# Patient Record
Sex: MALE | Race: WHITE | Employment: UNEMPLOYED | URBAN - METROPOLITAN AREA
[De-identification: names, ages, dates, MRNs, and addresses within clinical notes are randomized per-mention and may not be internally consistent; named-entity substitution may affect disease eponyms.]

---

## 2017-03-11 ENCOUNTER — HOSPITAL ENCOUNTER (EMERGENCY)
Age: 52
Discharge: HOME | End: 2017-03-11
Attending: EMERGENCY MEDICINE
Payer: SELF-PAY

## 2017-03-11 ENCOUNTER — APPOINTMENT (OUTPATIENT)
Dept: GENERAL RADIOLOGY | Age: 52
End: 2017-03-11
Attending: EMERGENCY MEDICINE
Payer: SELF-PAY

## 2017-03-11 VITALS
SYSTOLIC BLOOD PRESSURE: 180 MMHG | WEIGHT: 250 LBS | BODY MASS INDEX: 33.86 KG/M2 | HEIGHT: 72 IN | OXYGEN SATURATION: 94 % | TEMPERATURE: 98 F | DIASTOLIC BLOOD PRESSURE: 96 MMHG | HEART RATE: 105 BPM | RESPIRATION RATE: 18 BRPM

## 2017-03-11 DIAGNOSIS — M16.10 ARTHRITIS PAIN OF HIP: Primary | ICD-10-CM

## 2017-03-11 PROCEDURE — 74011250637 HC RX REV CODE- 250/637: Performed by: EMERGENCY MEDICINE

## 2017-03-11 PROCEDURE — 73502 X-RAY EXAM HIP UNI 2-3 VIEWS: CPT

## 2017-03-11 PROCEDURE — 96372 THER/PROPH/DIAG INJ SC/IM: CPT | Performed by: EMERGENCY MEDICINE

## 2017-03-11 PROCEDURE — 74011250636 HC RX REV CODE- 250/636: Performed by: EMERGENCY MEDICINE

## 2017-03-11 PROCEDURE — 99284 EMERGENCY DEPT VISIT MOD MDM: CPT | Performed by: EMERGENCY MEDICINE

## 2017-03-11 RX ORDER — HYDROMORPHONE HYDROCHLORIDE 1 MG/ML
2 INJECTION, SOLUTION INTRAMUSCULAR; INTRAVENOUS; SUBCUTANEOUS
Status: COMPLETED | OUTPATIENT
Start: 2017-03-11 | End: 2017-03-11

## 2017-03-11 RX ORDER — ONDANSETRON 4 MG/1
4 TABLET, ORALLY DISINTEGRATING ORAL
Status: COMPLETED | OUTPATIENT
Start: 2017-03-11 | End: 2017-03-11

## 2017-03-11 RX ORDER — CELECOXIB 200 MG/1
200 CAPSULE ORAL DAILY
Qty: 30 CAP | Refills: 0 | Status: SHIPPED | OUTPATIENT
Start: 2017-03-11 | End: 2018-01-24

## 2017-03-11 RX ADMIN — HYDROMORPHONE HYDROCHLORIDE 2 MG: 1 INJECTION, SOLUTION INTRAMUSCULAR; INTRAVENOUS; SUBCUTANEOUS at 20:20

## 2017-03-11 RX ADMIN — ONDANSETRON 4 MG: 4 TABLET, ORALLY DISINTEGRATING ORAL at 20:19

## 2017-03-11 NOTE — ED TRIAGE NOTES
Patient arrives via EMS from home. States broke right hip 3-4 years ago and did not have money to have surgery. States he \"over did\" it today with ambulating. VSS for ems, but hypertensive.

## 2017-03-12 NOTE — ED NOTES
Patient awake, A&O x3. Patient states he broke his hip 3-4 years ago and never had it fixed. Patient states that today he \"overdid it\" and is now having 10/10, sharp stabbing right hip pain. Patient denies numbness/tingling. Patient is concerned it may be re-broken.

## 2017-03-12 NOTE — DISCHARGE INSTRUCTIONS
Hip Pain: Care Instructions  Your Care Instructions  Hip pain may be caused by many things, including overuse, a fall, or a twisting movement. Another cause of hip pain is arthritis. Your pain may increase when you stand up, walk, or squat. The pain may come and go or may be constant. Home treatment can help relieve hip pain, swelling, and stiffness. If your pain is ongoing, you may need more tests and treatment. Follow-up care is a key part of your treatment and safety. Be sure to make and go to all appointments, and call your doctor if you are having problems. Its also a good idea to know your test results and keep a list of the medicines you take. How can you care for yourself at home? · Take pain medicines exactly as directed. ¨ If the doctor gave you a prescription medicine for pain, take it as prescribed. ¨ If you are not taking a prescription pain medicine, ask your doctor if you can take an over-the-counter medicine. · Rest and protect your hip. Take a break from any activity, including standing or walking, that may cause pain. · Put ice or a cold pack against your hip for 10 to 20 minutes at a time. Try to do this every 1 to 2 hours for the next 3 days (when you are awake) or until the swelling goes down. Put a thin cloth between the ice and your skin. · Sleep on your healthy side with a pillow between your knees, or sleep on your back with pillows under your knees. · If there is no swelling, you can put moist heat, a heating pad, or a warm cloth on your hip. Do gentle stretching exercises to help keep your hip flexible. · Learn how to prevent falls. Have your vision and hearing checked regularly. Wear slippers or shoes with a nonskid sole. · Stay at a healthy weight. · Wear comfortable shoes. When should you call for help? Call 911 anytime you think you may need emergency care. For example, call if:  · You have sudden chest pain and shortness of breath, or you cough up blood.   · You are not able to stand or walk or bear weight. · Your buttocks, legs, or feet feel numb or tingly. · Your leg or foot is cool or pale or changes color. · You have severe pain. Call your doctor now or seek immediate medical care if:  · You have signs of infection, such as:  ¨ Increased pain, swelling, warmth, or redness in the hip area. ¨ Red streaks leading from the hip area. ¨ Pus draining from the hip area. ¨ A fever. · You have signs of a blood clot, such as:  ¨ Pain in your calf, back of the knee, thigh, or groin. ¨ Redness and swelling in your leg or groin. · You are not able to bend, straighten, or move your leg normally. · You have trouble urinating or having bowel movements. Watch closely for changes in your health, and be sure to contact your doctor if:  · You do not get better as expected. Where can you learn more? Go to http://regina-jacy.info/. Enter O038 in the search box to learn more about \"Hip Pain: Care Instructions. \"  Current as of: May 27, 2016  Content Version: 11.1  © 2799-1098 [employer]. Care instructions adapted under license by [employer] (which disclaims liability or warranty for this information). If you have questions about a medical condition or this instruction, always ask your healthcare professional. [place] [address] any warranty or liability for your use of this information.

## 2017-03-12 NOTE — ED NOTES
I have reviewed medications, follow up provider options, and discharge instructions with the patient and spouse. The patient and spouse verbalized understanding. Copy of discharge information given to spouse upon discharge. Prescription(s) given to spouse. Patient discharged in no distress. Patient taken via wheelchair to waiting area.  No questions at this time

## 2017-03-12 NOTE — ED PROVIDER NOTES
HPI Comments: 69-year-old male reports chronic pain in his right hip for the past 3 years since falling out of a hammock. He's had several emergency department visits to [facility], [address] [place] [address], and [facility], according to the patient over the years. He states he has been told that his hip is \"so broken that it is amazing that he is walking. \"  He states he has never seen a orthopedist because he did not have insurance. He takes BC powder for pain. Pain worsened today. No new injury, fever, redness, rash. No numbness or weakness. Patient is a 46 y.o. male presenting with hip pain. The history is provided by the patient. Hip Injury    Pertinent negatives include no numbness and no back pain. Past Medical History:   Diagnosis Date    Sleep disorder        No past surgical history on file. No family history on file. Social History     Social History    Marital status:      Spouse name: N/A    Number of children: N/A    Years of education: N/A     Occupational History    Not on file. Social History Main Topics    Smoking status: Current Every Day Smoker    Smokeless tobacco: Not on file    Alcohol use No    Drug use: No    Sexual activity: Not on file     Other Topics Concern    Not on file     Social History Narrative    No narrative on file         ALLERGIES: Review of patient's allergies indicates no known allergies. Review of Systems   Constitutional: Negative for fever. Genitourinary: Negative for difficulty urinating and dysuria. Musculoskeletal: Positive for arthralgias. Negative for back pain and joint swelling. Skin: Negative for wound. Neurological: Negative for weakness and numbness. Vitals:    03/11/17 1827   BP: (!) 186/98   Pulse: 94   Resp: 18   Temp: 97.8 °F (36.6 °C)   SpO2: 95%   Weight: 113.4 kg (250 lb)   Height: 6' (1.829 m)            Physical Exam   Constitutional: No distress.    morbid obesity   HENT:   Head: Normocephalic and atraumatic. Eyes: Conjunctivae and EOM are normal. Pupils are equal, round, and reactive to light. Neck: Neck supple. Musculoskeletal:        Right hip: He exhibits decreased range of motion and tenderness. He exhibits no swelling, no crepitus and no deformity. Legs:  Neurological: He is alert. He has normal strength. No sensory deficit. He exhibits normal muscle tone. Skin: Skin is warm and dry. No rash noted. No erythema. Psychiatric: He has a normal mood and affect. Nursing note and vitals reviewed. MDM  Number of Diagnoses or Management Options  Diagnosis management comments: Parts of this document were created using dragon voice recognition software. The chart has been reviewed but errors may still be present. MRI in 2015 showed advanced arthritis. Will treat pain and check xray. Again expressed importance of ortho fu for definitive treatment. 9:12 PM  Left information with  for assistance. Pain improved. Amount and/or Complexity of Data Reviewed  Tests in the radiology section of CPT®: ordered and reviewed (Xr Hip Rt W Or Wo Pelv 2-3 Vws    Result Date: 3/11/2017  Exam: AP view of the pelvis, and AP and lateral views of the right hip Indication:  Right hip pain Comparison: Right hip radiograph from May 11, 2015 Findings: No definite evidence of acute fracture or dislocation. There is severe degenerative change in the right hip with joint space narrowing, osteophyte formation, and subchondral cystic change. Mild degenerative change in the lower lumbar spine. SI joints and symphysis pubis are within normal limits. No radiopaque foreign body or significant soft tissue abnormality is identified. Impression: Severe degenerative change in the right hip without evidence of acute fracture.  If patient is nonweightbearing or there remains clinical concern for fracture an MRI would be more sensitive.    )  Tests in the medicine section of CPT®: ordered and reviewed      ED Course       Procedures

## 2018-01-06 ENCOUNTER — HOSPITAL ENCOUNTER (INPATIENT)
Age: 53
LOS: 18 days | Discharge: HOME | DRG: 871 | End: 2018-01-24
Attending: EMERGENCY MEDICINE | Admitting: FAMILY MEDICINE
Payer: SELF-PAY

## 2018-01-06 ENCOUNTER — APPOINTMENT (OUTPATIENT)
Dept: CT IMAGING | Age: 53
DRG: 871 | End: 2018-01-06
Attending: EMERGENCY MEDICINE
Payer: SELF-PAY

## 2018-01-06 ENCOUNTER — APPOINTMENT (OUTPATIENT)
Dept: GENERAL RADIOLOGY | Age: 53
DRG: 871 | End: 2018-01-06
Attending: EMERGENCY MEDICINE
Payer: SELF-PAY

## 2018-01-06 DIAGNOSIS — J96.21 ACUTE ON CHRONIC RESPIRATORY FAILURE WITH HYPOXIA AND HYPERCAPNIA (HCC): ICD-10-CM

## 2018-01-06 DIAGNOSIS — I50.9 ACUTE CONGESTIVE HEART FAILURE, UNSPECIFIED CONGESTIVE HEART FAILURE TYPE: Primary | ICD-10-CM

## 2018-01-06 DIAGNOSIS — Z59.00 HOMELESS: ICD-10-CM

## 2018-01-06 DIAGNOSIS — R60.1 ANASARCA: ICD-10-CM

## 2018-01-06 DIAGNOSIS — G47.33 OSA (OBSTRUCTIVE SLEEP APNEA): Chronic | ICD-10-CM

## 2018-01-06 DIAGNOSIS — F17.200 NICOTINE DEPENDENCE, UNCOMPLICATED, UNSPECIFIED NICOTINE PRODUCT TYPE: Chronic | ICD-10-CM

## 2018-01-06 DIAGNOSIS — A41.9 SEVERE SEPSIS (HCC): ICD-10-CM

## 2018-01-06 DIAGNOSIS — J96.22 ACUTE ON CHRONIC RESPIRATORY FAILURE WITH HYPOXIA AND HYPERCAPNIA (HCC): ICD-10-CM

## 2018-01-06 DIAGNOSIS — N35.9 URETHRAL STRICTURE, UNSPECIFIED STRICTURE TYPE: ICD-10-CM

## 2018-01-06 DIAGNOSIS — G93.41 ACUTE METABOLIC ENCEPHALOPATHY: ICD-10-CM

## 2018-01-06 DIAGNOSIS — R65.20 SEVERE SEPSIS (HCC): ICD-10-CM

## 2018-01-06 DIAGNOSIS — J44.9 CHRONIC OBSTRUCTIVE PULMONARY DISEASE, UNSPECIFIED COPD TYPE (HCC): Chronic | ICD-10-CM

## 2018-01-06 DIAGNOSIS — F15.10 METHAMPHETAMINE ABUSE (HCC): ICD-10-CM

## 2018-01-06 DIAGNOSIS — F10.10 ALCOHOL ABUSE: Chronic | ICD-10-CM

## 2018-01-06 DIAGNOSIS — N17.9 ACUTE KIDNEY INJURY (HCC): ICD-10-CM

## 2018-01-06 DIAGNOSIS — J18.9 COMMUNITY ACQUIRED PNEUMONIA OF RIGHT LOWER LOBE OF LUNG: ICD-10-CM

## 2018-01-06 DIAGNOSIS — I95.9 HYPOTENSION, UNSPECIFIED HYPOTENSION TYPE: ICD-10-CM

## 2018-01-06 DIAGNOSIS — J96.21 ACUTE ON CHRONIC RESPIRATORY FAILURE WITH HYPOXIA (HCC): ICD-10-CM

## 2018-01-06 PROBLEM — J96.01 ACUTE RESPIRATORY FAILURE WITH HYPOXIA (HCC): Status: ACTIVE | Noted: 2018-01-06

## 2018-01-06 PROBLEM — R09.02 HYPOXIA: Status: ACTIVE | Noted: 2018-01-06

## 2018-01-06 LAB
ALBUMIN SERPL-MCNC: 3.5 G/DL (ref 3.5–5)
ALBUMIN/GLOB SERPL: 0.8 {RATIO} (ref 1.2–3.5)
ALP SERPL-CCNC: 98 U/L (ref 50–136)
ALT SERPL-CCNC: 32 U/L (ref 12–65)
ANION GAP SERPL CALC-SCNC: 5 MMOL/L (ref 7–16)
AST SERPL-CCNC: 29 U/L (ref 15–37)
BASOPHILS # BLD: 0 K/UL (ref 0–0.2)
BASOPHILS NFR BLD: 0 % (ref 0–2)
BILIRUB SERPL-MCNC: 0.4 MG/DL (ref 0.2–1.1)
BNP SERPL-MCNC: 533 PG/ML
BUN SERPL-MCNC: 24 MG/DL (ref 6–23)
CALCIUM SERPL-MCNC: 8.8 MG/DL (ref 8.3–10.4)
CHLORIDE SERPL-SCNC: 101 MMOL/L (ref 98–107)
CO2 SERPL-SCNC: 35 MMOL/L (ref 21–32)
CREAT SERPL-MCNC: 1.54 MG/DL (ref 0.8–1.5)
D DIMER PPP FEU-MCNC: 1.26 UG/ML(FEU)
DIFFERENTIAL METHOD BLD: ABNORMAL
EOSINOPHIL # BLD: 0.1 K/UL (ref 0–0.8)
EOSINOPHIL NFR BLD: 1 % (ref 0.5–7.8)
ERYTHROCYTE [DISTWIDTH] IN BLOOD BY AUTOMATED COUNT: 13.8 % (ref 11.9–14.6)
ETHANOL SERPL-MCNC: <3 MG/DL
GLOBULIN SER CALC-MCNC: 4.3 G/DL (ref 2.3–3.5)
GLUCOSE SERPL-MCNC: 101 MG/DL (ref 65–100)
HCT VFR BLD AUTO: 48.2 % (ref 41.1–50.3)
HGB BLD-MCNC: 14.6 G/DL (ref 13.6–17.2)
IMM GRANULOCYTES # BLD: 0 K/UL (ref 0–0.5)
IMM GRANULOCYTES NFR BLD AUTO: 0 % (ref 0–5)
LACTATE BLD-SCNC: 1.3 MMOL/L (ref 0.5–1.9)
LYMPHOCYTES # BLD: 1.7 K/UL (ref 0.5–4.6)
LYMPHOCYTES NFR BLD: 21 % (ref 13–44)
MCH RBC QN AUTO: 33.2 PG (ref 26.1–32.9)
MCHC RBC AUTO-ENTMCNC: 30.3 G/DL (ref 31.4–35)
MCV RBC AUTO: 109.5 FL (ref 79.6–97.8)
MONOCYTES # BLD: 0.8 K/UL (ref 0.1–1.3)
MONOCYTES NFR BLD: 10 % (ref 4–12)
NEUTS SEG # BLD: 5.4 K/UL (ref 1.7–8.2)
NEUTS SEG NFR BLD: 68 % (ref 43–78)
PLATELET # BLD AUTO: 216 K/UL (ref 150–450)
PMV BLD AUTO: 10.3 FL (ref 10.8–14.1)
POTASSIUM SERPL-SCNC: 5 MMOL/L (ref 3.5–5.1)
PROT SERPL-MCNC: 7.8 G/DL (ref 6.3–8.2)
RBC # BLD AUTO: 4.4 M/UL (ref 4.23–5.67)
SODIUM SERPL-SCNC: 141 MMOL/L (ref 136–145)
TROPONIN I BLD-MCNC: 0.02 NG/ML (ref 0.02–0.05)
WBC # BLD AUTO: 8 K/UL (ref 4.3–11.1)

## 2018-01-06 PROCEDURE — 94640 AIRWAY INHALATION TREATMENT: CPT

## 2018-01-06 PROCEDURE — 80053 COMPREHEN METABOLIC PANEL: CPT | Performed by: EMERGENCY MEDICINE

## 2018-01-06 PROCEDURE — 83605 ASSAY OF LACTIC ACID: CPT

## 2018-01-06 PROCEDURE — 74011000258 HC RX REV CODE- 258: Performed by: EMERGENCY MEDICINE

## 2018-01-06 PROCEDURE — 74011250636 HC RX REV CODE- 250/636: Performed by: FAMILY MEDICINE

## 2018-01-06 PROCEDURE — 65660000000 HC RM CCU STEPDOWN

## 2018-01-06 PROCEDURE — 96374 THER/PROPH/DIAG INJ IV PUSH: CPT | Performed by: EMERGENCY MEDICINE

## 2018-01-06 PROCEDURE — 74011250637 HC RX REV CODE- 250/637: Performed by: EMERGENCY MEDICINE

## 2018-01-06 PROCEDURE — 80307 DRUG TEST PRSMV CHEM ANLYZR: CPT | Performed by: FAMILY MEDICINE

## 2018-01-06 PROCEDURE — 87040 BLOOD CULTURE FOR BACTERIA: CPT | Performed by: EMERGENCY MEDICINE

## 2018-01-06 PROCEDURE — 99285 EMERGENCY DEPT VISIT HI MDM: CPT | Performed by: EMERGENCY MEDICINE

## 2018-01-06 PROCEDURE — 93005 ELECTROCARDIOGRAM TRACING: CPT | Performed by: EMERGENCY MEDICINE

## 2018-01-06 PROCEDURE — 94762 N-INVAS EAR/PLS OXIMTRY CONT: CPT | Performed by: EMERGENCY MEDICINE

## 2018-01-06 PROCEDURE — 74011636320 HC RX REV CODE- 636/320: Performed by: EMERGENCY MEDICINE

## 2018-01-06 PROCEDURE — 74011000250 HC RX REV CODE- 250: Performed by: FAMILY MEDICINE

## 2018-01-06 PROCEDURE — 84484 ASSAY OF TROPONIN QUANT: CPT

## 2018-01-06 PROCEDURE — 83880 ASSAY OF NATRIURETIC PEPTIDE: CPT | Performed by: EMERGENCY MEDICINE

## 2018-01-06 PROCEDURE — 74011250636 HC RX REV CODE- 250/636: Performed by: EMERGENCY MEDICINE

## 2018-01-06 PROCEDURE — 85379 FIBRIN DEGRADATION QUANT: CPT | Performed by: EMERGENCY MEDICINE

## 2018-01-06 PROCEDURE — 94760 N-INVAS EAR/PLS OXIMETRY 1: CPT

## 2018-01-06 PROCEDURE — 85025 COMPLETE CBC W/AUTO DIFF WBC: CPT | Performed by: EMERGENCY MEDICINE

## 2018-01-06 PROCEDURE — 71045 X-RAY EXAM CHEST 1 VIEW: CPT

## 2018-01-06 PROCEDURE — 71260 CT THORAX DX C+: CPT

## 2018-01-06 RX ORDER — FUROSEMIDE 10 MG/ML
40 INJECTION INTRAMUSCULAR; INTRAVENOUS EVERY 12 HOURS
Status: DISCONTINUED | OUTPATIENT
Start: 2018-01-06 | End: 2018-01-12

## 2018-01-06 RX ORDER — POLYETHYLENE GLYCOL 3350 17 G/17G
17 POWDER, FOR SOLUTION ORAL DAILY PRN
Status: DISCONTINUED | OUTPATIENT
Start: 2018-01-06 | End: 2018-01-24 | Stop reason: HOSPADM

## 2018-01-06 RX ORDER — IPRATROPIUM BROMIDE AND ALBUTEROL SULFATE 2.5; .5 MG/3ML; MG/3ML
3 SOLUTION RESPIRATORY (INHALATION)
Status: DISCONTINUED | OUTPATIENT
Start: 2018-01-07 | End: 2018-01-10

## 2018-01-06 RX ORDER — ACETAMINOPHEN 325 MG/1
650 TABLET ORAL
Status: DISCONTINUED | OUTPATIENT
Start: 2018-01-06 | End: 2018-01-24 | Stop reason: HOSPADM

## 2018-01-06 RX ORDER — HEPARIN SODIUM 5000 [USP'U]/ML
5000 INJECTION, SOLUTION INTRAVENOUS; SUBCUTANEOUS EVERY 8 HOURS
Status: DISCONTINUED | OUTPATIENT
Start: 2018-01-06 | End: 2018-01-21

## 2018-01-06 RX ORDER — LISINOPRIL 20 MG/1
20 TABLET ORAL DAILY
Status: DISCONTINUED | OUTPATIENT
Start: 2018-01-07 | End: 2018-01-21

## 2018-01-06 RX ORDER — CALCIUM CARBONATE 200(500)MG
400 TABLET,CHEWABLE ORAL
Status: DISCONTINUED | OUTPATIENT
Start: 2018-01-06 | End: 2018-01-24 | Stop reason: HOSPADM

## 2018-01-06 RX ORDER — ONDANSETRON 2 MG/ML
4 INJECTION INTRAMUSCULAR; INTRAVENOUS
Status: DISCONTINUED | OUTPATIENT
Start: 2018-01-06 | End: 2018-01-24 | Stop reason: HOSPADM

## 2018-01-06 RX ORDER — ENOXAPARIN SODIUM 100 MG/ML
40 INJECTION SUBCUTANEOUS EVERY 24 HOURS
Status: DISCONTINUED | OUTPATIENT
Start: 2018-01-06 | End: 2018-01-06

## 2018-01-06 RX ORDER — SODIUM CHLORIDE 0.9 % (FLUSH) 0.9 %
10 SYRINGE (ML) INJECTION
Status: COMPLETED | OUTPATIENT
Start: 2018-01-06 | End: 2018-01-06

## 2018-01-06 RX ORDER — DIPHENHYDRAMINE HCL 25 MG
25 CAPSULE ORAL
Status: DISCONTINUED | OUTPATIENT
Start: 2018-01-06 | End: 2018-01-24 | Stop reason: HOSPADM

## 2018-01-06 RX ORDER — SODIUM CHLORIDE 0.9 % (FLUSH) 0.9 %
5-10 SYRINGE (ML) INJECTION EVERY 8 HOURS
Status: DISCONTINUED | OUTPATIENT
Start: 2018-01-06 | End: 2018-01-24 | Stop reason: HOSPADM

## 2018-01-06 RX ORDER — GUAIFENESIN 600 MG/1
600 TABLET, EXTENDED RELEASE ORAL
Status: DISCONTINUED | OUTPATIENT
Start: 2018-01-06 | End: 2018-01-24 | Stop reason: HOSPADM

## 2018-01-06 RX ORDER — SODIUM CHLORIDE 0.9 % (FLUSH) 0.9 %
5-10 SYRINGE (ML) INJECTION AS NEEDED
Status: DISCONTINUED | OUTPATIENT
Start: 2018-01-06 | End: 2018-01-24 | Stop reason: HOSPADM

## 2018-01-06 RX ORDER — ZOLPIDEM TARTRATE 5 MG/1
5 TABLET ORAL
Status: DISCONTINUED | OUTPATIENT
Start: 2018-01-06 | End: 2018-01-07

## 2018-01-06 RX ORDER — CARVEDILOL 3.12 MG/1
3.12 TABLET ORAL 2 TIMES DAILY WITH MEALS
Status: DISCONTINUED | OUTPATIENT
Start: 2018-01-07 | End: 2018-01-24 | Stop reason: HOSPADM

## 2018-01-06 RX ORDER — FUROSEMIDE 10 MG/ML
60 INJECTION INTRAMUSCULAR; INTRAVENOUS
Status: COMPLETED | OUTPATIENT
Start: 2018-01-06 | End: 2018-01-06

## 2018-01-06 RX ADMIN — IOPAMIDOL 100 ML: 755 INJECTION, SOLUTION INTRAVENOUS at 19:46

## 2018-01-06 RX ADMIN — Medication 10 ML: at 19:46

## 2018-01-06 RX ADMIN — Medication 5 ML: at 22:44

## 2018-01-06 RX ADMIN — FUROSEMIDE 60 MG: 10 INJECTION, SOLUTION INTRAMUSCULAR; INTRAVENOUS at 20:48

## 2018-01-06 RX ADMIN — HEPARIN SODIUM 5000 UNITS: 5000 INJECTION, SOLUTION INTRAVENOUS; SUBCUTANEOUS at 22:56

## 2018-01-06 RX ADMIN — SODIUM CHLORIDE 100 ML: 900 INJECTION, SOLUTION INTRAVENOUS at 19:46

## 2018-01-06 RX ADMIN — IPRATROPIUM BROMIDE AND ALBUTEROL SULFATE 3 ML: .5; 3 SOLUTION RESPIRATORY (INHALATION) at 23:20

## 2018-01-06 RX ADMIN — NITROGLYCERIN 1 INCH: 20 OINTMENT TOPICAL at 18:51

## 2018-01-06 SDOH — ECONOMIC STABILITY - HOUSING INSECURITY: HOMELESSNESS UNSPECIFIED: Z59.00

## 2018-01-06 NOTE — IP AVS SNAPSHOT
Floyd Simons 
 
 
 [facility] 
[phone] Patient: Trina Hancock MRN: [id number] :1965 A check mag indicates which time of day the medication should be taken. My Medications START taking these medications Instructions Each Dose to Equal  
 Morning Noon Evening Bedtime  
 albuterol-ipratropium 2.5 mg-0.5 mg/3 ml Nebu Commonly known as:  DUO-NEB  
   
 3 mL by Nebulization route every four (4) hours as needed. 3 mL  
    
   
   
   
  
 amiodarone 400 mg tablet Commonly known as:  Sari Peterson Take 1 Tab by mouth two (2) times a day. 400 mg  
    
  
   
   
  
   
  
 carvedilol 3.125 mg tablet Commonly known as:  Marisel Alvarez Take 1 Tab by mouth two (2) times daily (with meals). 3.125 mg  
    
  
   
   
  
   
  
 rivaroxaban 20 mg Tab tablet Commonly known as:  Wayna Deer Start taking on:  2018 Take 1 Tab by mouth daily (with breakfast). 20 mg  
    
  
   
   
   
  
 sertraline 50 mg tablet Commonly known as:  ZOLOFT Start taking on:  2018 Take 1 Tab by mouth daily. 50 mg  
    
  
   
   
   
  
 tiotropium 18 mcg inhalation capsule Commonly known as:  Dorian Bibber Take 1 Cap by inhalation daily. 1 Cap CHANGE how you take these medications Instructions Each Dose to Equal  
 Morning Noon Evening Bedtime  
 albuterol 90 mcg/actuation inhaler Commonly known as:  PROVENTIL HFA, VENTOLIN HFA, PROAIR HFA What changed:   
- when to take this 
- reasons to take this Take 2 Puffs by inhalation every four (4) hours as needed for Wheezing. 2 Puff STOP taking these medications   
 celecoxib 200 mg capsule Commonly known as:  CELEBREX HYDROcodone-acetaminophen 5-325 mg per tablet Commonly known as:  NORCO  
   
  
 levoFLOXacin 750 mg tablet Commonly known as:  Bhavna Haider  
   
  
 Where to Get Your Medications Information on where to get these meds will be given to you by the nurse or doctor. ! Ask your nurse or doctor about these medications  
  albuterol 90 mcg/actuation inhaler  
 albuterol-ipratropium 2.5 mg-0.5 mg/3 ml Nebu  
 amiodarone 400 mg tablet  
 carvedilol 3.125 mg tablet  
 rivaroxaban 20 mg Tab tablet  
 sertraline 50 mg tablet  
 tiotropium 18 mcg inhalation capsule

## 2018-01-06 NOTE — IP AVS SNAPSHOT
[address] 
[phone] Patient: Nereyda Coyne MRN: [id number] :1965 About your hospitalization You were admitted on:  2018 You last received care in the:  [facility] 3 TELEMETRY You were discharged on:  2018 Why you were hospitalized Your primary diagnosis was:  Acute On Chronic Respiratory Failure With Hypoxia And Hypercapnia (Hcc) Your diagnoses also included:  Copd (Chronic Obstructive Pulmonary Disease) (Hcc), Nicotine Dependence, Methamphetamine Abuse, Anasarca, Sebastián (Obstructive Sleep Apnea), Acute Kidney Injury (Hcc), Acute Metabolic Encephalopathy, Hypotension, Urethral Stricture, Severe Sepsis (Hcc), Community Acquired Pneumonia Of Right Lower Lobe Of Lung (Hcc), Alcohol Abuse, Homeless, Diarrhea, Atrial Flutter (Hcc) Follow-up Information Follow up With Details Comments Contact Info None   None ([phone]) Patient stated that they have no PCP 
  
   10:30 am appointment on  with [facility] Discharge Orders None A check mag indicates which time of day the medication should be taken. My Medications START taking these medications Instructions Each Dose to Equal  
 Morning Noon Evening Bedtime  
 albuterol-ipratropium 2.5 mg-0.5 mg/3 ml Nebu Commonly known as:  DUO-NEB  
   
 3 mL by Nebulization route every four (4) hours as needed. 3 mL  
    
   
   
   
  
 amiodarone 400 mg tablet Commonly known as:  Abdullahi Altamirano Take 1 Tab by mouth two (2) times a day. 400 mg  
    
  
   
   
  
   
  
 carvedilol 3.125 mg tablet Commonly known as:  Lang Horne Take 1 Tab by mouth two (2) times daily (with meals). 3.125 mg  
    
  
   
   
  
   
  
 rivaroxaban 20 mg Tab tablet Commonly known as:  Staci Schwab Start taking on:  2018 Take 1 Tab by mouth daily (with breakfast).   
 20 mg  
    
  
   
   
   
  
 sertraline 50 mg tablet Commonly known as:  ZOLOFT Start taking on:  1/25/2018 Take 1 Tab by mouth daily. 50 mg  
    
  
   
   
   
  
 tiotropium 18 mcg inhalation capsule Commonly known as:  Dorian Adamchary Take 1 Cap by inhalation daily. 1 Cap CHANGE how you take these medications Instructions Each Dose to Equal  
 Morning Noon Evening Bedtime  
 albuterol 90 mcg/actuation inhaler Commonly known as:  PROVENTIL HFA, VENTOLIN HFA, PROAIR HFA What changed:   
- when to take this 
- reasons to take this Take 2 Puffs by inhalation every four (4) hours as needed for Wheezing. 2 Puff STOP taking these medications   
 celecoxib 200 mg capsule Commonly known as:  CELEBREX HYDROcodone-acetaminophen 5-325 mg per tablet Commonly known as:  NORCO  
   
  
 levoFLOXacin 750 mg tablet Commonly known as:  Bhavna Salines Where to Get Your Medications Information on where to get these meds will be given to you by the nurse or doctor. ! Ask your nurse or doctor about these medications  
  albuterol 90 mcg/actuation inhaler  
 albuterol-ipratropium 2.5 mg-0.5 mg/3 ml Nebu  
 amiodarone 400 mg tablet  
 carvedilol 3.125 mg tablet  
 rivaroxaban 20 mg Tab tablet  
 sertraline 50 mg tablet  
 tiotropium 18 mcg inhalation capsule Discharge Instructions Acute Alcohol Intoxication: Care Instructions Your Care Instructions You have had treatment to help your body rid itself of alcohol. Too much alcohol upsets the body's fluid balance. Your doctor may have given you fluids and vitamins. For some people, drinking too much alcohol is a one-time event. For others, it is an ongoing problem. In either case, it is serious. It can be life-threatening. Follow-up care is a key part of your treatment and safety.  Be sure to make and go to all appointments, and call your doctor if you are having problems. It's also a good idea to know your test results and keep a list of the medicines you take. How can you care for yourself at home? · Do not drink and drive. · Be safe with medicines. Take your medicines exactly as prescribed. Call your doctor if you think you are having a problem with your medicine. · Your doctor may have prescribed disulfiram (Antabuse). Do not drink any alcohol while you are taking this medicine. You may have severe or even life-threatening side effects from even small amounts of alcohol. · If you were given medicine to prevent nausea, be sure to take it exactly as prescribed. · Before you take any medicine, tell your doctor if: 
¨ You have had a bad reaction to any medicines in the past. 
¨ You are taking other medicines, including over-the-counter ones, or have other health problems. ¨ You are or could be pregnant. · Be prepared to have some symptoms of withdrawal in the next few days. · Drink plenty of liquids in the next few days. · Seek help if you need it to stop drinking. Getting counseling and joining a support group can help you stay sober. Try a support group such as Alcoholics Anonymous. · Avoid alcohol when you take medicines. It can react with many medicines and cause serious problems. When should you call for help? Call 911 anytime you think you may need emergency care. For example, call if: 
? · You feel confused and are seeing things that are not there. ? · You are thinking about killing yourself or hurting others. ? · You have a seizure. ? · You vomit blood or what looks like coffee grounds. ?Call your doctor now or seek immediate medical care if: 
? · You have trembling, restlessness, sweating, and other withdrawal symptoms that are new or that get worse. ? · Your withdrawal symptoms come back after not bothering you for days or weeks. ? · You can't stop vomiting. ? Watch closely for changes in your health, and be sure to contact your doctor if: 
? · You need help to stop drinking. Where can you learn more? Go to http://regina-jacy.info/. Enter T102 in the search box to learn more about \"Acute Alcohol Intoxication: Care Instructions. \" Current as of: November 3, 2016 Content Version: 11.4 © 8755-1560 [employer]. Care instructions adapted under license by [employer] (which disclaims liability or warranty for this information). If you have questions about a medical condition or this instruction, always ask your healthcare professional. [address] any warranty or liability for your use of this information. Chronic Obstructive Pulmonary Disease (COPD): Care Instructions Your Care Instructions Chronic obstructive pulmonary disease (COPD) is a general term for a group of lung diseases, including emphysema and chronic bronchitis. People with COPD have decreased airflow in and out of the lungs, which makes it hard to breathe. The airways also can get clogged with thick mucus. Cigarette smoking is a major cause of COPD. Although there is no cure for COPD, you can slow its progress. Following your treatment plan and taking care of yourself can help you feel better and live longer. Follow-up care is a key part of your treatment and safety. Be sure to make and go to all appointments, and call your doctor if you are having problems. It's also a good idea to know your test results and keep a list of the medicines you take. How can you care for yourself at home? ?Staying healthy ? · Do not smoke. This is the most important step you can take to prevent more damage to your lungs. If you need help quitting, talk to your doctor about stop-smoking programs and medicines. These can increase your chances of quitting for good. ? · Avoid colds and flu. Get a pneumococcal vaccine shot. If you have had one before, ask your doctor whether you need a second dose.  Get the flu vaccine every fall. If you must be around people with colds or the flu, wash your hands often. ? · Avoid secondhand smoke, air pollution, and high altitudes. Also avoid cold, dry air and hot, humid air. Stay at home with your windows closed when air pollution is bad. ?Medicines and oxygen therapy ? · Take your medicines exactly as prescribed. Call your doctor if you think you are having a problem with your medicine. ? · You may be taking medicines such as: ¨ Bronchodilators. These help open your airways and make breathing easier. Bronchodilators are either short-acting (work for 6 to 9 hours) or long-acting (work for 24 hours). You inhale most bronchodilators, so they start to act quickly. Always carry your quick-relief inhaler with you in case you need it while you are away from home. ¨ Corticosteroids (prednisone, budesonide). These reduce airway inflammation. They come in pill or inhaled form. You must take these medicines every day for them to work well. ? · A spacer may help you get more inhaled medicine to your lungs. Ask your doctor or pharmacist if a spacer is right for you. If it is, ask how to use it properly. ? · Do not take any vitamins, over-the-counter medicine, or herbal products without talking to your doctor first.  
? · If your doctor prescribed antibiotics, take them as directed. Do not stop taking them just because you feel better. You need to take the full course of antibiotics. ? · Oxygen therapy boosts the amount of oxygen in your blood and helps you breathe easier. Use the flow rate your doctor has recommended, and do not change it without talking to your doctor first.  
Activity ? · Get regular exercise. Walking is an easy way to get exercise. Start out slowly, and walk a little more each day. ? · Pay attention to your breathing. You are exercising too hard if you cannot talk while you are exercising.   
? · Take short rest breaks when doing household chores and other activities. ? · Learn breathing methods-such as breathing through pursed lips-to help you become less short of breath. ? · If your doctor has not set you up with a pulmonary rehabilitation program, talk to him or her about whether rehab is right for you. Rehab includes exercise programs, education about your disease and how to manage it, help with diet and other changes, and emotional support. Diet ? · Eat regular, healthy meals. Use bronchodilators about 1 hour before you eat to make it easier to eat. Eat several small meals instead of three large ones. Drink beverages at the end of the meal. Avoid foods that are hard to chew. ? · Eat foods that contain protein so that you do not lose muscle mass. ? · Talk with your doctor if you gain too much weight or if you lose weight without trying. ?Mental health ? · Talk to your family, friends, or a therapist about your feelings. It is normal to feel frightened, angry, hopeless, helpless, and even guilty. Talking openly about bad feelings can help you cope. If these feelings last, talk to your doctor. When should you call for help? Call 911 anytime you think you may need emergency care. For example, call if: 
? · You have severe trouble breathing. ?Call your doctor now or seek immediate medical care if: 
? · You have new or worse trouble breathing. ? · You cough up blood. ? · You have a fever. ? Watch closely for changes in your health, and be sure to contact your doctor if: 
? · You cough more deeply or more often, especially if you notice more mucus or a change in the color of your mucus. ? · You have new or worse swelling in your legs or belly. ? · You are not getting better as expected. Where can you learn more? Go to http://regina-jacy.info/. Arash English in the search box to learn more about \"Chronic Obstructive Pulmonary Disease (COPD): Care Instructions. \" Current as of: May 12, 2017 Content Version: 11.4 © 6505-7395 [employer]. Care instructions adapted under license by [employer] (which disclaims liability or warranty for this information). If you have questions about a medical condition or this instruction, always ask your healthcare professional. [address] any warranty or liability for your use of this information. Learning About Cardioversion What is cardioversion? Cardioversion helps your heart return to a normal rhythm. It treats problems like atrial fibrillation. It is also sometimes used in emergencies. It can correct a fast heartbeat that causes low blood pressure, chest pain, or heart failure. There are two types: · The electrical type uses an electric current. The current enters your body through patches on your chest or back. · The chemical type uses medicines. The medicine is usually put into your arm through a tube called an IV. How is cardioversion done? Your doctor may ask you to take medicines before the treatment. These help prevent blood clots. Your doctor will watch you closely to make sure that there are no problems. Electrical cardioversion The electrical procedure is done in a hospital. You will get medicine to help you relax and control the pain. Your doctor will put patches on your chest or back. The patches send an electric current to your heart. This resets your heart rhythm. The electrical part takes about 5 minutes. But you will probably be in the hospital for 1 to 2 hours. You will need to recover from the effects of the sedative medicine. Chemical cardioversion The chemical procedure is most often done in a hospital. In most cases, the medicine is put into your arm through a tube called an IV. But you may get medicines to take by mouth. You may feel a quick sting or pinch when the IV starts. The procedure usually takes about 4 to 8 hours. What can you expect after cardioversion? · You can usually go home the same day. You will need someone to drive you home. · Your doctor may have you take medicines daily. These help your heart beat normally and prevent blood clots. · After electrical cardioversion, you may have redness where the patches were. This looks and feels like a sunburn. · Abnormal heart rhythms sometimes come back after cardioversion. Follow-up care is a key part of your treatment and safety. Be sure to make and go to all appointments, and call your doctor if you are having problems. It's also a good idea to know your test results and keep a list of the medicines you take. Where can you learn more? Go to http://regina-jacy.info/. Enter Z279 in the search box to learn more about \"Learning About Cardioversion. \" Current as of: September 21, 2016 Content Version: 11.4 © 0914-9572 [employer]. Care instructions adapted under license by [employer] (which disclaims liability or warranty for this information). If you have questions about a medical condition or this instruction, always ask your healthcare professional. [place] [address] any warranty or liability for your use of this information. DISCHARGE SUMMARY from Nurse PATIENT INSTRUCTIONS: 
 
 
F-face looks uneven A-arms unable to move or move unevenly S-speech slurred or non-existent T-time-call 911 as soon as signs and symptoms begin-DO NOT go Back to bed or wait to see if you get better-TIME IS BRAIN. Warning Signs of HEART ATTACK Call 911 if you have these symptoms: 
? Chest discomfort. Most heart attacks involve discomfort in the center of the chest that lasts more than a few minutes, or that goes away and comes back. It can feel like uncomfortable pressure, squeezing, fullness, or pain. ? Discomfort in other areas of the upper body. Symptoms can include pain or discomfort in one or both arms, the back, neck, jaw, or stomach. ? Shortness of breath with or without chest discomfort. ? Other signs may include breaking out in a cold sweat, nausea, or lightheadedness. Don't wait more than five minutes to call [address]! Fast action can save your life. Calling 911 is almost always the fastest way to get lifesaving treatment. Emergency Medical Services staff can begin treatment when they arrive  up to an hour sooner than if someone gets to the hospital by car. The discharge information has been reviewed with the patient. The patient verbalized understanding. Discharge medications reviewed with the patient and appropriate educational materials and side effects teaching were provided. [employer] Announcement We are excited to announce that we are making your provider's discharge notes available to you in [employer]. You will see these notes when they are completed and signed by the physician that discharged you from your recent hospital stay. If you have any questions or concerns about any information you see in [employer], please call the Health Information Department where you were seen or reach out to your Primary Care Provider for more information about your plan of care. Introducing hospitals & HEALTH SERVICES! [employer] introduces [employer] patient portal. Now you can access parts of your medical record, email your doctor's office, and request medication refills online. 1. In your internet browser, go to [email or web address] 2. Click on the First Time User? Click Here link in the Sign In box. You will see the New Member Sign Up page. 3. Enter your [employer] Access Code exactly as it appears below. You will not need to use this code after youve completed the sign-up process. If you do not sign up before the expiration date, you must request a new code. · [employer] Access Code: [email or web address] Expires: 4/6/2018  6:31 PM 
 
4. Enter the last four digits of your Social Security Number (xxxx) and Date of Birth (mm/dd/yyyy) as indicated and click Submit. You will be taken to the next sign-up page. 5. Create a [employer] ID. This will be your [employer] login ID and cannot be changed, so think of one that is secure and easy to remember. 6. Create a [employer] password. You can change your password at any time. 7. Enter your Password Reset Question and Answer. This can be used at a later time if you forget your password. 8. Enter your e-mail address. You will receive e-mail notification when new information is available in [address] 9. Click Sign Up. You can now view and download portions of your medical record. 10. Click the Download Summary menu link to download a portable copy of your medical information. If you have questions, please visit the Frequently Asked Questions section of the [employer] website. Remember, [employer] is NOT to be used for urgent needs. For medical emergencies, dial 911. Now available from your iPhone and Android! Unresulted Labs-Please follow up with your PCP about these lab tests Order Current Status CULTURE, BLOOD Preliminary result CULTURE, BLOOD Preliminary result Providers Seen During Your Hospitalization Provider Specialty Primary office phone Anup Dean MD Emergency Medicine [phone] Nahomy Acosta MD [facility] [phone] Immunizations Administered for This Admission Name Date Influenza Vaccine (Quad) PF 1/24/2018 TB Skin Test (PPD) Intradermal 1/7/2018 Your Primary Care Physician (PCP) Primary Care Physician Office Phone Office Fax NONE ** None ** ** None ** You are allergic to the following No active allergies Recent Documentation Height Weight BMI Smoking Status 1.727 m 131.3 kg 44.02 kg/m2 Current Every Day Smoker Emergency Contacts Name Discharge Info Relation Home Work Mobile Shyann Schaffer  Spouse [3] [phone] Patient Belongings The following personal items are in your possession at time of discharge: 
  Dental Appliances: None  Visual Aid: None      Home Medications: None   Jewelry: None  Clothing: Pants, Shirt, Sent home    Other Valuables: None Please provide this summary of care documentation to your next provider. Signatures-by signing, you are acknowledging that this After Visit Summary has been reviewed with you and you have received a copy. Patient Signature:  ____________________________________________________________ Date:  ____________________________________________________________  
  
[facility] Provider Signature:  ____________________________________________________________ Date:  ____________________________________________________________

## 2018-01-06 NOTE — IP AVS SNAPSHOT
Summary of Care Report The Summary of Care report has been created to help improve care coordination. Users with access to [facility] or [address] (Web-based application) may access additional patient information including the Discharge Summary. If you are not currently a [employer] [facility] user and need more information, please call the number listed below in the [place] [address] section and ask to be connected with Medical Records. Facility Information Name Address Phone [address] [address] [address] [phone] Patient Information Patient Name Sex  Mil Winters ([id number]) Male 1965 Discharge Information Admitting Provider Service Area Unit Erlinda Sims MD / [id number] Telemetry / [phone] Discharge Provider Discharge Date/Time Discharge Disposition Destination (none) 2018 (Pending) AHR (none) Patient Language Language ENGLISH [13] Hospital Problems as of 2018  Reviewed: 2018 11:46 AM by Marlen Matos NP Class Noted - Resolved Last Modified POA Active Problems COPD (chronic obstructive pulmonary disease) ([facility] [address] (Chronic)  2018 - Present 2018 by Stacie Gutierrez MD Yes Entered by Erlinda Sims MD  
  Nicotine dependence (Chronic)  2018 - Present 2018 by Stacie Gutierrez MD Yes Entered by Erlinda Sims MD  
  Methamphetamine abuse (Chronic)  2018 - Present 2018 by Yadiel Castro NP Yes Entered by Erlinda Sims MD  
  Anasarca  2018 - Present 2018 by Erlinda Sims MD Yes Entered by Erlinda Sims MD  
  NATALIIA (obstructive sleep apnea) (Chronic)  2018 - Present 2018 by Stacie Gutierrez MD Yes   Entered by Erlinda Sims MD  
 * (Principal)Acute on chronic respiratory failure with hypoxia and hypercapnia ([address]  1/6/2018 - Present 1/11/2018 by Rodney Manuel MD Yes Entered by Tiera Hernández MD  
  Urethral stricture  1/7/2018 - Present 1/7/2018 by Rodney Manuel MD Yes Entered by Tiera Hernández MD  
  Alcohol abuse (Chronic)  1/15/2018 - Present 1/15/2018 by Swathi August MD Yes Entered by Swathi August MD  
  Homeless (Chronic)  1/15/2018 - Present 1/23/2018 by Eather Cogan, NP No  
  Entered by Swathi August MD  
  
Non-Hospital Problems as of 1/24/2018  Reviewed: 1/19/2018 11:46 AM by Opal Chavez NP None You are allergic to the following No active allergies Current Discharge Medication List  
  
START taking these medications Dose & Instructions Dispensing Information Comments  
 albuterol-ipratropium 2.5 mg-0.5 mg/3 ml Nebu Commonly known as:  Charlette Bustle Dose:  3 mL  
3 mL by Nebulization route every four (4) hours as needed. Quantity:  30 Nebule Refills:  0  
   
 amiodarone 400 mg tablet Commonly known as:  Rochelle Andes Dose:  400 mg Take 1 Tab by mouth two (2) times a day. Quantity:  60 Tab Refills:  0  
   
 carvedilol 3.125 mg tablet Commonly known as:  Kirsten Carreon Dose:  3.125 mg Take 1 Tab by mouth two (2) times daily (with meals). Quantity:  60 Tab Refills:  0  
   
 rivaroxaban 20 mg Tab tablet Commonly known as:  Amira Colwich Start taking on:  1/25/2018 Dose:  20 mg Take 1 Tab by mouth daily (with breakfast). Quantity:  30 Tab Refills:  0  
   
 sertraline 50 mg tablet Commonly known as:  ZOLOFT Start taking on:  1/25/2018 Dose:  50 mg Take 1 Tab by mouth daily. Quantity:  15 Tab Refills:  0  
   
 tiotropium 18 mcg inhalation capsule Commonly known as:  Devota Marlene Dose:  1 Cap Take 1 Cap by inhalation daily. Quantity:  30 Cap Refills:  0 CONTINUE these medications which have CHANGED Dose & Instructions Dispensing Information Comments  
 albuterol 90 mcg/actuation inhaler Commonly known as:  PROVENTIL HFA, VENTOLIN HFA, PROAIR HFA What changed:   
- when to take this 
- reasons to take this Dose:  2 Puff Take 2 Puffs by inhalation every four (4) hours as needed for Wheezing. Quantity:  1 Inhaler Refills:  0 STOP taking these medications Comments  
 celecoxib 200 mg capsule Commonly known as:  CELEBREX HYDROcodone-acetaminophen 5-325 mg per tablet Commonly known as:  NORCO  
   
   
 levoFLOXacin 750 mg tablet Commonly known as:  Jimbo Lopez Current Immunizations Name Date Influenza Vaccine (Quad) PF 1/24/2018 TB Skin Test (PPD) Intradermal 1/7/2018 Follow-up Information Follow up With Details Comments Contact Info None   None ([phone]) Patient stated that they have no PCP 
  
   10:30 am [facility] Discharge Instructions Acute Alcohol Intoxication: Care Instructions Your Care Instructions You have had treatment to help your body rid itself of alcohol. Too much alcohol upsets the body's fluid balance. Your doctor may have given you fluids and vitamins. For some people, drinking too much alcohol is a one-time event. For others, it is an ongoing problem. In either case, it is serious. It can be life-threatening. Follow-up care is a key part of your treatment and safety. Be sure to make and go to all appointments, and call your doctor if you are having problems. It's also a good idea to know your test results and keep a list of the medicines you take. How can you care for yourself at home? · Do not drink and drive. · Be safe with medicines. Take your medicines exactly as prescribed. Call your doctor if you think you are having a problem with your medicine. · Your doctor may have prescribed disulfiram (Antabuse).  Do not drink any alcohol while you are taking this medicine. You may have severe or even life-threatening side effects from even small amounts of alcohol. · If you were given medicine to prevent nausea, be sure to take it exactly as prescribed. · Before you take any medicine, tell your doctor if: 
¨ You have had a bad reaction to any medicines in the past. 
¨ You are taking other medicines, including over-the-counter ones, or have other health problems. ¨ You are or could be pregnant. · Be prepared to have some symptoms of withdrawal in the next few days. · Drink plenty of liquids in the next few days. · Seek help if you need it to stop drinking. Getting counseling and joining a support group can help you stay sober. Try a support group such as Alcoholics Anonymous. · Avoid alcohol when you take medicines. It can react with many medicines and cause serious problems. When should you call for help? Call 911 anytime you think you may need emergency care. For example, call if: 
? · You feel confused and are seeing things that are not there. ? · You are thinking about killing yourself or hurting others. ? · You have a seizure. ? · You vomit blood or what looks like coffee grounds. ?Call your doctor now or seek immediate medical care if: 
? · You have trembling, restlessness, sweating, and other withdrawal symptoms that are new or that get worse. ? · Your withdrawal symptoms come back after not bothering you for days or weeks. ? · You can't stop vomiting. ? Watch closely for changes in your health, and be sure to contact your doctor if: 
? · You need help to stop drinking. Where can you learn more? Go to http://regina-jacy.info/. Enter T102 in the search box to learn more about \"Acute Alcohol Intoxication: Care Instructions. \" Current as of: November 3, 2016 Content Version: 11.4 © 7425-7637 Healthwise, Incorporated.  Care instructions adapted under license by [address] (which disclaims liability or warranty for this information). If you have questions about a medical condition or this instruction, always ask your healthcare professional. [address] any warranty or liability for your use of this information. Chronic Obstructive Pulmonary Disease (COPD): Care Instructions Your Care Instructions Chronic obstructive pulmonary disease (COPD) is a general term for a group of lung diseases, including emphysema and chronic bronchitis. People with COPD have decreased airflow in and out of the lungs, which makes it hard to breathe. The airways also can get clogged with thick mucus. Cigarette smoking is a major cause of COPD. Although there is no cure for COPD, you can slow its progress. Following your treatment plan and taking care of yourself can help you feel better and live longer. Follow-up care is a key part of your treatment and safety. Be sure to make and go to all appointments, and call your doctor if you are having problems. It's also a good idea to know your test results and keep a list of the medicines you take. How can you care for yourself at home? ?Staying healthy ? · Do not smoke. This is the most important step you can take to prevent more damage to your lungs. If you need help quitting, talk to your doctor about stop-smoking programs and medicines. These can increase your chances of quitting for good. ? · Avoid colds and flu. Get a pneumococcal vaccine shot. If you have had one before, ask your doctor whether you need a second dose. Get the flu vaccine every fall. If you must be around people with colds or the flu, wash your hands often. ? · Avoid secondhand smoke, air pollution, and high altitudes. Also avoid cold, dry air and hot, humid air. Stay at home with your windows closed when air pollution is bad. ?Medicines and oxygen therapy ? · Take your medicines exactly as prescribed. Call your doctor if you think you are having a problem with your medicine. ? · You may be taking medicines such as: ¨ Bronchodilators. These help open your airways and make breathing easier. Bronchodilators are either short-acting (work for 6 to 9 hours) or long-acting (work for 24 hours). You inhale most bronchodilators, so they start to act quickly. Always carry your quick-relief inhaler with you in case you need it while you are away from home. ¨ Corticosteroids (prednisone, budesonide). These reduce airway inflammation. They come in pill or inhaled form. You must take these medicines every day for them to work well. ? · A spacer may help you get more inhaled medicine to your lungs. Ask your doctor or pharmacist if a spacer is right for you. If it is, ask how to use it properly. ? · Do not take any vitamins, over-the-counter medicine, or herbal products without talking to your doctor first.  
? · If your doctor prescribed antibiotics, take them as directed. Do not stop taking them just because you feel better. You need to take the full course of antibiotics. ? · Oxygen therapy boosts the amount of oxygen in your blood and helps you breathe easier. Use the flow rate your doctor has recommended, and do not change it without talking to your doctor first.  
Activity ? · Get regular exercise. Walking is an easy way to get exercise. Start out slowly, and walk a little more each day. ? · Pay attention to your breathing. You are exercising too hard if you cannot talk while you are exercising. ? · Take short rest breaks when doing household chores and other activities. ? · Learn breathing methods-such as breathing through pursed lips-to help you become less short of breath. ? · If your doctor has not set you up with a pulmonary rehabilitation program, talk to him or her about whether rehab is right for you.  Rehab includes exercise programs, education about your disease and how to manage it, help with diet and other changes, and emotional support. Diet ? · Eat regular, healthy meals. Use bronchodilators about 1 hour before you eat to make it easier to eat. Eat several small meals instead of three large ones. Drink beverages at the end of the meal. Avoid foods that are hard to chew. ? · Eat foods that contain protein so that you do not lose muscle mass. ? · Talk with your doctor if you gain too much weight or if you lose weight without trying. ?Mental health ? · Talk to your family, friends, or a therapist about your feelings. It is normal to feel frightened, angry, hopeless, helpless, and even guilty. Talking openly about bad feelings can help you cope. If these feelings last, talk to your doctor. When should you call for help? Call 911 anytime you think you may need emergency care. For example, call if: 
? · You have severe trouble breathing. ?Call your doctor now or seek immediate medical care if: 
? · You have new or worse trouble breathing. ? · You cough up blood. ? · You have a fever. ? Watch closely for changes in your health, and be sure to contact your doctor if: 
? · You cough more deeply or more often, especially if you notice more mucus or a change in the color of your mucus. ? · You have new or worse swelling in your legs or belly. ? · You are not getting better as expected. Where can you learn more? Go to http://regina-jacy.info/. Nettie Andersen in the search box to learn more about \"Chronic Obstructive Pulmonary Disease (COPD): Care Instructions. \" Current as of: May 12, 2017 Content Version: 11.4 © 1066-1289 [employer]. Care instructions adapted under license by [employer] (which disclaims liability or warranty for this information).  If you have questions about a medical condition or this instruction, always ask your healthcare professional. [address] any warranty or liability for your use of this information. Learning About Cardioversion What is cardioversion? Cardioversion helps your heart return to a normal rhythm. It treats problems like atrial fibrillation. It is also sometimes used in emergencies. It can correct a fast heartbeat that causes low blood pressure, chest pain, or heart failure. There are two types: · The electrical type uses an electric current. The current enters your body through patches on your chest or back. · The chemical type uses medicines. The medicine is usually put into your arm through a tube called an IV. How is cardioversion done? Your doctor may ask you to take medicines before the treatment. These help prevent blood clots. Your doctor will watch you closely to make sure that there are no problems. Electrical cardioversion The electrical procedure is done in a hospital. You will get medicine to help you relax and control the pain. Your doctor will put patches on your chest or back. The patches send an electric current to your heart. This resets your heart rhythm. The electrical part takes about 5 minutes. But you will probably be in the hospital for 1 to 2 hours. You will need to recover from the effects of the sedative medicine. Chemical cardioversion The chemical procedure is most often done in a hospital. In most cases, the medicine is put into your arm through a tube called an IV. But you may get medicines to take by mouth. You may feel a quick sting or pinch when the IV starts. The procedure usually takes about 4 to 8 hours. What can you expect after cardioversion? · You can usually go home the same day. You will need someone to drive you home. · Your doctor may have you take medicines daily. These help your heart beat normally and prevent blood clots. · After electrical cardioversion, you may have redness where the patches were. This looks and feels like a sunburn. · Abnormal heart rhythms sometimes come back after cardioversion. Follow-up care is a key part of your treatment and safety. Be sure to make and go to all appointments, and call your doctor if you are having problems. It's also a good idea to know your test results and keep a list of the medicines you take. Where can you learn more? Go to http://regina-jacy.info/. Enter W628 in the search box to learn more about \"Learning About Cardioversion. \" Current as of: September 21, 2016 Content Version: 11.4 © 1387-0187 [employer]. Care instructions adapted under license by [employer] (which disclaims liability or warranty for this information). If you have questions about a medical condition or this instruction, always ask your healthcare professional. [place] [address] any warranty or liability for your use of this information. DISCHARGE SUMMARY from Nurse PATIENT INSTRUCTIONS: 
 
After general anesthesia or intravenous sedation, for 24 hours or while taking prescription Narcotics: · Limit your activities · Do not drive and operate hazardous machinery · Do not make important personal or business decisions · Do  not drink alcoholic beverages · If you have not urinated within 8 hours after discharge, please contact your surgeon on call. Report the following to your surgeon: 
· Excessive pain, swelling, redness or odor of or around the surgical area · Temperature over 100.5 · Nausea and vomiting lasting longer than 4 hours or if unable to take medications · Any signs of decreased circulation or nerve impairment to extremity: change in color, persistent  numbness, tingling, coldness or increase pain · Any questions What to do at Home: 
Recommended activity: Activity as tolerated, 
 
 *  Please give a list of your current medications to your Primary Care Provider. *  Please update this list whenever your medications are discontinued, doses are 
    changed, or new medications (including over-the-counter products) are added. *  Please carry medication information at all times in case of emergency situations. These are general instructions for a healthy lifestyle: No smoking/ No tobacco products/ Avoid exposure to second hand smoke Surgeon General's Warning:  Quitting smoking now greatly reduces serious risk to your health. Obesity, smoking, and sedentary lifestyle greatly increases your risk for illness A healthy diet, regular physical exercise & weight monitoring are important for maintaining a healthy lifestyle You may be retaining fluid if you have a history of heart failure or if you experience any of the following symptoms:  Weight gain of 3 pounds or more overnight or 5 pounds in a week, increased swelling in our hands or feet or shortness of breath while lying flat in bed. Please call your doctor as soon as you notice any of these symptoms; do not wait until your next office visit. Recognize signs and symptoms of STROKE: 
 
F-face looks uneven A-arms unable to move or move unevenly S-speech slurred or non-existent T-time-call 911 as soon as signs and symptoms begin-DO NOT go Back to bed or wait to see if you get better-TIME IS BRAIN. Warning Signs of HEART ATTACK Call 911 if you have these symptoms: 
? Chest discomfort. Most heart attacks involve discomfort in the center of the chest that lasts more than a few minutes, or that goes away and comes back. It can feel like uncomfortable pressure, squeezing, fullness, or pain. ? Discomfort in other areas of the upper body. Symptoms can include pain or discomfort in one or both arms, the back, neck, jaw, or stomach. ? Shortness of breath with or without chest discomfort. ? Other signs may include breaking out in a cold sweat, nausea, or lightheadedness. Don't wait more than five minutes to call [address]! Fast action can save your life. Calling 911 is almost always the fastest way to get lifesaving treatment. Emergency Medical Services staff can begin treatment when they arrive  up to an hour sooner than if someone gets to the hospital by car. The discharge information has been reviewed with the patient. The patient verbalized understanding. Discharge medications reviewed with the patient and appropriate educational materials and side effects teaching were provided. Chart Review Routing History Recipient Method Report Sent By Pablo Boyd MD  
Fax: [phone] Phone: [phone] Fax Notes Report Brea Pain [[id number]] 3/21/2017 12:05 PM 3/11/2017

## 2018-01-06 NOTE — ED TRIAGE NOTES
EMS called by family for low abd pain and increased SOB. Pt has hx of \"lung problems\" but has refused to see a PCP for several years. Reports SOB for several months. Per EMS initial O2 sat 58% RA and 88% on 6L, given 125mg SoluMedrol and 10mg albuterol neb en route with some improvement.

## 2018-01-07 ENCOUNTER — APPOINTMENT (OUTPATIENT)
Dept: GENERAL RADIOLOGY | Age: 53
DRG: 871 | End: 2018-01-07
Attending: INTERNAL MEDICINE
Payer: SELF-PAY

## 2018-01-07 PROBLEM — J96.21 ACUTE ON CHRONIC RESPIRATORY FAILURE WITH HYPOXIA AND HYPERCAPNIA (HCC): Status: ACTIVE | Noted: 2018-01-06

## 2018-01-07 PROBLEM — I95.9 HYPOTENSION: Status: ACTIVE | Noted: 2018-01-07

## 2018-01-07 PROBLEM — G93.41 ACUTE METABOLIC ENCEPHALOPATHY: Status: ACTIVE | Noted: 2018-01-07

## 2018-01-07 PROBLEM — F17.200 NICOTINE DEPENDENCE: Chronic | Status: ACTIVE | Noted: 2018-01-06

## 2018-01-07 PROBLEM — G47.33 OSA (OBSTRUCTIVE SLEEP APNEA): Chronic | Status: ACTIVE | Noted: 2018-01-06

## 2018-01-07 PROBLEM — J96.22 ACUTE ON CHRONIC RESPIRATORY FAILURE WITH HYPOXIA AND HYPERCAPNIA (HCC): Status: ACTIVE | Noted: 2018-01-07

## 2018-01-07 PROBLEM — N35.919 URETHRAL STRICTURE: Status: ACTIVE | Noted: 2018-01-07

## 2018-01-07 PROBLEM — F15.10 METHAMPHETAMINE ABUSE (HCC): Chronic | Status: ACTIVE | Noted: 2018-01-06

## 2018-01-07 PROBLEM — J44.9 COPD (CHRONIC OBSTRUCTIVE PULMONARY DISEASE) (HCC): Chronic | Status: ACTIVE | Noted: 2018-01-06

## 2018-01-07 PROBLEM — J96.21 ACUTE ON CHRONIC RESPIRATORY FAILURE WITH HYPOXIA (HCC): Status: ACTIVE | Noted: 2018-01-06

## 2018-01-07 PROBLEM — J96.22 ACUTE ON CHRONIC RESPIRATORY FAILURE WITH HYPOXIA AND HYPERCAPNIA (HCC): Status: ACTIVE | Noted: 2018-01-06

## 2018-01-07 PROBLEM — J96.21 ACUTE ON CHRONIC RESPIRATORY FAILURE WITH HYPOXIA AND HYPERCAPNIA (HCC): Status: ACTIVE | Noted: 2018-01-07

## 2018-01-07 LAB
AMPHET UR QL SCN: POSITIVE
ANION GAP SERPL CALC-SCNC: 7 MMOL/L (ref 7–16)
ANION GAP SERPL CALC-SCNC: 8 MMOL/L (ref 7–16)
ARTERIAL PATENCY WRIST A: POSITIVE
ARTERIAL PATENCY WRIST A: POSITIVE
ATRIAL RATE: 107 BPM
BARBITURATES UR QL SCN: NEGATIVE
BASE EXCESS BLDA CALC-SCNC: 4.2 MMOL/L (ref 0–3)
BASE EXCESS BLDA CALC-SCNC: 4.3 MMOL/L (ref 0–3)
BDY SITE: ABNORMAL
BDY SITE: ABNORMAL
BENZODIAZ UR QL: NEGATIVE
BNP SERPL-MCNC: 697 PG/ML
BUN SERPL-MCNC: 26 MG/DL (ref 6–23)
BUN SERPL-MCNC: 29 MG/DL (ref 6–23)
CA-I BLD-SCNC: 1.2 MMOL/L (ref 1–1.3)
CALCIUM SERPL-MCNC: 8.2 MG/DL (ref 8.3–10.4)
CALCIUM SERPL-MCNC: 8.9 MG/DL (ref 8.3–10.4)
CALCULATED P AXIS, ECG09: 54 DEGREES
CALCULATED R AXIS, ECG10: 99 DEGREES
CALCULATED T AXIS, ECG11: 55 DEGREES
CANNABINOIDS UR QL SCN: NEGATIVE
CHLORIDE BLDA-SCNC: 97 MMOL/L (ref 98–106)
CHLORIDE SERPL-SCNC: 101 MMOL/L (ref 98–107)
CHLORIDE SERPL-SCNC: 98 MMOL/L (ref 98–107)
CO2 SERPL-SCNC: 35 MMOL/L (ref 21–32)
CO2 SERPL-SCNC: 36 MMOL/L (ref 21–32)
COCAINE UR QL SCN: NEGATIVE
COHGB MFR BLD: 1.5 % (ref 0.5–1.5)
COHGB MFR BLD: 2.5 % (ref 0.5–1.5)
CREAT SERPL-MCNC: 1.81 MG/DL (ref 0.8–1.5)
CREAT SERPL-MCNC: 2.11 MG/DL (ref 0.8–1.5)
DIAGNOSIS, 93000: NORMAL
DO-HGB BLD-MCNC: 1 % (ref 0–5)
DO-HGB BLD-MCNC: 2 % (ref 0–5)
FIO2 ON VENT: 70 %
GLUCOSE SERPL-MCNC: 183 MG/DL (ref 65–100)
GLUCOSE SERPL-MCNC: 93 MG/DL (ref 65–100)
HCO3 BLDA-SCNC: 35 MMOL/L (ref 22–26)
HCO3 BLDA-SCNC: 39 MMOL/L (ref 22–26)
HGB BLDMV-MCNC: 14.1 GM/DL (ref 11.7–15)
HGB BLDMV-MCNC: 15.2 GM/DL (ref 11.7–15)
METHADONE UR QL: NEGATIVE
METHGB MFR BLD: 0.4 % (ref 0–1.5)
METHGB MFR BLD: 0.4 % (ref 0–1.5)
OPIATES UR QL: NEGATIVE
OXYHGB MFR BLDA: 95.8 % (ref 94–97)
OXYHGB MFR BLDA: 96.5 % (ref 94–97)
P-R INTERVAL, ECG05: 170 MS
PCO2 BLDA: 125 MMHG (ref 35–45)
PCO2 BLDA: 85 MMHG (ref 35–45)
PCP UR QL: NEGATIVE
PEEP RESPIRATORY: 10 CM[H2O]
PH BLDA: 7.11 [PH] (ref 7.35–7.45)
PH BLDA: 7.23 [PH] (ref 7.35–7.45)
PO2 BLDA: 135 MMHG (ref 80–105)
PO2 BLDA: 152 MMHG (ref 80–105)
POTASSIUM BLDA-SCNC: 5.54 MMOL/L (ref 3.5–5.3)
POTASSIUM SERPL-SCNC: 4.7 MMOL/L (ref 3.5–5.1)
POTASSIUM SERPL-SCNC: 5.1 MMOL/L (ref 3.5–5.1)
Q-T INTERVAL, ECG07: 304 MS
QRS DURATION, ECG06: 78 MS
QTC CALCULATION (BEZET), ECG08: 405 MS
RESP RATE: 16
SAO2 % BLD: 98 % (ref 92–98.5)
SAO2 % BLD: 99 % (ref 92–98.5)
SERVICE CMNT-IMP: ABNORMAL
SERVICE CMNT-IMP: ABNORMAL
SODIUM BLDA-SCNC: 139.7 MMOL/L (ref 135–148)
SODIUM SERPL-SCNC: 141 MMOL/L (ref 136–145)
SODIUM SERPL-SCNC: 144 MMOL/L (ref 136–145)
TROPONIN I SERPL-MCNC: 0.04 NG/ML (ref 0.02–0.05)
VENTILATION MODE VENT: ABNORMAL
VENTILATION MODE VENT: ABNORMAL
VENTRICULAR RATE, ECG03: 107 BPM
VT SETTING VENT: 500 ML

## 2018-01-07 PROCEDURE — 80051 ELECTROLYTE PANEL: CPT

## 2018-01-07 PROCEDURE — 80048 BASIC METABOLIC PNL TOTAL CA: CPT | Performed by: FAMILY MEDICINE

## 2018-01-07 PROCEDURE — 87070 CULTURE OTHR SPECIMN AEROBIC: CPT | Performed by: INTERNAL MEDICINE

## 2018-01-07 PROCEDURE — 86580 TB INTRADERMAL TEST: CPT | Performed by: INTERNAL MEDICINE

## 2018-01-07 PROCEDURE — 65620000000 HC RM CCU GENERAL

## 2018-01-07 PROCEDURE — 74011000250 HC RX REV CODE- 250

## 2018-01-07 PROCEDURE — 94760 N-INVAS EAR/PLS OXIMETRY 1: CPT

## 2018-01-07 PROCEDURE — 94660 CPAP INITIATION&MGMT: CPT

## 2018-01-07 PROCEDURE — 80048 BASIC METABOLIC PNL TOTAL CA: CPT | Performed by: INTERNAL MEDICINE

## 2018-01-07 PROCEDURE — 94640 AIRWAY INHALATION TREATMENT: CPT

## 2018-01-07 PROCEDURE — C1751 CATH, INF, PER/CENT/MIDLINE: HCPCS

## 2018-01-07 PROCEDURE — 5A1945Z RESPIRATORY VENTILATION, 24-96 CONSECUTIVE HOURS: ICD-10-PCS | Performed by: INTERNAL MEDICINE

## 2018-01-07 PROCEDURE — 99254 IP/OBS CNSLTJ NEW/EST MOD 60: CPT | Performed by: INTERNAL MEDICINE

## 2018-01-07 PROCEDURE — 74011000250 HC RX REV CODE- 250: Performed by: FAMILY MEDICINE

## 2018-01-07 PROCEDURE — 87185 SC STD ENZYME DETCJ PER NZM: CPT | Performed by: INTERNAL MEDICINE

## 2018-01-07 PROCEDURE — 94002 VENT MGMT INPAT INIT DAY: CPT

## 2018-01-07 PROCEDURE — 87186 SC STD MICRODIL/AGAR DIL: CPT | Performed by: INTERNAL MEDICINE

## 2018-01-07 PROCEDURE — 36556 INSERT NON-TUNNEL CV CATH: CPT | Performed by: INTERNAL MEDICINE

## 2018-01-07 PROCEDURE — 89220 SPUTUM SPECIMEN COLLECTION: CPT

## 2018-01-07 PROCEDURE — 36556 INSERT NON-TUNNEL CV CATH: CPT

## 2018-01-07 PROCEDURE — 74011250636 HC RX REV CODE- 250/636: Performed by: INTERNAL MEDICINE

## 2018-01-07 PROCEDURE — 74011000250 HC RX REV CODE- 250: Performed by: INTERNAL MEDICINE

## 2018-01-07 PROCEDURE — 77030034849

## 2018-01-07 PROCEDURE — 0T7D7ZZ DILATION OF URETHRA, VIA NATURAL OR ARTIFICIAL OPENING: ICD-10-PCS | Performed by: UROLOGY

## 2018-01-07 PROCEDURE — 36592 COLLECT BLOOD FROM PICC: CPT

## 2018-01-07 PROCEDURE — 71045 X-RAY EXAM CHEST 1 VIEW: CPT

## 2018-01-07 PROCEDURE — 36600 WITHDRAWAL OF ARTERIAL BLOOD: CPT

## 2018-01-07 PROCEDURE — 31500 INSERT EMERGENCY AIRWAY: CPT | Performed by: INTERNAL MEDICINE

## 2018-01-07 PROCEDURE — 74011250636 HC RX REV CODE- 250/636

## 2018-01-07 PROCEDURE — 74011250636 HC RX REV CODE- 250/636: Performed by: FAMILY MEDICINE

## 2018-01-07 PROCEDURE — 83880 ASSAY OF NATRIURETIC PEPTIDE: CPT | Performed by: FAMILY MEDICINE

## 2018-01-07 PROCEDURE — 77030034850

## 2018-01-07 PROCEDURE — 74011250637 HC RX REV CODE- 250/637: Performed by: INTERNAL MEDICINE

## 2018-01-07 PROCEDURE — 05H433Z INSERTION OF INFUSION DEVICE INTO LEFT INNOMINATE VEIN, PERCUTANEOUS APPROACH: ICD-10-PCS | Performed by: INTERNAL MEDICINE

## 2018-01-07 PROCEDURE — 77030008771 HC TU NG SALEM SUMP -A

## 2018-01-07 PROCEDURE — 87077 CULTURE AEROBIC IDENTIFY: CPT | Performed by: INTERNAL MEDICINE

## 2018-01-07 PROCEDURE — 0BH18EZ INSERTION OF ENDOTRACHEAL AIRWAY INTO TRACHEA, VIA NATURAL OR ARTIFICIAL OPENING ENDOSCOPIC: ICD-10-PCS | Performed by: INTERNAL MEDICINE

## 2018-01-07 PROCEDURE — 77030020179

## 2018-01-07 PROCEDURE — 84484 ASSAY OF TROPONIN QUANT: CPT | Performed by: INTERNAL MEDICINE

## 2018-01-07 PROCEDURE — 77030015718 HC BLD LRYGSC MAC SIMS -A

## 2018-01-07 PROCEDURE — 74011000302 HC RX REV CODE- 302: Performed by: INTERNAL MEDICINE

## 2018-01-07 PROCEDURE — 36415 COLL VENOUS BLD VENIPUNCTURE: CPT | Performed by: FAMILY MEDICINE

## 2018-01-07 PROCEDURE — 74011000258 HC RX REV CODE- 258: Performed by: INTERNAL MEDICINE

## 2018-01-07 PROCEDURE — 74018 RADEX ABDOMEN 1 VIEW: CPT

## 2018-01-07 PROCEDURE — 77010033711 HC HIGH FLOW OXYGEN

## 2018-01-07 PROCEDURE — 0T9B70Z DRAINAGE OF BLADDER WITH DRAINAGE DEVICE, VIA NATURAL OR ARTIFICIAL OPENING: ICD-10-PCS | Performed by: UROLOGY

## 2018-01-07 RX ORDER — FENTANYL CITRATE 50 UG/ML
100 INJECTION, SOLUTION INTRAMUSCULAR; INTRAVENOUS ONCE
Status: COMPLETED | OUTPATIENT
Start: 2018-01-07 | End: 2018-01-07

## 2018-01-07 RX ORDER — METOPROLOL TARTRATE 5 MG/5ML
INJECTION INTRAVENOUS
Status: COMPLETED
Start: 2018-01-07 | End: 2018-01-07

## 2018-01-07 RX ORDER — FENTANYL CITRATE 50 UG/ML
INJECTION, SOLUTION INTRAMUSCULAR; INTRAVENOUS
Status: DISCONTINUED
Start: 2018-01-07 | End: 2018-01-07

## 2018-01-07 RX ORDER — PROPOFOL 10 MG/ML
INJECTION, EMULSION INTRAVENOUS
Status: DISCONTINUED
Start: 2018-01-07 | End: 2018-01-07

## 2018-01-07 RX ORDER — MIDAZOLAM HYDROCHLORIDE 1 MG/ML
INJECTION, SOLUTION INTRAMUSCULAR; INTRAVENOUS
Status: DISCONTINUED
Start: 2018-01-07 | End: 2018-01-07

## 2018-01-07 RX ORDER — METOPROLOL TARTRATE 5 MG/5ML
5 INJECTION INTRAVENOUS
Status: DISCONTINUED | OUTPATIENT
Start: 2018-01-07 | End: 2018-01-24 | Stop reason: HOSPADM

## 2018-01-07 RX ORDER — PROPOFOL 10 MG/ML
INJECTION, EMULSION INTRAVENOUS
Status: COMPLETED
Start: 2018-01-07 | End: 2018-01-07

## 2018-01-07 RX ORDER — ETOMIDATE 2 MG/ML
INJECTION INTRAVENOUS
Status: DISCONTINUED
Start: 2018-01-07 | End: 2018-01-07

## 2018-01-07 RX ORDER — CHLORHEXIDINE GLUCONATE 1.2 MG/ML
15 RINSE ORAL EVERY 12 HOURS
Status: DISCONTINUED | OUTPATIENT
Start: 2018-01-07 | End: 2018-01-15 | Stop reason: ALTCHOICE

## 2018-01-07 RX ORDER — PROPOFOL 10 MG/ML
INJECTION, EMULSION INTRAVENOUS
Status: ACTIVE
Start: 2018-01-07 | End: 2018-01-07

## 2018-01-07 RX ORDER — IBUPROFEN 200 MG
1 TABLET ORAL DAILY
Status: DISCONTINUED | OUTPATIENT
Start: 2018-01-07 | End: 2018-01-21

## 2018-01-07 RX ORDER — FENTANYL CITRATE 50 UG/ML
INJECTION, SOLUTION INTRAMUSCULAR; INTRAVENOUS
Status: COMPLETED
Start: 2018-01-07 | End: 2018-01-07

## 2018-01-07 RX ORDER — ETOMIDATE 2 MG/ML
INJECTION INTRAVENOUS
Status: COMPLETED
Start: 2018-01-07 | End: 2018-01-07

## 2018-01-07 RX ORDER — ETOMIDATE 2 MG/ML
20 INJECTION INTRAVENOUS ONCE
Status: COMPLETED | OUTPATIENT
Start: 2018-01-07 | End: 2018-01-07

## 2018-01-07 RX ORDER — PROPOFOL 10 MG/ML
130 INJECTION, EMULSION INTRAVENOUS
Status: COMPLETED | OUTPATIENT
Start: 2018-01-07 | End: 2018-01-07

## 2018-01-07 RX ORDER — FENTANYL CITRATE-0.9 % NACL/PF 25 MCG/ML
0-200 PLASTIC BAG, INJECTION (ML) INJECTION
Status: DISCONTINUED | OUTPATIENT
Start: 2018-01-07 | End: 2018-01-10

## 2018-01-07 RX ORDER — HYDRALAZINE HYDROCHLORIDE 20 MG/ML
20 INJECTION INTRAMUSCULAR; INTRAVENOUS
Status: DISCONTINUED | OUTPATIENT
Start: 2018-01-07 | End: 2018-01-24 | Stop reason: HOSPADM

## 2018-01-07 RX ADMIN — FENTANYL CITRATE 100 MCG: 50 INJECTION, SOLUTION INTRAMUSCULAR; INTRAVENOUS at 11:02

## 2018-01-07 RX ADMIN — HEPARIN SODIUM 5000 UNITS: 5000 INJECTION, SOLUTION INTRAVENOUS; SUBCUTANEOUS at 13:39

## 2018-01-07 RX ADMIN — ETOMIDATE 20 MG: 2 INJECTION INTRAVENOUS at 11:02

## 2018-01-07 RX ADMIN — Medication 5 ML: at 05:15

## 2018-01-07 RX ADMIN — NOREPINEPHRINE BITARTRATE 10 MCG/MIN: 1 INJECTION INTRAVENOUS at 11:43

## 2018-01-07 RX ADMIN — FUROSEMIDE 40 MG: 10 INJECTION, SOLUTION INTRAMUSCULAR; INTRAVENOUS at 21:08

## 2018-01-07 RX ADMIN — IPRATROPIUM BROMIDE AND ALBUTEROL SULFATE 3 ML: .5; 3 SOLUTION RESPIRATORY (INHALATION) at 12:35

## 2018-01-07 RX ADMIN — Medication 10 ML: at 13:33

## 2018-01-07 RX ADMIN — TUBERCULIN PURIFIED PROTEIN DERIVATIVE 5 UNITS: 5 INJECTION, SOLUTION INTRADERMAL at 21:08

## 2018-01-07 RX ADMIN — FENTANYL CITRATE 100 MCG: 50 INJECTION INTRAMUSCULAR; INTRAVENOUS at 11:02

## 2018-01-07 RX ADMIN — HEPARIN SODIUM 5000 UNITS: 5000 INJECTION, SOLUTION INTRAVENOUS; SUBCUTANEOUS at 05:13

## 2018-01-07 RX ADMIN — MIDAZOLAM HYDROCHLORIDE 2 MG/HR: 5 INJECTION, SOLUTION INTRAMUSCULAR; INTRAVENOUS at 12:06

## 2018-01-07 RX ADMIN — HEPARIN SODIUM 5000 UNITS: 5000 INJECTION, SOLUTION INTRAVENOUS; SUBCUTANEOUS at 21:24

## 2018-01-07 RX ADMIN — Medication 50 MCG/HR: at 12:05

## 2018-01-07 RX ADMIN — PROPOFOL 130 MG: 10 INJECTION, EMULSION INTRAVENOUS at 11:02

## 2018-01-07 RX ADMIN — Medication 10 ML: at 21:09

## 2018-01-07 RX ADMIN — ETOMIDATE 20 MG: 2 INJECTION, SOLUTION INTRAVENOUS at 11:02

## 2018-01-07 RX ADMIN — IPRATROPIUM BROMIDE AND ALBUTEROL SULFATE 3 ML: .5; 3 SOLUTION RESPIRATORY (INHALATION) at 03:56

## 2018-01-07 RX ADMIN — IPRATROPIUM BROMIDE AND ALBUTEROL SULFATE 3 ML: .5; 3 SOLUTION RESPIRATORY (INHALATION) at 08:02

## 2018-01-07 RX ADMIN — FUROSEMIDE 40 MG: 10 INJECTION, SOLUTION INTRAMUSCULAR; INTRAVENOUS at 09:03

## 2018-01-07 RX ADMIN — METOPROLOL TARTRATE 5 MG: 5 INJECTION, SOLUTION INTRAVENOUS at 09:55

## 2018-01-07 RX ADMIN — IPRATROPIUM BROMIDE AND ALBUTEROL SULFATE 3 ML: .5; 3 SOLUTION RESPIRATORY (INHALATION) at 15:49

## 2018-01-07 RX ADMIN — METOROPROLOL TARTRATE 5 MG: 5 INJECTION, SOLUTION INTRAVENOUS at 09:55

## 2018-01-07 RX ADMIN — IPRATROPIUM BROMIDE AND ALBUTEROL SULFATE 3 ML: .5; 3 SOLUTION RESPIRATORY (INHALATION) at 20:40

## 2018-01-07 RX ADMIN — CHLORHEXIDINE GLUCONATE 15 ML: 1.2 RINSE ORAL at 21:12

## 2018-01-07 NOTE — PROGRESS NOTES
Reviewed notes for spiritual concerns. Spoke with doctor about prognosis.     Tesha Doshi, staff [address], [address] [facility]  /   [email or web address]

## 2018-01-07 NOTE — PROGRESS NOTES
Patient was intubated with a number 9.0 ET Tube. Tube placement verified by auscultation. ET Tube is secured at the 24 cm mag at the lip and on the right side. Patient was intubated by Dr. Pastor Pittman on the 1 attempt. Breath sounds are coarse and diminished. Patient is Negative for subcutaneous air and chest excursion is symmetric. Trachea is midline. Patient is also Negative for cyanosis and is Negative for pitting edema. Patient placed on ventilator on documented settings. All alarms are set and audible. Resuscitation bag is at the head of the bed.       Ventilator Settings  Mode FIO2 Rate Tidal Volume Pressure PEEP I:E Ratio   PRVC  60 %  16  500 ml     8 cm H20  1:2.8      Peak airway pressure: 24 cm H2O   Minute ventilation: 8.4 l/min     ABG:   Recent Labs      01/07/18   0920   PH  7.11*   PCO2  125*   PO2  152*   HCO3  39*

## 2018-01-07 NOTE — PROGRESS NOTES
Rapid response called. Kt Wilder, Dr. Angie Shankar, RT, ICU charge, and Dr. Bienvenido Shirley to bedside. Patient placed on non rebreather. Orders received for bipap per Dr. Bienvenido Shirley. Patient to be transferred to icu. See flowsheets for vitals.

## 2018-01-07 NOTE — PROGRESS NOTES
Patient difficult to arouse. Once awoken patient confused, unable to answer questions and swinging arms about. Patient on 8 L NC and O2 sat 70%. Rapid response called and patient put on BIPAP. Patient to transferred to the unit.

## 2018-01-07 NOTE — PROGRESS NOTES
TRANSFER - IN REPORT:    Verbal report received from [facility], RN(name) on Felipe Arroyo  being received from ED (unit) for routine progression of care      Report consisted of patients Situation, Background, Assessment and   Recommendations(SBAR). Information from the following report(s) SBAR, ED Summary and Cardiac Rhythm NSR to Sinus Tachycardia was reviewed with the receiving nurse. Opportunity for questions and clarification was provided. Assessment completed upon patients arrival to unit and care assumed. Received to CV-Telemetry Unit, via stretcher from Emergency Department. Telemetry Roni applied. Admitted to room 319, oriented to room, surroundings and staff, voiced understanding. Instructed and demonstrated use of hand held call-light and Bed rail controls. Assisted out of street clothing and dressed in [address], applied nonskid socks to feet. Head to toe skin assessment completed. Skin warm, dry and intact. No skin abrasions, no skin tears or break-down noted. Cracked skin noted on soles and heels of feet. Old scars noted.

## 2018-01-07 NOTE — CONSULTS
CONSULT NOTE    Shraddha Kaur    1/7/2018    Date of Admission:  1/6/2018    The patient's chart is reviewed and the patient is discussed with the staff. Subjective:     Patient is a 45 y.o.  male seen and evaluated at the request of Rapid Response/Dr. Dre Wyman. Patient just admitted today with acute hypoxemic respiratory failure, with methamphetamine positive, smoker, morbid obesity, suspected NATALIIA. Was on Telemetry and being diuresed. Patient in room foaming in mouth, combative and disoriented. He was being aggressively bagged in Room and when I came noted to be moving around and eyes open. I took off Ambu bag and reported names Jorge Blend but disoriented. Review of Systems  Not alert and not able to answer much. Patient Active Problem List   Diagnosis Code    COPD (chronic obstructive pulmonary disease) ([facility] J44.9    Nicotine dependence F17.200    Methamphetamine abuse F15.10    Anasarca R60.1    NATALIIA (obstructive sleep apnea) G47.33    Acute on chronic respiratory failure with hypoxia (HCC) J96.21    Acute kidney injury ([facility] N17.9            Prior to Admission Medications   Prescriptions Last Dose Informant Patient Reported? Taking? HYDROcodone-acetaminophen (NORCO) 5-325 mg per tablet Not Taking at Unknown time  No No   Sig: Take 1-2 Tabs by mouth every six (6) hours as needed for Pain. Max Daily Amount: 8 Tabs. albuterol (PROVENTIL HFA, VENTOLIN HFA, PROAIR HFA) 90 mcg/actuation inhaler Not Taking at Unknown time  No No   Sig: Take 2 Puffs by inhalation every six (6) hours as needed for Wheezing or Shortness of Breath. celecoxib (CELEBREX) 200 mg capsule Not Taking at Unknown time  No No   Sig: Take 1 Cap by mouth daily. With food   levofloxacin (LEVAQUIN) 750 mg tablet Not Taking at Unknown time  No No   Sig: Take 1 Tab by mouth daily.       Facility-Administered Medications: None       Past Medical History:   Diagnosis Date    Sleep disorder      No past surgical history on file. Social History     Social History    Marital status:      Spouse name: N/A    Number of children: N/A    Years of education: N/A     Occupational History    Not on file. Social History Main Topics    Smoking status: Current Every Day Smoker     Packs/day: 2.00    Smokeless tobacco: Not on file    Alcohol use No      Comment: once a month beer    Drug use: Yes     Special: Marijuana, Methamphetamines    Sexual activity: Not on file     Other Topics Concern    Not on file     Social History Narrative     No family history on file.   No Known Allergies    Current Facility-Administered Medications   Medication Dose Route Frequency    etomidate (AMIDATE) 2 mg/mL injection        fentaNYL citrate (PF) 50 mcg/mL injection        propofol (DIPRIVAN) 10 mg/mL injection        midazolam (VERSED) 1 mg/mL injection        sodium chloride (NS) flush 5-10 mL  5-10 mL IntraVENous Q8H    sodium chloride (NS) flush 5-10 mL  5-10 mL IntraVENous PRN    lisinopril (PRINIVIL, ZESTRIL) tablet 20 mg  20 mg Oral DAILY    carvedilol (COREG) tablet 3.125 mg  3.125 mg Oral BID WITH MEALS    ondansetron (ZOFRAN) injection 4 mg  4 mg IntraVENous Q6H PRN    acetaminophen (TYLENOL) tablet 650 mg  650 mg Oral Q6H PRN    albuterol-ipratropium (DUO-NEB) 2.5 MG-0.5 MG/3 ML  3 mL Nebulization Q4H RT    furosemide (LASIX) injection 40 mg  40 mg IntraVENous Q12H    zolpidem (AMBIEN) tablet 5 mg  5 mg Oral QHS PRN    diphenhydrAMINE (BENADRYL) capsule 25 mg  25 mg Oral Q4H PRN    polyethylene glycol (MIRALAX) packet 17 g  17 g Oral DAILY PRN    guaiFENesin ER (MUCINEX) tablet 600 mg  600 mg Oral Q12H PRN    calcium carbonate (TUMS) chewable tablet 400 mg [elemental]  400 mg Oral TID PRN    heparin (porcine) injection 5,000 Units  5,000 Units SubCUTAneous Q8H         Objective:     Vitals:    01/07/18 0356 01/07/18 0427 01/07/18 0903 01/07/18 0915   BP:  (!) 144/96 162/84 162/84   Pulse: (!) 112 (!) 113    Resp:  22     Temp:  96.3 °F (35.7 °C)  98 °F (36.7 °C)   SpO2: 92% (!) 85%  (!) 70%   Weight:       Height:           PHYSICAL EXAM     Constitutional:  the patient is obese WM in bed on NRB  EENMT:  Sclera clear, pupils equal but small, oral mucosa moist  Respiratory: coarse b/l  Cardiovascular:  RRR without M,G,R  Gastrointestinal: soft and non-tender; with positive bowel sounds. Musculoskeletal: warm without cyanosis. There is no lower leg edema. Skin:  no jaundice or rashes, no visible wounds, slightly mottled  Neurologic: no gross neuro deficits     Psychiatric:  Did say his name and follow commands    CT: IMPRESSIONS:  1. No evidence of pulmonary embolus. 2. Trace right pleural effusion and pericardial effusion. 3. Bibasilar atelectasis.         Recent Labs      01/06/18   1838   WBC  8.0   HGB  14.6   HCT  48.2   PLT  216     Recent Labs      01/07/18   0353  01/06/18   1838   NA  141  141   K  5.1  5.0   CL  98  101   GLU  183*  101*   CO2  36*  35*   BUN  26*  24*   CREA  1.81*  1.54*   CA  8.9  8.8   ALB   --   3.5   TBILI   --   0.4   ALT   --   32   SGOT   --   29     No results for input(s): PH, PCO2, PO2, HCO3 in the last 72 hours. No results for input(s): LCAD, LAC in the last 72 hours.     Assessment:  (Medical Decision Making)     Hospital Problems  Never Reviewed          Codes Class Noted POA    COPD (chronic obstructive pulmonary disease) (HCC) (Chronic) ICD-10-CM: J44.9  ICD-9-CM: 369  1/6/2018 Yes        Nicotine dependence (Chronic) ICD-10-CM: F17.200  ICD-9-CM: 305.1  1/6/2018 Yes        Methamphetamine abuse (Chronic) ICD-10-CM: F15.10  ICD-9-CM: 305.70  1/6/2018 Yes        Anasarca ICD-10-CM: R60.1  ICD-9-CM: 782.3  1/6/2018 Yes        NATALIIA (obstructive sleep apnea) (Chronic) ICD-10-CM: G47.33  ICD-9-CM: 327.23  1/6/2018 Yes        * (Principal)Acute on chronic respiratory failure with hypoxia (HCC) ICD-10-CM: J96.21  ICD-9-CM: 518.84, 799.02  1/6/2018 Yes Acute kidney injury [facility]) ICD-10-CM: N17.9  ICD-9-CM: 584.9  1/6/2018 Yes              Plan:  (Medical Decision Making)     --transfer to the ICU  -- BIPAP for now and will f/u ABG -- currently PCO2 is 125. Will have to f/u. IF not will need to be on the vent  -- continue diureses and f/u urine output. Galindo needs to be placed  -- will have to see when stable if qualifies for a triology machine and is agreeable to wear it. If not may end up needing a trach. Per staff prior history reported may need trach  -- needs to stop using drugs  -- nebs  -- nicotine patch  -- continue remaining treatment    Condition is critical.   Time spent -- 33 minutes      More than 50% of the time documented was spent in face-to-face contact with the patient and in the care of the patient on the floor/unit where the patient is located. Thank you very much for this referral.  We appreciate the opportunity to participate in this patient's care. Will follow along with above stated plan.     Anni Arellano MD

## 2018-01-07 NOTE — PROCEDURES
Indication:    Time Out done and Correct patient for procedure. Using Ultrasound Large left Internal Jugular Vein Located. Area prepped and sterilized with chlorhexedine. Sterile drapes applied. Sterile sheath place on ultrasound probe. Patient given local lidocane for anesthesia. Using Seldinger Technique with realtime ultrasound guidance   QUAD Lumen Catheter inserted. Guidewire removed. All ports with blood return and lines flushed. Line Sutured in Lisa Chevy Pham. Patient tolerated procedure well, no complications. CXR ordered. Estimated Blood loss: 2 ml      Rasheeda Jay MD    Electronically signed.

## 2018-01-07 NOTE — PROGRESS NOTES
Hospitalist Progress Note     Admit Date:  2018  6:27 PM   Name:  Nelida Cr   Age:  45 y.o.  :  1965   MRN:  [id number]   PCP:  None  Treatment Team: Attending Provider: Janey Cordero MD    Subjective:   Patient is a 44 y/o M with history of morbid obesity, unconfirmed NATALIIA, cigarette smoking, methamphetamine abuse, who p/w worsening SOB. History of poor compliance and follow up with medical care. Apparently had been scheduled for trach in past but ended up cancelling last minute per wife. Had been recommended for CPAP at home in past but refused. Early morning after admission rapid response called for delirium, combativeness after being awoken from sleep; nurse had checked his pulse ox and was in 60s. He was bagged and almost intubated but improved slightly and was placed on bipap instead and transferred to ICU.     - cannot obtain history from pt due to AMS. On bipap. Discussed with wife.       Objective:     Patient Vitals for the past 24 hrs:   Temp Pulse Resp BP SpO2   18 1004 - 85 - (!) 172/114 -   18 0955 - (!) 109 - (!) 172/105 -   18 0930 - - - - 98 %   18 0915 98 °F (36.7 °C) - - 162/84 (!) 70 %   18 0903 - (!) 113 - 162/84 -   18 0802 - - - - 90 %   18 0427 96.3 °F (35.7 °C) (!) 112 22 (!) 144/96 (!) 85 %   18 0356 - - - - 92 %   18 0257 98.6 °F (37 °C) (!) 122 20 140/70 92 %   18 0129 97.8 °F (36.6 °C) (!) 130 24 136/76 93 %   18 2358 98.6 °F (37 °C) (!) 102 18 140/74 98 %   18 2320 - - - - 98 %   18 2226 98.8 °F (37.1 °C) (!) 113 20 (!) 184/113 100 %   18 - (!) 108 8 - 98 %   18 - (!) 109 - (!) 190/116 -   18 - (!) 107 22 (!) 190/116 -   18 - - - (!) 153/100 -   18 - (!) 109 13 (!) 205/107 100 %   18 1823 - (!) 108 28 (!) 195/115 98 %     Oxygen Therapy  O2 Sat (%): 98 % (18 0930)  Pulse via Oximetry: 100 beats per minute (01/07/18 0930)  O2 Device: BIPAP (01/07/18 0930)  O2 Flow Rate (L/min): 8 l/min (01/07/18 0802)  FIO2 (%): 100 % (01/07/18 0930)    Intake/Output Summary (Last 24 hours) at 01/07/18 1020  Last data filed at 01/07/18 0200   Gross per 24 hour   Intake                0 ml   Output             1600 ml   Net            -1600 ml         General:    Severely obese. On bipap.  disoriented  CV:   Tachy, reg. No murmur, rub, or gallop. Lungs:   Severely diminished, even on bipap  Abdomen:   Soft, nontender, nondistended. Extremities: Warm and dry. No cyanosis. Stasis edema 2+ with derm changes  Skin:     No rashes or jaundice. Data Review:  I have reviewed all labs, meds, telemetry events, and studies from the last 24 hours. Recent Results (from the past 24 hour(s))   EKG, 12 LEAD, INITIAL    Collection Time: 01/06/18  6:28 PM   Result Value Ref Range    Ventricular Rate 107 BPM    Atrial Rate 107 BPM    P-R Interval 170 ms    QRS Duration 78 ms    Q-T Interval 304 ms    QTC Calculation (Bezet) 405 ms    Calculated P Axis 54 degrees    Calculated R Axis 99 degrees    Calculated T Axis 55 degrees    Diagnosis       !! AGE AND GENDER SPECIFIC ECG ANALYSIS !!   Sinus tachycardia  Possible Left atrial enlargement  Rightward axis  Pulmonary disease pattern  Abnormal ECG  No previous ECGs available     POC TROPONIN-I    Collection Time: 01/06/18  6:35 PM   Result Value Ref Range    Troponin-I (POC) 0.02 0.02 - 0.05 ng/ml   CBC WITH AUTOMATED DIFF    Collection Time: 01/06/18  6:38 PM   Result Value Ref Range    WBC 8.0 4.3 - 11.1 K/uL    RBC 4.40 4.23 - 5.67 M/uL    HGB 14.6 13.6 - 17.2 g/dL    HCT 48.2 41.1 - 50.3 %    .5 (H) 79.6 - 97.8 FL    MCH 33.2 (H) 26.1 - 32.9 PG    MCHC 30.3 (L) 31.4 - 35.0 g/dL    RDW 13.8 11.9 - 14.6 %    PLATELET 252 963 - 916 K/uL    MPV 10.3 (L) 10.8 - 14.1 FL    DF AUTOMATED      NEUTROPHILS 68 43 - 78 %    LYMPHOCYTES 21 13 - 44 %    MONOCYTES 10 4.0 - 12.0 %    EOSINOPHILS 1 0.5 - 7.8 %    BASOPHILS 0 0.0 - 2.0 %    IMMATURE GRANULOCYTES 0 0.0 - 5.0 %    ABS. NEUTROPHILS 5.4 1.7 - 8.2 K/UL    ABS. LYMPHOCYTES 1.7 0.5 - 4.6 K/UL    ABS. MONOCYTES 0.8 0.1 - 1.3 K/UL    ABS. EOSINOPHILS 0.1 0.0 - 0.8 K/UL    ABS. BASOPHILS 0.0 0.0 - 0.2 K/UL    ABS. IMM. GRANS. 0.0 0.0 - 0.5 K/UL   D DIMER    Collection Time: 01/06/18  6:38 PM   Result Value Ref Range    D DIMER 1.26 (HH) <0.56 ug/ml(FEU)   METABOLIC PANEL, COMPREHENSIVE    Collection Time: 01/06/18  6:38 PM   Result Value Ref Range    Sodium 141 136 - 145 mmol/L    Potassium 5.0 3.5 - 5.1 mmol/L    Chloride 101 98 - 107 mmol/L    CO2 35 (H) 21 - 32 mmol/L    Anion gap 5 (L) 7 - 16 mmol/L    Glucose 101 (H) 65 - 100 mg/dL    BUN 24 (H) 6 - 23 MG/DL    Creatinine 1.54 (H) 0.8 - 1.5 MG/DL    GFR est AA >60 >60 ml/min/1.73m2    GFR est non-AA 50 (L) >60 ml/min/1.73m2    Calcium 8.8 8.3 - 10.4 MG/DL    Bilirubin, total 0.4 0.2 - 1.1 MG/DL    ALT (SGPT) 32 12 - 65 U/L    AST (SGOT) 29 15 - 37 U/L    Alk.  phosphatase 98 50 - 136 U/L    Protein, total 7.8 6.3 - 8.2 g/dL    Albumin 3.5 3.5 - 5.0 g/dL    Globulin 4.3 (H) 2.3 - 3.5 g/dL    A-G Ratio 0.8 (L) 1.2 - 3.5     BNP    Collection Time: 01/06/18  6:38 PM   Result Value Ref Range     pg/mL   POC LACTIC ACID    Collection Time: 01/06/18  6:38 PM   Result Value Ref Range    Lactic Acid (POC) 1.3 0.5 - 1.9 mmol/L   ETHYL ALCOHOL    Collection Time: 01/06/18  6:38 PM   Result Value Ref Range    ALCOHOL(ETHYL),SERUM <3 MG/DL   DRUG SCREEN, URINE    Collection Time: 01/06/18 10:48 PM   Result Value Ref Range    PCP(PHENCYCLIDINE) NEGATIVE       BENZODIAZEPINES NEGATIVE       COCAINE NEGATIVE       AMPHETAMINES POSITIVE      METHADONE NEGATIVE       THC (TH-CANNABINOL) NEGATIVE       OPIATES NEGATIVE       BARBITURATES NEGATIVE      BNP    Collection Time: 01/07/18  3:53 AM   Result Value Ref Range     pg/mL   METABOLIC PANEL, BASIC    Collection Time: 01/07/18  3:53 AM   Result Value Ref Range    Sodium 141 136 - 145 mmol/L    Potassium 5.1 3.5 - 5.1 mmol/L    Chloride 98 98 - 107 mmol/L    CO2 36 (H) 21 - 32 mmol/L    Anion gap 7 7 - 16 mmol/L    Glucose 183 (H) 65 - 100 mg/dL    BUN 26 (H) 6 - 23 MG/DL    Creatinine 1.81 (H) 0.8 - 1.5 MG/DL    GFR est AA 50 (L) >60 ml/min/1.73m2    GFR est non-AA 42 (L) >60 ml/min/1.73m2    Calcium 8.9 8.3 - 10.4 MG/DL   BLOOD GAS & LYTES, ARTERIAL    Collection Time: 01/07/18  9:20 AM   Result Value Ref Range    pH 7.11 (L) 7.35 - 7.45      PCO2 125 (H) 35 - 45 mmHg    PO2 152 (H) 80 - 105 mmHg    BICARBONATE 39 (H) 22 - 26 mmol/L    BASE EXCESS 4.2 (H) 0 - 3 mmol/L    TOTAL HEMOGLOBIN 15.2 (H) 11.7 - 15.0 GM/DL    O2 SAT 99 (H) 92 - 98.5 %    Arterial O2 Hgb 95.8 94 - 97 %    CARBOXYHEMOGLOBIN 2.5 (H) 0.5 - 1.5 %    METHEMOGLOBIN 0.4 0.0 - 1.5 %    DEOXYHEMOGLOBIN 1 0.0 - 5.0 %    Sodium 139.7 135 - 148 MMOL/L    POTASSIUM 5.54 (H) 3.5 - 5.3 MMOL/L    CHLORIDE 97 (L) 98 - 106      Calcium, ionized 1.20 1.0 - 1.3 mmol/L    SITE RR     ALLENS TEST POSITIVE      MODE NRB     Respiratory comment: Jason at 1 7 2018 9 23 42 AM. Read back.          All Micro Results     Procedure Component Value Units Date/Time    CULTURE, BLOOD [[id number]] Collected:  01/06/18 1854    Order Status:  Completed Specimen:  Whole Blood from Blood Updated:  01/06/18 1922    CULTURE, BLOOD [[id number]] Collected:  01/06/18 1854    Order Status:  Completed Specimen:  Whole Blood from Blood Updated:  01/06/18 1922          Current Meds:  Current Facility-Administered Medications   Medication Dose Route Frequency    nicotine (NICODERM CQ) 21 mg/24 hr patch 1 Patch  1 Patch TransDERmal DAILY    metoprolol (LOPRESSOR) injection 5 mg  5 mg IntraVENous Q4H PRN    hydrALAZINE (APRESOLINE) 20 mg/mL injection 20 mg  20 mg IntraVENous Q6H PRN    tuberculin injection 5 Units  5 Units IntraDERMal ONCE    sodium chloride (NS) flush 5-10 mL  5-10 mL IntraVENous Q8H    sodium chloride (NS) flush 5-10 mL  5-10 mL IntraVENous PRN    lisinopril (PRINIVIL, ZESTRIL) tablet 20 mg  20 mg Oral DAILY    carvedilol (COREG) tablet 3.125 mg  3.125 mg Oral BID WITH MEALS    ondansetron (ZOFRAN) injection 4 mg  4 mg IntraVENous Q6H PRN    acetaminophen (TYLENOL) tablet 650 mg  650 mg Oral Q6H PRN    albuterol-ipratropium (DUO-NEB) 2.5 MG-0.5 MG/3 ML  3 mL Nebulization Q4H RT    furosemide (LASIX) injection 40 mg  40 mg IntraVENous Q12H    diphenhydrAMINE (BENADRYL) capsule 25 mg  25 mg Oral Q4H PRN    polyethylene glycol (MIRALAX) packet 17 g  17 g Oral DAILY PRN    guaiFENesin ER (MUCINEX) tablet 600 mg  600 mg Oral Q12H PRN    calcium carbonate (TUMS) chewable tablet 400 mg [elemental]  400 mg Oral TID PRN    heparin (porcine) injection 5,000 Units  5,000 Units SubCUTAneous Q8H       Other Studies (last 24 hours):  Ct Chest W Cont    Result Date: 1/6/2018  HISTORY: Shortness of breath Exam: CT chest, PE protocol Technique: Thin section axial CT images are obtained from the thoracic inlet through the upper abdomen. Coronal reformatted images are obtained based on the axial data. 100 cc Isovue 370 is administered intraveneously without incident. Radiation dose reduction techniques were used for this study. Our CT scanners use one or all of the following: Automated exposure control, adjustment of the mA and/or kV according to patient size, use of iterative reconstruction. Comparison: None Findings: There are no filling defects seen within the pulmonary vasculature to suggest pulmonary embolus. There is no mediastinal, hilar, or axillary lymphadenopathy seen. No suspicious pulmonary nodules. There is a trace right pleural effusion present. There is bibasilar atelectasis. There is also a trace pericardial effusion. The central airways are patent. Evaluation of the upper abdomen demonstrates a small gallstone and perinephric fat stranding. Bone window evaluation demonstrates no aggressive osseous lesions. IMPRESSIONS: 1. No evidence of pulmonary embolus. 2. Trace right pleural effusion and pericardial effusion. 3. Bibasilar atelectasis. Xr Chest Port    Result Date: 1/6/2018  Portable chest x-ray 1/6/2018 INDICATION: Abdominal pain COMPARISON: 5/11/2015 Heart is enlarged. Lung fields are clear. Soft tissues and bony structures are unremarkable. IMPRESSION: Cardiomegaly, clear lung fields      Assessment and Plan:     Hospital Problems as of 1/7/2018  Never Reviewed          Codes Class Noted - Resolved POA    Acute metabolic encephalopathy [id number]: G93.41  ICD-9-CM: 348.31  1/7/2018 - Present Yes        COPD (chronic obstructive pulmonary disease) ([facility] (Chronic) ICD-10-CM: J44.9  ICD-9-CM: 102  1/6/2018 - Present Yes        Nicotine dependence (Chronic) ICD-10-CM: F17.200  ICD-9-CM: 305.1  1/6/2018 - Present Yes        Methamphetamine abuse ICD-10-CM: F15.10  ICD-9-CM: 305.70  1/6/2018 - Present Yes        Anasarca ICD-10-CM: R60.1  ICD-9-CM: 782.3  1/6/2018 - Present Yes        NATALIIA (obstructive sleep apnea) (Chronic) ICD-10-CM: G47.33  ICD-9-CM: 327.23  1/6/2018 - Present Yes        * (Principal)Acute on chronic respiratory failure with hypoxia (HCC) ICD-10-CM: J96.21  ICD-9-CM: 518.84, 799.02  1/6/2018 - Present Yes        Acute kidney injury ([facility] ICD-10-CM: N17.9  ICD-9-CM: 584.9  1/6/2018 - Present Yes              PLAN:    · Acute on chronic resp failure - placed on bipap. Narrowly avoided intubation, for the moment at least.  Will move to ICU. Recheck ABG in a couple hours. Nebs. Check 2d echo for CHF. prob has pulm HTN also  · ARF - cardiorenal +/- ischemic +/- uncontrolled HTN. Continue IV lasix. Is urinating but place alegre for monitoring. · HTN - uncontrolled. Not able to give PO meds currently due to AMS. PRN hydralazine and metoprolol. May also get better with bipap. If this does not work would start cardene  · Amphetamine abuse - definitely not helping matters.   Needs counseling on cessation when more oriented. · misc- ordered alegre placed but nursing unable. Consulting Urology    DC planning/Dispo:  Ppd ordered. SW consulted.   DVT ppx:  Heparin  Critical care time spent 40 minutes    Signed:  Jenniffer Velazquez MD

## 2018-01-07 NOTE — PROGRESS NOTES
RAPID RESPONSE CRITICAL CARE OUTREACH NURSE NOTE      Pt was seen and examined by this Outreach RN following Rapid Response. Pt status/focused assessment upon arrival to Rapid Response as follows:      SITUATION: Arrived to room with staff at bedside, use of ambubag with low sats, lethargy; primary RN at bedside states pt current user of amphetamines and prior refusal for bipap and trach. Female at bedside. NEURO: lethargic     RESP: shallow, tachypnic     CARDIAC: hypertensive, tachycardic. GI:n/a    : urine staining on the bed    LATEST VITAL SIGNS AND LAB VALUES RECORDED:  MEWS Score: 2 (01/06/18 2358)  Vitals:    01/07/18 0356 01/07/18 0427 01/07/18 0903 01/07/18 0915   BP:  (!) 144/96 162/84 162/84   Pulse:  (!) 112 (!) 113    Resp:  22     Temp:  96.3 °F (35.7 °C)  98 °F (36.7 °C)   SpO2: 92% (!) 85%  (!) 70%   Weight:       Height:         Recent Labs      01/07/18   0353  01/06/18   1838   NA  141  141   K  5.1  5.0   CL  98  101   CO2  36*  35*   AGAP  7  5*   GLU  183*  101*   BUN  26*  24*   CREA  1.81*  1.54*   GFRAA  50*  >60   GFRNA  42*  50*   CA  8.9  8.8   ALB   --   3.5   TP   --   7.8   GLOB   --   4.3*   AGRAT   --   0.8*   ALT   --   32        Recent Labs      01/06/18   1838   WBC  8.0   HGB  14.6   HCT  48.2   PLT  216          LAB WORK PENDING/SENT DURING RAPID RESPONSE:    Primary RN at bedside: Chapincito Gay  RT at bedside: Arielle Benitez MD at bedside: Dr Saelna Guzman, Dr Jose Luis Jara, Dr Phillip Connor      Interventions completed during Rapid Response:Blood gas, initiation of bipap    Is pt transferring to Critical Care bed? yes  Room #: 4008, once current pt transfers out and room cleaned  Did Outreach RN assist with transfer? yes      Pt disposition IF not transferring to Critical Care:      Post Rapid Response plan of care: Continue bipap.   Monitor CO2, mentation, and Withdrawal symtpoms

## 2018-01-07 NOTE — PROGRESS NOTES
Dr. Sara Newberry, Hospitalist notified of patient status, combative with staff and wife. Patient refused CPAP, BiPAP on admission. Will continue to monitor. Wife reported  (patient) does not want Cpap, Bipap, intubation or trach.

## 2018-01-07 NOTE — PROGRESS NOTES
Multiple attempts to place Galindo catheter for strict I&O's. Unable to place, Dr. Darin Rogers MD notified and consult for urology placed.

## 2018-01-07 NOTE — PROGRESS NOTES
Patient was sleeping, difficult to arouse, did awakened with sternal rub. Patient became combative with RN and Respiratory Therapist during UA treatment. Wife got up from [facility] 22 and attempted to assist with holding UA treatment, combative and pulling on her hair and swatting at her.

## 2018-01-07 NOTE — ED NOTES
TRANSFER - OUT REPORT:    Verbal report given to Debbi Banks RN(name) on [employer]  being transferred to [facility](unit) for routine progression of care       Report consisted of patients Situation, Background, Assessment and   Recommendations(SBAR). Information from the following report(s) SBAR was reviewed with the receiving nurse. Lines:   Peripheral IV 01/06/18 Left Antecubital (Active)       Peripheral IV 01/06/18 Right Antecubital (Active)        Opportunity for questions and clarification was provided.       Patient transported with:   O2 @ 4 liters, monitor

## 2018-01-07 NOTE — PROGRESS NOTES
Patient in ICU now on 100% BIPAP and was doing ok then saturation in 30's. Just had to emergently intubate patient and also hypotensive and had to place in emergent central line. No family here. Dr. Mika Ellis had to dilate the penis opening and place in catheter. He reports DO  [address]. They will need to f/u the patient. Hd to change from diprivan to fentanyl/versed and starting pressors with levaphed. Time spent additional, not counting procedures if 25 more minutes for about 60 minutes without procedures.     Song Davidson MD

## 2018-01-07 NOTE — PROCEDURES
Procedure:   emergent intubation    Indication: acute respiratory failure    Anesthesia- Rapid sequence:  Fentanyl 100 mcg(1.5mcg/kg), Etomidate 20 mg(0.5mg'kg) , Propofol 100 mg(1mg/kg),        After assessing the airway, the patient underwent preoxygenation with 100% FiO2 for 5 min. Fentanyl was then given and after 3 min, etomidate and succnylcholine was given sequentially in rapid IV push. The Sellick maneuver was performed throughout the entire sequence. After adequate sedation and paralisys emergent intubation was performed. A  4. 0MAC  blade  laryngoscope was used to visualize the epiglottis and vocal chords. After positive identification of the epiglottis an 9.0 ET tube was placed into the trachea with direct visualization. The tube was seen passing through the vocal chords  Without some difficulty. CO2 colorimetry was employed immediately to verify tube in airway  With  appropriate color change indicating detection of CO2. Water vapor was seen within the ET tube, and auscultation of the abdomen revealed no bubbling souds. Auscultation  and inspection of the chest after intubation showed symmetric chest excursion and symmetric air entry bilaterally. Chest X-ray has been ordered and is pending. The patient has been placed on a mechanical ventilator. There were no complications. India Melton MD    This note was signed electronically.

## 2018-01-07 NOTE — H&P
HOSPITALIST INITIAL HISTORY AND PHYSICAL    NAME:  Joe Osborne   Age:  45 y.o.  :   1965   MRN:   [id number]  PCP: None  Consulting MD:  Treatment Team: Attending Provider: Robert Mcgowan MD; Primary Nurse: Ramírez Chavez    CHIEF COMPLAINT: shortness of breath and swelling    HISTORY OF PRESENT ILLNESS:   Joe Osborne is a 45y.o. year-old male with a past medical history of polysubstance abuse and NATALIIA with poor follow up who presents to the ER with a complaint of worsening shortness of breath and lower extremity/abdominal swelling over the past month. He admits to respiratory problems in the past and apparently was supposed to get a trach placed at some point but didn't. He has also had CPAP recommended but cannot wear it because it is too loud for him. He admits to occasional cough productive of green sputum. Denies fevers, chills, nausea, vomiting, constipation, diarrhea, chest pain. REVIEW OF SYSTEMS: Comprehensive ROS performed and negative except as stated in HPI. Past Medical History:   Diagnosis Date    Sleep disorder         No past surgical history on file. Prior to Admission Medications   Prescriptions Last Dose Informant Patient Reported? Taking? HYDROcodone-acetaminophen (NORCO) 5-325 mg per tablet Not Taking at Unknown time  No No   Sig: Take 1-2 Tabs by mouth every six (6) hours as needed for Pain. Max Daily Amount: 8 Tabs. albuterol (PROVENTIL HFA, VENTOLIN HFA, PROAIR HFA) 90 mcg/actuation inhaler Not Taking at Unknown time  No No   Sig: Take 2 Puffs by inhalation every six (6) hours as needed for Wheezing or Shortness of Breath. celecoxib (CELEBREX) 200 mg capsule Not Taking at Unknown time  No No   Sig: Take 1 Cap by mouth daily. With food   levofloxacin (LEVAQUIN) 750 mg tablet Not Taking at Unknown time  No No   Sig: Take 1 Tab by mouth daily. Facility-Administered Medications: None       No Known Allergies    FAMILY HISTORY: Reviewed.  Negative except No family history on file. Social History   Substance Use Topics    Smoking status: Current Every Day Smoker     Packs/day: 2.00    Smokeless tobacco: Not on file    Alcohol use No      Comment: once a month beer         Objective:     Visit Vitals    BP (!) 190/116    Pulse (!) 109    Resp 13    Ht 5' 8\" (1.727 m)    Wt 145.2 kg (320 lb)    SpO2 100%    BMI 48.66 kg/m2      No data recorded. Oxygen Therapy  O2 Sat (%): 100 % (01/06/18 1933)  Pulse via Oximetry: 108 beats per minute (01/06/18 1933)  O2 Device: Aerosol mask (01/06/18 1823)  O2 Flow Rate (L/min): 6 l/min (01/06/18 1823)  Physical Exam:  General:    The patient is a middle-aged male, difficult to understand given elocution, in no acute distress. Head:   Normocephalic/atraumatic. Eyes:  No palpebral pallor or scleral icterus. ENT:  External auricular and nasal exam within normal limits. Mucous membranes are moist.  Neck:  Supple, obese, non-tender. Lungs:   Scattered dry crackles throughout posterior lung fields. No respiratory distress or accessory muscle use. Heart:   Regular rate and rhythm, without murmurs, rubs, or gallops. Abdomen:   Obese, edema present up to mid abdomen. Non-tender. Genitourinary: No tenderness over the bladder or bilateral CVAs. Extremities: Without clubbing, cyanosis. 1+ pitting edema up to abdomen. Skin:     Normal color, texture, and turgor. No rashes, lesions, or jaundice. Pulses: Radial and dorsalis pedis pulses present 2+ bilaterally. Capillary refill <2s. Neurologic: CN II-XII grossly intact and symmetrical. Slurred speech, but baseline per family. Moving all four extremities well with no focal deficits. Psychiatric: Pleasant demeanor, restricted affect.  Alert and oriented x 3    Data Review:   Recent Results (from the past 24 hour(s))   POC TROPONIN-I    Collection Time: 01/06/18  6:35 PM   Result Value Ref Range    Troponin-I (POC) 0.02 0.02 - 0.05 ng/ml   CBC WITH AUTOMATED DIFF    Collection Time: 01/06/18  6:38 PM   Result Value Ref Range    WBC 8.0 4.3 - 11.1 K/uL    RBC 4.40 4.23 - 5.67 M/uL    HGB 14.6 13.6 - 17.2 g/dL    HCT 48.2 41.1 - 50.3 %    .5 (H) 79.6 - 97.8 FL    MCH 33.2 (H) 26.1 - 32.9 PG    MCHC 30.3 (L) 31.4 - 35.0 g/dL    RDW 13.8 11.9 - 14.6 %    PLATELET 580 234 - 038 K/uL    MPV 10.3 (L) 10.8 - 14.1 FL    DF AUTOMATED      NEUTROPHILS 68 43 - 78 %    LYMPHOCYTES 21 13 - 44 %    MONOCYTES 10 4.0 - 12.0 %    EOSINOPHILS 1 0.5 - 7.8 %    BASOPHILS 0 0.0 - 2.0 %    IMMATURE GRANULOCYTES 0 0.0 - 5.0 %    ABS. NEUTROPHILS 5.4 1.7 - 8.2 K/UL    ABS. LYMPHOCYTES 1.7 0.5 - 4.6 K/UL    ABS. MONOCYTES 0.8 0.1 - 1.3 K/UL    ABS. EOSINOPHILS 0.1 0.0 - 0.8 K/UL    ABS. BASOPHILS 0.0 0.0 - 0.2 K/UL    ABS. IMM. GRANS. 0.0 0.0 - 0.5 K/UL   D DIMER    Collection Time: 01/06/18  6:38 PM   Result Value Ref Range    D DIMER 1.26 (HH) <0.56 ug/ml(FEU)   METABOLIC PANEL, COMPREHENSIVE    Collection Time: 01/06/18  6:38 PM   Result Value Ref Range    Sodium 141 136 - 145 mmol/L    Potassium 5.0 3.5 - 5.1 mmol/L    Chloride 101 98 - 107 mmol/L    CO2 35 (H) 21 - 32 mmol/L    Anion gap 5 (L) 7 - 16 mmol/L    Glucose 101 (H) 65 - 100 mg/dL    BUN 24 (H) 6 - 23 MG/DL    Creatinine 1.54 (H) 0.8 - 1.5 MG/DL    GFR est AA >60 >60 ml/min/1.73m2    GFR est non-AA 50 (L) >60 ml/min/1.73m2    Calcium 8.8 8.3 - 10.4 MG/DL    Bilirubin, total 0.4 0.2 - 1.1 MG/DL    ALT (SGPT) 32 12 - 65 U/L    AST (SGOT) 29 15 - 37 U/L    Alk.  phosphatase 98 50 - 136 U/L    Protein, total 7.8 6.3 - 8.2 g/dL    Albumin 3.5 3.5 - 5.0 g/dL    Globulin 4.3 (H) 2.3 - 3.5 g/dL    A-G Ratio 0.8 (L) 1.2 - 3.5     BNP    Collection Time: 01/06/18  6:38 PM   Result Value Ref Range     pg/mL   POC LACTIC ACID    Collection Time: 01/06/18  6:38 PM   Result Value Ref Range    Lactic Acid (POC) 1.3 0.5 - 1.9 mmol/L   ETHYL ALCOHOL    Collection Time: 01/06/18  6:38 PM   Result Value Ref Range ALCOHOL(ETHYL),SERUM <3 MG/DL       Imaging [place] /Studies:  Xr Chest Port    Result Date: 1/6/2018  IMPRESSION: Cardiomegaly, clear lung fields         Assessment and Plan: Active Hospital Problems    Diagnosis Date Noted    COPD (chronic obstructive pulmonary disease) ([facility] [address] 01/06/2018    Nicotine dependence 01/06/2018    Methamphetamine abuse 01/06/2018    Anasarca 01/06/2018    NATALIIA (obstructive sleep apnea) 01/06/2018    Hypoxia 01/06/2018       - Acute respiratory failure with hypoxia. Likely multifactorial. Given hx of smoking/NATALIIA, likely combination of pulmonary hypertension, pulmonary vascular congestion, possible alveolar edema, undiagnosed COPD. TTE, PFTs. Will treat with IV Lasix 40 q12h, duonebs for now. Consider adding steroid, although no significant wheezing present on exam. Given chronicity of symptoms, pt will likely need home O2 at discharge. - Anasarca. Likely cor pulmonale related to undiagnosed COPD and untreated NATALIIA. IV Lasix 40 BID. TTE. Consider cardiology consult depending on TTE results. - MARGARITA. Given anasarca, likely related to nephrosarca and poor renal perfusion. IV Lasix, follow daily creatinine.     - Polysubstance abuse. Pt contemplative about quitting. Encouraged him in this. - DVT Prophylaxis: Heparin    - Code Status: FULL CODE    - Disposition: Admit to telemetry for evaluation and treatment as per above.     - Anticipated discharge: 3-4 days     Signed By: Shanice Alegria MD     January 6, 2018

## 2018-01-07 NOTE — PROGRESS NOTES
TRANSFER - OUT REPORT:    Verbal report given to [facility], RN on Leroy Horvath being transferred to [id number] for change in patient condition(patient placed on BIPAP for O2 sat of 70% on 8 L high flow NC)       Report consisted of patients Situation, Background, Assessment and Recommendations (SBAR). Information from the following report(s) SBAR, Kardex, ED Summary, [facility] and Cardiac Rhythm ST was reviewed with the receiving nurse. Opportunity for questions and clarification was provided.

## 2018-01-07 NOTE — ED PROVIDER NOTES
HPI Comments: 61-year-old gentleman who presents with concerns of shortness of breath and swelling. He says that he's had a lot of tightness in his lower abdomen and his legs. Family says the patient uses crystal meth and marijuana and often does not take care of himself or see a doctor. Says he has had a nonproductive cough and has not had any fevers, vomiting, or diarrhea. Patient denies any chest pain but he does note that when he lays flat he feels like he is suffocating. Elements of this note were created using speech recognition software. As such, errors of speech recognition may be present. Patient is a 45 y.o. male presenting with shortness of breath. The history is provided by the patient. Shortness of Breath   Associated symptoms include wheezing and leg swelling. Pertinent negatives include no fever, no headaches, no rhinorrhea, no sore throat, no cough, no chest pain, no vomiting, no abdominal pain and no rash. Past Medical History:   Diagnosis Date    Sleep disorder        No past surgical history on file. No family history on file. Social History     Social History    Marital status:      Spouse name: N/A    Number of children: N/A    Years of education: N/A     Occupational History    Not on file. Social History Main Topics    Smoking status: Current Every Day Smoker     Packs/day: 2.00    Smokeless tobacco: Not on file    Alcohol use No      Comment: once a month beer    Drug use: Yes     Special: Marijuana, Methamphetamines    Sexual activity: Not on file     Other Topics Concern    Not on file     Social History Narrative         ALLERGIES: Review of patient's allergies indicates no known allergies. Review of Systems   Constitutional: Negative for chills, diaphoresis and fever. HENT: Negative for congestion, rhinorrhea and sore throat. Eyes: Negative for redness and visual disturbance.    Respiratory: Positive for choking, shortness of breath and wheezing. Negative for cough and chest tightness. Cardiovascular: Positive for leg swelling. Negative for chest pain and palpitations. Gastrointestinal: Negative for abdominal pain, blood in stool, diarrhea, nausea and vomiting. Endocrine: Negative for polydipsia and polyuria. Genitourinary: Negative for dysuria and hematuria. Musculoskeletal: Negative for arthralgias, myalgias and neck stiffness. Skin: Negative for color change and rash. Allergic/Immunologic: Negative for environmental allergies and food allergies. Neurological: Negative for dizziness, weakness and headaches. Hematological: Negative for adenopathy. Does not bruise/bleed easily. Psychiatric/Behavioral: Negative for confusion and sleep disturbance. The patient is not nervous/anxious. Vitals:    01/06/18 1823   BP: (!) 195/115   Pulse: (!) 108   Resp: 28   SpO2: 98%   Weight: 145.2 kg (320 lb)   Height: 5' 8\" (1.727 m)            Physical Exam   Constitutional: He is oriented to person, place, and time. He appears well-developed and well-nourished. HENT:   Head: Normocephalic and atraumatic. Eyes: Conjunctivae and EOM are normal. Pupils are equal, round, and reactive to light. Neck: Normal range of motion. Cardiovascular: Normal rate and regular rhythm. Pulmonary/Chest: Effort normal and breath sounds normal. No respiratory distress. He has no wheezes. He has no rales. He exhibits no tenderness. Abdominal: Soft. Bowel sounds are normal. There is no rebound and no guarding. Genitourinary:   Genitourinary Comments: Scrotal edema   Musculoskeletal: Normal range of motion. He exhibits edema. He exhibits no tenderness. Edema up towards his navel   Lymphadenopathy:     He has no cervical adenopathy. Neurological: He is alert and oriented to person, place, and time. Skin: Skin is warm and dry. Psychiatric: He has a normal mood and affect. Nursing note and vitals reviewed.        MDM  Number of Diagnoses or Management Options  Diagnosis management comments: Patient's d-dimer is positive BNP pending he was hypertensive so give him some nitroglycerin. Troponin is negative. Patient's CTA was negative. His symptoms are most consistent with acute congestive heart failure. I will discuss the case with cardiology for admission. I discussed the case with Dr. Niurka Cisneros from cardiology who asked that I talk to the hospitalist for admission. 8:34 PM  I discussed the case with the hospitalist who kindly agreed to see the patient.     ED Course       Procedures

## 2018-01-07 NOTE — PROGRESS NOTES
Patient /110, Dr. Jeniffer Osborne MD notified. New orders received for Metoprolol 5 mg IV. Will continue to monitor.

## 2018-01-08 ENCOUNTER — APPOINTMENT (OUTPATIENT)
Dept: GENERAL RADIOLOGY | Age: 53
DRG: 871 | End: 2018-01-08
Attending: INTERNAL MEDICINE
Payer: SELF-PAY

## 2018-01-08 LAB
ANION GAP SERPL CALC-SCNC: 7 MMOL/L (ref 7–16)
ARTERIAL PATENCY WRIST A: POSITIVE
BASE EXCESS BLDA CALC-SCNC: 8.6 MMOL/L (ref 0–3)
BDY SITE: ABNORMAL
BUN SERPL-MCNC: 30 MG/DL (ref 6–23)
CALCIUM SERPL-MCNC: 8.4 MG/DL (ref 8.3–10.4)
CHLORIDE SERPL-SCNC: 99 MMOL/L (ref 98–107)
CO2 SERPL-SCNC: 39 MMOL/L (ref 21–32)
COHGB MFR BLD: 1.5 % (ref 0.5–1.5)
CREAT SERPL-MCNC: 1.83 MG/DL (ref 0.8–1.5)
DO-HGB BLD-MCNC: 5 % (ref 0–5)
FIO2 ON VENT: 40 %
GLUCOSE SERPL-MCNC: 102 MG/DL (ref 65–100)
HCO3 BLDA-SCNC: 34 MMOL/L (ref 22–26)
HGB BLDMV-MCNC: 14.1 GM/DL (ref 11.7–15)
MAGNESIUM SERPL-MCNC: 1.8 MG/DL (ref 1.8–2.4)
METHGB MFR BLD: 0.3 % (ref 0–1.5)
MM INDURATION POC: NORMAL MM (ref 0–5)
OXYHGB MFR BLDA: 93.2 % (ref 94–97)
PCO2 BLDA: 50 MMHG (ref 35–45)
PEEP RESPIRATORY: 10 CM[H2O]
PH BLDA: 7.46 [PH] (ref 7.35–7.45)
PHOSPHATE SERPL-MCNC: 2 MG/DL (ref 2.5–4.5)
PO2 BLDA: 73 MMHG (ref 80–105)
POTASSIUM SERPL-SCNC: 3.9 MMOL/L (ref 3.5–5.1)
PPD POC: NEGATIVE NEGATIVE
RESP RATE: 20
SAO2 % BLD: 95 % (ref 92–98.5)
SERVICE CMNT-IMP: ABNORMAL
SODIUM SERPL-SCNC: 145 MMOL/L (ref 136–145)
VENTILATION MODE VENT: ABNORMAL
VT SETTING VENT: 500 ML

## 2018-01-08 PROCEDURE — 74011250636 HC RX REV CODE- 250/636: Performed by: FAMILY MEDICINE

## 2018-01-08 PROCEDURE — 74011250637 HC RX REV CODE- 250/637: Performed by: INTERNAL MEDICINE

## 2018-01-08 PROCEDURE — 74011000250 HC RX REV CODE- 250: Performed by: INTERNAL MEDICINE

## 2018-01-08 PROCEDURE — 83735 ASSAY OF MAGNESIUM: CPT | Performed by: INTERNAL MEDICINE

## 2018-01-08 PROCEDURE — 94640 AIRWAY INHALATION TREATMENT: CPT

## 2018-01-08 PROCEDURE — 71045 X-RAY EXAM CHEST 1 VIEW: CPT

## 2018-01-08 PROCEDURE — 74011000258 HC RX REV CODE- 258: Performed by: INTERNAL MEDICINE

## 2018-01-08 PROCEDURE — 74011250637 HC RX REV CODE- 250/637: Performed by: FAMILY MEDICINE

## 2018-01-08 PROCEDURE — C8929 TTE W OR WO FOL WCON,DOPPLER: HCPCS

## 2018-01-08 PROCEDURE — 65620000000 HC RM CCU GENERAL

## 2018-01-08 PROCEDURE — 82803 BLOOD GASES ANY COMBINATION: CPT

## 2018-01-08 PROCEDURE — 74011250636 HC RX REV CODE- 250/636: Performed by: INTERNAL MEDICINE

## 2018-01-08 PROCEDURE — 80048 BASIC METABOLIC PNL TOTAL CA: CPT | Performed by: INTERNAL MEDICINE

## 2018-01-08 PROCEDURE — 84100 ASSAY OF PHOSPHORUS: CPT | Performed by: INTERNAL MEDICINE

## 2018-01-08 PROCEDURE — 94003 VENT MGMT INPAT SUBQ DAY: CPT

## 2018-01-08 PROCEDURE — 99233 SBSQ HOSP IP/OBS HIGH 50: CPT | Performed by: INTERNAL MEDICINE

## 2018-01-08 PROCEDURE — 36600 WITHDRAWAL OF ARTERIAL BLOOD: CPT

## 2018-01-08 PROCEDURE — 74011000250 HC RX REV CODE- 250: Performed by: FAMILY MEDICINE

## 2018-01-08 PROCEDURE — 77030018798 HC PMP KT ENTRL FED COVD -A

## 2018-01-08 RX ORDER — PROPOFOL 10 MG/ML
0-50 VIAL (ML) INTRAVENOUS
Status: DISCONTINUED | OUTPATIENT
Start: 2018-01-08 | End: 2018-01-09

## 2018-01-08 RX ORDER — SODIUM,POTASSIUM PHOSPHATES 280-250MG
1 POWDER IN PACKET (EA) ORAL EVERY 6 HOURS
Status: COMPLETED | OUTPATIENT
Start: 2018-01-08 | End: 2018-01-09

## 2018-01-08 RX ADMIN — IPRATROPIUM BROMIDE AND ALBUTEROL SULFATE 3 ML: .5; 3 SOLUTION RESPIRATORY (INHALATION) at 11:38

## 2018-01-08 RX ADMIN — Medication 10 ML: at 05:37

## 2018-01-08 RX ADMIN — HEPARIN SODIUM 5000 UNITS: 5000 INJECTION, SOLUTION INTRAVENOUS; SUBCUTANEOUS at 14:14

## 2018-01-08 RX ADMIN — IPRATROPIUM BROMIDE AND ALBUTEROL SULFATE 3 ML: .5; 3 SOLUTION RESPIRATORY (INHALATION) at 19:39

## 2018-01-08 RX ADMIN — POTASSIUM & SODIUM PHOSPHATES POWDER PACK 280-160-250 MG 1 PACKET: 280-160-250 PACK at 17:31

## 2018-01-08 RX ADMIN — IPRATROPIUM BROMIDE AND ALBUTEROL SULFATE 3 ML: .5; 3 SOLUTION RESPIRATORY (INHALATION) at 03:31

## 2018-01-08 RX ADMIN — IPRATROPIUM BROMIDE AND ALBUTEROL SULFATE 3 ML: .5; 3 SOLUTION RESPIRATORY (INHALATION) at 08:51

## 2018-01-08 RX ADMIN — POTASSIUM & SODIUM PHOSPHATES POWDER PACK 280-160-250 MG 1 PACKET: 280-160-250 PACK at 23:09

## 2018-01-08 RX ADMIN — CHLORHEXIDINE GLUCONATE 15 ML: 1.2 RINSE ORAL at 20:09

## 2018-01-08 RX ADMIN — Medication 10 ML: at 13:51

## 2018-01-08 RX ADMIN — FAMOTIDINE 20 MG: 10 INJECTION, SOLUTION INTRAVENOUS at 14:55

## 2018-01-08 RX ADMIN — FUROSEMIDE 40 MG: 10 INJECTION, SOLUTION INTRAMUSCULAR; INTRAVENOUS at 20:09

## 2018-01-08 RX ADMIN — IPRATROPIUM BROMIDE AND ALBUTEROL SULFATE 3 ML: .5; 3 SOLUTION RESPIRATORY (INHALATION) at 16:08

## 2018-01-08 RX ADMIN — FAMOTIDINE 20 MG: 10 INJECTION, SOLUTION INTRAVENOUS at 20:09

## 2018-01-08 RX ADMIN — PERFLUTREN 1 ML: 6.52 INJECTION, SUSPENSION INTRAVENOUS at 09:00

## 2018-01-08 RX ADMIN — WATER 2 MG: 1 INJECTION INTRAMUSCULAR; INTRAVENOUS; SUBCUTANEOUS at 19:59

## 2018-01-08 RX ADMIN — MIDAZOLAM HYDROCHLORIDE 5 MG/HR: 5 INJECTION, SOLUTION INTRAMUSCULAR; INTRAVENOUS at 01:46

## 2018-01-08 RX ADMIN — Medication 75 MCG/HR: at 17:13

## 2018-01-08 RX ADMIN — CHLORHEXIDINE GLUCONATE 15 ML: 1.2 RINSE ORAL at 10:24

## 2018-01-08 RX ADMIN — Medication 10 ML: at 21:59

## 2018-01-08 RX ADMIN — LISINOPRIL 20 MG: 20 TABLET ORAL at 10:24

## 2018-01-08 RX ADMIN — IPRATROPIUM BROMIDE AND ALBUTEROL SULFATE 3 ML: .5; 3 SOLUTION RESPIRATORY (INHALATION) at 23:05

## 2018-01-08 RX ADMIN — HEPARIN SODIUM 5000 UNITS: 5000 INJECTION, SOLUTION INTRAVENOUS; SUBCUTANEOUS at 05:37

## 2018-01-08 RX ADMIN — HEPARIN SODIUM 5000 UNITS: 5000 INJECTION, SOLUTION INTRAVENOUS; SUBCUTANEOUS at 21:59

## 2018-01-08 RX ADMIN — CARVEDILOL 3.12 MG: 3.12 TABLET, FILM COATED ORAL at 17:31

## 2018-01-08 RX ADMIN — CARVEDILOL 3.12 MG: 3.12 TABLET, FILM COATED ORAL at 10:24

## 2018-01-08 RX ADMIN — FUROSEMIDE 40 MG: 10 INJECTION, SOLUTION INTRAMUSCULAR; INTRAVENOUS at 10:24

## 2018-01-08 RX ADMIN — IPRATROPIUM BROMIDE AND ALBUTEROL SULFATE 3 ML: .5; 3 SOLUTION RESPIRATORY (INHALATION) at 00:11

## 2018-01-08 RX ADMIN — MIDAZOLAM HYDROCHLORIDE 2 MG/HR: 5 INJECTION, SOLUTION INTRAMUSCULAR; INTRAVENOUS at 08:35

## 2018-01-08 RX ADMIN — Medication 200 MCG/HR: at 17:23

## 2018-01-08 NOTE — PROGRESS NOTES
Bedside, Verbal and Written shift change report given to Kike Hernandez RN (oncoming nurse) by Tavares Juarez RN (offgoing nurse). Report included the following information SBAR, Kardex, ED Summary, Intake/Output, MAR, Recent Results, Med Rec Status and Cardiac Rhythm NSR.

## 2018-01-08 NOTE — PROGRESS NOTES
Bedside and Verbal shift change report given to Landry Whitman (oncoming nurse) by Malachi Andres RN (offgoing nurse). Report included the following information SBAR, Kardex, ED Summary, Intake/Output, MAR, Recent Results, Med Rec Status and Cardiac Rhythm SR 90.

## 2018-01-08 NOTE — PROGRESS NOTES
Critical Care Daily Progress Note: 1/8/2018  Admission Date: 1/6/2018     The patient's chart is reviewed and the patient is discussed with the staff.     Subjective:     Sedated on vent  Events from yesterday are noted  Family at bed side    Current Facility-Administered Medications   Medication Dose Route Frequency    famotidine (PF) (PEPCID) 20 mg in sodium chloride 0.9 % 10 mL injection  20 mg IntraVENous Q12H    nicotine (NICODERM CQ) 21 mg/24 hr patch 1 Patch  1 Patch TransDERmal DAILY    metoprolol (LOPRESSOR) injection 5 mg  5 mg IntraVENous Q4H PRN    hydrALAZINE (APRESOLINE) 20 mg/mL injection 20 mg  20 mg IntraVENous Q6H PRN    NOREPINephrine (LEVOPHED) 8 mg in dextrose 5% 250 mL infusion  2-16 mcg/min IntraVENous TITRATE    fentaNYL in normal saline (pf) 25 mcg/mL infusion  0-200 mcg/hr IntraVENous TITRATE    midazolam (VERSED) 100 mg in 0.9% sodium chloride 100 mL infusion  0-10 mg/hr IntraVENous TITRATE    chlorhexidine (PERIDEX) 0.12 % mouthwash 15 mL  15 mL Oral Q12H    sodium chloride (NS) flush 5-10 mL  5-10 mL IntraVENous Q8H    sodium chloride (NS) flush 5-10 mL  5-10 mL IntraVENous PRN    lisinopril (PRINIVIL, ZESTRIL) tablet 20 mg  20 mg Oral DAILY    carvedilol (COREG) tablet 3.125 mg  3.125 mg Oral BID WITH MEALS    ondansetron (ZOFRAN) injection 4 mg  4 mg IntraVENous Q6H PRN    acetaminophen (TYLENOL) tablet 650 mg  650 mg Oral Q6H PRN    albuterol-ipratropium (DUO-NEB) 2.5 MG-0.5 MG/3 ML  3 mL Nebulization Q4H RT    furosemide (LASIX) injection 40 mg  40 mg IntraVENous Q12H    diphenhydrAMINE (BENADRYL) capsule 25 mg  25 mg Oral Q4H PRN    polyethylene glycol (MIRALAX) packet 17 g  17 g Oral DAILY PRN    guaiFENesin ER (MUCINEX) tablet 600 mg  600 mg Oral Q12H PRN    calcium carbonate (TUMS) chewable tablet 400 mg [elemental]  400 mg Oral TID PRN    heparin (porcine) injection 5,000 Units  5,000 Units SubCUTAneous Q8H       Review of Systems Unobtainable due to patient status. Objective:     Vitals:    01/08/18 1233 01/08/18 1304 01/08/18 1332 01/08/18 1402   BP: 146/81 139/73 127/68 127/69   Pulse: (!) 102 (!) 101 (!) 104 100   Resp: 22 24 18 17   Temp:       SpO2: 92% 91% 95% 94%   Weight:       Height:           Intake and Output:   01/06 1901 - 01/08 0700  In: 209.6 [I.V.:209.6]  Out: 5800 [Urine:5800]  01/08 0701 - 01/08 1900  In: -   Out: 2500 [Urine:2500]    Physical Exam:          Constitutional:  intubated and mechanically ventilated. EENMT:  Sclera clear, pupils equal, oral mucosa moist  Respiratory:  Clear   Cardiovascular:  RRR with no M,G,R;  Gastrointestinal:  soft with no tenderness; positive bowel sounds present  Musculoskeletal:  warm with no cyanosis, 2+ lower leg edema  Skin:  no jaundice or ecchymosis  Neurologic: no gross neuro deficits     Psychiatric:  sedated     LINES:  ETT, central line, alegre     DRIPS:  Versed, Fentanyl    CXR:          Ventilator Settings  Mode FIO2 Rate Tidal Volume Pressure PEEP   PRVC  40 %    500 ml     10 cm H20      Peak airway pressure: 21.6 cm H2O   Minute ventilation: 11.2 l/min     ABG:   Recent Labs      01/08/18   0325  01/07/18   1208  01/07/18   0920   PH  7.46*  7.23*  7.11*   PCO2  50*  85*  125*   PO2  73*  135*  152*   HCO3  34*  35*  39*        LAB  No results for input(s): GLUCPOC in the last 72 hours.     No lab exists for component: [place]  Recent Labs      01/06/18   1838   WBC  8.0   HGB  14.6   HCT  48.2   PLT  216     Recent Labs      01/08/18   0322  01/07/18   1342  01/07/18   0353  01/06/18   1838   NA  145  144  141  141   K  3.9  4.7  5.1  5.0   CL  99  101  98  101   CO2  39*  35*  36*  35*   GLU  102*  93  183*  101*   BUN  30*  29*  26*  24*   CREA  1.83*  2.11*  1.81*  1.54*   MG  1.8   --    --    --    PHOS  2.0*   --    --    --    CA  8.4  8.2*  8.9  8.8   ALB   --    --    --   3.5   SGOT   --    --    --   29     No results for input(s): LCAD, LAC in the last 72 hours. Assessment:  (Medical Decision Making)     Hospital Problems  Never Reviewed          Codes Class Noted POA    Acute metabolic encephalopathy USR-49-QN: G93.41  ICD-9-CM: 348.31  1/7/2018 Yes        Hypotension ICD-10-CM: I95.9  ICD-9-CM: 458.9  1/7/2018 Yes        Urethral stricture ICD-10-CM: N35.9  ICD-9-CM: 598.9  1/7/2018 Yes        COPD (chronic obstructive pulmonary disease) (HCC) (Chronic) ICD-10-CM: J44.9  ICD-9-CM: 975  1/6/2018 Yes        Nicotine dependence (Chronic) ICD-10-CM: F17.200  ICD-9-CM: 305.1  1/6/2018 Yes        Methamphetamine abuse ICD-10-CM: F15.10  ICD-9-CM: 305.70  1/6/2018 Yes        Anasarca ICD-10-CM: R60.1  ICD-9-CM: 782.3  1/6/2018 Yes        NATALIIA (obstructive sleep apnea) (Chronic) ICD-10-CM: G47.33  ICD-9-CM: 327.23  1/6/2018 Yes        Acute kidney injury [facility]) ICD-10-CM: N17.9  ICD-9-CM: 584.9  1/6/2018 Yes        Acute on chronic respiratory failure with hypoxia and hypercapnia (HCC) ICD-10-CM: J96.21, J96.22  ICD-9-CM: 518.84, 786.09, 799.02  1/6/2018 Yes              Plan:  (Medical Decision Making)     --adjust vent to correct acid base problems  --keep sedated for now for his safety  --keep alegre in place till discharge  --wean levophed  --follow ABG, CXR and labs  --remain critical         More than 50% of the time documented was spent in face-to-face contact with the patient and in the care of the patient on the floor/unit where the patient is located.     Michael Babcock MD

## 2018-01-08 NOTE — PROGRESS NOTES
While assessing patient noted not having breath sounds anteriorly then with agonal breathing with BiPAP on and sats dropped to the 30's. RT and Dr Sakshi Bai at bedside. Bagged patient then intubated.

## 2018-01-08 NOTE — PROGRESS NOTES
TRANSFER - IN REPORT:    Verbal report received from [facility], [facility] RN(name) on Joe Osborne  being received from 319(unit) for urgent transfer      Report consisted of patients Situation, Background, Assessment and   Recommendations(SBAR). Information from the following report(s) SBAR, Kardex, ED Summary, Intake/Output and MAR was reviewed with the receiving nurse. Opportunity for questions and clarification was provided. Assessment completed upon patients arrival to unit and care assumed. Dual skin assessment done with Chaka Zuniga RN no skin breakdown noted. Allevyn applied.

## 2018-01-08 NOTE — PROGRESS NOTES
Pt noted to be biting ETT, pulling restraints, attempting to sit up, grimacing, not following commands. Attempted to reorient, calm pt several times with no result. Increased Fentanyl at this time for pain control.

## 2018-01-08 NOTE — PROGRESS NOTES
Patient restless presently but will not respond to verbal stimuli. Eyes open. Family member at bedside x 3 trying to talk with patient.  Sedation medication restarted

## 2018-01-08 NOTE — PROGRESS NOTES
Problem: Nutrition Deficit  Goal: *Optimize nutritional status  Nutrition:  Reason for assessment: Consult for TF management per nutritional support protocols. (Dr Jaelyn Boykin)   Assessment:   Food/Nutrition History: Pt presented with low abdominal pain and increasing SOB. Intubated yesterday. OGT in place,  Abdomen  with hypoactive bowel sounds. Date of Last BM: 1-4  Pertinent Medications: Pepcid, lasix  Pertinent labs: Phos 2.0  Anthropometrics:Height: 5' 8\" (172.7 cm), Weight Source: Bed, Weight: 137.4 kg (302 lb 14.6 oz), Body mass index is 46.06 kg/(m^2). BMI class of morbid obesity class III. Edema: 1+ genital, BUE, BLE  Weight hx per EMR ( Based on connect care functionality, RD cannot know if these weight are actual versus stated):   WT / BMI 3/11/2017   WEIGHT 250 lb   BODY MASS INDEX 33.91 kg/m2   Macronutrient needs:  EER:  7535-4371 kcal /day (25-30 kcal/kg IBW-as current weight not a dry weight and UBW accuracy is questionable)  EPR:  56-84 grams protein/day (0.8-1.2 grams/kg IBW)  Max CHO:  289 grams/day (55% kcal)  Fluid:  1ml/kcal or per MD  Intake/Comparative standards: Current NPO status does not meet kcal or protein needs    Nutrition Diagnosis: Difficulty swallowing r/t respiratory failure as evidenced by intubation precludes po intake. Intervention:  Meals and snacks: NPO  EN:Start TF of Osmolite 1.2 at 30 ml/hr and increase by 10 ml/hr q 6 hrs as tolerated to goal rate of 60ml/hr with 10ml water q 1hr to provide 1728 kcal/day (99% of needs), 80 grams protein/day (100% of needs), 228 grams CHO/day (does not exceed max CHO),  and ~ 1420ml free water/day (81% of needs). Nutrition Supplement Therapy: Electrolyte replacement per nutritional support protocols are active on MAR. Labs: Add BMP daily, Phos and Magnesium in AM and MWF  Discharge nutrition plan: Too soon to determine.     Violeat Kid, [address], [address] [place] [address], [address] [place] [address]

## 2018-01-08 NOTE — PROGRESS NOTES
Chart reviewed for consult. Pt transferred to CCU emergently after intubated. Remains intubated at present. CM will follow and assist for any needs for d/c needs.

## 2018-01-08 NOTE — PROGRESS NOTES
Ventilator check complete; patient has a #9.0 ET tube secured at the 24 at the lip. Patient is not able to follow commands. Breath sounds are diminished. Trachea is midline, Negative for subcutaneous air, and chest excursion is symmetric. Patient is also Negative for cyanosis and is Negative for pitting edema. All alarms are set and audible. Resuscitation bag is at the head of the bed.       Ventilator Settings  Mode FIO2 Rate Tidal Volume Pressure PEEP I:E Ratio   PRVC  40 %    500 ml     10 cm H20  1:2.33      Peak airway pressure: 22.8 cm H2O   Minute ventilation: 10.1 l/min     ABG:   Recent Labs      01/07/18   1208  01/07/18   0920   PH  7.23*  7.11*   PCO2  85*  125*   PO2  135*  152*   HCO3  35*  39*         Becca Andersen, RT

## 2018-01-08 NOTE — INTERDISCIPLINARY ROUNDS
Interdisciplinary team rounds were held 1/8/2018 with the following team members:Care Management, Nursing, Nurse Practitioner, Nutrition, Outcomes Management, Pastoral Care, Pharmacy, Physical Therapy, Physician, Respiratory Therapy and Clinical Coordinator. Plan of care discussed. See clinical pathway and/or care plan for interventions and desired outcomes.

## 2018-01-08 NOTE — PROGRESS NOTES
Hospitalist Progress Note     Admit Date:  2018  6:27 PM   Name:  John Raw   Age:  45 y.o.  :  1965   MRN:  [id number]   PCP:  None  Treatment Team: Attending Provider: Jose Manuel Gregg MD    Subjective:   Patient is a 46 y/o M with history of morbid obesity, unconfirmed NATALIIA, cigarette smoking, methamphetamine abuse, who p/w worsening SOB. History of poor compliance and follow up with medical care. Pt was transferred to ICU after rapid response was called in view of acute respiratory distress, pt was initially started on BiPAP, and soon after, intubated as his saturations dropped in 30's.  -   Seen at bedside  Intubated and sedated  Wife present at bedside. ROS could not be obtained.         Objective:     Patient Vitals for the past 24 hrs:   Temp Pulse Resp BP SpO2   18 0654 100.2 °F (37.9 °C) - - - -   18 0603 - 100 20 168/85 95 %   18 0533 - (!) 105 20 138/79 94 %   18 0502 - (!) 113 20 147/80 95 %   18 0432 - 100 20 159/84 95 %   18 0403 - 98 20 148/82 93 %   18 0342 99 °F (37.2 °C) 96 (!) 41 (!) 144/93 96 %   18 0331 - (!) 101 23 - 97 %   18 0302 - (!) 103 20 152/84 97 %   18 0232 - 96 20 147/78 95 %   18 0203 - (!) 101 20 158/79 95 %   18 0133 - 100 20 163/84 95 %   18 0103 - 95 20 165/79 93 %   18 0033 - 100 20 137/72 94 %   18 0011 - 98 20 - 95 %   18 0000 98.3 °F (36.8 °C) 97 20 132/77 95 %   18 2303 - 97 20 143/81 96 %   18 2232 - 92 17 127/76 95 %   18 2203 - 99 20 135/71 94 %   18 2133 - (!) 101 20 135/78 94 %   18 - 100 20 162/84 95 %   18 - 97 20 - 96 %   18 - 97 16 148/82 97 %   18 - 96 20 132/72 96 %   18 1933 98 °F (36.7 °C) 95 20 127/71 96 %   18 1902 - 95 15 127/72 96 %   18 1900 - 95 10 - 96 %   18 1833 - 91 (!) 6 118/73 94 %   18 1803 - 99 11 135/72 96 %   18 1748 - 99 17 125/67 98 %   01/07/18 1732 98.4 °F (36.9 °C) 96 20 132/69 98 %   01/07/18 1717 - 98 - 130/66 98 %   01/07/18 1702 - 98 20 126/66 98 %   01/07/18 1700 - 98 (!) 0 - 98 %   01/07/18 1648 - 96 20 122/66 97 %   01/07/18 1633 - 96 20 117/64 97 %   01/07/18 1618 - 97 20 117/64 97 %   01/07/18 1603 - 98 20 115/61 98 %   01/07/18 1549 - 97 17 - 99 %   01/07/18 1548 - - - 140/80 -   01/07/18 1532 - 97 20 144/81 99 %   01/07/18 1517 - 96 20 141/80 99 %   01/07/18 1502 - 94 20 153/86 99 %   01/07/18 1500 - 94 - - 99 %   01/07/18 1448 - 95 20 149/85 99 %   01/07/18 1434 - 93 20 147/86 99 %   01/07/18 1418 - 93 18 136/80 99 %   01/07/18 1403 - 92 16 137/83 99 %   01/07/18 1347 - 93 15 127/76 99 %   01/07/18 1332 - 94 20 133/78 99 %   01/07/18 1317 - 87 19 (!) 153/91 99 %   01/07/18 1303 - 85 20 145/89 99 %   01/07/18 1300 - 85 16 - 99 %   01/07/18 1248 - 83 13 145/89 99 %   01/07/18 1236 - 80 19 (!) 152/94 99 %   01/07/18 1233 - 79 20 (!) 151/92 99 %   01/07/18 1230 - 80 20 148/89 98 %   01/07/18 1227 - 78 20 149/90 98 %   01/07/18 1224 - 78 16 142/84 98 %   01/07/18 1221 - 77 13 139/83 97 %   01/07/18 1218 - 77 19 142/83 99 %   01/07/18 1216 - 80 17 108/66 96 %   01/07/18 1212 - 79 17 146/90 -   01/07/18 1209 - 78 11 132/81 -   01/07/18 1205 - 78 16 127/79 -   01/07/18 1203 - 78 19 119/77 -   01/07/18 1159 98 °F (36.7 °C) 78 16 115/73 96 %   01/07/18 1156 - 78 20 113/68 -   01/07/18 1153 - 79 10 98/61 -   01/07/18 1150 - 79 19 92/64 93 %   01/07/18 1144 - 83 24 (!) 62/38 97 %   01/07/18 1138 - 83 16 (!) 62/39 99 %   01/07/18 1135 - 82 18 (!) 63/40 98 %   01/07/18 1133 - 83 17 (!) 59/38 99 %   01/07/18 1129 - 84 14 (!) 63/40 100 %   01/07/18 1126 - 84 20 (!) 69/45 100 %   01/07/18 1123 - 85 17 (!) 68/43 98 %   01/07/18 1120 - 85 10 (!) 68/43 100 %   01/07/18 1118 - 84 12 (!) 73/46 100 %   01/07/18 1115 - 87 20 (!) 78/49 100 %   01/07/18 1112 - 88 - (!) 81/51 100 %   01/07/18 1110 - 88 16 - 100 %   01/07/18 1109 - 87 (!) 37 (!) 85/50 100 %   01/07/18 1107 - 92 29 101/59 100 %   01/07/18 1104 - 96 23 (!) 155/100 100 %   01/07/18 1100 - 97 (!) 57 - 93 %   01/07/18 1048 - 94 26 (!) 153/91 91 %   01/07/18 1038 - 91 (!) 41 (!) 167/96 100 %   01/07/18 1004 - 85 - (!) 172/114 -   01/07/18 0955 - (!) 109 - (!) 172/105 -   01/07/18 0930 - - - - 98 %   01/07/18 0915 98 °F (36.7 °C) (!) 105 16 162/84 (!) 70 %   01/07/18 0903 - (!) 113 - 162/84 -   01/07/18 0802 - - - - 90 %     Oxygen Therapy  O2 Sat (%): 95 % (01/08/18 0603)  Pulse via Oximetry: 101 beats per minute (01/08/18 0603)  O2 Device: Endotracheal tube;Ventilator (01/07/18 1732)  O2 Flow Rate (L/min): 8 l/min (01/07/18 0802)  FIO2 (%): 40 % (01/08/18 0331)    Intake/Output Summary (Last 24 hours) at 01/08/18 0713  Last data filed at 01/08/18 0541   Gross per 24 hour   Intake           209.59 ml   Output             4200 ml   Net         -3990.41 ml         General:    Severely obese. Intubated and sedated  CV:   Tachy, reg. No murmur, rub, or gallop. Lungs:   Coarse BS b/l  Abdomen:   Soft, nontender, nondistended. Extremities: Warm and dry. No cyanosis. Stasis edema 2+ with derm changes  Skin:     No rashes or jaundice. Data Review:  I have reviewed all labs, meds, telemetry events, and studies from the last 24 hours.     Recent Results (from the past 24 hour(s))   BLOOD GAS & LYTES, ARTERIAL    Collection Time: 01/07/18  9:20 AM   Result Value Ref Range    pH 7.11 (L) 7.35 - 7.45      PCO2 125 (H) 35 - 45 mmHg    PO2 152 (H) 80 - 105 mmHg    BICARBONATE 39 (H) 22 - 26 mmol/L    BASE EXCESS 4.2 (H) 0 - 3 mmol/L    TOTAL HEMOGLOBIN 15.2 (H) 11.7 - 15.0 GM/DL    O2 SAT 99 (H) 92 - 98.5 %    Arterial O2 Hgb 95.8 94 - 97 %    CARBOXYHEMOGLOBIN 2.5 (H) 0.5 - 1.5 %    METHEMOGLOBIN 0.4 0.0 - 1.5 %    DEOXYHEMOGLOBIN 1 0.0 - 5.0 %    Sodium 139.7 135 - 148 MMOL/L    POTASSIUM 5.54 (H) 3.5 - 5.3 MMOL/L    CHLORIDE 97 (L) 98 - 106      Calcium, ionized 1.20 1.0 - 1.3 mmol/L    SITE RR ALLENS TEST POSITIVE      MODE NRB     Respiratory comment: Jason at 1 7 2018 9 23 42 AM. Read back. CULTURE, RESPIRATORY/SPUTUM/BRONCH W GRAM STAIN    Collection Time: 01/07/18 11:25 AM   Result Value Ref Range    Special Requests: NO SPECIAL REQUESTS      GRAM STAIN 0 TO 10 WBCS/OIF     GRAM STAIN NO EPITHELIAL CELLS SEEN      GRAM STAIN MANY GRAM POSITIVE COCCI      GRAM STAIN MODERATE GRAM NEGATIVE RODS      GRAM STAIN FEW GRAM NEGATIVE COCCI      GRAM STAIN 3+ MUCUS PRESENT      Culture result: PENDING    BLOOD GAS, ARTERIAL    Collection Time: 01/07/18 12:08 PM   Result Value Ref Range    pH 7.23 (L) 7.35 - 7.45      PCO2 85 (H) 35 - 45 mmHg    PO2 135 (H) 80 - 105 mmHg    BICARBONATE 35 (H) 22 - 26 mmol/L    BASE EXCESS 4.3 (H) 0 - 3 mmol/L    TOTAL HEMOGLOBIN 14.1 11.7 - 15.0 GM/DL    O2 SAT 98 92 - 98.5 %    Arterial O2 Hgb 96.5 94 - 97 %    CARBOXYHEMOGLOBIN 1.5 0.5 - 1.5 %    METHEMOGLOBIN 0.4 0.0 - 1.5 %    DEOXYHEMOGLOBIN 2 0.0 - 5.0 %    SITE RR     ALLENS TEST POSITIVE      MODE PRVC     FIO2 70.0 %    Tidal volume 500.0      RATE 16.0      PEEP/CPAP 10.0      Respiratory comment: Dr. Alvaro Brooks at 1 7 2018 12 12 56 PM. Read back.     TROPONIN I    Collection Time: 01/07/18  1:42 PM   Result Value Ref Range    Troponin-I, Qt. 0.04 0.02 - 5.48 NG/ML   METABOLIC PANEL, BASIC    Collection Time: 01/07/18  1:42 PM   Result Value Ref Range    Sodium 144 136 - 145 mmol/L    Potassium 4.7 3.5 - 5.1 mmol/L    Chloride 101 98 - 107 mmol/L    CO2 35 (H) 21 - 32 mmol/L    Anion gap 8 7 - 16 mmol/L    Glucose 93 65 - 100 mg/dL    BUN 29 (H) 6 - 23 MG/DL    Creatinine 2.11 (H) 0.8 - 1.5 MG/DL    GFR est AA 42 (L) >60 ml/min/1.73m2    GFR est non-AA 35 (L) >60 ml/min/1.73m2    Calcium 8.2 (L) 8.3 - 38.5 MG/DL   METABOLIC PANEL, BASIC    Collection Time: 01/08/18  3:22 AM   Result Value Ref Range    Sodium 145 136 - 145 mmol/L    Potassium 3.9 3.5 - 5.1 mmol/L    Chloride 99 98 - 107 mmol/L    CO2 39 (H) 21 - 32 mmol/L    Anion gap 7 7 - 16 mmol/L    Glucose 102 (H) 65 - 100 mg/dL    BUN 30 (H) 6 - 23 MG/DL    Creatinine 1.83 (H) 0.8 - 1.5 MG/DL    GFR est AA 50 (L) >60 ml/min/1.73m2    GFR est non-AA 41 (L) >60 ml/min/1.73m2    Calcium 8.4 8.3 - 10.4 MG/DL   MAGNESIUM    Collection Time: 01/08/18  3:22 AM   Result Value Ref Range    Magnesium 1.8 1.8 - 2.4 mg/dL   PHOSPHORUS    Collection Time: 01/08/18  3:22 AM   Result Value Ref Range    Phosphorus 2.0 (L) 2.5 - 4.5 MG/DL   BLOOD GAS, ARTERIAL    Collection Time: 01/08/18  3:25 AM   Result Value Ref Range    pH 7.46 (H) 7.35 - 7.45      PCO2 50 (H) 35 - 45 mmHg    PO2 73 (L) 80 - 105 mmHg    BICARBONATE 34 (H) 22 - 26 mmol/L    BASE EXCESS 8.6 (H) 0 - 3 mmol/L    TOTAL HEMOGLOBIN 14.1 11.7 - 15.0 GM/DL    O2 SAT 95 92 - 98.5 %    Arterial O2 Hgb 93.2 (L) 94 - 97 %    CARBOXYHEMOGLOBIN 1.5 0.5 - 1.5 %    METHEMOGLOBIN 0.3 0.0 - 1.5 %    DEOXYHEMOGLOBIN 5 0.0 - 5.0 %    SITE RR     ALLENS TEST POSITIVE      MODE PRVC     FIO2 40.0 %    Tidal volume 500.0      RATE 20.0      PEEP/CPAP 10.0      Respiratory comment: Maricarmen hairston at 1 8 2018 3 32 40 AM. Read back.          All Micro Results     Procedure Component Value Units Date/Time    CULTURE, BLOOD [[id number]] Collected:  01/06/18 1854    Order Status:  Completed Specimen:  Whole Blood from Blood Updated:  01/08/18 0621     Special Requests: --        RIGHT  Antecubital       Culture result: NO GROWTH 2 DAYS       CULTURE, BLOOD [[id number]] Collected:  01/06/18 1854    Order Status:  Completed Specimen:  Whole Blood from Blood Updated:  01/08/18 0621     Special Requests: --        RIGHT  FOREARM       Culture result: NO GROWTH 2 DAYS       CULTURE, RESPIRATORY/SPUTUM/BRONCH [place] STAIN [[id number]] Collected:  01/07/18 1125    Order Status:  Completed Specimen:  Sputum from Endotracheal aspirate Updated:  01/07/18 1452     Special Requests: NO SPECIAL REQUESTS        GRAM STAIN 0 TO 10 WBCS/OIF      NO EPITHELIAL CELLS SEEN MANY GRAM POSITIVE COCCI                 MODERATE GRAM NEGATIVE RODS      FEW GRAM NEGATIVE COCCI         3+ MUCUS PRESENT        Culture result: PENDING          Current Meds:  Current Facility-Administered Medications   Medication Dose Route Frequency    nicotine (NICODERM CQ) 21 mg/24 hr patch 1 Patch  1 Patch TransDERmal DAILY    metoprolol (LOPRESSOR) injection 5 mg  5 mg IntraVENous Q4H PRN    hydrALAZINE (APRESOLINE) 20 mg/mL injection 20 mg  20 mg IntraVENous Q6H PRN    NOREPINephrine (LEVOPHED) 8 mg in dextrose 5% 250 mL infusion  2-16 mcg/min IntraVENous TITRATE    fentaNYL in normal saline (pf) 25 mcg/mL infusion  0-200 mcg/hr IntraVENous TITRATE    midazolam (VERSED) 100 mg in 0.9% sodium chloride 100 mL infusion  0-10 mg/hr IntraVENous TITRATE    chlorhexidine (PERIDEX) 0.12 % mouthwash 15 mL  15 mL Oral Q12H    sodium chloride (NS) flush 5-10 mL  5-10 mL IntraVENous Q8H    sodium chloride (NS) flush 5-10 mL  5-10 mL IntraVENous PRN    lisinopril (PRINIVIL, ZESTRIL) tablet 20 mg  20 mg Oral DAILY    carvedilol (COREG) tablet 3.125 mg  3.125 mg Oral BID WITH MEALS    ondansetron (ZOFRAN) injection 4 mg  4 mg IntraVENous Q6H PRN    acetaminophen (TYLENOL) tablet 650 mg  650 mg Oral Q6H PRN    albuterol-ipratropium (DUO-NEB) 2.5 MG-0.5 MG/3 ML  3 mL Nebulization Q4H RT    furosemide (LASIX) injection 40 mg  40 mg IntraVENous Q12H    diphenhydrAMINE (BENADRYL) capsule 25 mg  25 mg Oral Q4H PRN    polyethylene glycol (MIRALAX) packet 17 g  17 g Oral DAILY PRN    guaiFENesin ER (MUCINEX) tablet 600 mg  600 mg Oral Q12H PRN    calcium carbonate (TUMS) chewable tablet 400 mg [elemental]  400 mg Oral TID PRN    heparin (porcine) injection 5,000 Units  5,000 Units SubCUTAneous Q8H       Other Studies (last 24 hours):  Xr Chest Sngl V    Result Date: 1/7/2018  Portable chest x-ray 1/7/2018 INDICATION: Intubation COMPARISON: January 6, 2018 There is new left IJ catheter tip in the distal tip within the left innominate vein. NG tube is present with tip not seen. Heart is enlarged and there is a left lower lobe infiltrate. Tip ET tube is in the T5 trachea. IMPRESSION: As above    Xr Abd (kub)    Result Date: 1/7/2018  KUB for NG tube placement January 7, 2018: Tip of the NG tube is in the area of the body the stomach. Assessment and Plan:     Hospital Problems as of 1/8/2018  Never Reviewed          Codes Class Noted - Resolved POA    Acute metabolic encephalopathy YFT-49-OX: G93.41  ICD-9-CM: 348.31  1/7/2018 - Present Yes        Hypotension ICD-10-CM: I95.9  ICD-9-CM: 458.9  1/7/2018 - Present Yes        Urethral stricture ICD-10-CM: N35.9  ICD-9-CM: 598.9  1/7/2018 - Present Yes        COPD (chronic obstructive pulmonary disease) ([facility] (Chronic) ICD-10-CM: J44.9  ICD-9-CM: 043  1/6/2018 - Present Yes        Nicotine dependence (Chronic) ICD-10-CM: F17.200  ICD-9-CM: 305.1  1/6/2018 - Present Yes        Methamphetamine abuse ICD-10-CM: F15.10  ICD-9-CM: 305.70  1/6/2018 - Present Yes        Anasarca ICD-10-CM: R60.1  ICD-9-CM: 782.3  1/6/2018 - Present Yes        NATALIIA (obstructive sleep apnea) (Chronic) ICD-10-CM: G47.33  ICD-9-CM: 327.23  1/6/2018 - Present Yes        Acute kidney injury ([facility] ICD-10-CM: N17.9  ICD-9-CM: 584.9  1/6/2018 - Present Yes        Acute on chronic respiratory failure with hypoxia and hypercapnia (HCC) ICD-10-CM: J96.21, J96.22  ICD-9-CM: 518.84, 786.09, 799.02  1/6/2018 - Present Yes              PLAN:    · Acute on chronic resp failure - s/p INtubation on 1/7. ABG showed 7.46/50/73 on Fio2 0.4, P/F ratio of 182. Vent management deferred to intensivist, will appreciate their recommendations. · ARF - cardiorenal +/- ischemic +/- uncontrolled HTN. Continue IV lasix. Creatinine is improving, baseline unknown. · HTN - suboptimally controlled. PRN hydralazine and metoprolol. Monitor and titrate the doses.   · Amphetamine abuse - discussed with pt's wife and counseled. · Urology has placed a alegre, recommended to keep in place until after discharge. · Non compliance: discussed with pt's wife and counseled. DC planning/Dispo:  Ppd ordered. SW consulted.   DVT ppx:  Heparin  Critical care time spent 40 minutes    Signed:  Arthea Gaucher, MD

## 2018-01-08 NOTE — PROGRESS NOTES
IP consult to cardiac rehab for heart failure. Patient is currently in intensive care and is on the ventilator. Will attempt to discuss referral with patient when stable and transferred out of the unit.

## 2018-01-08 NOTE — PROGRESS NOTES
Physical Therapy Note:    Participated in interdisciplinary rounds including Adriel, Wound Care, Palliative Care NP, RN staff, MD, Respiratory therapy, and Nutrition. Patient at this time is not following commands, therefore with decreased ability to participate in therapy safely. Will continue to participate in rounds to determine appropriateness of PT/OT.     Thank you,  PURA BowenT

## 2018-01-09 ENCOUNTER — APPOINTMENT (OUTPATIENT)
Dept: GENERAL RADIOLOGY | Age: 53
DRG: 871 | End: 2018-01-09
Attending: INTERNAL MEDICINE
Payer: SELF-PAY

## 2018-01-09 ENCOUNTER — APPOINTMENT (OUTPATIENT)
Dept: CT IMAGING | Age: 53
DRG: 871 | End: 2018-01-09
Attending: INTERNAL MEDICINE
Payer: SELF-PAY

## 2018-01-09 PROBLEM — R65.20 SEVERE SEPSIS (HCC): Status: ACTIVE | Noted: 2018-01-09

## 2018-01-09 PROBLEM — J18.9 COMMUNITY ACQUIRED PNEUMONIA OF RIGHT LOWER LOBE OF LUNG: Status: ACTIVE | Noted: 2018-01-09

## 2018-01-09 PROBLEM — A41.9 SEVERE SEPSIS (HCC): Status: ACTIVE | Noted: 2018-01-09

## 2018-01-09 LAB
ANION GAP SERPL CALC-SCNC: 5 MMOL/L (ref 7–16)
ARTERIAL PATENCY WRIST A: POSITIVE
BASE EXCESS BLDA CALC-SCNC: 12.3 MMOL/L (ref 0–3)
BASOPHILS # BLD: 0 K/UL (ref 0–0.2)
BASOPHILS NFR BLD: 0 % (ref 0–2)
BDY SITE: ABNORMAL
BUN SERPL-MCNC: 28 MG/DL (ref 6–23)
CALCIUM SERPL-MCNC: 8.5 MG/DL (ref 8.3–10.4)
CHLORIDE SERPL-SCNC: 98 MMOL/L (ref 98–107)
CO2 SERPL-SCNC: 40 MMOL/L (ref 21–32)
COHGB MFR BLD: 2 % (ref 0.5–1.5)
CREAT SERPL-MCNC: 1.52 MG/DL (ref 0.8–1.5)
DIFFERENTIAL METHOD BLD: ABNORMAL
DO-HGB BLD-MCNC: 4 % (ref 0–5)
EOSINOPHIL # BLD: 0.1 K/UL (ref 0–0.8)
EOSINOPHIL NFR BLD: 1 % (ref 0.5–7.8)
ERYTHROCYTE [DISTWIDTH] IN BLOOD BY AUTOMATED COUNT: 14 % (ref 11.9–14.6)
GLUCOSE SERPL-MCNC: 108 MG/DL (ref 65–100)
HCO3 BLDA-SCNC: 39 MMOL/L (ref 22–26)
HCT VFR BLD AUTO: 43.5 % (ref 41.1–50.3)
HGB BLD-MCNC: 13.5 G/DL (ref 13.6–17.2)
HGB BLDMV-MCNC: 13.9 GM/DL (ref 11.7–15)
IMM GRANULOCYTES # BLD: 0 K/UL (ref 0–0.5)
IMM GRANULOCYTES NFR BLD AUTO: 0 % (ref 0–5)
LYMPHOCYTES # BLD: 0.6 K/UL (ref 0.5–4.6)
LYMPHOCYTES NFR BLD: 7 % (ref 13–44)
MAGNESIUM SERPL-MCNC: 1.6 MG/DL (ref 1.8–2.4)
MCH RBC QN AUTO: 32.8 PG (ref 26.1–32.9)
MCHC RBC AUTO-ENTMCNC: 31 G/DL (ref 31.4–35)
MCV RBC AUTO: 105.6 FL (ref 79.6–97.8)
METHGB MFR BLD: 0.3 % (ref 0–1.5)
MM INDURATION POC: 0 MM (ref 0–5)
MONOCYTES # BLD: 0.8 K/UL (ref 0.1–1.3)
MONOCYTES NFR BLD: 9 % (ref 4–12)
NEUTS SEG # BLD: 7.3 K/UL (ref 1.7–8.2)
NEUTS SEG NFR BLD: 83 % (ref 43–78)
OXYHGB MFR BLDA: 93.7 % (ref 94–97)
PCO2 BLDA: 57 MMHG (ref 35–45)
PEEP RESPIRATORY: 10 CM[H2O]
PH BLDA: 7.45 [PH] (ref 7.35–7.45)
PHOSPHATE SERPL-MCNC: 3.2 MG/DL (ref 2.5–4.5)
PLATELET # BLD AUTO: 181 K/UL (ref 150–450)
PMV BLD AUTO: 9.8 FL (ref 10.8–14.1)
PO2 BLDA: 77 MMHG (ref 80–105)
POTASSIUM SERPL-SCNC: 3.6 MMOL/L (ref 3.5–5.1)
PPD POC: NEGATIVE NEGATIVE
RBC # BLD AUTO: 4.12 M/UL (ref 4.23–5.67)
RESP RATE: 18
SAO2 % BLD: 96 % (ref 92–98.5)
SERVICE CMNT-IMP: ABNORMAL
SODIUM SERPL-SCNC: 143 MMOL/L (ref 136–145)
VENTILATION MODE VENT: ABNORMAL
VT SETTING VENT: 500 ML
WBC # BLD AUTO: 8.8 K/UL (ref 4.3–11.1)

## 2018-01-09 PROCEDURE — 74011000250 HC RX REV CODE- 250: Performed by: INTERNAL MEDICINE

## 2018-01-09 PROCEDURE — 84100 ASSAY OF PHOSPHORUS: CPT | Performed by: INTERNAL MEDICINE

## 2018-01-09 PROCEDURE — 65620000000 HC RM CCU GENERAL

## 2018-01-09 PROCEDURE — 70486 CT MAXILLOFACIAL W/O DYE: CPT

## 2018-01-09 PROCEDURE — 74011250636 HC RX REV CODE- 250/636: Performed by: INTERNAL MEDICINE

## 2018-01-09 PROCEDURE — 74011250637 HC RX REV CODE- 250/637: Performed by: INTERNAL MEDICINE

## 2018-01-09 PROCEDURE — 74011000258 HC RX REV CODE- 258: Performed by: INTERNAL MEDICINE

## 2018-01-09 PROCEDURE — 71045 X-RAY EXAM CHEST 1 VIEW: CPT

## 2018-01-09 PROCEDURE — 99291 CRITICAL CARE FIRST HOUR: CPT | Performed by: INTERNAL MEDICINE

## 2018-01-09 PROCEDURE — 36592 COLLECT BLOOD FROM PICC: CPT

## 2018-01-09 PROCEDURE — 36600 WITHDRAWAL OF ARTERIAL BLOOD: CPT

## 2018-01-09 PROCEDURE — 85025 COMPLETE CBC W/AUTO DIFF WBC: CPT | Performed by: INTERNAL MEDICINE

## 2018-01-09 PROCEDURE — 94003 VENT MGMT INPAT SUBQ DAY: CPT

## 2018-01-09 PROCEDURE — 70450 CT HEAD/BRAIN W/O DYE: CPT

## 2018-01-09 PROCEDURE — 82803 BLOOD GASES ANY COMBINATION: CPT

## 2018-01-09 PROCEDURE — 74011250637 HC RX REV CODE- 250/637: Performed by: FAMILY MEDICINE

## 2018-01-09 PROCEDURE — 77030018798 HC PMP KT ENTRL FED COVD -A

## 2018-01-09 PROCEDURE — 74011250636 HC RX REV CODE- 250/636: Performed by: FAMILY MEDICINE

## 2018-01-09 PROCEDURE — 74011000250 HC RX REV CODE- 250: Performed by: FAMILY MEDICINE

## 2018-01-09 PROCEDURE — 77030018719 HC DRSG PTCH ANTIMIC J&J -A

## 2018-01-09 PROCEDURE — 80048 BASIC METABOLIC PNL TOTAL CA: CPT | Performed by: INTERNAL MEDICINE

## 2018-01-09 PROCEDURE — 83735 ASSAY OF MAGNESIUM: CPT | Performed by: INTERNAL MEDICINE

## 2018-01-09 PROCEDURE — 94640 AIRWAY INHALATION TREATMENT: CPT

## 2018-01-09 RX ORDER — VANCOMYCIN/0.9 % SOD CHLORIDE 1.5G/250ML
1500 PLASTIC BAG, INJECTION (ML) INTRAVENOUS EVERY 12 HOURS
Status: DISCONTINUED | OUTPATIENT
Start: 2018-01-09 | End: 2018-01-11

## 2018-01-09 RX ORDER — VANCOMYCIN 2 GRAM/500 ML IN 0.9 % SODIUM CHLORIDE INTRAVENOUS
2000 ONCE
Status: COMPLETED | OUTPATIENT
Start: 2018-01-09 | End: 2018-01-09

## 2018-01-09 RX ORDER — MAGNESIUM SULFATE HEPTAHYDRATE 40 MG/ML
2 INJECTION, SOLUTION INTRAVENOUS ONCE
Status: COMPLETED | OUTPATIENT
Start: 2018-01-09 | End: 2018-01-09

## 2018-01-09 RX ADMIN — FUROSEMIDE 40 MG: 10 INJECTION, SOLUTION INTRAMUSCULAR; INTRAVENOUS at 09:14

## 2018-01-09 RX ADMIN — IPRATROPIUM BROMIDE AND ALBUTEROL SULFATE 3 ML: .5; 3 SOLUTION RESPIRATORY (INHALATION) at 07:38

## 2018-01-09 RX ADMIN — DEXMEDETOMIDINE HYDROCHLORIDE 0.5 MCG/KG/HR: 100 INJECTION, SOLUTION INTRAVENOUS at 10:27

## 2018-01-09 RX ADMIN — CHLORHEXIDINE GLUCONATE 15 ML: 1.2 RINSE ORAL at 10:20

## 2018-01-09 RX ADMIN — HEPARIN SODIUM 5000 UNITS: 5000 INJECTION, SOLUTION INTRAVENOUS; SUBCUTANEOUS at 21:36

## 2018-01-09 RX ADMIN — MAGNESIUM SULFATE HEPTAHYDRATE 2 G: 40 INJECTION, SOLUTION INTRAVENOUS at 09:14

## 2018-01-09 RX ADMIN — HEPARIN SODIUM 5000 UNITS: 5000 INJECTION, SOLUTION INTRAVENOUS; SUBCUTANEOUS at 05:31

## 2018-01-09 RX ADMIN — Medication 10 ML: at 05:31

## 2018-01-09 RX ADMIN — PIPERACILLIN SODIUM,TAZOBACTAM SODIUM 4.5 G: 4; .5 INJECTION, POWDER, FOR SOLUTION INTRAVENOUS at 10:44

## 2018-01-09 RX ADMIN — POTASSIUM & SODIUM PHOSPHATES POWDER PACK 280-160-250 MG 1 PACKET: 280-160-250 PACK at 05:31

## 2018-01-09 RX ADMIN — IPRATROPIUM BROMIDE AND ALBUTEROL SULFATE 3 ML: .5; 3 SOLUTION RESPIRATORY (INHALATION) at 11:33

## 2018-01-09 RX ADMIN — POTASSIUM & SODIUM PHOSPHATES POWDER PACK 280-160-250 MG 1 PACKET: 280-160-250 PACK at 12:51

## 2018-01-09 RX ADMIN — IPRATROPIUM BROMIDE AND ALBUTEROL SULFATE 3 ML: .5; 3 SOLUTION RESPIRATORY (INHALATION) at 03:50

## 2018-01-09 RX ADMIN — DEXMEDETOMIDINE HYDROCHLORIDE 0.6 MCG/KG/HR: 100 INJECTION, SOLUTION INTRAVENOUS at 14:44

## 2018-01-09 RX ADMIN — FAMOTIDINE 20 MG: 10 INJECTION, SOLUTION INTRAVENOUS at 21:35

## 2018-01-09 RX ADMIN — IPRATROPIUM BROMIDE AND ALBUTEROL SULFATE 3 ML: .5; 3 SOLUTION RESPIRATORY (INHALATION) at 20:04

## 2018-01-09 RX ADMIN — VANCOMYCIN HYDROCHLORIDE 2000 MG: 10 INJECTION, POWDER, LYOPHILIZED, FOR SOLUTION INTRAVENOUS at 14:24

## 2018-01-09 RX ADMIN — Medication 10 ML: at 21:37

## 2018-01-09 RX ADMIN — WATER 2 MG: 1 INJECTION INTRAMUSCULAR; INTRAVENOUS; SUBCUTANEOUS at 21:36

## 2018-01-09 RX ADMIN — CARVEDILOL 3.12 MG: 3.12 TABLET, FILM COATED ORAL at 09:16

## 2018-01-09 RX ADMIN — Medication 200 MCG/HR: at 07:17

## 2018-01-09 RX ADMIN — DEXMEDETOMIDINE HYDROCHLORIDE 0.6 MCG/KG/HR: 100 INJECTION, SOLUTION INTRAVENOUS at 10:40

## 2018-01-09 RX ADMIN — IPRATROPIUM BROMIDE AND ALBUTEROL SULFATE 3 ML: .5; 3 SOLUTION RESPIRATORY (INHALATION) at 15:48

## 2018-01-09 RX ADMIN — IPRATROPIUM BROMIDE AND ALBUTEROL SULFATE 3 ML: .5; 3 SOLUTION RESPIRATORY (INHALATION) at 23:45

## 2018-01-09 RX ADMIN — Medication 50 MCG/HR: at 19:12

## 2018-01-09 RX ADMIN — FUROSEMIDE 40 MG: 10 INJECTION, SOLUTION INTRAMUSCULAR; INTRAVENOUS at 21:35

## 2018-01-09 RX ADMIN — HEPARIN SODIUM 5000 UNITS: 5000 INJECTION, SOLUTION INTRAVENOUS; SUBCUTANEOUS at 14:46

## 2018-01-09 RX ADMIN — DEXMEDETOMIDINE HYDROCHLORIDE 0.4 MCG/KG/HR: 100 INJECTION, SOLUTION INTRAVENOUS at 09:41

## 2018-01-09 RX ADMIN — Medication 100 MCG/HR: at 10:51

## 2018-01-09 RX ADMIN — FAMOTIDINE 20 MG: 10 INJECTION, SOLUTION INTRAVENOUS at 09:14

## 2018-01-09 RX ADMIN — CHLORHEXIDINE GLUCONATE 15 ML: 1.2 RINSE ORAL at 21:00

## 2018-01-09 RX ADMIN — LISINOPRIL 20 MG: 20 TABLET ORAL at 09:15

## 2018-01-09 RX ADMIN — PIPERACILLIN SODIUM,TAZOBACTAM SODIUM 4.5 G: 4; .5 INJECTION, POWDER, FOR SOLUTION INTRAVENOUS at 19:10

## 2018-01-09 RX ADMIN — Medication 10 ML: at 14:24

## 2018-01-09 NOTE — PROGRESS NOTES
Dr Wagner Po at bedside with this RN, pt noted to have copious amounts of yellow/green thick secretions from rt nare, mouth. Pt rouses to pain, pt being tapered off fentanyl while adding precedex. Pt vitals stable.

## 2018-01-09 NOTE — PROGRESS NOTES
Critical Care Daily Progress Note: 1/9/2018  Admission Date: 1/6/2018     Patient is a 45 y.o.  male seen and evaluated at the request of Rapid Response/Dr. Lakshmi Lambert.     Patient just admitted today with acute hypoxemic respiratory failure, with methamphetamine positive, smoker, morbid obesity, suspected NATALIIA. Was on Telemetry and being diuresed. Patient in room foaming in mouth, combative and disoriented. He was being aggressively bagged in Room and when I came noted to be moving around and eyes open. I took off Ambu bag and reported names Farhana Ocampo but disoriented. Shortly after admission to ICU on BIPAP had to be intubated on 1/7/18    The patient's chart is reviewed and the patient is discussed with the staff. Subjective:     Patient in bed on Vent, sedated and in restraints. Only on Fentanyl now. Having yellow drainage from Right nostril all over face. Wife reported to nurse today that had been coughing and congested with discolored sputum before he came in. Did wake up intermittently today with nursing.  Off pressors today    Current Facility-Administered Medications   Medication Dose Route Frequency    dexmedeTOMidine (PRECEDEX) 400 mcg in 0.9% sodium chloride 100 mL infusion  0.2-0.7 mcg/kg/hr IntraVENous TITRATE    magnesium sulfate 2 g/50 ml IVPB (premix or compounded)  2 g IntraVENous ONCE    famotidine (PF) (PEPCID) 20 mg in sodium chloride 0.9 % 10 mL injection  20 mg IntraVENous Q12H    NUTRITIONAL SUPPORT ELECTROLYTE PRN ORDERS   Does Not Apply PRN    potassium, sodium phosphates (NEUTRA-PHOS) packet 1 Packet  1 Packet Per G Tube Q6H    propofol (DIPRIVAN) infusion  0-50 mcg/kg/min IntraVENous TITRATE    nicotine (NICODERM CQ) 21 mg/24 hr patch 1 Patch  1 Patch TransDERmal DAILY    metoprolol (LOPRESSOR) injection 5 mg  5 mg IntraVENous Q4H PRN    hydrALAZINE (APRESOLINE) 20 mg/mL injection 20 mg  20 mg IntraVENous Q6H PRN    NOREPINephrine (LEVOPHED) 8 mg in dextrose 5% 250 mL infusion  2-16 mcg/min IntraVENous TITRATE    fentaNYL in normal saline (pf) 25 mcg/mL infusion  0-200 mcg/hr IntraVENous TITRATE    chlorhexidine (PERIDEX) 0.12 % mouthwash 15 mL  15 mL Oral Q12H    sodium chloride (NS) flush 5-10 mL  5-10 mL IntraVENous Q8H    sodium chloride (NS) flush 5-10 mL  5-10 mL IntraVENous PRN    lisinopril (PRINIVIL, ZESTRIL) tablet 20 mg  20 mg Oral DAILY    carvedilol (COREG) tablet 3.125 mg  3.125 mg Oral BID WITH MEALS    ondansetron (ZOFRAN) injection 4 mg  4 mg IntraVENous Q6H PRN    acetaminophen (TYLENOL) tablet 650 mg  650 mg Oral Q6H PRN    albuterol-ipratropium (DUO-NEB) 2.5 MG-0.5 MG/3 ML  3 mL Nebulization Q4H RT    furosemide (LASIX) injection 40 mg  40 mg IntraVENous Q12H    diphenhydrAMINE (BENADRYL) capsule 25 mg  25 mg Oral Q4H PRN    polyethylene glycol (MIRALAX) packet 17 g  17 g Oral DAILY PRN    guaiFENesin ER (MUCINEX) tablet 600 mg  600 mg Oral Q12H PRN    calcium carbonate (TUMS) chewable tablet 400 mg [elemental]  400 mg Oral TID PRN    heparin (porcine) injection 5,000 Units  5,000 Units SubCUTAneous Q8H       Review of Systems    Unobtainable due to patient status -- sedated and not alert. Objective:     Vitals:    01/09/18 0533 01/09/18 0602 01/09/18 0739 01/09/18 0914   BP: 126/74 129/71  121/66   Pulse: 90 95 93 93   Resp: 19 17 20    Temp:       SpO2: 94% 94% 93%    Weight:       Height:           Intake and Output:   01/07 1901 - 01/09 0700  In: 957.4 [I.V.:243.4]  Out: 8400 [Urine:8400]       Physical Exam:          Constitutional:  intubated and mechanically ventilated. EENMT:  Sclera clear, pupils equal, oral mucosa moist,noted thick yellow drainage from right nostril  Respiratory:  Clear but distant sounds b/l.  NO wheezing  Cardiovascular:  RRR with no M,G,R;  Gastrointestinal:  soft with no tenderness; positive bowel sounds present  Musculoskeletal:  warm with no cyanosis, 1+ lower leg edema  Skin:  no jaundice or ecchymosis  Neurologic: pupils pinpoint and noted going in opposite directions, does have pupil response. Then woke up and looked at me clearly and nodded head to his name then went back out. Breathing above vent. Psychiatric:  sedated     LINES:  ETT, central line, alegre     DRIPS:   Fentanyl    CXR:  Infiltrates in the lower lobes Right more visible then left            Ventilator Settings  Mode FIO2 Rate Tidal Volume Pressure PEEP   PRVC  40 %    500 ml     10 cm H20      Peak airway pressure: 21.1 cm H2O   Minute ventilation: 9.2 l/min     ABG:   Recent Labs      01/09/18   0228  01/08/18   0325  01/07/18   1208   PH  7.45  7.46*  7.23*   PCO2  57*  50*  85*   PO2  77*  73*  135*   HCO3  39*  34*  35*        LAB  No results for input(s): GLUCPOC in the last 72 hours. No lab exists for component: [place]  Recent Labs      01/06/18   1838   WBC  8.0   HGB  14.6   HCT  48.2   PLT  216     Recent Labs      01/09/18   0319  01/08/18   0322  01/07/18   1342   01/06/18   1838   NA  143  145  144   < >  141   K  3.6  3.9  4.7   < >  5.0   CL  98  99  101   < >  101   CO2  40*  39*  35*   < >  35*   GLU  108*  102*  93   < >  101*   BUN  28*  30*  29*   < >  24*   CREA  1.52*  1.83*  2.11*   < >  1.54*   MG  1.6*  1.8   --    --    --    PHOS  3.2  2.0*   --    --    --    CA  8.5  8.4  8.2*   < >  8.8   ALB   --    --    --    --   3.5   SGOT   --    --    --    --   29    < > = values in this interval not displayed. No results for input(s): LCAD, LAC in the last 72 hours.       Assessment:  (Medical Decision Making)     Hospital Problems  Never Reviewed          Codes Class Noted POA    Severe sepsis [facility]) ICD-10-CM: A41.9, R65.20  ICD-9-CM: 038.9, 995.92  1/9/2018 Unknown        Community acquired pneumonia of right lower lobe of lung ([address] ICD-10-CM: J18.1  ICD-9-CM: 360  1/9/2018 Unknown        Acute metabolic encephalopathy DRF-20-FF: G93.41  ICD-9-CM: 348.31  1/7/2018 Yes        Hypotension ICD-10-CM: I95.9  ICD-9-CM: 458.9  1/7/2018 Yes        Urethral stricture ICD-10-CM: N35.9  ICD-9-CM: 598.9  1/7/2018 Yes        COPD (chronic obstructive pulmonary disease) (HCC) (Chronic) ICD-10-CM: J44.9  ICD-9-CM: 593  1/6/2018 Yes        Nicotine dependence (Chronic) ICD-10-CM: F17.200  ICD-9-CM: 305.1  1/6/2018 Yes        Methamphetamine abuse ICD-10-CM: F15.10  ICD-9-CM: 305.70  1/6/2018 Yes        Anasarca ICD-10-CM: R60.1  ICD-9-CM: 782.3  1/6/2018 Yes        NATALIIA (obstructive sleep apnea) (Chronic) ICD-10-CM: G47.33  ICD-9-CM: 327.23  1/6/2018 Yes        Acute kidney injury [facility]) ICD-10-CM: N17.9  ICD-9-CM: 584.9  1/6/2018 Yes        Acute on chronic respiratory failure with hypoxia and hypercapnia (HCC) ICD-10-CM: J96.21, J96.22  ICD-9-CM: 518.84, 786.09, 799.02  1/6/2018 Yes              Plan:  (Medical Decision Making)     --continue vent support, did decrease his rate from 18 to 16 now and abg noted. --given copious yellow secretions from nostril and reported history prior to admission with now more visibile infiltrates in lower lobes, likley with sinusitis and CAP. Will start abx. cultures from 1/7 negative and will repeat today. per notes from 1/7 after intubation with copious secretions. Will start zosyn/vanco  --get CT head and sinus to r/o Intracranial pathology and also if marked sinus collection that needs drainage. --keep sedated for now for his safety, only on Fentanyl and will see if can add precedex and taper fentanyl.   --keep alegre in place till discharge  --continue tube feedings  --continue remaining treatment. Condition -- critical  Time spent with care today is 31 minutes. More than 50% of the time documented was spent in face-to-face contact with the patient and in the care of the patient on the floor/unit where the patient is located.     Morgan Casey MD

## 2018-01-09 NOTE — PROGRESS NOTES
CT sinus and CT head noted. NO CVA. pansinusitis and ?CSF leak.  Will engage ENT today  Prabha Luz MD

## 2018-01-09 NOTE — CONSULTS
Brief ENT Note    Consultation requested regarding patient intubated, with increased nasal and oral airway secretions. Earlier today a head CT scan was performed that suggested there was a possible fracture of the right nasal bone and that a CSF leak could not be ruled out. There is no history of head, midface or nasal trauma prior to being admitted. It would be highly unlikely that the amount of secretions would be secondary to a CSF leak. Appropriately so, a follow-up maxillofacial CT scan was obtained. This is reviewed at length. The most salient finding is that of a chronic pansinusitis. One of the indications that chronic changes are present include bilateral middle turbinate delano bullosa which are almost completely opacified. There is mild mucosal thickening inferior aspect bilateral frontal sinuses with severe mucosal thickening and opacification bilateral anterior posterior ethmoids. Similar mucosal thickening and chronic sinus changes in maxillary and sphenoid sinuses noted. Careful attention was then paid to the anterior posterior tables of the frontal sinus followed by a review of the fovea ethmoidalis and cribriform plates which are all intact. Medial and inferior orbital rims intact without dehiscence. There may possibly be an old fracture of the right lateral nasal bone but this does not extend into the ethmoids with no evidence of an old nasal ethmoid complex fracture. The written report on the CT scan confirms that there is no evidence of bony defects to suggest fracture or CSF leak. At this point in time treating the chronic sinusitis is handicapped by the patient being intubated in the supine position. He is appropriately covered with antibiotics. Steroids would be helpful but are contraindicated. Conservative measures would include saline nasal rinses with irrigation several times daily.   When the patient is extubated he will obviously be able to handle secretions better and participate in aggressive nasal saline rinses with the addition of intranasal steroids such as Rhinocort twice daily or even utilizing a nasal nebulizer with budesonide. These findings and suggestions discussed at length with Dr. Maya Marin.

## 2018-01-09 NOTE — PROGRESS NOTES
Bedside, Verbal and Written shift change report given to Mendy Butterfield RN (oncoming nurse) by Brittney Dalton RN (offgoing nurse). Report included the following information SBAR, Kardex, Intake/Output, MAR, Recent Results, Med Rec Status, Cardiac Rhythm NSR and Alarm Parameters .

## 2018-01-09 NOTE — PROGRESS NOTES
Pharmacokinetic Consult to Pharmacist    Opal Beau is a 45 y.o. male being treated for suspected pneumonia with vancomycin and pip/tazobactam.    Height: 5' 8\" (172.7 cm)  Weight: 135.9 kg (299 lb 9.7 oz)  Lab Results   Component Value Date/Time    BUN 28 01/09/2018 03:19 AM    Creatinine 1.52 01/09/2018 03:19 AM    WBC 8.0 01/06/2018 06:38 PM    Lactic Acid (POC) 1.3 01/06/2018 06:38 PM      Estimated Creatinine Clearance: 75 mL/min (based on Cr of 1.52). CULTURES:  All Micro Results     Procedure Component Value Units Date/Time    CULTURE, RESPIRATORY/SPUTUM/BRONCH Ina Cure [[id number]]     Order Status:  Sent Specimen:  Sputum from Sputum     CULTURE, BODY FLUID Iraida Hilts STAIN [[id number]]     Order Status:  Sent Specimen:  Miscellaneous Fluid     CULTURE, BLOOD [[id number]] Collected:  01/06/18 1854    Order Status:  Completed Specimen:  Whole Blood from Blood Updated:  01/09/18 0643     Special Requests: --        RIGHT  Antecubital       Culture result: NO GROWTH 3 DAYS       CULTURE, BLOOD [[id number]] Collected:  01/06/18 1854    Order Status:  Completed Specimen:  Whole Blood from Blood Updated:  01/09/18 0643     Special Requests: --        RIGHT  FOREARM       Culture result: NO GROWTH 3 DAYS       CULTURE, RESPIRATORY/SPUTUM/BRONCH Iraida Hilts STAIN [[id number]] Collected:  01/07/18 1125    Order Status:  Completed Specimen:  Sputum from Endotracheal aspirate Updated:  01/08/18 1009     Special Requests: NO SPECIAL REQUESTS        GRAM STAIN 0 TO 10 WBCS/OIF      NO EPITHELIAL CELLS SEEN         MANY GRAM POSITIVE COCCI                 MODERATE GRAM NEGATIVE RODS      FEW GRAM NEGATIVE COCCI         3+ MUCUS PRESENT        Culture result:         MODERATE NORMAL RESPIRATORY EDUAR          Day 1 of vancomycin. Goal trough is 15-20. I will dose 2000mg X1 loading dose and schedule 1500mg q12h to begin this evening. Plan for a trough level before the 4th dose of the dosing regimen.  Continue to trend SrCr for empiric dose adjustments if indicated. Kadie London Pharmacist will continue to follow patient. Please call with any questions. Thank you,  Vanessa Fan.  Bela Head, PharmD  Clinical Pharmacist  [phone]

## 2018-01-09 NOTE — PROGRESS NOTES
Problem: Nutrition Deficit  Goal: *Optimize nutritional status  Nutrition F/U: TF management per nutritional support protocols. (Dr Torin Gaviria)   Assessment:   · Remains on vent and TF depdendent. Has tolerated progression of TF to 50 ml/hr as of 0538 this AM with residuals <100ml, abdomen with active bowel sounds. Date of Last BM: 1-4   · Phos 3. 2-WNL after supplementation yesterday and that continues today  Last 3 Recorded Weights in this Encounter    01/07/18 1732 01/08/18 0345 01/09/18 0530   Weight: 138.7 kg (305 lb 12.5 oz) 137.4 kg (302 lb 14.6 oz) 135.9 kg (299 lb 9.7 oz)    Edema: 1+ genital, BUE, BLE   Weight hx per EMR ( Based on connect care functionality, RD cannot know if these weight are actual versus stated):   WT / BMI 3/11/2017   WEIGHT 250 lb   BODY MASS INDEX 33.91 kg/m2   Macronutrient needs:   EER: 7808-7468 kcal /day (25-30 kcal/kg IBW-as current weight not a dry weight and UBW accuracy is questionable)   EPR: 56-84 grams protein/day (0.8-1.2 grams/kg IBW)   Max CHO: 289 grams/day (55% kcal)   Fluid: 1ml/kcal or per MD   Intake/Comparative standards: Current TF: Osmolite 1.2 at 50 ml/hr with 10ml water q 1hr provides 1440 kcal/day (82% of needs), 67 grams protein/day (100% of needs), 190 grams CHO/day (does not exceed max CHO), and ~ 1224ml free water/day (70% of needs)  Intervention:   Meals and snacks: NPO   EN: Continue TF of Osmolite 1.2 at 50 ml/hr and progress  by 10 ml/hr q 6 hrs as tolerated to goal rate of 60ml/hr with 10ml water q 1hr to provide 1728 kcal/day (99% of needs), 80 grams protein/day (100% of needs), 228 grams CHO/day (does not exceed max CHO), and ~ 1420ml free water/day (81% of needs). Nutrition Supplement Therapy: Electrolyte replacement per nutritional support protocols are active on MAR.      [place], 64 [address], [address] [place] [address]

## 2018-01-09 NOTE — PROGRESS NOTES
I discussed the case with Dr. Pastor Pittman. Pulmonary will take over the care as pt is currently intubated. Hospitalist service will resume care once pt is stable from pulmonary stand point and extubated.

## 2018-01-09 NOTE — PROGRESS NOTES
Ventilator check complete; patient has a #9.0 ET tube secured at the 24 at the lip. Patient is sedated. Patient is not able to follow commands. Breath sounds are coarse. Trachea is midline, Negative for subcutaneous air, and chest excursion is symmetric. Patient is also Negative for cyanosis and is Negative for pitting edema. All alarms are set and audible. Resuscitation bag is at the head of the bed.       Ventilator Settings  Mode FIO2 Rate Tidal Volume Pressure PEEP I:E Ratio   PRVC  40 %   18 500 ml     10 cm H20  1:2.70      Peak airway pressure: 22.8 cm H2O   Minute ventilation: 9.2 l/min     ABG:   Recent Labs      01/08/18   0325  01/07/18   1208  01/07/18   0920   PH  7.46*  7.23*  7.11*   PCO2  50*  85*  125*   PO2  73*  135*  152*   HCO3  34*  35*  39*         Brooklyn Morse, RT

## 2018-01-09 NOTE — PROGRESS NOTES
Bedside shift change report given to SALVATORE Bar RN (oncoming nurse) by Paty Chavez RN (offgoing nurse). Report included the following information SBAR, Kardex, Intake/Output and Recent Results. Dual skin assessment performed by this RN and Paty Chavez RN. Pt noted to have fentanyl infusing @200. Pt lethargic, responds to pain only. Pt vitals stable at this time.

## 2018-01-09 NOTE — INTERDISCIPLINARY ROUNDS
Interdisciplinary team rounds were held 1/9/2018 with the following team members:Care Management, Nursing, Nutrition, Occupational Therapy, Patient Relations, Physical Therapy, Physician, Respiratory Therapy and Clinical Coordinator. Plan of care discussed. See clinical pathway and/or care plan for interventions and desired outcomes.

## 2018-01-09 NOTE — PROGRESS NOTES
Ventilator check complete; patient has a #9.0 ET tube secured at the 24 at the lip. Patient is sedated. Patient is not able to follow commands. Breath sounds are coarse. Trachea is midline, Negative for subcutaneous air, and chest excursion is symmetric. Patient is also Negative for cyanosis and is Negative for pitting edema. All alarms are set and audible. Resuscitation bag is at the head of the bed.       Ventilator Settings  Mode FIO2 Rate Tidal Volume Pressure PEEP I:E Ratio   PRVC  40 %   18 500 ml     10 cm H20  1:2.70      Peak airway pressure: 21.1 cm H2O   Minute ventilation: 9.2 l/min     ABG:   Recent Labs      01/09/18   0228  01/08/18   0325  01/07/18   1208   PH  7.45  7.46*  7.23*   PCO2  57*  50*  85*   PO2  77*  73*  135*   HCO3  39*  34*  35*         Ismael Mullen, RT

## 2018-01-10 ENCOUNTER — APPOINTMENT (OUTPATIENT)
Dept: GENERAL RADIOLOGY | Age: 53
DRG: 871 | End: 2018-01-10
Attending: INTERNAL MEDICINE
Payer: SELF-PAY

## 2018-01-10 LAB
ANION GAP SERPL CALC-SCNC: 4 MMOL/L (ref 7–16)
ARTERIAL PATENCY WRIST A: POSITIVE
BASE EXCESS BLDA CALC-SCNC: 8.6 MMOL/L (ref 0–3)
BASOPHILS # BLD: 0 K/UL (ref 0–0.2)
BASOPHILS NFR BLD: 0 % (ref 0–2)
BDY SITE: ABNORMAL
BUN SERPL-MCNC: 36 MG/DL (ref 6–23)
CALCIUM SERPL-MCNC: 8.3 MG/DL (ref 8.3–10.4)
CHLORIDE SERPL-SCNC: 100 MMOL/L (ref 98–107)
CO2 SERPL-SCNC: 39 MMOL/L (ref 21–32)
COHGB MFR BLD: 2 % (ref 0.5–1.5)
CREAT SERPL-MCNC: 1.71 MG/DL (ref 0.8–1.5)
DIFFERENTIAL METHOD BLD: ABNORMAL
DO-HGB BLD-MCNC: 6 % (ref 0–5)
EOSINOPHIL # BLD: 0.2 K/UL (ref 0–0.8)
EOSINOPHIL NFR BLD: 3 % (ref 0.5–7.8)
ERYTHROCYTE [DISTWIDTH] IN BLOOD BY AUTOMATED COUNT: 13.8 % (ref 11.9–14.6)
GLUCOSE SERPL-MCNC: 128 MG/DL (ref 65–100)
HCO3 BLDA-SCNC: 36 MMOL/L (ref 22–26)
HCT VFR BLD AUTO: 42.1 % (ref 41.1–50.3)
HGB BLD-MCNC: 13 G/DL (ref 13.6–17.2)
HGB BLDMV-MCNC: 13.7 GM/DL (ref 11.7–15)
IMM GRANULOCYTES # BLD: 0 K/UL (ref 0–0.5)
IMM GRANULOCYTES NFR BLD AUTO: 0 % (ref 0–5)
LYMPHOCYTES # BLD: 0.9 K/UL (ref 0.5–4.6)
LYMPHOCYTES NFR BLD: 12 % (ref 13–44)
MAGNESIUM SERPL-MCNC: 2.3 MG/DL (ref 1.8–2.4)
MCH RBC QN AUTO: 32.8 PG (ref 26.1–32.9)
MCHC RBC AUTO-ENTMCNC: 30.9 G/DL (ref 31.4–35)
MCV RBC AUTO: 106.3 FL (ref 79.6–97.8)
METHGB MFR BLD: 0.3 % (ref 0–1.5)
MM INDURATION POC: 0 MM (ref 0–5)
MONOCYTES # BLD: 0.7 K/UL (ref 0.1–1.3)
MONOCYTES NFR BLD: 10 % (ref 4–12)
NEUTS SEG # BLD: 5.4 K/UL (ref 1.7–8.2)
NEUTS SEG NFR BLD: 75 % (ref 43–78)
OXYHGB MFR BLDA: 92 % (ref 94–97)
PCO2 BLDA: 60 MMHG (ref 35–45)
PEEP RESPIRATORY: 10 CM[H2O]
PH BLDA: 7.39 [PH] (ref 7.35–7.45)
PHOSPHATE SERPL-MCNC: 4 MG/DL (ref 2.5–4.5)
PLATELET # BLD AUTO: 157 K/UL (ref 150–450)
PMV BLD AUTO: 10.2 FL (ref 10.8–14.1)
PO2 BLDA: 74 MMHG (ref 80–105)
POTASSIUM SERPL-SCNC: 3.6 MMOL/L (ref 3.5–5.1)
PPD POC: NEGATIVE NEGATIVE
RBC # BLD AUTO: 3.96 M/UL (ref 4.23–5.67)
SAO2 % BLD: 94 % (ref 92–98.5)
SERVICE CMNT-IMP: ABNORMAL
SODIUM SERPL-SCNC: 143 MMOL/L (ref 136–145)
VENTILATION MODE VENT: ABNORMAL
WBC # BLD AUTO: 7.2 K/UL (ref 4.3–11.1)

## 2018-01-10 PROCEDURE — 74011250636 HC RX REV CODE- 250/636: Performed by: FAMILY MEDICINE

## 2018-01-10 PROCEDURE — 74011000250 HC RX REV CODE- 250: Performed by: FAMILY MEDICINE

## 2018-01-10 PROCEDURE — 74011000250 HC RX REV CODE- 250: Performed by: INTERNAL MEDICINE

## 2018-01-10 PROCEDURE — 94660 CPAP INITIATION&MGMT: CPT

## 2018-01-10 PROCEDURE — 65620000000 HC RM CCU GENERAL

## 2018-01-10 PROCEDURE — 80048 BASIC METABOLIC PNL TOTAL CA: CPT | Performed by: INTERNAL MEDICINE

## 2018-01-10 PROCEDURE — 74011250636 HC RX REV CODE- 250/636: Performed by: INTERNAL MEDICINE

## 2018-01-10 PROCEDURE — 87070 CULTURE OTHR SPECIMN AEROBIC: CPT | Performed by: INTERNAL MEDICINE

## 2018-01-10 PROCEDURE — 71045 X-RAY EXAM CHEST 1 VIEW: CPT

## 2018-01-10 PROCEDURE — 94003 VENT MGMT INPAT SUBQ DAY: CPT

## 2018-01-10 PROCEDURE — 74011250637 HC RX REV CODE- 250/637: Performed by: FAMILY MEDICINE

## 2018-01-10 PROCEDURE — 77010033678 HC OXYGEN DAILY

## 2018-01-10 PROCEDURE — 85025 COMPLETE CBC W/AUTO DIFF WBC: CPT | Performed by: INTERNAL MEDICINE

## 2018-01-10 PROCEDURE — 36600 WITHDRAWAL OF ARTERIAL BLOOD: CPT

## 2018-01-10 PROCEDURE — 77030027138 HC INCENT SPIROMETER -A

## 2018-01-10 PROCEDURE — 36415 COLL VENOUS BLD VENIPUNCTURE: CPT | Performed by: INTERNAL MEDICINE

## 2018-01-10 PROCEDURE — 74011000258 HC RX REV CODE- 258: Performed by: INTERNAL MEDICINE

## 2018-01-10 PROCEDURE — 94640 AIRWAY INHALATION TREATMENT: CPT

## 2018-01-10 PROCEDURE — 97161 PT EVAL LOW COMPLEX 20 MIN: CPT

## 2018-01-10 PROCEDURE — 87040 BLOOD CULTURE FOR BACTERIA: CPT | Performed by: INTERNAL MEDICINE

## 2018-01-10 PROCEDURE — 99233 SBSQ HOSP IP/OBS HIGH 50: CPT | Performed by: INTERNAL MEDICINE

## 2018-01-10 PROCEDURE — 82803 BLOOD GASES ANY COMBINATION: CPT

## 2018-01-10 PROCEDURE — 94760 N-INVAS EAR/PLS OXIMETRY 1: CPT

## 2018-01-10 PROCEDURE — 84100 ASSAY OF PHOSPHORUS: CPT | Performed by: INTERNAL MEDICINE

## 2018-01-10 PROCEDURE — 74011250637 HC RX REV CODE- 250/637: Performed by: INTERNAL MEDICINE

## 2018-01-10 PROCEDURE — 83735 ASSAY OF MAGNESIUM: CPT | Performed by: INTERNAL MEDICINE

## 2018-01-10 RX ORDER — ALBUTEROL SULFATE 0.83 MG/ML
2.5 SOLUTION RESPIRATORY (INHALATION)
Status: DISCONTINUED | OUTPATIENT
Start: 2018-01-10 | End: 2018-01-13 | Stop reason: SDUPTHER

## 2018-01-10 RX ORDER — FLUTICASONE PROPIONATE 50 MCG
2 SPRAY, SUSPENSION (ML) NASAL DAILY
Status: DISCONTINUED | OUTPATIENT
Start: 2018-01-10 | End: 2018-01-24 | Stop reason: HOSPADM

## 2018-01-10 RX ADMIN — CARVEDILOL 3.12 MG: 3.12 TABLET, FILM COATED ORAL at 09:51

## 2018-01-10 RX ADMIN — PIPERACILLIN SODIUM,TAZOBACTAM SODIUM 4.5 G: 4; .5 INJECTION, POWDER, FOR SOLUTION INTRAVENOUS at 18:30

## 2018-01-10 RX ADMIN — HEPARIN SODIUM 5000 UNITS: 5000 INJECTION, SOLUTION INTRAVENOUS; SUBCUTANEOUS at 15:51

## 2018-01-10 RX ADMIN — PIPERACILLIN SODIUM,TAZOBACTAM SODIUM 4.5 G: 4; .5 INJECTION, POWDER, FOR SOLUTION INTRAVENOUS at 09:51

## 2018-01-10 RX ADMIN — FUROSEMIDE 40 MG: 10 INJECTION, SOLUTION INTRAMUSCULAR; INTRAVENOUS at 19:58

## 2018-01-10 RX ADMIN — CARVEDILOL 3.12 MG: 3.12 TABLET, FILM COATED ORAL at 18:12

## 2018-01-10 RX ADMIN — VANCOMYCIN HYDROCHLORIDE 1500 MG: 10 INJECTION, POWDER, LYOPHILIZED, FOR SOLUTION INTRAVENOUS at 13:33

## 2018-01-10 RX ADMIN — IPRATROPIUM BROMIDE AND ALBUTEROL SULFATE 3 ML: .5; 3 SOLUTION RESPIRATORY (INHALATION) at 03:30

## 2018-01-10 RX ADMIN — Medication 10 ML: at 14:00

## 2018-01-10 RX ADMIN — FAMOTIDINE 20 MG: 10 INJECTION, SOLUTION INTRAVENOUS at 09:51

## 2018-01-10 RX ADMIN — CHLORHEXIDINE GLUCONATE 15 ML: 1.2 RINSE ORAL at 19:59

## 2018-01-10 RX ADMIN — Medication 10 ML: at 20:00

## 2018-01-10 RX ADMIN — GUAIFENESIN 600 MG: 600 TABLET, EXTENDED RELEASE ORAL at 19:58

## 2018-01-10 RX ADMIN — CHLORHEXIDINE GLUCONATE 15 ML: 1.2 RINSE ORAL at 09:51

## 2018-01-10 RX ADMIN — FLUTICASONE PROPIONATE 2 SPRAY: 50 SPRAY, METERED NASAL at 18:12

## 2018-01-10 RX ADMIN — LISINOPRIL 20 MG: 20 TABLET ORAL at 09:51

## 2018-01-10 RX ADMIN — ACETAMINOPHEN 650 MG: 325 TABLET ORAL at 19:58

## 2018-01-10 RX ADMIN — IPRATROPIUM BROMIDE AND ALBUTEROL SULFATE 3 ML: .5; 3 SOLUTION RESPIRATORY (INHALATION) at 07:58

## 2018-01-10 RX ADMIN — ALBUTEROL SULFATE 2.5 MG: 2.5 SOLUTION RESPIRATORY (INHALATION) at 20:30

## 2018-01-10 RX ADMIN — HEPARIN SODIUM 5000 UNITS: 5000 INJECTION, SOLUTION INTRAVENOUS; SUBCUTANEOUS at 06:38

## 2018-01-10 RX ADMIN — HEPARIN SODIUM 5000 UNITS: 5000 INJECTION, SOLUTION INTRAVENOUS; SUBCUTANEOUS at 19:58

## 2018-01-10 RX ADMIN — Medication 10 ML: at 03:55

## 2018-01-10 RX ADMIN — IPRATROPIUM BROMIDE AND ALBUTEROL SULFATE 3 ML: .5; 3 SOLUTION RESPIRATORY (INHALATION) at 11:45

## 2018-01-10 RX ADMIN — FUROSEMIDE 40 MG: 10 INJECTION, SOLUTION INTRAMUSCULAR; INTRAVENOUS at 09:51

## 2018-01-10 RX ADMIN — VANCOMYCIN HYDROCHLORIDE 1500 MG: 10 INJECTION, POWDER, LYOPHILIZED, FOR SOLUTION INTRAVENOUS at 01:01

## 2018-01-10 RX ADMIN — FAMOTIDINE 20 MG: 10 INJECTION, SOLUTION INTRAVENOUS at 19:58

## 2018-01-10 RX ADMIN — PIPERACILLIN SODIUM,TAZOBACTAM SODIUM 4.5 G: 4; .5 INJECTION, POWDER, FOR SOLUTION INTRAVENOUS at 01:04

## 2018-01-10 NOTE — PROGRESS NOTES
Fentanyl IV titrated down to 25 mcg/hour IV.   notified of his decrease in sedation and agrees with this. Spoke with respiratory therapist regarding 's improved progress and plan to continue weaning ventilator settings.

## 2018-01-10 NOTE — PROGRESS NOTES
Bedside shift change report given to Reina RN (oncoming nurse) by Alpha Gaucher RN (offgoing nurse). Report included the following information Kardex, Intake/Output, MAR, Recent Results, Med Rec Status and Cardiac Rhythm NSR. Alert and oriented. Tolerating pressure support since 4 am. O2 saturation 94%, maintained and orally suctions self with yaunker and performs own mouth care. Moderate yellow/white secretions. Weaned OFF Precedex and Fentanyl IV. Discontinued soft wrist restraints. Calm and cooperative. Galindo, patent. Dobhoff tolerating TF. Mouths, Gestures and writes on paper with pen appropriately. No DTIs/No pressure ulcers observed. Plan of care discussed with  including him to perform bath with assistance if needed this am, he agrees and is appreciative.

## 2018-01-10 NOTE — PROGRESS NOTES
Mr. Keven Garcia has been compliant with a trial release of his bilateral soft wrist restraints and has not demonstrated any pulling on medical equipment and has written on paper that he understands and agrees to not pull on his medical equipment because it could cause harm to him. Soft wrist restraints discontinued at this time. Mr. Keven Garcia is using his remote control to turn channels and adjust the volume to his TV. He also is performing his own mouth care and suctioning his mouth out with his yaunker.

## 2018-01-10 NOTE — PROGRESS NOTES
Mr. Hua Andrade pointing to his ETT and writes,\"suction\". Moderate amount of yellow thick secretions from in-line suction catheter. Peridex mouth care performed and oral suction with yaunker. Face cleansed with soapy wash cloth. Precedex OFF since 8 pm.  Wife, son and daughter in law at bedside.  writing on paper with pen \"1 person at a time\" and then points to the door. Son and daughter in law step to waiting area per his request.  Pleasant and cooperative visit with family. Wife states,\"I am going to go home tonight and get some rest. He knows that's what I am going to do and I told him I will be back in the morning. \"

## 2018-01-10 NOTE — PROGRESS NOTES
Problem: Falls - Risk of  Goal: *Absence of Falls  Document Mega Fall Risk and appropriate interventions in the flowsheet.    Outcome: Progressing Towards Goal  Fall Risk Interventions:  Mobility Interventions: Bed/chair exit alarm, Communicate number of staff needed for ambulation/transfer, OT consult for ADLs, Patient to call before getting OOB, PT Consult for mobility concerns, PT Consult for assist device competence, Strengthening exercises (ROM-active/passive), Utilize walker, cane, or other assitive device, Utilize gait belt for transfers/ambulation    Mentation Interventions: Adequate sleep, hydration, pain control, Bed/chair exit alarm, Door open when patient unattended, Evaluate medications/consider consulting pharmacy, Familiar objects from home, Increase mobility, More frequent rounding, Reorient patient, Room close to nurse's station    Medication Interventions: Assess postural VS orthostatic hypotension, Bed/chair exit alarm, Evaluate medications/consider consulting pharmacy, Patient to call before getting OOB, Teach patient to arise slowly, Utilize gait belt for transfers/ambulation    Elimination Interventions: Bed/chair exit alarm, Call light in reach, Patient to call for help with toileting needs, Toilet paper/wipes in reach, Toileting schedule/hourly rounds

## 2018-01-10 NOTE — PROGRESS NOTES
Ventilator check complete; patient has a #9.0 ET tube secured at the 24 at the lip. Patient is not sedated. Patient is able to follow commands. Breath sounds are coarse and diminished. Trachea is midline, Negative for subcutaneous air, and chest excursion is symmetric. Patient is also Negative for cyanosis and is Negative for pitting edema. All alarms are set and audible. Resuscitation bag is at the head of the bed.       Ventilator Settings  Mode FIO2 Rate Tidal Volume Pressure PEEP I:E Ratio   PRVC  50 %   16 500 ml     10 cm H20  1:3.13      Peak airway pressure: 23.6 cm H2O   Minute ventilation: 9.3 l/min     ABG:   Recent Labs      01/09/18   0228  01/08/18   0325  01/07/18   1208   PH  7.45  7.46*  7.23*   PCO2  57*  50*  85*   PO2  77*  73*  135*   HCO3  39*  34*  35*         Brooklyn Morse, RT

## 2018-01-10 NOTE — PROGRESS NOTES
Critical Care Daily Progress Note: 1/10/2018  Admission Date: 1/6/2018     Patient is a 45 y.o.  male seen and evaluated at the request of Rapid Response/Dr. Karen Das.     Patient just admitted today with acute hypoxemic respiratory failure, with methamphetamine positive, smoker, morbid obesity, suspected NATALIIA. Was on Telemetry and being diuresed. Patient in room foaming in mouth, combative and disoriented. He was being aggressively bagged in Room and when I came noted to be moving around and eyes open. I took off Ambu bag and reported names Tavares Cueto but disoriented. Shortly after admission to ICU on BIPAP had to be intubated on 1/7/18    Patient had CT sinus and CT head done and marked pansinusitis noted. Discussed with ENT since ? CSF leak and he disagrees based on CT sinus. Off vent on 1/10    The patient's chart is reviewed and the patient is discussed with the staff. Subjective:     Patient in bed now off vent since few hours ago. Feels like he can breath on 6 LPM oxygen. No pain. Admits smokes 1.5 PPD and does frequent meth. Aware needs to quit. His wife also reports smoke and did meth till 2 month ago. Sinuses also have been an issue with marked draininge and also with some discomfort.      Review of Systems:  -Fever  -Headaches +sinus drainage  -Chest pain  -Dyspnea, -wheezing,- cough  -Abdominal pain-, constipation  -Leg swelling  All other organ systems grossly normal.      Current Facility-Administered Medications   Medication Dose Route Frequency    [START ON 1/11/2018] vanc trough reminder    Other ONCE    piperacillin-tazobactam (ZOSYN) 4.5 g in 0.9% sodium chloride (MBP/ADV) 100 mL  4.5 g IntraVENous Q8H    vancomycin (VANCOCIN) 1500 mg in  ml infusion  1,500 mg IntraVENous Q12H    influenza vaccine 2017-18 (3 yrs+)(PF) (FLUZONE QUAD/FLUARIX QUAD) injection 0.5 mL  0.5 mL IntraMUSCular PRIOR TO DISCHARGE    famotidine (PF) (PEPCID) 20 mg in sodium chloride 0.9 % 10 mL injection  20 mg IntraVENous Q12H    NUTRITIONAL SUPPORT ELECTROLYTE PRN ORDERS   Does Not Apply PRN    nicotine (NICODERM CQ) 21 mg/24 hr patch 1 Patch  1 Patch TransDERmal DAILY    metoprolol (LOPRESSOR) injection 5 mg  5 mg IntraVENous Q4H PRN    hydrALAZINE (APRESOLINE) 20 mg/mL injection 20 mg  20 mg IntraVENous Q6H PRN    chlorhexidine (PERIDEX) 0.12 % mouthwash 15 mL  15 mL Oral Q12H    sodium chloride (NS) flush 5-10 mL  5-10 mL IntraVENous Q8H    sodium chloride (NS) flush 5-10 mL  5-10 mL IntraVENous PRN    lisinopril (PRINIVIL, ZESTRIL) tablet 20 mg  20 mg Oral DAILY    carvedilol (COREG) tablet 3.125 mg  3.125 mg Oral BID WITH MEALS    ondansetron (ZOFRAN) injection 4 mg  4 mg IntraVENous Q6H PRN    acetaminophen (TYLENOL) tablet 650 mg  650 mg Oral Q6H PRN    albuterol-ipratropium (DUO-NEB) 2.5 MG-0.5 MG/3 ML  3 mL Nebulization Q4H RT    furosemide (LASIX) injection 40 mg  40 mg IntraVENous Q12H    diphenhydrAMINE (BENADRYL) capsule 25 mg  25 mg Oral Q4H PRN    polyethylene glycol (MIRALAX) packet 17 g  17 g Oral DAILY PRN    guaiFENesin ER (MUCINEX) tablet 600 mg  600 mg Oral Q12H PRN    calcium carbonate (TUMS) chewable tablet 400 mg [elemental]  400 mg Oral TID PRN    heparin (porcine) injection 5,000 Units  5,000 Units SubCUTAneous Q8H                  Objective:     Vitals:    01/10/18 0800 01/10/18 0802 01/10/18 0830 01/10/18 1146   BP:       Pulse: 76      Resp: 25      Temp:  98.7 °F (37.1 °C)     SpO2: 93%  91% 93%   Weight:       Height:           Intake and Output:   01/08 1901 - 01/10 0700  In: 3241.5 [I.V.:1742.5]  Out: 9493 [Urine:4195]       Physical Exam:          Constitutional:  Obese WM in bed and in no distress on 5 LPM  EENMT:  Sclera clear, pupils equal, oral mucosa moist, no nasal drainage today. Respiratory:  Clear but distant sounds b/l.  NO wheezing  Cardiovascular:  RRR with no M,G,R;  Gastrointestinal:  soft with no tenderness; positive bowel sounds present  Musculoskeletal:  warm with no cyanosis, 1+ lower leg edema  Skin:  no jaundice or ecchymosis  Neurologic: no gross focal deficits  Psychiatric:  Calm and answering questions    LINES:    central line, alegre     DRIPS:   none    CXR:  Infiltrates in the lower lobes Right              Ventilator Settings  Mode FIO2 Rate Tidal Volume Pressure PEEP   Pressure support  40 %    500 ml  10 cm H2O  10 cm H20      Peak airway pressure: 20.1 cm H2O   Minute ventilation: 8.1 l/min     ABG:   Recent Labs      01/10/18   0325  01/09/18   0228  01/08/18   0325   PH  7.39  7.45  7.46*   PCO2  60*  57*  50*   PO2  74*  77*  73*   HCO3  36*  39*  34*        LAB  No results for input(s): GLUCPOC in the last 72 hours. No lab exists for component: [place]  Recent Labs      01/10/18   0345  01/09/18   1128   WBC  7.2  8.8   HGB  13.0*  13.5*   HCT  42.1  43.5   PLT  157  181     Recent Labs      01/10/18   0345  01/09/18   0319  01/08/18   0322   NA  143  143  145   K  3.6  3.6  3.9   CL  100  98  99   CO2  39*  40*  39*   GLU  128*  108*  102*   BUN  36*  28*  30*   CREA  1.71*  1.52*  1.83*   MG  2.3  1.6*  1.8   PHOS  4.0  3.2  2.0*   CA  8.3  8.5  8.4     No results for input(s): LCAD, LAC in the last 72 hours. Assessment:  (Medical Decision Making)     Hospital Problems  Never Reviewed          Codes Class Noted POA    Severe sepsis ([facility] [address] ICD-10-CM: A41.9, R65.20  ICD-9-CM: 038.9, 995.92  1/9/2018 Unknown    Likely from acute on chronic sinusitis and pneumonia.     Community acquired pneumonia of right lower lobe of lung ([facility] [address] ICD-10-CM: J18.1  ICD-9-CM: 736  1/9/2018 Unknown    --likely from sinus drainage    Acute metabolic encephalopathy LCI-19-MI: G93.41  ICD-9-CM: 348.31  1/7/2018 Yes    resolved    Hypotension ICD-10-CM: I95.9  ICD-9-CM: 458.9  1/7/2018 Yes    Off pressors    Urethral stricture ICD-10-CM: N35.9  ICD-9-CM: 598.9  1/7/2018 Yes    Has alegre placed by urology    COPD (chronic obstructive pulmonary disease) ([facility] [address] (Chronic) ICD-10-CM: J44.9  ICD-9-CM: 497  1/6/2018 Yes    Likely and smokes about 1.5 PPD and uses drugs    Nicotine dependence (Chronic) ICD-10-CM: F17.200  ICD-9-CM: 305.1  1/6/2018 Yes        Methamphetamine abuse ICD-10-CM: F15.10  ICD-9-CM: 305.70  1/6/2018 Yes    See below    Anasarca ICD-10-CM: R60.1  ICD-9-CM: 782.3  1/6/2018 Yes        NATALIIA (obstructive sleep apnea) (Chronic) ICD-10-CM: Z08.83  ICD-9-CM: 327.23  1/6/2018 Yes    Does not have a CPAP at home but clearly with likely OHS vs NATALIIA, see below    Acute kidney injury [facility]) ICD-10-CM: N17.9  ICD-9-CM: 584.9  1/6/2018 Yes    Cr in 1.7 range    Acute on chronic respiratory failure with hypoxia and hypercapnia (HCC) ICD-10-CM: J96.21, J96.22  ICD-9-CM: 518.84, 786.09, 799.02  1/6/2018 Yes    Still on 5 LPM from vent today. Plan:  (Medical Decision Making)     --taper oxygen since does not use at home. Keep SAT > 90%  --spoke to ENT yesterday and now off vent will try nasal saline and nasal steroids for now. Since off vent they can see patient and determine changes if needed  --continue abx and will need at lease 14 days of treatment for marked sinus infection (if stays negative tomorrow, can de-esclate abx)  --nicotine patch  --needs to quit smoking and using drugs -- we had a long discussion about this today for over 15 minutes. --keep alegre in place till discharge  --speech and start PO intake  --will place on BIPAP on AVAPS mode QHS to see if able to tolerate. If so may need triology machine  --continue remaining treatment. Condition -- guarded    Spent 28 minutes with care today. More than 50% of the time documented was spent in face-to-face contact with the patient and in the care of the patient on the floor/unit where the patient is located.     Sophia Miranda 32, MD

## 2018-01-10 NOTE — PROGRESS NOTES
Guideline     Guideline Number: -[id number]     Title: Management of the Patient with Mechanical Ventilation (including weaning) and ABCDE Bundle    Effective Date:  03/00    Revised Date: 02/09, 03/10, 7/12, 5/13,                                  10/13, 8/14  Reviewed Date: 07/2015, 04/2016, 06/2017       I. Policy:  Management of the patient requiring mechanical ventilation, including readiness to wean and weaning protocol. The information provided serves as a guideline for patient management. Included in this guidelines is the ABCDE Bundle to provide guidance to staff for evidence based management of pain, agitation/anxiety and delirium in the intensive care unit. The goals of critical care analgesia and sedation are to facilitate mechanical ventilation, to prevent patient and caregiver injury, and to avoid the psychological and physiologic consequences of inadequate treatment of pain, anxiety, agitation, and delirium by maintaining a light level of sedation. Pain occurs commonly in adult ICU patients, regardless of admitting diagnosis. Therefore, pain should be frequently assessed and analgesic medications titrated to prevent adverse effects associated with either inadequate or excessive analgesia. Once pain has been addressed, anxiolytic and/or antipsychotic medications can be utilized to treat unresolved agitation/anxiety and delirium, with the goal to prevent over- or under sedation by using the Richards Agitation Sedation Scale (RASS). Assertive management of these issues has been shown to reduce costs, improve ICU outcomes such as successful extubation and ICU length of stay, and allow for patients to participate in their own care. II. Purpose: The respiratory care practitioner and the critical care RN will utilize the following guideline to provide the most efficient and effective management of mechanical ventilators and weaning and extubation processes.     Goals of Treatment:  1. Adequate management of patients pain and discomfort while maintaining a light level of                   sedation (RASS score of 0 to -1)  2. Both chronic and acute sources of pain should be identified and treated. 3. Sedative agents should be considered if patient still expresses discomfort and/or is not at RASS         goal of 0 to -1 despite adequate management of pain. 4. Patients requiring neuromuscular blockage must have continuous infusions of analgesic and              sedative agents. III. Responsibility: Director Respiratory Care Services and all Respiratory Care Practitioners  with documented competency as well as Critical Care RN staff. General Guidelines    1. Introduction to Ventilator Plan Phase  a. Ventilator Management Phase, General Statement  -  The plan should be initiated in patients who have a secure airway/require invasive mechanical ventilation (endotracheal or tracheostomy) only  -   The provider determines the appropriate medications used for analgesia and agitation/anxiety along with the clinical pharmacist    2. Monitoring Levels of Comfort  a. Pain Assessment  - Pain is monitored using the numerical scales  - A pain assessment should be conducted, at a minimum, every 4 hours and as needed and per guidelines. - The level of pain should be determined as satisfactory by the patient based on patients baseline level of pain , considering any chronic pain that the patient may have. - If the patient is unable to communicate pain level, the nurse can assess for nonverbal indicators including facial grimacing, moaning, tachypnea, tachycardia, hypertension, diaphoresis, etc as a cue to begin further pain assessment.             b.  Sedation Assessment  - Sedation is monitored using the Richards Agitation Sedation Scale (RASS)  Target RASS RASS Description   +4 Combative, violent, danger to staff     +3 Pulls or removes tubes(s) or catheters; aggressive   +2 Frequent nonpurposeful movement, fights ventilator   +1 Anxious, apprehensive, but not aggressive   0 Alert and calm   -1 Awakens to voice (eye opening/contact) > 10 sec   -2 Light sedation, briefly awakens to voice (eye opening/contact) < 10 sec   -3 Moderate sedation, movement or eye opening. No eye contact   -4 Deep sedation, no response to voice, but movement or eye opening to physical stimulation   -5 Unarousable, no response to voice or physical stimulation     -  Goal RASS is 0 to -1, unless otherwise specified by providers order.  - Nursing staff should conduct the RASS every 4 hours and as needed to maintain goal RASS of 0 to -1.  - If RASS is outside of goal range, discuss treatment options with provider.  - A RASS score of +2 to +4 requires further assessment by the nurse. Causes of agitation/anxiety that should be considered include:  a. Pulmonary -   endotracheal tube malposition or patency, mode of ventilation, pneumothorax, hypoxemia, hypercarbia  b. Metabolic  hypoglycemia, hyponatremia, acute renal or hepatic failure  c. Emotional upset  with information and awareness of critical condition, prognosis, need for surgical or invasive procedures, other interventions or complications, family or personal stressors  - C. Sedation Assessment while using Neuromusclar Blocking Agents  - D. Delirium Assessment  a. the ICU ([facility]  ICU)   3. Analgesia  The incidence of significant pain has been reported to be 50% or higher in both medical and surgical ICU patients. These patients also experience discomfort during routine/procedural ICU care and at rest.  However, patients may be unable to self-report their pain (either verbally or with other signs) because of an altered level of consciousness, the use of mechanical ventilation, or high doses of sedative agents or neuromuscular blocking agents.   The short and long term consequences of unrelieved or inadequately treated pain are significant and include patient discomfort, decreased satisfaction with care by family and patient, delirium, agitation/anxiety, post traumatic stress disorder and depression. Therefore, routine assessment and treatment of pain should occur in all ICU patients. Causes and Treatment of Pain in the ICU  a. Acute pain (post-operative, procedural pain, discomfort with usual ICU care or other acute episodes of pain-related to underlying disease)  1. Consider use of PCA for alert and oriented patients with pain needs not met by PRN dosing or opoids. 2. Preemptive analgesia should occur prior to chest tube removal, and should be considered for other procedural pain such as turning and repositioning, would drain removal, wound dressing change, tracheal suctioning, femoral catheter removal or place of central venous catheter. 3. Appropriate analgesic medications for preemptive analgesia are short acting intravenous (IV) agents (i.e. fentanyl, morphine, hydromorphone)  a. Administration of analgesia before patient experiences noxious stimuli prevents amplification and hyperexcitability of the central nervous system. b. Analgesia for Mechanically Ventilated Patients:  1. The approach to sedation and analgesia management for mechanically ventilated patients favors use of analgesia first sedation. The primary goal of this strategy is to address pain and discomfort first, and then if necessary, add anxiolytic agent. 2. Analgesia first sedation reduces dose escalation of medications, decreases the duration of mechanical ventilation and the incidence of VAP, improves the probability of successful extubation, and ultimately shortens ICU length of stay. 3. For pain management, analgesic medications are determined by the provider. Intermittent dosing of the analgesic should be attempted first.    If the patient requires more than 3 doses within 1 hour then provider should be contacted to consider continuous infusion.   4.  Analgesic options for mechanically ventilated patients include:  a. Fentanyl which is considered the drug of choice for patients requiring continuous infusion. b.Morphine may be considered for those patients without renal dysfunction who are hemodynamically stable and require intermittent pain medication. Continuous infusions of morphine may be used for patientl who are receiving comfort care as part of end of life care. c.Hydromorphone is reserved for patients who are refractory to fentanyl or morphine and is typically admininstered by intermittent dosing. 4.  Agitation/Anxiety    Background  Agitation and anxiety frequently occur in ICU patients. Anxiolytic/sedation agents may be indicated to help relieve discomfort, improve synchrony with mechanical ventilation, and decrease the overall work of breathing. Pain control alone may be sufficient to make patients comfortable enough to require no anxiolytic/sedative agent. In addition, non-pharmacologic interventions such as repositioning or verbal assurance may be helpful to comfort or redirect an agitated patient. If these methods are unsuccessful, then anxiolytic/sedative medications such as propofol, dexmedetomidine, or benzodiazepines can be used. Selection of an anxiolytic should be based on the pharmacokinetic properties of the medication, patient specific characteristics, and sedation goal.   However, nonbenzodiazepine sedatives (ie propofol or dexmedetomidine) may be preferable over benzodiazepines (ie midazolam or larazepam) due to more favorable outcomes such as delirum. Causes and Treatment of Agitation/Anxiety  a. Possible underlying causes of agitation and anxiety include pain, delirium, hypoxemia, hypoglycemia, hypotension, or withdrawal from alcohol  and other drugs. b. Analgesia first sedation should be attempted initially to manage pain and provide sedation in appropriate patients. Analgesia alone may be adequate to reach RASS goal of 0 to -1.   If patient remains agitated or anxious despite adequate analgesia (ie RASS +2 to +4) then anxiolytic/sedative should be considered. c. The choice of anxiolytic should be based on desired levels of sedation (ie light sedation or deep sedation) with preference for the use of nonbenzodiazepines such as propofol or dexmedetomidine if appropriate. While light sedation (ie RASS 0 to -1) is preferred for most patients, there are instances when deep sedation (ie RASS -4 to -5) is desired. For example, in the setting of ventilator dysynchrony due to ARDS or for patients receiving NMB agents. d. Medications to maintain light sedation (ie RASS 0 to -1) include  1. Propofol continuous infusion can be considered for hemodynamically stable (ie SBP = 100, MAP = 65 and/or not requiring vasopressor support) patients requiring light sedation. Propofol has a quick onset (1-2 minutes) and offset of action, making it a good agent to assess neurological status and facilitate liberation from the mechanical ventilator. 2.Dexmedetomidine continuous infusion is a good option for hemodynamically stable patients requiring light sedation as it allows for a more awake, interactive patient is associated with less delirium. It has an intermediate onset of action (5-10 min). Therefore, abrupt titrations should be avoided, but use of prn haloperidol or benzodiazepine may be useful to manage agitation until the medication takes effect. 3. Antipsychotics are another option. In particular, haloperidol intermittently dosed may be useful for patients with symptoms of agitation/anxiety and delirium. 4.Benzodiazapines can also be considered for light sedation, but should be intermittently doses. Midazolam is an option for patients without renal dysfunction. It has a short onset of action (2-5 minutes) making it a good agent for acute agitation/anxiety, but short duration of action resulting in frequent dosing. Lorazepam is another option.   It has a longer onset of action (15-20 minutes) in comparison to midazolam, but longer duration of action. e. Medications to maintain deep sedation (RASS -4 to -5) include:  1) Propofol continuous infusion should be considered as a first line option for hemodynamically stable patients. 2)Benzodiazepines can be considered as second line options for deep sedation. Studies comparing these agents to other sedatives have shown that they lead to worse outcomes including delirium, oversedation, delayed extubation, and longer time to discharge. Midazolam is one option for patients without renal dysfunction and lorazepam is another options. If patient requires more than 3 doses within 1 hour then contact provider  to consider initiation of continuous infusion. 5.  Daily Sedation Awakening Trial (SAT) from IV Continuous Analgesia/Sedation  a. Patients are to have daily awakening from sedation while on continuous IV analgesia and/or sedation in the ICU. Continuous analgesia infusions may be maintained only if needed for active pain and RASS is at goal 0 - -1. Unit guideline is for the SAT to occur following ICP rounds each morning.    b. The sedation awakening trial (SAT) is done regardless if the patient meets criteria for spontaneous breathing trial (SBT). c. SAT safety screen is assessed and SAT should not be performed if sedation is being used for active seizures, alcohol withdrawal, hemodynamically unstable or requiring support of vasoactive medications , in conjunction with NMB agents, if ICP is greater than  20mmHg or if sedation is being used to control ICP, patients RASS is +3 or +4 (very agitated or combative). Other exclusion criteria are:  if there is documentation of myocardial ischemia in the past 24 hours; or patient is receiving high frequency oscillator ventilation (HFOV) , if the patient has an open chest /abdomen or is receiving comfort care.   d. Criteria for passing the SAT are the patient opened their eyes to verbal stimuli or tolerated sedative interruption without exhibiting failure criteria.  e. Patients fail the SAT if the develop sustained anxiety, agitation, or pain; a respiratory rate of 35 per minutes for 5 minutes or longer, an SpO2 less than 88% for 5 minutes or longer; an acute cardiac dysrhythmia; two or more signs of respiratory distress including tachycardia, bradycardia; use of accessory muscles; diaphoresis; marked dyspnea; or myocardial ischemia. f. Respiratory therapy staff must verify with the nurse that continuous IV analgesia (unless being use  for active pain) and sedation (unless patient is receiving dexmedetomidine) is off prior to placing patient on SBT. g. DO NOT interrupt infusion of analgesia and sedation medications if patient is receiving neuromuscular blockade.  h. Monitor level of wakefulness unless patient is awake and follows commands (RASS 0 to -1) or patient becomes uncomfortable or agitated (RASS +3 to +4)  i. If agitation prevents successful awakening , administer bolus of analgesia and/or sedation then resume infusion of the medication at ½ previous dose and titrate as needed.  j. If oversedation prevents successful awakening, hole infusion until at goal and resume ½ of prior infusion rate/dose if clinically indicated. 6. Delirium  Background  Delirium is characterized by the acute onset of cerebral dysfunction with a change or fluctuation baseline  mental status, inattention, and either disorganized thinking or an altered level of consciousness. It affects up to 80% of mechanically ventilated adult ICU patients, and is associated with increased mortality,   and treatment is important and may in turn allow for a patient to be conscious yet cooperative enough to participate   in ventilator weaning trials and early mobilization efforts.     Delirium can only be assessed in patients who are able to sufficiently interact and communicate with bedside  clinicians (ie RASS -3 to +4). IV. Procedure:  A. Assessment: The following criteria must be assessed prior to the initiation of weaning from mechanical ventilation. Note: The criteria are general guidelines and must be individualized for each patient. The patients primary nurse will be responsible, in coordination with the RT, the Spontaneous Awakening Trial). The RT will perform the SBT. B. Spontaneous Awakening Trials (SATs  also referred to as Sedation Vacation) and Spontaneous Breathing Trials (SBTs) performed to determine readiness to wean. 1. For patients who meet established criteria, such as those without active seizures, alcohol withdrawal and agitation, myocardial ischemia or those requiring cardiac support devices, without increased intracranial pressure and those not receiving neuromuscular blockade, the nurse will reduce the infusions of sedative by 50% of current used for sedation that was used to achieve a level of light sedation (Rocha Score 2 or RASS score of 0 to -1) and evaluate patient response to reduction of sedation. Analgesics required for pain control are continued during the test.  Obtain MD order to cover no SAT for that time period if patient has any exclusion criteria as described above. 2. Failure of the spontaneous awakening trial occurs when the patient shows symptoms such as increased agitation, anxiety, pain or signs of respiratory distress including respiratory rate >35/min or oxygen saturation <88% as well as development of acute cardiac arrhythmias. If these symptoms develop during the SAT, the nurse then restarts sedation at 75% of the previous dose and titrates the medications until the patient is comfortable and/or symptoms have abated. 3.  If the patient passes the SAT then the patient moves on to the Spontaneous Breathing Trials as performed by the RT.    The SBT Safety Screen included the following:  No agitation, oxygen saturation > 88%, FIO2 < 50%, PEEP < 7.5 cm H20, no myocardial ischemia, no vasopressor use, and with inspiratory efforts. 4. Patients who pass the spontaneous awakening trial but fail the spontaneous breathing trial are placed back on full ventilator support and reassessed the next day. 5. Failure of the SBT (spontaneous breathing trail) includes any of the following:  Respiratory rate > 35/min, respiratory rate < 8/min, oxygen saturation < 88%, respiratory distress, mental status change, acute cardiac arrhythmia. 6. Extubation is considered for patients who tolerate the spontaneous awakening trial and pass the spontaneous breathing trial.    C. Can the cause of respiratory failure be reversed (i.e. absence of high spinal cord injury or advanced ALS)? D. Is gas exchange adequate? 1. PaO2/FIO2 ratio > 150  200,  2. PEEP < 8 cm H20  3. FIO2 < 50  4. pH > 7.30  5. Rapid shallow breathing index (f/VT) < 105  E. Is patient hemodynamically stable? 1. Absence of clinically significant hypotension (minimal vasopressors such as Dopamine < 5mcg/kg/minute)? F. Is there evidence of intact respiratory drive (NIP/NIF >-04 [id number], stable VC02)? G. Does patient have an adequate cough, airway clearance ability? H. Is there absence of excessive secretions? V. Initiation:     A. The therapist shall consult with RN to determine if sedation can be discontinued or significantly decreased. If this can be achieved, the therapist shall implement the                     followin. Identify patient and verify name and account number via ID [email or web address]. 2. Perform hand hygiene per hospital policy utilizing Standard Precautions for all patients and following transmission-based isolation as indicated per hospital policy. 3. Perform a ONE-MINUTE SPONTANEOUS TRIAL AND ASSESSMENT. 4. Measure Rapid Shallow Breathing Index (RSBI) and monitor SpO2 and cardiovascular parameters during the spontaneous breathing assessment.   5. If SpO2 and cardiovascular parameters are stable, continue spontaneous breathing trial for at least 30 minutes and up to 120 minutes, as patient tolerates. 6. Monitor ventilatory status, SpO2, and cardiovascular status during spontaneous breathing trial.  7. If patient has a successful trial, consider patient as a candidate for extubation and obtain order. 8. If patient fails the weaning trial, place back on ventilator and adjust settings to provide a non-fatiguing form of ventilatory support for the remainder of the day and night. 9. One attempt at weaning shall be performed each day until successful weaning occurs. The RCP will make every attempt to begin the spontaneous breathing trials between 0500 and 0600 to provide documentation of the trial when the pulmonologist makes rounds. B.  Assessment of SBT or PST:  1. Is gas exchange acceptable? 2. PaO2 > 60 mm Hg. 3. PH > 7.30  4. Increase in PaCO2  < 10 mm Hg  C. Is patient hemodynamically stable? 1. HR < 120 beats/minute  2. HR  < 20%   3. Systolic BP < 825 and > 90 mmHg  4. BP  < 20%, no vasopressors required  D. Does patient have stable ventilatory pattern? 1. Sustained RR < 30 breaths per minute  2. Normal and stable VCO2  3. Patient is not demonstrating any signs of increased work of breathing, such as increased use of accessory muscles. E. Mental status stable throughout trial?  1. Absence of changes such as somnolence, excessive agitation or anxiety  2. Absence of diaphoresis during trial?  IV. Safety:    A. The RCP shall monitor patient according to the above guidelines. If at any time during the weaning process, the respiratory therapist or nurse feels that the patient is not tolerating weaning, the therapist shall place patient back on previous ventilator settings. B. The patient shall be reassessed and the weaning process should be continued the following day. V. Reportable Conditions:    A.  The therapist shall notify the physician, as appropriate, for any of the following conditions:  1. FIO2 increase (sustained) at 10% or greater  2. Poor patient/ventilator interface in spite of adjustments  3. Need for increased sedation for respiratory distress  4. Need for increasing ventilating pressures (i.e. PEEP, PIP, MAP)  5. ABG results meeting panic value criteria or other clinical signs indicating deterioration of patients condition. 6. Unplanned extubation. 7. Unexplained sustained increase in PIP greater than 10 cm H2O.  8. Assessment results regarding ventilator discontinuance process. VI. Ventilator protocol management   A. The following items should be maintained for patients who are being mechanically           ventilated. 1. Obtain STAT Chest X-Ray for ET tube placement after insertion. 2. Chest X-Ray q a.m. while on ventilator. 3. ABGs 30 - 60 minutes after being stable on the ventilator. 4. ABG's q a.m. while on ventilator and prn.  5. Do spontaneous breathing trials when patient is hemodynamically stable, responsive, and without fever. 6. Terminate trials if patient exhibits signs of respiratory distress. 7. Therapists should maintain ABG s as follows:      a. pH -  7.30 - 7.50                   b. PaO2 -   60  100        8. Racemic Epinephrine (0.5cc) for post extubation stridor (2 UA treatments max.)    VII.   Early Mobilization    Mobility Level Criteria Start at Level 1 if:   PaO2/FIO2 <250   Positive end-expiratory Pressure (PEEP) >=10 cm H2O   O2 saturation <90%   Respiratory Rate (RR) Not within 10-30 per min   Cardiac arrhythmias or ischemia New onset   Heart Rate  (HR) <60 or >120 beats per min   Mean arterial pressure (MAP) <55 or >644 mmHg   Systolic blood pressure (SBP) <90 or > 180 mmHg   Vasopressor infusion New or increasing   Richards Agitation Scale (RASS) < - 3       Level I:   Breathe  (Rass -5 to -3)  HOB Angle  improve VAP protocol compliance   Visually confirm the [facility] is elevated >= 30 degrees to comply with VAP prevention protocols   The Centers for Disease Control and Prevention recommends an HOB angle of 30-45 degrees , unless contraindicated  Additional activities to be implemented   Every 2 hour turning   Passive range of motion   Up to 20 degrees reverse trendelenburg with lower extremity exercise/retracting footboard   Continuous lateral rotation therapy can be considered part of early mobility therapy in patients who are at high risk for pulmonary complications  Move to Level 2 when the patient  - Has acceptable oxygenation/hemodynamics  - Tolerates q 2 turning  - Tolerates HOB > 30 degrees or up to 20 degrees reverse trendelenburg    Level 2 :Tilt  Patient Assessment Rass > -3  (eg, opens eyes, may have profound weakness)  Up to 20 degrees Reverse Trendelenburg position and 10 degrees minimum HOB  - Reverse Trendelenburg positioning allows for orthostatic position in fragile patients  - If available , use in conjunction with retracting foot section to allow for partial weight bearing prior to sitting up in the bed or getting out of bed  Additional activities to be implemented  -  Maintain HOB >/= 30 degrees  - Q 2 hour turning  - Passive/active range of motion  - Legs dependent  - PT consultation       Move to Level 3 when the patient . .    -Tolerates active- assistance exercises 2 times per day    -Tolerates lower extremity exercises against footboard/Up to 20 degrees Reverse Trendelenburg    -Tolerates legs dependent /HOB 45 degrees  Level 3 :  SIT  (Rass >- 1 (eg , weak but may move arms/legs independently)   Full chair position (footboard on)   Full upright positioning allows for diaphragmatic excursion and lung expansion   Sitting with legs in a dependent position facilitates gas exchange  Additional activities to be implemented  - Maintain HOB >= 30 degrees  - Q 2 hour turning (assisted)  - Active range of motion  PT/OT actively involved  - Encourage activities of daily living  - Dangling, if patient can move arm against gravity  Move to Level 4 when the patient . .  - Tolerates increasing active exercise in bed  - Actively assists with every- 2- hour turning or turns independently  - Tolerates full chair position 3 times/day  Level 4:  Stand ( RASS >0 (eg, weak but may tolerate increased activity)      Stand Attempts   Full chair position (footboard off/feet on the floor)   Consider using a sit-to-stand lift   Pivot to chair, it tolerates partial weight bearing  Additional activities to be implemented  - Maintain head of bed >= 30 degrees  - Q 2 hr turning (self/assisted)  - Active range of motion  - Encourage activities of daily living  - PT/OT actively involved      Move to Level 5 when the patient .  - Can successfully comply with all activities  - Tolerates trial periods of full chair position (footboard off/feet on floor) 3 times per day  - Tolerates partial weight-bearing stand and pivots to chair    Level 5 :  Move  (RASS > 0    (eg, weak but may tolerate increased activity)   Achieve out of bed   Utilize mobile floor life to ambulate to bedside chair  Additional activities to be implemented  - Maintain HOB > = 30 degrees  - Q 2 hour turning (self/assisted)  - Active range of motion  - Patient stands/bears weight > 1 minute  - Patient marches in place  - PT/OT actively involved    Patient continues to ambulate progressively longer distances as tolerated until they consistently participate and move independently.         E Approved by [address] 2-19-09   [facility] Clinical Guidelines             GAY FERNANDO [facility] Flowsheet Content Variables to select when addressing section Comments   GAY Initiated  Yes/No  RN to address minimum q 24 hours (day shift)   Target RASS  0 = alert and oriented   -1 = drowsy   -2 = light sedation   -3= moderate sedation   -4= deep sedation Target on standard ventilator setting should be -2; -4 with oscillator   CAM -ICU  Positive   Negative   Unable to assess Delirium assessment   SAT Safety Screen Passed  Yes   No Select yes if proceeding on to the sedation vacation (reduction of continuous sedative drip by directed by MD)  Select no if your patient has any of the below reasons for not proceeding on to the daily awakening sedation vacation trial   SAT Screen for Failure  Active seizures   Acute delirium tremors   Agitation that threatens accidental line/tube removal   On paralytics   MI (24-48hr)   Abnormal ICP   Open abdomen Select one of the options when the patient will not undergo the sedation vacation  ALSO MUST OBTAIN AN ORDER FOR no [address] written under nursing miscellaneous for now by either the NP or Intensivist   Daily sedation Vacation/assessment of   Yes   No   Not applicable If yes, MUST see the reduction in sedation on the [facility] and please place in the comment section of the sedative sedation vacation started   SBT Safety Screen Passed  Yes   No Select Yes if the patient has none of the below listed reasons for not proceeding on to the SBT following reduction of sedation   SBT Screen Reason for Failure  Agitation   O2 Sat < or = 88%   FIO2 > 50%   PEEP >7   MI   Vasopressor Use   Bilevel setting on Vent   Oscillator in use   Increased resp effort Select reason as appropriate for NOT proceeding on to the SBT                                               Wake Up and Breathe Protocol

## 2018-01-10 NOTE — PROGRESS NOTES
Tolerating Pressure support ventilator settings. Suctions his mouth with yaunker and performs his own mouth care. Fentanyl discontinued.

## 2018-01-10 NOTE — PROGRESS NOTES
Ventilator check complete; patient has a #9.0 ET tube secured at the 26 at the lip. Patient is not sedated. Patient is able to follow commands. Breath sounds are clear. Trachea is midline, Negative for subcutaneous air, and chest excursion is symmetric. Patient is also Negative for cyanosis and is Negative for pitting edema. All alarms are set and audible. Resuscitation bag is at the head of the bed.       Ventilator Settings  Mode FIO2 Rate Tidal Volume Pressure PEEP I:E Ratio   Pressure support  40 %    500 ml  10 cm H2O  10 cm H20  1:3.13      Peak airway pressure: 20.1 cm H2O   Minute ventilation: 8.1 l/min     ABG:   Recent Labs      01/10/18   0325  01/09/18   0228  01/08/18   0325   PH  7.39  7.45  7.46*   PCO2  60*  57*  50*   PO2  74*  77*  73*   HCO3  36*  39*  34*         Vicente Rodriguez, RT

## 2018-01-10 NOTE — PROGRESS NOTES
Respiratory Mechanics completed and are as follows:  Weaning Parameters  Spontaneous Breathing Trial Complete: Yes  Resp Rate Observed: 18  Ve: 9.1  VT: 568  RSBI: 32  [place] Agitation Sedation Scale (RASS): Alert and calm  Patient extubated to a 10L NC. Patient is able to communicate and is negative for stridor. Breath sounds are clear. No complications with extubation.      Olaf Ca, RT

## 2018-01-10 NOTE — PROGRESS NOTES
Bedside shift change report given to Nallely Wyatt RN (oncoming nurse) by Aparna Gutierrez RN (offgoing nurse). Report included the following information Kardex, Intake/Output, MAR, Recent Results, Med Rec Status and Cardiac Rhythm NSR. Drowsy and confused. Reoriented. Mouths and writes appropriate words. Asking \"Are they gonna pull the plug. Am I OK? Where am I? \" Reoriented. Left neck central line:Precedex IV, Fentanyl IV, verified. ETT on ventilator. Dobhoff tolerating TF. Galindo, patent. Bilateral soft wrist restraints. No DTIs/No pressure ulcers observed.

## 2018-01-10 NOTE — PROGRESS NOTES
Spoke with nurse for ENT per Dr. Sue Smith request to notify ENT physician that pt is off ventilator. Nurse will notify physician.

## 2018-01-10 NOTE — PROGRESS NOTES
Pt Tommie Zambrano dsg loose.   Pt states Primary RN stated she would be back to bathe him and change his dsg at that time, Deferring to primary for now

## 2018-01-10 NOTE — PROGRESS NOTES
Problem: Mobility Impaired (Adult and Pediatric)  Goal: *Acute Goals and Plan of Care (Insert Text)  STG:  (1.)Mr. Schaffer will move from supine to sit and sit to supine , scoot up and down and roll side to side with STAND BY ASSIST within 3 day(s). (2.)Mr. Schaffer will transfer from bed to chair and chair to bed with CONTACT GUARD ASSIST using the least restrictive device within 3 day(s). (3.)Mr. Schaffer will ambulate with CONTACT GUARD ASSIST for 100 feet with the least restrictive device within 3 day(s). (4.)Mr. Schaffer will perform standing static and dynamic balance activities x 15 minutes with CONTACT GUARD ASSIST to improve safety within 3 day(s). (5.)Mr. Schaffer will maintain stable vital signs throughout all functional mobility within 3 days. LTG:  (1.)Mr. Schaffer will move from supine to sit and sit to supine , scoot up and down and roll side to side in bed with MODIFIED INDEPENDENCE within 7 day(s). (2.)Mr. Schaffer will transfer from bed to chair and chair to bed with MODIFIED INDEPENDENCE using the least restrictive device within 7 day(s). (3.)Mr. Schaffer will ambulate with MODIFIED INDEPENDENCE for 250+ feet with the least restrictive device within 7 day(s). ________________________________________________________________________________________________      PHYSICAL THERAPY: Initial Assessment, PM 1/10/2018  INPATIENT: Hospital Day: 5  Payor: SELF PAY / Plan: [facility] SELF PAY / Product Type: Self Pay /      NAME/AGE/GENDER: Joe Osborne is a 45 y.o. male   PRIMARY DIAGNOSIS: COPD (chronic obstructive pulmonary disease) (HCC) <principal problem not specified> <principal problem not specified>        ICD-10: Treatment Diagnosis:   · Difficulty in walking, Not elsewhere classified (R26.2)   Precaution/Allergies:  Review of patient's allergies indicates no known allergies. ASSESSMENT:     Mr. Servando Stuart is a 45 y.o. male with COPD.  He lives with wife in single story home and typically ambulates with a straight cane. He was extubated today and is in CCU, supine and agreeable to therapy. He transferred to sitting with CGA, stood with minimal assistance and able to ambulate within room with minimal assist and rolling walker. Patient required some verbal cues for safety and walker negotiation within room, however followed commands well. Short, shuffled steps appreciated, however appears to have functional strength and intact sensation BLE to light touch. He remained seated in recliner at the end of the session with all needs in reach and chair alarm activated. Nelida Cr is currently functioning below his baseline and would benefit from skilled PT during acute care stay to maximize safety and independence with functional mobility. This section established at most recent assessment   PROBLEM LIST (Impairments causing functional limitations):  1. Decreased Strength  2. Decreased ADL/Functional Activities  3. Decreased Transfer Abilities  4. Decreased Ambulation Ability/Technique  5. Decreased Balance  6. Increased Pain  7. Decreased Activity Tolerance  8. Decreased Knowledge of Precautions  9. Decreased [place] with Home Exercise Program   INTERVENTIONS PLANNED: (Benefits and precautions of physical therapy have been discussed with the patient.)  1. Balance Exercise  2. Bed Mobility  3. Family Education  4. Gait Training  5. Home Exercise Program (HEP)  6. Therapeutic Activites  7. Therapeutic Exercise/Strengthening  8. Transfer Training  9. Patient Education  10. Group Therapy     TREATMENT PLAN: Frequency/Duration: 3 times a week for duration of hospital stay  Rehabilitation Potential For Stated Goals: Good     RECOMMENDED REHABILITATION/EQUIPMENT: (at time of discharge pending progress): Due to the probability of continued deficits (see above) this patient will likely need continued skilled physical therapy after discharge.   Equipment:    None at this time HISTORY:   History of Present Injury/Illness (Reason for Referral):  Per MD: Darrius Carroll is a 45y.o. year-old male with a past medical history of polysubstance abuse and NATALIIA with poor follow up who presents to the ER with a complaint of worsening shortness of breath and lower extremity/abdominal swelling over the past month. He admits to respiratory problems in the past and apparently was supposed to get a trach placed at some point but didn't. He has also had CPAP recommended but cannot wear it because it is too loud for him. He admits to occasional cough productive of green sputum. Denies fevers, chills, nausea, vomiting, constipation, diarrhea, chest pain. Past Medical History/Comorbidities:   Mr. Meliton Phoenix  has a past medical history of Sleep disorder. Mr. Meliton Phoenix  has no past surgical history on file. Social History/Living Environment:   Home Environment: Private residence  # Steps to Enter: 0  One/Two Story Residence: One story  Living Alone: No  Support Systems: Spouse/Significant Other/Partner  Patient Expects to be Discharged to[de-identified] Private residence  Current DME Used/Available at Home: Cane, straight  Prior Level of Function/Work/Activity:  Patient ambulated with modified independence prior to admission, required some assist from wife for donning socks. Number of Personal Factors/Comorbidities that affect the Plan of Care: 1-2: MODERATE COMPLEXITY   EXAMINATION:   Most Recent Physical Functioning:   Gross Assessment:  AROM: Generally decreased, functional  PROM: Generally decreased, functional  Strength: Generally decreased, functional  Coordination: Generally decreased, functional  Sensation: Intact               Posture:  Posture (WDL): Exceptions to WDL  Posture Assessment:  Forward head, Rounded shoulders  Balance:  Sitting: Impaired  Sitting - Static: Good (unsupported)  Sitting - Dynamic: Fair (occasional)  Standing: Impaired  Standing - Static: Fair  Standing - Dynamic : Fair Bed Mobility:  Supine to Sit: Minimum assistance  Scooting: Contact guard assistance  Wheelchair Mobility:     Transfers:  Sit to Stand: Minimum assistance  Stand to Sit: Minimum assistance  Bed to Chair: Minimum assistance  Gait:     Base of Support: Narrowed; Center of gravity altered  Speed/Fawn: Slow;Pace decreased (<100 feet/min); Shuffled  Step Length: Right shortened;Left shortened  Swing Pattern: Right symmetrical;Left symmetrical  Stance: Right increased; Left increased  Gait Abnormalities: Decreased step clearance;Trunk sway increased; Path deviations  Distance (ft): 10 Feet (ft)  Assistive Device: Walker, rolling  Ambulation - Level of Assistance: Minimal assistance  Interventions: Manual cues; Safety awareness training;Verbal cues      Body Structures Involved:  1. Heart  2. Lungs  3. Muscles Body Functions Affected:  1. Sensory/Pain  2. Respiratory  3. Neuromusculoskeletal  4. Movement Related Activities and Participation Affected:  1. Mobility  2. Self Care  3. Domestic Life  4. Interpersonal Interactions and Relationships  5. Community, Social and [place]   Number of elements that affect the Plan of Care: 4+: HIGH COMPLEXITY   CLINICAL PRESENTATION:   Presentation: Stable and uncomplicated: LOW COMPLEXITY   CLINICAL DECISION MAKIN [facility] Inpatient Short Form  How much difficulty does the patient currently have. .. Unable A Lot A Little None   1. Turning over in bed (including adjusting bedclothes, sheets and blankets)? [] 1   [] 2   [x] 3   [] 4   2. Sitting down on and standing up from a chair with arms ( e.g., wheelchair, bedside commode, etc.)   [] 1   [] 2   [x] 3   [] 4   3. Moving from lying on back to sitting on the side of the bed? [] 1   [] 2   [] 3   [] 4   How much help from another person does the patient currently need. .. Total A Lot A Little None   4. Moving to and from a bed to a chair (including a wheelchair)?    [] 1   [] 2   [x] 3 [] 4   5. Need to walk in hospital room? [] 1   [x] 2   [] 3   [] 4   6. Climbing 3-5 steps with a railing? [] 1   [x] 2   [] 3   [] 4   © 2007, Trustees of [address] [place] [address] [address] [address], under license to [employer]. All rights reserved      Score:  Initial: 16 Most Recent: X (Date: -- )    Interpretation of Tool:  Represents activities that are increasingly more difficult (i.e. Bed mobility, Transfers, Gait). Score 24 23 22-20 19-15 14-10 9-7 6     Modifier CH CI CJ CK CL CM CN      ? Mobility - Walking and Moving Around:     - CURRENT STATUS: CK - 40%-59% impaired, limited or restricted    - GOAL STATUS: CJ - 20%-39% impaired, limited or restricted    - D/C STATUS:  ---------------To be determined---------------  Payor: SELF PAY / Plan: [facility] SELF PAY / Product Type: Self Pay /      Medical Necessity:     · Patient demonstrates good rehab potential due to higher previous functional level. Reason for Services/Other Comments:  · Patient continues to require modification of therapeutic interventions to increase complexity of exercises. Use of outcome tool(s) and clinical judgement create a POC that gives a: Clear prediction of patient's progress: LOW COMPLEXITY            TREATMENT:   (In addition to Assessment/Re-Assessment sessions the following treatments were rendered)   Pre-treatment Symptoms/Complaints:  none  Pain: Initial:   Pain Intensity 1: 0  Post Session:  0/10     Assessment/Reassessment only, no treatment provided today    Braces/Orthotics/Lines/Etc:   · IV  · alegre catheter  · O2 Device: Hi flow nasal cannula  Treatment/Session Assessment:    · Response to Treatment:  Patient tolerated treatment well.   · Interdisciplinary Collaboration:   o Physical Therapist  o Registered Nurse  · After treatment position/precautions:   o Up in chair  o Bed alarm/tab alert on  o Bed/Chair-wheels locked  o Bed in low position  o Call light within reach  o RN notified  o Family at bedside · Compliance with Program/Exercises: Will assess as treatment progresses. · Recommendations/Intent for next treatment session: \"Next visit will focus on advancements to more challenging activities and reduction in assistance provided\".   Total Treatment Duration:  PT Patient Time In/Time Out  Time In: 1431  Time Out: [address], DPT

## 2018-01-11 LAB
ANION GAP SERPL CALC-SCNC: 3 MMOL/L (ref 7–16)
BACTERIA SPEC CULT: NORMAL
BACTERIA SPEC CULT: NORMAL
BASOPHILS # BLD: 0 K/UL (ref 0–0.2)
BASOPHILS NFR BLD: 0 % (ref 0–2)
BUN SERPL-MCNC: 29 MG/DL (ref 6–23)
CALCIUM SERPL-MCNC: 8.6 MG/DL (ref 8.3–10.4)
CHLORIDE SERPL-SCNC: 102 MMOL/L (ref 98–107)
CO2 SERPL-SCNC: 39 MMOL/L (ref 21–32)
CREAT SERPL-MCNC: 1.41 MG/DL (ref 0.8–1.5)
DIFFERENTIAL METHOD BLD: ABNORMAL
EOSINOPHIL # BLD: 0.4 K/UL (ref 0–0.8)
EOSINOPHIL NFR BLD: 5 % (ref 0.5–7.8)
ERYTHROCYTE [DISTWIDTH] IN BLOOD BY AUTOMATED COUNT: 14 % (ref 11.9–14.6)
GLUCOSE SERPL-MCNC: 108 MG/DL (ref 65–100)
HCT VFR BLD AUTO: 45.3 % (ref 41.1–50.3)
HGB BLD-MCNC: 13.7 G/DL (ref 13.6–17.2)
IMM GRANULOCYTES # BLD: 0 K/UL (ref 0–0.5)
IMM GRANULOCYTES NFR BLD AUTO: 0 % (ref 0–5)
LYMPHOCYTES # BLD: 0.9 K/UL (ref 0.5–4.6)
LYMPHOCYTES NFR BLD: 13 % (ref 13–44)
MCH RBC QN AUTO: 33.1 PG (ref 26.1–32.9)
MCHC RBC AUTO-ENTMCNC: 30.2 G/DL (ref 31.4–35)
MCV RBC AUTO: 109.4 FL (ref 79.6–97.8)
MONOCYTES # BLD: 0.9 K/UL (ref 0.1–1.3)
MONOCYTES NFR BLD: 12 % (ref 4–12)
NEUTS SEG # BLD: 4.9 K/UL (ref 1.7–8.2)
NEUTS SEG NFR BLD: 70 % (ref 43–78)
PLATELET # BLD AUTO: 162 K/UL (ref 150–450)
PMV BLD AUTO: 10.4 FL (ref 10.8–14.1)
POTASSIUM SERPL-SCNC: 4 MMOL/L (ref 3.5–5.1)
RBC # BLD AUTO: 4.14 M/UL (ref 4.23–5.67)
SERVICE CMNT-IMP: NORMAL
SERVICE CMNT-IMP: NORMAL
SODIUM SERPL-SCNC: 144 MMOL/L (ref 136–145)
VANCOMYCIN TROUGH SERPL-MCNC: 21.8 UG/ML (ref 5–20)
WBC # BLD AUTO: 7.1 K/UL (ref 4.3–11.1)

## 2018-01-11 PROCEDURE — 94640 AIRWAY INHALATION TREATMENT: CPT

## 2018-01-11 PROCEDURE — 74011250637 HC RX REV CODE- 250/637: Performed by: INTERNAL MEDICINE

## 2018-01-11 PROCEDURE — 97165 OT EVAL LOW COMPLEX 30 MIN: CPT

## 2018-01-11 PROCEDURE — 77010033678 HC OXYGEN DAILY

## 2018-01-11 PROCEDURE — 74011000258 HC RX REV CODE- 258: Performed by: INTERNAL MEDICINE

## 2018-01-11 PROCEDURE — 74011000250 HC RX REV CODE- 250: Performed by: INTERNAL MEDICINE

## 2018-01-11 PROCEDURE — 74011250636 HC RX REV CODE- 250/636: Performed by: FAMILY MEDICINE

## 2018-01-11 PROCEDURE — 74011250636 HC RX REV CODE- 250/636: Performed by: INTERNAL MEDICINE

## 2018-01-11 PROCEDURE — 99233 SBSQ HOSP IP/OBS HIGH 50: CPT | Performed by: INTERNAL MEDICINE

## 2018-01-11 PROCEDURE — 80048 BASIC METABOLIC PNL TOTAL CA: CPT | Performed by: INTERNAL MEDICINE

## 2018-01-11 PROCEDURE — 74011250637 HC RX REV CODE- 250/637: Performed by: FAMILY MEDICINE

## 2018-01-11 PROCEDURE — 77030021668 HC NEB PREFIL KT VYRM -A

## 2018-01-11 PROCEDURE — 85025 COMPLETE CBC W/AUTO DIFF WBC: CPT | Performed by: INTERNAL MEDICINE

## 2018-01-11 PROCEDURE — 65620000000 HC RM CCU GENERAL

## 2018-01-11 PROCEDURE — 94660 CPAP INITIATION&MGMT: CPT

## 2018-01-11 PROCEDURE — 77030020120 HC VLV RESP PEP HI -B

## 2018-01-11 PROCEDURE — 80202 ASSAY OF VANCOMYCIN: CPT | Performed by: INTERNAL MEDICINE

## 2018-01-11 RX ORDER — LORAZEPAM 2 MG/ML
1 INJECTION INTRAMUSCULAR
Status: DISCONTINUED | OUTPATIENT
Start: 2018-01-11 | End: 2018-01-15

## 2018-01-11 RX ORDER — SERTRALINE HYDROCHLORIDE 50 MG/1
50 TABLET, FILM COATED ORAL DAILY
Status: DISCONTINUED | OUTPATIENT
Start: 2018-01-12 | End: 2018-01-24 | Stop reason: HOSPADM

## 2018-01-11 RX ADMIN — FUROSEMIDE 40 MG: 10 INJECTION, SOLUTION INTRAMUSCULAR; INTRAVENOUS at 09:45

## 2018-01-11 RX ADMIN — WATER 2 G: 1 INJECTION INTRAMUSCULAR; INTRAVENOUS; SUBCUTANEOUS at 11:16

## 2018-01-11 RX ADMIN — SALINE NASAL SPRAY 2 SPRAY: 1.5 SOLUTION NASAL at 18:06

## 2018-01-11 RX ADMIN — SALINE NASAL SPRAY 2 SPRAY: 1.5 SOLUTION NASAL at 22:50

## 2018-01-11 RX ADMIN — HEPARIN SODIUM 5000 UNITS: 5000 INJECTION, SOLUTION INTRAVENOUS; SUBCUTANEOUS at 14:25

## 2018-01-11 RX ADMIN — HEPARIN SODIUM 5000 UNITS: 5000 INJECTION, SOLUTION INTRAVENOUS; SUBCUTANEOUS at 05:15

## 2018-01-11 RX ADMIN — GUAIFENESIN 600 MG: 600 TABLET, EXTENDED RELEASE ORAL at 23:33

## 2018-01-11 RX ADMIN — ALBUTEROL SULFATE 2.5 MG: 2.5 SOLUTION RESPIRATORY (INHALATION) at 08:04

## 2018-01-11 RX ADMIN — CARVEDILOL 3.12 MG: 3.12 TABLET, FILM COATED ORAL at 08:29

## 2018-01-11 RX ADMIN — SALINE NASAL SPRAY 2 SPRAY: 1.5 SOLUTION NASAL at 13:28

## 2018-01-11 RX ADMIN — LISINOPRIL 20 MG: 20 TABLET ORAL at 10:00

## 2018-01-11 RX ADMIN — FLUTICASONE PROPIONATE 2 SPRAY: 50 SPRAY, METERED NASAL at 09:43

## 2018-01-11 RX ADMIN — PIPERACILLIN SODIUM,TAZOBACTAM SODIUM 4.5 G: 4; .5 INJECTION, POWDER, FOR SOLUTION INTRAVENOUS at 09:45

## 2018-01-11 RX ADMIN — CHLORHEXIDINE GLUCONATE 15 ML: 1.2 RINSE ORAL at 22:15

## 2018-01-11 RX ADMIN — ALBUTEROL SULFATE 2.5 MG: 2.5 SOLUTION RESPIRATORY (INHALATION) at 15:21

## 2018-01-11 RX ADMIN — CARVEDILOL 3.12 MG: 3.12 TABLET, FILM COATED ORAL at 16:58

## 2018-01-11 RX ADMIN — Medication 10 ML: at 13:30

## 2018-01-11 RX ADMIN — FAMOTIDINE 20 MG: 10 INJECTION, SOLUTION INTRAVENOUS at 22:50

## 2018-01-11 RX ADMIN — Medication 10 ML: at 05:15

## 2018-01-11 RX ADMIN — ALBUTEROL SULFATE 2.5 MG: 2.5 SOLUTION RESPIRATORY (INHALATION) at 12:09

## 2018-01-11 RX ADMIN — Medication 10 ML: at 22:50

## 2018-01-11 RX ADMIN — HEPARIN SODIUM 5000 UNITS: 5000 INJECTION, SOLUTION INTRAVENOUS; SUBCUTANEOUS at 22:50

## 2018-01-11 RX ADMIN — FUROSEMIDE 40 MG: 10 INJECTION, SOLUTION INTRAMUSCULAR; INTRAVENOUS at 22:50

## 2018-01-11 RX ADMIN — GUAIFENESIN 600 MG: 600 TABLET, EXTENDED RELEASE ORAL at 10:00

## 2018-01-11 RX ADMIN — ALBUTEROL SULFATE 2.5 MG: 2.5 SOLUTION RESPIRATORY (INHALATION) at 19:10

## 2018-01-11 RX ADMIN — CHLORHEXIDINE GLUCONATE 15 ML: 1.2 RINSE ORAL at 09:45

## 2018-01-11 RX ADMIN — ACETAMINOPHEN 650 MG: 325 TABLET ORAL at 23:32

## 2018-01-11 RX ADMIN — FAMOTIDINE 20 MG: 10 INJECTION, SOLUTION INTRAVENOUS at 09:45

## 2018-01-11 RX ADMIN — PIPERACILLIN SODIUM,TAZOBACTAM SODIUM 4.5 G: 4; .5 INJECTION, POWDER, FOR SOLUTION INTRAVENOUS at 01:49

## 2018-01-11 RX ADMIN — VANCOMYCIN HYDROCHLORIDE 1500 MG: 10 INJECTION, POWDER, LYOPHILIZED, FOR SOLUTION INTRAVENOUS at 01:49

## 2018-01-11 NOTE — INTERDISCIPLINARY ROUNDS
Interdisciplinary team rounds were held 1/11/2018 with the following team members:Nursing, Nurse Practitioner, Nutrition, Pharmacy, Physician, Respiratory Therapy and Clinical Coordinator. Plan of care discussed. See clinical pathway and/or care plan for interventions and desired outcomes.

## 2018-01-11 NOTE — PROGRESS NOTES
Critical Care Daily Progress Note: 1/11/2018  Admission Date: 1/6/2018     Patient is a 45 y.o.  male seen and evaluated at the request of Rapid Response/Dr. Germaine Villaseñor.     Patient just admitted today with acute hypoxemic respiratory failure, with methamphetamine positive, smoker, morbid obesity, suspected NATALIIA. Was on Telemetry and being diuresed. Patient in room foaming in mouth, combative and disoriented. He was being aggressively bagged in Room and when I came noted to be moving around and eyes open. I took off Ambu bag and reported names [employer] but disoriented. Shortly after admission to ICU on BIPAP had to be intubated on 1/7/18    Patient had CT sinus and CT head done and marked pansinusitis noted. Discussed with ENT since ? CSF leak and he disagrees based on CT sinus. Off vent on 1/10    The patient's chart is reviewed and the patient is discussed with the staff. Subjective:     Now on 8lpm with O2 sat of 94%. Coughing up purulent secretions  No leukocytosis.   Hemodynamically stable    Review of Systems:  -Fever  -Headaches +sinus drainage  -Chest pain  -Dyspnea, -wheezing,- cough  -Abdominal pain-, constipation  +slight Leg swelling  +sputum production      Current Facility-Administered Medications   Medication Dose Route Frequency    albuterol (PROVENTIL VENTOLIN) nebulizer solution 2.5 mg  2.5 mg Nebulization QID RT    sodium chloride (OCEAN) 0.65 % nasal spray 2 Spray  2 Spray Both Nostrils QID    sodium chloride (OCEAN) 0.65 % nasal spray 2 Spray  2 Spray Both Nostrils Q2H PRN    fluticasone (FLONASE) 50 mcg/actuation nasal spray 2 Spray  2 Spray Both Nostrils DAILY    piperacillin-tazobactam (ZOSYN) 4.5 g in 0.9% sodium chloride (MBP/ADV) 100 mL  4.5 g IntraVENous Q8H    vancomycin (VANCOCIN) 1500 mg in  ml infusion  1,500 mg IntraVENous Q12H    influenza vaccine 2017-18 (3 yrs+)(PF) (FLUZONE QUAD/FLUARIX QUAD) injection 0.5 mL  0.5 mL IntraMUSCular PRIOR TO DISCHARGE    famotidine (PF) (PEPCID) 20 mg in sodium chloride 0.9 % 10 mL injection  20 mg IntraVENous Q12H    NUTRITIONAL SUPPORT ELECTROLYTE PRN ORDERS   Does Not Apply PRN    nicotine (NICODERM CQ) 21 mg/24 hr patch 1 Patch  1 Patch TransDERmal DAILY    metoprolol (LOPRESSOR) injection 5 mg  5 mg IntraVENous Q4H PRN    hydrALAZINE (APRESOLINE) 20 mg/mL injection 20 mg  20 mg IntraVENous Q6H PRN    chlorhexidine (PERIDEX) 0.12 % mouthwash 15 mL  15 mL Oral Q12H    sodium chloride (NS) flush 5-10 mL  5-10 mL IntraVENous Q8H    sodium chloride (NS) flush 5-10 mL  5-10 mL IntraVENous PRN    lisinopril (PRINIVIL, ZESTRIL) tablet 20 mg  20 mg Oral DAILY    carvedilol (COREG) tablet 3.125 mg  3.125 mg Oral BID WITH MEALS    ondansetron (ZOFRAN) injection 4 mg  4 mg IntraVENous Q6H PRN    acetaminophen (TYLENOL) tablet 650 mg  650 mg Oral Q6H PRN    furosemide (LASIX) injection 40 mg  40 mg IntraVENous Q12H    diphenhydrAMINE (BENADRYL) capsule 25 mg  25 mg Oral Q4H PRN    polyethylene glycol (MIRALAX) packet 17 g  17 g Oral DAILY PRN    guaiFENesin ER (MUCINEX) tablet 600 mg  600 mg Oral Q12H PRN    calcium carbonate (TUMS) chewable tablet 400 mg [elemental]  400 mg Oral TID PRN    heparin (porcine) injection 5,000 Units  5,000 Units SubCUTAneous Q8H                  Objective:     Vitals:    01/11/18 0733 01/11/18 0803 01/11/18 0804 01/11/18 0833   BP: 130/75 100/63  130/78   Pulse: 84 79  84   Resp: 19 20  20   Temp:       SpO2: 92% 93% 95% 96%   Weight:       Height:           Intake and Output:   01/09 1901 - 01/11 0700  In: 3293.6 [P.O.:480; I.V.:2038.6]  Out: 4370 [Urine:4370]       Physical Exam:          Constitutional:  Obese WM in bed and in no distress on 5 LPM  EENMT:  Sclera clear, pupils equal, oral mucosa moist, no nasal drainage today. Respiratory:  Clear but distant sounds b/l.  NO wheezing  Cardiovascular:  RRR with no M,G,R;  Gastrointestinal:  soft with no tenderness; positive bowel sounds present  Musculoskeletal:  warm with no cyanosis, 1+ lower leg edema  Skin:  no jaundice or ecchymosis  Neurologic: no gross focal deficits  Psychiatric:  Calm and answering questions    LINES:    central line, alegre     DRIPS:   none    CXR:  Infiltrates in the lower lobes Right              Ventilator Settings  Mode FIO2 Rate Tidal Volume Pressure PEEP   Pressure support  40 %    500 ml  10 cm H2O  10 cm H20      Peak airway pressure: 20.1 cm H2O   Minute ventilation: 9 l/min     ABG:   Recent Labs      01/10/18   0325  01/09/18   0228   PH  7.39  7.45   PCO2  60*  57*   PO2  74*  77*   HCO3  36*  39*        LAB  No results for input(s): GLUCPOC in the last 72 hours. No lab exists for component: [place]  Recent Labs      01/11/18   0508  01/10/18   0345  01/09/18   1128   WBC  7.1  7.2  8.8   HGB  13.7  13.0*  13.5*   HCT  45.3  42.1  43.5   PLT  162  157  181     Recent Labs      01/11/18   0508  01/10/18   0345  01/09/18   0319   NA  144  143  143   K  4.0  3.6  3.6   CL  102  100  98   CO2  39*  39*  40*   GLU  108*  128*  108*   BUN  29*  36*  28*   CREA  1.41  1.71*  1.52*   MG   --   2.3  1.6*   PHOS   --   4.0  3.2   CA  8.6  8.3  8.5     No results for input(s): LCAD, LAC in the last 72 hours. Assessment:  (Medical Decision Making)     Hospital Problems  Never Reviewed          Codes Class Noted POA    Severe sepsis ([facility] [address] ICD-10-CM: A41.9, R65.20  ICD-9-CM: 038.9, 995.92  1/9/2018 Unknown    Likely from acute on chronic sinusitis and pneumonia. Community acquired pneumonia of right lower lobe of lung ([facility] [address] ICD-10-CM: J18.1  ICD-9-CM: 782  1/9/2018 Unknown    --likely from sinus drainage  --has h.  Flu and strep pneumo on culture  --taper to ceftriaxone from zosyn started 1/9 (day 3)    Acute metabolic encephalopathy IJW-12-TW: G93.41  ICD-9-CM: 348.31  1/7/2018 Yes    resolved    Hypotension ICD-10-CM: I95.9  ICD-9-CM: 458.9  1/7/2018 Yes    Off pressors    Urethral stricture ICD-10-CM: N35.9  ICD-9-CM: 598.9  1/7/2018 Yes    Has alegre placed by urology    COPD (chronic obstructive pulmonary disease) ([facility] [address] (Chronic) ICD-10-CM: J44.9  ICD-9-CM: 970  1/6/2018 Yes    Likely and smokes about 1.5 PPD and uses drugs    Nicotine dependence (Chronic) ICD-10-CM: F17.200  ICD-9-CM: 305.1  1/6/2018 Yes    Nicotine patch    Methamphetamine abuse ICD-10-CM: F15.10  ICD-9-CM: 305.70  1/6/2018 Yes    See below    Anasarca ICD-10-CM: R60.1  ICD-9-CM: 782.3  1/6/2018 Yes    On lasix. Continue and check bnp    NATALIIA (obstructive sleep apnea) (Chronic) ICD-10-CM: G47.33  ICD-9-CM: 327.23  1/6/2018 Yes    Does not have a CPAP at home but clearly with likely OHS vs NATALIIA, see below    Acute kidney injury [facility]) ICD-10-CM: N17.9  ICD-9-CM: 584.9  1/6/2018 Yes    Cr in 1.7 range    Acute on chronic respiratory failure with hypoxia and hypercapnia (HCC) ICD-10-CM: J96.21, J96.22  ICD-9-CM: 518.84, 786.09, 799.02  1/6/2018 Yes    Still on  8LPM today          Plan:  (Medical Decision Making)     --taper oxygen since does not use at home. Keep SAT > 90%  --continue day 2/14 of antibiotics. Narrowed to ceftriaxone today  --nicotine patch  --needs to quit smoking and using drugs -- we had a long discussion about this today for over 15 minutes. --continue scheduled bronchodilators, flutter valve  --continue lasix and check BNP  --keep alegre in place till discharge  --speech and start PO intake  --will place on BIPAP on AVAPS mode QHS to see if able to tolerate. If so may need triology machine  --hospitalist to resume care tomorrow if O2 weaning and stable for transfer to floor. More than 50% of the time documented was spent in face-to-face contact with the patient and in the care of the patient on the floor/unit where the patient is located.     Rhett Hodge MD

## 2018-01-11 NOTE — PROGRESS NOTES
BIPAP with air leak. Respiratory therapist notified. No s/sx of distress at this time. Denies needs.

## 2018-01-11 NOTE — PROGRESS NOTES
Problem: Self Care Deficits Care Plan (Adult)  Goal: *Acute Goals and Plan of Care (Insert Text)  1. Patient will complete lower body bathing and dressing with supervision and adaptive equipment as needed. 2. Patient will complete toileting with supervision. 3. Patient will tolerate 30 minutes of OT treatment with minimal rest breaks to increase activity tolerance for ADLs. 4. Patient will complete functional transfers with supervision and adaptive equipment as needed. Timeframe: 7 visits       OCCUPATIONAL THERAPY: Initial Assessment and AM 1/11/2018  INPATIENT: Hospital Day: 6  Payor: SELF PAY / Plan: [facility] SELF PAY / Product Type: Self Pay /      NAME/AGE/GENDER: Michelle Barnes is a 45 y.o. male   PRIMARY DIAGNOSIS:  COPD (chronic obstructive pulmonary disease) (HCC) Acute on chronic respiratory failure with hypoxia and hypercapnia (HCC) Acute on chronic respiratory failure with hypoxia and hypercapnia (HCC)        ICD-10: Treatment Diagnosis:    · Generalized Muscle Weakness (M62.81)  · Other lack of cordination (R27.8)  · History of falling (Z91.81)   Precautions/Allergies:    Review of patient's allergies indicates no known allergies. ASSESSMENT:     Mr. Marisa Tran presents to the hospital with acute respiratory failure and COPD. Patient was alert and pleasant upon arrival. Pt is agreeable to activity out of bed. Pt is currently on optiflow. Pt reports that he and his wife live in mobile home. Pt admits he and his wife are not in the best health (she is disabled as well). Pt tearful when reporting about his social situation stating \"its embarrassing\" and \"we are just barely making it! \" Pt has a cane that he uses for functional mobility and needs assistance some with lower body dressing due to hx or R hip injury. Pt perseverating on old hip injury and reports he feels like it needs to be x-rayed again. Pt sat to the edge of bed with minimal assistance.  Pt stood at the walker with SBA and needed CGA and assistance with transfer to chair and management of lines. Pt's vitals remained stable throughout. Pt appears comfortable up in the chair. Pt cooperative throughout and appears motivated to work with therapy. Pt is currently functioning below baseline for ADL/functional transfers and will benefit from OT services to address stated goals and plan of care. This section established at most recent assessment   PROBLEM LIST (Impairments causing functional limitations):  1. Decreased Strength  2. Decreased ADL/Functional Activities  3. Decreased Transfer Abilities  4. Decreased Ambulation Ability/Technique  5. Decreased Balance  6. Decreased Activity Tolerance  7. Decreased Work Simplification/Energy Conservation Techniques  8. Increased Shortness of Breath  9. Decreased Flexibility/Joint Mobility  10. Decreased [place] with Home Exercise Program   INTERVENTIONS PLANNED: (Benefits and precautions of occupational therapy have been discussed with the patient.)  1. Activities of daily living training  2. Adaptive equipment training  3. Balance training  4. Clothing management  5. Donning&doffing training  6. Group therapy  7. Neuromuscular re-eduation  8. Therapeutic activity  9. Therapeutic exercise     TREATMENT PLAN: Frequency/Duration: Follow patient 3 times per week to address above goals. Rehabilitation Potential For Stated Goals: Excellent     RECOMMENDED REHABILITATION/EQUIPMENT: (at time of discharge pending progress): Due to the probability of continued deficits (see above) this patient will likely need continued skilled occupational therapy after discharge. Equipment:    TBD              OCCUPATIONAL PROFILE AND HISTORY:   History of Present Injury/Illness (Reason for Referral):  See H&P  Past Medical History/Comorbidities:   Mr. Jenny Solis  has a past medical history of Sleep disorder. Mr. Jenny Solis  has no past surgical history on file.   Social History/Living Environment:   Home Environment: Trailer/mobile home  # Steps to Enter: 1  One/Two Story Residence: One story  Living Alone: No  Support Systems: Child(suzette), Family member(s), Spouse/Significant Other/Partner  Patient Expects to be Discharged to[de-identified] Private residence  Current DME Used/Available at Home: Cane, straight  Tub or Shower Type: Tub/Shower combination  Prior Level of Function/Work/Activity:  Pt lives at home with his wife. Pt reports he is not able to work due to pain in his R hip. Pt completes functional mobility with cane and gets occasional assistance for lower body dressing (assistance to don socks)  Personal Factors:          Social Background:  Lives with wife who is also disabled        Past/Current Experience:  Hx of falls; hx of drug abuse         Overall Behavior:  Tearful         Other factors that influence how disability is experienced by the patient:  Multiple co-morbidities    Number of Personal Factors/Comorbidities that affect the Plan of Care: Extensive review of physical, cognitive, and psychosocial performance (3+):  HIGH COMPLEXITY   ASSESSMENT OF OCCUPATIONAL PERFORMANCE[de-identified]   Activities of Daily Living:           Basic ADLs (From Assessment) Complex ADLs (From Assessment)   Basic ADL  Feeding: Setup  Oral Facial Hygiene/Grooming: Minimum assistance  Bathing: Moderate assistance  Upper Body Dressing: Minimum assistance  Lower Body Dressing: Moderate assistance  Toileting: Total assistance (alegre) Instrumental ADL  Meal Preparation: Total assistance  Homemaking: Total assistance   Grooming/Bathing/Dressing Activities of Daily Living     Cognitive Retraining  Safety/Judgement: Fall prevention;Home safety                 Functional Transfers  Toilet Transfer : Minimum assistance  Tub Transfer:  Moderate assistance  Shower Transfer: Minimum assistance     Bed/Mat Mobility  Supine to Sit: Minimum assistance  Sit to Stand: Stand-by asssistance  Bed to Chair: Contact guard assistance  Scooting: Contact guard assistance Most Recent Physical Functioning:   Gross Assessment:  AROM: Generally decreased, functional (B UE)  Strength: Generally decreased, functional (B UE)  Coordination: Generally decreased, functional (B UE)               Posture:  Posture (WDL): Exceptions to WDL  Posture Assessment: Forward head, Rounded shoulders  Balance:  Sitting: Impaired  Sitting - Static: Good (unsupported)  Sitting - Dynamic: Fair (occasional)  Standing: Impaired  Standing - Static: Fair  Standing - Dynamic : Fair Bed Mobility:  Supine to Sit: Minimum assistance  Scooting: Contact guard assistance  Wheelchair Mobility:     Transfers:  Sit to Stand: Stand-by asssistance  Stand to Sit: Stand-by asssistance  Bed to Chair: Contact guard assistance              Patient Vitals for the past 6 hrs:   BP SpO2 O2 Flow Rate (L/min) Pulse   18 0803 100/63 93 % 8 l/min 79   18 0804 - 95 % 8 l/min -   18 0833 130/78 96 % - 84   18 0902 128/76 91 % - 85   18 0933 116/72 90 % - 84   18 1003 107/72 93 % - 81   18 1032 105/60 96 % - 83   18 1102 126/71 93 % - 83   18 1119 - 95 % 50 l/min -   18 1133 123/80 94 % - 84   18 1202 161/90 100 % - 85   18 1209 - 98 % 50 l/min -   18 1233 125/61 97 % - 86   18 1333 114/62 98 % - 89       Mental Status  Neurologic State: Alert  Orientation Level: Oriented X4  Cognition: Appropriate for age attention/concentration, Follows commands  Perception: Appears intact  Perseveration: No perseveration noted  Safety/Judgement: Fall prevention, Home safety                          Physical Skills Involved:  1. Balance  2. Strength  3. Activity Tolerance  4. Pain (Chronic) Cognitive Skills Affected (resulting in the inability to perform in a timely and safe manner):  1. [facility] Psychosocial Skills Affected:  1.  Habits/Routines   Number of elements that affect the Plan of Care: 3-5:  MODERATE COMPLEXITY   CLINICAL DECISION MAKIN[address] [place] [id number] AM-PAC 6 Clicks   Daily Activity Inpatient Short Form  How much help from another person does the patient currently need. .. Total A Lot A Little None   1. Putting on and taking off regular lower body clothing? [] 1   [x] 2   [] 3   [] 4   2. Bathing (including washing, rinsing, drying)? [] 1   [x] 2   [] 3   [] 4   3. Toileting, which includes using toilet, bedpan or urinal?   [x] 1   [] 2   [] 3   [] 4   4. Putting on and taking off regular upper body clothing? [] 1   [] 2   [x] 3   [] 4   5. Taking care of personal grooming such as brushing teeth? [] 1   [] 2   [x] 3   [] 4   6. Eating meals? [] 1   [] 2   [x] 3   [] 4   © 2007, Trustees of [address] [place] [address] [address] [address], under license to [employer]. All rights reserved      Score:  Initial: 14 Most Recent: X (Date: -- )    Interpretation of Tool:  Represents activities that are increasingly more difficult (i.e. Bed mobility, Transfers, Gait). Score 24 23 22-20 19-15 14-10 9-7 6     Modifier CH CI CJ CK CL CM CN      ? Self Care:     - CURRENT STATUS: CL - 60%-79% impaired, limited or restricted    - GOAL STATUS: CJ - 20%-39% impaired, limited or restricted    - D/C STATUS:  ---------------To be determined---------------  Payor: SELF PAY / Plan: [facility] SELF PAY / Product Type: Self Pay /      Medical Necessity:     · Patient demonstrates good rehab potential due to higher previous functional level. Reason for Services/Other Comments:  · Patient continues to require skilled intervention due to decreased independence with ADL/functional transfers.    Use of outcome tool(s) and clinical judgement create a POC that gives a: LOW COMPLEXITY         TREATMENT:   (In addition to Assessment/Re-Assessment sessions the following treatments were rendered)     Pre-treatment Symptoms/Complaints:    Pain: Initial:   Pain Intensity 1: 0  Post Session:  0/10     Assessment/Reassessment only, no treatment provided today    Braces/Orthotics/Lines/Etc: · IV  · alegre catheter  · O2 Device: Heated, Hi flow nasal cannula  Treatment/Session Assessment:    · Response to Treatment:  Evaluation only. · Interdisciplinary Collaboration:   o Occupational Therapist  o Registered Nurse  · After treatment position/precautions:   o Up in chair  o Bed/Chair-wheels locked  o Call light within reach  o RN notified (states no alarm needed because pt calls for assistance)  · Compliance with Program/Exercises: Will assess as treatment progresses. · Recommendations/Intent for next treatment session: \"Next visit will focus on advancements to more challenging activities and reduction in assistance provided\".   Total Treatment Duration:  OT Patient Time In/Time Out  Time In: 1125  Time Out: [facility], OT

## 2018-01-11 NOTE — PROGRESS NOTES
Bedside shift change report given to Bard Ba RN (oncoming nurse) by Cora Kumari RN (offgoing nurse). Report included the following information Kardex, Intake/Output, MAR, Recent Results, Med Rec Status and Cardiac Rhythm sr. Alert and oriented x4 on 10 L high flow humidified NC. Coughing up large amounts of yellow secretions. Incentive spirometer given to patient and taught, returned correct demonstration. Galindo, patent. Tolerating po intake. Left neck central line, no s/sx of infection observed.

## 2018-01-11 NOTE — PROGRESS NOTES
Sputum culture pending sent to lab. Blood cultures drawn by phlebotomist.   placed on BIPAP by respiratory therapist. Discussed purpose and expected outcome of BIPAP use. Questions answered. Cooperative with BIPAP placement.

## 2018-01-11 NOTE — PROGRESS NOTES
Problem: Nutrition Deficit  Goal: *Optimize nutritional status  Nutrition F/U:  Assessment:  Weight 130.5 kg (CCU bed 1/11/18), edema - trace BLEs. The patient was extubated 1/10/18 and the FT was removed at the same time. His oral intake has resumed and he consumes everything on his tray. He reports that he has no food allergies and eats all foods. No difficulty chewing or swallowing reported. Labs are unremarkable. Macronutrient Needs:  EER: 7934-5736 kcal /day (25-30 kcal/kg IBW-as current weight not a dry weight and UBW accuracy is questionable)   EPR: 56-84 grams protein/day (0.8-1.2 grams/kg IBW)   Intake/Comparative Standards: This patient's average meal intake of cardiac diet for the past 1 recorded days/2 meals: 100%. This potentially meets 100% of calorie and 100% of protein needs. Intervention:   Meals and Snacks: Cardiac as tolerated. Mineral Supplement Therapy: Nutrition Support Orders/Electrolyte Management Replacement Protocols are active on the MAR. Coordination of Nutrition Care by a Nutrition Professional: AM CCU rounds, collaboration with Carito Goodwin RN. Nutrition Discharge Plan: Too soon to determine. Vandana Peña.  [facility]  [phone]

## 2018-01-11 NOTE — PROGRESS NOTES
Bedside shift change report given to Jaclyn WOLF (oncoming nurse) by Lorraine Solis RN (offgoing nurse). Report included the following information Kardex, Intake/Output, MAR, Recent Results, Med Rec Status and Cardiac Rhythm SR. Alert and oriented x4 on BIPAP. Self care requiring one person assist up to chair during the day. Using incentive spirometer correctly while awake. Left neck central line dressing loose around edges appears to be due to beard growth. Primary RN to change after BIPAP removed this am.  Galindo patent. Skin intact. No DTIs/No pressure ulcers observed.

## 2018-01-11 NOTE — PROGRESS NOTES
Patient crying and in emotional distress. Patient expressed significant worry concerning ability to address health concerns post discharge due to lack of resources. Patient expressed suicidal thoughts. Emotional support provided by primary RN. Orders received for tele psych consult.

## 2018-01-11 NOTE — PROGRESS NOTES
Dr. Joel Manuel notified temperature 101.1 orally s/p extubation today. Order obtained for blood cultures x2.

## 2018-01-11 NOTE — PROGRESS NOTES
Removed BIPAP mask stating,\"It got to be a little too much for me. I just need a minute off of it. \"  Face wiped and sips of water given. Mr. Evin Grider blew his nose and suctioned his mouth and asked for BIPAP to be placed back on his face. Tolerating and cooperative.

## 2018-01-12 ENCOUNTER — APPOINTMENT (OUTPATIENT)
Dept: GENERAL RADIOLOGY | Age: 53
DRG: 871 | End: 2018-01-12
Attending: INTERNAL MEDICINE
Payer: SELF-PAY

## 2018-01-12 LAB
AMPHET UR QL SCN: NEGATIVE
ANION GAP SERPL CALC-SCNC: 6 MMOL/L (ref 7–16)
ARTERIAL PATENCY WRIST A: POSITIVE
BACTERIA SPEC CULT: ABNORMAL
BARBITURATES UR QL SCN: NEGATIVE
BASE EXCESS BLDA CALC-SCNC: 11.6 MMOL/L (ref 0–3)
BASE EXCESS BLDA CALC-SCNC: 5.4 MMOL/L (ref 0–3)
BASE EXCESS BLDA CALC-SCNC: 8.6 MMOL/L (ref 0–3)
BASOPHILS # BLD: 0 K/UL (ref 0–0.2)
BASOPHILS NFR BLD: 0 % (ref 0–2)
BDY SITE: ABNORMAL
BENZODIAZ UR QL: POSITIVE
BNP SERPL-MCNC: 168 PG/ML
BUN SERPL-MCNC: 26 MG/DL (ref 6–23)
CALCIUM SERPL-MCNC: 8.7 MG/DL (ref 8.3–10.4)
CANNABINOIDS UR QL SCN: NEGATIVE
CHLORIDE SERPL-SCNC: 99 MMOL/L (ref 98–107)
CO2 SERPL-SCNC: 38 MMOL/L (ref 21–32)
COCAINE UR QL SCN: NEGATIVE
COHGB MFR BLD: 0.9 % (ref 0.5–1.5)
COHGB MFR BLD: 1.7 % (ref 0.5–1.5)
COHGB MFR BLD: 1.9 % (ref 0.5–1.5)
CREAT SERPL-MCNC: 1.41 MG/DL (ref 0.8–1.5)
DIFFERENTIAL METHOD BLD: ABNORMAL
DO-HGB BLD-MCNC: 10 % (ref 0–5)
DO-HGB BLD-MCNC: 4 % (ref 0–5)
DO-HGB BLD-MCNC: 5 % (ref 0–5)
EOSINOPHIL # BLD: 0.3 K/UL (ref 0–0.8)
EOSINOPHIL NFR BLD: 4 % (ref 0.5–7.8)
ERYTHROCYTE [DISTWIDTH] IN BLOOD BY AUTOMATED COUNT: 13.6 % (ref 11.9–14.6)
FIO2 ON VENT: 45 %
FIO2 ON VENT: 45 %
GLUCOSE SERPL-MCNC: 115 MG/DL (ref 65–100)
GRAM STN SPEC: ABNORMAL
HCO3 BLDA-SCNC: 37 MMOL/L (ref 22–26)
HCO3 BLDA-SCNC: 38 MMOL/L (ref 22–26)
HCO3 BLDA-SCNC: 46 MMOL/L (ref 22–26)
HCT VFR BLD AUTO: 48.3 % (ref 41.1–50.3)
HGB BLD-MCNC: 14.8 G/DL (ref 13.6–17.2)
HGB BLDMV-MCNC: 15.2 GM/DL (ref 11.7–15)
HGB BLDMV-MCNC: 15.8 GM/DL (ref 11.7–15)
HGB BLDMV-MCNC: 16 GM/DL (ref 11.7–15)
IMM GRANULOCYTES # BLD: 0 K/UL (ref 0–0.5)
IMM GRANULOCYTES NFR BLD AUTO: 0 % (ref 0–5)
LYMPHOCYTES # BLD: 1 K/UL (ref 0.5–4.6)
LYMPHOCYTES NFR BLD: 12 % (ref 13–44)
MAGNESIUM SERPL-MCNC: 2.2 MG/DL (ref 1.8–2.4)
MCH RBC QN AUTO: 33.3 PG (ref 26.1–32.9)
MCHC RBC AUTO-ENTMCNC: 30.6 G/DL (ref 31.4–35)
MCV RBC AUTO: 108.5 FL (ref 79.6–97.8)
METHADONE UR QL: NEGATIVE
METHGB MFR BLD: 0.3 % (ref 0–1.5)
METHGB MFR BLD: 0.4 % (ref 0–1.5)
METHGB MFR BLD: 0.4 % (ref 0–1.5)
MONOCYTES # BLD: 0.8 K/UL (ref 0.1–1.3)
MONOCYTES NFR BLD: 10 % (ref 4–12)
NEUTS SEG # BLD: 5.9 K/UL (ref 1.7–8.2)
NEUTS SEG NFR BLD: 74 % (ref 43–78)
OPIATES UR QL: NEGATIVE
OXYHGB MFR BLDA: 87.4 % (ref 94–97)
OXYHGB MFR BLDA: 93.3 % (ref 94–97)
OXYHGB MFR BLDA: 93.7 % (ref 94–97)
PCO2 BLDA: 126 MMHG (ref 35–45)
PCO2 BLDA: 66 MMHG (ref 35–45)
PCO2 BLDA: 95 MMHG (ref 35–45)
PCP UR QL: NEGATIVE
PEEP RESPIRATORY: 8 CM[H2O]
PH BLDA: 7.18 [PH] (ref 7.35–7.45)
PH BLDA: 7.22 [PH] (ref 7.35–7.45)
PH BLDA: 7.36 [PH] (ref 7.35–7.45)
PHOSPHATE SERPL-MCNC: 3.3 MG/DL (ref 2.5–4.5)
PLATELET # BLD AUTO: 163 K/UL (ref 150–450)
PMV BLD AUTO: 10.3 FL (ref 10.8–14.1)
PO2 BLDA: 54 MMHG (ref 80–105)
PO2 BLDA: 85 MMHG (ref 80–105)
PO2 BLDA: 86 MMHG (ref 80–105)
POTASSIUM SERPL-SCNC: 3.9 MMOL/L (ref 3.5–5.1)
RBC # BLD AUTO: 4.45 M/UL (ref 4.23–5.67)
RESP RATE: 20
RESP RATE: 22
SAO2 % BLD: 89 % (ref 92–98.5)
SAO2 % BLD: 95 % (ref 92–98.5)
SAO2 % BLD: 96 % (ref 92–98.5)
SERVICE CMNT-IMP: ABNORMAL
SODIUM SERPL-SCNC: 143 MMOL/L (ref 136–145)
VENTILATION MODE VENT: ABNORMAL
VT SETTING VENT: 500 ML
WBC # BLD AUTO: 8.1 K/UL (ref 4.3–11.1)

## 2018-01-12 PROCEDURE — 31500 INSERT EMERGENCY AIRWAY: CPT | Performed by: INTERNAL MEDICINE

## 2018-01-12 PROCEDURE — 74011250636 HC RX REV CODE- 250/636: Performed by: INTERNAL MEDICINE

## 2018-01-12 PROCEDURE — 77030034848

## 2018-01-12 PROCEDURE — 74011000250 HC RX REV CODE- 250

## 2018-01-12 PROCEDURE — 82803 BLOOD GASES ANY COMBINATION: CPT

## 2018-01-12 PROCEDURE — 74011250636 HC RX REV CODE- 250/636: Performed by: FAMILY MEDICINE

## 2018-01-12 PROCEDURE — 36592 COLLECT BLOOD FROM PICC: CPT

## 2018-01-12 PROCEDURE — 71045 X-RAY EXAM CHEST 1 VIEW: CPT

## 2018-01-12 PROCEDURE — 99233 SBSQ HOSP IP/OBS HIGH 50: CPT | Performed by: INTERNAL MEDICINE

## 2018-01-12 PROCEDURE — 83735 ASSAY OF MAGNESIUM: CPT | Performed by: INTERNAL MEDICINE

## 2018-01-12 PROCEDURE — 74011000250 HC RX REV CODE- 250: Performed by: INTERNAL MEDICINE

## 2018-01-12 PROCEDURE — 74011000258 HC RX REV CODE- 258: Performed by: INTERNAL MEDICINE

## 2018-01-12 PROCEDURE — 74011250636 HC RX REV CODE- 250/636: Performed by: NURSE PRACTITIONER

## 2018-01-12 PROCEDURE — 94660 CPAP INITIATION&MGMT: CPT

## 2018-01-12 PROCEDURE — 0BH18EZ INSERTION OF ENDOTRACHEAL AIRWAY INTO TRACHEA, VIA NATURAL OR ARTIFICIAL OPENING ENDOSCOPIC: ICD-10-PCS | Performed by: INTERNAL MEDICINE

## 2018-01-12 PROCEDURE — 65620000000 HC RM CCU GENERAL

## 2018-01-12 PROCEDURE — 5A1945Z RESPIRATORY VENTILATION, 24-96 CONSECUTIVE HOURS: ICD-10-PCS | Performed by: INTERNAL MEDICINE

## 2018-01-12 PROCEDURE — 85025 COMPLETE CBC W/AUTO DIFF WBC: CPT | Performed by: INTERNAL MEDICINE

## 2018-01-12 PROCEDURE — 80307 DRUG TEST PRSMV CHEM ANLYZR: CPT | Performed by: INTERNAL MEDICINE

## 2018-01-12 PROCEDURE — 36600 WITHDRAWAL OF ARTERIAL BLOOD: CPT

## 2018-01-12 PROCEDURE — 94640 AIRWAY INHALATION TREATMENT: CPT

## 2018-01-12 PROCEDURE — 74011250637 HC RX REV CODE- 250/637: Performed by: INTERNAL MEDICINE

## 2018-01-12 PROCEDURE — 74011250636 HC RX REV CODE- 250/636

## 2018-01-12 PROCEDURE — 83880 ASSAY OF NATRIURETIC PEPTIDE: CPT | Performed by: INTERNAL MEDICINE

## 2018-01-12 PROCEDURE — 94002 VENT MGMT INPAT INIT DAY: CPT

## 2018-01-12 PROCEDURE — 84100 ASSAY OF PHOSPHORUS: CPT | Performed by: INTERNAL MEDICINE

## 2018-01-12 PROCEDURE — 74011250637 HC RX REV CODE- 250/637: Performed by: FAMILY MEDICINE

## 2018-01-12 PROCEDURE — 80048 BASIC METABOLIC PNL TOTAL CA: CPT | Performed by: INTERNAL MEDICINE

## 2018-01-12 RX ORDER — FLUMAZENIL 0.1 MG/ML
0.2 INJECTION INTRAVENOUS ONCE
Status: COMPLETED | OUTPATIENT
Start: 2018-01-12 | End: 2018-01-12

## 2018-01-12 RX ORDER — ETOMIDATE 2 MG/ML
INJECTION INTRAVENOUS
Status: COMPLETED
Start: 2018-01-12 | End: 2018-01-12

## 2018-01-12 RX ORDER — DEXMEDETOMIDINE HYDROCHLORIDE 4 UG/ML
.2-.7 INJECTION, SOLUTION INTRAVENOUS
Status: DISCONTINUED | OUTPATIENT
Start: 2018-01-12 | End: 2018-01-12 | Stop reason: SDUPTHER

## 2018-01-12 RX ORDER — FENTANYL CITRATE 50 UG/ML
50 INJECTION, SOLUTION INTRAMUSCULAR; INTRAVENOUS ONCE
Status: COMPLETED | OUTPATIENT
Start: 2018-01-12 | End: 2018-01-12

## 2018-01-12 RX ORDER — LORAZEPAM 0.5 MG/1
0.5 TABLET ORAL
Status: DISCONTINUED | OUTPATIENT
Start: 2018-01-12 | End: 2018-01-24 | Stop reason: HOSPADM

## 2018-01-12 RX ORDER — ETOMIDATE 2 MG/ML
20 INJECTION INTRAVENOUS ONCE
Status: COMPLETED | OUTPATIENT
Start: 2018-01-12 | End: 2018-01-12

## 2018-01-12 RX ORDER — FENTANYL CITRATE 50 UG/ML
INJECTION, SOLUTION INTRAMUSCULAR; INTRAVENOUS
Status: COMPLETED
Start: 2018-01-12 | End: 2018-01-12

## 2018-01-12 RX ORDER — NOREPINEPHRINE BITARTRATE/D5W 4MG/250ML
PLASTIC BAG, INJECTION (ML) INTRAVENOUS
Status: COMPLETED
Start: 2018-01-12 | End: 2018-01-12

## 2018-01-12 RX ORDER — NOREPINEPHRINE BITARTRATE/D5W 4MG/250ML
2-16 PLASTIC BAG, INJECTION (ML) INTRAVENOUS
Status: DISCONTINUED | OUTPATIENT
Start: 2018-01-12 | End: 2018-01-15

## 2018-01-12 RX ORDER — HALOPERIDOL 5 MG/ML
5 INJECTION INTRAMUSCULAR
Status: DISCONTINUED | OUTPATIENT
Start: 2018-01-12 | End: 2018-01-24 | Stop reason: HOSPADM

## 2018-01-12 RX ORDER — NALOXONE HYDROCHLORIDE 0.4 MG/ML
INJECTION, SOLUTION INTRAMUSCULAR; INTRAVENOUS; SUBCUTANEOUS
Status: COMPLETED
Start: 2018-01-12 | End: 2018-01-12

## 2018-01-12 RX ORDER — HALOPERIDOL 5 MG/ML
5 INJECTION INTRAMUSCULAR ONCE
Status: COMPLETED | OUTPATIENT
Start: 2018-01-12 | End: 2018-01-12

## 2018-01-12 RX ORDER — NALOXONE HYDROCHLORIDE 0.4 MG/ML
0.4 INJECTION, SOLUTION INTRAMUSCULAR; INTRAVENOUS; SUBCUTANEOUS ONCE
Status: COMPLETED | OUTPATIENT
Start: 2018-01-12 | End: 2018-01-12

## 2018-01-12 RX ADMIN — CHLORHEXIDINE GLUCONATE 15 ML: 1.2 RINSE ORAL at 21:28

## 2018-01-12 RX ADMIN — ALBUTEROL SULFATE 2.5 MG: 2.5 SOLUTION RESPIRATORY (INHALATION) at 08:47

## 2018-01-12 RX ADMIN — ALBUTEROL SULFATE 2.5 MG: 2.5 SOLUTION RESPIRATORY (INHALATION) at 11:40

## 2018-01-12 RX ADMIN — ONDANSETRON 4 MG: 2 INJECTION INTRAMUSCULAR; INTRAVENOUS at 01:31

## 2018-01-12 RX ADMIN — CHLORHEXIDINE GLUCONATE 15 ML: 1.2 RINSE ORAL at 14:05

## 2018-01-12 RX ADMIN — FAMOTIDINE 20 MG: 10 INJECTION, SOLUTION INTRAVENOUS at 09:23

## 2018-01-12 RX ADMIN — ETOMIDATE 20 MG: 2 INJECTION INTRAVENOUS at 13:20

## 2018-01-12 RX ADMIN — SODIUM CHLORIDE 12.5 MG: 9 INJECTION INTRAMUSCULAR; INTRAVENOUS; SUBCUTANEOUS at 04:10

## 2018-01-12 RX ADMIN — FAMOTIDINE 20 MG: 10 INJECTION, SOLUTION INTRAVENOUS at 21:28

## 2018-01-12 RX ADMIN — WATER 2 G: 1 INJECTION INTRAMUSCULAR; INTRAVENOUS; SUBCUTANEOUS at 12:05

## 2018-01-12 RX ADMIN — NALOXONE HYDROCHLORIDE 0.4 MG: 0.4 INJECTION, SOLUTION INTRAMUSCULAR; INTRAVENOUS; SUBCUTANEOUS at 12:06

## 2018-01-12 RX ADMIN — ALBUTEROL SULFATE 2.5 MG: 2.5 SOLUTION RESPIRATORY (INHALATION) at 15:34

## 2018-01-12 RX ADMIN — Medication 10 ML: at 06:04

## 2018-01-12 RX ADMIN — FUROSEMIDE 40 MG: 10 INJECTION, SOLUTION INTRAMUSCULAR; INTRAVENOUS at 09:24

## 2018-01-12 RX ADMIN — HEPARIN SODIUM 5000 UNITS: 5000 INJECTION, SOLUTION INTRAVENOUS; SUBCUTANEOUS at 21:34

## 2018-01-12 RX ADMIN — Medication 10 MCG/MIN: at 14:37

## 2018-01-12 RX ADMIN — ETOMIDATE 20 MG: 2 INJECTION, SOLUTION INTRAVENOUS at 13:20

## 2018-01-12 RX ADMIN — FENTANYL CITRATE 50 MCG: 50 INJECTION INTRAMUSCULAR; INTRAVENOUS at 13:20

## 2018-01-12 RX ADMIN — LORAZEPAM 1 MG: 2 INJECTION INTRAMUSCULAR; INTRAVENOUS at 13:35

## 2018-01-12 RX ADMIN — WATER 500 MG: 1 INJECTION INTRAMUSCULAR; INTRAVENOUS; SUBCUTANEOUS at 16:50

## 2018-01-12 RX ADMIN — DEXMEDETOMIDINE HYDROCHLORIDE 0.7 MCG/KG/HR: 100 INJECTION, SOLUTION INTRAVENOUS at 13:48

## 2018-01-12 RX ADMIN — ALBUTEROL SULFATE 2.5 MG: 2.5 SOLUTION RESPIRATORY (INHALATION) at 20:22

## 2018-01-12 RX ADMIN — HEPARIN SODIUM 5000 UNITS: 5000 INJECTION, SOLUTION INTRAVENOUS; SUBCUTANEOUS at 16:51

## 2018-01-12 RX ADMIN — SODIUM CHLORIDE 500 ML: 900 INJECTION, SOLUTION INTRAVENOUS at 14:36

## 2018-01-12 RX ADMIN — WATER 500 MG: 1 INJECTION INTRAMUSCULAR; INTRAVENOUS; SUBCUTANEOUS at 21:29

## 2018-01-12 RX ADMIN — DEXMEDETOMIDINE HYDROCHLORIDE 0.3 MCG/KG/HR: 100 INJECTION, SOLUTION INTRAVENOUS at 23:15

## 2018-01-12 RX ADMIN — FENTANYL CITRATE 50 MCG: 50 INJECTION, SOLUTION INTRAMUSCULAR; INTRAVENOUS at 13:20

## 2018-01-12 RX ADMIN — LORAZEPAM 1 MG: 2 INJECTION INTRAMUSCULAR; INTRAVENOUS at 01:52

## 2018-01-12 RX ADMIN — Medication 10 ML: at 21:29

## 2018-01-12 RX ADMIN — FLUMAZENIL 0.2 MG: 0.1 INJECTION, SOLUTION INTRAVENOUS at 11:33

## 2018-01-12 RX ADMIN — POLYETHYLENE GLYCOL 3350 17 G: 17 POWDER, FOR SOLUTION ORAL at 04:36

## 2018-01-12 RX ADMIN — Medication 10 ML: at 14:37

## 2018-01-12 RX ADMIN — HALOPERIDOL LACTATE 5 MG: 5 INJECTION, SOLUTION INTRAMUSCULAR at 14:16

## 2018-01-12 RX ADMIN — HEPARIN SODIUM 5000 UNITS: 5000 INJECTION, SOLUTION INTRAVENOUS; SUBCUTANEOUS at 06:04

## 2018-01-12 NOTE — H&P (VIEW-ONLY)
CONSULT NOTE    Pastor Cedillo    1/7/2018    Date of Admission:  1/6/2018    The patient's chart is reviewed and the patient is discussed with the staff. Subjective:     Patient is a 45 y.o.  male seen and evaluated at the request of Rapid Response/Dr. Governor Valera. Patient just admitted today with acute hypoxemic respiratory failure, with methamphetamine positive, smoker, morbid obesity, suspected NATALIIA. Was on Telemetry and being diuresed. Patient in room foaming in mouth, combative and disoriented. He was being aggressively bagged in Room and when I came noted to be moving around and eyes open. I took off Ambu bag and reported names Benny Orosco but disoriented. Review of Systems  Not alert and not able to answer much. Patient Active Problem List   Diagnosis Code    COPD (chronic obstructive pulmonary disease) ([facility] [address] [facility] J44.9    Nicotine dependence F17.200    Methamphetamine abuse F15.10    Anasarca R60.1    NATALIIA (obstructive sleep apnea) G47.33    Acute on chronic respiratory failure with hypoxia (HCC) J96.21    Acute kidney injury ([facility] [address] N17.9            Prior to Admission Medications   Prescriptions Last Dose Informant Patient Reported? Taking? HYDROcodone-acetaminophen (NORCO) 5-325 mg per tablet Not Taking at Unknown time  No No   Sig: Take 1-2 Tabs by mouth every six (6) hours as needed for Pain. Max Daily Amount: 8 Tabs. albuterol (PROVENTIL HFA, VENTOLIN HFA, PROAIR HFA) 90 mcg/actuation inhaler Not Taking at Unknown time  No No   Sig: Take 2 Puffs by inhalation every six (6) hours as needed for Wheezing or Shortness of Breath. celecoxib (CELEBREX) 200 mg capsule Not Taking at Unknown time  No No   Sig: Take 1 Cap by mouth daily. With food   levofloxacin (LEVAQUIN) 750 mg tablet Not Taking at Unknown time  No No   Sig: Take 1 Tab by mouth daily.       Facility-Administered Medications: None       Past Medical History:   Diagnosis Date    Sleep disorder      No past surgical history on file. Social History     Social History    Marital status:      Spouse name: N/A    Number of children: N/A    Years of education: N/A     Occupational History    Not on file. Social History Main Topics    Smoking status: Current Every Day Smoker     Packs/day: 2.00    Smokeless tobacco: Not on file    Alcohol use No      Comment: once a month beer    Drug use: Yes     Special: Marijuana, Methamphetamines    Sexual activity: Not on file     Other Topics Concern    Not on file     Social History Narrative     No family history on file.   No Known Allergies    Current Facility-Administered Medications   Medication Dose Route Frequency    etomidate (AMIDATE) 2 mg/mL injection        fentaNYL citrate (PF) 50 mcg/mL injection        propofol (DIPRIVAN) 10 mg/mL injection        midazolam (VERSED) 1 mg/mL injection        sodium chloride (NS) flush 5-10 mL  5-10 mL IntraVENous Q8H    sodium chloride (NS) flush 5-10 mL  5-10 mL IntraVENous PRN    lisinopril (PRINIVIL, ZESTRIL) tablet 20 mg  20 mg Oral DAILY    carvedilol (COREG) tablet 3.125 mg  3.125 mg Oral BID WITH MEALS    ondansetron (ZOFRAN) injection 4 mg  4 mg IntraVENous Q6H PRN    acetaminophen (TYLENOL) tablet 650 mg  650 mg Oral Q6H PRN    albuterol-ipratropium (DUO-NEB) 2.5 MG-0.5 MG/3 ML  3 mL Nebulization Q4H RT    furosemide (LASIX) injection 40 mg  40 mg IntraVENous Q12H    zolpidem (AMBIEN) tablet 5 mg  5 mg Oral QHS PRN    diphenhydrAMINE (BENADRYL) capsule 25 mg  25 mg Oral Q4H PRN    polyethylene glycol (MIRALAX) packet 17 g  17 g Oral DAILY PRN    guaiFENesin ER (MUCINEX) tablet 600 mg  600 mg Oral Q12H PRN    calcium carbonate (TUMS) chewable tablet 400 mg [elemental]  400 mg Oral TID PRN    heparin (porcine) injection 5,000 Units  5,000 Units SubCUTAneous Q8H         Objective:     Vitals:    01/07/18 0356 01/07/18 0427 01/07/18 0903 01/07/18 0915   BP:  (!) 144/96 162/84 162/84   Pulse: (!) 112 (!) 113    Resp:  22     Temp:  96.3 °F (35.7 °C)  98 °F (36.7 °C)   SpO2: 92% (!) 85%  (!) 70%   Weight:       Height:           PHYSICAL EXAM     Constitutional:  the patient is obese WM in bed on NRB  EENMT:  Sclera clear, pupils equal but small, oral mucosa moist  Respiratory: coarse b/l  Cardiovascular:  RRR without M,G,R  Gastrointestinal: soft and non-tender; with positive bowel sounds. Musculoskeletal: warm without cyanosis. There is no lower leg edema. Skin:  no jaundice or rashes, no visible wounds, slightly mottled  Neurologic: no gross neuro deficits     Psychiatric:  Did say his name and follow commands    CT: IMPRESSIONS:  1. No evidence of pulmonary embolus. 2. Trace right pleural effusion and pericardial effusion. 3. Bibasilar atelectasis.         Recent Labs      01/06/18   1838   WBC  8.0   HGB  14.6   HCT  48.2   PLT  216     Recent Labs      01/07/18   0353  01/06/18   1838   NA  141  141   K  5.1  5.0   CL  98  101   GLU  183*  101*   CO2  36*  35*   BUN  26*  24*   CREA  1.81*  1.54*   CA  8.9  8.8   ALB   --   3.5   TBILI   --   0.4   ALT   --   32   SGOT   --   29     No results for input(s): PH, PCO2, PO2, HCO3 in the last 72 hours. No results for input(s): LCAD, LAC in the last 72 hours.     Assessment:  (Medical Decision Making)     Hospital Problems  Never Reviewed          Codes Class Noted POA    COPD (chronic obstructive pulmonary disease) (HCC) (Chronic) ICD-10-CM: J44.9  ICD-9-CM: 384  1/6/2018 Yes        Nicotine dependence (Chronic) ICD-10-CM: F17.200  ICD-9-CM: 305.1  1/6/2018 Yes        Methamphetamine abuse (Chronic) ICD-10-CM: F15.10  ICD-9-CM: 305.70  1/6/2018 Yes        Anasarca ICD-10-CM: R60.1  ICD-9-CM: 782.3  1/6/2018 Yes        NATALIIA (obstructive sleep apnea) (Chronic) ICD-10-CM: G47.33  ICD-9-CM: 327.23  1/6/2018 Yes        * (Principal)Acute on chronic respiratory failure with hypoxia (HCC) ICD-10-CM: J96.21  ICD-9-CM: 518.84, 799.02  1/6/2018 Yes Acute kidney injury [facility]) ICD-10-CM: N17.9  ICD-9-CM: 584.9  1/6/2018 Yes              Plan:  (Medical Decision Making)     --transfer to the ICU  -- BIPAP for now and will f/u ABG -- currently PCO2 is 125. Will have to f/u. IF not will need to be on the vent  -- continue diureses and f/u urine output. Galindo needs to be placed  -- will have to see when stable if qualifies for a triology machine and is agreeable to wear it. If not may end up needing a trach. Per staff prior history reported may need trach  -- needs to stop using drugs  -- nebs  -- nicotine patch  -- continue remaining treatment    Condition is critical.   Time spent -- 33 minutes      More than 50% of the time documented was spent in face-to-face contact with the patient and in the care of the patient on the floor/unit where the patient is located. Thank you very much for this referral.  We appreciate the opportunity to participate in this patient's care. Will follow along with above stated plan.     Anni Arellano MD

## 2018-01-12 NOTE — PROGRESS NOTES
Bedside and Verbal shift change report given to Juanjo Palomares RN (oncoming nurse) by Brenda Gary RN (offgoing nurse). Report included the following information SBAR, Kardex, Intake/Output, MAR, Recent Results and Cardiac Rhythm NSR. Patient remains very lethargic after phenergan administrations, VSS on BiPAP.

## 2018-01-12 NOTE — PROGRESS NOTES
Pt remains obtunded. Brief eye opening to verbal stimuli, but poor command following. PERRL @ 2 mm, sluggish. RT at bedside. Attempts to switch pt to CPAP, but periods of apnea noted. BiPAP changed from 16/8 to 20/12 by RT. Continuing to monitor.

## 2018-01-12 NOTE — PROGRESS NOTES
Problem: Mobility Impaired (Adult and Pediatric)  Goal: *Acute Goals and Plan of Care (Insert Text)  STG:  (1.)Mr. Schaffer will move from supine to sit and sit to supine , scoot up and down and roll side to side with STAND BY ASSIST within 3 day(s). (2.)Mr. Schaffer will transfer from bed to chair and chair to bed with CONTACT GUARD ASSIST using the least restrictive device within 3 day(s). (3.)Mr. Schaffer will ambulate with CONTACT GUARD ASSIST for 100 feet with the least restrictive device within 3 day(s). (4.)Mr. Schaffer will perform standing static and dynamic balance activities x 15 minutes with CONTACT GUARD ASSIST to improve safety within 3 day(s). (5.)Mr. Schaffer will maintain stable vital signs throughout all functional mobility within 3 days. LTG:  (1.)Mr. Schaffer will move from supine to sit and sit to supine , scoot up and down and roll side to side in bed with MODIFIED INDEPENDENCE within 7 day(s). (2.)Mr. Schaffer will transfer from bed to chair and chair to bed with MODIFIED INDEPENDENCE using the least restrictive device within 7 day(s). (3.)Mr. Schaffer will ambulate with MODIFIED INDEPENDENCE for 250+ feet with the least restrictive device within 7 day(s). ________________________________________________________________________________________________     Patient has just been re intubated this afternoon.   Will hold PT for today and will follow    Sherrell Freitas PTA

## 2018-01-12 NOTE — PROGRESS NOTES
Critical Care Daily Progress Note: 1/12/2018  Admission Date: 1/6/2018     Patient is a 45 y.o.  male admitted on 1/6 with acute hypoxemic respiratory failure, with methamphetamine positive, smoker, morbid obesity, suspected NATALIIA/OHS. Was on Telemetry and being diuresed. Patient in room foaming in mouth, combative and disoriented. He was being aggressively bagged in Room and when I came noted to be moving around and eyes open. I took off Ambu bag and reported names Benny Orosco but disoriented. Shortly after admission to ICU on BIPAP had to be intubated on 1/7/18/1/10/18    Patient had CT sinus and CT head done and marked pansinusitis noted. Discussed with ENT since ? CSF leak and he disagrees based on CT sinus. The patient's chart is reviewed and the patient is discussed with the staff. Subjective:     Currently on BiPAP for recurrent hypercarbia after using HFNC yesterday and BiPAP overnight    Received ativan overnight after telepsych was consulted because patient reported suicideality yesterday afternoon. Continues on lasix 40mg IV bid.   Narrowed to ceftriaxone yesterday    Review of Systems:  -Fever  -Headaches +sinus drainage  -Chest pain  -Dyspnea, -wheezing,- cough  -Abdominal pain-, constipation  +slight Leg swelling  +sputum production      Current Facility-Administered Medications   Medication Dose Route Frequency    promethazine (PHENERGAN) with saline injection 12.5 mg  12.5 mg IntraVENous Q6H PRN    cefTRIAXone (ROCEPHIN) 2 g in sterile water (preservative free) 20 mL IV syringe  2 g IntraVENous Q24H    sertraline (ZOLOFT) tablet 50 mg  50 mg Oral DAILY    LORazepam (ATIVAN) injection 1 mg  1 mg IntraVENous Q8H PRN    albuterol (PROVENTIL VENTOLIN) nebulizer solution 2.5 mg  2.5 mg Nebulization QID RT    sodium chloride (OCEAN) 0.65 % nasal spray 2 Spray  2 Spray Both Nostrils QID    sodium chloride (OCEAN) 0.65 % nasal spray 2 Spray  2 Spray Both Nostrils Q2H PRN    fluticasone (FLONASE) 50 mcg/actuation nasal spray 2 Spray  2 Spray Both Nostrils DAILY    influenza vaccine 2017-18 (3 yrs+)(PF) (FLUZONE QUAD/FLUARIX QUAD) injection 0.5 mL  0.5 mL IntraMUSCular PRIOR TO DISCHARGE    famotidine (PF) (PEPCID) 20 mg in sodium chloride 0.9 % 10 mL injection  20 mg IntraVENous Q12H    NUTRITIONAL SUPPORT ELECTROLYTE PRN ORDERS   Does Not Apply PRN    nicotine (NICODERM CQ) 21 mg/24 hr patch 1 Patch  1 Patch TransDERmal DAILY    metoprolol (LOPRESSOR) injection 5 mg  5 mg IntraVENous Q4H PRN    hydrALAZINE (APRESOLINE) 20 mg/mL injection 20 mg  20 mg IntraVENous Q6H PRN    chlorhexidine (PERIDEX) 0.12 % mouthwash 15 mL  15 mL Oral Q12H    sodium chloride (NS) flush 5-10 mL  5-10 mL IntraVENous Q8H    sodium chloride (NS) flush 5-10 mL  5-10 mL IntraVENous PRN    lisinopril (PRINIVIL, ZESTRIL) tablet 20 mg  20 mg Oral DAILY    carvedilol (COREG) tablet 3.125 mg  3.125 mg Oral BID WITH MEALS    ondansetron (ZOFRAN) injection 4 mg  4 mg IntraVENous Q6H PRN    acetaminophen (TYLENOL) tablet 650 mg  650 mg Oral Q6H PRN    furosemide (LASIX) injection 40 mg  40 mg IntraVENous Q12H    diphenhydrAMINE (BENADRYL) capsule 25 mg  25 mg Oral Q4H PRN    polyethylene glycol (MIRALAX) packet 17 g  17 g Oral DAILY PRN    guaiFENesin ER (MUCINEX) tablet 600 mg  600 mg Oral Q12H PRN    calcium carbonate (TUMS) chewable tablet 400 mg [elemental]  400 mg Oral TID PRN    heparin (porcine) injection 5,000 Units  5,000 Units SubCUTAneous Q8H                  Objective:     Vitals:    01/12/18 0845 01/12/18 0847 01/12/18 0900 01/12/18 0902   BP:    139/88   Pulse: 85  84    Resp: 29  19    Temp:   97.8 °F (36.6 °C)    SpO2: 97% 95% 94%    Weight:       Height:           Intake and Output:   01/10 1901 - 01/12 0700  In: 1080 [P.O.:480; I.V.:600]  Out: 4725 [Urine:4725]       Physical Exam:          Constitutional:  Obese WM in bed and in no distress on 5 LPM  EENMT:  Sclera clear, pupils equal, oral mucosa moist, no nasal drainage today. Respiratory:  Clear but distant sounds b/l. NO wheezing  Cardiovascular:  RRR with no M,G,R;  Gastrointestinal:  soft with no tenderness; positive bowel sounds present  Musculoskeletal:  warm with no cyanosis, 1+ lower leg edema  Skin:  no jaundice or ecchymosis  Neurologic: no gross focal deficits  Psychiatric:  Calm and answering questions    LINES:    central line, alegre     DRIPS:   none      Ventilator Settings  Mode FIO2 Rate Tidal Volume Pressure PEEP   Pressure support  40 %    500 ml  10 cm H2O  10 cm H20      Peak airway pressure: 20.1 cm H2O   Minute ventilation: 11.1 l/min     ABG:   Recent Labs      01/12/18   0830  01/10/18   0325   PH  7.22*  7.39   PCO2  95*  60*   PO2  86  74*   HCO3  38*  36*        LAB  No results for input(s): GLUCPOC in the last 72 hours. No lab exists for component: [place]  Recent Labs      01/12/18   0416  01/11/18   0508  01/10/18   0345   WBC  8.1  7.1  7.2   HGB  14.8  13.7  13.0*   HCT  48.3  45.3  42.1   PLT  163  162  157     Recent Labs      01/12/18   0416  01/11/18   0508  01/10/18   0345   NA  143  144  143   K  3.9  4.0  3.6   CL  99  102  100   CO2  38*  39*  39*   GLU  115*  108*  128*   BUN  26*  29*  36*   CREA  1.41  1.41  1.71*   MG  2.2   --   2.3   PHOS  3.3   --   4.0   CA  8.7  8.6  8.3     No results for input(s): LCAD, LAC in the last 72 hours. Assessment:  (Medical Decision Making)     Hospital Problems  Never Reviewed          Codes Class Noted POA    Severe sepsis ([facility] [address] ICD-10-CM: A41.9, R65.20  ICD-9-CM: 038.9, 995.92  1/9/2018 Unknown    Likely from acute on chronic sinusitis and pneumonia. Community acquired pneumonia of right lower lobe of lung ([facility] [address] ICD-10-CM: J18.1  ICD-9-CM: 383  1/9/2018 Unknown    --likely from sinus drainage  --has h.  Flu and strep pneumo on culture  --taper to ceftriaxone from zosyn started 1/9 (day 4)    Acute metabolic encephalopathy IIJ-73-OE: G93.41  ICD-9-CM: 348.31  1/7/2018 Yes    Resolved yesterday but worse today with hypercarbia    Hypotension ICD-10-CM: I95.9  ICD-9-CM: 458.9  1/7/2018 Yes    Off pressors    Urethral stricture ICD-10-CM: N35.9  ICD-9-CM: 598.9  1/7/2018 Yes    Has alegre placed by urology    COPD (chronic obstructive pulmonary disease) ([facility] [address] (Chronic) ICD-10-CM: J44.9  ICD-9-CM: 520  1/6/2018 Yes    Likely and smokes about 1.5 PPD and uses drugs    Nicotine dependence (Chronic) ICD-10-CM: F17.200  ICD-9-CM: 305.1  1/6/2018 Yes    Nicotine patch    Methamphetamine abuse ICD-10-CM: F15.10  ICD-9-CM: 305.70  1/6/2018 Yes    See below    Anasarca ICD-10-CM: R60.1  ICD-9-CM: 782.3  1/6/2018 Yes    On lasix. Continue and check bnp    NATALIIA (obstructive sleep apnea) (Chronic) ICD-10-CM: G47.33  ICD-9-CM: 327.23  1/6/2018 Yes    Does not have a CPAP at home but clearly with likely OHS vs NATALIIA, see below    Acute kidney injury [facility]) ICD-10-CM: N17.9  ICD-9-CM: 584.9  1/6/2018 Yes    Cr in 1.4 now    Acute on chronic respiratory failure with hypoxia and hypercapnia [facility]) ICD-10-CM: J96.21, J96.22  ICD-9-CM: 518.84, 786.09, 799.02  1/6/2018 Yes    Still requiring BiPAP          Plan:  (Medical Decision Making)     --BiPAP with recheck of ABG at noon  --CXR now  --continue day 4/14 of ceftriaxone  --nicotine patch  --urine drug screen today: ? Drug use in hospital  --continue scheduled bronchodilators, flutter valve  --continue lasix and ADD ON BNP today  --keep alegre in place till discharge  --speech and start PO intake  --Needs Trilogy at night but has no insurance- hopefully can get BiPAP at least  --hospitalist to resume care when off BiPAP during day. More than 50% of the time documented was spent in face-to-face contact with the patient and in the care of the patient on the floor/unit where the patient is located.     Milad Reese MD

## 2018-01-12 NOTE — PROGRESS NOTES
Patient again complaining of nausea, at present is sitting on bedside commode attempting to have BM, not able to given zofran again at this time; Dr. Denny Victor notified, new orders for 12.5mg phenergan q6hrs PRN.

## 2018-01-12 NOTE — INTERVAL H&P NOTE
H&P Update:  Julia Caballero was seen and examined. History and physical has been reviewed. The patient has been examined.  There have been no significant clinical changes since the completion of the originally dated History and Physical.    Signed By: Heidi Hutton MD     January 12, 2018 1:30 PM

## 2018-01-12 NOTE — PROGRESS NOTES
Precedex gtt initiated per orders. Pt remains agitated, attempting to pull at ETT and monitoring lines. Lorazepam administered per orders. Subsequent drop in NIBP, MAP 45-50 with multiple checks. Orders received for 500 mL 0.9%NS bolus, levophed gtt, and haloperidol, see MAR.

## 2018-01-12 NOTE — PROGRESS NOTES
Dr. Gallardo Rang at bedside with RT to intubate patient. Fentanyl 50 mcg and Etomidate 20 mg given IVP. No complications with intubation. CXR ordered. Will continue to monitor.

## 2018-01-12 NOTE — PROGRESS NOTES
Patient given his evening medications, saline nasal rinse, and PRN mucinex and PRN tylenol for mild lower abd pain; mouth care given, continues to orally suction self with thick secretions. Afebrile. Expresses that he realizes he came very close to death, and wants to get himself and  his family members off of drugs, given emotional support. RT to bedside to place on BiPAP overnight. VSS. Will continue to monitor.

## 2018-01-12 NOTE — PROGRESS NOTES
Bedside and Verbal shift change report given to [address] (oncoming nurse) by Renate Reddy RN (offgoing nurse). Report included the following information SBAR, Kardex, Intake/Output, MAR, Recent Results and Cardiac Rhythm NSR. Patient is resting in bed watching tv, oriented x4, follows commands. NSR 80s on monitor, /71, O2 sat 94% on Optiflow 50%. Lung sounds coarse upper, diminished lower lobes, continues with thick secretions, orally suctions self, yankaur at bedside. Bowel sounds active, abdomen obese, possible distension, semi-soft. Denies pain. Galindo draining kierra urine. NAD noted. Will continue to monitor.

## 2018-01-12 NOTE — PROGRESS NOTES
Patient unable to have BM, no BM in several days, given miralax in juice per orders. VSS. Slightly drowsy after admin of phenergan for nausea. Will continue to monitor.

## 2018-01-12 NOTE — PROGRESS NOTES
ABG obtained per orders, Dr. Kristy Castorena notified. RT at bedside, BiPAP rate and O2 settings changed by RT. Orders for repeat ABG in 2 hr. Pt continues to open eyes to verbal stimuli and is now following simple commands. Continuing to monitor.

## 2018-01-12 NOTE — PROGRESS NOTES
Chart review for NIV- patient listed as self pay, so this will not be an option.    Can purse BiPAP, and see if DME company willing to provide financial assist.

## 2018-01-12 NOTE — PROGRESS NOTES
Therapist is discontinuing occupational therapy at this time per departmental protocol due to patient intubated and change in medical status. Mr. Surekha Laboy occupational therapy goals were not met. Please reorder OT when medically indicated. Thank you.   CHERYL Jain, OTR/L  1/12/2018

## 2018-01-12 NOTE — PROGRESS NOTES
Patient taken off of BiPAP due to nausea, given zofran per orders. Placed on HFNC while awaiting RT to place back on OptiFlow. Patient shortly thereafter vomited approx 300ml brown liquid. Now stating he is scared and wants to call his wife. Reassured patient that nausea and vomiting will pass, given ativan 1mg per orders for anxiety. NSR 90s on monitor, /76, O2 sat 96% on Optiflow. Patient now resting quietly with eyes closed. Will continue to monitor.

## 2018-01-12 NOTE — PROCEDURES
Procedure: Endotracheal intubation    Indication: Acute respiratory failure 518.81    Anesthesia:  Fentanyl 50mcg (1.5mcg/kg),  Etomidate 20mg (0.5mg'kg)     After assessing the airway, the patient underwent preoxygenation with 100% FiO2 for 5 min. Fentanyl was then given and after 3 min, etomidate were administered sequentially in rapid IV push. The Sellick maneuver was performed throughout the entire sequence. After adequate sedation and paralysis intubation was performed. A Ari 4.0 laryngoscope was used to visualize the epiglottis and vocal cords. After positive identification of epiglottis and the vocal cords, a size 8.0 ET tube was placed into the trachea with direct visualization. Confirmation of endotracheal positionin. The ET tube was directly visualized passing through the vocal cords. 2.  CO2 colorimetry was employed immediately to verify tube in airway with appropriate color change indicating detection/lack of CO2.   3.  Water vapor was seen within the ET tube, and auscultation of the abdomen revealed no bubbling sounds. 4.  Auscultation  And inspection of the chest after intubation showed symmetric chest excursion and symmetric air entry bilaterally. 5.  Chest X-ray has been ordered and is pending. The tube was secured at 24cm at the teeth with a tube amanda. The patient has been placed on a mechanical ventilator. There were no complications.     Sarah Martinez MD

## 2018-01-12 NOTE — PROGRESS NOTES
Patient was intubated with a number 8.0 ET Tube. Tube placement verified by auscultation and ETCO2 monitor. ET Tube is secured at the 24 cm mag at the teeth and on the left side. Patient was intubated by Dr Jola Spatz on the 1 attempt. Breath sounds are diminished. Patient is Negative for subcutaneous air and chest excursion is symmetric. Trachea is midline. Patient is also Negative for cyanosis and is Negative for pitting edema. Patient placed on ventilator on documented settings. All alarms are set and audible. Resuscitation bag is at the head of the bed.       Ventilator Settings  Mode FIO2 Rate Tidal Volume Pressure PEEP I:E Ratio   PRVC  45 %    500 ml  0 cm H2O  8 cm H20  1:2.3      Peak airway pressure: 39 cm H2O   Minute ventilation: 11.8 l/min     ABG:   Recent Labs      01/12/18   1105  01/12/18   0830  01/10/18   0325   PH  7.18*  7.22*  7.39   PCO2  126*  95*  60*   PO2  85  86  74*   HCO3  46*  38*  36*

## 2018-01-12 NOTE — PROGRESS NOTES
Patient snoring on Optiflow, very drowsy after phenergan 12.5mg for nausea, O2 sats dropped 75-88% when snoring, placed back on BiPAP, O2 sat 94%. Will continue to monitor.

## 2018-01-12 NOTE — PROGRESS NOTES
Romazicon and narcan administered per orders, see MAR. Transient improvement in alertness noted with verbal/tactile stimuli, but pt unable to maintain adequate respirations--tidal volumes drop to 100-200s with consequent decrease in SpO2 to 60s-80s. Dr. Cherri Alicia notified, plan to intubate pt, RT notified.

## 2018-01-13 ENCOUNTER — APPOINTMENT (OUTPATIENT)
Dept: GENERAL RADIOLOGY | Age: 53
DRG: 871 | End: 2018-01-13
Attending: INTERNAL MEDICINE
Payer: SELF-PAY

## 2018-01-13 LAB
ANION GAP SERPL CALC-SCNC: 8 MMOL/L (ref 7–16)
ARTERIAL PATENCY WRIST A: POSITIVE
BACTERIA SPEC CULT: NORMAL
BASE EXCESS BLDA CALC-SCNC: 7.4 MMOL/L (ref 0–3)
BASOPHILS # BLD: 0 K/UL (ref 0–0.2)
BASOPHILS NFR BLD: 0 % (ref 0–2)
BDY SITE: ABNORMAL
BUN SERPL-MCNC: 27 MG/DL (ref 6–23)
CALCIUM SERPL-MCNC: 9.2 MG/DL (ref 8.3–10.4)
CHLORIDE SERPL-SCNC: 102 MMOL/L (ref 98–107)
CO2 SERPL-SCNC: 34 MMOL/L (ref 21–32)
COHGB MFR BLD: 1.4 % (ref 0.5–1.5)
CREAT SERPL-MCNC: 1.44 MG/DL (ref 0.8–1.5)
DIFFERENTIAL METHOD BLD: ABNORMAL
DO-HGB BLD-MCNC: 3 % (ref 0–5)
EOSINOPHIL # BLD: 0.2 K/UL (ref 0–0.8)
EOSINOPHIL NFR BLD: 3 % (ref 0.5–7.8)
ERYTHROCYTE [DISTWIDTH] IN BLOOD BY AUTOMATED COUNT: 13.3 % (ref 11.9–14.6)
FIO2 ON VENT: 55 %
GLUCOSE SERPL-MCNC: 96 MG/DL (ref 65–100)
GRAM STN SPEC: NORMAL
HCO3 BLDA-SCNC: 33 MMOL/L (ref 22–26)
HCT VFR BLD AUTO: 46.2 % (ref 41.1–50.3)
HGB BLD-MCNC: 13.9 G/DL (ref 13.6–17.2)
HGB BLDMV-MCNC: 14.7 GM/DL (ref 11.7–15)
IMM GRANULOCYTES # BLD: 0 K/UL (ref 0–0.5)
IMM GRANULOCYTES NFR BLD AUTO: 1 % (ref 0–5)
LYMPHOCYTES # BLD: 1.3 K/UL (ref 0.5–4.6)
LYMPHOCYTES NFR BLD: 19 % (ref 13–44)
MCH RBC QN AUTO: 32.1 PG (ref 26.1–32.9)
MCHC RBC AUTO-ENTMCNC: 30.1 G/DL (ref 31.4–35)
MCV RBC AUTO: 106.7 FL (ref 79.6–97.8)
METHGB MFR BLD: 0.3 % (ref 0–1.5)
MONOCYTES # BLD: 0.8 K/UL (ref 0.1–1.3)
MONOCYTES NFR BLD: 12 % (ref 4–12)
NEUTS SEG # BLD: 4.4 K/UL (ref 1.7–8.2)
NEUTS SEG NFR BLD: 65 % (ref 43–78)
OXYHGB MFR BLDA: 95.6 % (ref 94–97)
PCO2 BLDA: 49 MMHG (ref 35–45)
PEEP RESPIRATORY: 8 CM[H2O]
PH BLDA: 7.45 [PH] (ref 7.35–7.45)
PLATELET # BLD AUTO: 160 K/UL (ref 150–450)
PMV BLD AUTO: 10.8 FL (ref 10.8–14.1)
PO2 BLDA: 89 MMHG (ref 80–105)
POTASSIUM SERPL-SCNC: 3.5 MMOL/L (ref 3.5–5.1)
RBC # BLD AUTO: 4.33 M/UL (ref 4.23–5.67)
RESP RATE: 20
SAO2 % BLD: 97 % (ref 92–98.5)
SERVICE CMNT-IMP: NORMAL
SODIUM SERPL-SCNC: 144 MMOL/L (ref 136–145)
VENTILATION MODE VENT: ABNORMAL
VT SETTING VENT: 500 ML
WBC # BLD AUTO: 6.9 K/UL (ref 4.3–11.1)

## 2018-01-13 PROCEDURE — 74011250636 HC RX REV CODE- 250/636: Performed by: INTERNAL MEDICINE

## 2018-01-13 PROCEDURE — 82803 BLOOD GASES ANY COMBINATION: CPT

## 2018-01-13 PROCEDURE — 74011250637 HC RX REV CODE- 250/637: Performed by: INTERNAL MEDICINE

## 2018-01-13 PROCEDURE — 74011000250 HC RX REV CODE- 250: Performed by: INTERNAL MEDICINE

## 2018-01-13 PROCEDURE — 36592 COLLECT BLOOD FROM PICC: CPT

## 2018-01-13 PROCEDURE — 36600 WITHDRAWAL OF ARTERIAL BLOOD: CPT

## 2018-01-13 PROCEDURE — 94003 VENT MGMT INPAT SUBQ DAY: CPT

## 2018-01-13 PROCEDURE — 99291 CRITICAL CARE FIRST HOUR: CPT | Performed by: INTERNAL MEDICINE

## 2018-01-13 PROCEDURE — 85025 COMPLETE CBC W/AUTO DIFF WBC: CPT | Performed by: INTERNAL MEDICINE

## 2018-01-13 PROCEDURE — 94660 CPAP INITIATION&MGMT: CPT

## 2018-01-13 PROCEDURE — 94640 AIRWAY INHALATION TREATMENT: CPT

## 2018-01-13 PROCEDURE — 71045 X-RAY EXAM CHEST 1 VIEW: CPT

## 2018-01-13 PROCEDURE — 74011250637 HC RX REV CODE- 250/637: Performed by: FAMILY MEDICINE

## 2018-01-13 PROCEDURE — 65620000000 HC RM CCU GENERAL

## 2018-01-13 PROCEDURE — 80048 BASIC METABOLIC PNL TOTAL CA: CPT | Performed by: INTERNAL MEDICINE

## 2018-01-13 PROCEDURE — 74011250636 HC RX REV CODE- 250/636: Performed by: FAMILY MEDICINE

## 2018-01-13 RX ORDER — ALBUTEROL SULFATE 0.83 MG/ML
2.5 SOLUTION RESPIRATORY (INHALATION)
Status: DISCONTINUED | OUTPATIENT
Start: 2018-01-13 | End: 2018-01-21

## 2018-01-13 RX ADMIN — WATER 2 G: 1 INJECTION INTRAMUSCULAR; INTRAVENOUS; SUBCUTANEOUS at 12:12

## 2018-01-13 RX ADMIN — ALBUTEROL SULFATE 2.5 MG: 2.5 SOLUTION RESPIRATORY (INHALATION) at 07:28

## 2018-01-13 RX ADMIN — METHYLPREDNISOLONE SODIUM SUCCINATE 40 MG: 40 INJECTION, POWDER, FOR SOLUTION INTRAMUSCULAR; INTRAVENOUS at 18:00

## 2018-01-13 RX ADMIN — Medication 10 ML: at 05:23

## 2018-01-13 RX ADMIN — CHLORHEXIDINE GLUCONATE 15 ML: 1.2 RINSE ORAL at 08:32

## 2018-01-13 RX ADMIN — ALBUTEROL SULFATE 2.5 MG: 2.5 SOLUTION RESPIRATORY (INHALATION) at 21:10

## 2018-01-13 RX ADMIN — ALBUTEROL SULFATE 2.5 MG: 2.5 SOLUTION RESPIRATORY (INHALATION) at 14:42

## 2018-01-13 RX ADMIN — METOPROLOL TARTRATE 5 MG: 5 INJECTION, SOLUTION INTRAVENOUS at 22:50

## 2018-01-13 RX ADMIN — HEPARIN SODIUM 5000 UNITS: 5000 INJECTION, SOLUTION INTRAVENOUS; SUBCUTANEOUS at 05:23

## 2018-01-13 RX ADMIN — FAMOTIDINE 20 MG: 10 INJECTION, SOLUTION INTRAVENOUS at 21:33

## 2018-01-13 RX ADMIN — FLUTICASONE PROPIONATE 2 SPRAY: 50 SPRAY, METERED NASAL at 08:34

## 2018-01-13 RX ADMIN — HEPARIN SODIUM 5000 UNITS: 5000 INJECTION, SOLUTION INTRAVENOUS; SUBCUTANEOUS at 21:33

## 2018-01-13 RX ADMIN — FAMOTIDINE 20 MG: 10 INJECTION, SOLUTION INTRAVENOUS at 08:32

## 2018-01-13 RX ADMIN — METHYLPREDNISOLONE SODIUM SUCCINATE 40 MG: 40 INJECTION, POWDER, FOR SOLUTION INTRAMUSCULAR; INTRAVENOUS at 12:13

## 2018-01-13 RX ADMIN — WATER 500 MG: 1 INJECTION INTRAMUSCULAR; INTRAVENOUS; SUBCUTANEOUS at 21:33

## 2018-01-13 RX ADMIN — CARVEDILOL 3.12 MG: 3.12 TABLET, FILM COATED ORAL at 18:40

## 2018-01-13 RX ADMIN — WATER 500 MG: 1 INJECTION INTRAMUSCULAR; INTRAVENOUS; SUBCUTANEOUS at 08:32

## 2018-01-13 RX ADMIN — ALBUTEROL SULFATE 2.5 MG: 2.5 SOLUTION RESPIRATORY (INHALATION) at 11:13

## 2018-01-13 RX ADMIN — HEPARIN SODIUM 5000 UNITS: 5000 INJECTION, SOLUTION INTRAVENOUS; SUBCUTANEOUS at 14:12

## 2018-01-13 RX ADMIN — METHYLPREDNISOLONE SODIUM SUCCINATE 40 MG: 40 INJECTION, POWDER, FOR SOLUTION INTRAMUSCULAR; INTRAVENOUS at 23:20

## 2018-01-13 RX ADMIN — Medication 10 ML: at 14:13

## 2018-01-13 RX ADMIN — Medication 10 ML: at 21:33

## 2018-01-13 NOTE — PROGRESS NOTES
Ventilator check complete; patient has a #8. 0 ET tube secured at the 24 at the teeth. Breath sounds are coarse. Trachea is midline, Negative for subcutaneous air, and chest excursion is symmetric. Patient is also Negative for cyanosis. All alarms are set and audible. Resuscitation bag is at the head of the bed.       Ventilator Settings  Mode FIO2 Rate Tidal Volume Pressure PEEP I:E Ratio   PRVC  55 %    500 ml  0 cm H2O  8 cm H20  1:2.3      Peak airway pressure: 21 cm H2O   Minute ventilation: 10.4 l/min     ABG:   Recent Labs      01/12/18   1500  01/12/18   1105  01/12/18   0830   PH  7.36  7.18*  7.22*   PCO2  66*  126*  95*   PO2  54*  85  86   HCO3  37*  46*  38*         Dianne Díaz

## 2018-01-13 NOTE — PROGRESS NOTES
Critical Care Daily Progress Note: 1/13/2018  Admission Date: 1/6/2018     Patient is a 45 y.o.  male admitted on 1/6 with acute hypoxemic respiratory failure, with methamphetamine positive, smoker, morbid obesity, suspected NATALIIA/OHS. Was on Telemetry and being diuresed. Patient in room foaming in mouth, combative and disoriented. He was being aggressively bagged in Room and when I came noted to be moving around and eyes open. I took off Ambu bag and reported names Velia Carrion but disoriented. Shortly after admission to ICU on BIPAP had to be intubated on 1/7/18/1/10/18    Patient had CT sinus and CT head done and marked pansinusitis noted. Discussed with ENT since ? CSF leak and he disagrees based on CT sinus. The patient's chart is reviewed and the patient is discussed with the staff.     Subjective:   Reintubated yesterday for recurrent hypercarbia  Still has L basilar infiltrate  Negative 1.9L with diamox  BP was down requiring levophed briefly yesterday  No sedation    Review of Systems:  -Fever  -Headaches +sinus drainage  -Chest pain  -Dyspnea, -wheezing,- cough  -Abdominal pain-, constipation  +slight Leg swelling  +sputum production      Current Facility-Administered Medications   Medication Dose Route Frequency    promethazine (PHENERGAN) with saline injection 12.5 mg  12.5 mg IntraVENous Q6H PRN    LORazepam (ATIVAN) tablet 0.5 mg  0.5 mg Oral Q8H PRN    dexmedeTOMidine (PRECEDEX) 400 mcg in 0.9% sodium chloride 100 mL infusion  0.2-0.7 mcg/kg/hr IntraVENous TITRATE    acetaZOLAMIDE (DIAMOX) 500 mg in sterile water (preservative free) 5 mL injection  500 mg IntraVENous Q12H    NOREPINephrine (LEVOPHED) 4 mg in 5% dextrose 250 mL infusion  2-16 mcg/min IntraVENous TITRATE    haloperidol lactate (HALDOL) injection 5 mg  5 mg IntraVENous Q6H PRN    cefTRIAXone (ROCEPHIN) 2 g in sterile water (preservative free) 20 mL IV syringe  2 g IntraVENous Q24H    sertraline (ZOLOFT) tablet 50 mg  50 mg Oral DAILY    LORazepam (ATIVAN) injection 1 mg  1 mg IntraVENous Q8H PRN    albuterol (PROVENTIL VENTOLIN) nebulizer solution 2.5 mg  2.5 mg Nebulization QID RT    sodium chloride (OCEAN) 0.65 % nasal spray 2 Spray  2 Spray Both Nostrils QID    sodium chloride (OCEAN) 0.65 % nasal spray 2 Spray  2 Spray Both Nostrils Q2H PRN    fluticasone (FLONASE) 50 mcg/actuation nasal spray 2 Spray  2 Spray Both Nostrils DAILY    influenza vaccine 2017-18 (3 yrs+)(PF) (FLUZONE QUAD/FLUARIX QUAD) injection 0.5 mL  0.5 mL IntraMUSCular PRIOR TO DISCHARGE    famotidine (PF) (PEPCID) 20 mg in sodium chloride 0.9 % 10 mL injection  20 mg IntraVENous Q12H    NUTRITIONAL SUPPORT ELECTROLYTE PRN ORDERS   Does Not Apply PRN    nicotine (NICODERM CQ) 21 mg/24 hr patch 1 Patch  1 Patch TransDERmal DAILY    metoprolol (LOPRESSOR) injection 5 mg  5 mg IntraVENous Q4H PRN    hydrALAZINE (APRESOLINE) 20 mg/mL injection 20 mg  20 mg IntraVENous Q6H PRN    chlorhexidine (PERIDEX) 0.12 % mouthwash 15 mL  15 mL Oral Q12H    sodium chloride (NS) flush 5-10 mL  5-10 mL IntraVENous Q8H    sodium chloride (NS) flush 5-10 mL  5-10 mL IntraVENous PRN    lisinopril (PRINIVIL, ZESTRIL) tablet 20 mg  20 mg Oral DAILY    carvedilol (COREG) tablet 3.125 mg  3.125 mg Oral BID WITH MEALS    ondansetron (ZOFRAN) injection 4 mg  4 mg IntraVENous Q6H PRN    acetaminophen (TYLENOL) tablet 650 mg  650 mg Oral Q6H PRN    diphenhydrAMINE (BENADRYL) capsule 25 mg  25 mg Oral Q4H PRN    polyethylene glycol (MIRALAX) packet 17 g  17 g Oral DAILY PRN    guaiFENesin ER (MUCINEX) tablet 600 mg  600 mg Oral Q12H PRN    calcium carbonate (TUMS) chewable tablet 400 mg [elemental]  400 mg Oral TID PRN    heparin (porcine) injection 5,000 Units  5,000 Units SubCUTAneous Q8H         Objective:     Vitals:    01/13/18 1032 01/13/18 1103 01/13/18 1111 01/13/18 1113   BP: 142/80 140/81 140/81    Pulse: 75 82 83 84   Resp: 12 22 18 22   Temp: 98.7 °F (37.1 °C)    SpO2: 93% 94% 95% 95%   Weight:       Height:           Intake and Output:   01/11 1901 - 01/13 0700  In: 780.4 [I.V.:780.4]  Out: 4020 [Urine:4020]  01/13 0701 - 01/13 1900  In: -   Out: 380 [Urine:380]    Physical Exam:          Constitutional:  Obese WM in bed and in no distress on 5 LPM  EENMT:  Sclera clear, pupils equal, oral mucosa moist, no nasal drainage today. Respiratory:  Clear but distant sounds b/l. NO wheezing  Cardiovascular:  RRR with no M,G,R;  Gastrointestinal:  soft with no tenderness; positive bowel sounds present  Musculoskeletal:  warm with no cyanosis,  lower leg edema  Skin:  no jaundice or ecchymosis  Neurologic: no gross focal deficits  Psychiatric:  Calm and answering questions    LINES:    central line, alegre     DRIPS:   none        Ventilator Settings  Mode FIO2 Rate Tidal Volume Pressure PEEP   Pressure support (changed from simv)  40 %    500 ml  12 cm H2O  8 cm H20      Peak airway pressure: 20 cm H2O   Minute ventilation: 8.4 l/min     ABG:   Recent Labs      01/13/18   0332  01/12/18   1500  01/12/18   1105   PH  7.45  7.36  7.18*   PCO2  49*  66*  126*   PO2  89  54*  85   HCO3  33*  37*  46*        LAB  No results for input(s): GLUCPOC in the last 72 hours. No lab exists for component: [place]  Recent Labs      01/13/18   0324  01/12/18   0416  01/11/18   0508   WBC  6.9  8.1  7.1   HGB  13.9  14.8  13.7   HCT  46.2  48.3  45.3   PLT  160  163  162     Recent Labs      01/13/18   0324  01/12/18   0416  01/11/18   0508   NA  144  143  144   K  3.5  3.9  4.0   CL  102  99  102   CO2  34*  38*  39*   GLU  96  115*  108*   BUN  27*  26*  29*   CREA  1.44  1.41  1.41   MG   --   2.2   --    PHOS   --   3.3   --    CA  9.2  8.7  8.6     No results for input(s): LCAD, LAC in the last 72 hours.       Assessment:  (Medical Decision Making)     Hospital Problems  Never Reviewed          Codes Class Noted POA    Severe sepsis ([facility] [address] ICD-10-CM: A41.9, R65.20  ICD-9-CM: 038.9, 995.92  1/9/2018 Unknown    Likely from acute on chronic sinusitis and pneumonia. Community acquired pneumonia of right lower lobe of lung ([facility] ICD-10-CM: J18.1  ICD-9-CM: 666  1/9/2018 Unknown    --likely from sinus drainage  --has h. Flu and strep pneumo on culture  --taper to ceftriaxone from zosyn started 1/9 (day 4)    Acute metabolic encephalopathy ISD-99-QX: G93.41  ICD-9-CM: 348.31  1/7/2018 Yes    Resolved yesterday but worse today with hypercarbia    Hypotension ICD-10-CM: I95.9  ICD-9-CM: 458.9  1/7/2018 Yes    Off pressors    Urethral stricture ICD-10-CM: N35.9  ICD-9-CM: 598.9  1/7/2018 Yes    Has alegre placed by urology    COPD (chronic obstructive pulmonary disease) ([facility] (Chronic) ICD-10-CM: J44.9  ICD-9-CM: 410  1/6/2018 Yes    Likely and smokes about 1.5 PPD and uses drugs    Nicotine dependence (Chronic) ICD-10-CM: F17.200  ICD-9-CM: 305.1  1/6/2018 Yes    Nicotine patch    Methamphetamine abuse ICD-10-CM: F15.10  ICD-9-CM: 305.70  1/6/2018 Yes    See below    Anasarca ICD-10-CM: R60.1  ICD-9-CM: 782.3  1/6/2018 Yes    Continued diamox today     NATALIIA (obstructive sleep apnea) (Chronic) ICD-10-CM: G47.33  ICD-9-CM: 327.23  1/6/2018 Yes    Does not have a CPAP at home but clearly with likely OHS vs NATALIIA, see below    Acute kidney injury [facility]) ICD-10-CM: N17.9  ICD-9-CM: 584.9  1/6/2018 Yes    Cr is 1.4 now    Acute on chronic respiratory failure with hypoxia and hypercapnia ([facility] [address] ICD-10-CM: J96.21, J96.22  ICD-9-CM: 518.84, 786.09, 799.02  1/6/2018 Yes    Extubate this afternoon. Will need BiPAP at night      ? COPD:  Strong family history. Steroids added. +smoking history.     Plan:  (Medical Decision Making)     --extubate this afternoon  --continue diamox today and then reassess tomorrow  --BiPAP nightly after extubated  --continue day 5/14 of ceftriaxone  --nicotine patch  --continue scheduled bronchodilators, flutter valve  --keep alegre in place till discharge    More than 50% of the time documented was spent in face-to-face contact with the patient and in the care of the patient on the floor/unit where the patient is located.     Matt Caballero MD

## 2018-01-13 NOTE — PROGRESS NOTES
[facility] Shift assessment completed. Patient moving around in bed, but not following commands or opening eyes to voice. VSS on monitor, NSR. Regular, unlabored respirations on vent 55% Fi02. Strong, productive cough. Hypoactive bowel sounds.     Lines:  L IJ Quad CVC    Drips:  Precedex @0.2 mcg/kg/hr  Levophed @ 6 mcg/min    Drains:  [place]     Airway:  ETT

## 2018-01-13 NOTE — PROGRESS NOTES
Ventilator check complete; patient has a #8. 0 ET tube secured at the 24 at the teeth. Patient is not sedated. Patient is able to follow commands. Breath sounds are coarse. Trachea is midline, Negative for subcutaneous air, and chest excursion is symmetric. Patient is also Negative for cyanosis and is Negative for pitting edema. All alarms are set and audible. Resuscitation bag is at the head of the bed.       Ventilator Settings  Mode FIO2 Rate Tidal Volume Pressure PEEP I:E Ratio   SIMV, PRVC  45 %  12  500 ml  10 cm H2O  8 cm H20  1:2.3      Peak airway pressure: 20 cm H2O   Minute ventilation: 6.4 l/min     ABG:   Recent Labs      01/13/18   0332  01/12/18   1500  01/12/18   1105   PH  7.45  7.36  7.18*   PCO2  49*  66*  126*   PO2  89  54*  85   HCO3  33*  37*  46*         Rosy Siegel, RT

## 2018-01-13 NOTE — PROGRESS NOTES
Respiratory Mechanics completed and are as follows:  Weaning Parameters  Spontaneous Breathing Trial Complete: Yes  Resp Rate Observed: 23  Ve: 9.8  VT: 426  RSBI: 43  NIF: -24  Richards Agitation Sedation Scale (RASS): Drowsy  Patient extubated to a 6L NC. Patient is able to communicate and is negative for stridor. Breath sounds are diminished. No complications with extubation.      Boris Munoz, RT

## 2018-01-14 ENCOUNTER — APPOINTMENT (OUTPATIENT)
Dept: GENERAL RADIOLOGY | Age: 53
DRG: 871 | End: 2018-01-14
Attending: INTERNAL MEDICINE
Payer: SELF-PAY

## 2018-01-14 LAB
ANION GAP SERPL CALC-SCNC: 6 MMOL/L (ref 7–16)
BUN SERPL-MCNC: 32 MG/DL (ref 6–23)
CALCIUM SERPL-MCNC: 9.8 MG/DL (ref 8.3–10.4)
CHLORIDE SERPL-SCNC: 104 MMOL/L (ref 98–107)
CO2 SERPL-SCNC: 32 MMOL/L (ref 21–32)
CREAT SERPL-MCNC: 1.39 MG/DL (ref 0.8–1.5)
GLUCOSE SERPL-MCNC: 150 MG/DL (ref 65–100)
POTASSIUM SERPL-SCNC: 4.2 MMOL/L (ref 3.5–5.1)
SODIUM SERPL-SCNC: 142 MMOL/L (ref 136–145)

## 2018-01-14 PROCEDURE — 74011250637 HC RX REV CODE- 250/637: Performed by: INTERNAL MEDICINE

## 2018-01-14 PROCEDURE — 94760 N-INVAS EAR/PLS OXIMETRY 1: CPT

## 2018-01-14 PROCEDURE — 74011000250 HC RX REV CODE- 250: Performed by: INTERNAL MEDICINE

## 2018-01-14 PROCEDURE — 74011250636 HC RX REV CODE- 250/636: Performed by: INTERNAL MEDICINE

## 2018-01-14 PROCEDURE — 74011250637 HC RX REV CODE- 250/637: Performed by: NURSE PRACTITIONER

## 2018-01-14 PROCEDURE — 80048 BASIC METABOLIC PNL TOTAL CA: CPT | Performed by: INTERNAL MEDICINE

## 2018-01-14 PROCEDURE — 71045 X-RAY EXAM CHEST 1 VIEW: CPT

## 2018-01-14 PROCEDURE — 99233 SBSQ HOSP IP/OBS HIGH 50: CPT | Performed by: INTERNAL MEDICINE

## 2018-01-14 PROCEDURE — 77010033678 HC OXYGEN DAILY

## 2018-01-14 PROCEDURE — 36592 COLLECT BLOOD FROM PICC: CPT

## 2018-01-14 PROCEDURE — 65270000029 HC RM PRIVATE

## 2018-01-14 PROCEDURE — 74011250637 HC RX REV CODE- 250/637: Performed by: FAMILY MEDICINE

## 2018-01-14 PROCEDURE — 74011250636 HC RX REV CODE- 250/636: Performed by: FAMILY MEDICINE

## 2018-01-14 PROCEDURE — 94640 AIRWAY INHALATION TREATMENT: CPT

## 2018-01-14 RX ORDER — ACETAZOLAMIDE 250 MG/1
250 TABLET ORAL ONCE
Status: COMPLETED | OUTPATIENT
Start: 2018-01-14 | End: 2018-01-14

## 2018-01-14 RX ADMIN — Medication 10 ML: at 05:32

## 2018-01-14 RX ADMIN — ALBUTEROL SULFATE 2.5 MG: 2.5 SOLUTION RESPIRATORY (INHALATION) at 07:48

## 2018-01-14 RX ADMIN — Medication 10 ML: at 14:36

## 2018-01-14 RX ADMIN — CARVEDILOL 3.12 MG: 3.12 TABLET, FILM COATED ORAL at 09:12

## 2018-01-14 RX ADMIN — ONDANSETRON 4 MG: 2 INJECTION INTRAMUSCULAR; INTRAVENOUS at 05:31

## 2018-01-14 RX ADMIN — CARVEDILOL 3.12 MG: 3.12 TABLET, FILM COATED ORAL at 17:26

## 2018-01-14 RX ADMIN — FAMOTIDINE 20 MG: 10 INJECTION, SOLUTION INTRAVENOUS at 22:41

## 2018-01-14 RX ADMIN — ALBUTEROL SULFATE 2.5 MG: 2.5 SOLUTION RESPIRATORY (INHALATION) at 11:07

## 2018-01-14 RX ADMIN — METHYLPREDNISOLONE SODIUM SUCCINATE 40 MG: 40 INJECTION, POWDER, FOR SOLUTION INTRAMUSCULAR; INTRAVENOUS at 17:26

## 2018-01-14 RX ADMIN — METHYLPREDNISOLONE SODIUM SUCCINATE 40 MG: 40 INJECTION, POWDER, FOR SOLUTION INTRAMUSCULAR; INTRAVENOUS at 05:43

## 2018-01-14 RX ADMIN — Medication 5 ML: at 22:42

## 2018-01-14 RX ADMIN — HYDRALAZINE HYDROCHLORIDE 20 MG: 20 INJECTION INTRAMUSCULAR; INTRAVENOUS at 02:56

## 2018-01-14 RX ADMIN — ALBUTEROL SULFATE 2.5 MG: 2.5 SOLUTION RESPIRATORY (INHALATION) at 15:06

## 2018-01-14 RX ADMIN — HEPARIN SODIUM 5000 UNITS: 5000 INJECTION, SOLUTION INTRAVENOUS; SUBCUTANEOUS at 22:41

## 2018-01-14 RX ADMIN — SERTRALINE HYDROCHLORIDE 50 MG: 50 TABLET ORAL at 09:12

## 2018-01-14 RX ADMIN — CHLORHEXIDINE GLUCONATE 15 ML: 1.2 RINSE ORAL at 09:15

## 2018-01-14 RX ADMIN — ALBUTEROL SULFATE 2.5 MG: 2.5 SOLUTION RESPIRATORY (INHALATION) at 20:04

## 2018-01-14 RX ADMIN — LISINOPRIL 20 MG: 20 TABLET ORAL at 09:12

## 2018-01-14 RX ADMIN — FLUTICASONE PROPIONATE 2 SPRAY: 50 SPRAY, METERED NASAL at 09:15

## 2018-01-14 RX ADMIN — HEPARIN SODIUM 5000 UNITS: 5000 INJECTION, SOLUTION INTRAVENOUS; SUBCUTANEOUS at 05:43

## 2018-01-14 RX ADMIN — CHLORHEXIDINE GLUCONATE 15 ML: 1.2 RINSE ORAL at 22:41

## 2018-01-14 RX ADMIN — FAMOTIDINE 20 MG: 10 INJECTION, SOLUTION INTRAVENOUS at 09:12

## 2018-01-14 RX ADMIN — HEPARIN SODIUM 5000 UNITS: 5000 INJECTION, SOLUTION INTRAVENOUS; SUBCUTANEOUS at 14:36

## 2018-01-14 RX ADMIN — ACETAZOLAMIDE 250 MG: 250 TABLET ORAL at 12:34

## 2018-01-14 RX ADMIN — METHYLPREDNISOLONE SODIUM SUCCINATE 40 MG: 40 INJECTION, POWDER, FOR SOLUTION INTRAMUSCULAR; INTRAVENOUS at 13:14

## 2018-01-14 RX ADMIN — WATER 2 G: 1 INJECTION INTRAMUSCULAR; INTRAVENOUS; SUBCUTANEOUS at 11:01

## 2018-01-14 NOTE — PROGRESS NOTES
Verbal bedside report received from [facility]. Shift assessment completed. Patient is alert and oriented x 4, no complaints of pain. NSR on monitor. 02 sat in low to mid 90's on 6 L NC. Lung sounds course, diminished in bases. Frequent, productive coughing. Discussed plans with patient to wear BIPAP while sleeping and importance of compliance.     Lines:  R IJ Quad CVC    Drips:  none    Drains:  [place]

## 2018-01-14 NOTE — PROGRESS NOTES
Problem: Falls - Risk of  Goal: *Absence of Falls  Document Mega Fall Risk and appropriate interventions in the flowsheet.    Outcome: Progressing Towards Goal  Fall Risk Interventions:  Mobility Interventions: Bed/chair exit alarm, Communicate number of staff needed for ambulation/transfer, Patient to call before getting OOB, Strengthening exercises (ROM-active/passive), Utilize walker, cane, or other assitive device    Mentation Interventions: Bed/chair exit alarm, Door open when patient unattended, Increase mobility, More frequent rounding, Reorient patient, Toileting rounds    Medication Interventions: Bed/chair exit alarm, Evaluate medications/consider consulting pharmacy    Elimination Interventions: Bed/chair exit alarm, Call light in reach, Patient to call for help with toileting needs, Toileting schedule/hourly rounds

## 2018-01-14 NOTE — PROGRESS NOTES
Patient was compliant with BIPAP overnight, did request two short breaks. He is very scared and anxious about having to be intubated again. Educated patient on the importance of compliance with CPAP/BIPAP. Patient states he did not ever have a CPAP at home?  He could also benefit from more education on his condition; he was concerned if he would be able to ride in a car again because he knew someone who \" of carbon poisoning in his garage\"

## 2018-01-14 NOTE — PROGRESS NOTES
Dual skin assessment completed with Cuca Vasquez RN. Patient has scars to right knee, left forearm, left elbow, left upper arm, and left shoulder. Patient's toenails are yellow. Patient has a wart located on the fourth finger of the right hand. Sacral area is intact and blanchable without redness. Excoriation under abdominal fold. No other abnormalities noted.

## 2018-01-14 NOTE — PROGRESS NOTES
TRANSFER - IN REPORT:    [facility] Shraddha Ion  being received from CCU for routine progression of care      Report consisted of patients Situation, Background, Assessment and   Recommendations(SBAR). Information from the following report(s) SBAR was reviewed with the receiving nurse. Opportunity for questions and clarification was provided. Assessment completed upon patients arrival to unit and care assumed.

## 2018-01-14 NOTE — PROGRESS NOTES
Critical Care Daily Progress Note: 1/14/2018  Admission Date: 1/6/2018     Patient is a 45 y.o.  male admitted on 1/6 with acute hypoxemic respiratory failure, with methamphetamine positive, smoker, morbid obesity, suspected NATALIIA/OHS. Also has sinusitis on CT and possible pneumonia. Was on Telemetry and being diuresed. Patient in room foaming in mouth, combative and disoriented. He was being aggressively bagged in Room and when I came noted to be moving around and eyes open. I took off Ambu bag and reported names Sujatha Gonzales but disoriented. Shortly after admission to ICU on BIPAP had to be intubated on 1/7/18. He has been treated for pneumonia, sinusitis, COPD exacerbation and volume overload. Subjective:   Bicarb down to 32 on labs today and renal function improved with creatinine of 1.39. Diuresed with 1.2L negative yesterday.     No fevers or leukocytosis    Wore BiPAP overnight and is now on 6lpm.    Review of Systems:  -Fever  -Headaches +sinus drainage  -Chest pain  -Dyspnea, -wheezing,- cough  -Abdominal pain-, constipation  +slight Leg swelling  +sputum production      Current Facility-Administered Medications   Medication Dose Route Frequency    albuterol (PROVENTIL VENTOLIN) nebulizer solution 2.5 mg  2.5 mg Nebulization QID RT    methylPREDNISolone (PF) (SOLU-MEDROL) injection 40 mg  40 mg IntraVENous Q6H    promethazine (PHENERGAN) with saline injection 12.5 mg  12.5 mg IntraVENous Q6H PRN    LORazepam (ATIVAN) tablet 0.5 mg  0.5 mg Oral Q8H PRN    dexmedeTOMidine (PRECEDEX) 400 mcg in 0.9% sodium chloride 100 mL infusion  0.2-0.7 mcg/kg/hr IntraVENous TITRATE    NOREPINephrine (LEVOPHED) 4 mg in 5% dextrose 250 mL infusion  2-16 mcg/min IntraVENous TITRATE    haloperidol lactate (HALDOL) injection 5 mg  5 mg IntraVENous Q6H PRN    cefTRIAXone (ROCEPHIN) 2 g in sterile water (preservative free) 20 mL IV syringe  2 g IntraVENous Q24H    sertraline (ZOLOFT) tablet 50 mg  50 mg Oral DAILY    LORazepam (ATIVAN) injection 1 mg  1 mg IntraVENous Q8H PRN    sodium chloride (OCEAN) 0.65 % nasal spray 2 Spray  2 Spray Both Nostrils QID    sodium chloride (OCEAN) 0.65 % nasal spray 2 Spray  2 Spray Both Nostrils Q2H PRN    fluticasone (FLONASE) 50 mcg/actuation nasal spray 2 Spray  2 Spray Both Nostrils DAILY    influenza vaccine 2017-18 (3 yrs+)(PF) (FLUZONE QUAD/FLUARIX QUAD) injection 0.5 mL  0.5 mL IntraMUSCular PRIOR TO DISCHARGE    famotidine (PF) (PEPCID) 20 mg in sodium chloride 0.9 % 10 mL injection  20 mg IntraVENous Q12H    NUTRITIONAL SUPPORT ELECTROLYTE PRN ORDERS   Does Not Apply PRN    nicotine (NICODERM CQ) 21 mg/24 hr patch 1 Patch  1 Patch TransDERmal DAILY    metoprolol (LOPRESSOR) injection 5 mg  5 mg IntraVENous Q4H PRN    hydrALAZINE (APRESOLINE) 20 mg/mL injection 20 mg  20 mg IntraVENous Q6H PRN    chlorhexidine (PERIDEX) 0.12 % mouthwash 15 mL  15 mL Oral Q12H    sodium chloride (NS) flush 5-10 mL  5-10 mL IntraVENous Q8H    sodium chloride (NS) flush 5-10 mL  5-10 mL IntraVENous PRN    lisinopril (PRINIVIL, ZESTRIL) tablet 20 mg  20 mg Oral DAILY    carvedilol (COREG) tablet 3.125 mg  3.125 mg Oral BID WITH MEALS    ondansetron (ZOFRAN) injection 4 mg  4 mg IntraVENous Q6H PRN    acetaminophen (TYLENOL) tablet 650 mg  650 mg Oral Q6H PRN    diphenhydrAMINE (BENADRYL) capsule 25 mg  25 mg Oral Q4H PRN    polyethylene glycol (MIRALAX) packet 17 g  17 g Oral DAILY PRN    guaiFENesin ER (MUCINEX) tablet 600 mg  600 mg Oral Q12H PRN    calcium carbonate (TUMS) chewable tablet 400 mg [elemental]  400 mg Oral TID PRN    heparin (porcine) injection 5,000 Units  5,000 Units SubCUTAneous Q8H         Objective:     Vitals:    01/14/18 0633 01/14/18 0634 01/14/18 0731 01/14/18 0749   BP: 139/77  128/70    Pulse: (!) 102  (!) 104    Resp: 27  25    Temp:   99 °F (37.2 °C)    SpO2:  92% 93% 93%   Weight:       Height:           Intake and Output: 01/12 1901 - 01/14 0700  In: 646.7 [P.O.:500; I.V.:146.7]  Out: 2665 [Urine:2665]  01/14 0701 - 01/14 1900  In: -   Out: 140 [Urine:140]    Physical Exam:          Constitutional:  Obese WM in bed and in no distress on 5 LPM  EENMT:  Sclera clear, pupils equal, oral mucosa moist, no nasal drainage today. Respiratory:  Clear but distant sounds   Cardiovascular:  RRR with no M,G,R;  Gastrointestinal:  soft with no tenderness; positive bowel sounds present  Musculoskeletal:  warm with no cyanosis, trace lower leg edema  Skin:  no jaundice or ecchymosis  Neurologic: no gross focal deficits  Psychiatric:  Calm and answering questions    LINES:    central line, alegre     DRIPS:   none            ABG:   Recent Labs      01/13/18   0332  01/12/18   1500  01/12/18   1105   PH  7.45  7.36  7.18*   PCO2  49*  66*  126*   PO2  89  54*  85   HCO3  33*  37*  46*        LAB  No results for input(s): GLUCPOC in the last 72 hours. No lab exists for component: [place]  Recent Labs      01/13/18   0324  01/12/18   0416   WBC  6.9  8.1   HGB  13.9  14.8   HCT  46.2  48.3   PLT  160  163     Recent Labs      01/14/18   0350  01/13/18   0324  01/12/18   0416   NA  142  144  143   K  4.2  3.5  3.9   CL  104  102  99   CO2  32  34*  38*   GLU  150*  96  115*   BUN  32*  27*  26*   CREA  1.39  1.44  1.41   MG   --    --   2.2   PHOS   --    --   3.3   CA  9.8  9.2  8.7     No results for input(s): LCAD, LAC in the last 72 hours. Assessment:  (Medical Decision Making)     Hospital Problems  Never Reviewed          Codes Class Noted POA    Severe sepsis ([facility] [address] ICD-10-CM: A41.9, R65.20  ICD-9-CM: 038.9, 995.92  1/9/2018 Unknown    Likely from acute on chronic sinusitis and pneumonia. Community acquired pneumonia of right lower lobe of lung [facility]) ICD-10-CM: J18.1  ICD-9-CM: 340  1/9/2018 Unknown      --has h.  Flu and strep pneumo on culture  --taper to ceftriaxone from zosyn started 1/9 (day 5)    Acute metabolic encephalopathy ICD-10-CM: G93.41  ICD-9-CM: 348.31  1/7/2018 Yes    Resolved yesterday but worse today with hypercarbia    Hypotension ICD-10-CM: I95.9  ICD-9-CM: 458.9  1/7/2018 Yes    Off pressors    Urethral stricture ICD-10-CM: N35.9  ICD-9-CM: 598.9  1/7/2018 Yes    Has alegre placed by urology    COPD (chronic obstructive pulmonary disease) ([facility] (Chronic) ICD-10-CM: J44.9  ICD-9-CM: 574  1/6/2018 Yes    Likely and smokes about 1.5 PPD and uses drugs    Nicotine dependence (Chronic) ICD-10-CM: F17.200  ICD-9-CM: 305.1  1/6/2018 Yes    Nicotine patch    Methamphetamine abuse ICD-10-CM: F15.10  ICD-9-CM: 305.70  1/6/2018 Yes    See below    Anasarca ICD-10-CM: R60.1  ICD-9-CM: 782.3  1/6/2018 Yes    Continued diamox today     NATALIIA (obstructive sleep apnea) (Chronic) ICD-10-CM: G47.33  ICD-9-CM: 327.23  1/6/2018 Yes    Does not have a CPAP at home but clearly with likely OHS vs NATALIIA, see below    Acute kidney injury [facility]) ICD-10-CM: N17.9  ICD-9-CM: 584.9  1/6/2018 Yes    Cr is 1.4 now    Acute on chronic respiratory failure with hypoxia and hypercapnia ([facility] ICD-10-CM: J96.21, J96.22  ICD-9-CM: 518.84, 786.09, 799.02  1/6/2018 Yes    Extubate this afternoon. Will need BiPAP at night      ? COPD:  Strong family history. Steroids added. +smoking history. Plan:  (Medical Decision Making)     --Wean O2 as tolerated  --continue day 5/14 of abx for pneumonia/sinusitis (ceftriaxone ongoing)  --nicotine patch  --continue scheduled bronchodilators, flutter valve  --bronchodilators scheduled  --keep alegre in place til discharge  --hospitalist to resume primary care tomorrow if transfers to floor. More than 50% of the time documented was spent in face-to-face contact with the patient and in the care of the patient on the floor/unit where the patient is located.     Merlin Ball, MD

## 2018-01-15 ENCOUNTER — APPOINTMENT (OUTPATIENT)
Dept: GENERAL RADIOLOGY | Age: 53
DRG: 871 | End: 2018-01-15
Attending: INTERNAL MEDICINE
Payer: SELF-PAY

## 2018-01-15 PROBLEM — G93.41 ACUTE METABOLIC ENCEPHALOPATHY: Status: RESOLVED | Noted: 2018-01-07 | Resolved: 2018-01-15

## 2018-01-15 PROBLEM — Z59.00 HOMELESS: Status: ACTIVE | Noted: 2018-01-15

## 2018-01-15 PROBLEM — F10.10 ALCOHOL ABUSE: Chronic | Status: ACTIVE | Noted: 2018-01-15

## 2018-01-15 PROBLEM — R65.20 SEVERE SEPSIS (HCC): Status: RESOLVED | Noted: 2018-01-09 | Resolved: 2018-01-15

## 2018-01-15 PROBLEM — A41.9 SEVERE SEPSIS (HCC): Status: RESOLVED | Noted: 2018-01-09 | Resolved: 2018-01-15

## 2018-01-15 PROBLEM — I95.9 HYPOTENSION: Status: RESOLVED | Noted: 2018-01-07 | Resolved: 2018-01-15

## 2018-01-15 LAB
ANION GAP SERPL CALC-SCNC: 9 MMOL/L (ref 7–16)
BACTERIA SPEC CULT: NORMAL
BACTERIA SPEC CULT: NORMAL
BUN SERPL-MCNC: 41 MG/DL (ref 6–23)
CALCIUM SERPL-MCNC: 9.1 MG/DL (ref 8.3–10.4)
CHLORIDE SERPL-SCNC: 106 MMOL/L (ref 98–107)
CO2 SERPL-SCNC: 27 MMOL/L (ref 21–32)
CREAT SERPL-MCNC: 1.43 MG/DL (ref 0.8–1.5)
GLUCOSE SERPL-MCNC: 116 MG/DL (ref 65–100)
MAGNESIUM SERPL-MCNC: 2.5 MG/DL (ref 1.8–2.4)
PHOSPHATE SERPL-MCNC: 5.4 MG/DL (ref 2.5–4.5)
POTASSIUM SERPL-SCNC: 4.5 MMOL/L (ref 3.5–5.1)
SERVICE CMNT-IMP: NORMAL
SERVICE CMNT-IMP: NORMAL
SODIUM SERPL-SCNC: 142 MMOL/L (ref 136–145)

## 2018-01-15 PROCEDURE — 94640 AIRWAY INHALATION TREATMENT: CPT

## 2018-01-15 PROCEDURE — 74011250637 HC RX REV CODE- 250/637: Performed by: NURSE PRACTITIONER

## 2018-01-15 PROCEDURE — 74011250636 HC RX REV CODE- 250/636: Performed by: INTERNAL MEDICINE

## 2018-01-15 PROCEDURE — 74011250637 HC RX REV CODE- 250/637: Performed by: INTERNAL MEDICINE

## 2018-01-15 PROCEDURE — 97164 PT RE-EVAL EST PLAN CARE: CPT

## 2018-01-15 PROCEDURE — 74011250637 HC RX REV CODE- 250/637: Performed by: FAMILY MEDICINE

## 2018-01-15 PROCEDURE — 83735 ASSAY OF MAGNESIUM: CPT | Performed by: INTERNAL MEDICINE

## 2018-01-15 PROCEDURE — 71045 X-RAY EXAM CHEST 1 VIEW: CPT

## 2018-01-15 PROCEDURE — 74011250637 HC RX REV CODE- 250/637: Performed by: HOSPITALIST

## 2018-01-15 PROCEDURE — 74011000250 HC RX REV CODE- 250: Performed by: INTERNAL MEDICINE

## 2018-01-15 PROCEDURE — 74011250636 HC RX REV CODE- 250/636: Performed by: FAMILY MEDICINE

## 2018-01-15 PROCEDURE — 99232 SBSQ HOSP IP/OBS MODERATE 35: CPT | Performed by: INTERNAL MEDICINE

## 2018-01-15 PROCEDURE — 77010033678 HC OXYGEN DAILY

## 2018-01-15 PROCEDURE — 97116 GAIT TRAINING THERAPY: CPT

## 2018-01-15 PROCEDURE — 84100 ASSAY OF PHOSPHORUS: CPT | Performed by: INTERNAL MEDICINE

## 2018-01-15 PROCEDURE — 94760 N-INVAS EAR/PLS OXIMETRY 1: CPT

## 2018-01-15 PROCEDURE — 73502 X-RAY EXAM HIP UNI 2-3 VIEWS: CPT

## 2018-01-15 PROCEDURE — 65270000029 HC RM PRIVATE

## 2018-01-15 PROCEDURE — 36591 DRAW BLOOD OFF VENOUS DEVICE: CPT

## 2018-01-15 PROCEDURE — 80048 BASIC METABOLIC PNL TOTAL CA: CPT | Performed by: INTERNAL MEDICINE

## 2018-01-15 RX ORDER — LANOLIN ALCOHOL/MO/W.PET/CERES
100 CREAM (GRAM) TOPICAL DAILY
Status: DISCONTINUED | OUTPATIENT
Start: 2018-01-15 | End: 2018-01-24 | Stop reason: HOSPADM

## 2018-01-15 RX ORDER — FAMOTIDINE 20 MG/1
20 TABLET, FILM COATED ORAL 2 TIMES DAILY
Status: DISCONTINUED | OUTPATIENT
Start: 2018-01-16 | End: 2018-01-24 | Stop reason: HOSPADM

## 2018-01-15 RX ORDER — FOLIC ACID 1 MG/1
1 TABLET ORAL DAILY
Status: DISCONTINUED | OUTPATIENT
Start: 2018-01-15 | End: 2018-01-24 | Stop reason: HOSPADM

## 2018-01-15 RX ADMIN — METHYLPREDNISOLONE SODIUM SUCCINATE 40 MG: 40 INJECTION, POWDER, FOR SOLUTION INTRAMUSCULAR; INTRAVENOUS at 01:19

## 2018-01-15 RX ADMIN — FOLIC ACID 1 MG: 1 TABLET ORAL at 12:05

## 2018-01-15 RX ADMIN — FAMOTIDINE 20 MG: 10 INJECTION, SOLUTION INTRAVENOUS at 08:56

## 2018-01-15 RX ADMIN — Medication 10 ML: at 22:58

## 2018-01-15 RX ADMIN — METHYLPREDNISOLONE SODIUM SUCCINATE 40 MG: 40 INJECTION, POWDER, FOR SOLUTION INTRAMUSCULAR; INTRAVENOUS at 06:29

## 2018-01-15 RX ADMIN — HEPARIN SODIUM 5000 UNITS: 5000 INJECTION, SOLUTION INTRAVENOUS; SUBCUTANEOUS at 22:57

## 2018-01-15 RX ADMIN — ALBUTEROL SULFATE 2.5 MG: 2.5 SOLUTION RESPIRATORY (INHALATION) at 12:54

## 2018-01-15 RX ADMIN — CARVEDILOL 3.12 MG: 3.12 TABLET, FILM COATED ORAL at 08:56

## 2018-01-15 RX ADMIN — CHLORHEXIDINE GLUCONATE 15 ML: 1.2 RINSE ORAL at 08:56

## 2018-01-15 RX ADMIN — Medication 5 ML: at 06:29

## 2018-01-15 RX ADMIN — CARVEDILOL 3.12 MG: 3.12 TABLET, FILM COATED ORAL at 17:19

## 2018-01-15 RX ADMIN — HYDRALAZINE HYDROCHLORIDE 20 MG: 20 INJECTION INTRAMUSCULAR; INTRAVENOUS at 06:29

## 2018-01-15 RX ADMIN — LISINOPRIL 20 MG: 20 TABLET ORAL at 08:56

## 2018-01-15 RX ADMIN — SALINE NASAL SPRAY 2 SPRAY: 1.5 SOLUTION NASAL at 18:00

## 2018-01-15 RX ADMIN — ALBUTEROL SULFATE 2.5 MG: 2.5 SOLUTION RESPIRATORY (INHALATION) at 21:23

## 2018-01-15 RX ADMIN — METHYLPREDNISOLONE SODIUM SUCCINATE 40 MG: 40 INJECTION, POWDER, FOR SOLUTION INTRAMUSCULAR; INTRAVENOUS at 22:58

## 2018-01-15 RX ADMIN — SERTRALINE HYDROCHLORIDE 50 MG: 50 TABLET ORAL at 08:56

## 2018-01-15 RX ADMIN — WATER 2 G: 1 INJECTION INTRAMUSCULAR; INTRAVENOUS; SUBCUTANEOUS at 11:56

## 2018-01-15 RX ADMIN — HEPARIN SODIUM 5000 UNITS: 5000 INJECTION, SOLUTION INTRAVENOUS; SUBCUTANEOUS at 15:04

## 2018-01-15 RX ADMIN — SALINE NASAL SPRAY 2 SPRAY: 1.5 SOLUTION NASAL at 09:00

## 2018-01-15 RX ADMIN — SALINE NASAL SPRAY 2 SPRAY: 1.5 SOLUTION NASAL at 13:00

## 2018-01-15 RX ADMIN — ALBUTEROL SULFATE 2.5 MG: 2.5 SOLUTION RESPIRATORY (INHALATION) at 09:28

## 2018-01-15 RX ADMIN — Medication 5 ML: at 15:04

## 2018-01-15 RX ADMIN — ALBUTEROL SULFATE 2.5 MG: 2.5 SOLUTION RESPIRATORY (INHALATION) at 16:29

## 2018-01-15 RX ADMIN — METHYLPREDNISOLONE SODIUM SUCCINATE 40 MG: 40 INJECTION, POWDER, FOR SOLUTION INTRAMUSCULAR; INTRAVENOUS at 11:56

## 2018-01-15 RX ADMIN — Medication 100 MG: at 12:05

## 2018-01-15 RX ADMIN — HEPARIN SODIUM 5000 UNITS: 5000 INJECTION, SOLUTION INTRAVENOUS; SUBCUTANEOUS at 06:29

## 2018-01-15 RX ADMIN — FLUTICASONE PROPIONATE 2 SPRAY: 50 SPRAY, METERED NASAL at 08:56

## 2018-01-15 NOTE — PROGRESS NOTES
Patient had oxygen out of nose, saturation in the mid 70's, placed on BIPAP for inline nebulizer, unable to understand much of what he is saying, mumbles a lot.

## 2018-01-15 NOTE — PROGRESS NOTES
Problem: Mobility Impaired (Adult and Pediatric)  Goal: *Acute Goals and Plan of Care (Insert Text)  STG:  (1.)Mr. Schaffer will move from supine to sit and sit to supine , scoot up and down and roll side to side with STAND BY ASSIST within 3 day(s). (2.)Mr. Schaffer will transfer from bed to chair and chair to bed with CONTACT GUARD ASSIST using the least restrictive device within 3 day(s). (3.)Mr. Schaffer will ambulate with CONTACT GUARD ASSIST for 100 feet with the least restrictive device within 3 day(s). (4.)Mr. Schaffer will perform standing static and dynamic balance activities x 15 minutes with CONTACT GUARD ASSIST to improve safety within 3 day(s). (5.)Mr. Schaffer will maintain stable vital signs throughout all functional mobility within 3 days. LTG:  (1.)Mr. Schaffer will move from supine to sit and sit to supine , scoot up and down and roll side to side in bed with MODIFIED INDEPENDENCE within 7 day(s). (2.)Mr. Schaffer will transfer from bed to chair and chair to bed with MODIFIED INDEPENDENCE using the least restrictive device within 7 day(s). (3.)Mr. Schaffer will ambulate with MODIFIED INDEPENDENCE for 250+ feet with the least restrictive device within 7 day(s).   ________________________________________________________________________________________________      PHYSICAL THERAPY: Re-evaluation, Treatment Day: Day of Assessment, AM 1/15/2018  INPATIENT: Hospital Day: 10  Payor: SELF PAY / Plan: [facility] SELF PAY / Product Type: Self Pay /      NAME/AGE/GENDER: Radha Barajas is a 45 y.o. male   PRIMARY DIAGNOSIS: COPD (chronic obstructive pulmonary disease) (HCC) Acute on chronic respiratory failure with hypoxia and hypercapnia (HCC) Acute on chronic respiratory failure with hypoxia and hypercapnia (HCC)        ICD-10: Treatment Diagnosis:   · Difficulty in walking, Not elsewhere classified (R26.2)   Precaution/Allergies:  Review of patient's allergies indicates no known allergies. ASSESSMENT:     Mr. Jacinda Mcqueen is a 45 y.o. male with COPD. He reports he is homeless presently and typically ambulates with a straight cane. Pt was intubated and now extubated again for this reeval. When entering room pt was supine with BIPAP lying next to head, alarm on machine going off and pt groggy and difficult to understand with slurred speech. He was willing to participate. Replaced with 4L O2 and sats increased to 92%. He was able to get to EOB with SBA. Sitting at EOB required constant support probably due to lethargy. SBA to stand but needed CGA due to pt leaning to far back. He states he uses SPC in R hand with R hip hurting more. When ambulating he trialed using SPC in L which he coordinated about 66% of time. Returned BTB after session on 4L O2, 92% with alarm on. Goals were adjusted and updated. Will cont with PT to address impairments. Radha Barajas is currently functioning below his baseline and would benefit from skilled PT during acute care stay to maximize safety and independence with functional mobility decreasing risk for falls. This section established at most recent assessment   PROBLEM LIST (Impairments causing functional limitations):  1. Decreased Strength  2. Decreased ADL/Functional Activities  3. Decreased Transfer Abilities  4. Decreased Ambulation Ability/Technique  5. Decreased Balance  6. Increased Pain  7. Decreased Activity Tolerance  8. Decreased Knowledge of Precautions  9. Decreased [place] with Home Exercise Program   INTERVENTIONS PLANNED: (Benefits and precautions of physical therapy have been discussed with the patient.)  1. Balance Exercise  2. Bed Mobility  3. Family Education  4. Gait Training  5. Home Exercise Program (HEP)  6. Therapeutic Activites  7. Therapeutic Exercise/Strengthening  8. Transfer Training  9. Patient Education  10.  Group Therapy     TREATMENT PLAN: Frequency/Duration: 3 times a week for duration of hospital stay  Rehabilitation Potential For Stated Goals: Good     RECOMMENDED REHABILITATION/EQUIPMENT: (at time of discharge pending progress): Due to the probability of continued deficits (see above) this patient will likely need continued skilled physical therapy after discharge. Equipment:    None at this time              HISTORY:   History of Present Injury/Illness (Reason for Referral):  Per MD: Miguel Bibiana is a 45y.o. year-old male with a past medical history of polysubstance abuse and NATALIIA with poor follow up who presents to the ER with a complaint of worsening shortness of breath and lower extremity/abdominal swelling over the past month. He admits to respiratory problems in the past and apparently was supposed to get a trach placed at some point but didn't. He has also had CPAP recommended but cannot wear it because it is too loud for him. He admits to occasional cough productive of green sputum. Denies fevers, chills, nausea, vomiting, constipation, diarrhea, chest pain. Past Medical History/Comorbidities:   Mr. Karuna Ko  has a past medical history of Sleep disorder. Mr. Karnua Ko  has no past surgical history on file. Social History/Living Environment:   Home Environment: Trailer/mobile home  # Steps to Enter: 1  One/Two Story Residence: One story  Living Alone: No  Support Systems: Child(suzette), Family member(s), Spouse/Significant Other/Partner  Patient Expects to be Discharged to[de-identified] Private residence  Current DME Used/Available at Home: Cane, straight  Tub or Shower Type: Tub/Shower combination  Prior Level of Function/Work/Activity:  Patient ambulated with modified independence prior to admission, required some assist from wife for donning socks.      Number of Personal Factors/Comorbidities that affect the Plan of Care: 1-2: MODERATE COMPLEXITY   EXAMINATION:   Most Recent Physical Functioning:   Gross Assessment:                  Posture:     Balance:  Sitting: Impaired  Sitting - Static: Good (unsupported)  Sitting - Dynamic: Fair (occasional)  Standing: Impaired  Standing - Static: Fair  Standing - Dynamic : Fair Bed Mobility:  Supine to Sit: Contact guard assistance  Scooting: Contact guard assistance  Wheelchair Mobility:     Transfers:  Sit to Stand: Stand-by asssistance  Stand to Sit: Stand-by asssistance  Gait:     Base of Support: Widened  Speed/Fawn: Shuffled; Slow  Stance: Weight shift  Gait Abnormalities: Decreased step clearance;Shuffling gait; Step to gait  Distance (ft): 250 Feet (ft)  Assistive Device: Cane, straight  Ambulation - Level of Assistance: Contact guard assistance      Body Structures Involved:  1. Heart  2. Lungs  3. Muscles Body Functions Affected:  1. Sensory/Pain  2. Respiratory  3. Neuromusculoskeletal  4. Movement Related Activities and Participation Affected:  1. Mobility  2. Self Care  3. Domestic Life  4. Interpersonal Interactions and Relationships  5. Community, Social and [place]   Number of elements that affect the Plan of Care: 4+: HIGH COMPLEXITY   CLINICAL PRESENTATION:   Presentation: Stable and uncomplicated: LOW COMPLEXITY   CLINICAL DECISION MAKIN [facility] Short Form  How much difficulty does the patient currently have. .. Unable A Lot A Little None   1. Turning over in bed (including adjusting bedclothes, sheets and blankets)? [] 1   [] 2   [x] 3   [] 4   2. Sitting down on and standing up from a chair with arms ( e.g., wheelchair, bedside commode, etc.)   [] 1   [] 2   [x] 3   [] 4   3. Moving from lying on back to sitting on the side of the bed? [] 1   [] 2   [] 3   [] 4   How much help from another person does the patient currently need. .. Total A Lot A Little None   4. Moving to and from a bed to a chair (including a wheelchair)? [] 1   [] 2   [x] 3   [] 4   5. Need to walk in hospital room? [] 1   [] 2   [x] 3   [] 4   6. Climbing 3-5 steps with a railing?    [] 1   [x] 2   [] 3 [] 4   © 2007, Trustees of [address] [place] [address] [address] [id number], under license to [employer]. All rights reserved      Score:  Initial: 17 Most Recent: X (Date: -- )    Interpretation of Tool:  Represents activities that are increasingly more difficult (i.e. Bed mobility, Transfers, Gait). Score 24 23 22-20 19-15 14-10 9-7 6     Modifier CH CI CJ CK CL CM CN      ? Mobility - Walking and Moving Around:     - CURRENT STATUS: CK - 40%-59% impaired, limited or restricted    - GOAL STATUS: CJ - 20%-39% impaired, limited or restricted    - D/C STATUS:  ---------------To be determined---------------  Payor: SELF PAY / Plan: [facility] SELF PAY / Product Type: Self Pay /      Medical Necessity:     · Patient demonstrates good rehab potential due to higher previous functional level. Reason for Services/Other Comments:  · Patient continues to require modification of therapeutic interventions to increase complexity of exercises. Use of outcome tool(s) and clinical judgement create a POC that gives a: Clear prediction of patient's progress: LOW COMPLEXITY            TREATMENT:   (In addition to Assessment/Re-Assessment sessions the following treatments were rendered)   Pre-treatment Symptoms/Complaints:  none  Pain: Initial:   Pain Intensity 1: 0  Post Session:  0/10     PT Reassessment Table:   Initial Physical Functioning: Most Recent Physical Functioning:   Gross Assessment:  AROM: Generally decreased, functional  PROM: Generally decreased, functional  Strength: Generally decreased, functional  Coordination: Generally decreased, functional  Sensation: Intact Gross Assessment:       Posture:   Posture (WDL): Exceptions to WDL  Posture Assessment:  Forward head, Rounded shoulders Posture:       Balance:   Sitting: Impaired  Sitting - Static: Good (unsupported)  Sitting - Dynamic: Fair (occasional)  Standing: Impaired  Standing - Static: Fair  Standing - Dynamic : Fair Balance:   Sitting: Impaired  Sitting - Static: Good (unsupported)  Sitting - Dynamic: Fair (occasional)  Standing: Impaired  Standing - Static: Fair  Standing - Dynamic : Fair   Bed Mobility:   Supine to Sit: Minimum assistance  Scooting: Contact guard assistance Bed Mobility:   Supine to Sit: Contact guard assistance  Scooting: Contact guard assistance   Wheelchair Mobility:    Wheelchair Mobility:      Transfers:   Sit to Stand: Minimum assistance  Stand to Sit: Minimum assistance  Bed to Chair: Minimum assistance Transfers:   Sit to Stand: Stand-by asssistance  Stand to Sit: Stand-by asssistance   Gait:   Base of Support: Narrowed, Center of gravity altered  Speed/Fawn: Slow, Pace decreased (<100 feet/min), Shuffled  Step Length: Right shortened, Left shortened  Swing Pattern: Right symmetrical, Left symmetrical  Stance: Right increased, Left increased  Gait Abnormalities: Decreased step clearance, Trunk sway increased, Path deviations  Ambulation - Level of Assistance: Minimal assistance  Distance (ft): 10 Feet (ft)  Assistive Device: Walker, rolling  Interventions: Manual cues, Safety awareness training, Verbal cues Gait:   Base of Support: Widened  Speed/Fawn: Shuffled; Slow  Stance: Weight shift  Gait Abnormalities: Decreased step clearance;Shuffling gait; Step to gait  Ambulation - Level of Assistance: Contact guard assistance  Distance (ft): 250 Feet (ft)  Assistive Device: Cane, straight   ROM:                         ROM:                           Strength:              Strength:                Spine:           Spine:               Gait Training ( 10min):  Gait training to improve and/or restore physical functioning as related to mobility, strength, balance and coordination.   Ambulated 250 Feet (ft) with Contact guard assistance using a Cane, straight and maximal   related to their stance phase, stride length, push off and heel strike to promote proper body alignment, promote proper body posture, promote proper body mechanics and promote proper body breathing techniques. Braces/Orthotics/Lines/Etc:   · alegre catheter  · O2 Device: Hi flow nasal cannula  Treatment/Session Assessment:    · Response to Treatment:  Patient tolerated treatment well. · Interdisciplinary Collaboration:   o Physical Therapist  o Registered Nurse  · After treatment position/precautions:   o Supine in bed  o Bed alarm/tab alert on  o Bed/Chair-wheels locked  o Bed in low position  o Call light within reach  o RN notified  o Family at bedside   · Compliance with Program/Exercises: Will assess as treatment progresses. · Recommendations/Intent for next treatment session: \"Next visit will focus on advancements to more challenging activities and reduction in assistance provided\".   Total Treatment Duration:  PT Patient Time In/Time Out  Time In: 1020  Time Out: 103 Prema Noe DPT

## 2018-01-15 NOTE — PROGRESS NOTES
CM spoke with patient regarding source of income. Patient is in need of oxygen and a C-Pac machine. CM asked patient will he be able to provide some funding towards DME's. Patient stated that he has no income at all and will not be able to pay a large amount. Patient states that he works sometimes with [employer] and as soon as he gets better he can get work some hours. CM asked RN Case Manager Leyda Jurado regarding any available [employer] organizations that assist with these items. CM was informed that nothing is available. CM will follow up with JANNETTE to inquire about patient receiving Health Insurance and [address] [place] [address].

## 2018-01-15 NOTE — PROGRESS NOTES
Felipe Arroyo  Admission Date: 1/6/2018             Daily Progress Note: 1/15/2018    The patient's chart is reviewed and the patient is discussed with the staff. 45 y. o. CM admitted on 1/6 with acute hypoxemic respiratory failure, with methamphetamine positive, smoker, morbid obesity, suspected NATALIIA/OHS. Also has sinusitis on CT and possible pneumonia. Was on Telemetry and being diuresed. Patient in room foaming in mouth, combative and disoriented. He was being aggressively bagged in Room and when I came noted to be moving around and eyes open. I took off Ambu bag and but disoriented. Shortly after admission to ICU on BIPAP had to be intubated on 1/7/18. He has been treated for pneumonia, sinusitis, COPD exacerbation and volume overload. Moved for the ICU to the floor and the hospitalist service assumed care. Needs sleep study but has no pay source. Subjective:     Patient is awake and feels tired. States breathing is still not that good. On 3 LPM currrently.  reports does not have insurance, no income and no place of residence. Now reports has a hip fracture as well and did not tell anyone.      Current Facility-Administered Medications   Medication Dose Route Frequency    thiamine (B-1) tablet 100 mg  100 mg Oral DAILY    folic acid (FOLVITE) tablet 1 mg  1 mg Oral DAILY    methylPREDNISolone (PF) (SOLU-MEDROL) injection 40 mg  40 mg IntraVENous Q12H    albuterol (PROVENTIL VENTOLIN) nebulizer solution 2.5 mg  2.5 mg Nebulization QID RT    promethazine (PHENERGAN) with saline injection 12.5 mg  12.5 mg IntraVENous Q6H PRN    LORazepam (ATIVAN) tablet 0.5 mg  0.5 mg Oral Q8H PRN    haloperidol lactate (HALDOL) injection 5 mg  5 mg IntraVENous Q6H PRN    cefTRIAXone (ROCEPHIN) 2 g in sterile water (preservative free) 20 mL IV syringe  2 g IntraVENous Q24H    sertraline (ZOLOFT) tablet 50 mg  50 mg Oral DAILY    LORazepam (ATIVAN) injection 1 mg  1 mg IntraVENous Q8H PRN    sodium chloride (OCEAN) 0.65 % nasal spray 2 Spray  2 Spray Both Nostrils QID    sodium chloride (OCEAN) 0.65 % nasal spray 2 Spray  2 Spray Both Nostrils Q2H PRN    fluticasone (FLONASE) 50 mcg/actuation nasal spray 2 Spray  2 Spray Both Nostrils DAILY    influenza vaccine 2017-18 (3 yrs+)(PF) (FLUZONE QUAD/FLUARIX QUAD) injection 0.5 mL  0.5 mL IntraMUSCular PRIOR TO DISCHARGE    famotidine (PF) (PEPCID) 20 mg in sodium chloride 0.9 % 10 mL injection  20 mg IntraVENous Q12H    nicotine (NICODERM CQ) 21 mg/24 hr patch 1 Patch  1 Patch TransDERmal DAILY    metoprolol (LOPRESSOR) injection 5 mg  5 mg IntraVENous Q4H PRN    hydrALAZINE (APRESOLINE) 20 mg/mL injection 20 mg  20 mg IntraVENous Q6H PRN    chlorhexidine (PERIDEX) 0.12 % mouthwash 15 mL  15 mL Oral Q12H    sodium chloride (NS) flush 5-10 mL  5-10 mL IntraVENous Q8H    sodium chloride (NS) flush 5-10 mL  5-10 mL IntraVENous PRN    lisinopril (PRINIVIL, ZESTRIL) tablet 20 mg  20 mg Oral DAILY    carvedilol (COREG) tablet 3.125 mg  3.125 mg Oral BID WITH MEALS    ondansetron (ZOFRAN) injection 4 mg  4 mg IntraVENous Q6H PRN    acetaminophen (TYLENOL) tablet 650 mg  650 mg Oral Q6H PRN    diphenhydrAMINE (BENADRYL) capsule 25 mg  25 mg Oral Q4H PRN    polyethylene glycol (MIRALAX) packet 17 g  17 g Oral DAILY PRN    guaiFENesin ER (MUCINEX) tablet 600 mg  600 mg Oral Q12H PRN    calcium carbonate (TUMS) chewable tablet 400 mg [elemental]  400 mg Oral TID PRN    heparin (porcine) injection 5,000 Units  5,000 Units SubCUTAneous Q8H       Review of Systems  Constitutional: negative for fever, chills, sweats  Cardiovascular: negative for chest pain, palpitations, syncope, edema  Gastrointestinal:  negative for dysphagia, reflux, vomiting, diarrhea, abdominal pain, or melena  Neurologic:  negative for focal weakness, numbness, headache  MS  - does have hip pain.      Objective:     Vitals:    01/15/18 0928 01/15/18 1105 01/15/18 1254 01/15/18 1513   BP:  119/70  131/70   Pulse:  95  91   Resp:  18  18   Temp:  98.4 °F (36.9 °C)  98.1 °F (36.7 °C)   SpO2: 96% 92% 95% 92%   Weight:       Height:         Intake and Output:   01/13 1901 - 01/15 0700  In: 520 [P.O.:500; I.V.:20]  Out: 2090 [Urine:2090]  01/15 0701 - 01/15 1900  In: -   Out: 295 [Urine:295]    Physical Exam:   Constitution:  the patient is well developed and in no acute distress on 3 LPM  EENMT:  Sclera clear, pupils equal, oral mucosa moist, narrow airway. Respiratory: coarse b/l. Cardiovascular:  RRR without M,G,R  Gastrointestinal: soft and non-tender; with positive bowel sounds. Musculoskeletal: warm without cyanosis. There is + lower leg edema. Skin:  no jaundice or rashes, no wounds   Neurologic: no gross neuro deficits     Psychiatric:  alert and oriented x 3    CHEST XRAY 1/15/18:       LAB  No results for input(s): GLUCPOC in the last 72 hours. No lab exists for component: [place]   Recent Labs      01/13/18   0324   WBC  6.9   HGB  13.9   HCT  46.2   PLT  160     Recent Labs      01/15/18   0446  01/14/18   0350  01/13/18   0324   NA  142  142  144   K  4.5  4.2  3.5   CL  106  104  102   CO2  27  32  34*   GLU  116*  150*  96   BUN  41*  32*  27*   CREA  1.43  1.39  1.44   MG  2.5*   --    --    CA  9.1  9.8  9.2   PHOS  5.4*   --    --      Recent Labs      01/13/18   0332   PH  7.45   PCO2  49*   PO2  89   HCO3  33*     No results for input(s): LCAD, LAC in the last 72 hours.       Assessment:  (Medical Decision Making)         Hospital Problems  Date Reviewed: 1/15/2018          Codes Class Noted POA    Severe sepsis [facility]) ICD-10-CM: A41.9, R65.20  ICD-9-CM: 038.9, 995.92  1/9/2018 Unknown        Community acquired pneumonia of right lower lobe of lung ([facility] [address] ICD-10-CM: J18.1  ICD-9-CM: 629  1/9/2018 Yes        Acute metabolic encephalopathy CUS-46-IY: G93.41  ICD-9-CM: 348.31  1/7/2018 Yes        Hypotension ICD-10-CM: I95.9  ICD-9-CM: 458.9  1/7/2018 Yes        Urethral stricture ICD-10-CM: N35.9  ICD-9-CM: 598.9  1/7/2018 Yes        COPD (chronic obstructive pulmonary disease) (HCC) (Chronic) ICD-10-CM: J44.9  ICD-9-CM: 887  1/6/2018 Yes        Nicotine dependence (Chronic) ICD-10-CM: G64.607  ICD-9-CM: 305.1  1/6/2018 Yes        Methamphetamine abuse ICD-10-CM: F15.10  ICD-9-CM: 305.70  1/6/2018 Yes        Anasarca ICD-10-CM: R60.1  ICD-9-CM: 782.3  1/6/2018 Yes        NATALIIA (obstructive sleep apnea) (Chronic) ICD-10-CM: G47.33  ICD-9-CM: 327.23  1/6/2018 Yes        Acute kidney injury [facility]) ICD-10-CM: N17.9  ICD-9-CM: 584.9  1/6/2018 Yes        * (Principal)Acute on chronic respiratory failure with hypoxia and hypercapnia (HCC) ICD-10-CM: J96.21, J96.22  ICD-9-CM: 518.84, 786.09, 799.02  1/6/2018 Yes              Plan:  (Medical Decision Making)     --continue oxygen and taper as tolerated  --now given ? left hip fracture will get x-ray to check. If true will need ortho and this may help patient get emergency medicaid. Spoke with  and they are aware. --Albuterol  --Rocephin day 5  --Solu Medrol 40mg q6h, will taper to q12  --Case management to determine if any [employer] equipment and for possible sleep study. He is self pay source -- and this is the biggest issues. Also with him trying to get oxygen as well. --needs to stop doing drugs -- aware got a second chance in life and reports will not use. unfortunately his wife and reportedly by  his son are using drugs. --continue remaining treatment. More than 50% of the time documented was spent in face-to-face contact with the patient and in the care of the patient on the floor/unit where the patient is located.     Umesh George MD

## 2018-01-15 NOTE — PROGRESS NOTES
Problem: Interdisciplinary Rounds  Goal: Interdisciplinary Rounds  Outcome: Progressing Towards Goal  Interdisciplinary team rounds were held 1/15/2018 with the following team members:Care Management, Physician and . Plan of care discussed. See clinical pathway and/or care plan for interventions and desired outcomes.

## 2018-01-15 NOTE — PROGRESS NOTES
Pt has removed BiPAP several times during night . Have educated pt on importance and showed him the drop in O2 when removed but he continues to remove.

## 2018-01-15 NOTE — PROGRESS NOTES
CM Specialist Ana Delgado met with patient and he was alert. Patient is currently homeless but previously lived with wife and son. Patient states the home is a trailer with a few steps. Prior to admission patient was independent with adls. Patient does not drives because he doesnt has a s license. Patient states that he knows how to drive if need be. Patient does not have any breathing assistive equipment but needs to be using a C-Pac machine. Patient does has a cane that he uses. Patient does has issues affording medications because he doesnt have health insurance. Patient does not have a PCP. Patient needs to be seen by [facility].  Patient states that the entire family to include him has issues with Substance abuse and Alcoholism. Patient wants help with substance abuse and alcoholism. Patient states that he is tired of living his life this way. Patient voice concerns of needing drug/alcohol counseling. CM will provide the patient with substance/alcohol local programs for patient to follow up with once discharge. CM will relate information to RN Case Manager Amy Barrios. CM will continue to follow for discharge needs. .Care Management Interventions  PCP Verified by CM: No  Mode of Transport at Discharge: BLS  Transition of Care Consult (CM Consult): Discharge Planning  MyChart Signup: No  Discharge Durable Medical Equipment: No  Physical Therapy Consult: Yes  Occupational Therapy Consult: Yes  Speech Therapy Consult: No  Current Support Network:  Other (Patient is currently homeless)  Confirm Follow Up Transport: Other (see comment) (Unknown at this time.)  Plan discussed with Pt/Family/Caregiver: Yes  [employer] Information Provided?: No

## 2018-01-15 NOTE — PROGRESS NOTES
Hospitalist Progress Note    1/15/2018  Admit Date: 2018  6:27 PM   NAME: Pastor Cedillo   :  1965   DOS:              01/15/18  MRN:  [id number]   Attending: Jaylan Mariano MD  PCP:  None  Treatment Team: Attending Provider: Concetta Mcburney, MD; Care Manager: Zachary Bonner, RN; Utilization Review: Lito Cintron RN; Consulting Provider: Tye Kussmaul, MD; Consulting Provider: Mary Phillips MD; Consulting Provider: Ren Clark MD; Charge Nurse: Devonte Carr RN    Full Code     SUBJECTIVE:   As previously documented: \" Mr. Radhames Conti is a 46 yo M who was admitted on 18 by hospitalist team with acute hypoxemic respiratory failure, methamphetamine abuse, smoker, morbid obesity, suspected NATALIIA/OHS. Noted to have sinusitis on CT and possible pneumonia. Initially admitted to telemetry ge and  was diuresed. MR. Schaffer  had some foaming in mouth, combative and disoriented. Was being aggressively bagged in Room. Transferred to ICU on BIPAP and had to be intubated on 18. He has been treated for pneumonia, sinusitis, COPD exacerbation and volume overload. Respiratory culture on  with heavy strep pneumoniae and H. Influenzae. BC / negative. Initially extubated, although had to be reintubated for recurrent hypercarbia. Extubated on  and on  was transferred to medical ge. Received 4 days of IV Zosyn that was tapered to Ceftriaxone for full 14 days of treatment. Galindo catheter placed by urology due to urethral stricture - advised to keep Galindo catheter in until discharge. Placed on Diamox for anasarca. Hospitalst team asked to resume care on 1/15. \"     01/15/18   Mr. Pastor Cedillo denies any SOB, CP or abdominal pain. Is feeling tired. States that he broke up with his wife yesterday and now he is homeless. Hx of alcohol abuse. 10+ ROS reviewed and negative except for positive in HPI.    No Known Allergies  Current Facility-Administered Medications   Medication Dose Route Frequency    albuterol (PROVENTIL VENTOLIN) nebulizer solution 2.5 mg  2.5 mg Nebulization QID RT    methylPREDNISolone (PF) (SOLU-MEDROL) injection 40 mg  40 mg IntraVENous Q6H    promethazine (PHENERGAN) with saline injection 12.5 mg  12.5 mg IntraVENous Q6H PRN    LORazepam (ATIVAN) tablet 0.5 mg  0.5 mg Oral Q8H PRN    dexmedeTOMidine (PRECEDEX) 400 mcg in 0.9% sodium chloride 100 mL infusion  0.2-0.7 mcg/kg/hr IntraVENous TITRATE    NOREPINephrine (LEVOPHED) 4 mg in 5% dextrose 250 mL infusion  2-16 mcg/min IntraVENous TITRATE    haloperidol lactate (HALDOL) injection 5 mg  5 mg IntraVENous Q6H PRN    cefTRIAXone (ROCEPHIN) 2 g in sterile water (preservative free) 20 mL IV syringe  2 g IntraVENous Q24H    sertraline (ZOLOFT) tablet 50 mg  50 mg Oral DAILY    LORazepam (ATIVAN) injection 1 mg  1 mg IntraVENous Q8H PRN    sodium chloride (OCEAN) 0.65 % nasal spray 2 Spray  2 Spray Both Nostrils QID    sodium chloride (OCEAN) 0.65 % nasal spray 2 Spray  2 Spray Both Nostrils Q2H PRN    fluticasone (FLONASE) 50 mcg/actuation nasal spray 2 Spray  2 Spray Both Nostrils DAILY    influenza vaccine 2017-18 (3 yrs+)(PF) (FLUZONE QUAD/FLUARIX QUAD) injection 0.5 mL  0.5 mL IntraMUSCular PRIOR TO DISCHARGE    famotidine (PF) (PEPCID) 20 mg in sodium chloride 0.9 % 10 mL injection  20 mg IntraVENous Q12H    NUTRITIONAL SUPPORT ELECTROLYTE PRN ORDERS   Does Not Apply PRN    nicotine (NICODERM CQ) 21 mg/24 hr patch 1 Patch  1 Patch TransDERmal DAILY    metoprolol (LOPRESSOR) injection 5 mg  5 mg IntraVENous Q4H PRN    hydrALAZINE (APRESOLINE) 20 mg/mL injection 20 mg  20 mg IntraVENous Q6H PRN    chlorhexidine (PERIDEX) 0.12 % mouthwash 15 mL  15 mL Oral Q12H    sodium chloride (NS) flush 5-10 mL  5-10 mL IntraVENous Q8H    sodium chloride (NS) flush 5-10 mL  5-10 mL IntraVENous PRN    lisinopril (PRINIVIL, ZESTRIL) tablet 20 mg  20 mg Oral DAILY    carvedilol (COREG) tablet 3.125 mg  3.125 mg Oral BID WITH MEALS    ondansetron (ZOFRAN) injection 4 mg  4 mg IntraVENous Q6H PRN    acetaminophen (TYLENOL) tablet 650 mg  650 mg Oral Q6H PRN    diphenhydrAMINE (BENADRYL) capsule 25 mg  25 mg Oral Q4H PRN    polyethylene glycol (MIRALAX) packet 17 g  17 g Oral DAILY PRN    guaiFENesin ER (MUCINEX) tablet 600 mg  600 mg Oral Q12H PRN    calcium carbonate (TUMS) chewable tablet 400 mg [elemental]  400 mg Oral TID PRN    heparin (porcine) injection 5,000 Units  5,000 Units SubCUTAneous Q8H         Immunization History   Administered Date(s) Administered    TB Skin Test (PPD) Intradermal 2018     Objective:     Patient Vitals for the past 24 hrs:   Temp Pulse Resp BP SpO2   01/15/18 0928 - - - - 96 %   01/15/18 0748 97.3 °F (36.3 °C) 96 20 98/69 96 %   01/15/18 0446 98 °F (36.7 °C) (!) 102 20 (!) 171/93 98 %   01/15/18 0303 - - - - 97 %   01/15/18 0047 98.1 °F (36.7 °C) (!) 57 20 139/76 97 %   18 2317 - - - - 95 %   18 2004 - - - - 92 %   18 1930 99.2 °F (37.3 °C) 92 22 156/70 90 %   18 1749 97.9 °F (36.6 °C) 92 18 135/74 92 %   18 1702 - 91 19 117/66 93 %   18 1632 - 92 26 108/57 91 %   18 1603 - 93 27 104/59 (!) 89 %   18 1548 98.2 °F (36.8 °C) 96 20 126/62 91 %   18 1533 - 96 22 126/62 (!) 89 %   18 1506 - - - - 95 %   18 1502 - 97 25 103/63 (!) 89 %   18 1432 - 95 26 99/63 (!) 89 %   18 1404 - 97 21 97/55 91 %   18 1332 - (!) 101 26 116/67 95 %   18 1302 - 93 24 117/61 95 %   18 1232 - 99 30 117/60 92 %   18 1207 99.1 °F (37.3 °C) (!) 101 22 105/59 93 %   18 1203 - (!) 102 (!) 34 105/59 94 %   18 1132 - (!) 101 13 105/55 91 %     Temp (24hrs), Av.3 °F (36.8 °C), Min:97.3 °F (36.3 °C), Max:99.2 °F (37.3 °C)    Oxygen Therapy  O2 Sat (%): 96 % (01/15/18 0928)  Pulse via Oximetry: 96 beats per minute (01/15/18 0928)  O2 Device: BIPAP (01/15/18 0928)  O2 Flow Rate (L/min): 4 l/min (01/14/18 2004)  O2 Temperature: 86.9 °F (30.5 °C) (01/11/18 1521)  FIO2 (%): 45 % (01/15/18 0928)  Oxygen Therapy  O2 Sat (%): 96 % (01/15/18 0928)  Pulse via Oximetry: 96 beats per minute (01/15/18 0928)  O2 Device: BIPAP (01/15/18 0928)  O2 Flow Rate (L/min): 4 l/min (01/14/18 2004)  O2 Temperature: 86.9 °F (30.5 °C) (01/11/18 1521)  FIO2 (%): 45 % (01/15/18 0928)    Physical Exam:  General:         Alert, cooperative, no acute  Distress. Afebrile. HEENT:               NCAT. No obvious deformity. Nares normal. No drainage  Lungs:                  Decreased air entry b/l. Scattered rhonchi. Cardiovascular:   RRR. No m/r/g. 2+ pitting pedal edema b/l. +2 PT/DT pulses b/l. Abdomen:       S/nt/nd. Bowel sounds normal. .   Skin:         No rashes or lesions. Not Jaundiced  Neurologic:    AAOx3. No gross focal deficit. Moves all extremities. Normal sensory. Psychiatric:         Good mood. Normal affect. Denies any SI/HI.                DIAGNOSTIC STUDIES      Data Review:   Recent Results (from the past 24 hour(s))   METABOLIC PANEL, BASIC    Collection Time: 01/15/18  4:46 AM   Result Value Ref Range    Sodium 142 136 - 145 mmol/L    Potassium 4.5 3.5 - 5.1 mmol/L    Chloride 106 98 - 107 mmol/L    CO2 27 21 - 32 mmol/L    Anion gap 9 7 - 16 mmol/L    Glucose 116 (H) 65 - 100 mg/dL    BUN 41 (H) 6 - 23 MG/DL    Creatinine 1.43 0.8 - 1.5 MG/DL    GFR est AA >60 >60 ml/min/1.73m2    GFR est non-AA 55 (L) >60 ml/min/1.73m2    Calcium 9.1 8.3 - 10.4 MG/DL   PHOSPHORUS    Collection Time: 01/15/18  4:46 AM   Result Value Ref Range    Phosphorus 5.4 (H) 2.5 - 4.5 MG/DL   MAGNESIUM    Collection Time: 01/15/18  4:46 AM   Result Value Ref Range    Magnesium 2.5 (H) 1.8 - 2.4 mg/dL       All Micro Results     Procedure Component Value Units Date/Time    CULTURE, BLOOD [[id number]] Collected:  01/10/18 6110    Order Status:  Completed Specimen:  Blood from Blood Updated:  01/15/18 5902     Special Requests: -- RIGHT  ARM       Culture result: NO GROWTH 5 DAYS       CULTURE, BLOOD [[id number]] Collected:  01/10/18 2150    Order Status:  Completed Specimen:  Blood from Blood Updated:  01/15/18 0623     Special Requests: --        RIGHT  ARM       Culture result: NO GROWTH 5 DAYS       CULTURE, RESPIRATORY/SPUTUM/BRONCH W Rah Watt [[id number]] Collected:  01/10/18 2115    Order Status:  Completed Specimen:  Sputum from Sputum Updated:  01/13/18 0730     Special Requests: NO SPECIAL REQUESTS        GRAM STAIN FEW GRAM POSITIVE COCCI         WBC'S TOO NUMEROUS TO COUNT      0 TO 1 EPITHELIAL CELLS PER OIF      4+ MUCUS     Culture result:         SCANT NORMAL RESPIRATORY EUDAR    CULTURE, RESPIRATORY/SPUTUM/BRONCH Zuleima Kalin STAIN [[id number]]  (Abnormal)  (Susceptibility) Collected:  01/07/18 1125    Order Status:  Completed Specimen:  Sputum from Endotracheal aspirate Updated:  01/12/18 0644     Special Requests: NO SPECIAL REQUESTS        GRAM STAIN 0 TO 10 WBCS/OIF      NO EPITHELIAL CELLS SEEN         MANY GRAM POSITIVE COCCI                 MODERATE GRAM NEGATIVE RODS      FEW GRAM NEGATIVE COCCI         3+ MUCUS PRESENT        Culture result:         HEAVY STREPTOCOCCUS PNEUMONIAE (A)              HEAVY HAEMOPHILUS INFLUENZAE BETA LACTAMASE NEGATIVE (A)              MODERATE NORMAL RESPIRATORY EDUAR    CULTURE, BLOOD [[id number]] Collected:  01/06/18 1854    Order Status:  Completed Specimen:  Whole Blood from Blood Updated:  01/11/18 0616     Special Requests: --        RIGHT  FOREARM       Culture result: NO GROWTH 5 DAYS       CULTURE, BLOOD [[id number]] Collected:  01/06/18 1854    Order Status:  Completed Specimen:  Whole Blood from Blood Updated:  01/11/18 0616     Special Requests: --        RIGHT  Antecubital       Culture result: NO GROWTH 5 DAYS       CULTURE, BODY FLUID Zuleima Kalin STAIN [[id number]] Collected:  01/09/18 0945    Order Status:  Canceled Specimen:  Miscellaneous Fluid           Imaging Emigdio Roman /Studies:    CXR Results  (Last 48 hours)               01/15/18 0557  XR CHEST SNGL V Final result    Impression:  IMPRESSION: Left lower lobe atelectasis or pneumonia unchanged. Narrative:  EXAM:  XR CHEST SNGL V       INDICATION:  Respiratory failure       COMPARISON:  2018       FINDINGS: A portable AP radiograph of the chest was obtained at 0610 hours. The   patient is on a cardiac monitor. Left lower lobe airspace disease unchanged. Central venous catheter remains with its tip in the left internal jugular vein. .    The cardiac and mediastinal contours and pulmonary vascularity are normal.  The   bones and soft tissues are grossly within normal limits. 18 0515  XR CHEST SNGL V Final result    Impression:  IMPRESSION: Left lower lobe atelectasis or pneumonia improved. Narrative:  EXAM:  TEMPORARY       INDICATION:  Respiratory failure       COMPARISON:  2018       FINDINGS: A portable AP radiograph of the chest was obtained at 0527 hours. The   patient is on a cardiac monitor. Left lower lobe atelectasis or pneumonia   improved. .  The cardiac and mediastinal contours and pulmonary vascularity are   normal.  The bones and soft tissues are grossly within normal limits. Central   venous catheter remains in place within its left internal jugular vein.                    Results for orders placed or performed during the hospital encounter of 18   2D ECHO COMPLETE ADULT (TTE) W OR WO CONTR    Narrative    [address]  [phone]    Transthoracic Echocardiogram  2D, M-mode, Doppler, and Color Doppler    Patient: Rah Hennessy  MR #: [id number]  : 1965  Age: 45 years  Gender: Male  Study date: 2018  Account #: [de-identified]  Height: 67.7 in  Weight: 315.3 lb  BSA: 2.47 mï¾²  Status:Routine  Location: [facility]  BP: 144/ 96    Allergies: NO KNOWN ALLERGIES    Sonographer: Paloma Delacruz [facility]  Group:  [facility]  Referring Physician:  aJm Hunt MD  Reading Physician:  Arnel Prieto. MD Naga [facility]    INDICATIONS: Edema. *Patient on vent. Technically difficult due to patient body habitus   (316lbs). *    PROCEDURE: This was a routine study. A transthoracic echocardiogram was  performed. The study included complete 2D imaging, M-mode, complete spectral  Doppler, and color Doppler. Intravenous contrast (Definity) was administered. Echocardiographic views were limited by restricted patient mobility and poor  acoustic window availability. This was a technically difficult study. LEFT VENTRICLE: Size was normal. Systolic function was normal. Ejection  fraction was estimated in the range of 55 % to 60 %. There were no regional  wall motion abnormalities. There was moderate concentric hypertrophy. The   E/e'  ratio was 21.1. Diastolic Function was indeterminate. RIGHT VENTRICLE: Not well visualized. LEFT ATRIUM: The atrium was mildly dilated. RIGHT ATRIUM: Not well visualized. SYSTEMIC VEINS: IVC: The inferior vena cava was dilated. AORTIC VALVE: The valve was not well visualized. The peak velocity was 2.23  m/s. The mean pressure gradient was 11 mmHg. The peak pressure gradient was   20  mmHg. There was no insufficiency. MITRAL VALVE: Not well visualized. There was no evidence for stenosis. There  was no regurgitation. TRICUSPID VALVE: Not well visualized. There was no evidence for stenosis. There  was trivial regurgitation. PULMONIC VALVE: Not well visualized. PERICARDIUM: A trivial pericardial effusion was identified. There was no  evidence of hemodynamic compromise. AORTA: The root exhibited normal size. SUMMARY:    -  Procedure information: This was a technically difficult study. -  Left ventricle: Systolic function was normal. Ejection fraction was  estimated in the range of 55 % to 60 %.  There were no regional wall motion  abnormalities. There was moderate concentric hypertrophy. The E/e' ratio was  21.1. Diastolic Function was indeterminate. -  Left atrium: The atrium was mildly dilated. -  Inferior vena cava, hepatic veins: The inferior vena cava was dilated. -  Pericardium: A trivial pericardial effusion was identified. There was no  evidence of hemodynamic compromise. SYSTEM MEASUREMENT TABLES    2D mode  AoR Diam (2D): 3.9 cm  LA Dimension (2D): 3.2 cm  Left Atrium Systolic Volume Index; Method of Disks, Biplane; 2D mode;: 31   ml/m2  IVS/LVPW (2D): 1  IVSd (2D): 1.8 cm  LVIDd (2D): 4.2 cm  LVIDs (2D): 2.9 cm  LVOT Area (2D): 4.2 cm2  LVPWd (2D): 1.9 cm  RVIDd (2D): 3.8 cm    Unspecified Scan Mode  Peak Grad; Mean; Antegrade Flow: 19 mm[Hg]  Vmax; Antegrade Flow: 219 cm/s  LVOT Diam: 2.3 cm  Peak Grad; Mean; Antegrade Flow: 8 mm[Hg]  Vmax; Antegrade Flow: 141 cm/s    Prepared and signed by    Dionicio Reynolds.  MD [facility]  Signed 08-Jan-2018 14:17:50         Labs and Studies from previous 24 hours have been personally reviewed by myself    ASSESSMENT      Active Hospital Problems    Diagnosis Date Noted    Severe sepsis ([facility] 01/09/2018    Community acquired pneumonia of right lower lobe of lung ([facility] 01/09/2018    Acute metabolic encephalopathy 37/54/5136    Hypotension 01/07/2018    Urethral stricture 01/07/2018    COPD (chronic obstructive pulmonary disease) ([facility] 01/06/2018    Nicotine dependence 01/06/2018    Methamphetamine abuse 01/06/2018    Anasarca 01/06/2018    NATALIIA (obstructive sleep apnea) 01/06/2018    Acute kidney injury ([facility] 01/06/2018    [facility] on [facility] and [facility] 01/06/2018     Hospital Problems as of 1/15/2018  Date Reviewed: 1/15/2018          Codes Class Noted - Resolved POA    Alcohol abuse (Chronic) ICD-10-CM: F10.10  ICD-9-CM: 305.00  1/15/2018 - Present Yes        Homeless ICD-10-CM: Z59.0  ICD-9-CM: V60.0  1/15/2018 - Present No Community acquired pneumonia of right lower lobe of lung ([place] [address] ICD-10-CM: J18.1  ICD-9-CM: 823  1/9/2018 - Present Yes        Urethral stricture ICD-10-CM: N35.9  ICD-9-CM: 598.9  1/7/2018 - Present Yes        COPD (chronic obstructive pulmonary disease) ([place] [address] (Chronic) ICD-10-CM: J44.9  ICD-9-CM: 621  1/6/2018 - Present Yes        Nicotine dependence (Chronic) ICD-10-CM: F17.200  ICD-9-CM: 305.1  1/6/2018 - Present Yes        Methamphetamine abuse ICD-10-CM: F15.10  ICD-9-CM: 305.70  1/6/2018 - Present Yes        Anasarca ICD-10-CM: R60.1  ICD-9-CM: 782.3  1/6/2018 - Present Yes        NATALIIA (obstructive sleep apnea) (Chronic) ICD-10-CM: G47.33  ICD-9-CM: 327.23  1/6/2018 - Present Yes        Acute kidney injury ([place] [address] ICD-10-CM: N17.9  ICD-9-CM: 584.9  1/6/2018 - Present Yes        * (Principal)Acute on chronic respiratory failure with hypoxia and hypercapnia (HCC) ICD-10-CM: J96.21, J96.22  ICD-9-CM: 518.84, 786.09, 799.02  1/6/2018 - Present Yes        RESOLVED: Severe sepsis ([place] [address] ICD-10-CM: A41.9, R65.20  ICD-9-CM: 038.9, 995.92  1/9/2018 - 1/15/2018 Yes        RESOLVED: Acute metabolic encephalopathy IEM-38-MY: G93.41  ICD-9-CM: 348.31  1/7/2018 - 1/15/2018 Yes        RESOLVED: Hypotension ICD-10-CM: I95.9  ICD-9-CM: 458.9  1/7/2018 - 1/15/2018 Yes              Plan:  - Continue IV Rocephin 5/20  - on IV Solumedrol, Albuterol - tapering per pulmonary management. - wean off O2 supplementation   - add thiamine/Folic acid  - on BIPAP when sleeping/ napping - will need sleep study, although w/o insurance  - educated regarding drug abuse  - on nicotine patch.  - SW consulted  -PPD/PT/OT      DVT Prophylaxis: Heparin SQ  CODE Status: Full CODE  Plan of Care Discussed with: patient.  Care team.  Medical Risk: high  Doc Beata Murray MD  01/15/18

## 2018-01-15 NOTE — PROGRESS NOTES
Problem: Falls - Risk of  Goal: *Absence of Falls  Document Mega Fall Risk and appropriate interventions in the flowsheet.    Fall Risk Interventions:  Mobility Interventions: Bed/chair exit alarm, Communicate number of staff needed for ambulation/transfer, OT consult for ADLs, Patient to call before getting OOB, PT Consult for mobility concerns, PT Consult for assist device competence, Strengthening exercises (ROM-active/passive), Utilize walker, cane, or other assitive device    Mentation Interventions: Bed/chair exit alarm, Door open when patient unattended, Increase mobility, More frequent rounding, Reorient patient, Toileting rounds    Medication Interventions: Bed/chair exit alarm, Evaluate medications/consider consulting pharmacy, Patient to call before getting OOB, Teach patient to arise slowly    Elimination Interventions: Bed/chair exit alarm, Call light in reach, Patient to call for help with toileting needs, Toileting schedule/hourly rounds

## 2018-01-15 NOTE — PROGRESS NOTES
Patient has been approved through [facility] for Medicaid. Patient wife is inquiring on Social Security and Disability benefits for patient.

## 2018-01-16 ENCOUNTER — APPOINTMENT (OUTPATIENT)
Dept: GENERAL RADIOLOGY | Age: 53
DRG: 871 | End: 2018-01-16
Attending: INTERNAL MEDICINE
Payer: SELF-PAY

## 2018-01-16 LAB
ANION GAP SERPL CALC-SCNC: 8 MMOL/L (ref 7–16)
BUN SERPL-MCNC: 43 MG/DL (ref 6–23)
CALCIUM SERPL-MCNC: 8.9 MG/DL (ref 8.3–10.4)
CHLORIDE SERPL-SCNC: 105 MMOL/L (ref 98–107)
CO2 SERPL-SCNC: 31 MMOL/L (ref 21–32)
CREAT SERPL-MCNC: 1.36 MG/DL (ref 0.8–1.5)
ERYTHROCYTE [DISTWIDTH] IN BLOOD BY AUTOMATED COUNT: 13.3 % (ref 11.9–14.6)
FOLATE SERPL-MCNC: 7.6 NG/ML (ref 3.1–17.5)
GLUCOSE SERPL-MCNC: 111 MG/DL (ref 65–100)
HCT VFR BLD AUTO: 49.6 % (ref 41.1–50.3)
HGB BLD-MCNC: 15.4 G/DL (ref 13.6–17.2)
MCH RBC QN AUTO: 33 PG (ref 26.1–32.9)
MCHC RBC AUTO-ENTMCNC: 31 G/DL (ref 31.4–35)
MCV RBC AUTO: 106.2 FL (ref 79.6–97.8)
PLATELET # BLD AUTO: 209 K/UL (ref 150–450)
PMV BLD AUTO: 10.6 FL (ref 10.8–14.1)
POTASSIUM SERPL-SCNC: 4 MMOL/L (ref 3.5–5.1)
RBC # BLD AUTO: 4.67 M/UL (ref 4.23–5.67)
SODIUM SERPL-SCNC: 144 MMOL/L (ref 136–145)
VIT B12 SERPL-MCNC: 573 PG/ML (ref 193–986)
WBC # BLD AUTO: 9.6 K/UL (ref 4.3–11.1)

## 2018-01-16 PROCEDURE — 77010033678 HC OXYGEN DAILY

## 2018-01-16 PROCEDURE — 74011000250 HC RX REV CODE- 250: Performed by: INTERNAL MEDICINE

## 2018-01-16 PROCEDURE — 74011250637 HC RX REV CODE- 250/637: Performed by: HOSPITALIST

## 2018-01-16 PROCEDURE — 74011250636 HC RX REV CODE- 250/636: Performed by: INTERNAL MEDICINE

## 2018-01-16 PROCEDURE — 94760 N-INVAS EAR/PLS OXIMETRY 1: CPT

## 2018-01-16 PROCEDURE — 80048 BASIC METABOLIC PNL TOTAL CA: CPT | Performed by: INTERNAL MEDICINE

## 2018-01-16 PROCEDURE — 74011250637 HC RX REV CODE- 250/637: Performed by: FAMILY MEDICINE

## 2018-01-16 PROCEDURE — 85027 COMPLETE CBC AUTOMATED: CPT | Performed by: INTERNAL MEDICINE

## 2018-01-16 PROCEDURE — 82607 VITAMIN B-12: CPT | Performed by: INTERNAL MEDICINE

## 2018-01-16 PROCEDURE — 94660 CPAP INITIATION&MGMT: CPT

## 2018-01-16 PROCEDURE — 94640 AIRWAY INHALATION TREATMENT: CPT

## 2018-01-16 PROCEDURE — 97168 OT RE-EVAL EST PLAN CARE: CPT

## 2018-01-16 PROCEDURE — 36415 COLL VENOUS BLD VENIPUNCTURE: CPT | Performed by: INTERNAL MEDICINE

## 2018-01-16 PROCEDURE — 99232 SBSQ HOSP IP/OBS MODERATE 35: CPT | Performed by: INTERNAL MEDICINE

## 2018-01-16 PROCEDURE — 65270000029 HC RM PRIVATE

## 2018-01-16 PROCEDURE — 71045 X-RAY EXAM CHEST 1 VIEW: CPT

## 2018-01-16 PROCEDURE — 82746 ASSAY OF FOLIC ACID SERUM: CPT | Performed by: INTERNAL MEDICINE

## 2018-01-16 PROCEDURE — 74011250637 HC RX REV CODE- 250/637: Performed by: INTERNAL MEDICINE

## 2018-01-16 PROCEDURE — 74011250637 HC RX REV CODE- 250/637: Performed by: NURSE PRACTITIONER

## 2018-01-16 PROCEDURE — 36591 DRAW BLOOD OFF VENOUS DEVICE: CPT

## 2018-01-16 PROCEDURE — 97530 THERAPEUTIC ACTIVITIES: CPT

## 2018-01-16 PROCEDURE — 74011250636 HC RX REV CODE- 250/636: Performed by: FAMILY MEDICINE

## 2018-01-16 RX ADMIN — Medication 10 ML: at 11:40

## 2018-01-16 RX ADMIN — Medication 10 ML: at 14:36

## 2018-01-16 RX ADMIN — SALINE NASAL SPRAY 2 SPRAY: 1.5 SOLUTION NASAL at 11:39

## 2018-01-16 RX ADMIN — ALBUTEROL SULFATE 2.5 MG: 2.5 SOLUTION RESPIRATORY (INHALATION) at 11:31

## 2018-01-16 RX ADMIN — Medication 5 ML: at 05:44

## 2018-01-16 RX ADMIN — ALBUTEROL SULFATE 2.5 MG: 2.5 SOLUTION RESPIRATORY (INHALATION) at 07:53

## 2018-01-16 RX ADMIN — ALBUTEROL SULFATE 2.5 MG: 2.5 SOLUTION RESPIRATORY (INHALATION) at 21:17

## 2018-01-16 RX ADMIN — LISINOPRIL 20 MG: 20 TABLET ORAL at 08:40

## 2018-01-16 RX ADMIN — FAMOTIDINE 20 MG: 20 TABLET, FILM COATED ORAL at 08:41

## 2018-01-16 RX ADMIN — Medication 100 MG: at 08:41

## 2018-01-16 RX ADMIN — WATER 2 G: 1 INJECTION INTRAMUSCULAR; INTRAVENOUS; SUBCUTANEOUS at 11:40

## 2018-01-16 RX ADMIN — HEPARIN SODIUM 5000 UNITS: 5000 INJECTION, SOLUTION INTRAVENOUS; SUBCUTANEOUS at 21:38

## 2018-01-16 RX ADMIN — METHYLPREDNISOLONE SODIUM SUCCINATE 40 MG: 40 INJECTION, POWDER, FOR SOLUTION INTRAMUSCULAR; INTRAVENOUS at 08:41

## 2018-01-16 RX ADMIN — CARVEDILOL 3.12 MG: 3.12 TABLET, FILM COATED ORAL at 17:12

## 2018-01-16 RX ADMIN — ACETAMINOPHEN 650 MG: 325 TABLET ORAL at 21:37

## 2018-01-16 RX ADMIN — HEPARIN SODIUM 5000 UNITS: 5000 INJECTION, SOLUTION INTRAVENOUS; SUBCUTANEOUS at 05:44

## 2018-01-16 RX ADMIN — ALBUTEROL SULFATE 2.5 MG: 2.5 SOLUTION RESPIRATORY (INHALATION) at 15:21

## 2018-01-16 RX ADMIN — Medication 10 ML: at 20:37

## 2018-01-16 RX ADMIN — FLUTICASONE PROPIONATE 2 SPRAY: 50 SPRAY, METERED NASAL at 08:41

## 2018-01-16 RX ADMIN — SALINE NASAL SPRAY 2 SPRAY: 1.5 SOLUTION NASAL at 20:36

## 2018-01-16 RX ADMIN — SALINE NASAL SPRAY 2 SPRAY: 1.5 SOLUTION NASAL at 12:00

## 2018-01-16 RX ADMIN — CARVEDILOL 3.12 MG: 3.12 TABLET, FILM COATED ORAL at 08:40

## 2018-01-16 RX ADMIN — METHYLPREDNISOLONE SODIUM SUCCINATE 40 MG: 40 INJECTION, POWDER, FOR SOLUTION INTRAMUSCULAR; INTRAVENOUS at 20:33

## 2018-01-16 RX ADMIN — FAMOTIDINE 20 MG: 20 TABLET, FILM COATED ORAL at 17:12

## 2018-01-16 RX ADMIN — SERTRALINE HYDROCHLORIDE 50 MG: 50 TABLET ORAL at 08:41

## 2018-01-16 RX ADMIN — HEPARIN SODIUM 5000 UNITS: 5000 INJECTION, SOLUTION INTRAVENOUS; SUBCUTANEOUS at 14:35

## 2018-01-16 RX ADMIN — FOLIC ACID 1 MG: 1 TABLET ORAL at 08:40

## 2018-01-16 RX ADMIN — Medication 10 ML: at 21:38

## 2018-01-16 RX ADMIN — SALINE NASAL SPRAY 2 SPRAY: 1.5 SOLUTION NASAL at 17:12

## 2018-01-16 RX ADMIN — SALINE NASAL SPRAY 2 SPRAY: 1.5 SOLUTION NASAL at 08:41

## 2018-01-16 NOTE — PROGRESS NOTES
Shraddha Ion  Admission Date: 1/6/2018             Daily Progress Note: 1/16/2018    The patient's chart is reviewed and the patient is discussed with the staff. 45 y. o. CM admitted on 1/6 with acute hypoxemic respiratory failure, with methamphetamine positive, smoker, morbid obesity, suspected NATALIIA/OHS. Ravindra Coleman has sinusitis on CT and possible pneumonia.   Was on Telemetry and being diuresed. Patient in room foaming in mouth, combative and disoriented. He was being aggressively bagged in Room and when I came noted to be moving around and eyes open. I took off Ambu bag and but disoriented. Shortly after admission to ICU on BIPAP had to be intubated on 1/7/18. Rod Das has been treated for pneumonia (sputum with heavy strep pneumo and H flu), sinusitis, COPD exacerbation and volume overload. Moved for the ICU to the floor and the hospitalist service assumed care. Needs sleep study but has no pay source. Subjective:     Patient states his breathing is better. However he remains on 4L O2. Sleeping on my arrival with audible upper airway obstruction. Reports ongoing hip pain, but the hip fracture he reported to Dr Thaddeus Ng yesterday was found a year ago. Surgery was suggested but he did not have it due to financial constraints. Hip xray done yesterday with osteoarthritis. No new issues today.      Current Facility-Administered Medications   Medication Dose Route Frequency    thiamine (B-1) tablet 100 mg  100 mg Oral DAILY    folic acid (FOLVITE) tablet 1 mg  1 mg Oral DAILY    methylPREDNISolone (PF) (SOLU-MEDROL) injection 40 mg  40 mg IntraVENous Q12H    famotidine (PEPCID) tablet 20 mg  20 mg Oral BID    albuterol (PROVENTIL VENTOLIN) nebulizer solution 2.5 mg  2.5 mg Nebulization QID RT    promethazine (PHENERGAN) with saline injection 12.5 mg  12.5 mg IntraVENous Q6H PRN    LORazepam (ATIVAN) tablet 0.5 mg  0.5 mg Oral Q8H PRN    haloperidol lactate (HALDOL) injection 5 mg  5 mg IntraVENous Q6H PRN    cefTRIAXone (ROCEPHIN) 2 g in sterile water (preservative free) 20 mL IV syringe  2 g IntraVENous Q24H    sertraline (ZOLOFT) tablet 50 mg  50 mg Oral DAILY    sodium chloride (OCEAN) 0.65 % nasal spray 2 Spray  2 Spray Both Nostrils QID    sodium chloride (OCEAN) 0.65 % nasal spray 2 Spray  2 Spray Both Nostrils Q2H PRN    fluticasone (FLONASE) 50 mcg/actuation nasal spray 2 Spray  2 Spray Both Nostrils DAILY    influenza vaccine 2017-18 (3 yrs+)(PF) (FLUZONE QUAD/FLUARIX QUAD) injection 0.5 mL  0.5 mL IntraMUSCular PRIOR TO DISCHARGE    nicotine (NICODERM CQ) 21 mg/24 hr patch 1 Patch  1 Patch TransDERmal DAILY    metoprolol (LOPRESSOR) injection 5 mg  5 mg IntraVENous Q4H PRN    hydrALAZINE (APRESOLINE) 20 mg/mL injection 20 mg  20 mg IntraVENous Q6H PRN    sodium chloride (NS) flush 5-10 mL  5-10 mL IntraVENous Q8H    sodium chloride (NS) flush 5-10 mL  5-10 mL IntraVENous PRN    lisinopril (PRINIVIL, ZESTRIL) tablet 20 mg  20 mg Oral DAILY    carvedilol (COREG) tablet 3.125 mg  3.125 mg Oral BID WITH MEALS    ondansetron (ZOFRAN) injection 4 mg  4 mg IntraVENous Q6H PRN    acetaminophen (TYLENOL) tablet 650 mg  650 mg Oral Q6H PRN    diphenhydrAMINE (BENADRYL) capsule 25 mg  25 mg Oral Q4H PRN    polyethylene glycol (MIRALAX) packet 17 g  17 g Oral DAILY PRN    guaiFENesin ER (MUCINEX) tablet 600 mg  600 mg Oral Q12H PRN    calcium carbonate (TUMS) chewable tablet 400 mg [elemental]  400 mg Oral TID PRN    heparin (porcine) injection 5,000 Units  5,000 Units SubCUTAneous Q8H       Review of Systems  Constitutional: negative for fever, chills, sweats  Cardiovascular: negative for chest pain, palpitations, syncope, edema  Gastrointestinal: negative for dysphagia, reflux, vomiting, diarrhea, abdominal pain, or melena  Neurologic: negative for focal weakness, numbness, headache    Objective:     Vitals:    01/16/18 0710 01/16/18 0753 01/16/18 1131 01/16/18 1134   BP: 164/75   106/68   Pulse: 100   91   Resp: 18   17   Temp: 98.3 °F (36.8 °C)   98.3 °F (36.8 °C)   SpO2: 91% 91% 94% 99%   Weight:       Height:         Intake and Output:   01/14 1901 - 01/16 0700  In: -   Out: 2338 [Urine:2345]       Physical Exam:   Constitution:  the patient is well developed and in no acute distress  EENMT:  Sclera clear, pupils equal, oral mucosa moist  Respiratory: Snoring. CTA B, no w/r/r. Cardiovascular:  RRR without M,G,R  Gastrointestinal: soft and non-tender; with positive bowel sounds. Musculoskeletal: warm without cyanosis. There is no lower leg edema. Skin:  no jaundice or rashes, no wounds   Neurologic: no gross neuro deficits     Psychiatric:  alert and oriented x ppt    CHEST XRAY:   1/16/18  IMPRESSION: Left lower lobe atelectasis or pneumonia improved. LAB  No lab exists for component: [place]   Recent Labs      01/16/18   0642   WBC  9.6   HGB  15.4   HCT  49.6   PLT  209     Recent Labs      01/16/18   0642  01/15/18   0446  01/14/18   0350   NA  144  142  142   K  4.0  4.5  4.2   CL  105  106  104   CO2  31  27  32   GLU  111*  116*  150*   BUN  43*  41*  32*   CREA  1.36  1.43  1.39   MG   --   2.5*   --    CA  8.9  9.1  9.8   PHOS   --   5.4*   --      No results for input(s): PH, PCO2, PO2, HCO3 in the last 72 hours.     Assessment:  (Medical Decision Making)     Hospital Problems  Date Reviewed: 1/15/2018          Codes Class Noted POA    Alcohol abuse (Chronic) ICD-10-CM: F10.10  ICD-9-CM: 305.00  1/15/2018 Yes        Homeless ICD-10-CM: Z59.0  ICD-9-CM: V60.0  1/15/2018 No        Community acquired pneumonia of right lower lobe of lung ([facility] [address] ICD-10-CM: J18.1  ICD-9-CM: 568  1/9/2018 Yes        Urethral stricture ICD-10-CM: N35.9  ICD-9-CM: 598.9  1/7/2018 Yes        COPD (chronic obstructive pulmonary disease) ([email or web address] (Chronic) ICD-10-CM: J44.9  ICD-9-CM: 454  1/6/2018 Yes        Nicotine dependence (Chronic) ICD-10-CM: D98.895  ICD-9-CM: 305.1  1/6/2018 Yes Methamphetamine abuse ICD-10-CM: F15.10  ICD-9-CM: 305.70  1/6/2018 Yes        Anasarca ICD-10-CM: R60.1  ICD-9-CM: 782.3  1/6/2018 Yes        NATALIIA (obstructive sleep apnea) (Chronic) ICD-10-CM: C58.04  ICD-9-CM: 327.23  1/6/2018 Yes        Acute kidney injury [facility]) ICD-10-CM: N17.9  ICD-9-CM: 584.9  1/6/2018 Yes        * (Principal)Acute on chronic respiratory failure with hypoxia and hypercapnia (HCC) ICD-10-CM: J96.21, J96.22  ICD-9-CM: 518.84, 786.09, 799.02  1/6/2018 Yes            Patient with ongoing hypoxia likely related to recent pna with strep and h flu in sputum culture. Plan:  (Medical Decision Making)   -ABX: day 6 ceftriaxone, was on zosyn and vanc x 2 days prior to that. Will d/c tomorrow, completing 8 day course.   -on methylpred. Just tapered to bid today. Cont to taper in next 1-2 days. -wean o2 as tolerated  -appears euvolemic, no additional diuresis at present.   -albuterol  -likely needs outpatient f/u including sleep eval but unable to avoid. Case management involved.       Juan Canales MD

## 2018-01-16 NOTE — PROGRESS NOTES
Hospitalist Progress Note    2018  Admit Date: 2018  6:27 PM   NAME: Charan Martines   :  1965   DOS:              18  MRN:  [id number]   Attending: Umang Carty MD  PCP:  None  Treatment Team: Attending Provider: Rayshawn Jensen MD; Care Manager: Tan Edwards, RN; Utilization Review: Pascual Ocasio RN; Consulting Provider: Rory Thorne MD; Consulting Provider: Jocelyn Walker MD; Consulting Provider: Sukhdev Pride MD; Care Manager: Anna Lindsay, RN; Charge Nurse: Bi Prather RN    Full Code     SUBJECTIVE:   Patient examined at bedside. Wife present in the room, all questions answered. He was lying in his bed, HFNC saturating 96%. He stated feeling better. Has tolerated food, was able to participate with PT/OT. Pulmonary on board. He needs to have Galindo cath in until he is ready for discharge. Denies fever, sob, chest pain, diarrhea. 10+ ROS reviewed and negative except for positive in HPI.    No Known Allergies  Current Facility-Administered Medications   Medication Dose Route Frequency    thiamine (B-1) tablet 100 mg  100 mg Oral DAILY    folic acid (FOLVITE) tablet 1 mg  1 mg Oral DAILY    methylPREDNISolone (PF) (SOLU-MEDROL) injection 40 mg  40 mg IntraVENous Q12H    famotidine (PEPCID) tablet 20 mg  20 mg Oral BID    albuterol (PROVENTIL VENTOLIN) nebulizer solution 2.5 mg  2.5 mg Nebulization QID RT    promethazine (PHENERGAN) with saline injection 12.5 mg  12.5 mg IntraVENous Q6H PRN    LORazepam (ATIVAN) tablet 0.5 mg  0.5 mg Oral Q8H PRN    haloperidol lactate (HALDOL) injection 5 mg  5 mg IntraVENous Q6H PRN    cefTRIAXone (ROCEPHIN) 2 g in sterile water (preservative free) 20 mL IV syringe  2 g IntraVENous Q24H    sertraline (ZOLOFT) tablet 50 mg  50 mg Oral DAILY    sodium chloride (OCEAN) 0.65 % nasal spray 2 Spray  2 Spray Both Nostrils QID    sodium chloride (OCEAN) 0.65 % nasal spray 2 Spray  2 Spray Both Nostrils Q2H PRN    fluticasone (FLONASE) 50 mcg/actuation nasal spray 2 Spray  2 Spray Both Nostrils DAILY    influenza vaccine - (3 yrs+)(PF) (FLUZONE QUAD/FLUARIX QUAD) injection 0.5 mL  0.5 mL IntraMUSCular PRIOR TO DISCHARGE    nicotine (NICODERM CQ) 21 mg/24 hr patch 1 Patch  1 Patch TransDERmal DAILY    metoprolol (LOPRESSOR) injection 5 mg  5 mg IntraVENous Q4H PRN    hydrALAZINE (APRESOLINE) 20 mg/mL injection 20 mg  20 mg IntraVENous Q6H PRN    sodium chloride (NS) flush 5-10 mL  5-10 mL IntraVENous Q8H    sodium chloride (NS) flush 5-10 mL  5-10 mL IntraVENous PRN    lisinopril (PRINIVIL, ZESTRIL) tablet 20 mg  20 mg Oral DAILY    carvedilol (COREG) tablet 3.125 mg  3.125 mg Oral BID WITH MEALS    ondansetron (ZOFRAN) injection 4 mg  4 mg IntraVENous Q6H PRN    acetaminophen (TYLENOL) tablet 650 mg  650 mg Oral Q6H PRN    diphenhydrAMINE (BENADRYL) capsule 25 mg  25 mg Oral Q4H PRN    polyethylene glycol (MIRALAX) packet 17 g  17 g Oral DAILY PRN    guaiFENesin ER (MUCINEX) tablet 600 mg  600 mg Oral Q12H PRN    calcium carbonate (TUMS) chewable tablet 400 mg [elemental]  400 mg Oral TID PRN    heparin (porcine) injection 5,000 Units  5,000 Units SubCUTAneous Q8H         Immunization History   Administered Date(s) Administered    TB Skin Test (PPD) Intradermal 2018     Objective:     Patient Vitals for the past 24 hrs:   Temp Pulse Resp BP SpO2   18 0753 - - - - 91 %   18 0710 98.3 °F (36.8 °C) 100 18 164/75 91 %   18 0414 97.8 °F (36.6 °C) (!) 101 19 152/76 92 %   18 0348 - - - - 95 %   18 0016 98.5 °F (36.9 °C) 91 19 147/73 97 %   01/15/18 2127 - - - - 94 %   01/15/18 2126 - - - - 94 %   01/15/18 1946 98.2 °F (36.8 °C) 93 18 109/66 94 %   01/15/18 1630 - - - - 91 %   01/15/18 1513 98.1 °F (36.7 °C) 91 18 131/70 92 %   01/15/18 1254 - - - - 95 %   01/15/18 1105 98.4 °F (36.9 °C) 95 18 119/70 92 %   01/15/18 0928 - - - - 96 %     Temp (24hrs), Av.2 °F (36.8 °C), Min:97.8 °F (36.6 °C), Max:98.5 °F (36.9 °C)    Oxygen Therapy  O2 Sat (%): 91 % (01/16/18 0753)  Pulse via Oximetry: 100 beats per minute (01/16/18 0753)  O2 Device: Hi flow nasal cannula (01/16/18 0753)  O2 Flow Rate (L/min): 4 l/min (01/16/18 0753)  O2 Temperature: 86.9 °F (30.5 °C) (01/11/18 1521)  FIO2 (%): 36 % (01/16/18 0753)  Oxygen Therapy  O2 Sat (%): 91 % (01/16/18 0753)  Pulse via Oximetry: 100 beats per minute (01/16/18 0753)  O2 Device: Hi flow nasal cannula (01/16/18 0753)  O2 Flow Rate (L/min): 4 l/min (01/16/18 0753)  O2 Temperature: 86.9 °F (30.5 °C) (01/11/18 1521)  FIO2 (%): 36 % (01/16/18 0753)    Physical Exam:  General:         Alert, cooperative, no acute distress, Afebrile. HEENT:               NCAT. No obvious deformity. Nares normal. No drainage  Lungs:                  Decreased air entry b/l. Scattered rhonchi, no rales, no wheezing   Cardiovascular:   RRR. No m/r/g. Abdomen:       S/nt/nd. Bowel sounds normal.   Extremities:         No pedal edema   Skin:         No rashes or lesions. Not Jaundiced  Neurologic:    AAOx3. No gross focal deficit. Moves all extremities. Normal sensory. Psychiatric:         Good mood. Normal affect. Denies any SI/HI.    Genital:    Galindo cath in place, draining clear urine     DIAGNOSTIC STUDIES      Data Review:   Recent Results (from the past 24 hour(s))   CBC W/O DIFF    Collection Time: 01/16/18  6:42 AM   Result Value Ref Range    WBC 9.6 4.3 - 11.1 K/uL    RBC 4.67 4.23 - 5.67 M/uL    HGB 15.4 13.6 - 17.2 g/dL    HCT 49.6 41.1 - 50.3 %    .2 (H) 79.6 - 97.8 FL    MCH 33.0 (H) 26.1 - 32.9 PG    MCHC 31.0 (L) 31.4 - 35.0 g/dL    RDW 13.3 11.9 - 14.6 %    PLATELET 861 321 - 180 K/uL    MPV 10.6 (L) 10.8 - 14.1 FL       All Micro Results     Procedure Component Value Units Date/Time    CULTURE, BLOOD [[id number]] Collected:  01/10/18 3932    Order Status:  Completed Specimen:  Blood from Blood Updated:  01/15/18 8427     Special Requests: -- RIGHT  ARM       Culture result: NO GROWTH 5 DAYS       CULTURE, BLOOD [[id number]] Collected:  01/10/18 2150    Order Status:  Completed Specimen:  Blood from Blood Updated:  01/15/18 0623     Special Requests: --        RIGHT  ARM       Culture result: NO GROWTH 5 DAYS       CULTURE, RESPIRATORY/SPUTUM/BRONCH W Skip Alva [[id number]] Collected:  01/10/18 2115    Order Status:  Completed Specimen:  Sputum from Sputum Updated:  01/13/18 0730     Special Requests: NO SPECIAL REQUESTS        GRAM STAIN FEW GRAM POSITIVE COCCI         WBC'S TOO NUMEROUS TO COUNT      0 TO 1 EPITHELIAL CELLS PER OIF      4+ MUCUS     Culture result:         SCANT NORMAL RESPIRATORY EDUAR    CULTURE, RESPIRATORY/SPUTUM/BRONCH Lisa Clause STAIN [[id number]]  (Abnormal)  (Susceptibility) Collected:  01/07/18 1125    Order Status:  Completed Specimen:  Sputum from Endotracheal aspirate Updated:  01/12/18 0644     Special Requests: NO SPECIAL REQUESTS        GRAM STAIN 0 TO 10 WBCS/OIF      NO EPITHELIAL CELLS SEEN         MANY GRAM POSITIVE COCCI                 MODERATE GRAM NEGATIVE RODS      FEW GRAM NEGATIVE COCCI         3+ MUCUS PRESENT        Culture result:         HEAVY STREPTOCOCCUS PNEUMONIAE (A)              HEAVY HAEMOPHILUS INFLUENZAE BETA LACTAMASE NEGATIVE (A)              MODERATE NORMAL RESPIRATORY EDUAR    CULTURE, BLOOD [[id number]] Collected:  01/06/18 1854    Order Status:  Completed Specimen:  Whole Blood from Blood Updated:  01/11/18 0616     Special Requests: --        RIGHT  FOREARM       Culture result: NO GROWTH 5 DAYS       CULTURE, BLOOD [[id number]] Collected:  01/06/18 1854    Order Status:  Completed Specimen:  Whole Blood from Blood Updated:  01/11/18 0616     Special Requests: --        RIGHT  Antecubital       Culture result: NO GROWTH 5 DAYS       CULTURE, BODY FLUID Lisa Clause STAIN [[id number]] Collected:  01/09/18 0945    Order Status:  Canceled Specimen:  Miscellaneous Fluid           Imaging Maria Guadalupe Baltazar /Studies:    CXR Results  (Last 48 hours)               01/15/18 0557  XR CHEST SNGL V Final result    Impression:  IMPRESSION: Left lower lobe atelectasis or pneumonia unchanged. Narrative:  EXAM:  XR CHEST SNGL V       INDICATION:  Respiratory failure       COMPARISON:  2018       FINDINGS: A portable AP radiograph of the chest was obtained at 0610 hours. The   patient is on a cardiac monitor. Left lower lobe airspace disease unchanged. Central venous catheter remains with its tip in the left internal jugular vein. .    The cardiac and mediastinal contours and pulmonary vascularity are normal.  The   bones and soft tissues are grossly within normal limits. 18 0515  XR CHEST SNGL V Final result    Impression:  IMPRESSION: Left lower lobe atelectasis or pneumonia improved. Narrative:  EXAM:  TEMPORARY       INDICATION:  Respiratory failure       COMPARISON:  2018       FINDINGS: A portable AP radiograph of the chest was obtained at 0527 hours. The   patient is on a cardiac monitor. Left lower lobe atelectasis or pneumonia   improved. .  The cardiac and mediastinal contours and pulmonary vascularity are   normal.  The bones and soft tissues are grossly within normal limits. Central   venous catheter remains in place within its left internal jugular vein.                    Results for orders placed or performed during the hospital encounter of 18   2D ECHO COMPLETE ADULT (TTE) W OR WO CONTR    Narrative    [facility] [address], [facility] [address] [facility]  [phone]    Transthoracic Echocardiogram  2D, M-mode, Doppler, and Color Doppler    Patient: Naman Ralph  MR #: [id number]  : 1965  Age: 45 years  Gender: Male  Study date: 2018  Account #: [de-identified]  Height: 67.7 in  Weight: 315.3 lb  BSA: 2.47 mï¾²  Status:Routine  Location: [facility]  BP: 144/ 96    Allergies: NO KNOWN ALLERGIES    Sonographer: Sri Clement [facility]  Group:  [facility]  Referring Physician:  Jacqueline Burris MD  Reading Physician:  Lukas Fairbanks. MD [facility]    INDICATIONS: Edema. *Patient on vent. Technically difficult due to patient body habitus   (316lbs). *    PROCEDURE: This was a routine study. A transthoracic echocardiogram was  performed. The study included complete 2D imaging, M-mode, complete spectral  Doppler, and color Doppler. Intravenous contrast (Definity) was administered. Echocardiographic views were limited by restricted patient mobility and poor  acoustic window availability. This was a technically difficult study. LEFT VENTRICLE: Size was normal. Systolic function was normal. Ejection  fraction was estimated in the range of 55 % to 60 %. There were no regional  wall motion abnormalities. There was moderate concentric hypertrophy. The   E/e'  ratio was 21.1. Diastolic Function was indeterminate. RIGHT VENTRICLE: Not well visualized. LEFT ATRIUM: The atrium was mildly dilated. RIGHT ATRIUM: Not well visualized. SYSTEMIC VEINS: IVC: The inferior vena cava was dilated. AORTIC VALVE: The valve was not well visualized. The peak velocity was 2.23  m/s. The mean pressure gradient was 11 mmHg. The peak pressure gradient was   20  mmHg. There was no insufficiency. MITRAL VALVE: Not well visualized. There was no evidence for stenosis. There  was no regurgitation. TRICUSPID VALVE: Not well visualized. There was no evidence for stenosis. There  was trivial regurgitation. PULMONIC VALVE: Not well visualized. PERICARDIUM: A trivial pericardial effusion was identified. There was no  evidence of hemodynamic compromise. AORTA: The root exhibited normal size. SUMMARY:    -  Procedure information: This was a technically difficult study. -  Left ventricle: Systolic function was normal. Ejection fraction was  estimated in the range of 55 % to 60 %.  There were no regional wall motion  abnormalities. There was moderate concentric hypertrophy. The E/e' ratio was  21.1. Diastolic Function was indeterminate. -  Left atrium: The atrium was mildly dilated. -  Inferior vena cava, hepatic veins: The inferior vena cava was dilated. -  Pericardium: A trivial pericardial effusion was identified. There was no  evidence of hemodynamic compromise. SYSTEM MEASUREMENT TABLES    2D mode  AoR Diam (2D): 3.9 cm  LA Dimension (2D): 3.2 cm  Left Atrium Systolic Volume Index; Method of Disks, Biplane; 2D mode;: 31   ml/m2  IVS/LVPW (2D): 1  IVSd (2D): 1.8 cm  LVIDd (2D): 4.2 cm  LVIDs (2D): 2.9 cm  LVOT Area (2D): 4.2 cm2  LVPWd (2D): 1.9 cm  RVIDd (2D): 3.8 cm    Unspecified Scan Mode  Peak Grad; Mean; Antegrade Flow: 19 mm[Hg]  Vmax; Antegrade Flow: 219 cm/s  LVOT Diam: 2.3 cm  Peak Grad; Mean; Antegrade Flow: 8 mm[Hg]  Vmax; Antegrade Flow: 141 cm/s    Prepared and signed by    Kevin Jacques.  MD [facility]  Signed 08-Jan-2018 14:17:50         Labs and Studies from previous 24 hours have been personally reviewed by myself [place] Problems    Diagnosis Date Noted    Alcohol abuse 01/15/2018    Homeless 01/15/2018    Community acquired pneumonia of right lower lobe of lung ([facility] [address] 01/09/2018    Urethral stricture 01/07/2018    COPD (chronic obstructive pulmonary disease) ([facility] [address] 01/06/2018    Nicotine dependence 01/06/2018    Methamphetamine abuse 01/06/2018    Anasarca 01/06/2018    NATALIIA (obstructive sleep apnea) 01/06/2018    Acute kidney injury ([address] 01/06/2018    Acute on chronic respiratory failure with hypoxia and hypercapnia ([facility] [address] 01/06/2018     Hospital Problems as of 1/16/2018  Date Reviewed: 1/15/2018          Codes Class Noted - Resolved POA    Alcohol abuse (Chronic) ICD-10-CM: F10.10  ICD-9-CM: 305.00  1/15/2018 - Present Yes        Homeless ICD-10-CM: Z59.0  ICD-9-CM: V60.0  1/15/2018 - Present No        Community acquired pneumonia of right lower lobe of lung [facility]) ICD-10-CM: J18.1  ICD-9-CM: 501  1/9/2018 - Present Yes        Urethral stricture ICD-10-CM: N35.9  ICD-9-CM: 598.9  1/7/2018 - Present Yes        COPD (chronic obstructive pulmonary disease) ([facility] (Chronic) ICD-10-CM: J44.9  ICD-9-CM: 981  1/6/2018 - Present Yes        Nicotine dependence (Chronic) ICD-10-CM: F17.200  ICD-9-CM: 305.1  1/6/2018 - Present Yes        Methamphetamine abuse ICD-10-CM: F15.10  ICD-9-CM: 305.70  1/6/2018 - Present Yes        Anasarca ICD-10-CM: R60.1  ICD-9-CM: 782.3  1/6/2018 - Present Yes        NATALIIA (obstructive sleep apnea) (Chronic) ICD-10-CM: G47.33  ICD-9-CM: 327.23  1/6/2018 - Present Yes        Acute kidney injury ([facility] ICD-10-CM: N17.9  ICD-9-CM: 584.9  1/6/2018 - Present Yes        * (Principal)Acute on chronic respiratory failure with hypoxia and hypercapnia (HCC) ICD-10-CM: J96.21, J96.22  ICD-9-CM: 518.84, 786.09, 799.02  1/6/2018 - Present Yes        RESOLVED: Severe sepsis ([facility] ICD-10-CM: A41.9, R65.20  ICD-9-CM: 038.9, 995.92  1/9/2018 - 1/15/2018 Yes        RESOLVED: Acute metabolic encephalopathy RRQ-59-JN: G93.41  ICD-9-CM: 348.31  1/7/2018 - 1/15/2018 Yes        RESOLVED: Hypotension ICD-10-CM: I95.9  ICD-9-CM: 458.9  1/7/2018 - 1/15/2018 Yes              Plan:    - Continue IV Rocephin, pulmonary plans to discontinue tomorrow for a total of 8 days   - on IV Solumedrol, Albuterol - tapering   - wean off O2 supplementation   - add thiamine/Folic acid  - on BIPAP when sleeping/ napping - will need sleep study, although w/o insurance  - educated regarding drug abuse- care manager involved for referral as outpatient   - on nicotine patch.   -Keep Galindo cath due to urethral stricture- may remove once he is ready to be discharge and ambulating   - SW consulted  -PPD/PT/OT    DVT Prophylaxis: Heparin SQ  CODE Status: Full CODE    Chula Kingsley MD  01/16/18

## 2018-01-16 NOTE — PROGRESS NOTES
Quad lumen is not giving a blood return.   IV team to see if obtaining peripheral IV is possible if so line will be removed

## 2018-01-16 NOTE — PROGRESS NOTES
Problem: Breathing Pattern - Ineffective  Goal: *Absence of hypoxia  Outcome: Progressing Towards Goal  Patient on 4 L NC with 91% oxygen saturation.

## 2018-01-16 NOTE — PROGRESS NOTES
Problem: Falls - Risk of  Goal: *Absence of Falls  Document Mega Fall Risk and appropriate interventions in the flowsheet.    Outcome: Progressing Towards Goal  Fall Risk Interventions:  Mobility Interventions: Bed/chair exit alarm, Patient to call before getting OOB    Mentation Interventions: Bed/chair exit alarm, Door open when patient unattended, Increase mobility, More frequent rounding, Reorient patient, Toileting rounds    Medication Interventions: Patient to call before getting OOB, Teach patient to arise slowly, Bed/chair exit alarm    Elimination Interventions: Bed/chair exit alarm, Call light in reach

## 2018-01-16 NOTE — PROGRESS NOTES
Problem: Mobility Impaired (Adult and Pediatric)  Goal: *Acute Goals and Plan of Care (Insert Text)  STG:  (1.)Mr. Schaffer will move from supine to sit and sit to supine , scoot up and down and roll side to side with STAND BY ASSIST within 3 day(s). (2.)Mr. Schaffer will transfer from bed to chair and chair to bed with CONTACT GUARD ASSIST using the least restrictive device within 3 day(s). (3.)Mr. Schaffer will ambulate with CONTACT GUARD ASSIST for 100 feet with the least restrictive device within 3 day(s). (4.)Mr. Schaffer will perform standing static and dynamic balance activities x 15 minutes with CONTACT GUARD ASSIST to improve safety within 3 day(s). (5.)Mr. Schfafer will maintain stable vital signs throughout all functional mobility within 3 days. LTG:  (1.)Mr. Schaffer will move from supine to sit and sit to supine , scoot up and down and roll side to side in bed with MODIFIED INDEPENDENCE within 7 day(s). (2.)Mr. Schaffer will transfer from bed to chair and chair to bed with MODIFIED INDEPENDENCE using the least restrictive device within 7 day(s). (3.)Mr. Schaffer will ambulate with MODIFIED INDEPENDENCE for 250+ feet with the least restrictive device within 7 day(s). ________________________________________________________________________________________________      PHYSICAL THERAPY: Treatment Day: 1st, AM 1/16/2018  INPATIENT: Hospital Day: 11  Payor: SELF PAY / Plan: [facility] / Product Type: Self Pay /      NAME/AGE/GENDER: Alejandra Sanchez is a 45 y.o. male   PRIMARY DIAGNOSIS: COPD (chronic obstructive pulmonary disease) (HCC) Acute on chronic respiratory failure with hypoxia and hypercapnia (HCC) Acute on chronic respiratory failure with hypoxia and hypercapnia (HCC)        ICD-10: Treatment Diagnosis:   · Difficulty in walking, Not elsewhere classified (R26.2)   Precaution/Allergies:  Review of patient's allergies indicates no known allergies.       ASSESSMENT: Mr. Shree Osman is a 45 y.o. male with COPD. Pt was supine in bed with 4L O2 via nasal cannula in place with family member present upon PT entrance. Pt agreeable to participate in PT. He was able to get to EOB independently and perform sit-to-stand transfer with SBA. He states he uses SPC in his R hand for ambulation, secondary to R hip pain that he has had for years. Patient remained between 90-91% spO2 while ambulating with 4L O2. Pt ambulated 500 ft with SPC, CGA, and verbal cues to increase step length and demonstrate bilateral heel strike throughout. Returned bedside chair after session on 4L O2, 92%, with alarm on, respiratory therapist present, and RN notified. Will continue to follow patient throughout inpatient stay. This section established at most recent assessment   PROBLEM LIST (Impairments causing functional limitations):  1. Decreased Strength  2. Decreased ADL/Functional Activities  3. Decreased Transfer Abilities  4. Decreased Ambulation Ability/Technique  5. Decreased Balance  6. Increased Pain  7. Decreased Activity Tolerance  8. Decreased Knowledge of Precautions  9. Decreased [place] with Home Exercise Program   INTERVENTIONS PLANNED: (Benefits and precautions of physical therapy have been discussed with the patient.)  1. Balance Exercise  2. Bed Mobility  3. Family Education  4. Gait Training  5. Home Exercise Program (HEP)  6. Therapeutic Activites  7. Therapeutic Exercise/Strengthening  8. Transfer Training  9. Patient Education  10. Group Therapy     TREATMENT PLAN: Frequency/Duration: 3 times a week for duration of hospital stay  Rehabilitation Potential For Stated Goals: Good     RECOMMENDED REHABILITATION/EQUIPMENT: (at time of discharge pending progress): Due to the probability of continued deficits (see above) this patient will likely need continued skilled physical therapy after discharge.   Equipment:    None at this time              HISTORY:   History of Present Injury/Illness (Reason for Referral):  Per MD: Darrius Carroll is a 45y.o. year-old male with a past medical history of polysubstance abuse and NATALIIA with poor follow up who presents to the ER with a complaint of worsening shortness of breath and lower extremity/abdominal swelling over the past month. He admits to respiratory problems in the past and apparently was supposed to get a trach placed at some point but didn't. He has also had CPAP recommended but cannot wear it because it is too loud for him. He admits to occasional cough productive of green sputum. Denies fevers, chills, nausea, vomiting, constipation, diarrhea, chest pain. Past Medical History/Comorbidities:   Mr. Meliton [place]  has a past medical history of Sleep disorder. Mr. Meliton [place]  has no past surgical history on file. Social History/Living Environment:   Home Environment: Trailer/mobile home  # Steps to Enter: 1  One/Two Story Residence: One story  Living Alone: No  Support Systems: Child(suzette), Family member(s), Spouse/Significant Other/Partner  Patient Expects to be Discharged to[de-identified] Private residence  Current DME Used/Available at Home: Cane, straight  Tub or Shower Type: Tub/Shower combination  Prior Level of Function/Work/Activity:  Patient ambulated with modified independence prior to admission, required some assist from wife for donning socks.      Number of Personal Factors/Comorbidities that affect the Plan of Care: 1-2: MODERATE COMPLEXITY   EXAMINATION:   Most Recent Physical Functioning:   Gross Assessment:                  Posture:     Balance:  Sitting: Impaired  Sitting - Static: Good (unsupported)  Sitting - Dynamic: Fair (occasional)  Standing: Impaired  Standing - Static: Fair  Standing - Dynamic : Fair Bed Mobility:  Supine to Sit: Stand-by asssistance  Scooting: Stand-by asssistance  Wheelchair Mobility:     Transfers:  Sit to Stand: Stand-by asssistance  Stand to Sit: Stand-by asssistance  Gait:     Base of Support: Widened  Speed/Fawn: Shuffled; Slow  Step Length: Left shortened;Right shortened  Stance: Weight shift  Gait Abnormalities: Decreased step clearance;Shuffling gait; Step to gait  Distance (ft): 500 Feet (ft)  Assistive Device: Cane, straight  Ambulation - Level of Assistance: Contact guard assistance      Body Structures Involved:  1. Heart  2. Lungs  3. Muscles Body Functions Affected:  1. Sensory/Pain  2. Respiratory  3. Neuromusculoskeletal  4. Movement Related Activities and Participation Affected:  1. Mobility  2. Self Care  3. Domestic Life  4. Interpersonal Interactions and Relationships  5. Community, Social and [place]   Number of elements that affect the Plan of Care: 4+: HIGH COMPLEXITY   CLINICAL PRESENTATION:   Presentation: Stable and uncomplicated: LOW COMPLEXITY   CLINICAL DECISION MAKIN [facility] Inpatient Short Form  How much difficulty does the patient currently have. .. Unable A Lot A Little None   1. Turning over in bed (including adjusting bedclothes, sheets and blankets)? [] 1   [] 2   [x] 3   [] 4   2. Sitting down on and standing up from a chair with arms ( e.g., wheelchair, bedside commode, etc.)   [] 1   [] 2   [x] 3   [] 4   3. Moving from lying on back to sitting on the side of the bed? [] 1   [] 2   [] 3   [] 4   How much help from another person does the patient currently need. .. Total A Lot A Little None   4. Moving to and from a bed to a chair (including a wheelchair)? [] 1   [] 2   [x] 3   [] 4   5. Need to walk in hospital room? [] 1   [] 2   [x] 3   [] 4   6. Climbing 3-5 steps with a railing? [] 1   [x] 2   [] 3   [] 4   © , Trustees of [address] [place] [address] [address] [address], under license to [employer] All rights reserved      Score:  Initial: 17 Most Recent: X (Date: -- )    Interpretation of Tool:  Represents activities that are increasingly more difficult (i.e. Bed mobility, Transfers, Gait).    Score 24 23 22-20 19-15 14-10 9-7 6     Modifier CH CI CJ CK CL CM CN      ? Mobility - Walking and Moving Around:     - CURRENT STATUS: CK - 40%-59% impaired, limited or restricted    - GOAL STATUS: CJ - 20%-39% impaired, limited or restricted    - D/C STATUS:  ---------------To be determined---------------  Payor: SELF PAY / Plan: [facility] SELF PAY / Product Type: Self Pay /      Medical Necessity:     · Patient demonstrates good rehab potential due to higher previous functional level. Reason for Services/Other Comments:  · Patient continues to require modification of therapeutic interventions to increase complexity of exercises. Use of outcome tool(s) and clinical judgement create a POC that gives a: Clear prediction of patient's progress: LOW COMPLEXITY            TREATMENT:   (In addition to Assessment/Re-Assessment sessions the following treatments were rendered)   Pre-treatment Symptoms/Complaints:  none  Pain: Initial:      Post Session:  0/10     PT Reassessment Table:   Initial Physical Functioning: Most Recent Physical Functioning:   Gross Assessment:  AROM: Generally decreased, functional  PROM: Generally decreased, functional  Strength: Generally decreased, functional  Coordination: Generally decreased, functional  Sensation: Intact Gross Assessment:       Posture:   Posture (WDL): Exceptions to WDL  Posture Assessment:  Forward head, Rounded shoulders Posture:       Balance:   Sitting: Impaired  Sitting - Static: Good (unsupported)  Sitting - Dynamic: Fair (occasional)  Standing: Impaired  Standing - Static: Fair  Standing - Dynamic : Fair Balance:   Sitting: Impaired  Sitting - Static: Good (unsupported)  Sitting - Dynamic: Fair (occasional)  Standing: Impaired  Standing - Static: Fair  Standing - Dynamic : Fair   Bed Mobility:   Supine to Sit: Minimum assistance  Scooting: Contact guard assistance Bed Mobility:   Supine to Sit: Stand-by asssistance  Scooting: Stand-by asssistance   Wheelchair Mobility:    Wheelchair Mobility:      Transfers:   Sit to Stand: Minimum assistance  Stand to Sit: Minimum assistance  Bed to Chair: Minimum assistance Transfers:   Sit to Stand: Stand-by asssistance  Stand to Sit: Stand-by asssistance   Gait:   Base of Support: Narrowed, Center of gravity altered  Speed/Fawn: Slow, Pace decreased (<100 feet/min), Shuffled  Step Length: Right shortened, Left shortened  Swing Pattern: Right symmetrical, Left symmetrical  Stance: Right increased, Left increased  Gait Abnormalities: Decreased step clearance, Trunk sway increased, Path deviations  Ambulation - Level of Assistance: Minimal assistance  Distance (ft): 10 Feet (ft)  Assistive Device: Walker, rolling  Interventions: Manual cues, Safety awareness training, Verbal cues Gait:   Base of Support: Widened  Speed/Fawn: Shuffled; Slow  Step Length: Left shortened;Right shortened  Stance: Weight shift  Gait Abnormalities: Decreased step clearance;Shuffling gait; Step to gait  Ambulation - Level of Assistance: Contact guard assistance  Distance (ft): 500 Feet (ft)  Assistive Device: Cane, straight   ROM:                         ROM:                           Strength:              Strength:                Spine:           Spine:               Therapeutic Activity ( 25 min): Bed mobility and transfer training with SBA. Ambulation with straight cane to improve and/or restore physical functioning as related to mobility, strength, balance and coordination. Ambulated 500 Feet (ft) with Contact guard assistance using a Cane, straight and maximal   related to their stance phase, stride length, push off and heel strike to promote proper body alignment, promote proper body posture, promote proper body mechanics and promote proper body breathing techniques. Braces/Orthotics/Lines/Etc:   · alegre catheter  · O2 Device: Hi flow nasal cannula  Treatment/Session Assessment:    · Response to Treatment:  Patient tolerated treatment well.   · Interdisciplinary Collaboration:   o Physical Therapist  o Occupational Therapist  o Registered Nurse  o Respiratory Therapist  · After treatment position/precautions:   o Up in chair  o Bed alarm/tab alert on  o Bed/Chair-wheels locked  o Bed in low position  o Call light within reach  o RN notified  o Respiratory Therapist at bedside   · Compliance with Program/Exercises: Will assess as treatment progresses. · Recommendations/Intent for next treatment session: \"Next visit will focus on advancements to more challenging activities and reduction in assistance provided\".   Total Treatment Duration:  PT Patient Time In/Time Out  Time In: 1104  Time Out: [address], DPT

## 2018-01-16 NOTE — PROGRESS NOTES
Problem: Self Care Deficits Care Plan (Adult)  Goal: *Acute Goals and Plan of Care (Insert Text)    1. Patient will complete lower body bathing and dressing with minimal assistance and adaptive equipment as needed. MODIFIED   2. Patient will complete toileting with supervision. DISCONTINUE  3. Patient will tolerate 30 minutes of OT treatment with less than 3 rest breaks to increase activity tolerance for ADLs. MODIFIED  4. Patient will complete functional transfers with supervision and adaptive equipment as needed. CONTINUE     New goals added 1/16/2018:   5. Patient will participate in BUE therapeutic exercises to increase strength in BUE to at least 4+/5 for participation in ADLs. 6. Patient will complete upper body bathing and dressing with setup only. Timeframe: 7 visits          OCCUPATIONAL THERAPY: Re-evaluation 1/16/2018  INPATIENT: Hospital Day: 11  Payor: SELF PAY / Plan: [facility] SELF PAY / Product Type: Self Pay /      NAME/AGE/GENDER: Yumiko Taylor is a 45 y.o. male   PRIMARY DIAGNOSIS:  COPD (chronic obstructive pulmonary disease) (HCC) Acute on chronic respiratory failure with hypoxia and hypercapnia (HCC) Acute on chronic respiratory failure with hypoxia and hypercapnia (HCC)        ICD-10: Treatment Diagnosis:    · Generalized Muscle Weakness (M62.81)  · Other lack of cordination (R27.8)  · History of falling (Z91.81)   Precautions/Allergies:    Review of patient's allergies indicates no known allergies. ASSESSMENT:     Mr. Agnieszka Pierce presents to the hospital with acute respiratory failure and COPD. Patient seen for OT reassessment today after being extubated and transferred to the 7th floor. History from initial evaluation:  Pt reports that he and his wife live in mobile home. Pt admits he and his wife are not in the best health (she is disabled as well). Pt tearful when reporting about his social situation stating \"its embarrassing\" and \"we are just barely making it! \" Pt has a cane that he uses for functional mobility and needs assistance some with lower body dressing due to hx or R hip injury. Patient agreeable to OT re-evaluation. Reports no pain just chronic discomfort in his back and R hip. O2 sats 94% on 4L O2 during session. BUE assessment reveals ROM WFL, strength 3+/5 to 4/5 bilaterally. Balance intact in sitting, impaired in standing (good/ fair). Patient continues to function below his baseline for ADLs. Will plan to follow with updated goals and plan of care. This section established at most recent assessment   PROBLEM LIST (Impairments causing functional limitations):  1. Decreased Strength  2. Decreased ADL/Functional Activities  3. Decreased Transfer Abilities  4. Decreased Ambulation Ability/Technique  5. Decreased Balance  6. Decreased Activity Tolerance  7. Decreased Work Simplification/Energy Conservation Techniques  8. Increased Shortness of Breath  9. Decreased Flexibility/Joint Mobility  10. Decreased [place] with Home Exercise Program   INTERVENTIONS PLANNED: (Benefits and precautions of occupational therapy have been discussed with the patient.)  1. Activities of daily living training  2. Adaptive equipment training  3. Balance training  4. Clothing management  5. Donning&doffing training  6. Group therapy  7. Neuromuscular re-eduation  8. Therapeutic activity  9. Therapeutic exercise     TREATMENT PLAN: Frequency/Duration: Follow patient 3 times per week to address above goals. Rehabilitation Potential For Stated Goals: GOOD     RECOMMENDED REHABILITATION/EQUIPMENT: (at time of discharge pending progress): Due to the probability of continued deficits (see above) this patient will likely need continued skilled occupational therapy after discharge.   Equipment:    TBD              OCCUPATIONAL PROFILE AND HISTORY:   History of Present Injury/Illness (Reason for Referral):  See H&P  Past Medical History/Comorbidities:   Mr. Harsh Blancas  has a past medical history of Sleep disorder. Mr. Anais Keen  has no past surgical history on file. Social History/Living Environment:   Home Environment: Trailer/mobile home  # Steps to Enter: 1  One/Two Story Residence: One story  Living Alone: No  Support Systems: Child(suzette), Family member(s), Spouse/Significant Other/Partner  Patient Expects to be Discharged to[de-identified] Private residence  Current DME Used/Available at Home: Cane, straight  Tub or Shower Type: Tub/Shower combination  Prior Level of Function/Work/Activity:  Pt lives at home with his wife. Pt reports he is not able to work due to pain in his R hip. Pt completes functional mobility with cane and gets occasional assistance for lower body dressing (assistance to don socks)  Personal Factors:          Social Background:  Lives with wife who is also disabled        Past/Current Experience:  Hx of falls; hx of drug abuse         Overall Behavior:  Tearful         Other factors that influence how disability is experienced by the patient:  Multiple co-morbidities    Number of Personal Factors/Comorbidities that affect the Plan of Care: Extensive review of physical, cognitive, and psychosocial performance (3+):  HIGH COMPLEXITY   ASSESSMENT OF OCCUPATIONAL PERFORMANCE[de-identified]   Activities of Daily Living:           Basic ADLs (From Assessment) Complex ADLs (From Assessment)   Basic ADL  Feeding: Setup  Oral Facial Hygiene/Grooming: Setup  Bathing: Moderate assistance  Upper Body Dressing: Minimum assistance  Lower Body Dressing: Maximum assistance  Toileting: Moderate assistance Instrumental ADL  Meal Preparation: Maximum assistance  Homemaking: Maximum assistance  Medication Management: Maximum assistance  Financial Management: Maximum assistance   Grooming/Bathing/Dressing Activities of Daily Living     Cognitive Retraining  Safety/Judgement: Awareness of environment; Fall prevention           Toileting  Toileting Assistance:  Total assistance(dependent) (catheter)           Bed/Mat Mobility  Supine to Sit: Stand-by asssistance  Sit to Stand: Contact guard assistance  Scooting: Stand-by asssistance       Most Recent Physical Functioning:   Gross Assessment:  AROM: Within functional limits  Strength: Generally decreased, functional (3+/5 to 4/5)  Coordination: Generally decreased, functional               Posture:  Posture (WDL): Exceptions to WDL  Posture Assessment: Forward head, Rounded shoulders  Balance:  Sitting: Intact; With support  Sitting - Static: Good (unsupported)  Sitting - Dynamic: Fair (occasional)  Standing: Impaired  Standing - Static: Good  Standing - Dynamic : Fair Bed Mobility:  Supine to Sit: Stand-by asssistance  Scooting: Stand-by asssistance  Wheelchair Mobility:     Transfers:  Sit to Stand: Contact guard assistance  Stand to Sit: Contact guard assistance              Patient Vitals for the past 6 hrs:   BP BP Patient Position SpO2 O2 Flow Rate (L/min) Pulse   18 0710 164/75 At rest 91 % - 100   18 0753 - - 91 % 4 l/min -   18 1131 - - 94 % 4 l/min -   18 1134 106/68 At rest 99 % - 91       Mental Status  Neurologic State: Alert  Orientation Level: Oriented to person, Oriented to situation, Oriented to place  Cognition: Follows commands  Perception: Appears intact  Perseveration: No perseveration noted  Safety/Judgement: Awareness of environment, Fall prevention                          Physical Skills Involved:  1. Balance  2. Strength  3. Activity Tolerance  4. Pain (Chronic) Cognitive Skills Affected (resulting in the inability to perform in a timely and safe manner):  1. [facility] Psychosocial Skills Affected:  1. Habits/Routines  2. Environmental Adaptation   Number of elements that affect the Plan of Care: 3-5:  MODERATE COMPLEXITY   CLINICAL DECISION MAKIN [facility] [address] [id number] AM-PAC 6 Clicks   Daily Activity Inpatient Short Form  How much help from another person does the patient currently need. .. Total A Lot A Little None   1.   Putting on and taking off regular lower body clothing? [] 1   [x] 2   [] 3   [] 4   2. Bathing (including washing, rinsing, drying)? [] 1   [x] 2   [] 3   [] 4   3. Toileting, which includes using toilet, bedpan or urinal?   [x] 1   [] 2   [] 3   [] 4   4. Putting on and taking off regular upper body clothing? [] 1   [] 2   [x] 3   [] 4   5. Taking care of personal grooming such as brushing teeth? [] 1   [] 2   [] 3   [x] 4   6. Eating meals? [] 1   [] 2   [] 3   [x] 4   © 2007, Trustees of [address] [place] [address] [address] [address], under license to [employer]. All rights reserved      Score:  Initial: 14 Most Recent: 16 (Date: -- )    Interpretation of Tool:  Represents activities that are increasingly more difficult (i.e. Bed mobility, Transfers, Gait). Score 24 23 22-20 19-15 14-10 9-7 6     Modifier CH CI CJ CK CL CM CN      ? Self Care:     - CURRENT STATUS: CK - 40%-59% impaired, limited or restricted    - GOAL STATUS: CJ - 20%-39% impaired, limited or restricted    - D/C STATUS:  ---------------To be determined---------------  Payor: SELF PAY / Plan: [facility] SELF PAY / Product Type: Self Pay /      Medical Necessity:     · Patient demonstrates good rehab potential due to higher previous functional level. Reason for Services/Other Comments:  · Patient continues to require skilled intervention due to decreased independence with ADL/functional transfers. Use of outcome tool(s) and clinical judgement create a POC that gives a: LOW COMPLEXITY         TREATMENT:   (In addition to Assessment/Re-Assessment sessions the following treatments were rendered)     Pre-treatment Symptoms/Complaints:  None   Pain: Initial:   Pain Intensity 1: 0  Post Session:  0/10     Assessment/Reassessment only, no treatment provided today    Braces/Orthotics/Lines/Etc:   · IV  · alegre catheter  · O2 Device: Heated, Hi flow nasal cannula  Treatment/Session Assessment:    · Response to Treatment:  Re- Evaluation only.    · Interdisciplinary Collaboration:   o Occupational Therapist  o Registered Nurse  o Certified Nursing Assistant/Patient Care Technician  o Respiratory Therapist  · After treatment position/precautions:   o Up in chair  o Bed/Chair-wheels locked  o Call light within reach  o RN notified  o Family at bedside  o Nurse at bedside   · Compliance with Program/Exercises: Will assess as treatment progresses. · Recommendations/Intent for next treatment session: \"Next visit will focus on advancements to more challenging activities and reduction in assistance provided\".   Total Treatment Duration:  OT Patient Time In/Time Out  Time In: 1130  Time Out: Shivani Zabala, OTR/L

## 2018-01-17 ENCOUNTER — APPOINTMENT (OUTPATIENT)
Dept: GENERAL RADIOLOGY | Age: 53
DRG: 871 | End: 2018-01-17
Attending: INTERNAL MEDICINE
Payer: SELF-PAY

## 2018-01-17 LAB
ANION GAP SERPL CALC-SCNC: 6 MMOL/L (ref 7–16)
BUN SERPL-MCNC: 38 MG/DL (ref 6–23)
CALCIUM SERPL-MCNC: 8.8 MG/DL (ref 8.3–10.4)
CHLORIDE SERPL-SCNC: 106 MMOL/L (ref 98–107)
CO2 SERPL-SCNC: 32 MMOL/L (ref 21–32)
CREAT SERPL-MCNC: 1.08 MG/DL (ref 0.8–1.5)
ERYTHROCYTE [DISTWIDTH] IN BLOOD BY AUTOMATED COUNT: 13.2 % (ref 11.9–14.6)
GLUCOSE SERPL-MCNC: 147 MG/DL (ref 65–100)
HCT VFR BLD AUTO: 48.7 % (ref 41.1–50.3)
HGB BLD-MCNC: 15.1 G/DL (ref 13.6–17.2)
MAGNESIUM SERPL-MCNC: 2.4 MG/DL (ref 1.8–2.4)
MCH RBC QN AUTO: 32.3 PG (ref 26.1–32.9)
MCHC RBC AUTO-ENTMCNC: 31 G/DL (ref 31.4–35)
MCV RBC AUTO: 104.3 FL (ref 79.6–97.8)
PHOSPHATE SERPL-MCNC: 2.7 MG/DL (ref 2.5–4.5)
PLATELET # BLD AUTO: 215 K/UL (ref 150–450)
PMV BLD AUTO: 10.4 FL (ref 10.8–14.1)
POTASSIUM SERPL-SCNC: 4 MMOL/L (ref 3.5–5.1)
RBC # BLD AUTO: 4.67 M/UL (ref 4.23–5.67)
SODIUM SERPL-SCNC: 144 MMOL/L (ref 136–145)
WBC # BLD AUTO: 7.3 K/UL (ref 4.3–11.1)

## 2018-01-17 PROCEDURE — 74011250636 HC RX REV CODE- 250/636: Performed by: INTERNAL MEDICINE

## 2018-01-17 PROCEDURE — 84100 ASSAY OF PHOSPHORUS: CPT | Performed by: INTERNAL MEDICINE

## 2018-01-17 PROCEDURE — 74011250636 HC RX REV CODE- 250/636: Performed by: FAMILY MEDICINE

## 2018-01-17 PROCEDURE — 74011250637 HC RX REV CODE- 250/637: Performed by: INTERNAL MEDICINE

## 2018-01-17 PROCEDURE — 83735 ASSAY OF MAGNESIUM: CPT | Performed by: INTERNAL MEDICINE

## 2018-01-17 PROCEDURE — 74011250637 HC RX REV CODE- 250/637: Performed by: NURSE PRACTITIONER

## 2018-01-17 PROCEDURE — 94660 CPAP INITIATION&MGMT: CPT

## 2018-01-17 PROCEDURE — 36415 COLL VENOUS BLD VENIPUNCTURE: CPT | Performed by: INTERNAL MEDICINE

## 2018-01-17 PROCEDURE — 65270000029 HC RM PRIVATE

## 2018-01-17 PROCEDURE — 94640 AIRWAY INHALATION TREATMENT: CPT

## 2018-01-17 PROCEDURE — 74011250637 HC RX REV CODE- 250/637: Performed by: HOSPITALIST

## 2018-01-17 PROCEDURE — 99232 SBSQ HOSP IP/OBS MODERATE 35: CPT | Performed by: INTERNAL MEDICINE

## 2018-01-17 PROCEDURE — 77010033678 HC OXYGEN DAILY

## 2018-01-17 PROCEDURE — 74011000250 HC RX REV CODE- 250: Performed by: INTERNAL MEDICINE

## 2018-01-17 PROCEDURE — 74011250637 HC RX REV CODE- 250/637: Performed by: FAMILY MEDICINE

## 2018-01-17 PROCEDURE — 71045 X-RAY EXAM CHEST 1 VIEW: CPT

## 2018-01-17 PROCEDURE — 97530 THERAPEUTIC ACTIVITIES: CPT

## 2018-01-17 PROCEDURE — 85027 COMPLETE CBC AUTOMATED: CPT | Performed by: INTERNAL MEDICINE

## 2018-01-17 PROCEDURE — 94760 N-INVAS EAR/PLS OXIMETRY 1: CPT

## 2018-01-17 PROCEDURE — 80048 BASIC METABOLIC PNL TOTAL CA: CPT | Performed by: INTERNAL MEDICINE

## 2018-01-17 RX ADMIN — SALINE NASAL SPRAY 2 SPRAY: 1.5 SOLUTION NASAL at 14:06

## 2018-01-17 RX ADMIN — CARVEDILOL 3.12 MG: 3.12 TABLET, FILM COATED ORAL at 17:17

## 2018-01-17 RX ADMIN — SALINE NASAL SPRAY 2 SPRAY: 1.5 SOLUTION NASAL at 17:17

## 2018-01-17 RX ADMIN — METHYLPREDNISOLONE SODIUM SUCCINATE 40 MG: 40 INJECTION, POWDER, FOR SOLUTION INTRAMUSCULAR; INTRAVENOUS at 08:25

## 2018-01-17 RX ADMIN — Medication 100 MG: at 08:26

## 2018-01-17 RX ADMIN — HEPARIN SODIUM 5000 UNITS: 5000 INJECTION, SOLUTION INTRAVENOUS; SUBCUTANEOUS at 05:45

## 2018-01-17 RX ADMIN — HEPARIN SODIUM 5000 UNITS: 5000 INJECTION, SOLUTION INTRAVENOUS; SUBCUTANEOUS at 21:21

## 2018-01-17 RX ADMIN — LISINOPRIL 20 MG: 20 TABLET ORAL at 08:26

## 2018-01-17 RX ADMIN — WATER 2 G: 1 INJECTION INTRAMUSCULAR; INTRAVENOUS; SUBCUTANEOUS at 10:41

## 2018-01-17 RX ADMIN — Medication 5 ML: at 14:05

## 2018-01-17 RX ADMIN — ALBUTEROL SULFATE 2.5 MG: 2.5 SOLUTION RESPIRATORY (INHALATION) at 15:18

## 2018-01-17 RX ADMIN — SERTRALINE HYDROCHLORIDE 50 MG: 50 TABLET ORAL at 08:26

## 2018-01-17 RX ADMIN — ALBUTEROL SULFATE 2.5 MG: 2.5 SOLUTION RESPIRATORY (INHALATION) at 19:32

## 2018-01-17 RX ADMIN — FOLIC ACID 1 MG: 1 TABLET ORAL at 08:26

## 2018-01-17 RX ADMIN — Medication 5 ML: at 21:23

## 2018-01-17 RX ADMIN — CARVEDILOL 3.12 MG: 3.12 TABLET, FILM COATED ORAL at 08:26

## 2018-01-17 RX ADMIN — ACETAMINOPHEN 650 MG: 325 TABLET ORAL at 21:22

## 2018-01-17 RX ADMIN — SALINE NASAL SPRAY 2 SPRAY: 1.5 SOLUTION NASAL at 21:27

## 2018-01-17 RX ADMIN — ALBUTEROL SULFATE 2.5 MG: 2.5 SOLUTION RESPIRATORY (INHALATION) at 08:20

## 2018-01-17 RX ADMIN — Medication 10 ML: at 05:46

## 2018-01-17 RX ADMIN — ALBUTEROL SULFATE 2.5 MG: 2.5 SOLUTION RESPIRATORY (INHALATION) at 11:44

## 2018-01-17 RX ADMIN — FAMOTIDINE 20 MG: 20 TABLET, FILM COATED ORAL at 17:17

## 2018-01-17 RX ADMIN — HEPARIN SODIUM 5000 UNITS: 5000 INJECTION, SOLUTION INTRAVENOUS; SUBCUTANEOUS at 14:05

## 2018-01-17 RX ADMIN — SALINE NASAL SPRAY 2 SPRAY: 1.5 SOLUTION NASAL at 08:30

## 2018-01-17 RX ADMIN — FLUTICASONE PROPIONATE 2 SPRAY: 50 SPRAY, METERED NASAL at 08:31

## 2018-01-17 RX ADMIN — FAMOTIDINE 20 MG: 20 TABLET, FILM COATED ORAL at 08:26

## 2018-01-17 NOTE — PROGRESS NOTES
Problem: Mobility Impaired (Adult and Pediatric)  Goal: *Acute Goals and Plan of Care (Insert Text)  STG:  (1.)Mr. Schaffer will move from supine to sit and sit to supine , scoot up and down and roll side to side with STAND BY ASSIST within 3 day(s). (2.)Mr. Schaffer will transfer from bed to chair and chair to bed with CONTACT GUARD ASSIST using the least restrictive device within 3 day(s). (3.)Mr. Schaffer will ambulate with CONTACT GUARD ASSIST for 100 feet with the least restrictive device within 3 day(s). (4.)Mr. Schaffer will perform standing static and dynamic balance activities x 15 minutes with CONTACT GUARD ASSIST to improve safety within 3 day(s). (5.)Mr. Schaffer will maintain stable vital signs throughout all functional mobility within 3 days. LTG:  (1.)Mr. Schaffer will move from supine to sit and sit to supine , scoot up and down and roll side to side in bed with MODIFIED INDEPENDENCE within 7 day(s). (2.)Mr. Schaffer will transfer from bed to chair and chair to bed with MODIFIED INDEPENDENCE using the least restrictive device within 7 day(s). (3.)Mr. Schaffer will ambulate with MODIFIED INDEPENDENCE for 250+ feet with the least restrictive device within 7 day(s). ________________________________________________________________________________________________      PHYSICAL THERAPY: Daily Note, Treatment Day: 1st, PM 1/17/2018  INPATIENT: Hospital Day: 12  Payor: SELF PAY / Plan: [facility] SELF PAY / Product Type: Self Pay /      NAME/AGE/GENDER: Arnulfo Bobo is a 45 y.o. male   PRIMARY DIAGNOSIS: COPD (chronic obstructive pulmonary disease) (HCC) Acute on chronic respiratory failure with hypoxia and hypercapnia (HCC) Acute on chronic respiratory failure with hypoxia and hypercapnia (HCC)        ICD-10: Treatment Diagnosis:   · Difficulty in walking, Not elsewhere classified (R26.2)   Precaution/Allergies:  Review of patient's allergies indicates no known allergies. ASSESSMENT:     Mr. Refugio Brown is a 45 y.o. male with COPD. Patient was supine upon contact and agreeable to PT. Patient appears to be more groggy and drowsy today. He is difficult to understand due to mumbling. Patient able to perform supine to sit with mod assist ,additional time, and cues for improved technique. Patient attempted several times to perform supine to sit on his own power but was having difficulty. Patient then transfers to standing with min assist and cues for hand placement. Again, he attempted several times to perform on his own power but unable needing assistance from therapist. Once standing patient ambulates 250' with SPC and increased trunk sway/path deviations but no true LOB noted. Patient needs several short standing rest breaks due to fatigue. Patient returns to EOB and to supine with SBA. Overall patient did not do as well today as yesterday due to stated above reasons (drowsy, fatigued) but was motivated to particiapte. This section established at most recent assessment   PROBLEM LIST (Impairments causing functional limitations):  1. Decreased Strength  2. Decreased ADL/Functional Activities  3. Decreased Transfer Abilities  4. Decreased Ambulation Ability/Technique  5. Decreased Balance  6. Increased Pain  7. Decreased Activity Tolerance  8. Decreased Knowledge of Precautions  9. Decreased [place] with Home Exercise Program   INTERVENTIONS PLANNED: (Benefits and precautions of physical therapy have been discussed with the patient.)  1. Balance Exercise  2. Bed Mobility  3. Family Education  4. Gait Training  5. Home Exercise Program (HEP)  6. Therapeutic Activites  7. Therapeutic Exercise/Strengthening  8. Transfer Training  9. Patient Education  10.  Group Therapy     TREATMENT PLAN: Frequency/Duration: 3 times a week for duration of hospital stay  Rehabilitation Potential For Stated Goals: Good     RECOMMENDED REHABILITATION/EQUIPMENT: (at time of discharge pending progress): Due to the probability of continued deficits (see above) this patient will likely need continued skilled physical therapy after discharge. Equipment:    None at this time              HISTORY:   History of Present Injury/Illness (Reason for Referral):  Per MD: Darrius Carroll is a 45y.o. year-old male with a past medical history of polysubstance abuse and NATALIIA with poor follow up who presents to the ER with a complaint of worsening shortness of breath and lower extremity/abdominal swelling over the past month. He admits to respiratory problems in the past and apparently was supposed to get a trach placed at some point but didn't. He has also had CPAP recommended but cannot wear it because it is too loud for him. He admits to occasional cough productive of green sputum. Denies fevers, chills, nausea, vomiting, constipation, diarrhea, chest pain. Past Medical History/Comorbidities:   Mr. Meliton Phoenix  has a past medical history of Sleep disorder. Mr. Meliton Phoenix  has no past surgical history on file. Social History/Living Environment:   Home Environment: Trailer/mobile home  # Steps to Enter: 1  One/Two Story Residence: One story  Living Alone: No  Support Systems: Child(suzette), Family member(s), Spouse/Significant Other/Partner  Patient Expects to be Discharged to[de-identified] Private residence  Current DME Used/Available at Home: Cane, straight  Tub or Shower Type: Tub/Shower combination  Prior Level of Function/Work/Activity:  Patient ambulated with modified independence prior to admission, required some assist from wife for donning socks.      Number of Personal Factors/Comorbidities that affect the Plan of Care: 1-2: MODERATE COMPLEXITY   EXAMINATION:   Most Recent Physical Functioning:   Gross Assessment:                  Posture:     Balance:  Sitting: Intact  Standing: Impaired  Standing - Static: Fair  Standing - Dynamic : Fair Bed Mobility:  Supine to Sit: Moderate assistance  Sit to Supine: Stand-by asssistance  Wheelchair Mobility:     Transfers:  Sit to Stand: Minimum assistance  Stand to Sit: Minimum assistance  Gait:     Base of Support: Widened  Speed/Fawn: Shuffled; Slow  Step Length: Right shortened;Left shortened  Gait Abnormalities: Decreased step clearance;Shuffling gait; Path deviations;Trunk sway increased  Distance (ft): 250 Feet (ft)  Assistive Device: Cane, straight  Ambulation - Level of Assistance: Contact guard assistance  Interventions: Safety awareness training;Verbal cues      Body Structures Involved:  1. Heart  2. Lungs  3. Muscles Body Functions Affected:  1. Sensory/Pain  2. Respiratory  3. Neuromusculoskeletal  4. Movement Related Activities and Participation Affected:  1. Mobility  2. Self Care  3. Domestic Life  4. Interpersonal Interactions and Relationships  5. Community, Social and [place]   Number of elements that affect the Plan of Care: 4+: HIGH COMPLEXITY   CLINICAL PRESENTATION:   Presentation: Stable and uncomplicated: LOW COMPLEXITY   CLINICAL DECISION MAKIN [facility] Mobility Inpatient Short Form  How much difficulty does the patient currently have. .. Unable A Lot A Little None   1. Turning over in bed (including adjusting bedclothes, sheets and blankets)? [] 1   [] 2   [x] 3   [] 4   2. Sitting down on and standing up from a chair with arms ( e.g., wheelchair, bedside commode, etc.)   [] 1   [] 2   [x] 3   [] 4   3. Moving from lying on back to sitting on the side of the bed? [] 1   [] 2   [] 3   [] 4   How much help from another person does the patient currently need. .. Total A Lot A Little None   4. Moving to and from a bed to a chair (including a wheelchair)? [] 1   [] 2   [x] 3   [] 4   5. Need to walk in hospital room? [] 1   [] 2   [x] 3   [] 4   6. Climbing 3-5 steps with a railing? [] 1   [x] 2   [] 3   [] 4   © , Trustees of [address] [place] [address] [address] [address], under license to [employer].  All rights reserved Score:  Initial: 17 Most Recent: X (Date: -- )    Interpretation of Tool:  Represents activities that are increasingly more difficult (i.e. Bed mobility, Transfers, Gait). Score 24 23 22-20 19-15 14-10 9-7 6     Modifier CH CI CJ CK CL CM CN      ? Mobility - Walking and Moving Around:     - CURRENT STATUS: CK - 40%-59% impaired, limited or restricted    - GOAL STATUS: CJ - 20%-39% impaired, limited or restricted    - D/C STATUS:  ---------------To be determined---------------  Payor: SELF PAY / Plan: [facility] SELF PAY / Product Type: Self Pay /      Medical Necessity:     · Patient demonstrates good rehab potential due to higher previous functional level. Reason for Services/Other Comments:  · Patient continues to require modification of therapeutic interventions to increase complexity of exercises. Use of outcome tool(s) and clinical judgement create a POC that gives a: Clear prediction of patient's progress: LOW COMPLEXITY            TREATMENT:   (In addition to Assessment/Re-Assessment sessions the following treatments were rendered)   Pre-treatment Symptoms/Complaints:  none  Pain: Initial:   Pain Intensity 1: 0  Post Session:  0/10     PT Reassessment Table:   Most Recent Physical Functioning:   Gross Assessment:       Posture:       Balance:   Sitting: Intact  Standing: Impaired  Standing - Static: Fair  Standing - Dynamic : Fair   Bed Mobility:   Supine to Sit: Moderate assistance  Sit to Supine: Stand-by asssistance   Wheelchair Mobility:      Transfers:   Sit to Stand: Minimum assistance  Stand to Sit: Minimum assistance   Gait:   Base of Support: Widened  Speed/Fawn: Shuffled; Slow  Step Length: Right shortened;Left shortened  Gait Abnormalities: Decreased step clearance;Shuffling gait; Path deviations;Trunk sway increased  Ambulation - Level of Assistance: Contact guard assistance  Distance (ft): 250 Feet (ft)  Assistive Device: Cane, straight  Interventions: Safety awareness training;Verbal cues   ROM:                           Strength:                Spine:               Therapeutic Activity: (    24 Minutes): Therapeutic activities including bed mobility training, transfer training, ambulation on level ground, static/dynamic standing balance activities, scooting, and patient education to improve mobility, strength, balance and activity tolerance. Required moderate Safety awareness training;Verbal cues to promote static and dynamic balance in standing, promote coordination of bilateral, lower extremity(s) and to promote improved technique with bed mobility, transfers, scooting, and sequencing during ambulation. Braces/Orthotics/Lines/Etc:   · alegre catheter  · O2 Device: Hi flow nasal cannula  Treatment/Session Assessment:    · Response to Treatment:  See above  · Interdisciplinary Collaboration:   o Physical Therapy Assistant  o Registered Nurse  · After treatment position/precautions:   o Supine in bed  o Bed alarm/tab alert on  o Bed/Chair-wheels locked  o Bed in low position  o Call light within reach  o RN notified  o Family at bedside   · Compliance with Program/Exercises: Will assess as treatment progresses. · Recommendations/Intent for next treatment session: \"Next visit will focus on advancements to more challenging activities and reduction in assistance provided\".   Total Treatment Duration:  PT Patient Time In/Time Out  Time In: 1246  Time Out: 304 [facility]

## 2018-01-17 NOTE — PROGRESS NOTES
Joe Osborne  Admission Date: 1/6/2018             Daily Progress Note: 1/17/2018    The patient's chart is reviewed and the patient is discussed with the staff. 45 y. o. CM admitted on 1/6 with acute hypoxemic respiratory failure, with methamphetamine positive, smoker, morbid obesity, suspected NATALIIA/OHS. Rosangela Coats has sinusitis on CT and possible pneumonia.   Was on Telemetry and being diuresed. Patient in room foaming in mouth, combative and disoriented. He was being aggressively bagged in Room and when I came noted to be moving around and eyes open. I took off Ambu bag and but disoriented. Shortly after admission to ICU on BIPAP had to be intubated on 1/7/18. [facility] has been treated for pneumonia (sputum with heavy strep pneumo and H flu), sinusitis, COPD exacerbation and volume overload. Moved for the ICU to the floor and the hospitalist service assumed care. Needs sleep study but has no pay source. Reports ongoing hip pain, but the hip fracture he reported to Dr Tavares Huerta yesterday was found a year ago. Surgery was suggested but he did not have it due to financial constraints. Hip xray done yesterday with osteoarthritis. Subjective:     Patient in bed and states almost ready to go home. Aware that currently on oxygen and did uses BIPAP/on off and does not have it. Reports will not use Meth any more. Smoking may still be an issue.        Current Facility-Administered Medications   Medication Dose Route Frequency    thiamine (B-1) tablet 100 mg  100 mg Oral DAILY    folic acid (FOLVITE) tablet 1 mg  1 mg Oral DAILY    methylPREDNISolone (PF) (SOLU-MEDROL) injection 40 mg  40 mg IntraVENous Q12H    famotidine (PEPCID) tablet 20 mg  20 mg Oral BID    albuterol (PROVENTIL VENTOLIN) nebulizer solution 2.5 mg  2.5 mg Nebulization QID RT    promethazine (PHENERGAN) with saline injection 12.5 mg  12.5 mg IntraVENous Q6H PRN    LORazepam (ATIVAN) tablet 0.5 mg  0.5 mg Oral Q8H PRN    haloperidol lactate (HALDOL) injection 5 mg  5 mg IntraVENous Q6H PRN    cefTRIAXone (ROCEPHIN) 2 g in sterile water (preservative free) 20 mL IV syringe  2 g IntraVENous Q24H    sertraline (ZOLOFT) tablet 50 mg  50 mg Oral DAILY    sodium chloride (OCEAN) 0.65 % nasal spray 2 Spray  2 Spray Both Nostrils QID    sodium chloride (OCEAN) 0.65 % nasal spray 2 Spray  2 Spray Both Nostrils Q2H PRN    fluticasone (FLONASE) 50 mcg/actuation nasal spray 2 Spray  2 Spray Both Nostrils DAILY    influenza vaccine 2017-18 (3 yrs+)(PF) (FLUZONE QUAD/FLUARIX QUAD) injection 0.5 mL  0.5 mL IntraMUSCular PRIOR TO DISCHARGE    nicotine (NICODERM CQ) 21 mg/24 hr patch 1 Patch  1 Patch TransDERmal DAILY    metoprolol (LOPRESSOR) injection 5 mg  5 mg IntraVENous Q4H PRN    hydrALAZINE (APRESOLINE) 20 mg/mL injection 20 mg  20 mg IntraVENous Q6H PRN    sodium chloride (NS) flush 5-10 mL  5-10 mL IntraVENous Q8H    sodium chloride (NS) flush 5-10 mL  5-10 mL IntraVENous PRN    lisinopril (PRINIVIL, ZESTRIL) tablet 20 mg  20 mg Oral DAILY    carvedilol (COREG) tablet 3.125 mg  3.125 mg Oral BID WITH MEALS    ondansetron (ZOFRAN) injection 4 mg  4 mg IntraVENous Q6H PRN    acetaminophen (TYLENOL) tablet 650 mg  650 mg Oral Q6H PRN    diphenhydrAMINE (BENADRYL) capsule 25 mg  25 mg Oral Q4H PRN    polyethylene glycol (MIRALAX) packet 17 g  17 g Oral DAILY PRN    guaiFENesin ER (MUCINEX) tablet 600 mg  600 mg Oral Q12H PRN    calcium carbonate (TUMS) chewable tablet 400 mg [elemental]  400 mg Oral TID PRN    heparin (porcine) injection 5,000 Units  5,000 Units SubCUTAneous Q8H       Review of Systems  Constitutional: negative for fever, chills, sweats  Cardiovascular: negative for chest pain, palpitations, syncope, edema  Gastrointestinal: negative for dysphagia, reflux, vomiting, diarrhea, abdominal pain, or melena  Neurologic: negative for focal weakness, numbness, headache    Objective:     Vitals:    01/16/18 2353 01/17/18 0355 01/17/18 0740 01/17/18 0820   BP: 144/84 107/70 153/81    Pulse: 84 64 94    Resp: 18 18 19    Temp: 98.2 °F (36.8 °C) 98.3 °F (36.8 °C) 98.7 °F (37.1 °C)    SpO2: 97% 96% 90% 92%   Weight:       Height:         Intake and Output:   01/15 1901 - 01/17 0700  In: -   Out: 2450 [Urine:2450]       Physical Exam:   Constitution:  the patient is well developed and in no acute distress  EENMT:  Sclera clear, pupils equal, oral mucosa moist on 2 LPM  Respiratory: CTA b/l no w/r/r. Cardiovascular:  RRR without M,G,R  Gastrointestinal: soft and non-tender; with positive bowel sounds. Musculoskeletal: warm without cyanosis. There is no lower leg edema. Skin:  no jaundice or rashes, no wounds   Neurologic: no gross neuro deficits     Psychiatric:  alert and oriented x ppt    CHEST XRAY:   Slight infiltrates in RLL. Overall better aeration today. LAB  No lab exists for component: [place]   Recent Labs      01/17/18   0614  01/16/18   0642   WBC  7.3  9.6   HGB  15.1  15.4   HCT  48.7  49.6   PLT  215  209     Recent Labs      01/17/18   0614  01/16/18   0642  01/15/18   0446   NA  144  144  142   K  4.0  4.0  4.5   CL  106  105  106   CO2  32  31  27   GLU  147*  111*  116*   BUN  38*  43*  41*   CREA  1.08  1.36  1.43   MG  2.4   --   2.5*   CA  8.8  8.9  9.1   PHOS  2.7   --   5.4*     No results for input(s): PH, PCO2, PO2, HCO3 in the last 72 hours.     Assessment:  (Medical Decision Making)     Hospital Problems  Date Reviewed: 1/15/2018          Codes Class Noted POA    Alcohol abuse (Chronic) ICD-10-CM: F10.10  ICD-9-CM: 305.00  1/15/2018 Yes        Homeless ICD-10-CM: Z59.0  ICD-9-CM: V60.0  1/15/2018 No        Community acquired pneumonia of right lower lobe of lung ([facility] ICD-10-CM: J18.1  ICD-9-CM: 313  1/9/2018 Yes        Urethral stricture ICD-10-CM: N35.9  ICD-9-CM: 598.9  1/7/2018 Yes        COPD (chronic obstructive pulmonary disease) ([facility] (Chronic) ICD-10-CM: J44.9  ICD-9-CM: 594  1/6/2018 Yes        Nicotine dependence (Chronic) ICD-10-CM: F17.200  ICD-9-CM: 305.1  1/6/2018 Yes        Methamphetamine abuse ICD-10-CM: F15.10  ICD-9-CM: 305.70  1/6/2018 Yes        Anasarca ICD-10-CM: R60.1  ICD-9-CM: 782.3  1/6/2018 Yes        NATALIIA (obstructive sleep apnea) (Chronic) ICD-10-CM: I95.61  ICD-9-CM: 327.23  1/6/2018 Yes        Acute kidney injury [facility]) ICD-10-CM: N17.9  ICD-9-CM: 584.9  1/6/2018 Yes        * (Principal)Acute on chronic respiratory failure with hypoxia and hypercapnia (HCC) ICD-10-CM: J96.21, J96.22  ICD-9-CM: 518.84, 786.09, 799.02  1/6/2018 Yes            Patient with ongoing hypoxia likely related to recent pna with strep and h flu in sputum culture. Plan:  (Medical Decision Making)   -stop abx today, will have competed 8 days. CXR noted and improved Right base  -did check RA saturation myself and 88% and with purse lip breathing at 90%. Will need oxygen for home not sure who will pay for tanks. He reports does have a concentrator from a relative  --x-ray of hip with osteoarthritis and no surgery -- since in the past with ?fracture did not have funds for it  -albuterol  -likely needs outpatient f/u including sleep eval but unable to avoid. Case management involved, but not sure he will be able to afford it.       Starla Azevedo MD

## 2018-01-17 NOTE — PROGRESS NOTES
Hospitalist Progress Note    2018  Admit Date: 2018  6:27 PM   NAME: Rasta Wyatt   :  1965   DOS:              18  MRN:  [id number]   Attending: Susanna Lang MD  PCP:  None  Treatment Team: Attending Provider: Cabrera Coley MD; Care Manager: Josephine Montenegro RN; Utilization Review: Kamini Bryan RN; Consulting Provider: Verónica Flor MD; Consulting Provider: Caro Chou MD; Consulting Provider: George Hernandez MD; Care Manager: Emy Richter RN; Tech: Heike Luo RN    Full Code     SUBJECTIVE:   Patient examined at bedside. Wife present in the room, all questions answered. He had no complains. Had resting and walking oxygen test by pulmonary team. He will require home oxygen. His clinical status is much improved. Care manager involved for arrangements of oxygen tank and appointments for sleep studies. 10+ ROS reviewed and negative except for positive in HPI.    No Known Allergies  Current Facility-Administered Medications   Medication Dose Route Frequency    thiamine (B-1) tablet 100 mg  100 mg Oral DAILY    folic acid (FOLVITE) tablet 1 mg  1 mg Oral DAILY    methylPREDNISolone (PF) (SOLU-MEDROL) injection 40 mg  40 mg IntraVENous Q12H    famotidine (PEPCID) tablet 20 mg  20 mg Oral BID    albuterol (PROVENTIL VENTOLIN) nebulizer solution 2.5 mg  2.5 mg Nebulization QID RT    promethazine (PHENERGAN) with saline injection 12.5 mg  12.5 mg IntraVENous Q6H PRN    LORazepam (ATIVAN) tablet 0.5 mg  0.5 mg Oral Q8H PRN    haloperidol lactate (HALDOL) injection 5 mg  5 mg IntraVENous Q6H PRN    cefTRIAXone (ROCEPHIN) 2 g in sterile water (preservative free) 20 mL IV syringe  2 g IntraVENous Q24H    sertraline (ZOLOFT) tablet 50 mg  50 mg Oral DAILY    sodium chloride (OCEAN) 0.65 % nasal spray 2 Spray  2 Spray Both Nostrils QID    sodium chloride (OCEAN) 0.65 % nasal spray 2 Spray  2 Spray Both Nostrils Q2H PRN    fluticasone (FLONASE) 50 mcg/actuation nasal spray 2 Spray  2 Spray Both Nostrils DAILY    influenza vaccine  (3 yrs+)(PF) (FLUZONE QUAD/FLUARIX QUAD) injection 0.5 mL  0.5 mL IntraMUSCular PRIOR TO DISCHARGE    nicotine (NICODERM CQ) 21 mg/24 hr patch 1 Patch  1 Patch TransDERmal DAILY    metoprolol (LOPRESSOR) injection 5 mg  5 mg IntraVENous Q4H PRN    hydrALAZINE (APRESOLINE) 20 mg/mL injection 20 mg  20 mg IntraVENous Q6H PRN    sodium chloride (NS) flush 5-10 mL  5-10 mL IntraVENous Q8H    sodium chloride (NS) flush 5-10 mL  5-10 mL IntraVENous PRN    lisinopril (PRINIVIL, ZESTRIL) tablet 20 mg  20 mg Oral DAILY    carvedilol (COREG) tablet 3.125 mg  3.125 mg Oral BID WITH MEALS    ondansetron (ZOFRAN) injection 4 mg  4 mg IntraVENous Q6H PRN    acetaminophen (TYLENOL) tablet 650 mg  650 mg Oral Q6H PRN    diphenhydrAMINE (BENADRYL) capsule 25 mg  25 mg Oral Q4H PRN    polyethylene glycol (MIRALAX) packet 17 g  17 g Oral DAILY PRN    guaiFENesin ER (MUCINEX) tablet 600 mg  600 mg Oral Q12H PRN    calcium carbonate (TUMS) chewable tablet 400 mg [elemental]  400 mg Oral TID PRN    heparin (porcine) injection 5,000 Units  5,000 Units SubCUTAneous Q8H         Immunization History   Administered Date(s) Administered    TB Skin Test (PPD) Intradermal 2018     Objective:     Patient Vitals for the past 24 hrs:   Temp Pulse Resp BP SpO2   18 0820 - - - - 92 %   18 0740 98.7 °F (37.1 °C) 94 19 153/81 90 %   18 0355 98.3 °F (36.8 °C) 64 18 107/70 96 %   18 2353 98.2 °F (36.8 °C) 84 18 144/84 97 %   18 2119 - - - - 92 %   18 2055 97.9 °F (36.6 °C) 92 18 114/74 92 %   18 1523 - - - - 93 %   18 1501 97.9 °F (36.6 °C) 86 18 111/66 93 %   18 1134 98.3 °F (36.8 °C) 91 17 106/68 99 %   18 1131 - - - - 94 %     Temp (24hrs), Av.2 °F (36.8 °C), Min:97.9 °F (36.6 °C), Max:98.7 °F (37.1 °C)    Oxygen Therapy  O2 Sat (%): 92 % (18 0820)  Pulse via Oximetry: 98 beats per minute (01/17/18 0820)  O2 Device: Hi flow nasal cannula (01/17/18 0820)  O2 Flow Rate (L/min): 4 l/min (01/17/18 0820)  O2 Temperature: 86.9 °F (30.5 °C) (01/11/18 1521)  FIO2 (%): 36 % (01/16/18 1523)  Oxygen Therapy  O2 Sat (%): 92 % (01/17/18 0820)  Pulse via Oximetry: 98 beats per minute (01/17/18 0820)  O2 Device: Hi flow nasal cannula (01/17/18 0820)  O2 Flow Rate (L/min): 4 l/min (01/17/18 0820)  O2 Temperature: 86.9 °F (30.5 °C) (01/11/18 1521)  FIO2 (%): 36 % (01/16/18 1523)    Physical Exam:  General:         Alert, cooperative, no acute distress, Afebrile. HEENT:               NCAT. No obvious deformity. Nares normal. No drainage  Lungs:                  Decreased air entry b/l. Scattered rhonchi, no rales, no wheezing   Cardiovascular:   RRR. No m/r/g. Abdomen:       S/nt/nd. Bowel sounds normal.   Extremities:         No pedal edema   Skin:         No rashes or lesions. Not Jaundiced  Neurologic:    AAOx3. No gross focal deficit. Moves all extremities. Normal sensory. Psychiatric:         Good mood. Normal affect. Denies any SI/HI.    Genital:    Galindo cath in place, draining clear urine     DIAGNOSTIC STUDIES      Data Review:   Recent Results (from the past 24 hour(s))   METABOLIC PANEL, BASIC    Collection Time: 01/17/18  6:14 AM   Result Value Ref Range    Sodium 144 136 - 145 mmol/L    Potassium 4.0 3.5 - 5.1 mmol/L    Chloride 106 98 - 107 mmol/L    CO2 32 21 - 32 mmol/L    Anion gap 6 (L) 7 - 16 mmol/L    Glucose 147 (H) 65 - 100 mg/dL    BUN 38 (H) 6 - 23 MG/DL    Creatinine 1.08 0.8 - 1.5 MG/DL    GFR est AA >60 >60 ml/min/1.73m2    GFR est non-AA >60 >60 ml/min/1.73m2    Calcium 8.8 8.3 - 10.4 MG/DL   PHOSPHORUS    Collection Time: 01/17/18  6:14 AM   Result Value Ref Range    Phosphorus 2.7 2.5 - 4.5 MG/DL   MAGNESIUM    Collection Time: 01/17/18  6:14 AM   Result Value Ref Range    Magnesium 2.4 1.8 - 2.4 mg/dL   CBC W/O DIFF    Collection Time: 01/17/18  6:14 AM   Result Value Ref Range WBC 7.3 4.3 - 11.1 K/uL    RBC 4.67 4.23 - 5.67 M/uL    HGB 15.1 13.6 - 17.2 g/dL    HCT 48.7 41.1 - 50.3 %    .3 (H) 79.6 - 97.8 FL    MCH 32.3 26.1 - 32.9 PG    MCHC 31.0 (L) 31.4 - 35.0 g/dL    RDW 13.2 11.9 - 14.6 %    PLATELET 753 302 - 823 K/uL    MPV 10.4 (L) 10.8 - 14.1 FL       All Micro Results     Procedure Component Value Units Date/Time    CULTURE, BLOOD [[id number]] Collected:  01/10/18 2150    Order Status:  Completed Specimen:  Blood from Blood Updated:  01/15/18 0623     Special Requests: --        RIGHT  ARM       Culture result: NO GROWTH 5 DAYS       CULTURE, BLOOD [[id number]] Collected:  01/10/18 2150    Order Status:  Completed Specimen:  Blood from Blood Updated:  01/15/18 0623     Special Requests: --        RIGHT  ARM       Culture result: NO GROWTH 5 DAYS       CULTURE, RESPIRATORY/SPUTUM/BRONCH Sury Box STAIN [[id number]] Collected:  01/10/18 2115    Order Status:  Completed Specimen:  Sputum from Sputum Updated:  01/13/18 0730     Special Requests: NO SPECIAL REQUESTS        GRAM STAIN FEW GRAM POSITIVE COCCI         WBC'S TOO NUMEROUS TO COUNT      0 TO 1 EPITHELIAL CELLS PER OIF      4+ MUCUS     Culture result:         SCANT NORMAL RESPIRATORY EDUAR    CULTURE, RESPIRATORY/SPUTUM/BRONCH Sury Box STAIN [[id number]]  (Abnormal)  (Susceptibility) Collected:  01/07/18 1125    Order Status:  Completed Specimen:  Sputum from Endotracheal aspirate Updated:  01/12/18 0644     Special Requests: NO SPECIAL REQUESTS        GRAM STAIN 0 TO 10 WBCS/OIF      NO EPITHELIAL CELLS SEEN         MANY GRAM POSITIVE COCCI                 MODERATE GRAM NEGATIVE RODS      FEW GRAM NEGATIVE COCCI         3+ MUCUS PRESENT        Culture result:         HEAVY STREPTOCOCCUS PNEUMONIAE (A)              HEAVY HAEMOPHILUS INFLUENZAE BETA LACTAMASE NEGATIVE (A)              MODERATE NORMAL RESPIRATORY EDUAR    CULTURE, BLOOD [[id number]] Collected:  01/06/18 1854    Order Status:  Completed Specimen:  Whole Blood from Blood Updated:  01/11/18 0616     Special Requests: --        RIGHT  FOREARM       Culture result: NO GROWTH 5 DAYS       CULTURE, BLOOD [[id number]] Collected:  01/06/18 1854    Order Status:  Completed Specimen:  Whole Blood from Blood Updated:  01/11/18 0616     Special Requests: --        RIGHT  Antecubital       Culture result: NO GROWTH 5 DAYS       CULTURE, BODY FLUID Roula Chowdhury STAIN [[id number]] Collected:  01/09/18 0945    Order Status:  Canceled Specimen:  Miscellaneous Fluid           [facility]Studies:    CXR Results  (Last 48 hours)               01/15/18 0557  XR CHEST SNGL V Final result    Impression:  IMPRESSION: Left lower lobe atelectasis or pneumonia unchanged. Narrative:  EXAM:  XR CHEST SNGL V       INDICATION:  Respiratory failure       COMPARISON:  1/14/2018       FINDINGS: A portable AP radiograph of the chest was obtained at 0610 hours. The   patient is on a cardiac monitor. Left lower lobe airspace disease unchanged. Central venous catheter remains with its tip in the left internal jugular vein. .    The cardiac and mediastinal contours and pulmonary vascularity are normal.  The   bones and soft tissues are grossly within normal limits. 01/14/18 0515  XR CHEST SNGL V Final result    Impression:  IMPRESSION: Left lower lobe atelectasis or pneumonia improved. Narrative:  EXAM:  TEMPORARY       INDICATION:  Respiratory failure       COMPARISON:  1/13/2018       FINDINGS: A portable AP radiograph of the chest was obtained at 0527 hours. The   patient is on a cardiac monitor. Left lower lobe atelectasis or pneumonia   improved. .  The cardiac and mediastinal contours and pulmonary vascularity are   normal.  The bones and soft tissues are grossly within normal limits. Central   venous catheter remains in place within its left internal jugular vein.                    Results for orders placed or performed during the hospital encounter of 18   2D ECHO COMPLETE ADULT (TTE) W OR WO CONTR    Narrative    Sushil Gutierrez [address] [facility], [address] [facility]  [phone]    Transthoracic Echocardiogram  2D, M-mode, Doppler, and Color Doppler    Patient: Niall Zuniga  MR #: [id number]  : 1965  Age: 45 years  Gender: Male  Study date: 2018  Account #: [de-identified]  Height: 67.7 in  Weight: 315.3 lb  BSA: 2.47 mï¾²  Status:Routine  Location: [facility]  BP: 144/ 96    Allergies: NO KNOWN ALLERGIES    Sonographer:  Tianna Lomas RDCS  Group:  [facility]  Referring Physician:  Ashlie Burrows MD  Reading Physician:  Latonia Cano. MD [facility]    INDICATIONS: Edema. *Patient on vent. Technically difficult due to patient body habitus   (316lbs). *    PROCEDURE: This was a routine study. A transthoracic echocardiogram was  performed. The study included complete 2D imaging, M-mode, complete spectral  Doppler, and color Doppler. Intravenous contrast (Definity) was administered. Echocardiographic views were limited by restricted patient mobility and poor  acoustic window availability. This was a technically difficult study. LEFT VENTRICLE: Size was normal. Systolic function was normal. Ejection  fraction was estimated in the range of 55 % to 60 %. There were no regional  wall motion abnormalities. There was moderate concentric hypertrophy. The   E/e'  ratio was 21.1. Diastolic Function was indeterminate. RIGHT VENTRICLE: Not well visualized. LEFT ATRIUM: The atrium was mildly dilated. RIGHT ATRIUM: Not well visualized. SYSTEMIC VEINS: IVC: The inferior vena cava was dilated. AORTIC VALVE: The valve was not well visualized. The peak velocity was 2.23  m/s. The mean pressure gradient was 11 mmHg. The peak pressure gradient was   20  mmHg. There was no insufficiency. MITRAL VALVE: Not well visualized. There was no evidence for stenosis. There  was no regurgitation.     TRICUSPID VALVE: Not well visualized. There was no evidence for stenosis. There  was trivial regurgitation. PULMONIC VALVE: Not well visualized. PERICARDIUM: A trivial pericardial effusion was identified. There was no  evidence of hemodynamic compromise. AORTA: The root exhibited normal size. SUMMARY:    -  Procedure information: This was a technically difficult study. -  Left ventricle: Systolic function was normal. Ejection fraction was  estimated in the range of 55 % to 60 %. There were no regional wall motion  abnormalities. There was moderate concentric hypertrophy. The E/e' ratio was  21.1. Diastolic Function was indeterminate. -  Left atrium: The atrium was mildly dilated. -  Inferior vena cava, hepatic veins: The inferior vena cava was dilated. -  Pericardium: A trivial pericardial effusion was identified. There was no  evidence of hemodynamic compromise. SYSTEM MEASUREMENT TABLES    2D mode  AoR Diam (2D): 3.9 cm  LA Dimension (2D): 3.2 cm  Left Atrium Systolic Volume Index; Method of Disks, Biplane; 2D mode;: 31   ml/m2  IVS/LVPW (2D): 1  IVSd (2D): 1.8 cm  LVIDd (2D): 4.2 cm  LVIDs (2D): 2.9 cm  LVOT Area (2D): 4.2 cm2  LVPWd (2D): 1.9 cm  RVIDd (2D): 3.8 cm    Unspecified Scan Mode  Peak Grad; Mean; Antegrade Flow: 19 mm[Hg]  Vmax; Antegrade Flow: 219 cm/s  LVOT Diam: 2.3 cm  Peak Grad; Mean; Antegrade Flow: 8 mm[Hg]  Vmax; Antegrade Flow: 141 cm/s    Prepared and signed by    Kevin Jacques.  MD [facility]  Signed 08-Jan-2018 14:17:50         Labs and Studies from previous 24 hours have been personally reviewed by myself [place] Problems    Diagnosis Date Noted    Alcohol abuse 01/15/2018    Homeless 01/15/2018    Community acquired pneumonia of right lower lobe of lung ([facility] 01/09/2018    Urethral stricture 01/07/2018    COPD (chronic obstructive pulmonary disease) ([facility] [address] 01/06/2018    Nicotine dependence 01/06/2018    Methamphetamine abuse 01/06/2018  Anasarca 01/06/2018    NATALIIA (obstructive sleep apnea) 01/06/2018    Acute kidney injury ([facility] 01/06/2018    Acute on chronic respiratory failure with hypoxia and hypercapnia ([facility] 01/06/2018     Hospital Problems as of 1/17/2018  Date Reviewed: 1/15/2018          Codes Class Noted - Resolved POA    Alcohol abuse (Chronic) ICD-10-CM: F10.10  ICD-9-CM: 305.00  1/15/2018 - Present Yes        Homeless ICD-10-CM: Z59.0  ICD-9-CM: V60.0  1/15/2018 - Present No        Community acquired pneumonia of right lower lobe of lung ([facility] ICD-10-CM: J18.1  ICD-9-CM: 959  1/9/2018 - Present Yes        Urethral stricture ICD-10-CM: N35.9  ICD-9-CM: 598.9  1/7/2018 - Present Yes        COPD (chronic obstructive pulmonary disease) ([facility] (Chronic) ICD-10-CM: J44.9  ICD-9-CM: 926  1/6/2018 - Present Yes        Nicotine dependence (Chronic) ICD-10-CM: F17.200  ICD-9-CM: 305.1  1/6/2018 - Present Yes        Methamphetamine abuse ICD-10-CM: F15.10  ICD-9-CM: 305.70  1/6/2018 - Present Yes        Anasarca ICD-10-CM: R60.1  ICD-9-CM: 782.3  1/6/2018 - Present Yes        NATALIIA (obstructive sleep apnea) (Chronic) ICD-10-CM: G47.33  ICD-9-CM: 327.23  1/6/2018 - Present Yes        Acute kidney injury ([facility] ICD-10-CM: N17.9  ICD-9-CM: 584.9  1/6/2018 - Present Yes        * (Principal)Acute on chronic respiratory failure with hypoxia and hypercapnia (HCC) ICD-10-CM: J96.21, J96.22  ICD-9-CM: 518.84, 786.09, 799.02  1/6/2018 - Present Yes        RESOLVED: Severe sepsis ([facility] ICD-10-CM: A41.9, R65.20  ICD-9-CM: 038.9, 995.92  1/9/2018 - 1/15/2018 Yes        RESOLVED: Acute metabolic encephalopathy JIM-20-FU: G93.41  ICD-9-CM: 348.31  1/7/2018 - 1/15/2018 Yes        RESOLVED: Hypotension ICD-10-CM: I95.9  ICD-9-CM: 458.9  1/7/2018 - 1/15/2018 Yes              Plan:    - Stop Rocephin, he completed a total of 8 days  - DC solumedrol, continue Albuterol -   - Keep supplemental oxygen- he will need home oxygen   - thiamine/Folic acid  - on BIPAP when sleeping/ napping - will need sleep study, although w/o insurance  - educated regarding drug abuse- care manager involved for referral as outpatient   - on nicotine patch.   -Keep Galindo cath due to urethral stricture- may remove once he is ready to be discharge and ambulating   -PPD/PT/OT    DVT Prophylaxis: Heparin SQ  CODE Status: Full CODE    DC planning: possible DC once he has home oxygen, appointments arrangements- Thursday or Friday     Savannah Manuel MD  01/17/18

## 2018-01-17 NOTE — PROGRESS NOTES
SW following pt's chart due to previous consult on 1/7/18. SW contacted Jarvis Li with Pulmonary to see if there are any resources for this pt for a NATALIIA and CPAP per Dr. Dee Dixon note. Per Jarvis Li can go to [facility] to get set up for the sleep study. Pt will also qualify for HOP program.  SW will relay this information to pt to see if he is agreeable.

## 2018-01-17 NOTE — PROGRESS NOTES
Problem: Falls - Risk of  Goal: *Absence of Falls  Document Mega Fall Risk and appropriate interventions in the flowsheet.    Outcome: Progressing Towards Goal  Fall Risk Interventions:  Mobility Interventions: Bed/chair exit alarm, Communicate number of staff needed for ambulation/transfer, OT consult for ADLs, Patient to call before getting OOB, PT Consult for mobility concerns, PT Consult for assist device competence    Mentation Interventions: Adequate sleep, hydration, pain control, Bed/chair exit alarm, Evaluate medications/consider consulting pharmacy, Family/sitter at bedside, Increase mobility, More frequent rounding, Room close to nurse's station    Medication Interventions: Patient to call before getting OOB, Teach patient to arise slowly, Evaluate medications/consider consulting pharmacy    Elimination Interventions: Bed/chair exit alarm, Call light in reach, Patient to call for help with toileting needs, Toilet paper/wipes in reach, Toileting schedule/hourly rounds

## 2018-01-18 ENCOUNTER — APPOINTMENT (OUTPATIENT)
Dept: GENERAL RADIOLOGY | Age: 53
DRG: 871 | End: 2018-01-18
Attending: INTERNAL MEDICINE
Payer: SELF-PAY

## 2018-01-18 PROBLEM — R19.7 DIARRHEA: Status: ACTIVE | Noted: 2018-01-18

## 2018-01-18 LAB
AMPHET UR QL SCN: NEGATIVE
ANION GAP SERPL CALC-SCNC: 6 MMOL/L (ref 7–16)
ARTERIAL PATENCY WRIST A: POSITIVE
BARBITURATES UR QL SCN: NEGATIVE
BASE DEFICIT BLDA-SCNC: 0.5 MMOL/L (ref 0–2)
BASE EXCESS BLDA CALC-SCNC: 1 MMOL/L (ref 0–3)
BASE EXCESS BLDA CALC-SCNC: 2 MMOL/L (ref 0–3)
BDY SITE: ABNORMAL
BENZODIAZ UR QL: NEGATIVE
BNP SERPL-MCNC: 119 PG/ML
BUN SERPL-MCNC: 32 MG/DL (ref 6–23)
CALCIUM SERPL-MCNC: 8.4 MG/DL (ref 8.3–10.4)
CANNABINOIDS UR QL SCN: POSITIVE
CHLORIDE SERPL-SCNC: 106 MMOL/L (ref 98–107)
CO2 SERPL-SCNC: 34 MMOL/L (ref 21–32)
COCAINE UR QL SCN: NEGATIVE
COHGB MFR BLD: 1.6 % (ref 0.5–1.5)
COHGB MFR BLD: 1.7 % (ref 0.5–1.5)
COHGB MFR BLD: 1.8 % (ref 0.5–1.5)
CREAT SERPL-MCNC: 1.09 MG/DL (ref 0.8–1.5)
DO-HGB BLD-MCNC: 4 % (ref 0–5)
DO-HGB BLD-MCNC: 5 % (ref 0–5)
DO-HGB BLD-MCNC: 9 % (ref 0–5)
ERYTHROCYTE [DISTWIDTH] IN BLOOD BY AUTOMATED COUNT: 13 % (ref 11.9–14.6)
FIO2 ON VENT: 35 %
GLUCOSE SERPL-MCNC: 101 MG/DL (ref 65–100)
HCO3 BLDA-SCNC: 29 MMOL/L (ref 22–26)
HCO3 BLDA-SCNC: 31 MMOL/L (ref 22–26)
HCO3 BLDA-SCNC: 33 MMOL/L (ref 22–26)
HCT VFR BLD AUTO: 50.1 % (ref 41.1–50.3)
HGB BLD-MCNC: 15.8 G/DL (ref 13.6–17.2)
HGB BLDMV-MCNC: 16.6 GM/DL (ref 11.7–15)
HGB BLDMV-MCNC: 16.7 GM/DL (ref 11.7–15)
HGB BLDMV-MCNC: 17 GM/DL (ref 11.7–15)
MCH RBC QN AUTO: 32.7 PG (ref 26.1–32.9)
MCHC RBC AUTO-ENTMCNC: 31.5 G/DL (ref 31.4–35)
MCV RBC AUTO: 103.7 FL (ref 79.6–97.8)
METHADONE UR QL: NEGATIVE
METHGB MFR BLD: 0.1 % (ref 0–1.5)
METHGB MFR BLD: 0.4 % (ref 0–1.5)
METHGB MFR BLD: 0.5 % (ref 0–1.5)
MM INDURATION POC: 0 MM (ref 0–5)
OPIATES UR QL: NEGATIVE
OXYHGB MFR BLDA: 88.7 % (ref 94–97)
OXYHGB MFR BLDA: 93.1 % (ref 94–97)
OXYHGB MFR BLDA: 94.3 % (ref 94–97)
PCO2 BLDA: 69 MMHG (ref 35–45)
PCO2 BLDA: 70 MMHG (ref 35–45)
PCO2 BLDA: 83 MMHG (ref 35–45)
PCP UR QL: NEGATIVE
PH BLDA: 7.22 [PH] (ref 7.35–7.45)
PH BLDA: 7.24 [PH] (ref 7.35–7.45)
PH BLDA: 7.26 [PH] (ref 7.35–7.45)
PLATELET # BLD AUTO: 225 K/UL (ref 150–450)
PMV BLD AUTO: 10.4 FL (ref 10.8–14.1)
PO2 BLDA: 63 MMHG (ref 80–105)
PO2 BLDA: 78 MMHG (ref 80–105)
PO2 BLDA: 91 MMHG (ref 80–105)
POTASSIUM SERPL-SCNC: 3.7 MMOL/L (ref 3.5–5.1)
PPD POC: NORMAL NEGATIVE
RBC # BLD AUTO: 4.83 M/UL (ref 4.23–5.67)
RESP RATE: 14
RESP RATE: 22
SAO2 % BLD: 91 % (ref 92–98.5)
SAO2 % BLD: 95 % (ref 92–98.5)
SAO2 % BLD: 96 % (ref 92–98.5)
SERVICE CMNT-IMP: ABNORMAL
SODIUM SERPL-SCNC: 146 MMOL/L (ref 136–145)
VENTILATION MODE VENT: ABNORMAL
WBC # BLD AUTO: 15.1 K/UL (ref 4.3–11.1)

## 2018-01-18 PROCEDURE — 94640 AIRWAY INHALATION TREATMENT: CPT

## 2018-01-18 PROCEDURE — 74011250637 HC RX REV CODE- 250/637: Performed by: HOSPITALIST

## 2018-01-18 PROCEDURE — 36415 COLL VENOUS BLD VENIPUNCTURE: CPT | Performed by: INTERNAL MEDICINE

## 2018-01-18 PROCEDURE — 80307 DRUG TEST PRSMV CHEM ANLYZR: CPT | Performed by: INTERNAL MEDICINE

## 2018-01-18 PROCEDURE — 74011250636 HC RX REV CODE- 250/636: Performed by: INTERNAL MEDICINE

## 2018-01-18 PROCEDURE — 85027 COMPLETE CBC AUTOMATED: CPT | Performed by: INTERNAL MEDICINE

## 2018-01-18 PROCEDURE — 36600 WITHDRAWAL OF ARTERIAL BLOOD: CPT

## 2018-01-18 PROCEDURE — 65620000000 HC RM CCU GENERAL

## 2018-01-18 PROCEDURE — 77030019605

## 2018-01-18 PROCEDURE — 71045 X-RAY EXAM CHEST 1 VIEW: CPT

## 2018-01-18 PROCEDURE — 74011250637 HC RX REV CODE- 250/637: Performed by: NURSE PRACTITIONER

## 2018-01-18 PROCEDURE — 94660 CPAP INITIATION&MGMT: CPT

## 2018-01-18 PROCEDURE — 74011250637 HC RX REV CODE- 250/637: Performed by: INTERNAL MEDICINE

## 2018-01-18 PROCEDURE — 74011250636 HC RX REV CODE- 250/636: Performed by: FAMILY MEDICINE

## 2018-01-18 PROCEDURE — 74011250636 HC RX REV CODE- 250/636

## 2018-01-18 PROCEDURE — 74011000250 HC RX REV CODE- 250: Performed by: INTERNAL MEDICINE

## 2018-01-18 PROCEDURE — 77030021907 HC KT URIN FOL O&M -A

## 2018-01-18 PROCEDURE — 82803 BLOOD GASES ANY COMBINATION: CPT

## 2018-01-18 PROCEDURE — 74011250637 HC RX REV CODE- 250/637: Performed by: FAMILY MEDICINE

## 2018-01-18 PROCEDURE — 80048 BASIC METABOLIC PNL TOTAL CA: CPT | Performed by: INTERNAL MEDICINE

## 2018-01-18 PROCEDURE — 99232 SBSQ HOSP IP/OBS MODERATE 35: CPT | Performed by: INTERNAL MEDICINE

## 2018-01-18 PROCEDURE — 83880 ASSAY OF NATRIURETIC PEPTIDE: CPT | Performed by: INTERNAL MEDICINE

## 2018-01-18 RX ORDER — NALOXONE HYDROCHLORIDE 0.4 MG/ML
INJECTION, SOLUTION INTRAMUSCULAR; INTRAVENOUS; SUBCUTANEOUS
Status: COMPLETED
Start: 2018-01-18 | End: 2018-01-18

## 2018-01-18 RX ORDER — NALOXONE HYDROCHLORIDE 0.4 MG/ML
0.4 INJECTION, SOLUTION INTRAMUSCULAR; INTRAVENOUS; SUBCUTANEOUS ONCE
Status: COMPLETED | OUTPATIENT
Start: 2018-01-18 | End: 2018-01-18

## 2018-01-18 RX ORDER — NALOXONE HYDROCHLORIDE 0.4 MG/ML
0.4 INJECTION, SOLUTION INTRAMUSCULAR; INTRAVENOUS; SUBCUTANEOUS
Status: DISCONTINUED | OUTPATIENT
Start: 2018-01-18 | End: 2018-01-24 | Stop reason: HOSPADM

## 2018-01-18 RX ADMIN — WATER 250 MG: 1 INJECTION INTRAMUSCULAR; INTRAVENOUS; SUBCUTANEOUS at 09:17

## 2018-01-18 RX ADMIN — ALBUTEROL SULFATE 2.5 MG: 2.5 SOLUTION RESPIRATORY (INHALATION) at 07:28

## 2018-01-18 RX ADMIN — HEPARIN SODIUM 5000 UNITS: 5000 INJECTION, SOLUTION INTRAVENOUS; SUBCUTANEOUS at 05:14

## 2018-01-18 RX ADMIN — HEPARIN SODIUM 5000 UNITS: 5000 INJECTION, SOLUTION INTRAVENOUS; SUBCUTANEOUS at 21:49

## 2018-01-18 RX ADMIN — NALOXONE HYDROCHLORIDE 0.4 MG: 0.4 INJECTION, SOLUTION INTRAMUSCULAR; INTRAVENOUS; SUBCUTANEOUS at 14:48

## 2018-01-18 RX ADMIN — ALBUTEROL SULFATE 2.5 MG: 2.5 SOLUTION RESPIRATORY (INHALATION) at 11:09

## 2018-01-18 RX ADMIN — WATER 2 G: 1 INJECTION INTRAMUSCULAR; INTRAVENOUS; SUBCUTANEOUS at 09:12

## 2018-01-18 RX ADMIN — ONDANSETRON 4 MG: 2 INJECTION INTRAMUSCULAR; INTRAVENOUS at 06:32

## 2018-01-18 RX ADMIN — Medication 10 ML: at 05:14

## 2018-01-18 RX ADMIN — ALBUTEROL SULFATE 2.5 MG: 2.5 SOLUTION RESPIRATORY (INHALATION) at 16:49

## 2018-01-18 RX ADMIN — Medication 10 ML: at 21:50

## 2018-01-18 RX ADMIN — HYDRALAZINE HYDROCHLORIDE 20 MG: 20 INJECTION INTRAMUSCULAR; INTRAVENOUS at 05:14

## 2018-01-18 RX ADMIN — ALBUTEROL SULFATE 2.5 MG: 2.5 SOLUTION RESPIRATORY (INHALATION) at 19:35

## 2018-01-18 RX ADMIN — HEPARIN SODIUM 5000 UNITS: 5000 INJECTION, SOLUTION INTRAVENOUS; SUBCUTANEOUS at 16:21

## 2018-01-18 RX ADMIN — Medication 10 ML: at 14:00

## 2018-01-18 NOTE — PROGRESS NOTES
Problem: Falls - Risk of  Goal: *Absence of Falls  Document Mega Fall Risk and appropriate interventions in the flowsheet.    Outcome: Progressing Towards Goal  Fall Risk Interventions:  Mobility Interventions: Bed/chair exit alarm, Communicate number of staff needed for ambulation/transfer, Patient to call before getting OOB    Mentation Interventions: Adequate sleep, hydration, pain control, Bed/chair exit alarm, Evaluate medications/consider consulting pharmacy, Family/sitter at bedside, Increase mobility, More frequent rounding, Room close to nurse's station    Medication Interventions: Assess postural VS orthostatic hypotension, Bed/chair exit alarm, Patient to call before getting OOB    Elimination Interventions: Bed/chair exit alarm, Call light in reach, Patient to call for help with toileting needs

## 2018-01-18 NOTE — PROGRESS NOTES
Problem: Falls - Risk of  Goal: *Absence of Falls  Document Mega Fall Risk and appropriate interventions in the flowsheet.    Outcome: Progressing Towards Goal  Fall Risk Interventions:  Mobility Interventions: Bed/chair exit alarm, Communicate number of staff needed for ambulation/transfer, OT consult for ADLs, Patient to call before getting OOB, PT Consult for mobility concerns, PT Consult for assist device competence, Utilize walker, cane, or other assitive device    Mentation Interventions: Adequate sleep, hydration, pain control, Bed/chair exit alarm, Evaluate medications/consider consulting pharmacy, Family/sitter at bedside, Increase mobility, More frequent rounding, Room close to nurse's station    Medication Interventions: Bed/chair exit alarm, Evaluate medications/consider consulting pharmacy, Patient to call before getting OOB, Teach patient to arise slowly    Elimination Interventions: Bed/chair exit alarm, Call light in reach, Patient to call for help with toileting needs, Toileting schedule/hourly rounds

## 2018-01-18 NOTE — PROGRESS NOTES
Dr. Damion Cr and Dr. Tioc Phoenix notified of patient's respiratory status this AM and his nausea, high WBC, and frequent loose stools. Both MDs on floor to see patient.

## 2018-01-18 NOTE — PROGRESS NOTES
Leroy Horvath  Admission Date: 1/6/2018             Daily Progress Note: 1/18/2018    The patient's chart is reviewed and the patient is discussed with the staff. 45 y. o. CM admitted on 1/6 with acute hypoxemic respiratory failure, with methamphetamine positive, smoker with strong FH of severe COPD, morbid obesity, suspected OHS.  Also has sinusitis on CT and possible pneumonia.   Has had waxing/waning hypercarbia through his hospitalization- some days alert and others somnolent. Has required intubation twice thus far. Subjective:   Difficulty tolerating BiPAP last night. Lethargic again this morning. NT suctioned. Overnight his O2 requirement went up from 2L to 5L. Wife insists the diarrhea made it hard for him to wear BiPAP because had to keep getting up and that he is just really sleepy because he was awake all night.     Leukocytosis is up to 15.1  Multiple loose stools  Bicarb rising daily to 34 today      Current Facility-Administered Medications   Medication Dose Route Frequency    thiamine (B-1) tablet 100 mg  100 mg Oral DAILY    folic acid (FOLVITE) tablet 1 mg  1 mg Oral DAILY    famotidine (PEPCID) tablet 20 mg  20 mg Oral BID    albuterol (PROVENTIL VENTOLIN) nebulizer solution 2.5 mg  2.5 mg Nebulization QID RT    promethazine (PHENERGAN) with saline injection 12.5 mg  12.5 mg IntraVENous Q6H PRN    LORazepam (ATIVAN) tablet 0.5 mg  0.5 mg Oral Q8H PRN    haloperidol lactate (HALDOL) injection 5 mg  5 mg IntraVENous Q6H PRN    sertraline (ZOLOFT) tablet 50 mg  50 mg Oral DAILY    sodium chloride (OCEAN) 0.65 % nasal spray 2 Spray  2 Spray Both Nostrils QID    sodium chloride (OCEAN) 0.65 % nasal spray 2 Spray  2 Spray Both Nostrils Q2H PRN    fluticasone (FLONASE) 50 mcg/actuation nasal spray 2 Spray  2 Spray Both Nostrils DAILY    influenza vaccine 2017-18 (3 yrs+)(PF) (FLUZONE QUAD/FLUARIX QUAD) injection 0.5 mL  0.5 mL IntraMUSCular PRIOR TO DISCHARGE    nicotine (NICODERM CQ) 21 mg/24 hr patch 1 Patch  1 Patch TransDERmal DAILY    metoprolol (LOPRESSOR) injection 5 mg  5 mg IntraVENous Q4H PRN    hydrALAZINE (APRESOLINE) 20 mg/mL injection 20 mg  20 mg IntraVENous Q6H PRN    sodium chloride (NS) flush 5-10 mL  5-10 mL IntraVENous Q8H    sodium chloride (NS) flush 5-10 mL  5-10 mL IntraVENous PRN    lisinopril (PRINIVIL, ZESTRIL) tablet 20 mg  20 mg Oral DAILY    carvedilol (COREG) tablet 3.125 mg  3.125 mg Oral BID WITH MEALS    ondansetron (ZOFRAN) injection 4 mg  4 mg IntraVENous Q6H PRN    acetaminophen (TYLENOL) tablet 650 mg  650 mg Oral Q6H PRN    diphenhydrAMINE (BENADRYL) capsule 25 mg  25 mg Oral Q4H PRN    polyethylene glycol (MIRALAX) packet 17 g  17 g Oral DAILY PRN    guaiFENesin ER (MUCINEX) tablet 600 mg  600 mg Oral Q12H PRN    calcium carbonate (TUMS) chewable tablet 400 mg [elemental]  400 mg Oral TID PRN    heparin (porcine) injection 5,000 Units  5,000 Units SubCUTAneous Q8H       Review of Systems  Constitutional: negative for fever, chills, sweats  Cardiovascular: negative for chest pain, palpitations, syncope, edema  Gastrointestinal: negative for dysphagia, reflux, vomiting, diarrhea, abdominal pain, or melena  Neurologic: negative for focal weakness, numbness, headache    Objective:     Vitals:    01/17/18 2349 01/17/18 2356 01/18/18 0447 01/18/18 0735   BP:  165/89 167/90 139/78   Pulse:  99 97 (!) 106   Resp:  18 20 22   Temp:  99 °F (37.2 °C) 98 °F (36.7 °C) 97.7 °F (36.5 °C)   SpO2: 97% 94% 91% 92%   Weight:       Height:         Intake and Output:   01/16 1901 - 01/18 0700  In: -   Out: 2975 [Urine:2975]       Physical Exam:   Constitution:  the patient is well developed and in no acute distress  EENMT:  Sclera clear, pupils equal, oral mucosa moist on 2 LPM  Respiratory: CTA b/l no w/r/r. Cardiovascular:  RRR without M,G,R  Gastrointestinal: soft and non-tender; with positive bowel sounds.   Musculoskeletal: warm without cyanosis. There is no lower leg edema. Skin:  no jaundice or rashes, no wounds   Neurologic: no gross neuro deficits     Psychiatric:  alert and oriented x ppt    CHEST XRAY:         LAB  No lab exists for component: [place]   Recent Labs      01/18/18 0614  01/17/18 0614  01/16/18   0642   WBC  15.1*  7.3  9.6   HGB  15.8  15.1  15.4   HCT  50.1  48.7  49.6   PLT  225  215  209     Recent Labs      01/18/18 0614  01/17/18 0614  01/16/18   0642   NA  146*  144  144   K  3.7  4.0  4.0   CL  106  106  105   CO2  34*  32  31   GLU  101*  147*  111*   BUN  32*  38*  43*   CREA  1.09  1.08  1.36   MG   --   2.4   --    CA  8.4  8.8  8.9   PHOS   --   2.7   --      No results for input(s): PH, PCO2, PO2, HCO3 in the last 72 hours. Assessment:  (Medical Decision Making)     Hospital Problems  Date Reviewed: 1/15/2018          Codes Class Noted POA    Alcohol abuse (Chronic) ICD-10-CM: F10.10  ICD-9-CM: 305.00  1/15/2018 Yes        Homeless ICD-10-CM: Z59.0  ICD-9-CM: V60.0  1/15/2018 No        [facility] right lower [facility] ICD-10-CM: J18.1  ICD-9-CM: 160  1/9/2018 Yes    On ceftriaxone day 9/14, also covering sinusitis    Urethral stricture ICD-10-CM: N35.9  ICD-9-CM: 598.9  1/7/2018 Yes        COPD (chronic obstructive pulmonary disease) ([facility] (Chronic) ICD-10-CM: J44.9  ICD-9-CM: 756  1/6/2018 Yes    Suspected with strong FH. On albuterol nebs and spiriva started today.   Steroids off as no wheezing    Nicotine dependence (Chronic) ICD-10-CM: F17.200  ICD-9-CM: 305.1  1/6/2018 Yes        Methamphetamine abuse ICD-10-CM: F15.10  ICD-9-CM: 305.70  1/6/2018 Yes        Anasarca ICD-10-CM: R60.1  ICD-9-CM: 782.3  1/6/2018 Yes        NATALIIA (obstructive sleep apnea) (Chronic) ICD-10-CM: R11.59  ICD-9-CM: 327.23  1/6/2018 Yes    Strongly suspected with likely OHS    Acute kidney injury [facility]) ICD-10-CM: N17.9  ICD-9-CM: 584.9  1/6/2018 Yes    Resolved with creatinine normal    * (Principal)Acute on chronic respiratory failure with hypoxia and hypercapnia (HCC) ICD-10-CM: J96.21, J96.22  ICD-9-CM: 518.84, 786.09, 799.02  1/6/2018 Yes    Both O2 sat and encephalopathy worsened today. Recheck ABG          Plan:  (Medical Decision Making)   --Antibiotics stopped yesterday at day 8 but had been planned for 14 days for severe sinusitis/pneumonia. Will resume. --repeat ABG, BNP  --Monitor diarrhea closely. Consider c. Diff testing if appropriate. --continue BiPAP  --d/w wife concerns that without BiPAP at home he may have recurrences of hypercarbia (this is 3rd time in the hospital if he is again hypercarbic) which may be dangerous. Will continue working with case management to see what cost would be.     Lynnette Chirinos MD

## 2018-01-18 NOTE — PROGRESS NOTES
Physical Therapy Note:    Therapist is discontinuing physical therapy at this time due to decline in medical status. Mr. Delano Duran physical therapy goals were not met. Please reorder PT when our services are again appropriate. Thank you.     Tiburcio Henry DPT  1/18/2018

## 2018-01-18 NOTE — PROGRESS NOTES
Patient appeared to have difficulty breathing at shift change report. Patient awake and alert but sounds like he is snoring. Patient's O2 sats in the high 80s and O2 turned up to 5 L. RT called to bedside. Patient deep suctioned and breathing tx given. Patient breathing better. Patient and family states patient did not sleep at all last night. Patient continually falling asleep with RT at bedside. RT placed patient back on bipap machine.  Will monitor closely and notify MD.

## 2018-01-18 NOTE — PROGRESS NOTES
OT Note:    OT will discontinue services at this time due to patient being transferred to CCU. Please re-order when appropriate.     Thanks,  Evan Shelton

## 2018-01-18 NOTE — PROGRESS NOTES
I came to assess patient. He was found drowsy, minimally responding to verbal or painful stimuli. Saturation 91%. ABG showed ph 7.26 pco2 70 po2 91 on Bpap. Received 1 dose of narcan 0.4 mgiv. He was noted alert, able to speak, but slurred speech, was able to move all 4 limbs. He was explained he is at high risk for re-intubation. Will continue Bpap, continuous pulse oxymeter, cardiac monitor, rocephin daily. Serial abgs. ICU MD on board.

## 2018-01-18 NOTE — PROGRESS NOTES
TRANSFER - OUT REPORT:    Verbal report given to Nelia Haines RN(name) on Isis Tiwari  being transferred to ICU 3308(unit) for routine progression of care       Report consisted of patients Situation, Background, Assessment and   Recommendations(SBAR). Information from the following report(s) SBAR, Kardex, Intake/Output, MAR and Recent Results was reviewed with the receiving nurse. Lines:   Quad Lumen 01/07/18 Left Internal jugular (Active)   Central Line Being Utilized Yes 1/18/2018  7:35 AM   Criteria for Appropriate Use Irritant/vesicant medication 1/18/2018  7:35 AM   Site Assessment Clean, dry, & intact 1/18/2018  7:35 AM   Infiltration Assessment 0 1/18/2018  7:35 AM   Affected Extremity/Extremities Color distal to insertion site pink (or appropriate for race); Pulses palpable;Range of motion performed 1/18/2018  7:35 AM   Date of Last Dressing Change 01/18/18 1/18/2018 10:45 AM   Dressing Status Clean, dry, & intact 1/18/2018  7:35 AM   Dressing Type Disk with Chlorhexadine gluconate (CHG); Transparent 1/18/2018  7:35 AM   Action Taken Dressing changed 1/15/2018  3:09 PM   Proximal Hub Color/Line Status Red;Cap end changed; Capped;Flushed 1/18/2018 10:45 AM   Positive Blood Return (Medial Site) No 1/18/2018 10:45 AM   Medial 1 Hub Color/Line Status Gray;Cap end changed; Capped;Flushed 1/18/2018 10:45 AM   Positive Blood Return (Lateral Site) No 1/18/2018 10:45 AM   Medial 2 Hub Color/Line Status White;Capped;Cap end changed; Flushed 1/18/2018 10:45 AM   Positive Blood Return (Site #3) No 1/18/2018 10:45 AM   Distal Hub Color/Line Status Blue;Cap end changed; Capped;Flushed 1/18/2018 10:45 AM   Positive Blood Return (Site #4) No 1/18/2018 10:45 AM   Alcohol Cap Used No 1/15/2018  3:09 PM        Opportunity for questions and clarification was provided.       Patient transported with:   Registered Nurse   RT with BIPAP

## 2018-01-18 NOTE — PROGRESS NOTES
MEW score of 4. Patient awaiting ICU bed. ICU coordinator and RT have been assessing patient regularly. Patient on bipap. Continue to monitor closely.

## 2018-01-18 NOTE — PROGRESS NOTES
Hospitalist Progress Note    2018  Admit Date: 2018  6:27 PM   NAME: Joe Osborne   :  1965   DOS:              18  MRN:  [id number]   Attending: Kassandra Gomez MD  PCP:  None  Treatment Team: Attending Provider: Parmjit Lim MD; Care Manager: Brent Verde RN; Utilization Review: Marti Morales RN; Consulting Provider: Miles Castillo MD; Consulting Provider: Rachel Huber MD; Consulting Provider: Wendy Asher MD; Care Manager: Tyra Hendricks RN    Full Code     SUBJECTIVE:   Patient examined at bedside. Wife present in the room, all questions answered. This morning he was noted drowsy, saturating 92% on HFNC. ABG showed respiratory acidosis with hypercarbia ( ph 7.2, co2 83, po2 83 ). Wbc 15k and episodes of loose stools. Pulmonary on board, plan to resume antibiotics and transfer patient to ICU for monitoring. He is at high risk for re-intubation. Has underlying NATALIIA-hypoventilating syndrome. Drug screening this morning showed positive for THC. ROS: denied chest pain, vomiting. 10+ ROS reviewed and negative except for positive in HPI.    No Known Allergies  Current Facility-Administered Medications   Medication Dose Route Frequency    acetaZOLAMIDE (DIAMOX) 250 mg in sterile water (preservative free) 2.5 mL injection  250 mg IntraVENous ONCE    cefTRIAXone (ROCEPHIN) 2 g in sterile water (preservative free) 20 mL IV syringe  2 g IntraVENous Q24H    tiotropium (SPIRIVA) inhalation capsule 18 mcg  1 Cap Inhalation DAILY    thiamine (B-1) tablet 100 mg  100 mg Oral DAILY    folic acid (FOLVITE) tablet 1 mg  1 mg Oral DAILY    famotidine (PEPCID) tablet 20 mg  20 mg Oral BID    albuterol (PROVENTIL VENTOLIN) nebulizer solution 2.5 mg  2.5 mg Nebulization QID RT    promethazine (PHENERGAN) with saline injection 12.5 mg  12.5 mg IntraVENous Q6H PRN    LORazepam (ATIVAN) tablet 0.5 mg  0.5 mg Oral Q8H PRN    haloperidol lactate (HALDOL) injection 5 mg  5 mg IntraVENous Q6H PRN    sertraline (ZOLOFT) tablet 50 mg  50 mg Oral DAILY    sodium chloride (OCEAN) 0.65 % nasal spray 2 Spray  2 Spray Both Nostrils QID    sodium chloride (OCEAN) 0.65 % nasal spray 2 Spray  2 Spray Both Nostrils Q2H PRN    fluticasone (FLONASE) 50 mcg/actuation nasal spray 2 Spray  2 Spray Both Nostrils DAILY    influenza vaccine 2017-18 (3 yrs+)(PF) (FLUZONE QUAD/FLUARIX QUAD) injection 0.5 mL  0.5 mL IntraMUSCular PRIOR TO DISCHARGE    nicotine (NICODERM CQ) 21 mg/24 hr patch 1 Patch  1 Patch TransDERmal DAILY    metoprolol (LOPRESSOR) injection 5 mg  5 mg IntraVENous Q4H PRN    hydrALAZINE (APRESOLINE) 20 mg/mL injection 20 mg  20 mg IntraVENous Q6H PRN    sodium chloride (NS) flush 5-10 mL  5-10 mL IntraVENous Q8H    sodium chloride (NS) flush 5-10 mL  5-10 mL IntraVENous PRN    lisinopril (PRINIVIL, ZESTRIL) tablet 20 mg  20 mg Oral DAILY    carvedilol (COREG) tablet 3.125 mg  3.125 mg Oral BID WITH MEALS    ondansetron (ZOFRAN) injection 4 mg  4 mg IntraVENous Q6H PRN    acetaminophen (TYLENOL) tablet 650 mg  650 mg Oral Q6H PRN    diphenhydrAMINE (BENADRYL) capsule 25 mg  25 mg Oral Q4H PRN    polyethylene glycol (MIRALAX) packet 17 g  17 g Oral DAILY PRN    guaiFENesin ER (MUCINEX) tablet 600 mg  600 mg Oral Q12H PRN    calcium carbonate (TUMS) chewable tablet 400 mg [elemental]  400 mg Oral TID PRN    heparin (porcine) injection 5,000 Units  5,000 Units SubCUTAneous Q8H         Immunization History   Administered Date(s) Administered    TB Skin Test (PPD) Intradermal 01/07/2018     Objective:     Patient Vitals for the past 24 hrs:   Temp Pulse Resp BP SpO2   01/18/18 0735 97.7 °F (36.5 °C) (!) 106 22 139/78 92 %   01/18/18 0447 98 °F (36.7 °C) 97 20 167/90 91 %   01/17/18 2356 99 °F (37.2 °C) 99 18 165/89 94 %   01/17/18 2349 - - - - 97 %   01/17/18 2047 98 °F (36.7 °C) 95 18 159/77 92 %   01/17/18 1932 - - - - 90 %   01/17/18 1605 98 °F (36.7 °C) 88 18 150/85 94 % 18 1519 - - - - 92 %   18 1144 - - - - 92 %   18 1139 98.3 °F (36.8 °C) 82 18 149/82 97 %   18 0820 - - - - 92 %     Temp (24hrs), Av.2 °F (36.8 °C), Min:97.7 °F (36.5 °C), Max:99 °F (37.2 °C)    Oxygen Therapy  O2 Sat (%): 92 % (18 0735)  Pulse via Oximetry: 90 beats per minute (18)  O2 Device: Hi flow nasal cannula;Humidifier (18)  O2 Flow Rate (L/min): 3 l/min (18)  O2 Temperature: 86.9 °F (30.5 °C) (18 152)  FIO2 (%): 36 % (18 1523)  Oxygen Therapy  O2 Sat (%): 92 % (18 0735)  Pulse via Oximetry: 90 beats per minute (18)  O2 Device: Hi flow nasal cannula;Humidifier (18)  O2 Flow Rate (L/min): 3 l/min (18)  O2 Temperature: 86.9 °F (30.5 °C) (18 152)  FIO2 (%): 36 % (18 1523)    Physical Exam:  General:         Drowsy, but easily arousable   HEENT:               Small reactive pupils. No obvious deformity. Nares normal. No drainage  Lungs:                  Decreased air entry b/l. Scattered rhonchi, no rales, no wheezing   Cardiovascular:   RRR. No m/r/g. Abdomen:       S/nt/nd. Bowel sounds normal.   Extremities:         No pedal edema   Skin:         No rashes or lesions. Not Jaundiced  Neurologic:    AAOx3. No gross focal deficit. Moves all extremities. Normal sensory. Psychiatric:         Good mood. Normal affect. Denies any SI/HI.    Genital:    Galindo cath in place, draining clear urine     DIAGNOSTIC STUDIES      Data Review:   Recent Results (from the past 24 hour(s))   METABOLIC PANEL, BASIC    Collection Time: 18  6:14 AM   Result Value Ref Range    Sodium 146 (H) 136 - 145 mmol/L    Potassium 3.7 3.5 - 5.1 mmol/L    Chloride 106 98 - 107 mmol/L    CO2 34 (H) 21 - 32 mmol/L    Anion gap 6 (L) 7 - 16 mmol/L    Glucose 101 (H) 65 - 100 mg/dL    BUN 32 (H) 6 - 23 MG/DL    Creatinine 1.09 0.8 - 1.5 MG/DL    GFR est AA >60 >60 ml/min/1.73m2    GFR est non-AA >60 >60 ml/min/1.73m2    Calcium 8.4 8.3 - 10.4 MG/DL   CBC W/O DIFF    Collection Time: 01/18/18  6:14 AM   Result Value Ref Range    WBC 15.1 (H) 4.3 - 11.1 K/uL    RBC 4.83 4.23 - 5.67 M/uL    HGB 15.8 13.6 - 17.2 g/dL    HCT 50.1 41.1 - 50.3 %    .7 (H) 79.6 - 97.8 FL    MCH 32.7 26.1 - 32.9 PG    MCHC 31.5 31.4 - 35.0 g/dL    RDW 13.0 11.9 - 14.6 %    PLATELET 095 982 - 883 K/uL    MPV 10.4 (L) 10.8 - 14.1 FL       All Micro Results     Procedure Component Value Units Date/Time    CULTURE, BLOOD [[id number]] Collected:  01/10/18 2150    Order Status:  Completed Specimen:  Blood from Blood Updated:  01/15/18 0623     Special Requests: --        RIGHT  ARM       Culture result: NO GROWTH 5 DAYS       CULTURE, BLOOD [[id number]] Collected:  01/10/18 2150    Order Status:  Completed Specimen:  Blood from Blood Updated:  01/15/18 0623     Special Requests: --        RIGHT  ARM       Culture result: NO GROWTH 5 DAYS       CULTURE, RESPIRATORY/SPUTUM/BRONCH Delong Eagles STAIN [[id number]] Collected:  01/10/18 2115    Order Status:  Completed Specimen:  Sputum from Sputum Updated:  01/13/18 0730     Special Requests: NO SPECIAL REQUESTS        GRAM STAIN FEW GRAM POSITIVE COCCI         WBC'S TOO NUMEROUS TO COUNT      0 TO 1 EPITHELIAL CELLS PER OIF      4+ MUCUS     Culture result:         SCANT NORMAL RESPIRATORY EDUAR    CULTURE, RESPIRATORY/SPUTUM/BRONCH Delong Eagles STAIN [[id number]]  (Abnormal)  (Susceptibility) Collected:  01/07/18 1125    Order Status:  Completed Specimen:  Sputum from Endotracheal aspirate Updated:  01/12/18 0644     Special Requests: NO SPECIAL REQUESTS        GRAM STAIN 0 TO 10 WBCS/OIF      NO EPITHELIAL CELLS SEEN         MANY GRAM POSITIVE COCCI                 MODERATE GRAM NEGATIVE RODS      FEW GRAM NEGATIVE COCCI         3+ MUCUS PRESENT        Culture result:         HEAVY STREPTOCOCCUS PNEUMONIAE (A)              HEAVY HAEMOPHILUS INFLUENZAE BETA LACTAMASE NEGATIVE (A)              MODERATE NORMAL RESPIRATORY EDUAR    CULTURE, BLOOD [[id number]] Collected:  01/06/18 1854    Order Status:  Completed Specimen:  Whole Blood from Blood Updated:  01/11/18 0616     Special Requests: --        RIGHT  FOREARM       Culture result: NO GROWTH 5 DAYS       CULTURE, BLOOD [[id number]] Collected:  01/06/18 1854    Order Status:  Completed Specimen:  Whole Blood from Blood Updated:  01/11/18 0616     Special Requests: --        RIGHT  Antecubital       Culture result: NO GROWTH 5 DAYS       CULTURE, BODY FLUID Clare Heft STAIN [[id number]] Collected:  01/09/18 0945    Order Status:  Canceled Specimen:  Miscellaneous Fluid           Imaging Ledy Kennedy /Studies:    CXR Results  (Last 48 hours)               01/15/18 0557  XR CHEST SNGL V Final result    Impression:  IMPRESSION: Left lower lobe atelectasis or pneumonia unchanged. Narrative:  EXAM:  XR CHEST SNGL V       INDICATION:  Respiratory failure       COMPARISON:  1/14/2018       FINDINGS: A portable AP radiograph of the chest was obtained at 0610 hours. The   patient is on a cardiac monitor. Left lower lobe airspace disease unchanged. Central venous catheter remains with its tip in the left internal jugular vein. .    The cardiac and mediastinal contours and pulmonary vascularity are normal.  The   bones and soft tissues are grossly within normal limits. 01/14/18 0515  XR CHEST SNGL V Final result    Impression:  IMPRESSION: Left lower lobe atelectasis or pneumonia improved. Narrative:  EXAM:  TEMPORARY       INDICATION:  Respiratory failure       COMPARISON:  1/13/2018       FINDINGS: A portable AP radiograph of the chest was obtained at 0527 hours. The   patient is on a cardiac monitor. Left lower lobe atelectasis or pneumonia   improved. .  The cardiac and mediastinal contours and pulmonary vascularity are   normal.  The bones and soft tissues are grossly within normal limits.  Central   venous catheter remains in place within its left internal jugular vein. Results for orders placed or performed during the hospital encounter of 18   2D ECHO COMPLETE ADULT (TTE) W OR WO CONTR    Narrative    Sushil Gutierrez [address], [address] [facility]  [phone]    Transthoracic Echocardiogram  2D, M-mode, Doppler, and Color Doppler    Patient: Jesus Wu  MR #: [id number]  : 1965  Age: 45 years  Gender: Male  Study date: 2018  Account #: [de-identified]  Height: 67.7 in  Weight: 315.3 lb  BSA: 2.47 mï¾²  Status:Routine  Location: [facility]  BP: 144/ 96    Allergies: NO KNOWN ALLERGIES    Sonographer:  Sarah Dc RDCS  Group:  [facility]  Referring Physician:  Uzair Owen MD  Reading Physician:  Susana Nielson MD [facility]    INDICATIONS: Edema. *Patient on vent. Technically difficult due to patient body habitus   (316lbs). *    PROCEDURE: This was a routine study. A transthoracic echocardiogram was  performed. The study included complete 2D imaging, M-mode, complete spectral  Doppler, and color Doppler. Intravenous contrast (Definity) was administered. Echocardiographic views were limited by restricted patient mobility and poor  acoustic window availability. This was a technically difficult study. LEFT VENTRICLE: Size was normal. Systolic function was normal. Ejection  fraction was estimated in the range of 55 % to 60 %. There were no regional  wall motion abnormalities. There was moderate concentric hypertrophy. The   E/e'  ratio was 21.1. Diastolic Function was indeterminate. RIGHT VENTRICLE: Not well visualized. LEFT ATRIUM: The atrium was mildly dilated. RIGHT ATRIUM: Not well visualized. SYSTEMIC VEINS: IVC: The inferior vena cava was dilated. AORTIC VALVE: The valve was not well visualized. The peak velocity was 2.23  m/s. The mean pressure gradient was 11 mmHg. The peak pressure gradient was   20  mmHg.  There was no insufficiency. MITRAL VALVE: Not well visualized. There was no evidence for stenosis. There  was no regurgitation. TRICUSPID VALVE: Not well visualized. There was no evidence for stenosis. There  was trivial regurgitation. PULMONIC VALVE: Not well visualized. PERICARDIUM: A trivial pericardial effusion was identified. There was no  evidence of hemodynamic compromise. AORTA: The root exhibited normal size. SUMMARY:    -  Procedure information: This was a technically difficult study. -  Left ventricle: Systolic function was normal. Ejection fraction was  estimated in the range of 55 % to 60 %. There were no regional wall motion  abnormalities. There was moderate concentric hypertrophy. The E/e' ratio was  21.1. Diastolic Function was indeterminate. -  Left atrium: The atrium was mildly dilated. -  Inferior vena cava, hepatic veins: The inferior vena cava was dilated. -  Pericardium: A trivial pericardial effusion was identified. There was no  evidence of hemodynamic compromise. SYSTEM MEASUREMENT TABLES    2D mode  AoR Diam (2D): 3.9 cm  LA Dimension (2D): 3.2 cm  Left Atrium Systolic Volume Index; Method of Disks, Biplane; 2D mode;: 31   ml/m2  IVS/LVPW (2D): 1  IVSd (2D): 1.8 cm  LVIDd (2D): 4.2 cm  LVIDs (2D): 2.9 cm  LVOT Area (2D): 4.2 cm2  LVPWd (2D): 1.9 cm  RVIDd (2D): 3.8 cm    Unspecified Scan Mode  Peak Grad; Mean; Antegrade Flow: 19 mm[Hg]  Vmax; Antegrade Flow: 219 cm/s  LVOT Diam: 2.3 cm  Peak Grad; Mean; Antegrade Flow: 8 mm[Hg]  Vmax; Antegrade Flow: 141 cm/s    Prepared and signed by    Flavio Colon.  MD [facility]  Signed 08-Jan-2018 14:17:50         Labs and Studies from previous 24 hours have been personally reviewed by myself [place] [address] Problems    Diagnosis Date Noted    Alcohol abuse 01/15/2018    Homeless 01/15/2018    Community acquired pneumonia of right lower lobe of lung ([address] 01/09/2018    Urethral stricture 01/07/2018    COPD (chronic obstructive pulmonary disease) ([facility] 01/06/2018    Nicotine dependence 01/06/2018    Methamphetamine abuse 01/06/2018    Anasarca 01/06/2018    NATALIIA (obstructive sleep apnea) 01/06/2018    Acute kidney injury ([facility] 01/06/2018    Acute on chronic respiratory failure with hypoxia and hypercapnia ([facility] 01/06/2018     Hospital Problems as of 1/18/2018  Date Reviewed: 1/15/2018          Codes Class Noted - Resolved POA    Alcohol abuse (Chronic) ICD-10-CM: F10.10  ICD-9-CM: 305.00  1/15/2018 - Present Yes        Homeless ICD-10-CM: Z59.0  ICD-9-CM: V60.0  1/15/2018 - Present No        Community acquired pneumonia of right lower lobe of lung ([facility] ICD-10-CM: J18.1  ICD-9-CM: 240  1/9/2018 - Present Yes        Urethral stricture ICD-10-CM: N35.9  ICD-9-CM: 598.9  1/7/2018 - Present Yes        COPD (chronic obstructive pulmonary disease) ([facility] (Chronic) ICD-10-CM: J44.9  ICD-9-CM: 816  1/6/2018 - Present Yes        Nicotine dependence (Chronic) ICD-10-CM: F17.200  ICD-9-CM: 305.1  1/6/2018 - Present Yes        Methamphetamine abuse ICD-10-CM: F15.10  ICD-9-CM: 305.70  1/6/2018 - Present Yes        Anasarca ICD-10-CM: R60.1  ICD-9-CM: 782.3  1/6/2018 - Present Yes        NATALIIA (obstructive sleep apnea) (Chronic) ICD-10-CM: G47.33  ICD-9-CM: 327.23  1/6/2018 - Present Yes        Acute kidney injury ([facility] ICD-10-CM: N17.9  ICD-9-CM: 584.9  1/6/2018 - Present Yes        * (Principal)Acute on chronic respiratory failure with hypoxia and hypercapnia (HCC) ICD-10-CM: J96.21, J96.22  ICD-9-CM: 518.84, 786.09, 799.02  1/6/2018 - Present Yes        RESOLVED: Severe sepsis ([facility] ICD-10-CM: A41.9, R65.20  ICD-9-CM: 038.9, 995.92  1/9/2018 - 1/15/2018 Yes        RESOLVED: Acute metabolic encephalopathy YMI-33-SP: G93.41  ICD-9-CM: 348.31  1/7/2018 - 1/15/2018 Yes        RESOLVED: Hypotension ICD-10-CM: I95.9  ICD-9-CM: 458.9  1/7/2018 - 1/15/2018 Yes              Plan:    -Transfer to ICU for acute hypoercapnic respiratory failure  -resume Rocephin- follow pulmonary recommendations   -Duo-nebs  -Keep BPAP  -Monitor respiratory status  -Serial ABGs  - educated regarding drug abuse- he tested positive for THC in urine this am  - on nicotine patch.  -Has loose stools with leukocytosis, check for c dif   -Keep Galindo cath due to urethral stricture- may remove once he is ready to be discharge and ambulating   -PPD/PT/OT  -CXR in am     DVT Prophylaxis: Heparin SQ  CODE Status: Full CODE    Cyndi Bah MD  01/18/18

## 2018-01-19 ENCOUNTER — APPOINTMENT (OUTPATIENT)
Dept: GENERAL RADIOLOGY | Age: 53
DRG: 871 | End: 2018-01-19
Attending: INTERNAL MEDICINE
Payer: SELF-PAY

## 2018-01-19 LAB
ANION GAP SERPL CALC-SCNC: 6 MMOL/L (ref 7–16)
ARTERIAL PATENCY WRIST A: POSITIVE
BASE EXCESS BLDA CALC-SCNC: 3.3 MMOL/L (ref 0–3)
BASOPHILS # BLD: 0 K/UL (ref 0–0.2)
BASOPHILS NFR BLD: 0 % (ref 0–2)
BDY SITE: ABNORMAL
BUN SERPL-MCNC: 32 MG/DL (ref 6–23)
CALCIUM SERPL-MCNC: 9.1 MG/DL (ref 8.3–10.4)
CHLORIDE SERPL-SCNC: 107 MMOL/L (ref 98–107)
CO2 SERPL-SCNC: 33 MMOL/L (ref 21–32)
COHGB MFR BLD: 1.8 % (ref 0.5–1.5)
CREAT SERPL-MCNC: 0.96 MG/DL (ref 0.8–1.5)
DIFFERENTIAL METHOD BLD: ABNORMAL
DO-HGB BLD-MCNC: 3 % (ref 0–5)
EOSINOPHIL # BLD: 0.1 K/UL (ref 0–0.8)
EOSINOPHIL NFR BLD: 1 % (ref 0.5–7.8)
ERYTHROCYTE [DISTWIDTH] IN BLOOD BY AUTOMATED COUNT: 13.1 % (ref 11.9–14.6)
GLUCOSE SERPL-MCNC: 105 MG/DL (ref 65–100)
HCO3 BLDA-SCNC: 32 MMOL/L (ref 22–26)
HCT VFR BLD AUTO: 50.4 % (ref 41.1–50.3)
HGB BLD-MCNC: 15.7 G/DL (ref 13.6–17.2)
HGB BLDMV-MCNC: 15.8 GM/DL (ref 11.7–15)
IMM GRANULOCYTES # BLD: 0 K/UL (ref 0–0.5)
IMM GRANULOCYTES NFR BLD AUTO: 0 % (ref 0–5)
LYMPHOCYTES # BLD: 1.3 K/UL (ref 0.5–4.6)
LYMPHOCYTES NFR BLD: 12 % (ref 13–44)
MAGNESIUM SERPL-MCNC: 2.4 MG/DL (ref 1.8–2.4)
MCH RBC QN AUTO: 32.8 PG (ref 26.1–32.9)
MCHC RBC AUTO-ENTMCNC: 31.2 G/DL (ref 31.4–35)
MCV RBC AUTO: 105.2 FL (ref 79.6–97.8)
METHGB MFR BLD: 0.1 % (ref 0–1.5)
MONOCYTES # BLD: 0.7 K/UL (ref 0.1–1.3)
MONOCYTES NFR BLD: 7 % (ref 4–12)
NEUTS SEG # BLD: 8.3 K/UL (ref 1.7–8.2)
NEUTS SEG NFR BLD: 80 % (ref 43–78)
OXYHGB MFR BLDA: 95.4 % (ref 94–97)
PCO2 BLDA: 64 MMHG (ref 35–45)
PH BLDA: 7.31 [PH] (ref 7.35–7.45)
PHOSPHATE SERPL-MCNC: 2.8 MG/DL (ref 2.5–4.5)
PLATELET # BLD AUTO: 216 K/UL (ref 150–450)
PMV BLD AUTO: 10.4 FL (ref 10.8–14.1)
PO2 BLDA: 89 MMHG (ref 80–105)
POTASSIUM SERPL-SCNC: 3.9 MMOL/L (ref 3.5–5.1)
RBC # BLD AUTO: 4.79 M/UL (ref 4.23–5.67)
RESP RATE: 18
SAO2 % BLD: 97 % (ref 92–98.5)
SERVICE CMNT-IMP: ABNORMAL
SODIUM SERPL-SCNC: 146 MMOL/L (ref 136–145)
VENTILATION MODE VENT: ABNORMAL
WBC # BLD AUTO: 10.4 K/UL (ref 4.3–11.1)

## 2018-01-19 PROCEDURE — 71045 X-RAY EXAM CHEST 1 VIEW: CPT

## 2018-01-19 PROCEDURE — 94640 AIRWAY INHALATION TREATMENT: CPT

## 2018-01-19 PROCEDURE — 74011000250 HC RX REV CODE- 250: Performed by: INTERNAL MEDICINE

## 2018-01-19 PROCEDURE — 74011250637 HC RX REV CODE- 250/637: Performed by: INTERNAL MEDICINE

## 2018-01-19 PROCEDURE — 65270000029 HC RM PRIVATE

## 2018-01-19 PROCEDURE — 74011250637 HC RX REV CODE- 250/637: Performed by: HOSPITALIST

## 2018-01-19 PROCEDURE — 74011250636 HC RX REV CODE- 250/636: Performed by: INTERNAL MEDICINE

## 2018-01-19 PROCEDURE — 77010033678 HC OXYGEN DAILY

## 2018-01-19 PROCEDURE — 82803 BLOOD GASES ANY COMBINATION: CPT

## 2018-01-19 PROCEDURE — 94660 CPAP INITIATION&MGMT: CPT

## 2018-01-19 PROCEDURE — 85025 COMPLETE CBC W/AUTO DIFF WBC: CPT | Performed by: INTERNAL MEDICINE

## 2018-01-19 PROCEDURE — 74011250636 HC RX REV CODE- 250/636: Performed by: FAMILY MEDICINE

## 2018-01-19 PROCEDURE — 74011250637 HC RX REV CODE- 250/637: Performed by: FAMILY MEDICINE

## 2018-01-19 PROCEDURE — 83735 ASSAY OF MAGNESIUM: CPT | Performed by: INTERNAL MEDICINE

## 2018-01-19 PROCEDURE — 99232 SBSQ HOSP IP/OBS MODERATE 35: CPT | Performed by: INTERNAL MEDICINE

## 2018-01-19 PROCEDURE — 97168 OT RE-EVAL EST PLAN CARE: CPT

## 2018-01-19 PROCEDURE — 97164 PT RE-EVAL EST PLAN CARE: CPT

## 2018-01-19 PROCEDURE — 80048 BASIC METABOLIC PNL TOTAL CA: CPT | Performed by: INTERNAL MEDICINE

## 2018-01-19 PROCEDURE — 36600 WITHDRAWAL OF ARTERIAL BLOOD: CPT

## 2018-01-19 PROCEDURE — 74011250637 HC RX REV CODE- 250/637: Performed by: NURSE PRACTITIONER

## 2018-01-19 PROCEDURE — 84100 ASSAY OF PHOSPHORUS: CPT | Performed by: INTERNAL MEDICINE

## 2018-01-19 RX ORDER — DEXTROSE MONOHYDRATE 50 MG/ML
75 INJECTION, SOLUTION INTRAVENOUS CONTINUOUS
Status: DISCONTINUED | OUTPATIENT
Start: 2018-01-19 | End: 2018-01-21

## 2018-01-19 RX ADMIN — METHYLPREDNISOLONE SODIUM SUCCINATE 40 MG: 125 INJECTION, POWDER, FOR SOLUTION INTRAMUSCULAR; INTRAVENOUS at 14:20

## 2018-01-19 RX ADMIN — Medication 10 ML: at 05:07

## 2018-01-19 RX ADMIN — AZITHROMYCIN MONOHYDRATE 500 MG: 500 INJECTION, POWDER, LYOPHILIZED, FOR SOLUTION INTRAVENOUS at 18:17

## 2018-01-19 RX ADMIN — SALINE NASAL SPRAY 2 SPRAY: 1.5 SOLUTION NASAL at 17:29

## 2018-01-19 RX ADMIN — TIOTROPIUM BROMIDE 18 MCG: 18 CAPSULE ORAL; RESPIRATORY (INHALATION) at 09:52

## 2018-01-19 RX ADMIN — ALBUTEROL SULFATE 2.5 MG: 2.5 SOLUTION RESPIRATORY (INHALATION) at 09:52

## 2018-01-19 RX ADMIN — Medication 10 ML: at 14:20

## 2018-01-19 RX ADMIN — ALBUTEROL SULFATE 2.5 MG: 2.5 SOLUTION RESPIRATORY (INHALATION) at 16:44

## 2018-01-19 RX ADMIN — METHYLPREDNISOLONE SODIUM SUCCINATE 40 MG: 125 INJECTION, POWDER, FOR SOLUTION INTRAMUSCULAR; INTRAVENOUS at 21:36

## 2018-01-19 RX ADMIN — CARVEDILOL 3.12 MG: 3.12 TABLET, FILM COATED ORAL at 17:29

## 2018-01-19 RX ADMIN — WATER 2 MG: 1 INJECTION INTRAMUSCULAR; INTRAVENOUS; SUBCUTANEOUS at 00:10

## 2018-01-19 RX ADMIN — FAMOTIDINE 20 MG: 20 TABLET, FILM COATED ORAL at 08:38

## 2018-01-19 RX ADMIN — ALBUTEROL SULFATE 2.5 MG: 2.5 SOLUTION RESPIRATORY (INHALATION) at 12:42

## 2018-01-19 RX ADMIN — SALINE NASAL SPRAY 2 SPRAY: 1.5 SOLUTION NASAL at 14:20

## 2018-01-19 RX ADMIN — Medication 10 ML: at 21:37

## 2018-01-19 RX ADMIN — HEPARIN SODIUM 5000 UNITS: 5000 INJECTION, SOLUTION INTRAVENOUS; SUBCUTANEOUS at 06:01

## 2018-01-19 RX ADMIN — ALBUTEROL SULFATE 2.5 MG: 2.5 SOLUTION RESPIRATORY (INHALATION) at 19:24

## 2018-01-19 RX ADMIN — SALINE NASAL SPRAY 2 SPRAY: 1.5 SOLUTION NASAL at 22:00

## 2018-01-19 RX ADMIN — CARVEDILOL 3.12 MG: 3.12 TABLET, FILM COATED ORAL at 08:38

## 2018-01-19 RX ADMIN — WATER 2 MG: 1 INJECTION INTRAMUSCULAR; INTRAVENOUS; SUBCUTANEOUS at 00:00

## 2018-01-19 RX ADMIN — WATER 2 G: 1 INJECTION INTRAMUSCULAR; INTRAVENOUS; SUBCUTANEOUS at 08:37

## 2018-01-19 RX ADMIN — WATER 2 MG: 1 INJECTION INTRAMUSCULAR; INTRAVENOUS; SUBCUTANEOUS at 00:15

## 2018-01-19 RX ADMIN — Medication 1 SPRAY: at 05:07

## 2018-01-19 RX ADMIN — HEPARIN SODIUM 5000 UNITS: 5000 INJECTION, SOLUTION INTRAVENOUS; SUBCUTANEOUS at 14:19

## 2018-01-19 RX ADMIN — HEPARIN SODIUM 5000 UNITS: 5000 INJECTION, SOLUTION INTRAVENOUS; SUBCUTANEOUS at 21:35

## 2018-01-19 RX ADMIN — LISINOPRIL 20 MG: 20 TABLET ORAL at 08:38

## 2018-01-19 RX ADMIN — SALINE NASAL SPRAY 2 SPRAY: 1.5 SOLUTION NASAL at 08:39

## 2018-01-19 RX ADMIN — DEXTROSE MONOHYDRATE 75 ML/HR: 5 INJECTION, SOLUTION INTRAVENOUS at 18:18

## 2018-01-19 RX ADMIN — FOLIC ACID 1 MG: 1 TABLET ORAL at 08:38

## 2018-01-19 RX ADMIN — WATER 2 MG: 1 INJECTION INTRAMUSCULAR; INTRAVENOUS; SUBCUTANEOUS at 00:05

## 2018-01-19 RX ADMIN — FLUTICASONE PROPIONATE 2 SPRAY: 50 SPRAY, METERED NASAL at 09:05

## 2018-01-19 RX ADMIN — FAMOTIDINE 20 MG: 20 TABLET, FILM COATED ORAL at 17:29

## 2018-01-19 RX ADMIN — Medication 100 MG: at 08:38

## 2018-01-19 RX ADMIN — SERTRALINE HYDROCHLORIDE 50 MG: 50 TABLET ORAL at 08:38

## 2018-01-19 NOTE — PROGRESS NOTES
C-Diff testing has been ordered but the test has not been completed. It did not meet testing criteria because:  · The patient has not had 3 or more liquid bowel movements in the last 24 hours. Patient with no bowel movements in over 24 hours. C.diff test and enteric precautions discontinued.

## 2018-01-19 NOTE — PROGRESS NOTES
Critical Care Daily Progress Note: 1/19/2018    Leroy Horvath   Admission Date: 1/6/2018         The patient's chart is reviewed and the patient is discussed with the staff. Subjective:     45 y. o. CM admitted on 1/6 with acute hypoxemic respiratory failure, with methamphetamine positive, smoker with strong FH of severe COPD, morbid obesity, suspected OHS.  Also has sinusitis on CT and possible pneumonia.   Has had waxing/waning hypercarbia through his hospitalization- some days alert and others somnolent. Has required intubation twice thus far  On and off bipap since yesterday.   Doing better today-concerned about where he is going to live when he is discharged    Current Facility-Administered Medications   Medication Dose Route Frequency    artificial saliva (MOUTH KOTE) 1 Spray  1 Spray Oral PRN    cefTRIAXone (ROCEPHIN) 2 g in sterile water (preservative free) 20 mL IV syringe  2 g IntraVENous Q24H    tiotropium (SPIRIVA) inhalation capsule 18 mcg  1 Cap Inhalation DAILY    naloxone (NARCAN) injection 0.4 mg  0.4 mg IntraVENous EVERY 2 MINUTES AS NEEDED    thiamine (B-1) tablet 100 mg  100 mg Oral DAILY    folic acid (FOLVITE) tablet 1 mg  1 mg Oral DAILY    famotidine (PEPCID) tablet 20 mg  20 mg Oral BID    albuterol (PROVENTIL VENTOLIN) nebulizer solution 2.5 mg  2.5 mg Nebulization QID RT    promethazine (PHENERGAN) with saline injection 12.5 mg  12.5 mg IntraVENous Q6H PRN    LORazepam (ATIVAN) tablet 0.5 mg  0.5 mg Oral Q8H PRN    haloperidol lactate (HALDOL) injection 5 mg  5 mg IntraVENous Q6H PRN    sertraline (ZOLOFT) tablet 50 mg  50 mg Oral DAILY    sodium chloride (OCEAN) 0.65 % nasal spray 2 Spray  2 Spray Both Nostrils QID    sodium chloride (OCEAN) 0.65 % nasal spray 2 Spray  2 Spray Both Nostrils Q2H PRN    fluticasone (FLONASE) 50 mcg/actuation nasal spray 2 Spray  2 Spray Both Nostrils DAILY    influenza vaccine 2017-18 (3 yrs+)(PF) (FLUZONE QUAD/FLUARIX QUAD) injection 0.5 mL  0.5 mL IntraMUSCular PRIOR TO DISCHARGE    nicotine (NICODERM CQ) 21 mg/24 hr patch 1 Patch  1 Patch TransDERmal DAILY    metoprolol (LOPRESSOR) injection 5 mg  5 mg IntraVENous Q4H PRN    hydrALAZINE (APRESOLINE) 20 mg/mL injection 20 mg  20 mg IntraVENous Q6H PRN    sodium chloride (NS) flush 5-10 mL  5-10 mL IntraVENous Q8H    sodium chloride (NS) flush 5-10 mL  5-10 mL IntraVENous PRN    lisinopril (PRINIVIL, ZESTRIL) tablet 20 mg  20 mg Oral DAILY    carvedilol (COREG) tablet 3.125 mg  3.125 mg Oral BID WITH MEALS    ondansetron (ZOFRAN) injection 4 mg  4 mg IntraVENous Q6H PRN    acetaminophen (TYLENOL) tablet 650 mg  650 mg Oral Q6H PRN    diphenhydrAMINE (BENADRYL) capsule 25 mg  25 mg Oral Q4H PRN    polyethylene glycol (MIRALAX) packet 17 g  17 g Oral DAILY PRN    guaiFENesin ER (MUCINEX) tablet 600 mg  600 mg Oral Q12H PRN    calcium carbonate (TUMS) chewable tablet 400 mg [elemental]  400 mg Oral TID PRN    heparin (porcine) injection 5,000 Units  5,000 Units SubCUTAneous Q8H       Review of Systems    Constitutional:  negative for fever, chills, sweats  Cardiovascular:  negative for chest pain, palpitations, syncope, edema  Gastrointestinal:  negative for dysphagia, reflux, vomiting, diarrhea, abdominal pain, or melena  Neurologic:  negative for focal weakness, numbness, headache      Objective:     Vitals:    01/19/18 0948 01/19/18 0957 01/19/18 1002 01/19/18 1032   BP: 128/80  145/76 138/87   Pulse: 96  87 88   Resp: 28  19 29   Temp:       SpO2: 93% 94% 94% 94%   Weight:       Height:           Intake and Output:   01/17 1901 - 01/19 0700  In: -   Out: 1800 [Urine:1800]       Physical Exam:          Constitutional:  the patient is well developed and in no acute distress  EENMT:  Sclera clear, pupils equal, oral mucosa moist  Respiratory:   Decreased breath sounds  Cardiovascular:  RRR without M,G,R  Gastrointestinal: soft and non-tender; with positive bowel sounds. Musculoskeletal: warm without cyanosis. There is no lower leg edema. Skin:  no jaundice or rashes, no wounds   Neurologic: no gross neuro deficits     Psychiatric:  alert and oriented x 3    LINES:  peripheral    DRIPS:   none    CXR:       LAB  No results for input(s): GLUCPOC in the last 72 hours. No lab exists for component: [place]   Recent Labs      01/19/18   0521  01/18/18   0614  01/17/18   0614   WBC  10.4  15.1*  7.3   HGB  15.7  15.8  15.1   HCT  50.4*  50.1  48.7   PLT  216  225  215     Recent Labs      01/19/18   0521  01/18/18   0614  01/17/18   0614   NA  146*  146*  144   K  3.9  3.7  4.0   CL  107  106  106   CO2  33*  34*  32   GLU  105*  101*  147*   BUN  32*  32*  38*   CREA  0.96  1.09  1.08   MG  2.4   --   2.4   PHOS  2.8   --   2.7   CA  9.1  8.4  8.8     Recent Labs      01/19/18   0313  01/18/18   1425  01/18/18   1017   PH  7.31*  7.26*  7.24*   PCO2  64*  70*  69*   PO2  89  91  63*   HCO3  32*  31*  29*     No results for input(s): LCAD, LAC in the last 72 hours.     Assessment:  (Medical Decision Making)     Hospital Problems  Date Reviewed: 1/19/2018          Codes Class Noted POA    Diarrhea ICD-10-CM: R19.7  ICD-9-CM: 787.91  1/18/2018 No        Alcohol abuse (Chronic) ICD-10-CM: F10.10  ICD-9-CM: 305.00  1/15/2018 Yes        Homeless ICD-10-CM: Z59.0  ICD-9-CM: V60.0  1/15/2018 No        Community acquired pneumonia of right lower lobe of lung ([facility] ICD-10-CM: J18.1  ICD-9-CM: 974  1/9/2018 Yes    LLL    Urethral stricture ICD-10-CM: N35.9  ICD-9-CM: 598.9  1/7/2018 Yes        COPD (chronic obstructive pulmonary disease) (HCC) (Chronic) ICD-10-CM: J44.9  ICD-9-CM: 136  1/6/2018 Yes        Nicotine dependence (Chronic) ICD-10-CM: F17.200  ICD-9-CM: 305.1  1/6/2018 Yes        Methamphetamine abuse ICD-10-CM: F15.10  ICD-9-CM: 305.70  1/6/2018 Yes        Anasarca ICD-10-CM: R60.1  ICD-9-CM: 782.3  1/6/2018 Yes        NATALIIA (obstructive sleep apnea) (Chronic) ICD-10-CM: G47.33  ICD-9-CM: 327.23  1/6/2018 Yes        Acute kidney injury [facility]) ICD-10-CM: N17.9  ICD-9-CM: 584.9  1/6/2018 Yes        * (Principal)Acute on chronic respiratory failure with hypoxia and hypercapnia (HCC) ICD-10-CM: J96.21, J96.22  ICD-9-CM: 518.84, 786.09, 799.02  1/6/2018 Yes    Off bipap for now          Plan:  (Medical Decision Making)   1    Try to leave off bipap  2     Bronchodilators  3     Iv steroids  Will add  4     Possibly move to floor later today  5    Iv antibx  Since 1/18  --    More than 50% of the time documented was spent in face-to-face contact with the patient and in the care of the patient on the floor/unit where the patient is located.     Savi Pearl MD

## 2018-01-19 NOTE — PROGRESS NOTES
Problem: Mobility Impaired (Adult and Pediatric)  Goal: *Acute Goals and Plan of Care (Insert Text)  UPDATED: 1/19/18  STG:  (1.)Mr. Schaffer will move from supine to sit and sit to supine , scoot up and down and roll side to side with STAND BY ASSIST within 3 day(s). (2.)Mr. Schaffer will transfer from bed to chair and chair to bed with CONTACT GUARD ASSIST using the least restrictive device within 3 day(s). (3.)Mr. Schaffer will ambulate with CONTACT GUARD ASSIST for 100 feet with the least restrictive device within 3 day(s). (4.)Mr. Schaffer will perform standing static and dynamic balance activities x 15 minutes with CONTACT GUARD ASSIST to improve safety within 3 day(s). (5.)Mr. Schaffer will maintain stable vital signs throughout all functional mobility within 3 days. LTG:  (1.)Mr. Schaffer will move from supine to sit and sit to supine , scoot up and down and roll side to side in bed with MODIFIED INDEPENDENCE within 7 day(s). (2.)Mr. Schaffer will transfer from bed to chair and chair to bed with MODIFIED INDEPENDENCE using the least restrictive device within 7 day(s). (3.)Mr. Schaffer will ambulate with MODIFIED INDEPENDENCE for 250+ feet with the least restrictive device within 7 day(s). (4.)Mr. Schaffer will participate in therapeutic activity/exerices x 23 minutes for increased activity tolerance within 7 days.     ________________________________________________________________________________________________        PHYSICAL THERAPY: Re-evaluation, PM 1/19/2018  INPATIENT: Hospital Day: 14  Payor: SELF PAY / Plan: [facility] SELF PAY / Product Type: Self Pay /      NAME/AGE/GENDER: Kurt Isbell is a 45 y.o. male   PRIMARY DIAGNOSIS: COPD (chronic obstructive pulmonary disease) (HCC) Acute on chronic respiratory failure with hypoxia and hypercapnia (HCC) Acute on chronic respiratory failure with hypoxia and hypercapnia (HCC)        ICD-10: Treatment Diagnosis:   · Difficulty in walking, Not elsewhere classified (R26.2)   Precaution/Allergies:  Review of patient's allergies indicates no known allergies. ASSESSMENT:     Mr. Janet Cui was re-evaluated today in CCU as he was recently discharged due to transfer to unit. Pt reports a history of drug abuse which he has decided to give up. He later stated that his wife also has drug abuse problem. Pt presents to PT with slight weakness in B LEs but WFL AROM. He required CGA x 2 for STS and transferred to chair with CGA-Jose x 2. Pt ambulated short distance with increased trunk sway and decreased step clearance. Pt's SpO2 observed to remain in upper 80s-low 90s throughout session. Pt appears to be functioning close to his prior level of function but goals have been updated slightly. He could continue to benefit from PT. This section established at most recent assessment   PROBLEM LIST (Impairments causing functional limitations):  1. Decreased Strength  2. Decreased ADL/Functional Activities  3. Decreased Transfer Abilities  4. Decreased Ambulation Ability/Technique  5. Decreased Balance  6. Increased Pain  7. Decreased Activity Tolerance  8. Decreased Knowledge of Precautions  9. Decreased Morris with Home Exercise Program   INTERVENTIONS PLANNED: (Benefits and precautions of physical therapy have been discussed with the patient.)  1. Balance Exercise  2. Bed Mobility  3. Family Education  4. Gait Training  5. Home Exercise Program (HEP)  6. Therapeutic Activites  7. Therapeutic Exercise/Strengthening  8. Transfer Training  9. Patient Education  10. Group Therapy     TREATMENT PLAN: Frequency/Duration: 3 times a week for duration of hospital stay  Rehabilitation Potential For Stated Goals: Good     RECOMMENDED REHABILITATION/EQUIPMENT: (at time of discharge pending progress): Due to the probability of continued deficits (see above) this patient will likely need continued skilled physical therapy after discharge.   Equipment:  None at this time              HISTORY:   History of Present Injury/Illness (Reason for Referral):  Per MD: Ashu Schumacher is a 45y.o. year-old male with a past medical history of polysubstance abuse and NATALIIA with poor follow up who presents to the ER with a complaint of worsening shortness of breath and lower extremity/abdominal swelling over the past month. He admits to respiratory problems in the past and apparently was supposed to get a trach placed at some point but didn't. He has also had CPAP recommended but cannot wear it because it is too loud for him. He admits to occasional cough productive of green sputum. Denies fevers, chills, nausea, vomiting, constipation, diarrhea, chest pain. Past Medical History/Comorbidities:   Mr. Jose Ferguson  has a past medical history of Sleep disorder. Mr. Jose Ferguson  has no past surgical history on file. Social History/Living Environment:   Home Environment: Trailer/mobile home  # Steps to Enter: 1  One/Two Story Residence: One story  Living Alone: No  Support Systems: Child(suzette), Family member(s), Spouse/Significant Other/Partner  Patient Expects to be Discharged to[de-identified] Private residence  Current DME Used/Available at Home: Cane, straight  Tub or Shower Type: Tub/Shower combination  Prior Level of Function/Work/Activity:  Patient ambulated with modified independence prior to admission, required some assist from wife for donning socks.      Number of Personal Factors/Comorbidities that affect the Plan of Care: 1-2: MODERATE COMPLEXITY   EXAMINATION:   Most Recent Physical Functioning:   Gross Assessment:  AROM: Within functional limits  Strength: Generally decreased, functional (B hip flexion 4-/5)               Posture:     Balance:  Sitting: Impaired  Sitting - Static: Prop sitting  Sitting - Dynamic: Prop sitting  Standing: Impaired  Standing - Static: Fair  Standing - Dynamic : Fair Bed Mobility:     Wheelchair Mobility:     Transfers:  Sit to Stand: Contact guard assistance;Assist x2  Stand to Sit: Contact guard assistance;Assist x2  Bed to Chair: Contact guard assistance;Minimum assistance;Assist x2  Gait:     Base of Support: Center of gravity altered  Speed/Fawn: Slow  Step Length: Right shortened;Left shortened  Gait Abnormalities: Trunk sway increased;Decreased step clearance  Distance (ft): 2 Feet (ft)  Assistive Device:  (HHA)  Ambulation - Level of Assistance: Minimal assistance;Assist x2      Body Structures Involved:  1. Heart  2. Lungs  3. Muscles Body Functions Affected:  1. Sensory/Pain  2. Respiratory  3. Neuromusculoskeletal  4. Movement Related Activities and Participation Affected:  1. Mobility  2. Self Care  3. Domestic Life  4. Interpersonal Interactions and Relationships  5. Community, Social and [place]   Number of elements that affect the Plan of Care: 4+: HIGH COMPLEXITY   CLINICAL PRESENTATION:   Presentation: Stable and uncomplicated: LOW COMPLEXITY   CLINICAL DECISION MAKIN [facility] Mobility Inpatient Short Form  How much difficulty does the patient currently have. .. Unable A Lot A Little None   1. Turning over in bed (including adjusting bedclothes, sheets and blankets)? [] 1   [] 2   [x] 3   [] 4   2. Sitting down on and standing up from a chair with arms ( e.g., wheelchair, bedside commode, etc.)   [] 1   [] 2   [x] 3   [] 4   3. Moving from lying on back to sitting on the side of the bed? [] 1   [] 2   [x] 3   [] 4   How much help from another person does the patient currently need. .. Total A Lot A Little None   4. Moving to and from a bed to a chair (including a wheelchair)? [] 1   [] 2   [x] 3   [] 4   5. Need to walk in hospital room? [] 1   [] 2   [x] 3   [] 4   6. Climbing 3-5 steps with a railing? [] 1   [x] 2   [] 3   [] 4   © 2007, Trustees of [address] [place] [address] [address] [id number], under license to [employer].  All rights reserved      Score:  Initial: 17 Most Recent:17 (Date: 18 ) Interpretation of Tool:  Represents activities that are increasingly more difficult (i.e. Bed mobility, Transfers, Gait). Score 24 23 22-20 19-15 14-10 9-7 6     Modifier CH CI CJ CK CL CM CN      ? Mobility - Walking and Moving Around:     - CURRENT STATUS: CK - 40%-59% impaired, limited or restricted    - GOAL STATUS: CJ - 20%-39% impaired, limited or restricted    - D/C STATUS:  ---------------To be determined---------------  Payor: SELF PAY / Plan: [facility] SELF PAY / Product Type: Self Pay /      Medical Necessity:     · Patient demonstrates good rehab potential due to higher previous functional level. Reason for Services/Other Comments:  · Patient continues to require modification of therapeutic interventions to increase complexity of exercises. Use of outcome tool(s) and clinical judgement create a POC that gives a: Clear prediction of patient's progress: LOW COMPLEXITY            TREATMENT:   (In addition to Assessment/Re-Assessment sessions the following treatments were rendered)   Pre-treatment Symptoms/Complaints:  None  Pain: Initial:   Pain Intensity 1: 0  Post Session:  0/10    Assessment/Reassessment only, no treatment provided today          Braces/Orthotics/Lines/Etc:   · alegre catheter  · O2 Device: Hi flow nasal cannula  Treatment/Session Assessment:    · Response to Treatment:  Pt tolerated fairly given SpO2 in upper 80s-low 90s throughout. · Interdisciplinary Collaboration:   o Physical Therapist  o Registered Nurse  · After treatment position/precautions:   o Up in chair  o Bed alarm/tab alert on  o Bed/Chair-wheels locked  o Call light within reach  o RN notified  o Family at bedside   · Compliance with Program/Exercises: Will assess as treatment progresses. · Recommendations/Intent for next treatment session: \"Next visit will focus on advancements to more challenging activities and reduction in assistance provided\".   Total Treatment Duration:  PT Patient Time In/Time Out  Time In: 4304  Time Out: 201 [address] [place] [address] Dipak Hui, PT, DPT

## 2018-01-19 NOTE — PROGRESS NOTES
Hospitalist Progress Note    2018  Admit Date: 2018  6:27 PM   NAME: Coy Kenyon   :  1965   DOS:              18  MRN:  [id number]   Attending: Ana M Vanegas MD  PCP:  None  Treatment Team: Attending Provider: Venus Gutiérrez MD; Care Manager: Yasmeen Hooks, RN; Utilization Review: Zakia Dunn RN; Consulting Provider: Zane Yun MD; Consulting Provider: Nathan Neil MD; Consulting Provider: Nida Jiménez MD; Care Manager: Myriam Perez RN    Full Code     SUBJECTIVE:   Patient examined at bedside. Wife present in the room, all questions answered. This morning he was noted drowsy, saturating 92% on HFNC. ABG showed respiratory acidosis with hypercarbia ( ph 7.2, co2 83, po2 83 ). Wbc 15k and episodes of loose stools. Pulmonary on board, plan to resume antibiotics and transfer patient to ICU for monitoring. He is at high risk for re-intubation. Has underlying NATALIIA-hypoventilating syndrome. Drug screening this morning showed positive for THC. ROS: denied chest pain, vomiting.     2017- seen - no new complaints- off bipap- states that he will now be compliant- transferred emergently for bipap yesterday secondary to non-compliance    10+ ROS reviewed and negative except for positive in HPI.    No Known Allergies  Current Facility-Administered Medications   Medication Dose Route Frequency    artificial saliva (MOUTH KOTE) 1 Spray  1 Spray Oral PRN    methylPREDNISolone (PF) (SOLU-MEDROL) injection 40 mg  40 mg IntraVENous Q8H    cefTRIAXone (ROCEPHIN) 2 g in sterile water (preservative free) 20 mL IV syringe  2 g IntraVENous Q24H    tiotropium (SPIRIVA) inhalation capsule 18 mcg  1 Cap Inhalation DAILY    naloxone (NARCAN) injection 0.4 mg  0.4 mg IntraVENous EVERY 2 MINUTES AS NEEDED    thiamine (B-1) tablet 100 mg  100 mg Oral DAILY    folic acid (FOLVITE) tablet 1 mg  1 mg Oral DAILY    famotidine (PEPCID) tablet 20 mg  20 mg Oral BID    albuterol (PROVENTIL VENTOLIN) nebulizer solution 2.5 mg  2.5 mg Nebulization QID RT    promethazine (PHENERGAN) with saline injection 12.5 mg  12.5 mg IntraVENous Q6H PRN    LORazepam (ATIVAN) tablet 0.5 mg  0.5 mg Oral Q8H PRN    haloperidol lactate (HALDOL) injection 5 mg  5 mg IntraVENous Q6H PRN    sertraline (ZOLOFT) tablet 50 mg  50 mg Oral DAILY    sodium chloride (OCEAN) 0.65 % nasal spray 2 Spray  2 Spray Both Nostrils QID    sodium chloride (OCEAN) 0.65 % nasal spray 2 Spray  2 Spray Both Nostrils Q2H PRN    fluticasone (FLONASE) 50 mcg/actuation nasal spray 2 Spray  2 Spray Both Nostrils DAILY    influenza vaccine 2017-18 (3 yrs+)(PF) (FLUZONE QUAD/FLUARIX QUAD) injection 0.5 mL  0.5 mL IntraMUSCular PRIOR TO DISCHARGE    nicotine (NICODERM CQ) 21 mg/24 hr patch 1 Patch  1 Patch TransDERmal DAILY    metoprolol (LOPRESSOR) injection 5 mg  5 mg IntraVENous Q4H PRN    hydrALAZINE (APRESOLINE) 20 mg/mL injection 20 mg  20 mg IntraVENous Q6H PRN    sodium chloride (NS) flush 5-10 mL  5-10 mL IntraVENous Q8H    sodium chloride (NS) flush 5-10 mL  5-10 mL IntraVENous PRN    lisinopril (PRINIVIL, ZESTRIL) tablet 20 mg  20 mg Oral DAILY    carvedilol (COREG) tablet 3.125 mg  3.125 mg Oral BID WITH MEALS    ondansetron (ZOFRAN) injection 4 mg  4 mg IntraVENous Q6H PRN    acetaminophen (TYLENOL) tablet 650 mg  650 mg Oral Q6H PRN    diphenhydrAMINE (BENADRYL) capsule 25 mg  25 mg Oral Q4H PRN    polyethylene glycol (MIRALAX) packet 17 g  17 g Oral DAILY PRN    guaiFENesin ER (MUCINEX) tablet 600 mg  600 mg Oral Q12H PRN    calcium carbonate (TUMS) chewable tablet 400 mg [elemental]  400 mg Oral TID PRN    heparin (porcine) injection 5,000 Units  5,000 Units SubCUTAneous Q8H         Immunization History   Administered Date(s) Administered    TB Skin Test (PPD) Intradermal 01/07/2018     Objective:     Patient Vitals for the past 24 hrs:   Temp Pulse Resp BP SpO2   01/19/18 1644 - - - - 95 %   01/19/18 1505 - - - - 90 %   01/19/18 1432 - (!) 108 22 115/60 90 %   01/19/18 1417 - (!) 110 22 118/64 90 %   01/19/18 1402 - (!) 109 24 120/67 91 %   01/19/18 1348 - (!) 107 - 115/54 90 %   01/19/18 1333 - (!) 103 22 130/79 95 %   01/19/18 1318 - 99 18 133/73 93 %   01/19/18 1303 - 98 (!) 36 132/86 91 %   01/19/18 1248 - 89 21 130/86 100 %   01/19/18 1242 - - - - 94 %   01/19/18 1232 - 91 24 (!) 141/91 93 %   01/19/18 1217 - 90 - 137/89 94 %   01/19/18 1203 - 96 20 137/86 94 %   01/19/18 1200 98.5 °F (36.9 °C) - - - -   01/19/18 1148 - 97 19 142/82 97 %   01/19/18 1133 - 87 21 143/89 100 %   01/19/18 1118 - 89 (!) 38 144/88 96 %   01/19/18 1102 - 89 16 147/87 96 %   01/19/18 1047 - 91 (!) 31 145/88 96 %   01/19/18 1032 - 88 29 138/87 94 %   01/19/18 1002 - 87 19 145/76 94 %   01/19/18 0957 - - - - 94 %   01/19/18 0948 - 96 28 128/80 93 %   01/19/18 0933 - 96 (!) 32 132/79 99 %   01/19/18 0902 - 91 19 125/82 97 %   01/19/18 0848 - 99 20 112/80 94 %   01/19/18 0832 - (!) 101 17 (!) 155/92 91 %   01/19/18 0817 - (!) 101 16 (!) 151/93 92 %   01/19/18 0802 98.1 °F (36.7 °C) (!) 103 18 138/87 93 %   01/19/18 0748 - (!) 104 17 (!) 156/95 95 %   01/19/18 0733 - (!) 103 17 (!) 159/99 96 %   01/19/18 0717 - 97 15 (!) 167/102 94 %   01/19/18 0703 - 97 17 (!) 169/103 95 %   01/19/18 0617 - 96 18 (!) 140/91 94 %   01/19/18 0603 - 97 20 141/90 94 %   01/19/18 0548 - 97 21 (!) 142/91 95 %   01/19/18 0532 - 97 21 (!) 149/92 95 %   01/19/18 0517 - 98 (!) 32 133/85 97 %   01/19/18 0508 97.8 °F (36.6 °C) 98 18 137/87 95 %   01/19/18 0502 - 99 18 151/87 95 %   01/19/18 0447 - (!) 101 18 139/87 95 %   01/19/18 0432 - (!) 102 17 140/85 95 %   01/19/18 0418 - (!) 101 20 157/90 96 %   01/19/18 0403 - 97 22 (!) 162/96 96 %   01/19/18 0348 - 99 12 (!) 150/93 97 %   01/19/18 0332 - 97 15 (!) 148/92 95 %   01/19/18 0315 - - - - 95 %   01/19/18 0302 - (!) 102 14 151/86 96 %   01/19/18 0247 - 97 16 153/87 97 %   01/19/18 0233 - 93 19 142/83 95 % 18 0219 - 97 17 146/89 95 %   18 0203 - 96 17 145/87 95 %   18 0148 - 97 26 (!) 138/95 95 %   18 0132 98 °F (36.7 °C) 100 18 133/83 96 %   18 0117 - (!) 101 18 160/82 95 %   18 0047 - 96 16 131/80 96 %   18 0033 - 99 18 141/83 94 %   18 0018 - 99 17 143/83 94 %   18 2347 - (!) 101 18 155/81 95 %   18 2345 - - - - 94 %   18 2303 - 100 14 145/84 98 %   18 2248 - 96 16 157/88 96 %   18 2233 - 98 20 127/79 96 %   18 2217 - 96 18 145/83 95 %   18 2202 - 96 17 129/77 96 %   18 2147 - 99 17 127/75 96 %   18 213 - 99 13 134/79 97 %   188 - 99 18 132/76 95 %   188 - 98 (!) 32 (!) 154/95 96 %   18 - 96 16 136/81 95 %   18 - 100 (!) 52 138/81 96 %   18 97.8 °F (36.6 °C) 99 11 132/79 96 %   18 -  12 (!) 148/92 95 %   18 1947 - 97 9 131/78 95 %   18 1935 - - - - 96 %   18 193 - 96 14 133/77 96 %   18 1918 - 96 10 148/82 96 %   18 1903 - 94 12 130/81 95 %   18 1848 - 96 24 128/81 96 %   18 1832 - 99 17 130/81 95 %   18 1817 - 98 9 142/87 97 %   18 1803 - (!) 101 9 146/82 96 %   18 1747 - 100 13 (!) 151/91 97 %     Temp (24hrs), Av °F (36.7 °C), Min:97.8 °F (36.6 °C), Max:98.5 °F (36.9 °C)    Oxygen Therapy  O2 Sat (%): 95 % (18)  Pulse via Oximetry: 96 beats per minute (18)  O2 Device: BIPAP (neb given inline) (18)  O2 Flow Rate (L/min): 3 l/min (18 1242)  O2 Temperature: 86.9 °F (30.5 °C) (18 152)  FIO2 (%): 35 % (18)  Oxygen Therapy  O2 Sat (%): 95 % (18)  Pulse via Oximetry: 96 beats per minute (18)  O2 Device: BIPAP (neb given inline) (18)  O2 Flow Rate (L/min): 3 l/min (18 1242)  O2 Temperature: 86.9 °F (30.5 °C) (18 152)  FIO2 (%): 35 % (18)    Physical Exam:  General: Drowsy, but easily arousable   HEENT:               Small reactive pupils. No obvious deformity. Nares normal. No drainage  Lungs:                  Decreased air entry b/l. Scattered rhonchi, no rales, no wheezing   Cardiovascular:   RRR. No m/r/g. Abdomen:       S/nt/nd. Bowel sounds normal.   Extremities:         No pedal edema   Skin:         No rashes or lesions. Not Jaundiced  Neurologic:    AAOx3. No gross focal deficit. Moves all extremities. Normal sensory. Psychiatric:         Good mood. Normal affect. Denies any SI/HI. Genital:    Galindo cath in place, draining clear urine     DIAGNOSTIC STUDIES      Data Review:   Recent Results (from the past 24 hour(s))   BLOOD GAS, ARTERIAL    Collection Time: 01/19/18  3:13 AM   Result Value Ref Range    pH 7.31 (L) 7.35 - 7.45      PCO2 64 (H) 35 - 45 mmHg    PO2 89 80 - 105 mmHg    BICARBONATE 32 (H) 22 - 26 mmol/L    BASE EXCESS 3.3 (H) 0 - 3 mmol/L    TOTAL HEMOGLOBIN 15.8 (H) 11.7 - 15.0 GM/DL    O2 SAT 97 92 - 98.5 %    Arterial O2 Hgb 95.4 94 - 97 %    CARBOXYHEMOGLOBIN 1.8 (H) 0.5 - 1.5 %    METHEMOGLOBIN 0.1 0.0 - 1.5 %    DEOXYHEMOGLOBIN 3 0.0 - 5.0 %    SITE RR     ALLENS TEST POSITIVE      MODE BIPAP 18 8     RATE 18.0      Respiratory comment: Gina WOLF at 1 19 2018 3 20 27 AM. Read back.     METABOLIC PANEL, BASIC    Collection Time: 01/19/18  5:21 AM   Result Value Ref Range    Sodium 146 (H) 136 - 145 mmol/L    Potassium 3.9 3.5 - 5.1 mmol/L    Chloride 107 98 - 107 mmol/L    CO2 33 (H) 21 - 32 mmol/L    Anion gap 6 (L) 7 - 16 mmol/L    Glucose 105 (H) 65 - 100 mg/dL    BUN 32 (H) 6 - 23 MG/DL    Creatinine 0.96 0.8 - 1.5 MG/DL    GFR est AA >60 >60 ml/min/1.73m2    GFR est non-AA >60 >60 ml/min/1.73m2    Calcium 9.1 8.3 - 10.4 MG/DL   PHOSPHORUS    Collection Time: 01/19/18  5:21 AM   Result Value Ref Range    Phosphorus 2.8 2.5 - 4.5 MG/DL   MAGNESIUM    Collection Time: 01/19/18  5:21 AM   Result Value Ref Range    Magnesium 2.4 1.8 - 2.4 mg/dL   CBC WITH AUTOMATED DIFF    Collection Time: 01/19/18  5:21 AM   Result Value Ref Range    WBC 10.4 4.3 - 11.1 K/uL    RBC 4.79 4.23 - 5.67 M/uL    HGB 15.7 13.6 - 17.2 g/dL    HCT 50.4 (H) 41.1 - 50.3 %    .2 (H) 79.6 - 97.8 FL    MCH 32.8 26.1 - 32.9 PG    MCHC 31.2 (L) 31.4 - 35.0 g/dL    RDW 13.1 11.9 - 14.6 %    PLATELET 539 358 - 471 K/uL    MPV 10.4 (L) 10.8 - 14.1 FL    DF AUTOMATED      NEUTROPHILS 80 (H) 43 - 78 %    LYMPHOCYTES 12 (L) 13 - 44 %    MONOCYTES 7 4.0 - 12.0 %    EOSINOPHILS 1 0.5 - 7.8 %    BASOPHILS 0 0.0 - 2.0 %    IMMATURE GRANULOCYTES 0 0.0 - 5.0 %    ABS. NEUTROPHILS 8.3 (H) 1.7 - 8.2 K/UL    ABS. LYMPHOCYTES 1.3 0.5 - 4.6 K/UL    ABS. MONOCYTES 0.7 0.1 - 1.3 K/UL    ABS. EOSINOPHILS 0.1 0.0 - 0.8 K/UL    ABS. BASOPHILS 0.0 0.0 - 0.2 K/UL    ABS. IMM.  GRANS. 0.0 0.0 - 0.5 K/UL       All Micro Results     Procedure Component Value Units Date/Time    C. DIFFICILE/EPI PCR [[id number]]     Order Status:  Canceled Specimen:  Stool     CULTURE, BLOOD [[id number]] Collected:  01/10/18 2150    Order Status:  Completed Specimen:  Blood from Blood Updated:  01/15/18 0623     Special Requests: --        RIGHT  ARM       Culture result: NO GROWTH 5 DAYS       CULTURE, BLOOD [[id number]] Collected:  01/10/18 2150    Order Status:  Completed Specimen:  Blood from Blood Updated:  01/15/18 0623     Special Requests: --        RIGHT  ARM       Culture result: NO GROWTH 5 DAYS       CULTURE, RESPIRATORY/SPUTUM/BRONCH Haley Sicilian STAIN [[id number]] Collected:  01/10/18 2115    Order Status:  Completed Specimen:  Sputum from Sputum Updated:  01/13/18 0730     Special Requests: NO SPECIAL REQUESTS        GRAM STAIN FEW GRAM POSITIVE COCCI         WBC'S TOO NUMEROUS TO COUNT      0 TO 1 EPITHELIAL CELLS PER OIF      4+ MUCUS     Culture result:         SCANT NORMAL RESPIRATORY EDUAR    CULTURE, RESPIRATORY/SPUTUM/BRONCH W GRAM STAIN [[id number]]  (Abnormal)  (Susceptibility) Collected:  01/07/18 1129 Order Status:  Completed Specimen:  Sputum from Endotracheal aspirate Updated:  01/12/18 0644     Special Requests: NO SPECIAL REQUESTS        GRAM STAIN 0 TO 10 WBCS/OIF      NO EPITHELIAL CELLS SEEN         MANY GRAM POSITIVE COCCI                 MODERATE GRAM NEGATIVE RODS      FEW GRAM NEGATIVE COCCI         3+ MUCUS PRESENT        Culture result:         HEAVY STREPTOCOCCUS PNEUMONIAE (A)              HEAVY HAEMOPHILUS INFLUENZAE BETA LACTAMASE NEGATIVE (A)              MODERATE NORMAL RESPIRATORY EDUAR    CULTURE, BLOOD [[id number]] Collected:  01/06/18 1854    Order Status:  Completed Specimen:  Whole Blood from Blood Updated:  01/11/18 0616     Special Requests: --        RIGHT  FOREARM       Culture result: NO GROWTH 5 DAYS       CULTURE, BLOOD [[id number]] Collected:  01/06/18 1854    Order Status:  Completed Specimen:  Whole Blood from Blood Updated:  01/11/18 0616     Special Requests: --        RIGHT  Antecubital       Culture result: NO GROWTH 5 DAYS       CULTURE, BODY FLUID Ming Show STAIN [[id number]] Collected:  01/09/18 0945    Order Status:  Canceled Specimen:  Miscellaneous Fluid           Imaging Christopher Bowman /Studies:    CXR Results  (Last 48 hours)               01/15/18 0557  XR CHEST SNGL V Final result    Impression:  IMPRESSION: Left lower lobe atelectasis or pneumonia unchanged. Narrative:  EXAM:  XR CHEST SNGL V       INDICATION:  Respiratory failure       COMPARISON:  1/14/2018       FINDINGS: A portable AP radiograph of the chest was obtained at 0610 hours. The   patient is on a cardiac monitor. Left lower lobe airspace disease unchanged. Central venous catheter remains with its tip in the left internal jugular vein. .    The cardiac and mediastinal contours and pulmonary vascularity are normal.  The   bones and soft tissues are grossly within normal limits.             01/14/18 0515  XR CHEST SNGL V Final result    Impression:  IMPRESSION: Left lower lobe atelectasis or pneumonia improved. Narrative:  EXAM:  TEMPORARY       INDICATION:  Respiratory failure       COMPARISON:  2018       FINDINGS: A portable AP radiograph of the chest was obtained at 0527 hours. The   patient is on a cardiac monitor. Left lower lobe atelectasis or pneumonia   improved. .  The cardiac and mediastinal contours and pulmonary vascularity are   normal.  The bones and soft tissues are grossly within normal limits. Central   venous catheter remains in place within its left internal jugular vein. Results for orders placed or performed during the hospital encounter of 18   2D ECHO COMPLETE ADULT (TTE) W OR WO CONTR    Narrative    Sushil Gutierrez [address] [facility], [address] [facility]  [phone]    Transthoracic Echocardiogram  2D, M-mode, Doppler, and Color Doppler    Patient: Dorys Pearl  MR #: [id number]  : 1965  Age: 45 years  Gender: Male  Study date: 2018  Account #: [de-identified]  Height: 67.7 in  Weight: 315.3 lb  BSA: 2.47 mï¾²  Status:Routine  Location: 3305  BP: 144/ 96    Allergies: NO KNOWN ALLERGIES    Sonographer:  Adolph Roque [facility]  Group:  [facility]  Referring Physician:  Leoncio Sellers MD  Reading Physician:  Sandra Waters. MD [facility]    INDICATIONS: Edema. *Patient on vent. Technically difficult due to patient body habitus   (316lbs). *    PROCEDURE: This was a routine study. A transthoracic echocardiogram was  performed. The study included complete 2D imaging, M-mode, complete spectral  Doppler, and color Doppler. Intravenous contrast (Definity) was administered. Echocardiographic views were limited by restricted patient mobility and poor  acoustic window availability. This was a technically difficult study. LEFT VENTRICLE: Size was normal. Systolic function was normal. Ejection  fraction was estimated in the range of 55 % to 60 %.  There were no regional  wall motion abnormalities. There was moderate concentric hypertrophy. The   E/e'  ratio was 21.1. Diastolic Function was indeterminate. RIGHT VENTRICLE: Not well visualized. LEFT ATRIUM: The atrium was mildly dilated. RIGHT ATRIUM: Not well visualized. SYSTEMIC VEINS: IVC: The inferior vena cava was dilated. AORTIC VALVE: The valve was not well visualized. The peak velocity was 2.23  m/s. The mean pressure gradient was 11 mmHg. The peak pressure gradient was   20  mmHg. There was no insufficiency. MITRAL VALVE: Not well visualized. There was no evidence for stenosis. There  was no regurgitation. TRICUSPID VALVE: Not well visualized. There was no evidence for stenosis. There  was trivial regurgitation. PULMONIC VALVE: Not well visualized. PERICARDIUM: A trivial pericardial effusion was identified. There was no  evidence of hemodynamic compromise. AORTA: The root exhibited normal size. SUMMARY:    -  Procedure information: This was a technically difficult study. -  Left ventricle: Systolic function was normal. Ejection fraction was  estimated in the range of 55 % to 60 %. There were no regional wall motion  abnormalities. There was moderate concentric hypertrophy. The E/e' ratio was  21.1. Diastolic Function was indeterminate. -  Left atrium: The atrium was mildly dilated. -  Inferior vena cava, hepatic veins: The inferior vena cava was dilated. -  Pericardium: A trivial pericardial effusion was identified. There was no  evidence of hemodynamic compromise. SYSTEM MEASUREMENT TABLES    2D mode  AoR Diam (2D): 3.9 cm  LA Dimension (2D): 3.2 cm  Left Atrium Systolic Volume Index; Method of Disks, Biplane; 2D mode;: 31   ml/m2  IVS/LVPW (2D): 1  IVSd (2D): 1.8 cm  LVIDd (2D): 4.2 cm  LVIDs (2D): 2.9 cm  LVOT Area (2D): 4.2 cm2  LVPWd (2D): 1.9 cm  RVIDd (2D): 3.8 cm    Unspecified Scan Mode  Peak Grad; Mean; Antegrade Flow: 19 mm[Hg]  Vmax;  Antegrade Flow: 219 cm/s  LVOT Diam: 2.3 cm  Peak Grad; Mean; Antegrade Flow: 8 mm[Hg]  Vmax; Antegrade Flow: 141 cm/s    Prepared and signed by    Shanita Harrington.  MD [facility]  Signed 08-Jan-2018 14:17:50         Labs and Studies from previous 24 hours have been personally reviewed by myself    ASSESSMENT      Active Hospital Problems    Diagnosis Date Noted    Diarrhea 01/18/2018    Alcohol abuse 01/15/2018    Homeless 01/15/2018    Community acquired pneumonia of right lower lobe of lung ([facility] 01/09/2018    Urethral stricture 01/07/2018    COPD (chronic obstructive pulmonary disease) [facility] 01/06/2018    Nicotine dependence 01/06/2018    Methamphetamine abuse 01/06/2018    Anasarca 01/06/2018    NATALIIA (obstructive sleep apnea) 01/06/2018    Acute kidney injury ([facility] 01/06/2018    Acute on chronic respiratory failure with hypoxia and hypercapnia ([facility] 01/06/2018     Hospital Problems as of 1/19/2018  Date Reviewed: 1/19/2018          Codes Class Noted - Resolved POA    Diarrhea ICD-10-CM: R19.7  ICD-9-CM: 787.91  1/18/2018 - Present No        Alcohol abuse (Chronic) ICD-10-CM: F10.10  ICD-9-CM: 305.00  1/15/2018 - Present Yes        Homeless ICD-10-CM: Z59.0  ICD-9-CM: V60.0  1/15/2018 - Present No        Community acquired pneumonia of right lower lobe of lung ([facility] ICD-10-CM: J18.1  ICD-9-CM: 278  1/9/2018 - Present Yes        Urethral stricture ICD-10-CM: N35.9  ICD-9-CM: 598.9  1/7/2018 - Present Yes        COPD (chronic obstructive pulmonary disease) ([facility] (Chronic) ICD-10-CM: J44.9  ICD-9-CM: 496  1/6/2018 - Present Yes        Nicotine dependence (Chronic) ICD-10-CM: F17.200  ICD-9-CM: 305.1  1/6/2018 - Present Yes        Methamphetamine abuse ICD-10-CM: F15.10  ICD-9-CM: 305.70  1/6/2018 - Present Yes        Anasarca ICD-10-CM: R60.1  ICD-9-CM: 782.3  1/6/2018 - Present Yes        NATALIIA (obstructive sleep apnea) (Chronic) ICD-10-CM: G47.33  ICD-9-CM: 327.23  1/6/2018 - Present Yes        Acute kidney injury ([facility] ICD-10-CM: N17.9  ICD-9-CM: 584.9  1/6/2018 - Present Yes        * (Principal)Acute on chronic respiratory failure with hypoxia and hypercapnia (HCC) ICD-10-CM: J96.21, J96.22  ICD-9-CM: 518.84, 786.09, 799.02  1/6/2018 - Present Yes        RESOLVED: Severe sepsis ([facility] ICD-10-CM: A41.9, R65.20  ICD-9-CM: 038.9, 995.92  1/9/2018 - 1/15/2018 Yes        RESOLVED: Acute metabolic encephalopathy [facility]-XK: G93.41  ICD-9-CM: 348.31  1/7/2018 - 1/15/2018 Yes        RESOLVED: Hypotension ICD-10-CM: I95.9  ICD-9-CM: 458.9  1/7/2018 - 1/15/2018 Yes              Plan:    -acute hypoercapnic respiratory failure secondary to no-compliance- resolved-  - ?  Rt lower lobe pna- continue Rocephin add azithromycin  - Pulm input appreciated   -Duo-nebs  -Bipap at night  - on nicotine patch.  -no diarrhea for cdiff testing   -Keep Galindo cath due to urethral stricture- may remove once he is ready to be discharge and ambulating   -PPD/PT/OT  -transfer to the medical floor    DVT Prophylaxis: Heparin SQ  CODE Status: Full CODE    Nilay Nelson MD  01/19/18

## 2018-01-19 NOTE — PROGRESS NOTES
Pt arrived to unit on NRB and in NAD. Pt oriented x4 and following commands although drowsy. Skin is intact with no noticeable redness. Soles of feet cracked and dry. Will continue to monitor pt closely.

## 2018-01-19 NOTE — PROGRESS NOTES
Pt is lethargic, oriented x4, eyes open to voice and sternal rub, decreased command following, eyes conjugate. NSR/Sinus tach on monitor, S1/S2 auscultated. Breath sounds diminished, symmetrical chest expansion on Bipap @ 35%. Bowel sounds hypoactive, abdomen intact. Skin with trace edema and soles of feet with cracked skin. Lines: L IJ  Drains: alegre  Airway: Bipap mask    Will continue to monitor pt.

## 2018-01-19 NOTE — PROGRESS NOTES
Chart reviewed after transfer to CCU. Pt being followed by CM already. See previous notes. [facility] has already screened and started applications for pt. Did verify if approved for TASHA, not approved, just application started per Abebe with [facility].  Pt does have sepsis dx and will need to be followed after d/c. CM following for d/c needs.

## 2018-01-19 NOTE — PROGRESS NOTES
Follow up visit with patient and his wife. Talked about his living situation and two offers he has for a place to live in. Talked about limiting his stress and being thankful for caring people stepping up. Prayer. Will continue to discuss with patient during his admission.     Raman Saha, staff Arnoldo anna [address], [address] [facility]  /   [email or web address]

## 2018-01-19 NOTE — PROGRESS NOTES
Problem: Self Care Deficits Care Plan (Adult)  Goal: *Acute Goals and Plan of Care (Insert Text)  NEW GOALS 1/19/18:  1. Patient will complete full body bathing and dressing with modified independence and adaptive equipment as needed. MODIFIED   2. Patient will complete toileting with modified independence. MODIFIED  3. Patient will tolerate 30 minutes of OT treatment with less than 3 rest breaks to increase activity tolerance for ADLs. CONTINUE  4. Patient will complete functional transfers with modified independence and adaptive equipment as needed. MODIFIED  5. Patient will participate in BUE therapeutic exercises to increase strength in BUE to at least 4+/5 for participation in ADLs. CONTINUE  6. Patient will complete upper body bathing and dressing with setup only. DISCONTINUE    Timeframe: 7 visits          OCCUPATIONAL THERAPY: Re-evaluation and PM 1/19/2018  INPATIENT: Hospital Day: 14  Payor: SELF PAY / Plan: [facility] SELF PAY / Product Type: Self Pay /      NAME/AGE/GENDER: Felipe Arroyo is a 45 y.o. male   PRIMARY DIAGNOSIS:  COPD (chronic obstructive pulmonary disease) (HCC) Acute on chronic respiratory failure with hypoxia and hypercapnia (HCC) Acute on chronic respiratory failure with hypoxia and hypercapnia (HCC)        ICD-10: Treatment Diagnosis:    · Generalized Muscle Weakness (M62.81)  · Other lack of cordination (R27.8)  · History of falling (Z91.81)   Precautions/Allergies:    Review of patient's allergies indicates no known allergies. ASSESSMENT:     Mr. Fern Mortimer presents to the hospital with acute respiratory failure and COPD. Patient seen for OT reassessment today in CCU. Pt is supine in bed upon arrival, AOX4, and pleasantly agreeable to OT evaluation. Pt completes supine to sit with minimal assistance for boost up at trunk, demonstrating intact sitting balance at EOB. Per BUE screen, biceps/triceps and  strength are 4/5 and shoulder strength is  3+/5.  Pt completes STS with CGA/HHA and fair balance in standing. No AD. Pt left with PT for re-evaluation completion. Pt has progressed in terms of functional status, thus goals were updated accordingly. Will continue with updated goals and POC. This section established at most recent assessment   PROBLEM LIST (Impairments causing functional limitations):  1. Decreased Strength  2. Decreased ADL/Functional Activities  3. Decreased Transfer Abilities  4. Decreased Ambulation Ability/Technique  5. Decreased Balance  6. Decreased Activity Tolerance  7. Decreased Work Simplification/Energy Conservation Techniques  8. Increased Shortness of Breath  9. Decreased Flexibility/Joint Mobility  10. Decreased [place] with Home Exercise Program   INTERVENTIONS PLANNED: (Benefits and precautions of occupational therapy have been discussed with the patient.)  1. Activities of daily living training  2. Adaptive equipment training  3. Balance training  4. Clothing management  5. Donning&doffing training  6. Group therapy  7. Neuromuscular re-eduation  8. Therapeutic activity  9. Therapeutic exercise     TREATMENT PLAN: Frequency/Duration: Follow patient 3 times per week to address above goals. Rehabilitation Potential For Stated Goals: GOOD     RECOMMENDED REHABILITATION/EQUIPMENT: (at time of discharge pending progress): Due to the probability of continued deficits (see above) this patient will likely need continued skilled occupational therapy after discharge. Equipment:    TBD              OCCUPATIONAL PROFILE AND HISTORY:   History of Present Injury/Illness (Reason for Referral):  See H&P  Past Medical History/Comorbidities:   Mr. Brett Berman  has a past medical history of Sleep disorder. Mr. Brett Berman  has no past surgical history on file.   Social History/Living Environment:   Home Environment: Trailer/mobile home  # Steps to Enter: 1  One/Two Story Residence: One story  Living Alone: No  Support Systems: Child(suzette), Family member(s), Spouse/Significant Other/Partner  Patient Expects to be Discharged to[de-identified] Private residence  Current DME Used/Available at Home: Cane, straight  Tub or Shower Type: Tub/Shower combination  Prior Level of Function/Work/Activity:  Pt lives at home with his wife. Pt reports he is not able to work due to pain in his R hip. Pt completes functional mobility with cane and gets occasional assistance for lower body dressing (assistance to don socks)  Personal Factors:          Social Background:  Lives with wife who is also disabled        Past/Current Experience:  Hx of falls; hx of drug abuse         Overall Behavior:  Tearful         Other factors that influence how disability is experienced by the patient:  Multiple co-morbidities    Number of Personal Factors/Comorbidities that affect the Plan of Care: Extensive review of physical, cognitive, and psychosocial performance (3+):  HIGH COMPLEXITY   ASSESSMENT OF OCCUPATIONAL PERFORMANCE[de-identified]   Activities of Daily Living:           Basic ADLs (From Assessment) Complex ADLs (From Assessment)   Basic ADL  Feeding: Setup  Oral Facial Hygiene/Grooming: Setup  Bathing: Moderate assistance  Upper Body Dressing: Minimum assistance  Lower Body Dressing: Maximum assistance  Toileting: Moderate assistance Instrumental ADL  Meal Preparation: Maximum assistance  Homemaking: Maximum assistance  Medication Management: Maximum assistance  Financial Management: Maximum assistance   Grooming/Bathing/Dressing Activities of Daily Living     Cognitive Retraining  Safety/Judgement: Awareness of environment                       Bed/Mat Mobility  Supine to Sit: Minimum assistance  Sit to Stand: Contact guard assistance  Bed to Chair: Contact guard assistance;Minimum assistance  Scooting: Stand-by asssistance       Most Recent Physical Functioning:   Gross Assessment:                  Posture:  Posture (WDL): Exceptions to WDL  Posture Assessment:  Forward head, Rounded shoulders  Balance:  Sitting: Intact  Sitting - Static: Prop sitting  Sitting - Dynamic: Prop sitting  Standing: Impaired; Without support  Standing - Static: Fair  Standing - Dynamic : Fair Bed Mobility:  Supine to Sit: Minimum assistance  Scooting: Stand-by asssistance  Wheelchair Mobility:     Transfers:  Sit to Stand: Contact guard assistance  Stand to Sit: Contact guard assistance  Bed to Chair: Contact guard assistance;Minimum assistance              Patient Vitals for the past 6 hrs:   BP SpO2 O2 Flow Rate (L/min) Pulse   01/19/18 0933 132/79 99 % - 96   01/19/18 0948 128/80 93 % - 96   01/19/18 0957 - 94 % 4 l/min -   01/19/18 1002 145/76 94 % - 87   01/19/18 1032 138/87 94 % - 88   01/19/18 1047 145/88 96 % - 91   01/19/18 1102 147/87 96 % - 89   01/19/18 1118 144/88 96 % - 89   01/19/18 1133 143/89 100 % - 87   01/19/18 1148 142/82 97 % - 97   01/19/18 1203 137/86 94 % - 96   01/19/18 1217 137/89 94 % - 90   01/19/18 1232 (!) 141/91 93 % - 91   01/19/18 1242 - 94 % 3 l/min -   01/19/18 1248 130/86 100 % - 89   01/19/18 1303 132/86 91 % - 98   01/19/18 1318 133/73 93 % - 99   01/19/18 1333 130/79 95 % - (!) 103   01/19/18 1348 115/54 90 % - (!) 107   01/19/18 1402 120/67 91 % - (!) 109   01/19/18 1417 118/64 90 % - (!) 110   01/19/18 1432 115/60 90 % - (!) 108   01/19/18 1505 - 90 % - -       Mental Status  Neurologic State: Alert  Orientation Level: Oriented X4  Cognition: Follows commands, Decreased attention/concentration  Perception: Appears intact  Perseveration: No perseveration noted  Safety/Judgement: Awareness of environment                          Physical Skills Involved:  1. Balance  2. Strength  3. Activity Tolerance  4. Pain (Chronic) Cognitive Skills Affected (resulting in the inability to perform in a timely and safe manner):  1. [facility] Psychosocial Skills Affected:  1. Habits/Routines  2.  Environmental Adaptation   Number of elements that affect the Plan of Care: 3-5:  MODERATE COMPLEXITY   CLINICAL DECISION MAKING: [address] [place] [id number] AM-PAC 6 Clicks   Daily Activity Inpatient Short Form  How much help from another person does the patient currently need. .. Total A Lot A Little None   1. Putting on and taking off regular lower body clothing? [] 1   [x] 2   [] 3   [] 4   2. Bathing (including washing, rinsing, drying)? [] 1   [x] 2   [] 3   [] 4   3. Toileting, which includes using toilet, bedpan or urinal?   [x] 1   [] 2   [] 3   [] 4   4. Putting on and taking off regular upper body clothing? [] 1   [] 2   [x] 3   [] 4   5. Taking care of personal grooming such as brushing teeth? [] 1   [] 2   [] 3   [x] 4   6. Eating meals? [] 1   [] 2   [] 3   [x] 4   © 2007, Trustees of [address] [place] [id number], under license to [employer]. All rights reserved      Score:  Initial: 14 Most Recent: 16 (Date: -- )    Interpretation of Tool:  Represents activities that are increasingly more difficult (i.e. Bed mobility, Transfers, Gait). Score 24 23 22-20 19-15 14-10 9-7 6     Modifier CH CI CJ CK CL CM CN      ? Self Care:     - CURRENT STATUS: CK - 40%-59% impaired, limited or restricted    - GOAL STATUS: CJ - 20%-39% impaired, limited or restricted    - D/C STATUS:  ---------------To be determined---------------  Payor: SELF PAY / Plan: [facility] SELF PAY / Product Type: Self Pay /      Medical Necessity:     · Patient demonstrates good rehab potential due to higher previous functional level. Reason for Services/Other Comments:  · Patient continues to require skilled intervention due to decreased independence with ADL/functional transfers.    Use of outcome tool(s) and clinical judgement create a POC that gives a: LOW COMPLEXITY         TREATMENT:   (In addition to Assessment/Re-Assessment sessions the following treatments were rendered)     Pre-treatment Symptoms/Complaints:    Pain: Initial:   Pain Intensity 1: 0  Post Session:  0/10     Assessment/Reassessment only, no treatment provided today    Braces/Orthotics/Lines/Etc:   · alegre catheter  · O2 Device: Heated, Hi flow nasal cannula  Treatment/Session Assessment:    · Response to Treatment:  Tolerated well w/o complications or complaints   · Interdisciplinary Collaboration:   o Physical Therapist  o Occupational Therapist  o Registered Nurse  · After treatment position/precautions:   o Up in chair  o with PT   · Compliance with Program/Exercises: Will assess as treatment progresses. · Recommendations/Intent for next treatment session: \"Next visit will focus on advancements to more challenging activities and reduction in assistance provided\".   Total Treatment Duration:  OT Patient Time In/Time Out  Time In: 1454  Time Out: [address]

## 2018-01-19 NOTE — PROGRESS NOTES
Problem: Nutrition Deficit  Goal: *Optimize nutritional status  Nutrition F/U:  Assessment:  Weight 126.9 kg (CCU bed 1/19/18), edema - trace. The patient was transferred back to CCU for BIPAP due to AMS. He was able to come off BIPAP and eat his lunch with excellent intake reported. Macronutrient Needs:  EER: 2794-5767 kcal /day (25-30 kcal/kg IBW-as current weight not a dry weight and UBW accuracy is questionable)   EPR: 56-84 grams protein/day (0.8-1.2 grams/kg IBW)   Intake/Comparative Standards:  Minimal oral intake recorded. Intervention:   Meals and Snacks: Cardiac. Mineral Supplement Therapy: Nutrition Support Orders/Electrolyte Management Replacement Protocols are active on the MAR. Coordination of Nutrition Care by a Nutrition Professional: AM CCU rounds, collaboration with Lauren Painter RN. Nutrition Discharge Plan: Too soon to determine. Iram Wang.  Oliver Saber  [phone]

## 2018-01-19 NOTE — INTERDISCIPLINARY ROUNDS
Interdisciplinary team rounds were held 1/19/2018 with the following team members:Care Management, Nursing, Nurse Practitioner, Nutrition, Pharmacy, Physical Therapy, Physician, Respiratory Therapy and Clinical Coordinator and the patient and spouse. Plan of care discussed. See clinical pathway and/or care plan for interventions and desired outcomes.

## 2018-01-19 NOTE — PROGRESS NOTES
TRANSFER - IN REPORT:    Verbal report received from [facility] on Charan Martines  being received from floor for routine progression of care      Report consisted of patients Situation, Background, Assessment and   Recommendations(SBAR). Information from the following report(s) SBAR, Intake/Output, MAR, Recent Results and Med Rec Status was reviewed with the receiving nurse. Opportunity for questions and clarification was provided. Assessment completed upon patients arrival to unit and care assumed.

## 2018-01-20 ENCOUNTER — APPOINTMENT (OUTPATIENT)
Dept: GENERAL RADIOLOGY | Age: 53
DRG: 871 | End: 2018-01-20
Attending: INTERNAL MEDICINE
Payer: SELF-PAY

## 2018-01-20 LAB
ANION GAP SERPL CALC-SCNC: 4 MMOL/L (ref 7–16)
BASOPHILS # BLD: 0 K/UL (ref 0–0.2)
BASOPHILS NFR BLD: 0 % (ref 0–2)
BUN SERPL-MCNC: 30 MG/DL (ref 6–23)
CALCIUM SERPL-MCNC: 8.7 MG/DL (ref 8.3–10.4)
CHLORIDE SERPL-SCNC: 104 MMOL/L (ref 98–107)
CO2 SERPL-SCNC: 35 MMOL/L (ref 21–32)
CREAT SERPL-MCNC: 1.07 MG/DL (ref 0.8–1.5)
DIFFERENTIAL METHOD BLD: ABNORMAL
EOSINOPHIL # BLD: 0 K/UL (ref 0–0.8)
EOSINOPHIL NFR BLD: 0 % (ref 0.5–7.8)
ERYTHROCYTE [DISTWIDTH] IN BLOOD BY AUTOMATED COUNT: 12.9 % (ref 11.9–14.6)
GLUCOSE SERPL-MCNC: 174 MG/DL (ref 65–100)
HCT VFR BLD AUTO: 46.4 % (ref 41.1–50.3)
HGB BLD-MCNC: 14.8 G/DL (ref 13.6–17.2)
IMM GRANULOCYTES # BLD: 0.1 K/UL (ref 0–0.5)
IMM GRANULOCYTES NFR BLD AUTO: 0 % (ref 0–5)
LYMPHOCYTES # BLD: 0.7 K/UL (ref 0.5–4.6)
LYMPHOCYTES NFR BLD: 4 % (ref 13–44)
MCH RBC QN AUTO: 32.7 PG (ref 26.1–32.9)
MCHC RBC AUTO-ENTMCNC: 31.9 G/DL (ref 31.4–35)
MCV RBC AUTO: 102.4 FL (ref 79.6–97.8)
MONOCYTES # BLD: 0.2 K/UL (ref 0.1–1.3)
MONOCYTES NFR BLD: 1 % (ref 4–12)
NEUTS SEG # BLD: 15 K/UL (ref 1.7–8.2)
NEUTS SEG NFR BLD: 95 % (ref 43–78)
PLATELET # BLD AUTO: 216 K/UL (ref 150–450)
PMV BLD AUTO: 10.5 FL (ref 10.8–14.1)
POTASSIUM SERPL-SCNC: 4.2 MMOL/L (ref 3.5–5.1)
RBC # BLD AUTO: 4.53 M/UL (ref 4.23–5.67)
SODIUM SERPL-SCNC: 143 MMOL/L (ref 136–145)
WBC # BLD AUTO: 15.9 K/UL (ref 4.3–11.1)

## 2018-01-20 PROCEDURE — 74011250637 HC RX REV CODE- 250/637: Performed by: NURSE PRACTITIONER

## 2018-01-20 PROCEDURE — 71045 X-RAY EXAM CHEST 1 VIEW: CPT

## 2018-01-20 PROCEDURE — 85025 COMPLETE CBC W/AUTO DIFF WBC: CPT | Performed by: INTERNAL MEDICINE

## 2018-01-20 PROCEDURE — 94760 N-INVAS EAR/PLS OXIMETRY 1: CPT

## 2018-01-20 PROCEDURE — C1751 CATH, INF, PER/CENT/MIDLINE: HCPCS

## 2018-01-20 PROCEDURE — 74011250637 HC RX REV CODE- 250/637: Performed by: FAMILY MEDICINE

## 2018-01-20 PROCEDURE — 74011250636 HC RX REV CODE- 250/636: Performed by: INTERNAL MEDICINE

## 2018-01-20 PROCEDURE — 76937 US GUIDE VASCULAR ACCESS: CPT

## 2018-01-20 PROCEDURE — 80048 BASIC METABOLIC PNL TOTAL CA: CPT | Performed by: INTERNAL MEDICINE

## 2018-01-20 PROCEDURE — 94640 AIRWAY INHALATION TREATMENT: CPT

## 2018-01-20 PROCEDURE — 74011250637 HC RX REV CODE- 250/637: Performed by: INTERNAL MEDICINE

## 2018-01-20 PROCEDURE — 74011250636 HC RX REV CODE- 250/636: Performed by: FAMILY MEDICINE

## 2018-01-20 PROCEDURE — 74011250637 HC RX REV CODE- 250/637: Performed by: HOSPITALIST

## 2018-01-20 PROCEDURE — 99232 SBSQ HOSP IP/OBS MODERATE 35: CPT | Performed by: INTERNAL MEDICINE

## 2018-01-20 PROCEDURE — 65270000029 HC RM PRIVATE

## 2018-01-20 PROCEDURE — 74011000250 HC RX REV CODE- 250: Performed by: INTERNAL MEDICINE

## 2018-01-20 RX ORDER — SODIUM CHLORIDE 0.9 % (FLUSH) 0.9 %
10 SYRINGE (ML) INJECTION EVERY 8 HOURS
Status: DISCONTINUED | OUTPATIENT
Start: 2018-01-20 | End: 2018-01-23

## 2018-01-20 RX ORDER — SODIUM CHLORIDE 0.9 % (FLUSH) 0.9 %
10 SYRINGE (ML) INJECTION AS NEEDED
Status: DISCONTINUED | OUTPATIENT
Start: 2018-01-20 | End: 2018-01-24 | Stop reason: HOSPADM

## 2018-01-20 RX ADMIN — ALBUTEROL SULFATE 2.5 MG: 2.5 SOLUTION RESPIRATORY (INHALATION) at 11:30

## 2018-01-20 RX ADMIN — METHYLPREDNISOLONE SODIUM SUCCINATE 40 MG: 125 INJECTION, POWDER, FOR SOLUTION INTRAMUSCULAR; INTRAVENOUS at 05:51

## 2018-01-20 RX ADMIN — Medication 10 ML: at 13:25

## 2018-01-20 RX ADMIN — HEPARIN SODIUM 5000 UNITS: 5000 INJECTION, SOLUTION INTRAVENOUS; SUBCUTANEOUS at 05:52

## 2018-01-20 RX ADMIN — SALINE NASAL SPRAY 2 SPRAY: 1.5 SOLUTION NASAL at 08:51

## 2018-01-20 RX ADMIN — SALINE NASAL SPRAY 2 SPRAY: 1.5 SOLUTION NASAL at 23:32

## 2018-01-20 RX ADMIN — Medication 10 ML: at 18:40

## 2018-01-20 RX ADMIN — DEXTROSE MONOHYDRATE 75 ML/HR: 5 INJECTION, SOLUTION INTRAVENOUS at 20:48

## 2018-01-20 RX ADMIN — SERTRALINE HYDROCHLORIDE 50 MG: 50 TABLET ORAL at 08:44

## 2018-01-20 RX ADMIN — Medication 100 MG: at 08:44

## 2018-01-20 RX ADMIN — CARVEDILOL 3.12 MG: 3.12 TABLET, FILM COATED ORAL at 08:45

## 2018-01-20 RX ADMIN — FAMOTIDINE 20 MG: 20 TABLET, FILM COATED ORAL at 18:36

## 2018-01-20 RX ADMIN — METHYLPREDNISOLONE SODIUM SUCCINATE 40 MG: 125 INJECTION, POWDER, FOR SOLUTION INTRAMUSCULAR; INTRAVENOUS at 13:23

## 2018-01-20 RX ADMIN — ALBUTEROL SULFATE 2.5 MG: 2.5 SOLUTION RESPIRATORY (INHALATION) at 20:43

## 2018-01-20 RX ADMIN — FLUTICASONE PROPIONATE 2 SPRAY: 50 SPRAY, METERED NASAL at 08:52

## 2018-01-20 RX ADMIN — SALINE NASAL SPRAY 2 SPRAY: 1.5 SOLUTION NASAL at 18:00

## 2018-01-20 RX ADMIN — LISINOPRIL 20 MG: 20 TABLET ORAL at 08:45

## 2018-01-20 RX ADMIN — Medication 10 ML: at 05:52

## 2018-01-20 RX ADMIN — FAMOTIDINE 20 MG: 20 TABLET, FILM COATED ORAL at 08:45

## 2018-01-20 RX ADMIN — SALINE NASAL SPRAY 2 SPRAY: 1.5 SOLUTION NASAL at 20:56

## 2018-01-20 RX ADMIN — TIOTROPIUM BROMIDE 18 MCG: 18 CAPSULE ORAL; RESPIRATORY (INHALATION) at 08:11

## 2018-01-20 RX ADMIN — AZITHROMYCIN MONOHYDRATE 500 MG: 500 INJECTION, POWDER, LYOPHILIZED, FOR SOLUTION INTRAVENOUS at 18:39

## 2018-01-20 RX ADMIN — HEPARIN SODIUM 5000 UNITS: 5000 INJECTION, SOLUTION INTRAVENOUS; SUBCUTANEOUS at 18:36

## 2018-01-20 RX ADMIN — Medication 10 ML: at 23:30

## 2018-01-20 RX ADMIN — SALINE NASAL SPRAY 2 SPRAY: 1.5 SOLUTION NASAL at 13:24

## 2018-01-20 RX ADMIN — ALBUTEROL SULFATE 2.5 MG: 2.5 SOLUTION RESPIRATORY (INHALATION) at 15:25

## 2018-01-20 RX ADMIN — FOLIC ACID 1 MG: 1 TABLET ORAL at 08:44

## 2018-01-20 RX ADMIN — SALINE NASAL SPRAY 2 SPRAY: 1.5 SOLUTION NASAL at 18:37

## 2018-01-20 RX ADMIN — CARVEDILOL 3.12 MG: 3.12 TABLET, FILM COATED ORAL at 18:36

## 2018-01-20 RX ADMIN — ALBUTEROL SULFATE 2.5 MG: 2.5 SOLUTION RESPIRATORY (INHALATION) at 08:09

## 2018-01-20 RX ADMIN — HEPARIN SODIUM 5000 UNITS: 5000 INJECTION, SOLUTION INTRAVENOUS; SUBCUTANEOUS at 23:30

## 2018-01-20 RX ADMIN — WATER 2 G: 1 INJECTION INTRAMUSCULAR; INTRAVENOUS; SUBCUTANEOUS at 08:51

## 2018-01-20 NOTE — PROGRESS NOTES
MIDLINE Placement Note    PRE-PROCEDURE VERIFICATION  PROCEDURE DETAIL  Time out completed all person present in agreement with time out. Anna Correia rn vat in agreement with time out. A single lumen Midline was started for vascular access. The following documentation is in addition to the Midline properties in the lines/airways flowsheet :  Lot #: [id number]  Xylocaine used: yes  Mid-Arm Circumference: 37 (cm)  Internal Catheter Length: 8 (cm)  Internal Catheter Total Length: 8 (cm)  Vein Selection for Midline:right brachial    Line is okay to use: yes.

## 2018-01-20 NOTE — PROGRESS NOTES
TRANSFER - OUT REPORT:    [facility]  being transferred to room 817  for ordered transfer. Report consisted of patients Situation, Background, Assessment and   Recommendations(SBAR). Information from the following report(s) SBAR was reviewed with the receiving nurse. Lines:   Quad Lumen 01/07/18 Left Internal jugular (Active)   Central Line Being Utilized Yes 1/20/2018  8:02 AM   Criteria for Appropriate Use Limited/no vessel suitable for conventional peripheral access 1/20/2018  8:02 AM   Site Assessment Clean, dry, & intact 1/20/2018  8:02 AM   Infiltration Assessment 0 1/20/2018  8:02 AM   Affected Extremity/Extremities Color distal to insertion site pink (or appropriate for race); Pulses palpable;Range of motion performed 1/20/2018  8:02 AM   Date of Last Dressing Change 01/19/18 1/20/2018  8:02 AM   Dressing Status Clean, dry, & intact 1/20/2018  8:02 AM   Dressing Type Disk with Chlorhexadine gluconate (CHG); Transparent;Tape 1/20/2018  8:02 AM   Action Taken Dressing changed 1/19/2018 10:30 AM   Proximal Hub Color/Line Status Red;Patent; Flushed 1/20/2018  8:02 AM   Positive Blood Return (Medial Site) No 1/20/2018  8:02 AM   Medial 1 Hub Color/Line Status Gray;Patent; Flushed 1/20/2018  8:02 AM   Positive Blood Return (Lateral Site) Yes 1/20/2018  8:02 AM   Medial 2 Hub Color/Line Status Blue;Patent; Flushed 1/20/2018  8:02 AM   Positive Blood Return (Site #3) No 1/20/2018  8:02 AM   Distal Hub Color/Line Status Brown;Patent; Flushed 1/20/2018  8:02 AM   Positive Blood Return (Site #4) No 1/20/2018  8:02 AM   Alcohol Cap Used No 1/19/2018  7:29 PM        Opportunity for questions and clarification was provided.       Patient transported with:   O2 @ 5 liters

## 2018-01-20 NOTE — PROGRESS NOTES
Received patient to room 817 from ICU. Oriented patient to room and floor. No distress at present.  Patient instructed to call if needs

## 2018-01-20 NOTE — PROGRESS NOTES
Hospitalist Progress Note    2018  Admit Date: 2018  6:27 PM   NAME: Coy Kenyon   :  1965   DOS:              18  MRN:  [id number]   Attending: Ana M Vanegas MD  PCP:  None  Treatment Team: Attending Provider: Venus Gutiérrez MD; Care Manager: Yasmeen Hooks, RN; Utilization Review: Zakia Dunn RN; Consulting Provider: Zane Yun MD; Consulting Provider: Nathan Neil MD; Consulting Provider: Nida Jiménez MD; Care Manager: Myriam Perez RN    Full Code     SUBJECTIVE:   Patient examined at bedside. Wife present in the room, all questions answered. This morning he was noted drowsy, saturating 92% on HFNC. ABG showed respiratory acidosis with hypercarbia ( ph 7.2, co2 83, po2 83 ). Wbc 15k and episodes of loose stools. Pulmonary on board, plan to resume antibiotics and transfer patient to ICU for monitoring. He is at high risk for re-intubation. Has underlying NATALIIA-hypoventilating syndrome. Drug screening this morning showed positive for THC. ROS: denied chest pain, vomiting.     2017- seen - no new complaints- off bipap- states that he will now be compliant- transferred emergently for bipap 2018 secondary to non-compliance    2018- seen - did well overnight on bipap. Wants to know when he will be dc. States brother has several cpap machines that he may be able to use. 10+ ROS reviewed and negative except for positive in HPI.    No Known Allergies  Current Facility-Administered Medications   Medication Dose Route Frequency    artificial saliva (MOUTH KOTE) 1 Spray  1 Spray Oral PRN    methylPREDNISolone (PF) (SOLU-MEDROL) injection 40 mg  40 mg IntraVENous Q8H    dextrose 5% infusion  75 mL/hr IntraVENous CONTINUOUS    azithromycin (ZITHROMAX) 500 mg in 0.9% sodium chloride 250 mL IVPB  500 mg IntraVENous Q24H    cefTRIAXone (ROCEPHIN) 2 g in sterile water (preservative free) 20 mL IV syringe  2 g IntraVENous Q24H    tiotropium (SPIRIVA) inhalation capsule 18 mcg  1 Cap Inhalation DAILY    naloxone (NARCAN) injection 0.4 mg  0.4 mg IntraVENous EVERY 2 MINUTES AS NEEDED    thiamine (B-1) tablet 100 mg  100 mg Oral DAILY    folic acid (FOLVITE) tablet 1 mg  1 mg Oral DAILY    famotidine (PEPCID) tablet 20 mg  20 mg Oral BID    albuterol (PROVENTIL VENTOLIN) nebulizer solution 2.5 mg  2.5 mg Nebulization QID RT    promethazine (PHENERGAN) with saline injection 12.5 mg  12.5 mg IntraVENous Q6H PRN    LORazepam (ATIVAN) tablet 0.5 mg  0.5 mg Oral Q8H PRN    haloperidol lactate (HALDOL) injection 5 mg  5 mg IntraVENous Q6H PRN    sertraline (ZOLOFT) tablet 50 mg  50 mg Oral DAILY    sodium chloride (OCEAN) 0.65 % nasal spray 2 Spray  2 Spray Both Nostrils QID    sodium chloride (OCEAN) 0.65 % nasal spray 2 Spray  2 Spray Both Nostrils Q2H PRN    fluticasone (FLONASE) 50 mcg/actuation nasal spray 2 Spray  2 Spray Both Nostrils DAILY    influenza vaccine 2017-18 (3 yrs+)(PF) (FLUZONE QUAD/FLUARIX QUAD) injection 0.5 mL  0.5 mL IntraMUSCular PRIOR TO DISCHARGE    nicotine (NICODERM CQ) 21 mg/24 hr patch 1 Patch  1 Patch TransDERmal DAILY    metoprolol (LOPRESSOR) injection 5 mg  5 mg IntraVENous Q4H PRN    hydrALAZINE (APRESOLINE) 20 mg/mL injection 20 mg  20 mg IntraVENous Q6H PRN    sodium chloride (NS) flush 5-10 mL  5-10 mL IntraVENous Q8H    sodium chloride (NS) flush 5-10 mL  5-10 mL IntraVENous PRN    lisinopril (PRINIVIL, ZESTRIL) tablet 20 mg  20 mg Oral DAILY    carvedilol (COREG) tablet 3.125 mg  3.125 mg Oral BID WITH MEALS    ondansetron (ZOFRAN) injection 4 mg  4 mg IntraVENous Q6H PRN    acetaminophen (TYLENOL) tablet 650 mg  650 mg Oral Q6H PRN    diphenhydrAMINE (BENADRYL) capsule 25 mg  25 mg Oral Q4H PRN    polyethylene glycol (MIRALAX) packet 17 g  17 g Oral DAILY PRN    guaiFENesin ER (MUCINEX) tablet 600 mg  600 mg Oral Q12H PRN    calcium carbonate (TUMS) chewable tablet 400 mg [elemental]  400 mg Oral TID PRN  heparin (porcine) injection 5,000 Units  5,000 Units SubCUTAneous Q8H         Immunization History   Administered Date(s) Administered    TB Skin Test (PPD) Intradermal 2018     Objective:     Patient Vitals for the past 24 hrs:   Temp Pulse Resp BP SpO2   18 1003 - 98 (!) 37 119/78 94 %   18 0811 - - - - 91 %   18 0802 97.8 °F (36.6 °C) 96 22 127/74 92 %   18 0739 97.8 °F (36.6 °C) - - - -   18 0602 - (!) 105 15 118/70 98 %   18 0402 - 98 27 124/85 95 %   18 0339 97.4 °F (36.3 °C) (!) 102 (!) 33 (!) 138/92 95 %   18 0203 - (!) 102 (!) 38 141/81 96 %   18 0027 97.9 °F (36.6 °C) 95 29 129/75 98 %   18 2202 - 97 22 105/72 92 %   18 - (!) 101 (!) 31 100/55 92 %   18 1929 99 °F (37.2 °C) 97 8 95/61 97 %   18 1924 - - - - 93 %   18 1644 - - - - 95 %   18 1508 - (!) 109 20 116/70 90 %   18 1505 - - - - 90 %   18 1447 - (!) 109 18 121/68 93 %   18 1432 - (!) 108 22 115/60 90 %   18 1417 - (!) 110 22 118/64 90 %   18 1402 - (!) 109 24 120/67 91 %   18 1348 - (!) 107 - 115/54 90 %   18 1333 - (!) 103 22 130/79 95 %   18 1318 - 99 18 133/73 93 %   18 1303 - 98 (!) 36 132/86 91 %   18 1248 - 89 21 130/86 100 %   18 1242 - - - - 94 %   18 1232 - 91 24 (!) 141/91 93 %   18 1217 - 90 - 137/89 94 %   18 1203 - 96 20 137/86 94 %   18 1200 98.5 °F (36.9 °C) - - - -   18 1148 - 97 19 142/82 97 %     Temp (24hrs), Av.1 °F (36.7 °C), Min:97.4 °F (36.3 °C), Max:99 °F (37.2 °C)    Oxygen Therapy  O2 Sat (%): 94 % (18 1003)  Pulse via Oximetry: 101 beats per minute (18 1003)  O2 Device: Nasal cannula (18 0811)  O2 Flow Rate (L/min): 4 l/min (18 0811)  O2 Temperature: 86.9 °F (30.5 °C) (18 1521)  FIO2 (%): 35 % (18 1644)  Oxygen Therapy  O2 Sat (%): 94 % (18 1003)  Pulse via Oximetry: 101 beats per minute (01/20/18 1003)  O2 Device: Nasal cannula (01/20/18 0811)  O2 Flow Rate (L/min): 4 l/min (01/20/18 0811)  O2 Temperature: 86.9 °F (30.5 °C) (01/11/18 1521)  FIO2 (%): 35 % (01/19/18 1644)    Physical Exam:  General:         Drowsy, but easily arousable   HEENT:               Small reactive pupils. No obvious deformity. Nares normal. No drainage  Lungs:                  Decreased air entry b/l. Scattered rhonchi, no rales, no wheezing   Cardiovascular:   RRR. No m/r/g. Abdomen:       S/nt/nd. Bowel sounds normal.   Extremities:         No pedal edema   Skin:         No rashes or lesions. Not Jaundiced  Neurologic:    AAOx3. No gross focal deficit. Moves all extremities. Normal sensory. Psychiatric:         Good mood. Normal affect. Denies any SI/HI.    Genital:    Galindo cath in place, draining clear urine     DIAGNOSTIC STUDIES      Data Review:   Recent Results (from the past 24 hour(s))   METABOLIC PANEL, BASIC    Collection Time: 01/20/18  3:43 AM   Result Value Ref Range    Sodium 143 136 - 145 mmol/L    Potassium 4.2 3.5 - 5.1 mmol/L    Chloride 104 98 - 107 mmol/L    CO2 35 (H) 21 - 32 mmol/L    Anion gap 4 (L) 7 - 16 mmol/L    Glucose 174 (H) 65 - 100 mg/dL    BUN 30 (H) 6 - 23 MG/DL    Creatinine 1.07 0.8 - 1.5 MG/DL    GFR est AA >60 >60 ml/min/1.73m2    GFR est non-AA >60 >60 ml/min/1.73m2    Calcium 8.7 8.3 - 10.4 MG/DL   CBC WITH AUTOMATED DIFF    Collection Time: 01/20/18  3:43 AM   Result Value Ref Range    WBC 15.9 (H) 4.3 - 11.1 K/uL    RBC 4.53 4.23 - 5.67 M/uL    HGB 14.8 13.6 - 17.2 g/dL    HCT 46.4 41.1 - 50.3 %    .4 (H) 79.6 - 97.8 FL    MCH 32.7 26.1 - 32.9 PG    MCHC 31.9 31.4 - 35.0 g/dL    RDW 12.9 11.9 - 14.6 %    PLATELET 413 631 - 687 K/uL    MPV 10.5 (L) 10.8 - 14.1 FL    DF AUTOMATED      NEUTROPHILS 95 (H) 43 - 78 %    LYMPHOCYTES 4 (L) 13 - 44 %    MONOCYTES 1 (L) 4.0 - 12.0 %    EOSINOPHILS 0 (L) 0.5 - 7.8 %    BASOPHILS 0 0.0 - 2.0 %    IMMATURE GRANULOCYTES 0 0.0 - 5.0 %    ABS. NEUTROPHILS 15.0 (H) 1.7 - 8.2 K/UL    ABS. LYMPHOCYTES 0.7 0.5 - 4.6 K/UL    ABS. MONOCYTES 0.2 0.1 - 1.3 K/UL    ABS. EOSINOPHILS 0.0 0.0 - 0.8 K/UL    ABS. BASOPHILS 0.0 0.0 - 0.2 K/UL    ABS. IMM.  GRANS. 0.1 0.0 - 0.5 K/UL       All Micro Results     Procedure Component Value Units Date/Time    C. DIFFICILE/EPI PCR [[id number]]     Order Status:  Canceled Specimen:  Stool     CULTURE, BLOOD [[id number]] Collected:  01/10/18 2150    Order Status:  Completed Specimen:  Blood from Blood Updated:  01/15/18 0623     Special Requests: --        RIGHT  ARM       Culture result: NO GROWTH 5 DAYS       CULTURE, BLOOD [[id number]] Collected:  01/10/18 2150    Order Status:  Completed Specimen:  Blood from Blood Updated:  01/15/18 0623     Special Requests: --        RIGHT  ARM       Culture result: NO GROWTH 5 DAYS       CULTURE, RESPIRATORY/SPUTUM/BRONCH Gwendalyn Ivorian STAIN [[id number]] Collected:  01/10/18 2115    Order Status:  Completed Specimen:  Sputum from Sputum Updated:  01/13/18 0730     Special Requests: NO SPECIAL REQUESTS        GRAM STAIN FEW GRAM POSITIVE COCCI         WBC'S TOO NUMEROUS TO COUNT      0 TO 1 EPITHELIAL CELLS PER OIF      4+ MUCUS     Culture result:         SCANT NORMAL RESPIRATORY EDUAR    CULTURE, RESPIRATORY/SPUTUM/BRONCH Gwendalyn Ivorian STAIN [[id number]]  (Abnormal)  (Susceptibility) Collected:  01/07/18 1125    Order Status:  Completed Specimen:  Sputum from Endotracheal aspirate Updated:  01/12/18 0644     Special Requests: NO SPECIAL REQUESTS        GRAM STAIN 0 TO 10 WBCS/OIF      NO EPITHELIAL CELLS SEEN         MANY GRAM POSITIVE COCCI                 MODERATE GRAM NEGATIVE RODS      FEW GRAM NEGATIVE COCCI         3+ MUCUS PRESENT        Culture result:         HEAVY STREPTOCOCCUS PNEUMONIAE (A)              HEAVY HAEMOPHILUS INFLUENZAE BETA LACTAMASE NEGATIVE (A)              MODERATE NORMAL RESPIRATORY EDUAR    CULTURE, BLOOD [[id number]] Collected:  01/06/18 1854    Order Status:  Completed Specimen:  Whole Blood from Blood Updated:  01/11/18 0616     Special Requests: --        RIGHT  FOREARM       Culture result: NO GROWTH 5 DAYS       CULTURE, BLOOD [[id number]] Collected:  01/06/18 1854    Order Status:  Completed Specimen:  Whole Blood from Blood Updated:  01/11/18 0616     Special Requests: --        RIGHT  Antecubital       Culture result: NO GROWTH 5 DAYS       CULTURE, BODY FLUID Hue Large STAIN [[id number]] Collected:  01/09/18 0945    Order Status:  Canceled Specimen:  Miscellaneous Fluid           Imaging Lupe Basque /Studies:    CXR Results  (Last 48 hours)               01/15/18 0557  XR CHEST SNGL V Final result    Impression:  IMPRESSION: Left lower lobe atelectasis or pneumonia unchanged. Narrative:  EXAM:  XR CHEST SNGL V       INDICATION:  Respiratory failure       COMPARISON:  1/14/2018       FINDINGS: A portable AP radiograph of the chest was obtained at 0610 hours. The   patient is on a cardiac monitor. Left lower lobe airspace disease unchanged. Central venous catheter remains with its tip in the left internal jugular vein. .    The cardiac and mediastinal contours and pulmonary vascularity are normal.  The   bones and soft tissues are grossly within normal limits. 01/14/18 0515  XR CHEST SNGL V Final result    Impression:  IMPRESSION: Left lower lobe atelectasis or pneumonia improved. Narrative:  EXAM:  TEMPORARY       INDICATION:  Respiratory failure       COMPARISON:  1/13/2018       FINDINGS: A portable AP radiograph of the chest was obtained at 0527 hours. The   patient is on a cardiac monitor. Left lower lobe atelectasis or pneumonia   improved. .  The cardiac and mediastinal contours and pulmonary vascularity are   normal.  The bones and soft tissues are grossly within normal limits. Central   venous catheter remains in place within its left internal jugular vein.                    Results for orders placed or performed during the hospital encounter of 18   2D ECHO COMPLETE ADULT (TTE) W OR WO CONTR    Narrative    Sushil Gutierrez [address], [address] [facility]  [phone]    Transthoracic Echocardiogram  2D, M-mode, Doppler, and Color Doppler    Patient: Sandra Zaman  MR #: [id number]  : 1965  Age: 45 years  Gender: Male  Study date: 2018  Account #: [de-identified]  Height: 67.7 in  Weight: 315.3 lb  BSA: 2.47 mï¾²  Status:Routine  Location: 3305  BP: 144/ 96    Allergies: NO KNOWN ALLERGIES    Sonographer:  Yana Chatterjee [facility]  Group:  [facility]  Referring Physician:  Iveth Benton MD  Reading Physician:  Larry Wu. MD [facility] [place], [place]    INDICATIONS: Edema. *Patient on vent. Technically difficult due to patient body habitus   (316lbs). *    PROCEDURE: This was a routine study. A transthoracic echocardiogram was  performed. The study included complete 2D imaging, M-mode, complete spectral  Doppler, and color Doppler. Intravenous contrast (Definity) was administered. Echocardiographic views were limited by restricted patient mobility and poor  acoustic window availability. This was a technically difficult study. LEFT VENTRICLE: Size was normal. Systolic function was normal. Ejection  fraction was estimated in the range of 55 % to 60 %. There were no regional  wall motion abnormalities. There was moderate concentric hypertrophy. The   E/e'  ratio was 21.1. Diastolic Function was indeterminate. RIGHT VENTRICLE: Not well visualized. LEFT ATRIUM: The atrium was mildly dilated. RIGHT ATRIUM: Not well visualized. SYSTEMIC VEINS: IVC: The inferior vena cava was dilated. AORTIC VALVE: The valve was not well visualized. The peak velocity was 2.23  m/s. The mean pressure gradient was 11 mmHg. The peak pressure gradient was   20  mmHg. There was no insufficiency. MITRAL VALVE: Not well visualized. There was no evidence for stenosis. There  was no regurgitation. TRICUSPID VALVE: Not well visualized. There was no evidence for stenosis. There  was trivial regurgitation. PULMONIC VALVE: Not well visualized. PERICARDIUM: A trivial pericardial effusion was identified. There was no  evidence of hemodynamic compromise. AORTA: The root exhibited normal size. SUMMARY:    -  Procedure information: This was a technically difficult study. -  Left ventricle: Systolic function was normal. Ejection fraction was  estimated in the range of 55 % to 60 %. There were no regional wall motion  abnormalities. There was moderate concentric hypertrophy. The E/e' ratio was  21.1. Diastolic Function was indeterminate. -  Left atrium: The atrium was mildly dilated. -  Inferior vena cava, hepatic veins: The inferior vena cava was dilated. -  Pericardium: A trivial pericardial effusion was identified. There was no  evidence of hemodynamic compromise. SYSTEM MEASUREMENT TABLES    2D mode  AoR Diam (2D): 3.9 cm  LA Dimension (2D): 3.2 cm  Left Atrium Systolic Volume Index; Method of Disks, Biplane; 2D mode;: 31   ml/m2  IVS/LVPW (2D): 1  IVSd (2D): 1.8 cm  LVIDd (2D): 4.2 cm  LVIDs (2D): 2.9 cm  LVOT Area (2D): 4.2 cm2  LVPWd (2D): 1.9 cm  RVIDd (2D): 3.8 cm    Unspecified Scan Mode  Peak Grad; Mean; Antegrade Flow: 19 mm[Hg]  Vmax; Antegrade Flow: 219 cm/s  LVOT Diam: 2.3 cm  Peak Grad; Mean; Antegrade Flow: 8 mm[Hg]  Vmax; Antegrade Flow: 141 cm/s    Prepared and signed by    Lilly Ordonez.  MD [facility] [place]  Signed 08-Jan-2018 14:17:50         Labs and Studies from previous 24 hours have been personally reviewed by myself    ASSESSMENT      Active Hospital Problems    Diagnosis Date Noted    Diarrhea 01/18/2018    Alcohol abuse 01/15/2018    Homeless 01/15/2018    Community acquired pneumonia of right lower lobe of lung ([address] 01/09/2018    Urethral stricture 01/07/2018    COPD (chronic obstructive pulmonary disease) ([address] 01/06/2018    Nicotine dependence 01/06/2018    Methamphetamine abuse 01/06/2018    Anasarca 01/06/2018    NATALIIA (obstructive sleep apnea) 01/06/2018    Acute kidney injury ([facility] 01/06/2018    Acute on chronic respiratory failure with hypoxia and hypercapnia ([facility] 01/06/2018     Hospital Problems as of 1/20/2018  Date Reviewed: 1/19/2018          Codes Class Noted - Resolved POA    Diarrhea ICD-10-CM: R19.7  ICD-9-CM: 787.91  1/18/2018 - Present No        Alcohol abuse (Chronic) ICD-10-CM: F10.10  ICD-9-CM: 305.00  1/15/2018 - Present Yes        Homeless ICD-10-CM: Z59.0  ICD-9-CM: V60.0  1/15/2018 - Present No        Community acquired pneumonia of right lower lobe of lung ([facility] ICD-10-CM: J18.1  ICD-9-CM: 484  1/9/2018 - Present Yes        Urethral stricture ICD-10-CM: N35.9  ICD-9-CM: 598.9  1/7/2018 - Present Yes        COPD (chronic obstructive pulmonary disease) ([facility] (Chronic) ICD-10-CM: J44.9  ICD-9-CM: 403  1/6/2018 - Present Yes        Nicotine dependence (Chronic) ICD-10-CM: F17.200  ICD-9-CM: 305.1  1/6/2018 - Present Yes        Methamphetamine abuse ICD-10-CM: F15.10  ICD-9-CM: 305.70  1/6/2018 - Present Yes        Anasarca ICD-10-CM: R60.1  ICD-9-CM: 782.3  1/6/2018 - Present Yes        NATALIIA (obstructive sleep apnea) (Chronic) ICD-10-CM: G47.33  ICD-9-CM: 327.23  1/6/2018 - Present Yes        Acute kidney injury ([facility] ICD-10-CM: N17.9  ICD-9-CM: 584.9  1/6/2018 - Present Yes        * (Principal)Acute on chronic respiratory failure with hypoxia and hypercapnia (HCC) ICD-10-CM: J96.21, J96.22  ICD-9-CM: 518.84, 786.09, 799.02  1/6/2018 - Present Yes        RESOLVED: Severe sepsis ([facility] ICD-10-CM: A41.9, R65.20  ICD-9-CM: 038.9, 995.92  1/9/2018 - 1/15/2018 Yes        RESOLVED: Acute metabolic encephalopathy CFX-24-FI: G93.41  ICD-9-CM: 348.31  1/7/2018 - 1/15/2018 Yes        RESOLVED: Hypotension ICD-10-CM: I95.9  ICD-9-CM: 458.9  1/7/2018 - 1/15/2018 Yes              Plan:    - acute hypercapnic hypoxic respiratory failure secondary to no-compliance- resolved-  - ?  Rt lower lobe pna- continue Rocephin add azithromycin  - Pulm input appreciated   - continue Duo-nebs  - continue steroids- last dose scheduled for today at 2pm   - monitor bg on steroids and cover with insulin as needed  - continue Bipap at night  - on nicotine patch.  -no diarrhea for cdiff testing   -Keep Galindo cath due to urethral stricture- may remove once he is ready to be discharge and ambulating   -PPD/PT/OT  - awaiting transfer to the medical floor  Can likely be discharged once we organize home O2 and bipap needs    DVT Prophylaxis: Heparin SQ  CODE Status: Full Chaitanya Verdugo MD  01/20/18

## 2018-01-20 NOTE — PROGRESS NOTES
Critical Care Daily Progress Note: 1/20/2018    Isis Tiwari   Admission Date: 1/6/2018         The patient's chart is reviewed and the patient is discussed with the staff. Subjective:     45 y. o. CM admitted on 1/6 with acute hypoxemic respiratory failure, with methamphetamine positive, smoker with strong FH of severe COPD, morbid obesity, suspected OHS.  Also has sinusitis on CT and possible pneumonia.   Has had waxing/waning hypercarbia through his hospitalization- some days alert and others somnolent.  Has required intubation twice thus far  Better today- going to floor      Current Facility-Administered Medications   Medication Dose Route Frequency    artificial saliva (MOUTH KOTE) 1 Spray  1 Spray Oral PRN    methylPREDNISolone (PF) (SOLU-MEDROL) injection 40 mg  40 mg IntraVENous Q8H    dextrose 5% infusion  75 mL/hr IntraVENous CONTINUOUS    azithromycin (ZITHROMAX) 500 mg in 0.9% sodium chloride 250 mL IVPB  500 mg IntraVENous Q24H    cefTRIAXone (ROCEPHIN) 2 g in sterile water (preservative free) 20 mL IV syringe  2 g IntraVENous Q24H    tiotropium (SPIRIVA) inhalation capsule 18 mcg  1 Cap Inhalation DAILY    naloxone (NARCAN) injection 0.4 mg  0.4 mg IntraVENous EVERY 2 MINUTES AS NEEDED    thiamine (B-1) tablet 100 mg  100 mg Oral DAILY    folic acid (FOLVITE) tablet 1 mg  1 mg Oral DAILY    famotidine (PEPCID) tablet 20 mg  20 mg Oral BID    albuterol (PROVENTIL VENTOLIN) nebulizer solution 2.5 mg  2.5 mg Nebulization QID RT    promethazine (PHENERGAN) with saline injection 12.5 mg  12.5 mg IntraVENous Q6H PRN    LORazepam (ATIVAN) tablet 0.5 mg  0.5 mg Oral Q8H PRN    haloperidol lactate (HALDOL) injection 5 mg  5 mg IntraVENous Q6H PRN    sertraline (ZOLOFT) tablet 50 mg  50 mg Oral DAILY    sodium chloride (OCEAN) 0.65 % nasal spray 2 Spray  2 Spray Both Nostrils QID    sodium chloride (OCEAN) 0.65 % nasal spray 2 Spray  2 Spray Both Nostrils Q2H PRN    fluticasone (FLONASE) 50 mcg/actuation nasal spray 2 Spray  2 Spray Both Nostrils DAILY    influenza vaccine 2017-18 (3 yrs+)(PF) (FLUZONE QUAD/FLUARIX QUAD) injection 0.5 mL  0.5 mL IntraMUSCular PRIOR TO DISCHARGE    nicotine (NICODERM CQ) 21 mg/24 hr patch 1 Patch  1 Patch TransDERmal DAILY    metoprolol (LOPRESSOR) injection 5 mg  5 mg IntraVENous Q4H PRN    hydrALAZINE (APRESOLINE) 20 mg/mL injection 20 mg  20 mg IntraVENous Q6H PRN    sodium chloride (NS) flush 5-10 mL  5-10 mL IntraVENous Q8H    sodium chloride (NS) flush 5-10 mL  5-10 mL IntraVENous PRN    lisinopril (PRINIVIL, ZESTRIL) tablet 20 mg  20 mg Oral DAILY    carvedilol (COREG) tablet 3.125 mg  3.125 mg Oral BID WITH MEALS    ondansetron (ZOFRAN) injection 4 mg  4 mg IntraVENous Q6H PRN    acetaminophen (TYLENOL) tablet 650 mg  650 mg Oral Q6H PRN    diphenhydrAMINE (BENADRYL) capsule 25 mg  25 mg Oral Q4H PRN    polyethylene glycol (MIRALAX) packet 17 g  17 g Oral DAILY PRN    guaiFENesin ER (MUCINEX) tablet 600 mg  600 mg Oral Q12H PRN    calcium carbonate (TUMS) chewable tablet 400 mg [elemental]  400 mg Oral TID PRN    heparin (porcine) injection 5,000 Units  5,000 Units SubCUTAneous Q8H       Review of Systems    Constitutional:  negative for fever, chills, sweats  Cardiovascular:  negative for chest pain, palpitations, syncope, edema  Gastrointestinal:  negative for dysphagia, reflux, vomiting, diarrhea, abdominal pain, or melena  Neurologic:  negative for focal weakness, numbness, headache      Objective:     Vitals:    01/20/18 0739 01/20/18 0802 01/20/18 0811 01/20/18 1003   BP:  127/74  119/78   Pulse:  96  98   Resp:  22  (!) 37   Temp: 97.8 °F (36.6 °C) 97.8 °F (36.6 °C)     SpO2:  92% 91% 94%   Weight:       Height:           Intake and Output:   01/18 1901 - 01/20 0700  In: 2154 [P.O.:1080; I.V.:1074]  Out: 2525 [Urine:2525]       Physical Exam:          Constitutional:  the patient is well developed and in no acute distress  EENMT:  Sclera clear, pupils equal, oral mucosa moist  Respiratory: clear -no wheezing  Cardiovascular:  RRR without M,G,R  Gastrointestinal: soft and non-tender; with positive bowel sounds. Musculoskeletal: warm without cyanosis. There is no lower leg edema. Skin:  no jaundice or rashes, no wounds   Neurologic: no gross neuro deficits     Psychiatric:  alert and oriented x 3        CXR:       LAB  No results for input(s): GLUCPOC in the last 72 hours. No lab exists for component: [place]   Recent Labs      01/20/18   0343  01/19/18   0521  01/18/18   0614   WBC  15.9*  10.4  15.1*   HGB  14.8  15.7  15.8   HCT  46.4  50.4*  50.1   PLT  216  216  225     Recent Labs      01/20/18   0343  01/19/18   0521  01/18/18   0614   NA  143  146*  146*   K  4.2  3.9  3.7   CL  104  107  106   CO2  35*  33*  34*   GLU  174*  105*  101*   BUN  30*  32*  32*   CREA  1.07  0.96  1.09   MG   --   2.4   --    PHOS   --   2.8   --    CA  8.7  9.1  8.4     Recent Labs      01/19/18   0313  01/18/18   1425  01/18/18   1017   PH  7.31*  7.26*  7.24*   PCO2  64*  70*  69*   PO2  89  91  63*   HCO3  32*  31*  29*     No results for input(s): LCAD, LAC in the last 72 hours.     Assessment:  (Medical Decision Making)     Hospital Problems  Date Reviewed: 1/19/2018          Codes Class Noted POA    Diarrhea ICD-10-CM: R19.7  ICD-9-CM: 787.91  1/18/2018 No        Alcohol abuse (Chronic) ICD-10-CM: F10.10  ICD-9-CM: 305.00  1/15/2018 Yes        Homeless ICD-10-CM: Z59.0  ICD-9-CM: V60.0  1/15/2018 No        Community acquired pneumonia of right lower lobe of lung ([address] ICD-10-CM: J18.1  ICD-9-CM: 407  1/9/2018 Yes        Urethral stricture ICD-10-CM: N35.9  ICD-9-CM: 598.9  1/7/2018 Yes        COPD (chronic obstructive pulmonary disease) (HCC) (Chronic) ICD-10-CM: J44.9  ICD-9-CM: 313  1/6/2018 Yes        Nicotine dependence (Chronic) ICD-10-CM: Z59.915  ICD-9-CM: 305.1  1/6/2018 Yes        Methamphetamine abuse ICD-10-CM: F15.10  ICD-9-CM: 305.70  1/6/2018 Yes        Anasarca ICD-10-CM: R60.1  ICD-9-CM: 782.3  1/6/2018 Yes        NATALIIA (obstructive sleep apnea) (Chronic) ICD-10-CM: Z83.62  ICD-9-CM: 327.23  1/6/2018 Yes        Acute kidney injury [facility]) ICD-10-CM: N17.9  ICD-9-CM: 584.9  1/6/2018 Yes        * (Principal)Acute on chronic respiratory failure with hypoxia and hypercapnia (HCC) ICD-10-CM: J96.21, J96.22  ICD-9-CM: 518.84, 786.09, 799.02  1/6/2018 Yes              Plan:  (Medical Decision Making)   11    Try to leave off bipap during the day, bipap hs  2     Bronchodilators  3     Iv steroids  Will add  4      move to floor later today  5    Iv antibx  Since 1/18  --    More than 50% of the time documented was spent in face-to-face contact with the patient and in the care of the patient on the floor/unit where the patient is located.     Patrick Calhoun MD

## 2018-01-20 NOTE — PROGRESS NOTES
Pt is up in chair, A&Ox4, follows commands, eyes focus and track. Breath sounds diminished in the bases, symmetrical chest expansion on 4L NC. NSR/ST on monitor, S1/S2 auscultated. Bowel sounds hypoactive, abdomen obese and semi-soft. Skin intact. Lines :L IJ  Drips: D5 @ 75 mL/hr  Drains: alegre    Will continue to monitor pt.

## 2018-01-20 NOTE — PROGRESS NOTES
TRANSFER - OUT REPORT:    Verbal report given to Simon Mims RN(name) on Yumiko Taylor  being transferred to 8th floor(unit) for routine progression of care       Report consisted of patients Situation, Background, Assessment and   Recommendations(SBAR). Information from the following report(s) SBAR, Intake/Output, MAR, Recent Results, Med Rec Status and Cardiac Rhythm NSR was reviewed with the receiving nurse. Lines:   Quad Lumen 01/07/18 Left Internal jugular (Active)   Central Line Being Utilized Yes 1/20/2018  8:02 AM   Criteria for Appropriate Use Limited/no vessel suitable for conventional peripheral access 1/20/2018  8:02 AM   Site Assessment Clean, dry, & intact 1/20/2018  8:02 AM   Infiltration Assessment 0 1/20/2018  8:02 AM   Affected Extremity/Extremities Color distal to insertion site pink (or appropriate for race); Pulses palpable;Range of motion performed 1/20/2018  8:02 AM   Date of Last Dressing Change 01/19/18 1/20/2018  8:02 AM   Dressing Status Clean, dry, & intact 1/20/2018  8:02 AM   Dressing Type Disk with Chlorhexadine gluconate (CHG); Transparent;Tape 1/20/2018  8:02 AM   Action Taken Dressing changed 1/19/2018 10:30 AM   Proximal Hub Color/Line Status Red;Patent; Flushed 1/20/2018  8:02 AM   Positive Blood Return (Medial Site) No 1/20/2018  8:02 AM   Medial 1 Hub Color/Line Status Gray;Patent; Flushed 1/20/2018  8:02 AM   Positive Blood Return (Lateral Site) Yes 1/20/2018  8:02 AM   Medial 2 Hub Color/Line Status Blue;Patent; Flushed 1/20/2018  8:02 AM   Positive Blood Return (Site #3) No 1/20/2018  8:02 AM   Distal Hub Color/Line Status Brown;Patent; Flushed 1/20/2018  8:02 AM   Positive Blood Return (Site #4) No 1/20/2018  8:02 AM   Alcohol Cap Used No 1/19/2018  7:29 PM        Opportunity for questions and clarification was provided.       Patient transported with:   O2 @ 4 liters

## 2018-01-21 ENCOUNTER — APPOINTMENT (OUTPATIENT)
Dept: GENERAL RADIOLOGY | Age: 53
DRG: 871 | End: 2018-01-21
Attending: INTERNAL MEDICINE
Payer: SELF-PAY

## 2018-01-21 PROBLEM — I48.92 ATRIAL FLUTTER (HCC): Status: ACTIVE | Noted: 2018-01-21

## 2018-01-21 LAB
AMPHET UR QL SCN: NEGATIVE
ANION GAP SERPL CALC-SCNC: 5 MMOL/L (ref 7–16)
APTT PPP: 42.9 SEC (ref 23.2–35.3)
ATRIAL RATE: 153 BPM
ATRIAL RATE: 155 BPM
BARBITURATES UR QL SCN: NEGATIVE
BASOPHILS # BLD: 0 K/UL (ref 0–0.2)
BASOPHILS NFR BLD: 0 % (ref 0–2)
BENZODIAZ UR QL: NEGATIVE
BUN SERPL-MCNC: 24 MG/DL (ref 6–23)
CALCIUM SERPL-MCNC: 8.9 MG/DL (ref 8.3–10.4)
CALCULATED R AXIS, ECG10: -74 DEGREES
CALCULATED R AXIS, ECG10: 38 DEGREES
CALCULATED T AXIS, ECG11: 114 DEGREES
CALCULATED T AXIS, ECG11: 116 DEGREES
CANNABINOIDS UR QL SCN: NEGATIVE
CHLORIDE SERPL-SCNC: 104 MMOL/L (ref 98–107)
CK MB CFR SERPL CALC: 5.7 %
CK MB CFR SERPL CALC: 5.7 %
CK MB SERPL-MCNC: 1.6 NG/ML (ref 0.5–3.6)
CK MB SERPL-MCNC: 1.6 NG/ML (ref 0.5–3.6)
CK SERPL-CCNC: 28 U/L (ref 21–215)
CK SERPL-CCNC: 28 U/L (ref 21–215)
CO2 SERPL-SCNC: 35 MMOL/L (ref 21–32)
COCAINE UR QL SCN: NEGATIVE
CREAT SERPL-MCNC: 0.99 MG/DL (ref 0.8–1.5)
DIAGNOSIS, 93000: NORMAL
DIAGNOSIS, 93000: NORMAL
DIFFERENTIAL METHOD BLD: ABNORMAL
EOSINOPHIL # BLD: 0.1 K/UL (ref 0–0.8)
EOSINOPHIL NFR BLD: 1 % (ref 0.5–7.8)
ERYTHROCYTE [DISTWIDTH] IN BLOOD BY AUTOMATED COUNT: 13.1 % (ref 11.9–14.6)
GLUCOSE SERPL-MCNC: 108 MG/DL (ref 65–100)
HCT VFR BLD AUTO: 43.9 % (ref 41.1–50.3)
HGB BLD-MCNC: 13.8 G/DL (ref 13.6–17.2)
IMM GRANULOCYTES # BLD: 0.1 K/UL (ref 0–0.5)
IMM GRANULOCYTES NFR BLD AUTO: 1 % (ref 0–5)
LYMPHOCYTES # BLD: 1.8 K/UL (ref 0.5–4.6)
LYMPHOCYTES NFR BLD: 10 % (ref 13–44)
MAGNESIUM SERPL-MCNC: 2.1 MG/DL (ref 1.8–2.4)
MCH RBC QN AUTO: 32.2 PG (ref 26.1–32.9)
MCHC RBC AUTO-ENTMCNC: 31.4 G/DL (ref 31.4–35)
MCV RBC AUTO: 102.6 FL (ref 79.6–97.8)
METHADONE UR QL: NEGATIVE
MONOCYTES # BLD: 1.1 K/UL (ref 0.1–1.3)
MONOCYTES NFR BLD: 7 % (ref 4–12)
NEUTS SEG # BLD: 14.3 K/UL (ref 1.7–8.2)
NEUTS SEG NFR BLD: 81 % (ref 43–78)
OPIATES UR QL: NEGATIVE
P-R INTERVAL, ECG05: 132 MS
P-R INTERVAL, ECG05: 144 MS
PCP UR QL: NEGATIVE
PLATELET # BLD AUTO: 210 K/UL (ref 150–450)
PMV BLD AUTO: 10.6 FL (ref 10.8–14.1)
POTASSIUM SERPL-SCNC: 4 MMOL/L (ref 3.5–5.1)
PROCALCITONIN SERPL-MCNC: <0.1 NG/ML
Q-T INTERVAL, ECG07: 236 MS
Q-T INTERVAL, ECG07: 328 MS
QRS DURATION, ECG06: 100 MS
QRS DURATION, ECG06: 80 MS
QTC CALCULATION (BEZET), ECG08: 376 MS
QTC CALCULATION (BEZET), ECG08: 526 MS
RBC # BLD AUTO: 4.28 M/UL (ref 4.23–5.67)
SODIUM SERPL-SCNC: 144 MMOL/L (ref 136–145)
TROPONIN I SERPL-MCNC: 0.03 NG/ML (ref 0.02–0.05)
TROPONIN I SERPL-MCNC: 0.04 NG/ML (ref 0.02–0.05)
TSH SERPL DL<=0.005 MIU/L-ACNC: 1.55 UIU/ML (ref 0.36–3.74)
VENTRICULAR RATE, ECG03: 153 BPM
VENTRICULAR RATE, ECG03: 155 BPM
WBC # BLD AUTO: 17.3 K/UL (ref 4.3–11.1)

## 2018-01-21 PROCEDURE — 80307 DRUG TEST PRSMV CHEM ANLYZR: CPT | Performed by: INTERNAL MEDICINE

## 2018-01-21 PROCEDURE — 84484 ASSAY OF TROPONIN QUANT: CPT | Performed by: INTERNAL MEDICINE

## 2018-01-21 PROCEDURE — 74011250637 HC RX REV CODE- 250/637: Performed by: FAMILY MEDICINE

## 2018-01-21 PROCEDURE — 94660 CPAP INITIATION&MGMT: CPT

## 2018-01-21 PROCEDURE — 65660000000 HC RM CCU STEPDOWN

## 2018-01-21 PROCEDURE — 74011000250 HC RX REV CODE- 250: Performed by: NURSE PRACTITIONER

## 2018-01-21 PROCEDURE — 94640 AIRWAY INHALATION TREATMENT: CPT

## 2018-01-21 PROCEDURE — 74011250636 HC RX REV CODE- 250/636: Performed by: INTERNAL MEDICINE

## 2018-01-21 PROCEDURE — 84145 PROCALCITONIN (PCT): CPT | Performed by: INTERNAL MEDICINE

## 2018-01-21 PROCEDURE — 74011000258 HC RX REV CODE- 258: Performed by: INTERNAL MEDICINE

## 2018-01-21 PROCEDURE — 71045 X-RAY EXAM CHEST 1 VIEW: CPT

## 2018-01-21 PROCEDURE — 77010033678 HC OXYGEN DAILY

## 2018-01-21 PROCEDURE — 94760 N-INVAS EAR/PLS OXIMETRY 1: CPT

## 2018-01-21 PROCEDURE — 99232 SBSQ HOSP IP/OBS MODERATE 35: CPT | Performed by: INTERNAL MEDICINE

## 2018-01-21 PROCEDURE — 74011250636 HC RX REV CODE- 250/636: Performed by: FAMILY MEDICINE

## 2018-01-21 PROCEDURE — 74011250637 HC RX REV CODE- 250/637: Performed by: INTERNAL MEDICINE

## 2018-01-21 PROCEDURE — 93005 ELECTROCARDIOGRAM TRACING: CPT | Performed by: INTERNAL MEDICINE

## 2018-01-21 PROCEDURE — 74011250637 HC RX REV CODE- 250/637: Performed by: NURSE PRACTITIONER

## 2018-01-21 PROCEDURE — 83735 ASSAY OF MAGNESIUM: CPT | Performed by: INTERNAL MEDICINE

## 2018-01-21 PROCEDURE — 74011000250 HC RX REV CODE- 250: Performed by: INTERNAL MEDICINE

## 2018-01-21 PROCEDURE — 85730 THROMBOPLASTIN TIME PARTIAL: CPT | Performed by: INTERNAL MEDICINE

## 2018-01-21 PROCEDURE — 80048 BASIC METABOLIC PNL TOTAL CA: CPT | Performed by: INTERNAL MEDICINE

## 2018-01-21 PROCEDURE — 82550 ASSAY OF CK (CPK): CPT | Performed by: INTERNAL MEDICINE

## 2018-01-21 PROCEDURE — 85025 COMPLETE CBC W/AUTO DIFF WBC: CPT | Performed by: INTERNAL MEDICINE

## 2018-01-21 PROCEDURE — 93005 ELECTROCARDIOGRAM TRACING: CPT | Performed by: FAMILY MEDICINE

## 2018-01-21 PROCEDURE — 87040 BLOOD CULTURE FOR BACTERIA: CPT | Performed by: INTERNAL MEDICINE

## 2018-01-21 PROCEDURE — 36415 COLL VENOUS BLD VENIPUNCTURE: CPT | Performed by: INTERNAL MEDICINE

## 2018-01-21 PROCEDURE — 84443 ASSAY THYROID STIM HORMONE: CPT | Performed by: INTERNAL MEDICINE

## 2018-01-21 PROCEDURE — 74011250637 HC RX REV CODE- 250/637: Performed by: HOSPITALIST

## 2018-01-21 RX ORDER — LEVALBUTEROL INHALATION SOLUTION 0.63 MG/3ML
0.63 SOLUTION RESPIRATORY (INHALATION) EVERY 8 HOURS
Status: DISCONTINUED | OUTPATIENT
Start: 2018-01-21 | End: 2018-01-24 | Stop reason: HOSPADM

## 2018-01-21 RX ORDER — LISINOPRIL 5 MG/1
5 TABLET ORAL DAILY
Status: DISCONTINUED | OUTPATIENT
Start: 2018-01-22 | End: 2018-01-21

## 2018-01-21 RX ORDER — DILTIAZEM HYDROCHLORIDE 5 MG/ML
10 INJECTION INTRAVENOUS ONCE
Status: COMPLETED | OUTPATIENT
Start: 2018-01-21 | End: 2018-01-21

## 2018-01-21 RX ORDER — METOPROLOL TARTRATE 5 MG/5ML
5 INJECTION INTRAVENOUS
Status: COMPLETED | OUTPATIENT
Start: 2018-01-21 | End: 2018-01-21

## 2018-01-21 RX ORDER — HEPARIN SODIUM 5000 [USP'U]/100ML
12-25 INJECTION, SOLUTION INTRAVENOUS
Status: DISCONTINUED | OUTPATIENT
Start: 2018-01-21 | End: 2018-01-22

## 2018-01-21 RX ORDER — HEPARIN SODIUM 5000 [USP'U]/ML
5000 INJECTION, SOLUTION INTRAVENOUS; SUBCUTANEOUS ONCE
Status: COMPLETED | OUTPATIENT
Start: 2018-01-21 | End: 2018-01-21

## 2018-01-21 RX ADMIN — Medication 10 ML: at 06:38

## 2018-01-21 RX ADMIN — FAMOTIDINE 20 MG: 20 TABLET, FILM COATED ORAL at 08:48

## 2018-01-21 RX ADMIN — HEPARIN SODIUM AND DEXTROSE 12 UNITS/KG/HR: 5000; 5 INJECTION INTRAVENOUS at 14:55

## 2018-01-21 RX ADMIN — CARVEDILOL 3.12 MG: 3.12 TABLET, FILM COATED ORAL at 08:48

## 2018-01-21 RX ADMIN — ALBUTEROL SULFATE 2.5 MG: 2.5 SOLUTION RESPIRATORY (INHALATION) at 07:36

## 2018-01-21 RX ADMIN — FLUTICASONE PROPIONATE 2 SPRAY: 50 SPRAY, METERED NASAL at 08:49

## 2018-01-21 RX ADMIN — LORAZEPAM 0.5 MG: 0.5 TABLET ORAL at 12:05

## 2018-01-21 RX ADMIN — METOROPROLOL TARTRATE 5 MG: 5 INJECTION, SOLUTION INTRAVENOUS at 11:51

## 2018-01-21 RX ADMIN — HEPARIN SODIUM 5000 UNITS: 5000 INJECTION, SOLUTION INTRAVENOUS; SUBCUTANEOUS at 14:26

## 2018-01-21 RX ADMIN — WATER 2 G: 1 INJECTION INTRAMUSCULAR; INTRAVENOUS; SUBCUTANEOUS at 08:49

## 2018-01-21 RX ADMIN — FOLIC ACID 1 MG: 1 TABLET ORAL at 08:48

## 2018-01-21 RX ADMIN — DILTIAZEM HYDROCHLORIDE 10 MG: 5 INJECTION INTRAVENOUS at 17:30

## 2018-01-21 RX ADMIN — SALINE NASAL SPRAY 2 SPRAY: 1.5 SOLUTION NASAL at 14:25

## 2018-01-21 RX ADMIN — SALINE NASAL SPRAY 2 SPRAY: 1.5 SOLUTION NASAL at 18:30

## 2018-01-21 RX ADMIN — SODIUM CHLORIDE 250 ML: 900 INJECTION, SOLUTION INTRAVENOUS at 16:10

## 2018-01-21 RX ADMIN — ALBUTEROL SULFATE 2.5 MG: 2.5 SOLUTION RESPIRATORY (INHALATION) at 11:40

## 2018-01-21 RX ADMIN — FAMOTIDINE 20 MG: 20 TABLET, FILM COATED ORAL at 17:28

## 2018-01-21 RX ADMIN — Medication 100 MG: at 08:48

## 2018-01-21 RX ADMIN — HEPARIN SODIUM 5000 UNITS: 5000 INJECTION, SOLUTION INTRAVENOUS; SUBCUTANEOUS at 06:38

## 2018-01-21 RX ADMIN — METOPROLOL TARTRATE 5 MG: 5 INJECTION, SOLUTION INTRAVENOUS at 12:04

## 2018-01-21 RX ADMIN — SALINE NASAL SPRAY 2 SPRAY: 1.5 SOLUTION NASAL at 08:47

## 2018-01-21 RX ADMIN — LEVALBUTEROL HYDROCHLORIDE 0.63 MG: 0.63 SOLUTION RESPIRATORY (INHALATION) at 20:49

## 2018-01-21 RX ADMIN — AZITHROMYCIN MONOHYDRATE 500 MG: 500 INJECTION, POWDER, LYOPHILIZED, FOR SOLUTION INTRAVENOUS at 18:25

## 2018-01-21 RX ADMIN — DILTIAZEM HYDROCHLORIDE 2.5 MG/HR: 5 INJECTION INTRAVENOUS at 14:28

## 2018-01-21 RX ADMIN — SERTRALINE HYDROCHLORIDE 50 MG: 50 TABLET ORAL at 08:48

## 2018-01-21 RX ADMIN — DILTIAZEM HYDROCHLORIDE 10 MG: 5 INJECTION INTRAVENOUS at 14:21

## 2018-01-21 RX ADMIN — LISINOPRIL 20 MG: 20 TABLET ORAL at 08:48

## 2018-01-21 RX ADMIN — TIOTROPIUM BROMIDE 18 MCG: 18 CAPSULE ORAL; RESPIRATORY (INHALATION) at 09:00

## 2018-01-21 RX ADMIN — SALINE NASAL SPRAY 2 SPRAY: 1.5 SOLUTION NASAL at 23:59

## 2018-01-21 RX ADMIN — Medication 10 ML: at 14:24

## 2018-01-21 RX ADMIN — Medication 10 ML: at 14:25

## 2018-01-21 NOTE — PROGRESS NOTES
Patient arrived from ICU via stretcher, family members present at this time. Patient is alert and oriented x 4 at this time. No distress at this time. Skin assessment revealed 1+ pitting edema in all four extremities. Lung assessment revealed inspiratory breathing patient states, \" the BIPAP helps me a lot when I am having difficulty breathing. \" Angel cath in place and currently draining yellow colored urine. D5W going through Quad lumen at 75 ml/hr. Mid cath is difficult to flush at this time. Safety measures in place. Will continue to monitor.

## 2018-01-21 NOTE — PROGRESS NOTES
MD paged for patient c/o chest pain and sob. HR in 150's. MD at bedside with new orders for 12 Lead EKG, remote tele, lopressor and lab work. Lopressor given. Will continue to monitor.

## 2018-01-21 NOTE — PROGRESS NOTES
TRANSFER - OUT REPORT:    Verbal report given to Angel Phoenix on [employer]  being transferred to [facility] for change in patient condition(heart rate 150-160's)       Report consisted of patients Situation, Background, Assessment and   Recommendations(SBAR). Information from the following report(s) SBAR was reviewed with the receiving nurse. Lines:   Peripheral IV 01/21/18 Right Forearm (Active)        Opportunity for questions and clarification was provided.       Patient transported with:   Registered Nurse

## 2018-01-21 NOTE — PROGRESS NOTES
Pulmonary Daily Progress Note: 1/21/2018    Anamaria Pagan   Admission Date: 1/6/2018     45 y. o. CM admitted on 1/6 with acute hypoxemic respiratory failure, with methamphetamine positive, smoker with strong FH of severe COPD, morbid obesity, suspected OHS.  Also has sinusitis on CT and possible pneumonia.   Has had waxing/waning hypercarbia through his hospitalization- some days alert and others somnolent.  Has required intubation twice thus far. Transferred to floor on 1/20    The patient's chart is reviewed and the patient is discussed with the staff. Subjective:     Patient sleeping when I came to the room and not on oxygen and noted with snoring/chocking. Woke him up. Did use the BIPAP last night but the pressure was too low for him, would like it higher. Not coughing up much. On 5 LPM oxygen. Reports does now have  Place to stay, with a friend. Is not homeless now.        Current Facility-Administered Medications   Medication Dose Route Frequency    sodium chloride (NS) flush 10 mL  10 mL InterCATHeter Q8H    sodium chloride (NS) flush 10 mL  10 mL InterCATHeter PRN    artificial saliva (MOUTH KOTE) 1 Spray  1 Spray Oral PRN    dextrose 5% infusion  75 mL/hr IntraVENous CONTINUOUS    azithromycin (ZITHROMAX) 500 mg in 0.9% sodium chloride 250 mL IVPB  500 mg IntraVENous Q24H    cefTRIAXone (ROCEPHIN) 2 g in sterile water (preservative free) 20 mL IV syringe  2 g IntraVENous Q24H    tiotropium (SPIRIVA) inhalation capsule 18 mcg  1 Cap Inhalation DAILY    naloxone (NARCAN) injection 0.4 mg  0.4 mg IntraVENous EVERY 2 MINUTES AS NEEDED    thiamine (B-1) tablet 100 mg  100 mg Oral DAILY    folic acid (FOLVITE) tablet 1 mg  1 mg Oral DAILY    famotidine (PEPCID) tablet 20 mg  20 mg Oral BID    albuterol (PROVENTIL VENTOLIN) nebulizer solution 2.5 mg  2.5 mg Nebulization QID RT    promethazine (PHENERGAN) with saline injection 12.5 mg  12.5 mg IntraVENous Q6H PRN    LORazepam (ATIVAN) tablet 0.5 mg  0.5 mg Oral Q8H PRN    haloperidol lactate (HALDOL) injection 5 mg  5 mg IntraVENous Q6H PRN    sertraline (ZOLOFT) tablet 50 mg  50 mg Oral DAILY    sodium chloride (OCEAN) 0.65 % nasal spray 2 Spray  2 Spray Both Nostrils QID    sodium chloride (OCEAN) 0.65 % nasal spray 2 Spray  2 Spray Both Nostrils Q2H PRN    fluticasone (FLONASE) 50 mcg/actuation nasal spray 2 Spray  2 Spray Both Nostrils DAILY    influenza vaccine 2017-18 (3 yrs+)(PF) (FLUZONE QUAD/FLUARIX QUAD) injection 0.5 mL  0.5 mL IntraMUSCular PRIOR TO DISCHARGE    nicotine (NICODERM CQ) 21 mg/24 hr patch 1 Patch  1 Patch TransDERmal DAILY    metoprolol (LOPRESSOR) injection 5 mg  5 mg IntraVENous Q4H PRN    hydrALAZINE (APRESOLINE) 20 mg/mL injection 20 mg  20 mg IntraVENous Q6H PRN    sodium chloride (NS) flush 5-10 mL  5-10 mL IntraVENous Q8H    sodium chloride (NS) flush 5-10 mL  5-10 mL IntraVENous PRN    lisinopril (PRINIVIL, ZESTRIL) tablet 20 mg  20 mg Oral DAILY    carvedilol (COREG) tablet 3.125 mg  3.125 mg Oral BID WITH MEALS    ondansetron (ZOFRAN) injection 4 mg  4 mg IntraVENous Q6H PRN    acetaminophen (TYLENOL) tablet 650 mg  650 mg Oral Q6H PRN    diphenhydrAMINE (BENADRYL) capsule 25 mg  25 mg Oral Q4H PRN    polyethylene glycol (MIRALAX) packet 17 g  17 g Oral DAILY PRN    guaiFENesin ER (MUCINEX) tablet 600 mg  600 mg Oral Q12H PRN    calcium carbonate (TUMS) chewable tablet 400 mg [elemental]  400 mg Oral TID PRN    heparin (porcine) injection 5,000 Units  5,000 Units SubCUTAneous Q8H       Review of Systems    Constitutional:  negative for fever, chills, sweats  Cardiovascular:  negative for chest pain, palpitations, syncope, edema  Gastrointestinal:  negative for dysphagia, reflux, vomiting, diarrhea, abdominal pain, or melena  Neurologic:  negative for focal weakness, numbness, headache      Objective:     Vitals:    01/21/18 0050 01/21/18 0315 01/21/18 0637 01/21/18 0716   BP: 132/85  142/80   Pulse:  91  92   Resp:  16  17   Temp:  97.8 °F (36.6 °C)  97.4 °F (36.3 °C)   SpO2: 93% 97%  93%   Weight:   279 lb 3.2 oz (126.6 kg)    Height:           Intake and Output:   01/19 1901 - 01/21 0700  In: Antwon Perkimmy [P.O.:795; I.V.:1034]  Out: 3650 [Urine:3650]       Physical Exam:          Constitutional:  the patient is well developed and in no acute distress on 5 PM  EENMT:  Sclera clear, pupils equal, oral mucosa moist  Respiratory: clear -no wheezing  Cardiovascular:  RRR without M,G,R  Gastrointestinal: soft and non-tender; with positive bowel sounds. Musculoskeletal: warm without cyanosis. There is no lower leg edema. Skin:  no jaundice or rashes, no wounds   Neurologic: no gross neuro deficits     Psychiatric:  alert and oriented x 3        CXR: minmimal haziness in left base ?atelecatsis vs infiltrates          LAB  No results for input(s): GLUCPOC in the last 72 hours. No lab exists for component: [place]   Recent Labs      01/21/18   0653  01/20/18   0343  01/19/18   0521   WBC  17.3*  15.9*  10.4   HGB  13.8  14.8  15.7   HCT  43.9  46.4  50.4*   PLT  210  216  216     Recent Labs      01/21/18   0653  01/20/18   0343  01/19/18   0521   NA  144  143  146*   K  4.0  4.2  3.9   CL  104  104  107   CO2  35*  35*  33*   GLU  108*  174*  105*   BUN  24*  30*  32*   CREA  0.99  1.07  0.96   MG   --    --   2.4   PHOS   --    --   2.8   CA  8.9  8.7  9.1     Recent Labs      01/19/18   0313  01/18/18   1425  01/18/18   1017   PH  7.31*  7.26*  7.24*   PCO2  64*  70*  69*   PO2  89  91  63*   HCO3  32*  31*  29*     No results for input(s): LCAD, LAC in the last 72 hours.     Assessment:  (Medical Decision Making)     Hospital Problems  Date Reviewed: 1/19/2018          Codes Class Noted POA    Diarrhea ICD-10-CM: R19.7  ICD-9-CM: 787.91  1/18/2018 No        Alcohol abuse (Chronic) ICD-10-CM: F10.10  ICD-9-CM: 305.00  1/15/2018 Yes        Homeless ICD-10-CM: Z59.0  ICD-9-CM: V60.0  1/15/2018 No Community acquired pneumonia of right lower lobe of lung ([facility] [address] ICD-10-CM: J18.1  ICD-9-CM: 087  1/9/2018 Yes        Urethral stricture ICD-10-CM: N35.9  ICD-9-CM: 598.9  1/7/2018 Yes        COPD (chronic obstructive pulmonary disease) (HCC) (Chronic) ICD-10-CM: J44.9  ICD-9-CM: 249  1/6/2018 Yes        Nicotine dependence (Chronic) ICD-10-CM: F17.200  ICD-9-CM: 305.1  1/6/2018 Yes        Methamphetamine abuse ICD-10-CM: F15.10  ICD-9-CM: 305.70  1/6/2018 Yes        Anasarca ICD-10-CM: R60.1  ICD-9-CM: 782.3  1/6/2018 Yes        NATALIIA (obstructive sleep apnea) (Chronic) ICD-10-CM: W72.12  ICD-9-CM: 327.23  1/6/2018 Yes        Acute kidney injury [facility]) ICD-10-CM: N17.9  ICD-9-CM: 584.9  1/6/2018 Yes        * (Principal)Acute on chronic respiratory failure with hypoxia and hypercapnia (HCC) ICD-10-CM: J96.21, J96.22  ICD-9-CM: 518.84, 786.09, 799.02  1/6/2018 Yes              Plan:  (Medical Decision Making)     --will place back on BIPAP when sleeping but go up on the pressure to 18/12 or higher, just talked to respiratory  --taper oxygen as tolerated, not sure he will be able to get this or BIPAP at home  --nebs  --continue steroids   --on abx since 1/8 again -- does not have any new cultures. CXR noted with ?left sided infiltrates. --will get procal, cxr AP And LAT and f/u WBC to see if really need to continue abx or not  --stop IVF  --picc ?not working will see if they can call team. Then will need to keep central line  --social issues will be major issues regarding placement -- need for oxygen, BIPAP, etc  --KEEP DEXTER IN-- per UROLOGY --  was very difficult to place -- [address].  --continue remaining treatment. More than 50% of the time documented was spent in face-to-face contact with the patient and in the care of the patient on the floor/unit where the patient is located.     Ovidio Waite MD

## 2018-01-21 NOTE — PROGRESS NOTES
At this time patient has his cardizem running at 5 mg/Hr and his SBP is around 100 mmHg. HR is still around 150. His initial set of troponin was WNL. Will order a bolus of NS. May need amio drip. Will ask cardiology to see him.

## 2018-01-21 NOTE — CONSULTS
[facility] Cardiology Consult     Attending Cardiologist:Dr. Hunter Slater    Primary Cardiologist:none    Primary Care Physician:none    Subjective:     Alejandra Sanchez is a 45 y.o. male with hx of polysubstance abuse with interesting toxicology reports--unsure if he has been using substances even in hospital. We are asked to see pt for his tachycardia. Pt was admitted on 1/6, notes reviewed. He developed sudden onset of acute respiratory compromise and tachycardia earlier today. ECG now appears to be atrial flutter with RVR. He is now on IV cardizem and IV heparin. No prior hx of tachycardia, mild chest discomfort earlier today with onset of CP. Troponin level negative. He states he last did drugs approximately 3 days prior to admit but toxicology would beg to differ. He denies CP at present.     Past Medical History:   Diagnosis Date    Sleep disorder       No past surgical history on file.    Current Facility-Administered Medications   Medication Dose Route Frequency    heparin 25,000 units in dextrose 500 mL infusion  12-25 Units/kg/hr (Adjusted) IntraVENous TITRATE    levalbuterol (XOPENEX) nebulizer soln 0.63 mg/3 mL  0.63 mg Nebulization Q8H    dilTIAZem (CARDIZEM) 125 mg in dextrose 5% 125 mL infusion  0-15 mg/hr IntraVENous TITRATE    sodium chloride 0.9 % bolus infusion 250 mL  250 mL IntraVENous ONCE    sodium chloride (NS) flush 10 mL  10 mL InterCATHeter Q8H    sodium chloride (NS) flush 10 mL  10 mL InterCATHeter PRN    artificial saliva (MOUTH KOTE) 1 Spray  1 Spray Oral PRN    azithromycin (ZITHROMAX) 500 mg in 0.9% sodium chloride 250 mL IVPB  500 mg IntraVENous Q24H    cefTRIAXone (ROCEPHIN) 2 g in sterile water (preservative free) 20 mL IV syringe  2 g IntraVENous Q24H    tiotropium (SPIRIVA) inhalation capsule 18 mcg  1 Cap Inhalation DAILY    naloxone (NARCAN) injection 0.4 mg  0.4 mg IntraVENous EVERY 2 MINUTES AS NEEDED    thiamine (B-1) tablet 100 mg  100 mg Oral DAILY    folic acid (FOLVITE) tablet 1 mg  1 mg Oral DAILY    famotidine (PEPCID) tablet 20 mg  20 mg Oral BID    promethazine (PHENERGAN) with saline injection 12.5 mg  12.5 mg IntraVENous Q6H PRN    LORazepam (ATIVAN) tablet 0.5 mg  0.5 mg Oral Q8H PRN    haloperidol lactate (HALDOL) injection 5 mg  5 mg IntraVENous Q6H PRN    sertraline (ZOLOFT) tablet 50 mg  50 mg Oral DAILY    sodium chloride (OCEAN) 0.65 % nasal spray 2 Spray  2 Spray Both Nostrils QID    sodium chloride (OCEAN) 0.65 % nasal spray 2 Spray  2 Spray Both Nostrils Q2H PRN    fluticasone (FLONASE) 50 mcg/actuation nasal spray 2 Spray  2 Spray Both Nostrils DAILY    influenza vaccine 2017-18 (3 yrs+)(PF) (FLUZONE QUAD/FLUARIX QUAD) injection 0.5 mL  0.5 mL IntraMUSCular PRIOR TO DISCHARGE    metoprolol (LOPRESSOR) injection 5 mg  5 mg IntraVENous Q4H PRN    hydrALAZINE (APRESOLINE) 20 mg/mL injection 20 mg  20 mg IntraVENous Q6H PRN    sodium chloride (NS) flush 5-10 mL  5-10 mL IntraVENous Q8H    sodium chloride (NS) flush 5-10 mL  5-10 mL IntraVENous PRN    carvedilol (COREG) tablet 3.125 mg  3.125 mg Oral BID WITH MEALS    ondansetron (ZOFRAN) injection 4 mg  4 mg IntraVENous Q6H PRN    acetaminophen (TYLENOL) tablet 650 mg  650 mg Oral Q6H PRN    diphenhydrAMINE (BENADRYL) capsule 25 mg  25 mg Oral Q4H PRN    polyethylene glycol (MIRALAX) packet 17 g  17 g Oral DAILY PRN    guaiFENesin ER (MUCINEX) tablet 600 mg  600 mg Oral Q12H PRN    calcium carbonate (TUMS) chewable tablet 400 mg [elemental]  400 mg Oral TID PRN     No Known Allergies   Social History   Substance Use Topics    Smoking status: Current Every Day Smoker     Packs/day: 2.00    Smokeless tobacco: Not on file    Alcohol use No      Comment: once a month beer          Review of Systems  Gen: Denies fever, chills, malaise or fatigue. Appetite good.    HEENT: Denies frequent headaches, dizzyness, visual disturbances, Neck pain or swallowing difficulty  Lungs: SOB improved since admit   Cardiovascular: as above, tachycardic   GI: Denies hememesis, dark tarry stools, No prior Hx of GI bleed, Denies constipation  : Denies dysuria, no complaints of frequency, nocturia  Heme: No prior bleeding disorders, no prior Cancer  Neuro: Denies prior CVA, TIA. Endocrine: no diabetes, thyroid disorders  Psychiatric: Denies anxiety, or other psychiatric illnesses. Objective:     Visit Vitals    /61 (BP 1 Location: Right arm, BP Patient Position: At rest)  Comment: Simultaneous filing. User may not have seen previous data.  Pulse (!) 152  Comment: Simultaneous filing. User may not have seen previous data.  Temp 96.6 °F (35.9 °C)    Resp 18    Ht 5' 8\" (1.727 m)    Wt 126.6 kg (279 lb 3.2 oz)    SpO2 98%    BMI 42.45 kg/m2     General:Alert, cooperative, no distress, appears stated age  Head: Normocephalic, without obvious abnormality, atraumatic. Eyes: Conjunctivae/corneas clear. PERRL, EOMs intact  Nose:Nares normal. Septum midline. Mucosa normal. No drainage or sinus tenderness. Throat: Lips, mucosa, and tongue normal. Very poor dentition. Neck: Supple, symmetrical, trachea midline,  no carotid bruit and no JVD. Lungs:Clear to auscultation bilaterally. Chest wall: No tenderness or deformity. Heart: tachycardic, irregular   Abdomen:Soft, non-tender. Bowel sounds normal. No masses, No organomegaly. Extremities: Extremities normal, atraumatic, no cyanosis or edema. Pulses: 2+ and symmetric all extremities. Skin: Skin color, texture, turgor normal. No rashes or lesions  Lymph nodes: Cervical, supraclavicular, and axillary nodes normal  Neurologic:No focal deficits identified                 ECG:atrial tachycardia RVR.      Data Review:     Recent Results (from the past 24 hour(s))   METABOLIC PANEL, BASIC    Collection Time: 01/21/18  6:53 AM   Result Value Ref Range    Sodium 144 136 - 145 mmol/L    Potassium 4.0 3.5 - 5.1 mmol/L    Chloride 104 98 - 107 mmol/L    CO2 35 (H) 21 - 32 mmol/L    Anion gap 5 (L) 7 - 16 mmol/L    Glucose 108 (H) 65 - 100 mg/dL    BUN 24 (H) 6 - 23 MG/DL    Creatinine 0.99 0.8 - 1.5 MG/DL    GFR est AA >60 >60 ml/min/1.73m2    GFR est non-AA >60 >60 ml/min/1.73m2    Calcium 8.9 8.3 - 10.4 MG/DL   CBC WITH AUTOMATED DIFF    Collection Time: 01/21/18  6:53 AM   Result Value Ref Range    WBC 17.3 (H) 4.3 - 11.1 K/uL    RBC 4.28 4.23 - 5.67 M/uL    HGB 13.8 13.6 - 17.2 g/dL    HCT 43.9 41.1 - 50.3 %    .6 (H) 79.6 - 97.8 FL    MCH 32.2 26.1 - 32.9 PG    MCHC 31.4 31.4 - 35.0 g/dL    RDW 13.1 11.9 - 14.6 %    PLATELET 821 377 - 886 K/uL    MPV 10.6 (L) 10.8 - 14.1 FL    DF AUTOMATED      NEUTROPHILS 81 (H) 43 - 78 %    LYMPHOCYTES 10 (L) 13 - 44 %    MONOCYTES 7 4.0 - 12.0 %    EOSINOPHILS 1 0.5 - 7.8 %    BASOPHILS 0 0.0 - 2.0 %    IMMATURE GRANULOCYTES 1 0.0 - 5.0 %    ABS. NEUTROPHILS 14.3 (H) 1.7 - 8.2 K/UL    ABS. LYMPHOCYTES 1.8 0.5 - 4.6 K/UL    ABS. MONOCYTES 1.1 0.1 - 1.3 K/UL    ABS. EOSINOPHILS 0.1 0.0 - 0.8 K/UL    ABS. BASOPHILS 0.0 0.0 - 0.2 K/UL    ABS. IMM.  GRANS. 0.1 0.0 - 0.5 K/UL   PROCALCITONIN    Collection Time: 01/21/18  6:53 AM   Result Value Ref Range    Procalcitonin <0.1 ng/mL   EKG, 12 LEAD, INITIAL    Collection Time: 01/21/18 11:46 AM   Result Value Ref Range    Ventricular Rate 155 BPM    Atrial Rate 155 BPM    P-R Interval 132 ms    QRS Duration 80 ms    Q-T Interval 328 ms    QTC Calculation (Bezet) 526 ms    Calculated R Axis -74 degrees    Calculated T Axis 114 degrees    Diagnosis       Sinus tachycardia  Left axis deviation  ST & T wave abnormality, consider inferior ischemia  Abnormal ECG  When compared with ECG of 06-JAN-2018 18:28,  QRS axis Shifted left  ST now depressed in Inferior leads  ST now depressed in Anterolateral leads  T wave inversion now evident in Inferior leads  T wave inversion now evident in Lateral leads  Confirmed by Drea Lane ([id number]) on 1/21/2018 1:06:52 PM     EKG, 12 LEAD, SUBSEQUENT    Collection Time: 01/21/18 12:35 PM   Result Value Ref Range    Ventricular Rate 153 BPM    Atrial Rate 153 BPM    P-R Interval 144 ms    QRS Duration 100 ms    Q-T Interval 236 ms    QTC Calculation (Bezet) 376 ms    Calculated R Axis 38 degrees    Calculated T Axis 116 degrees    Diagnosis       Atrial flutter   Nonspecific T wave abnormality  Abnormal ECG  When compared with ECG of 21-JAN-2018 11:46,  QRS axis Shifted right  ST no longer depressed in Inferior leads  ST elevation has replaced ST depression in Anterior leads  Nonspecific T wave abnormality no longer evident in Anterior leads  Confirmed by Marcus Beckham ([id number]) on 1/21/2018 1:07:31 PM     TROPONIN I    Collection Time: 01/21/18  1:24 PM   Result Value Ref Range    Troponin-I, Qt. 0.03 0.02 - 0.05 NG/ML   CK WITH MB    Collection Time: 01/21/18  1:24 PM   Result Value Ref Range    CK 28 21 - 215 U/L    CK - MB 1.6 0.5 - 3.6 ng/ml    CK-MB Index 5.7 (H) <2.5 %   MAGNESIUM    Collection Time: 01/21/18  1:24 PM   Result Value Ref Range    Magnesium 2.1 1.8 - 2.4 mg/dL         Assessment / Plan     Principal Problem:    Acute on chronic respiratory failure with hypoxia and hypercapnia (HCC) (1/6/2018)--appears to be stable on NC oxygen. Active Problems:  Atrial flutter with RVR--will try to rate control with IV cardizem for now. May need IV amiodarone but will try with cardizem first. Check TSH. Continue IV heparin. Not sure if he is good long term anticoagulation candidate. COPD (chronic obstructive pulmonary disease) (HCC) (1/6/2018)--exacerbating rhythm       Nicotine dependence (1/6/2018)      Methamphetamine abuse (1/6/2018)--as above, will check additional UDS. Encouraged cessation.        Anasarca (1/6/2018)      NATALIIA (obstructive sleep apnea) (1/6/2018)      Acute kidney injury ([facility] [address] (1/6/2018)      Urethral stricture (1/7/2018)      Community acquired pneumonia of right lower lobe of lung ([facility] [address] (1/9/2018) Alcohol abuse (1/15/2018)      Homeless (1/15/2018)      Diarrhea (1/18/2018)              Griffin Tobar NP

## 2018-01-21 NOTE — PROGRESS NOTES
Report received from [facility]. Patient sitting up in chair with family at bedside. Respirations even and unlabored at 18. Will continue to monitor.

## 2018-01-21 NOTE — PROGRESS NOTES
Patient resting in bed currently. BIPAP currently on patient. Respirations even and unlabored at 16. Safety measures in place. Will continue to monitor.

## 2018-01-21 NOTE — PROGRESS NOTES
Patient placed back on BIPAP 20/10 @ 40% patient complains of shortness of breath and chest pain. Stat EKG and chest xray ordered.

## 2018-01-21 NOTE — PROGRESS NOTES
Visit with patient to build rapport with . Patient is calm. Receptive to  presence. Encouraged and assured patient of our continued care.     Perez Beasley,  Staff   C: [phone]  /  [email or web address]

## 2018-01-21 NOTE — PROGRESS NOTES
Patient resting in bed with no complaints at this time. Patient is alert and orientated with no distress noted. IV intact and patent with no s/s of infection noted. Respirations even and unlabored with heart rate regular. Patient unable to ambulate independently without assistance; needs x1 r/t weakness and dyspnea. Bed in low locked position with call light within reach. Patient instructed to call if assistance is needed. Will continue to monitor.

## 2018-01-22 ENCOUNTER — APPOINTMENT (OUTPATIENT)
Dept: GENERAL RADIOLOGY | Age: 53
DRG: 871 | End: 2018-01-22
Attending: INTERNAL MEDICINE
Payer: SELF-PAY

## 2018-01-22 LAB
ANION GAP SERPL CALC-SCNC: 7 MMOL/L (ref 7–16)
APTT PPP: 57.3 SEC (ref 23.2–35.3)
BASOPHILS # BLD: 0 K/UL (ref 0–0.2)
BASOPHILS NFR BLD: 0 % (ref 0–2)
BUN SERPL-MCNC: 24 MG/DL (ref 6–23)
CALCIUM SERPL-MCNC: 8.4 MG/DL (ref 8.3–10.4)
CHLORIDE SERPL-SCNC: 106 MMOL/L (ref 98–107)
CK MB CFR SERPL CALC: 7.3 %
CK MB SERPL-MCNC: 2.2 NG/ML (ref 0.5–3.6)
CK SERPL-CCNC: 30 U/L (ref 21–215)
CO2 SERPL-SCNC: 32 MMOL/L (ref 21–32)
CREAT SERPL-MCNC: 0.98 MG/DL (ref 0.8–1.5)
DIFFERENTIAL METHOD BLD: ABNORMAL
EOSINOPHIL # BLD: 0.2 K/UL (ref 0–0.8)
EOSINOPHIL NFR BLD: 2 % (ref 0.5–7.8)
ERYTHROCYTE [DISTWIDTH] IN BLOOD BY AUTOMATED COUNT: 13.2 % (ref 11.9–14.6)
ERYTHROCYTE [DISTWIDTH] IN BLOOD BY AUTOMATED COUNT: 13.2 % (ref 11.9–14.6)
GLUCOSE BLD STRIP.AUTO-MCNC: 110 MG/DL (ref 65–100)
GLUCOSE BLD STRIP.AUTO-MCNC: 118 MG/DL (ref 65–100)
GLUCOSE BLD STRIP.AUTO-MCNC: 135 MG/DL (ref 65–100)
GLUCOSE SERPL-MCNC: 98 MG/DL (ref 65–100)
HCT VFR BLD AUTO: 40.8 % (ref 41.1–50.3)
HCT VFR BLD AUTO: 43.3 % (ref 41.1–50.3)
HGB BLD-MCNC: 12.7 G/DL (ref 13.6–17.2)
HGB BLD-MCNC: 13.4 G/DL (ref 13.6–17.2)
IMM GRANULOCYTES # BLD: 0 K/UL (ref 0–0.5)
IMM GRANULOCYTES NFR BLD AUTO: 0 % (ref 0–5)
LYMPHOCYTES # BLD: 1.9 K/UL (ref 0.5–4.6)
LYMPHOCYTES NFR BLD: 15 % (ref 13–44)
MAGNESIUM SERPL-MCNC: 2.2 MG/DL (ref 1.8–2.4)
MCH RBC QN AUTO: 32.3 PG (ref 26.1–32.9)
MCH RBC QN AUTO: 32.6 PG (ref 26.1–32.9)
MCHC RBC AUTO-ENTMCNC: 30.9 G/DL (ref 31.4–35)
MCHC RBC AUTO-ENTMCNC: 31.1 G/DL (ref 31.4–35)
MCV RBC AUTO: 104.3 FL (ref 79.6–97.8)
MCV RBC AUTO: 104.6 FL (ref 79.6–97.8)
MONOCYTES # BLD: 1 K/UL (ref 0.1–1.3)
MONOCYTES NFR BLD: 8 % (ref 4–12)
NEUTS SEG # BLD: 9 K/UL (ref 1.7–8.2)
NEUTS SEG NFR BLD: 75 % (ref 43–78)
PHOSPHATE SERPL-MCNC: 3.9 MG/DL (ref 2.5–4.5)
PLATELET # BLD AUTO: 201 K/UL (ref 150–450)
PLATELET # BLD AUTO: 209 K/UL (ref 150–450)
PMV BLD AUTO: 10.7 FL (ref 10.8–14.1)
PMV BLD AUTO: 11 FL (ref 10.8–14.1)
POTASSIUM SERPL-SCNC: 3.6 MMOL/L (ref 3.5–5.1)
RBC # BLD AUTO: 3.9 M/UL (ref 4.23–5.67)
RBC # BLD AUTO: 4.15 M/UL (ref 4.23–5.67)
SODIUM SERPL-SCNC: 145 MMOL/L (ref 136–145)
TROPONIN I SERPL-MCNC: 0.1 NG/ML (ref 0.02–0.05)
WBC # BLD AUTO: 12.1 K/UL (ref 4.3–11.1)
WBC # BLD AUTO: 9.3 K/UL (ref 4.3–11.1)

## 2018-01-22 PROCEDURE — 74011250637 HC RX REV CODE- 250/637: Performed by: NURSE PRACTITIONER

## 2018-01-22 PROCEDURE — 77010033678 HC OXYGEN DAILY

## 2018-01-22 PROCEDURE — 82550 ASSAY OF CK (CPK): CPT | Performed by: INTERNAL MEDICINE

## 2018-01-22 PROCEDURE — 74011250636 HC RX REV CODE- 250/636

## 2018-01-22 PROCEDURE — 36415 COLL VENOUS BLD VENIPUNCTURE: CPT | Performed by: INTERNAL MEDICINE

## 2018-01-22 PROCEDURE — 74011250637 HC RX REV CODE- 250/637: Performed by: FAMILY MEDICINE

## 2018-01-22 PROCEDURE — 80048 BASIC METABOLIC PNL TOTAL CA: CPT | Performed by: INTERNAL MEDICINE

## 2018-01-22 PROCEDURE — 77030027138 HC INCENT SPIROMETER -A

## 2018-01-22 PROCEDURE — 94760 N-INVAS EAR/PLS OXIMETRY 1: CPT

## 2018-01-22 PROCEDURE — 74011250636 HC RX REV CODE- 250/636: Performed by: FAMILY MEDICINE

## 2018-01-22 PROCEDURE — 74011000250 HC RX REV CODE- 250: Performed by: INTERNAL MEDICINE

## 2018-01-22 PROCEDURE — 85730 THROMBOPLASTIN TIME PARTIAL: CPT | Performed by: FAMILY MEDICINE

## 2018-01-22 PROCEDURE — 83735 ASSAY OF MAGNESIUM: CPT | Performed by: INTERNAL MEDICINE

## 2018-01-22 PROCEDURE — 5A2204Z RESTORATION OF CARDIAC RHYTHM, SINGLE: ICD-10-PCS | Performed by: INTERNAL MEDICINE

## 2018-01-22 PROCEDURE — 85027 COMPLETE CBC AUTOMATED: CPT | Performed by: INTERNAL MEDICINE

## 2018-01-22 PROCEDURE — 74011250636 HC RX REV CODE- 250/636: Performed by: INTERNAL MEDICINE

## 2018-01-22 PROCEDURE — 99152 MOD SED SAME PHYS/QHP 5/>YRS: CPT

## 2018-01-22 PROCEDURE — 82962 GLUCOSE BLOOD TEST: CPT

## 2018-01-22 PROCEDURE — 82553 CREATINE MB FRACTION: CPT | Performed by: INTERNAL MEDICINE

## 2018-01-22 PROCEDURE — 74011000258 HC RX REV CODE- 258: Performed by: INTERNAL MEDICINE

## 2018-01-22 PROCEDURE — 99232 SBSQ HOSP IP/OBS MODERATE 35: CPT | Performed by: INTERNAL MEDICINE

## 2018-01-22 PROCEDURE — 74011250637 HC RX REV CODE- 250/637: Performed by: INTERNAL MEDICINE

## 2018-01-22 PROCEDURE — 74011000250 HC RX REV CODE- 250: Performed by: FAMILY MEDICINE

## 2018-01-22 PROCEDURE — 85025 COMPLETE CBC W/AUTO DIFF WBC: CPT | Performed by: INTERNAL MEDICINE

## 2018-01-22 PROCEDURE — 84100 ASSAY OF PHOSPHORUS: CPT | Performed by: INTERNAL MEDICINE

## 2018-01-22 PROCEDURE — 65660000000 HC RM CCU STEPDOWN

## 2018-01-22 PROCEDURE — 92960 CARDIOVERSION ELECTRIC EXT: CPT

## 2018-01-22 PROCEDURE — 84484 ASSAY OF TROPONIN QUANT: CPT | Performed by: INTERNAL MEDICINE

## 2018-01-22 PROCEDURE — 93312 ECHO TRANSESOPHAGEAL: CPT

## 2018-01-22 PROCEDURE — 94640 AIRWAY INHALATION TREATMENT: CPT

## 2018-01-22 PROCEDURE — 71046 X-RAY EXAM CHEST 2 VIEWS: CPT

## 2018-01-22 PROCEDURE — 74011250637 HC RX REV CODE- 250/637: Performed by: HOSPITALIST

## 2018-01-22 RX ORDER — LIDOCAINE HYDROCHLORIDE 20 MG/ML
15 SOLUTION OROPHARYNGEAL AS NEEDED
Status: DISCONTINUED | OUTPATIENT
Start: 2018-01-22 | End: 2018-01-24 | Stop reason: HOSPADM

## 2018-01-22 RX ORDER — FENTANYL CITRATE 50 UG/ML
25-50 INJECTION, SOLUTION INTRAMUSCULAR; INTRAVENOUS
Status: DISCONTINUED | OUTPATIENT
Start: 2018-01-22 | End: 2018-01-23

## 2018-01-22 RX ORDER — AMIODARONE HYDROCHLORIDE 200 MG/1
400 TABLET ORAL 2 TIMES DAILY
Status: DISCONTINUED | OUTPATIENT
Start: 2018-01-22 | End: 2018-01-24 | Stop reason: HOSPADM

## 2018-01-22 RX ORDER — HEPARIN SODIUM 5000 [USP'U]/ML
35 INJECTION, SOLUTION INTRAVENOUS; SUBCUTANEOUS ONCE
Status: COMPLETED | OUTPATIENT
Start: 2018-01-22 | End: 2018-01-22

## 2018-01-22 RX ORDER — MIDAZOLAM HYDROCHLORIDE 1 MG/ML
.5-2 INJECTION, SOLUTION INTRAMUSCULAR; INTRAVENOUS
Status: DISCONTINUED | OUTPATIENT
Start: 2018-01-22 | End: 2018-01-23

## 2018-01-22 RX ADMIN — Medication 10 ML: at 14:00

## 2018-01-22 RX ADMIN — Medication 10 ML: at 09:18

## 2018-01-22 RX ADMIN — CARVEDILOL 3.12 MG: 3.12 TABLET, FILM COATED ORAL at 17:40

## 2018-01-22 RX ADMIN — Medication 10 ML: at 02:32

## 2018-01-22 RX ADMIN — Medication 100 MG: at 09:06

## 2018-01-22 RX ADMIN — FLUTICASONE PROPIONATE 2 SPRAY: 50 SPRAY, METERED NASAL at 09:21

## 2018-01-22 RX ADMIN — HEPARIN SODIUM 4450 UNITS: 5000 INJECTION, SOLUTION INTRAVENOUS; SUBCUTANEOUS at 02:30

## 2018-01-22 RX ADMIN — FOLIC ACID 1 MG: 1 TABLET ORAL at 09:06

## 2018-01-22 RX ADMIN — WATER 2 G: 1 INJECTION INTRAMUSCULAR; INTRAVENOUS; SUBCUTANEOUS at 09:12

## 2018-01-22 RX ADMIN — LEVALBUTEROL HYDROCHLORIDE 0.63 MG: 0.63 SOLUTION RESPIRATORY (INHALATION) at 14:40

## 2018-01-22 RX ADMIN — FAMOTIDINE 20 MG: 20 TABLET, FILM COATED ORAL at 09:06

## 2018-01-22 RX ADMIN — SERTRALINE HYDROCHLORIDE 50 MG: 50 TABLET ORAL at 09:06

## 2018-01-22 RX ADMIN — SALINE NASAL SPRAY 2 SPRAY: 1.5 SOLUTION NASAL at 09:21

## 2018-01-22 RX ADMIN — Medication 5 ML: at 02:32

## 2018-01-22 RX ADMIN — AMIODARONE HYDROCHLORIDE 400 MG: 200 TABLET ORAL at 09:06

## 2018-01-22 RX ADMIN — LIDOCAINE HYDROCHLORIDE 15 ML: 20 SOLUTION ORAL; TOPICAL at 12:00

## 2018-01-22 RX ADMIN — TIOTROPIUM BROMIDE 18 MCG: 18 CAPSULE ORAL; RESPIRATORY (INHALATION) at 08:34

## 2018-01-22 RX ADMIN — RIVAROXABAN 20 MG: 20 TABLET, FILM COATED ORAL at 12:39

## 2018-01-22 RX ADMIN — DILTIAZEM HYDROCHLORIDE 15 MG/HR: 5 INJECTION INTRAVENOUS at 02:30

## 2018-01-22 RX ADMIN — MIDAZOLAM HYDROCHLORIDE 2 MG: 1 INJECTION, SOLUTION INTRAMUSCULAR; INTRAVENOUS at 11:15

## 2018-01-22 RX ADMIN — Medication 10 ML: at 22:00

## 2018-01-22 RX ADMIN — AMIODARONE HYDROCHLORIDE 400 MG: 200 TABLET ORAL at 17:40

## 2018-01-22 RX ADMIN — LEVALBUTEROL HYDROCHLORIDE 0.63 MG: 0.63 SOLUTION RESPIRATORY (INHALATION) at 20:14

## 2018-01-22 RX ADMIN — LEVALBUTEROL HYDROCHLORIDE 0.63 MG: 0.63 SOLUTION RESPIRATORY (INHALATION) at 05:37

## 2018-01-22 RX ADMIN — FENTANYL CITRATE 50 MCG: 50 INJECTION, SOLUTION INTRAMUSCULAR; INTRAVENOUS at 11:10

## 2018-01-22 RX ADMIN — MIDAZOLAM HYDROCHLORIDE 2 MG: 1 INJECTION, SOLUTION INTRAMUSCULAR; INTRAVENOUS at 11:10

## 2018-01-22 RX ADMIN — CARVEDILOL 3.12 MG: 3.12 TABLET, FILM COATED ORAL at 09:06

## 2018-01-22 RX ADMIN — FAMOTIDINE 20 MG: 20 TABLET, FILM COATED ORAL at 17:39

## 2018-01-22 NOTE — PROGRESS NOTES
Pulmonary Daily Progress Note: 1/22/2018    Concetta Oviedo   Admission Date: 1/6/2018     45 y. o. CM admitted on 1/6 with acute hypoxemic respiratory failure, with methamphetamine positive, smoker with strong FH of severe COPD, morbid obesity, suspected OHS.  Also has sinusitis on CT and possible pneumonia.   Has had waxing/waning hypercarbia through his hospitalization- some days alert and others somnolent.  Has required intubation twice thus far. Transferred to floor on 1/20    The patient's chart is reviewed and the patient is discussed with the staff. Subjective:     Taking a nap lying flat in bed with oxygen on.   Leukocytosis improved and no infiltrate on CXR    Current Facility-Administered Medications   Medication Dose Route Frequency    rivaroxaban (XARELTO) tablet 20 mg  20 mg Oral DAILY WITH BREAKFAST    amiodarone (CORDARONE) tablet 400 mg  400 mg Oral BID    fentaNYL citrate (PF) injection 25-50 mcg  25-50 mcg IntraVENous Multiple    midazolam (VERSED) injection 0.5-2 mg  0.5-2 mg IntraVENous Multiple    lidocaine (XYLOCAINE) 2 % viscous solution 15 mL  15 mL Mouth/Throat PRN    levalbuterol (XOPENEX) nebulizer soln 0.63 mg/3 mL  0.63 mg Nebulization Q8H    dilTIAZem (CARDIZEM) 125 mg in dextrose 5% 125 mL infusion  0-15 mg/hr IntraVENous TITRATE    sodium chloride (NS) flush 10 mL  10 mL InterCATHeter Q8H    sodium chloride (NS) flush 10 mL  10 mL InterCATHeter PRN    artificial saliva (MOUTH KOTE) 1 Spray  1 Spray Oral PRN    azithromycin (ZITHROMAX) 500 mg in 0.9% sodium chloride 250 mL IVPB  500 mg IntraVENous Q24H    cefTRIAXone (ROCEPHIN) 2 g in sterile water (preservative free) 20 mL IV syringe  2 g IntraVENous Q24H    tiotropium (SPIRIVA) inhalation capsule 18 mcg  1 Cap Inhalation DAILY    naloxone (NARCAN) injection 0.4 mg  0.4 mg IntraVENous EVERY 2 MINUTES AS NEEDED    thiamine (B-1) tablet 100 mg  100 mg Oral DAILY    folic acid (FOLVITE) tablet 1 mg  1 mg Oral DAILY    famotidine (PEPCID) tablet 20 mg  20 mg Oral BID    promethazine (PHENERGAN) with saline injection 12.5 mg  12.5 mg IntraVENous Q6H PRN    LORazepam (ATIVAN) tablet 0.5 mg  0.5 mg Oral Q8H PRN    haloperidol lactate (HALDOL) injection 5 mg  5 mg IntraVENous Q6H PRN    sertraline (ZOLOFT) tablet 50 mg  50 mg Oral DAILY    sodium chloride (OCEAN) 0.65 % nasal spray 2 Spray  2 Spray Both Nostrils QID    sodium chloride (OCEAN) 0.65 % nasal spray 2 Spray  2 Spray Both Nostrils Q2H PRN    fluticasone (FLONASE) 50 mcg/actuation nasal spray 2 Spray  2 Spray Both Nostrils DAILY    influenza vaccine 2017-18 (3 yrs+)(PF) (FLUZONE QUAD/FLUARIX QUAD) injection 0.5 mL  0.5 mL IntraMUSCular PRIOR TO DISCHARGE    metoprolol (LOPRESSOR) injection 5 mg  5 mg IntraVENous Q4H PRN    hydrALAZINE (APRESOLINE) 20 mg/mL injection 20 mg  20 mg IntraVENous Q6H PRN    sodium chloride (NS) flush 5-10 mL  5-10 mL IntraVENous Q8H    sodium chloride (NS) flush 5-10 mL  5-10 mL IntraVENous PRN    carvedilol (COREG) tablet 3.125 mg  3.125 mg Oral BID WITH MEALS    ondansetron (ZOFRAN) injection 4 mg  4 mg IntraVENous Q6H PRN    acetaminophen (TYLENOL) tablet 650 mg  650 mg Oral Q6H PRN    diphenhydrAMINE (BENADRYL) capsule 25 mg  25 mg Oral Q4H PRN    polyethylene glycol (MIRALAX) packet 17 g  17 g Oral DAILY PRN    guaiFENesin ER (MUCINEX) tablet 600 mg  600 mg Oral Q12H PRN    calcium carbonate (TUMS) chewable tablet 400 mg [elemental]  400 mg Oral TID PRN       Review of Systems    Constitutional:  negative for fever, chills, sweats  Cardiovascular:  negative for chest pain, palpitations, syncope, edema  Gastrointestinal:  negative for dysphagia, reflux, vomiting, diarrhea, abdominal pain, or melena  Neurologic:  negative for focal weakness, numbness, headache      Objective:     Vitals:    01/22/18 0834 01/22/18 0902 01/22/18 1128 01/22/18 1311   BP:  151/69 122/73 103/63   Pulse:  (!) 110 78 74   Resp:  20 18 20   Temp:  96.4 °F (35.8 °C)  96.8 °F (36 °C)   SpO2: 93% 96% 99% 98%   Weight:       Height:           Intake and Output:   01/20 1901 - 01/22 0700  In: 400 [P.O.:400]  Out: 3825 [Urine:3825]  01/22 0701 - 01/22 1900  In: 240 [P.O.:240]  Out: -     Physical Exam:          Constitutional:  the patient is well developed and in no acute distress on 5 PM  EENMT:  Sclera clear, pupils equal, oral mucosa moist  Respiratory: clear -no wheezing  Cardiovascular:  RRR without M,G,R  Gastrointestinal: soft and non-tender; with positive bowel sounds. Musculoskeletal: warm without cyanosis. There is no lower leg edema. Skin:  no jaundice or rashes, no wounds   Neurologic: no gross neuro deficits     Psychiatric:  alert and oriented x 3        CXR: minmimal haziness in left base ?atelecatsis vs infiltrates          LAB  Recent Labs      01/22/18   0633  01/21/18   2357   GLUCPOC  110*  118*      Recent Labs      01/22/18   0440  01/21/18   0653  01/20/18   0343   WBC  12.1*  17.3*  15.9*   HGB  13.4*  13.8  14.8   HCT  43.3  43.9  46.4   PLT  209  210  216     Recent Labs      01/22/18   0440  01/21/18   1820  01/21/18   1324  01/21/18   0653  01/20/18   0343   NA  145   --    --   144  143   K  3.6   --    --   4.0  4.2   CL  106   --    --   104  104   CO2  32   --    --   35*  35*   GLU  98   --    --   108*  174*   BUN  24*   --    --   24*  30*   CREA  0.98   --    --   0.99  1.07   MG  2.2   --   2.1   --    --    PHOS  3.9   --    --    --    --    CA  8.4   --    --   8.9  8.7   TROIQ  0.10*  0.04  0.03   --    --      No results for input(s): PH, PCO2, PO2, HCO3 in the last 72 hours. No results for input(s): LCAD, LAC in the last 72 hours.     Assessment:  (Medical Decision Making)     Hospital Problems  Date Reviewed: 1/19/2018          Codes Class Noted POA    Atrial flutter ([facility] [address] ICD-10-CM: U61.87  ICD-9-CM: 427.32  1/21/2018 Unknown        Diarrhea ICD-10-CM: R19.7  ICD-9-CM: 787.91  1/18/2018 No        Alcohol abuse (Chronic) ICD-10-CM: F10.10  ICD-9-CM: 305.00  1/15/2018 Yes        Homeless ICD-10-CM: Z59.0  ICD-9-CM: V60.0  1/15/2018 No        Community acquired pneumonia of right lower lobe of lung ([facility] [address] ICD-10-CM: J18.1  ICD-9-CM: 892  1/9/2018 Yes        Urethral stricture ICD-10-CM: N35.9  ICD-9-CM: 598.9  1/7/2018 Yes        COPD (chronic obstructive pulmonary disease) (HCC) (Chronic) ICD-10-CM: J44.9  ICD-9-CM: 183  1/6/2018 Yes        Nicotine dependence (Chronic) ICD-10-CM: F17.200  ICD-9-CM: 305.1  1/6/2018 Yes        Methamphetamine abuse ICD-10-CM: F15.10  ICD-9-CM: 305.70  1/6/2018 Yes        Anasarca ICD-10-CM: R60.1  ICD-9-CM: 782.3  1/6/2018 Yes        NATALIIA (obstructive sleep apnea) (Chronic) ICD-10-CM: G47.33  ICD-9-CM: 327.23  1/6/2018 Yes        Acute kidney injury [facility]) ICD-10-CM: N17.9  ICD-9-CM: 584.9  1/6/2018 Yes        * (Principal)Acute on chronic respiratory failure with hypoxia and hypercapnia (HCC) ICD-10-CM: J96.21, J96.22  ICD-9-CM: 518.84, 786.09, 799.02  1/6/2018 Yes              Plan:  (Medical Decision Making)     --continue BIPAP when sleeping but go up on the pressure to 18/12 or higher, just talked to respiratory  --taper oxygen as tolerated, not sure he will be able to get this or BIPAP at home  --nebs  --completes ceftriaxone tomorrow on 1/23 for sinusitis. --Stop azithromycin with interacts with xarelto, raising the levels of xarelto to the point where bleeding risks can occur.  --social issues will be major issues regarding placement -- need for oxygen, BIPAP, etc  --KEEP DEXTER IN-- per UROLOGY --  was very difficult to place -- [place] [id number]. More than 50% of the time documented was spent in face-to-face contact with the patient and in the care of the patient on the floor/unit where the patient is located.     Song Patel MD

## 2018-01-22 NOTE — PROGRESS NOTES
Physical Therapy Note:    Attempted to see patient today for physical therapy treatment session. Per chart, patient off of the floor for cardioversion. Will follow and re-attempt as schedule permits/patient available.  Thank you,    Cordelia Gilbert, PT, DPT  1/22/18

## 2018-01-22 NOTE — PROCEDURES
[address]    Acosta Macias  MR#: [id number]  : 1965  ACCOUNT #: [de-identified]   DATE OF SERVICE: 2018    PROCEDURE:  This is a MIRANDA cardioversion for recent onset atrial flutter. DESCRIPTION OF PROCEDURE:  After obtaining informed consent, the patient was brought in the lab. He underwent sedation with Versed and fentanyl after which his esophagus was intubated without difficulty. MIRANDA was obtained showing normal valvular function, normal LV function and no evidence of clot, thrombus or vegetation. The patient was initially attempted 50-joule direct-current synchronized cardioversion for atrial flutter. This was unsuccessful. A repeat 150-joule direct-current synchronized cardioversion was successful with restitution of normal sinus rhythm. The patient will be maintained on heparin until therapeutic oral anticoagulation is achieved.       MD LASHAE Shelton / SERENITY  D: 2018 11:24     T: 2018 13:35  JOB #: [id number]

## 2018-01-22 NOTE — PROGRESS NOTES
Progress Note    Patient: Coy Kenyon MRN: [id number]  SSN: [id number]    YOB: 1965  Age: 45 y.o. Sex: male      Admit Date: 1/6/2018    LOS: 16 days     Subjective: This is a 44 YO male patient with a PMH of HTN, obesity, met-amphetamine use and tobacco use who was admitted on 1/16 with acute hypoxemic respiratory failure. Sinusitis was reported on CT scan and also possible pneumonia was reported. He was intubated and admitted to the ICU. He was started on IV ceftriaxone. He was extubated and unfortunately he developed respiratory failure had had to be re-intubated. There has been some suspicion for inpatient illicit drug use although it has not been proved. He was extubated for the second time and was transferred to the medical floor. On 1/21 he developed atrial flutter and had to be transferred to the tely floor. He was started on a cardizem drip + heparin  with partial response. Today he had a MIRANDA done with Electric cardioversion. He cardioverted to NSR and was switched to oral xarelto and amiodarone. Is using BIPAP at night. Has no resources and placement might be a problem. Objective:     Vitals:    01/22/18 0902 01/22/18 1128 01/22/18 1311 01/22/18 1440   BP: 151/69 122/73 103/63    Pulse: (!) 110 78 74    Resp: 20 18 20    Temp: 96.4 °F (35.8 °C)  96.8 °F (36 °C)    SpO2: 96% 99% 98% 97%   Weight:       Height:            Intake and Output:  Current Shift: 01/22 0701 - 01/22 1900  In: 240 [P.O.:240]  Out: -   Last three shifts: 01/20 1901 - 01/22 0700  In: 400 [P.O.:400]  Out: 3825 [Urine:3825]    Physical Exam:   GENERAL: alert  EYE: conjunctivae/corneas clear. PERRL, EOM's intact. Fundi benign  LYMPHATIC: Cervical, supraclavicular, and axillary nodes normal.   THROAT & NECK: normal and no erythema or exudates noted. LUNG: decreased breath sounds in both lung bases   HEART:tachycardic. regular  ABDOMEN: soft, non-tender.  Bowel sounds normal. No masses,  no organomegaly  EXTREMITIES:  extremities normal, atraumatic, no cyanosis or edema  SKIN: Normal.  NEUROLOGIC: No focal deficits. PSYCHIATRIC: non focal    Lab/Data Review:  Lab results reviewed. For significant abnormal values and values requiring intervention, see assessment and plan. Assessment:     Principal Problem:    Acute on chronic respiratory failure with hypoxia and hypercapnia (HCC) (1/6/2018)    Active Problems:    COPD (chronic obstructive pulmonary disease) ([address] (1/6/2018)      Nicotine dependence (1/6/2018)      Methamphetamine abuse (1/6/2018)      Anasarca (1/6/2018)      NATALIIA (obstructive sleep apnea) (1/6/2018)      Acute kidney injury ([address] (1/6/2018)      Urethral stricture (1/7/2018)      Community acquired pneumonia of right lower lobe of lung ([address] (1/9/2018)      Alcohol abuse (1/15/2018)      Homeless (1/15/2018)      Diarrhea (1/18/2018)      Atrial flutter ([address] (1/21/2018)        Plan:     -had MIRANDA and cardioversion today. Started on amio. IV heparin stopped,started on xarelto   -continue coreg   -removed nicotine patch due to arrhythmia   -stopped albuterol. Ordered xopenex. - Will complete Ceftriaxone tomorrow  -continue BIPAP at night   -keep alegre in place ( placed by urology).  Consider removal a few days before his discharge date has been confirmed     Signed By: Katarzyna Barroso MD     January 22, 2018

## 2018-01-22 NOTE — PROGRESS NOTES
:\"Bedside\"} shift change report given to Skyler Curry (oncoming nurse) by self  (offgoing nurse). Report included the following information SBAR.

## 2018-01-22 NOTE — PROGRESS NOTES
TRANSFER - OUT REPORT:    Verbal report given to chata and telemetry rn(name) on Dorota Overall  being transferred to [place] [id number](unit) for routine progression of care       Report consisted of patients Situation, Background, Assessment and   Recommendations(SBAR). Information from the following report(s) SBAR was reviewed with the receiving nurse. Opportunity for questions and clarification was provided. Procedure: pancho/cvn   Finding Summary: cvn x 2, converted to NSR(cath/pci/pacer settings)            Intra Procedure Meds:    Versed: 4mg  Fentanyl: 50mcg               Peripheral IV 01/21/18 Right Forearm (Active)                                has No Known Allergies.     Past Medical History:   Diagnosis Date    Sleep disorder      Visit Vitals    /73    Pulse 78    Temp 96.4 °F (35.8 °C)    Resp 18    Ht 5' 8\" (1.727 m)    Wt 129.4 kg (285 lb 4.8 oz)    SpO2 99%    BMI 43.38 kg/m2

## 2018-01-22 NOTE — PROGRESS NOTES
\"Bedside shift change report given to self  (oncoming nurse) by Cynthia Melissa RN (offgoing nurse). Report included the following information SBAR.

## 2018-01-22 NOTE — PROGRESS NOTES
Bedside and Verbal shift change report given to self (oncoming nurse) by Taiwo Altamirano RN (offgoing nurse). Report included the following information Kardex.

## 2018-01-22 NOTE — PROGRESS NOTES
1/22/2018 6:50 AM    Admit Date: 1/6/2018    Admit Diagnosis: COPD (chronic obstructive pulmonary disease) (HCC)      Subjective:    Patient remains in atrail flutter with rvr. Needs pancho cardioversion.     Objective:      Visit Vitals    BP 94/57 (BP 1 Location: Left arm, BP Patient Position: At rest)    Pulse (!) 116    Temp 98.3 °F (36.8 °C)    Resp 20    Ht 5' 8\" (1.727 m)    Wt 129.4 kg (285 lb 4.8 oz)    SpO2 97%    BMI 43.38 kg/m2       ROS:  General ROS: negative for - chills  Hematological and Lymphatic ROS: negative for - blood clots or jaundice  Respiratory ROS: no cough, shortness of breath, or wheezing  Cardiovascular ROS: no chest pain or dyspnea on exertion  Gastrointestinal ROS: no abdominal pain, change in bowel habits, or black or bloody stools  Neurological ROS: no TIA or stroke symptoms    Physical Exam:    Physical Examination: General appearance - alert, well appearing, and in no distress  Mental status - alert, oriented to person, place, and time  Eyes - pupils equal and reactive, extraocular eye movements intact  Neck/lymph - supple, no significant adenopathy  Chest/CV - clear to auscultation, no wheezes, rales or rhonchi, symmetric air entry  Heart - irregularly irregular rhythm with rate 130  Abdomen/GI - soft, nontender, nondistended, no masses or organomegaly  Musculoskeletal - no joint tenderness, deformity or swelling  Extremities - peripheral pulses normal, no pedal edema, no clubbing or cyanosis  Skin - normal coloration and turgor, no rashes, no suspicious skin lesions noted    Current Facility-Administered Medications   Medication Dose Route Frequency    heparin 25,000 units in dextrose 500 mL infusion  12-25 Units/kg/hr (Adjusted) IntraVENous TITRATE    levalbuterol (XOPENEX) nebulizer soln 0.63 mg/3 mL  0.63 mg Nebulization Q8H    dilTIAZem (CARDIZEM) 125 mg in dextrose 5% 125 mL infusion  0-15 mg/hr IntraVENous TITRATE    sodium chloride (NS) flush 10 mL  10 mL InterCATHeter Q8H    sodium chloride (NS) flush 10 mL  10 mL InterCATHeter PRN    artificial saliva (MOUTH KOTE) 1 Spray  1 Spray Oral PRN    azithromycin (ZITHROMAX) 500 mg in 0.9% sodium chloride 250 mL IVPB  500 mg IntraVENous Q24H    cefTRIAXone (ROCEPHIN) 2 g in sterile water (preservative free) 20 mL IV syringe  2 g IntraVENous Q24H    tiotropium (SPIRIVA) inhalation capsule 18 mcg  1 Cap Inhalation DAILY    naloxone (NARCAN) injection 0.4 mg  0.4 mg IntraVENous EVERY 2 MINUTES AS NEEDED    thiamine (B-1) tablet 100 mg  100 mg Oral DAILY    folic acid (FOLVITE) tablet 1 mg  1 mg Oral DAILY    famotidine (PEPCID) tablet 20 mg  20 mg Oral BID    promethazine (PHENERGAN) with saline injection 12.5 mg  12.5 mg IntraVENous Q6H PRN    LORazepam (ATIVAN) tablet 0.5 mg  0.5 mg Oral Q8H PRN    haloperidol lactate (HALDOL) injection 5 mg  5 mg IntraVENous Q6H PRN    sertraline (ZOLOFT) tablet 50 mg  50 mg Oral DAILY    sodium chloride (OCEAN) 0.65 % nasal spray 2 Spray  2 Spray Both Nostrils QID    sodium chloride (OCEAN) 0.65 % nasal spray 2 Spray  2 Spray Both Nostrils Q2H PRN    fluticasone (FLONASE) 50 mcg/actuation nasal spray 2 Spray  2 Spray Both Nostrils DAILY    influenza vaccine 2017-18 (3 yrs+)(PF) (FLUZONE QUAD/FLUARIX QUAD) injection 0.5 mL  0.5 mL IntraMUSCular PRIOR TO DISCHARGE    metoprolol (LOPRESSOR) injection 5 mg  5 mg IntraVENous Q4H PRN    hydrALAZINE (APRESOLINE) 20 mg/mL injection 20 mg  20 mg IntraVENous Q6H PRN    sodium chloride (NS) flush 5-10 mL  5-10 mL IntraVENous Q8H    sodium chloride (NS) flush 5-10 mL  5-10 mL IntraVENous PRN    carvedilol (COREG) tablet 3.125 mg  3.125 mg Oral BID WITH MEALS    ondansetron (ZOFRAN) injection 4 mg  4 mg IntraVENous Q6H PRN    acetaminophen (TYLENOL) tablet 650 mg  650 mg Oral Q6H PRN    diphenhydrAMINE (BENADRYL) capsule 25 mg  25 mg Oral Q4H PRN    polyethylene glycol (MIRALAX) packet 17 g  17 g Oral DAILY PRN    guaiFENesin ER (MUCINEX) tablet 600 mg  600 mg Oral Q12H PRN    calcium carbonate (TUMS) chewable tablet 400 mg [elemental]  400 mg Oral TID PRN       Data Review:   @LABRCNT(Na,K,BUN,CREA,WBC,HGB,HCT,PLT,INR,TRP,TCHOL*,Triglyceride*,LDL*,LDLCPOC HDL*,HDL])@    TELEMETRY: aflutter    Assessment/Plan:     Principal Problem:    Acute on chronic respiratory failure with hypoxia and hypercapnia (HCC) (1/6/2018)    Active Problems:    COPD (chronic obstructive pulmonary disease) ([address] (1/6/2018)The current medical regimen is effective;  continue present plan and medications.         Nicotine dependence (1/6/2018)      Methamphetamine abuse (1/6/2018)      Anasarca (1/6/2018)      NATALIIA (obstructive sleep apnea) (1/6/2018)      Acute kidney injury ([address] (1/6/2018)      Urethral stricture (1/7/2018)      Community acquired pneumonia of right lower lobe of lung ([address] (1/9/2018)      Alcohol abuse (1/15/2018)      Homeless (1/15/2018)      Diarrhea (1/18/2018)      Atrial flutter ([address] (1/21/2018) consider pancho cardioversion today          Rasheeda Mccarthy MD

## 2018-01-22 NOTE — PROGRESS NOTES
OT note: Attempted to see patient this am for OT, however patient is off the floor for pancho cardioversion. . Will re-attempt as time permits.   Thank you,   Phil Kay, OT

## 2018-01-23 PROBLEM — I48.92 ATRIAL FLUTTER (HCC): Status: RESOLVED | Noted: 2018-01-21 | Resolved: 2018-01-23

## 2018-01-23 PROBLEM — J18.9 COMMUNITY ACQUIRED PNEUMONIA OF RIGHT LOWER LOBE OF LUNG: Status: RESOLVED | Noted: 2018-01-09 | Resolved: 2018-01-23

## 2018-01-23 PROBLEM — N17.9 ACUTE KIDNEY INJURY (HCC): Status: RESOLVED | Noted: 2018-01-06 | Resolved: 2018-01-23

## 2018-01-23 PROBLEM — R19.7 DIARRHEA: Status: RESOLVED | Noted: 2018-01-18 | Resolved: 2018-01-23

## 2018-01-23 PROBLEM — Z59.00 HOMELESS: Chronic | Status: ACTIVE | Noted: 2018-01-15

## 2018-01-23 LAB
ANION GAP SERPL CALC-SCNC: 4 MMOL/L (ref 7–16)
BUN SERPL-MCNC: 17 MG/DL (ref 6–23)
CALCIUM SERPL-MCNC: 8.6 MG/DL (ref 8.3–10.4)
CHLORIDE SERPL-SCNC: 101 MMOL/L (ref 98–107)
CO2 SERPL-SCNC: 38 MMOL/L (ref 21–32)
CREAT SERPL-MCNC: 1.08 MG/DL (ref 0.8–1.5)
ERYTHROCYTE [DISTWIDTH] IN BLOOD BY AUTOMATED COUNT: 13 % (ref 11.9–14.6)
GLUCOSE BLD STRIP.AUTO-MCNC: 108 MG/DL (ref 65–100)
GLUCOSE SERPL-MCNC: 156 MG/DL (ref 65–100)
HCT VFR BLD AUTO: 44.8 % (ref 41.1–50.3)
HGB BLD-MCNC: 13.8 G/DL (ref 13.6–17.2)
MAGNESIUM SERPL-MCNC: 2.1 MG/DL (ref 1.8–2.4)
MCH RBC QN AUTO: 32.4 PG (ref 26.1–32.9)
MCHC RBC AUTO-ENTMCNC: 30.8 G/DL (ref 31.4–35)
MCV RBC AUTO: 105.2 FL (ref 79.6–97.8)
PLATELET # BLD AUTO: 190 K/UL (ref 150–450)
PMV BLD AUTO: 10.5 FL (ref 10.8–14.1)
POTASSIUM SERPL-SCNC: 4.1 MMOL/L (ref 3.5–5.1)
RBC # BLD AUTO: 4.26 M/UL (ref 4.23–5.67)
SODIUM SERPL-SCNC: 143 MMOL/L (ref 136–145)
WBC # BLD AUTO: 9 K/UL (ref 4.3–11.1)

## 2018-01-23 PROCEDURE — 94640 AIRWAY INHALATION TREATMENT: CPT

## 2018-01-23 PROCEDURE — 97535 SELF CARE MNGMENT TRAINING: CPT

## 2018-01-23 PROCEDURE — 74011250637 HC RX REV CODE- 250/637: Performed by: HOSPITALIST

## 2018-01-23 PROCEDURE — 74011250636 HC RX REV CODE- 250/636: Performed by: INTERNAL MEDICINE

## 2018-01-23 PROCEDURE — 99232 SBSQ HOSP IP/OBS MODERATE 35: CPT | Performed by: INTERNAL MEDICINE

## 2018-01-23 PROCEDURE — 97110 THERAPEUTIC EXERCISES: CPT

## 2018-01-23 PROCEDURE — 85027 COMPLETE CBC AUTOMATED: CPT | Performed by: INTERNAL MEDICINE

## 2018-01-23 PROCEDURE — 74011000250 HC RX REV CODE- 250: Performed by: INTERNAL MEDICINE

## 2018-01-23 PROCEDURE — 80048 BASIC METABOLIC PNL TOTAL CA: CPT | Performed by: INTERNAL MEDICINE

## 2018-01-23 PROCEDURE — 83735 ASSAY OF MAGNESIUM: CPT | Performed by: INTERNAL MEDICINE

## 2018-01-23 PROCEDURE — 77030019605

## 2018-01-23 PROCEDURE — 74011250637 HC RX REV CODE- 250/637: Performed by: FAMILY MEDICINE

## 2018-01-23 PROCEDURE — 94660 CPAP INITIATION&MGMT: CPT

## 2018-01-23 PROCEDURE — 97530 THERAPEUTIC ACTIVITIES: CPT

## 2018-01-23 PROCEDURE — 74011250637 HC RX REV CODE- 250/637: Performed by: NURSE PRACTITIONER

## 2018-01-23 PROCEDURE — 82962 GLUCOSE BLOOD TEST: CPT

## 2018-01-23 PROCEDURE — 65660000000 HC RM CCU STEPDOWN

## 2018-01-23 PROCEDURE — 74011250637 HC RX REV CODE- 250/637: Performed by: INTERNAL MEDICINE

## 2018-01-23 PROCEDURE — 77010033678 HC OXYGEN DAILY

## 2018-01-23 PROCEDURE — 94761 N-INVAS EAR/PLS OXIMETRY MLT: CPT

## 2018-01-23 PROCEDURE — 94760 N-INVAS EAR/PLS OXIMETRY 1: CPT

## 2018-01-23 PROCEDURE — 36415 COLL VENOUS BLD VENIPUNCTURE: CPT | Performed by: INTERNAL MEDICINE

## 2018-01-23 RX ORDER — NYSTATIN 100000 [USP'U]/G
POWDER TOPICAL 2 TIMES DAILY
Status: DISCONTINUED | OUTPATIENT
Start: 2018-01-23 | End: 2018-01-24 | Stop reason: HOSPADM

## 2018-01-23 RX ADMIN — LEVALBUTEROL HYDROCHLORIDE 0.63 MG: 0.63 SOLUTION RESPIRATORY (INHALATION) at 14:53

## 2018-01-23 RX ADMIN — LEVALBUTEROL HYDROCHLORIDE 0.63 MG: 0.63 SOLUTION RESPIRATORY (INHALATION) at 19:52

## 2018-01-23 RX ADMIN — FAMOTIDINE 20 MG: 20 TABLET, FILM COATED ORAL at 18:10

## 2018-01-23 RX ADMIN — AMIODARONE HYDROCHLORIDE 400 MG: 200 TABLET ORAL at 18:10

## 2018-01-23 RX ADMIN — SALINE NASAL SPRAY 2 SPRAY: 1.5 SOLUTION NASAL at 09:51

## 2018-01-23 RX ADMIN — Medication 10 ML: at 14:00

## 2018-01-23 RX ADMIN — FLUTICASONE PROPIONATE 2 SPRAY: 50 SPRAY, METERED NASAL at 09:51

## 2018-01-23 RX ADMIN — Medication 10 ML: at 20:40

## 2018-01-23 RX ADMIN — WATER 2 G: 1 INJECTION INTRAMUSCULAR; INTRAVENOUS; SUBCUTANEOUS at 09:43

## 2018-01-23 RX ADMIN — CARVEDILOL 3.12 MG: 3.12 TABLET, FILM COATED ORAL at 09:41

## 2018-01-23 RX ADMIN — CARVEDILOL 3.12 MG: 3.12 TABLET, FILM COATED ORAL at 18:10

## 2018-01-23 RX ADMIN — RIVAROXABAN 20 MG: 20 TABLET, FILM COATED ORAL at 09:41

## 2018-01-23 RX ADMIN — LEVALBUTEROL HYDROCHLORIDE 0.63 MG: 0.63 SOLUTION RESPIRATORY (INHALATION) at 08:16

## 2018-01-23 RX ADMIN — Medication 10 ML: at 05:14

## 2018-01-23 RX ADMIN — AMIODARONE HYDROCHLORIDE 400 MG: 200 TABLET ORAL at 09:41

## 2018-01-23 RX ADMIN — TIOTROPIUM BROMIDE 18 MCG: 18 CAPSULE ORAL; RESPIRATORY (INHALATION) at 08:16

## 2018-01-23 RX ADMIN — SALINE NASAL SPRAY 2 SPRAY: 1.5 SOLUTION NASAL at 20:40

## 2018-01-23 RX ADMIN — NYSTATIN: 100000 POWDER TOPICAL at 21:59

## 2018-01-23 RX ADMIN — SERTRALINE HYDROCHLORIDE 50 MG: 50 TABLET ORAL at 09:41

## 2018-01-23 RX ADMIN — FOLIC ACID 1 MG: 1 TABLET ORAL at 09:41

## 2018-01-23 RX ADMIN — FAMOTIDINE 20 MG: 20 TABLET, FILM COATED ORAL at 09:41

## 2018-01-23 RX ADMIN — Medication 100 MG: at 09:41

## 2018-01-23 NOTE — PROGRESS NOTES
Bedside and Verbal shift change report given to self (oncoming nurse) by Sky Cannon RN (offgoing nurse). Report included the following information SBAR, Kardex, MAR and Recent Results.

## 2018-01-23 NOTE — PROGRESS NOTES
Problem: Mobility Impaired (Adult and Pediatric)  Goal: *Acute Goals and Plan of Care (Insert Text)  UPDATED: 1/19/18  STG:  (1.)Mr. Schaffer will move from supine to sit and sit to supine , scoot up and down and roll side to side with STAND BY ASSIST within 3 day(s). (2.)Mr. Schaffer will transfer from bed to chair and chair to bed with CONTACT GUARD ASSIST using the least restrictive device within 3 day(s). Met 1/23/2018   (3.)Mr. Schaffer will ambulate with CONTACT GUARD ASSIST for 100 feet with the least restrictive device within 3 day(s). Met 1/23/2018   (4.)Mr. Schaffer will perform standing static and dynamic balance activities x 15 minutes with CONTACT GUARD ASSIST to improve safety within 3 day(s). (5.)Mr. Schaffer will maintain stable vital signs throughout all functional mobility within 3 days. Met 1/23/2018     LTG:  (1.)Mr. Schaffer will move from supine to sit and sit to supine , scoot up and down and roll side to side in bed with MODIFIED INDEPENDENCE within 7 day(s). (2.)Mr. Schaffer will transfer from bed to chair and chair to bed with MODIFIED INDEPENDENCE using the least restrictive device within 7 day(s). (3.)Mr. Schaffer will ambulate with MODIFIED INDEPENDENCE for 250+ feet with the least restrictive device within 7 day(s). (4.)Mr. Schaffer will participate in therapeutic activity/exerices x 23 minutes for increased activity tolerance within 7 days.     ________________________________________________________________________________________________        PHYSICAL THERAPY: Daily Note, Treatment Day: 1st, AM 1/23/2018  INPATIENT: Hospital Day: 18  Payor: SELF PAY / Plan: [facility] SELF PAY / Product Type: Self Pay /      NAME/AGE/GENDER: Sakshi Arteaga is a 45 y.o. male   PRIMARY DIAGNOSIS: COPD (chronic obstructive pulmonary disease) (HCC) Acute on chronic respiratory failure with hypoxia and hypercapnia (HCC) Acute on chronic respiratory failure with hypoxia and hypercapnia [facility])        ICD-10: Treatment Diagnosis:   · Difficulty in walking, Not elsewhere classified (R26.2)   Precaution/Allergies:  Review of patient's allergies indicates no known allergies. ASSESSMENT:     Mr. Meliton Phoenix was sitting in recliner on 4L high flow nasal cannula on presentation. Patient stood with modified independence. He ambulated 300' with RW and SBA. Then performed standing exercises with hands on bedside table for balance. Pt's SpO2 observed to remain >90% throughout session. Pt demonstrated progress with transfers and gait today. He could continue to benefit from PT. This section established at most recent assessment   PROBLEM LIST (Impairments causing functional limitations):  1. Decreased Strength  2. Decreased ADL/Functional Activities  3. Decreased Transfer Abilities  4. Decreased Ambulation Ability/Technique  5. Decreased Balance  6. Increased Pain  7. Decreased Activity Tolerance  8. Decreased Knowledge of Precautions  9. Decreased [place] with Home Exercise Program   INTERVENTIONS PLANNED: (Benefits and precautions of physical therapy have been discussed with the patient.)  1. Balance Exercise  2. Bed Mobility  3. Family Education  4. Gait Training  5. Home Exercise Program (HEP)  6. Therapeutic Activites  7. Therapeutic Exercise/Strengthening  8. Transfer Training  9. Patient Education  10. Group Therapy     TREATMENT PLAN: Frequency/Duration: 3 times a week for duration of hospital stay  Rehabilitation Potential For Stated Goals: Good     RECOMMENDED REHABILITATION/EQUIPMENT: (at time of discharge pending progress): Due to the probability of continued deficits (see above) this patient will likely need continued skilled physical therapy after discharge. Equipment:    None at this time  [address] [facility], [place]? ? To be determined.               HISTORY:   History of Present Injury/Illness (Reason for Referral):  Per MD: Darrius Carroll is a 45y.o. year-old male with a past medical history of polysubstance abuse and NATALIIA with poor follow up who presents to the ER with a complaint of worsening shortness of breath and lower extremity/abdominal swelling over the past month. He admits to respiratory problems in the past and apparently was supposed to get a trach placed at some point but didn't. He has also had CPAP recommended but cannot wear it because it is too loud for him. He admits to occasional cough productive of green sputum. Denies fevers, chills, nausea, vomiting, constipation, diarrhea, chest pain. Past Medical History/Comorbidities:   Mr. Jose Ferguson  has a past medical history of Sleep disorder. Mr. Jose Ferguson  has no past surgical history on file. Social History/Living Environment:   Home Environment: Trailer/mobile home  # Steps to Enter: 1  One/Two Story Residence: One story  Living Alone: No  Support Systems: Child(suzette), Family member(s), Spouse/Significant Other/Partner  Patient Expects to be Discharged to[de-identified] Private residence  Current DME Used/Available at Home: Cane, straight  Tub or Shower Type: Tub/Shower combination  Prior Level of Function/Work/Activity:  Patient ambulated with modified independence prior to admission, required some assist from wife for donning socks.      Number of Personal Factors/Comorbidities that affect the Plan of Care: 1-2: MODERATE COMPLEXITY   EXAMINATION:   Most Recent Physical Functioning:   Gross Assessment:                  Posture:     Balance:  Sitting: Intact  Standing: Impaired  Standing - Static: Good  Standing - Dynamic : Fair Bed Mobility:     Wheelchair Mobility:     Transfers:  Sit to Stand: Modified independent  Stand to Sit: Modified independent  Gait:     Base of Support: Center of gravity altered  Speed/Fawn: Slow  Step Length: Right shortened;Left shortened  Gait Abnormalities: Decreased step clearance  Distance (ft): 300 Feet (ft)  Assistive Device: Walker, rolling  Ambulation - Level of Assistance: Stand-by asssistance  Interventions: Verbal cues      Body Structures Involved:  1. Heart  2. Lungs  3. Muscles Body Functions Affected:  1. Sensory/Pain  2. Respiratory  3. Neuromusculoskeletal  4. Movement Related Activities and Participation Affected:  1. Mobility  2. Self Care  3. Domestic Life  4. Interpersonal Interactions and Relationships  5. Community, Social and [place]   Number of elements that affect the Plan of Care: 4+: HIGH COMPLEXITY   CLINICAL PRESENTATION:   Presentation: Stable and uncomplicated: LOW COMPLEXITY   CLINICAL DECISION MAKIN [facility] Inpatient Short Form  How much difficulty does the patient currently have. .. Unable A Lot A Little None   1. Turning over in bed (including adjusting bedclothes, sheets and blankets)? [] 1   [] 2   [x] 3   [] 4   2. Sitting down on and standing up from a chair with arms ( e.g., wheelchair, bedside commode, etc.)   [] 1   [] 2   [x] 3   [] 4   3. Moving from lying on back to sitting on the side of the bed? [] 1   [] 2   [x] 3   [] 4   How much help from another person does the patient currently need. .. Total A Lot A Little None   4. Moving to and from a bed to a chair (including a wheelchair)? [] 1   [] 2   [x] 3   [] 4   5. Need to walk in hospital room? [] 1   [] 2   [x] 3   [] 4   6. Climbing 3-5 steps with a railing? [] 1   [x] 2   [] 3   [] 4   © , Trustees of [address] [place] [address], under license to [employer]. All rights reserved      Score:  Initial: 17 Most Recent:17 (Date: 18 )    Interpretation of Tool:  Represents activities that are increasingly more difficult (i.e. Bed mobility, Transfers, Gait). Score 24 23 22-20 19-15 14-10 9-7 6     Modifier CH CI CJ CK CL CM CN      ?  Mobility - Walking and Moving Around:     - CURRENT STATUS: CK - 40%-59% impaired, limited or restricted    - GOAL STATUS: CJ - 20%-39% impaired, limited or restricted    - D/C STATUS:  ---------------To be determined---------------  Payor: SELF PAY / Plan: [facility] SELF PAY / Product Type: Self Pay /      Medical Necessity:     · Patient demonstrates good rehab potential due to higher previous functional level. Reason for Services/Other Comments:  · Patient continues to require modification of therapeutic interventions to increase complexity of exercises. Use of outcome tool(s) and clinical judgement create a POC that gives a: Clear prediction of patient's progress: LOW COMPLEXITY            TREATMENT:   (In addition to Assessment/Re-Assessment sessions the following treatments were rendered)   Pre-treatment Symptoms/Complaints:  None  Pain: Initial:   Pain Intensity 1: 0  Post Session:  0/10    Therapeutic Exercise: (25 Minutes):  Exercises per grid below to improve mobility, strength and balance. Required minimal visual and verbal cues to promote proper body alignment and promote proper body breathing techniques. Progressed complexity of movement as indicated. Date: 1/23/19        Ambulation  Device  assistance 300'  RW  SBA        Partial Squats         Hip Abduction/ Adduction Standing 2x10 AB        Heel Raises  Standing         Toe Raises Standing 2x10 AB        Hip Flexion Standing 2x10 AB        Sit to Stand         Key:  R=right, L=left, B=bilaterally, A=active, AA=active assisted, P=passive            Braces/Orthotics/Lines/Etc:   · alegre catheter  · O2 Device: Hi flow nasal cannula  Treatment/Session Assessment:    · Response to Treatment:  Pt tolerated fairly given SpO2 in upper 80s-low 90s throughout. · Interdisciplinary Collaboration:   o Physical Therapist  o Occupational Therapist  o Registered Nurse  · After treatment position/precautions:   o Up in chair  o Bed/Chair-wheels locked  o Call light within reach  o RN notified  o Family at bedside   · Compliance with Program/Exercises: Will assess as treatment progresses. · Recommendations/Intent for next treatment session:   \"Next visit will focus on advancements to more challenging activities and reduction in assistance provided\".   Total Treatment Duration:  PT Patient Time In/Time Out  Time In: 1055  Time Out: 98 Prema Ontiveros, PT, DPT

## 2018-01-23 NOTE — PROGRESS NOTES
\"Bedside shift change report given to Chris Mary T.RN (oncoming nurse) by self (offgoing nurse). Report included the following information SBAR.

## 2018-01-23 NOTE — PROGRESS NOTES
Problem: Self Care Deficits Care Plan (Adult)  Goal: *Acute Goals and Plan of Care (Insert Text)  NEW GOALS 1/19/18:  1. Patient will complete full body bathing and dressing with modified independence and adaptive equipment as needed. MODIFIED   2. Patient will complete toileting with modified independence. MODIFIED  3. Patient will tolerate 30 minutes of OT treatment with less than 3 rest breaks to increase activity tolerance for ADLs. CONTINUE  4. Patient will complete functional transfers with modified independence and adaptive equipment as needed. MODIFIED  5. Patient will participate in BUE therapeutic exercises to increase strength in BUE to at least 4+/5 for participation in ADLs. CONTINUE  6. Patient will complete upper body bathing and dressing with setup only. DISCONTINUE    Timeframe: 7 visits          OCCUPATIONAL THERAPY: Treatment Day: 1st and PM 1/23/2018  INPATIENT: Hospital Day: 18  Payor: SELF PAY / Plan: [facility] SELF PAY / Product Type: Self Pay /      NAME/AGE/GENDER: John Forman is a 45 y.o. male   PRIMARY DIAGNOSIS:  COPD (chronic obstructive pulmonary disease) (HCC) Acute on chronic respiratory failure with hypoxia and hypercapnia (HCC) Acute on chronic respiratory failure with hypoxia and hypercapnia (HCC)        ICD-10: Treatment Diagnosis:    · Generalized Muscle Weakness (M62.81)  · Other lack of cordination (R27.8)  · History of falling (Z91.81)   Precautions/Allergies:    Review of patient's allergies indicates no known allergies. ASSESSMENT:     Mr. Najera Husbands was sitting up in chair upon arrival with wife at bedside and pt had O2 out of his nose. Educated pt to place O2 back in his nose with pt agreeable. Pt agreeable to OT treatment today and was quite receptive to recommendations throughout session. Pt completed functional mobility with CGA to supervision in the room and no loss of balance. Pt stood at sink and brushed teeth and combed hair without difficulty.  Pt made several laps around bed in room and then sat and rested in chair. Pt educated on LE dressing with use of sock-aid with pt demonstrating good use to help with energy conservation and overall independence with ADL. Pt completed functional mobility in the hallway, but did use walker out of room because LE pain. Pt tolerated with supervision with walker. Pt reports pins/needles sensation in the feet after functional mobility. Pt demonstrated no shortness of breath with good O2 sats at end of session. Pt making progress towards goals and will benefit from continued OT services. This section established at most recent assessment   PROBLEM LIST (Impairments causing functional limitations):  1. Decreased Strength  2. Decreased ADL/Functional Activities  3. Decreased Transfer Abilities  4. Decreased Ambulation Ability/Technique  5. Decreased Balance  6. Decreased Activity Tolerance  7. Decreased Work Simplification/Energy Conservation Techniques  8. Increased Shortness of Breath  9. Decreased Flexibility/Joint Mobility  10. Decreased [place] with Home Exercise Program   INTERVENTIONS PLANNED: (Benefits and precautions of occupational therapy have been discussed with the patient.)  1. Activities of daily living training  2. Adaptive equipment training  3. Balance training  4. Clothing management  5. Donning&doffing training  6. Group therapy  7. Neuromuscular re-eduation  8. Therapeutic activity  9. Therapeutic exercise     TREATMENT PLAN: Frequency/Duration: Follow patient 3 times per week to address above goals. Rehabilitation Potential For Stated Goals: GOOD     RECOMMENDED REHABILITATION/EQUIPMENT: (at time of discharge pending progress): Due to the probability of continued deficits (see above) this patient will likely need continued skilled occupational therapy after discharge.   Equipment:    TBD              OCCUPATIONAL PROFILE AND HISTORY:   History of Present Injury/Illness (Reason for Referral):  See H&P  Past Medical History/Comorbidities:   Mr. Karuna Ko  has a past medical history of Sleep disorder. Mr. Karuna Ko  has no past surgical history on file. Social History/Living Environment:   Home Environment: Trailer/mobile home  # Steps to Enter: 1  One/Two Story Residence: One story  Living Alone: No  Support Systems: Child(suzette), Family member(s), Spouse/Significant Other/Partner  Patient Expects to be Discharged to[de-identified] Private residence  Current DME Used/Available at Home: Cane, straight  Tub or Shower Type: Tub/Shower combination  Prior Level of Function/Work/Activity:  Pt lives at home with his wife. Pt reports he is not able to work due to pain in his R hip. Pt completes functional mobility with cane and gets occasional assistance for lower body dressing (assistance to don socks)  Personal Factors:          Social Background:  Lives with wife who is also disabled        Past/Current Experience:  Hx of falls; hx of drug abuse         Overall Behavior:  Tearful         Other factors that influence how disability is experienced by the patient:  Multiple co-morbidities    Number of Personal Factors/Comorbidities that affect the Plan of Care: Extensive review of physical, cognitive, and psychosocial performance (3+):  HIGH COMPLEXITY   ASSESSMENT OF OCCUPATIONAL PERFORMANCE[de-identified]   Activities of Daily Living:           Basic ADLs (From Assessment) Complex ADLs (From Assessment)   Basic ADL  Feeding: Setup  Oral Facial Hygiene/Grooming: Setup  Bathing: Moderate assistance  Upper Body Dressing: Minimum assistance  Lower Body Dressing: Maximum assistance  Toileting:  Moderate assistance Instrumental ADL  Meal Preparation: Maximum assistance  Homemaking: Maximum assistance  Medication Management: Maximum assistance  Financial Management: Maximum assistance   Grooming/Bathing/Dressing Activities of Daily Living   Grooming  Grooming Assistance: Supervision/set up  Brushing Teeth: Supervision/set-up  Brushing/Combing Hair: Supervision/set-up Cognitive Retraining  Safety/Judgement: Awareness of environment;Home safety                 Functional Transfers  Bathroom Mobility: Supervision/set up  Cues: Verbal cues provided;Visual cues provided   Lower Body Dressing Assistance  Socks: Minimum assistance  Position Performed: Seated in chair  Cues: Don;Verbal cues provided;Visual cues provided  Adaptive Equipment Used: Sock aid Bed/Mat Mobility  Sit to Stand: Modified independent       Most Recent Physical Functioning:   Gross Assessment:  AROM: Generally decreased, functional (B UE)  Strength: Generally decreased, functional (B UE)  Coordination: Generally decreased, functional (B UE)               Posture:  Posture (WDL): Exceptions to WDL  Posture Assessment: Forward head, Rounded shoulders  Balance:  Sitting: Intact  Standing: Impaired; Intact; With support  Standing - Static: Good  Standing - Dynamic : Fair Bed Mobility:     Wheelchair Mobility:     Transfers:  Sit to Stand: Modified independent  Stand to Sit: Modified independent              Patient Vitals for the past 6 hrs:   BP BP Patient Position SpO2 O2 Flow Rate (L/min) Pulse   01/23/18 0816 - - 90 % 4 l/min -   01/23/18 0923 (!) 158/95 At rest 98 % - 88       Mental Status  Neurologic State: Alert  Orientation Level: Appropriate for age  Cognition: Appropriate for age attention/concentration, Follows commands  Perception: Appears intact  Perseveration: No perseveration noted  Safety/Judgement: Awareness of environment, Home safety                          Physical Skills Involved:  1. Balance  2. Strength  3. Activity Tolerance  4. Pain (Chronic) Cognitive Skills Affected (resulting in the inability to perform in a timely and safe manner):  1. [facility] Psychosocial Skills Affected:  1. Habits/Routines  2.  Environmental Adaptation   Number of elements that affect the Plan of Care: 3-5:  MODERATE COMPLEXITY   CLINICAL DECISION MAKING:   Nickey Klinefelter AM-PAC 6 Clicks   Daily Activity Inpatient Short Form  How much help from another person does the patient currently need. .. Total A Lot A Little None   1. Putting on and taking off regular lower body clothing? [] 1   [x] 2   [] 3   [] 4   2. Bathing (including washing, rinsing, drying)? [] 1   [x] 2   [] 3   [] 4   3. Toileting, which includes using toilet, bedpan or urinal?   [x] 1   [] 2   [] 3   [] 4   4. Putting on and taking off regular upper body clothing? [] 1   [] 2   [x] 3   [] 4   5. Taking care of personal grooming such as brushing teeth? [] 1   [] 2   [] 3   [x] 4   6. Eating meals? [] 1   [] 2   [] 3   [x] 4   © 2007, Trustees of [address] [place] [address] [address] [address], under license to [employer]. All rights reserved      Score:  Initial: 14 Most Recent: 16 (Date: -- )    Interpretation of Tool:  Represents activities that are increasingly more difficult (i.e. Bed mobility, Transfers, Gait). Score 24 23 22-20 19-15 14-10 9-7 6     Modifier CH CI CJ CK CL CM CN      ? Self Care:     - CURRENT STATUS: CK - 40%-59% impaired, limited or restricted    - GOAL STATUS: CJ - 20%-39% impaired, limited or restricted    - D/C STATUS:  ---------------To be determined---------------  Payor: SELF PAY / Plan: [facility] SELF PAY / Product Type: Self Pay /      Medical Necessity:     · Patient demonstrates good rehab potential due to higher previous functional level. Reason for Services/Other Comments:  · Patient continues to require skilled intervention due to decreased independence with ADL/functional transfers.    Use of outcome tool(s) and clinical judgement create a POC that gives a: LOW COMPLEXITY         TREATMENT:   (In addition to Assessment/Re-Assessment sessions the following treatments were rendered)     Pre-treatment Symptoms/Complaints:    Pain: Initial:   Pain Intensity 1: 0  Post Session:  0/10     Self Care: (15): Procedure(s) (per grid) utilized to improve and/or restore self-care/home management as related to dressing and grooming. Required minimal visual and verbal cueing to facilitate activities of daily living skills and compensatory activities. Therapeutic Activity: (    23): Therapeutic activities including Chair transfers and Ambulation on level ground to improve mobility, strength, balance and coordination. Required minimal Verbal cues to promote dynamic balance in standing.     n/a    Braces/Orthotics/Lines/Etc:   · alegre catheter  · O2 Device: Heated, Hi flow nasal cannula  Treatment/Session Assessment:    · Response to Treatment:  Tolerated well w/o complications or complaints   · Interdisciplinary Collaboration:   o Occupational Therapist  o Registered Nurse  · After treatment position/precautions:   o Up in chair  o Bed/Chair-wheels locked  o Call light within reach   · Compliance with Program/Exercises: Will assess as treatment progresses. · Recommendations/Intent for next treatment session: \"Next visit will focus on advancements to more challenging activities and reduction in assistance provided\".   Total Treatment Duration:  OT Patient Time In/Time Out  Time In: 1257  Time Out: Alessandra Wall 78, OT

## 2018-01-23 NOTE — PROGRESS NOTES
Oxygen Qualifier       Room air: SpO2 with O2 and liter flow   Resting SpO2  82%  90% on 4L   Ambulating SpO2   95% on 4L       Completed by:    Elliott Denise, RT

## 2018-01-23 NOTE — PROGRESS NOTES
Marli Call  Admission Date: 1/6/2018             Daily Progress Note: 1/23/2018   52 y. o. CM admitted on 1/6 with acute hypoxemic respiratory failure, with methamphetamine positive, smoker with strong FH of severe COPD, morbid obesity, suspected OHS.  Also has sinusitis on CT and possible pneumonia.   Has had waxing/waning hypercarbia through his hospitalization- some days alert and others somnolent.  Has required intubation twice thus far. Transferred to floor on 1/20    Subjective:     Sats 98% on 4 lpm, assess O2 needs. Watching TV and asking when he can go home.    Wearing BiPAP QHS here    Review of Systems  Constitutional: negative for fevers, chills and sweats  Respiratory: negative for cough, sputum, wheezing or dyspnea on exertion    Current Facility-Administered Medications   Medication Dose Route Frequency    rivaroxaban (XARELTO) tablet 20 mg  20 mg Oral DAILY WITH BREAKFAST    amiodarone (CORDARONE) tablet 400 mg  400 mg Oral BID    fentaNYL citrate (PF) injection 25-50 mcg  25-50 mcg IntraVENous Multiple    midazolam (VERSED) injection 0.5-2 mg  0.5-2 mg IntraVENous Multiple    lidocaine (XYLOCAINE) 2 % viscous solution 15 mL  15 mL Mouth/Throat PRN    levalbuterol (XOPENEX) nebulizer soln 0.63 mg/3 mL  0.63 mg Nebulization Q8H    sodium chloride (NS) flush 10 mL  10 mL InterCATHeter Q8H    sodium chloride (NS) flush 10 mL  10 mL InterCATHeter PRN    artificial saliva (MOUTH KOTE) 1 Spray  1 Spray Oral PRN    cefTRIAXone (ROCEPHIN) 2 g in sterile water (preservative free) 20 mL IV syringe  2 g IntraVENous Q24H    tiotropium (SPIRIVA) inhalation capsule 18 mcg  1 Cap Inhalation DAILY    naloxone (NARCAN) injection 0.4 mg  0.4 mg IntraVENous EVERY 2 MINUTES AS NEEDED    thiamine (B-1) tablet 100 mg  100 mg Oral DAILY    folic acid (FOLVITE) tablet 1 mg  1 mg Oral DAILY    famotidine (PEPCID) tablet 20 mg  20 mg Oral BID    promethazine (PHENERGAN) with saline injection 12.5 mg 12.5 mg IntraVENous Q6H PRN    LORazepam (ATIVAN) tablet 0.5 mg  0.5 mg Oral Q8H PRN    haloperidol lactate (HALDOL) injection 5 mg  5 mg IntraVENous Q6H PRN    sertraline (ZOLOFT) tablet 50 mg  50 mg Oral DAILY    sodium chloride (OCEAN) 0.65 % nasal spray 2 Spray  2 Spray Both Nostrils QID    sodium chloride (OCEAN) 0.65 % nasal spray 2 Spray  2 Spray Both Nostrils Q2H PRN    fluticasone (FLONASE) 50 mcg/actuation nasal spray 2 Spray  2 Spray Both Nostrils DAILY    influenza vaccine 2017-18 (3 yrs+)(PF) (FLUZONE QUAD/FLUARIX QUAD) injection 0.5 mL  0.5 mL IntraMUSCular PRIOR TO DISCHARGE    metoprolol (LOPRESSOR) injection 5 mg  5 mg IntraVENous Q4H PRN    hydrALAZINE (APRESOLINE) 20 mg/mL injection 20 mg  20 mg IntraVENous Q6H PRN    sodium chloride (NS) flush 5-10 mL  5-10 mL IntraVENous Q8H    sodium chloride (NS) flush 5-10 mL  5-10 mL IntraVENous PRN    carvedilol (COREG) tablet 3.125 mg  3.125 mg Oral BID WITH MEALS    ondansetron (ZOFRAN) injection 4 mg  4 mg IntraVENous Q6H PRN    acetaminophen (TYLENOL) tablet 650 mg  650 mg Oral Q6H PRN    diphenhydrAMINE (BENADRYL) capsule 25 mg  25 mg Oral Q4H PRN    polyethylene glycol (MIRALAX) packet 17 g  17 g Oral DAILY PRN    guaiFENesin ER (MUCINEX) tablet 600 mg  600 mg Oral Q12H PRN    calcium carbonate (TUMS) chewable tablet 400 mg [elemental]  400 mg Oral TID PRN         Objective:     Vitals:    01/23/18 0540 01/23/18 0816 01/23/18 0923 01/23/18 1325   BP:   (!) 158/95 134/79   Pulse:   88 90   Resp:   18 16   Temp:   98.1 °F (36.7 °C) 98.5 °F (36.9 °C)   SpO2: 95% 90% 98% 93%   Weight:       Height:         Intake and Output:   01/21 1901 - 01/23 0700  In: 880 [P.O.:880]  Out: 4125 [Urine:4125]  01/23 0701 - 01/23 1900  In: 480 [P.O.:480]  Out: 325 [Urine:325]    Physical Exam:          Constitutional: the patient is overweight  HEENT: Sclera clear, pupils equal, oral mucosa moist  Lungs: decreased bases on NC  Cardiovascular: RRR without M,G,R  Abd/GI: soft and non-tender; with positive bowel sounds. Ext: warm without cyanosis. There is no lower leg edema.   Musculoskeletal: moves all four extremities with equal strength  Skin: no jaundice or rashes, no wounds   Neuro: no gross neuro deficits       Lines/Drains: IV  Nutrition: cardiac    CHEST XRAY:       LAB  Recent Labs      01/23/18   0924  01/22/18   1954  01/22/18   0440   WBC  9.0  9.3  12.1*   HGB  13.8  12.7*  13.4*   HCT  44.8  40.8*  43.3   PLT  190  201  209     Recent Labs      01/23/18   0924  01/22/18   0440  01/21/18   1820  01/21/18   1324  01/21/18   0653   NA  143  145   --    --   144   K  4.1  3.6   --    --   4.0   CL  101  106   --    --   104   CO2  38*  32   --    --   35*   GLU  156*  98   --    --   108*   BUN  17  24*   --    --   24*   CREA  1.08  0.98   --    --   0.99   MG  2.1  2.2   --   2.1   --    PHOS   --   3.9   --    --    --    TROIQ   --   0.10*  0.04  0.03   --          Assessment:     Patient Active Problem List   Diagnosis Code    COPD (chronic obstructive pulmonary disease) ([facility]) J44.9    Nicotine dependence F17.200    Methamphetamine abuse F15.10    Anasarca R60.1    NATALIIA (obstructive sleep apnea) G47.33    Acute on chronic respiratory failure with hypoxia and hypercapnia ([facility]) J96.21, J96.22    Urethral stricture N35.9    Alcohol abuse F10.10    Homeless Z59.0       [facility] Problems  Date Reviewed: 1/19/2018          Codes Class Noted POA    Alcohol abuse (Chronic) ICD-10-CM: F10.10  ICD-9-CM: 305.00  1/15/2018 Yes        Homeless (Chronic) ICD-10-CM: Z59.0  ICD-9-CM: V60.0  1/15/2018 No        Urethral stricture ICD-10-CM: N35.9  ICD-9-CM: 598.9  1/7/2018 Yes        COPD (chronic obstructive pulmonary disease) ([facility]) (Chronic) ICD-10-CM: J44.9  ICD-9-CM: 496  1/6/2018 Yes        Nicotine dependence (Chronic) ICD-10-CM: E35.104  ICD-9-CM: 305.1  1/6/2018 Yes        Methamphetamine abuse (Chronic) ICD-10-CM: F15.10  ICD-9-CM: 305.70 1/6/2018 Yes        Anasarca ICD-10-CM: R60.1  ICD-9-CM: 782.3  1/6/2018 Yes        NATALIIA (obstructive sleep apnea) (Chronic) ICD-10-CM: S87.44  ICD-9-CM: 327.23  1/6/2018 Yes    BiPAP here, O/P f/u at the [facility]    * (Principal)Acute on chronic respiratory failure with hypoxia and hypercapnia (HCC) ICD-10-CM: J96.21, J96.22  ICD-9-CM: 518.84, 786.09, 799.02  1/6/2018 Yes                -- assess O2 needs  -- he is a self pay and will need BIPAP. CM assisting and he will plan to be seen at the [facility] clinic for sleep eval after discharge for BiPAP. -- keep alegre until discharge  --smoking cessation and lifestyle changes needed  -- discussed with CM home O2 and BiPAP. BiPAP with be through [facility] after discharge and RT to assess O2 needs    -- okay to go home from our standpoint. Pranay Hennessy, NP   I have spoken with and examined the patient. I agree with the above assessment and plan as documented. Gen: pleasant on nasal cannula O2  Lungs:  CTA with few scattered posterior crackles  Heart:  RRR with no Murmur/Rubs/Gallops  Abd: NABS  Ext: no edema    --Recommend outpatient follow up be arranged for COPD. He can be arranged to see me at the [facility] if appropriate or otherwise can continue duonebs with nebulizer until he can be seen at [address] in future. --Needs BiPAP nightly- settings IPAP20/EPAP10 with FiO2 35% and minimum respiratory rate of 18  --Needs PFT while inpatient tomorrow prior to discharge. Will f/u result at [facility] and set up chronic inhaler therapy. --Discussed importance of abstinence from smoking and meth with patient  --[facility] will sign off. Please call us with any questions. Tamiko Duval MD      More than 50% of time documented was spent in face-to-face contact with the patient and in the care of the patient on the floor/unit where the patient is located.

## 2018-01-23 NOTE — MED STUDENT NOTES
Internal Medicine Progress Note  Admission: 1/6/2018  Encounter Date: 01/23/18      Subjective:  Isis Tiwari is a 45 y.o. male with PMHx of HTN, tobacco and methamphetamine abuse who presented on 1/6/18 with acute respiratory failure. CXR and CT showed possible RLL PNA  And sinusitis. Zosyn was started. He required intubation on 1/7 and admission to the ICU. Cardiology and pulmonology were consulted. Subsequent extubation failed requiring prolonged intubation until 1/14 when he was successfully extubated. Serial blood cultures have been negative. Sputum cultures were positive for Strep. Pneumoniae and zosyn was transitioned to ceftiraxone. The patient went into atrial flutter on 1/21, was started on diltiazem and heparin and cardiology was consulted. He underwent MIRANDA and electric cardioversion on 1/22 and was switched to amiodarone and xarelto. He is on 4l O2 NC continuously and BiPAP at night. Today he reports doing much better and is not short of breath. He denies any chest pain, palpitations, swelling or drug use. He states he has a tingling pain in his feet when he walks and reports a similar pain in his fingertips. He is having normal bowel movements. Otherwise he has no complaints.     Objective:  Visit Vitals    BP (!) 158/95 (BP 1 Location: Left arm, BP Patient Position: At rest)    Pulse 88    Temp 98.1 °F (36.7 °C)    Resp 18    Ht 5' 8\" (1.727 m)    Wt 130.5 kg (287 lb 9.6 oz)    SpO2 98%    BMI 43.73 kg/m2       Intake/Output Summary (Last 24 hours) at 01/23/18 1054  Last data filed at 01/23/18 0924   Gross per 24 hour   Intake              480 ml   Output             2000 ml   Net            -1520 ml       Physical Exam  --General: Well appearing, seated in chair in no acute distress   --HENT: NCAT, nares clear without discharge, O2 NC in place, oropharynx clear and moist, no JVD --Eyes: PERRL, EOMI, no scleral icterus  --Heart: Normal S1, S2, RRR, no murmurs rubs or gallops  --Lungs: Normal work of breathing, distant breath sounds CTAB  --Abdomen: Normal BS, non-tender, non-distended  --Extremities: No cyanosis or edema, moves all 4 spontaneously  --Skin: Warm, dry.  No rashes or trauma  --Neuro: CN II-XII grossly intact B/L, no focal deficits      Current Facility-Administered Medications:     rivaroxaban (XARELTO) tablet 20 mg, 20 mg, Oral, DAILY WITH BREAKFAST, Vilma Lopez MD, 20 mg at 01/23/18 0941    amiodarone (CORDARONE) tablet 400 mg, 400 mg, Oral, BID, Vilma Lopez MD, 400 mg at 01/23/18 0941    fentaNYL citrate (PF) injection 25-50 mcg, 25-50 mcg, IntraVENous, Multiple, Vilma Lopez MD, 50 mcg at 01/22/18 1110    midazolam (VERSED) injection 0.5-2 mg, 0.5-2 mg, IntraVENous, Multiple, Vilma Lopez MD, 2 mg at 01/22/18 1115    lidocaine (XYLOCAINE) 2 % viscous solution 15 mL, 15 mL, Mouth/Throat, PRN, Concetta Mcburney, MD, 15 mL at 01/22/18 1200    levalbuterol (XOPENEX) nebulizer soln 0.63 mg/3 mL, 0.63 mg, Nebulization, Q8H, Ramesh Lo MD, 0.63 mg at 01/23/18 0816    sodium chloride (NS) flush 10 mL, 10 mL, InterCATHeter, Q8H, Concetta Mcburney, MD, 10 mL at 01/23/18 0514    sodium chloride (NS) flush 10 mL, 10 mL, InterCATHeter, PRN, Concetta Mcburney, MD    artificial saliva (MOUTH KOTE) 1 Spray, 1 Spray, Oral, PRN, Jessika Santos MD, 1 [place] at 01/19/18 0507    cefTRIAXone (ROCEPHIN) 2 g in sterile water (preservative free) 20 mL IV syringe, 2 g, IntraVENous, Q24H, Justino Preston MD, 2 g at 01/23/18 0943    tiotropium (SPIRIVA) inhalation capsule 18 mcg, 1 Cap, Inhalation, DAILY, Justino Preston MD, 18 mcg at 01/23/18 0816    naloxone (NARCAN) injection 0.4 mg, 0.4 mg, IntraVENous, EVERY 2 MINUTES AS NEEDED, Tye Kussmaul, MD    thiamine (B-1) tablet 100 mg, 100 mg, Oral, DAILY, Jaylan Mariano MD, 100 mg at 17/42/04 5659    folic acid (FOLVITE) tablet 1 mg, 1 mg, Oral, DAILY, Jaylan Mariano MD, 1 mg at 01/23/18 0941    famotidine (PEPCID) tablet 20 mg, 20 mg, Oral, BID, Greyson Chan MD, 20 mg at 01/23/18 0941    promethazine (PHENERGAN) with saline injection 12.5 mg, 12.5 mg, IntraVENous, Q6H PRN, Ovidio Waite MD, 12.5 mg at 01/12/18 0410    LORazepam (ATIVAN) tablet 0.5 mg, 0.5 mg, Oral, Q8H PRN, Rhett Hodge MD, 0.5 mg at 01/21/18 1205    haloperidol lactate (HALDOL) injection 5 mg, 5 mg, IntraVENous, Q6H PRN, Rhett Hodge MD    sertraline (ZOLOFT) tablet 50 mg, 50 mg, Oral, DAILY, Julio Vera NP, 50 mg at 01/23/18 0941    sodium chloride (OCEAN) 0.65 % nasal spray 2 Spray, 2 Spray, Both Nostrils, QID, Vitaly Orozco MD, 2 Spray at 01/21/18 1425    sodium chloride (OCEAN) 0.65 % nasal spray 2 Spray, 2 Spray, Both Nostrils, Q2H PRN, Ovidio Waite MD, 2 [place] at 01/23/18 0951    fluticasone (FLONASE) 50 mcg/actuation nasal spray 2 Spray, 2 Spray, Both Nostrils, DAILY, Vitaly Orozco MD, 2 Spray at 01/23/18 0951    influenza vaccine 2017-18 (3 yrs+)(PF) (FLUZONE QUAD/FLUARIX QUAD) injection 0.5 mL, 0.5 mL, IntraMUSCular, PRIOR TO DISCHARGE, Eulalia Garcia MD    metoprolol (LOPRESSOR) injection 5 mg, 5 mg, IntraVENous, Q4H PRN, Yumiko Tamez MD, 5 mg at 01/21/18 1204    hydrALAZINE (APRESOLINE) 20 mg/mL injection 20 mg, 20 mg, IntraVENous, Q6H PRN, Yumiko Tamez MD, 20 mg at 01/18/18 0514    sodium chloride (NS) flush 5-10 mL, 5-10 mL, IntraVENous, Q8H, Shelbie Rosales MD, 10 mL at 01/22/18 1400    sodium chloride (NS) flush 5-10 mL, 5-10 mL, IntraVENous, PRN, Shelbie Rosales MD, 10 mL at 01/16/18 2138    carvedilol (COREG) tablet 3.125 mg, 3.125 mg, Oral, BID WITH MEALS, Iliana Gallo MD, 3.125 mg at 01/23/18 0941    ondansetron (ZOFRAN) injection 4 mg, 4 mg, IntraVENous, Q6H PRN, Iliana Gallo MD, 4 mg at 01/18/18 8320    acetaminophen (TYLENOL) tablet 650 mg, 650 mg, Oral, Q6H PRN, Iliana Gallo MD, 650 mg at 01/17/18 2122    diphenhydrAMINE (BENADRYL) capsule 25 mg, 25 mg, Oral, Q4H PRN, Lloyd Soda Avelino Maxwell MD    polyethylene glycol [facility]) packet 17 g, 17 g, Oral, DAILY PRN, Katlyn Cobb MD, 17 g at 01/12/18 0436    guaiFENesin ER (MUCINEX) tablet 600 mg, 600 mg, Oral, Q12H PRN, Katlyn Cobb MD, 600 mg at 01/11/18 2333    calcium carbonate (TUMS) chewable tablet 400 mg [elemental], 400 mg, Oral, TID PRN, Katlyn Cobb MD    Imaging:     --CXR 1/22/18  IMPRESSION:   1. No infiltrate seen. Only stable chronic appearing interstitial prominence is  seen at the left lung base.       Labs:  MCV: 105.2  K+: 4.1  Ca2+: 8.6  Mg2+: 2.1  BUN: 17  Cr: 1.08    Assessment:  Patient Active Problem List   Diagnosis Code    COPD (chronic obstructive pulmonary disease) ([facility]) J44.9    Nicotine dependence F17.200    Methamphetamine abuse F15.10    Anasarca R60.1    NATALIIA (obstructive sleep apnea) G47.33    Acute kidney injury ([facility] [address] N17.9    Acute on chronic respiratory failure with hypoxia and hypercapnia ([facility]) J96.21, J96.22    Urethral stricture N35.9    Community acquired pneumonia of right lower lobe of lung ([facility]) J18.1    Alcohol abuse F10.10    Homeless Z59.0    Diarrhea R19.7    Atrial flutter ([facility]) I48.92         Plan:   Acute on Chronic Respiratory Failure/COPD/ CAP  --Levalbuterol and tiotropium neb  --Ween off O2 and BiPAP before D/C  --Finish ceftriaxone today    Atrial Flutter  -- Successful MIRANDA cardioversion 1/22   -- Continue Carvedilol, amiodarone, and xarelto  -- Monitor electrolytes and replete PRN    MARGARITA  -- Resolved  -- Galindo to be removed at discharge  -- Follow up with urology OP    Substance Abuse  -- /encourage abstinence and rehab    Homeless  -- Social work referral for discharge resources and counseling    DVT prophylaxis: xarelto  Regular diet       *ATTENTION:  This note has been created by a medical student for educational purposes only. Please do not refer to the content of this note for clinical decision-making, billing, or other purposes.   Please see attending physicians note to obtain clinical information on this patient. *

## 2018-01-23 NOTE — PROGRESS NOTES
1/23/2018 7:06 AM    Admit Date: 1/6/2018    Admit Diagnosis: COPD (chronic obstructive pulmonary disease) (HCC)      Subjective:    Patient sp cardioversion for a flutter. Maintain amiodarone and xarelto.  Call if needed    Objective:      Visit Vitals    /78 (BP 1 Location: Left arm, BP Patient Position: At rest)    Pulse 93    Temp 97.3 °F (36.3 °C)    Resp 18    Ht 5' 8\" (1.727 m)    Wt 130.5 kg (287 lb 9.6 oz)    SpO2 95%    BMI 43.73 kg/m2       ROS:  General ROS: negative for - chills  Hematological and Lymphatic ROS: negative for - blood clots or jaundice  Respiratory ROS: no cough, shortness of breath, or wheezing  Cardiovascular ROS: no chest pain or dyspnea on exertion  Gastrointestinal ROS: no abdominal pain, change in bowel habits, or black or bloody stools  Neurological ROS: no TIA or stroke symptoms    Physical Exam:    Physical Examination: General appearance - alert, well appearing, and in no distress  Mental status - alert, oriented to person, place, and time  Eyes - pupils equal and reactive, extraocular eye movements intact  Neck/lymph - supple, no significant adenopathy  Chest/CV - clear to auscultation, no wheezes, rales or rhonchi, symmetric air entry  Heart - normal rate, regular rhythm, normal S1, S2, no murmurs, rubs, clicks or gallops  Abdomen/GI - soft, nontender, nondistended, no masses or organomegaly  Musculoskeletal - no joint tenderness, deformity or swelling  Extremities - peripheral pulses normal, no pedal edema, no clubbing or cyanosis  Skin - normal coloration and turgor, no rashes, no suspicious skin lesions noted    Current Facility-Administered Medications   Medication Dose Route Frequency    rivaroxaban (XARELTO) tablet 20 mg  20 mg Oral DAILY WITH BREAKFAST    amiodarone (CORDARONE) tablet 400 mg  400 mg Oral BID    fentaNYL citrate (PF) injection 25-50 mcg  25-50 mcg IntraVENous Multiple    midazolam (VERSED) injection 0.5-2 mg  0.5-2 mg IntraVENous Multiple    lidocaine (XYLOCAINE) 2 % viscous solution 15 mL  15 mL Mouth/Throat PRN    levalbuterol (XOPENEX) nebulizer soln 0.63 mg/3 mL  0.63 mg Nebulization Q8H    sodium chloride (NS) flush 10 mL  10 mL InterCATHeter Q8H    sodium chloride (NS) flush 10 mL  10 mL InterCATHeter PRN    artificial saliva (MOUTH KOTE) 1 Spray  1 Spray Oral PRN    cefTRIAXone (ROCEPHIN) 2 g in sterile water (preservative free) 20 mL IV syringe  2 g IntraVENous Q24H    tiotropium (SPIRIVA) inhalation capsule 18 mcg  1 Cap Inhalation DAILY    naloxone (NARCAN) injection 0.4 mg  0.4 mg IntraVENous EVERY 2 MINUTES AS NEEDED    thiamine (B-1) tablet 100 mg  100 mg Oral DAILY    folic acid (FOLVITE) tablet 1 mg  1 mg Oral DAILY    famotidine (PEPCID) tablet 20 mg  20 mg Oral BID    promethazine (PHENERGAN) with saline injection 12.5 mg  12.5 mg IntraVENous Q6H PRN    LORazepam (ATIVAN) tablet 0.5 mg  0.5 mg Oral Q8H PRN    haloperidol lactate (HALDOL) injection 5 mg  5 mg IntraVENous Q6H PRN    sertraline (ZOLOFT) tablet 50 mg  50 mg Oral DAILY    sodium chloride (OCEAN) 0.65 % nasal spray 2 Spray  2 Spray Both Nostrils QID    sodium chloride (OCEAN) 0.65 % nasal spray 2 Spray  2 Spray Both Nostrils Q2H PRN    fluticasone (FLONASE) 50 mcg/actuation nasal spray 2 Spray  2 Spray Both Nostrils DAILY    influenza vaccine 2017-18 (3 yrs+)(PF) (FLUZONE QUAD/FLUARIX QUAD) injection 0.5 mL  0.5 mL IntraMUSCular PRIOR TO DISCHARGE    metoprolol (LOPRESSOR) injection 5 mg  5 mg IntraVENous Q4H PRN    hydrALAZINE (APRESOLINE) 20 mg/mL injection 20 mg  20 mg IntraVENous Q6H PRN    sodium chloride (NS) flush 5-10 mL  5-10 mL IntraVENous Q8H    sodium chloride (NS) flush 5-10 mL  5-10 mL IntraVENous PRN    carvedilol (COREG) tablet 3.125 mg  3.125 mg Oral BID WITH MEALS    ondansetron (ZOFRAN) injection 4 mg  4 mg IntraVENous Q6H PRN    acetaminophen (TYLENOL) tablet 650 mg  650 mg Oral Q6H PRN    diphenhydrAMINE (BENADRYL) capsule 25 mg  25 mg Oral Q4H PRN    polyethylene glycol (MIRALAX) packet 17 g  17 g Oral DAILY PRN    guaiFENesin ER (MUCINEX) tablet 600 mg  600 mg Oral Q12H PRN    calcium carbonate (TUMS) chewable tablet 400 mg [elemental]  400 mg Oral TID PRN       Data Review:   @LABRCNT(Na,K,BUN,CREA,WBC,HGB,HCT,PLT,INR,TRP,TCHOL*,Triglyceride*,LDL*,LDLCPOC HDL*,HDL])@    TELEMETRY: nsr    Assessment/Plan:     Principal Problem:    Acute on chronic respiratory failure with hypoxia and hypercapnia (HCC) (1/6/2018)The current medical regimen is effective;  continue present plan and medications. Active Problems:    COPD (chronic obstructive pulmonary disease) ([address] (1/6/2018)The current medical regimen is effective;  continue present plan and medications. Nicotine dependence (1/6/2018)      Methamphetamine abuse (1/6/2018)      Anasarca (1/6/2018)      NATALIIA (obstructive sleep apnea) (1/6/2018)      Acute kidney injury ([address] (1/6/2018)      Urethral stricture (1/7/2018)      Community acquired pneumonia of right lower lobe of lung ([address] (1/9/2018)      Alcohol abuse (1/15/2018)      Homeless (1/15/2018)      Diarrhea (1/18/2018)      Atrial flutter ([address] (1/21/2018)The current medical regimen is effective;  continue present plan and medications.             Julianne Hutchins MD

## 2018-01-23 NOTE — PROGRESS NOTES
Patient will need BIPAP and O2 prior to d/c possibly tomorrow. Spoke with Dr. Fredi Morel and she is willing to see patient at the [facility] at D/C. Dr. Fredi Morel is going to write note and order for BIPAP setting. [address] and they are willing to work with patient if they can afford $50.00. CM following for DME and waiting for PFT per Dr. Fredi Morel.

## 2018-01-23 NOTE — PROGRESS NOTES
Hospitalist Progress Note     Admit Date:  2018  6:27 PM   Name:  Geraldyne Peabody   Age:  45 y.o.  :  1965   MRN:  [id number]   PCP:  None  Treatment Team: Attending Provider: Erlinda Sims MD; Utilization Review: Efrain Sam RN; Consulting Provider: Romana Macario MD; Consulting Provider: Deepak Solomon MD; Consulting Provider: Dax Verdin MD; Staff Nurse: Shani Dejesus    Subjective: This is a 44 YO male patient with a PMH of HTN, obesity, met-amphetamine use and tobacco use who was admitted on  with acute hypoxemic respiratory failure. Sinusitis was reported on CT scan and also possible pneumonia was reported. He was intubated and admitted to the ICU. He was started on IV ceftriaxone. He was extubated and unfortunately he developed respiratory failure had had to be re-intubated. There has been some suspicion for inpatient illicit drug use although it has not been proved. He was extubated for the second time and was transferred to the medical floor. On  he developed atrial flutter and had to be transferred to the tely floor. He was started on a cardizem drip + heparin  with partial response. Today he had a MIRANDA done with Electric cardioversion. He cardioverted to NSR and was switched to oral xarelto and amiodarone. Is using BIPAP at night.  Has no resources and placement might be a problem.        18  Says sob is better- on nasal cannula oxygen and bipap at night      Objective:   Patient Vitals for the past 24 hrs:   Temp Pulse Resp BP SpO2   18 1325 98.5 °F (36.9 °C) 90 16 134/79 93 %   18 0923 98.1 °F (36.7 °C) 88 18 (!) 158/95 98 %   18 0816 - - - - 90 %   18 0540 - - - - 95 %   18 0450 97.3 °F (36.3 °C) 93 18 130/78 95 %   18 0058 98.7 °F (37.1 °C) 91 18 148/88 96 %   18 2340 - - - - 94 %   18 97 °F (36.1 °C) 91 19 154/75 96 %   18 - - - - 91 %   18 - - - - 91 %   18 173 97.8 °F (36.6 °C) 87 20 126/71 92 %     Oxygen Therapy  O2 Sat (%): 93 % (01/23/18 1325)  Pulse via Oximetry: 84 beats per minute (01/23/18 0816)  O2 Device: Nasal cannula (01/23/18 0923)  O2 Flow Rate (L/min): 4 l/min (01/23/18 0816)  O2 Temperature: 86.9 °F (30.5 °C) (01/11/18 1521)  FIO2 (%): 40 % (01/23/18 0540)    Intake/Output Summary (Last 24 hours) at 01/23/18 1449  Last data filed at 01/23/18 1234   Gross per 24 hour   Intake              720 ml   Output             2325 ml   Net            -1605 ml         General:    Well nourished. Alert. On nasal cannula oxygen-no resp distress at rest  HEENT- normal  CV:   RRR. No murmur, rub, or gallop. Lungs:   Mild decreased lung bases. No wheezing, rhonchi, or rales. Abdomen:   Soft, nontender, nondistended. Obese  CNS- no focal neurological deficit  Extremities: Warm and dry. No cyanosis or edema. Skin:     No rashes or jaundice. Data Review:  I have reviewed all labs, meds, telemetry events, and studies from the last 24 hours.     Recent Results (from the past 24 hour(s))   GLUCOSE, POC    Collection Time: 01/22/18  6:06 PM   Result Value Ref Range    Glucose (POC) 135 (H) 65 - 100 mg/dL   CBC W/O DIFF    Collection Time: 01/22/18  7:54 PM   Result Value Ref Range    WBC 9.3 4.3 - 11.1 K/uL    RBC 3.90 (L) 4.23 - 5.67 M/uL    HGB 12.7 (L) 13.6 - 17.2 g/dL    HCT 40.8 (L) 41.1 - 50.3 %    .6 (H) 79.6 - 97.8 FL    MCH 32.6 26.1 - 32.9 PG    MCHC 31.1 (L) 31.4 - 35.0 g/dL    RDW 13.2 11.9 - 14.6 %    PLATELET 684 158 - 597 K/uL    MPV 10.7 (L) 10.8 - 14.1 FL   GLUCOSE, POC    Collection Time: 01/23/18  6:02 AM   Result Value Ref Range    Glucose (POC) 108 (H) 65 - 100 mg/dL   CBC W/O DIFF    Collection Time: 01/23/18  9:24 AM   Result Value Ref Range    WBC 9.0 4.3 - 11.1 K/uL    RBC 4.26 4.23 - 5.67 M/uL    HGB 13.8 13.6 - 17.2 g/dL    HCT 44.8 41.1 - 50.3 %    .2 (H) 79.6 - 97.8 FL    MCH 32.4 26.1 - 32.9 PG    MCHC 30.8 (L) 31.4 - 35.0 g/dL RDW 13.0 11.9 - 14.6 %    PLATELET 263 854 - 311 K/uL    MPV 10.5 (L) 10.8 - 41.0 FL   METABOLIC PANEL, BASIC    Collection Time: 01/23/18  9:24 AM   Result Value Ref Range    Sodium 143 136 - 145 mmol/L    Potassium 4.1 3.5 - 5.1 mmol/L    Chloride 101 98 - 107 mmol/L    CO2 38 (H) 21 - 32 mmol/L    Anion gap 4 (L) 7 - 16 mmol/L    Glucose 156 (H) 65 - 100 mg/dL    BUN 17 6 - 23 MG/DL    Creatinine 1.08 0.8 - 1.5 MG/DL    GFR est AA >60 >60 ml/min/1.73m2    GFR est non-AA >60 >60 ml/min/1.73m2    Calcium 8.6 8.3 - 10.4 MG/DL   MAGNESIUM    Collection Time: 01/23/18  9:24 AM   Result Value Ref Range    Magnesium 2.1 1.8 - 2.4 mg/dL        All Micro Results     Procedure Component Value Units Date/Time    CULTURE, BLOOD [[id number]] Collected:  01/21/18 1324    Order Status:  Completed Specimen:  Blood from Blood Updated:  01/23/18 0618     Special Requests: --        LEFT  ARM       Culture result: NO GROWTH 2 DAYS       CULTURE, BLOOD [[id number]] Collected:  01/21/18 1351    Order Status:  Completed Specimen:  Blood from Blood Updated:  01/23/18 0618     Special Requests: --        LEFT  Antecubital       Culture result: NO GROWTH 2 DAYS       C. DIFFICILE/EPI PCR [[id number]]     Order Status:  Canceled Specimen:  Stool     CULTURE, BLOOD [[id number]] Collected:  01/10/18 2150    Order Status:  Completed Specimen:  Blood from Blood Updated:  01/15/18 0623     Special Requests: --        RIGHT  ARM       Culture result: NO GROWTH 5 DAYS       CULTURE, BLOOD [[id number]] Collected:  01/10/18 2150    Order Status:  Completed Specimen:  Blood from Blood Updated:  01/15/18 0623     Special Requests: --        RIGHT  ARM       Culture result: NO GROWTH 5 DAYS       CULTURE, RESPIRATORY/SPUTUM/BRONCH Benetta Ling STAIN [[id number]] Collected:  01/10/18 2115    Order Status:  Completed Specimen:  Sputum from Sputum Updated:  01/13/18 0730     Special Requests: NO SPECIAL REQUESTS        GRAM STAIN FEW GRAM POSITIVE COCCI WBC'S TOO NUMEROUS TO COUNT      0 TO 1 EPITHELIAL CELLS PER OIF      4+ MUCUS     Culture result:         SCANT NORMAL RESPIRATORY EDUAR    CULTURE, RESPIRATORY/SPUTUM/BRONCH Kirsten Query STAIN [[id number]]  (Abnormal)  (Susceptibility) Collected:  01/07/18 1125    Order Status:  Completed Specimen:  Sputum from Endotracheal aspirate Updated:  01/12/18 0644     Special Requests: NO SPECIAL REQUESTS        GRAM STAIN 0 TO 10 WBCS/OIF      NO EPITHELIAL CELLS SEEN         MANY GRAM POSITIVE COCCI                 MODERATE GRAM NEGATIVE RODS      FEW GRAM NEGATIVE COCCI         3+ MUCUS PRESENT        Culture result:         HEAVY STREPTOCOCCUS PNEUMONIAE (A)              HEAVY HAEMOPHILUS INFLUENZAE BETA LACTAMASE NEGATIVE (A)              MODERATE NORMAL RESPIRATORY EDUAR    CULTURE, BLOOD [[id number]] Collected:  01/06/18 1854    Order Status:  Completed Specimen:  Whole Blood from Blood Updated:  01/11/18 0616     Special Requests: --        RIGHT  FOREARM       Culture result: NO GROWTH 5 DAYS       CULTURE, BLOOD [[id number]] Collected:  01/06/18 1854    Order Status:  Completed Specimen:  Whole Blood from Blood Updated:  01/11/18 0616     Special Requests: --        RIGHT  Antecubital       Culture result: NO GROWTH 5 DAYS       CULTURE, BODY FLUID Kirsten Query STAIN [[id number]] Collected:  01/09/18 0945    Order Status:  Canceled Specimen:  Miscellaneous Fluid           Current Meds:  Current Facility-Administered Medications   Medication Dose Route Frequency    rivaroxaban (XARELTO) tablet 20 mg  20 mg Oral DAILY WITH BREAKFAST    amiodarone (CORDARONE) tablet 400 mg  400 mg Oral BID    fentaNYL citrate (PF) injection 25-50 mcg  25-50 mcg IntraVENous Multiple    midazolam (VERSED) injection 0.5-2 mg  0.5-2 mg IntraVENous Multiple    lidocaine (XYLOCAINE) 2 % viscous solution 15 mL  15 mL Mouth/Throat PRN    levalbuterol (XOPENEX) nebulizer soln 0.63 mg/3 mL  0.63 mg Nebulization Q8H    sodium chloride (NS) flush 10 mL 10 mL InterCATHeter Q8H    sodium chloride (NS) flush 10 mL  10 mL InterCATHeter PRN    artificial saliva (MOUTH KOTE) 1 Spray  1 Spray Oral PRN    cefTRIAXone (ROCEPHIN) 2 g in sterile water (preservative free) 20 mL IV syringe  2 g IntraVENous Q24H    tiotropium (SPIRIVA) inhalation capsule 18 mcg  1 Cap Inhalation DAILY    naloxone (NARCAN) injection 0.4 mg  0.4 mg IntraVENous EVERY 2 MINUTES AS NEEDED    thiamine (B-1) tablet 100 mg  100 mg Oral DAILY    folic acid (FOLVITE) tablet 1 mg  1 mg Oral DAILY    famotidine (PEPCID) tablet 20 mg  20 mg Oral BID    promethazine (PHENERGAN) with saline injection 12.5 mg  12.5 mg IntraVENous Q6H PRN    LORazepam (ATIVAN) tablet 0.5 mg  0.5 mg Oral Q8H PRN    haloperidol lactate (HALDOL) injection 5 mg  5 mg IntraVENous Q6H PRN    sertraline (ZOLOFT) tablet 50 mg  50 mg Oral DAILY    sodium chloride (OCEAN) 0.65 % nasal spray 2 Spray  2 Spray Both Nostrils QID    sodium chloride (OCEAN) 0.65 % nasal spray 2 Spray  2 Spray Both Nostrils Q2H PRN    fluticasone (FLONASE) 50 mcg/actuation nasal spray 2 Spray  2 Spray Both Nostrils DAILY    influenza vaccine 2017-18 (3 yrs+)(PF) (FLUZONE QUAD/FLUARIX QUAD) injection 0.5 mL  0.5 mL IntraMUSCular PRIOR TO DISCHARGE    metoprolol (LOPRESSOR) injection 5 mg  5 mg IntraVENous Q4H PRN    hydrALAZINE (APRESOLINE) 20 mg/mL injection 20 mg  20 mg IntraVENous Q6H PRN    sodium chloride (NS) flush 5-10 mL  5-10 mL IntraVENous Q8H    sodium chloride (NS) flush 5-10 mL  5-10 mL IntraVENous PRN    carvedilol (COREG) tablet 3.125 mg  3.125 mg Oral BID WITH MEALS    ondansetron (ZOFRAN) injection 4 mg  4 mg IntraVENous Q6H PRN    acetaminophen (TYLENOL) tablet 650 mg  650 mg Oral Q6H PRN    diphenhydrAMINE (BENADRYL) capsule 25 mg  25 mg Oral Q4H PRN    polyethylene glycol (MIRALAX) packet 17 g  17 g Oral DAILY PRN    guaiFENesin ER (MUCINEX) tablet 600 mg  600 mg Oral Q12H PRN    calcium carbonate (TUMS) chewable tablet 400 mg [elemental]  400 mg Oral TID PRN       Other Studies (last 24 hours):  Xr Chest Pa Lat    Result Date: 1/22/2018  CHEST X-RAY, 2 views 1/22/2018 History: Left-sided infiltrate. Technique: PA and lateral views of the chest. Comparison: Chest x-ray 1/21/2018 Findings: Stable borderline cardiomegaly is seen. Lungs are expanded without pneumothorax. Stable asymmetric interstitial prominence is seen at the left lung base a similar appearance has been seen on multiple prior studies dating back to the earliest chest x-ray at this institution dated 5/11/2015 and therefore this is felt to represent chronic change. IMPRESSION: 1. No infiltrate seen. Only stable chronic appearing interstitial prominence is seen at the left lung base.        Assessment and Plan:     Hospital Problems as of 1/23/2018  Date Reviewed: 1/19/2018          Codes Class Noted - Resolved POA    Alcohol abuse (Chronic) ICD-10-CM: F10.10  ICD-9-CM: 305.00  1/15/2018 - Present Yes        Homeless (Chronic) ICD-10-CM: Z59.0  ICD-9-CM: V60.0  1/15/2018 - Present No        Urethral stricture ICD-10-CM: N35.9  ICD-9-CM: 598.9  1/7/2018 - Present Yes        COPD (chronic obstructive pulmonary disease) ([facility] [address] (Chronic) ICD-10-CM: J44.9  ICD-9-CM: 423  1/6/2018 - Present Yes        Nicotine dependence (Chronic) ICD-10-CM: F17.200  ICD-9-CM: 305.1  1/6/2018 - Present Yes        Methamphetamine abuse (Chronic) ICD-10-CM: F15.10  ICD-9-CM: 305.70  1/6/2018 - Present Yes        Anasarca ICD-10-CM: R60.1  ICD-9-CM: 782.3  1/6/2018 - Present Yes        NATALIIA (obstructive sleep apnea) (Chronic) ICD-10-CM: G47.33  ICD-9-CM: 327.23  1/6/2018 - Present Yes        * (Principal)Acute on chronic respiratory failure with hypoxia and hypercapnia (HCC) ICD-10-CM: J96.21, J96.22  ICD-9-CM: 518.84, 786.09, 799.02  1/6/2018 - Present Yes        RESOLVED: [facility] ([facility] ICD-10-CM: I48.92  ICD-9-CM: 427.32  1/21/2018 - 1/23/2018 Unknown        RESOLVED: Diarrhea ICD-10-CM: R19.7  ICD-9-CM: 787.91  1/18/2018 - 1/23/2018 No        RESOLVED: Severe sepsis ([facility] ICD-10-CM: A41.9, R65.20  ICD-9-CM: 038.9, 995.92  1/9/2018 - 1/15/2018 Yes        RESOLVED: Community acquired pneumonia of right lower lobe of lung ([facility] ICD-10-CM: J18.1  ICD-9-CM: 641  1/9/2018 - 1/23/2018 Yes        RESOLVED: Acute metabolic encephalopathy DSC-79-MK: G93.41  ICD-9-CM: 348.31  1/7/2018 - 1/15/2018 Yes        RESOLVED: Hypotension ICD-10-CM: I95.9  ICD-9-CM: 458.9  1/7/2018 - 1/15/2018 Yes        RESOLVED: Acute kidney injury ([facility] ICD-10-CM: N17.9  ICD-9-CM: 584.9  1/6/2018 - 1/23/2018 Yes              PLAN:    Principal Problem:    Acute on chronic respiratory failure with hypoxia and hypercapnia (HCC) (1/6/2018)     Active Problems:    COPD (chronic obstructive pulmonary disease) ([facility] [address] (1/6/2018)       Nicotine dependence (1/6/2018)       Methamphetamine abuse (1/6/2018)       Anasarca (1/6/2018)       NATALIIA (obstructive sleep apnea) (1/6/2018)       Acute kidney injury ([facility] (1/6/2018)       Urethral stricture (1/7/2018)       Community acquired pneumonia of right lower lobe of lung ([facility] (1/9/2018)       Alcohol abuse (1/15/2018)       Homeless (1/15/2018)       Diarrhea (1/18/2018)       Atrial flutter ([facility] [address] (1/21/2018)           Plan:      -s/p cardioversion- presently on xarelto  -continue coreg   -h/o nicotine patch removed due to arrhythmia   -copd- xopenex- o2 dependent- requiring bipap at night. - completed the course of anitbiotic  -keep alegre in place ( placed by urology).  Consider removal a few days before his discharge date has been confirmed        Signed:  Austin Gary MD

## 2018-01-24 VITALS
HEIGHT: 68 IN | HEART RATE: 88 BPM | TEMPERATURE: 98.2 F | WEIGHT: 289.5 LBS | SYSTOLIC BLOOD PRESSURE: 126 MMHG | DIASTOLIC BLOOD PRESSURE: 74 MMHG | RESPIRATION RATE: 19 BRPM | BODY MASS INDEX: 43.87 KG/M2 | OXYGEN SATURATION: 94 %

## 2018-01-24 PROCEDURE — 74011250637 HC RX REV CODE- 250/637: Performed by: NURSE PRACTITIONER

## 2018-01-24 PROCEDURE — 90686 IIV4 VACC NO PRSV 0.5 ML IM: CPT | Performed by: INTERNAL MEDICINE

## 2018-01-24 PROCEDURE — 77010033678 HC OXYGEN DAILY

## 2018-01-24 PROCEDURE — 74011250637 HC RX REV CODE- 250/637: Performed by: FAMILY MEDICINE

## 2018-01-24 PROCEDURE — 94010 BREATHING CAPACITY TEST: CPT

## 2018-01-24 PROCEDURE — 74011250637 HC RX REV CODE- 250/637: Performed by: HOSPITALIST

## 2018-01-24 PROCEDURE — 74011250636 HC RX REV CODE- 250/636: Performed by: INTERNAL MEDICINE

## 2018-01-24 PROCEDURE — 97530 THERAPEUTIC ACTIVITIES: CPT

## 2018-01-24 PROCEDURE — 90471 IMMUNIZATION ADMIN: CPT

## 2018-01-24 PROCEDURE — 74011000250 HC RX REV CODE- 250: Performed by: INTERNAL MEDICINE

## 2018-01-24 PROCEDURE — 74011250637 HC RX REV CODE- 250/637: Performed by: INTERNAL MEDICINE

## 2018-01-24 PROCEDURE — 94760 N-INVAS EAR/PLS OXIMETRY 1: CPT

## 2018-01-24 PROCEDURE — 94640 AIRWAY INHALATION TREATMENT: CPT

## 2018-01-24 RX ORDER — AMIODARONE HYDROCHLORIDE 400 MG/1
400 TABLET ORAL 2 TIMES DAILY
Qty: 60 TAB | Refills: 0 | Status: ON HOLD | OUTPATIENT
Start: 2018-01-24 | End: 2018-11-16

## 2018-01-24 RX ORDER — IPRATROPIUM BROMIDE AND ALBUTEROL SULFATE 2.5; .5 MG/3ML; MG/3ML
3 SOLUTION RESPIRATORY (INHALATION)
Qty: 30 NEBULE | Refills: 0 | Status: SHIPPED | OUTPATIENT
Start: 2018-01-24 | End: 2021-01-01 | Stop reason: HOSPADM

## 2018-01-24 RX ORDER — ALBUTEROL SULFATE 90 UG/1
2 AEROSOL, METERED RESPIRATORY (INHALATION)
Qty: 1 INHALER | Refills: 0 | Status: SHIPPED | OUTPATIENT
Start: 2018-01-24 | End: 2021-01-01 | Stop reason: HOSPADM

## 2018-01-24 RX ORDER — CARVEDILOL 3.12 MG/1
3.12 TABLET ORAL 2 TIMES DAILY WITH MEALS
Qty: 60 TAB | Refills: 0 | Status: SHIPPED | OUTPATIENT
Start: 2018-01-24 | End: 2018-11-30

## 2018-01-24 RX ORDER — SERTRALINE HYDROCHLORIDE 50 MG/1
50 TABLET, FILM COATED ORAL DAILY
Qty: 15 TAB | Refills: 0 | Status: ON HOLD | OUTPATIENT
Start: 2018-01-25 | End: 2018-11-30 | Stop reason: SDUPTHER

## 2018-01-24 RX ADMIN — NYSTATIN: 100000 POWDER TOPICAL at 09:00

## 2018-01-24 RX ADMIN — SALINE NASAL SPRAY 2 SPRAY: 1.5 SOLUTION NASAL at 18:00

## 2018-01-24 RX ADMIN — INFLUENZA VIRUS VACCINE 0.5 ML: 15; 15; 15; 15 SUSPENSION INTRAMUSCULAR at 16:55

## 2018-01-24 RX ADMIN — AMIODARONE HYDROCHLORIDE 400 MG: 200 TABLET ORAL at 08:40

## 2018-01-24 RX ADMIN — AMIODARONE HYDROCHLORIDE 400 MG: 200 TABLET ORAL at 17:08

## 2018-01-24 RX ADMIN — Medication 100 MG: at 08:40

## 2018-01-24 RX ADMIN — LEVALBUTEROL HYDROCHLORIDE 0.63 MG: 0.63 SOLUTION RESPIRATORY (INHALATION) at 14:18

## 2018-01-24 RX ADMIN — FAMOTIDINE 20 MG: 20 TABLET, FILM COATED ORAL at 08:40

## 2018-01-24 RX ADMIN — FOLIC ACID 1 MG: 1 TABLET ORAL at 08:40

## 2018-01-24 RX ADMIN — WATER 2 G: 1 INJECTION INTRAMUSCULAR; INTRAVENOUS; SUBCUTANEOUS at 08:41

## 2018-01-24 RX ADMIN — Medication 10 ML: at 05:24

## 2018-01-24 RX ADMIN — SERTRALINE HYDROCHLORIDE 50 MG: 50 TABLET ORAL at 08:40

## 2018-01-24 RX ADMIN — Medication 5 ML: at 14:00

## 2018-01-24 RX ADMIN — RIVAROXABAN 20 MG: 20 TABLET, FILM COATED ORAL at 08:40

## 2018-01-24 RX ADMIN — FAMOTIDINE 20 MG: 20 TABLET, FILM COATED ORAL at 17:08

## 2018-01-24 RX ADMIN — NYSTATIN: 100000 POWDER TOPICAL at 18:00

## 2018-01-24 RX ADMIN — SALINE NASAL SPRAY 2 SPRAY: 1.5 SOLUTION NASAL at 13:00

## 2018-01-24 RX ADMIN — LEVALBUTEROL HYDROCHLORIDE 0.63 MG: 0.63 SOLUTION RESPIRATORY (INHALATION) at 07:40

## 2018-01-24 RX ADMIN — CARVEDILOL 3.12 MG: 3.12 TABLET, FILM COATED ORAL at 17:08

## 2018-01-24 RX ADMIN — CARVEDILOL 3.12 MG: 3.12 TABLET, FILM COATED ORAL at 08:40

## 2018-01-24 RX ADMIN — TIOTROPIUM BROMIDE 18 MCG: 18 CAPSULE ORAL; RESPIRATORY (INHALATION) at 07:41

## 2018-01-24 NOTE — PROGRESS NOTES
Bedside shift change report given to Graciela Regalado. RN (oncoming nurse) by self Sasha julio). Report included the following information SBAR.

## 2018-01-24 NOTE — DISCHARGE INSTRUCTIONS
Acute Alcohol Intoxication: Care Instructions  Your Care Instructions    You have had treatment to help your body rid itself of alcohol. Too much alcohol upsets the body's fluid balance. Your doctor may have given you fluids and vitamins. For some people, drinking too much alcohol is a one-time event. For others, it is an ongoing problem. In either case, it is serious. It can be life-threatening. Follow-up care is a key part of your treatment and safety. Be sure to make and go to all appointments, and call your doctor if you are having problems. It's also a good idea to know your test results and keep a list of the medicines you take. How can you care for yourself at home? · Do not drink and drive. · Be safe with medicines. Take your medicines exactly as prescribed. Call your doctor if you think you are having a problem with your medicine. · Your doctor may have prescribed disulfiram (Antabuse). Do not drink any alcohol while you are taking this medicine. You may have severe or even life-threatening side effects from even small amounts of alcohol. · If you were given medicine to prevent nausea, be sure to take it exactly as prescribed. · Before you take any medicine, tell your doctor if:  ¨ You have had a bad reaction to any medicines in the past.  ¨ You are taking other medicines, including over-the-counter ones, or have other health problems. ¨ You are or could be pregnant. · Be prepared to have some symptoms of withdrawal in the next few days. · Drink plenty of liquids in the next few days. · Seek help if you need it to stop drinking. Getting counseling and joining a support group can help you stay sober. Try a support group such as Alcoholics Anonymous. · Avoid alcohol when you take medicines. It can react with many medicines and cause serious problems. When should you call for help? Call 911 anytime you think you may need emergency care.  For example, call if:  ? · You feel confused and are seeing things that are not there. ? · You are thinking about killing yourself or hurting others. ? · You have a seizure. ? · You vomit blood or what looks like coffee grounds. ?Call your doctor now or seek immediate medical care if:  ? · You have trembling, restlessness, sweating, and other withdrawal symptoms that are new or that get worse. ? · Your withdrawal symptoms come back after not bothering you for days or weeks. ? · You can't stop vomiting. ? Watch closely for changes in your health, and be sure to contact your doctor if:  ? · You need help to stop drinking. Where can you learn more? Go to http://regina-jacy.info/. Enter T102 in the search box to learn more about \"Acute Alcohol Intoxication: Care Instructions. \"  Current as of: November 3, 2016  Content Version: 11.4  © 2980-2579 [employer]. Care instructions adapted under license by [employer] (which disclaims liability or warranty for this information). If you have questions about a medical condition or this instruction, always ask your healthcare professional. [address] any warranty or liability for your use of this information. Chronic Obstructive Pulmonary Disease (COPD): Care Instructions  Your Care Instructions    Chronic obstructive pulmonary disease (COPD) is a general term for a group of lung diseases, including emphysema and chronic bronchitis. People with COPD have decreased airflow in and out of the lungs, which makes it hard to breathe. The airways also can get clogged with thick mucus. Cigarette smoking is a major cause of COPD. Although there is no cure for COPD, you can slow its progress. Following your treatment plan and taking care of yourself can help you feel better and live longer. Follow-up care is a key part of your treatment and safety. Be sure to make and go to all appointments, and call your doctor if you are having problems.  It's also a good idea to know your test results and keep a list of the medicines you take. How can you care for yourself at home? ?Staying healthy  ? · Do not smoke. This is the most important step you can take to prevent more damage to your lungs. If you need help quitting, talk to your doctor about stop-smoking programs and medicines. These can increase your chances of quitting for good. ? · Avoid colds and flu. Get a pneumococcal vaccine shot. If you have had one before, ask your doctor whether you need a second dose. Get the flu vaccine every fall. If you must be around people with colds or the flu, wash your hands often. ? · Avoid secondhand smoke, air pollution, and high altitudes. Also avoid cold, dry air and hot, humid air. Stay at home with your windows closed when air pollution is bad. ?Medicines and oxygen therapy  ? · Take your medicines exactly as prescribed. Call your doctor if you think you are having a problem with your medicine. ? · You may be taking medicines such as:  ¨ Bronchodilators. These help open your airways and make breathing easier. Bronchodilators are either short-acting (work for 6 to 9 hours) or long-acting (work for 24 hours). You inhale most bronchodilators, so they start to act quickly. Always carry your quick-relief inhaler with you in case you need it while you are away from home. ¨ Corticosteroids (prednisone, budesonide). These reduce airway inflammation. They come in pill or inhaled form. You must take these medicines every day for them to work well. ? · A spacer may help you get more inhaled medicine to your lungs. Ask your doctor or pharmacist if a spacer is right for you. If it is, ask how to use it properly. ? · Do not take any vitamins, over-the-counter medicine, or herbal products without talking to your doctor first.   ? · If your doctor prescribed antibiotics, take them as directed. Do not stop taking them just because you feel better.  You need to take the full course of antibiotics. ? · Oxygen therapy boosts the amount of oxygen in your blood and helps you breathe easier. Use the flow rate your doctor has recommended, and do not change it without talking to your doctor first.   Activity  ? · Get regular exercise. Walking is an easy way to get exercise. Start out slowly, and walk a little more each day. ? · Pay attention to your breathing. You are exercising too hard if you cannot talk while you are exercising. ? · Take short rest breaks when doing household chores and other activities. ? · Learn breathing methods-such as breathing through pursed lips-to help you become less short of breath. ? · If your doctor has not set you up with a pulmonary rehabilitation program, talk to him or her about whether rehab is right for you. Rehab includes exercise programs, education about your disease and how to manage it, help with diet and other changes, and emotional support. Diet  ? · Eat regular, healthy meals. Use bronchodilators about 1 hour before you eat to make it easier to eat. Eat several small meals instead of three large ones. Drink beverages at the end of the meal. Avoid foods that are hard to chew. ? · Eat foods that contain protein so that you do not lose muscle mass. ? · Talk with your doctor if you gain too much weight or if you lose weight without trying. ?Mental health  ? · Talk to your family, friends, or a therapist about your feelings. It is normal to feel frightened, angry, hopeless, helpless, and even guilty. Talking openly about bad feelings can help you cope. If these feelings last, talk to your doctor. When should you call for help? Call 911 anytime you think you may need emergency care. For example, call if:  ? · You have severe trouble breathing. ?Call your doctor now or seek immediate medical care if:  ? · You have new or worse trouble breathing. ? · You cough up blood. ? · You have a fever. ? Watch closely for changes in your health, and be sure to contact your doctor if:  ? · You cough more deeply or more often, especially if you notice more mucus or a change in the color of your mucus. ? · You have new or worse swelling in your legs or belly. ? · You are not getting better as expected. Where can you learn more? Go to http://regina-jacy.info/. Glenda Maynard in the search box to learn more about \"Chronic Obstructive Pulmonary Disease (COPD): Care Instructions. \"  Current as of: May 12, 2017  Content Version: 11.4  © 9163-9751 [employer]. Care instructions adapted under license by [employer] (which disclaims liability or warranty for this information). If you have questions about a medical condition or this instruction, always ask your healthcare professional. [address] any warranty or liability for your use of this information. Learning About Cardioversion  What is cardioversion? Cardioversion helps your heart return to a normal rhythm. It treats problems like atrial fibrillation. It is also sometimes used in emergencies. It can correct a fast heartbeat that causes low blood pressure, chest pain, or heart failure. There are two types:  · The electrical type uses an electric current. The current enters your body through patches on your chest or back. · The chemical type uses medicines. The medicine is usually put into your arm through a tube called an IV. How is cardioversion done? Your doctor may ask you to take medicines before the treatment. These help prevent blood clots. Your doctor will watch you closely to make sure that there are no problems. Electrical cardioversion  The electrical procedure is done in a hospital. You will get medicine to help you relax and control the pain. Your doctor will put patches on your chest or back. The patches send an electric current to your heart. This resets your heart rhythm.   The electrical part takes about 5 minutes. But you will probably be in the hospital for 1 to 2 hours. You will need to recover from the effects of the sedative medicine. Chemical cardioversion  The chemical procedure is most often done in a hospital. In most cases, the medicine is put into your arm through a tube called an IV. But you may get medicines to take by mouth. You may feel a quick sting or pinch when the IV starts. The procedure usually takes about 4 to 8 hours. What can you expect after cardioversion? · You can usually go home the same day. You will need someone to drive you home. · Your doctor may have you take medicines daily. These help your heart beat normally and prevent blood clots. · After electrical cardioversion, you may have redness where the patches were. This looks and feels like a sunburn. · Abnormal heart rhythms sometimes come back after cardioversion. Follow-up care is a key part of your treatment and safety. Be sure to make and go to all appointments, and call your doctor if you are having problems. It's also a good idea to know your test results and keep a list of the medicines you take. Where can you learn more? Go to http://reginaInvolution Studiosjacy.info/. Enter U308 in the search box to learn more about \"Learning About Cardioversion. \"  Current as of: September 21, 2016  Content Version: 11.4  © 9225-8544 [employer]. Care instructions adapted under license by [employer] (which disclaims liability or warranty for this information). If you have questions about a medical condition or this instruction, always ask your healthcare professional. [place] [address] any warranty or liability for your use of this information.       DISCHARGE SUMMARY from Nurse    PATIENT INSTRUCTIONS:    After general anesthesia or intravenous sedation, for 24 hours or while taking prescription Narcotics:  · Limit your activities  · Do not drive and operate hazardous machinery  · Do not make important personal or business decisions  · Do  not drink alcoholic beverages  · If you have not urinated within 8 hours after discharge, please contact your surgeon on call. Report the following to your surgeon:  · Excessive pain, swelling, redness or odor of or around the surgical area  · Temperature over 100.5  · Nausea and vomiting lasting longer than 4 hours or if unable to take medications  · Any signs of decreased circulation or nerve impairment to extremity: change in color, persistent  numbness, tingling, coldness or increase pain  · Any questions    What to do at Home:  Recommended activity: Activity as tolerated,    *  Please give a list of your current medications to your Primary Care Provider. *  Please update this list whenever your medications are discontinued, doses are      changed, or new medications (including over-the-counter products) are added. *  Please carry medication information at all times in case of emergency situations. These are general instructions for a healthy lifestyle:    No smoking/ No tobacco products/ Avoid exposure to second hand smoke  Surgeon General's Warning:  Quitting smoking now greatly reduces serious risk to your health. Obesity, smoking, and sedentary lifestyle greatly increases your risk for illness    A healthy diet, regular physical exercise & weight monitoring are important for maintaining a healthy lifestyle    You may be retaining fluid if you have a history of heart failure or if you experience any of the following symptoms:  Weight gain of 3 pounds or more overnight or 5 pounds in a week, increased swelling in our hands or feet or shortness of breath while lying flat in bed. Please call your doctor as soon as you notice any of these symptoms; do not wait until your next office visit.     Recognize signs and symptoms of STROKE:    F-face looks uneven    A-arms unable to move or move unevenly    S-speech slurred or non-existent    T-time-call 911 as soon as signs and symptoms begin-DO NOT go       Back to bed or wait to see if you get better-TIME IS BRAIN. Warning Signs of HEART ATTACK     Call 911 if you have these symptoms:   Chest discomfort. Most heart attacks involve discomfort in the center of the chest that lasts more than a few minutes, or that goes away and comes back. It can feel like uncomfortable pressure, squeezing, fullness, or pain.  Discomfort in other areas of the upper body. Symptoms can include pain or discomfort in one or both arms, the back, neck, jaw, or stomach.  Shortness of breath with or without chest discomfort.  Other signs may include breaking out in a cold sweat, nausea, or lightheadedness. Don't wait more than five minutes to call 911 - MINUTES MATTER! Fast action can save your life. Calling 911 is almost always the fastest way to get lifesaving treatment. Emergency Medical Services staff can begin treatment when they arrive -- up to an hour sooner than if someone gets to the hospital by car. The discharge information has been reviewed with the patient. The patient verbalized understanding. Discharge medications reviewed with the patient and appropriate educational materials and side effects teaching were provided.

## 2018-01-24 NOTE — PROGRESS NOTES
Care Management Interventions  PCP Verified by CM:  (referred to [facility] [phone])  Mode of Transport at Discharge: Other (see comment)  Transition of Care Consult (CM Consult): Discharge Planning  MyChart Signup: No  Discharge Durable Medical Equipment: No  Physical Therapy Consult: Yes  Occupational Therapy Consult: Yes  Speech Therapy Consult: No  Current Support Network: Lives with Spouse  Confirm Follow Up Transport: Family  Plan discussed with Pt/Family/Caregiver: Yes  Freedom of Choice Offered: Yes   Resource Information Provided?: No  Discharge Location  Discharge Placement: Home  Patient to be d/c home today. Spoke with [facility] regarding BIPAP and O2. They charge $10/portable tank, $100/O2 concentrator and they have no used BIPAP so cost to patient is $1000 minimal depending on type and equipment needed. Asked them about working with [facility] and they stated was best they could do. [facility] and they will do O2 at $50/month. Unable to supply BIPAP. Discussed all this with patient they can afford the O2 with [facility] so this was set up with them. Patient has brother that has CPAP spoke with Dr. Dallin Lombardo asking if CPAP would be okay after explaining about unable to obtain BIPAP. Per Dr. Dallin Lombardo BIPAP is the best option for patient but if unable to locate BIPAP she would consider CPAP and provide settings. I spoke with Rehana Morales of the [facility] program and they will see what they can find out. Patient will need voucher for medication and nebulizer. Dr. Gilles Sacks stated that patient can wait until tomorrow to receive the nebulizer from the [facility]. Voucher total for medication $594.76, also given 30 day free card for Xarelto and approved by [place]. Patient will need to go to [facility] due to [address] [facility] [address] is closed at this time.  Received call from Arti Ji and they have found a used BIPAP machine for the patient and obtained permission from patient to send clinical information to [facility] and [facility]. Patient signed authorization form for release of medical information and this is placed in the chart. Carrol Smyth and Farideh Sanchez delivered BIPAP to patient's room and also has mask for him. He is to follow up at the [facility] tomorrow 1/25/18 at 10 am with Severo Catalan to set up for nebulizer and their [address] They are to set patient up for medication assistance. Patient to be d/c home with wife and he declined home health follow up stated will go to the clinic tomorrow and get everything arranged.

## 2018-01-24 NOTE — PROGRESS NOTES
Problem: Mobility Impaired (Adult and Pediatric)  Goal: *Acute Goals and Plan of Care (Insert Text)  UPDATED: 1/19/18  STG:  (1.)Mr. Schaffer will move from supine to sit and sit to supine , scoot up and down and roll side to side with STAND BY ASSIST within 3 day(s). (2.)Mr. Schaffer will transfer from bed to chair and chair to bed with CONTACT GUARD ASSIST using the least restrictive device within 3 day(s). Met 1/23/2018   (3.)Mr. Schaffer will ambulate with CONTACT GUARD ASSIST for 100 feet with the least restrictive device within 3 day(s). Met 1/23/2018   (4.)Mr. Schaffer will perform standing static and dynamic balance activities x 15 minutes with CONTACT GUARD ASSIST to improve safety within 3 day(s). (5.)Mr. Schaffer will maintain stable vital signs throughout all functional mobility within 3 days. Met 1/23/2018     LTG:  (1.)Mr. Schaffer will move from supine to sit and sit to supine , scoot up and down and roll side to side in bed with MODIFIED INDEPENDENCE within 7 day(s). (2.)Mr. Schaffer will transfer from bed to chair and chair to bed with MODIFIED INDEPENDENCE using the least restrictive device within 7 day(s). (3.)Mr. Schaffer will ambulate with MODIFIED INDEPENDENCE for 250+ feet with the least restrictive device within 7 day(s). (4.)Mr. Schaffer will participate in therapeutic activity/exerices x 23 minutes for increased activity tolerance within 7 days.     ________________________________________________________________________________________________        PHYSICAL THERAPY: Daily Note, Treatment Day: 2nd, AM 1/24/2018  INPATIENT: Hospital Day: 23  Payor: SELF PAY / Plan: [facility] SELF PAY / Product Type: Self Pay /      NAME/AGE/GENDER: Rasta Wyatt is a 45 y.o. male   PRIMARY DIAGNOSIS: COPD (chronic obstructive pulmonary disease) (HCC) Acute on chronic respiratory failure with hypoxia and hypercapnia (HCC) Acute on chronic respiratory failure with hypoxia and hypercapnia [facility])        ICD-10: Treatment Diagnosis:   · Difficulty in walking, Not elsewhere classified (R26.2)   Precaution/Allergies:  Review of patient's allergies indicates no known allergies. ASSESSMENT:     Mr. Agnieszka Pierce was sitting in recliner on 4L high flow nasal cannula on presentation. Patient stood with modified independence. He ambulated 300' with no AD, but pushing portable oxygen tank. Pt's SpO2 observed to remain >90% throughout session. Pt demonstrated progress with transfers and gait today. He was tired upon return to room. He could continue to benefit from PT. This section established at most recent assessment   PROBLEM LIST (Impairments causing functional limitations):  1. Decreased Strength  2. Decreased ADL/Functional Activities  3. Decreased Transfer Abilities  4. Decreased Ambulation Ability/Technique  5. Decreased Balance  6. Increased Pain  7. Decreased Activity Tolerance  8. Decreased Knowledge of Precautions  9. Decreased [place] with Home Exercise Program   INTERVENTIONS PLANNED: (Benefits and precautions of physical therapy have been discussed with the patient.)  1. Balance Exercise  2. Bed Mobility  3. Family Education  4. Gait Training  5. Home Exercise Program (HEP)  6. Therapeutic Activites  7. Therapeutic Exercise/Strengthening  8. Transfer Training  9. Patient Education  10. Group Therapy     TREATMENT PLAN: Frequency/Duration: 3 times a week for duration of hospital stay  Rehabilitation Potential For Stated Goals: Good     RECOMMENDED REHABILITATION/EQUIPMENT: (at time of discharge pending progress): Due to the probability of continued deficits (see above) this patient will likely need continued skilled physical therapy after discharge. Equipment:    None at this time  [place]? ? To be determined.               HISTORY:   History of Present Injury/Illness (Reason for Referral):  Per MD: Yumiko Taylor is a 45y.o. year-old male with a past medical history of polysubstance abuse and NATALIIA with poor follow up who presents to the ER with a complaint of worsening shortness of breath and lower extremity/abdominal swelling over the past month. He admits to respiratory problems in the past and apparently was supposed to get a trach placed at some point but didn't. He has also had CPAP recommended but cannot wear it because it is too loud for him. He admits to occasional cough productive of green sputum. Denies fevers, chills, nausea, vomiting, constipation, diarrhea, chest pain. Past Medical History/Comorbidities:   Mr. Chelsey Forde  has a past medical history of Sleep disorder. Mr. Chelsey Forde  has no past surgical history on file. Social History/Living Environment:   Home Environment: Trailer/mobile home  # Steps to Enter: 1  One/Two Story Residence: One story  Living Alone: No  Support Systems: Child(suzette), Family member(s), Spouse/Significant Other/Partner  Patient Expects to be Discharged to[de-identified] Private residence  Current DME Used/Available at Home: Cane, straight  Tub or Shower Type: Tub/Shower combination  Prior Level of Function/Work/Activity:  Patient ambulated with modified independence prior to admission, required some assist from wife for donning socks. Number of Personal Factors/Comorbidities that affect the Plan of Care: 1-2: MODERATE COMPLEXITY   EXAMINATION:   Most Recent Physical Functioning:   Gross Assessment:                  Posture:  Posture Assessment: Forward head, Rounded shoulders  Balance:    Bed Mobility:     Wheelchair Mobility:     Transfers:     Gait:     Base of Support: Center of gravity altered  Speed/Fawn: Slow  Step Length: Left shortened;Right shortened  Gait Abnormalities: Decreased step clearance  Distance (ft): 300 Feet (ft)  Assistive Device: Other (comment) (no AD, but pushing portable oxygen tank)  Ambulation - Level of Assistance: Stand-by asssistance      Body Structures Involved:  1. Heart  2. Lungs  3.  Muscles Body Functions Affected:  1. Sensory/Pain  2. Respiratory  3. Neuromusculoskeletal  4. Movement Related Activities and Participation Affected:  1. Mobility  2. Self Care  3. Domestic Life  4. Interpersonal Interactions and Relationships  5. Community, Social and [place]   Number of elements that affect the Plan of Care: 4+: HIGH COMPLEXITY   CLINICAL PRESENTATION:   Presentation: Stable and uncomplicated: LOW COMPLEXITY   CLINICAL DECISION MAKIN [facility] Inpatient Short Form  How much difficulty does the patient currently have. .. Unable A Lot A Little None   1. Turning over in bed (including adjusting bedclothes, sheets and blankets)? [] 1   [] 2   [x] 3   [] 4   2. Sitting down on and standing up from a chair with arms ( e.g., wheelchair, bedside commode, etc.)   [] 1   [] 2   [x] 3   [] 4   3. Moving from lying on back to sitting on the side of the bed? [] 1   [] 2   [x] 3   [] 4   How much help from another person does the patient currently need. .. Total A Lot A Little None   4. Moving to and from a bed to a chair (including a wheelchair)? [] 1   [] 2   [x] 3   [] 4   5. Need to walk in hospital room? [] 1   [] 2   [x] 3   [] 4   6. Climbing 3-5 steps with a railing? [] 1   [x] 2   [] 3   [] 4   © , Trustees of [address] [place] [address], under license to [employer]. All rights reserved      Score:  Initial: 17 Most Recent:17 (Date: 18 )    Interpretation of Tool:  Represents activities that are increasingly more difficult (i.e. Bed mobility, Transfers, Gait). Score 24 23 22-20 19-15 14-10 9-7 6     Modifier CH CI CJ CK CL CM CN      ?  Mobility - Walking and Moving Around:     - CURRENT STATUS: CK - 40%-59% impaired, limited or restricted    - GOAL STATUS: CJ - 20%-39% impaired, limited or restricted    - D/C STATUS:  ---------------To be determined---------------  Payor: SELF PAY / Plan: [facility] SELF PAY / Product Type: Self Pay /      Medical Necessity:     · Patient demonstrates good rehab potential due to higher previous functional level. Reason for Services/Other Comments:  · Patient continues to require modification of therapeutic interventions to increase complexity of exercises. Use of outcome tool(s) and clinical judgement create a POC that gives a: Clear prediction of patient's progress: LOW COMPLEXITY            TREATMENT:   (In addition to Assessment/Re-Assessment sessions the following treatments were rendered)   Pre-treatment Symptoms/Complaints:  None  Pain: Initial:      Post Session:  0/10    Therapeutic Exercise: ( ):  Exercises per grid below to improve mobility, strength and balance. Required minimal visual and verbal cues to promote proper body alignment and promote proper body breathing techniques. Progressed complexity of movement as indicated. Date: 1/23/19        Ambulation  Device  assistance 300'  RW  SBA        Partial Squats         Hip Abduction/ Adduction Standing 2x10 AB        Heel Raises  Standing         Toe Raises Standing 2x10 AB        Hip Flexion Standing 2x10 AB        Sit to Stand         Key:  R=right, L=left, B=bilaterally, A=active, AA=active assisted, P=passive    Therapeutic Activity: (    25 minutes ):  Therapeutic activities including Bed transfers, Chair transfers, Toilet transfers and Ambulation on level ground to improve mobility, strength, balance and endurance. Braces/Orthotics/Lines/Etc:   · alegre catheter  · O2 Device: Hi flow nasal cannula  Treatment/Session Assessment:    · Response to Treatment:  Pt tolerated fairly given SpO2 in upper 80s-low 90s throughout. · Interdisciplinary Collaboration:   o Physical Therapist  o Occupational Therapist  o Registered Nurse  · After treatment position/precautions:   o Up in chair  o Bed/Chair-wheels locked  o Call light within reach  o RN notified  o Family at bedside   · Compliance with Program/Exercises:  Will assess as treatment progresses. · Recommendations/Intent for next treatment session: \"Next visit will focus on advancements to more challenging activities and reduction in assistance provided\".   Total Treatment Duration:  PT Patient Time In/Time Out  Time In: 1040  Time Out: [place], [place]

## 2018-01-24 NOTE — PROGRESS NOTES
Problem: Falls - Risk of  Goal: *Absence of Falls  Document Mega Fall Risk and appropriate interventions in the flowsheet.    Outcome: Progressing Towards Goal  Fall Risk Interventions:  Mobility Interventions: Bed/chair exit alarm, Patient to call before getting OOB    Mentation Interventions: Bed/chair exit alarm, More frequent rounding, Room close to nurse's station, Update white board    Medication Interventions: Bed/chair exit alarm, Evaluate medications/consider consulting pharmacy, Patient to call before getting OOB, Teach patient to arise slowly    Elimination Interventions: Bed/chair exit alarm, Call light in reach, Urinal in reach, Toileting schedule/hourly rounds

## 2018-01-24 NOTE — PROGRESS NOTES
Bedside and Verbal shift change report given to Raul Harper RN (oncoming nurse) by self Romero julio). Report included the following information SBAR, Kardex, MAR and Recent Results. Bed alarm on and functioning.

## 2018-01-24 NOTE — DISCHARGE SUMMARY
Hospitalist Discharge Summary     Admit Date:  2018  6:27 PM   Name:  Miguel Mccarty   Age:  45 y.o.  :  1965   MRN:  [id number]   PCP:  None  Treatment Team: Attending Provider: Gopal Morales MD; Utilization Review: Carri Galdamez RN; Consulting Provider: Charmayne Huge, MD; Consulting Provider: Semaj Leonard MD; Staff Nurse: Elliot Womack; Care Manager: Zachary Wilson RN    Problem List for this Hospitalization:  Hospital Problems as of 2018  Date Reviewed: 2018          Codes Class Noted - Resolved POA    Alcohol abuse (Chronic) ICD-10-CM: F10.10  ICD-9-CM: 305.00  1/15/2018 - Present Yes        Homeless (Chronic) ICD-10-CM: Z59.0  ICD-9-CM: V60.0  1/15/2018 - Present No        Urethral stricture ICD-10-CM: N35.9  ICD-9-CM: 598.9  2018 - Present Yes        COPD (chronic obstructive pulmonary disease) ([facility] (Chronic) ICD-10-CM: J44.9  ICD-9-CM: 084  2018 - Present Yes        Nicotine dependence (Chronic) ICD-10-CM: F17.200  ICD-9-CM: 305.1  2018 - Present Yes        Methamphetamine abuse (Chronic) ICD-10-CM: F15.10  ICD-9-CM: 305.70  2018 - Present Yes        Anasarca ICD-10-CM: R60.1  ICD-9-CM: 782.3  2018 - Present Yes        NATALIIA (obstructive sleep apnea) (Chronic) ICD-10-CM: T96.16  ICD-9-CM: 327.23  2018 - Present Yes        * (Principal)Acute on chronic respiratory failure with hypoxia and hypercapnia ([facility] ICD-10-CM: J96.21, J96.22  ICD-9-CM: 518.84, 786.09, 799.02  2018 - Present Yes        RESOLVED: Atrial flutter ([facility] ICD-10-CM: I48.92  ICD-9-CM: 427.32  2018 - 2018 Unknown        RESOLVED: Diarrhea ICD-10-CM: R19.7  ICD-9-CM: 787.91  2018 - 2018 No        RESOLVED: Severe sepsis ([facility] ICD-10-CM: A41.9, R65.20  ICD-9-CM: 038.9, 995.92  2018 - 1/15/2018 Yes        RESOLVED: Community acquired pneumonia of right lower lobe of lung ([facility] ICD-10-CM: J18.1  ICD-9-CM: 487  2018 - 2018 Yes        RESOLVED: Acute metabolic encephalopathy AKH-63-VO: G93.41  ICD-9-CM: 348.31  1/7/2018 - 1/15/2018 Yes        RESOLVED: Hypotension ICD-10-CM: I95.9  ICD-9-CM: 458.9  1/7/2018 - 1/15/2018 Yes        RESOLVED: Acute kidney injury ([facility] ICD-10-CM: N17.9  ICD-9-CM: 584.9  1/6/2018 - 1/23/2018 Yes                Admission HPI from 1/6/2018:    Coy Kenyon is a 45y.o. year-old male with a past medical history of polysubstance abuse and NATALIIA with poor follow up who presents to the ER with a complaint of worsening shortness of breath and lower extremity/abdominal swelling over the past month. He admits to respiratory problems in the past and apparently was supposed to get a trach placed at some point but didn't. He has also had CPAP recommended but cannot wear it because it is too loud for him. He admits to occasional cough productive of green sputum. Denies fevers, chills, nausea, vomiting, constipation, diarrhea, chest pain. Pt was intialy admitted for resp failure,copd exa,buster,anasarca. Hospital Course: This is a 44 YO male patient with a PMH of HTN, obesity, met-amphetamine use and tobacco use who was admitted on 1/16 with acute hypoxemic respiratory failure. Sinusitis was reported on CT scan and also possible pneumonia was reported. He was intubated and admitted to the ICU. Sputum cultures were positive for Strep. Pneumoniae and zosyn was transitioned to ceftiraxone. He was extubated and unfortunately he developed respiratory failure had had to be re-intubated. There has been some suspicion for inpatient illicit drug use although it has not been proved. He was extubated for the second time and was transferred to the medical floor. On 1/21 he developed atrial flutter and had to be transferred to the tely floor. He was started on a cardizem drip + heparin  with partial response. Today he had a MIRANDA done with Electric cardioversion. He cardioverted to NSR and was switched to oral xarelto and amiodarone.  Is using BIPAP at night.pt had PFTs prior to discharge. Pt respiratory status is stable. Ok to discharge per pulmonology. Pt is medically stable to discharge.  did get an appointment with the [facility] clinic  And pts oxygen,bipap and nebulization machine are arranged. Follow up instructions below. Plan was discussed with patient. .  All questions answered. Patient was stable at time of discharge and was instructed to call or return if there are any concerns or recurrence of symptoms. Diagnostic Imaging/Tests:   Xr Chest Sngl V    Result Date: 1/7/2018  Portable chest x-ray 1/7/2018 INDICATION: Intubation COMPARISON: January 6, 2018 There is new left IJ catheter tip in the distal tip within the left innominate vein. NG tube is present with tip not seen. Heart is enlarged and there is a left lower lobe infiltrate. Tip ET tube is in the T5 trachea. IMPRESSION: As above    Xr Abd (kub)    Result Date: 1/7/2018  KUB for NG tube placement January 7, 2018: Tip of the NG tube is in the area of the body the stomach. Ct Chest W Cont    Result Date: 1/6/2018  HISTORY: Shortness of breath Exam: CT chest, PE protocol Technique: Thin section axial CT images are obtained from the thoracic inlet through the upper abdomen. Coronal reformatted images are obtained based on the axial data. 100 cc Isovue 370 is administered intraveneously without incident. Radiation dose reduction techniques were used for this study. Our CT scanners use one or all of the following: Automated exposure control, adjustment of the mA and/or kV according to patient size, use of iterative reconstruction. Comparison: None Findings: There are no filling defects seen within the pulmonary vasculature to suggest pulmonary embolus. There is no mediastinal, hilar, or axillary lymphadenopathy seen. No suspicious pulmonary nodules. There is a trace right pleural effusion present. There is bibasilar atelectasis. There is also a trace pericardial effusion.  The central airways are patent. Evaluation of the upper abdomen demonstrates a small gallstone and perinephric fat stranding. Bone window evaluation demonstrates no aggressive osseous lesions. IMPRESSIONS: 1. No evidence of pulmonary embolus. 2. Trace right pleural effusion and pericardial effusion. 3. Bibasilar atelectasis. Xr Chest Port    Result Date: 2018  Portable chest x-ray 2018 INDICATION: Abdominal pain COMPARISON: 2015 Heart is enlarged. Lung fields are clear. Soft tissues and bony structures are unremarkable. IMPRESSION: Cardiomegaly, clear lung fields      Echocardiogram results:  Results for orders placed or performed during the hospital encounter of 18   2D ECHO COMPLETE ADULT (TTE) W OR WO CONTR    Narrative    [facility] [address] [facility], [address]  [phone]    Transthoracic Echocardiogram  2D, M-mode, Doppler, and Color Doppler    Patient: Olga Saeed  MR #: [id number]  : 1965  Age: 45 years  Gender: Male  Study date: 2018  Account #: [de-identified]  Height: 67.7 in  Weight: 315.3 lb  BSA: 2.47 mï¾²  Status:Routine  Location: 3305  BP: 144/ 96    Allergies: NO KNOWN ALLERGIES    Sonographer:  Neel Sylvester RDCS  Group:  [facility]  Referring Physician:  Irlanda Carrillo MD  Reading Physician:  Flavio Colon. MD Naga [facility]    INDICATIONS: Edema. *Patient on vent. Technically difficult due to patient body habitus   (316lbs). *    PROCEDURE: This was a routine study. A transthoracic echocardiogram was  performed. The study included complete 2D imaging, M-mode, complete spectral  Doppler, and color Doppler. Intravenous contrast (Definity) was administered. Echocardiographic views were limited by restricted patient mobility and poor  acoustic window availability. This was a technically difficult study.     LEFT VENTRICLE: Size was normal. Systolic function was normal. Ejection  fraction was estimated in the range of 55 % to 60 %. There were no regional  wall motion abnormalities. There was moderate concentric hypertrophy. The   E/e'  ratio was 21.1. Diastolic Function was indeterminate. RIGHT VENTRICLE: Not well visualized. LEFT ATRIUM: The atrium was mildly dilated. RIGHT ATRIUM: Not well visualized. SYSTEMIC VEINS: IVC: The inferior vena cava was dilated. AORTIC VALVE: The valve was not well visualized. The peak velocity was 2.23  m/s. The mean pressure gradient was 11 mmHg. The peak pressure gradient was   20  mmHg. There was no insufficiency. MITRAL VALVE: Not well visualized. There was no evidence for stenosis. There  was no regurgitation. TRICUSPID VALVE: Not well visualized. There was no evidence for stenosis. There  was trivial regurgitation. PULMONIC VALVE: Not well visualized. PERICARDIUM: A trivial pericardial effusion was identified. There was no  evidence of hemodynamic compromise. AORTA: The root exhibited normal size. SUMMARY:    -  Procedure information: This was a technically difficult study. -  Left ventricle: Systolic function was normal. Ejection fraction was  estimated in the range of 55 % to 60 %. There were no regional wall motion  abnormalities. There was moderate concentric hypertrophy. The E/e' ratio was  21.1. Diastolic Function was indeterminate. -  Left atrium: The atrium was mildly dilated. -  Inferior vena cava, hepatic veins: The inferior vena cava was dilated. -  Pericardium: A trivial pericardial effusion was identified. There was no  evidence of hemodynamic compromise. SYSTEM MEASUREMENT TABLES    2D mode  AoR Diam (2D): 3.9 cm  LA Dimension (2D): 3.2 cm  Left Atrium Systolic Volume Index; Method of Disks, Biplane; 2D mode;: 31   ml/m2  IVS/LVPW (2D): 1  IVSd (2D): 1.8 cm  LVIDd (2D): 4.2 cm  LVIDs (2D): 2.9 cm  LVOT Area (2D): 4.2 cm2  LVPWd (2D): 1.9 cm  RVIDd (2D): 3.8 cm    Unspecified Scan Mode  Peak Grad; Mean;  Antegrade Flow: 19 mm[Hg]  Vmax; Antegrade Flow: 219 cm/s  LVOT Diam: 2.3 cm  Peak Grad; Mean; Antegrade Flow: 8 mm[Hg]  Vmax; Antegrade Flow: 141 cm/s    Prepared and signed by    Hal Nielson MD [facility]  Signed 08-Jan-2018 14:17:50           All Micro Results     Procedure Component Value Units Date/Time    CULTURE, BLOOD [[id number]] Collected:  01/21/18 1324    Order Status:  Completed Specimen:  Blood from Blood Updated:  01/24/18 0651     Special Requests: --        LEFT  ARM       Culture result: NO GROWTH 3 DAYS       CULTURE, BLOOD [[id number]] Collected:  01/21/18 1351    Order Status:  Completed Specimen:  Blood from Blood Updated:  01/24/18 0651     Special Requests: --        LEFT  Antecubital       Culture result: NO GROWTH 3 DAYS       C. DIFFICILE/EPI PCR [[id number]]     Order Status:  Canceled Specimen:  Stool     CULTURE, BLOOD [[id number]] Collected:  01/10/18 2150    Order Status:  Completed Specimen:  Blood from Blood Updated:  01/15/18 0623     Special Requests: --        RIGHT  ARM       Culture result: NO GROWTH 5 DAYS       CULTURE, BLOOD [[id number]] Collected:  01/10/18 2150    Order Status:  Completed Specimen:  Blood from Blood Updated:  01/15/18 0623     Special Requests: --        RIGHT  ARM       Culture result: NO GROWTH 5 DAYS       CULTURE, RESPIRATORY/SPUTUM/BRONCH Delong Eagles STAIN [[id number]] Collected:  01/10/18 2115    Order Status:  Completed Specimen:  Sputum from Sputum Updated:  01/13/18 0730     Special Requests: NO SPECIAL REQUESTS        GRAM STAIN FEW GRAM POSITIVE COCCI         WBC'S TOO NUMEROUS TO COUNT      0 TO 1 EPITHELIAL CELLS PER OIF      4+ MUCUS     Culture result:         SCANT NORMAL RESPIRATORY EDUAR    CULTURE, RESPIRATORY/SPUTUM/BRONCH Delong Eagles STAIN [[id number]]  (Abnormal)  (Susceptibility) Collected:  01/07/18 1125    Order Status:  Completed Specimen:  Sputum from Endotracheal aspirate Updated:  01/12/18 0644     Special Requests: NO SPECIAL REQUESTS        Christina Mays STAIN 0 TO 10 WBCS/OIF      NO EPITHELIAL CELLS SEEN         MANY GRAM POSITIVE COCCI                 MODERATE GRAM NEGATIVE RODS      FEW GRAM NEGATIVE COCCI         3+ MUCUS PRESENT        Culture result:         HEAVY STREPTOCOCCUS PNEUMONIAE (A)              HEAVY HAEMOPHILUS INFLUENZAE BETA LACTAMASE NEGATIVE (A)              MODERATE NORMAL RESPIRATORY EDUAR    CULTURE, BLOOD [[id number]] Collected:  01/06/18 1854    Order Status:  Completed Specimen:  Whole Blood from Blood Updated:  01/11/18 0616     Special Requests: --        RIGHT  FOREARM       Culture result: NO GROWTH 5 DAYS       CULTURE, BLOOD [[id number]] Collected:  01/06/18 1854    Order Status:  Completed Specimen:  Whole Blood from Blood Updated:  01/11/18 0616     Special Requests: --        RIGHT  Antecubital       Culture result: NO GROWTH 5 DAYS       CULTURE, BODY FLUID Zuleima Kalin STAIN [[id number]] Collected:  01/09/18 0945    Order Status:  Canceled Specimen:  Miscellaneous Fluid           Labs: Results:       BMP, Mg, Phos Recent Labs      01/23/18 0924 01/22/18   0440   NA  143  145   K  4.1  3.6   CL  101  106   CO2  38*  32   AGAP  4*  7   BUN  17  24*   CREA  1.08  0.98   CA  8.6  8.4   GLU  156*  98   MG  2.1  2.2   PHOS   --   3.9      CBC Recent Labs      01/23/18 0924 01/22/18 1954 01/22/18   0440   WBC  9.0  9.3  12.1*   RBC  4.26  3.90*  4.15*   HGB  13.8  12.7*  13.4*   HCT  44.8  40.8*  43.3   PLT  190  201  209   GRANS   --    --   75   LYMPH   --    --   15   EOS   --    --   2   MONOS   --    --   8   BASOS   --    --   0   IG   --    --   0   ANEU   --    --   9.0*   ABL   --    --   1.9   JAY   --    --   0.2   ABM   --    --   1.0   ABB   --    --   0.0   AIG   --    --   0.0      LFT No results for input(s): SGOT, ALT, TBIL, AP, TP, ALB, GLOB, AGRAT, GPT in the last 72 hours.    Cardiac Testing Lab Results   Component Value Date/Time     01/18/2018 06:14 AM     01/12/2018 04:16 AM     01/07/2018 03:53 AM    CK 30 01/22/2018 04:40 AM    CK 28 01/21/2018 06:20 PM    CK 28 01/21/2018 01:24 PM    CK - MB 2.2 01/22/2018 04:40 AM    CK - MB 1.6 01/21/2018 06:20 PM    CK - MB 1.6 01/21/2018 01:24 PM    CK-MB Index 7.3 01/22/2018 04:40 AM    CK-MB Index 5.7 01/21/2018 06:20 PM    CK-MB Index 5.7 01/21/2018 01:24 PM    Troponin-I, Qt. 0.10 01/22/2018 04:40 AM    Troponin-I, Qt. 0.04 01/21/2018 06:20 PM    Troponin-I, Qt. 0.03 01/21/2018 01:24 PM      Coagulation Tests Lab Results   Component Value Date/Time    Prothrombin time 9.8 05/11/2015 03:30 PM    INR 0.9 05/11/2015 03:30 PM    aPTT 57.3 01/22/2018 08:26 AM    aPTT 42.9 01/21/2018 08:44 PM      A1c No results found for: HBA1C, HGBE8, BHM8QDEW   Lipid Panel No results found for: CHOL, CHOLPOCT, CHOLX, CHLST, CHOLV, 332348, HDL, LDL, LDLC, DLDLP, 186372, VLDLC, VLDL, TGLX, TRIGL, TRIGP, TGLPOCT, CHHD, CHHDX   Thyroid Panel Lab Results   Component Value Date/Time    TSH 1.550 01/21/2018 06:20 PM        Most Recent UA No results found for: COLOR, APPRN, REFSG, SACHA, PROTU, GLUCU, KETU, BILU, BLDU, UROU, CARLITOS, LEUKU     No Known Allergies  Immunization History   Administered Date(s) Administered    TB Skin Test (PPD) Intradermal 01/07/2018       All Labs from Last 24 Hrs:  No results found for this or any previous visit (from the past 24 hour(s)).     Discharge Exam:  Patient Vitals for the past 24 hrs:   Temp Pulse Resp BP SpO2   01/24/18 1420 - - - - 98 %   01/24/18 1326 98.2 °F (36.8 °C) 88 22 125/63 91 %   01/24/18 1223 (!) 53.6 °F (12 °C) - - - -   01/24/18 0932 98.3 °F (36.8 °C) 90 20 160/83 97 %   01/24/18 0740 - - - - 92 %   01/24/18 0531 97.5 °F (36.4 °C) 91 18 143/86 93 %   01/24/18 0039 97.8 °F (36.6 °C) 87 18 (!) 151/91 98 %   01/23/18 2335 - - - - 92 %   01/23/18 2044 97.7 °F (36.5 °C) 88 18 132/85 92 %   01/23/18 1953 - - - - 91 %   01/23/18 1615 97.9 °F (36.6 °C) 89 16 144/81 93 %     Oxygen Therapy  O2 Sat (%): 98 % (weaned to 3l) (01/24/18 1420)  Pulse via Oximetry: 89 beats per minute (01/24/18 1420)  O2 Device: Nasal cannula (01/24/18 1420)  O2 Flow Rate (L/min): 3 l/min (01/24/18 1420)  O2 Temperature: 86.9 °F (30.5 °C) (01/11/18 1521)  FIO2 (%): 40 % (01/23/18 0540)    Intake/Output Summary (Last 24 hours) at 01/24/18 1552  Last data filed at 01/24/18 0556   Gross per 24 hour   Intake              915 ml   Output             1700 ml   Net             -785 ml       General:    Well nourished. Alert. No distress. On 4lit of oxygen nasal cannula  Eyes:   Normal sclera. Extraocular movements intact. ENT:  Normocephalic, atraumatic. Moist mucous membranes  CV:   Regular rate and rhythm. No murmur, rub, or gallop. Lungs:  Clear to auscultation bilaterally. No wheezing, rhonchi, or rales. Abdomen: Soft, nontender, nondistended. Bowel sounds normal. obese  Extremities: Warm and dry. No cyanosis or edema. Neurologic: CN II-XII grossly intact. Sensation intact. Skin:     No rashes or jaundice. Psych:  Normal mood and affect. Discharge Info:   Current Discharge Medication List      START taking these medications    Details   amiodarone (PACERONE) 400 mg tablet Take 1 Tab by mouth two (2) times a day. Qty: 60 Tab, Refills: 0      carvedilol (COREG) 3.125 mg tablet Take 1 Tab by mouth two (2) times daily (with meals). Qty: 60 Tab, Refills: 0      rivaroxaban (XARELTO) 20 mg tab tablet Take 1 Tab by mouth daily (with breakfast). Qty: 30 Tab, Refills: 0      sertraline (ZOLOFT) 50 mg tablet Take 1 Tab by mouth daily. Qty: 15 Tab, Refills: 0      tiotropium (SPIRIVA) 18 mcg inhalation capsule Take 1 Cap by inhalation daily. Qty: 30 Cap, Refills: 0      albuterol-ipratropium (DUO-NEB) 2.5 mg-0.5 mg/3 ml nebu 3 mL by Nebulization route every four (4) hours as needed.   Qty: 30 Nebule, Refills: 0         CONTINUE these medications which have CHANGED    Details   albuterol (PROVENTIL HFA, VENTOLIN HFA, PROAIR HFA) 90 mcg/actuation inhaler Take 2 Puffs by inhalation every four (4) hours as needed for Wheezing. Qty: 1 Inhaler, Refills: 0         STOP taking these medications       celecoxib (CELEBREX) 200 mg capsule Comments:   Reason for Stopping:         levofloxacin (LEVAQUIN) 750 mg tablet Comments:   Reason for Stopping:         HYDROcodone-acetaminophen (NORCO) 5-325 mg per tablet Comments:   Reason for Stopping:                 Disposition: home . Activity: no strenuous work. Diet: DIET CARDIAC Regular    Follow-up Appointments   Procedures    FOLLOW UP VISIT Appointment in: 3 - 5 Days Cardiac diet. Advised against smoking and polysubstance abuse. Advised to keep appointment with free clinic. Advised to be complaint with medication and f/u. tsh in 4 weeks at the free clinic. Cardiac diet. Advised against smoking and polysubstance abuse. Advised to keep appointment with free clinic. Advised to be complaint with medication and f/u.  tsh in 4 weeks at the free clinic. Standing Status:   Standing     Number of Occurrences:   1     Order Specific Question:   Appointment in     Answer:   3 - 5 Days     If any bleeding stop xarelto and call pcp or come back to er. Come back to er if any shortness of breath even on bipap/oxygen/nebs. Advised on alcohol cessation. Follow-up Information     Follow up With Details Comments Contact Info    None   None ([phone]) Patient stated that they have no PCP            Time spent in patient discharge planning and coordination 38 minutes.     Signed:  Shaheen Rosales MD

## 2018-01-24 NOTE — PROGRESS NOTES
Discharge instructions were reviewed with patient. An opportunity was given for questions. All medications were reviewed, and information was given on the new medications. Patient verbalized understanding, and has no questions at this time.      Prescriptions and Vouchers provided to patient by   IVs and heart monitor removed by primary RN

## 2018-01-25 ENCOUNTER — NURSE NAVIGATOR (OUTPATIENT)
Dept: CASE MANAGEMENT | Age: 53
End: 2018-01-25

## 2018-01-26 LAB
BACTERIA SPEC CULT: NORMAL
BACTERIA SPEC CULT: NORMAL
SERVICE CMNT-IMP: NORMAL
SERVICE CMNT-IMP: NORMAL

## 2018-02-01 ENCOUNTER — APPOINTMENT (OUTPATIENT)
Dept: CT IMAGING | Age: 53
DRG: 208 | End: 2018-02-01
Attending: EMERGENCY MEDICINE
Payer: SELF-PAY

## 2018-02-01 ENCOUNTER — HOSPITAL ENCOUNTER (INPATIENT)
Age: 53
LOS: 7 days | Discharge: HOME | DRG: 208 | End: 2018-02-08
Attending: EMERGENCY MEDICINE | Admitting: INTERNAL MEDICINE
Payer: SELF-PAY

## 2018-02-01 ENCOUNTER — APPOINTMENT (OUTPATIENT)
Dept: GENERAL RADIOLOGY | Age: 53
DRG: 208 | End: 2018-02-01
Attending: STUDENT IN AN ORGANIZED HEALTH CARE EDUCATION/TRAINING PROGRAM
Payer: SELF-PAY

## 2018-02-01 DIAGNOSIS — G47.33 OSA (OBSTRUCTIVE SLEEP APNEA): Chronic | ICD-10-CM

## 2018-02-01 DIAGNOSIS — J44.9 CHRONIC OBSTRUCTIVE PULMONARY DISEASE, UNSPECIFIED COPD TYPE (HCC): Chronic | ICD-10-CM

## 2018-02-01 DIAGNOSIS — J96.22 ACUTE ON CHRONIC RESPIRATORY FAILURE WITH HYPOXIA AND HYPERCAPNIA (HCC): Primary | ICD-10-CM

## 2018-02-01 DIAGNOSIS — E66.01 MORBID OBESITY (HCC): ICD-10-CM

## 2018-02-01 DIAGNOSIS — F10.10 ALCOHOL ABUSE: Chronic | ICD-10-CM

## 2018-02-01 DIAGNOSIS — J96.21 ACUTE ON CHRONIC RESPIRATORY FAILURE WITH HYPOXIA AND HYPERCAPNIA (HCC): Primary | ICD-10-CM

## 2018-02-01 DIAGNOSIS — R40.1 STUPOR: ICD-10-CM

## 2018-02-01 DIAGNOSIS — F17.200 NICOTINE DEPENDENCE, UNCOMPLICATED, UNSPECIFIED NICOTINE PRODUCT TYPE: Chronic | ICD-10-CM

## 2018-02-01 DIAGNOSIS — F15.10 METHAMPHETAMINE ABUSE (HCC): Chronic | ICD-10-CM

## 2018-02-01 DIAGNOSIS — R60.1 ANASARCA: ICD-10-CM

## 2018-02-01 DIAGNOSIS — J96.02 ACUTE RESPIRATORY FAILURE WITH HYPERCAPNIA (HCC): ICD-10-CM

## 2018-02-01 PROBLEM — R41.82 ALTERED MENTAL STATUS: Status: ACTIVE | Noted: 2018-02-01

## 2018-02-01 LAB
ALBUMIN SERPL-MCNC: 2.5 G/DL (ref 3.5–5)
ALBUMIN/GLOB SERPL: 0.6 {RATIO} (ref 1.2–3.5)
ALP SERPL-CCNC: 67 U/L (ref 50–136)
ALT SERPL-CCNC: 41 U/L (ref 12–65)
AMORPH CRY URNS QL MICRO: ABNORMAL
AMPHET UR QL SCN: POSITIVE
ANION GAP SERPL CALC-SCNC: 12 MMOL/L (ref 7–16)
APPEARANCE UR: ABNORMAL
ARTERIAL PATENCY WRIST A: POSITIVE
AST SERPL-CCNC: 50 U/L (ref 15–37)
ATRIAL RATE: 88 BPM
BACTERIA URNS QL MICRO: ABNORMAL /HPF
BARBITURATES UR QL SCN: NEGATIVE
BASE DEFICIT BLDA-SCNC: 2.9 MMOL/L (ref 0–2)
BASE DEFICIT BLDA-SCNC: 2.9 MMOL/L (ref 0–2)
BASE EXCESS BLDA CALC-SCNC: 3.9 MMOL/L (ref 0–3)
BASOPHILS # BLD: 0 K/UL (ref 0–0.2)
BASOPHILS NFR BLD: 0 % (ref 0–2)
BDY SITE: ABNORMAL
BENZODIAZ UR QL: POSITIVE
BILIRUB SERPL-MCNC: 0.3 MG/DL (ref 0.2–1.1)
BILIRUB UR QL: NEGATIVE
BNP SERPL-MCNC: 487 PG/ML
BUN SERPL-MCNC: 15 MG/DL (ref 6–23)
CALCIUM SERPL-MCNC: 7.6 MG/DL (ref 8.3–10.4)
CALCULATED P AXIS, ECG09: 126 DEGREES
CALCULATED R AXIS, ECG10: 112 DEGREES
CALCULATED T AXIS, ECG11: 92 DEGREES
CANNABINOIDS UR QL SCN: NEGATIVE
CASTS URNS QL MICRO: ABNORMAL /LPF
CHLORIDE SERPL-SCNC: 107 MMOL/L (ref 98–107)
CO2 SERPL-SCNC: 25 MMOL/L (ref 21–32)
COCAINE UR QL SCN: NEGATIVE
COHGB MFR BLD: 1.2 % (ref 0.5–1.5)
COHGB MFR BLD: 2 % (ref 0.5–1.5)
COHGB MFR BLD: 2 % (ref 0.5–1.5)
COLOR UR: YELLOW
CREAT SERPL-MCNC: 1.58 MG/DL (ref 0.8–1.5)
DIAGNOSIS, 93000: NORMAL
DIFFERENTIAL METHOD BLD: ABNORMAL
DO-HGB BLD-MCNC: 10 % (ref 0–5)
DO-HGB BLD-MCNC: 10 % (ref 0–5)
DO-HGB BLD-MCNC: 2 % (ref 0–5)
EOSINOPHIL # BLD: 0.1 K/UL (ref 0–0.8)
EOSINOPHIL NFR BLD: 1 % (ref 0.5–7.8)
EPI CELLS #/AREA URNS HPF: ABNORMAL /HPF
ERYTHROCYTE [DISTWIDTH] IN BLOOD BY AUTOMATED COUNT: 14.1 % (ref 11.9–14.6)
FIO2 ON VENT: 40 %
FIO2 ON VENT: 40 %
FIO2 ON VENT: 65 %
GLOBULIN SER CALC-MCNC: 4.1 G/DL (ref 2.3–3.5)
GLUCOSE SERPL-MCNC: 212 MG/DL (ref 65–100)
GLUCOSE UR STRIP.AUTO-MCNC: NEGATIVE MG/DL
HCO3 BLDA-SCNC: 28 MMOL/L (ref 22–26)
HCO3 BLDA-SCNC: 28 MMOL/L (ref 22–26)
HCO3 BLDA-SCNC: 31 MMOL/L (ref 22–26)
HCT VFR BLD AUTO: 45.1 % (ref 41.1–50.3)
HGB BLD-MCNC: 13.8 G/DL (ref 13.6–17.2)
HGB BLDMV-MCNC: 12.3 GM/DL (ref 11.7–15)
HGB BLDMV-MCNC: 13.2 GM/DL (ref 11.7–15)
HGB BLDMV-MCNC: 13.2 GM/DL (ref 11.7–15)
HGB UR QL STRIP: NEGATIVE
IMM GRANULOCYTES # BLD: 0.1 K/UL (ref 0–0.5)
IMM GRANULOCYTES NFR BLD AUTO: 1 % (ref 0–5)
KETONES UR QL STRIP.AUTO: NEGATIVE MG/DL
LACTATE BLD-SCNC: 5.6 MMOL/L (ref 0.5–1.9)
LEUKOCYTE ESTERASE UR QL STRIP.AUTO: NEGATIVE
LYMPHOCYTES # BLD: 1.4 K/UL (ref 0.5–4.6)
LYMPHOCYTES NFR BLD: 13 % (ref 13–44)
MCH RBC QN AUTO: 32.9 PG (ref 26.1–32.9)
MCHC RBC AUTO-ENTMCNC: 30.6 G/DL (ref 31.4–35)
MCV RBC AUTO: 107.4 FL (ref 79.6–97.8)
METHADONE UR QL: NEGATIVE
METHGB MFR BLD: 0.1 % (ref 0–1.5)
METHGB MFR BLD: 0.2 % (ref 0–1.5)
METHGB MFR BLD: 0.2 % (ref 0–1.5)
MONOCYTES # BLD: 0.5 K/UL (ref 0.1–1.3)
MONOCYTES NFR BLD: 4 % (ref 4–12)
MUCOUS THREADS URNS QL MICRO: ABNORMAL /LPF
NEUTS SEG # BLD: 8.8 K/UL (ref 1.7–8.2)
NEUTS SEG NFR BLD: 81 % (ref 43–78)
NITRITE UR QL STRIP.AUTO: NEGATIVE
OPIATES UR QL: POSITIVE
OXYHGB MFR BLDA: 87.4 % (ref 94–97)
OXYHGB MFR BLDA: 87.4 % (ref 94–97)
OXYHGB MFR BLDA: 96.9 % (ref 94–97)
P-R INTERVAL, ECG05: 196 MS
PCO2 BLDA: 56 MMHG (ref 35–45)
PCO2 BLDA: 80 MMHG (ref 35–45)
PCO2 BLDA: 80 MMHG (ref 35–45)
PCP UR QL: NEGATIVE
PEEP RESPIRATORY: 18 CM[H2O]
PEEP RESPIRATORY: 8 CM[H2O]
PEEP RESPIRATORY: 8 CM[H2O]
PH BLDA: 7.16 [PH] (ref 7.35–7.45)
PH BLDA: 7.16 [PH] (ref 7.35–7.45)
PH BLDA: 7.35 [PH] (ref 7.35–7.45)
PH UR STRIP: 5.5 [PH] (ref 5–9)
PLATELET # BLD AUTO: 178 K/UL (ref 150–450)
PMV BLD AUTO: 9.9 FL (ref 10.8–14.1)
PO2 BLDA: 137 MMHG (ref 80–105)
PO2 BLDA: 69 MMHG (ref 80–105)
PO2 BLDA: 69 MMHG (ref 80–105)
POTASSIUM SERPL-SCNC: 4.7 MMOL/L (ref 3.5–5.1)
PROT SERPL-MCNC: 6.6 G/DL (ref 6.3–8.2)
PROT UR STRIP-MCNC: NEGATIVE MG/DL
Q-T INTERVAL, ECG07: 380 MS
QRS DURATION, ECG06: 92 MS
QTC CALCULATION (BEZET), ECG08: 459 MS
RBC # BLD AUTO: 4.2 M/UL (ref 4.23–5.67)
RBC #/AREA URNS HPF: ABNORMAL /HPF
RESP RATE: 15
RESP RATE: 15
RESP RATE: 18
SAO2 % BLD: 89 % (ref 92–98.5)
SAO2 % BLD: 89 % (ref 92–98.5)
SAO2 % BLD: 98 % (ref 92–98.5)
SERVICE CMNT-IMP: ABNORMAL
SODIUM SERPL-SCNC: 144 MMOL/L (ref 136–145)
SP GR UR REFRACTOMETRY: 1.02 (ref 1–1.02)
TROPONIN I BLD-MCNC: 0.02 NG/ML (ref 0.02–0.05)
UROBILINOGEN UR QL STRIP.AUTO: 0.2 EU/DL (ref 0.2–1)
VENTILATION MODE VENT: ABNORMAL
VENTRICULAR RATE, ECG03: 88 BPM
VT SETTING VENT: 450 ML
VT SETTING VENT: 500 ML
WBC # BLD AUTO: 10.7 K/UL (ref 4.3–11.1)
WBC URNS QL MICRO: >100 /HPF

## 2018-02-01 PROCEDURE — 83880 ASSAY OF NATRIURETIC PEPTIDE: CPT | Performed by: STUDENT IN AN ORGANIZED HEALTH CARE EDUCATION/TRAINING PROGRAM

## 2018-02-01 PROCEDURE — 94002 VENT MGMT INPAT INIT DAY: CPT

## 2018-02-01 PROCEDURE — 85025 COMPLETE CBC W/AUTO DIFF WBC: CPT | Performed by: STUDENT IN AN ORGANIZED HEALTH CARE EDUCATION/TRAINING PROGRAM

## 2018-02-01 PROCEDURE — 84484 ASSAY OF TROPONIN QUANT: CPT

## 2018-02-01 PROCEDURE — 5A1945Z RESPIRATORY VENTILATION, 24-96 CONSECUTIVE HOURS: ICD-10-PCS | Performed by: INTERNAL MEDICINE

## 2018-02-01 PROCEDURE — 74011250636 HC RX REV CODE- 250/636: Performed by: EMERGENCY MEDICINE

## 2018-02-01 PROCEDURE — 96375 TX/PRO/DX INJ NEW DRUG ADDON: CPT | Performed by: EMERGENCY MEDICINE

## 2018-02-01 PROCEDURE — 87186 SC STD MICRODIL/AGAR DIL: CPT | Performed by: INTERNAL MEDICINE

## 2018-02-01 PROCEDURE — 70450 CT HEAD/BRAIN W/O DYE: CPT

## 2018-02-01 PROCEDURE — 82803 BLOOD GASES ANY COMBINATION: CPT

## 2018-02-01 PROCEDURE — 81003 URINALYSIS AUTO W/O SCOPE: CPT | Performed by: EMERGENCY MEDICINE

## 2018-02-01 PROCEDURE — 93005 ELECTROCARDIOGRAM TRACING: CPT | Performed by: STUDENT IN AN ORGANIZED HEALTH CARE EDUCATION/TRAINING PROGRAM

## 2018-02-01 PROCEDURE — 74011250636 HC RX REV CODE- 250/636

## 2018-02-01 PROCEDURE — 80307 DRUG TEST PRSMV CHEM ANLYZR: CPT | Performed by: EMERGENCY MEDICINE

## 2018-02-01 PROCEDURE — 99285 EMERGENCY DEPT VISIT HI MDM: CPT | Performed by: EMERGENCY MEDICINE

## 2018-02-01 PROCEDURE — 87086 URINE CULTURE/COLONY COUNT: CPT | Performed by: INTERNAL MEDICINE

## 2018-02-01 PROCEDURE — 87088 URINE BACTERIA CULTURE: CPT | Performed by: INTERNAL MEDICINE

## 2018-02-01 PROCEDURE — 65610000006 HC RM INTENSIVE CARE

## 2018-02-01 PROCEDURE — 83605 ASSAY OF LACTIC ACID: CPT

## 2018-02-01 PROCEDURE — 80053 COMPREHEN METABOLIC PANEL: CPT | Performed by: STUDENT IN AN ORGANIZED HEALTH CARE EDUCATION/TRAINING PROGRAM

## 2018-02-01 PROCEDURE — 71045 X-RAY EXAM CHEST 1 VIEW: CPT

## 2018-02-01 PROCEDURE — 96366 THER/PROPH/DIAG IV INF ADDON: CPT | Performed by: EMERGENCY MEDICINE

## 2018-02-01 PROCEDURE — 99223 1ST HOSP IP/OBS HIGH 75: CPT | Performed by: INTERNAL MEDICINE

## 2018-02-01 PROCEDURE — 36600 WITHDRAWAL OF ARTERIAL BLOOD: CPT

## 2018-02-01 PROCEDURE — 96365 THER/PROPH/DIAG IV INF INIT: CPT | Performed by: EMERGENCY MEDICINE

## 2018-02-01 RX ORDER — MIDAZOLAM HYDROCHLORIDE 1 MG/ML
INJECTION, SOLUTION INTRAMUSCULAR; INTRAVENOUS
Status: COMPLETED
Start: 2018-02-01 | End: 2018-02-01

## 2018-02-01 RX ORDER — PROPOFOL 10 MG/ML
INJECTION, EMULSION INTRAVENOUS
Status: COMPLETED
Start: 2018-02-01 | End: 2018-02-01

## 2018-02-01 RX ORDER — MIDAZOLAM HYDROCHLORIDE 1 MG/ML
5 INJECTION, SOLUTION INTRAMUSCULAR; INTRAVENOUS ONCE
Status: COMPLETED | OUTPATIENT
Start: 2018-02-01 | End: 2018-02-01

## 2018-02-01 RX ORDER — PROPOFOL 10 MG/ML
0-50 INJECTION, EMULSION INTRAVENOUS
Status: COMPLETED | OUTPATIENT
Start: 2018-02-01 | End: 2018-02-01

## 2018-02-01 RX ORDER — FENTANYL CITRATE 50 UG/ML
100 INJECTION, SOLUTION INTRAMUSCULAR; INTRAVENOUS
Status: COMPLETED | OUTPATIENT
Start: 2018-02-01 | End: 2018-02-01

## 2018-02-01 RX ADMIN — FENTANYL CITRATE 100 MCG: 50 INJECTION INTRAMUSCULAR; INTRAVENOUS at 19:29

## 2018-02-01 RX ADMIN — MIDAZOLAM 5 MG: 1 INJECTION INTRAMUSCULAR; INTRAVENOUS at 19:15

## 2018-02-01 RX ADMIN — MIDAZOLAM HYDROCHLORIDE 5 MG: 1 INJECTION, SOLUTION INTRAMUSCULAR; INTRAVENOUS at 19:15

## 2018-02-01 RX ADMIN — SODIUM CHLORIDE 1000 ML: 900 INJECTION, SOLUTION INTRAVENOUS at 22:26

## 2018-02-01 RX ADMIN — PROPOFOL 15 MCG/KG/MIN: 10 INJECTION, EMULSION INTRAVENOUS at 19:26

## 2018-02-01 NOTE — IP AVS SNAPSHOT
Summary of Care Report The Summary of Care report has been created to help improve care coordination. Users with access to [employer] or [address] (Web-based application) may access additional patient information including the Discharge Summary. If you are not currently a [address] user and need more information, please call the number listed below in the [place] [address] section and ask to be connected with Medical Records. Facility Information Name Address Phone [phone] [address] [address] [phone] Patient Information Patient Name Sex  Yuly Barahona ([id number]) Male 1965 Discharge Information Admitting Provider Service Area Unit Dez Lind MD / [address] [facility] / [phone] Discharge Provider Discharge Date/Time Discharge Disposition Destination (none) 2018 Midday (Pending) AHR (none) Patient Language Language ENGLISH [13] Hospital Problems as of 2018  Reviewed: 2018  7:50 AM by Bossman Kidd NP Class Noted - Resolved Last Modified POA Active Problems COPD (chronic obstructive pulmonary disease) ([facility] (Chronic)  2018 - Present 2018 by Bossman Kidd NP Yes Entered by Rudy Snyder MD  
  Nicotine dependence (Chronic)  2018 - Present 2018 by Bossman Kidd NP Yes Entered by Rudy Snyder MD  
  Methamphetamine abuse (Chronic)  2018 - Present 2018 by Bossman Kidd NP Yes Entered by Rudy Snyder MD  
  NATALIIA (obstructive sleep apnea) (Chronic)  2018 - Present 2018 by Bossman Kidd NP Yes Entered by Rudy Snyder MD  
  Acute on chronic respiratory failure with hypoxia and hypercapnia ([facility] [address]  2018 - Present 2018 by Bossman Kidd NP Yes   Entered by Mahesh Pulliam MD  
 Alcohol abuse (Chronic)  2/1/2018 - Present 2/6/2018 by Ashanti Alarcon, NP Yes Entered by Micah Gamble MD  
  Altered mental status  2/1/2018 - Present 2/1/2018 by Elie Essex, MD Yes Entered by Elie Essex, MD  
  * (Principal)Acute respiratory failure with hypercapnia ([address]  2/1/2018 - Present 2/2/2018 by Michael Garg, NP Yes Entered by Elie Essex, MD  
  Morbid obesity [facility]) (Chronic)  2/2/2018 - Present 2/6/2018 by Ashanti Alarcon, NP Yes Entered by Patrick Calhoun MD  
  H/O atrial flutter (Chronic)  2/6/2018 - Present 2/6/2018 by Ashanti Alarcon, NP Yes Entered by Ashanti Alarcon NP Overview Signed 2/6/2018  7:51 AM by Ashanti Alarcon NP  
   1/2018 Atrial flutter, s/p cardioversion. Non-Hospital Problems as of 2/8/2018  Reviewed: 2/8/2018  7:50 AM by Ashanti Alarcon NP Class Noted - Resolved Last Modified Active Problems Anasarca  1/6/2018 - Present 1/6/2018 by Bella Richard MD  
  Entered by Bella Richard MD  
  Urethral stricture  1/7/2018 - Present 1/7/2018 by Popeye Ellington MD  
  Entered by Starla Azevedo MD  
  Homeless (Chronic)  1/15/2018 - Present 1/23/2018 by Michael Garg NP Entered by Micah Gamble MD  
  
You are allergic to the following No active allergies Current Discharge Medication List  
  
CONTINUE these medications which have NOT CHANGED Dose & Instructions Dispensing Information Comments  
 albuterol 90 mcg/actuation inhaler Commonly known as:  PROVENTIL HFA, VENTOLIN HFA, PROAIR HFA Dose:  2 Puff Take 2 Puffs by inhalation every four (4) hours as needed for Wheezing. Quantity:  1 Inhaler Refills:  0  
   
 albuterol-ipratropium 2.5 mg-0.5 mg/3 ml Nebu Commonly known as:  Sonido Lobstein Dose:  3 mL  
3 mL by Nebulization route every four (4) hours as needed. Quantity:  30 Nebule Refills:  0  
   
 amiodarone 400 mg tablet Commonly known as:  Jc Gonzalez  
 Dose:  400 mg Take 1 Tab by mouth two (2) times a day. Quantity:  60 Tab Refills:  0  
   
 carvedilol 3.125 mg tablet Commonly known as:  Daryle Rubins Dose:  3.125 mg Take 1 Tab by mouth two (2) times daily (with meals). Quantity:  60 Tab Refills:  0  
   
 rivaroxaban 20 mg Tab tablet Commonly known as:  Juaquin Bessie Dose:  20 mg Take 1 Tab by mouth daily (with breakfast). Quantity:  30 Tab Refills:  0  
   
 sertraline 50 mg tablet Commonly known as:  ZOLOFT Dose:  50 mg Take 1 Tab by mouth daily. Quantity:  15 Tab Refills:  0  
   
 tiotropium 18 mcg inhalation capsule Commonly known as:  Darliss Cassis Dose:  1 Cap Take 1 Cap by inhalation daily. Quantity:  30 Cap Refills:  0 Current Immunizations Name Date Influenza Vaccine (Quad) PF 1/24/2018 TB Skin Test (PPD) Intradermal 1/7/2018 Follow-up Information Follow up With Details Comments Contact Info [address] [place] [address] On 2/27/2018 1:30 PM Jacques Gonzales [phone] 
[phone] Discharge Instructions Chronic Obstructive Pulmonary Disease (COPD): Care Instructions Your Care Instructions Chronic obstructive pulmonary disease (COPD) is a general term for a group of lung diseases, including emphysema and chronic bronchitis. People with COPD have decreased airflow in and out of the lungs, which makes it hard to breathe. The airways also can get clogged with thick mucus. Cigarette smoking is a major cause of COPD. Although there is no cure for COPD, you can slow its progress. Following your treatment plan and taking care of yourself can help you feel better and live longer. Follow-up care is a key part of your treatment and safety. Be sure to make and go to all appointments, and call your doctor if you are having problems. It's also a good idea to know your test results and keep a list of the medicines you take. How can you care for yourself at home? ?Staying healthy ? · Do not smoke. This is the most important step you can take to prevent more damage to your lungs. If you need help quitting, talk to your doctor about stop-smoking programs and medicines. These can increase your chances of quitting for good. ? · Avoid colds and flu. Get a pneumococcal vaccine shot. If you have had one before, ask your doctor whether you need a second dose. Get the flu vaccine every fall. If you must be around people with colds or the flu, wash your hands often. ? · Avoid secondhand smoke, air pollution, and high altitudes. Also avoid cold, dry air and hot, humid air. Stay at home with your windows closed when air pollution is bad. ?Medicines and oxygen therapy ? · Take your medicines exactly as prescribed. Call your doctor if you think you are having a problem with your medicine. ? · You may be taking medicines such as: ¨ Bronchodilators. These help open your airways and make breathing easier. Bronchodilators are either short-acting (work for 6 to 9 hours) or long-acting (work for 24 hours). You inhale most bronchodilators, so they start to act quickly. Always carry your quick-relief inhaler with you in case you need it while you are away from home. ¨ Corticosteroids (prednisone, budesonide). These reduce airway inflammation. They come in pill or inhaled form. You must take these medicines every day for them to work well. ? · A spacer may help you get more inhaled medicine to your lungs. Ask your doctor or pharmacist if a spacer is right for you. If it is, ask how to use it properly. ? · Do not take any vitamins, over-the-counter medicine, or herbal products without talking to your doctor first.  
? · If your doctor prescribed antibiotics, take them as directed. Do not stop taking them just because you feel better. You need to take the full course of antibiotics. ? · Oxygen therapy boosts the amount of oxygen in your blood and helps you breathe easier. Use the flow rate your doctor has recommended, and do not change it without talking to your doctor first.  
Activity ? · Get regular exercise. Walking is an easy way to get exercise. Start out slowly, and walk a little more each day. ? · Pay attention to your breathing. You are exercising too hard if you cannot talk while you are exercising. ? · Take short rest breaks when doing household chores and other activities. ? · Learn breathing methods-such as breathing through pursed lips-to help you become less short of breath. ? · If your doctor has not set you up with a pulmonary rehabilitation program, talk to him or her about whether rehab is right for you. Rehab includes exercise programs, education about your disease and how to manage it, help with diet and other changes, and emotional support. Diet ? · Eat regular, healthy meals. Use bronchodilators about 1 hour before you eat to make it easier to eat. Eat several small meals instead of three large ones. Drink beverages at the end of the meal. Avoid foods that are hard to chew. ? · Eat foods that contain protein so that you do not lose muscle mass. ? · Talk with your doctor if you gain too much weight or if you lose weight without trying. ?Mental health ? · Talk to your family, friends, or a therapist about your feelings. It is normal to feel frightened, angry, hopeless, helpless, and even guilty. Talking openly about bad feelings can help you cope. If these feelings last, talk to your doctor. When should you call for help? Call 911 anytime you think you may need emergency care. For example, call if: 
? · You have severe trouble breathing. ?Call your doctor now or seek immediate medical care if: 
? · You have new or worse trouble breathing. ? · You cough up blood. ? · You have a fever. ?Watch closely for changes in your health, and be sure to contact your doctor if: 
? · You cough more deeply or more often, especially if you notice more mucus or a change in the color of your mucus. ? · You have new or worse swelling in your legs or belly. ? · You are not getting better as expected. Where can you learn more? Go to http://regina-jacy.info/. Marinell Habermann in the search box to learn more about \"Chronic Obstructive Pulmonary Disease (COPD): Care Instructions. \" Current as of: May 12, 2017 Content Version: 11.4 © 8769-3995 [employer]. Care instructions adapted under license by [employer] (which disclaims liability or warranty for this information). If you have questions about a medical condition or this instruction, always ask your healthcare professional. [address] any warranty or liability for your use of this information. DISCHARGE SUMMARY from Nurse PATIENT INSTRUCTIONS: 
 
After general anesthesia or intravenous sedation, for 24 hours or while taking prescription Narcotics: · Limit your activities · Do not drive and operate hazardous machinery · Do not make important personal or business decisions · Do  not drink alcoholic beverages · If you have not urinated within 8 hours after discharge, please contact your surgeon on call. Report the following to your surgeon: 
· Excessive pain, swelling, redness or odor of or around the surgical area · Temperature over 100.5 · Nausea and vomiting lasting longer than 4 hours or if unable to take medications · Any signs of decreased circulation or nerve impairment to extremity: change in color, persistent  numbness, tingling, coldness or increase pain · Any questions What to do at Home: *  Please give a list of your current medications to your Primary Care Provider.  
 
*  Please update this list whenever your medications are discontinued, doses are 
 changed, or new medications (including over-the-counter products) are added. *  Please carry medication information at all times in case of emergency situations. These are general instructions for a healthy lifestyle: No smoking/ No tobacco products/ Avoid exposure to second hand smoke Surgeon General's Warning:  Quitting smoking now greatly reduces serious risk to your health. Obesity, smoking, and sedentary lifestyle greatly increases your risk for illness A healthy diet, regular physical exercise & weight monitoring are important for maintaining a healthy lifestyle You may be retaining fluid if you have a history of heart failure or if you experience any of the following symptoms:  Weight gain of 3 pounds or more overnight or 5 pounds in a week, increased swelling in our hands or feet or shortness of breath while lying flat in bed. Please call your doctor as soon as you notice any of these symptoms; do not wait until your next office visit. Recognize signs and symptoms of STROKE: 
 
F-face looks uneven A-arms unable to move or move unevenly S-speech slurred or non-existent T-time-call 911 as soon as signs and symptoms begin-DO NOT go Back to bed or wait to see if you get better-TIME IS BRAIN. Warning Signs of HEART ATTACK Call 911 if you have these symptoms: 
? Chest discomfort. Most heart attacks involve discomfort in the center of the chest that lasts more than a few minutes, or that goes away and comes back. It can feel like uncomfortable pressure, squeezing, fullness, or pain. ? Discomfort in other areas of the upper body. Symptoms can include pain or discomfort in one or both arms, the back, neck, jaw, or stomach. ? Shortness of breath with or without chest discomfort. ? Other signs may include breaking out in a cold sweat, nausea, or lightheadedness. Don't wait more than five minutes to call [address]!  Fast action can save your life. Calling 911 is almost always the fastest way to get lifesaving treatment. Emergency Medical Services staff can begin treatment when they arrive  up to an hour sooner than if someone gets to the hospital by car. The discharge information has been reviewed with the patient. The patient verbalized understanding. Discharge medications reviewed with the patient and appropriate educational materials and side effects teaching were provided. ___________________________________________________________________________________________________________________________________ Chart Review Routing History Recipient Method Report Sent By Talha Morales MD  
Fax: [phone] Phone: [phone] Fax Notes Report Augusto Tee [[id number]] 3/21/2017 12:05 PM 3/11/2017

## 2018-02-01 NOTE — IP AVS SNAPSHOT
[address] 992 322 [facility] 
[phone] Patient: Freya High MRN: [id number] :1965 About your hospitalization You were admitted on:  2018 You last received care in the:  [facility] You were discharged on:  2018 Why you were hospitalized Your primary diagnosis was:  Acute Respiratory Failure With Hypercapnia (Hcc) Your diagnoses also included:  Copd (Chronic Obstructive Pulmonary Disease) (Hcc), Methamphetamine Abuse, Nicotine Dependence, Alcohol Abuse, Sebastián (Obstructive Sleep Apnea), Acute On Chronic Respiratory Failure With Hypoxia And Hypercapnia (Hcc), Altered Mental Status, Morbid Obesity (Hcc), H/O Atrial Flutter Follow-up Information Follow up With Details Comments Contact Info [address] [place] [address] On 2018 1:30 PM Jacques Gonzales [phone] 
[phone] Discharge Orders None A check mag indicates which time of day the medication should be taken. My Medications CONTINUE taking these medications Instructions Each Dose to Equal  
 Morning Noon Evening Bedtime  
 albuterol 90 mcg/actuation inhaler Commonly known as:  PROVENTIL HFA, VENTOLIN HFA, PROAIR HFA Take 2 Puffs by inhalation every four (4) hours as needed for Wheezing. 2 Puff  
    
   
   
   
  
 albuterol-ipratropium 2.5 mg-0.5 mg/3 ml Nebu Commonly known as:  DUO-NEB  
   
 3 mL by Nebulization route every four (4) hours as needed. 3 mL  
    
   
   
   
  
 amiodarone 400 mg tablet Commonly known as:  Eustaquio Goltz Your next dose is: This evening Take 1 Tab by mouth two (2) times a day. 400 mg  
    
  
   
   
  
   
  
 carvedilol 3.125 mg tablet Commonly known as:  Brenda Solano Your next dose is: This evening Take 1 Tab by mouth two (2) times daily (with meals).   
 3.125 mg  
    
  
   
   
 rivaroxaban 20 mg Tab tablet Commonly known as:  Harriet Partha Your next dose is:  Tomorrow Morning Take 1 Tab by mouth daily (with breakfast). 20 mg  
    
  
   
   
   
  
 sertraline 50 mg tablet Commonly known as:  ZOLOFT Your next dose is:  Tomorrow Morning Take 1 Tab by mouth daily. 50 mg  
    
  
   
   
   
  
 tiotropium 18 mcg inhalation capsule Commonly known as:  Tedkatja Cipriano Your next dose is:  Tomorrow Morning Take 1 Cap by inhalation daily. 1 Cap Discharge Instructions Chronic Obstructive Pulmonary Disease (COPD): Care Instructions Your Care Instructions Chronic obstructive pulmonary disease (COPD) is a general term for a group of lung diseases, including emphysema and chronic bronchitis. People with COPD have decreased airflow in and out of the lungs, which makes it hard to breathe. The airways also can get clogged with thick mucus. Cigarette smoking is a major cause of COPD. Although there is no cure for COPD, you can slow its progress. Following your treatment plan and taking care of yourself can help you feel better and live longer. Follow-up care is a key part of your treatment and safety. Be sure to make and go to all appointments, and call your doctor if you are having problems. It's also a good idea to know your test results and keep a list of the medicines you take. How can you care for yourself at home? ?Staying healthy ? · Do not smoke. This is the most important step you can take to prevent more damage to your lungs. If you need help quitting, talk to your doctor about stop-smoking programs and medicines. These can increase your chances of quitting for good. ? · Avoid colds and flu. Get a pneumococcal vaccine shot. If you have had one before, ask your doctor whether you need a second dose. Get the flu vaccine every fall.  If you must be around people with colds or the flu, wash your hands often. ? · Avoid secondhand smoke, air pollution, and high altitudes. Also avoid cold, dry air and hot, humid air. Stay at home with your windows closed when air pollution is bad. ?Medicines and oxygen therapy ? · Take your medicines exactly as prescribed. Call your doctor if you think you are having a problem with your medicine. ? · You may be taking medicines such as: ¨ Bronchodilators. These help open your airways and make breathing easier. Bronchodilators are either short-acting (work for 6 to 9 hours) or long-acting (work for 24 hours). You inhale most bronchodilators, so they start to act quickly. Always carry your quick-relief inhaler with you in case you need it while you are away from home. ¨ Corticosteroids (prednisone, budesonide). These reduce airway inflammation. They come in pill or inhaled form. You must take these medicines every day for them to work well. ? · A spacer may help you get more inhaled medicine to your lungs. Ask your doctor or pharmacist if a spacer is right for you. If it is, ask how to use it properly. ? · Do not take any vitamins, over-the-counter medicine, or herbal products without talking to your doctor first.  
? · If your doctor prescribed antibiotics, take them as directed. Do not stop taking them just because you feel better. You need to take the full course of antibiotics. ? · Oxygen therapy boosts the amount of oxygen in your blood and helps you breathe easier. Use the flow rate your doctor has recommended, and do not change it without talking to your doctor first.  
Activity ? · Get regular exercise. Walking is an easy way to get exercise. Start out slowly, and walk a little more each day. ? · Pay attention to your breathing. You are exercising too hard if you cannot talk while you are exercising. ? · Take short rest breaks when doing household chores and other activities. ? · Learn breathing methods-such as breathing through pursed lips-to help you become less short of breath. ? · If your doctor has not set you up with a pulmonary rehabilitation program, talk to him or her about whether rehab is right for you. Rehab includes exercise programs, education about your disease and how to manage it, help with diet and other changes, and emotional support. Diet ? · Eat regular, healthy meals. Use bronchodilators about 1 hour before you eat to make it easier to eat. Eat several small meals instead of three large ones. Drink beverages at the end of the meal. Avoid foods that are hard to chew. ? · Eat foods that contain protein so that you do not lose muscle mass. ? · Talk with your doctor if you gain too much weight or if you lose weight without trying. ?Mental health ? · Talk to your family, friends, or a therapist about your feelings. It is normal to feel frightened, angry, hopeless, helpless, and even guilty. Talking openly about bad feelings can help you cope. If these feelings last, talk to your doctor. When should you call for help? Call 911 anytime you think you may need emergency care. For example, call if: 
? · You have severe trouble breathing. ?Call your doctor now or seek immediate medical care if: 
? · You have new or worse trouble breathing. ? · You cough up blood. ? · You have a fever. ? Watch closely for changes in your health, and be sure to contact your doctor if: 
? · You cough more deeply or more often, especially if you notice more mucus or a change in the color of your mucus. ? · You have new or worse swelling in your legs or belly. ? · You are not getting better as expected. Where can you learn more? Go to http://regina-jacy.info/. Evette Head in the search box to learn more about \"Chronic Obstructive Pulmonary Disease (COPD): Care Instructions. \" Current as of: May 12, 2017 Content Version: 11.4 © 4978-7744 Healthwise, [employer]. Care instructions adapted under license by [employer] (which disclaims liability or warranty for this information). If you have questions about a medical condition or this instruction, always ask your healthcare professional. [address] any warranty or liability for your use of this information. DISCHARGE SUMMARY from Nurse PATIENT INSTRUCTIONS: 
 
After general anesthesia or intravenous sedation, for 24 hours or while taking prescription Narcotics: · Limit your activities · Do not drive and operate hazardous machinery · Do not make important personal or business decisions · Do  not drink alcoholic beverages · If you have not urinated within 8 hours after discharge, please contact your surgeon on call. Report the following to your surgeon: 
· Excessive pain, swelling, redness or odor of or around the surgical area · Temperature over 100.5 · Nausea and vomiting lasting longer than 4 hours or if unable to take medications · Any signs of decreased circulation or nerve impairment to extremity: change in color, persistent  numbness, tingling, coldness or increase pain · Any questions What to do at Home: *  Please give a list of your current medications to your Primary Care Provider. *  Please update this list whenever your medications are discontinued, doses are 
    changed, or new medications (including over-the-counter products) are added. *  Please carry medication information at all times in case of emergency situations. These are general instructions for a healthy lifestyle: No smoking/ No tobacco products/ Avoid exposure to second hand smoke Surgeon General's Warning:  Quitting smoking now greatly reduces serious risk to your health. Obesity, smoking, and sedentary lifestyle greatly increases your risk for illness A healthy diet, regular physical exercise & weight monitoring are important for maintaining a healthy lifestyle You may be retaining fluid if you have a history of heart failure or if you experience any of the following symptoms:  Weight gain of 3 pounds or more overnight or 5 pounds in a week, increased swelling in our hands or feet or shortness of breath while lying flat in bed. Please call your doctor as soon as you notice any of these symptoms; do not wait until your next office visit. Recognize signs and symptoms of STROKE: 
 
F-face looks uneven A-arms unable to move or move unevenly S-speech slurred or non-existent T-time-call 911 as soon as signs and symptoms begin-DO NOT go Back to bed or wait to see if you get better-TIME IS BRAIN. Warning Signs of HEART ATTACK Call 911 if you have these symptoms: 
? Chest discomfort. Most heart attacks involve discomfort in the center of the chest that lasts more than a few minutes, or that goes away and comes back. It can feel like uncomfortable pressure, squeezing, fullness, or pain. ? Discomfort in other areas of the upper body. Symptoms can include pain or discomfort in one or both arms, the back, neck, jaw, or stomach. ? Shortness of breath with or without chest discomfort. ? Other signs may include breaking out in a cold sweat, nausea, or lightheadedness. Don't wait more than five minutes to call [address]! Fast action can save your life. Calling 911 is almost always the fastest way to get lifesaving treatment. Emergency Medical Services staff can begin treatment when they arrive  up to an hour sooner than if someone gets to the hospital by car. The discharge information has been reviewed with the patient. The patient verbalized understanding. Discharge medications reviewed with the patient and appropriate educational materials and side effects teaching were provided.  
___________________________________________________________________________ ________________________________________________________ [employer] Announcement We are excited to announce that we are making your provider's discharge notes available to you in [employer]. You will see these notes when they are completed and signed by the physician that discharged you from your recent hospital stay. If you have any questions or concerns about any information you see in [employer], please call the Health Information Department where you were seen or reach out to your Primary Care Provider for more information about your plan of care. Introducing [facility]! George Jung introduces [employer] patient portal. Now you can access parts of your medical record, email your doctor's office, and request medication refills online. 1. In your internet browser, go to [email or web address] 2. Click on the First Time User? Click Here link in the Sign In box. You will see the New Member Sign Up page. 3. Enter your [employer] Access Code exactly as it appears below. You will not need to use this code after youve completed the sign-up process. If you do not sign up before the expiration date, you must request a new code. · [employer] Access Code: [email or web address] Expires: 4/6/2018  6:31 PM 
 
4. Enter the last four digits of your Social Security Number (xxxx) and Date of Birth (mm/dd/yyyy) as indicated and click Submit. You will be taken to the next sign-up page. 5. Create a [employer] ID. This will be your [employer] login ID and cannot be changed, so think of one that is secure and easy to remember. 6. Create a [employer] password. You can change your password at any time. 7. Enter your Password Reset Question and Answer. This can be used at a later time if you forget your password. 8. Enter your e-mail address. You will receive e-mail notification when new information is available in [address] 9. Click Sign Up. You can now view and download portions of your medical record. 10. Click the Download Summary menu link to download a portable copy of your medical information. If you have questions, please visit the Frequently Asked Questions section of the [employer] website. Remember, [employer] is NOT to be used for urgent needs. For medical emergencies, dial 911. Now available from your iPhone and Android! Providers Seen During Your Hospitalization Provider Specialty Primary office phone Kristina Cohn MD Emergency Medicine [phone] Viktor Morton MD Pulmonary Disease [phone] Your Primary Care Physician (PCP) Primary Care Physician Office Phone Office Fax NONE ** None ** ** None ** You are allergic to the following No active allergies Recent Documentation Height Weight BMI Smoking Status 1.727 m 129.3 kg 43.34 kg/m2 Current Every Day Smoker Emergency Contacts Name Discharge Info Relation Home Work Mobile Ailyn Schaffer  Spouse [3] [phone] Patient Belongings The following personal items are in your possession at time of discharge: 
  Dental Appliances: None         Home Medications: None   Jewelry: None  Clothing: None    Other Valuables: None Please provide this summary of care documentation to your next provider. Signatures-by signing, you are acknowledging that this After Visit Summary has been reviewed with you and you have received a copy. Patient Signature:  ____________________________________________________________ Date:  ____________________________________________________________  
  
Hai Banks Provider Signature:  ____________________________________________________________ Date:  ____________________________________________________________

## 2018-02-02 ENCOUNTER — APPOINTMENT (OUTPATIENT)
Dept: GENERAL RADIOLOGY | Age: 53
DRG: 208 | End: 2018-02-02
Attending: INTERNAL MEDICINE
Payer: SELF-PAY

## 2018-02-02 PROBLEM — E66.01 MORBID OBESITY (HCC): Status: ACTIVE | Noted: 2018-02-02

## 2018-02-02 LAB
ALBUMIN SERPL-MCNC: 2.5 G/DL (ref 3.5–5)
ALBUMIN/GLOB SERPL: 0.7 {RATIO} (ref 1.2–3.5)
ALP SERPL-CCNC: 69 U/L (ref 50–136)
ALT SERPL-CCNC: 51 U/L (ref 12–65)
ANION GAP SERPL CALC-SCNC: 16 MMOL/L (ref 7–16)
ARTERIAL PATENCY WRIST A: POSITIVE
AST SERPL-CCNC: 65 U/L (ref 15–37)
BASE EXCESS BLDA CALC-SCNC: 4.4 MMOL/L (ref 0–3)
BDY SITE: ABNORMAL
BILIRUB SERPL-MCNC: 0.4 MG/DL (ref 0.2–1.1)
BUN SERPL-MCNC: 18 MG/DL (ref 6–23)
CALCIUM SERPL-MCNC: 8.5 MG/DL (ref 8.3–10.4)
CHLORIDE SERPL-SCNC: 105 MMOL/L (ref 98–107)
CO2 SERPL-SCNC: 23 MMOL/L (ref 21–32)
COHGB MFR BLD: 1.9 % (ref 0.5–1.5)
CREAT SERPL-MCNC: 1.48 MG/DL (ref 0.8–1.5)
DIFFERENTIAL METHOD BLD: ABNORMAL
DO-HGB BLD-MCNC: 2 % (ref 0–5)
ERYTHROCYTE [DISTWIDTH] IN BLOOD BY AUTOMATED COUNT: 13.9 % (ref 11.9–14.6)
FIO2 ON VENT: 40 %
GLOBULIN SER CALC-MCNC: 3.8 G/DL (ref 2.3–3.5)
GLUCOSE BLD STRIP.AUTO-MCNC: 72 MG/DL (ref 65–100)
GLUCOSE BLD STRIP.AUTO-MCNC: 74 MG/DL (ref 65–100)
GLUCOSE SERPL-MCNC: 59 MG/DL (ref 65–100)
HCO3 BLDA-SCNC: 30 MMOL/L (ref 22–26)
HCT VFR BLD AUTO: 38.2 % (ref 41.1–50.3)
HGB BLD-MCNC: 12.1 G/DL (ref 13.6–17.2)
HGB BLDMV-MCNC: 11.8 GM/DL (ref 11.7–15)
LACTATE SERPL-SCNC: 1.6 MMOL/L (ref 0.4–2)
LYMPHOCYTES # BLD: 1.3 K/UL (ref 0.5–4.6)
LYMPHOCYTES NFR BLD MANUAL: 13 % (ref 16–44)
MCH RBC QN AUTO: 33.2 PG (ref 26.1–32.9)
MCHC RBC AUTO-ENTMCNC: 31.7 G/DL (ref 31.4–35)
MCV RBC AUTO: 104.7 FL (ref 79.6–97.8)
METHGB MFR BLD: 0.3 % (ref 0–1.5)
MONOCYTES # BLD: 0.9 K/UL (ref 0.1–1.3)
MONOCYTES NFR BLD MANUAL: 9 % (ref 3–9)
NEUTS SEG # BLD: 7.5 K/UL (ref 1.7–8.2)
NEUTS SEG NFR BLD MANUAL: 78 % (ref 47–75)
OXYHGB MFR BLDA: 95.5 % (ref 94–97)
PCO2 BLDA: 47 MMHG (ref 35–45)
PEEP RESPIRATORY: 8 CM[H2O]
PH BLDA: 7.42 [PH] (ref 7.35–7.45)
PLATELET # BLD AUTO: 148 K/UL (ref 150–450)
PLATELET COMMENTS,PCOM: ADEQUATE
PMV BLD AUTO: 10.7 FL (ref 10.8–14.1)
PO2 BLDA: 92 MMHG (ref 80–105)
POTASSIUM SERPL-SCNC: 5 MMOL/L (ref 3.5–5.1)
PROCALCITONIN SERPL-MCNC: 0.4 NG/ML
PROT SERPL-MCNC: 6.3 G/DL (ref 6.3–8.2)
RBC # BLD AUTO: 3.65 M/UL (ref 4.23–5.67)
RBC MORPH BLD: ABNORMAL
RESP RATE: 18
SAO2 % BLD: 98 % (ref 92–98.5)
SODIUM SERPL-SCNC: 144 MMOL/L (ref 136–145)
VENTILATION MODE VENT: ABNORMAL
VT SETTING VENT: 500 ML
WBC # BLD AUTO: 9.7 K/UL (ref 4.3–11.1)
WBC MORPH BLD: ABNORMAL

## 2018-02-02 PROCEDURE — 74011250637 HC RX REV CODE- 250/637: Performed by: INTERNAL MEDICINE

## 2018-02-02 PROCEDURE — 77030004950 HC CATH ENTRL NG COVD -A

## 2018-02-02 PROCEDURE — 80053 COMPREHEN METABOLIC PANEL: CPT | Performed by: INTERNAL MEDICINE

## 2018-02-02 PROCEDURE — 77030032490 HC SLV COMPR SCD KNE COVD -B

## 2018-02-02 PROCEDURE — 87040 BLOOD CULTURE FOR BACTERIA: CPT | Performed by: INTERNAL MEDICINE

## 2018-02-02 PROCEDURE — 82962 GLUCOSE BLOOD TEST: CPT

## 2018-02-02 PROCEDURE — 94760 N-INVAS EAR/PLS OXIMETRY 1: CPT

## 2018-02-02 PROCEDURE — 87070 CULTURE OTHR SPECIMN AEROBIC: CPT | Performed by: INTERNAL MEDICINE

## 2018-02-02 PROCEDURE — 36415 COLL VENOUS BLD VENIPUNCTURE: CPT | Performed by: INTERNAL MEDICINE

## 2018-02-02 PROCEDURE — 77030019605

## 2018-02-02 PROCEDURE — 83605 ASSAY OF LACTIC ACID: CPT | Performed by: INTERNAL MEDICINE

## 2018-02-02 PROCEDURE — 94003 VENT MGMT INPAT SUBQ DAY: CPT

## 2018-02-02 PROCEDURE — 74018 RADEX ABDOMEN 1 VIEW: CPT

## 2018-02-02 PROCEDURE — 94640 AIRWAY INHALATION TREATMENT: CPT

## 2018-02-02 PROCEDURE — 74011250636 HC RX REV CODE- 250/636: Performed by: INTERNAL MEDICINE

## 2018-02-02 PROCEDURE — 85025 COMPLETE CBC W/AUTO DIFF WBC: CPT | Performed by: INTERNAL MEDICINE

## 2018-02-02 PROCEDURE — 84145 PROCALCITONIN (PCT): CPT | Performed by: INTERNAL MEDICINE

## 2018-02-02 PROCEDURE — 65610000001 HC ROOM ICU GENERAL

## 2018-02-02 PROCEDURE — 74011000250 HC RX REV CODE- 250: Performed by: INTERNAL MEDICINE

## 2018-02-02 PROCEDURE — 99233 SBSQ HOSP IP/OBS HIGH 50: CPT | Performed by: INTERNAL MEDICINE

## 2018-02-02 PROCEDURE — 82803 BLOOD GASES ANY COMBINATION: CPT

## 2018-02-02 PROCEDURE — 36600 WITHDRAWAL OF ARTERIAL BLOOD: CPT

## 2018-02-02 RX ORDER — IPRATROPIUM BROMIDE AND ALBUTEROL SULFATE 2.5; .5 MG/3ML; MG/3ML
3 SOLUTION RESPIRATORY (INHALATION)
Status: DISCONTINUED | OUTPATIENT
Start: 2018-02-02 | End: 2018-02-08 | Stop reason: HOSPADM

## 2018-02-02 RX ORDER — DEXTROSE 50 % IN WATER (D50W) INTRAVENOUS SYRINGE
Status: DISCONTINUED
Start: 2018-02-02 | End: 2018-02-02

## 2018-02-02 RX ORDER — OXYCODONE HYDROCHLORIDE 5 MG/1
5 TABLET ORAL
Status: DISCONTINUED | OUTPATIENT
Start: 2018-02-02 | End: 2018-02-07

## 2018-02-02 RX ORDER — CARVEDILOL 3.12 MG/1
3.12 TABLET ORAL 2 TIMES DAILY WITH MEALS
Status: DISCONTINUED | OUTPATIENT
Start: 2018-02-02 | End: 2018-02-08 | Stop reason: HOSPADM

## 2018-02-02 RX ORDER — PROPOFOL 10 MG/ML
0-50 VIAL (ML) INTRAVENOUS
Status: DISCONTINUED | OUTPATIENT
Start: 2018-02-02 | End: 2018-02-05

## 2018-02-02 RX ORDER — SODIUM CHLORIDE 0.9 % (FLUSH) 0.9 %
5-10 SYRINGE (ML) INJECTION AS NEEDED
Status: DISCONTINUED | OUTPATIENT
Start: 2018-02-02 | End: 2018-02-08 | Stop reason: HOSPADM

## 2018-02-02 RX ORDER — SODIUM CHLORIDE 0.9 % (FLUSH) 0.9 %
5-10 SYRINGE (ML) INJECTION EVERY 8 HOURS
Status: DISCONTINUED | OUTPATIENT
Start: 2018-02-02 | End: 2018-02-08 | Stop reason: HOSPADM

## 2018-02-02 RX ORDER — CHLORHEXIDINE GLUCONATE 1.2 MG/ML
15 RINSE ORAL EVERY 12 HOURS
Status: DISCONTINUED | OUTPATIENT
Start: 2018-02-02 | End: 2018-02-08 | Stop reason: HOSPADM

## 2018-02-02 RX ORDER — AMIODARONE HYDROCHLORIDE 200 MG/1
400 TABLET ORAL 2 TIMES DAILY
Status: DISCONTINUED | OUTPATIENT
Start: 2018-02-02 | End: 2018-02-08 | Stop reason: HOSPADM

## 2018-02-02 RX ADMIN — PROPOFOL 20 MCG/KG/MIN: 10 INJECTION, EMULSION INTRAVENOUS at 00:25

## 2018-02-02 RX ADMIN — IPRATROPIUM BROMIDE AND ALBUTEROL SULFATE 3 ML: .5; 3 SOLUTION RESPIRATORY (INHALATION) at 23:15

## 2018-02-02 RX ADMIN — IPRATROPIUM BROMIDE AND ALBUTEROL SULFATE 3 ML: .5; 3 SOLUTION RESPIRATORY (INHALATION) at 12:08

## 2018-02-02 RX ADMIN — IPRATROPIUM BROMIDE AND ALBUTEROL SULFATE 3 ML: .5; 3 SOLUTION RESPIRATORY (INHALATION) at 03:36

## 2018-02-02 RX ADMIN — CHLORHEXIDINE GLUCONATE 15 ML: 1.2 RINSE ORAL at 21:00

## 2018-02-02 RX ADMIN — CARVEDILOL 3.12 MG: 3.12 TABLET, FILM COATED ORAL at 17:03

## 2018-02-02 RX ADMIN — PROPOFOL 10 MCG/KG/MIN: 10 INJECTION, EMULSION INTRAVENOUS at 07:17

## 2018-02-02 RX ADMIN — OXYCODONE HYDROCHLORIDE 5 MG: 5 TABLET ORAL at 23:08

## 2018-02-02 RX ADMIN — Medication 10 ML: at 06:00

## 2018-02-02 RX ADMIN — IPRATROPIUM BROMIDE AND ALBUTEROL SULFATE 3 ML: .5; 3 SOLUTION RESPIRATORY (INHALATION) at 00:53

## 2018-02-02 RX ADMIN — IPRATROPIUM BROMIDE AND ALBUTEROL SULFATE 3 ML: .5; 3 SOLUTION RESPIRATORY (INHALATION) at 15:11

## 2018-02-02 RX ADMIN — AMIODARONE HYDROCHLORIDE 400 MG: 200 TABLET ORAL at 17:03

## 2018-02-02 RX ADMIN — Medication 10 ML: at 14:24

## 2018-02-02 RX ADMIN — PROPOFOL 10 MCG/KG/MIN: 10 INJECTION, EMULSION INTRAVENOUS at 19:35

## 2018-02-02 RX ADMIN — Medication 10 ML: at 00:26

## 2018-02-02 RX ADMIN — IPRATROPIUM BROMIDE AND ALBUTEROL SULFATE 3 ML: .5; 3 SOLUTION RESPIRATORY (INHALATION) at 19:12

## 2018-02-02 RX ADMIN — Medication 10 ML: at 23:08

## 2018-02-02 RX ADMIN — IPRATROPIUM BROMIDE AND ALBUTEROL SULFATE 3 ML: .5; 3 SOLUTION RESPIRATORY (INHALATION) at 07:32

## 2018-02-02 NOTE — PROGRESS NOTES
Shift assessment complete. Chart reviewed. Patient is alert. Oriented to self and place. Intubated (R nare) follows commands, writes words. PERRLA 2mm. NSR on monitor. Chest expansion symmetrical. Lung sounds clear but diminshed on auscultation. Vent settings per RT. Pt on propofol at 15 mcg. Bowel sounds hypoactive. Galindo Catheter in place, draining. VSS, NAD.

## 2018-02-02 NOTE — PROGRESS NOTES
Patient was intubated with a number 7.0 ET Tube. Tube placement verified by auscultation. ET Tube is secured at the hub of nostril at the nose and on the right side. Patient was intubated by EMS. Breath sounds are coarse. Patient is Negative for subcutaneous air and chest excursion is symmetric. Trachea is midline. Patient is also Negative for cyanosis. Patient placed on ventilator on documented settings. All alarms are set and audible. Resuscitation bag is at the head of the bed.       Ventilator Settings  Mode FIO2 Rate Tidal Volume Pressure PEEP I:E Ratio     PRVC  40   15   450      8        Peak airway pressure:     Minute ventilation:       ABG:   Recent Labs      02/01/18   2145  02/01/18   1930  02/01/18   1330   PH  7.35  7.16*  7.16*   PCO2  56*  80*  80*   PO2  137*  69*  69*   HCO3  31*  28*  28*

## 2018-02-02 NOTE — PROGRESS NOTES
TRANSFER - IN REPORT:    Verbal report received from [address] on Radha Barajas  being received from ED(unit) for routine progression of care      Report consisted of patients Situation, Background, Assessment and   Recommendations(SBAR). Information from the following report(s) SBAR, Kardex, ED Summary, Procedure Summary, Intake/Output, MAR, Accordion, Recent Results, Med Rec Status, Cardiac Rhythm NSR and Alarm Parameters  was reviewed with the receiving nurse. Opportunity for questions and clarification was provided. Assessment completed upon patients arrival to unit and care assumed.

## 2018-02-02 NOTE — PROGRESS NOTES
Care Daily Progress Note: 2/2/2018  Admission Date: 2/1/2018     The patient's chart is reviewed and the patient is discussed with the staff. Subjective:    45 y.o.  male presents with altered mental status.     Patient is known with polysubstance abuse, NATALIIA and COPD who was recently in the hospital requiring reintubation coming in with altered mental status. There are no family to give nay history but as per staff patient was doing well half an hour before his family came to reassess him and was found on the floor unresponsive. EMS came and had to nasally intubate him. There is no clear reason why he was intubated nasally. Patient was recommended CPAP but he is non compliant and refused to use it due to the noise. Also a trach was recommended and refused  He has been on vent over night and is more responsive       Current Facility-Administered Medications   Medication Dose Route Frequency    amiodarone (CORDARONE) tablet 400 mg  400 mg Oral BID    carvedilol (COREG) tablet 3.125 mg  3.125 mg Oral BID WITH MEALS    rivaroxaban (XARELTO) tablet 20 mg  20 mg Oral DAILY WITH BREAKFAST    sodium chloride (NS) flush 5-10 mL  5-10 mL IntraVENous Q8H    sodium chloride (NS) flush 5-10 mL  5-10 mL IntraVENous PRN    albuterol-ipratropium (DUO-NEB) 2.5 MG-0.5 MG/3 ML  3 mL Nebulization Q4H RT    propofol (DIPRIVAN) infusion  0-50 mcg/kg/min IntraVENous TITRATE    dextrose (D50W) 50% injection syrg           Review of Systems   Unobtainable due to patient status. Objective:     Vitals:    02/02/18 0702 02/02/18 0725 02/02/18 0732 02/02/18 0802   BP: 146/90  126/75 140/81   Pulse: 80 79 80 80   Resp: 16 19 21 16   Temp:       SpO2: 97% 94% 94% 96%   Weight:       Height:           Intake and Output:   01/31 1901 - 02/02 0700  In: -   Out: 225 [Urine:225]       Physical Exam:          Constitutional:  intubated and mechanically ventilated.   EENMT:  Sclera clear, pupils equal, oral mucosa moist  Respiratory: clear  Cardiovascular:  RRR with no M,G,R;  Gastrointestinal:  soft with no tenderness; positive bowel sounds present  Musculoskeletal:  warm with no cyanosis, no lower leg edema  Skin:  no jaundice or ecchymosis  Neurologic: no gross neuro deficits     Psychiatric:  Lethargic but does open eyes     LINES:  ETT, peripheral    DRIPS:  none    CXR:  None this am    Ventilator Settings  Mode FIO2 Rate Tidal Volume Pressure PEEP   PRVC  40 %    500 ml     8 cm H20      Peak airway pressure: 23 cm H2O   Minute ventilation: 9.4 l/min     ABG:   Recent Labs      02/02/18   0310  02/01/18   2145  02/01/18   1930   PH  7.42  7.35  7.16*   PCO2  47*  56*  80*   PO2  92  137*  69*   HCO3  30*  31*  28*        LAB  Recent Labs      02/02/18   0739  02/02/18   0620   GLUCPOC  74  72     Recent Labs      02/02/18   0323  02/01/18   1919   WBC  9.7  10.7   HGB  12.1*  13.8   HCT  38.2*  45.1   PLT  148*  178     Recent Labs      02/02/18   0323  02/01/18 1919   NA  144  144   K  5.0  4.7   CL  105  107   CO2  23  25   GLU  59*  212*   BUN  18  15   CREA  1.48  1.58*   CA  8.5  7.6*   ALB  2.5*  2.5*   SGOT  65*  50*     No results for input(s): LCAD, LAC in the last 72 hours.       Assessment:  (Medical Decision Making)     Hospital Problems  Date Reviewed: 1/19/2018          Codes Class Noted POA    Altered mental status ICD-10-CM: R41.82  ICD-9-CM: 780.97  2/1/2018 Yes        Acute respiratory failure with hypercapnia (HCC) ICD-10-CM: J96.02  ICD-9-CM: 518.81  2/1/2018 Unknown    Vent support    Alcohol abuse (Chronic) ICD-10-CM: F10.10  ICD-9-CM: 305.00  1/15/2018 Yes        COPD (chronic obstructive pulmonary disease) (HCC) (Chronic) ICD-10-CM: J44.9  ICD-9-CM: 211  1/6/2018 Yes    No wheezing    Nicotine dependence (Chronic) ICD-10-CM: F17.200  ICD-9-CM: 305.1  1/6/2018 Yes        Methamphetamine abuse (Chronic) ICD-10-CM: F15.10  ICD-9-CM: 305.70  1/6/2018 Yes        NATALIIA (obstructive sleep apnea) (Chronic) ICD-10-CM: R30.80  ICD-9-CM: 327.23  1/6/2018 Yes        Acute on chronic respiratory failure with hypoxia and hypercapnia (HCC) ICD-10-CM: J96.21, J96.22  ICD-9-CM: 518.84, 786.09, 799.02  1/6/2018 Yes    Probably due to narcotic meds          Plan:  (Medical Decision Making)   1    Wean vent  2    Extubate later today  --    More than 50% of the time documented was spent in face-to-face contact with the patient and in the care of the patient on the floor/unit where the patient is located.     Hollie Gonzalez MD

## 2018-02-02 NOTE — ED NOTES
Patient to CT in stretcher with respiratory and [address], RN at this time. Cardiac monitoring and cycling vitals in place.

## 2018-02-02 NOTE — ED NOTES
Propofol rate change to 20mcg/kg at this time. Unable to chart in [facility] due to lack of \"rate change\". Cardiac monitoring and cycling vitals in place. Will continue to monitor.

## 2018-02-02 NOTE — PROGRESS NOTES
Pt arrived from ED via bed. Pt placed on monitor,  NSR on monitor, /94 with HR 72, oral temp 97.4. Pt not interactive, no command following but able to withdraw from pain. Pt nasally intubated on right nare, O2 saturation 98% on PRVC at 40% FiO2, chest expansion symmetrical, BBS coarse, able to suction thin clear secretions via inline tube. Abdomen is intact and obese, bowel sounds active. Galindo catheter in place, urine is yellow, cloudy and hazy. All extremities are weak and cool to touch, pulses present. Skin assessment complete- no breakdown noted, sacrum is intact with no redness present. Feet and hands are covered in dark grease, attempted to remove. No family present with patient, will continue to monitor closely.     Lines:   #18 Right AC  #20 Right upper arm  #18 left AC    Drains:  Galindo    Drips:  Propofol @ 20 mcg/kg/min

## 2018-02-02 NOTE — ED NOTES
Propofol rate change to 30mcg/kg at this time. Unable to chart in [facility] due to lack of \"rate change\". Cardiac monitoring and cycling vitals in place. Will continue to monitor.

## 2018-02-02 NOTE — ED PROVIDER NOTES
HPI Comments: 45year old gentleman who presents after being found down. Family says they last saw him approximately 30 minutes earlier. When he came back he was on the floor not really responsive and not breathing well. EMS was called and on their arrival they found a pulse and a pressure with the patient's respiratory effort was very poor. They gave him some IM Narcan and established an IV and gave him some IV Narcan failed to improve his respiratory or mental status. They've been nasally intubated him and transported him to us. Review of his old records reveals that he was discharged from our facility within the past couple of weeks after an admission for respiratory failure and being intubated. CPAP was recommended for him but he refused. Placing a trach was also discussed with him but he refused that. The chart also reveals a history of polysubstance abuse including methamphetamines and alcohol. Patient is a 45 y.o. male presenting with respiratory distress syndrome. The history is provided by the patient. Respiratory Distress          Past Medical History:   Diagnosis Date    Sleep disorder        No past surgical history on file. No family history on file. Social History     Social History    Marital status:      Spouse name: N/A    Number of children: N/A    Years of education: N/A     Occupational History    Not on file. Social History Main Topics    Smoking status: Current Every Day Smoker     Packs/day: 2.00    Smokeless tobacco: Not on file    Alcohol use No      Comment: once a month beer    Drug use: Yes     Special: Marijuana, Methamphetamines    Sexual activity: Not on file     Other Topics Concern    Not on file     Social History Narrative         ALLERGIES: Review of patient's allergies indicates no known allergies.     Review of Systems   Unable to perform ROS: Acuity of condition       Vitals:    02/01/18 1912 02/01/18 1917 02/01/18 1922 02/01/18 1923   BP: 151/73 149/66 151/76    Pulse: 72 88 88    Resp:  13 15    SpO2: (!) 68% 94% 92%    Weight:    131.1 kg (289 lb)   Height:    5' 8\" (1.727 m)            Physical Exam   Constitutional: He appears well-developed and well-nourished. HENT:   Head: Normocephalic and atraumatic. Eyes: Right eye exhibits no discharge. Left eye exhibits no discharge. Neck: No tracheal deviation present. No thyromegaly present. Cardiovascular: Normal rate, regular rhythm and normal heart sounds. Pulmonary/Chest:   mminimal spontaneous respiratory effort   Abdominal:   Large abdomen   Musculoskeletal: He exhibits edema. He exhibits no deformity. 2+ edema in both legs   Skin:   Ecchymoses on his abdomen from previous Lovenox injections   Nursing note and vitals reviewed. MDM  Number of Diagnoses or Management Options  Diagnosis management comments: Patient had minimal spontaneous respiratory effort. Therefore we placed him on a ventilator and I did give him sedation with Versed, fentanyl, and propofol. We will get a head CT scan to look for a head bleed  Since he is on xarelto. This may be another episode of hypoxic or hypercapnic respiratory failure, previous stroke, could be a head bleed. His EKG did not show evidence of an MI. Patient's lactic acid is elevated at 5.6. However, I do not think this represents severe sepsis. I think it is from prolonged hypoxia.    ===================================================================  This patient is critically ill and there is a high probability of of imminent or life threatening deterioration in the patient's condition without immediate management.     The nature of the patient's clinical problem is: respiratory failure    I have spent 45 minutes in direct patient care, documentation, review of labs/xrays/old records, discussion with intensivist .     The time involved in the performance of separately reportable procedures was not counted toward critical care time.      Justin Parham MD; 2/1/2018 @8:11 PM  ===================================================================            ED Course       Procedures

## 2018-02-02 NOTE — CDMP QUERY
Please clarify if this patient has MORBID OBESITY with Height 5' 8\", Weight 296 lbs, and BMI 45.     Thank you,  Darrick Smith RN, [address] [place] [address] Documentation Management Program  [phone]

## 2018-02-02 NOTE — ED NOTES
Propofol has been titrated down to 20mcg/kg at this time. Unable to chart due to unavailability of \"rate change\" option in [facility].

## 2018-02-02 NOTE — H&P
HISTORY AND PHYSICAL      Arnulfo Bobo    2/1/2018    Date of Admission:  2/1/2018    The patient's chart is reviewed and the patient is discussed with the staff. Subjective:     Patient is a 45 y.o.  male presents with altered mental status. Patient is known with polysubstance abuse, NATALIIA and COPD who was recently in the hospital requiring reintubation coming in with altered mental status. There are no family to give nay history but as per staff patient was doing well half an hour before his family came to reassess him and was found on the floor unresponsive. EMS came and had to nasally intubate him. There is no clear reason why he was intubated nasally. Patient was recommended CPAP but he is non compliant and refused to use it due to the noise. Also a trach was recommended and refused. Patient received narcan twice on the field with no response. Review of Systems  Review of systems not obtained due to patient factors. Patient Active Problem List   Diagnosis Code    COPD (chronic obstructive pulmonary disease) ([facility]) J44.9    Nicotine dependence F17.200    Methamphetamine abuse F15.10    Anasarca R60.1    NATALIIA (obstructive sleep apnea) G47.33    Acute on chronic respiratory failure with hypoxia and hypercapnia ([facility]) J96.21, J96.22    Urethral stricture N35.9    Alcohol abuse F10.10    Homeless Z59.0    Altered mental status R41.82    Acute respiratory failure with hypercapnia ([facility]) J96.02       Prior to Admission Medications   Prescriptions Last Dose Informant Patient Reported? Taking? albuterol (PROVENTIL HFA, VENTOLIN HFA, PROAIR HFA) 90 mcg/actuation inhaler   No No   Sig: Take 2 Puffs by inhalation every four (4) hours as needed for Wheezing. albuterol-ipratropium (DUO-NEB) 2.5 mg-0.5 mg/3 ml nebu   No No   Sig: 3 mL by Nebulization route every four (4) hours as needed.    amiodarone (PACERONE) 400 mg tablet   No No   Sig: Take 1 Tab by mouth two (2) times a day. carvedilol (COREG) 3.125 mg tablet   No No   Sig: Take 1 Tab by mouth two (2) times daily (with meals). rivaroxaban (XARELTO) 20 mg tab tablet   No No   Sig: Take 1 Tab by mouth daily (with breakfast). sertraline (ZOLOFT) 50 mg tablet   No No   Sig: Take 1 Tab by mouth daily. tiotropium (SPIRIVA) 18 mcg inhalation capsule   No No   Sig: Take 1 Cap by inhalation daily. Facility-Administered Medications: None       Past Medical History:   Diagnosis Date    Sleep disorder      No past surgical history on file. Social History     Social History    Marital status:      Spouse name: N/A    Number of children: N/A    Years of education: N/A     Occupational History    Not on file. Social History Main Topics    Smoking status: Current Every Day Smoker     Packs/day: 2.00    Smokeless tobacco: Not on file    Alcohol use No      Comment: once a month beer    Drug use: Yes     Special: Marijuana, Methamphetamines    Sexual activity: Not on file     Other Topics Concern    Not on file     Social History Narrative     No family history on file. No Known Allergies    No current facility-administered medications for this encounter. Current Outpatient Prescriptions   Medication Sig    amiodarone (PACERONE) 400 mg tablet Take 1 Tab by mouth two (2) times a day.  carvedilol (COREG) 3.125 mg tablet Take 1 Tab by mouth two (2) times daily (with meals).  rivaroxaban (XARELTO) 20 mg tab tablet Take 1 Tab by mouth daily (with breakfast).  sertraline (ZOLOFT) 50 mg tablet Take 1 Tab by mouth daily.  tiotropium (SPIRIVA) 18 mcg inhalation capsule Take 1 Cap by inhalation daily.  albuterol (PROVENTIL HFA, VENTOLIN HFA, PROAIR HFA) 90 mcg/actuation inhaler Take 2 Puffs by inhalation every four (4) hours as needed for Wheezing.  albuterol-ipratropium (DUO-NEB) 2.5 mg-0.5 mg/3 ml nebu 3 mL by Nebulization route every four (4) hours as needed.            Objective: Vitals:    02/01/18 2057 02/01/18 2102 02/01/18 2107 02/01/18 2112   BP: 128/76 128/78 130/79 132/78   Pulse: 69 68 69 68   Resp: 16 23 17 18   SpO2: 100% 100% 100% 97%   Weight:       Height:         Nasally intubated on mechanical ventilation     PHYSICAL EXAM     Constitutional:  the patient is well developed and in no acute distress, nasally intubated   EENMT:  Sclera clear, pupils equal, oral mucosa moist  Respiratory: clear equal air sounds bilaterally   Cardiovascular:  RRR without M,G,R  Gastrointestinal: soft and non-tender; with positive bowel sounds. Musculoskeletal: warm without cyanosis. There is mild lower leg edema. Skin:  no jaundice or rashes, no wounds   Neurologic: patient is sedated not responsive  Psychiatric:  Sedated not responsive     CXR:1.  ENDOTRACHEAL TUBE TIP AT THE MID TRACHEA WITHOUT DEFINITE PNEUMOTHORAX.     2. STABLE BORDERLINE CARDIOMEGALY WITH NO ACUTE CARDIOPULMONARY DISEASE  IDENTIFIED. Recent Labs      02/01/18 1919   WBC  10.7   HGB  13.8   HCT  45.1   PLT  178     Recent Labs      02/01/18 1919   NA  144   K  4.7   CL  107   GLU  212*   CO2  25   BUN  15   CREA  1.58*   CA  7.6*   ALB  2.5*   TBILI  0.3   ALT  41   SGOT  50*     Recent Labs      02/01/18   1930  02/01/18   1330   PH  7.16*  7.16*   PCO2  80*  80*   PO2  69*  69*   HCO3  28*  28*     No results for input(s): LCAD, LAC in the last 72 hours. Assessment:  (Medical Decision Making)     Patient is coming with altered mental status secondary to hypercapnic respiratory failure requiring mechanical ventilation and suspicion for substance abuse. Patient is known with COPD and NATALIIA.      Hospital Problems  Date Reviewed: 1/19/2018          Codes Class Noted POA    Altered mental status ICD-10-CM: R41.82  ICD-9-CM: 780.97  2/1/2018 Yes        Acute respiratory failure with hypercapnia ([facility] ICD-10-CM: J96.02  ICD-9-CM: 518.81  2/1/2018 Unknown        Alcohol abuse (Chronic) ICD-10-CM: F10.10  ICD-9-CM: 305.00  1/15/2018 Yes        COPD (chronic obstructive pulmonary disease) ([facility]) (Chronic) ICD-10-CM: J44.9  ICD-9-CM: 496  1/6/2018 Yes        Nicotine dependence (Chronic) ICD-10-CM: E55.987  ICD-9-CM: 305.1  1/6/2018 Yes        Methamphetamine abuse (Chronic) ICD-10-CM: F15.10  ICD-9-CM: 305.70  1/6/2018 Yes        NATALIIA (obstructive sleep apnea) (Chronic) ICD-10-CM: S27.05  ICD-9-CM: 327.23  1/6/2018 Yes        Acute on chronic respiratory failure with hypoxia and hypercapnia ([facility]) ICD-10-CM: J96.21, J96.22  ICD-9-CM: 518.84, 786.09, 799.02  1/6/2018 Yes              Plan:  (Medical Decision Making)     --Will admit to ICU  for further medical management  -- mechanical ventilation  -- sedate with propofol   --Respiratory nebulizer treatments  --sputum culture  --IV steroid therapy  --no indication for antibiotic therapy  --reassess mental status  -- follow drug screen \  -- repeat ABg     More than 50% of the time documented was spent in face-to-face contact with the patient and in the care of the patient on the floor/unit where the patient is located.     Hermelindo Valenzuela MD

## 2018-02-02 NOTE — ED TRIAGE NOTES
Pt arrived to ED via [facility] nasally intubated. EMS states PT was fine then 30 mins later was unable to breath.

## 2018-02-02 NOTE — ED NOTES
Propofol rate change to 25mcg/kg at this time. Unable to chart in [facility] due to lack of \"rate change\". Cardiac monitoring and cycling vitals in place. Will continue to monitor.

## 2018-02-02 NOTE — PROGRESS NOTES
Bedside shift change report given to Yocasta Bello (oncoming nurse) by Domenic Fan (offgoing nurse). Report included the following information SBAR, Kardex, ED Summary, Procedure Summary, Intake/Output, MAR, Recent Results, Med Rec Status and Cardiac Rhythm NSR. Skin assessed, patient turned.

## 2018-02-02 NOTE — INTERDISCIPLINARY ROUNDS
Interdisciplinary team rounds were held 2/2/2018 with the following team members:Care Management, Nursing, Nurse Practitioner, Nutrition, Outcomes Management, Pharmacy, Physical Therapy, Physician, Respiratory Therapy, Speech Therapy, Clinical Coordinator. Plan of care discussed. See clinical pathway and/or care plan for interventions and desired outcomes.

## 2018-02-03 LAB
ANION GAP SERPL CALC-SCNC: 10 MMOL/L (ref 7–16)
ARTERIAL PATENCY WRIST A: POSITIVE
ARTERIAL PATENCY WRIST A: POSITIVE
BASE EXCESS BLDA CALC-SCNC: 0.5 MMOL/L (ref 0–3)
BASE EXCESS BLDA CALC-SCNC: 5 MMOL/L (ref 0–3)
BDY SITE: ABNORMAL
BDY SITE: ABNORMAL
BUN SERPL-MCNC: 16 MG/DL (ref 6–23)
CALCIUM SERPL-MCNC: 8.5 MG/DL (ref 8.3–10.4)
CHLORIDE SERPL-SCNC: 105 MMOL/L (ref 98–107)
CO2 SERPL-SCNC: 30 MMOL/L (ref 21–32)
COHGB MFR BLD: 0.9 % (ref 0.5–1.5)
COHGB MFR BLD: 1.1 % (ref 0.5–1.5)
CREAT SERPL-MCNC: 1.46 MG/DL (ref 0.8–1.5)
DO-HGB BLD-MCNC: 3 % (ref 0–5)
DO-HGB BLD-MCNC: 6 % (ref 0–5)
ERYTHROCYTE [DISTWIDTH] IN BLOOD BY AUTOMATED COUNT: 14.1 % (ref 11.9–14.6)
FIO2 ON VENT: 40 %
GLUCOSE SERPL-MCNC: 74 MG/DL (ref 65–100)
HCO3 BLDA-SCNC: 27 MMOL/L (ref 22–26)
HCO3 BLDA-SCNC: 34 MMOL/L (ref 22–26)
HCT VFR BLD AUTO: 36.8 % (ref 41.1–50.3)
HGB BLD-MCNC: 11.3 G/DL (ref 13.6–17.2)
HGB BLDMV-MCNC: 11.8 GM/DL (ref 11.7–15)
HGB BLDMV-MCNC: 12.5 GM/DL (ref 11.7–15)
MCH RBC QN AUTO: 32.1 PG (ref 26.1–32.9)
MCHC RBC AUTO-ENTMCNC: 30.7 G/DL (ref 31.4–35)
MCV RBC AUTO: 104.5 FL (ref 79.6–97.8)
METHGB MFR BLD: 0.3 % (ref 0–1.5)
METHGB MFR BLD: 0.4 % (ref 0–1.5)
OXYHGB MFR BLDA: 93.1 % (ref 94–97)
OXYHGB MFR BLDA: 96 % (ref 94–97)
PCO2 BLDA: 48 MMHG (ref 35–45)
PCO2 BLDA: 73 MMHG (ref 35–45)
PEEP RESPIRATORY: 8 CM[H2O]
PEEP RESPIRATORY: 8 CM[H2O]
PH BLDA: 7.28 [PH] (ref 7.35–7.45)
PH BLDA: 7.36 [PH] (ref 7.35–7.45)
PLATELET # BLD AUTO: 152 K/UL (ref 150–450)
PMV BLD AUTO: 9.9 FL (ref 10.8–14.1)
PO2 BLDA: 100 MMHG (ref 80–105)
PO2 BLDA: 73 MMHG (ref 80–105)
POTASSIUM SERPL-SCNC: 4.1 MMOL/L (ref 3.5–5.1)
RBC # BLD AUTO: 3.52 M/UL (ref 4.23–5.67)
SAO2 % BLD: 94 % (ref 92–98.5)
SAO2 % BLD: 97 % (ref 92–98.5)
SERVICE CMNT-IMP: ABNORMAL
SODIUM SERPL-SCNC: 145 MMOL/L (ref 136–145)
VENTILATION MODE VENT: ABNORMAL
VENTILATION MODE VENT: ABNORMAL
WBC # BLD AUTO: 8.4 K/UL (ref 4.3–11.1)

## 2018-02-03 PROCEDURE — 74011000250 HC RX REV CODE- 250: Performed by: INTERNAL MEDICINE

## 2018-02-03 PROCEDURE — 36415 COLL VENOUS BLD VENIPUNCTURE: CPT | Performed by: INTERNAL MEDICINE

## 2018-02-03 PROCEDURE — 94003 VENT MGMT INPAT SUBQ DAY: CPT

## 2018-02-03 PROCEDURE — 74011250637 HC RX REV CODE- 250/637: Performed by: INTERNAL MEDICINE

## 2018-02-03 PROCEDURE — 65610000001 HC ROOM ICU GENERAL

## 2018-02-03 PROCEDURE — 74011250636 HC RX REV CODE- 250/636: Performed by: INTERNAL MEDICINE

## 2018-02-03 PROCEDURE — 94640 AIRWAY INHALATION TREATMENT: CPT

## 2018-02-03 PROCEDURE — 82803 BLOOD GASES ANY COMBINATION: CPT

## 2018-02-03 PROCEDURE — 80048 BASIC METABOLIC PNL TOTAL CA: CPT | Performed by: INTERNAL MEDICINE

## 2018-02-03 PROCEDURE — 94660 CPAP INITIATION&MGMT: CPT

## 2018-02-03 PROCEDURE — 85027 COMPLETE CBC AUTOMATED: CPT | Performed by: INTERNAL MEDICINE

## 2018-02-03 PROCEDURE — 99233 SBSQ HOSP IP/OBS HIGH 50: CPT | Performed by: INTERNAL MEDICINE

## 2018-02-03 PROCEDURE — 36600 WITHDRAWAL OF ARTERIAL BLOOD: CPT

## 2018-02-03 RX ORDER — SODIUM CHLORIDE, SODIUM LACTATE, POTASSIUM CHLORIDE, CALCIUM CHLORIDE 600; 310; 30; 20 MG/100ML; MG/100ML; MG/100ML; MG/100ML
75 INJECTION, SOLUTION INTRAVENOUS CONTINUOUS
Status: DISCONTINUED | OUTPATIENT
Start: 2018-02-03 | End: 2018-02-05

## 2018-02-03 RX ORDER — ACETAMINOPHEN 325 MG/1
650 TABLET ORAL
Status: DISCONTINUED | OUTPATIENT
Start: 2018-02-03 | End: 2018-02-08 | Stop reason: HOSPADM

## 2018-02-03 RX ADMIN — Medication 10 ML: at 06:14

## 2018-02-03 RX ADMIN — CHLORHEXIDINE GLUCONATE 15 ML: 1.2 RINSE ORAL at 08:33

## 2018-02-03 RX ADMIN — SODIUM CHLORIDE, SODIUM LACTATE, POTASSIUM CHLORIDE, AND CALCIUM CHLORIDE 75 ML/HR: 600; 310; 30; 20 INJECTION, SOLUTION INTRAVENOUS at 19:46

## 2018-02-03 RX ADMIN — ACETAMINOPHEN 650 MG: 325 TABLET, FILM COATED ORAL at 21:06

## 2018-02-03 RX ADMIN — IPRATROPIUM BROMIDE AND ALBUTEROL SULFATE 3 ML: .5; 3 SOLUTION RESPIRATORY (INHALATION) at 11:33

## 2018-02-03 RX ADMIN — IPRATROPIUM BROMIDE AND ALBUTEROL SULFATE 3 ML: .5; 3 SOLUTION RESPIRATORY (INHALATION) at 03:06

## 2018-02-03 RX ADMIN — CARVEDILOL 3.12 MG: 3.12 TABLET, FILM COATED ORAL at 08:34

## 2018-02-03 RX ADMIN — PROPOFOL 10 MCG/KG/MIN: 10 INJECTION, EMULSION INTRAVENOUS at 06:12

## 2018-02-03 RX ADMIN — IPRATROPIUM BROMIDE AND ALBUTEROL SULFATE 3 ML: .5; 3 SOLUTION RESPIRATORY (INHALATION) at 23:22

## 2018-02-03 RX ADMIN — Medication 10 ML: at 22:00

## 2018-02-03 RX ADMIN — AMIODARONE HYDROCHLORIDE 400 MG: 200 TABLET ORAL at 08:33

## 2018-02-03 RX ADMIN — CARVEDILOL 3.12 MG: 3.12 TABLET, FILM COATED ORAL at 17:15

## 2018-02-03 RX ADMIN — IPRATROPIUM BROMIDE AND ALBUTEROL SULFATE 3 ML: .5; 3 SOLUTION RESPIRATORY (INHALATION) at 07:10

## 2018-02-03 RX ADMIN — SODIUM CHLORIDE, SODIUM LACTATE, POTASSIUM CHLORIDE, AND CALCIUM CHLORIDE 75 ML/HR: 600; 310; 30; 20 INJECTION, SOLUTION INTRAVENOUS at 08:40

## 2018-02-03 RX ADMIN — Medication 10 ML: at 15:19

## 2018-02-03 RX ADMIN — AMIODARONE HYDROCHLORIDE 400 MG: 200 TABLET ORAL at 17:15

## 2018-02-03 RX ADMIN — IPRATROPIUM BROMIDE AND ALBUTEROL SULFATE 3 ML: .5; 3 SOLUTION RESPIRATORY (INHALATION) at 16:13

## 2018-02-03 RX ADMIN — CHLORHEXIDINE GLUCONATE 15 ML: 1.2 RINSE ORAL at 21:00

## 2018-02-03 RX ADMIN — IPRATROPIUM BROMIDE AND ALBUTEROL SULFATE 3 ML: .5; 3 SOLUTION RESPIRATORY (INHALATION) at 19:30

## 2018-02-03 RX ADMIN — RIVAROXABAN 20 MG: 20 TABLET, FILM COATED ORAL at 08:34

## 2018-02-03 NOTE — PROGRESS NOTES
Ventilator check complete; patient has a #7. 0 ET tube secured at the Nare. .  Breath sounds are coarse. Trachea is midline, Negative for subcutaneous air, and chest excursion is symmetric. Patient is also Negative for cyanosis and is Negative for pitting edema. All alarms are set and audible. Resuscitation bag is at the head of the bed.       Ventilator Settings  Mode FIO2 Rate Tidal Volume Pressure PEEP I:E Ratio   Pressure support  30 %    500 ml  10 cm H2O  8 cm H20  1:2.2      Peak airway pressure: 19 cm H2O   Minute ventilation: 5.6 l/min     ABG:   Recent Labs      02/03/18   0310  02/02/18   0310  02/01/18   2145   PH  7.28*  7.42  7.35   PCO2  73*  47*  56*   PO2  100  92  137*   HCO3  34*  30*  31*         Mendez Mg, RT

## 2018-02-03 NOTE — PROGRESS NOTES
Respiratory Mechanics completed and are as follows:  Weaning Parameters  Spontaneous Breathing Trial Complete: Yes  Resp Rate Observed: 10  Ve: 5.6  VT: 546 (PS-10cm)  RSBI: 18.3  Richards Agitation Sedation Scale (RASS): Alert and calm  Patient extubated to a 2L NC. Patient is able to communicate and is negative for stridor. Breath sounds are coarse. No complications with extubation.      Jeevan Abdi, RT

## 2018-02-03 NOTE — PROGRESS NOTES
Increased pressure support from 5 to 10 and decreased FIO2 from 40% to 30% post ABG. No complications noted at this time.

## 2018-02-03 NOTE — PROGRESS NOTES
Bedside report received from [facility]. Pt resting quietly in bed. Alert and follows commands. Pt on ventilator with PS and Fi02 40%. Lung sounds clear and diminished. Abdomen obese and semi-soft with active bowel sounds. NGT in L nare clamped. Galindo in place draining kierra cloudy urine. Restraints removed- pt is calm and cooperative. Bed in chair position and tv on for stimulation. Pt denies pain at this time.

## 2018-02-03 NOTE — PROGRESS NOTES
Bedside shift change report given to [facility] (oncoming nurse) by Hill Franklin (offgoing nurse). Report included the following information SBAR, Kardex, Procedure Summary, Intake/Output, MAR, Recent Results, Med Rec Status and Cardiac Rhythm NSR. Skin assessed.  Pt turned

## 2018-02-03 NOTE — PROGRESS NOTES
Nutrition:  Acknowledge Nutrition Consult for General Nutrition Management & Supplements and TF Management. The patient was discharged 1/24/18 after an admission for respiratory failure. During his previous admission he had excellent oral intake when he was alert and not requiring ventilator support. The patient was admitted intubated and ventilated. He was extubated earlier today with a regular diet now ordered. MST remains incomplete at this time. Do not anticipate any barrier that would impede continued excellent oral intake. Patient will be seen based on standards of care or if an acute need evolves. [facility].  Leonard Pean  [phone]

## 2018-02-03 NOTE — PROGRESS NOTES
Care Daily Progress Note: 2/3/2018  Admission Date: 2/1/2018     The patient's chart is reviewed and the patient is discussed with the staff. Subjective:   45 y. o.  male presents with altered mental status on 2/1      Patient is known with polysubstance abuse, NATALIIA and COPD who was recently in the hospital requiring reintubation coming in with altered mental status. There are no family to give nay history but as per staff patient was doing well half an hour before his family came to reassess him and was found on the floor unresponsive. EMS came and had to nasally intubate him. There is no clear reason why he was intubated nasally. Patient was recommended CPAP but he is non compliant and refused to use it due to the noise. Also a trach was recommended and refused  More awake today but abg earlier this am revealed paco2 of 73 on ps of 5 so ps increased    Current Facility-Administered Medications   Medication Dose Route Frequency    amiodarone (CORDARONE) tablet 400 mg  400 mg Oral BID    carvedilol (COREG) tablet 3.125 mg  3.125 mg Oral BID WITH MEALS    rivaroxaban (XARELTO) tablet 20 mg  20 mg Oral DAILY WITH BREAKFAST    sodium chloride (NS) flush 5-10 mL  5-10 mL IntraVENous Q8H    sodium chloride (NS) flush 5-10 mL  5-10 mL IntraVENous PRN    albuterol-ipratropium (DUO-NEB) 2.5 MG-0.5 MG/3 ML  3 mL Nebulization Q4H RT    propofol (DIPRIVAN) infusion  0-50 mcg/kg/min IntraVENous TITRATE    chlorhexidine (PERIDEX) 0.12 % mouthwash 15 mL  15 mL Oral Q12H    oxyCODONE IR (ROXICODONE) tablet 5 mg  5 mg Oral Q6H PRN       Review of Systems   Unobtainable due to patient status.           Objective:     Vitals:    02/03/18 0445 02/03/18 0503 02/03/18 0602 02/03/18 0715   BP:  172/72 145/86    Pulse:  86 80 81   Resp:  17 19 (!) 52   Temp: 98.1 °F (36.7 °C)      SpO2:  94% 97% 95%   Weight: 285 lb 0.9 oz (129.3 kg)      Height:           Intake and Output:   02/01 1901 - 02/03 0700  In: 1398.9 [I.V.:1398.9]  Out: 1425 [Urine:1425]       Physical Exam:          Constitutional:  intubated and mechanically ventilated. EENMT:  Sclera clear, pupils equal, oral mucosa moist  Respiratory: clear  Cardiovascular:  RRR with no M,G,R;  Gastrointestinal:  soft with no tenderness; positive bowel sounds present  Musculoskeletal:  warm with no cyanosis, no lower leg edema  Skin:  no jaundice or ecchymosis  Neurologic: no gross neuro deficits     Psychiatric:  alert and  responsive    LINES:  ETT, peripheral    DRIPS:  none    CXR:    None today    Ventilator Settings  Mode FIO2 Rate Tidal Volume Pressure PEEP   Pressure support  30 %    500 ml  10 cm H2O  8 cm H20      Peak airway pressure: 19 cm H2O   Minute ventilation: 5.6 l/min     ABG:   Recent Labs      02/03/18   0310  02/02/18   0310  02/01/18   2145   PH  7.28*  7.42  7.35   PCO2  73*  47*  56*   PO2  100  92  137*   HCO3  34*  30*  31*        LAB  Recent Labs      02/02/18   0739  02/02/18   0620   GLUCPOC  74  72     Recent Labs      02/03/18   0340  02/02/18   0323  02/01/18   1919   WBC  8.4  9.7  10.7   HGB  11.3*  12.1*  13.8   HCT  36.8*  38.2*  45.1   PLT  152  148*  178     Recent Labs      02/03/18   0340  02/02/18   0323  02/01/18   1919   NA  145  144  144   K  4.1  5.0  4.7   CL  105  105  107   CO2  30  23  25   GLU  74  59*  212*   BUN  16  18  15   CREA  1.46  1.48  1.58*   CA  8.5  8.5  7.6*   ALB   --   2.5*  2.5*   SGOT   --   65*  50*     Recent Labs      02/02/18   0852   LAC  1.6         Assessment:  (Medical Decision Making)     Hospital Problems  Date Reviewed: 1/19/2018          Codes Class Noted POA    Morbid obesity ([facility] ICD-10-CM: E66.01  ICD-9-CM: 278.01  2/2/2018 Unknown        Altered mental status ICD-10-CM: R41.82  ICD-9-CM: 780.97  2/1/2018 Yes    improved    * (Principal)Acute respiratory failure with hypercapnia ([facility] ICD-10-CM: J96.02  ICD-9-CM: 518.81  2/1/2018 Yes    abg this am looked much worse but the pt.  Looks good    Alcohol abuse (Chronic) ICD-10-CM: F10.10  ICD-9-CM: 305.00  1/15/2018 Yes        COPD (chronic obstructive pulmonary disease) ([facility]) (Chronic) ICD-10-CM: J44.9  ICD-9-CM: 496  1/6/2018 Yes        Nicotine dependence (Chronic) ICD-10-CM: F85.152  ICD-9-CM: 305.1  1/6/2018 Yes        Methamphetamine abuse (Chronic) ICD-10-CM: F15.10  ICD-9-CM: 305.70  1/6/2018 Yes        NATALIIA (obstructive sleep apnea) (Chronic) ICD-10-CM: T55.34  ICD-9-CM: 327.23  1/6/2018 Yes        Acute on chronic respiratory failure with hypoxia and hypercapnia (HCC) ICD-10-CM: J96.21, J96.22  ICD-9-CM: 518.84, 786.09, 799.02  1/6/2018 Yes              Plan:  (Medical Decision Making)   1    Repeat abg on ps of 5 - if ok extubate  --    More than 50% of the time documented was spent in face-to-face contact with the patient and in the care of the patient on the floor/unit where the patient is located.     Dez Andersen MD

## 2018-02-03 NOTE — PROGRESS NOTES
Ventilator check complete; patient has a #7. 0 Nasal ET tube secured at the hub at the right nare Patient is not sedated. Patient is able to follow commands. Breath sounds are coarse. Trachea is midline, Negative for subcutaneous air, and chest excursion is symmetric. Patient is also Negative for cyanosis and is Negative for pitting edema. All alarms are set and audible. Resuscitation bag is at the head of the bed.       Ventilator Settings  Mode FIO2 Rate Tidal Volume Pressure PEEP I:E Ratio   Pressure support  40 %    500 ml  8 cm H2O (weaned)  8 cm H20  1:2.2      Peak airway pressure: 17 cm H2O   Minute ventilation: 5 l/min     ABG:   Recent Labs      02/02/18   0310  02/01/18   2145  02/01/18   1930   PH  7.42  7.35  7.16*   PCO2  47*  56*  80*   PO2  92  137*  69*   HCO3  30*  31*  28*         [address], RT

## 2018-02-04 LAB
ARTERIAL PATENCY WRIST A: POSITIVE
ARTERIAL PATENCY WRIST A: POSITIVE
BASE EXCESS BLDA CALC-SCNC: 2.9 MMOL/L (ref 0–3)
BASE EXCESS BLDA CALC-SCNC: 3.8 MMOL/L (ref 0–3)
BDY SITE: ABNORMAL
BDY SITE: ABNORMAL
COHGB MFR BLD: 1.3 % (ref 0.5–1.5)
COHGB MFR BLD: 1.7 % (ref 0.5–1.5)
DO-HGB BLD-MCNC: 4 % (ref 0–5)
DO-HGB BLD-MCNC: 7 % (ref 0–5)
FIO2 ON VENT: 40 %
FIO2 ON VENT: 45 %
HCO3 BLDA-SCNC: 32 MMOL/L (ref 22–26)
HCO3 BLDA-SCNC: 36 MMOL/L (ref 22–26)
HGB BLDMV-MCNC: 12.8 GM/DL (ref 11.7–15)
HGB BLDMV-MCNC: 13.2 GM/DL (ref 11.7–15)
METHGB MFR BLD: 0.2 % (ref 0–1.5)
METHGB MFR BLD: 0.5 % (ref 0–1.5)
OXYHGB MFR BLDA: 90.9 % (ref 94–97)
OXYHGB MFR BLDA: 93.9 % (ref 94–97)
PCO2 BLDA: 100 MMHG (ref 35–45)
PCO2 BLDA: 69 MMHG (ref 35–45)
PH BLDA: 7.17 [PH] (ref 7.35–7.45)
PH BLDA: 7.28 [PH] (ref 7.35–7.45)
PO2 BLDA: 67 MMHG (ref 80–105)
PO2 BLDA: 91 MMHG (ref 80–105)
RESP RATE: 22
SAO2 % BLD: 93 % (ref 92–98.5)
SAO2 % BLD: 96 % (ref 92–98.5)
SERVICE CMNT-IMP: ABNORMAL
SERVICE CMNT-IMP: ABNORMAL
VENTILATION MODE VENT: ABNORMAL
VENTILATION MODE VENT: ABNORMAL

## 2018-02-04 PROCEDURE — 94640 AIRWAY INHALATION TREATMENT: CPT

## 2018-02-04 PROCEDURE — 74011250637 HC RX REV CODE- 250/637: Performed by: INTERNAL MEDICINE

## 2018-02-04 PROCEDURE — 74011250636 HC RX REV CODE- 250/636: Performed by: INTERNAL MEDICINE

## 2018-02-04 PROCEDURE — 36600 WITHDRAWAL OF ARTERIAL BLOOD: CPT

## 2018-02-04 PROCEDURE — 94660 CPAP INITIATION&MGMT: CPT

## 2018-02-04 PROCEDURE — 65610000001 HC ROOM ICU GENERAL

## 2018-02-04 PROCEDURE — 82803 BLOOD GASES ANY COMBINATION: CPT

## 2018-02-04 PROCEDURE — 77010033678 HC OXYGEN DAILY

## 2018-02-04 PROCEDURE — 99232 SBSQ HOSP IP/OBS MODERATE 35: CPT | Performed by: INTERNAL MEDICINE

## 2018-02-04 PROCEDURE — 74011000250 HC RX REV CODE- 250: Performed by: INTERNAL MEDICINE

## 2018-02-04 RX ADMIN — CARVEDILOL 3.12 MG: 3.12 TABLET, FILM COATED ORAL at 08:52

## 2018-02-04 RX ADMIN — IPRATROPIUM BROMIDE AND ALBUTEROL SULFATE 3 ML: .5; 3 SOLUTION RESPIRATORY (INHALATION) at 07:03

## 2018-02-04 RX ADMIN — Medication 10 ML: at 05:24

## 2018-02-04 RX ADMIN — CHLORHEXIDINE GLUCONATE 15 ML: 1.2 RINSE ORAL at 21:45

## 2018-02-04 RX ADMIN — RIVAROXABAN 20 MG: 20 TABLET, FILM COATED ORAL at 08:52

## 2018-02-04 RX ADMIN — CHLORHEXIDINE GLUCONATE 15 ML: 1.2 RINSE ORAL at 08:54

## 2018-02-04 RX ADMIN — AMIODARONE HYDROCHLORIDE 400 MG: 200 TABLET ORAL at 17:03

## 2018-02-04 RX ADMIN — CARVEDILOL 3.12 MG: 3.12 TABLET, FILM COATED ORAL at 17:03

## 2018-02-04 RX ADMIN — IPRATROPIUM BROMIDE AND ALBUTEROL SULFATE 3 ML: .5; 3 SOLUTION RESPIRATORY (INHALATION) at 15:08

## 2018-02-04 RX ADMIN — Medication 10 ML: at 14:23

## 2018-02-04 RX ADMIN — IPRATROPIUM BROMIDE AND ALBUTEROL SULFATE 3 ML: .5; 3 SOLUTION RESPIRATORY (INHALATION) at 04:49

## 2018-02-04 RX ADMIN — SODIUM CHLORIDE, SODIUM LACTATE, POTASSIUM CHLORIDE, AND CALCIUM CHLORIDE 75 ML/HR: 600; 310; 30; 20 INJECTION, SOLUTION INTRAVENOUS at 14:23

## 2018-02-04 RX ADMIN — IPRATROPIUM BROMIDE AND ALBUTEROL SULFATE 3 ML: .5; 3 SOLUTION RESPIRATORY (INHALATION) at 10:53

## 2018-02-04 RX ADMIN — AMIODARONE HYDROCHLORIDE 400 MG: 200 TABLET ORAL at 08:52

## 2018-02-04 RX ADMIN — IPRATROPIUM BROMIDE AND ALBUTEROL SULFATE 3 ML: .5; 3 SOLUTION RESPIRATORY (INHALATION) at 19:15

## 2018-02-04 RX ADMIN — Medication 10 ML: at 21:45

## 2018-02-04 NOTE — PROGRESS NOTES
ABG results given to DR Jluis Cortez. Orders to increase BIPAP to 18/8, RR 22.  Repeat ABG in one hour

## 2018-02-04 NOTE — PROGRESS NOTES
knows patient from previous admissions. Will continue to follow.     Chilo Mena, staff [address], [address]  /   [email or web address]

## 2018-02-04 NOTE — PROGRESS NOTES
Patient sleeping with BiPAP in place most of the day. Patient responds immediately to voice and awakens. NC in place on 5L during meals, O2 sats stable. Patient awake and alert during meals and eats without assistance. No complaints of pain. VSS, NAD.

## 2018-02-04 NOTE — PROGRESS NOTES
Bedside shift change report given to Shelly Varela RN (oncoming nurse) by Madelyn Kat RN (offgoing nurse). Report included the following information SBAR, Kardex, ED Summary, Intake/Output, MAR, Recent Results and Cardiac Rhythm NSR. Patient is drowsy but opens eyes to voice and follows all commands. BiPAP 45%, O2 sats 98%. NSR, BP stable. Bilateral breath sounds are coarse and diminished. Abdomen is obese and intact with active bowel sounds x4. Patient voids without difficulty in urinal. Brisk cap refill and pulses palpable to all extremities. No edema noted. Skin is intact with no issues noted, Allevyn in place. VSS, NAD.

## 2018-02-04 NOTE — PROGRESS NOTES
Critical Care Daily Progress Note: 2/4/2018    [place] List   Admission Date: 2/1/2018         The patient's chart is reviewed and the patient is discussed with the staff. Subjective:     45 y. o.  male presents with altered mental status on 2/1      Patient is known with polysubstance abuse, NATALIIA and COPD who was recently in the hospital requiring reintubation coming in with altered mental status. There are no family to give nay history but as per staff patient was doing well half an hour before his family came to reassess him and was found on the floor unresponsive. EMS came and had to nasally intubate him. There is no clear reason why he was intubated nasally. Patient was recommended CPAP but he is non compliant and refused to use it due to the noise.  Also a trach was recommended and refused  More awake today but abg earlier this am revealed paco2 of 73 on ps of 5 so ps increased    Current Facility-Administered Medications   Medication Dose Route Frequency    lactated Ringers infusion  75 mL/hr IntraVENous CONTINUOUS    acetaminophen (TYLENOL) tablet 650 mg  650 mg Oral Q6H PRN    amiodarone (CORDARONE) tablet 400 mg  400 mg Oral BID    carvedilol (COREG) tablet 3.125 mg  3.125 mg Oral BID WITH MEALS    rivaroxaban (XARELTO) tablet 20 mg  20 mg Oral DAILY WITH BREAKFAST    sodium chloride (NS) flush 5-10 mL  5-10 mL IntraVENous Q8H    sodium chloride (NS) flush 5-10 mL  5-10 mL IntraVENous PRN    albuterol-ipratropium (DUO-NEB) 2.5 MG-0.5 MG/3 ML  3 mL Nebulization Q4H RT    propofol (DIPRIVAN) infusion  0-50 mcg/kg/min IntraVENous TITRATE    chlorhexidine (PERIDEX) 0.12 % mouthwash 15 mL  15 mL Oral Q12H    oxyCODONE IR (ROXICODONE) tablet 5 mg  5 mg Oral Q6H PRN       Review of Systems    Constitutional:  negative for fever, chills, sweats  Cardiovascular:  negative for chest pain, palpitations, syncope, edema  Gastrointestinal:  negative for dysphagia, reflux, vomiting, diarrhea, abdominal pain, or melena  Neurologic:  negative for focal weakness, numbness, headache      Objective:     Vitals:    02/04/18 0502 02/04/18 0602 02/04/18 0707 02/04/18 0710   BP: 116/59 112/65     Pulse: 91 81     Resp: 24 22     Temp: 100.1 °F (37.8 °C)      SpO2: 91% 90% 93% 93%   Weight:       Height:           Intake and Output:   02/02 1901 - 02/04 0700  In: 1333.3 [P.O.:480; I.V.:743.3]  Out: 1950 [Urine:1950]       Physical Exam:          Constitutional:  the patient is well developed and in no acute distress  EENMT:  Sclera clear, pupils equal, oral mucosa moist  Respiratory:   rhonchi  Cardiovascular:  RRR without M,G,R  Gastrointestinal: soft and non-tender; with positive bowel sounds. Musculoskeletal: warm without cyanosis. There is no lower leg edema.   Skin:  no jaundice or rashes, no wounds   Neurologic: no gross neuro deficits     Psychiatric: lethargic but does wake up    LINES:  peripheral    DRIPS:   none    CXR:       LAB  Recent Labs      02/02/18   0739  02/02/18   0620   GLUCPOC  74  72      Recent Labs      02/03/18   0340  02/02/18   0323  02/01/18 1919   WBC  8.4  9.7  10.7   HGB  11.3*  12.1*  13.8   HCT  36.8*  38.2*  45.1   PLT  152  148*  178     Recent Labs      02/03/18   0340  02/02/18   0323  02/01/18 1919   NA  145  144  144   K  4.1  5.0  4.7   CL  105  105  107   CO2  30  23  25   GLU  74  59*  212*   BUN  16  18  15   CREA  1.46  1.48  1.58*   CA  8.5  8.5  7.6*   ALB   --   2.5*  2.5*   TBILI   --   0.4  0.3   ALT   --   51  41   SGOT   --   65*  50*     Recent Labs      02/04/18   0624  02/04/18   0502  02/03/18   0925   PH  7.28*  7.17*  7.36   PCO2  69*  100*  48*   PO2  67*  91  73*   HCO3  32*  36*  27*     Recent Labs      02/02/18   0852   LAC  1.6       Assessment:  (Medical Decision Making)     Hospital Problems  Date Reviewed: 1/19/2018          Codes Class Noted POA    [facility] ICD-10-CM: E66.01  ICD-9-CM: 278.01  2/2/2018 Unknown Altered mental status ICD-10-CM: R41.82  ICD-9-CM: 780.97  2/1/2018 Yes        * (Principal)Acute respiratory failure with hypercapnia (HCC) ICD-10-CM: J96.02  ICD-9-CM: 518.81  2/1/2018 Yes        Alcohol abuse (Chronic) ICD-10-CM: F10.10  ICD-9-CM: 305.00  1/15/2018 Yes        COPD (chronic obstructive pulmonary disease) (HCC) (Chronic) ICD-10-CM: J44.9  ICD-9-CM: 496  1/6/2018 Yes    No wheezing    Nicotine dependence (Chronic) ICD-10-CM: F17.200  ICD-9-CM: 305.1  1/6/2018 Yes        Methamphetamine abuse (Chronic) ICD-10-CM: F15.10  ICD-9-CM: 305.70  1/6/2018 Yes        NATALIIA (obstructive sleep apnea) (Chronic) ICD-10-CM: C72.28  ICD-9-CM: 327.23  1/6/2018 Yes        Acute on chronic respiratory failure with hypoxia and hypercapnia (HCC) ICD-10-CM: J96.21, J96.22  ICD-9-CM: 518.84, 786.09, 799.02  1/6/2018 Yes    ongoing          Plan:  (Medical Decision Making)    1    bipap for now -will try to wean off  2     Will repeat cxr  --    More than 50% of the time documented was spent in face-to-face contact with the patient and in the care of the patient on the floor/unit where the patient is located.     Miky Bautista MD

## 2018-02-05 ENCOUNTER — APPOINTMENT (OUTPATIENT)
Dept: GENERAL RADIOLOGY | Age: 53
DRG: 208 | End: 2018-02-05
Attending: INTERNAL MEDICINE
Payer: SELF-PAY

## 2018-02-05 LAB
BACTERIA SPEC CULT: ABNORMAL
BACTERIA SPEC CULT: NORMAL
GRAM STN SPEC: NORMAL
SERVICE CMNT-IMP: ABNORMAL
SERVICE CMNT-IMP: NORMAL

## 2018-02-05 PROCEDURE — 65270000029 HC RM PRIVATE

## 2018-02-05 PROCEDURE — 74011250637 HC RX REV CODE- 250/637: Performed by: INTERNAL MEDICINE

## 2018-02-05 PROCEDURE — 94640 AIRWAY INHALATION TREATMENT: CPT

## 2018-02-05 PROCEDURE — 99232 SBSQ HOSP IP/OBS MODERATE 35: CPT | Performed by: INTERNAL MEDICINE

## 2018-02-05 PROCEDURE — 74011000250 HC RX REV CODE- 250: Performed by: INTERNAL MEDICINE

## 2018-02-05 PROCEDURE — 77010033678 HC OXYGEN DAILY

## 2018-02-05 PROCEDURE — 71045 X-RAY EXAM CHEST 1 VIEW: CPT

## 2018-02-05 PROCEDURE — 94660 CPAP INITIATION&MGMT: CPT

## 2018-02-05 PROCEDURE — 94760 N-INVAS EAR/PLS OXIMETRY 1: CPT

## 2018-02-05 RX ADMIN — CHLORHEXIDINE GLUCONATE 15 ML: 1.2 RINSE ORAL at 22:36

## 2018-02-05 RX ADMIN — IPRATROPIUM BROMIDE AND ALBUTEROL SULFATE 3 ML: .5; 3 SOLUTION RESPIRATORY (INHALATION) at 23:53

## 2018-02-05 RX ADMIN — AMIODARONE HYDROCHLORIDE 400 MG: 200 TABLET ORAL at 17:07

## 2018-02-05 RX ADMIN — Medication 10 ML: at 06:01

## 2018-02-05 RX ADMIN — IPRATROPIUM BROMIDE AND ALBUTEROL SULFATE 3 ML: .5; 3 SOLUTION RESPIRATORY (INHALATION) at 00:20

## 2018-02-05 RX ADMIN — AMIODARONE HYDROCHLORIDE 400 MG: 200 TABLET ORAL at 08:49

## 2018-02-05 RX ADMIN — IPRATROPIUM BROMIDE AND ALBUTEROL SULFATE 3 ML: .5; 3 SOLUTION RESPIRATORY (INHALATION) at 19:37

## 2018-02-05 RX ADMIN — CARVEDILOL 3.12 MG: 3.12 TABLET, FILM COATED ORAL at 17:07

## 2018-02-05 RX ADMIN — CARVEDILOL 3.12 MG: 3.12 TABLET, FILM COATED ORAL at 08:49

## 2018-02-05 RX ADMIN — RIVAROXABAN 20 MG: 20 TABLET, FILM COATED ORAL at 08:49

## 2018-02-05 RX ADMIN — IPRATROPIUM BROMIDE AND ALBUTEROL SULFATE 3 ML: .5; 3 SOLUTION RESPIRATORY (INHALATION) at 11:15

## 2018-02-05 RX ADMIN — Medication 10 ML: at 13:25

## 2018-02-05 RX ADMIN — IPRATROPIUM BROMIDE AND ALBUTEROL SULFATE 3 ML: .5; 3 SOLUTION RESPIRATORY (INHALATION) at 15:54

## 2018-02-05 RX ADMIN — Medication 10 ML: at 22:37

## 2018-02-05 RX ADMIN — IPRATROPIUM BROMIDE AND ALBUTEROL SULFATE 3 ML: .5; 3 SOLUTION RESPIRATORY (INHALATION) at 07:08

## 2018-02-05 RX ADMIN — IPRATROPIUM BROMIDE AND ALBUTEROL SULFATE 3 ML: .5; 3 SOLUTION RESPIRATORY (INHALATION) at 04:45

## 2018-02-05 NOTE — PROGRESS NOTES
Problem: Non-Violent Restraints  Goal: *Removal from restraints as soon as assessed to be safe  Outcome: Resolved/Met Date Met: 02/05/18       Problem: Falls - Risk of  Goal: *Absence of Falls  Document Mega Fall Risk and appropriate interventions in the flowsheet.    Outcome: Progressing Towards Goal  Fall Risk Interventions:  Mobility Interventions: Bed/chair exit alarm, Communicate number of staff needed for ambulation/transfer, OT consult for ADLs, Patient to call before getting OOB, PT Consult for mobility concerns         Medication Interventions: Bed/chair exit alarm, Evaluate medications/consider consulting pharmacy, Patient to call before getting OOB, Teach patient to arise slowly    Elimination Interventions: Bed/chair exit alarm, Call light in reach, Patient to call for help with toileting needs, Toilet paper/wipes in reach, Toileting schedule/hourly rounds, Urinal in reach    History of Falls Interventions: Bed/chair exit alarm, Consult care management for discharge planning, Door open when patient unattended, Evaluate medications/consider consulting pharmacy

## 2018-02-05 NOTE — PROGRESS NOTES
Bedside and Verbal shift change report given to Helder Berry RN (oncoming nurse) by Wilman Ernandez RN (offgoing nurse).  Report included the following information SBAR, Kardex, ED Summary and Cardiac Rhythm SR.

## 2018-02-05 NOTE — PROGRESS NOTES
[facility] brochure given to pt by primary RN, Slick Rodriguez. Message to Hermelindo Strange, [facility], regarding pt's appointment tomorrow. Still currently in ICU.

## 2018-02-05 NOTE — PROGRESS NOTES
Pt is A&Ox4, follows commands, eyes focus and track. Breath sounds diminished with symmetrical chest expansion, on BiPap @ 40%. NSR on monitor, S1/S2 auscultated. Bowel sounds active, abdomen obese and semi-soft. Skin intact. Lines:18G R AC, 20G R UA, 18G L AC  Drips: LR @ 75      Pt requesting to have Bipap off, will inform RT. Will continue to monitor pt.

## 2018-02-05 NOTE — PROGRESS NOTES
Bedside and Verbal shift change report given to Emilie Ponce, [facility] (oncoming nurse) by Nell Thacker RN (offgoing nurse). Report included the following information SBAR, Kardex, Intake/Output, MAR, Recent Results and Cardiac Rhythm NSR. Patient remains drowsy, awakens spontaneously and to voice. Mumbles words, but oriented x3. NSR 70s on monitor, /76, O2 sat 97% on BiPAP, while sleeping. NAD noted. Will continue to monitor.

## 2018-02-05 NOTE — PROGRESS NOTES
Critical Care Daily Progress Note: 2/5/2018    Joe Osborne   Admission Date: 2/1/2018      45 y. o.  male presents with altered mental status on 2/1      Patient is known with polysubstance abuse, NATALIIA and COPD who was recently in the hospital requiring reintubation coming in with altered mental status. There are no family to give nay history but as per staff patient was doing well half an hour before his family came to reassess him and was found on the floor unresponsive. EMS came and had to nasally intubate him. There is no clear reason why he was intubated nasally. Patient was recommended CPAP but he is non compliant and refused to use it due to the noise. Also a trach was recommended and refused       The patient's chart is reviewed and the patient is discussed with the staff. Subjective:     Patient in bed off BIPAP now. On NC, but coming out since needs nasal trumpet to clear posterior pharynx. Awake and aware that he is unfortunately getting sick from using drugs. Aware needs to quit. No pain.  Does not like nasal trumpet    Current Facility-Administered Medications   Medication Dose Route Frequency    acetaminophen (TYLENOL) tablet 650 mg  650 mg Oral Q6H PRN    amiodarone (CORDARONE) tablet 400 mg  400 mg Oral BID    carvedilol (COREG) tablet 3.125 mg  3.125 mg Oral BID WITH MEALS    rivaroxaban (XARELTO) tablet 20 mg  20 mg Oral DAILY WITH BREAKFAST    sodium chloride (NS) flush 5-10 mL  5-10 mL IntraVENous Q8H    sodium chloride (NS) flush 5-10 mL  5-10 mL IntraVENous PRN    albuterol-ipratropium (DUO-NEB) 2.5 MG-0.5 MG/3 ML  3 mL Nebulization Q4H RT    chlorhexidine (PERIDEX) 0.12 % mouthwash 15 mL  15 mL Oral Q12H    oxyCODONE IR (ROXICODONE) tablet 5 mg  5 mg Oral Q6H PRN       Review of Systems    Constitutional:  negative for fever, chills, sweats  Cardiovascular:  negative for chest pain, palpitations, syncope, edema  Gastrointestinal:  negative for dysphagia, reflux, vomiting, diarrhea, abdominal pain, or melena  Neurologic:  negative for focal weakness, numbness, headache      Objective:     Vitals:    02/05/18 0822 02/05/18 0832 02/05/18 0902 02/05/18 0931   BP: 139/79 133/77 123/65 112/61   Pulse: 75 74 76 74   Resp: 18 20 24 12   Temp: 97.7 °F (36.5 °C)      SpO2: 98% 94% 90% 97%   Weight:       Height:           Intake and Output:   02/03 1901 - 02/05 0700  In: 2726 [I.V.:2726]  Out: 1325 [Urine:1325]  02/05 0701 - 02/05 1900  In: -   Out: 300 [Urine:300]    Physical Exam:          Constitutional:  the patient is well developed and in no acute distress on 5 LPM  EENMT:  Sclera clear, pupils equal, oral mucosa moist  Respiratory:   Poor effort but no rhonchi today. Cardiovascular:  RRR without M,G,R  Gastrointestinal: soft and non-tender; with positive bowel sounds. Musculoskeletal: warm without cyanosis. There is no lower leg edema. Skin:  no jaundice or rashes, no wounds   Neurologic: no gross neuro deficits     Psychiatric: awake but slow in answering questions. LINES:  peripheral    DRIPS:   none    CXR:       LAB  No results for input(s): GLUCPOC in the last 72 hours. No lab exists for component: [place]   Recent Labs      02/03/18   0340   WBC  8.4   HGB  11.3*   HCT  36.8*   PLT  152     Recent Labs      02/03/18   0340   NA  145   K  4.1   CL  105   CO2  30   GLU  74   BUN  16   CREA  1.46   CA  8.5     Recent Labs      02/04/18   0624  02/04/18   0502  02/03/18   0925   PH  7.28*  7.17*  7.36   PCO2  69*  100*  48*   PO2  67*  91  73*   HCO3  32*  36*  27*     No results for input(s): LCAD, LAC in the last 72 hours.     Assessment:  (Medical Decision Making)     Hospital Problems  Date Reviewed: 1/19/2018          Codes Class Noted POA    Morbid obesity ([facility] ICD-10-CM: E66.01  ICD-9-CM: 278.01  2/2/2018 Unknown        Altered mental status ICD-10-CM: R41.82  ICD-9-CM: 780.97  2/1/2018 Yes        * (Principal)Acute respiratory failure with hypercapnia [facility]) ICD-10-CM: J96.02  ICD-9-CM: 518.81  2/1/2018 Yes        Alcohol abuse (Chronic) ICD-10-CM: F10.10  ICD-9-CM: 305.00  1/15/2018 Yes        COPD (chronic obstructive pulmonary disease) (HCC) (Chronic) ICD-10-CM: J44.9  ICD-9-CM: 496  1/6/2018 Yes    No wheezing    Nicotine dependence (Chronic) ICD-10-CM: F17.200  ICD-9-CM: 305.1  1/6/2018 Yes        Methamphetamine abuse (Chronic) ICD-10-CM: F15.10  ICD-9-CM: 305.70  1/6/2018 Yes        NATALIIA (obstructive sleep apnea) (Chronic) ICD-10-CM: Q40.31  ICD-9-CM: 327.23  1/6/2018 Yes        Acute on chronic respiratory failure with hypoxia and hypercapnia (HCC) ICD-10-CM: J96.21, J96.22  ICD-9-CM: 518.84, 786.09, 799.02  1/6/2018 Yes    ongoing          Plan:  (Medical Decision Making)     --continue BIPAP and QHs  --taper oxygen to 3 LPM for now.   --remove nasal trumpet  --check labs and cxr today  --may be able to goto floor in transition care unit later today since will need to continue BIPAP with sleep and QHS  --needs to stop using drugs, aware it is killing him. --continue remaining treatment. More than 50% of the time documented was spent in face-to-face contact with the patient and in the care of the patient on the floor/unit where the patient is located.     Cierra Denton MD

## 2018-02-05 NOTE — PROGRESS NOTES
Interdisciplinary team rounds were held 2/5/2018 with the following team members:Nursing, Nutrition, Outcomes Management, Palliative Care, Pharmacy, Physician, Respiratory Therapy and Clinical Coordinator and the patient. Plan of care discussed. See clinical pathway and/or care plan for interventions and desired outcomes.

## 2018-02-05 NOTE — PROGRESS NOTES
Pt requesting to be back on Bipap because he would like to go to sleep. RT notified and patient is now on BiPap.

## 2018-02-06 PROBLEM — J44.9 COPD (CHRONIC OBSTRUCTIVE PULMONARY DISEASE) (HCC): Chronic | Status: ACTIVE | Noted: 2018-02-01

## 2018-02-06 PROBLEM — F15.10 METHAMPHETAMINE ABUSE (HCC): Chronic | Status: ACTIVE | Noted: 2018-02-01

## 2018-02-06 PROBLEM — F17.200 NICOTINE DEPENDENCE: Chronic | Status: ACTIVE | Noted: 2018-02-01

## 2018-02-06 PROBLEM — G47.33 OSA (OBSTRUCTIVE SLEEP APNEA): Chronic | Status: ACTIVE | Noted: 2018-02-01

## 2018-02-06 PROBLEM — F10.10 ALCOHOL ABUSE: Chronic | Status: ACTIVE | Noted: 2018-02-01

## 2018-02-06 PROBLEM — E66.01 MORBID OBESITY (HCC): Chronic | Status: ACTIVE | Noted: 2018-02-02

## 2018-02-06 PROBLEM — J96.21 ACUTE ON CHRONIC RESPIRATORY FAILURE WITH HYPOXIA AND HYPERCAPNIA (HCC): Status: ACTIVE | Noted: 2018-02-01

## 2018-02-06 PROBLEM — Z86.79 H/O ATRIAL FLUTTER: Chronic | Status: ACTIVE | Noted: 2018-02-06

## 2018-02-06 PROBLEM — J96.22 ACUTE ON CHRONIC RESPIRATORY FAILURE WITH HYPOXIA AND HYPERCAPNIA (HCC): Status: ACTIVE | Noted: 2018-02-01

## 2018-02-06 LAB
ANION GAP SERPL CALC-SCNC: 6 MMOL/L (ref 7–16)
BASOPHILS # BLD: 0 K/UL (ref 0–0.2)
BASOPHILS NFR BLD: 0 % (ref 0–2)
BUN SERPL-MCNC: 8 MG/DL (ref 6–23)
CALCIUM SERPL-MCNC: 8.4 MG/DL (ref 8.3–10.4)
CHLORIDE SERPL-SCNC: 101 MMOL/L (ref 98–107)
CO2 SERPL-SCNC: 36 MMOL/L (ref 21–32)
CREAT SERPL-MCNC: 1.1 MG/DL (ref 0.8–1.5)
DIFFERENTIAL METHOD BLD: ABNORMAL
EOSINOPHIL # BLD: 0.3 K/UL (ref 0–0.8)
EOSINOPHIL NFR BLD: 5 % (ref 0.5–7.8)
ERYTHROCYTE [DISTWIDTH] IN BLOOD BY AUTOMATED COUNT: 14.2 % (ref 11.9–14.6)
GLUCOSE SERPL-MCNC: 101 MG/DL (ref 65–100)
HCT VFR BLD AUTO: 35.7 % (ref 41.1–50.3)
HGB BLD-MCNC: 11 G/DL (ref 13.6–17.2)
IMM GRANULOCYTES # BLD: 0 K/UL (ref 0–0.5)
IMM GRANULOCYTES NFR BLD AUTO: 0 % (ref 0–5)
LYMPHOCYTES # BLD: 1.4 K/UL (ref 0.5–4.6)
LYMPHOCYTES NFR BLD: 22 % (ref 13–44)
MAGNESIUM SERPL-MCNC: 1.9 MG/DL (ref 1.8–2.4)
MCH RBC QN AUTO: 31.8 PG (ref 26.1–32.9)
MCHC RBC AUTO-ENTMCNC: 30.8 G/DL (ref 31.4–35)
MCV RBC AUTO: 103.2 FL (ref 79.6–97.8)
MONOCYTES # BLD: 0.4 K/UL (ref 0.1–1.3)
MONOCYTES NFR BLD: 6 % (ref 4–12)
NEUTS SEG # BLD: 4.2 K/UL (ref 1.7–8.2)
NEUTS SEG NFR BLD: 67 % (ref 43–78)
PHOSPHATE SERPL-MCNC: 3.1 MG/DL (ref 2.5–4.5)
PLATELET # BLD AUTO: 176 K/UL (ref 150–450)
PMV BLD AUTO: 9.2 FL (ref 10.8–14.1)
POTASSIUM SERPL-SCNC: 3.4 MMOL/L (ref 3.5–5.1)
RBC # BLD AUTO: 3.46 M/UL (ref 4.23–5.67)
SODIUM SERPL-SCNC: 143 MMOL/L (ref 136–145)
WBC # BLD AUTO: 6.3 K/UL (ref 4.3–11.1)

## 2018-02-06 PROCEDURE — 36415 COLL VENOUS BLD VENIPUNCTURE: CPT | Performed by: INTERNAL MEDICINE

## 2018-02-06 PROCEDURE — 94660 CPAP INITIATION&MGMT: CPT

## 2018-02-06 PROCEDURE — 77030012341 HC CHMB SPCR OPTC MDI VYRM -A

## 2018-02-06 PROCEDURE — 94760 N-INVAS EAR/PLS OXIMETRY 1: CPT

## 2018-02-06 PROCEDURE — 74011250637 HC RX REV CODE- 250/637: Performed by: INTERNAL MEDICINE

## 2018-02-06 PROCEDURE — 77010033678 HC OXYGEN DAILY

## 2018-02-06 PROCEDURE — 99232 SBSQ HOSP IP/OBS MODERATE 35: CPT | Performed by: INTERNAL MEDICINE

## 2018-02-06 PROCEDURE — 80048 BASIC METABOLIC PNL TOTAL CA: CPT | Performed by: INTERNAL MEDICINE

## 2018-02-06 PROCEDURE — 65270000029 HC RM PRIVATE

## 2018-02-06 PROCEDURE — 84100 ASSAY OF PHOSPHORUS: CPT | Performed by: INTERNAL MEDICINE

## 2018-02-06 PROCEDURE — 74011000250 HC RX REV CODE- 250: Performed by: INTERNAL MEDICINE

## 2018-02-06 PROCEDURE — 94640 AIRWAY INHALATION TREATMENT: CPT

## 2018-02-06 PROCEDURE — 83735 ASSAY OF MAGNESIUM: CPT | Performed by: INTERNAL MEDICINE

## 2018-02-06 PROCEDURE — 85025 COMPLETE CBC W/AUTO DIFF WBC: CPT | Performed by: INTERNAL MEDICINE

## 2018-02-06 PROCEDURE — 97162 PT EVAL MOD COMPLEX 30 MIN: CPT

## 2018-02-06 RX ADMIN — IPRATROPIUM BROMIDE AND ALBUTEROL SULFATE 3 ML: .5; 3 SOLUTION RESPIRATORY (INHALATION) at 23:26

## 2018-02-06 RX ADMIN — CARVEDILOL 3.12 MG: 3.12 TABLET, FILM COATED ORAL at 17:33

## 2018-02-06 RX ADMIN — CARVEDILOL 3.12 MG: 3.12 TABLET, FILM COATED ORAL at 09:14

## 2018-02-06 RX ADMIN — CHLORHEXIDINE GLUCONATE 15 ML: 1.2 RINSE ORAL at 09:00

## 2018-02-06 RX ADMIN — Medication 10 ML: at 22:15

## 2018-02-06 RX ADMIN — IPRATROPIUM BROMIDE AND ALBUTEROL SULFATE 3 ML: .5; 3 SOLUTION RESPIRATORY (INHALATION) at 04:28

## 2018-02-06 RX ADMIN — AMIODARONE HYDROCHLORIDE 400 MG: 200 TABLET ORAL at 17:33

## 2018-02-06 RX ADMIN — IPRATROPIUM BROMIDE AND ALBUTEROL SULFATE 3 ML: .5; 3 SOLUTION RESPIRATORY (INHALATION) at 15:18

## 2018-02-06 RX ADMIN — IPRATROPIUM BROMIDE AND ALBUTEROL SULFATE 3 ML: .5; 3 SOLUTION RESPIRATORY (INHALATION) at 19:38

## 2018-02-06 RX ADMIN — CHLORHEXIDINE GLUCONATE 15 ML: 1.2 RINSE ORAL at 22:14

## 2018-02-06 RX ADMIN — AMIODARONE HYDROCHLORIDE 400 MG: 200 TABLET ORAL at 09:14

## 2018-02-06 RX ADMIN — Medication 10 ML: at 17:34

## 2018-02-06 RX ADMIN — RIVAROXABAN 20 MG: 20 TABLET, FILM COATED ORAL at 09:15

## 2018-02-06 RX ADMIN — IPRATROPIUM BROMIDE AND ALBUTEROL SULFATE 3 ML: .5; 3 SOLUTION RESPIRATORY (INHALATION) at 07:20

## 2018-02-06 RX ADMIN — IPRATROPIUM BROMIDE AND ALBUTEROL SULFATE 3 ML: .5; 3 SOLUTION RESPIRATORY (INHALATION) at 11:16

## 2018-02-06 NOTE — ROUTINE PROCESS
Verbal report taken from [facility] on Nelida Lev  being transferred to [id number] (unit) for routine progression of care        Report consisted of patients Situation, Background, Assessment and   Recommendations(SBAR).    Information from the following report(s) SBAR, Kardex, Procedure Summary, Intake/Output, MAR and Cardiac Rhythm NSR 70s was reviewed with the receiving nurse.

## 2018-02-06 NOTE — PROGRESS NOTES
Problem: Mobility Impaired (Adult and Pediatric)  Goal: *Acute Goals and Plan of Care (Insert Text)  Discharge Goals:  (1.)Mr. Schaffer will perform bed mobility with INDEPENDENCE within 7 day(s). (2.)Mr. Schaffer will transfer with MODIFIED INDEPENDENCE using the least restrictive device within 7 day(s). (3.)Mr. Schaffer will ambulate with MODIFIED INDEPENDENCE for 500+ feet with the least restrictive device within 7 day(s). (4.)Mr. Schaffer will tolerate 25+ minutes of therapeutic activity/exercise while maintaining Sp02>90% to improve functional strength and endurance within 7 day(s). (5.)Mr. Schaffer will verbalize and follow 3/3 energy conservation techniques within 7 day(s). PHYSICAL THERAPY: Initial Assessment, PM 2/6/2018  INPATIENT: Hospital Day: 6  Payor: SELF PAY / Plan: [facility] SELF PAY / Product Type: Self Pay /      NAME/AGE/GENDER: Pastor Cedillo is a 45 y.o. male   PRIMARY DIAGNOSIS: Acute respiratory failure with hypercapnia (HCC) Acute respiratory failure with hypercapnia ([facility] Acute respiratory failure with hypercapnia (HCC)        ICD-10: Treatment Diagnosis:   · Generalized Muscle Weakness (M62.81)  · Difficulty in walking, Not elsewhere classified (R26.2)   Precaution/Allergies:  Review of patient's allergies indicates no known allergies. ASSESSMENT:     Mr. Radhames Conti is a 45year old male admitted with acute hypercapnic respiratory failure. Patient seen this PM for initial physical therapy evaluation: presents in bathroom with PCT, oriented x4, and endorses 0/10 acute pain and chronic pain in R knee/hip. Per chart, patient lives with family members in a single story residence. Patient noted to be homeless on last admission per chart. At baseline requires assistance with socks/shoes R LE, decreased activity tolerance, home 02, and ambulates with a SPC. No recent falls.  Today, B UE strength 4-/5, R LE strength 4-/5, and L LE strength 4/5, B UE/LE ROM WFL, and sensation is intact to light touch BUE/LE. Patient performed transfers with CGA and short distance ambulation x15ft within room with CGA. Mr. Agnieszka Pierce presents with decreased functional activity tolerance and balance/gait status from baseline. Recommend continued skilled PT services to address stated deficits. Patient will also benefit from energy conservation training. Will follow and progress toward stated goals during acute stay. Of note, patient asking about \"rehab program\" at discharge. This section established at most recent assessment   PROBLEM LIST (Impairments causing functional limitations):  1. Decreased Strength  2. Decreased ADL/Functional Activities  3. Decreased Transfer Abilities  4. Decreased Ambulation Ability/Technique  5. Decreased Balance  6. Increased Pain  7. Decreased Activity Tolerance  8. Decreased Pacing Skills  9. Decreased Work Simplification/Energy Conservation Techniques  10. Increased Fatigue  11. Increased Shortness of Breath   INTERVENTIONS PLANNED: (Benefits and precautions of physical therapy have been discussed with the patient.)  1. Balance Exercise  2. Bed Mobility  3. Family Education  4. Gait Training  5. Home Exercise Program (HEP)  6. Range of Motion (ROM)  7. Therapeutic Activites  8. Therapeutic Exercise/Strengthening  9. Transfer Training  10. Group Therapy     TREATMENT PLAN: Frequency/Duration: 3 times a week for duration of hospital stay. Rehabilitation Potential For Stated Goals: GOOD     RECOMMENDED REHABILITATION/EQUIPMENT: (at time of discharge pending progress): Due to the probability of continued deficits (see above) this patient will likely need continued skilled physical therapy after discharge. Equipment:    TBD              HISTORY:   History of Present Injury/Illness (Reason for Referral):  Weakness; Respiratory Failure  Past Medical History/Comorbidities:   Mr. Agnieszka Pierce  has a past medical history of Sleep disorder.   Mr. Agnieszka Pierce  has no past surgical history on file. Social History/Living Environment:   Home Environment: Trailer/mobile home  # Steps to Enter: 3  One/Two Story Residence: One story  Living Alone: No  Support Systems: Child(suzette), Family member(s)  Patient Expects to be Discharged to[de-identified] Unknown  Current DME Used/Available at Home: CPAP  Prior Level of Function/Work/Activity:  Per chart, patient lives with family members in a single story residence. Patient noted to be homeless on last admission. At baseline requires assistance with socks/shoes R LE, decreased activity tolerance, home 02, and ambulates with a SPC. Number of Personal Factors/Comorbidities that affect the Plan of Care: 1-2: MODERATE COMPLEXITY   EXAMINATION:   Most Recent Physical Functioning:   Gross Assessment:  AROM: Within functional limits (B UE/LE)  Strength: Generally decreased, functional (B UE 4-/5; R LE 4-/5; L LE 4/5)  Coordination: Within functional limits (B UE/LE)  Sensation: Intact (Distal BUE/LE)               Posture:  Posture (WDL): Exceptions to WDL  Posture Assessment: Forward head, Rounded shoulders  Balance:  Sitting: Intact  Standing: Impaired  Standing - Static: Fair  Standing - Dynamic : Fair Bed Mobility:  Scooting: Supervision  Wheelchair Mobility:     Transfers:  Sit to Stand: Contact guard assistance  Stand to Sit: Contact guard assistance  Bed to Chair: Contact guard assistance  Gait:     Base of Support: Widened;Center of gravity altered  Speed/Fawn: Shuffled; Slow  Step Length: Right shortened;Left shortened  Gait Abnormalities: Decreased step clearance;Shuffling gait; Step to gait  Distance (ft): 15 Feet (ft)  Assistive Device:  (none)  Ambulation - Level of Assistance: Contact guard assistance  Interventions: Safety awareness training; Tactile cues; Verbal cues      Body Structures Involved:  1. Lungs  2. Muscles Body Functions Affected:  1. Neuromusculoskeletal  2. Movement Related Activities and Participation Affected:  1. Mobility  2.  Self Care   Number of elements that affect the Plan of Care: 4+: HIGH COMPLEXITY   CLINICAL PRESENTATION:   Presentation: Evolving clinical presentation with changing clinical characteristics: MODERATE COMPLEXITY   CLINICAL DECISION MAKIN [facility] Mobility Inpatient Short Form  How much difficulty does the patient currently have. .. Unable A Lot A Little None   1. Turning over in bed (including adjusting bedclothes, sheets and blankets)? [] 1   [] 2   [] 3   [x] 4   2. Sitting down on and standing up from a chair with arms ( e.g., wheelchair, bedside commode, etc.)   [] 1   [] 2   [x] 3   [] 4   3. Moving from lying on back to sitting on the side of the bed? [] 1   [] 2   [x] 3   [] 4   How much help from another person does the patient currently need. .. Total A Lot A Little None   4. Moving to and from a bed to a chair (including a wheelchair)? [] 1   [] 2   [x] 3   [] 4   5. Need to walk in hospital room? [] 1   [] 2   [x] 3   [] 4   6. Climbing 3-5 steps with a railing? [] 1   [x] 2   [] 3   [] 4   © , Trustees of [address] [place] [address], under license to [employer]. All rights reserved      Score:  Initial: 18 Most Recent: 18 (Date: 18 )    Interpretation of Tool:  Represents activities that are increasingly more difficult (i.e. Bed mobility, Transfers, Gait). Score 24 23 22-20 19-15 14-10 9-7 6     Modifier CH CI CJ CK CL CM CN      ? Mobility - Walking and Moving Around:     - CURRENT STATUS: CK - 40%-59% impaired, limited or restricted    - GOAL STATUS: CI - 1%-19% impaired, limited or restricted    - D/C STATUS:  ---------------To be determined---------------  Payor: SELF PAY / Plan: [facility] SELF PA[facility] / Product Type: Self Pay /      Medical Necessity:     · Patient demonstrates good rehab potential due to higher previous functional level.   Reason for Services/Other Comments:  · Patient continues to require skilled intervention due to decreased functional activity tolerance and mobility from modified independent baseline. .   Use of outcome tool(s) and clinical judgement create a POC that gives a: Questionable prediction of patient's progress: MODERATE COMPLEXITY            TREATMENT:   (In addition to Assessment/Re-Assessment sessions the following treatments were rendered)   Pre-treatment Symptoms/Complaints:    Pain: Initial:   Pain Intensity 1: 0  Post Session:  0/10     Assessment/Reassessment only, no treatment provided today    Braces/Orthotics/Lines/Etc:   · O2 Device: Nasal cannula  Treatment/Session Assessment:    · Response to Treatment:  See above. · Interdisciplinary Collaboration:   o Physical Therapist  o Certified Nursing Assistant/Patient Care Technician  · After treatment position/precautions:   o Up in chair  o Bed/Chair-wheels locked  o Bed in low position  o Call light within reach  o RN notified  o Oriented to call light; chair alarm activated; needs within reach; PCT notified   · Compliance with Program/Exercises: Will assess as treatment progresses. · Recommendations/Intent for next treatment session: \"Next visit will focus on advancements to more challenging activities and reduction in assistance provided\".   Total Treatment Duration:  PT Patient Time In/Time Out  Time In: 1300  Time Out: Ana Chambers DPT

## 2018-02-06 NOTE — PROGRESS NOTES
Problem: Falls - Risk of  Goal: *Absence of Falls  Document Mega Fall Risk and appropriate interventions in the flowsheet.    Outcome: Progressing Towards Goal  Fall Risk Interventions:  Mobility Interventions: Assess mobility with egress test, Bed/chair exit alarm, Communicate number of staff needed for ambulation/transfer, OT consult for ADLs, Patient to call before getting OOB, PT Consult for mobility concerns, PT Consult for assist device competence         Medication Interventions: Assess postural VS orthostatic hypotension, Bed/chair exit alarm, Evaluate medications/consider consulting pharmacy, Patient to call before getting OOB, Teach patient to arise slowly    Elimination Interventions: Bed/chair exit alarm, Call light in reach, Elevated toilet seat, Patient to call for help with toileting needs, Toilet paper/wipes in reach, Toileting schedule/hourly rounds, Urinal in reach    History of Falls Interventions: Bed/chair exit alarm, Consult care management for discharge planning, Door open when patient unattended, Evaluate medications/consider consulting pharmacy, Investigate reason for fall, Room close to nurse's station, Utilize gait belt for transfer/ambulation

## 2018-02-06 NOTE — PROGRESS NOTES
[facility] [address] CRITICAL CARE OUTREACH NURSE PROGRESS REPORT      SUBJECTIVE: Assessed patient secondary to outreach protocol. MEWS Score: 1 (02/06/18 0756)  Vitals:    02/06/18 0300 02/06/18 0428 02/06/18 0723 02/06/18 0756   BP: 138/70   151/90   Pulse: 72   74   Resp: 21   20   Temp: 97.1 °F (36.2 °C)   98.7 °F (37.1 °C)   SpO2: 97% 98% 91% 91%   Weight:       Height:              LAB DATA:    Recent Labs      02/06/18   0737   NA  143   K  3.4*   CL  101   CO2  36*   AGAP  6*   GLU  101*   BUN  8   CREA  1.10   GFRAA  >60   GFRNA  >60   CA  8.4   MG  1.9   PHOS  3.1        Recent Labs      02/06/18   0737   WBC  6.3   HGB  11.0*   HCT  35.7*   PLT  176          OBJECTIVE: On arrival to room, I found patient to be resting in bed. Pain Assessment  Pain Intensity 1: 0 (02/05/18 2119)  Pain Location 1: Throat  Pain Intervention(s) 1: Medication (see MAR)  Patient Stated Pain Goal: 0                                 ASSESSMENT:  Pt resting in bed, no family or visitors present. AXO, in NAD. On 3L NC, sat 92%, states he wears his BIPAP at night. No immediate concerns at this time. PLAN:          Previous Outreach assessment has been reviewed. There have been no significant clinical changes since the completion of the last dated Outreach assessment. Will continue to follow up per outreach protocol.     Signed By:   Donald Mendoza RN    February 6, 2018 10:32 AM

## 2018-02-06 NOTE — PROGRESS NOTES
TRANSFER - OUT REPORT:    Verbal report given to Maggie Kelly RN (name) on Himanshu Orlando  being transferred to [phone] (unit) for routine progression of care       Report consisted of patients Situation, Background, Assessment and   Recommendations(SBAR). Information from the following report(s) SBAR, Kardex, Procedure Summary, Intake/Output, MAR and Cardiac Rhythm NSR 70s was reviewed with the receiving nurse. Lines:   Peripheral IV 02/01/18 Right Antecubital (Active)   Site Assessment Clean, dry, & intact 2/5/2018  7:15 PM   Phlebitis Assessment 0 2/5/2018  7:15 PM   Infiltration Assessment 0 2/5/2018  7:15 PM   Dressing Status Clean, dry, & intact 2/5/2018  7:15 PM   Dressing Type Tape;Transparent 2/5/2018  7:15 PM   Hub Color/Line Status Capped 2/5/2018  7:15 PM   Alcohol Cap Used No 2/4/2018  3:02 PM       Peripheral IV 02/01/18 Right;Upper Arm (Active)   Site Assessment Clean, dry, & intact 2/5/2018  7:15 PM   Phlebitis Assessment 0 2/5/2018  7:15 PM   Infiltration Assessment 0 2/5/2018  7:15 PM   Dressing Status Clean, dry, & intact 2/5/2018  7:15 PM   Dressing Type Tape;Transparent 2/5/2018  7:15 PM   Hub Color/Line Status Capped 2/5/2018  7:15 PM   Alcohol Cap Used No 2/4/2018  3:02 PM        Opportunity for questions and clarification was provided.       Patient transported with:   O2 @ 5 liters  Tech

## 2018-02-06 NOTE — PROGRESS NOTES
[facility] CRITICAL CARE OUTREACH NURSE PROGRESS REPORT      SUBJECTIVE: Called to assess patient secondary to transfer from ICU. MEWS Score: 2 (02/05/18 2117)  Vitals:    02/05/18 2117 02/05/18 2300 02/05/18 2353 02/06/18 0005   BP: 119/63 151/72     Pulse: 72 69     Resp: 22 22     Temp: 98.7 °F (37.1 °C) 96.6 °F (35.9 °C)     SpO2: 95% 97% 91% 100%   Weight:       Height:        LAB DATA:    Recent Labs      02/03/18   0340   NA  145   K  4.1   CL  105   CO2  30   AGAP  10   GLU  74   BUN  16   CREA  1.46   GFRAA  >60   GFRNA  53*   CA  8.5        Recent Labs      02/03/18   0340   WBC  8.4   HGB  11.3*   HCT  36.8*   PLT  152          OBJECTIVE: On arrival to room, I found patient to be resting in bed. General appearance: alert, cooperative, no distress, appears stated age  Neurologic: Grossly normal       Pain Assessment  Pain Intensity 1: 0 (02/05/18 2119)  Pain Location 1: Throat  Pain Intervention(s) 1: Medication (see MAR)  Patient Stated Pain Goal: 0                                 ASSESSMENT:  Pt resting in bed NAD. Respirations even and unlabored. Pt with no complaints at this time. PLAN:  Continue to follow per outreach protocol.

## 2018-02-06 NOTE — PROGRESS NOTES
TRANSFER - IN REPORT:    Verbal report received from Elizabeth Lee on Coy Kenyon  being received from ICU for routine progression of care      Report consisted of patients Situation, Background, Assessment and   Recommendations(SBAR). Information from the following report(s) Kardex was reviewed with the receiving nurse. Screening Assessment for C Diff:     1. Three (3) or more diarrheal (liquid unformed) stools in less than 24 hours  no     2. If yes, has patient off laxatives for more than 24 hours? No     3. Was a stool specimen sent for C. Difficile toxin A and B? Yes     4. Was the patient placed on contact isolation? Yes    Opportunity for questions and clarification was provided. Assessment completed upon patients arrival to unit and care assumed.

## 2018-02-06 NOTE — PROGRESS NOTES
Patient up to bedside commode, tolerated well, CHG bath given, partial assist. VSS on 5L NC, NAD noted. Will continue to monitor.

## 2018-02-06 NOTE — PROGRESS NOTES
[facility]  Admission Date: 2/1/2018             Daily Progress Note: 2/6/2018    The patient's chart is reviewed and the patient is discussed with the staff. 45 y.o. CM brought in by EMS nasally intubated with altered mental status. He is known with polysubstance abuse, NATALIIA and COPD who was recently hospitalized requiring reintubation coming in with AMS. No family to give history but as per staff patient was doing well half an hour before his family came to reassess him and was found on the floor unresponsive. EMS called and nasally intubated. Has NATALIIA and both CPAP and trach have been recommended but is non compliant and refused. Received narcan twice on the field with no response. Admitted to ICU on vent support, given nebs and steroids. UDS positive for amphetamines, benzodiazepines and opiates. Was extubated and initially required BIPAP but was weaned to [place] and moved to the floor. Subjective:     Lying in bed, remains on NC now but wore BIPAP last night with sleep. States has CPAP machine and wears it \"most of the time\".   Is on home O2     Current Facility-Administered Medications   Medication Dose Route Frequency    acetaminophen (TYLENOL) tablet 650 mg  650 mg Oral Q6H PRN    amiodarone (CORDARONE) tablet 400 mg  400 mg Oral BID    carvedilol (COREG) tablet 3.125 mg  3.125 mg Oral BID WITH MEALS    rivaroxaban (XARELTO) tablet 20 mg  20 mg Oral DAILY WITH BREAKFAST    sodium chloride (NS) flush 5-10 mL  5-10 mL IntraVENous Q8H    sodium chloride (NS) flush 5-10 mL  5-10 mL IntraVENous PRN    albuterol-ipratropium (DUO-NEB) 2.5 MG-0.5 MG/3 ML  3 mL Nebulization Q4H RT    chlorhexidine (PERIDEX) 0.12 % mouthwash 15 mL  15 mL Oral Q12H    oxyCODONE IR (ROXICODONE) tablet 5 mg  5 mg Oral Q6H PRN       Review of Systems  Constitutional: negative for fever, chills, sweats  Cardiovascular: negative for chest pain, palpitations, syncope, edema  Gastrointestinal: negative for dysphagia, reflux, vomiting, diarrhea, abdominal pain, or melena  Neurologic:  negative for focal weakness, numbness, headache    Objective:     Vitals:    02/06/18 0005 02/06/18 0300 02/06/18 0428 02/06/18 0723   BP:  138/70     Pulse:  72     Resp:  21     Temp:  97.1 °F (36.2 °C)     SpO2: 100% 97% 98% 91%   Weight:       Height:         Intake and Output:   02/04 1901 - 02/06 0700  In: 5058 [P.O.:240; I.V.:950]  Out: 1930 [Urine:1930]       Physical Exam:   Constitution:  the patient is well developed and in no acute distress, NC 3L, sat 91%  EENMT:  Sclera clear, pupils equal, oral mucosa moist  Respiratory: few scattered crackles, moist cough productive at times  Cardiovascular:  RRR without M,G,R  Gastrointestinal: soft, obese and non-tender; with positive bowel sounds. Musculoskeletal: warm without cyanosis. There is no lower leg edema. Skin:  no jaundice or rashes, no wounds   Neurologic: no gross neuro deficits     Psychiatric:  alert and oriented x 3    CXR: None today    CXR 2/5/18:          LAB  No results for input(s): GLUCPOC in the last 72 hours. No lab exists for component: GLPOC   No results for input(s): WBC, HGB, HCT, PLT, INR, HGBEXT, HCTEXT, PLTEXT, HGBEXT, HCTEXT, PLTEXT in the last 72 hours. No lab exists for component: INREXT, INREXT  No results for input(s): NA, K, CL, CO2, GLU, BUN, CREA, MG, CA, PHOS, TROIQ, ALB, TBIL, TBILI, GPT, ALT, SGOT, BNPP in the last 72 hours. No lab exists for component: TROIP  Recent Labs      02/04/18   0624  02/04/18   0502  02/03/18   0925   PH  7.28*  7.17*  7.36   PCO2  69*  100*  48*   PO2  67*  91  73*   HCO3  32*  36*  27*     No results for input(s): LCAD, LAC in the last 72 hours. Assessment:  (Medical Decision Making)     Hospital Problems  Date Reviewed: 2/6/2018          Codes Class Noted POA    H/O atrial flutter (Chronic) ICD-10-CM: M79.33  ICD-9-CM: V12.59  2/6/2018 Yes     1/2018 Atrial flutter, s/p cardioversion. Sinus--on Amio and Xarelto    Morbid obesity (HCC) (Chronic) ICD-10-CM: E66.01  ICD-9-CM: 278.01  2/2/2018 Yes    Chronic--weight loss recommended    COPD (chronic obstructive pulmonary disease) (HCC) (Chronic) ICD-10-CM: J44.9  ICD-9-CM: 496  2/1/2018 Yes    chronic    Nicotine dependence (Chronic) ICD-10-CM: F17.200  ICD-9-CM: 305.1  2/1/2018 Yes    Chronic--cessation encouraged    Methamphetamine abuse (Chronic) ICD-10-CM: F15.10  ICD-9-CM: 305.70  2/1/2018 Yes    Chronic--cessation encouraged    NATALIIA (obstructive sleep apnea) (Chronic) ICD-10-CM: B60.36  ICD-9-CM: 327.23  2/1/2018 Yes    Has BIPAP with [facility]--stressed nightly use    Acute on chronic respiratory failure with hypoxia and hypercapnia (HCC) ICD-10-CM: J96.21, J96.22  ICD-9-CM: 518.84, 786.09, 799.02  2/1/2018 Yes    Chronic--on NC    Alcohol abuse (Chronic) ICD-10-CM: F10.10  ICD-9-CM: 305.00  2/1/2018 Yes    Chronic--cessation encouraged    Altered mental status ICD-10-CM: R41.82  ICD-9-CM: 780.97  2/1/2018 Yes    resolved    * (Principal)Acute respiratory failure with hypercapnia ([facility] [address] ICD-10-CM: J96.02  ICD-9-CM: 518.81  2/1/2018 Yes    Weaned to NC          Plan:  (Medical Decision Making)     --Duoneb  --Amiodarone and Xarelto--was started last admission. Had DC cardioversion. Has been sinus per all rhythm strips this admission.  --OOB--PT to mobilize  --Substance abuse cessation has been encouraged  --Compliance with home CPAP vital to overall health. --Is to follow up at the [facility] with cardiology and Pulmonary. More than 50% of the time documented was spent in face-to-face contact with the patient and in the care of the patient on the floor/unit where the patient is located. Flavio Grissom NP     I have spoken with and examined the patient. I agree with the above assessment and plan as documented. Patient states he did meth because he felt bad.   We discussed that there was no medical intervention that would stop him from making bad decisions like this. We discussed the potential benefits/risks of tracheostomy given recurrent intubations over the last 6 weeks for hypercarbic respiratory failure. Gen:  Pleasant,no distress on supplemental O2  Lungs:  Decreased bilaterally  Heart:  RRR with no Murmur/Rubs/Gallops  Ext: no edema today    --continue duonebs  --substance abuse is a major problem.   He is too sick to go inpatient rehab for substance abuse.    --PT, Adeline Thorne MD

## 2018-02-06 NOTE — PROGRESS NOTES
In bed  Alert and oriented  Oxygen at 3l via cannula  Breath sounds fairly clear  Few scattered crackles

## 2018-02-06 NOTE — PROGRESS NOTES
Patient transferred to floor from ICU. He was recent discharge in January with home O2 through [facility] on a self-pay basis. Patient follows at the [facility] for routine medical care. He has a pending application through [facility] for possible Medicaid coverage. Case Management will follow for discharge planning needs.     Care Management Interventions  Transition of Care Consult (CM Consult): Discharge Planning  Discharge Durable Medical Equipment: No  Occupational Therapy Consult: No  Speech Therapy Consult: No  Current Support Network: Lives with Spouse, Own Home  Confirm Follow Up Transport: Self  Plan discussed with Pt/Family/Caregiver: Yes  Freedom of Choice Offered: Yes  Discharge Location  Discharge Placement: Home

## 2018-02-06 NOTE — PROGRESS NOTES
Date of Outreach Update:  Radha Barajas was seen and assessed. MEWS Score: 2 (02/05/18 2117)  Vitals:    02/05/18 2117 02/05/18 2300 02/05/18 2353 02/06/18 0005   BP: 119/63 151/72     Pulse: 72 69     Resp: 22 22     Temp: 98.7 °F (37.1 °C) 96.6 °F (35.9 °C)     SpO2: 95% 97% 91% 100%   Weight:       Height:             Pain Assessment  Pain Intensity 1: 0 (02/05/18 2119)  Pain Location 1: Throat  Pain Intervention(s) 1: Medication (see MAR)  Patient Stated Pain Goal: 0      Previous Outreach assessment has been reviewed. There have been no significant clinical changes since the completion of the last dated Outreach assessment. Will continue to follow up per outreach protocol.     Signed By:   Telly Calabrese RN    February 6, 2018 4:06 AM

## 2018-02-07 PROCEDURE — 99232 SBSQ HOSP IP/OBS MODERATE 35: CPT | Performed by: INTERNAL MEDICINE

## 2018-02-07 PROCEDURE — 74011000250 HC RX REV CODE- 250: Performed by: INTERNAL MEDICINE

## 2018-02-07 PROCEDURE — 94640 AIRWAY INHALATION TREATMENT: CPT

## 2018-02-07 PROCEDURE — 77010033678 HC OXYGEN DAILY

## 2018-02-07 PROCEDURE — 77030021668 HC NEB PREFIL KT VYRM -A

## 2018-02-07 PROCEDURE — 94760 N-INVAS EAR/PLS OXIMETRY 1: CPT

## 2018-02-07 PROCEDURE — 74011250637 HC RX REV CODE- 250/637: Performed by: NURSE PRACTITIONER

## 2018-02-07 PROCEDURE — 65270000029 HC RM PRIVATE

## 2018-02-07 PROCEDURE — 94660 CPAP INITIATION&MGMT: CPT

## 2018-02-07 PROCEDURE — 74011250637 HC RX REV CODE- 250/637: Performed by: INTERNAL MEDICINE

## 2018-02-07 PROCEDURE — 97530 THERAPEUTIC ACTIVITIES: CPT

## 2018-02-07 RX ORDER — POTASSIUM CHLORIDE 20 MEQ/1
40 TABLET, EXTENDED RELEASE ORAL
Status: COMPLETED | OUTPATIENT
Start: 2018-02-07 | End: 2018-02-07

## 2018-02-07 RX ADMIN — IPRATROPIUM BROMIDE AND ALBUTEROL SULFATE 3 ML: .5; 3 SOLUTION RESPIRATORY (INHALATION) at 07:30

## 2018-02-07 RX ADMIN — RIVAROXABAN 20 MG: 20 TABLET, FILM COATED ORAL at 09:14

## 2018-02-07 RX ADMIN — AMIODARONE HYDROCHLORIDE 400 MG: 200 TABLET ORAL at 09:15

## 2018-02-07 RX ADMIN — CARVEDILOL 3.12 MG: 3.12 TABLET, FILM COATED ORAL at 16:57

## 2018-02-07 RX ADMIN — Medication 5 ML: at 17:00

## 2018-02-07 RX ADMIN — IPRATROPIUM BROMIDE AND ALBUTEROL SULFATE 3 ML: .5; 3 SOLUTION RESPIRATORY (INHALATION) at 03:40

## 2018-02-07 RX ADMIN — Medication 5 ML: at 06:36

## 2018-02-07 RX ADMIN — CARVEDILOL 3.12 MG: 3.12 TABLET, FILM COATED ORAL at 09:16

## 2018-02-07 RX ADMIN — IPRATROPIUM BROMIDE AND ALBUTEROL SULFATE 3 ML: .5; 3 SOLUTION RESPIRATORY (INHALATION) at 19:34

## 2018-02-07 RX ADMIN — POTASSIUM CHLORIDE 40 MEQ: 20 TABLET, EXTENDED RELEASE ORAL at 09:15

## 2018-02-07 RX ADMIN — IPRATROPIUM BROMIDE AND ALBUTEROL SULFATE 3 ML: .5; 3 SOLUTION RESPIRATORY (INHALATION) at 11:29

## 2018-02-07 RX ADMIN — IPRATROPIUM BROMIDE AND ALBUTEROL SULFATE 3 ML: .5; 3 SOLUTION RESPIRATORY (INHALATION) at 15:18

## 2018-02-07 RX ADMIN — CHLORHEXIDINE GLUCONATE 15 ML: 1.2 RINSE ORAL at 09:16

## 2018-02-07 RX ADMIN — AMIODARONE HYDROCHLORIDE 400 MG: 200 TABLET ORAL at 16:58

## 2018-02-07 NOTE — PROGRESS NOTES
Patient states that he wants inpatient treatment for substance abuse. However, patient is uninsured.  is unaware of any inpatient substance abuse treatment facility who will accept the patient without funding. There are \"USP house\" programs such as [facility] in [facility] that will accept people who are willing to work for their room and board and attend meetings within the facility. Contacted Prasanna Fox with [facility] for possible leads for other options. Case Management will continue to follow.     Care Management Interventions  Transition of Care Consult (CM Consult): Discharge Planning  Discharge Durable Medical Equipment: No  Occupational Therapy Consult: No  Speech Therapy Consult: No  Current Support Network: Lives with Spouse, Own Home  Confirm Follow Up Transport: Self  Plan discussed with Pt/Family/Caregiver: Yes  Freedom of Choice Offered: Yes  Discharge Location  Discharge Placement: Home

## 2018-02-07 NOTE — PROGRESS NOTES
Problem: Mobility Impaired (Adult and Pediatric)  Goal: *Acute Goals and Plan of Care (Insert Text)  Discharge Goals:  (1.)Mr. Schaffer will perform bed mobility with INDEPENDENCE within 7 day(s). (2.)Mr. Schaffer will transfer with MODIFIED INDEPENDENCE using the least restrictive device within 7 day(s). (3.)Mr. Schaffer will ambulate with MODIFIED INDEPENDENCE for 500+ feet with the least restrictive device within 7 day(s). (4.)Mr. Schaffer will tolerate 25+ minutes of therapeutic activity/exercise while maintaining Sp02>90% to improve functional strength and endurance within 7 day(s). (5.)Mr. Schaffer will verbalize and follow 3/3 energy conservation techniques within 7 day(s). PHYSICAL THERAPY: Daily Note, Treatment Day: 1st, AM 2/7/2018  INPATIENT: Hospital Day: 7  Payor: SELF PAY / Plan: [facility] SELF PAY / Product Type: Self Pay /      NAME/AGE/GENDER: Ashu Schumacher is a 45 y.o. male   PRIMARY DIAGNOSIS: Acute respiratory failure with hypercapnia (HCC) Acute respiratory failure with hypercapnia (HCC) Acute respiratory failure with hypercapnia (HCC)        ICD-10: Treatment Diagnosis:   · Generalized Muscle Weakness (M62.81)  · Difficulty in walking, Not elsewhere classified (R26.2)   Precaution/Allergies:  Review of patient's allergies indicates no known allergies. ASSESSMENT:     Mr. Jose Ferguson is a 45year old male admitted with acute hypercapnic respiratory failure. Today, he was able to ambulate 250' in hallway with straight cane and supervision. Patient on 3L O2 during gait with sats 92% or higher. He reports OA in his R hip and has a trendelenburg to the R. Patient was using his cane in the R hand. He was educated to used the cane in his L hand and his trendelenburg immediately improved when he did this. He verbalized understanding to continue with the cane in his L hand.   He continues to benefit from skilled PT to improve his mobility and functional independence and decrease his risk of falls. This section established at most recent assessment   PROBLEM LIST (Impairments causing functional limitations):  1. Decreased Strength  2. Decreased ADL/Functional Activities  3. Decreased Transfer Abilities  4. Decreased Ambulation Ability/Technique  5. Decreased Balance  6. Increased Pain  7. Decreased Activity Tolerance  8. Decreased Pacing Skills  9. Decreased Work Simplification/Energy Conservation Techniques  10. Increased Fatigue  11. Increased Shortness of Breath   INTERVENTIONS PLANNED: (Benefits and precautions of physical therapy have been discussed with the patient.)  1. Balance Exercise  2. Bed Mobility  3. Family Education  4. Gait Training  5. Home Exercise Program (HEP)  6. Range of Motion (ROM)  7. Therapeutic Activites  8. Therapeutic Exercise/Strengthening  9. Transfer Training  10. Group Therapy     TREATMENT PLAN: Frequency/Duration: 3 times a week for duration of hospital stay. Rehabilitation Potential For Stated Goals: GOOD     RECOMMENDED REHABILITATION/EQUIPMENT: (at time of discharge pending progress): Due to the probability of continued deficits (see above) this patient will likely need continued skilled physical therapy after discharge. Equipment:    TBD              HISTORY:   History of Present Injury/Illness (Reason for Referral):  Weakness; Respiratory Failure  Past Medical History/Comorbidities:   Mr. Agnieszka Pierce  has a past medical history of Sleep disorder. Mr. Agnieszka Pierce  has no past surgical history on file. Social History/Living Environment:   Home Environment: Trailer/mobile home  # Steps to Enter: 3  One/Two Story Residence: One story  Living Alone: No  Support Systems: Child(suzette), Family member(s)  Patient Expects to be Discharged to[de-identified] Unknown  Current DME Used/Available at Home: CPAP  Prior Level of Function/Work/Activity:  Per chart, patient lives with family members in a single story residence. Patient noted to be homeless on last admission.  At baseline requires assistance with socks/shoes R LE, decreased activity tolerance, home 02, and ambulates with a SPC. Number of Personal Factors/Comorbidities that affect the Plan of Care: 1-2: MODERATE COMPLEXITY   EXAMINATION:   Most Recent Physical Functioning:   Gross Assessment:                  Posture:     Balance:  Sitting: Intact  Standing: Impaired  Standing - Static: Good  Standing - Dynamic : Fair Bed Mobility:     Wheelchair Mobility:     Transfers:  Sit to Stand: Supervision  Stand to Sit: Supervision  Bed to Chair: Supervision  Gait:     Base of Support: Center of gravity altered; Widened  Speed/Fawn: Shuffled; Slow  Step Length: Left shortened;Right shortened  Gait Abnormalities: Decreased step clearance;Trendelenburg;Shuffling gait  Distance (ft): 250 Feet (ft)  Assistive Device: Cane, straight  Ambulation - Level of Assistance: Stand-by asssistance  Interventions: Safety awareness training;Visual/Demos; Verbal cues      Body Structures Involved:  1. Lungs  2. Muscles Body Functions Affected:  1. Neuromusculoskeletal  2. Movement Related Activities and Participation Affected:  1. Mobility  2. Self Care   Number of elements that affect the Plan of Care: 4+: HIGH COMPLEXITY   CLINICAL PRESENTATION:   Presentation: Evolving clinical presentation with changing clinical characteristics: MODERATE COMPLEXITY   CLINICAL DECISION MAKIN [facility] Mobility Inpatient Short Form  How much difficulty does the patient currently have. .. Unable A Lot A Little None   1. Turning over in bed (including adjusting bedclothes, sheets and blankets)? [] 1   [] 2   [] 3   [x] 4   2. Sitting down on and standing up from a chair with arms ( e.g., wheelchair, bedside commode, etc.)   [] 1   [] 2   [x] 3   [] 4   3. Moving from lying on back to sitting on the side of the bed? [] 1   [] 2   [x] 3   [] 4   How much help from another person does the patient currently need. ..  Total A Lot A Little None   4. Moving to and from a bed to a chair (including a wheelchair)? [] 1   [] 2   [x] 3   [] 4   5. Need to walk in hospital room? [] 1   [] 2   [x] 3   [] 4   6. Climbing 3-5 steps with a railing? [] 1   [x] 2   [] 3   [] 4   © 2007, Trustees of [address] [place] [address] [address] [address], under license to [employer]. All rights reserved      Score:  Initial: 18 Most Recent: 18 (Date: 2/6/18 )    Interpretation of Tool:  Represents activities that are increasingly more difficult (i.e. Bed mobility, Transfers, Gait). Score 24 23 22-20 19-15 14-10 9-7 6     Modifier CH CI CJ CK CL CM CN      ? Mobility - Walking and Moving Around:     - CURRENT STATUS: CK - 40%-59% impaired, limited or restricted    - GOAL STATUS: CI - 1%-19% impaired, limited or restricted    - D/C STATUS:  ---------------To be determined---------------  Payor: SELF PAY / Plan: [facility] SELF PAY / Product Type: Self Pay /      Medical Necessity:     · Patient demonstrates good rehab potential due to higher previous functional level. Reason for Services/Other Comments:  · Patient continues to require skilled intervention due to decreased functional activity tolerance and mobility from modified independent baseline. .   Use of outcome tool(s) and clinical judgement create a POC that gives a: Questionable prediction of patient's progress: MODERATE COMPLEXITY            TREATMENT:   (In addition to Assessment/Re-Assessment sessions the following treatments were rendered)   Pre-treatment Symptoms/Complaints:    Pain: Initial:   Pain Intensity 1: 0  Post Session:  0/10     Therapeutic Activity: (    20 minutes): Therapeutic activities including Chair transfers and Ambulation on level ground to improve mobility, strength and balance. Required moderate Safety awareness training;Visual/Demos; Verbal cues to promote static and dynamic balance in standing.        Braces/Orthotics/Lines/Etc:   · O2 Device: Nasal cannula  Treatment/Session Assessment:    · Response to Treatment:  See above. · Interdisciplinary Collaboration:   o Physical Therapist  o Certified Nursing Assistant/Patient Care Technician  · After treatment position/precautions:   o Up in chair  o Bed/Chair-wheels locked  o Bed in low position  o Call light within reach  o RN notified  o Oriented to call light; chair alarm activated; needs within reach; PCT notified   · Compliance with Program/Exercises: Will assess as treatment progresses. · Recommendations/Intent for next treatment session: \"Next visit will focus on advancements to more challenging activities and reduction in assistance provided\".   Total Treatment Duration:  PT Patient Time In/Time Out  Time In: 1100  Time Out: [address], [facility]

## 2018-02-07 NOTE — PROGRESS NOTES
Problem: Falls - Risk of  Goal: *Absence of Falls  Document Mega Fall Risk and appropriate interventions in the flowsheet.    Outcome: Progressing Towards Goal  Fall Risk Interventions:  Mobility Interventions: Bed/chair exit alarm         Medication Interventions: Bed/chair exit alarm    Elimination Interventions: Bed/chair exit alarm    History of Falls Interventions: Bed/chair exit alarm, Consult care management for discharge planning, Door open when patient unattended, Evaluate medications/consider consulting pharmacy, Investigate reason for fall, Room close to nurse's station

## 2018-02-07 NOTE — PROGRESS NOTES
Charan Martines  Admission Date: 2/1/2018             Daily Progress Note: 2/7/2018    The patient's chart is reviewed and the patient is discussed with the staff. 45 y.o. CM brought in by EMS nasally intubated with altered mental status. He is known with polysubstance abuse, NATALIIA and COPD who was recently hospitalized requiring reintubation coming in with AMS. No family to give history but as per staff patient was doing well half an hour before his family came to reassess him and was found on the floor unresponsive. EMS called and nasally intubated. Has NATALIIA and both CPAP and trach have been recommended but is non compliant and refused. Received narcan twice on the field with no response. Admitted to ICU on vent support, given nebs and steroids. UDS positive for amphetamines, benzodiazepines and opiates. Was extubated and initially required BIPAP but was weaned to [place] and moved to the floor. Subjective:     Lying in bed, still with moist cough, productive at times. Remains on NC, wore BIPAP last night with sleep. Has CPAP machine at home.      Current Facility-Administered Medications   Medication Dose Route Frequency    acetaminophen (TYLENOL) tablet 650 mg  650 mg Oral Q6H PRN    amiodarone (CORDARONE) tablet 400 mg  400 mg Oral BID    carvedilol (COREG) tablet 3.125 mg  3.125 mg Oral BID WITH MEALS    rivaroxaban (XARELTO) tablet 20 mg  20 mg Oral DAILY WITH BREAKFAST    sodium chloride (NS) flush 5-10 mL  5-10 mL IntraVENous Q8H    sodium chloride (NS) flush 5-10 mL  5-10 mL IntraVENous PRN    albuterol-ipratropium (DUO-NEB) 2.5 MG-0.5 MG/3 ML  3 mL Nebulization Q4H RT    chlorhexidine (PERIDEX) 0.12 % mouthwash 15 mL  15 mL Oral Q12H    oxyCODONE IR (ROXICODONE) tablet 5 mg  5 mg Oral Q6H PRN       Review of Systems  Constitutional: negative for fever, chills, sweats  Cardiovascular: negative for chest pain, palpitations, syncope, edema  Gastrointestinal:  negative for dysphagia, reflux, vomiting, diarrhea, abdominal pain, or melena  Neurologic:  negative for focal weakness, numbness, headache    Objective:     Vitals:    02/06/18 2325 02/06/18 2326 02/07/18 0340 02/07/18 0607   BP: 135/72   154/89   Pulse: 78   73   Resp: 20   20   Temp: 98.4 °F (36.9 °C)   98.5 °F (36.9 °C)   SpO2: 93% 95% 98% 99%   Weight:       Height:         Intake and Output:   02/05 1901 - 02/07 0700  In: 560 [P.O.:560]  Out: 2200 [Urine:2200]       Physical Exam:   Constitution:  the patient is well developed and in no acute distress, NC 3L, sat 91%  EENMT:  Sclera clear, pupils equal, oral mucosa moist  Respiratory: few scattered crackles, moist cough productive at times  Cardiovascular:  RRR without M,G,R  Gastrointestinal: soft, obese and non-tender; with positive bowel sounds. Musculoskeletal: warm without cyanosis. There is no lower leg edema. Skin:  no jaundice or rashes, no wounds   Neurologic: no gross neuro deficits     Psychiatric:  alert and oriented x 3    CXR: None today    CXR 2/5/18:          LAB  No results for input(s): GLUCPOC in the last 72 hours. No lab exists for component: [address]      02/06/18   0737   WBC  6.3   HGB  11.0*   HCT  35.7*   PLT  176     Recent Labs      02/06/18   0737   NA  143   K  3.4*   CL  101   CO2  36*   GLU  101*   BUN  8   CREA  1.10   MG  1.9   CA  8.4   PHOS  3.1     No results for input(s): PH, PCO2, PO2, HCO3 in the last 72 hours. No results for input(s): LCAD, LAC in the last 72 hours. Assessment:  (Medical Decision Making)     Hospital Problems  Date Reviewed: 2/7/2018          Codes Class Noted POA    H/O atrial flutter (Chronic) ICD-10-CM: X80.21  ICD-9-CM: V12.59  2/6/2018 Yes     1/2018 Atrial flutter, s/p cardioversion.          Sinus--on Amio and Xarelto    Morbid obesity (HCC) (Chronic) ICD-10-CM: E66.01  ICD-9-CM: 278.01  2/2/2018 Yes    Chronic--weight loss recommended    COPD (chronic obstructive pulmonary disease) [facility]) (Chronic) ICD-10-CM: J44.9  ICD-9-CM: 038  2/1/2018 Yes    chronic    Nicotine dependence (Chronic) ICD-10-CM: F17.200  ICD-9-CM: 305.1  2/1/2018 Yes    Chronic--cessation encouraged    Methamphetamine abuse (Chronic) ICD-10-CM: F15.10  ICD-9-CM: 305.70  2/1/2018 Yes    Chronic--cessation encouraged    NATALIIA (obstructive sleep apnea) (Chronic) ICD-10-CM: V47.01  ICD-9-CM: 327.23  2/1/2018 Yes    Has BIPAP with [facility]--stressed nightly use    Acute on chronic respiratory failure with hypoxia and hypercapnia (HCC) ICD-10-CM: J96.21, J96.22  ICD-9-CM: 518.84, 786.09, 799.02  2/1/2018 Yes    Chronic--on NC    Alcohol abuse (Chronic) ICD-10-CM: F10.10  ICD-9-CM: 305.00  2/1/2018 Yes    Chronic--cessation encouraged    Altered mental status ICD-10-CM: R41.82  ICD-9-CM: 780.97  2/1/2018 Yes    resolved    * (Principal)Acute respiratory failure with hypercapnia ([address] ICD-10-CM: J96.02  ICD-9-CM: 518.81  2/1/2018 Yes    Weaned to NC          Plan:  (Medical Decision Making)     --Duoneb  --Blood cultures:  No growth 4 days-pending  --Amiodarone and Xarelto--recent flutter with DC cardioversion, sinus now.  --OOB--PT to mobilize  --Substance abuse cessation has been encouraged--UDS:  amphetamines, benzodiazepines and opiates. --He is too sick to go inpatient rehab for substance abuse but would greatly benefit. --Compliance with home CPAP vital to overall health--asked to have family bring in his machine for use here. --K+ 3.4 yesterday--supplement  --Is to follow up at the [facility] with cardiology and pulmonary. --Discussed lifestyle changes and he wrote a list of goals. More than 50% of the time documented was spent in face-to-face contact with the patient and in the care of the patient on the floor/unit where the patient is located. Imelda Harper, TIFFANY    I have spoken with and examined the patient. I agree with the above assessment and plan as documented.   Darrius Carroll is a 49yoM with a h/o COPD, substance abuse with meth, chronic respiratory failure with hypercapnea, homelessness who returned to hospital with hypercarbia after meth use. Gen: overweight, comfortable on 3lpm  Lungs:  Decreased bilaterally  Heart:  RRR with no Murmur/Rubs/Gallops  Ext: no edema    --Continue bronchodilators. No evidence of infection.   --Continue weaning O2  --ambulating sat      Ousmane Cohn MD

## 2018-02-07 NOTE — PROGRESS NOTES
Patient laying in bed trying to take a nap. Placed on bipap with sleep with no complications. bipap checked and alarms set and functioning properly.

## 2018-02-07 NOTE — PROGRESS NOTES
Pt sitting up right in the bed, reports no pain at this time, evening snack offered, pt getting 02 at 3L via NC

## 2018-02-08 VITALS
WEIGHT: 285.06 LBS | BODY MASS INDEX: 43.2 KG/M2 | TEMPERATURE: 98.1 F | SYSTOLIC BLOOD PRESSURE: 148 MMHG | RESPIRATION RATE: 20 BRPM | HEART RATE: 79 BPM | DIASTOLIC BLOOD PRESSURE: 82 MMHG | HEIGHT: 68 IN | OXYGEN SATURATION: 95 %

## 2018-02-08 PROCEDURE — 77010033678 HC OXYGEN DAILY

## 2018-02-08 PROCEDURE — 94640 AIRWAY INHALATION TREATMENT: CPT

## 2018-02-08 PROCEDURE — 74011000250 HC RX REV CODE- 250: Performed by: INTERNAL MEDICINE

## 2018-02-08 PROCEDURE — 99239 HOSP IP/OBS DSCHRG MGMT >30: CPT | Performed by: INTERNAL MEDICINE

## 2018-02-08 PROCEDURE — 97530 THERAPEUTIC ACTIVITIES: CPT

## 2018-02-08 PROCEDURE — 74011250637 HC RX REV CODE- 250/637: Performed by: INTERNAL MEDICINE

## 2018-02-08 PROCEDURE — 94760 N-INVAS EAR/PLS OXIMETRY 1: CPT

## 2018-02-08 RX ADMIN — Medication 5 ML: at 07:28

## 2018-02-08 RX ADMIN — IPRATROPIUM BROMIDE AND ALBUTEROL SULFATE 3 ML: .5; 3 SOLUTION RESPIRATORY (INHALATION) at 09:09

## 2018-02-08 RX ADMIN — IPRATROPIUM BROMIDE AND ALBUTEROL SULFATE 3 ML: .5; 3 SOLUTION RESPIRATORY (INHALATION) at 12:55

## 2018-02-08 RX ADMIN — CARVEDILOL 3.12 MG: 3.12 TABLET, FILM COATED ORAL at 09:19

## 2018-02-08 RX ADMIN — AMIODARONE HYDROCHLORIDE 400 MG: 200 TABLET ORAL at 09:19

## 2018-02-08 RX ADMIN — IPRATROPIUM BROMIDE AND ALBUTEROL SULFATE 3 ML: .5; 3 SOLUTION RESPIRATORY (INHALATION) at 00:00

## 2018-02-08 RX ADMIN — CHLORHEXIDINE GLUCONATE 15 ML: 1.2 RINSE ORAL at 00:10

## 2018-02-08 RX ADMIN — Medication 5 ML: at 00:11

## 2018-02-08 RX ADMIN — RIVAROXABAN 20 MG: 20 TABLET, FILM COATED ORAL at 09:19

## 2018-02-08 NOTE — PROGRESS NOTES
[address] with the [facility] Program (HOP) at the [facility]. They have reached out to [facility] on the patient's behalf to assist patient in finding substance abuse treatment. She has set patient to meet with Tani Parra at the [facility]. She notified patient and he voices understanding and agreement with this plan. Patient is discharging home today. He has home O2 through [facility]. Case Management will remain available to assist as needed.     Care Management Interventions  Transition of Care Consult (CM Consult): Discharge Planning  Discharge Durable Medical Equipment: No  Occupational Therapy Consult: No  Speech Therapy Consult: No  Current Support Network: Lives with Spouse, Own Home  Confirm Follow Up Transport: Self  Plan discussed with Pt/Family/Caregiver: Yes  Freedom of Choice Offered: Yes  Discharge Location  Discharge Placement: Home

## 2018-02-08 NOTE — PROGRESS NOTES
Received bedside shift report from [employer], RN. Pt lying in bed. No apparent distress. Respirations even and unlabored. Instructed to call for assistance with needs, as they arise. Pt voiced understanding.

## 2018-02-08 NOTE — DISCHARGE SUMMARY
Discharge Note    Joe Osborne  Admission date:  2/1/2018  Discharge date:  2/8/2018     Admitting Diagnosis:  Acute respiratory failure with hypercapnia [facility])    Discharge Diagnoses:   Hospital Problems  Date Reviewed: 2/8/2018          Codes Class Noted POA    H/O atrial flutter (Chronic) ICD-10-CM: Z86.79  ICD-9-CM: V12.59  2/6/2018 Yes    Overview Signed 2/6/2018  7:51 AM by Gabi Sotelo NP     1/2018 Atrial flutter, s/p cardioversion. Morbid obesity (HCC) (Chronic) ICD-10-CM: E66.01  ICD-9-CM: 278.01  2/2/2018 Yes        COPD (chronic obstructive pulmonary disease) ([facility] (Chronic) ICD-10-CM: J44.9  ICD-9-CM: 496  2/1/2018 Yes        Nicotine dependence (Chronic) ICD-10-CM: Q35.734  ICD-9-CM: 305.1  2/1/2018 Yes        Methamphetamine abuse (Chronic) ICD-10-CM: F15.10  ICD-9-CM: 305.70  2/1/2018 Yes        NATALIIA (obstructive sleep apnea) (Chronic) ICD-10-CM: I30.61  ICD-9-CM: 327.23  2/1/2018 Yes        Acute on chronic respiratory failure with hypoxia and hypercapnia (HCC) ICD-10-CM: J96.21, J96.22  ICD-9-CM: 518.84, 786.09, 799.02  2/1/2018 Yes        Alcohol abuse (Chronic) ICD-10-CM: F10.10  ICD-9-CM: 305.00  2/1/2018 Yes        Altered mental status ICD-10-CM: R41.82  ICD-9-CM: 780.97  2/1/2018 Yes        * (Principal)Acute respiratory failure with hypercapnia (HCC) ICD-10-CM: J96.02  ICD-9-CM: 518.81  2/1/2018 Yes              Consultants:  PT    Studies/Procedures:  CXR  Head CT    Condition on Discharge:  stable    Disposition:  Going to son's home    Hospital course:  45 y. o. CM brought in by EMS nasally intubated with altered mental status. He is known with polysubstance abuse, NATALIIA and COPD who was recently hospitalized requiring reintubation coming in with AMS. No family to give history but as per staff patient was doing well half an hour before his family came to reassess him and was found on the floor unresponsive. EMS called and nasally intubated.   Has NATALIIA and both CPAP and trach have been recommended but is non compliant and refused. Received narcan twice on the field with no response. Admitted to ICU on vent support, given nebs and steroids. UDS positive for amphetamines, benzodiazepines and opiates. Was extubated and initially required BIPAP but was weaned to [place] and moved to the floor. PT assisted with mobility. Discussions regarding ongoing substance abuse and the decline in his overall health. Wore BIPAP nightly here and has a home CPAP. He is ready for discharge to home and wioll follow up a the [facility] in 1 month. Will continue home CPAP and supplemental O2. Physical Exam:   Constitution:  the patient is well developed and in no acute distress  EENMT:  Sclera clear, pupils equal, oral mucosa moist  Respiratory: few scattered crackles, productive cough at times  Cardiovascular:  RRR without M,G,R  Gastrointestinal: soft, obese and non-tender; with positive bowel sounds. Musculoskeletal: warm without cyanosis. There is no lower leg edema. Skin:  no jaundice or rashes, no wounds   Neurologic: no gross neuro deficits     Psychiatric:  alert and oriented x 3      LAB  Recent Labs      02/06/18   0737   WBC  6.3   HGB  11.0*   HCT  35.7*   PLT  176     Recent Labs      02/06/18   0737   NA  143   K  3.4*   CL  101   CO2  36*   BUN  8   CREA  1.10   MG  1.9   PHOS  3.1     No results for input(s): PH, PCO2, PO2, HCO3 in the last 72 hours. Discharge Medications:   Current Discharge Medication List      CONTINUE these medications which have NOT CHANGED    Details   amiodarone (PACERONE) 400 mg tablet Take 1 Tab by mouth two (2) times a day. Qty: 60 Tab, Refills: 0      carvedilol (COREG) 3.125 mg tablet Take 1 Tab by mouth two (2) times daily (with meals). Qty: 60 Tab, Refills: 0      rivaroxaban (XARELTO) 20 mg tab tablet Take 1 Tab by mouth daily (with breakfast).   Qty: 30 Tab, Refills: 0      sertraline (ZOLOFT) 50 mg tablet Take 1 Tab by mouth daily.  Qty: 15 Tab, Refills: 0      tiotropium (SPIRIVA) 18 mcg inhalation capsule Take 1 Cap by inhalation daily. Qty: 30 Cap, Refills: 0      albuterol (PROVENTIL HFA, VENTOLIN HFA, PROAIR HFA) 90 mcg/actuation inhaler Take 2 Puffs by inhalation every four (4) hours as needed for Wheezing. Qty: 1 Inhaler, Refills: 0      albuterol-ipratropium (DUO-NEB) 2.5 mg-0.5 mg/3 ml nebu 3 mL by Nebulization route every four (4) hours as needed. Qty: 30 Nebule, Refills: 0               Followup/Outpt Studies:  --Follow up appointment with pulmonary at [facility] in 1 month. --Continue home CPAP with sleep. --Substance abuse cessation strongly encouraged  --Has home O2 supplied by [facility]  --Total discharge greater than 30 minutes in duration. More than 50% of the time documented was spent in face-to-face contact with the patient and in the care of the patient on the floor/unit where the patient is located. Venu Ku, NP  I have spoken with and examined the patient. I agree with the above assessment and plan as documented.     Gen:  Pleasant, NAD  Lungs:  CTA presently  Heart:  RRR with no Murmur/Rubs/Gallops  Ext: no c/c/e    --strongly encouraged patient to comply with nightly cPAP  --Abstinence from drugs is essential to recover  --encouraged weight loss  --[facility] a [facility] is necessary    Juliet Landeros MD

## 2018-02-08 NOTE — PROGRESS NOTES
Pt resting in bed. Pt alert oriented times 3 at this time. Pt on 3 L NC at this time. Pt denies pain or distress at this time. Pt encouraged to call for assistance if needed call light in reach, door open will monitor.

## 2018-02-08 NOTE — PROGRESS NOTES
Discharge instructions and prescriptions provided and explained to the pt. Med side effect sheet reviewed. Opportunity for questions provided. Pt trying to get in touch with son to bring oxygen tank to take him home. Instructed to call once ready to leave.

## 2018-02-08 NOTE — PROGRESS NOTES
Problem: Mobility Impaired (Adult and Pediatric)  Goal: *Acute Goals and Plan of Care (Insert Text)  Discharge Goals:  (1.)Mr. Schaffer will perform bed mobility with INDEPENDENCE within 7 day(s). (2.)Mr. Schaffer will transfer with MODIFIED INDEPENDENCE using the least restrictive device within 7 day(s). (3.)Mr. Schaffer will ambulate with MODIFIED INDEPENDENCE for 500+ feet with the least restrictive device within 7 day(s). (4.)Mr. Schaffer will tolerate 25+ minutes of therapeutic activity/exercise while maintaining Sp02>90% to improve functional strength and endurance within 7 day(s). (5.)Mr. Schaffer will verbalize and follow 3/3 energy conservation techniques within 7 day(s). PHYSICAL THERAPY: Daily Note, Treatment Day: 2nd, AM 2/8/2018  INPATIENT: Hospital Day: 8  Payor: SELF PAY / Plan: [facility] SELF PAY / Product Type: Self Pay /      NAME/AGE/GENDER: Davon Gutierrez is a 45 y.o. male   PRIMARY DIAGNOSIS: Acute respiratory failure with hypercapnia (HCC) Acute respiratory failure with hypercapnia (HCC) Acute respiratory failure with hypercapnia (HCC)        ICD-10: Treatment Diagnosis:   · Generalized Muscle Weakness (M62.81)  · Difficulty in walking, Not elsewhere classified (R26.2)   Precaution/Allergies:  Review of patient's allergies indicates no known allergies. ASSESSMENT:     Mr. Anais Keen is up around room independently and is willing to walk. He is on 3L. He walked 750 feet with the cane and sats dropping into the low to mid 80's even with stopping and pursed lip breathing. Increased O2 to 4L and this helped the problem. Good progress with mobility. This section established at most recent assessment   PROBLEM LIST (Impairments causing functional limitations):  1. Decreased Strength  2. Decreased ADL/Functional Activities  3. Decreased Transfer Abilities  4. Decreased Ambulation Ability/Technique  5. Decreased Balance  6. Increased Pain  7.  Decreased Activity Tolerance  8. Decreased Pacing Skills  9. Decreased Work Simplification/Energy Conservation Techniques  10. Increased Fatigue  11. Increased Shortness of Breath   INTERVENTIONS PLANNED: (Benefits and precautions of physical therapy have been discussed with the patient.)  1. Balance Exercise  2. Bed Mobility  3. Family Education  4. Gait Training  5. Home Exercise Program (HEP)  6. Range of Motion (ROM)  7. Therapeutic Activites  8. Therapeutic Exercise/Strengthening  9. Transfer Training  10. Group Therapy     TREATMENT PLAN: Frequency/Duration: 3 times a week for duration of hospital stay. Rehabilitation Potential For Stated Goals: GOOD     RECOMMENDED REHABILITATION/EQUIPMENT: (at time of discharge pending progress): Due to the probability of continued deficits (see above) this patient will likely need continued skilled physical therapy after discharge. Equipment:    TBD              HISTORY:   History of Present Injury/Illness (Reason for Referral):  Weakness; Respiratory Failure  Past Medical History/Comorbidities:   Mr. Servando Stuart  has a past medical history of Sleep disorder. Mr. Servando Stuart  has no past surgical history on file. Social History/Living Environment:   Home Environment: Trailer/mobile home  # Steps to Enter: 3  One/Two Story Residence: One story  Living Alone: No  Support Systems: Child(suzette), Family member(s)  Patient Expects to be Discharged to[de-identified] Unknown  Current DME Used/Available at Home: CPAP  Prior Level of Function/Work/Activity:  Per chart, patient lives with family members in a single story residence. Patient noted to be homeless on last admission. At baseline requires assistance with socks/shoes R LE, decreased activity tolerance, home 02, and ambulates with a SPC.    Number of Personal Factors/Comorbidities that affect the Plan of Care: 1-2: MODERATE COMPLEXITY   EXAMINATION:   Most Recent Physical Functioning:   Gross Assessment:                  Posture:     Balance:    Bed Mobility:     Wheelchair Mobility:     Transfers:  Sit to Stand: Independent  Stand to Sit: Independent  Gait:     Speed/Fawn: Pace decreased (<100 feet/min)  Gait Abnormalities: Antalgic  Distance (ft): 750 Feet (ft)  Assistive Device: Cane, straight      Body Structures Involved:  1. Lungs  2. Muscles Body Functions Affected:  1. Neuromusculoskeletal  2. Movement Related Activities and Participation Affected:  1. Mobility  2. Self Care   Number of elements that affect the Plan of Care: 4+: HIGH COMPLEXITY   CLINICAL PRESENTATION:   Presentation: Evolving clinical presentation with changing clinical characteristics: MODERATE COMPLEXITY   CLINICAL DECISION MAKIN [facility] Mobility Inpatient Short Form  How much difficulty does the patient currently have. .. Unable A Lot A Little None   1. Turning over in bed (including adjusting bedclothes, sheets and blankets)? [] 1   [] 2   [] 3   [x] 4   2. Sitting down on and standing up from a chair with arms ( e.g., wheelchair, bedside commode, etc.)   [] 1   [] 2   [x] 3   [] 4   3. Moving from lying on back to sitting on the side of the bed? [] 1   [] 2   [x] 3   [] 4   How much help from another person does the patient currently need. .. Total A Lot A Little None   4. Moving to and from a bed to a chair (including a wheelchair)? [] 1   [] 2   [x] 3   [] 4   5. Need to walk in hospital room? [] 1   [] 2   [x] 3   [] 4   6. Climbing 3-5 steps with a railing? [] 1   [x] 2   [] 3   [] 4   © , Trustees of [address] [place] [address] [address] [address], under license to [employer]. All rights reserved      Score:  Initial: 18 Most Recent: 18 (Date: 18 )    Interpretation of Tool:  Represents activities that are increasingly more difficult (i.e. Bed mobility, Transfers, Gait). Score 24 23 22-20 19-15 14-10 9-7 6     Modifier CH CI CJ CK CL CM CN      ?  Mobility - Walking and Moving Around:     - CURRENT STATUS: CK - 40%-59% impaired, limited or restricted    - GOAL STATUS: CI - 1%-19% impaired, limited or restricted    - D/C STATUS:  ---------------To be determined---------------  Payor: SELF PAY / Plan: [facility] SELF PAY / Product Type: Self Pay /      Medical Necessity:     · Patient demonstrates good rehab potential due to higher previous functional level. Reason for Services/Other Comments:  · Patient continues to require skilled intervention due to decreased functional activity tolerance and mobility from modified independent baseline. .   Use of outcome tool(s) and clinical judgement create a POC that gives a: Questionable prediction of patient's progress: MODERATE COMPLEXITY            TREATMENT:   (In addition to Assessment/Re-Assessment sessions the following treatments were rendered)   Pre-treatment Symptoms/Complaints:    Pain: Initial:   Pain Intensity 1: 0  Post Session:  0/10     Therapeutic Activity: (    15 minutes): Therapeutic activities including Chair transfers and Ambulation on level ground to improve mobility, strength and balance. Required no assistance to promote static and dynamic balance in standing. Braces/Orthotics/Lines/Etc:   · O2 Device: Nasal cannula  Treatment/Session Assessment:    · Response to Treatment:  See above. · Interdisciplinary Collaboration:   o Physical Therapy Assistant  o Registered Nurse  · After treatment position/precautions:   o Up in chair  o Bed/Chair-wheels locked  o Bed in low position  o Call light within reach  o RN notified   · Compliance with Program/Exercises: Will assess as treatment progresses. · Recommendations/Intent for next treatment session: \"Next visit will focus on advancements to more challenging activities and reduction in assistance provided\".   Total Treatment Duration:  PT Patient Time In/Time Out  Time In: 0925  Time Out: 0940    Samara Lynch PTA

## 2018-02-08 NOTE — PROGRESS NOTES
John Raw  Admission Date: 2/1/2018             Daily Progress Note: 2/8/2018    The patient's chart is reviewed and the patient is discussed with the staff. 45 y.o. CM brought in by EMS nasally intubated with altered mental status. He is known with polysubstance abuse, NATALIIA and COPD who was recently hospitalized requiring reintubation coming in with AMS. No family to give history but as per staff patient was doing well half an hour before his family came to reassess him and was found on the floor unresponsive. EMS called and nasally intubated. Has NATALIIA and both CPAP and trach have been recommended but is non compliant and refused. Received narcan twice on the field with no response. Admitted to ICU on vent support, given nebs and steroids. UDS positive for amphetamines, benzodiazepines and opiates. Was extubated and initially required BIPAP but was weaned to [place] and moved to the floor. Subjective:     Lying in bed, still with moist cough, productive at times. Remains on NC, wore BIPAP last night with sleep. Has CPAP machine at home.      Current Facility-Administered Medications   Medication Dose Route Frequency    acetaminophen (TYLENOL) tablet 650 mg  650 mg Oral Q6H PRN    amiodarone (CORDARONE) tablet 400 mg  400 mg Oral BID    carvedilol (COREG) tablet 3.125 mg  3.125 mg Oral BID WITH MEALS    rivaroxaban (XARELTO) tablet 20 mg  20 mg Oral DAILY WITH BREAKFAST    sodium chloride (NS) flush 5-10 mL  5-10 mL IntraVENous Q8H    sodium chloride (NS) flush 5-10 mL  5-10 mL IntraVENous PRN    albuterol-ipratropium (DUO-NEB) 2.5 MG-0.5 MG/3 ML  3 mL Nebulization Q4H RT    chlorhexidine (PERIDEX) 0.12 % mouthwash 15 mL  15 mL Oral Q12H       Review of Systems  Constitutional: negative for fever, chills, sweats  Cardiovascular: negative for chest pain, palpitations, syncope, edema  Gastrointestinal:  negative for dysphagia, reflux, vomiting, diarrhea, abdominal pain, or melena  Neurologic:  negative for focal weakness, numbness, headache    Objective:     Vitals:    02/07/18 2300 02/07/18 2336 02/07/18 2337 02/08/18 0300   BP: 145/80   176/87   Pulse: 77   73   Resp: 20   20   Temp: 98 °F (36.7 °C)   97.1 °F (36.2 °C)   SpO2: 95% 90% 96% 99%   Weight:       Height:         Intake and Output:   02/06 1901 - 02/08 0700  In: 240 [P.O.:240]  Out: 1400 [Urine:1400]  02/08 0701 - 02/08 1900  In: -   Out: 500 [Urine:500]    Physical Exam:   Constitution:  the patient is well developed and in no acute distress, NC 3L, sat 99%  EENMT:  Sclera clear, pupils equal, oral mucosa moist  Respiratory: few scattered crackles, moist cough productive at times  Cardiovascular:  RRR without M,G,R  Gastrointestinal: soft, obese and non-tender; with positive bowel sounds. Musculoskeletal: warm without cyanosis. There is no lower leg edema. Skin:  no jaundice or rashes, no wounds   Neurologic: no gross neuro deficits     Psychiatric:  alert and oriented x 3    CXR: None today    CXR 2/5/18:          LAB  No results for input(s): GLUCPOC in the last 72 hours. No lab exists for component: [address]      02/06/18   0737   WBC  6.3   HGB  11.0*   HCT  35.7*   PLT  176     Recent Labs      02/06/18   0737   NA  143   K  3.4*   CL  101   CO2  36*   GLU  101*   BUN  8   CREA  1.10   MG  1.9   CA  8.4   PHOS  3.1     No results for input(s): PH, PCO2, PO2, HCO3 in the last 72 hours. No results for input(s): LCAD, LAC in the last 72 hours. Assessment:  (Medical Decision Making)     Hospital Problems  Date Reviewed: 2/8/2018          Codes Class Noted POA    H/O atrial flutter (Chronic) ICD-10-CM: G45.50  ICD-9-CM: V12.59  2/6/2018 Yes     1/2018 Atrial flutter, s/p cardioversion.          Sinus--on Amio and Xarelto    Morbid obesity (HCC) (Chronic) ICD-10-CM: E66.01  ICD-9-CM: 278.01  2/2/2018 Yes    Chronic--weight loss recommended    COPD (chronic obstructive pulmonary disease) ([facility] (Chronic) ICD-10-CM: J44.9  ICD-9-CM: 055  2/1/2018 Yes    chronic    Nicotine dependence (Chronic) ICD-10-CM: F17.200  ICD-9-CM: 305.1  2/1/2018 Yes    Chronic--cessation encouraged    Methamphetamine abuse (Chronic) ICD-10-CM: F15.10  ICD-9-CM: 305.70  2/1/2018 Yes    Chronic--cessation encouraged    NATALIIA (obstructive sleep apnea) (Chronic) ICD-10-CM: O06.57  ICD-9-CM: 327.23  2/1/2018 Yes    Has CPAP with [facility]--stressed nightly use    Acute on chronic respiratory failure with hypoxia and hypercapnia (HCC) ICD-10-CM: J96.21, J96.22  ICD-9-CM: 518.84, 786.09, 799.02  2/1/2018 Yes    Chronic--on NC    Alcohol abuse (Chronic) ICD-10-CM: F10.10  ICD-9-CM: 305.00  2/1/2018 Yes    Chronic--cessation encouraged    Altered mental status ICD-10-CM: R41.82  ICD-9-CM: 780.97  2/1/2018 Yes    resolved    * (Principal)Acute respiratory failure with hypercapnia ([address] ICD-10-CM: J96.02  ICD-9-CM: 518.81  2/1/2018 Yes    Weaned to NC          Plan:  (Medical Decision Making)     --Duoneb  --Blood cultures:  No growth--final  --Amiodarone and Xarelto--recent flutter with DC cardioversion, sinus now.  --OOB--PT to mobilize  --Substance abuse cessation has been encouraged--UDS:  amphetamines, benzodiazepines and opiates. --Compliance with home CPAP--asked to have family bring in his machine for use here. --Is to follow up at the [facility] with cardiology and pulmonary. --Case management following for possible discharge options for rehab of substance abuse    More than 50% of the time documented was spent in face-to-face contact with the patient and in the care of the patient on the floor/unit where the patient is located.      Ashanti Alarcon NP

## 2018-02-08 NOTE — DISCHARGE INSTRUCTIONS
Chronic Obstructive Pulmonary Disease (COPD): Care Instructions  Your Care Instructions    Chronic obstructive pulmonary disease (COPD) is a general term for a group of lung diseases, including emphysema and chronic bronchitis. People with COPD have decreased airflow in and out of the lungs, which makes it hard to breathe. The airways also can get clogged with thick mucus. Cigarette smoking is a major cause of COPD. Although there is no cure for COPD, you can slow its progress. Following your treatment plan and taking care of yourself can help you feel better and live longer. Follow-up care is a key part of your treatment and safety. Be sure to make and go to all appointments, and call your doctor if you are having problems. It's also a good idea to know your test results and keep a list of the medicines you take. How can you care for yourself at home? ?Staying healthy  ? · Do not smoke. This is the most important step you can take to prevent more damage to your lungs. If you need help quitting, talk to your doctor about stop-smoking programs and medicines. These can increase your chances of quitting for good. ? · Avoid colds and flu. Get a pneumococcal vaccine shot. If you have had one before, ask your doctor whether you need a second dose. Get the flu vaccine every fall. If you must be around people with colds or the flu, wash your hands often. ? · Avoid secondhand smoke, air pollution, and high altitudes. Also avoid cold, dry air and hot, humid air. Stay at home with your windows closed when air pollution is bad. ?Medicines and oxygen therapy  ? · Take your medicines exactly as prescribed. Call your doctor if you think you are having a problem with your medicine. ? · You may be taking medicines such as:  ¨ Bronchodilators. These help open your airways and make breathing easier. Bronchodilators are either short-acting (work for 6 to 9 hours) or long-acting (work for 24 hours).  You inhale most bronchodilators, so they start to act quickly. Always carry your quick-relief inhaler with you in case you need it while you are away from home. ¨ Corticosteroids (prednisone, budesonide). These reduce airway inflammation. They come in pill or inhaled form. You must take these medicines every day for them to work well. ? · A spacer may help you get more inhaled medicine to your lungs. Ask your doctor or pharmacist if a spacer is right for you. If it is, ask how to use it properly. ? · Do not take any vitamins, over-the-counter medicine, or herbal products without talking to your doctor first.   ? · If your doctor prescribed antibiotics, take them as directed. Do not stop taking them just because you feel better. You need to take the full course of antibiotics. ? · Oxygen therapy boosts the amount of oxygen in your blood and helps you breathe easier. Use the flow rate your doctor has recommended, and do not change it without talking to your doctor first.   Activity  ? · Get regular exercise. Walking is an easy way to get exercise. Start out slowly, and walk a little more each day. ? · Pay attention to your breathing. You are exercising too hard if you cannot talk while you are exercising. ? · Take short rest breaks when doing household chores and other activities. ? · Learn breathing methods-such as breathing through pursed lips-to help you become less short of breath. ? · If your doctor has not set you up with a pulmonary rehabilitation program, talk to him or her about whether rehab is right for you. Rehab includes exercise programs, education about your disease and how to manage it, help with diet and other changes, and emotional support. Diet  ? · Eat regular, healthy meals. Use bronchodilators about 1 hour before you eat to make it easier to eat. Eat several small meals instead of three large ones. Drink beverages at the end of the meal. Avoid foods that are hard to chew.    ? · Eat foods that contain protein so that you do not lose muscle mass. ? · Talk with your doctor if you gain too much weight or if you lose weight without trying. ?Mental health  ? · Talk to your family, friends, or a therapist about your feelings. It is normal to feel frightened, angry, hopeless, helpless, and even guilty. Talking openly about bad feelings can help you cope. If these feelings last, talk to your doctor. When should you call for help? Call 911 anytime you think you may need emergency care. For example, call if:  ? · You have severe trouble breathing. ?Call your doctor now or seek immediate medical care if:  ? · You have new or worse trouble breathing. ? · You cough up blood. ? · You have a fever. ? Watch closely for changes in your health, and be sure to contact your doctor if:  ? · You cough more deeply or more often, especially if you notice more mucus or a change in the color of your mucus. ? · You have new or worse swelling in your legs or belly. ? · You are not getting better as expected. Where can you learn more? Go to http://regina-jacy.info/. Brittney Scale in the search box to learn more about \"Chronic Obstructive Pulmonary Disease (COPD): Care Instructions. \"  Current as of: May 12, 2017  Content Version: 11.4  © 2714-3896 [employer]. Care instructions adapted under license by [employer] (which disclaims liability or warranty for this information). If you have questions about a medical condition or this instruction, always ask your healthcare professional. [place] [address] any warranty or liability for your use of this information.     DISCHARGE SUMMARY from Nurse    PATIENT INSTRUCTIONS:    After general anesthesia or intravenous sedation, for 24 hours or while taking prescription Narcotics:  · Limit your activities  · Do not drive and operate hazardous machinery  · Do not make important personal or business decisions  · Do  not drink alcoholic beverages  · If you have not urinated within 8 hours after discharge, please contact your surgeon on call. Report the following to your surgeon:  · Excessive pain, swelling, redness or odor of or around the surgical area  · Temperature over 100.5  · Nausea and vomiting lasting longer than 4 hours or if unable to take medications  · Any signs of decreased circulation or nerve impairment to extremity: change in color, persistent  numbness, tingling, coldness or increase pain  · Any questions    What to do at Home:      *  Please give a list of your current medications to your Primary Care Provider. *  Please update this list whenever your medications are discontinued, doses are      changed, or new medications (including over-the-counter products) are added. *  Please carry medication information at all times in case of emergency situations. These are general instructions for a healthy lifestyle:    No smoking/ No tobacco products/ Avoid exposure to second hand smoke  Surgeon General's Warning:  Quitting smoking now greatly reduces serious risk to your health. Obesity, smoking, and sedentary lifestyle greatly increases your risk for illness    A healthy diet, regular physical exercise & weight monitoring are important for maintaining a healthy lifestyle    You may be retaining fluid if you have a history of heart failure or if you experience any of the following symptoms:  Weight gain of 3 pounds or more overnight or 5 pounds in a week, increased swelling in our hands or feet or shortness of breath while lying flat in bed. Please call your doctor as soon as you notice any of these symptoms; do not wait until your next office visit.     Recognize signs and symptoms of STROKE:    F-face looks uneven    A-arms unable to move or move unevenly    S-speech slurred or non-existent    T-time-call 911 as soon as signs and symptoms begin-DO NOT go       Back to bed or wait to see if you get better-TIME IS BRAIN. Warning Signs of HEART ATTACK     Call 911 if you have these symptoms:   Chest discomfort. Most heart attacks involve discomfort in the center of the chest that lasts more than a few minutes, or that goes away and comes back. It can feel like uncomfortable pressure, squeezing, fullness, or pain.  Discomfort in other areas of the upper body. Symptoms can include pain or discomfort in one or both arms, the back, neck, jaw, or stomach.  Shortness of breath with or without chest discomfort.  Other signs may include breaking out in a cold sweat, nausea, or lightheadedness. Don't wait more than five minutes to call 911 - MINUTES MATTER! Fast action can save your life. Calling 911 is almost always the fastest way to get lifesaving treatment. Emergency Medical Services staff can begin treatment when they arrive -- up to an hour sooner than if someone gets to the hospital by car. The discharge information has been reviewed with the patient. The patient verbalized understanding. Discharge medications reviewed with the patient and appropriate educational materials and side effects teaching were provided.   ___________________________________________________________________________________________________________________________________

## 2018-07-03 ENCOUNTER — HOSPITAL ENCOUNTER (EMERGENCY)
Age: 53
Discharge: HOME | End: 2018-07-03
Attending: EMERGENCY MEDICINE
Payer: SELF-PAY

## 2018-07-03 ENCOUNTER — APPOINTMENT (OUTPATIENT)
Dept: GENERAL RADIOLOGY | Age: 53
End: 2018-07-03
Attending: EMERGENCY MEDICINE
Payer: SELF-PAY

## 2018-07-03 VITALS
SYSTOLIC BLOOD PRESSURE: 166 MMHG | WEIGHT: 240 LBS | HEIGHT: 72 IN | RESPIRATION RATE: 20 BRPM | HEART RATE: 105 BPM | OXYGEN SATURATION: 94 % | BODY MASS INDEX: 32.51 KG/M2 | TEMPERATURE: 98.4 F | DIASTOLIC BLOOD PRESSURE: 77 MMHG

## 2018-07-03 DIAGNOSIS — L03.311 ABDOMINAL WALL CELLULITIS: Primary | ICD-10-CM

## 2018-07-03 LAB
ALBUMIN SERPL-MCNC: 3 G/DL (ref 3.5–5)
ALBUMIN/GLOB SERPL: 0.7 {RATIO} (ref 1.2–3.5)
ALP SERPL-CCNC: 107 U/L (ref 50–136)
ALT SERPL-CCNC: 22 U/L (ref 12–65)
ANION GAP SERPL CALC-SCNC: 9 MMOL/L (ref 7–16)
AST SERPL-CCNC: 12 U/L (ref 15–37)
ATRIAL RATE: 102 BPM
BASOPHILS # BLD: 0 K/UL (ref 0–0.2)
BASOPHILS NFR BLD: 0 % (ref 0–2)
BILIRUB SERPL-MCNC: 0.3 MG/DL (ref 0.2–1.1)
BUN SERPL-MCNC: 19 MG/DL (ref 6–23)
CALCIUM SERPL-MCNC: 9 MG/DL (ref 8.3–10.4)
CALCULATED P AXIS, ECG09: 115 DEGREES
CALCULATED R AXIS, ECG10: 123 DEGREES
CALCULATED T AXIS, ECG11: 90 DEGREES
CHLORIDE SERPL-SCNC: 105 MMOL/L (ref 98–107)
CO2 SERPL-SCNC: 32 MMOL/L (ref 21–32)
CREAT SERPL-MCNC: 1.29 MG/DL (ref 0.8–1.5)
DIAGNOSIS, 93000: NORMAL
DIFFERENTIAL METHOD BLD: ABNORMAL
EOSINOPHIL # BLD: 0.3 K/UL (ref 0–0.8)
EOSINOPHIL NFR BLD: 3 % (ref 0.5–7.8)
ERYTHROCYTE [DISTWIDTH] IN BLOOD BY AUTOMATED COUNT: 13.5 % (ref 11.9–14.6)
GLOBULIN SER CALC-MCNC: 4.3 G/DL (ref 2.3–3.5)
GLUCOSE SERPL-MCNC: 120 MG/DL (ref 65–100)
HCT VFR BLD AUTO: 46.4 % (ref 41.1–50.3)
HGB BLD-MCNC: 15.5 G/DL (ref 13.6–17.2)
IMM GRANULOCYTES # BLD: 0 K/UL (ref 0–0.5)
IMM GRANULOCYTES NFR BLD AUTO: 0 % (ref 0–5)
LYMPHOCYTES # BLD: 1.9 K/UL (ref 0.5–4.6)
LYMPHOCYTES NFR BLD: 20 % (ref 13–44)
MCH RBC QN AUTO: 33.2 PG (ref 26.1–32.9)
MCHC RBC AUTO-ENTMCNC: 33.4 G/DL (ref 31.4–35)
MCV RBC AUTO: 99.4 FL (ref 79.6–97.8)
MONOCYTES # BLD: 0.8 K/UL (ref 0.1–1.3)
MONOCYTES NFR BLD: 9 % (ref 4–12)
NEUTS SEG # BLD: 6.4 K/UL (ref 1.7–8.2)
NEUTS SEG NFR BLD: 68 % (ref 43–78)
P-R INTERVAL, ECG05: 180 MS
PLATELET # BLD AUTO: 283 K/UL (ref 150–450)
PMV BLD AUTO: 9.7 FL (ref 10.8–14.1)
POTASSIUM SERPL-SCNC: 4.2 MMOL/L (ref 3.5–5.1)
PROT SERPL-MCNC: 7.3 G/DL (ref 6.3–8.2)
Q-T INTERVAL, ECG07: 358 MS
QRS DURATION, ECG06: 80 MS
QTC CALCULATION (BEZET), ECG08: 466 MS
RBC # BLD AUTO: 4.67 M/UL (ref 4.23–5.67)
SODIUM SERPL-SCNC: 146 MMOL/L (ref 136–145)
TROPONIN I SERPL-MCNC: 0.02 NG/ML (ref 0.02–0.05)
VENTRICULAR RATE, ECG03: 102 BPM
WBC # BLD AUTO: 9.4 K/UL (ref 4.3–11.1)

## 2018-07-03 PROCEDURE — 74011250637 HC RX REV CODE- 250/637: Performed by: EMERGENCY MEDICINE

## 2018-07-03 PROCEDURE — 84484 ASSAY OF TROPONIN QUANT: CPT | Performed by: EMERGENCY MEDICINE

## 2018-07-03 PROCEDURE — 74011250636 HC RX REV CODE- 250/636: Performed by: EMERGENCY MEDICINE

## 2018-07-03 PROCEDURE — 71046 X-RAY EXAM CHEST 2 VIEWS: CPT

## 2018-07-03 PROCEDURE — 80053 COMPREHEN METABOLIC PANEL: CPT | Performed by: EMERGENCY MEDICINE

## 2018-07-03 PROCEDURE — 96365 THER/PROPH/DIAG IV INF INIT: CPT | Performed by: EMERGENCY MEDICINE

## 2018-07-03 PROCEDURE — 74011000258 HC RX REV CODE- 258: Performed by: EMERGENCY MEDICINE

## 2018-07-03 PROCEDURE — 85025 COMPLETE CBC W/AUTO DIFF WBC: CPT | Performed by: EMERGENCY MEDICINE

## 2018-07-03 PROCEDURE — 93005 ELECTROCARDIOGRAM TRACING: CPT | Performed by: EMERGENCY MEDICINE

## 2018-07-03 PROCEDURE — 99285 EMERGENCY DEPT VISIT HI MDM: CPT | Performed by: EMERGENCY MEDICINE

## 2018-07-03 PROCEDURE — 96375 TX/PRO/DX INJ NEW DRUG ADDON: CPT | Performed by: EMERGENCY MEDICINE

## 2018-07-03 RX ORDER — SULFAMETHOXAZOLE AND TRIMETHOPRIM 800; 160 MG/1; MG/1
1 TABLET ORAL 2 TIMES DAILY
Qty: 14 TAB | Refills: 0 | Status: SHIPPED | OUTPATIENT
Start: 2018-07-03 | End: 2018-07-10

## 2018-07-03 RX ORDER — CEPHALEXIN 500 MG/1
500 CAPSULE ORAL 4 TIMES DAILY
Qty: 28 CAP | Refills: 0 | Status: SHIPPED | OUTPATIENT
Start: 2018-07-03 | End: 2018-07-10

## 2018-07-03 RX ORDER — CLONIDINE HYDROCHLORIDE 0.1 MG/1
0.2 TABLET ORAL
Status: COMPLETED | OUTPATIENT
Start: 2018-07-03 | End: 2018-07-03

## 2018-07-03 RX ORDER — SULFAMETHOXAZOLE AND TRIMETHOPRIM 800; 160 MG/1; MG/1
2 TABLET ORAL
Status: COMPLETED | OUTPATIENT
Start: 2018-07-03 | End: 2018-07-03

## 2018-07-03 RX ORDER — ONDANSETRON 2 MG/ML
4 INJECTION INTRAMUSCULAR; INTRAVENOUS
Status: COMPLETED | OUTPATIENT
Start: 2018-07-03 | End: 2018-07-03

## 2018-07-03 RX ORDER — MORPHINE SULFATE 2 MG/ML
6 INJECTION, SOLUTION INTRAMUSCULAR; INTRAVENOUS
Status: COMPLETED | OUTPATIENT
Start: 2018-07-03 | End: 2018-07-03

## 2018-07-03 RX ADMIN — MORPHINE SULFATE 6 MG: 2 INJECTION, SOLUTION INTRAMUSCULAR; INTRAVENOUS at 04:08

## 2018-07-03 RX ADMIN — CEFTRIAXONE SODIUM 1 G: 1 INJECTION, POWDER, FOR SOLUTION INTRAMUSCULAR; INTRAVENOUS at 04:09

## 2018-07-03 RX ADMIN — ONDANSETRON 4 MG: 2 INJECTION INTRAMUSCULAR; INTRAVENOUS at 04:08

## 2018-07-03 RX ADMIN — CLONIDINE HYDROCHLORIDE 0.2 MG: 0.1 TABLET ORAL at 04:08

## 2018-07-03 RX ADMIN — SULFAMETHOXAZOLE AND TRIMETHOPRIM 2 TABLET: 800; 160 TABLET ORAL at 04:08

## 2018-07-03 NOTE — ED NOTES
I have reviewed discharge instructions with the patient. The patient verbalized understanding. Patient left ED via Discharge Method: wheelchair to Home with daughter in law. Opportunity for questions and clarification provided. Patient given 2 scripts. To continue your aftercare when you leave the hospital, you may receive an automated call from our care team to check in on how you are doing. This is a free service and part of our promise to provide the best care and service to meet your aftercare needs.  If you have questions, or wish to unsubscribe from this service please call [phone]. Thank you for Choosing our [facility].

## 2018-07-03 NOTE — DISCHARGE INSTRUCTIONS
Cellulitis: Care Instructions  Your Care Instructions    Cellulitis is a skin infection. It often occurs after a break in the skin from a scrape, cut, bite, or puncture, or after a rash. The doctor has checked you carefully, but problems can develop later. If you notice any problems or new symptoms, get medical treatment right away. Follow-up care is a key part of your treatment and safety. Be sure to make and go to all appointments, and call your doctor if you are having problems. It's also a good idea to know your test results and keep a list of the medicines you take. How can you care for yourself at home? · Take your antibiotics as directed. Do not stop taking them just because you feel better. You need to take the full course of antibiotics. · Prop up the infected area on pillows to reduce pain and swelling. Try to keep the area above the level of your heart as often as you can. · If your doctor told you how to care for your wound, follow your doctor's instructions. If you did not get instructions, follow this general advice:  ¨ Wash the wound with clean water 2 times a day. Don't use hydrogen peroxide or alcohol, which can slow healing. ¨ You may cover the wound with a thin layer of petroleum jelly, such as Vaseline, and a nonstick bandage. ¨ Apply more petroleum jelly and replace the bandage as needed. · Be safe with medicines. Take pain medicines exactly as directed. ¨ If the doctor gave you a prescription medicine for pain, take it as prescribed. ¨ If you are not taking a prescription pain medicine, ask your doctor if you can take an over-the-counter medicine. To prevent cellulitis in the future  · Try to prevent cuts, scrapes, or other injuries to your skin. Cellulitis most often occurs where there is a break in the skin. · If you get a scrape, cut, mild burn, or bite, wash the wound with clean water as soon as you can to help avoid infection.  Don't use hydrogen peroxide or alcohol, which can slow healing. · If you have swelling in your legs (edema), support stockings and good skin care may help prevent leg sores and cellulitis. · Take care of your feet, especially if you have diabetes or other conditions that increase the risk of infection. Wear shoes and socks. Do not go barefoot. If you have athlete's foot or other skin problems on your feet, talk to your doctor about how to treat them. When should you call for help? Call your doctor now or seek immediate medical care if:  ? · You have signs that your infection is getting worse, such as:  ¨ Increased pain, swelling, warmth, or redness. ¨ Red streaks leading from the area. ¨ Pus draining from the area. ¨ A fever. ? · You get a rash. ? Watch closely for changes in your health, and be sure to contact your doctor if:  ? · You are not getting better after 1 day (24 hours). ? · You do not get better as expected. Where can you learn more? Go to http://regina-jacy.info/. Eriberto Kemp in the search box to learn more about \"Cellulitis: Care Instructions. \"  Current as of: October 13, 2016  Content Version: 11.4  © 4101-3437 [employer]. Care instructions adapted under license by [employer] (which disclaims liability or warranty for this information). If you have questions about a medical condition or this instruction, always ask your healthcare professional. [place] [address] any warranty or liability for your use of this information.

## 2018-07-03 NOTE — ED NOTES
Pt came to the ed c/o abscess on right lower abd. Denies cp. Pt states he is always sob. Pt is supposed to wear 2L cont, but only uses it when sob. Discussed poc. Call bell in reach. Family at bedside.

## 2018-07-03 NOTE — ED PROVIDER NOTES
HPI Comments: 47-year-old male history of chronic respiratory failure, sleep apnea, atrial flutter presents with an area of redness and pain to his right lower abdomen that he noticed 2 days ago. He has had similar areas in the past, but not as severe. Denies fevers or chills. Also has chronic right hip pain, increased today. Denies any increased shortness of breath from baseline. On home oxygen. Patient is a 52 y.o. male presenting with shortness of breath. The history is provided by the patient. Shortness of Breath   Associated symptoms include cough and abdominal pain. Pertinent negatives include no fever and no vomiting. Past Medical History:   Diagnosis Date    Sleep disorder        No past surgical history on file. No family history on file. Social History     Social History    Marital status:      Spouse name: N/A    Number of children: N/A    Years of education: N/A     Occupational History    Not on file. Social History Main Topics    Smoking status: Current Every Day Smoker     Packs/day: 2.00    Smokeless tobacco: Not on file    Alcohol use No      Comment: once a month beer    Drug use: Yes     Special: Marijuana, Methamphetamines    Sexual activity: Not on file     Other Topics Concern    Not on file     Social History Narrative         ALLERGIES: Review of patient's allergies indicates no known allergies. Review of Systems   Constitutional: Positive for fatigue. Negative for fever. HENT: Negative for congestion. Eyes: Negative for visual disturbance. Respiratory: Positive for cough and shortness of breath. Gastrointestinal: Positive for abdominal pain. Negative for vomiting. Skin: Positive for color change and wound.        Vitals:    07/03/18 0034 07/03/18 0228 07/03/18 0259 07/03/18 0408   BP: (!) 177/109 (!) 195/96 (!) 223/105 190/85   Pulse: (!) 107 99 96 96   Resp: 20 20     Temp: 98.5 °F (36.9 °C)      SpO2: 93% 91% 92%    Weight: 108.9 kg (240 lb)      Height: 6' (1.829 m)               Physical Exam   Constitutional: No distress. Morbid obesity   HENT:   Head: Normocephalic and atraumatic. Eyes: Conjunctivae are normal. Pupils are equal, round, and reactive to light. Cardiovascular: Regular rhythm and normal heart sounds. Pulmonary/Chest: No respiratory distress. Abdominal: There is tenderness in the right lower quadrant. There is no rigidity, no rebound and no guarding. Musculoskeletal:        Right hip: He exhibits decreased range of motion and tenderness. Neurological: He is alert. Skin: There is erythema. Psychiatric: He has a normal mood and affect. Nursing note and vitals reviewed. MDM  Number of Diagnoses or Management Options  Diagnosis management comments: Parts of this document were created using dragon voice recognition software. The chart has been reviewed but errors may still be present. Cellulitis without fluctuance. No indication to drain today. Advised warm compresses. Will place on antibiotics. No increased dyspnea from baseline. Blood pressure treated. Labs unremarkable. I discussed the results of all labs, procedures, radiographs, and treatments with the patient and available family. Treatment plan is agreed upon and the patient is ready for discharge. Questions about treatment in the ED and differential diagnosis of presenting condition were answered. Patient was given verbal discharge instructions including, but not limited to, importance of returning to the emergency department for any concern of worsening or continued symptoms. Instructions were given to follow up with a primary care provider or specialist within 1-2 days. Adverse effects of medications, if prescribed, were discussed and patient was advised to refrain from significant physical activity until followed up by primary care physician and to not drive or operate heavy machinery after taking any sedating substances. Amount and/or Complexity of Data Reviewed  Clinical lab tests: reviewed (Results for orders placed or performed during the hospital encounter of 07/03/18  -CBC WITH AUTOMATED DIFF       Result                                            Value                         Ref Range                       WBC                                               9.4                           4.3 - 11.1 K/uL                 RBC                                               4.67                          4.23 - 5.67 M/uL                HGB                                               15.5                          13.6 - 17.2 g/dL                HCT                                               46.4                          41.1 - 50.3 %                   MCV                                               99.4 (H)                      79.6 - 97.8 FL                  MCH                                               33.2 (H)                      26.1 - 32.9 PG                  MCHC                                              33.4                          31.4 - 35.0 g/dL                RDW                                               13.5                          11.9 - 14.6 %                   PLATELET                                          283                           150 - 450 K/uL                  MPV                                               9.7 (L)                       10.8 - 14.1 FL                  DF                                                AUTOMATED                                                     NEUTROPHILS                                       68                            43 - 78 %                       LYMPHOCYTES                                       20                            13 - 44 %                       MONOCYTES                                         9                             4.0 - 12.0 %                    EOSINOPHILS                                       3 0.5 - 7.8 %                     BASOPHILS                                         0                             0.0 - 2.0 %                     IMMATURE GRANULOCYTES                             0                             0.0 - 5.0 %                     ABS. NEUTROPHILS                                  6.4                           1.7 - 8.2 K/UL                  ABS. LYMPHOCYTES                                  1.9                           0.5 - 4.6 K/UL                  ABS. MONOCYTES                                    0.8                           0.1 - 1.3 K/UL                  ABS. EOSINOPHILS                                  0.3                           0.0 - 0.8 K/UL                  ABS. BASOPHILS                                    0.0                           0.0 - 0.2 K/UL                  ABS. IMM.  GRANS.                                  0.0                           0.0 - 0.5 K/UL             -METABOLIC PANEL, COMPREHENSIVE       Result                                            Value                         Ref Range                       Sodium                                            146 (H)                       136 - 145 mmol/L                Potassium                                         4.2                           3.5 - 5.1 mmol/L                Chloride                                          105                           98 - 107 mmol/L                 CO2                                               32                            21 - 32 mmol/L                  Anion gap                                         9                             7 - 16 mmol/L                   Glucose                                           120 (H)                       65 - 100 mg/dL                  BUN                                               19                            6 - 23 MG/DL                    Creatinine                                        1.29                          0.8 - 1.5 MG/DL                 GFR est AA                                        >60                           >60 ml/min/1.73m2               GFR est non-AA                                    >60                           >60 ml/min/1.73m2               Calcium                                           9.0                           8.3 - 10.4 MG/DL                Bilirubin, total                                  0.3                           0.2 - 1.1 MG/DL                 ALT (SGPT)                                        22                            12 - 65 U/L                     AST (SGOT)                                        12 (L)                        15 - 37 U/L                     Alk. phosphatase                                  107                           50 - 136 U/L                    Protein, total                                    7.3                           6.3 - 8.2 g/dL                  Albumin                                           3.0 (L)                       3.5 - 5.0 g/dL                  Globulin                                          4.3 (H)                       2.3 - 3.5 g/dL                  A-G Ratio                                         0.7 (L)                       1.2 - 3.5                  -TROPONIN I       Result                                            Value                         Ref Range                       Troponin-I, Qt.                                   0.02                          0.02 - 0.05 NG/ML          -EKG, 12 LEAD, INITIAL       Result                                            Value                         Ref Range                       Ventricular Rate                                  102                           BPM                             Atrial Rate                                       102                           BPM                             P-R Interval                                      180                           ms QRS Duration                                      80                            ms                              Q-T Interval                                      358                           ms                              QTC Calculation (Bezet)                           466                           ms                              Calculated P Axis                                 115                           degrees                         Calculated R Axis                                 123                           degrees                         Calculated T Axis                                 90                            degrees                         Diagnosis                                                                                                   !!! Suspect arm lead reversal, interpretation assumes no reversal   Sinus tachycardia   Left posterior fascicular block   Possible Anterior infarct (cited on or before 01-FEB-2018)   Abnormal ECG   When compared with ECG of 01-FEB-2018 19:14,   Questionable change in initial forces of Lateral leads     )  Tests in the radiology section of CPT®: reviewed (Xr Chest Pa Lat    Result Date: 7/3/2018  EXAM:  XR CHEST PA LAT INDICATION:   shortness of breath COMPARISON: 2/5/2018. FINDINGS: PA and lateral radiographs of the chest demonstrate chronic linear atelectasis in left lower lobe. The cardiac and mediastinal contours and pulmonary vascularity are normal.  The bones and soft tissues are within normal limits. IMPRESSION: No acute abnormalities.  Chronic linear atelectasis left lower lobe.     )  Tests in the medicine section of CPT®: ordered and reviewed          ED Course       Procedures

## 2018-07-03 NOTE — ED TRIAGE NOTES
Patient states shortness of breath that has been on going all day. Also states he had a reddened place on abdomen that is hurting.

## 2018-11-16 ENCOUNTER — APPOINTMENT (OUTPATIENT)
Dept: GENERAL RADIOLOGY | Age: 53
DRG: 208 | End: 2018-11-16
Attending: INTERNAL MEDICINE
Payer: SELF-PAY

## 2018-11-16 ENCOUNTER — APPOINTMENT (OUTPATIENT)
Dept: GENERAL RADIOLOGY | Age: 53
DRG: 208 | End: 2018-11-16
Attending: EMERGENCY MEDICINE
Payer: SELF-PAY

## 2018-11-16 ENCOUNTER — APPOINTMENT (OUTPATIENT)
Dept: CT IMAGING | Age: 53
DRG: 208 | End: 2018-11-16
Attending: EMERGENCY MEDICINE
Payer: SELF-PAY

## 2018-11-16 ENCOUNTER — HOSPITAL ENCOUNTER (INPATIENT)
Age: 53
LOS: 14 days | Discharge: HOME HEALTH | DRG: 208 | End: 2018-11-30
Attending: EMERGENCY MEDICINE | Admitting: INTERNAL MEDICINE
Payer: SELF-PAY

## 2018-11-16 DIAGNOSIS — R60.1 ANASARCA: ICD-10-CM

## 2018-11-16 DIAGNOSIS — N17.9 ACUTE KIDNEY INJURY (HCC): ICD-10-CM

## 2018-11-16 DIAGNOSIS — J96.21 ACUTE ON CHRONIC RESPIRATORY FAILURE WITH HYPOXIA AND HYPERCAPNIA (HCC): Primary | ICD-10-CM

## 2018-11-16 DIAGNOSIS — F17.210 CIGARETTE NICOTINE DEPENDENCE WITHOUT COMPLICATION: Chronic | ICD-10-CM

## 2018-11-16 DIAGNOSIS — M25.551 PAIN OF BOTH HIP JOINTS: ICD-10-CM

## 2018-11-16 DIAGNOSIS — G47.33 OSA (OBSTRUCTIVE SLEEP APNEA): Chronic | ICD-10-CM

## 2018-11-16 DIAGNOSIS — J96.22 ACUTE ON CHRONIC RESPIRATORY FAILURE WITH HYPOXIA AND HYPERCAPNIA (HCC): Primary | ICD-10-CM

## 2018-11-16 DIAGNOSIS — J44.9 CHRONIC OBSTRUCTIVE PULMONARY DISEASE, UNSPECIFIED COPD TYPE (HCC): ICD-10-CM

## 2018-11-16 DIAGNOSIS — M25.552 PAIN OF BOTH HIP JOINTS: ICD-10-CM

## 2018-11-16 DIAGNOSIS — F10.10 ALCOHOL ABUSE: Chronic | ICD-10-CM

## 2018-11-16 DIAGNOSIS — R40.1 STUPOR: ICD-10-CM

## 2018-11-16 DIAGNOSIS — Z59.00 HOMELESS: Chronic | ICD-10-CM

## 2018-11-16 DIAGNOSIS — J96.02 ACUTE RESPIRATORY FAILURE WITH HYPERCAPNIA (HCC): ICD-10-CM

## 2018-11-16 DIAGNOSIS — I10 ESSENTIAL HYPERTENSION: ICD-10-CM

## 2018-11-16 DIAGNOSIS — Z86.79 H/O ATRIAL FLUTTER: Chronic | ICD-10-CM

## 2018-11-16 DIAGNOSIS — F19.10 POLYSUBSTANCE ABUSE (HCC): ICD-10-CM

## 2018-11-16 DIAGNOSIS — E66.01 MORBID OBESITY (HCC): ICD-10-CM

## 2018-11-16 DIAGNOSIS — F15.10 METHAMPHETAMINE ABUSE (HCC): Chronic | ICD-10-CM

## 2018-11-16 PROBLEM — R41.82 ALTERED MENTAL STATUS: Status: ACTIVE | Noted: 2018-11-16

## 2018-11-16 LAB
ALBUMIN SERPL-MCNC: 3.2 G/DL (ref 3.5–5)
ALBUMIN/GLOB SERPL: 0.8 {RATIO} (ref 1.2–3.5)
ALP SERPL-CCNC: 97 U/L (ref 50–136)
ALT SERPL-CCNC: 21 U/L (ref 12–65)
AMPHET UR QL SCN: POSITIVE
ANION GAP SERPL CALC-SCNC: 4 MMOL/L (ref 7–16)
APPEARANCE UR: CLEAR
ARTERIAL PATENCY WRIST A: YES
AST SERPL-CCNC: 15 U/L (ref 15–37)
ATRIAL RATE: 131 BPM
BACTERIA URNS QL MICRO: 0 /HPF
BARBITURATES UR QL SCN: NEGATIVE
BASE EXCESS BLD CALC-SCNC: 2 MMOL/L
BASE EXCESS BLD CALC-SCNC: 4 MMOL/L
BASE EXCESS BLD CALC-SCNC: 4 MMOL/L
BASE EXCESS BLD CALC-SCNC: 8 MMOL/L
BDY SITE: ABNORMAL
BENZODIAZ UR QL: NEGATIVE
BILIRUB SERPL-MCNC: 0.3 MG/DL (ref 0.2–1.1)
BILIRUB UR QL: NEGATIVE
BNP SERPL-MCNC: 274 PG/ML
BODY TEMPERATURE: 98.6
BUN SERPL-MCNC: 20 MG/DL (ref 6–23)
CALCIUM SERPL-MCNC: 8.4 MG/DL (ref 8.3–10.4)
CALCULATED P AXIS, ECG09: -112 DEGREES
CALCULATED R AXIS, ECG10: 116 DEGREES
CALCULATED T AXIS, ECG11: -53 DEGREES
CANNABINOIDS UR QL SCN: NEGATIVE
CASTS URNS QL MICRO: ABNORMAL /LPF
CHLORIDE SERPL-SCNC: 105 MMOL/L (ref 98–107)
CO2 BLD-SCNC: 34 MMOL/L
CO2 BLD-SCNC: 37 MMOL/L
CO2 BLD-SCNC: 37 MMOL/L
CO2 BLD-SCNC: 41 MMOL/L
CO2 SERPL-SCNC: 32 MMOL/L (ref 21–32)
COCAINE UR QL SCN: NEGATIVE
COLOR UR: YELLOW
CREAT SERPL-MCNC: 1.38 MG/DL (ref 0.8–1.5)
DIAGNOSIS, 93000: NORMAL
EPI CELLS #/AREA URNS HPF: 0 /HPF
ERYTHROCYTE [DISTWIDTH] IN BLOOD BY AUTOMATED COUNT: 13.2 %
FLOW RATE ISTAT,IFRATE: 2 L/MIN
GAS FLOW.O2 O2 DELIVERY SYS: ABNORMAL L/MIN
GAS FLOW.O2 SETTING OXYMISER: 10 BPM
GAS FLOW.O2 SETTING OXYMISER: 16 BPM
GAS FLOW.O2 SETTING OXYMISER: 18 BPM
GLOBULIN SER CALC-MCNC: 4.1 G/DL (ref 2.3–3.5)
GLUCOSE BLD STRIP.AUTO-MCNC: 122 MG/DL (ref 65–100)
GLUCOSE BLD STRIP.AUTO-MCNC: 128 MG/DL (ref 65–100)
GLUCOSE BLD STRIP.AUTO-MCNC: 165 MG/DL (ref 65–100)
GLUCOSE SERPL-MCNC: 101 MG/DL (ref 65–100)
GLUCOSE UR STRIP.AUTO-MCNC: NEGATIVE MG/DL
HCO3 BLD-SCNC: 31.7 MMOL/L (ref 22–26)
HCO3 BLD-SCNC: 34.4 MMOL/L (ref 22–26)
HCO3 BLD-SCNC: 35.4 MMOL/L (ref 22–26)
HCO3 BLD-SCNC: 37.8 MMOL/L (ref 22–26)
HCT VFR BLD AUTO: 43.9 % (ref 41.1–50.3)
HGB BLD-MCNC: 13.1 G/DL (ref 13.6–17.2)
HGB UR QL STRIP: ABNORMAL
INSPIRATION.DURATION SETTING TIME VENT: 0.9 SEC
INSPIRATION.DURATION SETTING TIME VENT: 1 SEC
KETONES UR QL STRIP.AUTO: NEGATIVE MG/DL
LEUKOCYTE ESTERASE UR QL STRIP.AUTO: NEGATIVE
MCH RBC QN AUTO: 32.6 PG (ref 26.1–32.9)
MCHC RBC AUTO-ENTMCNC: 29.8 G/DL (ref 31.4–35)
MCV RBC AUTO: 109.2 FL (ref 79.6–97.8)
METHADONE UR QL: NEGATIVE
NITRITE UR QL STRIP.AUTO: NEGATIVE
NRBC # BLD: 0 K/UL (ref 0–0.2)
O2/TOTAL GAS SETTING VFR VENT: 35 %
O2/TOTAL GAS SETTING VFR VENT: 40 %
O2/TOTAL GAS SETTING VFR VENT: 50 %
OPIATES UR QL: NEGATIVE
P-R INTERVAL, ECG05: 104 MS
PCO2 BLD: 115.3 MMHG (ref 35–45)
PCO2 BLD: 63.1 MMHG (ref 35–45)
PCO2 BLD: 72.1 MMHG (ref 35–45)
PCO2 BLD: 76.6 MMHG (ref 35–45)
PCP UR QL: NEGATIVE
PEEP RESPIRATORY: 10 CMH2O
PEEP RESPIRATORY: 9 CMH2O
PH BLD: 7.12 [PH] (ref 7.35–7.45)
PH BLD: 7.25 [PH] (ref 7.35–7.45)
PH BLD: 7.26 [PH] (ref 7.35–7.45)
PH BLD: 7.36 [PH] (ref 7.35–7.45)
PH UR STRIP: 5 [PH] (ref 5–9)
PHOSPHATE SERPL-MCNC: 3.8 MG/DL (ref 2.5–4.5)
PIP ISTAT,IPIP: 13
PLATELET # BLD AUTO: 237 K/UL (ref 150–450)
PMV BLD AUTO: 9.6 FL (ref 9.4–12.3)
PO2 BLD: 70 MMHG (ref 75–100)
PO2 BLD: 75 MMHG (ref 75–100)
PO2 BLD: 76 MMHG (ref 75–100)
PO2 BLD: 97 MMHG (ref 75–100)
POTASSIUM SERPL-SCNC: 5.1 MMOL/L (ref 3.5–5.1)
PROT SERPL-MCNC: 7.3 G/DL (ref 6.3–8.2)
PROT UR STRIP-MCNC: NEGATIVE MG/DL
Q-T INTERVAL, ECG07: 278 MS
QRS DURATION, ECG06: 78 MS
QTC CALCULATION (BEZET), ECG08: 410 MS
RBC # BLD AUTO: 4.02 M/UL (ref 4.23–5.6)
RBC #/AREA URNS HPF: ABNORMAL /HPF
SAO2 % BLD: 90 % (ref 95–98)
SAO2 % BLD: 92 % (ref 95–98)
SAO2 % BLD: 94 % (ref 95–98)
SAO2 % BLD: 94 % (ref 95–98)
SERVICE CMNT-IMP: ABNORMAL
SODIUM SERPL-SCNC: 141 MMOL/L (ref 136–145)
SP GR UR REFRACTOMETRY: 1.01 (ref 1–1.02)
SPECIMEN TYPE: ABNORMAL
SPONTANEOUS TIMED, IST: 15
TROPONIN I BLD-MCNC: 0.01 NG/ML (ref 0.02–0.05)
TROPONIN I BLD-MCNC: 0.02 NG/ML (ref 0.02–0.05)
TSH SERPL DL<=0.005 MIU/L-ACNC: 3.63 UIU/ML (ref 0.36–3.74)
UROBILINOGEN UR QL STRIP.AUTO: 0.2 EU/DL (ref 0.2–1)
VENTILATION MODE VENT: ABNORMAL
VENTRICULAR RATE, ECG03: 131 BPM
VT SETTING VENT: 420 ML
VT SETTING VENT: 500 ML
VT SETTING VENT: 600 ML
WBC # BLD AUTO: 7.5 K/UL (ref 4.3–11.1)
WBC URNS QL MICRO: 0 /HPF

## 2018-11-16 PROCEDURE — 99223 1ST HOSP IP/OBS HIGH 75: CPT | Performed by: INTERNAL MEDICINE

## 2018-11-16 PROCEDURE — 74011250636 HC RX REV CODE- 250/636

## 2018-11-16 PROCEDURE — 77030008771 HC TU NG SALEM SUMP -A

## 2018-11-16 PROCEDURE — 77030020263 HC SOL INJ SOD CL0.9% LFCR 1000ML

## 2018-11-16 PROCEDURE — 74011250637 HC RX REV CODE- 250/637: Performed by: EMERGENCY MEDICINE

## 2018-11-16 PROCEDURE — 94640 AIRWAY INHALATION TREATMENT: CPT

## 2018-11-16 PROCEDURE — 74011000258 HC RX REV CODE- 258: Performed by: EMERGENCY MEDICINE

## 2018-11-16 PROCEDURE — 74011000250 HC RX REV CODE- 250: Performed by: INTERNAL MEDICINE

## 2018-11-16 PROCEDURE — 71045 X-RAY EXAM CHEST 1 VIEW: CPT

## 2018-11-16 PROCEDURE — C1751 CATH, INF, PER/CENT/MIDLINE: HCPCS

## 2018-11-16 PROCEDURE — 74011000250 HC RX REV CODE- 250: Performed by: EMERGENCY MEDICINE

## 2018-11-16 PROCEDURE — 84443 ASSAY THYROID STIM HORMONE: CPT

## 2018-11-16 PROCEDURE — 93005 ELECTROCARDIOGRAM TRACING: CPT | Performed by: EMERGENCY MEDICINE

## 2018-11-16 PROCEDURE — 31500 INSERT EMERGENCY AIRWAY: CPT | Performed by: INTERNAL MEDICINE

## 2018-11-16 PROCEDURE — 74011250637 HC RX REV CODE- 250/637: Performed by: NURSE PRACTITIONER

## 2018-11-16 PROCEDURE — 83880 ASSAY OF NATRIURETIC PEPTIDE: CPT

## 2018-11-16 PROCEDURE — 77010033678 HC OXYGEN DAILY

## 2018-11-16 PROCEDURE — 84484 ASSAY OF TROPONIN QUANT: CPT

## 2018-11-16 PROCEDURE — 74011636637 HC RX REV CODE- 636/637: Performed by: INTERNAL MEDICINE

## 2018-11-16 PROCEDURE — 87086 URINE CULTURE/COLONY COUNT: CPT

## 2018-11-16 PROCEDURE — 74011250637 HC RX REV CODE- 250/637: Performed by: INTERNAL MEDICINE

## 2018-11-16 PROCEDURE — 74011000250 HC RX REV CODE- 250

## 2018-11-16 PROCEDURE — 82962 GLUCOSE BLOOD TEST: CPT

## 2018-11-16 PROCEDURE — 96375 TX/PRO/DX INJ NEW DRUG ADDON: CPT | Performed by: EMERGENCY MEDICINE

## 2018-11-16 PROCEDURE — 77030034850

## 2018-11-16 PROCEDURE — 94660 CPAP INITIATION&MGMT: CPT

## 2018-11-16 PROCEDURE — 36569 INSJ PICC 5 YR+ W/O IMAGING: CPT | Performed by: INTERNAL MEDICINE

## 2018-11-16 PROCEDURE — 74011250636 HC RX REV CODE- 250/636: Performed by: EMERGENCY MEDICINE

## 2018-11-16 PROCEDURE — 94002 VENT MGMT INPAT INIT DAY: CPT

## 2018-11-16 PROCEDURE — 96374 THER/PROPH/DIAG INJ IV PUSH: CPT | Performed by: EMERGENCY MEDICINE

## 2018-11-16 PROCEDURE — 02HV33Z INSERTION OF INFUSION DEVICE INTO SUPERIOR VENA CAVA, PERCUTANEOUS APPROACH: ICD-10-PCS | Performed by: INTERNAL MEDICINE

## 2018-11-16 PROCEDURE — 85027 COMPLETE CBC AUTOMATED: CPT

## 2018-11-16 PROCEDURE — 94762 N-INVAS EAR/PLS OXIMTRY CONT: CPT | Performed by: EMERGENCY MEDICINE

## 2018-11-16 PROCEDURE — B548ZZA ULTRASONOGRAPHY OF SUPERIOR VENA CAVA, GUIDANCE: ICD-10-PCS | Performed by: INTERNAL MEDICINE

## 2018-11-16 PROCEDURE — 99285 EMERGENCY DEPT VISIT HI MDM: CPT | Performed by: EMERGENCY MEDICINE

## 2018-11-16 PROCEDURE — 71260 CT THORAX DX C+: CPT

## 2018-11-16 PROCEDURE — 77030013140 HC MSK NEB VYRM -A

## 2018-11-16 PROCEDURE — 74018 RADEX ABDOMEN 1 VIEW: CPT

## 2018-11-16 PROCEDURE — 84100 ASSAY OF PHOSPHORUS: CPT

## 2018-11-16 PROCEDURE — 0BH17EZ INSERTION OF ENDOTRACHEAL AIRWAY INTO TRACHEA, VIA NATURAL OR ARTIFICIAL OPENING: ICD-10-PCS | Performed by: INTERNAL MEDICINE

## 2018-11-16 PROCEDURE — 74011000250 HC RX REV CODE- 250: Performed by: NURSE PRACTITIONER

## 2018-11-16 PROCEDURE — 80053 COMPREHEN METABOLIC PANEL: CPT

## 2018-11-16 PROCEDURE — 65610000001 HC ROOM ICU GENERAL

## 2018-11-16 PROCEDURE — 82803 BLOOD GASES ANY COMBINATION: CPT

## 2018-11-16 PROCEDURE — 80307 DRUG TEST PRSMV CHEM ANLYZR: CPT

## 2018-11-16 PROCEDURE — 76937 US GUIDE VASCULAR ACCESS: CPT

## 2018-11-16 PROCEDURE — 81001 URINALYSIS AUTO W/SCOPE: CPT

## 2018-11-16 PROCEDURE — 36600 WITHDRAWAL OF ARTERIAL BLOOD: CPT

## 2018-11-16 PROCEDURE — 74011636320 HC RX REV CODE- 636/320: Performed by: EMERGENCY MEDICINE

## 2018-11-16 PROCEDURE — 74011250636 HC RX REV CODE- 250/636: Performed by: INTERNAL MEDICINE

## 2018-11-16 PROCEDURE — 5A1945Z RESPIRATORY VENTILATION, 24-96 CONSECUTIVE HOURS: ICD-10-PCS | Performed by: INTERNAL MEDICINE

## 2018-11-16 PROCEDURE — 74011000258 HC RX REV CODE- 258: Performed by: INTERNAL MEDICINE

## 2018-11-16 RX ORDER — AMIODARONE HYDROCHLORIDE 200 MG/1
400 TABLET ORAL 2 TIMES DAILY
Status: DISCONTINUED | OUTPATIENT
Start: 2018-11-16 | End: 2018-11-16

## 2018-11-16 RX ORDER — FACIAL-BODY WIPES
10 EACH TOPICAL DAILY PRN
Status: DISCONTINUED | OUTPATIENT
Start: 2018-11-16 | End: 2018-11-30 | Stop reason: HOSPADM

## 2018-11-16 RX ORDER — MORPHINE SULFATE 10 MG/ML
5 INJECTION, SOLUTION INTRAMUSCULAR; INTRAVENOUS
Status: DISCONTINUED | OUTPATIENT
Start: 2018-11-16 | End: 2018-11-24

## 2018-11-16 RX ORDER — IPRATROPIUM BROMIDE AND ALBUTEROL SULFATE 2.5; .5 MG/3ML; MG/3ML
3 SOLUTION RESPIRATORY (INHALATION)
Status: COMPLETED | OUTPATIENT
Start: 2018-11-16 | End: 2018-11-16

## 2018-11-16 RX ORDER — SERTRALINE HYDROCHLORIDE 50 MG/1
50 TABLET, FILM COATED ORAL DAILY
Status: DISCONTINUED | OUTPATIENT
Start: 2018-11-16 | End: 2018-11-16

## 2018-11-16 RX ORDER — DEXAMETHASONE SODIUM PHOSPHATE 4 MG/ML
6 INJECTION, SOLUTION INTRA-ARTICULAR; INTRALESIONAL; INTRAMUSCULAR; INTRAVENOUS; SOFT TISSUE EVERY 12 HOURS
Status: DISCONTINUED | OUTPATIENT
Start: 2018-11-16 | End: 2018-11-19

## 2018-11-16 RX ORDER — LEVALBUTEROL INHALATION SOLUTION 1.25 MG/3ML
1.25 SOLUTION RESPIRATORY (INHALATION)
Status: DISCONTINUED | OUTPATIENT
Start: 2018-11-16 | End: 2018-11-16

## 2018-11-16 RX ORDER — SODIUM CHLORIDE 9 MG/ML
50 INJECTION, SOLUTION INTRAVENOUS CONTINUOUS
Status: DISCONTINUED | OUTPATIENT
Start: 2018-11-16 | End: 2018-11-17

## 2018-11-16 RX ORDER — FENTANYL CITRATE 50 UG/ML
75 INJECTION, SOLUTION INTRAMUSCULAR; INTRAVENOUS ONCE
Status: COMPLETED | OUTPATIENT
Start: 2018-11-16 | End: 2018-11-16

## 2018-11-16 RX ORDER — DILTIAZEM HYDROCHLORIDE 60 MG/1
60 TABLET, FILM COATED ORAL
Status: DISCONTINUED | OUTPATIENT
Start: 2018-11-16 | End: 2018-11-16

## 2018-11-16 RX ORDER — HEPARIN 100 UNIT/ML
900 SYRINGE INTRAVENOUS EVERY 8 HOURS
Status: DISCONTINUED | OUTPATIENT
Start: 2018-11-16 | End: 2018-11-30 | Stop reason: HOSPADM

## 2018-11-16 RX ORDER — SUCCINYLCHOLINE CHLORIDE 20 MG/ML
INJECTION INTRAMUSCULAR; INTRAVENOUS
Status: COMPLETED
Start: 2018-11-16 | End: 2018-11-16

## 2018-11-16 RX ORDER — MORPHINE SULFATE 2 MG/ML
2 INJECTION, SOLUTION INTRAMUSCULAR; INTRAVENOUS
Status: DISCONTINUED | OUTPATIENT
Start: 2018-11-16 | End: 2018-11-19 | Stop reason: SDUPTHER

## 2018-11-16 RX ORDER — SODIUM CHLORIDE 0.9 % (FLUSH) 0.9 %
30 SYRINGE (ML) INJECTION AS NEEDED
Status: DISCONTINUED | OUTPATIENT
Start: 2018-11-16 | End: 2018-11-30 | Stop reason: HOSPADM

## 2018-11-16 RX ORDER — MIDAZOLAM HYDROCHLORIDE 1 MG/ML
3 INJECTION, SOLUTION INTRAMUSCULAR; INTRAVENOUS ONCE
Status: COMPLETED | OUTPATIENT
Start: 2018-11-16 | End: 2018-11-16

## 2018-11-16 RX ORDER — MIDAZOLAM HYDROCHLORIDE 1 MG/ML
INJECTION, SOLUTION INTRAMUSCULAR; INTRAVENOUS
Status: COMPLETED
Start: 2018-11-16 | End: 2018-11-16

## 2018-11-16 RX ORDER — NALOXONE HYDROCHLORIDE 0.4 MG/ML
0.4 INJECTION, SOLUTION INTRAMUSCULAR; INTRAVENOUS; SUBCUTANEOUS
Status: COMPLETED | OUTPATIENT
Start: 2018-11-16 | End: 2018-11-16

## 2018-11-16 RX ORDER — ACETAMINOPHEN 650 MG/1
650 SUPPOSITORY RECTAL
Status: DISCONTINUED | OUTPATIENT
Start: 2018-11-16 | End: 2018-11-30 | Stop reason: HOSPADM

## 2018-11-16 RX ORDER — SUCCINYLCHOLINE CHLORIDE 20 MG/ML
130 INJECTION INTRAMUSCULAR; INTRAVENOUS
Status: COMPLETED | OUTPATIENT
Start: 2018-11-16 | End: 2018-11-16

## 2018-11-16 RX ORDER — CARVEDILOL 3.12 MG/1
3.12 TABLET ORAL 2 TIMES DAILY WITH MEALS
Status: DISCONTINUED | OUTPATIENT
Start: 2018-11-16 | End: 2018-11-16

## 2018-11-16 RX ORDER — FUROSEMIDE 10 MG/ML
80 INJECTION INTRAMUSCULAR; INTRAVENOUS
Status: COMPLETED | OUTPATIENT
Start: 2018-11-16 | End: 2018-11-16

## 2018-11-16 RX ORDER — ETOMIDATE 2 MG/ML
30 INJECTION INTRAVENOUS ONCE
Status: COMPLETED | OUTPATIENT
Start: 2018-11-16 | End: 2018-11-16

## 2018-11-16 RX ORDER — LEVALBUTEROL INHALATION SOLUTION 1.25 MG/3ML
1.25 SOLUTION RESPIRATORY (INHALATION)
Status: DISCONTINUED | OUTPATIENT
Start: 2018-11-16 | End: 2018-11-17

## 2018-11-16 RX ORDER — ENOXAPARIN SODIUM 100 MG/ML
40 INJECTION SUBCUTANEOUS EVERY 24 HOURS
Status: DISCONTINUED | OUTPATIENT
Start: 2018-11-16 | End: 2018-11-17

## 2018-11-16 RX ORDER — ALBUTEROL SULFATE 0.83 MG/ML
2.5 SOLUTION RESPIRATORY (INHALATION)
Status: DISCONTINUED | OUTPATIENT
Start: 2018-11-16 | End: 2018-11-16 | Stop reason: ALTCHOICE

## 2018-11-16 RX ORDER — FENTANYL CITRATE 50 UG/ML
INJECTION, SOLUTION INTRAMUSCULAR; INTRAVENOUS
Status: COMPLETED
Start: 2018-11-16 | End: 2018-11-16

## 2018-11-16 RX ORDER — SERTRALINE HYDROCHLORIDE 50 MG/1
50 TABLET, FILM COATED ORAL DAILY
Status: DISCONTINUED | OUTPATIENT
Start: 2018-11-17 | End: 2018-11-30 | Stop reason: HOSPADM

## 2018-11-16 RX ORDER — HYDROCODONE BITARTRATE AND ACETAMINOPHEN 7.5; 325 MG/1; MG/1
1 TABLET ORAL
Status: DISCONTINUED | OUTPATIENT
Start: 2018-11-16 | End: 2018-11-30 | Stop reason: HOSPADM

## 2018-11-16 RX ORDER — ALBUTEROL SULFATE 0.83 MG/ML
1.25 SOLUTION RESPIRATORY (INHALATION)
Status: DISCONTINUED | OUTPATIENT
Start: 2018-11-16 | End: 2018-11-16 | Stop reason: ALTCHOICE

## 2018-11-16 RX ORDER — METOPROLOL TARTRATE 5 MG/5ML
5 INJECTION INTRAVENOUS EVERY 6 HOURS
Status: DISCONTINUED | OUTPATIENT
Start: 2018-11-16 | End: 2018-11-19

## 2018-11-16 RX ORDER — FENTANYL CITRATE 50 UG/ML
150 INJECTION, SOLUTION INTRAMUSCULAR; INTRAVENOUS ONCE
Status: COMPLETED | OUTPATIENT
Start: 2018-11-16 | End: 2018-11-16

## 2018-11-16 RX ORDER — CARVEDILOL 3.12 MG/1
3.12 TABLET ORAL 2 TIMES DAILY WITH MEALS
Status: DISCONTINUED | OUTPATIENT
Start: 2018-11-17 | End: 2018-11-16

## 2018-11-16 RX ORDER — SODIUM CHLORIDE 0.9 % (FLUSH) 0.9 %
30 SYRINGE (ML) INJECTION EVERY 8 HOURS
Status: DISCONTINUED | OUTPATIENT
Start: 2018-11-16 | End: 2018-11-30 | Stop reason: HOSPADM

## 2018-11-16 RX ORDER — PROPOFOL 10 MG/ML
0-50 VIAL (ML) INTRAVENOUS
Status: DISCONTINUED | OUTPATIENT
Start: 2018-11-16 | End: 2018-11-20

## 2018-11-16 RX ORDER — HEPARIN 100 UNIT/ML
900 SYRINGE INTRAVENOUS AS NEEDED
Status: DISCONTINUED | OUTPATIENT
Start: 2018-11-16 | End: 2018-11-30 | Stop reason: HOSPADM

## 2018-11-16 RX ORDER — SODIUM CHLORIDE 0.9 % (FLUSH) 0.9 %
10 SYRINGE (ML) INJECTION
Status: COMPLETED | OUTPATIENT
Start: 2018-11-16 | End: 2018-11-16

## 2018-11-16 RX ORDER — ETOMIDATE 2 MG/ML
INJECTION INTRAVENOUS
Status: COMPLETED
Start: 2018-11-16 | End: 2018-11-16

## 2018-11-16 RX ADMIN — FUROSEMIDE 80 MG: 10 INJECTION, SOLUTION INTRAMUSCULAR; INTRAVENOUS at 02:35

## 2018-11-16 RX ADMIN — NITROGLYCERIN 1 INCH: 20 OINTMENT TOPICAL at 02:34

## 2018-11-16 RX ADMIN — SERTRALINE HYDROCHLORIDE 50 MG: 50 TABLET, FILM COATED ORAL at 08:13

## 2018-11-16 RX ADMIN — PROPOFOL 10 MCG/KG/MIN: 10 INJECTION, EMULSION INTRAVENOUS at 17:25

## 2018-11-16 RX ADMIN — SODIUM CHLORIDE, PRESERVATIVE FREE 900 UNITS: 5 INJECTION INTRAVENOUS at 21:21

## 2018-11-16 RX ADMIN — DEXAMETHASONE SODIUM PHOSPHATE 6 MG: 4 INJECTION, SOLUTION INTRAMUSCULAR; INTRAVENOUS at 10:08

## 2018-11-16 RX ADMIN — SUCCINYLCHOLINE CHLORIDE 130 MG: 20 INJECTION, SOLUTION INTRAMUSCULAR; INTRAVENOUS; PARENTERAL at 17:11

## 2018-11-16 RX ADMIN — DILTIAZEM HYDROCHLORIDE 10 MG/HR: 5 INJECTION INTRAVENOUS at 16:17

## 2018-11-16 RX ADMIN — ENOXAPARIN SODIUM 40 MG: 40 INJECTION, SOLUTION INTRAVENOUS; SUBCUTANEOUS at 08:13

## 2018-11-16 RX ADMIN — FENTANYL CITRATE 75 MCG: 50 INJECTION INTRAMUSCULAR; INTRAVENOUS at 13:45

## 2018-11-16 RX ADMIN — ETOMIDATE 30 MG: 40 INJECTION, SOLUTION INTRAVENOUS at 17:10

## 2018-11-16 RX ADMIN — SODIUM CHLORIDE 100 ML: 900 INJECTION, SOLUTION INTRAVENOUS at 04:08

## 2018-11-16 RX ADMIN — ALBUTEROL SULFATE 2.5 MG: 2.5 SOLUTION RESPIRATORY (INHALATION) at 08:47

## 2018-11-16 RX ADMIN — Medication 10 ML: at 04:08

## 2018-11-16 RX ADMIN — FENTANYL CITRATE 150 MCG: 50 INJECTION, SOLUTION INTRAMUSCULAR; INTRAVENOUS at 17:09

## 2018-11-16 RX ADMIN — TIOTROPIUM BROMIDE 18 MCG: 18 CAPSULE ORAL; RESPIRATORY (INHALATION) at 08:47

## 2018-11-16 RX ADMIN — Medication 30 ML: at 21:21

## 2018-11-16 RX ADMIN — SUCCINYLCHOLINE CHLORIDE 130 MG: 20 INJECTION INTRAMUSCULAR; INTRAVENOUS at 17:11

## 2018-11-16 RX ADMIN — ETOMIDATE 30 MG: 2 INJECTION INTRAVENOUS at 17:10

## 2018-11-16 RX ADMIN — LEVALBUTEROL HYDROCHLORIDE 1.25 MG: 1.25 SOLUTION RESPIRATORY (INHALATION) at 20:33

## 2018-11-16 RX ADMIN — METHYLPREDNISOLONE SODIUM SUCCINATE 125 MG: 125 INJECTION, POWDER, FOR SOLUTION INTRAMUSCULAR; INTRAVENOUS at 05:07

## 2018-11-16 RX ADMIN — METOPROLOL TARTRATE 5 MG: 5 INJECTION, SOLUTION INTRAVENOUS at 21:17

## 2018-11-16 RX ADMIN — ALBUTEROL SULFATE 2.5 MG: 2.5 SOLUTION RESPIRATORY (INHALATION) at 16:32

## 2018-11-16 RX ADMIN — CARVEDILOL 3.12 MG: 3.12 TABLET, FILM COATED ORAL at 08:13

## 2018-11-16 RX ADMIN — NALOXONE HYDROCHLORIDE 0.4 MG: 0.4 INJECTION, SOLUTION INTRAMUSCULAR; INTRAVENOUS; SUBCUTANEOUS at 05:02

## 2018-11-16 RX ADMIN — MIDAZOLAM HYDROCHLORIDE 3 MG: 1 INJECTION, SOLUTION INTRAMUSCULAR; INTRAVENOUS at 13:45

## 2018-11-16 RX ADMIN — FENTANYL CITRATE 150 MCG: 50 INJECTION INTRAMUSCULAR; INTRAVENOUS at 17:09

## 2018-11-16 RX ADMIN — INSULIN HUMAN 2 UNITS: 100 INJECTION, SOLUTION PARENTERAL at 14:01

## 2018-11-16 RX ADMIN — IOPAMIDOL 100 ML: 755 INJECTION, SOLUTION INTRAVENOUS at 04:08

## 2018-11-16 RX ADMIN — NALOXONE HYDROCHLORIDE 0.4 MG: 0.4 INJECTION, SOLUTION INTRAMUSCULAR; INTRAVENOUS; SUBCUTANEOUS at 04:58

## 2018-11-16 RX ADMIN — DILTIAZEM HYDROCHLORIDE 60 MG: 60 TABLET, FILM COATED ORAL at 10:08

## 2018-11-16 RX ADMIN — FENTANYL CITRATE 75 MCG: 50 INJECTION, SOLUTION INTRAMUSCULAR; INTRAVENOUS at 13:45

## 2018-11-16 RX ADMIN — SODIUM CHLORIDE 50 ML/HR: 900 INJECTION, SOLUTION INTRAVENOUS at 07:41

## 2018-11-16 RX ADMIN — ALBUTEROL SULFATE 2.5 MG: 2.5 SOLUTION RESPIRATORY (INHALATION) at 12:13

## 2018-11-16 RX ADMIN — IPRATROPIUM BROMIDE AND ALBUTEROL SULFATE 3 ML: .5; 3 SOLUTION RESPIRATORY (INHALATION) at 04:47

## 2018-11-16 RX ADMIN — DEXAMETHASONE SODIUM PHOSPHATE 6 MG: 4 INJECTION, SOLUTION INTRAMUSCULAR; INTRAVENOUS at 20:32

## 2018-11-16 RX ADMIN — AMIODARONE HYDROCHLORIDE 400 MG: 200 TABLET ORAL at 09:05

## 2018-11-16 SDOH — ECONOMIC STABILITY - HOUSING INSECURITY: HOMELESSNESS UNSPECIFIED: Z59.00

## 2018-11-16 NOTE — PROGRESS NOTES
Dr. Ethan Cornelius at bedside. Pt medicated per [facility] and successfully intubated. RT at bedside and pt placed on vent--see flowsheet for settings. Lung sounds diminished. Pt with a lot of secretions. Suctioned via ETT and orally. CXR ordered to confirm placement. Diprivan gtt started for sedation/comfort. NGT placed per orders for medications. KUB ordered to confirm placement. Galindo catheter placed under sterile technique per MD orders to monitor UOP closely. Pt also with poor PIV access. Multiple attempts to start PIV without success. STAT PICC line ordered and VAT to come place.

## 2018-11-16 NOTE — CONSULTS
[facility] Cardiology Consult Attending Cardiologist:Dr. Chace Silva Primary Cardiologist:none Primary Care Physician:none Subjective:  
 
Loco Barrera is a 52 y.o. male with hx of polysubstance abuse who presented obtunded and essentially unresponsive. He required airway support and IV narcan. UDS was positive for methamphetamine. He was seen in January with aflutter RVR. He underwent MIRANDA and cardioversion with initiation of amiodarone and xarelto then. He did not follow up in office and does not see physician on routine basis. He is unable to keep his eyes open during interview. He denies CP, SOB at present. Past Medical History:  
Diagnosis Date  Chronic obstructive pulmonary disease ([facility] [address]  Heart failure ([facility] [address]  Sleep disorder No past surgical history on file. Current Facility-Administered Medications Medication Dose Route Frequency  carvedilol (COREG) tablet 3.125 mg  3.125 mg Oral BID WITH MEALS  sertraline (ZOLOFT) tablet 50 mg  50 mg Oral DAILY  tiotropium (SPIRIVA) inhalation capsule 18 mcg  1 Cap Inhalation DAILY  0.9% sodium chloride infusion  50 mL/hr IntraVENous CONTINUOUS  
 insulin regular (NOVOLIN R, HUMULIN R) injection   SubCUTAneous AC&HS  
 albuterol (PROVENTIL VENTOLIN) nebulizer solution 2.5 mg  2.5 mg Nebulization Q4H RT  
 acetaminophen (TYLENOL) suppository 650 mg  650 mg Rectal Q4H PRN  
 HYDROcodone-acetaminophen (NORCO) 7.5-325 mg per tablet 1 Tab  1 Tab Oral Q4H PRN  
 morphine injection 2 mg  2 mg IntraVENous Q2H PRN  
 bisacodyl (DULCOLAX) suppository 10 mg  10 mg Rectal DAILY PRN  
 enoxaparin (LOVENOX) injection 40 mg  40 mg SubCUTAneous Q24H  
 dexamethasone (DECADRON) 4 mg/mL injection 6 mg  6 mg IntraVENous Q12H  
 amiodarone (CORDARONE) tablet 400 mg  400 mg Oral BID  dilTIAZem (CARDIZEM) IR tablet 60 mg  60 mg Oral TIDAC No Known Allergies Social History Tobacco Use  
  Smoking status: Current Every Day Smoker Packs/day: 2.00 Substance Use Topics  Alcohol use: No  
  Comment: once a month beer No family history on file. Review of Systems Gen: Denies fever, chills, malaise or fatigue. Appetite good. HEENT: Denies frequent headaches, dizzyness, visual disturbances, Neck pain or swallowing difficulty Lungs: Denies shortness of breath, hx of COPD, breathing problems Cardiovascular: Denies chest pain, orthopnea, PND, no syncope or near syncope GI: Denies hememesis, dark tarry stools, No prior Hx of GI bleed, Denies constipation : Denies dysuria, no complaints of frequency, nocturia Heme: No prior bleeding disorders, no prior Cancer Neuro: Denies prior CVA, TIA. Endocrine: no diabetes, thyroid disorders Psychiatric: Denies anxiety, or other psychiatric illnesses. Objective:  
 
Visit Vitals BP (!) 181/113 Pulse (!) 129 Temp 98.3 °F (36.8 °C) Resp 11 Ht 6' (1.829 m) Wt 108.9 kg (240 lb) SpO2 96% BMI 32.55 kg/m² General:Alert, cooperative, no distress, appears stated age Head: Normocephalic, without obvious abnormality, atraumatic. Eyes: Conjunctivae/corneas clear. PERRL, EOMs intact Nose:Nares normal. Septum midline. Mucosa normal. No drainage or sinus tenderness. Throat: Lips, mucosa, and tongue normal. Teeth and gums normal.  
Neck: Supple, symmetrical, trachea midline,  no carotid bruit and no JVD. Lungs:Clear to auscultation bilaterally. Chest wall: No tenderness or deformity. Heart: Regular rate and rhythm, S1, S2 normal, no murmur, click, rub or gallop. Abdomen:Soft, non-tender. Bowel sounds normal. No masses, No organomegaly. Extremities: Extremities normal, atraumatic, no cyanosis or edema. Pulses: 2+ and symmetric all extremities. Skin: Skin color, texture, turgor normal. No rashes or lesions Lymph nodes: Cervical, supraclavicular, and axillary nodes normal 
Neurologic:No focal deficits identified ECG: atrial flutter with RVR Data Review:  
 
Recent Results (from the past 24 hour(s)) POC TROPONIN-I Collection Time: 11/16/18  2:13 AM  
Result Value Ref Range Troponin-I (POC) 0.02 0.02 - 0.05 ng/ml EKG, 12 LEAD, INITIAL Collection Time: 11/16/18  2:16 AM  
Result Value Ref Range Ventricular Rate 131 BPM  
 Atrial Rate 131 BPM  
 P-R Interval 104 ms QRS Duration 78 ms Q-T Interval 278 ms QTC Calculation (Bezet) 410 ms Calculated P Axis -112 degrees Calculated R Axis 116 degrees Calculated T Axis -53 degrees Diagnosis Atypical atrial flutter Left posterior fascicular block Nonspecific ST and T wave abnormality Abnormal ECG When compared with ECG of 03-JUL-2018 00:47, 
T wave inversion now evident in Inferior leads Atrial flutter is now present Confirmed by [facility]  MD ()JAGDISH ([id number]) on 11/16/2018 7:53:16 AM 
  
CBC W/O DIFF Collection Time: 11/16/18  2:32 AM  
Result Value Ref Range WBC 7.5 4.3 - 11.1 K/uL  
 RBC 4.02 (L) 4.23 - 5.6 M/uL  
 HGB 13.1 (L) 13.6 - 17.2 g/dL HCT 43.9 41.1 - 50.3 % .2 (H) 79.6 - 97.8 FL  
 MCH 32.6 26.1 - 32.9 PG  
 MCHC 29.8 (L) 31.4 - 35.0 g/dL  
 RDW 13.2 % PLATELET 977 992 - 623 K/uL MPV 9.6 9.4 - 12.3 FL ABSOLUTE NRBC 0.00 0.0 - 0.2 K/uL METABOLIC PANEL, COMPREHENSIVE Collection Time: 11/16/18  2:32 AM  
Result Value Ref Range Sodium 141 136 - 145 mmol/L Potassium 5.1 3.5 - 5.1 mmol/L Chloride 105 98 - 107 mmol/L  
 CO2 32 21 - 32 mmol/L Anion gap 4 (L) 7 - 16 mmol/L Glucose 101 (H) 65 - 100 mg/dL BUN 20 6 - 23 MG/DL Creatinine 1.38 0.8 - 1.5 MG/DL  
 GFR est AA >60 >60 ml/min/1.73m2 GFR est non-AA 57 (L) >60 ml/min/1.73m2 Calcium 8.4 8.3 - 10.4 MG/DL Bilirubin, total 0.3 0.2 - 1.1 MG/DL  
 ALT (SGPT) 21 12 - 65 U/L  
 AST (SGOT) 15 15 - 37 U/L Alk. phosphatase 97 50 - 136 U/L Protein, total 7.3 6.3 - 8.2 g/dL Albumin 3.2 (L) 3.5 - 5.0 g/dL Globulin 4.1 (H) 2.3 - 3.5 g/dL A-G Ratio 0.8 (L) 1.2 - 3.5 BNP Collection Time: 11/16/18  2:32 AM  
Result Value Ref Range  pg/mL TSH 3RD GENERATION Collection Time: 11/16/18  2:32 AM  
Result Value Ref Range TSH 3.630 0.358 - 3.740 uIU/mL PHOSPHORUS Collection Time: 11/16/18  2:32 AM  
Result Value Ref Range Phosphorus 3.8 2.5 - 4.5 MG/DL  
POC G3 Collection Time: 11/16/18  2:34 AM  
Result Value Ref Range Device: NASAL CANNULA pH (POC) 7.251 (L) 7.35 - 7.45    
 pCO2 (POC) 72.1 (HH) 35 - 45 MMHG  
 pO2 (POC) 70 (L) 75 - 100 MMHG  
 HCO3 (POC) 31.7 (H) 22 - 26 MMOL/L  
 sO2 (POC) 90 (L) 95 - 98 % Base excess (POC) 2 mmol/L Allens test (POC) YES Site RIGHT RADIAL Patient temp. 98.6 Specimen type (POC) ARTERIAL Performed by TumblinTamaraRT   
 CO2, POC 34 MMOL/L Flow rate (POC) 2.000 L/min Respiratory comment: PhysicianNotified POC TROPONIN-I Collection Time: 11/16/18  2:36 AM  
Result Value Ref Range Troponin-I (POC) 0.01 (L) 0.02 - 0.05 ng/ml POC G3 Collection Time: 11/16/18  4:03 AM  
Result Value Ref Range Device: BIPAP    
 FIO2 (POC) 35 % pH (POC) 7.261 (L) 7.35 - 7.45    
 pCO2 (POC) 76.6 (HH) 35 - 45 MMHG  
 pO2 (POC) 76 75 - 100 MMHG  
 HCO3 (POC) 34.4 (H) 22 - 26 MMOL/L  
 sO2 (POC) 92 (L) 95 - 98 % Base excess (POC) 4 mmol/L Tidal volume 420 ml Set Rate 10 bpm  
 PEEP/CPAP (POC) 9 cmH2O  
 PIP (POC) 13 Allens test (POC) YES Inspiratory Time 0.9 sec Site RIGHT RADIAL Patient temp. 98.6 Specimen type (POC) ARTERIAL Performed by EarleGraceRT   
 CO2, POC 37 MMOL/L Spontaneous timed 15 Respiratory comment: PhysicianNotified DRUG SCREEN, URINE Collection Time: 11/16/18  5:26 AM  
Result Value Ref Range PCP(PHENCYCLIDINE) NEGATIVE BENZODIAZEPINES NEGATIVE     
 COCAINE NEGATIVE  AMPHETAMINES POSITIVE    
 METHADONE NEGATIVE     
 THC (TH-CANNABINOL) NEGATIVE     
 OPIATES NEGATIVE     
 BARBITURATES NEGATIVE URINALYSIS W/ RFLX MICROSCOPIC Collection Time: 11/16/18  7:53 AM  
Result Value Ref Range Color YELLOW Appearance CLEAR Specific gravity 1.014 1.001 - 1.023    
 pH (UA) 5.0 5.0 - 9.0 Protein NEGATIVE  NEG mg/dL Glucose NEGATIVE  mg/dL Ketone NEGATIVE  NEG mg/dL Bilirubin NEGATIVE  NEG Blood TRACE (A) NEG Urobilinogen 0.2 0.2 - 1.0 EU/dL Nitrites NEGATIVE  NEG Leukocyte Esterase NEGATIVE  NEG    
 WBC 0 0 /hpf  
 RBC 0-3 0 /hpf Epithelial cells 0 0 /hpf Bacteria 0 0 /hpf Casts 0-3 0 /lpf Assessment / Plan Principal Problem: 
  Acute on chronic respiratory failure with hypoxia and hypercapnia (HCC) (11/16/2018)-=-secondary to meth use. NATALIIA Active Problems: COPD (chronic obstructive pulmonary disease) ([facility] [address] (11/16/2018) [facility] abuse ([facility] (2/1/2018) NATALIIA (obstructive sleep apnea) (11/16/2018) Altered mental status (11/16/2018) Morbid obesity ([facility] [address] (11/16/2018) H/O atrial flutter (11/16/2018)--as above, s/p pancho/ cv in  January was placed on amiodarone and xarelto at that time. He was not taking his medications at home. Doubtful he is appropriate for long term anticoagulation. Will start PO cardizem and defer to Dr. Bridger Murguia regarding other interventions. Overview: 1/2018 Atrial flutter, s/p cardioversion. Essential hypertension (11/16/2018)--as above, start cardizem 60 mg tid.   
 
 
 
 
 
 
Carlos Manuel Hung NP

## 2018-11-16 NOTE — PROGRESS NOTES
Pt admitted to room 3109 from ER. On arrival, pt lethargic but awakens to voice and follows commands. Oriented to person and hospital.  Pupils 2mm, round and sluggish. Pt w/poor dentition, mumbles and difficult to understand what he is saying at times. Pt denies having dentures. Lung sounds diminished. RT at bedside and pt placed on BiPAP-- see flowsheet for settings. Aflutter, HR 130s on monitor. Hypertensive, BP 170s/120s. Pt obese, abd soft, bowel sounds hypoactive. Pt NPO at this time. Incontinent of urine in the bed, cleaned. Provided with urinal and able to void with assistance. Condom catheter placed to monitor UOP more closely. Pt denies any pain or other complaints. Dual skin assessment complete w/LUCIANO Phipps. Pt skin is dry and warm. No redness or breakdown present to sacrum--allevyn placed for protection. Pt bathed w/CHG wipes and repositioned w/hob elevated.

## 2018-11-16 NOTE — H&P
HISTORY AND PHYSICAL Wash Gail 11/16/2018 Date of Admission:  11/16/2018 The patient's chart is reviewed and the patient is discussed with the staff. Subjective: The patient is a 52 y.o.  male known to [facility] with morbid obesity, NATALIIA, COPD, substance abuse, acute on chronic respiratory failure, ? OHS who presented to the ER via EMS on CPAP. Apparently he called EMS earlier in distress and was found in the tripod position. On arrival to the ER he was placed on BIPAP. He has been largely obtunded but does arouse to sternal rub. He received 1 amp of narcan with limited response. I arrived after the narcan and found his neck flexed by 2 pillows and removed them with improved air flow on BIPAP. His pupils were pinpoint. I examined him and was checking babinski's and he awaked with this stimulation with downgoing toes. A second amp of narcan has been given. A urine tox screen is pending. He can provide no history. He has been homeless in the past. 
 
He has a history of atrial arrhythmias and was apparently on Xarelto and amiodarone in the past. It is unclear if he is still using these medications. A CT was performed revealing no infiltrates or pneumonia. A trace R effusion and pericardial effusion was noted. Review of Systems Review of systems not obtained due to patient factors. Patient Active Problem List  
Diagnosis Code  COPD (chronic obstructive pulmonary disease) ([facility]) J44.9  Nicotine dependence F17.200  Methamphetamine abuse ([facility] [address] F15.10  Anasarca R60.1  NATALIIA (obstructive sleep apnea) G47.33  
 Acute on chronic respiratory failure with hypoxia and hypercapnia ([facility]) J96.21, J96.22  
 Urethral stricture [id number]  Alcohol abuse F10.10  Homeless Z59.0  Altered mental status R41.82  
 Acute respiratory failure with hypercapnia ([facility]) J96.02  
 Morbid obesity ([facility]) E66.01  
 H/O atrial flutter Z86.79  
 
 
 Prior to Admission Medications Prescriptions Last Dose Informant Patient Reported? Taking? albuterol (PROVENTIL HFA, VENTOLIN HFA, PROAIR HFA) 90 mcg/actuation inhaler   No No  
Sig: Take 2 Puffs by inhalation every four (4) hours as needed for Wheezing. albuterol-ipratropium (DUO-NEB) 2.5 mg-0.5 mg/3 ml nebu   No No  
Sig: 3 mL by Nebulization route every four (4) hours as needed. amiodarone (PACERONE) 400 mg tablet   No No  
Sig: Take 1 Tab by mouth two (2) times a day. carvedilol (COREG) 3.125 mg tablet   No No  
Sig: Take 1 Tab by mouth two (2) times daily (with meals). rivaroxaban (XARELTO) 20 mg tab tablet   No No  
Sig: Take 1 Tab by mouth daily (with breakfast). sertraline (ZOLOFT) 50 mg tablet   No No  
Sig: Take 1 Tab by mouth daily. tiotropium (SPIRIVA) 18 mcg inhalation capsule   No No  
Sig: Take 1 Cap by inhalation daily. Facility-Administered Medications: None Past Medical History:  
Diagnosis Date  Chronic obstructive pulmonary disease ([facility] [address]  Heart failure ([facility] [address]  Sleep disorder No past surgical history on file. Social History Socioeconomic History  Marital status:  Spouse name: Not on file  Number of children: Not on file  Years of education: Not on file  Highest education level: Not on file Social Needs  Financial resource strain: Not on file  Food insecurity - worry: Not on file  Food insecurity - inability: Not on file  Transportation needs - medical: Not on file  Transportation needs - non-medical: Not on file Occupational History  Not on file Tobacco Use  Smoking status: Current Every Day Smoker Packs/day: 2.00 Substance and Sexual Activity  Alcohol use: No  
  Comment: once a month beer  Drug use: Yes Types: Marijuana, Methamphetamines  Sexual activity: Not on file Other Topics Concern  Not on file Social History Narrative  Not on file No family history on file. No Known Allergies Current Facility-Administered Medications Medication Dose Route Frequency  methylPREDNISolone (PF) (SOLU-MEDROL) injection 125 mg  125 mg IntraVENous ONCE  
 naloxone (NARCAN) injection 0.4 mg  0.4 mg IntraVENous NOW Current Outpatient Medications Medication Sig  
 amiodarone (PACERONE) 400 mg tablet Take 1 Tab by mouth two (2) times a day.  carvedilol (COREG) 3.125 mg tablet Take 1 Tab by mouth two (2) times daily (with meals).  rivaroxaban (XARELTO) 20 mg tab tablet Take 1 Tab by mouth daily (with breakfast).  sertraline (ZOLOFT) 50 mg tablet Take 1 Tab by mouth daily.  tiotropium (SPIRIVA) 18 mcg inhalation capsule Take 1 Cap by inhalation daily.  albuterol (PROVENTIL HFA, VENTOLIN HFA, PROAIR HFA) 90 mcg/actuation inhaler Take 2 Puffs by inhalation every four (4) hours as needed for Wheezing.  albuterol-ipratropium (DUO-NEB) 2.5 mg-0.5 mg/3 ml nebu 3 mL by Nebulization route every four (4) hours as needed. Objective:  
 
Vitals:  
 11/16/18 0421 11/16/18 3726 11/16/18 5829 11/16/18 6403 BP: (!) 171/108 (!) 189/120 Pulse: (!) 132 (!) 132 (!) 133 Resp: 23 15 18 Temp:      
SpO2: 95%  94% 97% Weight:      
Height: PHYSICAL EXAM  
 
Constitutional:  the patient is morbidly obese and in no acute distress on BIPAP 
EENMT:  Sclera clear, pupils equal and PP, oral mucosa moist 
Neck: Obese with no retractions; JVD difficult to assess due to obese neck Respiratory: decreased BS bilaterally Cardiovascular:  RRR without M,G,R 
Gastrointestinal: soft and non-tender; with positive bowel sounds. Musculoskeletal: warm without cyanosis. There is trace lower leg edema and mild stasis changes. Skin:  no jaundice or rashes Neurologic: no gross neuro deficits Psychiatric: obtunded but aroused to sternal rub and babinski maneuver. Awakened and able to use urinal but not interactive yet. CXR: 
 
 
 
 
Chest CT:  
 
IMPRESSION:   
 1. No acute process or evidence of pulmonary embolism. 2. Unchanged myocardial megaly, coronary artery disease and tiny pericardial 
effusion. 3. A tiny right pleural effusion is also unchanged. Recent Labs 11/16/18 0232 WBC 7.5 HGB 13.1* HCT 43.9  Recent Labs 11/16/18 0232   
K 5.1  * CO2 32 BUN 20  
CREA 1.38  
CA 8.4 ALB 3.2* TBILI 0.3 ALT 21 SGOT 15 No results for input(s): PH, PCO2, PO2, HCO3 in the last 72 hours. No results for input(s): LCAD, LAC in the last 72 hours. ABG: 
 
Results for Carol Perea (MRN [id number]) as of 11/16/2018 04:56 Ref. Range 11/16/2018 02:34 11/16/2018 04:03  
pH (POC) Latest Ref Range: 7.35 - 7.45   7.251 (L) 7.261 (L)  
pCO2 (POC) Latest Ref Range: 35 - 45 MMHG 72.1 (HH) 76.6 (HH)  
pO2 (POC) Latest Ref Range: 75 - 100 MMHG 70 (L) 76 HCO3 (POC) Latest Ref Range: 22 - 26 MMOL/L 31.7 (H) 34.4 (H)  
sO2 (POC) Latest Ref Range: 95 - 98 % 90 (L) 92 (L) Base excess (POC) Latest Units: mmol/L 2 4 FIO2 (POC) Latest Units: %  35 Patient temp. Latest Units:   98.6 98.6 Specimen type (POC) Latest Units:   ARTERIAL ARTERIAL Flow rate (POC) Latest Units: L/min 2.000 Set Rate Latest Units: bpm  10 Site Latest Units:   RIGHT RADIAL RIGHT RADIAL Device: Latest Units:   NASAL CANNULA BIPAP Tidal volume Latest Units: ml  420 PIP (POC) Latest Units:    13 PEEP/CPAP (POC) Latest Units: cmH2O  9 Allens test (POC) Latest Units:   YES YES Assessment:  (Medical Decision Making) Hospital Problems  Date Reviewed: 11/16/2018 Codes Class Noted POA  
 COPD (chronic obstructive pulmonary disease) ([facility] [address] (Chronic) ICD-10-CM: J44.9 ICD-9-CM: 092  11/16/2018 Yes Severity unclear. NATALIIA (obstructive sleep apnea) (Chronic) ICD-10-CM: G47.33 
ICD-9-CM: 327.23  11/16/2018 Yes Unclear if pt compliant with CPAP.    
 * (Principal) Acute on chronic respiratory failure with hypoxia and hypercapnia ([facility] [address] ICD-10-CM: J96.21, J96.22 
ICD-9-CM: 518.84, 786.09, 799.02  11/16/2018 Yes Now arouses with stimulation. ABG not changing on BIPAP suggesting OHS. Need to check urine tox screen. Altered mental status ICD-10-CM: R41.82 
ICD-9-CM: 780.97  11/16/2018 Yes Likley from hypoxemia and hypercapnia. Response to narcan not impressive for narcotic OD Morbid obesity ([facility] [address] (Chronic) ICD-10-CM: E66.01 
ICD-9-CM: 278.01  11/16/2018 Yes Ongoing H/O atrial flutter (Chronic) ICD-10-CM: Z86.79 
ICD-9-CM: V12.59  11/16/2018 Yes Overview Signed 2/6/2018  7:51 AM by Zbigniew Huerta NP  
  1/2018 Atrial flutter, s/p cardioversion. Essential hypertension ICD-10-CM: I10 
ICD-9-CM: 401.9  11/16/2018 Unknown May need further BP control. Plan:  (Medical Decision Making) --Will admit to ICU for further medical management 
--Continue BIPAP; follow ABGs' --Intubate prn. --Respiratory nebulizer treatments 
--culture urine 
--steroid therapy 
--Check UDS 
--Check TSH; T4 if abnormal. 
--Monitor neuro status. May need head CT if focal changes develop. More than 50% of the time documented was spent in face-to-face contact with the patient and in the care of the patient on the floor/unit where the patient is located.  
 
Evan Donato MD

## 2018-11-16 NOTE — ROUTINE PROCESS
TRANSFER - OUT REPORT: 
 
Verbal report given to Deng Cooper RN on Lizet Menendez  being transferred to [facility] for routine progression of care Report consisted of patients Situation, Background, Assessment and  
Recommendations(SBAR). Information from the following report(s) SBAR, ED Summary, Intake/Output, MAR and Recent Results was reviewed with the receiving nurse. Lines:  
Peripheral IV 11/16/18 Anterior; Left Antecubital (Active) Opportunity for questions and clarification was provided. Patient transported with: 
 Registered Nurse

## 2018-11-16 NOTE — PROGRESS NOTES
Patient was intubated with a number 8.0 ET Tube. Tube placement verified by auscultation and ETCO2 monitor. ET Tube is secured at the 23 cm mag at the lip and on the right side. Patient was intubated by Dr Blanco on the 1 attempt. Breath sounds are diminished. Patient is Negative for subcutaneous air and chest excursion is symmetric. Trachea is midline. Patient is also Negative for cyanosis. Patient placed on ventilator on documented settings. All alarms are set and audible. Resuscitation bag is at the head of the bed. Ventilator Settings Mode FIO2 Rate Tidal Volume Pressure PEEP I:E Ratio PRVC  40 %   18 500 ml     10 cm H20  1:2.7 Peak airway pressure: 22 cm H2O Minute ventilation: 10.9 l/min ABG: No results for input(s): PH, PCO2, PO2, HCO3 in the last 72 hours.

## 2018-11-16 NOTE — ROUTINE PROCESS
Guideline Guideline Number: [id number] Title: Management of the Patient with Mechanical Ventilation (including weaning) and ABCDE Bundle Effective Date:  03/00 Revised Date: 02/09, 03/10, 7/12, 5/13,                                  10/13, 8/14 Reviewed Date: 07/2015, 04/2016, 06/2017 I. Policy:  Management of the patient requiring mechanical ventilation, including readiness to wean and weaning protocol. The information provided serves as a guideline for patient management. Included in this guidelines is the ABCDE Bundle to provide guidance to staff for evidence based management of pain, agitation/anxiety and delirium in the intensive care unit. The goals of critical care analgesia and sedation are to facilitate mechanical ventilation, to prevent patient and caregiver injury, and to avoid the psychological and physiologic consequences of inadequate treatment of pain, anxiety, agitation, and delirium by maintaining a light level of sedation. Pain occurs commonly in adult ICU patients, regardless of admitting diagnosis. Therefore, pain should be frequently assessed and analgesic medications titrated to prevent adverse effects associated with either inadequate or excessive analgesia. Once pain has been addressed, anxiolytic and/or antipsychotic medications can be utilized to treat unresolved agitation/anxiety and delirium, with the goal to prevent over- or under sedation by using the Richards Agitation Sedation Scale (RASS). Assertive management of these issues has been shown to reduce costs, improve ICU outcomes such as successful extubation and ICU length of stay, and allow for patients to participate in their own care. II. Purpose: The respiratory care practitioner and the critical care RN will utilize the following guideline to provide the most efficient and effective management of mechanical ventilators and weaning and extubation processes. Goals of Treatment: 1. Adequate management of patients pain and discomfort while maintaining a light level of                   sedation (RASS score of 0 to -1) 2. Both chronic and acute sources of pain should be identified and treated. 3. Sedative agents should be considered if patient still expresses discomfort and/or is not at RASS         goal of 0 to -1 despite adequate management of pain. 4. Patients requiring neuromuscular blockage must have continuous infusions of analgesic and              sedative agents. III. Responsibility: Director Respiratory Care Services and all Respiratory Care Practitioners  with documented competency as well as Critical Care RN staff. General Guidelines 1. Introduction to Ventilator Plan Phase 
a. Ventilator Management Phase, General Statement -  The plan should be initiated in patients who have a secure airway/require invasive mechanical ventilation (endotracheal or tracheostomy) only -   The provider determines the appropriate medications used for analgesia and agitation/anxiety along with the clinical pharmacist 
 
2. Monitoring Levels of Comfort 
a. Pain Assessment 
- Pain is monitored using the numerical scales - A pain assessment should be conducted, at a minimum, every 4 hours and as needed and per guidelines. - The level of pain should be determined as satisfactory by the patient based on patients baseline level of pain , considering any chronic pain that the patient may have. - If the patient is unable to communicate pain level, the nurse can assess for nonverbal indicators including facial grimacing, moaning, tachypnea, tachycardia, hypertension, diaphoresis, etc as a cue to begin further pain assessment. b.  Sedation Assessment - Sedation is monitored using the Richards Agitation Sedation Scale (RASS) Target RASS RASS Description +4 Combative, violent, danger to staff 
  
+3 Pulls or removes tubes(s) or catheters; aggressive +2 Frequent nonpurposeful movement, fights ventilator +1 Anxious, apprehensive, but not aggressive  
0 Alert and calm  
-1 Awakens to voice (eye opening/contact) > 10 sec  
-2 Light sedation, briefly awakens to voice (eye opening/contact) < 10 sec  
-3 Moderate sedation, movement or eye opening. No eye contact  
-4 Deep sedation, no response to voice, but movement or eye opening to physical stimulation  
-5 Unarousable, no response to voice or physical stimulation  
 
-  Goal RASS is 0 to -1, unless otherwise specified by providers order. 
- Nursing staff should conduct the RASS every 4 hours and as needed to maintain goal RASS of 0 to -1. 
- If RASS is outside of goal range, discuss treatment options with provider. 
- A RASS score of +2 to +4 requires further assessment by the nurse. Causes of agitation/anxiety that should be considered include: 
a. Pulmonary -   endotracheal tube malposition or patency, mode of ventilation, pneumothorax, hypoxemia, hypercarbia 
b. Metabolic  hypoglycemia, hyponatremia, acute renal or hepatic failure 
c. Emotional upset  with information and awareness of critical condition, prognosis, need for surgical or invasive procedures, other interventions or complications, family or personal stressors 
- C. Sedation Assessment while using Neuromusclar Blocking Agents 
- D. Delirium Assessment 
a. the ICU ([facility]  ICU) 3. Analgesia The incidence of significant pain has been reported to be 50% or higher in both medical and surgical ICU patients. These patients also experience discomfort during routine/procedural ICU care and at rest.  However, patients may be unable to self-report their pain (either verbally or with other signs) because of an altered level of consciousness, the use of mechanical ventilation, or high doses of sedative agents or neuromuscular blocking agents.  
The short and long term consequences of unrelieved or inadequately treated pain are significant and include patient discomfort, decreased satisfaction with care by family and patient, delirium, agitation/anxiety, post traumatic stress disorder and depression. Therefore, routine assessment and treatment of pain should occur in all ICU patients. Causes and Treatment of Pain in the ICU 
a. Acute pain (post-operative, procedural pain, discomfort with usual ICU care or other acute episodes of pain-related to underlying disease) 1. Consider use of PCA for alert and oriented patients with pain needs not met by PRN dosing or opoids. 2. Preemptive analgesia should occur prior to chest tube removal, and should be considered for other procedural pain such as turning and repositioning, would drain removal, wound dressing change, tracheal suctioning, femoral catheter removal or place of central venous catheter. 3. Appropriate analgesic medications for preemptive analgesia are short acting intravenous (IV) agents (i.e. fentanyl, morphine, hydromorphone) a. Administration of analgesia before patient experiences noxious stimuli prevents amplification and hyperexcitability of the central nervous system. b. Analgesia for Mechanically Ventilated Patients: 
1. The approach to sedation and analgesia management for mechanically ventilated patients favors use of analgesia first sedation. The primary goal of this strategy is to address pain and discomfort first, and then if necessary, add anxiolytic agent. 2. Analgesia first sedation reduces dose escalation of medications, decreases the duration of mechanical ventilation and the incidence of VAP, improves the probability of successful extubation, and ultimately shortens ICU length of stay. 3. For pain management, analgesic medications are determined by the provider.    Intermittent dosing of the analgesic should be attempted first.   
If the patient requires more than 3 doses within 1 hour then provider should be contacted to consider continuous infusion. 4.  Analgesic options for mechanically ventilated patients include: 
a. Fentanyl which is considered the drug of choice for patients requiring continuous infusion. b.Morphine may be considered for those patients without renal dysfunction who are hemodynamically stable and require intermittent pain medication. Continuous infusions of morphine may be used for patientl who are receiving comfort care as part of end of life care. c.Hydromorphone is reserved for patients who are refractory to fentanyl or morphine and is typically admininstered by intermittent dosing. 4.  Agitation/Anxiety Background Agitation and anxiety frequently occur in ICU patients. Anxiolytic/sedation agents may be indicated to help relieve discomfort, improve synchrony with mechanical ventilation, and decrease the overall work of breathing. Pain control alone may be sufficient to make patients comfortable enough to require no anxiolytic/sedative agent. In addition, non-pharmacologic interventions such as repositioning or verbal assurance may be helpful to comfort or redirect an agitated patient. If these methods are unsuccessful, then anxiolytic/sedative medications such as propofol, dexmedetomidine, or benzodiazepines can be used. Selection of an anxiolytic should be based on the pharmacokinetic properties of the medication, patient specific characteristics, and sedation goal.   However, nonbenzodiazepine sedatives (ie propofol or dexmedetomidine) may be preferable over benzodiazepines (ie midazolam or larazepam) due to more favorable outcomes such as delirum. Causes and Treatment of Agitation/Anxiety 
a. Possible underlying causes of agitation and anxiety include pain, delirium, hypoxemia, hypoglycemia, hypotension, or withdrawal from alcohol  and other drugs.  
b. Analgesia first sedation should be attempted initially to manage pain and provide sedation in appropriate patients. Analgesia alone may be adequate to reach RASS goal of 0 to -1. If patient remains agitated or anxious despite adequate analgesia (ie RASS +2 to +4) then anxiolytic/sedative should be considered. c. The choice of anxiolytic should be based on desired levels of sedation (ie light sedation or deep sedation) with preference for the use of nonbenzodiazepines such as propofol or dexmedetomidine if appropriate. While light sedation (ie RASS 0 to -1) is preferred for most patients, there are instances when deep sedation (ie RASS -4 to -5) is desired. For example, in the setting of ventilator dysynchrony due to ARDS or for patients receiving NMB agents. d. Medications to maintain light sedation (ie RASS 0 to -1) include 1. Propofol continuous infusion can be considered for hemodynamically stable (ie SBP = 100, MAP = 65 and/or not requiring vasopressor support) patients requiring light sedation. Propofol has a quick onset (1-2 minutes) and offset of action, making it a good agent to assess neurological status and facilitate liberation from the mechanical ventilator. 2.Dexmedetomidine continuous infusion is a good option for hemodynamically stable patients requiring light sedation as it allows for a more awake, interactive patient is associated with less delirium. It has an intermediate onset of action (5-10 min). Therefore, abrupt titrations should be avoided, but use of prn haloperidol or benzodiazepine may be useful to manage agitation until the medication takes effect. 3. Antipsychotics are another option. In particular, haloperidol intermittently dosed may be useful for patients with symptoms of agitation/anxiety and delirium. 4.Benzodiazapines can also be considered for light sedation, but should be intermittently doses. Midazolam is an option for patients without renal dysfunction.   It has a short onset of action (2-5 minutes) making it a good agent for acute agitation/anxiety, but short duration of action resulting in frequent dosing. Lorazepam is another option. It has a longer onset of action (15-20 minutes) in comparison to midazolam, but longer duration of action. e. Medications to maintain deep sedation (RASS -4 to -5) include: 
1) Propofol continuous infusion should be considered as a first line option for hemodynamically stable patients. 2)Benzodiazepines can be considered as second line options for deep sedation. Studies comparing these agents to other sedatives have shown that they lead to worse outcomes including delirium, oversedation, delayed extubation, and longer time to discharge. Midazolam is one option for patients without renal dysfunction and lorazepam is another options. If patient requires more than 3 doses within 1 hour then contact provider  to consider initiation of continuous infusion. 5.  Daily Sedation Awakening Trial (SAT) from IV Continuous Analgesia/Sedation 
a. Patients are to have daily awakening from sedation while on continuous IV analgesia and/or sedation in the ICU. Continuous analgesia infusions may be maintained only if needed for active pain and RASS is at goal 0 - -1. Unit guideline is for the SAT to occur following ICP rounds each morning.   
b. The sedation awakening trial (SAT) is done regardless if the patient meets criteria for spontaneous breathing trial (SBT). c. SAT safety screen is assessed and SAT should not be performed if sedation is being used for active seizures, alcohol withdrawal, hemodynamically unstable or requiring support of vasoactive medications , in conjunction with NMB agents, if ICP is greater than 
20mmHg or if sedation is being used to control ICP, patients RASS is +3 or +4 (very agitated or combative).   Other exclusion criteria are:  if there is documentation of myocardial ischemia in the past 24 hours; or patient is receiving high frequency oscillator ventilation (HFOV) , if the patient has an open chest /abdomen or is receiving comfort care. d. Criteria for passing the SAT are the patient opened their eyes to verbal stimuli or tolerated sedative interruption without exhibiting failure criteria. 
e. Patients fail the SAT if the develop sustained anxiety, agitation, or pain; a respiratory rate of 35 per minutes for 5 minutes or longer, an SpO2 less than 88% for 5 minutes or longer; an acute cardiac dysrhythmia; two or more signs of respiratory distress including tachycardia, bradycardia; use of accessory muscles; diaphoresis; marked dyspnea; or myocardial ischemia. f. Respiratory therapy staff must verify with the nurse that continuous IV analgesia (unless being use  for active pain) and sedation (unless patient is receiving dexmedetomidine) is off prior to placing patient on SBT. g. DO NOT interrupt infusion of analgesia and sedation medications if patient is receiving neuromuscular blockade. 
h. Monitor level of wakefulness unless patient is awake and follows commands (RASS 0 to -1) or patient becomes uncomfortable or agitated (RASS +3 to +4) 
i. If agitation prevents successful awakening , administer bolus of analgesia and/or sedation then resume infusion of the medication at ½ previous dose and titrate as needed. 
j. If oversedation prevents successful awakening, hole infusion until at goal and resume ½ of prior infusion rate/dose if clinically indicated. 6. Delirium Background Delirium is characterized by the acute onset of cerebral dysfunction with a change or fluctuation baseline 
mental status, inattention, and either disorganized thinking or an altered level of consciousness. It affects up to 80% of mechanically ventilated adult ICU patients, and is associated with increased mortality,  
and treatment is important and may in turn allow for a patient to be conscious yet cooperative enough to participate in ventilator weaning trials and early mobilization efforts. Delirium can only be assessed in patients who are able to sufficiently interact and communicate with bedside 
clinicians (ie RASS -3 to +4). IV. Procedure: A. Assessment: The following criteria must be assessed prior to the initiation of weaning from mechanical ventilation. Note: The criteria are general guidelines and must be individualized for each patient. The patients primary nurse will be responsible, in coordination with the RT, the Spontaneous Awakening Trial). The RT will perform the SBT. B. Spontaneous Awakening Trials (SATs  also referred to as Sedation Vacation) and Spontaneous Breathing Trials (SBTs) performed to determine readiness to wean. 1. For patients who meet established criteria, such as those without active seizures, alcohol withdrawal and agitation, myocardial ischemia or those requiring cardiac support devices, without increased intracranial pressure and those not receiving neuromuscular blockade, the nurse will reduce the infusions of sedative by 50% of current used for sedation that was used to achieve a level of light sedation (Rocha Score 2 or RASS score of 0 to -1) and evaluate patient response to reduction of sedation. Analgesics required for pain control are continued during the test.  Obtain MD order to cover no SAT for that time period if patient has any exclusion criteria as described above. 2. Failure of the spontaneous awakening trial occurs when the patient shows symptoms such as increased agitation, anxiety, pain or signs of respiratory distress including respiratory rate >35/min or oxygen saturation <88% as well as development of acute cardiac arrhythmias. If these symptoms develop during the SAT, the nurse then restarts sedation at 75% of the previous dose and titrates the medications until the patient is comfortable and/or symptoms have abated. 3.  If the patient passes the SAT then the patient moves on to the Spontaneous Breathing Trials as performed by the RT. The SBT Safety Screen included the following:  No agitation, oxygen saturation > 88%, FIO2 < 50%, PEEP < 7.5 cm H20, no myocardial ischemia, no vasopressor use, and with inspiratory efforts. 4. Patients who pass the spontaneous awakening trial but fail the spontaneous breathing trial are placed back on full ventilator support and reassessed the next day. 5. Failure of the SBT (spontaneous breathing trail) includes any of the following:  Respiratory rate > 35/min, respiratory rate < 8/min, oxygen saturation < 88%, respiratory distress, mental status change, acute cardiac arrhythmia. 6. Extubation is considered for patients who tolerate the spontaneous awakening trial and pass the spontaneous breathing trial.   
C. Can the cause of respiratory failure be reversed (i.e. absence of high spinal cord injury or advanced ALS)? D. Is gas exchange adequate? 1. PaO2/FIO2 ratio > 150  200, 2. PEEP < 8 cm H20 
3. FIO2 < 50 
4. pH > 7.30 
5. Rapid shallow breathing index (f/VT) < 105 
E. Is patient hemodynamically stable? 1. Absence of clinically significant hypotension (minimal vasopressors such as Dopamine < 5mcg/kg/minute)? F. Is there evidence of intact respiratory drive (NIP/NIF >-90 ANN09, stable VC02)? G. Does patient have an adequate cough, airway clearance ability? H. Is there absence of excessive secretions? V. Initiation: A. The therapist shall consult with RN to determine if sedation can be discontinued or significantly decreased. If this can be achieved, the therapist shall implement the  
                  followin. Identify patient and verify name and account number via ID bracelet. 2. Perform hand hygiene per hospital policy utilizing Standard Precautions for all patients and following transmission-based isolation as indicated per hospital policy. 3. Perform a ONE-MINUTE SPONTANEOUS TRIAL AND ASSESSMENT. 4. Measure Rapid Shallow Breathing Index (RSBI) and monitor SpO2 and cardiovascular parameters during the spontaneous breathing assessment. 5. If SpO2 and cardiovascular parameters are stable, continue spontaneous breathing trial for at least 30 minutes and up to 120 minutes, as patient tolerates. 6. Monitor ventilatory status, SpO2, and cardiovascular status during spontaneous breathing trial. 
7. If patient has a successful trial, consider patient as a candidate for extubation and obtain order. 8. If patient fails the weaning trial, place back on ventilator and adjust settings to provide a non-fatiguing form of ventilatory support for the remainder of the day and night. 9. One attempt at weaning shall be performed each day until successful weaning occurs. The RCP will make every attempt to begin the spontaneous breathing trials between 0500 and 0600 to provide documentation of the trial when the pulmonologist makes rounds. B.  Assessment of SBT or PST: 
1. Is gas exchange acceptable? 2. PaO2 > 60 mm Hg. 3. PH > 7.30 
4. Increase in PaCO2  < 10 mm Hg C. Is patient hemodynamically stable? 1. HR < 120 beats/minute 2. HR  < 20% 3. Systolic BP < 604 and > 90 mmHg 4. BP  < 20%, no vasopressors required D. Does patient have stable ventilatory pattern? 1. Sustained RR < 30 breaths per minute 2. Normal and stable VCO2 3. Patient is not demonstrating any signs of increased work of breathing, such as increased use of accessory muscles. E. Mental status stable throughout trial? 
1. Absence of changes such as somnolence, excessive agitation or anxiety 2. Absence of diaphoresis during trial? 
IV. Safety: A. The RCP shall monitor patient according to the above guidelines.  If at any time during the weaning process, the respiratory therapist or nurse feels that the patient is not tolerating weaning, the therapist shall place patient back on previous ventilator settings. B. The patient shall be reassessed and the weaning process should be continued the following day. V. Reportable Conditions: A. The therapist shall notify the physician, as appropriate, for any of the following         conditions: 1. FIO2 increase (sustained) at 10% or greater 2. Poor patient/ventilator interface in spite of adjustments 3. Need for increased sedation for respiratory distress 4. Need for increasing ventilating pressures (i.e. PEEP, PIP, MAP) 5. ABG results meeting panic value criteria or other clinical signs indicating deterioration of patients condition. 6. Unplanned extubation. 7. Unexplained sustained increase in PIP greater than 10 cm H2O. 
8. Assessment results regarding ventilator discontinuance process. VI. Ventilator protocol management A. The following items should be maintained for patients who are being mechanically           ventilated. 1. Obtain STAT Chest X-Ray for ET tube placement after insertion. 2. Chest X-Ray q a.m. while on ventilator. 3. ABGs 30 - 60 minutes after being stable on the ventilator. 4. ABG's q a.m. while on ventilator and prn. 
5. Do spontaneous breathing trials when patient is hemodynamically stable, responsive, and without fever. 6. Terminate trials if patient exhibits signs of respiratory distress. 7. Therapists should maintain ABG s as follows: 
    a. pH -  7.30 - 7.50              
    b. PaO2 -   60  100  
     8. Racemic Epinephrine (0.5cc) for post extubation stridor (2 UA treatments max.) 
 
VII. Early Mobilization Mobility Level Criteria Start at Level 1 if:  
PaO2/FIO2 <250 Positive end-expiratory Pressure (PEEP) >=10 cm H2O  
O2 saturation <90% Respiratory Rate (RR) Not within 10-30 per min Cardiac arrhythmias or ischemia New onset Heart Rate  (HR) <60 or >120 beats per min Mean arterial pressure (MAP) <55 or >140 mmHg Systolic blood pressure (SBP) <90 or > 180 mmHg Vasopressor infusion New or increasing Richards Agitation Scale (RASS) < - 3 Level I:   Breathe  (Rass -5 to -3) HOB Angle  improve VAP protocol compliance ? Visually confirm the [facility] is elevated >= 30 degrees to comply with VAP prevention protocols ? The Centers for Disease Control and Prevention recommends an HOB angle of 30-45 degrees , unless contraindicated Additional activities to be implemented ? Every 2 hour turning ? Passive range of motion ? Up to 20 degrees reverse trendelenburg with lower extremity exercise/retracting footboard ? Continuous lateral rotation therapy can be considered part of early mobility therapy in patients who are at high risk for pulmonary complications Move to Level 2 when the patient 
- Has acceptable oxygenation/hemodynamics - Tolerates q 2 turning - Tolerates HOB > 30 degrees or up to 20 degrees reverse trendelenburg Level 2 :Tilt  Patient Assessment Rass > -3  (eg, opens eyes, may have profound weakness) Up to 20 degrees Reverse Trendelenburg position and 10 degrees minimum HOB 
- Reverse Trendelenburg positioning allows for orthostatic position in fragile patients - If available , use in conjunction with retracting foot section to allow for partial weight bearing prior to sitting up in the bed or getting out of bed Additional activities to be implemented -  Maintain HOB >/= 30 degrees - Q 2 hour turning - Passive/active range of motion - Legs dependent - PT consultation Move to Level 3 when the patient . Morena Urbina -Tolerates active- assistance exercises 2 times per day 
  -Tolerates lower extremity exercises against footboard/Up to 20 degrees Reverse Trendelenburg 
  -Tolerates legs dependent /HOB 45 degrees Level 3 :  SIT  (Rass >- 1 (eg , weak but may move arms/legs independently) Full chair position (footboard on) ?  Full upright positioning allows for diaphragmatic excursion and lung expansion ? Sitting with legs in a dependent position facilitates gas exchange Additional activities to be implemented - Maintain HOB >= 30 degrees - Q 2 hour turning (assisted) - Active range of motion  PT/OT actively involved - Encourage activities of daily living 
- Dangling, if patient can move arm against gravity Move to Level 4 when the patient . Bernardino Ajith - Tolerates increasing active exercise in bed - Actively assists with every- 2- hour turning or turns independently - Tolerates full chair position 3 times/day Level 4:  Stand ( RASS >0 (eg, weak but may tolerate increased activity) Stand Attempts ? Full chair position (footboard off/feet on the floor) ? Consider using a sit-to-stand lift ? Pivot to chair, it tolerates partial weight bearing Additional activities to be implemented - Maintain head of bed >= 30 degrees - Q 2 hr turning (self/assisted) - Active range of motion - Encourage activities of daily living 
- PT/OT actively involved Move to Level 5 when the patient . 
- Can successfully comply with all activities - Tolerates trial periods of full chair position (footboard off/feet on floor) 3 times per day - Tolerates partial weight-bearing stand and pivots to chair Level 5 :  Move  (RASS > 0    (eg, weak but may tolerate increased activity) Achieve out of bed ? Utilize mobile floor life to ambulate to bedside chair Additional activities to be implemented - Maintain HOB > = 30 degrees - Q 2 hour turning (self/assisted) - Active range of motion - Patient stands/bears weight > 1 minute - Patient marches in place 
- PT/OT actively involved Patient continues to ambulate progressively longer distances as tolerated until they consistently participate and move independently. E Approved by Critical Care Committee 2-19-09 [facility] Clinical Guidelines ABCDE DOC Flowsheet Content Variables to select when addressing section Comments ABCDE Initiated ? Yes/No  RN to address minimum q 24 hours (day shift) Target RASS ? 0 = alert and oriented ? -1 = drowsy ? -2 = light sedation ? -3= moderate sedation ? -4= deep sedation Target on standard ventilator setting should be -2; -4 with oscillator CAM -ICU ? Positive ? Negative ? Unable to assess Delirium assessment SAT Safety Screen Passed ? Yes 
? No Select yes if proceeding on to the sedation vacation (reduction of continuous sedative drip by directed by MD) Select no if your patient has any of the below reasons for not proceeding on to the daily awakening sedation vacation trial  
SAT Screen for Failure ? Active seizures ? Acute delirium tremors ? Agitation that threatens accidental line/tube removal 
? On paralytics ? MI (24-48hr) ? Abnormal ICP ? Open abdomen Select one of the options when the patient will not undergo the sedation vacation  ALSO MUST OBTAIN AN ORDER FOR vanessa [place] written under nursing miscellaneous for now by either the NP or Intensivist  
Daily sedation Vacation/assessment of  ? Yes 
? No 
? Not applicable If yes, MUST see the reduction in sedation on the [facility] and please place in the comment section of the sedative sedation vacation started SBT Safety Screen Passed ? Yes 
? No Select Yes if the patient has none of the below listed reasons for not proceeding on to the SBT following reduction of sedation SBT Screen Reason for Failure ? Agitation ? O2 Sat < or = 88% 
? FIO2 > 50% ? PEEP >7 
? MI 
? Vasopressor Use 
? Bilevel setting on Vent ? Oscillator in use ? Increased resp effort Select reason as appropriate for NOT proceeding on to the SBT Wake Up and Breathe Protocol

## 2018-11-16 NOTE — PROGRESS NOTES
TRANSFER - IN REPORT: 
 
Verbal report received from Nayely Saunders on Aislinn Jung  being received from ED for routine progression of care Report consisted of patients Situation, Background, Assessment and  
Recommendations(SBAR). Information from the following report(s) SBAR, Kardex, ED Summary, Intake/Output, MAR, Recent Results, Med Rec Status and Cardiac Rhythm Sinus Tach was reviewed with the receiving nurse. Opportunity for questions and clarification was provided. Assessment completed upon patients arrival to unit and care assumed.

## 2018-11-16 NOTE — ED NOTES
Respiratory at bedside on pt arrival. Removed from cpap by respiratory. Placed on 02 via nc at [address] remain in low 90s.

## 2018-11-16 NOTE — ED PROVIDER NOTES
The history is provided by the patient. Shortness of Breath This is a new problem. The average episode lasts 2 days. Associated symptoms include coryza, cough, sputum production, chest pain, abdominal pain and leg swelling. Pertinent negatives include no fever and no leg pain. He has tried nothing for the symptoms. Associated medical issues include COPD and heart failure. Past Medical History:  
Diagnosis Date  Chronic obstructive pulmonary disease ([facility] [address]  Heart failure ([facility] [address]  Sleep disorder No past surgical history on file. No family history on file. Social History Socioeconomic History  Marital status:  Spouse name: Not on file  Number of children: Not on file  Years of education: Not on file  Highest education level: Not on file Social Needs  Financial resource strain: Not on file  Food insecurity - worry: Not on file  Food insecurity - inability: Not on file  Transportation needs - medical: Not on file  Transportation needs - non-medical: Not on file Occupational History  Not on file Tobacco Use  Smoking status: Current Every Day Smoker Packs/day: 2.00 Substance and Sexual Activity  Alcohol use: No  
  Comment: once a month beer  Drug use: Yes Types: Marijuana, Methamphetamines  Sexual activity: Not on file Other Topics Concern  Not on file Social History Narrative  Not on file ALLERGIES: Patient has no known allergies. Review of Systems Unable to perform ROS: Acuity of condition Constitutional: Negative for fever. Respiratory: Positive for cough, sputum production and shortness of breath. Cardiovascular: Positive for chest pain and leg swelling. Gastrointestinal: Positive for abdominal pain. Vitals:  
 11/16/18 0724 BP: (!) 168/117 Pulse: (!) 131 Resp: 24 Temp: 97.7 °F (36.5 °C) SpO2: 99% Weight: 108.9 kg (240 lb) Height: 6' (1.829 m) Physical Exam  
Constitutional: Morbidly obese, dyspneic and bordering on somnolent. Difficult to understand his speech HENT:  
Head: Atraumatic. Mouth/Throat: Oropharynx is clear and moist.  
Eyes: Pupils are equal, round, and reactive to light. Neck: Normal range of motion. Hepatojugular reflux present. No JVD present. Cardiovascular: Regular rhythm and normal heart sounds. Tachycardia present. Pulses: 
     Carotid pulses are 2+ on the right side, and 2+ on the left side. Radial pulses are 2+ on the right side, and 2+ on the left side. Pulmonary/Chest: Effort normal. He has rales (bilateral Rales long-term up the back). Abdominal: Soft. He exhibits distension. He exhibits no mass. There is no tenderness. There is no rebound and no guarding. Musculoskeletal: He exhibits edema (1+ pitting edema). Skin: Skin is warm and dry. No erythema. Nursing note and vitals reviewed. MDM Number of Diagnoses or Management Options Diagnosis management comments: Clinical scenario and he seems more consistent with congestive heart failure exacerbation. No wheezing. Blood gas ordered due to borderline somnolence. Nitroglycerin paste and Lasix ordered for heart failure. Oxygen saturation is 90% on oxygen. At this time. Chest x-ray pending. EKG shows a believe is a sinus tach, less likely an atrial flutter with 2-1 block. No obvious ischemia. Amount and/or Complexity of Data Reviewed Clinical lab tests: ordered and reviewed (Results for orders placed or performed during the hospital encounter of 11/16/18 
-CBC W/O DIFF Result                      Value             Ref Range WBC                         7.5               4.3 - 11.1 K* 
     RBC                         4.02 (L)          4.23 - 5.6 M* HGB                         13.1 (L)          13.6 - 17.2 * HCT                         43.9              41.1 - 50.3 % MCV                         109.2 (H)         79.6 - 97.8 * MCH                         32.6              26.1 - 32.9 * MCHC                        29.8 (L)          31.4 - 35.0 * RDW                         13.2              % PLATELET                    237               150 - 450 K/* MPV                         9.6               9.4 - 12.3 FL ABSOLUTE NRBC               0.00              0.0 - 0.2 K/* 
-METABOLIC PANEL, COMPREHENSIVE Result                      Value             Ref Range Sodium                      141               136 - 145 mm* Potassium                   5.1               3.5 - 5.1 mm* Chloride                    105               98 - 107 mmo* CO2                         32                21 - 32 mmol* Anion gap                   4 (L)             7 - 16 mmol/L Glucose                     101 (H)           65 - 100 mg/* BUN                         20                6 - 23 MG/DL Creatinine                  1.38              0.8 - 1.5 MG* 
     GFR est AA                  >60               >60 ml/min/1* GFR est non-AA              57 (L)            >60 ml/min/1* Calcium                     8.4               8.3 - 10.4 M* Bilirubin, total            0.3               0.2 - 1.1 MG* ALT (SGPT)                  21                12 - 65 U/L   
     AST (SGOT)                  15                15 - 37 U/L Alk. phosphatase            97                50 - 136 U/L Protein, total              7.3               6.3 - 8.2 g/* Albumin                     3.2 (L)           3.5 - 5.0 g/* Globulin                    4.1 (H)           2.3 - 3.5 g/* A-G Ratio                   0.8 (L)           1.2 - 3.5     
-BNP Result                      Value             Ref Range BNP                         274               pg/mL         
-POC TROPONIN-I Result                      Value             Ref Range Troponin-I (POC)            0.02              0.02 - 0.05 * 
-POC G3 Result                      Value             Ref Range Device:                     NASAL CANNULA pH (POC)                    7.251 (L)         7.35 - 7.45   
     pCO2 (POC)                  72.1 (HH)         35 - 45 MMHG  
     pO2 (POC)                   70 (L)            75 - 100 MMHG 
     HCO3 (POC)                  31.7 (H)          22 - 26 MMOL* 
     sO2 (POC)                   90 (L)            95 - 98 % Base excess (POC)           2                 mmol/L Allens test (POC)           YES Site                        RIGHT RADIAL Patient temp. 98.6 Specimen type (POC)         ARTERIAL Performed by [facility] 
     CO2, POC                    34                MMOL/L Flow rate (POC)             2.000             L/min Respiratory comment:                                        
 PhysicianNotified 
-POC TROPONIN-I Result                      Value             Ref Range Troponin-I (POC)            0.01 (L)          0.02 - 0.05 * 
-POC G3 Result                      Value             Ref Range Device:                     BIPAP                           
     FIO2 (POC)                  35                %             
     pH (POC)                    7.261 (L)         7.35 - 7.45   
     pCO2 (POC)                  76.6 (HH)         35 - 45 MMHG  
     pO2 (POC)                   76                75 - 100 MMHG 
     HCO3 (POC)                  34.4 (H)          22 - 26 MMOL* sO2 (POC)                   92 (L)            95 - 98 % Base excess (POC)           4                 mmol/L Tidal volume                420               ml            
     Set Rate                    10                bpm           
     PEEP/CPAP (POC)             9                 cmH2O         
     PIP (POC)                   13                              
     Allens test (POC)           YES Inspiratory Time            0.9               sec Site                        RIGHT RADIAL Patient temp. 98.6 Specimen type (POC)         ARTERIAL Performed by                [facility]                    
     CO2, POC                    37                MMOL/L Spontaneous timed           15 Respiratory comment:                                        
 PhysicianNotified ) Tests in the radiology section of CPT®: ordered and reviewed (Ct Chest W Cont Result Date: 11/16/2018 EXAM:  CT chest with IV contrast-PE protocol. DATE:  November 16, 2018. INDICATION:  Dyspnea. COMPARISON: January 6, 2018. TECHNIQUE: Axial CT images of the chest were obtained after the intravenous injection of 100 mm Isovue-370 CT contrast. Radiation dose reduction techniques were used for this study. Our CT scanners use one or all of the following: Automated exposure control, adjustment of the mA and/or kV according to patient size, iterative reconstruction. FINDINGS:  No pulmonary artery filling defects are identified. There is no thoracic aortic aneurysm or dissection. Mild cardiomegaly, coronary artery disease and a tiny pericardial effusion are unchanged. There is also an unchanged tiny right pleural effusion. Mild atelectasis is noted in the lung bases. There is no pneumothorax or lymphadenopathy. IMPRESSION:  1. No acute process or evidence of pulmonary embolism. 2. Unchanged myocardial megaly, coronary artery disease and tiny pericardial effusion. 3. A tiny right pleural effusion is also unchanged. Xr Chest [facility] Result Date: 11/16/2018 EXAM:  Chest x-ray. DATE:  November 16, 2018. INDICATION:  Dyspnea. COMPARISON:  July 3, 2018. TECHNIQUE:  Single frontal view chest. FINDINGS:  The cardiac silhouette appears enlarged. The lungs are clear. No pneumothorax, pulmonary vascular congestion or significant pleural effusion is seen. IMPRESSION:  Cardiomegaly. CT-NO PE, no edema, trace pericardial effussion, small pleural effussion per radiology) Review and summarize past medical records: yes Discuss the patient with other providers: yes Critical Care Total time providing critical care: (=================================================================== This patient is critically ill and there is a high probability of of imminent or life threatening deterioration in the patient's condition without immediate management. The nature of the patient's clinical problem is: respiratory failure I have spent 30 minutes in direct patient care, documentation, review of labs/xrays/old records, discussion with Staff . The time involved in the performance of separately reportable procedures was not counted toward critical care time. Arnulfo Bolivar MD; 11/16/2018 @4:48 AM 
=================================================================== 
 
) Procedures

## 2018-11-16 NOTE — PROGRESS NOTES
Initial visit was made, prayer, emotional support and a spiritual presence were provided.  card was left with the patient. Vincenzo Chinchilla

## 2018-11-16 NOTE — ED TRIAGE NOTES
Pt arrives from home via [facility] c/o shortness of breath onset couple days pta with worsening tonight. Pt arrives on cpap. Per ems pt in tripod position on their arrival. sats 78% ra on ems arrival. Per ems pt noncompliant with meds, extensive cardiac hx including chf and cardiac arrest. Rales and rhonchi with ems. Hypertensive and tachycardic in 130s with ems. Swelling to bilateral lower extremities. Hx etoh abuse and meth abuse. Reports daily etoh until approx 3 days pta. Admits to meth use approx 1 week pta.

## 2018-11-16 NOTE — PROGRESS NOTES
Dr. Rosibel Anand, Cardiology at bedside for MIRANDA Cardioversion. Verbal consent given by pt. Dr. Yadira Parikh also at bedside and sedation/respiratory status discussed, as pt is currently on BiPAP, FiO2 80%, O2 Sat 99%. High risk for airway compromise and Dr. Yadira Parikh is aware if pt needs to be intubated. Attempted to safely sedate pt for procedure at bedside. Pt medicated w/versed and fentanyl (see MAR), however, pt remains restless, uncooperative and pt's tongue obstructing passage of probe. O2 Sats dropping into the 80s-- NRB mask placed to get pt O2 Sat back up to 90-92%, along with nasal trumpet insertion to help assist with airway management. MD unable to advance probe down esophagus. Pt too high risk for further respiratory decompensation. Procedure aborted. Pt now lethargic, not following commands well. RT placed pt back on BiPAP, FiO2 80%, O2 Sat 98%--weaning FiO2 down. Remains Aflutter on monitor, . /77. Positioned with hob elevated. Continuing to monitor.

## 2018-11-16 NOTE — PROCEDURES
Procedure: elective intubation Indication: respiratory insufficiency Anesthesia- Rapid sequence:  Fentanyl 150mcg, Etomidate 30mg, Paralysis: Succinylcholine 130 mg After assessing the airway, the patient underwent preoxygenation with 100% FiO2 for 5 min. Fentanyl was then given and after 3 min, etomidate and succnylcholine was given sequentially in rapid IV push. The Sellick maneuver was performed throughout the entire sequence. After adequate sedation and paralisys emergent intubation was performed. Using a glidescope with #4 blade the VC were identified. After positive identification of the vocal chords, an 8.0 ET tube was placed into the trachea with direct visualization. The tube was seen passing through the vocal chords without difficulty. CO2 colorimetry was employed immediately to verify tube in airway with /without appropriate color change indicating detection of CO2. Water vapor was seen within the ET tube, and auscultation of the abdomen revealed no bubbling souds. Auscultation  and inspection of the chest after intubation showed symmetric chest excursion and symmetric air entry bilaterally. Chest X-ray has been ordered and is pending. The patient has been placed on a mechanical ventilator. There were no complications.  
 
Nguyễn Tomas MD

## 2018-11-16 NOTE — PROGRESS NOTES
PICC Placement Note PRE-PROCEDURE VERIFICATION Correct Procedure: yes. Time out completed with assistant Margaret López RN, VAT and all persons present in agreement with time out. Correct Site:  yes Temperature: Temp: 97.4 °F (36.3 °C), Temperature Source: Temp Source: Axillary Recent Labs 11/16/18 
0232 BUN 20  
CREA 1.38  
 WBC 7.5 Allergies: Patient has no known allergies. Education materials for [facility] Care given to patient or family. PROCEDURE DETAIL A triple lumen PICC line was started for vascular access, antibiotic therapy and desire for reliable access. The following documentation is in addition to the PICC properties in the lines/airways flowsheet : 
Lot #: [id number] 
xylocaine used: yes Mid-Arm Circumference: 35 (cm) Internal Catheter Length: 46 (cm) Internal Catheter Total Length: 46 (cm) Vein Selection for PICC:right basilic Central Line Bundle followed yes Complication Related to Insertion: none Both the insertion guidewire and ECG guidewire were removed intact all ports have positive blood return and were flush well with normal saline. The location of the tip of the PICC is verified by chest xray. Patients heart rhythm is too fast for ECG confirmation. The tip is in the SVC per radiologists reading. Line is okay to use: yes Mary Alcantara RN, VAT

## 2018-11-17 ENCOUNTER — APPOINTMENT (OUTPATIENT)
Dept: GENERAL RADIOLOGY | Age: 53
DRG: 208 | End: 2018-11-17
Attending: INTERNAL MEDICINE
Payer: SELF-PAY

## 2018-11-17 LAB
ANION GAP SERPL CALC-SCNC: 7 MMOL/L (ref 7–16)
ARTERIAL PATENCY WRIST A: YES
BASE EXCESS BLD CALC-SCNC: 10 MMOL/L
BASOPHILS # BLD: 0 K/UL (ref 0–0.2)
BASOPHILS NFR BLD: 0 % (ref 0–2)
BDY SITE: ABNORMAL
BODY TEMPERATURE: 98.6
BUN SERPL-MCNC: 25 MG/DL (ref 6–23)
CALCIUM SERPL-MCNC: 8.7 MG/DL (ref 8.3–10.4)
CHLORIDE SERPL-SCNC: 102 MMOL/L (ref 98–107)
CO2 BLD-SCNC: 36 MMOL/L
CO2 SERPL-SCNC: 33 MMOL/L (ref 21–32)
CREAT SERPL-MCNC: 1.49 MG/DL (ref 0.8–1.5)
DIFFERENTIAL METHOD BLD: ABNORMAL
EOSINOPHIL # BLD: 0 K/UL (ref 0–0.8)
EOSINOPHIL NFR BLD: 0 % (ref 0.5–7.8)
ERYTHROCYTE [DISTWIDTH] IN BLOOD BY AUTOMATED COUNT: 12.6 %
GAS FLOW.O2 O2 DELIVERY SYS: ABNORMAL L/MIN
GAS FLOW.O2 SETTING OXYMISER: 18 BPM
GLUCOSE BLD STRIP.AUTO-MCNC: 120 MG/DL (ref 65–100)
GLUCOSE BLD STRIP.AUTO-MCNC: 130 MG/DL (ref 65–100)
GLUCOSE BLD STRIP.AUTO-MCNC: 131 MG/DL (ref 65–100)
GLUCOSE BLD STRIP.AUTO-MCNC: 133 MG/DL (ref 65–100)
GLUCOSE SERPL-MCNC: 137 MG/DL (ref 65–100)
HCO3 BLD-SCNC: 34.8 MMOL/L (ref 22–26)
HCT VFR BLD AUTO: 37.8 % (ref 41.1–50.3)
HGB BLD-MCNC: 11.8 G/DL (ref 13.6–17.2)
IMM GRANULOCYTES # BLD: 0 K/UL (ref 0–0.5)
IMM GRANULOCYTES NFR BLD AUTO: 0 % (ref 0–5)
INSPIRATION.DURATION SETTING TIME VENT: 0.9 SEC
LYMPHOCYTES # BLD: 0.7 K/UL (ref 0.5–4.6)
LYMPHOCYTES NFR BLD: 10 % (ref 13–44)
MAGNESIUM SERPL-MCNC: 1.8 MG/DL (ref 1.8–2.4)
MCH RBC QN AUTO: 31.8 PG (ref 26.1–32.9)
MCHC RBC AUTO-ENTMCNC: 31.2 G/DL (ref 31.4–35)
MCV RBC AUTO: 101.9 FL (ref 79.6–97.8)
MONOCYTES # BLD: 0.3 K/UL (ref 0.1–1.3)
MONOCYTES NFR BLD: 5 % (ref 4–12)
NEUTS SEG # BLD: 5.7 K/UL (ref 1.7–8.2)
NEUTS SEG NFR BLD: 84 % (ref 43–78)
NITRIC:PPM ISTAT,INITR: 0 PPM
NRBC # BLD: 0 K/UL (ref 0–0.2)
O2/TOTAL GAS SETTING VFR VENT: 40 %
PCO2 BLD: 44.4 MMHG (ref 35–45)
PEEP RESPIRATORY: 10 CMH2O
PH BLD: 7.5 [PH] (ref 7.35–7.45)
PLATELET # BLD AUTO: 228 K/UL (ref 150–450)
PMV BLD AUTO: 10 FL (ref 9.4–12.3)
PO2 BLD: 65 MMHG (ref 75–100)
POTASSIUM SERPL-SCNC: 3.9 MMOL/L (ref 3.5–5.1)
RBC # BLD AUTO: 3.71 M/UL (ref 4.23–5.6)
SAO2 % BLD: 94 % (ref 95–98)
SERVICE CMNT-IMP: ABNORMAL
SERVICE CMNT-IMP: ABNORMAL
SODIUM SERPL-SCNC: 142 MMOL/L (ref 136–145)
SPECIMEN TYPE: ABNORMAL
VENTILATION MODE VENT: ABNORMAL
VT SETTING VENT: 500 ML
WBC # BLD AUTO: 6.8 K/UL (ref 4.3–11.1)

## 2018-11-17 PROCEDURE — 74011000250 HC RX REV CODE- 250: Performed by: INTERNAL MEDICINE

## 2018-11-17 PROCEDURE — 36600 WITHDRAWAL OF ARTERIAL BLOOD: CPT

## 2018-11-17 PROCEDURE — 94640 AIRWAY INHALATION TREATMENT: CPT

## 2018-11-17 PROCEDURE — 74011250637 HC RX REV CODE- 250/637: Performed by: INTERNAL MEDICINE

## 2018-11-17 PROCEDURE — 71045 X-RAY EXAM CHEST 1 VIEW: CPT

## 2018-11-17 PROCEDURE — 65610000001 HC ROOM ICU GENERAL

## 2018-11-17 PROCEDURE — 74011000258 HC RX REV CODE- 258: Performed by: INTERNAL MEDICINE

## 2018-11-17 PROCEDURE — 85025 COMPLETE CBC W/AUTO DIFF WBC: CPT

## 2018-11-17 PROCEDURE — 77030020263 HC SOL INJ SOD CL0.9% LFCR 1000ML

## 2018-11-17 PROCEDURE — 99232 SBSQ HOSP IP/OBS MODERATE 35: CPT | Performed by: INTERNAL MEDICINE

## 2018-11-17 PROCEDURE — 94003 VENT MGMT INPAT SUBQ DAY: CPT

## 2018-11-17 PROCEDURE — 77030020255 HC SOL INJ LR 1000ML BG

## 2018-11-17 PROCEDURE — 82803 BLOOD GASES ANY COMBINATION: CPT

## 2018-11-17 PROCEDURE — 74011250636 HC RX REV CODE- 250/636: Performed by: INTERNAL MEDICINE

## 2018-11-17 PROCEDURE — 80048 BASIC METABOLIC PNL TOTAL CA: CPT

## 2018-11-17 PROCEDURE — 74011000250 HC RX REV CODE- 250: Performed by: NURSE PRACTITIONER

## 2018-11-17 PROCEDURE — 83735 ASSAY OF MAGNESIUM: CPT

## 2018-11-17 PROCEDURE — 82962 GLUCOSE BLOOD TEST: CPT

## 2018-11-17 RX ORDER — ENOXAPARIN SODIUM 100 MG/ML
40 INJECTION SUBCUTANEOUS EVERY 12 HOURS
Status: DISCONTINUED | OUTPATIENT
Start: 2018-11-17 | End: 2018-11-19

## 2018-11-17 RX ORDER — LEVALBUTEROL INHALATION SOLUTION 1.25 MG/3ML
1.25 SOLUTION RESPIRATORY (INHALATION)
Status: DISCONTINUED | OUTPATIENT
Start: 2018-11-17 | End: 2018-11-30 | Stop reason: HOSPADM

## 2018-11-17 RX ORDER — SODIUM CHLORIDE, SODIUM LACTATE, POTASSIUM CHLORIDE, CALCIUM CHLORIDE 600; 310; 30; 20 MG/100ML; MG/100ML; MG/100ML; MG/100ML
75 INJECTION, SOLUTION INTRAVENOUS CONTINUOUS
Status: DISCONTINUED | OUTPATIENT
Start: 2018-11-17 | End: 2018-11-20

## 2018-11-17 RX ADMIN — DEXAMETHASONE SODIUM PHOSPHATE 6 MG: 4 INJECTION, SOLUTION INTRAMUSCULAR; INTRAVENOUS at 21:07

## 2018-11-17 RX ADMIN — METOPROLOL TARTRATE 5 MG: 5 INJECTION, SOLUTION INTRAVENOUS at 22:04

## 2018-11-17 RX ADMIN — SODIUM CHLORIDE, PRESERVATIVE FREE 900 UNITS: 5 INJECTION INTRAVENOUS at 06:08

## 2018-11-17 RX ADMIN — DEXMEDETOMIDINE 0.2 MCG/KG/HR: 100 INJECTION, SOLUTION, CONCENTRATE INTRAVENOUS at 10:51

## 2018-11-17 RX ADMIN — DILTIAZEM HYDROCHLORIDE 15 MG/HR: 5 INJECTION INTRAVENOUS at 07:49

## 2018-11-17 RX ADMIN — SODIUM CHLORIDE, SODIUM LACTATE, POTASSIUM CHLORIDE, AND CALCIUM CHLORIDE 75 ML/HR: 600; 310; 30; 20 INJECTION, SOLUTION INTRAVENOUS at 10:35

## 2018-11-17 RX ADMIN — Medication 30 ML: at 14:37

## 2018-11-17 RX ADMIN — PROPOFOL 25 MCG/KG/MIN: 10 INJECTION, EMULSION INTRAVENOUS at 00:10

## 2018-11-17 RX ADMIN — ENOXAPARIN SODIUM 40 MG: 40 INJECTION, SOLUTION INTRAVENOUS; SUBCUTANEOUS at 09:05

## 2018-11-17 RX ADMIN — SODIUM CHLORIDE 50 ML/HR: 900 INJECTION, SOLUTION INTRAVENOUS at 03:31

## 2018-11-17 RX ADMIN — METOPROLOL TARTRATE 5 MG: 5 INJECTION, SOLUTION INTRAVENOUS at 03:31

## 2018-11-17 RX ADMIN — SERTRALINE HYDROCHLORIDE 50 MG: 50 TABLET ORAL at 09:04

## 2018-11-17 RX ADMIN — Medication 30 ML: at 21:07

## 2018-11-17 RX ADMIN — PROPOFOL 30 MCG/KG/MIN: 10 INJECTION, EMULSION INTRAVENOUS at 10:51

## 2018-11-17 RX ADMIN — PROPOFOL 30 MCG/KG/MIN: 10 INJECTION, EMULSION INTRAVENOUS at 06:08

## 2018-11-17 RX ADMIN — METOPROLOL TARTRATE 5 MG: 5 INJECTION, SOLUTION INTRAVENOUS at 14:36

## 2018-11-17 RX ADMIN — LEVALBUTEROL HYDROCHLORIDE 1.25 MG: 1.25 SOLUTION RESPIRATORY (INHALATION) at 00:11

## 2018-11-17 RX ADMIN — LEVALBUTEROL HYDROCHLORIDE 1.25 MG: 1.25 SOLUTION RESPIRATORY (INHALATION) at 08:09

## 2018-11-17 RX ADMIN — DILTIAZEM HYDROCHLORIDE 15 MG/HR: 5 INJECTION INTRAVENOUS at 00:16

## 2018-11-17 RX ADMIN — DEXAMETHASONE SODIUM PHOSPHATE 6 MG: 4 INJECTION, SOLUTION INTRAMUSCULAR; INTRAVENOUS at 08:56

## 2018-11-17 RX ADMIN — SODIUM CHLORIDE, PRESERVATIVE FREE 900 UNITS: 5 INJECTION INTRAVENOUS at 14:33

## 2018-11-17 RX ADMIN — METOPROLOL TARTRATE 5 MG: 5 INJECTION, SOLUTION INTRAVENOUS at 09:03

## 2018-11-17 RX ADMIN — Medication 30 ML: at 06:08

## 2018-11-17 RX ADMIN — LEVALBUTEROL HYDROCHLORIDE 1.25 MG: 1.25 SOLUTION RESPIRATORY (INHALATION) at 03:10

## 2018-11-17 RX ADMIN — ENOXAPARIN SODIUM 40 MG: 40 INJECTION, SOLUTION INTRAVENOUS; SUBCUTANEOUS at 21:07

## 2018-11-17 RX ADMIN — SODIUM CHLORIDE, PRESERVATIVE FREE 900 UNITS: 5 INJECTION INTRAVENOUS at 21:07

## 2018-11-17 RX ADMIN — DEXMEDETOMIDINE 0.5 MCG/KG/HR: 100 INJECTION, SOLUTION, CONCENTRATE INTRAVENOUS at 18:32

## 2018-11-17 NOTE — PROGRESS NOTES
Ventilator check complete; patient has a #8. 0 ET tube secured at the 23 at the lip. Patient is sedated. Patient is not able to follow commands. Breath sounds are diminished. Trachea is midline, Negative for subcutaneous air, and chest excursion is symmetric. Patient is also Negative for cyanosis. All alarms are set and audible. Resuscitation bag is at the head of the bed. Ventilator Settings Mode FIO2 Rate Tidal Volume Pressure PEEP I:E Ratio PRVC  40 %   14 500 ml     10 cm H20  1:3.7 Peak airway pressure: 26 cm H2O Minute ventilation: 7.3 l/min ABG: No results for input(s): PH, PCO2, PO2, HCO3 in the last 72 hours.  
 
 
Heather Lind, RT

## 2018-11-17 NOTE — PROGRESS NOTES
Rounding visit to assess pt's spiritual needs. Offered presence & prayer to demonstrate caring & concern, convey emotional & spiritual support. Aleshia Up MDiv, ThM, PhD 
[employer]

## 2018-11-17 NOTE — PROGRESS NOTES
Bedside shift change report given to LUCIANO Cheung (oncoming nurse) by Lashonda Ragsdale RN (offgoing nurse). Report included the following information SBAR, Kardex, ED Summary, Intake/Output, MAR, Recent Results, Med Rec Status and Cardiac Rhythm aflutter. Dual skin assessment performed. Dual verification of gtts. Pt turned and repositioned.

## 2018-11-17 NOTE — PROGRESS NOTES
Pt attempting to sit up in bed, reach for ETT and kicking legs. Propofol gtt increased for goal RASS of 0 to -1.

## 2018-11-17 NOTE — PROGRESS NOTES
Leodan Leon, Cardiology NP at bedside to see patient.  despite cardizem gtt at max rate. New orders received. D/C coreg and order Lopressor 5mg IV Q6. New orders to main HR <140.

## 2018-11-17 NOTE — PROGRESS NOTES
Bedside, Verbal and Written shift change report given to Suha An RN (oncoming nurse) by Jasson Verdugo RN (offgoing nurse). Report included the following information SBAR, Kardex, ED Summary, Intake/Output, MAR, Recent Results and Cardiac Rhythm Atrial flutter.

## 2018-11-17 NOTE — PROGRESS NOTES
[facility] CARDIOLOGY PROGRESS NOTE 
      
 
11/17/2018 10:07 AM 
 
Admit Date: 11/16/2018 Admit Diagnosis: Acute on chronic respiratory failure with hypoxia and hypercapnia (HCC) Assessment:  
Principal Problem: 
  Acute on chronic respiratory failure with hypoxia and hypercapnia ([facility] [address] (11/16/2018) Active Problems: COPD (chronic obstructive pulmonary disease) ([facility] [address] (11/16/2018) Methamphetamine abuse ([facility] [address] (2/1/2018) NATALIIA (obstructive sleep apnea) (11/16/2018) Altered mental status (11/16/2018) Morbid obesity ([facility] [address] (11/16/2018) H/O atrial flutter (11/16/2018) Overview: 1/2018 Atrial flutter, s/p cardioversion. Essential hypertension (11/16/2018) Plan: 1. Atypical AFL - continue rate control. Intubated now. Can consider MIRANDA/DCCV in the next 24-48 hours. Was hard to perform yesterday given his acute respiratory status. I would favor diuresis first and getting him back on Xarelto. 2. Fluid Overload/pulmonary edema - goal net negative 1-1.5 L daily. 3. Stroke ppx - restart Xarelto vs heparin gtt when appropriate. 4. Respiratory failure - ICU level care as you are. Thank you for allowing me to participate in the electrophysiologic care of this most pleasant patient. Please feel free to contact me if there are any questions or concerns. John De Leon. Sosa Alex MD, MS Clinical Cardiac Electrophysiology [address] Cardiology Subjective: No complaints this AM, no chest pain or shortness of breath Interval History: (History of pertinent interval events obtained from nursing staff) ROS: 
GEN:  No fever or chills Cardiovascular:  As noted above Pulmonary:  As noted above Neuro:  No new focal motor or sensory loss Objective:  
 
Vitals:  
 11/17/18 0809 11/17/18 0932 11/17/18 0946 11/17/18 1001 BP:  118/66 119/64 121/66 Pulse: (!) 127 84 84 84 Resp: 14 14 14 14 Temp:      
SpO2: 97% 100% 99% 99% Weight: Height:      
 
 
Physical Exam: 
General - intubated, sedated. Obese. Neck- supple, no JVD 
CV- Irregularly irregular Lung- Intubated, rales. Abd- soft, nontender, nondistended Ext- 1+ edema Skin- warm and dry Current Facility-Administered Medications Medication Dose Route Frequency  0.9% sodium chloride infusion  50 mL/hr IntraVENous CONTINUOUS  
 acetaminophen (TYLENOL) suppository 650 mg  650 mg Rectal Q4H PRN  
 HYDROcodone-acetaminophen (NORCO) 7.5-325 mg per tablet 1 Tab  1 Tab Oral Q4H PRN  
 morphine injection 2 mg  2 mg IntraVENous Q2H PRN  
 bisacodyl (DULCOLAX) suppository 10 mg  10 mg Rectal DAILY PRN  
 enoxaparin (LOVENOX) injection 40 mg  40 mg SubCUTAneous Q24H  
 dexamethasone (DECADRON) 4 mg/mL injection 6 mg  6 mg IntraVENous Q12H  
 influenza vaccine 2018-19 (6 mos+)(PF) (FLUARIX QUAD/FLULAVAL QUAD) injection 0.5 mL  0.5 mL IntraMUSCular PRIOR TO DISCHARGE  dilTIAZem (CARDIZEM) 125 mg in dextrose 5% 125 mL infusion  0-15 mg/hr IntraVENous TITRATE  propofol (DIPRIVAN) infusion  0-50 mcg/kg/min IntraVENous TITRATE  morphine 10 mg/ml injection 5 mg  5 mg IntraVENous Q3H PRN  
 sertraline (ZOLOFT) tablet 50 mg  50 mg Per NG tube DAILY  insulin regular (NOVOLIN R, HUMULIN R) injection   SubCUTAneous Q6H  
 sodium chloride (NS) flush 30 mL  30 mL InterCATHeter Q8H  
 heparin (porcine) pf 900 Units  900 Units InterCATHeter Q8H  
 sodium chloride (NS) flush 30 mL  30 mL InterCATHeter PRN  
 heparin (porcine) pf 900 Units  900 Units InterCATHeter PRN  
 levalbuterol (XOPENEX) nebulizer soln 1.25 mg/3 mL  1.25 mg Nebulization Q4H RT  
 metoprolol (LOPRESSOR) injection 5 mg  5 mg IntraVENous Q6H Data Review:  
Recent Results (from the past 24 hour(s)) GLUCOSE, POC Collection Time: 11/16/18  1:15 PM  
Result Value Ref Range Glucose (POC) 165 (H) 65 - 100 mg/dL POC G3 Collection Time: 11/16/18  4:41 PM  
Result Value Ref Range Device: BIPAP FIO2 (POC) 50 % pH (POC) 7.124 (LL) 7.35 - 7.45    
 pCO2 (POC) 115.3 (HH) 35 - 45 MMHG  
 pO2 (POC) 97 75 - 100 MMHG  
 HCO3 (POC) 37.8 (H) 22 - 26 MMOL/L  
 sO2 (POC) 94 (L) 95 - 98 % Base excess (POC) 4 mmol/L Tidal volume 600 ml Set Rate 16 bpm  
 Allens test (POC) YES Inspiratory Time 1 sec Site RIGHT RADIAL Patient temp. 98.6 Specimen type (POC) ARTERIAL Performed by SojournerSarahRT   
 CO2, POC 41 MMOL/L Respiratory comment: NurseNotified GLUCOSE, POC Collection Time: 11/16/18  5:15 PM  
Result Value Ref Range Glucose (POC) 128 (H) 65 - 100 mg/dL POC G3 Collection Time: 11/16/18  6:51 PM  
Result Value Ref Range Device: VENT    
 FIO2 (POC) 40 % pH (POC) 7.357 7.35 - 7.45    
 pCO2 (POC) 63.1 (HH) 35 - 45 MMHG  
 pO2 (POC) 75 75 - 100 MMHG  
 HCO3 (POC) 35.4 (H) 22 - 26 MMOL/L  
 sO2 (POC) 94 (L) 95 - 98 % Base excess (POC) 8 mmol/L Mode Pressure regulated volume control Tidal volume 500 ml Set Rate 18 bpm  
 PEEP/CPAP (POC) 10 cmH2O Allens test (POC) YES Site RIGHT RADIAL Patient temp. 98.6 Specimen type (POC) ARTERIAL Performed by SojournerSarahRT   
 CO2, POC 37 MMOL/L Respiratory comment: NurseNotified GLUCOSE, POC Collection Time: 11/16/18 11:33 PM  
Result Value Ref Range Glucose (POC) 122 (H) 65 - 100 mg/dL POC G3 Collection Time: 11/17/18  3:26 AM  
Result Value Ref Range Device: VENT    
 FIO2 (POC) 40 % pH (POC) 7.503 (H) 7.35 - 7.45    
 pCO2 (POC) 44.4 35 - 45 MMHG  
 pO2 (POC) 65 (L) 75 - 100 MMHG  
 HCO3 (POC) 34.8 (H) 22 - 26 MMOL/L  
 sO2 (POC) 94 (L) 95 - 98 % Base excess (POC) 10 mmol/L Mode Pressure regulated volume control Tidal volume 500 ml Set Rate 18 bpm  
 PEEP/CPAP (POC) 10 cmH2O Allens test (POC) YES Inspiratory Time 0.9 sec Nitric-ppm (POC) 0 ppm  
 Site RIGHT RADIAL Patient temp. 98.6  Specimen type (POC) ARTERIAL    
 Performed by Silke   
 CO2, POC 36 MMOL/L Respiratory comment: NurseNotified METABOLIC PANEL, BASIC Collection Time: 11/17/18  3:50 AM  
Result Value Ref Range Sodium 142 136 - 145 mmol/L Potassium 3.9 3.5 - 5.1 mmol/L Chloride 102 98 - 107 mmol/L  
 CO2 33 (H) 21 - 32 mmol/L Anion gap 7 7 - 16 mmol/L Glucose 137 (H) 65 - 100 mg/dL BUN 25 (H) 6 - 23 MG/DL Creatinine 1.49 0.8 - 1.5 MG/DL  
 GFR est AA >60 >60 ml/min/1.73m2 GFR est non-AA 52 (L) >60 ml/min/1.73m2 Calcium 8.7 8.3 - 10.4 MG/DL MAGNESIUM Collection Time: 11/17/18  3:50 AM  
Result Value Ref Range Magnesium 1.8 1.8 - 2.4 mg/dL CBC WITH AUTOMATED DIFF Collection Time: 11/17/18  3:50 AM  
Result Value Ref Range WBC 6.8 4.3 - 11.1 K/uL  
 RBC 3.71 (L) 4.23 - 5.6 M/uL  
 HGB 11.8 (L) 13.6 - 17.2 g/dL HCT 37.8 (L) 41.1 - 50.3 % .9 (H) 79.6 - 97.8 FL  
 MCH 31.8 26.1 - 32.9 PG  
 MCHC 31.2 (L) 31.4 - 35.0 g/dL  
 RDW 12.6 % PLATELET 139 208 - 367 K/uL MPV 10.0 9.4 - 12.3 FL ABSOLUTE NRBC 0.00 0.0 - 0.2 K/uL  
 DF AUTOMATED NEUTROPHILS 84 (H) 43 - 78 % LYMPHOCYTES 10 (L) 13 - 44 % MONOCYTES 5 4.0 - 12.0 % EOSINOPHILS 0 (L) 0.5 - 7.8 % BASOPHILS 0 0.0 - 2.0 % IMMATURE GRANULOCYTES 0 0.0 - 5.0 %  
 ABS. NEUTROPHILS 5.7 1.7 - 8.2 K/UL  
 ABS. LYMPHOCYTES 0.7 0.5 - 4.6 K/UL  
 ABS. MONOCYTES 0.3 0.1 - 1.3 K/UL  
 ABS. EOSINOPHILS 0.0 0.0 - 0.8 K/UL  
 ABS. BASOPHILS 0.0 0.0 - 0.2 K/UL  
 ABS. IMM. GRANS. 0.0 0.0 - 0.5 K/UL GLUCOSE, POC Collection Time: 11/17/18  5:58 AM  
Result Value Ref Range Glucose (POC) 130 (H) 65 - 100 mg/dL EKG:  (EKG has been independently visualized by me with interpretation below). Atypical atrial flutter.

## 2018-11-17 NOTE — PROGRESS NOTES
[facility] Admission Date: 11/16/2018 Daily Progress Note: 11/17/2018 The patient's chart is reviewed and the patient is discussed with the staff. 49yo male with history of morbid obesity, NATALIIA possible OHS, presumed COPD (no spirometry on file), substance abuse (amphetamines this admission. Past also benzos, opioids, THC), called EMS in respiratory distress. Tripoding on arrival, on CPAP. ABG with pCO2 72, placed on bipap. ABG worsened to pCO2 of 115, intubated 11/16. Subjective:  
 
Patient was intubated yesterday afternoon for rising CO2 levels on bipap. Drug screen returned positive for amphetamines. Did not tolerate sedation vacation this morning, becoming agitated. Currently on propofol 35. Started on dilt drip for tachycardia as well. Current Facility-Administered Medications Medication Dose Route Frequency  0.9% sodium chloride infusion  50 mL/hr IntraVENous CONTINUOUS  
 acetaminophen (TYLENOL) suppository 650 mg  650 mg Rectal Q4H PRN  
 HYDROcodone-acetaminophen (NORCO) 7.5-325 mg per tablet 1 Tab  1 Tab Oral Q4H PRN  
 morphine injection 2 mg  2 mg IntraVENous Q2H PRN  
 bisacodyl (DULCOLAX) suppository 10 mg  10 mg Rectal DAILY PRN  
 enoxaparin (LOVENOX) injection 40 mg  40 mg SubCUTAneous Q24H  
 dexamethasone (DECADRON) 4 mg/mL injection 6 mg  6 mg IntraVENous Q12H  
 influenza vaccine 2018-19 (6 mos+)(PF) (FLUARIX QUAD/FLULAVAL QUAD) injection 0.5 mL  0.5 mL IntraMUSCular PRIOR TO DISCHARGE  dilTIAZem (CARDIZEM) 125 mg in dextrose 5% 125 mL infusion  0-15 mg/hr IntraVENous TITRATE  propofol (DIPRIVAN) infusion  0-50 mcg/kg/min IntraVENous TITRATE  morphine 10 mg/ml injection 5 mg  5 mg IntraVENous Q3H PRN  
 sertraline (ZOLOFT) tablet 50 mg  50 mg Per NG tube DAILY  insulin regular (NOVOLIN R, HUMULIN R) injection   SubCUTAneous Q6H  
 sodium chloride (NS) flush 30 mL  30 mL InterCATHeter Q8H  
  heparin (porcine) pf 900 Units  900 Units InterCATHeter Q8H  
 sodium chloride (NS) flush 30 mL  30 mL InterCATHeter PRN  
 heparin (porcine) pf 900 Units  900 Units InterCATHeter PRN  
 levalbuterol (XOPENEX) nebulizer soln 1.25 mg/3 mL  1.25 mg Nebulization Q4H RT  
 metoprolol (LOPRESSOR) injection 5 mg  5 mg IntraVENous Q6H Review of Systems Unobtainable due to patient status. Objective:  
 
Vitals:  
 11/17/18 8738 11/17/18 0732 11/17/18 0746 11/17/18 0809 BP: 132/72 140/75 140/72 Pulse: 85 84 84 (!) 127 Resp: 14 14 14 14 Temp: 97.9 °F (36.6 °C) SpO2: 100% 100% 99% 97% Weight:      
Height:      
 
Intake and Output:  
11/15 1901 - 11/17 0700 In: 1433.5 [I.V.:1433.5] Out: 5475 [QYDKT:[id number]] No intake/output data recorded. Physical Exam:  
Constitution:  the patient is well developed and in no acute distress EENMT:  Sclera clear, pupils equal, oral mucosa moist 
Respiratory: Intubated. CTA B, no w/r/r. Cardiovascular:  RRR without M,G,R 
Gastrointestinal: soft and non-tender; with positive bowel sounds. Musculoskeletal: warm without cyanosis. There is no lower leg edema. Skin:  no jaundice or rashes, no wounds Neurologic: sedated Psychiatric:  sedated CHEST XRAY:  
11/17/18 1. Persistent left basilar density, likely combination of atelectasis and 
prominent epicardial fat pad 2. ET tube tip is in satisfactory position. LAB No lab exists for component: [place] Recent Labs 11/17/18 
0350 11/16/18 
0232 WBC 6.8 7.5 HGB 11.8* 13.1*  
HCT 37.8* 43.9  237 Recent Labs 11/17/18 
0350 11/16/18 
0232  141  
K 3.9 5.1  105 CO2 33* 32 * 101* BUN 25* 20  
CREA 1.49 1.38  
MG 1.8  --   
CA 8.7 8.4 PHOS  --  3.8 ALB  --  3.2* SGOT  --  15 No results for input(s): PH, PCO2, PO2, HCO3 in the last 72 hours. MICRO Urine - ngtd Assessment:  (Medical Decision Making) Hospital Problems  Date Reviewed: 11/16/2018 Codes Class Noted POA  
 COPD (chronic obstructive pulmonary disease) ([facility] [address] (Chronic) ICD-10-CM: J44.9 ICD-9-CM: 195  11/16/2018 Yes NATALIIA (obstructive sleep apnea) (Chronic) ICD-10-CM: G47.33 
ICD-9-CM: 327.23  11/16/2018 Yes * (Principal) Acute on chronic respiratory failure with hypoxia and hypercapnia (HCC) ICD-10-CM: J96.21, J96.22 
ICD-9-CM: 518.84, 786.09, 799.02  11/16/2018 Yes Altered mental status ICD-10-CM: R41.82 
ICD-9-CM: 780.97  11/16/2018 Yes Morbid obesity ([facility] (Chronic) ICD-10-CM: E66.01 
ICD-9-CM: 278.01  11/16/2018 Yes  
   
 H/O atrial flutter (Chronic) ICD-10-CM: M67.62 
ICD-9-CM: V12.59  11/16/2018 Yes Overview Signed 2/6/2018  7:51 AM by Andria Hodgkin, NP  
  1/2018 Atrial flutter, s/p cardioversion. Essential hypertension ICD-10-CM: I10 
ICD-9-CM: 401.9  11/16/2018 Unknown Methamphetamine abuse (HCC) (Chronic) ICD-10-CM: F15.10 ICD-9-CM: 305.70  2/1/2018 Yes Patient with hypercarbic respiratory failure likely due to a combination of amphetamine abuse, obesity with NATALIIA/OHS and possible COPD. No wheeze on exam currently. Tachycardic and agitated earlier, possibly related to substance abuse. Plan:  (Medical Decision Making)  
-will start precedex. Hopefully this will allow us to wean dilt and begin to wake him up.  
-wean propofol if able. -uop dropping some. Start LR at 75/hr.  
-on decadron 6mg IV every 12. Ok to cont, wean tomorrow if still no wheezing.  
-change xopenex to prn.   
 
 
Susanna Martinez MD

## 2018-11-17 NOTE — PROGRESS NOTES
Sedation vacation performed and not tolerated by patient. Patient sitting up in bed, kicking legs, and agitated. Sedation increased. Will continue to monitor and will repeat sedation vacation later today.

## 2018-11-17 NOTE — PROGRESS NOTES
Ventilator check complete; patient has a #8. 0 ET tube secured at the 24 at the lip. Patient is sedated. Patient is able to follow commands. Breath sounds are coarse. Trachea is midline, Negative for subcutaneous air, and chest excursion is symmetric. Patient is also Negative for cyanosis. All alarms are set and audible. Resuscitation bag is at the head of the bed. Ventilator Settings Mode FIO2 Rate Tidal Volume Pressure PEEP I:E Ratio PRVC  40 %    500 ml     10 cm H20  1:2.7 Peak airway pressure: 26 cm H2O Minute ventilation: 9.6 l/min ABG: No results for input(s): PH, PCO2, PO2, HCO3 in the last 72 hours.  
 
 
Bernard Denton, RT

## 2018-11-18 ENCOUNTER — APPOINTMENT (OUTPATIENT)
Dept: GENERAL RADIOLOGY | Age: 53
DRG: 208 | End: 2018-11-18
Attending: INTERNAL MEDICINE
Payer: SELF-PAY

## 2018-11-18 LAB
ARTERIAL PATENCY WRIST A: YES
BASE EXCESS BLD CALC-SCNC: 10 MMOL/L
BDY SITE: ABNORMAL
BODY TEMPERATURE: 98.6
CO2 BLD-SCNC: 37 MMOL/L
GAS FLOW.O2 O2 DELIVERY SYS: ABNORMAL L/MIN
GAS FLOW.O2 SETTING OXYMISER: 14 BPM
GLUCOSE BLD STRIP.AUTO-MCNC: 113 MG/DL (ref 65–100)
GLUCOSE BLD STRIP.AUTO-MCNC: 118 MG/DL (ref 65–100)
GLUCOSE BLD STRIP.AUTO-MCNC: 118 MG/DL (ref 65–100)
GLUCOSE BLD STRIP.AUTO-MCNC: 135 MG/DL (ref 65–100)
HCO3 BLD-SCNC: 35.2 MMOL/L (ref 22–26)
INSPIRATION.DURATION SETTING TIME VENT: 0.9 SEC
O2/TOTAL GAS SETTING VFR VENT: 40 %
PCO2 BLD: 49.8 MMHG (ref 35–45)
PEEP RESPIRATORY: 10 CMH2O
PH BLD: 7.46 [PH] (ref 7.35–7.45)
PO2 BLD: 81 MMHG (ref 75–100)
SAO2 % BLD: 96 % (ref 95–98)
SERVICE CMNT-IMP: ABNORMAL
SPECIMEN TYPE: ABNORMAL
VENTILATION MODE VENT: ABNORMAL
VT SETTING VENT: 50 ML

## 2018-11-18 PROCEDURE — 65610000001 HC ROOM ICU GENERAL

## 2018-11-18 PROCEDURE — 77030020255 HC SOL INJ LR 1000ML BG

## 2018-11-18 PROCEDURE — 71045 X-RAY EXAM CHEST 1 VIEW: CPT

## 2018-11-18 PROCEDURE — 99232 SBSQ HOSP IP/OBS MODERATE 35: CPT | Performed by: INTERNAL MEDICINE

## 2018-11-18 PROCEDURE — 74011250636 HC RX REV CODE- 250/636: Performed by: INTERNAL MEDICINE

## 2018-11-18 PROCEDURE — 74011250637 HC RX REV CODE- 250/637: Performed by: INTERNAL MEDICINE

## 2018-11-18 PROCEDURE — 82803 BLOOD GASES ANY COMBINATION: CPT

## 2018-11-18 PROCEDURE — 74011000258 HC RX REV CODE- 258: Performed by: INTERNAL MEDICINE

## 2018-11-18 PROCEDURE — 74011000250 HC RX REV CODE- 250: Performed by: INTERNAL MEDICINE

## 2018-11-18 PROCEDURE — 36600 WITHDRAWAL OF ARTERIAL BLOOD: CPT

## 2018-11-18 PROCEDURE — 74011000250 HC RX REV CODE- 250: Performed by: NURSE PRACTITIONER

## 2018-11-18 PROCEDURE — 94003 VENT MGMT INPAT SUBQ DAY: CPT

## 2018-11-18 PROCEDURE — 82962 GLUCOSE BLOOD TEST: CPT

## 2018-11-18 RX ADMIN — METOPROLOL TARTRATE 5 MG: 5 INJECTION, SOLUTION INTRAVENOUS at 09:48

## 2018-11-18 RX ADMIN — SODIUM CHLORIDE, SODIUM LACTATE, POTASSIUM CHLORIDE, AND CALCIUM CHLORIDE 75 ML/HR: 600; 310; 30; 20 INJECTION, SOLUTION INTRAVENOUS at 23:31

## 2018-11-18 RX ADMIN — METOPROLOL TARTRATE 5 MG: 5 INJECTION, SOLUTION INTRAVENOUS at 03:48

## 2018-11-18 RX ADMIN — ENOXAPARIN SODIUM 40 MG: 40 INJECTION, SOLUTION INTRAVENOUS; SUBCUTANEOUS at 21:10

## 2018-11-18 RX ADMIN — SODIUM CHLORIDE, PRESERVATIVE FREE 900 UNITS: 5 INJECTION INTRAVENOUS at 21:11

## 2018-11-18 RX ADMIN — DEXAMETHASONE SODIUM PHOSPHATE 6 MG: 4 INJECTION, SOLUTION INTRAMUSCULAR; INTRAVENOUS at 08:07

## 2018-11-18 RX ADMIN — DEXMEDETOMIDINE 0.3 MCG/KG/HR: 100 INJECTION, SOLUTION, CONCENTRATE INTRAVENOUS at 15:11

## 2018-11-18 RX ADMIN — DEXMEDETOMIDINE 0.5 MCG/KG/HR: 100 INJECTION, SOLUTION, CONCENTRATE INTRAVENOUS at 07:00

## 2018-11-18 RX ADMIN — Medication 30 ML: at 05:14

## 2018-11-18 RX ADMIN — METOPROLOL TARTRATE 5 MG: 5 INJECTION, SOLUTION INTRAVENOUS at 21:10

## 2018-11-18 RX ADMIN — SODIUM CHLORIDE, PRESERVATIVE FREE 900 UNITS: 5 INJECTION INTRAVENOUS at 15:13

## 2018-11-18 RX ADMIN — SERTRALINE HYDROCHLORIDE 50 MG: 50 TABLET ORAL at 08:07

## 2018-11-18 RX ADMIN — DEXMEDETOMIDINE 0.5 MCG/KG/HR: 100 INJECTION, SOLUTION, CONCENTRATE INTRAVENOUS at 22:02

## 2018-11-18 RX ADMIN — METOPROLOL TARTRATE 5 MG: 5 INJECTION, SOLUTION INTRAVENOUS at 15:12

## 2018-11-18 RX ADMIN — SODIUM CHLORIDE, SODIUM LACTATE, POTASSIUM CHLORIDE, AND CALCIUM CHLORIDE 75 ML/HR: 600; 310; 30; 20 INJECTION, SOLUTION INTRAVENOUS at 07:00

## 2018-11-18 RX ADMIN — SODIUM CHLORIDE, PRESERVATIVE FREE 900 UNITS: 5 INJECTION INTRAVENOUS at 05:14

## 2018-11-18 RX ADMIN — Medication 30 ML: at 15:13

## 2018-11-18 RX ADMIN — ENOXAPARIN SODIUM 40 MG: 40 INJECTION, SOLUTION INTRAVENOUS; SUBCUTANEOUS at 09:48

## 2018-11-18 RX ADMIN — DEXAMETHASONE SODIUM PHOSPHATE 6 MG: 4 INJECTION, SOLUTION INTRAMUSCULAR; INTRAVENOUS at 21:10

## 2018-11-18 RX ADMIN — DEXMEDETOMIDINE 0.5 MCG/KG/HR: 100 INJECTION, SOLUTION, CONCENTRATE INTRAVENOUS at 00:06

## 2018-11-18 RX ADMIN — DEXMEDETOMIDINE 0.3 MCG/KG/HR: 100 INJECTION, SOLUTION, CONCENTRATE INTRAVENOUS at 11:20

## 2018-11-18 RX ADMIN — Medication 30 ML: at 21:10

## 2018-11-18 NOTE — PROGRESS NOTES
[facility] CARDIOLOGY PROGRESS NOTE 
      
 
11/18/2018 10:07 AM 
 
Admit Date: 11/16/2018 Admit Diagnosis: Acute on chronic respiratory failure with hypoxia and hypercapnia (HCC) Assessment:  
Principal Problem: 
  Acute on chronic respiratory failure with hypoxia and hypercapnia ([facility] [address] (11/16/2018) Active Problems: COPD (chronic obstructive pulmonary disease) ([facility] [address] (11/16/2018) Methamphetamine abuse ([facility] [address] (2/1/2018) NATALIIA (obstructive sleep apnea) (11/16/2018) Altered mental status (11/16/2018) Morbid obesity ([facility] [address] (11/16/2018) H/O atrial flutter (11/16/2018) Overview: 1/2018 Atrial flutter, s/p cardioversion. Essential hypertension (11/16/2018) Plan: 1. Atypical AFL - continue rate control. Can consider MIRANDA/DCCV in the next 24-48 hours. Was hard to perform on 11/16 given his acute respiratory status. I would favor diuresis/COPD treawtments first and getting him back on Xarelto when possible prior to any rhythm control strategy is attempted. 2. COPD/hypoxic respiratory failure/ventilator requirement - as per pulmonary CC, not unreasonable to achieve mild net negative diuresis as well. 3. Stroke ppx - restart Xarelto vs heparin gtt when appropriate. Thank you for allowing me to participate in the electrophysiologic care of this most pleasant patient. Please feel free to contact me if there are any questions or concerns. Latanya Tovar. Carlos Roland MD, MS Clinical Cardiac Electrophysiology [facility] Cardiology Subjective: Intubated, sedated. Remains on vent. Interval History: (History of pertinent interval events obtained from nursing staff) ROS: 
GEN:  No fever or chills Cardiovascular:  As noted above Pulmonary:  As noted above Neuro:  No new focal motor or sensory loss Objective:  
 
Vitals:  
 11/18/18 0830 11/18/18 0831 11/18/18 7665 11/18/18 1165 BP:  (!) 152/92  148/89 Pulse: 86  86 86 Resp: 15  13 17  
 Temp:      
SpO2: 97%  97% 97% Weight:      
Height:      
 
 
Physical Exam: 
General - intubated, sedated. Obese. Neck- supple, no JVD 
CV- Irregularly irregular, controlled rate. Lung- Intubated, rales. Abd- soft, nontender, nondistended Ext- 1+ edema Skin- warm and dry Current Facility-Administered Medications Medication Dose Route Frequency  lactated Ringers infusion  75 mL/hr IntraVENous CONTINUOUS  
 dexmedeTOMidine (PRECEDEX) 400 mcg in 0.9% sodium chloride 100 mL infusion  0.2-1 mcg/kg/hr IntraVENous TITRATE  levalbuterol (XOPENEX) nebulizer soln 1.25 mg/3 mL  1.25 mg Nebulization Q4H PRN  
 enoxaparin (LOVENOX) injection 40 mg  40 mg SubCUTAneous Q12H  
 acetaminophen (TYLENOL) suppository 650 mg  650 mg Rectal Q4H PRN  
 HYDROcodone-acetaminophen (NORCO) 7.5-325 mg per tablet 1 Tab  1 Tab Oral Q4H PRN  
 morphine injection 2 mg  2 mg IntraVENous Q2H PRN  
 bisacodyl (DULCOLAX) suppository 10 mg  10 mg Rectal DAILY PRN  
 dexamethasone (DECADRON) 4 mg/mL injection 6 mg  6 mg IntraVENous Q12H  
 influenza vaccine 2018-19 (6 mos+)(PF) (FLUARIX QUAD/FLULAVAL QUAD) injection 0.5 mL  0.5 mL IntraMUSCular PRIOR TO DISCHARGE  dilTIAZem (CARDIZEM) 125 mg in dextrose 5% 125 mL infusion  0-15 mg/hr IntraVENous TITRATE  propofol (DIPRIVAN) infusion  0-50 mcg/kg/min IntraVENous TITRATE  morphine 10 mg/ml injection 5 mg  5 mg IntraVENous Q3H PRN  
 sertraline (ZOLOFT) tablet 50 mg  50 mg Per NG tube DAILY  insulin regular (NOVOLIN R, HUMULIN R) injection   SubCUTAneous Q6H  
 sodium chloride (NS) flush 30 mL  30 mL InterCATHeter Q8H  
 heparin (porcine) pf 900 Units  900 Units InterCATHeter Q8H  
 sodium chloride (NS) flush 30 mL  30 mL InterCATHeter PRN  
 heparin (porcine) pf 900 Units  900 Units InterCATHeter PRN  
 metoprolol (LOPRESSOR) injection 5 mg  5 mg IntraVENous Q6H Data Review:  
Recent Results (from the past 24 hour(s)) GLUCOSE, POC  
 Collection Time: 11/17/18 11:51 AM  
Result Value Ref Range Glucose (POC) 133 (H) 65 - 100 mg/dL GLUCOSE, POC Collection Time: 11/17/18  6:08 PM  
Result Value Ref Range Glucose (POC) 131 (H) 65 - 100 mg/dL GLUCOSE, POC Collection Time: 11/17/18 11:24 PM  
Result Value Ref Range Glucose (POC) 120 (H) 65 - 100 mg/dL POC G3 Collection Time: 11/18/18  4:01 AM  
Result Value Ref Range Device: VENT    
 FIO2 (POC) 40 % pH (POC) 7.457 (H) 7.35 - 7.45    
 pCO2 (POC) 49.8 (H) 35 - 45 MMHG  
 pO2 (POC) 81 75 - 100 MMHG  
 HCO3 (POC) 35.2 (H) 22 - 26 MMOL/L  
 sO2 (POC) 96 95 - 98 % Base excess (POC) 10 mmol/L Mode Pressure regulated volume control Tidal volume 50 ml Set Rate 14 bpm  
 PEEP/CPAP (POC) 10 cmH2O Allens test (POC) YES Inspiratory Time 0.9 sec Site LEFT RADIAL Patient temp. 98.6 Specimen type (POC) ARTERIAL Performed by Dax   
 CO2, POC 37 MMOL/L  
GLUCOSE, POC Collection Time: 11/18/18  5:56 AM  
Result Value Ref Range Glucose (POC) 135 (H) 65 - 100 mg/dL EKG:  (EKG has been independently visualized by me with interpretation below). Atypical atrial flutter.

## 2018-11-18 NOTE — PROGRESS NOTES
Ventilator check complete; patient has a #8. 0 ET tube secured at the 24 at the lip. Patient is sedated. Patient is able to follow commands. Breath sounds are coarse. Trachea is midline, Negative for subcutaneous air, and chest excursion is symmetric. Patient is also Negative for cyanosis and is Negative for pitting edema. All alarms are set and audible. Resuscitation bag is at the head of the bed. Ventilator Settings Mode FIO2 Rate Tidal Volume Pressure PEEP I:E Ratio PRVC  40 %   14 500 ml     10 cm H20  1:3.7 Peak airway pressure: 25 cm H2O Minute ventilation: 8.7 l/min ABG: No results for input(s): PH, PCO2, PO2, HCO3 in the last 72 hours.  
 
 
Wreath Be Bartlett, RT

## 2018-11-18 NOTE — PROGRESS NOTES
Ventilator check complete; patient has a #8. 0 ET tube secured at the 24 at the lip. Patient is sedated. Patient is not able to follow commands. Breath sounds are diminished. Trachea is midline, Negative for subcutaneous air, and chest excursion is symmetric. Patient is also Negative for cyanosis. All alarms are set and audible. Resuscitation bag is at the head of the bed. Ventilator Settings Mode FIO2 Rate Tidal Volume Pressure PEEP I:E Ratio Pressure support  40 %      10 cm H2O  10 cm H20 Peak airway pressure: 20 cm H2O Minute ventilation: 6.2 l/min ABG: No results for input(s): PH, PCO2, PO2, HCO3 in the last 72 hours.  
 
 
Juancho Montemayor, RT

## 2018-11-18 NOTE — PROGRESS NOTES
Cornelio Ny Admission Date: 11/16/2018 Daily Progress Note: 11/18/2018 The patient's chart is reviewed and the patient is discussed with the staff. 49yo male with history of morbid obesity, NATALIIA possible OHS, presumed COPD (no spirometry on file), substance abuse (amphetamines this admission. Past also benzos, opioids, THC), called EMS in respiratory distress. Tripoding on arrival, on CPAP. ABG with pCO2 72, placed on bipap. ABG worsened to pCO2 of 115, intubated 11/16. Subjective:  
 
Patient alert and following commands today. On precedex 0.2. Vent down to 10/10. No new issues this morning. Seen by cardiology and considering DCCV in 1-2 days. Current Facility-Administered Medications Medication Dose Route Frequency  lactated Ringers infusion  75 mL/hr IntraVENous CONTINUOUS  
 dexmedeTOMidine (PRECEDEX) 400 mcg in 0.9% sodium chloride 100 mL infusion  0.2-1 mcg/kg/hr IntraVENous TITRATE  levalbuterol (XOPENEX) nebulizer soln 1.25 mg/3 mL  1.25 mg Nebulization Q4H PRN  
 enoxaparin (LOVENOX) injection 40 mg  40 mg SubCUTAneous Q12H  
 acetaminophen (TYLENOL) suppository 650 mg  650 mg Rectal Q4H PRN  
 HYDROcodone-acetaminophen (NORCO) 7.5-325 mg per tablet 1 Tab  1 Tab Oral Q4H PRN  
 morphine injection 2 mg  2 mg IntraVENous Q2H PRN  
 bisacodyl (DULCOLAX) suppository 10 mg  10 mg Rectal DAILY PRN  
 dexamethasone (DECADRON) 4 mg/mL injection 6 mg  6 mg IntraVENous Q12H  
 influenza vaccine 2018-19 (6 mos+)(PF) (FLUARIX QUAD/FLULAVAL QUAD) injection 0.5 mL  0.5 mL IntraMUSCular PRIOR TO DISCHARGE  dilTIAZem (CARDIZEM) 125 mg in dextrose 5% 125 mL infusion  0-15 mg/hr IntraVENous TITRATE  propofol (DIPRIVAN) infusion  0-50 mcg/kg/min IntraVENous TITRATE  morphine 10 mg/ml injection 5 mg  5 mg IntraVENous Q3H PRN  
 sertraline (ZOLOFT) tablet 50 mg  50 mg Per NG tube DAILY  insulin regular (NOVOLIN R, HUMULIN R) injection   SubCUTAneous Q6H  
 sodium chloride (NS) flush 30 mL  30 mL InterCATHeter Q8H  
 heparin (porcine) pf 900 Units  900 Units InterCATHeter Q8H  
 sodium chloride (NS) flush 30 mL  30 mL InterCATHeter PRN  
 heparin (porcine) pf 900 Units  900 Units InterCATHeter PRN  
 metoprolol (LOPRESSOR) injection 5 mg  5 mg IntraVENous Q6H Review of Systems Unobtainable due to patient status. Objective:  
 
Vitals:  
 11/18/18 0945 11/18/18 1000 11/18/18 1015 11/18/18 1030 BP: 149/89 (!) 152/92 150/89 149/88 Pulse: 86 86 86 86 Resp: 15 11 15 12 Temp:      
SpO2: 97% 97% 97% 97% Weight:      
Height:      
 
Intake and Output:  
11/16 1901 - 11/18 0700 In: 2944.5 [I.V.:2944.5] Out: 975 [Urine:975] No intake/output data recorded. Physical Exam:  
Constitution:  the patient is well developed and in no acute distress EENMT:  Sclera clear, pupils equal, oral mucosa moist 
Respiratory: Intubated. CTA B, no w/r/r. Cardiovascular:  RRR without M,G,R 
Gastrointestinal: soft and non-tender; with positive bowel sounds. Musculoskeletal: warm without cyanosis. There is no lower leg edema. Skin:  no jaundice or rashes, no wounds Neurologic: alert, moving all 4 to command. Psychiatric:  Alert and calm. CHEST XRAY:  
11/18/18 1. Stable chest x-ray. Left basilar density, likely atelectasis. 2. ET tube tip is in good position. LAB No lab exists for component: [place] Recent Labs 11/17/18 
0350 11/16/18 
0232 WBC 6.8 7.5 HGB 11.8* 13.1*  
HCT 37.8* 43.9  237 Recent Labs 11/17/18 
0350 11/16/18 
0232  141  
K 3.9 5.1  105 CO2 33* 32 * 101* BUN 25* 20  
CREA 1.49 1.38  
MG 1.8  --   
CA 8.7 8.4 PHOS  --  3.8 ALB  --  3.2* SGOT  --  15 No results for input(s): PH, PCO2, PO2, HCO3 in the last 72 hours. MICRO Urine - ngtd Assessment:  (Medical Decision Making) Hospital Problems  Date Reviewed: 11/16/2018 Codes Class Noted POA  
 COPD (chronic obstructive pulmonary disease) ([facility] (Chronic) ICD-10-CM: J44.9 ICD-9-CM: 033  11/16/2018 Yes NATALIIA (obstructive sleep apnea) (Chronic) ICD-10-CM: G47.33 
ICD-9-CM: 327.23  11/16/2018 Yes * (Principal) Acute on chronic respiratory failure with hypoxia and hypercapnia (HCC) ICD-10-CM: J96.21, J96.22 
ICD-9-CM: 518.84, 786.09, 799.02  11/16/2018 Yes Altered mental status ICD-10-CM: R41.82 
ICD-9-CM: 780.97  11/16/2018 Yes Morbid obesity ([facility] (Chronic) ICD-10-CM: E66.01 
ICD-9-CM: 278.01  11/16/2018 Yes  
   
 H/O atrial flutter (Chronic) ICD-10-CM: M13.33 
ICD-9-CM: V12.59  11/16/2018 Yes Overview Signed 2/6/2018  7:51 AM by Deann Santoyo NP  
  1/2018 Atrial flutter, s/p cardioversion. Essential hypertension ICD-10-CM: I10 
ICD-9-CM: 401.9  11/16/2018 Unknown Methamphetamine abuse (HCC) (Chronic) ICD-10-CM: F15.10 ICD-9-CM: 305.70  2/1/2018 Yes Patient with hypercarbic respiratory failure likely due to a combination of amphetamine abuse, obesity with NATALIIA/OHS and possible COPD. No wheeze on exam currently. Tachycardic and agitated earlier, possibly related to substance abuse. Better on precedex. A flutter with plans for DCCV eventually by cards. Plan:  (Medical Decision Making)  
-will perform spontaneous breathing trial today to see if ready for extubation.  
-if sounding good after extubation will wean steroids.  
-cbc and bmp tomorrow. -xopenex prn. Mart Jones MD  
 
ADDENDUM: 
On further chart review, it was following attempt at MIRANDA on 11/16 that patient became hypoxic and then required intubation in the first place. Patient was unable to swallow MIRANDA probe.  Since MIRANDA is still part of his plan it would seem wise to do this while he is still intubated as we are likely to run into the same issues if this is again attempted without a secure airway. Will abandon extubation plans, keep comfortable on vent until MIRANDA completed.

## 2018-11-18 NOTE — PROGRESS NOTES
Bedside, Verbal and Written shift change report given to Roger De Leon RN (oncoming nurse) by Scott Cruz RN (offgoing nurse). Report included the following information SBAR, Kardex, ED Summary, Intake/Output, MAR, Recent Results and Cardiac Rhythm Aflutter.

## 2018-11-19 ENCOUNTER — APPOINTMENT (OUTPATIENT)
Dept: GENERAL RADIOLOGY | Age: 53
DRG: 208 | End: 2018-11-19
Attending: INTERNAL MEDICINE
Payer: SELF-PAY

## 2018-11-19 LAB
ANION GAP SERPL CALC-SCNC: 5 MMOL/L (ref 7–16)
ARTERIAL PATENCY WRIST A: YES
BACTERIA SPEC CULT: NORMAL
BASE EXCESS BLD CALC-SCNC: 9 MMOL/L
BDY SITE: ABNORMAL
BODY TEMPERATURE: 98.6
BUN SERPL-MCNC: 43 MG/DL (ref 6–23)
CALCIUM SERPL-MCNC: 8.6 MG/DL (ref 8.3–10.4)
CHLORIDE SERPL-SCNC: 103 MMOL/L (ref 98–107)
CO2 BLD-SCNC: 37 MMOL/L
CO2 SERPL-SCNC: 34 MMOL/L (ref 21–32)
CREAT SERPL-MCNC: 1.39 MG/DL (ref 0.8–1.5)
ERYTHROCYTE [DISTWIDTH] IN BLOOD BY AUTOMATED COUNT: 12.7 %
GAS FLOW.O2 O2 DELIVERY SYS: ABNORMAL L/MIN
GLUCOSE BLD STRIP.AUTO-MCNC: 115 MG/DL (ref 65–100)
GLUCOSE BLD STRIP.AUTO-MCNC: 116 MG/DL (ref 65–100)
GLUCOSE BLD STRIP.AUTO-MCNC: 117 MG/DL (ref 65–100)
GLUCOSE BLD STRIP.AUTO-MCNC: 99 MG/DL (ref 65–100)
GLUCOSE SERPL-MCNC: 124 MG/DL (ref 65–100)
HCO3 BLD-SCNC: 35.4 MMOL/L (ref 22–26)
HCT VFR BLD AUTO: 42.9 % (ref 41.1–50.3)
HGB BLD-MCNC: 13.3 G/DL (ref 13.6–17.2)
MCH RBC QN AUTO: 31.6 PG (ref 26.1–32.9)
MCHC RBC AUTO-ENTMCNC: 31 G/DL (ref 31.4–35)
MCV RBC AUTO: 101.9 FL (ref 79.6–97.8)
NRBC # BLD: 0 K/UL (ref 0–0.2)
O2/TOTAL GAS SETTING VFR VENT: 40 %
PCO2 BLD: 53.8 MMHG (ref 35–45)
PEEP RESPIRATORY: 10 CMH2O
PH BLD: 7.43 [PH] (ref 7.35–7.45)
PLATELET # BLD AUTO: 224 K/UL (ref 150–450)
PMV BLD AUTO: 10.1 FL (ref 9.4–12.3)
PO2 BLD: 87 MMHG (ref 75–100)
POTASSIUM SERPL-SCNC: 4.5 MMOL/L (ref 3.5–5.1)
PRESSURE SUPPORT SETTING VENT: 10 CMH2O
RBC # BLD AUTO: 4.21 M/UL (ref 4.23–5.6)
SAO2 % BLD: 97 % (ref 95–98)
SERVICE CMNT-IMP: ABNORMAL
SERVICE CMNT-IMP: NORMAL
SODIUM SERPL-SCNC: 142 MMOL/L (ref 136–145)
SPECIMEN TYPE: ABNORMAL
TOTAL RESP. RATE, ITRR: 20
VENTILATION MODE VENT: ABNORMAL
WBC # BLD AUTO: 9.3 K/UL (ref 4.3–11.1)

## 2018-11-19 PROCEDURE — 94003 VENT MGMT INPAT SUBQ DAY: CPT

## 2018-11-19 PROCEDURE — 36600 WITHDRAWAL OF ARTERIAL BLOOD: CPT

## 2018-11-19 PROCEDURE — 74011250636 HC RX REV CODE- 250/636: Performed by: INTERNAL MEDICINE

## 2018-11-19 PROCEDURE — B24BZZ4 ULTRASONOGRAPHY OF HEART WITH AORTA, TRANSESOPHAGEAL: ICD-10-PCS | Performed by: INTERNAL MEDICINE

## 2018-11-19 PROCEDURE — 74011000258 HC RX REV CODE- 258: Performed by: INTERNAL MEDICINE

## 2018-11-19 PROCEDURE — 80048 BASIC METABOLIC PNL TOTAL CA: CPT

## 2018-11-19 PROCEDURE — 74011250637 HC RX REV CODE- 250/637: Performed by: INTERNAL MEDICINE

## 2018-11-19 PROCEDURE — 74011250636 HC RX REV CODE- 250/636

## 2018-11-19 PROCEDURE — 99233 SBSQ HOSP IP/OBS HIGH 50: CPT | Performed by: INTERNAL MEDICINE

## 2018-11-19 PROCEDURE — 93312 ECHO TRANSESOPHAGEAL: CPT

## 2018-11-19 PROCEDURE — 77030031476 HC EXCH HEAT MOISTW FLTR HALY -A

## 2018-11-19 PROCEDURE — 77030020255 HC SOL INJ LR 1000ML BG

## 2018-11-19 PROCEDURE — 82803 BLOOD GASES ANY COMBINATION: CPT

## 2018-11-19 PROCEDURE — 74011000250 HC RX REV CODE- 250: Performed by: NURSE PRACTITIONER

## 2018-11-19 PROCEDURE — 85027 COMPLETE CBC AUTOMATED: CPT

## 2018-11-19 PROCEDURE — 5A2204Z RESTORATION OF CARDIAC RHYTHM, SINGLE: ICD-10-PCS | Performed by: INTERNAL MEDICINE

## 2018-11-19 PROCEDURE — 71045 X-RAY EXAM CHEST 1 VIEW: CPT

## 2018-11-19 PROCEDURE — 77030029488 HC ELECTRD PAD DEFIB ZOLL -B

## 2018-11-19 PROCEDURE — 82962 GLUCOSE BLOOD TEST: CPT

## 2018-11-19 PROCEDURE — 74011000250 HC RX REV CODE- 250: Performed by: INTERNAL MEDICINE

## 2018-11-19 PROCEDURE — C9113 INJ PANTOPRAZOLE SODIUM, VIA: HCPCS | Performed by: INTERNAL MEDICINE

## 2018-11-19 PROCEDURE — 65610000001 HC ROOM ICU GENERAL

## 2018-11-19 RX ORDER — FENTANYL CITRATE 50 UG/ML
INJECTION, SOLUTION INTRAMUSCULAR; INTRAVENOUS
Status: COMPLETED
Start: 2018-11-19 | End: 2018-11-19

## 2018-11-19 RX ORDER — ENOXAPARIN SODIUM 150 MG/ML
140 INJECTION SUBCUTANEOUS EVERY 12 HOURS
Status: DISCONTINUED | OUTPATIENT
Start: 2018-11-19 | End: 2018-11-20

## 2018-11-19 RX ORDER — FENTANYL CITRATE 50 UG/ML
100 INJECTION, SOLUTION INTRAMUSCULAR; INTRAVENOUS ONCE
Status: COMPLETED | OUTPATIENT
Start: 2018-11-19 | End: 2018-11-19

## 2018-11-19 RX ORDER — MIDAZOLAM HYDROCHLORIDE 1 MG/ML
5 INJECTION, SOLUTION INTRAMUSCULAR; INTRAVENOUS ONCE
Status: COMPLETED | OUTPATIENT
Start: 2018-11-19 | End: 2018-11-19

## 2018-11-19 RX ORDER — MIDAZOLAM HYDROCHLORIDE 1 MG/ML
INJECTION, SOLUTION INTRAMUSCULAR; INTRAVENOUS
Status: COMPLETED
Start: 2018-11-19 | End: 2018-11-19

## 2018-11-19 RX ORDER — DEXAMETHASONE SODIUM PHOSPHATE 4 MG/ML
4 INJECTION, SOLUTION INTRA-ARTICULAR; INTRALESIONAL; INTRAMUSCULAR; INTRAVENOUS; SOFT TISSUE EVERY 12 HOURS
Status: DISCONTINUED | OUTPATIENT
Start: 2018-11-19 | End: 2018-11-20

## 2018-11-19 RX ADMIN — SODIUM CHLORIDE, PRESERVATIVE FREE 900 UNITS: 5 INJECTION INTRAVENOUS at 06:09

## 2018-11-19 RX ADMIN — SODIUM CHLORIDE, SODIUM LACTATE, POTASSIUM CHLORIDE, AND CALCIUM CHLORIDE 75 ML/HR: 600; 310; 30; 20 INJECTION, SOLUTION INTRAVENOUS at 11:22

## 2018-11-19 RX ADMIN — AMIODARONE HYDROCHLORIDE 150 MG: 50 INJECTION, SOLUTION INTRAVENOUS at 15:49

## 2018-11-19 RX ADMIN — FENTANYL CITRATE 50 MCG: 50 INJECTION, SOLUTION INTRAMUSCULAR; INTRAVENOUS at 14:23

## 2018-11-19 RX ADMIN — DEXAMETHASONE SODIUM PHOSPHATE 6 MG: 4 INJECTION, SOLUTION INTRAMUSCULAR; INTRAVENOUS at 09:22

## 2018-11-19 RX ADMIN — SODIUM CHLORIDE, PRESERVATIVE FREE 900 UNITS: 5 INJECTION INTRAVENOUS at 13:37

## 2018-11-19 RX ADMIN — DEXMEDETOMIDINE 0.5 MCG/KG/HR: 100 INJECTION, SOLUTION, CONCENTRATE INTRAVENOUS at 04:04

## 2018-11-19 RX ADMIN — SODIUM CHLORIDE, PRESERVATIVE FREE 900 UNITS: 5 INJECTION INTRAVENOUS at 21:44

## 2018-11-19 RX ADMIN — METOPROLOL TARTRATE 5 MG: 5 INJECTION, SOLUTION INTRAVENOUS at 04:04

## 2018-11-19 RX ADMIN — MORPHINE SULFATE 2 MG: 2 INJECTION, SOLUTION INTRAMUSCULAR; INTRAVENOUS at 13:36

## 2018-11-19 RX ADMIN — MIDAZOLAM HYDROCHLORIDE 5 MG: 1 INJECTION, SOLUTION INTRAMUSCULAR; INTRAVENOUS at 14:22

## 2018-11-19 RX ADMIN — Medication 30 ML: at 06:08

## 2018-11-19 RX ADMIN — AMIODARONE HYDROCHLORIDE 1 MG/MIN: 50 INJECTION, SOLUTION INTRAVENOUS at 15:48

## 2018-11-19 RX ADMIN — SERTRALINE HYDROCHLORIDE 50 MG: 50 TABLET ORAL at 09:23

## 2018-11-19 RX ADMIN — ENOXAPARIN SODIUM 40 MG: 40 INJECTION, SOLUTION INTRAVENOUS; SUBCUTANEOUS at 09:22

## 2018-11-19 RX ADMIN — AMIODARONE HYDROCHLORIDE 1 MG/MIN: 50 INJECTION, SOLUTION INTRAVENOUS at 23:45

## 2018-11-19 RX ADMIN — DEXMEDETOMIDINE 0.3 MCG/KG/HR: 100 INJECTION, SOLUTION, CONCENTRATE INTRAVENOUS at 15:50

## 2018-11-19 RX ADMIN — Medication 30 ML: at 13:37

## 2018-11-19 RX ADMIN — ENOXAPARIN SODIUM 140 MG: 150 INJECTION, SOLUTION INTRAVENOUS; SUBCUTANEOUS at 21:43

## 2018-11-19 RX ADMIN — DEXMEDETOMIDINE 0.5 MCG/KG/HR: 100 INJECTION, SOLUTION, CONCENTRATE INTRAVENOUS at 10:43

## 2018-11-19 RX ADMIN — DEXAMETHASONE SODIUM PHOSPHATE 4 MG: 4 INJECTION, SOLUTION INTRAMUSCULAR; INTRAVENOUS at 20:53

## 2018-11-19 RX ADMIN — METOPROLOL TARTRATE 5 MG: 5 INJECTION, SOLUTION INTRAVENOUS at 09:22

## 2018-11-19 RX ADMIN — Medication 30 ML: at 21:44

## 2018-11-19 RX ADMIN — SODIUM CHLORIDE 40 MG: 9 INJECTION, SOLUTION INTRAMUSCULAR; INTRAVENOUS; SUBCUTANEOUS at 11:24

## 2018-11-19 RX ADMIN — SODIUM CHLORIDE, SODIUM LACTATE, POTASSIUM CHLORIDE, AND CALCIUM CHLORIDE 75 ML/HR: 600; 310; 30; 20 INJECTION, SOLUTION INTRAVENOUS at 21:43

## 2018-11-19 RX ADMIN — FENTANYL CITRATE 50 MCG: 50 INJECTION INTRAMUSCULAR; INTRAVENOUS at 14:23

## 2018-11-19 NOTE — PROCEDURES
Brief Cardiac Procedure Note Patient: Brian Zhang MRN: [id number]  SSN: [id number] YOB: 1965  Age: 52 y.o. Sex: male Date of Procedure: 11/19/2018 Pre-procedure Diagnosis: atrial flutter Post-procedure Diagnosis: Sinus anali Procedure: Transesophageal Echocardiogram with Cardioversion Brief Description of Procedure: See note Performed By: Jacob Meigs, MD  
 
Assistants: None Anesthesia: Moderate Sedation Estimated Blood Loss: Less than 10 mL Specimens: None Implants: None Findings:  
200J x 1 - sinus anali Complications: None Recommendations: Continue medical therapy. Signed By: Jacob Meigs, MD   
 November 19, 2018

## 2018-11-19 NOTE — PROGRESS NOTES
Pt waking up and writing again on paper to communicate. precedex decreased to 0.3 mcg. Pt updated on success of MIRANDA. Pt wanting ETT out today.

## 2018-11-19 NOTE — PROGRESS NOTES
Care Daily Progress Note: 11/19/2018 Admission Date: 11/16/2018 The patient's chart is reviewed and the patient is discussed with the staff. 49yo male with history of morbid obesity, NATALIIA possible OHS, presumed COPD (no spirometry on file), substance abuse (amphetamines this admission. Past also benzos, opioids, THC), called EMS in respiratory distress. Tripoding on arrival, on CPAP. ABG with pCO2 72, placed on bipap. ABG worsened to pCO2 of 115, intubated 11/16. Urine drug screen positive for amphetamines. He has a history of atrial arrhythmias and was apparently on Xarelto and amiodarone in the past. It is unclear if he is still using these medications. Chest CT with no infiltrates or pneumonia. Cardiology consulted for Afib with RVR, was placed on Amiodarone, Xarelto, Diltiazem drip and plan for MIRANDA with possible DCCV. Subjective:  
 
Able to write easily and answer questions. Remains on vent support--had apnea on PS trial. 
 
Current Facility-Administered Medications Medication Dose Route Frequency  pantoprazole (PROTONIX) 40 mg in sodium chloride 0.9% 10 mL injection  40 mg IntraVENous DAILY  lactated Ringers infusion  75 mL/hr IntraVENous CONTINUOUS  
 dexmedeTOMidine (PRECEDEX) 400 mcg in 0.9% sodium chloride 100 mL infusion  0.2-1 mcg/kg/hr IntraVENous TITRATE  levalbuterol (XOPENEX) nebulizer soln 1.25 mg/3 mL  1.25 mg Nebulization Q4H PRN  
 enoxaparin (LOVENOX) injection 40 mg  40 mg SubCUTAneous Q12H  
 acetaminophen (TYLENOL) suppository 650 mg  650 mg Rectal Q4H PRN  
 HYDROcodone-acetaminophen (NORCO) 7.5-325 mg per tablet 1 Tab  1 Tab Oral Q4H PRN  
 morphine injection 2 mg  2 mg IntraVENous Q2H PRN  
 bisacodyl (DULCOLAX) suppository 10 mg  10 mg Rectal DAILY PRN  
 dexamethasone (DECADRON) 4 mg/mL injection 6 mg  6 mg IntraVENous Q12H  
 influenza vaccine 2018-19 (6 mos+)(PF) (FLUARIX QUAD/FLULAVAL QUAD) injection 0.5 mL  0.5 mL IntraMUSCular PRIOR TO DISCHARGE  dilTIAZem (CARDIZEM) 125 mg in dextrose 5% 125 mL infusion  0-15 mg/hr IntraVENous TITRATE  propofol (DIPRIVAN) infusion  0-50 mcg/kg/min IntraVENous TITRATE  morphine 10 mg/ml injection 5 mg  5 mg IntraVENous Q3H PRN  
 sertraline (ZOLOFT) tablet 50 mg  50 mg Per NG tube DAILY  insulin regular (NOVOLIN R, HUMULIN R) injection   SubCUTAneous Q6H  
 sodium chloride (NS) flush 30 mL  30 mL InterCATHeter Q8H  
 heparin (porcine) pf 900 Units  900 Units InterCATHeter Q8H  
 sodium chloride (NS) flush 30 mL  30 mL InterCATHeter PRN  
 heparin (porcine) pf 900 Units  900 Units InterCATHeter PRN  
 metoprolol (LOPRESSOR) injection 5 mg  5 mg IntraVENous Q6H Review of Systems Unobtainable due to patient status. Objective:  
 
Vitals:  
 18 1102 18 1131 18 1138 18 1202 BP: 151/75 (!) 163/93  166/86 Pulse: 90 90 90 90 Resp: 15 15 14 14 Temp:    98.7 °F (37.1 °C) SpO2: 93% 94% 94% 96% Weight:      
Height:      
 
 
Intake and Output:  
 1901 -  0700 In: 3228.8 [I.V.:3228.8] Out: 8331 [IDGX] No intake/output data recorded. Physical Exam:         
Constitutional:  intubated and mechanically ventilated. EENMT:  Sclera clear, pupils equal, oral mucosa moist 
Respiratory: few anterior crackles, no wheezing Cardiovascular:  irregular, AFib/flutter, HR 70-90swith no M,G,R; 
Gastrointestinal:  rounded, obese with no tenderness; positive bowel sounds present Musculoskeletal:  warm with no cyanosis, trace lower leg edema Skin:  no jaundice or ecchymosis Neurologic: no gross neuro deficits Psychiatric:  Awakes, following commands, nodding responses LINES:   
ETT 18 [place] 18 NG 18 Right PICC 18 DRIPS:   
Precedex 0.5mcg/kg/hr LR 75ml/hr CXR 18:   
 
 
Ventilator Settings Mode FIO2 Rate Tidal Volume Pressure PEEP  
 PRVC(pt apneic on PS mode)  45 %    500 ml  10 cm H2O  10 cm H20 Peak airway pressure: 23 cm H2O Minute ventilation: 7.2 l/min ABG:  
Recent Labs 11/19/18 
5917 11/18/18 
0401 11/17/18 
0326 PHI 7.427 7.457* 7.503* PCO2I 53.8* 49.8* 44.4 PO2I 87 81 65* HCO3I 35.4* 35.2* 34.8* LAB Recent Labs 11/19/18 
1121 11/19/18 
0607 11/18/18 
2325 11/18/18 
1728 11/18/18 
1119 GLUCPOC 115* 116* 118* 113* 118* Recent Labs 11/19/18 
0517 11/17/18 
0350 WBC 9.3 6.8 HGB 13.3* 11.8* HCT 42.9 37.8*  228 Recent Labs 11/19/18 
0517 11/17/18 
0350  142  
K 4.5 3.9  102 CO2 34* 33* * 137* BUN 43* 25* CREA 1.39 1.49  
MG  --  1.8  
CA 8.6 8.7 No results for input(s): LCAD, LAC in the last 72 hours. Assessment:  (Medical Decision Making) Hospital Problems  Date Reviewed: 11/16/2018 Codes Class Noted POA  
 COPD (chronic obstructive pulmonary disease) ([facility] (Chronic) ICD-10-CM: J44.9 ICD-9-CM: 795  11/16/2018 Yes  
 chronic NATALIIA (obstructive sleep apnea) (Chronic) ICD-10-CM: L31.60 
ICD-9-CM: 327.23  11/16/2018 Yes  
 chronic * (Principal) Acute on chronic respiratory failure with hypoxia and hypercapnia (HCC) ICD-10-CM: J96.21, J96.22 
ICD-9-CM: 518.84, 786.09, 799.02  11/16/2018 Yes On vent --PS trials Altered mental status ICD-10-CM: R41.82 
ICD-9-CM: 780.97  11/16/2018 Yes Awakens and following commands Morbid obesity ([facility] (Chronic) ICD-10-CM: E66.01 
ICD-9-CM: 278.01  11/16/2018 Yes  
 chronic H/O atrial flutter (Chronic) ICD-10-CM: Z86.79 
ICD-9-CM: V12.59  11/16/2018 Yes  
  1/2018 Atrial flutter, s/p cardioversion. Per cardioversion Essential hypertension ICD-10-CM: I10 
ICD-9-CM: 401.9  11/16/2018 Yes Chronic--SBP 170s Methamphetamine abuse (HCC) (Chronic) ICD-10-CM: F15.10 ICD-9-CM: 305.70  2/1/2018 Yes  
 chronic Plan:  (Medical Decision Making) --Decadron 6mg IV q12h 
--Cardiology following--planning MIRANDA/DCCV today --Wean vent with plan to extubate. More than 50% of the time documented was spent in face-to-face contact with the patient and in the care of the patient on the floor/unit where the patient is located. Aida Cruz NP I have spoken with and examined the patient. I agree with the above assessment and plan as documented. HR remains 125bpm, planning on cardioversion later today. Remains intubated, but can likely extubate after procedure. Gen:  Pleasant, awake, cooperative Lungs:  CTAB Heart:  RRR with no Murmur/Rubs/Gallops Ext: trace edema 
 
--taper decadron  
--Extubate after cardioversion today.  
 
Jes Campos MD

## 2018-11-19 NOTE — PROGRESS NOTES
Respiratory Mechanics completed and are as follows: 
Weaning Parameters Spontaneous Breathing Trial Complete: Yes Resp Rate Observed: 18 Ve: 7.6 VT: 425 RSBI: 42 NIF: -30 VC: 1026 Richards Agitation Sedation Scale (RASS): Alert and calm Patient extubated to an Airvo 50L/ 55%. Patient is able to communicate and is negative for stridor. Breath sounds are diminished. No complications with extubation.   
 
Magda Shelby, RT

## 2018-11-19 NOTE — PROGRESS NOTES
Bedside shift change report given to Maria Esther Cobb RN (oncoming nurse) by Heena Lozoya RN (offgoing nurse). Report included the following information SBAR, Kardex, MAR, Recent Results, Med Rec Status and Cardiac Rhythm aflutter.

## 2018-11-19 NOTE — PROGRESS NOTES
precedex gtt decreased to 0.1 mcg and Dr Leonard Sparks made aware that pt is awake and wanting tube out. Pt on PS for last 5 min. Ok to extubate per protocol. RT aware.

## 2018-11-19 NOTE — PROGRESS NOTES
Bedside report received from [facility]. Pt awakens to name, follows commands. Denies pain. precedex gtt remains at 0.5 mcg for possible MIRANDA today.

## 2018-11-19 NOTE — PROGRESS NOTES
Ventilator check complete; patient has a #8. 0 ET tube secured at the 22 at the teeth. Patient is not sedated. Patient is able to follow commands. Breath sounds are coarse. Trachea is midline, Negative for subcutaneous air, and chest excursion is symmetric. Patient is also Negative for cyanosis and is Negative for pitting edema. All alarms are set and audible. Resuscitation bag is at the head of the bed. Ventilator Settings Mode FIO2 Rate Tidal Volume Pressure PEEP I:E Ratio Pressure support  45 %     10 cm H2O  10 cm H20 Peak airway pressure: 20 cm H2O Minute ventilation: 7.8 l/min ABG: No results for input(s): PH, PCO2, PO2, HCO3 in the last 72 hours.  
 
 
Sultana Rizo, RT

## 2018-11-19 NOTE — PROGRESS NOTES
A follow up visit was made to the patient. Emotional support, spiritual presence and  
prayer were provided for the patient  and his wife, [facility] [employer]. Antonia Chinchilla

## 2018-11-19 NOTE — PROGRESS NOTES
Spoke with Ami Gallegos NP with cardiology about possible MIRANDA today. NP to call back if MIRANDA to be done today. Pt writing on paper. Asked that his brother Tomasa Willingham to be called. Brother given a call and states he will see pt later today. Pt made aware.

## 2018-11-19 NOTE — PROGRESS NOTES
Consent for MIRANDA obtained from pt's wife. zoll and pads at bedside. Dr Vitaly Carey here to see pt. Pt given fentanyl 50 mcg and versed 5 mg IVP for procedure. MIRANDA in progress. No clots seen. Pt synchronized cardioverted to NSR from A-flitter at 1416 with one shock at 200 J. New orders noted for amiodarone bolus and gtt.

## 2018-11-19 NOTE — PROGRESS NOTES
Pt continues to improve. Voice getting stronger. Pt able to tolerate ice chips. No difficulty with swallowing. Yankauer at bedside, pt able to use effectively. Blood glucose=99. Pt given complete chg bath with linen change as pt was sweaty and uncomfortable.

## 2018-11-19 NOTE — PROGRESS NOTES
Pt tearful and writing on paper \"am I doing ok\" and \"will you pray for me\". Assured patient that he is being prayed for and explained to patient why he is on the ventilator. Pt nods \"yes\" to understanding why he is on the ventilator. Spoke with patient about having  pray with him during the daytime and patient nodded \"yes\". Will let AM nurse know to have  come and pray with patient.

## 2018-11-19 NOTE — PROGRESS NOTES
Ventilator check complete; patient has a #8.0 ETT secured at 22 cm at the teeth. Patient is not sedated. Patient is able to follow commands. Breath sounds are coarse and diminished. Trachea is midline, Negative for subcutaneous air, and chest excursion is symmetric. Patient is also Negative for cyanosis and is Negative for pitting edema. All alarms are set and audible. Resuscitation bag is at the head of the bed. Ventilator Settings Mode FIO2 Spont. Rate Exhaled Tidal Volume Pressure Support PEEP I:E Ratio Pressure support, CPAP  40 %   17 breaths/m 460 ml 10 cm H2O  10 cm H20  1:3.7 Peak airway pressure: 20 cm H2O Minute ventilation: 7.8 l/min ABG: No results for input(s): PH, PCO2, PO2, HCO3 in the last 72 hours. [address]

## 2018-11-19 NOTE — PROGRESS NOTES
Interdisciplinary team rounds were held 11/19/2018 with the following team members:Care Management, Nursing, Nurse Practitioner, Nutrition, Occupational Therapy, Palliative Care, Pastoral Care, Pharmacy, Physical Therapy, Physician, Respiratory Therapy and Clinical Coordinator and the patient. Plan of care discussed. See clinical pathway and/or care plan for interventions and desired outcomes.

## 2018-11-19 NOTE — PROGRESS NOTES
[facility] CARDIOLOGY PROGRESS NOTE 
      
 
11/19/2018 2:17 PM 
 
Admit Date: 11/16/2018 Subjective:  
Remains in atrial flutter ROS:  UNABLE TO OBTAIN DUE TO INABILITY OF PATIENT TO COMMUNICATE SECONDARY TO CURRENT INTUBATION AND NEED FOR MECHANICAL VENTILATION. Objective:  
  
Vitals:  
 11/19/18 1102 11/19/18 1131 11/19/18 1138 11/19/18 1202 BP: 151/75 (!) 163/93  166/86 Pulse: 90 90 90 90 Resp: 15 15 14 14 Temp:    98.7 °F (37.1 °C) SpO2: 93% 94% 94% 96% Weight:      
Height:      
 
 
Physical Exam: 
General-Intubated. Neck- supple, no JVD 
CV- regular rate and rhythm no MRG Lung- clear bilaterally Abd- soft, nontender, nondistended Ext- no edema bilaterally. Skin- warm and dry Psychiatric:  Unable to accurately assess due to intubated and sedated status. Neurologic:  Unable to accurately assess due to intubated and sedated status. Data Review:  
Recent Labs 11/19/18 
0517 11/17/18 
0350  142  
K 4.5 3.9 MG  --  1.8 BUN 43* 25* CREA 1.39 1.49  
* 137* WBC 9.3 6.8 HGB 13.3* 11.8* HCT 42.9 37.8*  228 TELEMETRY:  Atrial flutter Assessment/Plan:  
 
Principal Problem: 
  Acute on chronic respiratory failure with hypoxia and hypercapnia ([facility] (11/16/2018) Remains intubated and managed per pulmonary Active Problems: COPD (chronic obstructive pulmonary disease) ([facility] (11/16/2018) Managed per pulmonary Methamphetamine abuse ([address] (2/1/2018) Managed per pulmonary NATALIIA (obstructive sleep apnea) (11/16/2018) Stable Altered mental status (11/16/2018) Improving Morbid obesity ([address] (11/16/2018) Gayl Brine H/O atrial flutter (11/16/2018) Plan MIRANDA/eCV today Essential hypertension (11/16/2018) Stable Desmond Krishnamurthy MD 
11/19/2018 2:17 PM

## 2018-11-19 NOTE — PROGRESS NOTES
RT at bedside. Pt apneic. Pt placed back on a rate of 14, TV of 500 and peep of 10. 45% FIO2. Pt not breathing over vent. Sleeping. Arouses to stimulation. Still awaiting cardiology.

## 2018-11-20 LAB
ANION GAP SERPL CALC-SCNC: 4 MMOL/L (ref 7–16)
BUN SERPL-MCNC: 45 MG/DL (ref 6–23)
CALCIUM SERPL-MCNC: 8.3 MG/DL (ref 8.3–10.4)
CHLORIDE SERPL-SCNC: 102 MMOL/L (ref 98–107)
CO2 SERPL-SCNC: 34 MMOL/L (ref 21–32)
CREAT SERPL-MCNC: 1.57 MG/DL (ref 0.8–1.5)
ERYTHROCYTE [DISTWIDTH] IN BLOOD BY AUTOMATED COUNT: 12.8 %
GLUCOSE BLD STRIP.AUTO-MCNC: 102 MG/DL (ref 65–100)
GLUCOSE BLD STRIP.AUTO-MCNC: 105 MG/DL (ref 65–100)
GLUCOSE BLD STRIP.AUTO-MCNC: 108 MG/DL (ref 65–100)
GLUCOSE BLD STRIP.AUTO-MCNC: 117 MG/DL (ref 65–100)
GLUCOSE SERPL-MCNC: 112 MG/DL (ref 65–100)
HCT VFR BLD AUTO: 41.5 % (ref 41.1–50.3)
HGB BLD-MCNC: 12.7 G/DL (ref 13.6–17.2)
MCH RBC QN AUTO: 31.9 PG (ref 26.1–32.9)
MCHC RBC AUTO-ENTMCNC: 30.6 G/DL (ref 31.4–35)
MCV RBC AUTO: 104.3 FL (ref 79.6–97.8)
NRBC # BLD: 0 K/UL (ref 0–0.2)
PLATELET # BLD AUTO: 203 K/UL (ref 150–450)
PMV BLD AUTO: 9.8 FL (ref 9.4–12.3)
POTASSIUM SERPL-SCNC: 4.6 MMOL/L (ref 3.5–5.1)
RBC # BLD AUTO: 3.98 M/UL (ref 4.23–5.6)
SODIUM SERPL-SCNC: 140 MMOL/L (ref 136–145)
WBC # BLD AUTO: 9.2 K/UL (ref 4.3–11.1)

## 2018-11-20 PROCEDURE — 77010033678 HC OXYGEN DAILY

## 2018-11-20 PROCEDURE — 74011250637 HC RX REV CODE- 250/637: Performed by: INTERNAL MEDICINE

## 2018-11-20 PROCEDURE — 74011000258 HC RX REV CODE- 258: Performed by: INTERNAL MEDICINE

## 2018-11-20 PROCEDURE — C9113 INJ PANTOPRAZOLE SODIUM, VIA: HCPCS | Performed by: INTERNAL MEDICINE

## 2018-11-20 PROCEDURE — 82962 GLUCOSE BLOOD TEST: CPT

## 2018-11-20 PROCEDURE — 85027 COMPLETE CBC AUTOMATED: CPT

## 2018-11-20 PROCEDURE — 77030020255 HC SOL INJ LR 1000ML BG

## 2018-11-20 PROCEDURE — 74011250636 HC RX REV CODE- 250/636: Performed by: INTERNAL MEDICINE

## 2018-11-20 PROCEDURE — 77030021668 HC NEB PREFIL KT VYRM -A

## 2018-11-20 PROCEDURE — 99232 SBSQ HOSP IP/OBS MODERATE 35: CPT | Performed by: INTERNAL MEDICINE

## 2018-11-20 PROCEDURE — 74011000250 HC RX REV CODE- 250: Performed by: INTERNAL MEDICINE

## 2018-11-20 PROCEDURE — 80048 BASIC METABOLIC PNL TOTAL CA: CPT

## 2018-11-20 PROCEDURE — 65660000000 HC RM CCU STEPDOWN

## 2018-11-20 RX ORDER — CARVEDILOL 3.12 MG/1
3.12 TABLET ORAL 2 TIMES DAILY WITH MEALS
Status: DISCONTINUED | OUTPATIENT
Start: 2018-11-20 | End: 2018-11-20

## 2018-11-20 RX ORDER — AMIODARONE HYDROCHLORIDE 200 MG/1
200 TABLET ORAL EVERY 12 HOURS
Status: DISCONTINUED | OUTPATIENT
Start: 2018-11-20 | End: 2018-11-30 | Stop reason: HOSPADM

## 2018-11-20 RX ORDER — HYDRALAZINE HYDROCHLORIDE 20 MG/ML
20 INJECTION INTRAMUSCULAR; INTRAVENOUS
Status: DISCONTINUED | OUTPATIENT
Start: 2018-11-20 | End: 2018-11-30 | Stop reason: HOSPADM

## 2018-11-20 RX ORDER — AMLODIPINE BESYLATE 5 MG/1
5 TABLET ORAL DAILY
Status: DISCONTINUED | OUTPATIENT
Start: 2018-11-20 | End: 2018-11-21

## 2018-11-20 RX ORDER — CARVEDILOL 12.5 MG/1
12.5 TABLET ORAL 2 TIMES DAILY WITH MEALS
Status: DISCONTINUED | OUTPATIENT
Start: 2018-11-20 | End: 2018-11-21

## 2018-11-20 RX ORDER — CARVEDILOL 6.25 MG/1
6.25 TABLET ORAL 2 TIMES DAILY WITH MEALS
Status: DISCONTINUED | OUTPATIENT
Start: 2018-11-20 | End: 2018-11-20

## 2018-11-20 RX ADMIN — AMLODIPINE BESYLATE 5 MG: 5 TABLET ORAL at 15:25

## 2018-11-20 RX ADMIN — SODIUM CHLORIDE, PRESERVATIVE FREE 900 UNITS: 5 INJECTION INTRAVENOUS at 22:22

## 2018-11-20 RX ADMIN — AMIODARONE HYDROCHLORIDE 200 MG: 200 TABLET ORAL at 15:25

## 2018-11-20 RX ADMIN — SODIUM CHLORIDE, PRESERVATIVE FREE 900 UNITS: 5 INJECTION INTRAVENOUS at 06:08

## 2018-11-20 RX ADMIN — AMIODARONE HYDROCHLORIDE 1 MG/MIN: 50 INJECTION, SOLUTION INTRAVENOUS at 08:26

## 2018-11-20 RX ADMIN — DEXAMETHASONE SODIUM PHOSPHATE 4 MG: 4 INJECTION, SOLUTION INTRAMUSCULAR; INTRAVENOUS at 08:26

## 2018-11-20 RX ADMIN — RIVAROXABAN 20 MG: 20 TABLET, FILM COATED ORAL at 17:15

## 2018-11-20 RX ADMIN — SODIUM CHLORIDE, SODIUM LACTATE, POTASSIUM CHLORIDE, AND CALCIUM CHLORIDE 75 ML/HR: 600; 310; 30; 20 INJECTION, SOLUTION INTRAVENOUS at 10:27

## 2018-11-20 RX ADMIN — AMIODARONE HYDROCHLORIDE 200 MG: 200 TABLET ORAL at 22:21

## 2018-11-20 RX ADMIN — SODIUM CHLORIDE, PRESERVATIVE FREE 900 UNITS: 5 INJECTION INTRAVENOUS at 10:56

## 2018-11-20 RX ADMIN — SODIUM CHLORIDE 40 MG: 9 INJECTION, SOLUTION INTRAMUSCULAR; INTRAVENOUS; SUBCUTANEOUS at 08:26

## 2018-11-20 RX ADMIN — Medication 30 ML: at 22:21

## 2018-11-20 RX ADMIN — ENOXAPARIN SODIUM 140 MG: 150 INJECTION, SOLUTION INTRAVENOUS; SUBCUTANEOUS at 10:25

## 2018-11-20 RX ADMIN — SERTRALINE HYDROCHLORIDE 50 MG: 50 TABLET ORAL at 08:26

## 2018-11-20 RX ADMIN — HYDRALAZINE HYDROCHLORIDE 20 MG: 20 INJECTION INTRAMUSCULAR; INTRAVENOUS at 13:08

## 2018-11-20 RX ADMIN — SODIUM CHLORIDE, PRESERVATIVE FREE 900 UNITS: 5 INJECTION INTRAVENOUS at 13:09

## 2018-11-20 RX ADMIN — Medication 30 ML: at 13:09

## 2018-11-20 RX ADMIN — CARVEDILOL 12.5 MG: 12.5 TABLET, FILM COATED ORAL at 17:15

## 2018-11-20 RX ADMIN — Medication 30 ML: at 06:08

## 2018-11-20 RX ADMIN — HYDROCODONE BITARTRATE AND ACETAMINOPHEN 1 TABLET: 7.5; 325 TABLET ORAL at 10:56

## 2018-11-20 NOTE — PROGRESS NOTES
TRANSFER - IN REPORT: 
 
Verbal report received from [facility], [facility] on Glenis Velazquez being received from ICU for routine progression of care. Report consisted of patients Situation, Background, Assessment and Recommendations(SBAR). Information from the following report(s) SBAR, Kardex, Procedure Summary, Intake/Output, MAR, Recent Results and Cardiac Rhythm NSR was reviewed. Opportunity for questions and clarification was provided. Assessment completed upon patients arrival to unit and care assumed. Patient received to room 311. Patient connected to monitor and assessment completed. Plan of care reviewed. Patient oriented to room and call light. Patient aware to use call light to communicate any chest pain or needs. Dual skin assessment completed on admission. Sacrum and heels visualized with no signs of redness or skin breakdown noted. Allevyn in place on sacrum. Patient's back is dry and flaky. Two skin tears noted on upper back. No other skin issues noted.

## 2018-11-20 NOTE — PROGRESS NOTES
Problem: Falls - Risk of 
Goal: *Absence of Falls Document Rachel Cole Fall Risk and appropriate interventions in the flowsheet. Outcome: Progressing Towards Goal 
Fall Risk Interventions: 
Mobility Interventions: Patient to call before getting OOB Mentation Interventions: Adequate sleep, hydration, pain control Medication Interventions: Patient to call before getting OOB Elimination Interventions: Call light in reach, Urinal in reach Problem: Pressure Injury - Risk of 
Goal: *Prevention of pressure injury Document Rudy Scale and appropriate interventions in the flowsheet. Outcome: Progressing Towards Goal 
Pressure Injury Interventions: 
Sensory Interventions: Assess changes in LOC Moisture Interventions: Absorbent underpads Activity Interventions: Increase time out of bed Mobility Interventions: HOB 30 degrees or less Nutrition Interventions: Document food/fluid/supplement intake Friction and Shear Interventions: HOB 30 degrees or less

## 2018-11-20 NOTE — PROGRESS NOTES
Interdisciplinary team rounds were held 11/20/2018 with the following team members:Care Management, Nursing, Nurse Practitioner, Nutrition, Occupational Therapy, Pastoral Care, Pharmacy, Physical Therapy, Physician, Respiratory Therapy and Clinical Coordinator and the patient. Plan of care discussed. See clinical pathway and/or care plan for interventions and desired outcomes.

## 2018-11-20 NOTE — PROCEDURES
Carmelina Loving [address] CARDIAC CATH [facility] 
MR#: [id number] : 1965 ACCOUNT #: [de-identified] DATE OF SERVICE: 2018 REFERRING PHYSICIAN:  MD Lisa Ellington MD  
 
CLINICAL INDICATION:  Recurrent atrial flutter. BRIEF HISTORY:  The patient is a 75-year-old medically noncompliant methamphetamine addict who was admitted with acute respiratory failure requiring intubation. He is now improving and are considering extubation and requesting MIRANDA-guided cardioversion prior to extubation. PROCEDURE PERFORMED:  MIRANDA with cardioversion. CONSCIOUS SEDATION:  Start time 1282, end time 1. ANESTHESIA:  5 mg of Versed and 50 mcg of fentanyl. MONITORING RN:  Juan Diego Coreas PROCEDURE:  After informed consent, he was sedated with Versed and fentanyl. MIRANDA probe was passed without complications. This demonstrates mildly reduced left ventricular systolic function, ejection fraction of 40-45%. There is mild to moderate mitral regurgitation present, mild tricuspid regurgitation present. There is no evidence of left atrial, left atrial appendage, right atrial, right appendage thrombus. Following completion of the MIRANDA, the patient underwent 200 joules of synchronized biphasic energy with restoration of sinus bradycardia. RECOMMENDATIONS:  The patient will be treated with therapeutic doses of Lovenox. We will discontinue Cardizem and metoprolol given resting bradycardia and treat with antiarrhythmic therapy and amiodarone. Pending the patient's clinical course, we will convert back to home anticoagulation with Xarelto. Thank you for allowing us to participate in the care of this patient. If you have questions or concerns, please feel free to contact me. MD LEANDER Ortiz / TREASURE 
D: 2018 14:26 T: 2018 16:41 JOB #: [id number]

## 2018-11-20 NOTE — PROGRESS NOTES
Pt assisted to recliner for breakfast. Pt able to ambulate unassisted to recliner. Gait slightly unsteady.

## 2018-11-20 NOTE — PROGRESS NOTES
Evening /105- Coreg given. PRN Hydralazine given at 1308 and is every 6 hours. Will reassess in 1 hour.

## 2018-11-20 NOTE — PROGRESS NOTES
[facility] OUTREACH NURSE PROGRESS REPORT SUBJECTIVE: Recent transfer from ICU MEWS Score: 1 (11/20/18 1201) Vitals:  
 11/20/18 1350 11/20/18 1401 11/20/18 1519 11/20/18 1521 BP:   (!) 175/102 (!) 173/108 Pulse:  83 86 87 Resp:  19 18 19 Temp:   97.2 °F (36.2 °C) SpO2: 93% 97% 98% 98% Weight:    141.1 kg (311 lb) Height:    6' (1.829 m) LAB DATA: 
 
Recent Labs  
  11/20/18 
0513 11/19/18 
0517  142  
K 4.6 4.5  
 103 CO2 34* 34* AGAP 4* 5* * 124* BUN 45* 43* CREA 1.57* 1.39 GFRAA 59* >60  
GFRNA 49* 56*  
CA 8.3 8.6 Recent Labs  
  11/20/18 
0513 11/19/18 
0517 WBC 9.2 9.3 HGB 12.7* 13.3* HCT 41.5 42.9  224 Pain Assessment Pain Intensity 1: 0 (11/20/18 1527) Pain Location 1: Abdomen Pain Intervention(s) 1: Medication (see MAR) Patient Stated Pain Goal: 0 
 
  
  
  
  
 
  
  
  
   
 
ASSESSMENT:  Pt awake, alert, oriented, wife at bedside. Lung sounds clear, on 4L NC tolerating well. Current /105, primary RN at bedside, will be giving PRN meds. PLAN:  Will continue to follow per ICU outreach protocol.

## 2018-11-20 NOTE — PROGRESS NOTES
[facility] CARDIOLOGY PROGRESS NOTE 
      
 
11/20/2018 2:44 PM 
 
Admit Date: 11/16/2018 Subjective:  
Extubated and stable in SR 
 
ROS: 
Cardiovascular:  As noted above Objective:  
  
Vitals:  
 11/20/18 1308 11/20/18 1312 11/20/18 1350 11/20/18 1401 BP: (!) 210/117 (!) 195/97 Pulse: 77 77  83 Resp:  14  19 Temp:      
SpO2:  100% 93% 97% Weight:      
Height:      
 
 
Physical Exam: 
General-No Acute Distress Neck- supple, no JVD 
CV- regular rate and rhythm no MRG Lung- clear bilaterally Abd- soft, nontender, nondistended Ext- no edema bilaterally. Skin- warm and dry Data Review:  
Recent Labs  
  11/20/18 
0513 11/19/18 
0517  142  
K 4.6 4.5  
BUN 45* 43* CREA 1.57* 1.39  
* 124* WBC 9.2 9.3 HGB 12.7* 13.3* HCT 41.5 42.9  224 Assessment/Plan:  
 
Principal Problem: 
  Acute on chronic respiratory failure with hypoxia and hypercapnia ([facility] (11/16/2018) Extubated and stable. Active Problems: COPD (chronic obstructive pulmonary disease) ([facility] [facility] (11/16/2018) Stable Methamphetamine abuse ([facility] (2/1/2018) Chronic and recurrent NATALIIA (obstructive sleep apnea) (11/16/2018) CPAP Altered mental status (11/16/2018) Resolved Morbid obesity ([address] (11/16/2018) Chronic H/O atrial flutter (11/16/2018) In SR and start Xarelto 20mg daily and amiodarone 200mg BID Essential hypertension (11/16/2018) Increase carvedilol to 12.5mg BID, add amlodipine 5mg daily. Consider adding ACE/ARB id renal function stabilizes.    
 
 
 
 
Taylor Blackwood MD 
11/20/2018 2:44 PM

## 2018-11-20 NOTE — PROGRESS NOTES
Bedside report received from [facility]. Pt resting quietly at present. Vital signs are stable.  Pt very emotional.

## 2018-11-20 NOTE — PROGRESS NOTES
Pt assisted to commode to have bowel movement. Pt had large loose brown stool with flatus. Pt given chg bath, linens changed. Pt assisted back to recliner, will sit up for lunch.

## 2018-11-20 NOTE — PROGRESS NOTES
Care Daily Progress Note: 11/20/2018 Admission Date: 11/16/2018 The patient's chart is reviewed and the patient is discussed with the staff. 49yo male with history of morbid obesity, NATALIIA possible OHS, presumed COPD (no spirometry on file), substance abuse (amphetamines this admission. Past also benzos, opioids, THC), called EMS in respiratory distress. Tripoding on arrival, on CPAP. ABG with pCO2 72, placed on bipap. ABG worsened to pCO2 of 115, intubated 11/16. Urine drug screen positive for amphetamines. He has a history of atrial arrhythmias and was apparently on Xarelto and amiodarone in the past. It is unclear if he is still using these medications. Chest CT with no infiltrates or pneumonia. Cardiology consulted for Afib with RVR, was placed on Amiodarone, Xarelto, Diltiazem drip. MIRANDA 11/19 with possible DCCV to sinus rhythm. Able to be extubated 11/19 to Air-Vo. Subjective:  
 
Sitting up in chair eating lunch. Asking when he can be discharged. Denies shortness of breath and has productive cough at times--using oral suction for clearing. Current Facility-Administered Medications Medication Dose Route Frequency  insulin regular (NOVOLIN R, HUMULIN R) injection   SubCUTAneous AC&HS  pantoprazole (PROTONIX) 40 mg in sodium chloride 0.9% 10 mL injection  40 mg IntraVENous DAILY  dexamethasone (DECADRON) 4 mg/mL injection 4 mg  4 mg IntraVENous Q12H  
 enoxaparin (LOVENOX) injection 140 mg  140 mg SubCUTAneous Q12H  
 amiodarone (CORDARONE) 450 mg in dextrose 5% 250 mL infusion  1 mg/min IntraVENous CONTINUOUS  
 lactated Ringers infusion  75 mL/hr IntraVENous CONTINUOUS  
 dexmedeTOMidine (PRECEDEX) 400 mcg in 0.9% sodium chloride 100 mL infusion  0.2-1 mcg/kg/hr IntraVENous TITRATE  levalbuterol (XOPENEX) nebulizer soln 1.25 mg/3 mL  1.25 mg Nebulization Q4H PRN  
 acetaminophen (TYLENOL) suppository 650 mg  650 mg Rectal Q4H PRN  
  HYDROcodone-acetaminophen (NORCO) 7.5-325 mg per tablet 1 Tab  1 Tab Oral Q4H PRN  
 bisacodyl (DULCOLAX) suppository 10 mg  10 mg Rectal DAILY PRN  
 influenza vaccine 2018-19 (6 mos+)(PF) (FLUARIX QUAD/FLULAVAL QUAD) injection 0.5 mL  0.5 mL IntraMUSCular PRIOR TO DISCHARGE  propofol (DIPRIVAN) infusion  0-50 mcg/kg/min IntraVENous TITRATE  morphine 10 mg/ml injection 5 mg  5 mg IntraVENous Q3H PRN  
 sertraline (ZOLOFT) tablet 50 mg  50 mg Per NG tube DAILY  sodium chloride (NS) flush 30 mL  30 mL InterCATHeter Q8H  
 heparin (porcine) pf 900 Units  900 Units InterCATHeter Q8H  
 sodium chloride (NS) flush 30 mL  30 mL InterCATHeter PRN  
 heparin (porcine) pf 900 Units  900 Units InterCATHeter PRN Review of Systems Constitutional: negative for fever, chills, sweats Cardiovascular: negative for chest pain, palpitations, syncope, edema Gastrointestinal: negative for dysphagia, reflux, vomiting, diarrhea, abdominal pain, or melena Neurologic: negative for focal weakness, numbness, headache Objective:  
 
Vitals:  
 11/20/18 1053 11/20/18 1101 11/20/18 1103 11/20/18 1112 BP:  (!) 172/105 Pulse: 86 73 Resp: 17 23 Temp:      
SpO2: 94%  99% 95% Weight:      
Height:      
 
 
Intake and Output:  
11/18 1901 - 11/20 0700 In: 3636.3 [I.V.:3636.3] Out: 2025 [Urine:2025] 11/20 0701 - 11/20 1900 In: 200 [P.O.:200] Out: - Physical Exam:         
Constitutional:  the patient is well developed, in no acute distress, Air-Vo 45L, 45%, sat 96% EENMT:  Sclera clear, pupils equal, oral mucosa moist 
Respiratory: few fine crackles, no wheezing, productive cough at times Cardiovascular:  RRR without M,G,R 
Gastrointestinal: soft and non-tender; with positive bowel sounds. Musculoskeletal: warm without cyanosis. There is trace lower leg edema. Skin:  no jaundice or rashes, no wounds Neurologic: no gross neuro deficits Psychiatric:  alert and oriented x 3 
 
 
 LINES:   
Galindo 11/16/18 Right PICC 11/16/18 DRIPS:   
LR 75ml/hr Amiodarone 1mg/min CXR:  None today CXR 1/19/18:   
 
 
Ventilator Settings Mode FIO2 Rate Tidal Volume Pressure PEEP Pressure support  45 %    500 ml  10 cm H2O  10 cm H20 Peak airway pressure: 27 cm H2O Minute ventilation: 7.5 l/min ABG:  
Recent Labs 11/19/18 
0318 11/18/18 
0401 PHI 7.427 7.457* PCO2I 53.8* 49.8*  
PO2I 87 81 HCO3I 35.4* 35.2*  
  
 
LAB Recent Labs  
  11/20/18 
0606 11/20/18 
0000 11/19/18 
1740 11/19/18 
1643 11/19/18 
1121 GLUCPOC 108* 102* 99 117* 115* Recent Labs  
  11/20/18 
0513 11/19/18 
0517 WBC 9.2 9.3 HGB 12.7* 13.3* HCT 41.5 42.9  224 Recent Labs  
  11/20/18 
0513 11/19/18 
0517  142  
K 4.6 4.5  
 103 CO2 34* 34* * 124* BUN 45* 43* CREA 1.57* 1.39  
CA 8.3 8.6 No results for input(s): LCAD, LAC in the last 72 hours. Assessment:  (Medical Decision Making) Hospital Problems  Date Reviewed: 11/20/2018 Codes Class Noted POA  
 COPD (chronic obstructive pulmonary disease) ([facility] (Chronic) ICD-10-CM: J44.9 ICD-9-CM: 093  11/16/2018 Yes  
 chronic NATALIIA (obstructive sleep apnea) (Chronic) ICD-10-CM: H83.66 
ICD-9-CM: 327.23  11/16/2018 Yes  
 chronic * (Principal) Acute on chronic respiratory failure with hypoxia and hypercapnia (HCC) ICD-10-CM: J96.21, J96.22 
ICD-9-CM: 518.84, 786.09, 799.02  11/16/2018 Yes Extubated 11/19--Air-Vo 45L, 45% Altered mental status ICD-10-CM: R41.82 
ICD-9-CM: 780.97  11/16/2018 Yes Alert and oriented x3 Morbid obesity ([facility] (Chronic) ICD-10-CM: E66.01 
ICD-9-CM: 278.01  11/16/2018 Yes  
 chronic H/O atrial flutter (Chronic) ICD-10-CM: Z86.79 
ICD-9-CM: V12.59  11/16/2018 Yes  
  1/2018 Atrial flutter, s/p cardioversion. DCCV to sinus Essential hypertension ICD-10-CM: I10 
ICD-9-CM: 401.9  11/16/2018 Yes Chronic--SBP 170s Methamphetamine abuse (HCC) (Chronic) ICD-10-CM: F15.10 ICD-9-CM: 305.70  2/1/2018 Yes Chronic--cessation stressed Plan:  (Medical Decision Making) --Decadron 4mg IV q12h 
--Lovenox therapeutic dose 140mg q12h 
--Cardiology following 
--Wean O2 as tolerated --Extubated, OK to move out of ICU from pulmonary standpoint. More than 50% of the time documented was spent in face-to-face contact with the patient and in the care of the patient on the floor/unit where the patient is located. Elian Stanley NP I have spoken with and examined the patient. I agree with the above assessment and plan as documented. Patient is still on optiflow and has HTN with SBP in 190s. Gen : pleasant, alert, no distress. Lungs: coarse decreased in bases Heart:  RRR with no Murmur/Rubs/Gallops Ext: 1+ edema 
 
--resume coreg 
--stop IV fluids. Still has edema. Monitor creatinine tomorrow --Wean O2 
--Stop steroids, continue bronchodilators 
--appreciate hospitalist involvement on floor. OK for transfer with remove telemetry.  
 
Kimberli Clark MD

## 2018-11-20 NOTE — PROGRESS NOTES
A follow up visit was made to the patient. Emotional support, spiritual presence and  
prayer were provided. The patient shared that he had made promises to God before and messed up. Today he communicated that he wanted to invite Diaz into his life. The  assisted the patient by sharing the process of accepting Diaz as Savior and Lord. The patient did pray asking God to come into his life. Brielle Chinchilla

## 2018-11-20 NOTE — PROGRESS NOTES
TRANSFER - OUT REPORT: 
 
Verbal report given to [facility] RN(name) on Gray Lloyd  being transferred to [facility](unit) for routine progression of care Report consisted of patients Situation, Background, Assessment and  
Recommendations(SBAR). Information from the following report(s) ED Summary, Procedure Summary, MAR and Cardiac Rhythm NSR was reviewed with the receiving nurse. Lines: PICC Triple Lumen 19/91/63 Right;Basilic (Active) Central Line Being Utilized Yes 11/20/2018 12:01 PM  
Criteria for Appropriate Use Hemodynamically unstable, requiring monitoring lines, vasopressors, or volume resuscitation 11/20/2018  7:01 AM  
Site Assessment Clean, dry, & intact 11/20/2018  7:01 AM  
Phlebitis Assessment 0 11/20/2018  7:01 AM  
Infiltration Assessment 0 11/20/2018  7:01 AM  
Arm Circumference (cm) 35 cm 11/19/2018 12:21 PM  
Date of Last Dressing Change 11/16/18 11/20/2018  7:01 AM  
Dressing Status Clean, dry, & intact 11/20/2018  7:01 AM  
Dressing Type Disk with Chlorhexadine gluconate (CHG) 11/20/2018  7:01 AM  
Hub Color/Line Status White;Capped;Flushed 11/20/2018  7:01 AM  
Positive Blood Return (Site #1) Yes 11/20/2018  7:01 AM  
Hub Color/Line Status Leontine Griffon; Infusing 11/20/2018  7:01 AM  
Positive Blood Return (Site #2) Yes 11/20/2018  7:01 AM  
Hub Color/Line Status Red; Infusing 11/20/2018  7:01 AM  
Positive Blood Return (Site #3) Yes 11/20/2018  7:01 AM  
Alcohol Cap Used No 11/19/2018  7:02 PM  
  
 
Opportunity for questions and clarification was provided. Patient transported with: 
 O2 @ 4 liters

## 2018-11-20 NOTE — PROGRESS NOTES
Spoke with Froylan Storey NP that Dr Nettie Preston request cardiology to become primary as pt is extubated, no longer needing their service and on amiodarone gtt.

## 2018-11-20 NOTE — PROGRESS NOTES
Pt return to bed. Blood pressure elevated. New orders noted from Dr Joy Chavez. Hydralazine  Given for elevated BP. Ok to remove alegre.

## 2018-11-21 PROBLEM — R31.9 HEMATURIA: Status: ACTIVE | Noted: 2018-11-21

## 2018-11-21 PROBLEM — N35.919 URETHRAL STRICTURE: Chronic | Status: ACTIVE | Noted: 2018-01-07

## 2018-11-21 PROBLEM — I10 ESSENTIAL HYPERTENSION: Chronic | Status: ACTIVE | Noted: 2018-11-16

## 2018-11-21 LAB
ANION GAP SERPL CALC-SCNC: 5 MMOL/L (ref 7–16)
BUN SERPL-MCNC: 43 MG/DL (ref 6–23)
CALCIUM SERPL-MCNC: 8.1 MG/DL (ref 8.3–10.4)
CHLORIDE SERPL-SCNC: 103 MMOL/L (ref 98–107)
CO2 SERPL-SCNC: 33 MMOL/L (ref 21–32)
CREAT SERPL-MCNC: 1.75 MG/DL (ref 0.8–1.5)
ERYTHROCYTE [DISTWIDTH] IN BLOOD BY AUTOMATED COUNT: 12.8 %
GLUCOSE SERPL-MCNC: 100 MG/DL (ref 65–100)
HCT VFR BLD AUTO: 40.1 % (ref 41.1–50.3)
HGB BLD-MCNC: 12.2 G/DL (ref 13.6–17.2)
MCH RBC QN AUTO: 31.8 PG (ref 26.1–32.9)
MCHC RBC AUTO-ENTMCNC: 30.4 G/DL (ref 31.4–35)
MCV RBC AUTO: 104.4 FL (ref 79.6–97.8)
NRBC # BLD: 0 K/UL (ref 0–0.2)
PLATELET # BLD AUTO: 165 K/UL (ref 150–450)
PMV BLD AUTO: 9.6 FL (ref 9.4–12.3)
POTASSIUM SERPL-SCNC: 4.1 MMOL/L (ref 3.5–5.1)
RBC # BLD AUTO: 3.84 M/UL (ref 4.23–5.6)
SODIUM SERPL-SCNC: 141 MMOL/L (ref 136–145)
WBC # BLD AUTO: 9.2 K/UL (ref 4.3–11.1)

## 2018-11-21 PROCEDURE — 80048 BASIC METABOLIC PNL TOTAL CA: CPT

## 2018-11-21 PROCEDURE — 74011250636 HC RX REV CODE- 250/636: Performed by: INTERNAL MEDICINE

## 2018-11-21 PROCEDURE — 74011250637 HC RX REV CODE- 250/637: Performed by: INTERNAL MEDICINE

## 2018-11-21 PROCEDURE — 85027 COMPLETE CBC AUTOMATED: CPT

## 2018-11-21 PROCEDURE — 99232 SBSQ HOSP IP/OBS MODERATE 35: CPT | Performed by: INTERNAL MEDICINE

## 2018-11-21 PROCEDURE — 36592 COLLECT BLOOD FROM PICC: CPT

## 2018-11-21 PROCEDURE — 77030020263 HC SOL INJ SOD CL0.9% LFCR 1000ML

## 2018-11-21 PROCEDURE — 65270000029 HC RM PRIVATE

## 2018-11-21 RX ORDER — HYDRALAZINE HYDROCHLORIDE 25 MG/1
50 TABLET, FILM COATED ORAL 3 TIMES DAILY
Status: DISCONTINUED | OUTPATIENT
Start: 2018-11-21 | End: 2018-11-30 | Stop reason: HOSPADM

## 2018-11-21 RX ORDER — CARVEDILOL 25 MG/1
25 TABLET ORAL 2 TIMES DAILY WITH MEALS
Status: DISCONTINUED | OUTPATIENT
Start: 2018-11-21 | End: 2018-11-30 | Stop reason: HOSPADM

## 2018-11-21 RX ORDER — AMLODIPINE BESYLATE 10 MG/1
10 TABLET ORAL DAILY
Status: DISCONTINUED | OUTPATIENT
Start: 2018-11-21 | End: 2018-11-30 | Stop reason: HOSPADM

## 2018-11-21 RX ORDER — IBUPROFEN 200 MG
1 TABLET ORAL DAILY
Status: DISCONTINUED | OUTPATIENT
Start: 2018-11-21 | End: 2018-11-30 | Stop reason: HOSPADM

## 2018-11-21 RX ORDER — SODIUM CHLORIDE 9 MG/ML
75 INJECTION, SOLUTION INTRAVENOUS CONTINUOUS
Status: DISCONTINUED | OUTPATIENT
Start: 2018-11-21 | End: 2018-11-22

## 2018-11-21 RX ADMIN — RIVAROXABAN 20 MG: 20 TABLET, FILM COATED ORAL at 17:15

## 2018-11-21 RX ADMIN — HYDRALAZINE HYDROCHLORIDE 50 MG: 50 TABLET, FILM COATED ORAL at 21:26

## 2018-11-21 RX ADMIN — SODIUM CHLORIDE, PRESERVATIVE FREE 900 UNITS: 5 INJECTION INTRAVENOUS at 21:26

## 2018-11-21 RX ADMIN — AMIODARONE HYDROCHLORIDE 200 MG: 200 TABLET ORAL at 20:48

## 2018-11-21 RX ADMIN — HYDROCODONE BITARTRATE AND ACETAMINOPHEN 1 TABLET: 7.5; 325 TABLET ORAL at 20:48

## 2018-11-21 RX ADMIN — Medication 30 ML: at 05:57

## 2018-11-21 RX ADMIN — HYDROCODONE BITARTRATE AND ACETAMINOPHEN 1 TABLET: 7.5; 325 TABLET ORAL at 16:25

## 2018-11-21 RX ADMIN — SODIUM CHLORIDE 75 ML/HR: 900 INJECTION, SOLUTION INTRAVENOUS at 08:21

## 2018-11-21 RX ADMIN — SODIUM CHLORIDE, PRESERVATIVE FREE 600 UNITS: 5 INJECTION INTRAVENOUS at 13:58

## 2018-11-21 RX ADMIN — Medication 20 ML: at 13:58

## 2018-11-21 RX ADMIN — CARVEDILOL 25 MG: 25 TABLET, FILM COATED ORAL at 16:21

## 2018-11-21 RX ADMIN — CARVEDILOL 25 MG: 25 TABLET, FILM COATED ORAL at 09:02

## 2018-11-21 RX ADMIN — AMIODARONE HYDROCHLORIDE 200 MG: 200 TABLET ORAL at 09:02

## 2018-11-21 RX ADMIN — HYDRALAZINE HYDROCHLORIDE 50 MG: 50 TABLET, FILM COATED ORAL at 09:01

## 2018-11-21 RX ADMIN — AMLODIPINE BESYLATE 10 MG: 10 TABLET ORAL at 09:00

## 2018-11-21 RX ADMIN — SERTRALINE HYDROCHLORIDE 50 MG: 50 TABLET ORAL at 09:01

## 2018-11-21 RX ADMIN — HYDRALAZINE HYDROCHLORIDE 50 MG: 50 TABLET, FILM COATED ORAL at 16:21

## 2018-11-21 RX ADMIN — SODIUM CHLORIDE, PRESERVATIVE FREE 900 UNITS: 5 INJECTION INTRAVENOUS at 05:57

## 2018-11-21 NOTE — PROGRESS NOTES
Date of Outreach Update: 
Liset Gonzales was seen and assessed. MEWS Score: 1 (11/21/18 0445) Vitals:  
 11/20/18 2136 11/20/18 2227 11/21/18 0014 11/21/18 0445 BP: (!) 173/108 151/73 144/90 163/83 Pulse: 84  72 68 Resp: 18 18 18 Temp: 98.3 °F (36.8 °C)  98.5 °F (36.9 °C) 98.1 °F (36.7 °C) SpO2: 98%  97% 98% Weight:    140.1 kg (308 lb 14.4 oz) Height:      
  
 
 Pain Assessment Pain Intensity 1: 0 (11/21/18 0058) Pain Location 1: Abdomen Pain Intervention(s) 1: Medication (see MAR) Patient Stated Pain Goal: 0 Previous Outreach assessment has been reviewed. There have been no significant clinical changes since the completion of the last dated Outreach assessment. Will continue to follow up per outreach protocol. Signed By:   Danielle Boyd   November 21, 2018 5:53 AM

## 2018-11-21 NOTE — PROGRESS NOTES
Problem: Falls - Risk of 
Goal: *Absence of Falls Document Debria Check Fall Risk and appropriate interventions in the flowsheet. Outcome: Progressing Towards Goal 
Fall Risk Interventions: 
Mobility Interventions: Communicate number of staff needed for ambulation/transfer, Patient to call before getting OOB Mentation Interventions: Adequate sleep, hydration, pain control Medication Interventions: Patient to call before getting OOB Elimination Interventions: Call light in reach, Urinal in reach

## 2018-11-21 NOTE — PROGRESS NOTES
Date of Outreach Update: 
Dossie Space was seen and assessed. MEWS Score: 1 (11/21/18 1335) Vitals:  
 11/21/18 0901 11/21/18 1335 11/21/18 1621 11/21/18 1723 BP: 127/77 123/66 (!) 138/95 130/71 Pulse: 76 68 76 68 Resp: 18 19 18 Temp: 98.1 °F (36.7 °C) 97.7 °F (36.5 °C)  98.2 °F (36.8 °C) SpO2: 94% 96%  97% Weight:      
Height:      
  
 
 Pain Assessment Pain Intensity 1: 5 (11/21/18 1620) Pain Location 1: Back, Other (comment)(kidney) Pain Intervention(s) 1: Medication (see MAR) Patient Stated Pain Goal: 0 Previous Outreach assessment has been reviewed. There have been no significant clinical changes since the completion of the last dated Outreach assessment. Will continue to follow up per outreach protocol. Signed By:   Dallin Lua RN   November 21, 2018 5:37 PM

## 2018-11-21 NOTE — PROGRESS NOTES
Date of Outreach Update: 
Liset Gonzales was seen and assessed. MEWS Score: 1 (11/20/18 1721) Vitals:  
 11/20/18 1715 11/20/18 1721 11/20/18 2136 11/20/18 2227 BP: (!) 197/105 (!) 197/105 (!) 173/108 151/73 Pulse: 83 82 84 Resp:  19 18 Temp:  98.7 °F (37.1 °C) 98.3 °F (36.8 °C) SpO2:  96% 98% Weight:      
Height:      
  
 
 Pain Assessment Pain Intensity 1: 0 (11/20/18 1527) Pain Location 1: Abdomen Pain Intervention(s) 1: Medication (see MAR) Patient Stated Pain Goal: 0 Previous Outreach assessment has been reviewed. Patient resting comfortably in bed on 4 L NC with O2 sat of 94% and HR 83. Lung sounds clear. Patient A&O x 4. BP has been high, last reading systolic was >759, will follow up and monitor closely. No concerns from patient at this time. Will continue to follow up per outreach protocol. Signed By:   Danielle Boyd   November 20, 2018 11:29 PM

## 2018-11-21 NOTE — PROGRESS NOTES
TRANSFER - OUT REPORT: 
 
Verbal report given to Faina RN on Prasad Isabel  being transferred to [facility] for routine progression of care Report consisted of patients Situation, Background, Assessment and Recommendations(SBAR). Information from the following report(s) SBAR, Kardex, [facility] and Cardiac Rhythm normal sinus was reviewed with the receiving nurse. Opportunity for questions and clarification was provided.

## 2018-11-21 NOTE — PROGRESS NOTES
TRANSFER - IN REPORT: 
 
Verbal report received from Aracelis(name) on Fidencio Mac  being received from [facility] Tele[facility](unit) for routine progression of care Report consisted of patients Situation, Background, Assessment and  
Recommendations(SBAR). Information from the following report(s) SBAR, Kardex, Intake/Output, MAR and Recent Results was reviewed with the receiving nurse. Opportunity for questions and clarification was provided. Assessment completed upon patients arrival to unit and care assumed.

## 2018-11-21 NOTE — CONSULTS
Hospitalist Consult Note Admit Date:  2018  2:17 AM  
Name:  Cynthia Ho Age:  52 y.o. 
:  1965 MRN:  [id number] PCP:  None Treatment Team: Attending Provider: Thelma Farrar MD; Consulting Provider: Neil Cope MD; Consulting Provider: La George MD; Consulting Provider: Kacie Lin MD; Care Manager: Daniel Chan RN 
 
HPI:  
Mr. Ho Cool is a 51 yo male with a past medical history of COPD, current tobacco abuse (1ppd), morbid obesity, NATALIIA (on CPAP), OHS, polysubstance abuse (Amphetamines this admission. Benzos, opioids and THC in past). Pt called EMS due to respiratory distress. Was found to be in respiratory distress in the ER with pCO2 on admission 72 and was placed on BiPAP. His ABG worsened to pCO2 115 and was intubated on . He was then extubated on . UDS was positive for Amphetamines (pt admits). CT chest was negative for infiltrates. Cardiology is following A-fib with RVR and he underwent MIRANDA  with cardioversion to sinus rhythm. Pt has home CPAP and Oxygen but has not been using them. Pt reports breathing better today. Reports living with wife and step son and is not in a good relationship with his step son who also abuses drugs. Denies any physical abuse. Reports he is going to stop smoking and drug abuse and comply with O2, CPAP and medications. Denies fever, chills, CP, abd pain, N/V or urinary symptoms. Hospitalist service consulted to assume primary care. 10 systems reviewed and negative except as noted in HPI. Past Medical History:  
Diagnosis Date  Acute respiratory failure with hypercapnia ([address] 2018  Chronic obstructive pulmonary disease ([address]  Heart failure ([address]  Sleep disorder No past surgical history on file. Current Facility-Administered Medications Medication Dose Route Frequency  carvedilol (COREG) tablet 25 mg  25 mg Oral BID WITH MEALS  
  amLODIPine (NORVASC) tablet 10 mg  10 mg Oral DAILY  hydrALAZINE (APRESOLINE) tablet 50 mg  50 mg Oral TID  
 0.9% sodium chloride infusion  75 mL/hr IntraVENous CONTINUOUS  
 hydrALAZINE (APRESOLINE) 20 mg/mL injection 20 mg  20 mg IntraVENous Q6H PRN  
 amiodarone (CORDARONE) tablet 200 mg  200 mg Oral Q12H  rivaroxaban (XARELTO) tablet 20 mg  20 mg Oral DAILY WITH DINNER  
 levalbuterol (XOPENEX) nebulizer soln 1.25 mg/3 mL  1.25 mg Nebulization Q4H PRN  
 acetaminophen (TYLENOL) suppository 650 mg  650 mg Rectal Q4H PRN  
 HYDROcodone-acetaminophen (NORCO) 7.5-325 mg per tablet 1 Tab  1 Tab Oral Q4H PRN  
 bisacodyl (DULCOLAX) suppository 10 mg  10 mg Rectal DAILY PRN  
 influenza vaccine 2018-19 (6 mos+)(PF) (FLUARIX QUAD/FLULAVAL QUAD) injection 0.5 mL  0.5 mL IntraMUSCular PRIOR TO DISCHARGE  morphine 10 mg/ml injection 5 mg  5 mg IntraVENous Q3H PRN  
 sertraline (ZOLOFT) tablet 50 mg  50 mg Per NG tube DAILY  sodium chloride (NS) flush 30 mL  30 mL InterCATHeter Q8H  
 heparin (porcine) pf 900 Units  900 Units InterCATHeter Q8H  
 sodium chloride (NS) flush 30 mL  30 mL InterCATHeter PRN  
 heparin (porcine) pf 900 Units  900 Units InterCATHeter PRN No Known Allergies Social History Tobacco Use  Smoking status: Current Every Day Smoker Packs/day: 2.00 Substance Use Topics  Alcohol use: No  
  Comment: once a month beer No family history on file. Immunization History Administered Date(s) Administered  Influenza Vaccine (Quad) PF 01/24/2018  TB Skin Test (PPD) Intradermal 01/07/2018 Objective:  
 
Patient Vitals for the past 24 hrs: 
 Temp Pulse Resp BP SpO2  
11/21/18 1335 97.7 °F (36.5 °C) 68  123/66   
11/21/18 0901 98.1 °F (36.7 °C) 76 18 127/77 94 % 11/21/18 0900  77  127/77   
11/21/18 0445 98.1 °F (36.7 °C) 68 18 163/83 98 % 11/21/18 0014 98.5 °F (36.9 °C) 72 18 144/90 97 % 11/20/18 2227    151/73  11/20/18 2136 98.3 °F (36.8 °C) 84 18 (!) 173/108 98 % 11/20/18 1721 98.7 °F (37.1 °C) 82 19 (!) 197/105 96 % 11/20/18 1715  83  (!) 197/105   
11/20/18 1521  87 19 (!) 173/108 98 % 11/20/18 1519 97.2 °F (36.2 °C) 86 18 (!) 175/102 98 % Oxygen Therapy O2 Sat (%): 94 % (11/21/18 0901) Pulse via Oximetry: 84 beats per minute (11/20/18 1401) O2 Device: Room air (11/21/18 1335) O2 Flow Rate (L/min): 4 l/min (11/21/18 0700) O2 Temperature: 87.8 °F (31 °C) (11/19/18 2125) FIO2 (%): 45 % (11/20/18 1112) Intake/Output Summary (Last 24 hours) at 11/21/2018 1422 Last data filed at 11/21/2018 4873 Gross per 24 hour Intake 480 ml Output 450 ml Net 30 ml Physical Exam: 
General:    Well nourished. Alert. Obese. Eyes:   Normal sclera. Extraocular movements intact. ENT:  Normocephalic, atraumatic. Moist mucous membranes CV:   RRR. No murmur, rub, or gallop. Lungs:  Decreased breath sounds bilaterally. Abdomen: Soft, nontender, nondistended. Bowel sounds normal.  
Extremities: Warm and dry. No cyanosis or edema. Neurologic: CN II-XII grossly intact. Sensation intact. Skin:     No rashes or jaundice. Psych:  Normal mood and affect. I reviewed the labs, imaging, EKGs, telemetry, and other studies done this admission. Data Review:  
Recent Results (from the past 24 hour(s)) GLUCOSE, POC Collection Time: 11/20/18 10:34 PM  
Result Value Ref Range Glucose (POC) 105 (H) 65 - 100 mg/dL CBC W/O DIFF Collection Time: 11/21/18  6:05 AM  
Result Value Ref Range WBC 9.2 4.3 - 11.1 K/uL  
 RBC 3.84 (L) 4.23 - 5.6 M/uL  
 HGB 12.2 (L) 13.6 - 17.2 g/dL HCT 40.1 (L) 41.1 - 50.3 % .4 (H) 79.6 - 97.8 FL  
 MCH 31.8 26.1 - 32.9 PG  
 MCHC 30.4 (L) 31.4 - 35.0 g/dL  
 RDW 12.8 % PLATELET 792 543 - 754 K/uL MPV 9.6 9.4 - 12.3 FL ABSOLUTE NRBC 0.00 0.0 - 0.2 K/uL METABOLIC PANEL, BASIC Collection Time: 11/21/18  6:05 AM  
Result Value Ref Range Sodium 141 136 - 145 mmol/L Potassium 4.1 3.5 - 5.1 mmol/L Chloride 103 98 - 107 mmol/L  
 CO2 33 (H) 21 - 32 mmol/L Anion gap 5 (L) 7 - 16 mmol/L Glucose 100 65 - 100 mg/dL BUN 43 (H) 6 - 23 MG/DL Creatinine 1.75 (H) 0.8 - 1.5 MG/DL  
 GFR est AA 52 (L) >60 ml/min/1.73m2 GFR est non-AA 43 (L) >60 ml/min/1.73m2 Calcium 8.1 (L) 8.3 - 10.4 MG/DL All Micro Results Procedure Component Value Units Date/Time Dino Jolly [[id number]] Collected:  11/16/18 0753 Order Status:  Completed Specimen:  Urine from Clean catch Updated:  11/19/18 9870 Special Requests: NO SPECIAL REQUESTS Culture result:    
  10,000 to 50,000 COLONIES/mL MIXED SKIN EDUAR ISOLATED Other Studies: Xr Chest Sngl V Result Date: 11/17/2018 Portable view of the chest COMPARISON: Yesterday. CLINICAL HISTORY: Follow-up respiratory failure. FINDINGS: External pads are seen overlying the left hemithorax. There is persistent left basilar density. No pneumothorax is seen. Cardiac silhouette is enlarged, similar to prior. Endotracheal tube, enteric tube and right-sided PICC are stable. IMPRESSION: 1. Persistent left basilar density, likely combination of atelectasis and prominent epicardial fat pad 2. ET tube tip is in satisfactory position. Xr Chest Sngl V Addendum Date: 11/16/2018 Addendum: The ETT is in good position. Result Date: 11/16/2018 Portable chest x-ray. CLINICAL INDICATION: Status post right-sided PICC catheter placement. FINDINGS: Single AP view the chest compared to similar exam dated 11/16/2018 shows interval placement of a right-sided PICC catheter with its terminus at the cava atrial junction. No pneumothorax noted. IMPRESSION: Status post placement of a right-sided PICC catheter in satisfactory position without obvious complication Xr Abd (kub) Result Date: 11/16/2018 KUB INDICATION: Nasogastric tube placement Supine views of the abdomen were obtained. The tip of the nasogastric tube is in the stomach. There is mild nonspecific bowel distention. IMPRESSION: Tip of the NG tube is in the stomach. Ct Chest W Cont Result Date: 11/16/2018 EXAM:  CT chest with IV contrast-PE protocol. DATE:  November 16, 2018. INDICATION:  Dyspnea. COMPARISON: January 6, 2018. TECHNIQUE: Axial CT images of the chest were obtained after the intravenous injection of 100 mm Isovue-370 CT contrast. Radiation dose reduction techniques were used for this study. Our CT scanners use one or all of the following: Automated exposure control, adjustment of the mA and/or kV according to patient size, iterative reconstruction. FINDINGS:  No pulmonary artery filling defects are identified. There is no thoracic aortic aneurysm or dissection. Mild cardiomegaly, coronary artery disease and a tiny pericardial effusion are unchanged. There is also an unchanged tiny right pleural effusion. Mild atelectasis is noted in the lung bases. There is no pneumothorax or lymphadenopathy. IMPRESSION:  1. No acute process or evidence of pulmonary embolism. 2. Unchanged myocardial megaly, coronary artery disease and tiny pericardial effusion. 3. A tiny right pleural effusion is also unchanged. Xr Chest [facility] Result Date: 11/16/2018 EXAM:  Chest x-ray. DATE:  November 16, 2018. INDICATION:  Dyspnea. COMPARISON:  July 3, 2018. TECHNIQUE:  Single frontal view chest. FINDINGS:  The cardiac silhouette appears enlarged. The lungs are clear. No pneumothorax, pulmonary vascular congestion or significant pleural effusion is seen. IMPRESSION:  Cardiomegaly. Assessment and Plan:  
 
Hospital Problems as of 11/21/2018 Date Reviewed: 11/16/2018 Codes Class Noted - Resolved POA Hematuria ICD-10-CM: R31.9 ICD-9-CM: 599.70  11/21/2018 - Present No  
   
 COPD (chronic obstructive pulmonary disease) (HCC) (Chronic) ICD-10-CM: J44.9 ICD-9-CM: 940  11/16/2018 - Present Yes NATALIIA (obstructive sleep apnea) (Chronic) ICD-10-CM: H71.69 
ICD-9-CM: 327.23  11/16/2018 - Present Yes * (Principal) Acute on chronic respiratory failure with hypoxia and hypercapnia (HCC) ICD-10-CM: J96.21, J96.22 
ICD-9-CM: 518.84, 786.09, 799.02  11/16/2018 - Present Yes Altered mental status ICD-10-CM: R41.82 
ICD-9-CM: 780.97  11/16/2018 - Present Yes Morbid obesity ([address] (Chronic) ICD-10-CM: E66.01 
ICD-9-CM: 278.01  11/16/2018 - Present Yes  
   
 H/O atrial flutter (Chronic) ICD-10-CM: V06.60 
ICD-9-CM: V12.59  11/16/2018 - Present Yes Overview Signed 2/6/2018  7:51 AM by Sammy Contreras NP  
  1/2018 Atrial flutter, s/p cardioversion. Essential hypertension (Chronic) ICD-10-CM: I10 
ICD-9-CM: 401.9  11/16/2018 - Present Yes Methamphetamine abuse (HCC) (Chronic) ICD-10-CM: F15.10 ICD-9-CM: 305.70  2/1/2018 - Present Yes Acute respiratory failure with hypercapnia: Cont O2 supplementation and assess home O2 needs prior to discharge. Acute COPD exacerbation: Improved. Off steroids. Pulmonary input greatly appreciated. Will need RT home O2 needs assessment prior to discharge. Will need to follow up with pulmonary service in office for PFTs. NATALIIA, OHS: Family to bring home CPAP. Pt counseled regarding weight loss. Atrial fibrillation with RVR: Currently on Amio and Xarelto per cardiology. underwent MIRANDA 11/19 with cardioversion to sinus rhythm CKD 3: Appears to be stale. Was on lasix for some degree of pulmonary congestion which is discontinued to to mild bump in Cr. Will continue to monitor kidney function closely. Systolic CHF: BNP elevated 270. MIRANDA this admission with LVEF 40-45%. Mod LAE, mild [place], Mod MR. Currently stable. Cont to monitor. Cardiology input appreciated. Essential hypertension: Cont current management and monitor BP levels. Polysubstance abuse: Counseled.  Pt reports he will stop drug abuse and smoking from now. Ongoing tobacco abuse: Will provide with nicotine patches on discharge. Thank you for allowing us to participate in the care of the patient. Hospitalist service will assume primary care. Signed:  
Kary Erickson MD

## 2018-11-21 NOTE — PROGRESS NOTES
[facility] OUTREACH NURSE PROGRESS REPORT SUBJECTIVE: Assessed patient secondary to outreach protocol. MEWS Score: 1 (11/21/18 0901) Vitals:  
 11/21/18 0014 11/21/18 0445 11/21/18 0900 11/21/18 0901 BP: 144/90 163/83 127/77 127/77 Pulse: 72 68 77 76 Resp: 18 18 18 Temp: 98.5 °F (36.9 °C) 98.1 °F (36.7 °C)  98.1 °F (36.7 °C) SpO2: 97% 98%  94% Weight:  140.1 kg (308 lb 14.4 oz) Height:      
  
 
 
LAB DATA: 
 
Recent Labs  
  11/21/18 
0605 11/20/18 
0513 11/19/18 
0517  140 142  
K 4.1 4.6 4.5  
 102 103 CO2 33* 34* 34* AGAP 5* 4* 5*  112* 124* BUN 43* 45* 43* CREA 1.75* 1.57* 1.39 GFRAA 52* 59* >60  
GFRNA 43* 49* 56*  
CA 8.1* 8.3 8.6 Recent Labs  
  11/21/18 
5921 11/20/18 
0513 11/19/18 
3233 WBC 9.2 9.2 9.3 HGB 12.2* 12.7* 13.3* HCT 40.1* 41.5 42.9  203 224 OBJECTIVE: On arrival to room, I found patient to be resting in bed. Pain Assessment Pain Intensity 1: 0 (11/21/18 1130) Pain Location 1: Abdomen Pain Intervention(s) 1: Medication (see MAR) Patient Stated Pain Goal: 0 
 
  
  
  
  
 
  
  
  
   
 
ASSESSMENT:  Pt sitting up in bed, talking with family. O2 sat 98%, pt currently in NAD, denies pain, no immediate concerns at this time. PLAN:   
 
 
Will continue to follow up per outreach protocol. Signed By:   Rissa Cruz RN   November 21, 2018 1:00 PM

## 2018-11-21 NOTE — PROGRESS NOTES
Fidencio Mac Admission Date: 11/16/2018 Daily Progress Note: 11/21/2018 The patient's chart is reviewed and the patient is discussed with the staff. 47yo male with history of morbid obesity, NATALIIA possible OHS, presumed COPD (no spirometry on file), substance abuse (amphetamines this admission. Past also benzos, opioids, THC), called EMS in respiratory distress. Tripoding on arrival, on CPAP. ABG with pCO2 72, placed on bipap. ABG worsened to pCO2 of 115, intubated 11/16. Urine drug screen positive for amphetamines. He has a history of atrial arrhythmias and was apparently on Xarelto and amiodarone in the past. It is unclear if he is still using these medications. Chest CT with no infiltrates or pneumonia. Cardiology consulted for Afib with RVR, was placed on Amiodarone, Xarelto, Diltiazem drip. MIRANDA 11/19 with cardioversion to sinus rhythm. Able to be extubated 11/19. Has home CPAP/oxygen but was not using. Subjective:  
    Questions about when he can go home. Family at bedside. He states that he got home CPAP in February and wore it for one night before it \"broke\". Current Facility-Administered Medications Medication Dose Route Frequency  carvedilol (COREG) tablet 25 mg  25 mg Oral BID WITH MEALS  
 amLODIPine (NORVASC) tablet 10 mg  10 mg Oral DAILY  hydrALAZINE (APRESOLINE) tablet 50 mg  50 mg Oral TID  
 0.9% sodium chloride infusion  75 mL/hr IntraVENous CONTINUOUS  
 insulin regular (NOVOLIN R, HUMULIN R) injection   SubCUTAneous AC&HS  hydrALAZINE (APRESOLINE) 20 mg/mL injection 20 mg  20 mg IntraVENous Q6H PRN  
 amiodarone (CORDARONE) tablet 200 mg  200 mg Oral Q12H  rivaroxaban (XARELTO) tablet 20 mg  20 mg Oral DAILY WITH DINNER  
 levalbuterol (XOPENEX) nebulizer soln 1.25 mg/3 mL  1.25 mg Nebulization Q4H PRN  
 acetaminophen (TYLENOL) suppository 650 mg  650 mg Rectal Q4H PRN  
  HYDROcodone-acetaminophen (NORCO) 7.5-325 mg per tablet 1 Tab  1 Tab Oral Q4H PRN  
 bisacodyl (DULCOLAX) suppository 10 mg  10 mg Rectal DAILY PRN  
 influenza vaccine 2018-19 (6 mos+)(PF) (FLUARIX QUAD/FLULAVAL QUAD) injection 0.5 mL  0.5 mL IntraMUSCular PRIOR TO DISCHARGE  morphine 10 mg/ml injection 5 mg  5 mg IntraVENous Q3H PRN  
 sertraline (ZOLOFT) tablet 50 mg  50 mg Per NG tube DAILY  sodium chloride (NS) flush 30 mL  30 mL InterCATHeter Q8H  
 heparin (porcine) pf 900 Units  900 Units InterCATHeter Q8H  
 sodium chloride (NS) flush 30 mL  30 mL InterCATHeter PRN  
 heparin (porcine) pf 900 Units  900 Units InterCATHeter PRN Objective:  
 
Vitals:  
 11/21/18 0014 11/21/18 0445 11/21/18 0900 11/21/18 0901 BP: 144/90 163/83 127/77 127/77 Pulse: 72 68 77 76 Resp: 18 18 18 Temp: 98.5 °F (36.9 °C) 98.1 °F (36.7 °C)  98.1 °F (36.7 °C) SpO2: 97% 98%  94% Weight:  308 lb 14.4 oz (140.1 kg) Height:      
 
Intake and Output:  
11/19 1901 - 11/21 0700 In: 2311.6 [P.O.:320; I.V.:1991.6] Out: 1275 [IDSRW:6086] 11/21 0701 - 11/21 1900 In: 480 [P.O.:480] Out: - Physical Exam:  
Constitutional:  the patient is well developed and in no acute distress HEENT:  Sclera clear, pupils equal, oral mucosa moist 
Lungs: clear bilaterally and overall decreased. Wearing nasal cannula. Cardiovascular:  RRR without M,G,R 
Abd/GI: soft and non-tender; with positive bowel sounds. Ext: warm without cyanosis. There is no lower leg edema. Musculoskeletal: moves all four extremities with equal strength Skin:  no jaundice or rashes, no wounds Neuro: no gross neuro deficits Musculoskeletal: can ambulate. No deformity Psychiatric: Calm. ROS:  Good appetite, denies dyspnea, no pain Lines: peripheral IV 
 
CHEST XRAY:   
 
 
LAB Recent Labs  
  11/21/18 
0928 11/20/18 
0513 11/19/18 
2941 WBC 9.2 9.2 9.3 HGB 12.2* 12.7* 13.3* HCT 40.1* 41.5 42.9  203 224 Recent Labs  
  11/21/18 
1931 11/20/18 
0513 11/19/18 
2845  140 142  
K 4.1 4.6 4.5  
 102 103 CO2 33* 34* 34*  112* 124* BUN 43* 45* 43* CREA 1.75* 1.57* 1.39 Assessment:  (Medical Decision Making) Hospital Problems  Date Reviewed: 11/16/2018 Codes Class Noted POA  
 COPD (chronic obstructive pulmonary disease) ([facility] [address] [facility] (Chronic) ICD-10-CM: J44.9 ICD-9-CM: 068  11/16/2018 Yes  
 ? severity NATALIIA (obstructive sleep apnea) (Chronic) ICD-10-CM: G47.33 
ICD-9-CM: 327.23  11/16/2018 Yes Has home CPAP but not using * (Principal) Acute on chronic respiratory failure with hypoxia and hypercapnia (HCC) ICD-10-CM: J96.21, J96.22 
ICD-9-CM: 518.84, 786.09, 799.02  11/16/2018 Yes Required short term intubation Altered mental status ICD-10-CM: R41.82 
ICD-9-CM: 780.97  11/16/2018 Yes Resolved. Positive drug screen and in respiratory failure Morbid obesity ([address] (Chronic) ICD-10-CM: E66.01 
ICD-9-CM: 278.01  11/16/2018 Yes Contributes to complexity of care H/O atrial flutter (Chronic) ICD-10-CM: Z86.79 
ICD-9-CM: V12.59  11/16/2018 Yes Overview Signed 2/6/2018  7:51 AM by Yary Reyes NP  
  1/2018 Atrial flutter, s/p cardioversion. S/p cardioversion -maintaining sinus on amio Essential hypertension (Chronic) ICD-10-CM: I10 
ICD-9-CM: 401.9  11/16/2018 Yes  
 controlled Methamphetamine abuse (HCC) (Chronic) ICD-10-CM: F15.10 ICD-9-CM: 305.70  2/1/2018 Yes Plan:  (Medical Decision Making) 1. Family to bring home CPAP for use here - he says its broken but later he said he wasn't sure about that 2. Assess oxygen needs at discharge - has home oxygen but was not using 3. IV fluids per cardiology - slight increase in creatinine today 4. Needs office follow up for COPD with PFTs and in the sleep lab Karsten Ontiveros NP More than 50% of time documented was spent face-to-face contact with the patient and in the care of the patient on the floor/unit where the patient is located. Lungs:    clear Heart:  RRR with no Murmur/Rubs/Gallops Additional Comments:  Home cpap, wean O2 as tolerated I have spoken with and examined the patient. I agree with the above assessment and plan as documented.  
 
Karen Rodriguez MD

## 2018-11-21 NOTE — PROGRESS NOTES
END OF SHIFT NOTE: 
 
INTAKE/OUTPUT 
11/20 0701 - 11/21 0700 In: 3955 [P.O.:320; I.V.:692] Out: 775 [Urine:775] Voiding: YES Catheter: NO 
Drain:   
 
 
 
 
 
Flatus: Patient does have flatus present. Stool:  1 occurrences. Characteristics: 
Stool Assessment Stool Color: Pink Tamiko Stool Appearance: Loose Stool Amount: Large Stool Source/Status: Rectum Emesis: 0 occurrences. Characteristics: VITAL SIGNS Patient Vitals for the past 12 hrs: 
 Temp Pulse Resp BP SpO2  
11/21/18 1815 97.6 °F (36.4 °C) 64 16 126/71 94 % 11/21/18 1723 98.2 °F (36.8 °C) 68 18 130/71 97 % 11/21/18 1621  76  (!) 138/95   
11/21/18 1335 97.7 °F (36.5 °C) 68 19 123/66 96 % 11/21/18 0901 98.1 °F (36.7 °C) 76 18 127/77 94 % 11/21/18 0900  77  127/77  Pain Assessment Pain Intensity 1: 0 (11/21/18 1817) Pain Location 1: Back, Other (comment)(kidney) Pain Intervention(s) 1: Emotional support Patient Stated Pain Goal: 0 Ambulating Yes Shift report given to oncoming nurse at the bedside.  
 
Ayo Beckham, RN

## 2018-11-21 NOTE — PROGRESS NOTES
[facility] CARDIOLOGY PROGRESS NOTE 
      
 
11/21/2018 Admit Date: 11/16/2018 Subjective:  
Extubated and stable in SR. He is minimally mobile currently. Renal function continues to worsen. Volume status is difficult to determine due to morbid obesity. ROS: 
Cardiovascular:  As noted above Objective:  
  
Vitals:  
 11/20/18 2136 11/20/18 2227 11/21/18 0014 11/21/18 0445 BP: (!) 173/108 151/73 144/90 163/83 Pulse: 84  72 68 Resp: 18 18 18 Temp: 98.3 °F (36.8 °C)  98.5 °F (36.9 °C) 98.1 °F (36.7 °C) SpO2: 98%  97% 98% Weight:    140.1 kg (308 lb 14.4 oz) Height:      
 
 
Physical Exam: 
General-No Acute Distress Neck- supple, no JVD 
CV- regular rate and rhythm no MRG Lung- clear bilaterally Abd- soft, nontender, nondistended Ext- no edema bilaterally. Skin- warm and dry Data Review:  
Recent Labs  
  11/21/18 
3116 11/20/18 
0692  140  
K 4.1 4.6 BUN 43* 45* CREA 1.75* 1.57*  112* WBC 9.2 9.2 HGB 12.2* 12.7* HCT 40.1* 41.5  203 Assessment/Plan:  
 
Principal Problem: 
  Acute on chronic respiratory failure with hypoxia and hypercapnia ([facility] (11/16/2018) Extubated and stable - still on high flow O2 Active Problems: COPD (chronic obstructive pulmonary disease) ([facility] [address] (11/16/2018) Stable Methamphetamine abuse ([facility] (2/1/2018) Chronic and recurrent NATALIIA (obstructive sleep apnea) (11/16/2018) CPAP Altered mental status (11/16/2018) Resolved Morbid obesity ([address] (11/16/2018) Chronic H/O atrial flutter (11/16/2018) In SR and start Xarelto 20mg daily and amiodarone 200mg BID Essential hypertension (11/16/2018) Increase carvedilol to 25mg BID, amlodipine 10mg daily. Add hydralazine. Do not add ACE/ARB due to worsening renal function. MARGARITA - add low dose IVF. Monitor for worsening hypoxia Karyna Castano MD 
11/21/2018 2:44 PM

## 2018-11-21 NOTE — PROGRESS NOTES
11/21/18 1817 Dual Skin Pressure Injury Assessment Dual Skin Pressure Injury Assessment [facility] Second Care Provider (Based on Facility Policy) Evy Dai

## 2018-11-21 NOTE — PROGRESS NOTES
Bedside and Verbal shift change report given to Ian Quinteros RN (oncoming nurse) by Esther Herrera RN (offgoing nurse). Report included the following information SBAR, Kardex, Accordion and Recent Results.

## 2018-11-21 NOTE — PROGRESS NOTES
Care Management Interventions PCP Verified by CM: No(was to follow up with [facility] last time he followed per cheliangeline was  in Feb 2018) Palliative Care Criteria Met (RRAT>21 & CHF Dx)?: No(RRAT 17 Dx Chronic Respiratory Failure) Transition of Care Consult (CM Consult): Discharge Planning Discharge Durable Medical Equipment: No(Walker, cane CPAP(unsure works) O2 through [employer]) Physical Therapy Consult: No 
Occupational Therapy Consult: No 
Speech Therapy Consult: No 
Current Support Network: Lives with Spouse Confirm Follow Up Transport: Family(wife has a car) Plan discussed with Pt/Family/Caregiver: Yes Freedom of Choice Offered: Yes Discharge Location Discharge Placement: Home Met with patient for d/c planning. Familiar with patient from previous admission. Patient is alert and oriented x 3, independent of ADL's and lives with his wife. He states that he was living in a mobile home with his wife and son but has not paid rent for 10 months and electric has been turned off. He states he son does drugs and he is trying to get off drugs. Patient admits to Meth everyday since age of 13 with last reported use per patient is last week. UDS showed positive for amphetimines. Discussed with patient about rehab facilities as outpatient and have provided him a list of area facilities. Patient states he not sure where he will go when d/c. Asked him where his wife was staying and he stated with his brother. Asked if he could stay there and he stated I may can stay there for a few days but not long. He has 3 sons that live nearby but states cannot ask them for help. States his wife brings in $700/month and that he has no income.  He was given a [place] application to assist with medications at d/c. Provided patient with information on [facility](for drug rehab), [employer] phone number to be placed on list for apartment, [facility] number for assistance, [facility] outpatient program, and [facility] for patient to review. Patient in January was referred to the [facility] and he states the last time he followed up was in Feb 2018. He has not been taking his medications. He was given a voucher in Jan 24, 2018 for $594 for his medication with follow up appointment at [facility] at that time. I left message for [facility] representative to see if can get restarted with their program if patient willing to go and follow up. DME includes walker, cane, O2 concentrator and portable O2 tanks that were provided by [facility] for $50/month that patient has not paid since Feb and states they are trying to contact him for payment or  of O2 equipment and has a CPAP machine that he was given from the [facility] that he states does not work but that someone is bringing CPAP in for RT to check. Current d/c plan will be to return with his wife. Hopefully he can return to his brother home until he can find housing. Did discuss shelters for patient follow up. Patient wife has a car and provides the transportation.

## 2018-11-22 LAB
ANION GAP SERPL CALC-SCNC: 3 MMOL/L (ref 7–16)
APPEARANCE UR: CLEAR
BILIRUB UR QL: NEGATIVE
BNP SERPL-MCNC: 360 PG/ML
BUN SERPL-MCNC: 38 MG/DL (ref 6–23)
CALCIUM SERPL-MCNC: 7.8 MG/DL (ref 8.3–10.4)
CHLORIDE SERPL-SCNC: 104 MMOL/L (ref 98–107)
CO2 SERPL-SCNC: 33 MMOL/L (ref 21–32)
COLOR UR: YELLOW
CREAT SERPL-MCNC: 1.53 MG/DL (ref 0.8–1.5)
ERYTHROCYTE [DISTWIDTH] IN BLOOD BY AUTOMATED COUNT: 13 %
GLUCOSE SERPL-MCNC: 93 MG/DL (ref 65–100)
GLUCOSE UR STRIP.AUTO-MCNC: 100 MG/DL
HCT VFR BLD AUTO: 39.6 % (ref 41.1–50.3)
HEMOCCULT STL QL: NEGATIVE
HGB BLD-MCNC: 12.1 G/DL (ref 13.6–17.2)
HGB UR QL STRIP: NEGATIVE
KETONES UR QL STRIP.AUTO: NEGATIVE MG/DL
LEUKOCYTE ESTERASE UR QL STRIP.AUTO: NEGATIVE
MCH RBC QN AUTO: 32.1 PG (ref 26.1–32.9)
MCHC RBC AUTO-ENTMCNC: 30.6 G/DL (ref 31.4–35)
MCV RBC AUTO: 105 FL (ref 79.6–97.8)
NITRITE UR QL STRIP.AUTO: NEGATIVE
NRBC # BLD: 0 K/UL (ref 0–0.2)
PH UR STRIP: 6 [PH] (ref 5–9)
PLATELET # BLD AUTO: 148 K/UL (ref 150–450)
PMV BLD AUTO: 10.3 FL (ref 9.4–12.3)
POTASSIUM SERPL-SCNC: 4.2 MMOL/L (ref 3.5–5.1)
PROT UR STRIP-MCNC: ABNORMAL MG/DL
RBC # BLD AUTO: 3.77 M/UL (ref 4.23–5.6)
SODIUM SERPL-SCNC: 140 MMOL/L (ref 136–145)
SP GR UR REFRACTOMETRY: 1.02 (ref 1–1.02)
UROBILINOGEN UR QL STRIP.AUTO: 0.2 EU/DL (ref 0.2–1)
WBC # BLD AUTO: 7.6 K/UL (ref 4.3–11.1)

## 2018-11-22 PROCEDURE — 74011250637 HC RX REV CODE- 250/637: Performed by: INTERNAL MEDICINE

## 2018-11-22 PROCEDURE — 85027 COMPLETE CBC AUTOMATED: CPT

## 2018-11-22 PROCEDURE — 94660 CPAP INITIATION&MGMT: CPT

## 2018-11-22 PROCEDURE — 74011250636 HC RX REV CODE- 250/636: Performed by: INTERNAL MEDICINE

## 2018-11-22 PROCEDURE — 65660000000 HC RM CCU STEPDOWN

## 2018-11-22 PROCEDURE — 81003 URINALYSIS AUTO W/O SCOPE: CPT

## 2018-11-22 PROCEDURE — 94640 AIRWAY INHALATION TREATMENT: CPT

## 2018-11-22 PROCEDURE — 82272 OCCULT BLD FECES 1-3 TESTS: CPT

## 2018-11-22 PROCEDURE — 99232 SBSQ HOSP IP/OBS MODERATE 35: CPT | Performed by: INTERNAL MEDICINE

## 2018-11-22 PROCEDURE — 77010033678 HC OXYGEN DAILY

## 2018-11-22 PROCEDURE — 94760 N-INVAS EAR/PLS OXIMETRY 1: CPT

## 2018-11-22 PROCEDURE — 80048 BASIC METABOLIC PNL TOTAL CA: CPT

## 2018-11-22 PROCEDURE — 77030020263 HC SOL INJ SOD CL0.9% LFCR 1000ML

## 2018-11-22 PROCEDURE — 83880 ASSAY OF NATRIURETIC PEPTIDE: CPT

## 2018-11-22 PROCEDURE — 74011000250 HC RX REV CODE- 250: Performed by: PHYSICIAN ASSISTANT

## 2018-11-22 RX ORDER — BUDESONIDE 0.5 MG/2ML
500 INHALANT ORAL
Status: DISCONTINUED | OUTPATIENT
Start: 2018-11-22 | End: 2018-11-25

## 2018-11-22 RX ORDER — LEVALBUTEROL INHALATION SOLUTION 0.63 MG/3ML
0.63 SOLUTION RESPIRATORY (INHALATION)
Status: DISCONTINUED | OUTPATIENT
Start: 2018-11-22 | End: 2018-11-25

## 2018-11-22 RX ADMIN — Medication 30 ML: at 13:27

## 2018-11-22 RX ADMIN — SODIUM CHLORIDE, PRESERVATIVE FREE 900 UNITS: 5 INJECTION INTRAVENOUS at 21:47

## 2018-11-22 RX ADMIN — LEVALBUTEROL HYDROCHLORIDE 0.63 MG: 0.63 SOLUTION RESPIRATORY (INHALATION) at 20:27

## 2018-11-22 RX ADMIN — LEVALBUTEROL HYDROCHLORIDE 0.63 MG: 0.63 SOLUTION RESPIRATORY (INHALATION) at 14:27

## 2018-11-22 RX ADMIN — SERTRALINE HYDROCHLORIDE 50 MG: 50 TABLET ORAL at 08:12

## 2018-11-22 RX ADMIN — AMIODARONE HYDROCHLORIDE 200 MG: 200 TABLET ORAL at 08:12

## 2018-11-22 RX ADMIN — AMLODIPINE BESYLATE 10 MG: 10 TABLET ORAL at 08:11

## 2018-11-22 RX ADMIN — Medication 30 ML: at 05:29

## 2018-11-22 RX ADMIN — LEVALBUTEROL HYDROCHLORIDE 0.63 MG: 0.63 SOLUTION RESPIRATORY (INHALATION) at 08:25

## 2018-11-22 RX ADMIN — SODIUM CHLORIDE, PRESERVATIVE FREE 900 UNITS: 5 INJECTION INTRAVENOUS at 13:27

## 2018-11-22 RX ADMIN — RIVAROXABAN 20 MG: 20 TABLET, FILM COATED ORAL at 17:54

## 2018-11-22 RX ADMIN — HYDROCODONE BITARTRATE AND ACETAMINOPHEN 1 TABLET: 7.5; 325 TABLET ORAL at 13:26

## 2018-11-22 RX ADMIN — CARVEDILOL 25 MG: 25 TABLET, FILM COATED ORAL at 17:54

## 2018-11-22 RX ADMIN — HYDRALAZINE HYDROCHLORIDE 50 MG: 50 TABLET, FILM COATED ORAL at 08:09

## 2018-11-22 RX ADMIN — SODIUM CHLORIDE, PRESERVATIVE FREE 900 UNITS: 5 INJECTION INTRAVENOUS at 05:28

## 2018-11-22 RX ADMIN — AMIODARONE HYDROCHLORIDE 200 MG: 200 TABLET ORAL at 21:46

## 2018-11-22 RX ADMIN — HYDROCODONE BITARTRATE AND ACETAMINOPHEN 1 TABLET: 7.5; 325 TABLET ORAL at 07:24

## 2018-11-22 RX ADMIN — Medication 30 ML: at 21:47

## 2018-11-22 RX ADMIN — HYDROCODONE BITARTRATE AND ACETAMINOPHEN 1 TABLET: 7.5; 325 TABLET ORAL at 21:50

## 2018-11-22 RX ADMIN — BUDESONIDE 500 MCG: 0.5 INHALANT RESPIRATORY (INHALATION) at 08:25

## 2018-11-22 RX ADMIN — HYDRALAZINE HYDROCHLORIDE 50 MG: 50 TABLET, FILM COATED ORAL at 17:53

## 2018-11-22 RX ADMIN — HYDRALAZINE HYDROCHLORIDE 50 MG: 50 TABLET, FILM COATED ORAL at 21:46

## 2018-11-22 RX ADMIN — SODIUM CHLORIDE 75 ML/HR: 900 INJECTION, SOLUTION INTRAVENOUS at 09:45

## 2018-11-22 RX ADMIN — BUDESONIDE 500 MCG: 0.5 INHALANT RESPIRATORY (INHALATION) at 20:27

## 2018-11-22 RX ADMIN — CARVEDILOL 25 MG: 25 TABLET, FILM COATED ORAL at 08:11

## 2018-11-22 NOTE — PROGRESS NOTES
11/22/2018 9:08 AM 
 
Admit Date: 11/16/2018 Subjective:  
 
Shirley Montaño denies chest pain, palpitations. dopes not feel well Objective:  
  
Visit Vitals /79 Pulse 67 Temp 96.9 °F (36.1 °C) Resp 16 Ht 6' (1.829 m) Wt 140.1 kg (308 lb 14.4 oz) SpO2 92% BMI 41.89 kg/m² Physical Exam: 
Heart: regular rate and rhythm Lungs: clear to auscultation bilaterally Abdomen: soft Data Review:  
Labs:   
Recent Results (from the past 24 hour(s)) CBC W/O DIFF Collection Time: 11/22/18  4:46 AM  
Result Value Ref Range WBC 7.6 4.3 - 11.1 K/uL  
 RBC 3.77 (L) 4.23 - 5.6 M/uL  
 HGB 12.1 (L) 13.6 - 17.2 g/dL HCT 39.6 (L) 41.1 - 50.3 % .0 (H) 79.6 - 97.8 FL  
 MCH 32.1 26.1 - 32.9 PG  
 MCHC 30.6 (L) 31.4 - 35.0 g/dL  
 RDW 13.0 % PLATELET 671 (L) 396 - 450 K/uL MPV 10.3 9.4 - 12.3 FL ABSOLUTE NRBC 0.00 0.0 - 0.2 K/uL METABOLIC PANEL, BASIC Collection Time: 11/22/18  4:46 AM  
Result Value Ref Range Sodium 140 136 - 145 mmol/L Potassium 4.2 3.5 - 5.1 mmol/L Chloride 104 98 - 107 mmol/L  
 CO2 33 (H) 21 - 32 mmol/L Anion gap 3 (L) 7 - 16 mmol/L Glucose 93 65 - 100 mg/dL BUN 38 (H) 6 - 23 MG/DL Creatinine 1.53 (H) 0.8 - 1.5 MG/DL  
 GFR est AA >60 >60 ml/min/1.73m2 GFR est non-AA 50 (L) >60 ml/min/1.73m2 Calcium 7.8 (L) 8.3 - 10.4 MG/DL  
BNP Collection Time: 11/22/18  4:46 AM  
Result Value Ref Range  pg/mL Assessment:  
 
Patient Active Problem List  
 Diagnosis Date Noted  Hematuria 11/21/2018  COPD (chronic obstructive pulmonary disease) ([facility] 11/16/2018  NATALIIA (obstructive sleep apnea) 11/16/2018  Acute on chronic respiratory failure with hypoxia and hypercapnia ([facility] 11/16/2018  Altered mental status 11/16/2018  Morbid obesity ([facility] 11/16/2018  H/O atrial flutter on amio  11/16/2018  Essential hypertension 11/16/2018  Nicotine dependence 02/01/2018  Methamphetamine abuse ([facility] 02/01/2018  Alcohol abuse 02/01/2018  Homeless 01/15/2018  Urethral stricture 01/07/2018  Anasarca 01/06/2018 Plan:  
Continue meds

## 2018-11-22 NOTE — PROGRESS NOTES
Progress Note   
2018 Admit Date: 2018  2:17 AM  
NAME: Shae Kebede :  1965 MRN:  [id number] Attending: Minor Schafer MD 
PCP:  None Treatment Team: Attending Provider: Minor Schafer MD; Consulting Provider: Lauren Mak MD; Consulting Provider: Madelaine Braun MD; Consulting Provider: Minor Schafer MD; Care Manager: Kadie Chang RN Full Code SUBJECTIVE:  
Mr. Yadira Valdivia is a 51 yo male with PMH of morbid obesity, NATALIIA, probably COPD, substance abuse (+amphetamines on UDS this admission), afib reportedly on xarelto and amio who presented with c/o SOB. He was started on BiPAP, then intubated. He developed afib with RVR, started on amio, cardizem, xarelto and underwent MIRANDA cardioversion. He was then extubated and transferred to the medical floor. EF 40-45%. Today reports episode of SOB with anxiety this morning. Also with LE edema. . SOB resolved per pt. Denies CP, n/v/d, abd pain. On 5 L NC. Concerned about DC home due to poor relationship with step son and drugs in the home. Past Medical History:  
Diagnosis Date  Acute respiratory failure with hypercapnia ([address] 2018  Chronic obstructive pulmonary disease ([address]  Heart failure ([address]  Sleep disorder Recent Results (from the past 24 hour(s)) CBC W/O DIFF Collection Time: 18  4:46 AM  
Result Value Ref Range WBC 7.6 4.3 - 11.1 K/uL  
 RBC 3.77 (L) 4.23 - 5.6 M/uL  
 HGB 12.1 (L) 13.6 - 17.2 g/dL HCT 39.6 (L) 41.1 - 50.3 % .0 (H) 79.6 - 97.8 FL  
 MCH 32.1 26.1 - 32.9 PG  
 MCHC 30.6 (L) 31.4 - 35.0 g/dL  
 RDW 13.0 % PLATELET 170 (L) 064 - 450 K/uL MPV 10.3 9.4 - 12.3 FL ABSOLUTE NRBC 0.00 0.0 - 0.2 K/uL METABOLIC PANEL, BASIC Collection Time: 18  4:46 AM  
Result Value Ref Range Sodium 140 136 - 145 mmol/L Potassium 4.2 3.5 - 5.1 mmol/L  Chloride 104 98 - 107 mmol/L  
 CO2 33 (H) 21 - 32 mmol/L  
 Anion gap 3 (L) 7 - 16 mmol/L Glucose 93 65 - 100 mg/dL BUN 38 (H) 6 - 23 MG/DL Creatinine 1.53 (H) 0.8 - 1.5 MG/DL  
 GFR est AA >60 >60 ml/min/1.73m2 GFR est non-AA 50 (L) >60 ml/min/1.73m2 Calcium 7.8 (L) 8.3 - 10.4 MG/DL  
BNP Collection Time: 11/22/18  4:46 AM  
Result Value Ref Range  pg/mL URINALYSIS W/ RFLX MICROSCOPIC Collection Time: 11/22/18  9:44 AM  
Result Value Ref Range Color YELLOW Appearance CLEAR Specific gravity 1.023 1.001 - 1.023    
 pH (UA) 6.0 5.0 - 9.0 Protein TRACE (A) NEG mg/dL Glucose 100 mg/dL Ketone NEGATIVE  NEG mg/dL Bilirubin NEGATIVE  NEG Blood NEGATIVE  NEG Urobilinogen 0.2 0.2 - 1.0 EU/dL Nitrites NEGATIVE  NEG Leukocyte Esterase NEGATIVE  NEG    
OCCULT BLOOD, STOOL Collection Time: 11/22/18 10:33 AM  
Result Value Ref Range Occult blood, stool NEGATIVE  NEG No Known Allergies Current Facility-Administered Medications Medication Dose Route Frequency Provider Last Rate Last Dose  budesonide (PULMICORT) 500 mcg/2 ml nebulizer suspension  500 mcg Nebulization BID RT CHARLENE Huang   500 mcg at 11/22/18 0825  levalbuterol (XOPENEX) nebulizer soln 0.63 mg/3 mL  0.63 mg Nebulization Q6H RT CHARLENE Montiel   0.63 mg at 11/22/18 1427  carvedilol (COREG) tablet 25 mg  25 mg Oral BID WITH MEALS Greer Jerome MD   25 mg at 11/22/18 0811  
 amLODIPine (NORVASC) tablet 10 mg  10 mg Oral DAILY Greer Jerome MD   10 mg at 11/22/18 2023  hydrALAZINE (APRESOLINE) tablet 50 mg  50 mg Oral TID Greer Jerome MD   50 mg at 11/22/18 0809  
 0.9% sodium chloride infusion  75 mL/hr IntraVENous CONTINUOUS Greer Jerome MD 75 mL/hr at 11/22/18 0945 75 mL/hr at 11/22/18 0945  
 nicotine (NICODERM CQ) 21 mg/24 hr patch 1 Patch  1 Patch TransDERmal DAILY Lillie Varela MD   1 Patch at 11/22/18 0803  hydrALAZINE (APRESOLINE) 20 mg/mL injection 20 mg  20 mg IntraVENous Q6H PRN Anne Alba MD   20 mg at 18 1308  amiodarone (CORDARONE) tablet 200 mg  200 mg Oral Q12H Pretty Cain MD   200 mg at 18  rivaroxaban (XARELTO) tablet 20 mg  20 mg Oral DAILY WITH DINNER Pretty Cain MD   20 mg at 18 1715  levalbuterol (XOPENEX) nebulizer soln 1.25 mg/3 mL  1.25 mg Nebulization Q4H PRN Ovidio Perdomo MD      
 acetaminophen (TYLENOL) suppository 650 mg  650 mg Rectal Q4H PRN Salina Fuller MD      
 HYDROcodone-acetaminophen [facility]) 7.5-325 mg per tablet 1 Tab  1 Tab Oral Q4H PRN Salina Fuller MD   1 Tab at 18 1326  
 bisacodyl (DULCOLAX) suppository 10 mg  10 mg Rectal DAILY PRN Salina Fuller MD      
 influenza vaccine 3908-52 (6 mos+)(PF) (FLUARIX QUAD/FLULAVAL QUAD) injection 0.5 mL  0.5 mL IntraMUSCular PRIOR TO DISCHARGE Salina Fuller MD      
 morphine 10 mg/ml injection 5 mg  5 mg IntraVENous Q3H PRN Sandoval Gorman MD      
 sertraline (ZOLOFT) tablet 50 mg  50 mg Per NG tube DAILY Sandoval Gorman MD   50 mg at 18 7038  sodium chloride (NS) flush 30 mL  30 mL InterCATHeter Q8H Sandoval Gorman MD   30 mL at 18 1327  
 heparin (porcine) pf 900 Units  900 Units InterCATHeter Q8H Sandoval Gorman MD   900 Units at 18 1327  
 sodium chloride (NS) flush 30 mL  30 mL InterCATHeter PRN Sandoval Gorman MD      
 heparin (porcine) pf 900 Units  900 Units InterCATHeter PRN Sandoval Gorman MD   900 Units at 18 1056 Review of Systems negative with exception of pertinent positives noted above PHYSICAL EXAM  
 
Visit Vitals /57 Pulse 62 Temp 97.7 °F (36.5 °C) Resp 16 Ht 6' (1.829 m) Wt 140.1 kg (308 lb 14.4 oz) SpO2 96% BMI 41.89 kg/m² Temp (24hrs), Av.6 °F (36.4 °C), Min:96.9 °F (36.1 °C), Max:98.2 °F (36.8 °C) Oxygen Therapy O2 Sat (%): 96 % (18 1431) Pulse via Oximetry: 89 beats per minute (11/22/18 1431) O2 Device: Nasal cannula (11/22/18 1431) O2 Flow Rate (L/min): 5 l/min (11/22/18 1431) O2 Temperature: 87.8 °F (31 °C) (11/19/18 2125) FIO2 (%): 45 % (11/20/18 1112) Intake/Output Summary (Last 24 hours) at 11/22/2018 1514 Last data filed at 11/22/2018 7642 Gross per 24 hour Intake 856 ml Output 475 ml Net 381 ml General: No acute distress   
Lungs: Scant wheezes throughout. Resp even and nonlabored Heart:  S1S2 present without murmurs rubs gallops. RRR. 1+ LE edema Abdomen: Soft, non tender, BS present Extremities: moves ext spontaneously. No cyanosis Neurologic:  A/O X4. No focal deficits. Results summary of Diagnostic Studies/Procedures copied from within [facility] EMR: 
 
 
ASSESSMENT Active Hospital Problems Diagnosis Date Noted  Hematuria 11/21/2018  Acute on chronic respiratory failure with hypoxia and hypercapnia ([facility] 11/16/2018  COPD (chronic obstructive pulmonary disease) ([facility] 11/16/2018  NATALIIA (obstructive sleep apnea) 11/16/2018  H/O atrial flutter 11/16/2018 1/2018 Atrial flutter, s/p cardioversion.  Morbid obesity ([address] 11/16/2018  Altered mental status 11/16/2018  Essential hypertension 11/16/2018  Methamphetamine abuse ([facility] 02/01/2018 Plan: 
 
Acute respiratory failure with hypoxia and hypercapnia/Acute COPD exacerbation:  Continue O2. Will need to assess home O2 needs prior to DC. Pulmonary following. Start nebs. Stop IVF, worsening LE edema, elevated BNP. NATALIIA: home CPAP Atrial fib with RVR:  Continue on amio and xarelto. S/p MIRANDA 11/19 with cardioversion. Cardiology following 
 
CKD 3:  Stable. Lasix held due to bump in creatinine. Follow creatinine closely. Systolic CHF:  EF 87-10%, mod LAE, mild [place], mod MR. Cardiology following. Continue current regimen. Stop IVF. HTN:  Stable. Continue current regimen Polysubstance abuse: cessation discussed Tobacco abuse: cessation discussed Notes, labs, VS, diagnostic testing reviewd Time spent with pt 20 min DVT Prophylaxis: xarelto Plan of Care Discussed with: Supervising MD Dr. Edwin Mcdonald, care team, pt Bull Vera, NP

## 2018-11-22 NOTE — PROGRESS NOTES
Placed pt on hospital CPAP at HS w/ 4L bled in. Pt did bring their home CPAP, although they did not bring the cord to plug the machine in and they also did not bring their mask.

## 2018-11-22 NOTE — PROGRESS NOTES
Paty Degroot Admission Date: 11/16/2018 Daily Progress Note: 11/22/2018 The patient's chart is reviewed and the patient is discussed with the staff. Pt is a 51 yo male with a history of morbid obesity, NATALIIA, probable COPD, substance abuse (UDS + amphetamines this admission), and afib reportedly on xarelto and amio prior to admission. He developed shortness of breath and was started on CPAP until arrival to ER. His pCO2 was 72 on arirval and pt was placed on BiPAP. Pt's repeat ABG was worse with pCOD 115 and pt was intubated on 11/16/18. Pt admitted to the ICU and chest CT was negative for pneumonia. He developed afib with RVR and cardiology was consulted. Pt was placed on amio, cardizem, and xarelto. He did have a MIRANDA with cardioversion with return to [place] GRETA Nguyen. Pt was extubated on 11/19. He was transferred to the floor and hospitalist were consulted to assume care. Subjective:  
 
Pt lying in bed on hospital CPAP with 5L bled in. Pt complains of some coughing but reports that his mucous is clearing up. He does feel a little winded without CPAP in place. He is on IVF at 75cc/hr and has lower ext edema. Per review of prior echo reports, his EF was normal 1/2018 and now his EF is 40-45%. Cardiology is following. Pt complains of BRBPR and in stool. His RN also reports some cloudiness in his urine. Current Facility-Administered Medications Medication Dose Route Frequency  carvedilol (COREG) tablet 25 mg  25 mg Oral BID WITH MEALS  
 amLODIPine (NORVASC) tablet 10 mg  10 mg Oral DAILY  hydrALAZINE (APRESOLINE) tablet 50 mg  50 mg Oral TID  
 0.9% sodium chloride infusion  75 mL/hr IntraVENous CONTINUOUS  
 nicotine (NICODERM CQ) 21 mg/24 hr patch 1 Patch  1 Patch TransDERmal DAILY  hydrALAZINE (APRESOLINE) 20 mg/mL injection 20 mg  20 mg IntraVENous Q6H PRN  
 amiodarone (CORDARONE) tablet 200 mg  200 mg Oral Q12H  rivaroxaban (XARELTO) tablet 20 mg  20 mg Oral DAILY WITH DINNER  
 levalbuterol (XOPENEX) nebulizer soln 1.25 mg/3 mL  1.25 mg Nebulization Q4H PRN  
 acetaminophen (TYLENOL) suppository 650 mg  650 mg Rectal Q4H PRN  
 HYDROcodone-acetaminophen (NORCO) 7.5-325 mg per tablet 1 Tab  1 Tab Oral Q4H PRN  
 bisacodyl (DULCOLAX) suppository 10 mg  10 mg Rectal DAILY PRN  
 influenza vaccine 2018-19 (6 mos+)(PF) (FLUARIX QUAD/FLULAVAL QUAD) injection 0.5 mL  0.5 mL IntraMUSCular PRIOR TO DISCHARGE  morphine 10 mg/ml injection 5 mg  5 mg IntraVENous Q3H PRN  
 sertraline (ZOLOFT) tablet 50 mg  50 mg Per NG tube DAILY  sodium chloride (NS) flush 30 mL  30 mL InterCATHeter Q8H  
 heparin (porcine) pf 900 Units  900 Units InterCATHeter Q8H  
 sodium chloride (NS) flush 30 mL  30 mL InterCATHeter PRN  
 heparin (porcine) pf 900 Units  900 Units InterCATHeter PRN Review of Systems 
+dyspnea 
+cough +BRBPR Constitutional: negative for fever, chills, sweats Cardiovascular: negative for chest pain, palpitations, syncope, edema Gastrointestinal:  negative for dysphagia, reflux, vomiting, diarrhea, abdominal pain Neurologic:  negative for focal weakness, numbness, headache Objective:  
 
Vitals:  
 11/21/18 1900 11/21/18 2323 11/22/18 0128 11/22/18 5810 BP: 130/81 153/78  (!) 153/99 Pulse: 65 64  67 Resp: 16 16  16 Temp: 97.6 °F (36.4 °C) 97.4 °F (36.3 °C)  97.6 °F (36.4 °C) SpO2: 95% 96% 95% 95% Weight:      
Height:      
 
Intake and Output:  
11/20 0701 - 11/21 1900 In: 2452 [P.O.:1760; I.V.:692] Out: 875 Tima Friends 11/21 1901 - 11/22 0700 In: 376 [I.V.:376] Out: 475 [Urine:475] Physical Exam:  
Constitution:  the patient is well developed and in no acute distress, on CPAP with 5L O2 bled in EENMT:  Sclera clear, pupils equal, oral mucosa moist 
Respiratory: few faint wheezes anteriorly Cardiovascular:  RRR without M,G,R 
 Gastrointestinal: soft and non-tender; with positive bowel sounds. Musculoskeletal: warm without cyanosis. There is 1+ lower leg edema. Skin:  no jaundice or rashes Neurologic: no gross neuro deficits Psychiatric:  alert and oriented x 3 CXR: 11/19/18: 
 
 
 
 
MIRANDA: 11/19/18: 
 
SUMMARY: 
 
-  Left ventricle: Systolic function was mildly to moderately reduced. Ejection 
fraction was estimated in the range of 40 % to 45 %. This study was  
inadequate 
for the evaluation of regional wall motion. -  Left atrium: The atrium was mildly to moderately dilated. -  Right atrium: The atrium was mildly dilated. -  Mitral valve: There was moderate regurgitation. Echo: 1/6/18: -  Left ventricle: Systolic function was normal. Ejection fraction was 
estimated in the range of 55 % to 60 %. There were no regional wall motion 
abnormalities. There was moderate concentric hypertrophy. The E/e' ratio was 
21.1. Diastolic Function was indeterminate. -  Left atrium: The atrium was mildly dilated. -  Inferior vena cava, hepatic veins: The inferior vena cava was dilated. -  Pericardium: A trivial pericardial effusion was identified. There was no 
evidence of hemodynamic compromise. LAB Recent Labs  
  11/20/18 
2234 11/20/18 
1126 11/20/18 
0606 11/20/18 
0000 11/19/18 
1740 GLUCPOC 105* 117* 108* 102* 99 Recent Labs  
  11/21/18 
3657 11/20/18 
0513 11/19/18 
0236 WBC 9.2 9.2 9.3 HGB 12.2* 12.7* 13.3* HCT 40.1* 41.5 42.9  203 224 Recent Labs  
  11/21/18 
5003 11/20/18 
0513 11/19/18 
3501  140 142  
K 4.1 4.6 4.5  
 102 103 CO2 33* 34* 34*  112* 124* BUN 43* 45* 43* CREA 1.75* 1.57* 1.39  
CA 8.1* 8.3 8.6 No results for input(s): PH, PCO2, PO2, HCO3, PHI, PCO2I, PO2I, HCO3I in the last 72 hours. No results for input(s): LCAD, LAC in the last 72 hours. Assessment:  (Medical Decision Making) Hospital Problems  Date Reviewed: 11/22/2018 Codes Class Noted POA Hematuria ICD-10-CM: R31.9 ICD-9-CM: 599.70  11/21/2018 No  
 Check UA given new complaints of cloudy urine COPD (chronic obstructive pulmonary disease) (HCC) (Chronic) ICD-10-CM: J44.9 ICD-9-CM: 311  11/16/2018 Yes Start nebs with wheezing present NATALIIA (obstructive sleep apnea) (Chronic) ICD-10-CM: G47.33 
ICD-9-CM: 327.23  11/16/2018 Yes Continue CPAP, home CPAP at bedside, will ask RT to evaluate * (Principal) Acute on chronic respiratory failure with hypoxia and hypercapnia (HCC) ICD-10-CM: J96.21, J96.22 
ICD-9-CM: 518.84, 786.09, 799.02  11/16/2018 Yes Wean O2 as tolerated Altered mental status ICD-10-CM: R41.82 
ICD-9-CM: 780.97  11/16/2018 Yes Improved Morbid obesity ([facility] (Chronic) ICD-10-CM: E66.01 
ICD-9-CM: 278.01  11/16/2018 Yes  
 BMI > 40 H/O atrial flutter (Chronic) ICD-10-CM: Z86.79 
ICD-9-CM: V12.59  11/16/2018 Yes Overview Signed 2/6/2018  7:51 AM by Rocio Batista NP  
  1/2018 Atrial flutter, s/p cardioversion. Cardiology following Essential hypertension (Chronic) ICD-10-CM: I10 
ICD-9-CM: 401.9  11/16/2018 Yes Fairly controlled Methamphetamine abuse (HCC) (Chronic) ICD-10-CM: F15.10 ICD-9-CM: 305.70  2/1/2018 Yes Cessation discussed Plan:  (Medical Decision Making)  
 
--wean O2 as tolerated 
--RT to evaluate home CPAP 
--check BNP given worsening dyspnea and lower ext edema, consider decreasing or discontinuing IVF 
--check UA, hemoccult stool 
--start pulmicort/xopenex nebs 
--OOB discussed 
--have asked house supervisor to change attending to hospitalist service 
--likely not home today given persistent dyspnea and lower ext edema in setting of MARGARITA More than 50% of the time documented was spent in face-to-face contact with the patient and in the care of the patient on the floor/unit where the patient is located.  
 
CHARLENE Putnam 
 Lungs:  Fairly clear- pt on cpap Heart:  RRR with no Murmur/Rubs/Gallops Additional Comments:  More sob,just given pain meds- comfortable now I have spoken with and examined the patient. I agree with the above assessment and plan as documented.  
 
Sony Butterfield MD

## 2018-11-23 PROBLEM — R31.9 HEMATURIA: Status: RESOLVED | Noted: 2018-11-21 | Resolved: 2018-11-23

## 2018-11-23 LAB
ANION GAP SERPL CALC-SCNC: 6 MMOL/L (ref 7–16)
BUN SERPL-MCNC: 34 MG/DL (ref 6–23)
CALCIUM SERPL-MCNC: 7.7 MG/DL (ref 8.3–10.4)
CHLORIDE SERPL-SCNC: 105 MMOL/L (ref 98–107)
CO2 SERPL-SCNC: 33 MMOL/L (ref 21–32)
CREAT SERPL-MCNC: 1.68 MG/DL (ref 0.8–1.5)
ERYTHROCYTE [DISTWIDTH] IN BLOOD BY AUTOMATED COUNT: 13.2 %
GLUCOSE SERPL-MCNC: 94 MG/DL (ref 65–100)
HCT VFR BLD AUTO: 39 % (ref 41.1–50.3)
HGB BLD-MCNC: 11.7 G/DL (ref 13.6–17.2)
MCH RBC QN AUTO: 31.8 PG (ref 26.1–32.9)
MCHC RBC AUTO-ENTMCNC: 30 G/DL (ref 31.4–35)
MCV RBC AUTO: 106 FL (ref 79.6–97.8)
NRBC # BLD: 0 K/UL (ref 0–0.2)
PLATELET # BLD AUTO: 137 K/UL (ref 150–450)
PMV BLD AUTO: 10.4 FL (ref 9.4–12.3)
POTASSIUM SERPL-SCNC: 4.1 MMOL/L (ref 3.5–5.1)
RBC # BLD AUTO: 3.68 M/UL (ref 4.23–5.6)
SODIUM SERPL-SCNC: 144 MMOL/L (ref 136–145)
WBC # BLD AUTO: 7.3 K/UL (ref 4.3–11.1)

## 2018-11-23 PROCEDURE — 77030020263 HC SOL INJ SOD CL0.9% LFCR 1000ML

## 2018-11-23 PROCEDURE — 94760 N-INVAS EAR/PLS OXIMETRY 1: CPT

## 2018-11-23 PROCEDURE — 65660000000 HC RM CCU STEPDOWN

## 2018-11-23 PROCEDURE — 74011250637 HC RX REV CODE- 250/637: Performed by: INTERNAL MEDICINE

## 2018-11-23 PROCEDURE — 94640 AIRWAY INHALATION TREATMENT: CPT

## 2018-11-23 PROCEDURE — 85027 COMPLETE CBC AUTOMATED: CPT

## 2018-11-23 PROCEDURE — 74011250636 HC RX REV CODE- 250/636: Performed by: FAMILY MEDICINE

## 2018-11-23 PROCEDURE — 36592 COLLECT BLOOD FROM PICC: CPT

## 2018-11-23 PROCEDURE — 99232 SBSQ HOSP IP/OBS MODERATE 35: CPT | Performed by: INTERNAL MEDICINE

## 2018-11-23 PROCEDURE — 74011250636 HC RX REV CODE- 250/636: Performed by: INTERNAL MEDICINE

## 2018-11-23 PROCEDURE — 80048 BASIC METABOLIC PNL TOTAL CA: CPT

## 2018-11-23 PROCEDURE — 74011000250 HC RX REV CODE- 250: Performed by: PHYSICIAN ASSISTANT

## 2018-11-23 PROCEDURE — 77010033678 HC OXYGEN DAILY

## 2018-11-23 PROCEDURE — 94660 CPAP INITIATION&MGMT: CPT

## 2018-11-23 RX ORDER — SODIUM CHLORIDE 9 MG/ML
75 INJECTION, SOLUTION INTRAVENOUS CONTINUOUS
Status: DISCONTINUED | OUTPATIENT
Start: 2018-11-23 | End: 2018-11-25

## 2018-11-23 RX ADMIN — HYDROCODONE BITARTRATE AND ACETAMINOPHEN 1 TABLET: 7.5; 325 TABLET ORAL at 19:09

## 2018-11-23 RX ADMIN — LEVALBUTEROL HYDROCHLORIDE 0.63 MG: 0.63 SOLUTION RESPIRATORY (INHALATION) at 02:35

## 2018-11-23 RX ADMIN — AMIODARONE HYDROCHLORIDE 200 MG: 200 TABLET ORAL at 21:28

## 2018-11-23 RX ADMIN — BUDESONIDE 500 MCG: 0.5 INHALANT RESPIRATORY (INHALATION) at 07:20

## 2018-11-23 RX ADMIN — SODIUM CHLORIDE, PRESERVATIVE FREE 900 UNITS: 5 INJECTION INTRAVENOUS at 21:28

## 2018-11-23 RX ADMIN — Medication 30 ML: at 21:29

## 2018-11-23 RX ADMIN — HYDRALAZINE HYDROCHLORIDE 50 MG: 50 TABLET, FILM COATED ORAL at 17:12

## 2018-11-23 RX ADMIN — HYDROCODONE BITARTRATE AND ACETAMINOPHEN 1 TABLET: 7.5; 325 TABLET ORAL at 13:35

## 2018-11-23 RX ADMIN — LEVALBUTEROL HYDROCHLORIDE 0.63 MG: 0.63 SOLUTION RESPIRATORY (INHALATION) at 14:20

## 2018-11-23 RX ADMIN — Medication 30 ML: at 05:30

## 2018-11-23 RX ADMIN — HYDRALAZINE HYDROCHLORIDE 50 MG: 50 TABLET, FILM COATED ORAL at 21:28

## 2018-11-23 RX ADMIN — AMIODARONE HYDROCHLORIDE 200 MG: 200 TABLET ORAL at 08:44

## 2018-11-23 RX ADMIN — CARVEDILOL 25 MG: 25 TABLET, FILM COATED ORAL at 08:44

## 2018-11-23 RX ADMIN — RIVAROXABAN 20 MG: 20 TABLET, FILM COATED ORAL at 17:12

## 2018-11-23 RX ADMIN — SODIUM CHLORIDE, PRESERVATIVE FREE 900 UNITS: 5 INJECTION INTRAVENOUS at 13:36

## 2018-11-23 RX ADMIN — CARVEDILOL 25 MG: 25 TABLET, FILM COATED ORAL at 17:12

## 2018-11-23 RX ADMIN — LEVALBUTEROL HYDROCHLORIDE 0.63 MG: 0.63 SOLUTION RESPIRATORY (INHALATION) at 07:20

## 2018-11-23 RX ADMIN — BUDESONIDE 500 MCG: 0.5 INHALANT RESPIRATORY (INHALATION) at 19:12

## 2018-11-23 RX ADMIN — HYDRALAZINE HYDROCHLORIDE 50 MG: 50 TABLET, FILM COATED ORAL at 08:43

## 2018-11-23 RX ADMIN — HYDROCODONE BITARTRATE AND ACETAMINOPHEN 1 TABLET: 7.5; 325 TABLET ORAL at 08:44

## 2018-11-23 RX ADMIN — Medication 30 ML: at 13:36

## 2018-11-23 RX ADMIN — LEVALBUTEROL HYDROCHLORIDE 0.63 MG: 0.63 SOLUTION RESPIRATORY (INHALATION) at 19:13

## 2018-11-23 RX ADMIN — SERTRALINE HYDROCHLORIDE 50 MG: 50 TABLET ORAL at 08:44

## 2018-11-23 RX ADMIN — SODIUM CHLORIDE, PRESERVATIVE FREE 900 UNITS: 5 INJECTION INTRAVENOUS at 05:30

## 2018-11-23 RX ADMIN — SODIUM CHLORIDE 75 ML/HR: 900 INJECTION, SOLUTION INTRAVENOUS at 17:10

## 2018-11-23 RX ADMIN — AMLODIPINE BESYLATE 10 MG: 10 TABLET ORAL at 08:44

## 2018-11-23 NOTE — PROGRESS NOTES
Progress Note   
2018 Admit Date: 2018  2:17 AM  
NAME: Mckenzie Moore :  1965 MRN:  [id number] Attending: Mariana Scott MD 
PCP:  None Treatment Team: Attending Provider: Mariana Scott MD; Consulting Provider: Anthony Prado MD; Consulting Provider: Venancio Velasco MD; Consulting Provider: Mariana Scott MD 
 
Full Code SUBJECTIVE:  
HPI:  
Mr. Johnny Carrillo is a 51 yo male with PMH of morbid obesity, NATALIIA, probably COPD, substance abuse (+amphetamines on UDS this admission), afib reportedly on xarelto and amio who presented with c/o SOB. He was started on BiPAP, then intubated. He developed afib with RVR, started on amio, cardizem, xarelto and underwent MIRANDA cardioversion. He was then extubated and transferred to the medical floor. EF 40-45%. Today reports episode of SOB with anxiety this morning. Also with LE edema. . SOB resolved per pt. Denies CP, n/v/d, abd pain. On 5 L NC. Concerned about DC home due to poor relationship with step son and drugs in the home.  Progress Note: Today patient states he is \"ok. \" His speech is somewhat slurred but he denies confusion. He brought in his home bipap, however, it is giving an error message. He states that he might try to go live with his brother in [facility] upon discharge. Notes he is still somewhat SOB. He also is having less urine, which is very concentrated. Past Medical History:  
Diagnosis Date  Acute respiratory failure with hypercapnia ([address] 2018  Chronic obstructive pulmonary disease ([address]  Heart failure ([address]  Sleep disorder Recent Results (from the past 24 hour(s)) CBC W/O DIFF Collection Time: 18  4:47 AM  
Result Value Ref Range WBC 7.3 4.3 - 11.1 K/uL  
 RBC 3.68 (L) 4.23 - 5.6 M/uL  
 HGB 11.7 (L) 13.6 - 17.2 g/dL HCT 39.0 (L) 41.1 - 50.3 % .0 (H) 79.6 - 97.8 FL  
 MCH 31.8 26.1 - 32.9 PG  
 MCHC 30.0 (L) 31.4 - 35.0 g/dL RDW 13.2 % PLATELET 009 (L) 286 - 450 K/uL MPV 10.4 9.4 - 12.3 FL ABSOLUTE NRBC 0.00 0.0 - 0.2 K/uL METABOLIC PANEL, BASIC Collection Time: 11/23/18  4:47 AM  
Result Value Ref Range Sodium 144 136 - 145 mmol/L Potassium 4.1 3.5 - 5.1 mmol/L Chloride 105 98 - 107 mmol/L  
 CO2 33 (H) 21 - 32 mmol/L Anion gap 6 (L) 7 - 16 mmol/L Glucose 94 65 - 100 mg/dL BUN 34 (H) 6 - 23 MG/DL Creatinine 1.68 (H) 0.8 - 1.5 MG/DL  
 GFR est AA 55 (L) >60 ml/min/1.73m2 GFR est non-AA 45 (L) >60 ml/min/1.73m2 Calcium 7.7 (L) 8.3 - 10.4 MG/DL No Known Allergies Current Facility-Administered Medications Medication Dose Route Frequency Provider Last Rate Last Dose  budesonide (PULMICORT) 500 mcg/2 ml nebulizer suspension  500 mcg Nebulization BID RT Severo Bills, PA   500 mcg at 11/23/18 0720  levalbuterol (XOPENEX) nebulizer soln 0.63 mg/3 mL  0.63 mg Nebulization Q6H RT CHARLENE Choe   0.63 mg at 11/23/18 1420  carvedilol (COREG) tablet 25 mg  25 mg Oral BID WITH MEALS Sandip Pack MD   25 mg at 11/23/18 0844  
 amLODIPine (NORVASC) tablet 10 mg  10 mg Oral DAILY Sandip Pack MD   10 mg at 11/23/18 8025  hydrALAZINE (APRESOLINE) tablet 50 mg  50 mg Oral TID Sandip Pack MD   50 mg at 11/23/18 0843  
 nicotine (NICODERM CQ) 21 mg/24 hr patch 1 Patch  1 Patch TransDERmal DAILY Leonard Haley MD   1 Patch at 11/23/18 9858  hydrALAZINE (APRESOLINE) 20 mg/mL injection 20 mg  20 mg IntraVENous Q6H PRN Juvencio Gamez MD   20 mg at 11/20/18 1308  amiodarone (CORDARONE) tablet 200 mg  200 mg Oral Q12H Sandip Pack MD   200 mg at 11/23/18 6493  rivaroxaban (XARELTO) tablet 20 mg  20 mg Oral DAILY WITH DINNER Sandip Pack MD   20 mg at 11/22/18 1754  levalbuterol (XOPENEX) nebulizer soln 1.25 mg/3 mL  1.25 mg Nebulization Q4H PRN Bhavani Cabrera MD      
  acetaminophen (TYLENOL) suppository 650 mg  650 mg Rectal Q4H PRN Cj Mendez MD      
 HYDROcodone-acetaminophen [facility]) 7.5-325 mg per tablet 1 Tab  1 Tab Oral Q4H PRN Cj Mendez MD   1 Tab at 18 1335  bisacodyl (DULCOLAX) suppository 10 mg  10 mg Rectal DAILY PRN Cj Mendez MD      
 influenza vaccine 1434-20 (6 mos+)(PF) (FLUARIX QUAD/FLULAVAL QUAD) injection 0.5 mL  0.5 mL IntraMUSCular PRIOR TO DISCHARGE Cj Mendez MD      
 morphine 10 mg/ml injection 5 mg  5 mg IntraVENous Q3H PRN Sandvoal Gorman MD      
 sertraline (ZOLOFT) tablet 50 mg  50 mg Per NG tube DAILY Sandoval Gorman MD   50 mg at 18 0481  sodium chloride (NS) flush 30 mL  30 mL InterCATHeter Q8H Sandoval Gorman MD   30 mL at 18 1336  heparin (porcine) pf 900 Units  900 Units InterCATHeter Q8H Sandoval Gorman MD   900 Units at 18 1336  sodium chloride (NS) flush 30 mL  30 mL InterCATHeter PRN Sandoval Gorman MD      
 heparin (porcine) pf 900 Units  900 Units InterCATHeter PRN Sandoval Gorman MD   900 Units at 18 1056 Review of Systems negative with exception of pertinent positives noted above PHYSICAL EXAM  
 
Visit Vitals /75 Pulse 62 Temp 97.8 °F (36.6 °C) Resp 18 Ht 6' (1.829 m) Wt 140.1 kg (308 lb 14.4 oz) SpO2 98% BMI 41.89 kg/m² Temp (24hrs), Av.6 °F (36.4 °C), Min:97.3 °F (36.3 °C), Max:98 °F (36.7 °C) Oxygen Therapy O2 Sat (%): 98 % (18 1526) Pulse via Oximetry: 78 beats per minute (18 1427) O2 Device: Nasal cannula (18 1427) O2 Flow Rate (L/min): 4 l/min (18 1427) O2 Temperature: 87.8 °F (31 °C) (18) FIO2 (%): 45 % (18 1112) Intake/Output Summary (Last 24 hours) at 2018 1558 Last data filed at 2018 1141 Gross per 24 hour Intake 1331 ml Output 1250 ml Net 81 ml General: No acute distress, speech mildly slurred   
 Lungs: Scant wheezes throughout. Resp even and nonlabored Heart:  S1S2 present without murmurs rubs gallops. RRR. 1+ LE edema Abdomen: Morbidly obese, Soft, non tender, BS present Extremities: moves ext spontaneously. No cyanosis Neurologic:  A/O X4. No focal deficits. Results summary of Diagnostic Studies/Procedures copied from within [facility] EMR: 
 
 
ASSESSMENT Active Hospital Problems Diagnosis Date Noted  Acute on chronic respiratory failure with hypoxia and hypercapnia ([facility] 11/16/2018  COPD (chronic obstructive pulmonary disease) ([facility] 11/16/2018  NATALIIA (obstructive sleep apnea) 11/16/2018  H/O atrial flutter 11/16/2018 1/2018 Atrial flutter, s/p cardioversion.  Morbid obesity ([facility] 11/16/2018  Altered mental status 11/16/2018  Essential hypertension 11/16/2018  Methamphetamine abuse ([facility] 02/01/2018  Acute kidney injury ([facility] 01/06/2018 Plan: 
 
Acute respiratory failure with hypoxia and hypercapnia/Acute COPD exacerbation:  Continue O2. Will need to assess home O2 needs prior to DC - needs functional bipap. Pulmonary following. Cont nebs. NATALIIA: cont CPAP with O2 bleed in. Stressed importance of wearing with naps and at night. Atrial fib with RVR:  Continue on amio and xarelto. S/p MIRANDA 11/19 with cardioversion. Cardiology following 
 
CKD 3:  Cr slightly climbing. Encouraged patient to continue drinking fluids. Repeat BMP in AM. Systolic CHF:  EF 82-83%, mod LAE, mild [place], mod MR. Cardiology following. Continue current regimen. HTN:  Stable. Continue current regimen Polysubstance abuse: cessation discussed Tobacco abuse: cessation discussed Notes, labs, VS, diagnostic testing reviewd Time spent with pt 20 min DVT Prophylaxis: xarelto Plan of Care Discussed with: Supervising MD Dr. Oliva Briseno, care team, pt Rica Leyva

## 2018-11-23 NOTE — PROGRESS NOTES
Problem: Nutrition Deficit Goal: *Optimize nutritional status Nutrition: 
Assessment based on LOS. Assessment: Anthropometrics: Ht - 6'0\", wgt - 140.1 kg (11/21/18 standing scale), BMI 41.9 c/w obesity class III, edema - 1+ BLEs. Macronutrient Needs: 
Estimated calorie needs - 4630-3938 franki/day (11-14 franki/kg/day) (173% IBW) Estimated protein needs - 65-97 gm pro/day (0.8-1.2 gm pro/kgIBW/day) (GFR 45 ml/min) Intake/Comparative Standards: This patient's average intake of cardiac diet for the past 4 recorded days/5 meals: 90%. This potentially meets 100% of calorie and 100% of protein goals. Diet: 
 Cardiac. Pertinent Labs: BUN 34, creatinine 1.68. Food/Nutrition History: 
 The patient presents with no acute nutrition risk factors based on the nursing admission malnutrition screen. He reports no difficulty with oral intake PTA and is having no difficulty eating while off BIPAP. Diagnosis (Nutrition): No nutrition diagnosis at this time. Intervention: 
Meals and Snacks: Cardiac. Nutrition Discharge Plan: Too soon to determine. Aga Bacon. Heidy Stoll 
[phone]

## 2018-11-23 NOTE — PROGRESS NOTES
Romy Burris Admission Date: 11/16/2018 Daily Progress Note: 11/23/2018 The patient's chart is reviewed and the patient is discussed with the staff. Pt is a 51 yo male with a history of morbid obesity, NATALIIA, probable COPD, substance abuse (UDS + amphetamines this admission), and afib reportedly on xarelto and amio prior to admission. He developed shortness of breath and was started on CPAP until arrival to ER. His pCO2 was 72 on arirval and pt was placed on BiPAP. Pt's repeat ABG was worse with pCOD 115 and pt was intubated on 11/16/18. Pt admitted to the ICU and chest CT was negative for pneumonia. He developed afib with RVR and cardiology was consulted. Pt was placed on amio, cardizem, and xarelto. He did have a MIRANDA with cardioversion with return to [place] GRETA Nguyen. Pt was extubated on 11/19. He was transferred to the floor and hospitalist were consulted to assume care. Subjective:  
 
Pt lying in bed on hospital CPAP with 5L bled in. No new complaint Current Facility-Administered Medications Medication Dose Route Frequency  budesonide (PULMICORT) 500 mcg/2 ml nebulizer suspension  500 mcg Nebulization BID RT  
 levalbuterol (XOPENEX) nebulizer soln 0.63 mg/3 mL  0.63 mg Nebulization Q6H RT  
 carvedilol (COREG) tablet 25 mg  25 mg Oral BID WITH MEALS  
 amLODIPine (NORVASC) tablet 10 mg  10 mg Oral DAILY  hydrALAZINE (APRESOLINE) tablet 50 mg  50 mg Oral TID  nicotine (NICODERM CQ) 21 mg/24 hr patch 1 Patch  1 Patch TransDERmal DAILY  hydrALAZINE (APRESOLINE) 20 mg/mL injection 20 mg  20 mg IntraVENous Q6H PRN  
 amiodarone (CORDARONE) tablet 200 mg  200 mg Oral Q12H  rivaroxaban (XARELTO) tablet 20 mg  20 mg Oral DAILY WITH DINNER  
 levalbuterol (XOPENEX) nebulizer soln 1.25 mg/3 mL  1.25 mg Nebulization Q4H PRN  
 acetaminophen (TYLENOL) suppository 650 mg  650 mg Rectal Q4H PRN  
 HYDROcodone-acetaminophen (NORCO) 7.5-325 mg per tablet 1 Tab  1 Tab Oral Q4H PRN  
 bisacodyl (DULCOLAX) suppository 10 mg  10 mg Rectal DAILY PRN  
 influenza vaccine 2018-19 (6 mos+)(PF) (FLUARIX QUAD/FLULAVAL QUAD) injection 0.5 mL  0.5 mL IntraMUSCular PRIOR TO DISCHARGE  morphine 10 mg/ml injection 5 mg  5 mg IntraVENous Q3H PRN  
 sertraline (ZOLOFT) tablet 50 mg  50 mg Per NG tube DAILY  sodium chloride (NS) flush 30 mL  30 mL InterCATHeter Q8H  
 heparin (porcine) pf 900 Units  900 Units InterCATHeter Q8H  
 sodium chloride (NS) flush 30 mL  30 mL InterCATHeter PRN  
 heparin (porcine) pf 900 Units  900 Units InterCATHeter PRN Review of Systems 
+dyspnea 
+cough Constitutional: negative for fever, chills, sweats Cardiovascular: negative for chest pain, palpitations, syncope, edema Gastrointestinal:  negative for dysphagia, reflux, vomiting, diarrhea, abdominal pain Neurologic:  negative for focal weakness, numbness, headache Objective:  
 
Vitals:  
 11/22/18 2330 11/22/18 2340 11/23/18 0235 11/23/18 0340 BP:  105/52  117/53 Pulse:  65  63 Resp:  18  18 Temp:  97.5 °F (36.4 °C)  97.4 °F (36.3 °C) SpO2: 94% 91% 95% 97% Weight:      
Height:      
 
Intake and Output:  
11/21 0701 - 11/22 1900 In: 9923 [P.O.:1440; I.V.:1707] Out: 625 [Urine:625] 11/22 1901 - 11/23 0700 In: 0 Out: 350 [Urine:350] Physical Exam:  
Constitution:  the patient is well developed and in no acute distress, on CPAP with 5L O2 bled in EENMT:  Sclera clear, pupils equal, oral mucosa moist 
Respiratory: few faint wheezes anteriorly Cardiovascular:  RRR without M,G,R 
Gastrointestinal: soft and non-tender; with positive bowel sounds. Musculoskeletal: warm without cyanosis. There is 1+ lower leg edema. Skin:  no jaundice or rashes Neurologic: no gross neuro deficits Psychiatric:  alert and oriented x 3 CXR: 11/19/18: 
 
 
 
 
MIRANDA: 11/19/18: 
 
SUMMARY: 
 
-  Left ventricle: Systolic function was mildly to moderately reduced. Ejection fraction was estimated in the range of 40 % to 45 %. This study was  
inadequate 
for the evaluation of regional wall motion. -  Left atrium: The atrium was mildly to moderately dilated. -  Right atrium: The atrium was mildly dilated. -  Mitral valve: There was moderate regurgitation. Echo: 1/6/18: -  Left ventricle: Systolic function was normal. Ejection fraction was 
estimated in the range of 55 % to 60 %. There were no regional wall motion 
abnormalities. There was moderate concentric hypertrophy. The E/e' ratio was 
21.1. Diastolic Function was indeterminate. -  Left atrium: The atrium was mildly dilated. -  Inferior vena cava, hepatic veins: The inferior vena cava was dilated. -  Pericardium: A trivial pericardial effusion was identified. There was no 
evidence of hemodynamic compromise. LAB Recent Labs  
  11/20/18 
2234 11/20/18 
1126 GLUCPOC 105* 117* Recent Labs  
  11/23/18 0447 11/22/18 0446 11/21/18 
3013 WBC 7.3 7.6 9.2 HGB 11.7* 12.1* 12.2* HCT 39.0* 39.6* 40.1*  
* 148* 165 Recent Labs  
  11/23/18 0447 11/22/18 0446 11/21/18 
2774  140 141  
K 4.1 4.2 4.1  104 103 CO2 33* 33* 33* GLU 94 93 100 BUN 34* 38* 43* CREA 1.68* 1.53* 1.75* CA 7.7* 7.8* 8.1* No results for input(s): PH, PCO2, PO2, HCO3, PHI, PCO2I, PO2I, HCO3I in the last 72 hours. No results for input(s): LCAD, LAC in the last 72 hours. Assessment:  (Medical Decision Making) Hospital Problems  Date Reviewed: 11/22/2018 Codes Class Noted POA Hematuria ICD-10-CM: R31.9 ICD-9-CM: 599.70  11/21/2018 No  
 Check UA  
  
 COPD (chronic obstructive pulmonary disease) (HCC) (Chronic) ICD-10-CM: J44.9 ICD-9-CM: 813  11/16/2018 Yes On BD 
  
 NATALIIA (obstructive sleep apnea) (Chronic) ICD-10-CM: G47.33 
ICD-9-CM: 327.23  11/16/2018 Yes Continue CPAP,  * (Principal) Acute on chronic respiratory failure with hypoxia and hypercapnia ([facility] ICD-10-CM: J96.21, J96.22 
ICD-9-CM: 518.84, 786.09, 799.02  11/16/2018 Yes Wean O2 as tolerated Altered mental status ICD-10-CM: R41.82 
ICD-9-CM: 780.97  11/16/2018 Yes Improved Morbid obesity ([facility] (Chronic) ICD-10-CM: E66.01 
ICD-9-CM: 278.01  11/16/2018 Yes  
 BMI > 40 H/O atrial flutter (Chronic) ICD-10-CM: Z86.79 
ICD-9-CM: V12.59  11/16/2018 Yes Overview Signed 2/6/2018  7:51 AM by Norman Maki NP  
  1/2018 Atrial flutter, s/p cardioversion. Cardiology following Essential hypertension (Chronic) ICD-10-CM: I10 
ICD-9-CM: 401.9  11/16/2018 Yes Fairly controlled Methamphetamine abuse (HCC) (Chronic) ICD-10-CM: F15.10 ICD-9-CM: 305.70  2/1/2018 Yes Cessation discussed Plan:  (Medical Decision Making)  
 
--wean O2 as tolerated 
--cont. CPAP 
--follow BMP and CBC More than 50% of the time documented was spent in face-to-face contact with the patient and in the care of the patient on the floor/unit where the patient is located.  
 
Kwabena Ibrahim MD

## 2018-11-23 NOTE — PROGRESS NOTES
11/23/2018 10:30 AM 
 
Admit Date: 11/16/2018 Subjective:  
 
Jeraldsigarrison Thorpe denies palpitations. His SOP he says is better Objective:  
  
Visit Vitals /80 Pulse 64 Temp 97.3 °F (36.3 °C) Resp 17 Ht 6' (1.829 m) Wt 140.1 kg (308 lb 14.4 oz) SpO2 90% BMI 41.89 kg/m² Physical Exam: 
Heart: regular rate and rhythm Data Review:  
Labs:   
Recent Results (from the past 24 hour(s)) OCCULT BLOOD, STOOL Collection Time: 11/22/18 10:33 AM  
Result Value Ref Range Occult blood, stool NEGATIVE  NEG    
CBC W/O DIFF Collection Time: 11/23/18  4:47 AM  
Result Value Ref Range WBC 7.3 4.3 - 11.1 K/uL  
 RBC 3.68 (L) 4.23 - 5.6 M/uL  
 HGB 11.7 (L) 13.6 - 17.2 g/dL HCT 39.0 (L) 41.1 - 50.3 % .0 (H) 79.6 - 97.8 FL  
 MCH 31.8 26.1 - 32.9 PG  
 MCHC 30.0 (L) 31.4 - 35.0 g/dL  
 RDW 13.2 % PLATELET 803 (L) 503 - 450 K/uL MPV 10.4 9.4 - 12.3 FL ABSOLUTE NRBC 0.00 0.0 - 0.2 K/uL METABOLIC PANEL, BASIC Collection Time: 11/23/18  4:47 AM  
Result Value Ref Range Sodium 144 136 - 145 mmol/L Potassium 4.1 3.5 - 5.1 mmol/L Chloride 105 98 - 107 mmol/L  
 CO2 33 (H) 21 - 32 mmol/L Anion gap 6 (L) 7 - 16 mmol/L Glucose 94 65 - 100 mg/dL BUN 34 (H) 6 - 23 MG/DL Creatinine 1.68 (H) 0.8 - 1.5 MG/DL  
 GFR est AA 55 (L) >60 ml/min/1.73m2 GFR est non-AA 45 (L) >60 ml/min/1.73m2 Calcium 7.7 (L) 8.3 - 10.4 MG/DL Telemetry: normal sinus rhythm Assessment:  
 
Patient Active Problem List  
 Diagnosis Date Noted  Hematuria 11/21/2018  COPD (chronic obstructive pulmonary disease) ([facility] 11/16/2018  NATALIIA (obstructive sleep apnea) 11/16/2018  Acute on chronic respiratory failure with hypoxia and hypercapnia ([facility] 11/16/2018  Altered mental status 11/16/2018  Morbid obesity ([facility] 11/16/2018  H/O atrial flutter back to sinus on PO amio 11/16/2018  Essential hypertension 11/16/2018  Nicotine dependence 02/01/2018  Methamphetamine abuse ([facility] needs to stop 02/01/2018  Alcohol abuse 02/01/2018  Homeless 01/15/2018  Urethral stricture 01/07/2018  Anasarca 01/06/2018 Plan:  
 
Continue amio

## 2018-11-23 NOTE — PROGRESS NOTES
END OF SHIFT NOTE: 
 
INTAKE/OUTPUT 
11/22 0701 - 11/23 0700 In: 0750 [I.V.:1331] Out: 400 [Urine:400] Voiding: YES Catheter: NO 
Drain:   
 
 
 
 
 
Flatus: Patient does have flatus present. Stool:  0 occurrences. Characteristics: 
Stool Assessment Stool Color: [place] Stool Appearance: Loose Stool Amount: Large Stool Source/Status: Rectum Emesis: 0 occurrences. Characteristics: VITAL SIGNS Patient Vitals for the past 12 hrs: 
 Temp Pulse Resp BP SpO2  
11/23/18 1526 97.8 °F (36.6 °C) 62 18 134/75 98 % 11/23/18 1427     96 % 11/23/18 1051 97.6 °F (36.4 °C) 67 18 127/74 95 % 11/23/18 0722     90 % 11/23/18 0721 97.3 °F (36.3 °C) 64 17 132/80 91 % Pain Assessment Pain Intensity 1: 6 (11/23/18 1342) Pain Location 1: Hip Pain Intervention(s) 1: Medication (see MAR) Patient Stated Pain Goal: 0 Ambulating No 
 
Shift report given to oncoming nurse at the bedside.  
 
Jodee Melendez RN

## 2018-11-24 LAB
ANION GAP SERPL CALC-SCNC: 2 MMOL/L (ref 7–16)
BUN SERPL-MCNC: 25 MG/DL (ref 6–23)
CALCIUM SERPL-MCNC: 8 MG/DL (ref 8.3–10.4)
CHLORIDE SERPL-SCNC: 105 MMOL/L (ref 98–107)
CO2 SERPL-SCNC: 33 MMOL/L (ref 21–32)
CREAT SERPL-MCNC: 1.18 MG/DL (ref 0.8–1.5)
ERYTHROCYTE [DISTWIDTH] IN BLOOD BY AUTOMATED COUNT: 13.2 %
GLUCOSE SERPL-MCNC: 134 MG/DL (ref 65–100)
HCT VFR BLD AUTO: 39.4 % (ref 41.1–50.3)
HGB BLD-MCNC: 11.8 G/DL (ref 13.6–17.2)
MCH RBC QN AUTO: 31.8 PG (ref 26.1–32.9)
MCHC RBC AUTO-ENTMCNC: 29.9 G/DL (ref 31.4–35)
MCV RBC AUTO: 106.2 FL (ref 79.6–97.8)
NRBC # BLD: 0 K/UL (ref 0–0.2)
PLATELET # BLD AUTO: 143 K/UL (ref 150–450)
PMV BLD AUTO: 10.7 FL (ref 9.4–12.3)
POTASSIUM SERPL-SCNC: 3.8 MMOL/L (ref 3.5–5.1)
RBC # BLD AUTO: 3.71 M/UL (ref 4.23–5.6)
SODIUM SERPL-SCNC: 140 MMOL/L (ref 136–145)
WBC # BLD AUTO: 10 K/UL (ref 4.3–11.1)

## 2018-11-24 PROCEDURE — 36592 COLLECT BLOOD FROM PICC: CPT

## 2018-11-24 PROCEDURE — 74011250637 HC RX REV CODE- 250/637: Performed by: INTERNAL MEDICINE

## 2018-11-24 PROCEDURE — 77030020263 HC SOL INJ SOD CL0.9% LFCR 1000ML

## 2018-11-24 PROCEDURE — 74011250636 HC RX REV CODE- 250/636: Performed by: INTERNAL MEDICINE

## 2018-11-24 PROCEDURE — 74011000250 HC RX REV CODE- 250: Performed by: PHYSICIAN ASSISTANT

## 2018-11-24 PROCEDURE — 80048 BASIC METABOLIC PNL TOTAL CA: CPT

## 2018-11-24 PROCEDURE — 99232 SBSQ HOSP IP/OBS MODERATE 35: CPT | Performed by: INTERNAL MEDICINE

## 2018-11-24 PROCEDURE — 85027 COMPLETE CBC AUTOMATED: CPT

## 2018-11-24 PROCEDURE — 77010033678 HC OXYGEN DAILY

## 2018-11-24 PROCEDURE — 65660000000 HC RM CCU STEPDOWN

## 2018-11-24 PROCEDURE — 94760 N-INVAS EAR/PLS OXIMETRY 1: CPT

## 2018-11-24 PROCEDURE — 94660 CPAP INITIATION&MGMT: CPT

## 2018-11-24 PROCEDURE — 94640 AIRWAY INHALATION TREATMENT: CPT

## 2018-11-24 RX ADMIN — LEVALBUTEROL HYDROCHLORIDE 0.63 MG: 0.63 SOLUTION RESPIRATORY (INHALATION) at 07:04

## 2018-11-24 RX ADMIN — Medication 30 ML: at 21:42

## 2018-11-24 RX ADMIN — AMIODARONE HYDROCHLORIDE 200 MG: 200 TABLET ORAL at 08:46

## 2018-11-24 RX ADMIN — LEVALBUTEROL HYDROCHLORIDE 0.63 MG: 0.63 SOLUTION RESPIRATORY (INHALATION) at 19:41

## 2018-11-24 RX ADMIN — RIVAROXABAN 20 MG: 20 TABLET, FILM COATED ORAL at 17:47

## 2018-11-24 RX ADMIN — HYDRALAZINE HYDROCHLORIDE 50 MG: 50 TABLET, FILM COATED ORAL at 16:00

## 2018-11-24 RX ADMIN — CARVEDILOL 25 MG: 25 TABLET, FILM COATED ORAL at 17:47

## 2018-11-24 RX ADMIN — HYDROCODONE BITARTRATE AND ACETAMINOPHEN 1 TABLET: 7.5; 325 TABLET ORAL at 08:57

## 2018-11-24 RX ADMIN — HYDRALAZINE HYDROCHLORIDE 50 MG: 50 TABLET, FILM COATED ORAL at 08:45

## 2018-11-24 RX ADMIN — AMIODARONE HYDROCHLORIDE 200 MG: 200 TABLET ORAL at 21:42

## 2018-11-24 RX ADMIN — HYDROCODONE BITARTRATE AND ACETAMINOPHEN 1 TABLET: 7.5; 325 TABLET ORAL at 14:44

## 2018-11-24 RX ADMIN — BUDESONIDE 500 MCG: 0.5 INHALANT RESPIRATORY (INHALATION) at 07:04

## 2018-11-24 RX ADMIN — BUDESONIDE 500 MCG: 0.5 INHALANT RESPIRATORY (INHALATION) at 19:41

## 2018-11-24 RX ADMIN — LEVALBUTEROL HYDROCHLORIDE 0.63 MG: 0.63 SOLUTION RESPIRATORY (INHALATION) at 03:11

## 2018-11-24 RX ADMIN — Medication 30 ML: at 14:45

## 2018-11-24 RX ADMIN — SERTRALINE HYDROCHLORIDE 50 MG: 50 TABLET ORAL at 08:46

## 2018-11-24 RX ADMIN — AMLODIPINE BESYLATE 10 MG: 10 TABLET ORAL at 09:00

## 2018-11-24 RX ADMIN — LEVALBUTEROL HYDROCHLORIDE 0.63 MG: 0.63 SOLUTION RESPIRATORY (INHALATION) at 14:26

## 2018-11-24 RX ADMIN — SODIUM CHLORIDE, PRESERVATIVE FREE 900 UNITS: 5 INJECTION INTRAVENOUS at 05:12

## 2018-11-24 RX ADMIN — SODIUM CHLORIDE, PRESERVATIVE FREE 900 UNITS: 5 INJECTION INTRAVENOUS at 14:51

## 2018-11-24 RX ADMIN — SODIUM CHLORIDE, PRESERVATIVE FREE 900 UNITS: 5 INJECTION INTRAVENOUS at 21:41

## 2018-11-24 RX ADMIN — HYDROCODONE BITARTRATE AND ACETAMINOPHEN 1 TABLET: 7.5; 325 TABLET ORAL at 19:39

## 2018-11-24 RX ADMIN — CARVEDILOL 25 MG: 25 TABLET, FILM COATED ORAL at 08:45

## 2018-11-24 RX ADMIN — Medication 30 ML: at 05:13

## 2018-11-24 RX ADMIN — HYDRALAZINE HYDROCHLORIDE 50 MG: 50 TABLET, FILM COATED ORAL at 21:42

## 2018-11-24 NOTE — PROGRESS NOTES
Shift assessment complete via doc flow sheet. A+OX4, speech slurred, slow to reply. HR regular RR even and unlabored. LS diminised Abd soft, active bowel sounds. IV patent without complications. All needs met at this time. Staff will monitor with hourly rounds.

## 2018-11-24 NOTE — PROGRESS NOTES
11/24/2018 9:00 AM 
 
Admit Date: 11/16/2018 Subjective:  
 
Dossie Space denies palpitations . Objective:  
  
Visit Vitals /79 (BP 1 Location: Left arm, BP Patient Position: At rest) Pulse 79 Temp 97.8 °F (36.6 °C) Resp 19 Ht 6' (1.829 m) Wt 140.1 kg (308 lb 14.4 oz) SpO2 99% Comment: 4L NC  
BMI 41.89 kg/m² Physical Exam: 
Heart: regular rate and rhythm Lungs: clear to auscultation bilaterally Data Review:  
Labs:   
Recent Results (from the past 24 hour(s)) CBC W/O DIFF Collection Time: 11/24/18  4:05 AM  
Result Value Ref Range WBC 10.0 4.3 - 11.1 K/uL  
 RBC 3.71 (L) 4.23 - 5.6 M/uL  
 HGB 11.8 (L) 13.6 - 17.2 g/dL HCT 39.4 (L) 41.1 - 50.3 % .2 (H) 79.6 - 97.8 FL  
 MCH 31.8 26.1 - 32.9 PG  
 MCHC 29.9 (L) 31.4 - 35.0 g/dL  
 RDW 13.2 % PLATELET 490 (L) 929 - 450 K/uL MPV 10.7 9.4 - 12.3 FL ABSOLUTE NRBC 0.00 0.0 - 0.2 K/uL METABOLIC PANEL, BASIC Collection Time: 11/24/18  4:05 AM  
Result Value Ref Range Sodium 140 136 - 145 mmol/L Potassium 3.8 3.5 - 5.1 mmol/L Chloride 105 98 - 107 mmol/L  
 CO2 33 (H) 21 - 32 mmol/L Anion gap 2 (L) 7 - 16 mmol/L Glucose 134 (H) 65 - 100 mg/dL BUN 25 (H) 6 - 23 MG/DL Creatinine 1.18 0.8 - 1.5 MG/DL  
 GFR est AA >60 >60 ml/min/1.73m2 GFR est non-AA >60 >60 ml/min/1.73m2 Calcium 8.0 (L) 8.3 - 10.4 MG/DL Telemetry: normal sinus rhythm Assessment:  
 
Patient Active Problem List  
 Diagnosis Date Noted  COPD (chronic obstructive pulmonary disease) ([address] 11/16/2018  NATALIIA (obstructive sleep apnea) 11/16/2018  Acute on chronic respiratory failure with hypoxia and hypercapnia [facility] 11/16/2018  Altered mental status 11/16/2018  Morbid obesity ([facility] [facility] 11/16/2018  H/O atrial  Reg today on po amio  11/16/2018  Essential hypertension 11/16/2018  Nicotine dependence 02/01/2018  Methamphetamine abuse ([facility] [facility] 02/01/2018  Alcohol abuse 02/01/2018  Homeless 01/15/2018  Urethral stricture 01/07/2018  Anasarca 01/06/2018  Acute kidney injury ([facility] 01/06/2018 Plan:  
 dicsussed stopping substance abuse

## 2018-11-24 NOTE — PROGRESS NOTES
Patient asked to speak with chaplain Chinchilla has known patient for many years  has visited with patient in multiple crisis - he always says he wants to turn things around Today he said he has made a mess of his life and those who are close to him. Spent time reflecting upon all our visits and his cry for help Prayed with patient and will continue to support him. Not sure this was a heart felt visit since the only thing patient said was what time does the [place] game come on TV.  
 
Maikel Skaggs, staff [address] chinchilla [address], [address] [address]  /   [email or web address]
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
## 2018-11-24 NOTE — PROGRESS NOTES
Pt placed on CPAP at this time with 5 liters of oxygen bled in. SAT's 98%. Unit plugged in red outlet. No complications noted at this time.

## 2018-11-24 NOTE — PROGRESS NOTES
Erin De Luna Admission Date: 11/16/2018 Daily Progress Note: 11/24/2018 The patient's chart is reviewed and the patient is discussed with the staff. Pt is a 51 yo male with a history of morbid obesity, NATALIIA, probable COPD, substance abuse (UDS + amphetamines this admission), and Afib reportedly on xarelto and amio prior to admission. He developed shortness of breath and was started on CPAP until arrival to ER. His pCO2 was 72 on arrival and pt was placed on BiPAP. Pt's repeat ABG was worse with pCOD 115 and pt was intubated on 11/16/18. Pt admitted to the ICU and chest CT was negative for pneumonia. He developed afib with RVR and cardiology was consulted. Pt was placed on amio, cardizem, and xarelto. He did have a MIRANDA with cardioversion with return to [place] [facility]. Pt was extubated on 11/19. He was transferred to the floor and hospitalist consulted to assume care. Subjective:  
 
Currently on 4L/nc with 99% sats. Wore CPAP during the night with 5L bled in. Occasional cough productive of clear sputum. Denies wheezing, but does feel a little SOB especially with exertion. Says he just doesn't feel well today, in general.   
Says he is Still having BRBPR. Says his urine has cleared up. Current Facility-Administered Medications Medication Dose Route Frequency  0.9% sodium chloride infusion  75 mL/hr IntraVENous CONTINUOUS  
 budesonide (PULMICORT) 500 mcg/2 ml nebulizer suspension  500 mcg Nebulization BID RT  
 levalbuterol (XOPENEX) nebulizer soln 0.63 mg/3 mL  0.63 mg Nebulization Q6H RT  
 carvedilol (COREG) tablet 25 mg  25 mg Oral BID WITH MEALS  
 amLODIPine (NORVASC) tablet 10 mg  10 mg Oral DAILY  hydrALAZINE (APRESOLINE) tablet 50 mg  50 mg Oral TID  nicotine (NICODERM CQ) 21 mg/24 hr patch 1 Patch  1 Patch TransDERmal DAILY  hydrALAZINE (APRESOLINE) 20 mg/mL injection 20 mg  20 mg IntraVENous Q6H PRN  
  amiodarone (CORDARONE) tablet 200 mg  200 mg Oral Q12H  rivaroxaban (XARELTO) tablet 20 mg  20 mg Oral DAILY WITH DINNER  
 levalbuterol (XOPENEX) nebulizer soln 1.25 mg/3 mL  1.25 mg Nebulization Q4H PRN  
 acetaminophen (TYLENOL) suppository 650 mg  650 mg Rectal Q4H PRN  
 HYDROcodone-acetaminophen (NORCO) 7.5-325 mg per tablet 1 Tab  1 Tab Oral Q4H PRN  
 bisacodyl (DULCOLAX) suppository 10 mg  10 mg Rectal DAILY PRN  
 influenza vaccine 2018-19 (6 mos+)(PF) (FLUARIX QUAD/FLULAVAL QUAD) injection 0.5 mL  0.5 mL IntraMUSCular PRIOR TO DISCHARGE  morphine 10 mg/ml injection 5 mg  5 mg IntraVENous Q3H PRN  
 sertraline (ZOLOFT) tablet 50 mg  50 mg Per NG tube DAILY  sodium chloride (NS) flush 30 mL  30 mL InterCATHeter Q8H  
 heparin (porcine) pf 900 Units  900 Units InterCATHeter Q8H  
 sodium chloride (NS) flush 30 mL  30 mL InterCATHeter PRN  
 heparin (porcine) pf 900 Units  900 Units InterCATHeter PRN Review of Systems Constitutional: negative for fever, chills, sweats Cardiovascular: negative for chest pain, palpitations, syncope +edema Gastrointestinal:  negative for dysphagia, reflux, vomiting, diarrhea, abdominal pain, or melena +BRBPR Neurologic:  negative for focal weakness, numbness, headache Objective:  
 
Vitals:  
 11/24/18 0311 11/24/18 0330 11/24/18 0704 11/24/18 0730 BP:  124/73  145/79 Pulse:  85  79 Resp:  18  19 Temp:  96.6 °F (35.9 °C)  97.8 °F (36.6 °C) SpO2: 93% 100% 99% 99% Weight:      
Height:      
 
Intake and Output:  
11/22 1901 - 11/24 0700 In: 589 [I.V.:589] Out: 2075 [Urine:2075] 11/24 0701 - 11/24 1900 In: 468 [I.V.:468] Out: 200 [Urine:200] Physical Exam:  
Constitution:  the patient is well developed and in no acute distress;  Sitting up in recliner chair EENMT:  Sclera clear, pupils equal, oral mucosa moist 
Respiratory: Clear bilaterally with diminished bases. O2 4L/nc Cardiovascular:  RRR without M,G,R 
 Gastrointestinal: soft and non-tender; with positive bowel sounds. Musculoskeletal: warm without cyanosis. There is 1+ lower leg edema. Skin:  no jaundice or rashes Neurologic: no gross neuro deficits Psychiatric:  alert and oriented x 3 CXR: 11/19/18: 
 
  
 
 
 
 
LAB Recent Labs  
  11/24/18 
0405 11/23/18 
0447 11/22/18 
0446 WBC 10.0 7.3 7.6 HGB 11.8* 11.7* 12.1*  
HCT 39.4* 39.0* 39.6*  
* 137* 148* Recent Labs  
  11/24/18 
0405 11/23/18 
0447 11/22/18 
0446  144 140  
K 3.8 4.1 4.2  105 104 CO2 33* 33* 33* * 94 93 BUN 25* 34* 38* CREA 1.18 1.68* 1.53* CA 8.0* 7.7* 7.8* MIRANDA: 11/19/18:  
SUMMARY: 
-  Left ventricle: Systolic function was mildly to moderately reduced. Ejection fraction was estimated in the range of 40 % to 45 %. This study was Inadequate for the evaluation of regional wall motion. -  Left atrium: The atrium was mildly to moderately dilated. -  Right atrium: The atrium was mildly dilated. -  Mitral valve: There was moderate regurgitation. Assessment:  (Medical Decision Making) Hospital Problems  Date Reviewed: 11/22/2018 Codes Class Noted POA  
 COPD (chronic obstructive pulmonary disease) ([facility] (Chronic) ICD-10-CM: J44.9 ICD-9-CM: 940  11/16/2018 Yes Continue bronchodilators NATALIIA (obstructive sleep apnea) (Chronic) ICD-10-CM: G47.33 
ICD-9-CM: 327.23  11/16/2018 Yes CPAP at HS with 5L bled in * (Principal) Acute on chronic respiratory failure with hypoxia and hypercapnia (HCC) ICD-10-CM: J96.21, J96.22 
ICD-9-CM: 518.84, 786.09, 799.02  11/16/2018 Yes On 4L/nc, wean as tolerated. Altered mental status ICD-10-CM: R41.82 
ICD-9-CM: 780.97  11/16/2018 Yes Improved Morbid obesity ([address] (Chronic) ICD-10-CM: E66.01 
ICD-9-CM: 278.01  11/16/2018 Yes  
   
 H/O atrial flutter (Chronic) ICD-10-CM: Z04.89 
ICD-9-CM: V12.59  11/16/2018 Yes 1/2018 Atrial flutter, s/p cardioversion. Essential hypertension (Chronic) ICD-10-CM: I10 
ICD-9-CM: 401.9  11/16/2018 Yes Methamphetamine abuse (HCC) (Chronic) ICD-10-CM: F15.10 ICD-9-CM: 305.70  2/1/2018 Yes Acute kidney injury ([facility] ICD-10-CM: N17.9 ICD-9-CM: 584.9  1/6/2018 Yes Cr improved with IVF. Plan:  (Medical Decision Making) --Pulmicort, xopenex 
--Wean O2 as tolerated 
--OOB, mobilize 
--CPAP qhs 
--Pt reports BRBPR, had Negative Hemoccult 11/22/18, will check 1 more since he is on anticoagulation with xarelto. Hgb unchanged. More than 50% of the time documented was spent in face-to-face contact with the patient and in the care of the patient on the floor/unit where the patient is located. Kyrie Field NP The patient has been seen and examined by me personally, the chart,labs, and radiographic studies have been reviewed. Chest: CTA Extremities: 1+ edema I agree with the above assessment and plan.  
 
Neida Simms MD.

## 2018-11-25 LAB
ANION GAP SERPL CALC-SCNC: 3 MMOL/L (ref 7–16)
BUN SERPL-MCNC: 22 MG/DL (ref 6–23)
CALCIUM SERPL-MCNC: 8.1 MG/DL (ref 8.3–10.4)
CHLORIDE SERPL-SCNC: 106 MMOL/L (ref 98–107)
CO2 SERPL-SCNC: 34 MMOL/L (ref 21–32)
CREAT SERPL-MCNC: 1.09 MG/DL (ref 0.8–1.5)
ERYTHROCYTE [DISTWIDTH] IN BLOOD BY AUTOMATED COUNT: 13.1 %
GLUCOSE SERPL-MCNC: 94 MG/DL (ref 65–100)
HCT VFR BLD AUTO: 37.6 % (ref 41.1–50.3)
HGB BLD-MCNC: 11.3 G/DL (ref 13.6–17.2)
MCH RBC QN AUTO: 31.9 PG (ref 26.1–32.9)
MCHC RBC AUTO-ENTMCNC: 30.1 G/DL (ref 31.4–35)
MCV RBC AUTO: 106.2 FL (ref 79.6–97.8)
NRBC # BLD: 0 K/UL (ref 0–0.2)
PLATELET # BLD AUTO: 155 K/UL (ref 150–450)
PMV BLD AUTO: 10.4 FL (ref 9.4–12.3)
POTASSIUM SERPL-SCNC: 4.5 MMOL/L (ref 3.5–5.1)
RBC # BLD AUTO: 3.54 M/UL (ref 4.23–5.6)
SODIUM SERPL-SCNC: 143 MMOL/L (ref 136–145)
WBC # BLD AUTO: 8.6 K/UL (ref 4.3–11.1)

## 2018-11-25 PROCEDURE — 74011250637 HC RX REV CODE- 250/637: Performed by: INTERNAL MEDICINE

## 2018-11-25 PROCEDURE — 94640 AIRWAY INHALATION TREATMENT: CPT

## 2018-11-25 PROCEDURE — 77010033678 HC OXYGEN DAILY

## 2018-11-25 PROCEDURE — 65660000000 HC RM CCU STEPDOWN

## 2018-11-25 PROCEDURE — 74011250636 HC RX REV CODE- 250/636: Performed by: INTERNAL MEDICINE

## 2018-11-25 PROCEDURE — 74011250637 HC RX REV CODE- 250/637: Performed by: NURSE PRACTITIONER

## 2018-11-25 PROCEDURE — 99232 SBSQ HOSP IP/OBS MODERATE 35: CPT | Performed by: INTERNAL MEDICINE

## 2018-11-25 PROCEDURE — 36592 COLLECT BLOOD FROM PICC: CPT

## 2018-11-25 PROCEDURE — 94760 N-INVAS EAR/PLS OXIMETRY 1: CPT

## 2018-11-25 PROCEDURE — 85027 COMPLETE CBC AUTOMATED: CPT

## 2018-11-25 PROCEDURE — 94660 CPAP INITIATION&MGMT: CPT

## 2018-11-25 PROCEDURE — 74011000250 HC RX REV CODE- 250: Performed by: NURSE PRACTITIONER

## 2018-11-25 PROCEDURE — 80048 BASIC METABOLIC PNL TOTAL CA: CPT

## 2018-11-25 PROCEDURE — 74011000250 HC RX REV CODE- 250: Performed by: PHYSICIAN ASSISTANT

## 2018-11-25 RX ORDER — ALBUTEROL SULFATE 0.83 MG/ML
2.5 SOLUTION RESPIRATORY (INHALATION)
Status: DISCONTINUED | OUTPATIENT
Start: 2018-11-25 | End: 2018-11-30 | Stop reason: HOSPADM

## 2018-11-25 RX ADMIN — HYDROCODONE BITARTRATE AND ACETAMINOPHEN 1 TABLET: 7.5; 325 TABLET ORAL at 13:10

## 2018-11-25 RX ADMIN — AMLODIPINE BESYLATE 10 MG: 10 TABLET ORAL at 08:00

## 2018-11-25 RX ADMIN — AMIODARONE HYDROCHLORIDE 200 MG: 200 TABLET ORAL at 21:52

## 2018-11-25 RX ADMIN — SODIUM CHLORIDE, PRESERVATIVE FREE 900 UNITS: 5 INJECTION INTRAVENOUS at 21:53

## 2018-11-25 RX ADMIN — LEVALBUTEROL HYDROCHLORIDE 0.63 MG: 0.63 SOLUTION RESPIRATORY (INHALATION) at 07:40

## 2018-11-25 RX ADMIN — Medication 30 ML: at 21:53

## 2018-11-25 RX ADMIN — HYDRALAZINE HYDROCHLORIDE 50 MG: 50 TABLET, FILM COATED ORAL at 08:00

## 2018-11-25 RX ADMIN — ALBUTEROL SULFATE 2.5 MG: 2.5 SOLUTION RESPIRATORY (INHALATION) at 19:31

## 2018-11-25 RX ADMIN — CARVEDILOL 25 MG: 25 TABLET, FILM COATED ORAL at 08:00

## 2018-11-25 RX ADMIN — Medication 30 ML: at 13:12

## 2018-11-25 RX ADMIN — HYDRALAZINE HYDROCHLORIDE 50 MG: 50 TABLET, FILM COATED ORAL at 17:26

## 2018-11-25 RX ADMIN — AMIODARONE HYDROCHLORIDE 200 MG: 200 TABLET ORAL at 08:00

## 2018-11-25 RX ADMIN — SODIUM CHLORIDE, PRESERVATIVE FREE 900 UNITS: 5 INJECTION INTRAVENOUS at 13:11

## 2018-11-25 RX ADMIN — RIVAROXABAN 20 MG: 20 TABLET, FILM COATED ORAL at 17:26

## 2018-11-25 RX ADMIN — SODIUM CHLORIDE, PRESERVATIVE FREE 900 UNITS: 5 INJECTION INTRAVENOUS at 05:47

## 2018-11-25 RX ADMIN — BUDESONIDE 500 MCG: 0.5 INHALANT RESPIRATORY (INHALATION) at 07:40

## 2018-11-25 RX ADMIN — HYDROCODONE BITARTRATE AND ACETAMINOPHEN 1 TABLET: 7.5; 325 TABLET ORAL at 01:28

## 2018-11-25 RX ADMIN — HYDROCODONE BITARTRATE AND ACETAMINOPHEN 1 TABLET: 7.5; 325 TABLET ORAL at 18:36

## 2018-11-25 RX ADMIN — SERTRALINE HYDROCHLORIDE 50 MG: 50 TABLET ORAL at 08:00

## 2018-11-25 RX ADMIN — HYDROCODONE BITARTRATE AND ACETAMINOPHEN 1 TABLET: 7.5; 325 TABLET ORAL at 23:21

## 2018-11-25 RX ADMIN — CARVEDILOL 25 MG: 25 TABLET, FILM COATED ORAL at 17:26

## 2018-11-25 RX ADMIN — ALBUTEROL SULFATE 2.5 MG: 2.5 SOLUTION RESPIRATORY (INHALATION) at 11:51

## 2018-11-25 RX ADMIN — HYDRALAZINE HYDROCHLORIDE 50 MG: 50 TABLET, FILM COATED ORAL at 21:53

## 2018-11-25 RX ADMIN — HYDROCODONE BITARTRATE AND ACETAMINOPHEN 1 TABLET: 7.5; 325 TABLET ORAL at 08:17

## 2018-11-25 RX ADMIN — ALBUTEROL SULFATE 2.5 MG: 2.5 SOLUTION RESPIRATORY (INHALATION) at 15:57

## 2018-11-25 RX ADMIN — LEVALBUTEROL HYDROCHLORIDE 0.63 MG: 0.63 SOLUTION RESPIRATORY (INHALATION) at 01:22

## 2018-11-25 RX ADMIN — TIOTROPIUM BROMIDE 18 MCG: 18 CAPSULE ORAL; RESPIRATORY (INHALATION) at 11:51

## 2018-11-25 RX ADMIN — Medication 30 ML: at 05:47
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
## 2018-11-25 NOTE — PROGRESS NOTES
Ken Heller, nephew, stated that when patient is released, he can come to his house and live with him. YURIY Calix informed and given nephew's telephone number. Hospital  talking with patient at present.

## 2018-11-25 NOTE — PROGRESS NOTES
Called by staff about patient He is really having a tough day  has known patient for years Patient was in bed with his nephew at bedside Patient asked  \"is it ok to ask God to stop my heart\" Re-focused patient's question to \"God you know I am in over my head - help me\" Guided patient in that we have talked about in his own strength he cannot fix himself or anyone he cares about. Acknowledged that the pain of his present trials is overwhelming and even appears hopeless (he has failed repeatedly) Assured him with our friendship and the ongoing commitment to helping him no matter what it takes as long as he is committed to seeking change. Not sure if patient has any positive resources outside the hospital setting Shannan Ordonez, staff [address] [facility]  /   [email or web address]

## 2018-11-25 NOTE — PROGRESS NOTES
11/25/2018 9:37 AM 
 
Admit Date: 11/16/2018 Subjective:  
 
Madelyn Randle denies chest pain, palpitations. He has no place to live he says Possibly with his brother HR is regular Objective:  
  
Visit Vitals /66 Pulse 70 Temp 97.1 °F (36.2 °C) Resp 16 Ht 6' (1.829 m) Wt 140.1 kg (308 lb 14.4 oz) SpO2 94% BMI 41.89 kg/m² Physical Exam: 
Heart: regular rate and rhythm Lungs: clear to auscultation bilaterally Extremities: extremities normal, atraumatic, no cyanosis or edema Data Review:  
Labs:   
Recent Results (from the past 24 hour(s)) CBC W/O DIFF Collection Time: 11/25/18  4:05 AM  
Result Value Ref Range WBC 8.6 4.3 - 11.1 K/uL  
 RBC 3.54 (L) 4.23 - 5.6 M/uL  
 HGB 11.3 (L) 13.6 - 17.2 g/dL HCT 37.6 (L) 41.1 - 50.3 % .2 (H) 79.6 - 97.8 FL  
 MCH 31.9 26.1 - 32.9 PG  
 MCHC 30.1 (L) 31.4 - 35.0 g/dL  
 RDW 13.1 % PLATELET 702 265 - 384 K/uL MPV 10.4 9.4 - 12.3 FL ABSOLUTE NRBC 0.00 0.0 - 0.2 K/uL METABOLIC PANEL, BASIC Collection Time: 11/25/18  4:05 AM  
Result Value Ref Range Sodium 143 136 - 145 mmol/L Potassium 4.5 3.5 - 5.1 mmol/L Chloride 106 98 - 107 mmol/L  
 CO2 34 (H) 21 - 32 mmol/L Anion gap 3 (L) 7 - 16 mmol/L Glucose 94 65 - 100 mg/dL BUN 22 6 - 23 MG/DL Creatinine 1.09 0.8 - 1.5 MG/DL  
 GFR est AA >60 >60 ml/min/1.73m2 GFR est non-AA >60 >60 ml/min/1.73m2 Calcium 8.1 (L) 8.3 - 10.4 MG/DL Assessment:  
 
Patient Active Problem List  
 Diagnosis Date Noted  COPD (chronic obstructive pulmonary disease) ([facility] per pulmonary  11/16/2018  NATALIIA (obstructive sleep apnea) 11/16/2018  Acute on chronic respiratory failure with hypoxia and hypercapnia ([facility] 11/16/2018  Altered mental status 11/16/2018  Morbid obesity ([facility] 11/16/2018  H/O atrial flutter in sinus back on amio He was on meds in past 11/16/2018  Essential hypertension 11/16/2018  Nicotine dependence 02/01/2018  Methamphetamine abuse ([facility] 02/01/2018  Alcohol abuse 02/01/2018  Homeless 01/15/2018  Urethral stricture 01/07/2018  Anasarca 01/06/2018  Acute kidney injury ([facility] 01/06/2018 Plan:  
Continue meds Call us if needed

## 2018-11-25 NOTE — PROGRESS NOTES
LMSW noted consults regarding need for new CPAP and home O2. Pt remains self pay pending for benefits at this time. He had previous charitable assistance and rental agreements for the CPAP and home O2 that he was using, which he has now reported as broken or missing. Message left at [facility] [address] to confirm that they have not picked up the equipment they had provided under rental agreement. Case Management Administration will have to be approached about sponsorship of pt for these supportive care needs. Will also consult with COPD Navigator about any assistance options. Will follow up.
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
## 2018-11-25 NOTE — PROGRESS NOTES
END OF SHIFT NOTE: 
 
INTAKE/OUTPUT 
11/23 0701 - 11/24 0700 In: 589 [I.V.:589] Out: 1725 [[facility]:6996] Voiding: YES Catheter: NO 
Drain:   
 
 
 
 
 
Flatus: Patient does have flatus present. Stool:  0 occurrences. Characteristics: 
Stool Assessment Stool Color: Brown, Red streaks Stool Appearance: Soft Stool Amount: Medium Stool Source/Status: Rectum Emesis: 0 occurrences. Characteristics: VITAL SIGNS Patient Vitals for the past 12 hrs: 
 Temp Pulse Resp BP SpO2  
11/24/18 1540 98 °F (36.7 °C) 63 18 132/75 95 % 11/24/18 1426     94 % 11/24/18 1146 98.1 °F (36.7 °C) 62 20 101/61 94 % Pain Assessment Pain Intensity 1: 7 (11/24/18 1939) Pain Location 1: Hip Pain Intervention(s) 1: Medication (see MAR) Patient Stated Pain Goal: 0 Ambulating No 
 
Shift report given to oncoming nurse at the bedside.  
 
Devota Moritz, RN

## 2018-11-26 LAB
ANION GAP SERPL CALC-SCNC: 5 MMOL/L (ref 7–16)
BUN SERPL-MCNC: 18 MG/DL (ref 6–23)
CALCIUM SERPL-MCNC: 8.4 MG/DL (ref 8.3–10.4)
CHLORIDE SERPL-SCNC: 104 MMOL/L (ref 98–107)
CO2 SERPL-SCNC: 33 MMOL/L (ref 21–32)
CREAT SERPL-MCNC: 0.92 MG/DL (ref 0.8–1.5)
ERYTHROCYTE [DISTWIDTH] IN BLOOD BY AUTOMATED COUNT: 13.1 %
GLUCOSE SERPL-MCNC: 85 MG/DL (ref 65–100)
HCT VFR BLD AUTO: 38.6 % (ref 41.1–50.3)
HGB BLD-MCNC: 11.5 G/DL (ref 13.6–17.2)
MCH RBC QN AUTO: 31.7 PG (ref 26.1–32.9)
MCHC RBC AUTO-ENTMCNC: 29.8 G/DL (ref 31.4–35)
MCV RBC AUTO: 106.3 FL (ref 79.6–97.8)
NRBC # BLD: 0 K/UL (ref 0–0.2)
PLATELET # BLD AUTO: 149 K/UL (ref 150–450)
PMV BLD AUTO: 11.2 FL (ref 9.4–12.3)
POTASSIUM SERPL-SCNC: 4.1 MMOL/L (ref 3.5–5.1)
RBC # BLD AUTO: 3.63 M/UL (ref 4.23–5.6)
SODIUM SERPL-SCNC: 142 MMOL/L (ref 136–145)
WBC # BLD AUTO: 8 K/UL (ref 4.3–11.1)

## 2018-11-26 PROCEDURE — 94760 N-INVAS EAR/PLS OXIMETRY 1: CPT

## 2018-11-26 PROCEDURE — 74011250637 HC RX REV CODE- 250/637: Performed by: INTERNAL MEDICINE

## 2018-11-26 PROCEDURE — 99232 SBSQ HOSP IP/OBS MODERATE 35: CPT | Performed by: INTERNAL MEDICINE

## 2018-11-26 PROCEDURE — 36592 COLLECT BLOOD FROM PICC: CPT

## 2018-11-26 PROCEDURE — 74011250636 HC RX REV CODE- 250/636: Performed by: INTERNAL MEDICINE

## 2018-11-26 PROCEDURE — 94640 AIRWAY INHALATION TREATMENT: CPT

## 2018-11-26 PROCEDURE — 65660000000 HC RM CCU STEPDOWN

## 2018-11-26 PROCEDURE — 80048 BASIC METABOLIC PNL TOTAL CA: CPT

## 2018-11-26 PROCEDURE — 77010033678 HC OXYGEN DAILY

## 2018-11-26 PROCEDURE — 85027 COMPLETE CBC AUTOMATED: CPT

## 2018-11-26 PROCEDURE — 74011000250 HC RX REV CODE- 250: Performed by: NURSE PRACTITIONER

## 2018-11-26 RX ORDER — LORAZEPAM 2 MG/ML
1 INJECTION INTRAMUSCULAR
Status: DISCONTINUED | OUTPATIENT
Start: 2018-11-26 | End: 2018-11-30 | Stop reason: HOSPADM

## 2018-11-26 RX ADMIN — HYDROCODONE BITARTRATE AND ACETAMINOPHEN 1 TABLET: 7.5; 325 TABLET ORAL at 19:33

## 2018-11-26 RX ADMIN — ALBUTEROL SULFATE 2.5 MG: 2.5 SOLUTION RESPIRATORY (INHALATION) at 15:45

## 2018-11-26 RX ADMIN — CARVEDILOL 25 MG: 25 TABLET, FILM COATED ORAL at 08:00

## 2018-11-26 RX ADMIN — HYDROCODONE BITARTRATE AND ACETAMINOPHEN 1 TABLET: 7.5; 325 TABLET ORAL at 14:11

## 2018-11-26 RX ADMIN — HYDRALAZINE HYDROCHLORIDE 50 MG: 50 TABLET, FILM COATED ORAL at 16:43

## 2018-11-26 RX ADMIN — TIOTROPIUM BROMIDE 18 MCG: 18 CAPSULE ORAL; RESPIRATORY (INHALATION) at 07:21

## 2018-11-26 RX ADMIN — Medication 30 ML: at 19:34

## 2018-11-26 RX ADMIN — AMIODARONE HYDROCHLORIDE 200 MG: 200 TABLET ORAL at 08:02

## 2018-11-26 RX ADMIN — CARVEDILOL 25 MG: 25 TABLET, FILM COATED ORAL at 16:43

## 2018-11-26 RX ADMIN — ALBUTEROL SULFATE 2.5 MG: 2.5 SOLUTION RESPIRATORY (INHALATION) at 07:21

## 2018-11-26 RX ADMIN — AMLODIPINE BESYLATE 10 MG: 10 TABLET ORAL at 08:00

## 2018-11-26 RX ADMIN — ALBUTEROL SULFATE 2.5 MG: 2.5 SOLUTION RESPIRATORY (INHALATION) at 11:34

## 2018-11-26 RX ADMIN — SERTRALINE HYDROCHLORIDE 50 MG: 50 TABLET ORAL at 08:01

## 2018-11-26 RX ADMIN — Medication 30 ML: at 06:10

## 2018-11-26 RX ADMIN — SODIUM CHLORIDE, PRESERVATIVE FREE 900 UNITS: 5 INJECTION INTRAVENOUS at 06:10

## 2018-11-26 RX ADMIN — RIVAROXABAN 20 MG: 20 TABLET, FILM COATED ORAL at 16:43

## 2018-11-26 RX ADMIN — SODIUM CHLORIDE, PRESERVATIVE FREE 900 UNITS: 5 INJECTION INTRAVENOUS at 19:34

## 2018-11-26 RX ADMIN — HYDRALAZINE HYDROCHLORIDE 50 MG: 50 TABLET, FILM COATED ORAL at 19:33

## 2018-11-26 RX ADMIN — HYDROCODONE BITARTRATE AND ACETAMINOPHEN 1 TABLET: 7.5; 325 TABLET ORAL at 08:13

## 2018-11-26 RX ADMIN — Medication 30 ML: at 13:39

## 2018-11-26 RX ADMIN — HYDRALAZINE HYDROCHLORIDE 50 MG: 50 TABLET, FILM COATED ORAL at 08:00

## 2018-11-26 RX ADMIN — SODIUM CHLORIDE, PRESERVATIVE FREE 900 UNITS: 5 INJECTION INTRAVENOUS at 13:38

## 2018-11-26 RX ADMIN — CARVEDILOL 25 MG: 25 TABLET, FILM COATED ORAL at 07:56

## 2018-11-26 RX ADMIN — AMIODARONE HYDROCHLORIDE 200 MG: 200 TABLET ORAL at 19:33
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
## 2018-11-26 NOTE — PROGRESS NOTES
END OF SHIFT NOTE: 
 
INTAKE/OUTPUT 
11/24 0701 - 11/25 0700 In: 1974 [I.V.:1974] Out: 750 [Urine:750] Voiding: YES Catheter: NO 
Drain:   
 
 
 
 
 
Flatus: Patient does have flatus present. Stool:  0 occurrences. Characteristics: 
Stool Assessment Stool Color: Brown, Red streaks Stool Appearance: Soft Stool Amount: Medium Stool Source/Status: Rectum Emesis: 0 occurrences. Characteristics: VITAL SIGNS Patient Vitals for the past 12 hrs: 
 Temp Pulse Resp BP SpO2  
11/25/18 1600     94 % 11/25/18 1530 97.7 °F (36.5 °C) 66 16 135/76 96 % 11/25/18 1333  74 16  92 % 11/25/18 1156     97 % 11/25/18 1138 98.3 °F (36.8 °C) 64 16 118/61 97 % 11/25/18 0746     94 % 11/25/18 0736 97.1 °F (36.2 °C) 70 16 143/66 98 % Pain Assessment Pain Intensity 1: 0 (11/25/18 1920) Pain Location 1: Hip Pain Intervention(s) 1: Medication (see MAR) Patient Stated Pain Goal: 0 Ambulating No 
 
Shift report given to oncoming nurse at the bedside.  
 
Dianne Hunter RN

## 2018-11-26 NOTE — PROGRESS NOTES
Oxygen Qualifier Room air: SpO2 with O2 and liter flow Resting SpO2  87%  92% on 2L Ambulating SpO2  84%  92% on 4L Pt unable to fully ambulate per hx of hip problems. Was able to stand at the edge of the bed. Still required 4L NC when standing to achieve SpO2 >90% Completed by: 
 
Kit Leach
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
## 2018-11-27 LAB
ANION GAP SERPL CALC-SCNC: 4 MMOL/L (ref 7–16)
BUN SERPL-MCNC: 15 MG/DL (ref 6–23)
CALCIUM SERPL-MCNC: 8.3 MG/DL (ref 8.3–10.4)
CHLORIDE SERPL-SCNC: 102 MMOL/L (ref 98–107)
CO2 SERPL-SCNC: 34 MMOL/L (ref 21–32)
CREAT SERPL-MCNC: 1.05 MG/DL (ref 0.8–1.5)
ERYTHROCYTE [DISTWIDTH] IN BLOOD BY AUTOMATED COUNT: 13.1 %
GLUCOSE SERPL-MCNC: 119 MG/DL (ref 65–100)
HCT VFR BLD AUTO: 38.2 % (ref 41.1–50.3)
HGB BLD-MCNC: 11.6 G/DL (ref 13.6–17.2)
MCH RBC QN AUTO: 32 PG (ref 26.1–32.9)
MCHC RBC AUTO-ENTMCNC: 30.4 G/DL (ref 31.4–35)
MCV RBC AUTO: 105.2 FL (ref 79.6–97.8)
NRBC # BLD: 0 K/UL (ref 0–0.2)
PLATELET # BLD AUTO: 165 K/UL (ref 150–450)
PMV BLD AUTO: 10.8 FL (ref 9.4–12.3)
POTASSIUM SERPL-SCNC: 4.3 MMOL/L (ref 3.5–5.1)
RBC # BLD AUTO: 3.63 M/UL (ref 4.23–5.6)
SODIUM SERPL-SCNC: 140 MMOL/L (ref 136–145)
WBC # BLD AUTO: 7.6 K/UL (ref 4.3–11.1)

## 2018-11-27 PROCEDURE — 80048 BASIC METABOLIC PNL TOTAL CA: CPT

## 2018-11-27 PROCEDURE — 94760 N-INVAS EAR/PLS OXIMETRY 1: CPT

## 2018-11-27 PROCEDURE — 94640 AIRWAY INHALATION TREATMENT: CPT

## 2018-11-27 PROCEDURE — 74011250637 HC RX REV CODE- 250/637: Performed by: INTERNAL MEDICINE

## 2018-11-27 PROCEDURE — 74011000250 HC RX REV CODE- 250: Performed by: NURSE PRACTITIONER

## 2018-11-27 PROCEDURE — 77010033678 HC OXYGEN DAILY

## 2018-11-27 PROCEDURE — 74011250636 HC RX REV CODE- 250/636: Performed by: INTERNAL MEDICINE

## 2018-11-27 PROCEDURE — 65660000000 HC RM CCU STEPDOWN

## 2018-11-27 PROCEDURE — 99232 SBSQ HOSP IP/OBS MODERATE 35: CPT | Performed by: INTERNAL MEDICINE

## 2018-11-27 PROCEDURE — 74011250637 HC RX REV CODE- 250/637: Performed by: NURSE PRACTITIONER

## 2018-11-27 PROCEDURE — 36592 COLLECT BLOOD FROM PICC: CPT

## 2018-11-27 PROCEDURE — 85027 COMPLETE CBC AUTOMATED: CPT

## 2018-11-27 RX ADMIN — RIVAROXABAN 20 MG: 20 TABLET, FILM COATED ORAL at 16:07

## 2018-11-27 RX ADMIN — ALBUTEROL SULFATE 2.5 MG: 2.5 SOLUTION RESPIRATORY (INHALATION) at 15:14

## 2018-11-27 RX ADMIN — AMIODARONE HYDROCHLORIDE 200 MG: 200 TABLET ORAL at 08:31

## 2018-11-27 RX ADMIN — ALBUTEROL SULFATE 2.5 MG: 2.5 SOLUTION RESPIRATORY (INHALATION) at 07:38

## 2018-11-27 RX ADMIN — SODIUM CHLORIDE, PRESERVATIVE FREE 900 UNITS: 5 INJECTION INTRAVENOUS at 13:22

## 2018-11-27 RX ADMIN — HYDRALAZINE HYDROCHLORIDE 50 MG: 50 TABLET, FILM COATED ORAL at 16:07

## 2018-11-27 RX ADMIN — CARVEDILOL 25 MG: 25 TABLET, FILM COATED ORAL at 16:07

## 2018-11-27 RX ADMIN — SODIUM CHLORIDE, PRESERVATIVE FREE 900 UNITS: 5 INJECTION INTRAVENOUS at 21:30

## 2018-11-27 RX ADMIN — ALBUTEROL SULFATE 2.5 MG: 2.5 SOLUTION RESPIRATORY (INHALATION) at 21:25

## 2018-11-27 RX ADMIN — Medication 30 ML: at 13:21

## 2018-11-27 RX ADMIN — Medication 30 ML: at 21:30

## 2018-11-27 RX ADMIN — AMIODARONE HYDROCHLORIDE 200 MG: 200 TABLET ORAL at 19:45

## 2018-11-27 RX ADMIN — HYDRALAZINE HYDROCHLORIDE 50 MG: 50 TABLET, FILM COATED ORAL at 08:30

## 2018-11-27 RX ADMIN — CARVEDILOL 25 MG: 25 TABLET, FILM COATED ORAL at 08:30

## 2018-11-27 RX ADMIN — HYDROCODONE BITARTRATE AND ACETAMINOPHEN 1 TABLET: 7.5; 325 TABLET ORAL at 13:44

## 2018-11-27 RX ADMIN — SODIUM CHLORIDE, PRESERVATIVE FREE 900 UNITS: 5 INJECTION INTRAVENOUS at 05:05

## 2018-11-27 RX ADMIN — AMLODIPINE BESYLATE 10 MG: 10 TABLET ORAL at 08:30

## 2018-11-27 RX ADMIN — TIOTROPIUM BROMIDE 18 MCG: 18 CAPSULE ORAL; RESPIRATORY (INHALATION) at 07:42

## 2018-11-27 RX ADMIN — HYDRALAZINE HYDROCHLORIDE 50 MG: 50 TABLET, FILM COATED ORAL at 21:32

## 2018-11-27 RX ADMIN — HYDROCODONE BITARTRATE AND ACETAMINOPHEN 1 TABLET: 7.5; 325 TABLET ORAL at 05:05

## 2018-11-27 RX ADMIN — HYDROCODONE BITARTRATE AND ACETAMINOPHEN 1 TABLET: 7.5; 325 TABLET ORAL at 23:34

## 2018-11-27 RX ADMIN — Medication 30 ML: at 05:05

## 2018-11-27 RX ADMIN — HYDROCODONE BITARTRATE AND ACETAMINOPHEN 1 TABLET: 7.5; 325 TABLET ORAL at 19:45

## 2018-11-27 RX ADMIN — ALBUTEROL SULFATE 2.5 MG: 2.5 SOLUTION RESPIRATORY (INHALATION) at 11:16

## 2018-11-27 RX ADMIN — SERTRALINE HYDROCHLORIDE 50 MG: 50 TABLET ORAL at 08:31

## 2018-11-27 RX ADMIN — HYDROCODONE BITARTRATE AND ACETAMINOPHEN 1 TABLET: 7.5; 325 TABLET ORAL at 10:05

## 2018-11-27 NOTE — PROGRESS NOTES
Problem: Breathing Pattern - Ineffective Goal: *Absence of hypoxia Outcome: Progressing Towards Goal 
Weaned to 3L
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
## 2018-11-27 NOTE — PROGRESS NOTES
Romy Burris Admission Date: 11/16/2018 Daily Progress Note: 11/27/2018 The patient's chart is reviewed and the patient is discussed with the staff. 51 yo male with a history of morbid obesity, NATALIIA, probable COPD, substance abuse (UDS + amphetamines this admission), and Afib reportedly on xarelto and amio prior to admission. He developed shortness of breath and was started on CPAP until arrival to ER. His pCO2 was 72 on arrival and pt was placed on BiPAP. Pt's repeat ABG was worse with pCOD 115 and pt was intubated on 11/16/18. Pt admitted to the ICU and chest CT was negative for pneumonia. He developed afib with RVR and cardiology was consulted. Pt was placed on amio, cardizem, and xarelto. He did have a MIRANDA with cardioversion with return to [facility]. Pt was extubated on 11/19. He was transferred to the floor and hospitalist consulted to assume care. Has home Bipap and oxygen but was not using (had been donated to him by a company in the past). The bipap is now broken, he is uninsured and cannot afford medications or equipment. Subjective:  
    
Up in a chair Asking when he can go home Current Facility-Administered Medications Medication Dose Route Frequency  LORazepam (ATIVAN) injection 1 mg  1 mg IntraVENous Q4H PRN  
 albuterol (PROVENTIL VENTOLIN) nebulizer solution 2.5 mg  2.5 mg Nebulization QID RT  
 tiotropium (SPIRIVA) inhalation capsule 18 mcg  1 Cap Inhalation DAILY  carvedilol (COREG) tablet 25 mg  25 mg Oral BID WITH MEALS  
 amLODIPine (NORVASC) tablet 10 mg  10 mg Oral DAILY  hydrALAZINE (APRESOLINE) tablet 50 mg  50 mg Oral TID  nicotine (NICODERM CQ) 21 mg/24 hr patch 1 Patch  1 Patch TransDERmal DAILY  hydrALAZINE (APRESOLINE) 20 mg/mL injection 20 mg  20 mg IntraVENous Q6H PRN  
 amiodarone (CORDARONE) tablet 200 mg  200 mg Oral Q12H  rivaroxaban (XARELTO) tablet 20 mg  20 mg Oral DAILY WITH DINNER  
  levalbuterol (XOPENEX) nebulizer soln 1.25 mg/3 mL  1.25 mg Nebulization Q4H PRN  
 acetaminophen (TYLENOL) suppository 650 mg  650 mg Rectal Q4H PRN  
 HYDROcodone-acetaminophen (NORCO) 7.5-325 mg per tablet 1 Tab  1 Tab Oral Q4H PRN  
 bisacodyl (DULCOLAX) suppository 10 mg  10 mg Rectal DAILY PRN  
 influenza vaccine 2018-19 (6 mos+)(PF) (FLUARIX QUAD/FLULAVAL QUAD) injection 0.5 mL  0.5 mL IntraMUSCular PRIOR TO DISCHARGE  sertraline (ZOLOFT) tablet 50 mg  50 mg Per NG tube DAILY  sodium chloride (NS) flush 30 mL  30 mL InterCATHeter Q8H  
 heparin (porcine) pf 900 Units  900 Units InterCATHeter Q8H  
 sodium chloride (NS) flush 30 mL  30 mL InterCATHeter PRN  
 heparin (porcine) pf 900 Units  900 Units InterCATHeter PRN Objective:  
 
Vitals:  
 11/27/18 0405 11/27/18 0742 11/27/18 0807 11/27/18 1116 BP: 120/72  158/70 Pulse: 80  65 Resp: 20  16 Temp: 98 °F (36.7 °C)  97.3 °F (36.3 °C) SpO2: 98% 97% 93% 94% Weight:      
Height:      
 
Intake and Output:  
11/25 1901 - 11/27 0700 In: -  
Out: 2300 [DMEFE:1437] 11/27 0701 - 11/27 1900 In: -  
Out: 318 [PNKDS:852] Physical Exam:  
Constitutional:  the patient is well developed and in no acute distress HEENT:  Sclera clear, pupils equal, oral mucosa moist 
Lungs: clear bilaterally. Wearing 3 liter cannula. Cardiovascular:  RRR without M,G,R 
Abd/GI: firm/obese and non-tender; with positive bowel sounds. Ext: warm without cyanosis. There is a trace amount of lower leg edema. Musculoskeletal: moves all four extremities with equal strength Skin:  no jaundice or rashes, no wounds Neuro: no gross neuro deficits Musculoskeletal: can ambulate. No deformity Psychiatric: Calm. ROS:  Chronic dyspnea, good appetite Lines: peripheral IV 
 
CHEST XRAY:   
 
 
LAB Recent Labs  
  11/27/18 
0950 11/26/18 
0424 11/25/18 
0405 WBC 7.6 8.0 8.6 HGB 11.6* 11.5* 11.3* HCT 38.2* 38.6* 37.6*  
 149* 155 Recent Labs  
  11/27/18 
0950 11/26/18 
0424 11/25/18 
0405  142 143  
K 4.3 4.1 4.5  
 104 106 CO2 34* 33* 34* * 85 94 BUN 15 18 22 CREA 1.05 0.92 1.09 Assessment:  (Medical Decision Making) Hospital Problems  Date Reviewed: 11/24/2018 Codes Class Noted POA  
 COPD (chronic obstructive pulmonary disease) ([facility] (Chronic) ICD-10-CM: J44.9 ICD-9-CM: 573  11/16/2018 Yes ? Severity. On spiriva and albuterol at home. Has nebulizer, oxygen but admits to not using oxygen. Can't afford medications and has no insurance NATALIIA (obstructive sleep apnea) (Chronic) ICD-10-CM: G47.33 
ICD-9-CM: 327.23  11/16/2018 Yes Had home bipap but machine is broken * (Principal) Acute on chronic respiratory failure with hypoxia and hypercapnia (HCC) ICD-10-CM: J96.21, J96.22 
ICD-9-CM: 518.84, 786.09, 799.02  11/16/2018 Yes Oxygen dependent here - has been ordered as outpatient but he was not using Altered mental status ICD-10-CM: R41.82 
ICD-9-CM: 780.97  11/16/2018 Yes Resolved. Elevated CO2 on admission Morbid obesity ([facility] (Chronic) ICD-10-CM: E66.01 
ICD-9-CM: 278.01  11/16/2018 Yes  
 contributes to the complexity of care H/O atrial flutter (Chronic) ICD-10-CM: Z86.79 
ICD-9-CM: V12.59  11/16/2018 Yes Overview Signed 2/6/2018  7:51 AM by Leodan Vazquez, NP  
  1/2018 Atrial flutter, s/p cardioversion. S/p cardioversion Essential hypertension (Chronic) ICD-10-CM: I10 
ICD-9-CM: 401.9  11/16/2018 Yes  
 controlled Methamphetamine abuse (HCC) (Chronic) ICD-10-CM: F15.10 ICD-9-CM: 305.70  2/1/2018 Yes  
 noted Acute kidney injury ([address] ICD-10-CM: N17.9 ICD-9-CM: 584.9  1/6/2018 Yes Improved with hydration Plan:  (Medical Decision Making) -- home soon - to his nephew house -  working on getting him BIPAP for free, working also on discharge planning Marce Vo MD 
 
 More than 50% of time documented was spent face-to-face contact with the patient and in the care of the patient on the floor/unit where the patient is located.

## 2018-11-28 LAB
ANION GAP SERPL CALC-SCNC: 3 MMOL/L (ref 7–16)
BUN SERPL-MCNC: 15 MG/DL (ref 6–23)
CALCIUM SERPL-MCNC: 8.2 MG/DL (ref 8.3–10.4)
CHLORIDE SERPL-SCNC: 101 MMOL/L (ref 98–107)
CO2 SERPL-SCNC: 36 MMOL/L (ref 21–32)
CREAT SERPL-MCNC: 1.05 MG/DL (ref 0.8–1.5)
ERYTHROCYTE [DISTWIDTH] IN BLOOD BY AUTOMATED COUNT: 13 %
GLUCOSE SERPL-MCNC: 121 MG/DL (ref 65–100)
HCT VFR BLD AUTO: 35.6 % (ref 41.1–50.3)
HGB BLD-MCNC: 10.7 G/DL (ref 13.6–17.2)
MCH RBC QN AUTO: 31.5 PG (ref 26.1–32.9)
MCHC RBC AUTO-ENTMCNC: 30.1 G/DL (ref 31.4–35)
MCV RBC AUTO: 104.7 FL (ref 79.6–97.8)
NRBC # BLD: 0 K/UL (ref 0–0.2)
PLATELET # BLD AUTO: 147 K/UL (ref 150–450)
PMV BLD AUTO: 11.2 FL (ref 9.4–12.3)
POTASSIUM SERPL-SCNC: 4.1 MMOL/L (ref 3.5–5.1)
RBC # BLD AUTO: 3.4 M/UL (ref 4.23–5.6)
SODIUM SERPL-SCNC: 140 MMOL/L (ref 136–145)
WBC # BLD AUTO: 7.5 K/UL (ref 4.3–11.1)

## 2018-11-28 PROCEDURE — 85027 COMPLETE CBC AUTOMATED: CPT

## 2018-11-28 PROCEDURE — 80048 BASIC METABOLIC PNL TOTAL CA: CPT

## 2018-11-28 PROCEDURE — 74011000250 HC RX REV CODE- 250: Performed by: NURSE PRACTITIONER

## 2018-11-28 PROCEDURE — 77010033678 HC OXYGEN DAILY

## 2018-11-28 PROCEDURE — 74011250637 HC RX REV CODE- 250/637: Performed by: NURSE PRACTITIONER

## 2018-11-28 PROCEDURE — 74011250637 HC RX REV CODE- 250/637: Performed by: INTERNAL MEDICINE

## 2018-11-28 PROCEDURE — 36592 COLLECT BLOOD FROM PICC: CPT

## 2018-11-28 PROCEDURE — 94660 CPAP INITIATION&MGMT: CPT

## 2018-11-28 PROCEDURE — 94640 AIRWAY INHALATION TREATMENT: CPT

## 2018-11-28 PROCEDURE — 94760 N-INVAS EAR/PLS OXIMETRY 1: CPT

## 2018-11-28 PROCEDURE — 65660000000 HC RM CCU STEPDOWN

## 2018-11-28 PROCEDURE — 99232 SBSQ HOSP IP/OBS MODERATE 35: CPT | Performed by: INTERNAL MEDICINE

## 2018-11-28 PROCEDURE — 74011250636 HC RX REV CODE- 250/636: Performed by: INTERNAL MEDICINE

## 2018-11-28 RX ORDER — FUROSEMIDE 40 MG/1
40 TABLET ORAL 2 TIMES DAILY
Status: DISCONTINUED | OUTPATIENT
Start: 2018-11-28 | End: 2018-11-30 | Stop reason: HOSPADM

## 2018-11-28 RX ADMIN — Medication 30 ML: at 04:05

## 2018-11-28 RX ADMIN — HYDRALAZINE HYDROCHLORIDE 50 MG: 50 TABLET, FILM COATED ORAL at 08:34

## 2018-11-28 RX ADMIN — HYDRALAZINE HYDROCHLORIDE 50 MG: 50 TABLET, FILM COATED ORAL at 16:15

## 2018-11-28 RX ADMIN — CARVEDILOL 25 MG: 25 TABLET, FILM COATED ORAL at 08:35

## 2018-11-28 RX ADMIN — SERTRALINE HYDROCHLORIDE 50 MG: 50 TABLET ORAL at 08:35

## 2018-11-28 RX ADMIN — CARVEDILOL 25 MG: 25 TABLET, FILM COATED ORAL at 16:15

## 2018-11-28 RX ADMIN — ALBUTEROL SULFATE 2.5 MG: 2.5 SOLUTION RESPIRATORY (INHALATION) at 07:15

## 2018-11-28 RX ADMIN — HYDROCODONE BITARTRATE AND ACETAMINOPHEN 1 TABLET: 7.5; 325 TABLET ORAL at 04:03

## 2018-11-28 RX ADMIN — ALBUTEROL SULFATE 2.5 MG: 2.5 SOLUTION RESPIRATORY (INHALATION) at 15:18

## 2018-11-28 RX ADMIN — RIVAROXABAN 20 MG: 20 TABLET, FILM COATED ORAL at 16:15

## 2018-11-28 RX ADMIN — AMIODARONE HYDROCHLORIDE 200 MG: 200 TABLET ORAL at 20:47

## 2018-11-28 RX ADMIN — AMLODIPINE BESYLATE 10 MG: 10 TABLET ORAL at 08:34

## 2018-11-28 RX ADMIN — BISACODYL 10 MG: 10 SUPPOSITORY RECTAL at 20:46

## 2018-11-28 RX ADMIN — FUROSEMIDE 40 MG: 40 TABLET ORAL at 20:48

## 2018-11-28 RX ADMIN — HYDROCODONE BITARTRATE AND ACETAMINOPHEN 1 TABLET: 7.5; 325 TABLET ORAL at 10:13

## 2018-11-28 RX ADMIN — AMIODARONE HYDROCHLORIDE 200 MG: 200 TABLET ORAL at 08:34

## 2018-11-28 RX ADMIN — SODIUM CHLORIDE, PRESERVATIVE FREE 900 UNITS: 5 INJECTION INTRAVENOUS at 20:50

## 2018-11-28 RX ADMIN — HYDROCODONE BITARTRATE AND ACETAMINOPHEN 1 TABLET: 7.5; 325 TABLET ORAL at 13:53

## 2018-11-28 RX ADMIN — SODIUM CHLORIDE, PRESERVATIVE FREE 900 UNITS: 5 INJECTION INTRAVENOUS at 04:04

## 2018-11-28 RX ADMIN — ALBUTEROL SULFATE 2.5 MG: 2.5 SOLUTION RESPIRATORY (INHALATION) at 20:50

## 2018-11-28 RX ADMIN — ALBUTEROL SULFATE 2.5 MG: 2.5 SOLUTION RESPIRATORY (INHALATION) at 11:18

## 2018-11-28 RX ADMIN — HYDRALAZINE HYDROCHLORIDE 50 MG: 50 TABLET, FILM COATED ORAL at 20:48

## 2018-11-28 RX ADMIN — FUROSEMIDE 40 MG: 40 TABLET ORAL at 13:56

## 2018-11-28 RX ADMIN — Medication 30 ML: at 13:56

## 2018-11-28 RX ADMIN — TIOTROPIUM BROMIDE 90 MCG: 18 CAPSULE ORAL; RESPIRATORY (INHALATION) at 07:15

## 2018-11-28 RX ADMIN — SODIUM CHLORIDE, PRESERVATIVE FREE 900 UNITS: 5 INJECTION INTRAVENOUS at 13:56

## 2018-11-28 NOTE — PROGRESS NOTES
Bedside and Verbal shift change report received from [facility], [address] [facility]. Report included the following information SBAR, Kardex, Procedure Summary, Intake/Output, MAR and Recent Results.
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
## 2018-11-28 NOTE — PROGRESS NOTES
Naheed Falcon Admission Date: 11/16/2018 Daily Progress Note: 11/28/2018 The patient's chart is reviewed and the patient is discussed with the staff. 53 yo male with a history of morbid obesity, NATALIIA, probable COPD, substance abuse (UDS + amphetamines this admission), and Afib reportedly on xarelto and amio prior to admission. He developed shortness of breath and was started on CPAP until arrival to ER. His pCO2 was 72 on arrival and pt was placed on BiPAP. Pt's repeat ABG was worse with pCOD 115 and pt was intubated on 11/16/18. Pt admitted to the ICU and chest CT was negative for pneumonia. He developed afib with RVR and cardiology was consulted. Pt was placed on amio, cardizem, and xarelto. He did have a MIRANDA with cardioversion with return to [facility]. Pt was extubated on 11/19. He was transferred to the floor and hospitalist consulted to assume care. Has home Bipap and oxygen but was not using (had been donated to him by a company in the past). The bipap is now broken, he is uninsured and cannot afford medications or equipment Subjective:  
 
Up in chair,  Still has edema,  Talked with Nidhi Gary from -  Looking into the pt. needs Current Facility-Administered Medications Medication Dose Route Frequency  LORazepam (ATIVAN) injection 1 mg  1 mg IntraVENous Q4H PRN  
 albuterol (PROVENTIL VENTOLIN) nebulizer solution 2.5 mg  2.5 mg Nebulization QID RT  
 tiotropium (SPIRIVA) inhalation capsule 18 mcg  1 Cap Inhalation DAILY  carvedilol (COREG) tablet 25 mg  25 mg Oral BID WITH MEALS  
 amLODIPine (NORVASC) tablet 10 mg  10 mg Oral DAILY  hydrALAZINE (APRESOLINE) tablet 50 mg  50 mg Oral TID  nicotine (NICODERM CQ) 21 mg/24 hr patch 1 Patch  1 Patch TransDERmal DAILY  hydrALAZINE (APRESOLINE) 20 mg/mL injection 20 mg  20 mg IntraVENous Q6H PRN  
 amiodarone (CORDARONE) tablet 200 mg  200 mg Oral Q12H  rivaroxaban (XARELTO) tablet 20 mg  20 mg Oral DAILY WITH DINNER  
 levalbuterol (XOPENEX) nebulizer soln 1.25 mg/3 mL  1.25 mg Nebulization Q4H PRN  
 acetaminophen (TYLENOL) suppository 650 mg  650 mg Rectal Q4H PRN  
 HYDROcodone-acetaminophen (NORCO) 7.5-325 mg per tablet 1 Tab  1 Tab Oral Q4H PRN  
 bisacodyl (DULCOLAX) suppository 10 mg  10 mg Rectal DAILY PRN  
 influenza vaccine 2018-19 (6 mos+)(PF) (FLUARIX QUAD/FLULAVAL QUAD) injection 0.5 mL  0.5 mL IntraMUSCular PRIOR TO DISCHARGE  sertraline (ZOLOFT) tablet 50 mg  50 mg Per NG tube DAILY  sodium chloride (NS) flush 30 mL  30 mL InterCATHeter Q8H  
 heparin (porcine) pf 900 Units  900 Units InterCATHeter Q8H  
 sodium chloride (NS) flush 30 mL  30 mL InterCATHeter PRN  
 heparin (porcine) pf 900 Units  900 Units InterCATHeter PRN Review of Systems Constitutional: negative for fever, chills, sweats Cardiovascular: negative for chest pain, palpitations, syncope, edema Gastrointestinal:  negative for dysphagia, reflux, vomiting, diarrhea, abdominal pain, or melena Neurologic:  negative for focal weakness, numbness, headache Objective:  
 
Vitals:  
 11/27/18 2300 11/28/18 0300 11/28/18 0715 11/28/18 4391 BP: 114/58 149/57  163/84 Pulse: 66 66  66 Resp: 19 19  16 Temp: 97.6 °F (36.4 °C) 98 °F (36.7 °C)  97.3 °F (36.3 °C) SpO2:  95% 93% 93% Weight:      
Height:      
 
Intake and Output:  
11/26 1901 - 11/28 0700 In: 480 [P.O.:480] Out: 2175 [Urine:2175] 11/28 0701 - 11/28 1900 In: -  
Out: 500 [Urine:500] Physical Exam:  
Constitution:  the patient is well developed and in no acute distress EENMT:  Sclera clear, pupils equal, oral mucosa moist 
Respiratory: clear Cardiovascular:  RRR without M,G,R 
Gastrointestinal: soft and non-tender; with positive bowel sounds. Musculoskeletal: warm without cyanosis. There is 2+ lower leg edema. Skin:  no jaundice or rashes, no wounds Neurologic: no gross neuro deficits Psychiatric:  alert and oriented x 3 CXR:  
 
 
LAB No results for input(s): GLUCPOC in the last 72 hours. No lab exists for component: [place] Recent Labs  
  11/28/18 
0321 11/27/18 
0950 11/26/18 
0424 WBC 7.5 7.6 8.0 HGB 10.7* 11.6* 11.5* HCT 35.6* 38.2* 38.6*  
* 165 149* Recent Labs  
  11/28/18 
0321 11/27/18 
0950 11/26/18 
0424  140 142  
K 4.1 4.3 4.1  102 104 CO2 36* 34* 33* * 119* 85 BUN 15 15 18 CREA 1.05 1.05 0.92  
CA 8.2* 8.3 8.4 No results for input(s): PH, PCO2, PO2, HCO3, PHI, PCO2I, PO2I, HCO3I in the last 72 hours. No results for input(s): LCAD, LAC in the last 72 hours. Assessment:  (Medical Decision Making) Hospital Problems  Date Reviewed: 11/24/2018 Codes Class Noted POA  
 COPD (chronic obstructive pulmonary disease) ([facility] (Chronic) ICD-10-CM: J44.9 ICD-9-CM: 783  11/16/2018 Yes NATALIIA (obstructive sleep apnea) (Chronic) ICD-10-CM: G47.33 
ICD-9-CM: 327.23  11/16/2018 Yes * (Principal) Acute on chronic respiratory failure with hypoxia and hypercapnia (HCC) ICD-10-CM: J96.21, J96.22 
ICD-9-CM: 518.84, 786.09, 799.02  11/16/2018 Yes  
 bipap hs Altered mental status ICD-10-CM: R41.82 
ICD-9-CM: 780.97  11/16/2018 Yes Morbid obesity ([facility] (Chronic) ICD-10-CM: E66.01 
ICD-9-CM: 278.01  11/16/2018 Yes  
   
 H/O atrial flutter (Chronic) ICD-10-CM: M93.53 
ICD-9-CM: V12.59  11/16/2018 Yes Overview Signed 2/6/2018  7:51 AM by Karley Ferrer NP  
  1/2018 Atrial flutter, s/p cardioversion. Essential hypertension (Chronic) ICD-10-CM: I10 
ICD-9-CM: 401.9  11/16/2018 Yes Methamphetamine abuse (HCC) (Chronic) ICD-10-CM: F15.10 ICD-9-CM: 305.70  2/1/2018 Yes Acute kidney injury ([facility] ICD-10-CM: N17.9 ICD-9-CM: 584.9  1/6/2018 Yes  
 resolved Plan:  (Medical Decision Making) 1   Add lasix bid 2   Working on disposition- the pt has no insurance- will need help to get meds and home bipap 
-- 
 
More than 50% of the time documented was spent in face-to-face contact with the patient and in the care of the patient on the floor/unit where the patient is located.  
 
Sony Butterfield MD

## 2018-11-29 LAB
ANION GAP SERPL CALC-SCNC: 3 MMOL/L (ref 7–16)
BUN SERPL-MCNC: 14 MG/DL (ref 6–23)
CALCIUM SERPL-MCNC: 8.6 MG/DL (ref 8.3–10.4)
CHLORIDE SERPL-SCNC: 97 MMOL/L (ref 98–107)
CO2 SERPL-SCNC: 39 MMOL/L (ref 21–32)
CREAT SERPL-MCNC: 1.01 MG/DL (ref 0.8–1.5)
ERYTHROCYTE [DISTWIDTH] IN BLOOD BY AUTOMATED COUNT: 13 % (ref 11.9–14.6)
GLUCOSE SERPL-MCNC: 93 MG/DL (ref 65–100)
HCT VFR BLD AUTO: 36 % (ref 41.1–50.3)
HGB BLD-MCNC: 11 G/DL (ref 13.6–17.2)
MCH RBC QN AUTO: 31.5 PG (ref 26.1–32.9)
MCHC RBC AUTO-ENTMCNC: 30.6 G/DL (ref 31.4–35)
MCV RBC AUTO: 103.2 FL (ref 79.6–97.8)
NRBC # BLD: 0 K/UL (ref 0–0.2)
PLATELET # BLD AUTO: 153 K/UL (ref 150–450)
PMV BLD AUTO: 10.7 FL (ref 9.4–12.3)
POTASSIUM SERPL-SCNC: 4 MMOL/L (ref 3.5–5.1)
RBC # BLD AUTO: 3.49 M/UL (ref 4.23–5.6)
SODIUM SERPL-SCNC: 139 MMOL/L (ref 136–145)
WBC # BLD AUTO: 10.1 K/UL (ref 4.3–11.1)

## 2018-11-29 PROCEDURE — 74011250637 HC RX REV CODE- 250/637: Performed by: INTERNAL MEDICINE

## 2018-11-29 PROCEDURE — 97161 PT EVAL LOW COMPLEX 20 MIN: CPT

## 2018-11-29 PROCEDURE — 99232 SBSQ HOSP IP/OBS MODERATE 35: CPT | Performed by: INTERNAL MEDICINE

## 2018-11-29 PROCEDURE — 74011000250 HC RX REV CODE- 250: Performed by: NURSE PRACTITIONER

## 2018-11-29 PROCEDURE — 36592 COLLECT BLOOD FROM PICC: CPT

## 2018-11-29 PROCEDURE — 74011250636 HC RX REV CODE- 250/636: Performed by: INTERNAL MEDICINE

## 2018-11-29 PROCEDURE — 94760 N-INVAS EAR/PLS OXIMETRY 1: CPT

## 2018-11-29 PROCEDURE — 85027 COMPLETE CBC AUTOMATED: CPT

## 2018-11-29 PROCEDURE — 74011250637 HC RX REV CODE- 250/637: Performed by: NURSE PRACTITIONER

## 2018-11-29 PROCEDURE — 65660000000 HC RM CCU STEPDOWN

## 2018-11-29 PROCEDURE — 94640 AIRWAY INHALATION TREATMENT: CPT

## 2018-11-29 PROCEDURE — 80048 BASIC METABOLIC PNL TOTAL CA: CPT

## 2018-11-29 PROCEDURE — 77010033678 HC OXYGEN DAILY

## 2018-11-29 RX ADMIN — HYDROCODONE BITARTRATE AND ACETAMINOPHEN 1 TABLET: 7.5; 325 TABLET ORAL at 18:40

## 2018-11-29 RX ADMIN — SODIUM CHLORIDE, PRESERVATIVE FREE 900 UNITS: 5 INJECTION INTRAVENOUS at 22:02

## 2018-11-29 RX ADMIN — SERTRALINE HYDROCHLORIDE 50 MG: 50 TABLET ORAL at 08:03

## 2018-11-29 RX ADMIN — AMIODARONE HYDROCHLORIDE 200 MG: 200 TABLET ORAL at 08:02

## 2018-11-29 RX ADMIN — FUROSEMIDE 40 MG: 40 TABLET ORAL at 22:02

## 2018-11-29 RX ADMIN — Medication 30 ML: at 14:00

## 2018-11-29 RX ADMIN — HYDRALAZINE HYDROCHLORIDE 50 MG: 50 TABLET, FILM COATED ORAL at 16:09

## 2018-11-29 RX ADMIN — TIOTROPIUM BROMIDE 18 MCG: 18 CAPSULE ORAL; RESPIRATORY (INHALATION) at 08:00

## 2018-11-29 RX ADMIN — HYDROCODONE BITARTRATE AND ACETAMINOPHEN 1 TABLET: 7.5; 325 TABLET ORAL at 14:06

## 2018-11-29 RX ADMIN — AMLODIPINE BESYLATE 10 MG: 10 TABLET ORAL at 08:02

## 2018-11-29 RX ADMIN — SODIUM CHLORIDE, PRESERVATIVE FREE 900 UNITS: 5 INJECTION INTRAVENOUS at 06:10

## 2018-11-29 RX ADMIN — Medication 30 ML: at 22:03

## 2018-11-29 RX ADMIN — ALBUTEROL SULFATE 2.5 MG: 2.5 SOLUTION RESPIRATORY (INHALATION) at 11:37

## 2018-11-29 RX ADMIN — FUROSEMIDE 40 MG: 40 TABLET ORAL at 08:03

## 2018-11-29 RX ADMIN — ALBUTEROL SULFATE 2.5 MG: 2.5 SOLUTION RESPIRATORY (INHALATION) at 07:58

## 2018-11-29 RX ADMIN — CARVEDILOL 25 MG: 25 TABLET, FILM COATED ORAL at 17:32

## 2018-11-29 RX ADMIN — RIVAROXABAN 20 MG: 20 TABLET, FILM COATED ORAL at 17:32

## 2018-11-29 RX ADMIN — HYDRALAZINE HYDROCHLORIDE 50 MG: 50 TABLET, FILM COATED ORAL at 22:02

## 2018-11-29 RX ADMIN — CARVEDILOL 25 MG: 25 TABLET, FILM COATED ORAL at 08:03

## 2018-11-29 RX ADMIN — AMIODARONE HYDROCHLORIDE 200 MG: 200 TABLET ORAL at 22:02

## 2018-11-29 RX ADMIN — HYDROCODONE BITARTRATE AND ACETAMINOPHEN 1 TABLET: 7.5; 325 TABLET ORAL at 00:35

## 2018-11-29 RX ADMIN — ALBUTEROL SULFATE 2.5 MG: 2.5 SOLUTION RESPIRATORY (INHALATION) at 16:09

## 2018-11-29 RX ADMIN — ALBUTEROL SULFATE 2.5 MG: 2.5 SOLUTION RESPIRATORY (INHALATION) at 19:41

## 2018-11-29 RX ADMIN — HYDRALAZINE HYDROCHLORIDE 50 MG: 50 TABLET, FILM COATED ORAL at 08:03

## 2018-11-29 RX ADMIN — HYDROCODONE BITARTRATE AND ACETAMINOPHEN 1 TABLET: 7.5; 325 TABLET ORAL at 23:37

## 2018-11-29 RX ADMIN — Medication 30 ML: at 06:10

## 2018-11-29 RX ADMIN — SODIUM CHLORIDE, PRESERVATIVE FREE 900 UNITS: 5 INJECTION INTRAVENOUS at 14:06

## 2018-11-29 NOTE — PROGRESS NOTES
Dispo update:  Discussed discharge planning with Mr. Kya Rdz in room 26. He and his wife are planning to stay with his younger son and his wife at discharge. He has two CPAP machines (one in room not working, per his report; one at home to be brought in by wife). He also has a concentrator and portable tank at home. Total income is around $700 per month, plus earnings from a [employer]. Part of this income goes to methamphetamines and alcohol, per Mr. Kya Rdz, and there is not enough money to pay [employer]formerly [employer] for the monthly rental fee (he is \"self pay\", no health insurance). He completed an inpatient drug/alcohol program in 2006 or 2007, but does not go to AA or NA. Plan is home with son, and have our RT to look at the CPAP machine in the room, as well as the one his wife is to bring in. Again, encouraged him not to use drugs or alcohol. He denies cravings for either.

## 2018-11-29 NOTE — PROGRESS NOTES
Pt has rested quielty during the 3053-1743 shift. Co hip pain x1, medicated per md orders, see mar. Wore cpap this shift, no resp distress noted. Voiding via urinal without diff. sr up x3, bed lowered and locked, and call light within reach.
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
## 2018-11-29 NOTE — PROGRESS NOTES
Fidelia Ayala Admission Date: 11/16/2018 Daily Progress Note: 11/29/2018 The patient's chart is reviewed and the patient is discussed with the staff. 53 yo male with a history of morbid obesity, NATALIIA, probable COPD, substance abuse (UDS + amphetamines this admission), and Afib reportedly on xarelto and amio prior to admission. He developed shortness of breath and was started on CPAP until arrival to ER. His pCO2 was 72 on arrival and pt was placed on BiPAP. Pt's repeat ABG was worse with pCOD 115 and pt was intubated on 11/16/18. Pt admitted to the ICU and chest CT was negative for pneumonia. He developed afib with RVR and cardiology was consulted. Pt was placed on amio, cardizem, and xarelto. He did have a MIRANDA with cardioversion with return to [place] [facility]. Pt was extubated on 11/19. He was transferred to the floor and hospitalist consulted to assume care. Has home Bipap and oxygen but was not using (had been donated to him by a company in the past). The bipap is now broken, he is uninsured and cannot afford medications or equipment Subjective:  
 
Patient has 2 CPAP machine actually Current Facility-Administered Medications Medication Dose Route Frequency  furosemide (LASIX) tablet 40 mg  40 mg Oral BID  LORazepam (ATIVAN) injection 1 mg  1 mg IntraVENous Q4H PRN  
 albuterol (PROVENTIL VENTOLIN) nebulizer solution 2.5 mg  2.5 mg Nebulization QID RT  
 tiotropium (SPIRIVA) inhalation capsule 18 mcg  1 Cap Inhalation DAILY  carvedilol (COREG) tablet 25 mg  25 mg Oral BID WITH MEALS  
 amLODIPine (NORVASC) tablet 10 mg  10 mg Oral DAILY  hydrALAZINE (APRESOLINE) tablet 50 mg  50 mg Oral TID  nicotine (NICODERM CQ) 21 mg/24 hr patch 1 Patch  1 Patch TransDERmal DAILY  hydrALAZINE (APRESOLINE) 20 mg/mL injection 20 mg  20 mg IntraVENous Q6H PRN  
 amiodarone (CORDARONE) tablet 200 mg  200 mg Oral Q12H  rivaroxaban (XARELTO) tablet 20 mg  20 mg Oral DAILY WITH DINNER  
 levalbuterol (XOPENEX) nebulizer soln 1.25 mg/3 mL  1.25 mg Nebulization Q4H PRN  
 acetaminophen (TYLENOL) suppository 650 mg  650 mg Rectal Q4H PRN  
 HYDROcodone-acetaminophen (NORCO) 7.5-325 mg per tablet 1 Tab  1 Tab Oral Q4H PRN  
 bisacodyl (DULCOLAX) suppository 10 mg  10 mg Rectal DAILY PRN  
 influenza vaccine 2018-19 (6 mos+)(PF) (FLUARIX QUAD/FLULAVAL QUAD) injection 0.5 mL  0.5 mL IntraMUSCular PRIOR TO DISCHARGE  sertraline (ZOLOFT) tablet 50 mg  50 mg Per NG tube DAILY  sodium chloride (NS) flush 30 mL  30 mL InterCATHeter Q8H  
 heparin (porcine) pf 900 Units  900 Units InterCATHeter Q8H  
 sodium chloride (NS) flush 30 mL  30 mL InterCATHeter PRN  
 heparin (porcine) pf 900 Units  900 Units InterCATHeter PRN Review of Systems Constitutional: negative for fever, chills, sweats Cardiovascular: negative for chest pain, palpitations, syncope, edema Gastrointestinal:  negative for dysphagia, reflux, vomiting, diarrhea, abdominal pain, or melena Neurologic:  negative for focal weakness, numbness, headache Objective:  
 
Vitals:  
 11/29/18 0736 11/29/18 0801 11/29/18 1113 11/29/18 1137 BP: 157/76  122/50 Pulse: 72  63 Resp: 16  16 Temp: 97.2 °F (36.2 °C)  98.6 °F (37 °C) SpO2: 95% 91% 93% 94% Weight:      
Height:      
 
Intake and Output:  
11/27 1901 - 11/29 0700 In: 480 [P.O.:480] Out: 1779 [ROWPA:[id number]] 11/29 0701 - 11/29 1900 In: -  
Out: 775 [place] Physical Exam:  
Constitution:  the patient is well developed and in no acute distress EENMT:  Sclera clear, pupils equal, oral mucosa moist 
Respiratory: clear Cardiovascular:  RRR without M,G,R 
Gastrointestinal: soft and non-tender; with positive bowel sounds. Musculoskeletal: warm without cyanosis. There is 2+ lower leg edema. Skin:  no jaundice or rashes, no wounds Neurologic: no gross neuro deficits Psychiatric:  alert and oriented x 3 CXR:  
 
 
LAB No results for input(s): GLUCPOC in the last 72 hours. No lab exists for component: [place] Recent Labs  
  11/29/18 
0604 11/28/18 
0321 11/27/18 
7212 WBC 10.1 7.5 7.6 HGB 11.0* 10.7* 11.6* HCT 36.0* 35.6* 38.2*  
 147* 165 Recent Labs  
  11/29/18 
0604 11/28/18 
0321 11/27/18 
1653  140 140  
K 4.0 4.1 4.3 CL 97* 101 102 CO2 39* 36* 34* GLU 93 121* 119* BUN 14 15 15 CREA 1.01 1.05 1.05  
CA 8.6 8.2* 8.3 No results for input(s): PH, PCO2, PO2, HCO3, PHI, PCO2I, PO2I, HCO3I in the last 72 hours. No results for input(s): LCAD, LAC in the last 72 hours. Assessment:  (Medical Decision Making) Hospital Problems  Date Reviewed: 11/24/2018 Codes Class Noted POA  
 COPD (chronic obstructive pulmonary disease) ([facility] (Chronic) ICD-10-CM: J44.9 ICD-9-CM: 464  11/16/2018 Yes NATALIIA (obstructive sleep apnea) (Chronic) ICD-10-CM: G47.33 
ICD-9-CM: 327.23  11/16/2018 Yes * (Principal) Acute on chronic respiratory failure with hypoxia and hypercapnia (HCC) ICD-10-CM: J96.21, J96.22 
ICD-9-CM: 518.84, 786.09, 799.02  11/16/2018 Yes  
 bipap hs Altered mental status ICD-10-CM: R41.82 
ICD-9-CM: 780.97  11/16/2018 Yes Morbid obesity ([facility] (Chronic) ICD-10-CM: E66.01 
ICD-9-CM: 278.01  11/16/2018 Yes  
   
 H/O atrial flutter (Chronic) ICD-10-CM: I43.73 
ICD-9-CM: V12.59  11/16/2018 Yes Overview Signed 2/6/2018  7:51 AM by Elio Villanueva NP  
  1/2018 Atrial flutter, s/p cardioversion. Essential hypertension (Chronic) ICD-10-CM: I10 
ICD-9-CM: 401.9  11/16/2018 Yes Methamphetamine abuse (HCC) (Chronic) ICD-10-CM: F15.10 ICD-9-CM: 305.70  2/1/2018 Yes Acute kidney injury ([facility] ICD-10-CM: N17.9 ICD-9-CM: 584.9  1/6/2018 Yes  
 resolved Plan:  (Medical Decision Making)  
- helping with discharge - patient going [place] with his wife - has two CPAP machines - bringing the second one More than 50% of the time documented was spent in face-to-face contact with the patient and in the care of the patient on the floor/unit where the patient is located.  
 
Kathleen Thompson MD

## 2018-11-29 NOTE — PROGRESS NOTES
Pt assessed and cared for until 2200. Medications administered. Pt hand off and report given to [facility].

## 2018-11-29 NOTE — PROGRESS NOTES
Problem: Gas Exchange - Impaired Goal: *Absence of hypoxia Outcome: Progressing Towards Goal 
Off oxygen at this time

## 2018-11-29 NOTE — PROGRESS NOTES
Problem: Mobility Impaired (Adult and Pediatric) Goal: *Acute Goals and Plan of Care (Insert Text) LTG: 
(1.)Mr. Schaffer will move from supine to sit and sit to supine, scoot up and down and roll side to side INDEPENDENTLY with bed flat within 7 treatment day(s). (2.)Mr. Schaffer will transfer from bed to chair and chair to bed INDEPENDENTLY within 7 treatment day(s). (3.)Mr. Schaffer will ambulate with MODIFIED INDEPENDENCE for 350+ feet with the least restrictive device within 7 treatment day(s). (4.)Mr. Schaffer will perform exercises per HEP for 15+ minutes to improve strength and mobility within 7 days. ________________________________________________________________________________________________ PHYSICAL THERAPY: Initial Assessment, PM 11/29/2018INPATIENT: Hospital Day: 14 
Payor: SELF PAY / Plan: [facility] [address] SELF PAY / Product Type: Self Pay /  
  
NAME/AGE/GENDER: Cornelio Ny is a 52 y.o. male PRIMARY DIAGNOSIS: Acute on chronic respiratory failure with hypoxia and hypercapnia (HCC) Acute on chronic respiratory failure with hypoxia and hypercapnia (HCC) Acute on chronic respiratory failure with hypoxia and hypercapnia (HCC) Acute on chronic respiratory failure with hypoxia and hypercapnia (HCC) ICD-10: Treatment Diagnosis:  
 · Generalized Muscle Weakness (M62.81) · Difficulty in walking, Not elsewhere classified (R26.2) · Other abnormalities of gait and mobility (R26.89) Precaution/Allergies: 
Patient has no known allergies. ASSESSMENT:  
Mr. Charan Albert is a 52year old male admitted from home for respiratory failure. He lives with family and is typically ambulatory with cane. Presents in supine without complaints and is agreeable to therapy assessment. Pt requests assistance with donning socks, states wife helps at baseline. Pt performs bed mobility and transfers with CGA-SBA.  Ambulates 225 ft in room and hallway with walker, CGA-SBA, and min verbal cueing for posture and gait mechanics with walker. Demonstrates slow gait pace with decreased step clearance and mild trunk sway/shuffling. Returned to room and up to chair after activity. O2 sats 90% on 3L after activity. Cornelio Ny is functioning slightly below baseline with strength and mobility due to prolonged hospital stay and decreased activity. He will benefit from continued therapy during acute care stay to address deficits/maximize independence with mobility. Anticipate dc with family support and  PT. This section established at most recent assessment PROBLEM LIST (Impairments causing functional limitations): 1. Decreased Strength 2. Decreased ADL/Functional Activities 3. Decreased Transfer Abilities 4. Decreased Ambulation Ability/Technique 5. Decreased Balance 6. Increased Pain 7. Decreased Activity Tolerance INTERVENTIONS PLANNED: (Benefits and precautions of physical therapy have been discussed with the patient.) 1. Balance Exercise 2. Bed Mobility 3. Gait Training 4. Home Exercise Program (HEP) 5. Therapeutic Activites 6. Therapeutic Exercise/Strengthening 7. Transfer Training TREATMENT PLAN: Frequency/Duration: 3 times a week for duration of hospital stay Rehabilitation Potential For Stated Goals: Good RECOMMENDED REHABILITATION/EQUIPMENT: (at time of discharge pending progress): Due to the probability of continued deficits (see above) this patient will likely need continued skilled physical therapy after discharge. Equipment:  
? None at this time HISTORY:  
History of Present Injury/Illness (Reason for Referral): 
Per H&P, \"  
The patient is a 52 y.o.  male known to [facility] with morbid obesity, NATALIIA, COPD, substance abuse, acute on chronic respiratory failure, ? OHS who presented to the ER via EMS on CPAP. Apparently he called EMS earlier in distress and was found in the tripod position.  On arrival to the ER he was placed on BIPAP. He has been largely obtunded but does arouse to sternal rub. He received 1 amp of narcan with limited response. I arrived after the narcan and found his neck flexed by 2 pillows and removed them with improved air flow on BIPAP. His pupils were pinpoint. I examined him and was checking babinski's and he awaked with this stimulation with downgoing toes. A second amp of narcan has been given. A urine tox screen is pending. He can provide no history. He has been homeless in the past. 
  
He has a history of atrial arrhythmias and was apparently on Xarelto and amiodarone in the past. It is unclear if he is still using these medications.  
  
A CT was performed revealing no infiltrates or pneumonia. A trace R effusion and pericardial effusion was noted. \" 
 
Past Medical History/Comorbidities:  
Mr. Chetan Estrada  has a past medical history of Acute respiratory failure with hypercapnia ([address] Chronic obstructive pulmonary disease ([address] Heart failure ([address] and Sleep disorder. Mr. Chetan Estrada  has no past surgical history on file. Social History/Living Environment:  
Home Environment: Private residence # Steps to Enter: 3 Rails to Enter: Yes Wheelchair Ramp: No 
One/Two Story Residence: One story Living Alone: No 
Support Systems: Family member(s), Spouse/Significant Other/Partner Patient Expects to be Discharged to[de-identified] Private residence Current DME Used/Available at Home: [employer], straight, Walker, rolling, Shower chair Prior Level of Function/Work/Activity: 
Lives with wife; will be staying with son at [place]. Ambulatory with cane at baseline but does have walker. One recent fall when walking out in yard. Number of Personal Factors/Comorbidities that affect the Plan of Care: 1-2: MODERATE COMPLEXITY EXAMINATION:  
Most Recent Physical Functioning:  
Gross Assessment: 
AROM: Within functional limits Strength: Generally decreased, functional 
 Coordination: Generally decreased, functional 
Sensation: Impaired Posture: 
Posture (WDL): Exceptions to [facility] Posture Assessment: Forward head, Rounded shoulders Balance: 
Sitting: Intact Standing: Impaired Standing - Static: Fair Standing - Dynamic : Fair Bed Mobility: 
Rolling: Stand-by assistance Supine to Sit: Stand-by assistance Scooting: Stand-by assistance Wheelchair Mobility: 
  
Transfers: 
Sit to Stand: Contact guard assistance Stand to Sit: Contact guard assistance Bed to Chair: Contact guard assistance;Stand-by assistance Gait: 
  
Base of Support: Widened;Center of gravity altered Speed/Fawn: Pace decreased (<100 feet/min) Step Length: Left shortened;Right shortened Gait Abnormalities: Trunk sway increased;Decreased step clearance Distance (ft): 225 Feet (ft) Assistive Device: Walker, rolling Ambulation - Level of Assistance: Contact guard assistance;Stand-by assistance Interventions: Verbal cues; Visual/Demos; Safety awareness training; Tactile cues Body Structures Involved: 1. Muscles Body Functions Affected: 1. Movement Related Activities and Participation Affected: 1. General Tasks and Demands 2. Mobility 3. Domestic Life 4. Community, Social and [place] Number of elements that affect the Plan of Care: 4+: HIGH COMPLEXITY CLINICAL PRESENTATION:  
Presentation: Stable and uncomplicated: LOW COMPLEXITY CLINICAL DECISION MAKING:  
[employer] 6 Clicks Basic Mobility Inpatient Short Form How much difficulty does the patient currently have. .. Unable A Lot A Little None 1. Turning over in bed (including adjusting bedclothes, sheets and blankets)? [] 1   [] 2   [] 3   [x] 4  
2. Sitting down on and standing up from a chair with arms ( e.g., wheelchair, bedside commode, etc.)   [] 1   [] 2   [] 3   [x] 4  
3. Moving from lying on back to sitting on the side of the bed? [] 1   [] 2   [] 3   [x] 4 How much help from another person does the patient currently need. .. Total A Lot A Little None 4. Moving to and from a bed to a chair (including a wheelchair)? [] 1   [] 2   [x] 3   [] 4  
5. Need to walk in hospital room? [] 1   [] 2   [x] 3   [] 4  
6. Climbing 3-5 steps with a railing? [] 1   [x] 2   [] 3   [] 4  
© 2007, Trustees of [facility], under license to [employer]. All rights reserved Score:  Initial: 20 Most Recent: X (Date: -- ) Interpretation of Tool:  Represents activities that are increasingly more difficult (i.e. Bed mobility, Transfers, Gait). Score 24 23 22-20 19-15 14-10 9-7 6 Modifier CH CI CJ CK CL CM CN   
 
? Mobility - Walking and Moving Around:  
  - CURRENT STATUS: CJ - 20%-39% impaired, limited or restricted  - GOAL STATUS: CI - 1%-19% impaired, limited or restricted  - D/C STATUS:  ---------------To be determined--------------- Payor: SELF PAY / Plan: [facility] SELF PAY / Product Type: Self Pay /   
 
Medical Necessity:    
· Patient demonstrates good rehab potential due to higher previous functional level. Reason for Services/Other Comments: 
· Patient continues to demonstrate capacity to improve strenth, mobility, balance, transfers, activity tolerance which will increase independence and increase safety. Use of outcome tool(s) and clinical judgement create a POC that gives a: Clear prediction of patient's progress: LOW COMPLEXITY  
  
 
 
 
TREATMENT:  
(In addition to Assessment/Re-Assessment sessions the following treatments were rendered) Pre-treatment Symptoms/Complaints:  \"I'll do better tomorrow\" Pain: Initial:  
Pain Intensity 1: 0  Post Session:  0/10 Assessment/Reassessment only, no treatment provided today Braces/Orthotics/Lines/Etc:  
· O2 Device: Nasal cannula Treatment/Session Assessment:   
· Response to Treatment:  Pt performs mobility with CGA in room and hallway · Interdisciplinary Collaboration: o Physical Therapist 
o Registered Nurse · After treatment position/precautions:  
o Up in chair 
o Bed/Chair-wheels locked 
o Bed in low position 
o Call light within reach · Compliance with Program/Exercises: Compliant all of the time · Recommendations/Intent for next treatment session: \"Next visit will focus on advancements to more challenging activities and reduction in assistance provided\". Total Treatment Duration: PT Patient Time In/Time Out Time In: 4763 Time Out: 3202 Hector Fallon DPT

## 2018-11-30 ENCOUNTER — HOME HEALTH ADMISSION (OUTPATIENT)
Dept: HOME HEALTH SERVICES | Facility: HOME HEALTH | Age: 53
End: 2018-11-30
Payer: SELF-PAY

## 2018-11-30 VITALS
SYSTOLIC BLOOD PRESSURE: 121 MMHG | WEIGHT: 308.9 LBS | BODY MASS INDEX: 41.84 KG/M2 | HEIGHT: 72 IN | RESPIRATION RATE: 18 BRPM | DIASTOLIC BLOOD PRESSURE: 70 MMHG | OXYGEN SATURATION: 90 % | HEART RATE: 69 BPM | TEMPERATURE: 97.3 F

## 2018-11-30 LAB
ANION GAP SERPL CALC-SCNC: 3 MMOL/L (ref 7–16)
BUN SERPL-MCNC: 15 MG/DL (ref 6–23)
CALCIUM SERPL-MCNC: 8.3 MG/DL (ref 8.3–10.4)
CHLORIDE SERPL-SCNC: 97 MMOL/L (ref 98–107)
CO2 SERPL-SCNC: 39 MMOL/L (ref 21–32)
CREAT SERPL-MCNC: 1.12 MG/DL (ref 0.8–1.5)
ERYTHROCYTE [DISTWIDTH] IN BLOOD BY AUTOMATED COUNT: 13.3 % (ref 11.9–14.6)
GLUCOSE SERPL-MCNC: 114 MG/DL (ref 65–100)
HCT VFR BLD AUTO: 33.5 % (ref 41.1–50.3)
HGB BLD-MCNC: 10.2 G/DL (ref 13.6–17.2)
MCH RBC QN AUTO: 31.4 PG (ref 26.1–32.9)
MCHC RBC AUTO-ENTMCNC: 30.4 G/DL (ref 31.4–35)
MCV RBC AUTO: 103.1 FL (ref 79.6–97.8)
NRBC # BLD: 0 K/UL (ref 0–0.2)
PLATELET # BLD AUTO: 150 K/UL (ref 150–450)
PMV BLD AUTO: 10.8 FL (ref 9.4–12.3)
POTASSIUM SERPL-SCNC: 3.8 MMOL/L (ref 3.5–5.1)
RBC # BLD AUTO: 3.25 M/UL (ref 4.23–5.6)
SODIUM SERPL-SCNC: 139 MMOL/L (ref 136–145)
WBC # BLD AUTO: 7.1 K/UL (ref 4.3–11.1)

## 2018-11-30 PROCEDURE — 36592 COLLECT BLOOD FROM PICC: CPT

## 2018-11-30 PROCEDURE — 74011250637 HC RX REV CODE- 250/637: Performed by: INTERNAL MEDICINE

## 2018-11-30 PROCEDURE — 94760 N-INVAS EAR/PLS OXIMETRY 1: CPT

## 2018-11-30 PROCEDURE — 74011250637 HC RX REV CODE- 250/637: Performed by: NURSE PRACTITIONER

## 2018-11-30 PROCEDURE — 74011000250 HC RX REV CODE- 250: Performed by: NURSE PRACTITIONER

## 2018-11-30 PROCEDURE — 80048 BASIC METABOLIC PNL TOTAL CA: CPT

## 2018-11-30 PROCEDURE — 94660 CPAP INITIATION&MGMT: CPT

## 2018-11-30 PROCEDURE — 85027 COMPLETE CBC AUTOMATED: CPT

## 2018-11-30 PROCEDURE — 90471 IMMUNIZATION ADMIN: CPT

## 2018-11-30 PROCEDURE — 97530 THERAPEUTIC ACTIVITIES: CPT

## 2018-11-30 PROCEDURE — 74011250636 HC RX REV CODE- 250/636: Performed by: INTERNAL MEDICINE

## 2018-11-30 PROCEDURE — 94640 AIRWAY INHALATION TREATMENT: CPT

## 2018-11-30 PROCEDURE — 99232 SBSQ HOSP IP/OBS MODERATE 35: CPT | Performed by: INTERNAL MEDICINE

## 2018-11-30 PROCEDURE — 77010033678 HC OXYGEN DAILY

## 2018-11-30 PROCEDURE — 90686 IIV4 VACC NO PRSV 0.5 ML IM: CPT | Performed by: INTERNAL MEDICINE

## 2018-11-30 RX ORDER — IBUPROFEN 200 MG
1 TABLET ORAL DAILY
Qty: 30 PATCH | Refills: 0 | Status: SHIPPED | OUTPATIENT
Start: 2018-12-01 | End: 2018-12-31

## 2018-11-30 RX ORDER — LEVALBUTEROL INHALATION SOLUTION 1.25 MG/3ML
1.25 SOLUTION RESPIRATORY (INHALATION)
Qty: 60 NEBULE | Refills: 0 | Status: SHIPPED | OUTPATIENT
Start: 2018-11-30 | End: 2020-01-31

## 2018-11-30 RX ORDER — HYDROCODONE BITARTRATE AND ACETAMINOPHEN 7.5; 325 MG/1; MG/1
1 TABLET ORAL
Qty: 18 TAB | Refills: 0 | Status: SHIPPED | OUTPATIENT
Start: 2018-11-30 | End: 2020-01-31

## 2018-11-30 RX ORDER — FUROSEMIDE 40 MG/1
40 TABLET ORAL DAILY
Qty: 30 TAB | Refills: 0 | Status: SHIPPED | OUTPATIENT
Start: 2018-11-30 | End: 2020-01-31

## 2018-11-30 RX ORDER — AMIODARONE HYDROCHLORIDE 200 MG/1
200 TABLET ORAL EVERY 12 HOURS
Qty: 60 TAB | Refills: 0 | Status: SHIPPED | OUTPATIENT
Start: 2018-11-30 | End: 2021-01-01 | Stop reason: HOSPADM

## 2018-11-30 RX ORDER — CARVEDILOL 25 MG/1
25 TABLET ORAL 2 TIMES DAILY WITH MEALS
Qty: 30 TAB | Refills: 0 | Status: SHIPPED | OUTPATIENT
Start: 2018-11-30 | End: 2020-01-31

## 2018-11-30 RX ORDER — SERTRALINE HYDROCHLORIDE 50 MG/1
50 TABLET, FILM COATED ORAL DAILY
Qty: 30 TAB | Refills: 0 | Status: SHIPPED | OUTPATIENT
Start: 2018-11-30 | End: 2020-02-04 | Stop reason: SDUPTHER

## 2018-11-30 RX ORDER — AMLODIPINE BESYLATE 10 MG/1
10 TABLET ORAL DAILY
Qty: 30 TAB | Refills: 0 | Status: SHIPPED | OUTPATIENT
Start: 2018-12-01 | End: 2020-01-31

## 2018-11-30 RX ORDER — HYDRALAZINE HYDROCHLORIDE 50 MG/1
50 TABLET, FILM COATED ORAL 3 TIMES DAILY
Qty: 90 TAB | Refills: 0 | Status: SHIPPED | OUTPATIENT
Start: 2018-11-30 | End: 2021-01-01 | Stop reason: HOSPADM

## 2018-11-30 RX ADMIN — CARVEDILOL 25 MG: 25 TABLET, FILM COATED ORAL at 08:16

## 2018-11-30 RX ADMIN — HYDROCODONE BITARTRATE AND ACETAMINOPHEN 1 TABLET: 7.5; 325 TABLET ORAL at 04:47

## 2018-11-30 RX ADMIN — INFLUENZA VIRUS VACCINE 0.5 ML: 15; 15; 15; 15 SUSPENSION INTRAMUSCULAR at 10:48

## 2018-11-30 RX ADMIN — AMLODIPINE BESYLATE 10 MG: 10 TABLET ORAL at 09:00

## 2018-11-30 RX ADMIN — HYDRALAZINE HYDROCHLORIDE 50 MG: 50 TABLET, FILM COATED ORAL at 08:16

## 2018-11-30 RX ADMIN — ALBUTEROL SULFATE 2.5 MG: 2.5 SOLUTION RESPIRATORY (INHALATION) at 11:06

## 2018-11-30 RX ADMIN — SODIUM CHLORIDE, PRESERVATIVE FREE 900 UNITS: 5 INJECTION INTRAVENOUS at 05:25

## 2018-11-30 RX ADMIN — FUROSEMIDE 40 MG: 40 TABLET ORAL at 08:16

## 2018-11-30 RX ADMIN — Medication 30 ML: at 05:25

## 2018-11-30 RX ADMIN — HYDROCODONE BITARTRATE AND ACETAMINOPHEN 1 TABLET: 7.5; 325 TABLET ORAL at 10:21

## 2018-11-30 RX ADMIN — ALBUTEROL SULFATE 2.5 MG: 2.5 SOLUTION RESPIRATORY (INHALATION) at 07:56

## 2018-11-30 RX ADMIN — SERTRALINE HYDROCHLORIDE 50 MG: 50 TABLET ORAL at 08:16

## 2018-11-30 RX ADMIN — AMIODARONE HYDROCHLORIDE 200 MG: 200 TABLET ORAL at 08:15

## 2018-11-30 RX ADMIN — TIOTROPIUM BROMIDE 18 MCG: 18 CAPSULE ORAL; RESPIRATORY (INHALATION) at 07:56
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
## 2018-11-30 NOTE — PROGRESS NOTES
[facility] Admission Date: 11/16/2018 Daily Progress Note: 11/30/2018 The patient's chart is reviewed and the patient is discussed with the staff. 51 yo male with a history of morbid obesity, NATALIIA, probable COPD, substance abuse (UDS + amphetamines this admission), and Afib reportedly on xarelto and amio prior to admission. He developed shortness of breath and was started on CPAP until arrival to ER. His pCO2 was 72 on arrival and pt was placed on BiPAP. Pt's repeat ABG was worse with pCOD 115 and pt was intubated on 11/16/18. Pt admitted to the ICU and chest CT was negative for pneumonia. He developed afib with RVR and cardiology was consulted. Pt was placed on amio, cardizem, and xarelto. He did have a MIRANDA with cardioversion with return to [facility]. Pt was extubated on 11/19. He was transferred to the floor and hospitalist consulted to assume care.  
[facility] home Bipap and oxygen but was not using (had been donated to him by a company in the past). The bipap is now broken, he is uninsured and cannot afford medications or equipment Subjective:  
Pt on cpap hs,  Nc I2 at 4 L in day, doing much better,  3500 out last 24 hrs Current Facility-Administered Medications Medication Dose Route Frequency  furosemide (LASIX) tablet 40 mg  40 mg Oral BID  LORazepam (ATIVAN) injection 1 mg  1 mg IntraVENous Q4H PRN  
 albuterol (PROVENTIL VENTOLIN) nebulizer solution 2.5 mg  2.5 mg Nebulization QID RT  
 tiotropium (SPIRIVA) inhalation capsule 18 mcg  1 Cap Inhalation DAILY  carvedilol (COREG) tablet 25 mg  25 mg Oral BID WITH MEALS  
 amLODIPine (NORVASC) tablet 10 mg  10 mg Oral DAILY  hydrALAZINE (APRESOLINE) tablet 50 mg  50 mg Oral TID  nicotine (NICODERM CQ) 21 mg/24 hr patch 1 Patch  1 Patch TransDERmal DAILY  hydrALAZINE (APRESOLINE) 20 mg/mL injection 20 mg  20 mg IntraVENous Q6H PRN  
 amiodarone (CORDARONE) tablet 200 mg  200 mg Oral Q12H  rivaroxaban (XARELTO) tablet 20 mg  20 mg Oral DAILY WITH DINNER  
 levalbuterol (XOPENEX) nebulizer soln 1.25 mg/3 mL  1.25 mg Nebulization Q4H PRN  
 acetaminophen (TYLENOL) suppository 650 mg  650 mg Rectal Q4H PRN  
 HYDROcodone-acetaminophen (NORCO) 7.5-325 mg per tablet 1 Tab  1 Tab Oral Q4H PRN  
 bisacodyl (DULCOLAX) suppository 10 mg  10 mg Rectal DAILY PRN  
 influenza vaccine 2018-19 (6 mos+)(PF) (FLUARIX QUAD/FLULAVAL QUAD) injection 0.5 mL  0.5 mL IntraMUSCular PRIOR TO DISCHARGE  sertraline (ZOLOFT) tablet 50 mg  50 mg Per NG tube DAILY  sodium chloride (NS) flush 30 mL  30 mL InterCATHeter Q8H  
 heparin (porcine) pf 900 Units  900 Units InterCATHeter Q8H  
 sodium chloride (NS) flush 30 mL  30 mL InterCATHeter PRN  
 heparin (porcine) pf 900 Units  900 Units InterCATHeter PRN Review of Systems Constitutional: negative for fever, chills, sweats Cardiovascular: negative for chest pain, palpitations, syncope, edema Gastrointestinal:  negative for dysphagia, reflux, vomiting, diarrhea, abdominal pain, or melena Neurologic:  negative for focal weakness, numbness, headache Objective:  
 
Vitals:  
 11/30/18 3857 11/30/18 0444 11/30/18 8476 11/30/18 1589 BP: 116/66  121/70 Pulse: 68  69 Resp: 16  18 Temp: 98.3 °F (36.8 °C)  97.3 °F (36.3 °C) SpO2: 90% 91% 91% 91% Weight:      
Height:      
 
Intake and Output:  
11/28 1901 - 11/30 0700 In: -  
Out: Vitaliy Living [[id number]] No intake/output data recorded. Physical Exam:  
Constitution:  the patient is well developed and in no acute distress EENMT:  Sclera clear, pupils equal, oral mucosa moist 
Respiratory: clear Cardiovascular:  RRR without M,G,R 
Gastrointestinal: soft and non-tender; with positive bowel sounds. Musculoskeletal: warm without cyanosis. There is 1+ lower leg edema. Skin:  no jaundice or rashes, no wounds Neurologic: no gross neuro deficits Psychiatric:  alert and oriented x 3 CXR:  
 LAB 
No results for input(s): GLUCPOC in the last 72 hours. No lab exists for component: [place] Recent Labs 11/30/18 
0290 11/29/18 
0604 11/28/18 
0321 WBC 7.1 10.1 7.5 HGB 10.2* 11.0* 10.7* HCT 33.5* 36.0* 35.6*  
 153 147* Recent Labs 11/30/18 
7924 11/29/18 
0604 11/28/18 
0321  139 140  
K 3.8 4.0 4.1 CL 97* 97* 101 CO2 39* 39* 36* * 93 121* BUN 15 14 15 CREA 1.12 1.01 1.05  
CA 8.3 8.6 8.2* No results for input(s): PH, PCO2, PO2, HCO3, PHI, PCO2I, PO2I, HCO3I in the last 72 hours. No results for input(s): LCAD, LAC in the last 72 hours. Assessment:  (Medical Decision Making) Hospital Problems  Date Reviewed: 11/24/2018 Codes Class Noted POA  
 COPD (chronic obstructive pulmonary disease) ([facility] (Chronic) ICD-10-CM: J44.9 ICD-9-CM: 007  11/16/2018 Yes NATALIIA (obstructive sleep apnea) (Chronic) ICD-10-CM: G47.33 
ICD-9-CM: 327.23  11/16/2018 Yes * (Principal) Acute on chronic respiratory failure with hypoxia and hypercapnia (HCC) ICD-10-CM: J96.21, J96.22 
ICD-9-CM: 518.84, 786.09, 799.02  11/16/2018 Yes Nc O2 during day Altered mental status ICD-10-CM: R41.82 
ICD-9-CM: 780.97  11/16/2018 Yes Morbid obesity ([facility] (Chronic) ICD-10-CM: E66.01 
ICD-9-CM: 278.01  11/16/2018 Yes  
   
 H/O atrial flutter (Chronic) ICD-10-CM: J86.35 
ICD-9-CM: V12.59  11/16/2018 Yes Overview Signed 2/6/2018  7:51 AM by Juliana Ramirez NP  
  1/2018 Atrial flutter, s/p cardioversion. Essential hypertension (Chronic) ICD-10-CM: I10 
ICD-9-CM: 401.9  11/16/2018 Yes Methamphetamine abuse (HCC) (Chronic) ICD-10-CM: F15.10 ICD-9-CM: 305.70  2/1/2018 Yes Acute kidney injury ([facility] ICD-10-CM: N17.9 ICD-9-CM: 584.9  1/6/2018 Yes  
 stable Plan:  (Medical Decision Making) 1    Home on O2 during day, cpap hs 
2    Continue lasix -- 
 
 More than 50% of the time documented was spent in face-to-face contact with the patient and in the care of the patient on the floor/unit where the patient is located.  
 
Sony Butterfield MD

## 2018-11-30 NOTE — PROGRESS NOTES
[facility] Face to Face Encounter Patients Name: Cynthia Ho    YOB: 1965 Ordering Physician: Dr. Sumit Solomon MD  
 
Primary Diagnosis: Acute on chronic respiratory failure with hypoxia and hypercapnia ([address] Acute on chronic respiratory failure with hypoxia and hypercapnia (HCC) Date of Face to Face:   11/30/2018 Face to Face Encounter findings are related to primary reason for home care:   yes. 1. I certify that the patient needs intermittent care as follows: skilled nursing care:  teaching/training of new meds, disease process 
physical therapy: strengthening, stretching/ROM, transfer training, gait/stair training, balance training and pt/caregiver education 2. I certify that this patient is homebound, that is: 1) patient requires the use of a cane device, special transportation, or assistance of another to leave the home; or 2) patient's condition makes leaving the home medically contraindicated; and 3) patient has a normal inability to leave the home and leaving the home requires considerable and taxing effort. Patient may leave the home for infrequent and short duration for medical reasons, and occasional absences for non-medical reasons. Homebound status is due to the following functional limitations: Patient with increased shortness of breath and elevated heart rate with ambulation greater than 20 feet limiting patient's ability to ambulate safely within the community. 3. I certify that this patient is under my care and that I, or a nurse practitioner or  [id number], or clinical nurse specialist, or certified nurse midwife, working with me, had a Face-to-Face Encounter that meets the physician Face-to-Face Encounter requirements. The following are the clinical findings from the [address] [place] [address] encounter that support the need for skilled services and is a summary of the encounter: see hospital chart See hospital chart Rangel Euceda RN 
11/30/2018 THE FOLLOWING TO BE COMPLETED BY THE COMMUNITY PHYSICIAN: 
 
I concur with the findings described above from the F2F encounter that this patient is homebound and in need of a skilled service. Certifying Physician: _____________________________________ Printed Certifying Physician Name: _____________________________________ Date: _________________

## 2018-11-30 NOTE — PROGRESS NOTES
Problem: Mobility Impaired (Adult and Pediatric) Goal: *Acute Goals and Plan of Care (Insert Text) LTG: 
(1.)Mr. Schaffer will move from supine to sit and sit to supine, scoot up and down and roll side to side INDEPENDENTLY with bed flat within 7 treatment day(s). (2.)Mr. Schaffer will transfer from bed to chair and chair to bed INDEPENDENTLY within 7 treatment day(s). (3.)Mr. Schaffer will ambulate with MODIFIED INDEPENDENCE for 350+ feet with the least restrictive device within 7 treatment day(s). (4.)Mr. Schaffer will perform exercises per HEP for 15+ minutes to improve strength and mobility within 7 days. ________________________________________________________________________________________________ PHYSICAL THERAPY: Daily Note, Treatment Day: 1st, AM 11/30/2018INPATIENT: Hospital Day: 15 
Payor: SELF PAY / Plan: [facility] SELF PAY / Product Type: Self Pay /  
  
NAME/AGE/GENDER: Ayanna Yanez is a 52 y.o. male PRIMARY DIAGNOSIS: Acute on chronic respiratory failure with hypoxia and hypercapnia (HCC) Acute on chronic respiratory failure with hypoxia and hypercapnia (HCC) Acute on chronic respiratory failure with hypoxia and hypercapnia (HCC) Acute on chronic respiratory failure with hypoxia and hypercapnia (HCC) ICD-10: Treatment Diagnosis:  
 · Generalized Muscle Weakness (M62.81) · Difficulty in walking, Not elsewhere classified (R26.2) · Other abnormalities of gait and mobility (R26.89) Precaution/Allergies: 
Patient has no known allergies. ASSESSMENT:  
Mr. Corinna Kinney is a 52year old male admitted from home for respiratory failure. He lives with family and is typically ambulatory with cane. Pt is supine and is agreeable to therapy treatment. Pt performs bed mobility and transfers with SBA. Ambulates 200 ft in room and hallway with walker, SBA and min verbal cueing for posture and gait mechanics with walker. Demonstrates slow antalgic (back and right hip pain) gait pace with decreased step clearance and mild trunk sway/shuffling. Returned to room and to EOB. Left with needs in reach. Paty Degroot is functioning slightly below baseline with strength and mobility due to prolonged hospital stay and decreased activity. He will benefit from continued therapy during acute care stay to address deficits/maximize independence with mobility. Anticipate dc with family support and  PT. This section established at most recent assessment PROBLEM LIST (Impairments causing functional limitations): 1. Decreased Strength 2. Decreased ADL/Functional Activities 3. Decreased Transfer Abilities 4. Decreased Ambulation Ability/Technique 5. Decreased Balance 6. Increased Pain 7. Decreased Activity Tolerance INTERVENTIONS PLANNED: (Benefits and precautions of physical therapy have been discussed with the patient.) 1. Balance Exercise 2. Bed Mobility 3. Gait Training 4. Home Exercise Program (HEP) 5. Therapeutic Activites 6. Therapeutic Exercise/Strengthening 7. Transfer Training TREATMENT PLAN: Frequency/Duration: 3 times a week for duration of hospital stay Rehabilitation Potential For Stated Goals: Good RECOMMENDED REHABILITATION/EQUIPMENT: (at time of discharge pending progress): Due to the probability of continued deficits (see above) this patient will likely need continued skilled physical therapy after discharge. Equipment:  
? None at this time HISTORY:  
History of Present Injury/Illness (Reason for Referral): 
Per H&P, \"  
The patient is a 52 y.o.  male known to [facility] with morbid obesity, NATALIIA, COPD, substance abuse, acute on chronic respiratory failure, ? OHS who presented to the ER via EMS on CPAP. Apparently he called EMS earlier in distress and was found in the tripod position. On arrival to the ER he was placed on BIPAP.  He has been largely obtunded but does arouse to sternal rub. He received 1 amp of narcan with limited response. I arrived after the narcan and found his neck flexed by 2 pillows and removed them with improved air flow on BIPAP. His pupils were pinpoint. I examined him and was checking babinski's and he awaked with this stimulation with downgoing toes. A second amp of narcan has been given. A urine tox screen is pending. He can provide no history. He has been homeless in the past. 
  
He has a history of atrial arrhythmias and was apparently on Xarelto and amiodarone in the past. It is unclear if he is still using these medications.  
  
A CT was performed revealing no infiltrates or pneumonia. A trace R effusion and pericardial effusion was noted. \" 
 
Past Medical History/Comorbidities:  
Mr. Fabiola Craft  has a past medical history of Acute respiratory failure with hypercapnia ([address] Chronic obstructive pulmonary disease ([address] Heart failure ([address] and Sleep disorder. Mr. Fabiola Craft  has no past surgical history on file. Social History/Living Environment:  
Home Environment: Private residence # Steps to Enter: 3 Rails to Enter: Yes Wheelchair Ramp: No 
One/Two Story Residence: One story Living Alone: No 
Support Systems: Family member(s), Spouse/Significant Other/Partner Patient Expects to be Discharged to[de-identified] Private residence Current DME Used/Available at Home: Carle[employer] Friendly, straight, Walker, rolling, Shower chair Prior Level of Function/Work/Activity: 
Lives with wife; will be staying with son at [place]. Ambulatory with cane at baseline but does have walker. One recent fall when walking out in yard. Number of Personal Factors/Comorbidities that affect the Plan of Care: 1-2: MODERATE COMPLEXITY EXAMINATION:  
Most Recent Physical Functioning:  
Gross Assessment: 
  
         
  
Posture: 
  
Balance: 
Sitting: Intact Standing: Impaired Standing - Static: Good Standing - Dynamic : Fair(+) Bed Mobility: 
Supine to Sit: Modified independent Wheelchair Mobility: 
  
Transfers: 
Sit to Stand: Supervision Stand to Sit: Supervision Gait: 
  
Base of Support: Center of gravity altered; Widened Speed/Fawn: Pace decreased (<100 feet/min); Shuffled Step Length: Left shortened;Right shortened Gait Abnormalities: Antalgic;Decreased step clearance;Shuffling gait;Trunk sway increased Distance (ft): 200 Feet (ft) Assistive Device: Walker, rolling Ambulation - Level of Assistance: Stand-by assistance Interventions: Safety awareness training;Verbal cues Body Structures Involved: 1. Muscles Body Functions Affected: 1. Movement Related Activities and Participation Affected: 1. General Tasks and Demands 2. Mobility 3. Domestic Life 4. Community, Social and [place] Number of elements that affect the Plan of Care: 4+: HIGH COMPLEXITY CLINICAL PRESENTATION:  
Presentation: Stable and uncomplicated: LOW COMPLEXITY CLINICAL DECISION MAKING:  
[facility] [employer] 6 Clicks Basic Mobility Inpatient Short Form How much difficulty does the patient currently have. .. Unable A Lot A Little None 1. Turning over in bed (including adjusting bedclothes, sheets and blankets)? [] 1   [] 2   [] 3   [x] 4  
2. Sitting down on and standing up from a chair with arms ( e.g., wheelchair, bedside commode, etc.)   [] 1   [] 2   [] 3   [x] 4  
3. Moving from lying on back to sitting on the side of the bed? [] 1   [] 2   [] 3   [x] 4 How much help from another person does the patient currently need. .. Total A Lot A Little None 4. Moving to and from a bed to a chair (including a wheelchair)? [] 1   [] 2   [x] 3   [] 4  
5. Need to walk in hospital room? [] 1   [] 2   [x] 3   [] 4  
6. Climbing 3-5 steps with a railing? [] 1   [x] 2   [] 3   [] 4  
© 2007, Trustees of [facility] [employer], under license to [employer]. All rights reserved Score:  Initial: 20 Most Recent: X (Date: -- ) Interpretation of Tool:  Represents activities that are increasingly more difficult (i.e. Bed mobility, Transfers, Gait). Score 24 23 22-20 19-15 14-10 9-7 6 Modifier CH CI CJ CK CL CM CN   
 
? Mobility - Walking and Moving Around:  
  - CURRENT STATUS: CJ - 20%-39% impaired, limited or restricted  - GOAL STATUS: CI - 1%-19% impaired, limited or restricted  - D/C STATUS:  ---------------To be determined--------------- Payor: SELF PAY / Plan: [facility] SELF PAY / Product Type: Self Pay /   
 
Medical Necessity:    
· Patient demonstrates good rehab potential due to higher previous functional level. Reason for Services/Other Comments: 
· Patient continues to demonstrate capacity to improve strenth, mobility, balance, transfers, activity tolerance which will increase independence and increase safety. Use of outcome tool(s) and clinical judgement create a POC that gives a: Clear prediction of patient's progress: LOW COMPLEXITY  
  
 
 
 
TREATMENT:  
(In addition to Assessment/Re-Assessment sessions the following treatments were rendered) Pre-treatment Symptoms/Complaints: \"My back and (right) hip hurt. \" 
Pain: Initial: Did not rate Post Session:  No change. Therapeutic Activity: (    18 min): Therapeutic activities including bed mobility and ambulation on level surface to improve mobility, strength and balance. Required very min Safety awareness training;Verbal cues to promote dynamic balance in standing. Braces/Orthotics/Lines/Etc:  
· O2 Device: Nasal cannula Treatment/Session Assessment:   
· Response to Treatment:  See above · Interdisciplinary Collaboration:  
o Physical Therapy Assistant 
o Registered Nurse · After treatment position/precautions:  
o Bed/Chair-wheels locked 
o Bed in low position 
o Call light within reach 
o RN notified 
o sitting EOB · Compliance with Program/Exercises: Compliant all of the time · Recommendations/Intent for next treatment session: \"Next visit will focus on advancements to more challenging activities and reduction in assistance provided\". Total Treatment Duration: PT Patient Time In/Time Out Time In: 1001 Time Out: 1019 Danyell Flynn PTA

## 2018-11-30 NOTE — PROGRESS NOTES
Spoke to Mr. Solitario Chou in room 218 again about discharge planning. His wife is coming to pick him up, and he has a portable oxygen tank, concentrator, CPAP (that works correctly), and a nebulizer. Med voucher provided for [employer]. [facility]. [facility] RN and PT referral, order, and face to face placed into Epic/[employer] link.

## 2018-11-30 NOTE — DISCHARGE INSTRUCTIONS
DISCHARGE SUMMARY from Nurse    PATIENT INSTRUCTIONS:    After general anesthesia or intravenous sedation, for 24 hours or while taking prescription Narcotics:  · Limit your activities  · Do not drive and operate hazardous machinery  · Do not make important personal or business decisions  · Do  not drink alcoholic beverages  · If you have not urinated within 8 hours after discharge, please contact your surgeon on call. Report the following to your surgeon:  · Excessive pain, swelling, redness or odor of or around the surgical area  · Temperature over 100.5  · Nausea and vomiting lasting longer than 4 hours or if unable to take medications  · Any signs of decreased circulation or nerve impairment to extremity: change in color, persistent  numbness, tingling, coldness or increase pain  · Any questions    What to do at Home:  Recommended activity: Activity as tolerated, per MD instructions     If you experience any of the following symptoms fever > 100.5, nausea, vomiting, pain, chest pain and/or shortness of breath to the ER please follow up with MD.    *  Please give a list of your current medications to your Primary Care Provider. *  Please update this list whenever your medications are discontinued, doses are      changed, or new medications (including over-the-counter products) are added. *  Please carry medication information at all times in case of emergency situations. These are general instructions for a healthy lifestyle:    No smoking/ No tobacco products/ Avoid exposure to second hand smoke  Surgeon General's Warning:  Quitting smoking now greatly reduces serious risk to your health.     Obesity, smoking, and sedentary lifestyle greatly increases your risk for illness    A healthy diet, regular physical exercise & weight monitoring are important for maintaining a healthy lifestyle    You may be retaining fluid if you have a history of heart failure or if you experience any of the following symptoms:  Weight gain of 3 pounds or more overnight or 5 pounds in a week, increased swelling in our hands or feet or shortness of breath while lying flat in bed. Please call your doctor as soon as you notice any of these symptoms; do not wait until your next office visit. Recognize signs and symptoms of STROKE:    F-face looks uneven    A-arms unable to move or move unevenly    S-speech slurred or non-existent    T-time-call 911 as soon as signs and symptoms begin-DO NOT go       Back to bed or wait to see if you get better-TIME IS BRAIN. Warning Signs of HEART ATTACK     Call 911 if you have these symptoms:   Chest discomfort. Most heart attacks involve discomfort in the center of the chest that lasts more than a few minutes, or that goes away and comes back. It can feel like uncomfortable pressure, squeezing, fullness, or pain.  Discomfort in other areas of the upper body. Symptoms can include pain or discomfort in one or both arms, the back, neck, jaw, or stomach.  Shortness of breath with or without chest discomfort.  Other signs may include breaking out in a cold sweat, nausea, or lightheadedness. Don't wait more than five minutes to call 911 - MINUTES MATTER! Fast action can save your life. Calling 911 is almost always the fastest way to get lifesaving treatment. Emergency Medical Services staff can begin treatment when they arrive -- up to an hour sooner than if someone gets to the hospital by car. The discharge information has been reviewed with the patient. The patient verbalized understanding. Discharge medications reviewed with the patient and appropriate educational materials and side effects teaching were provided.   ___________________________________________________________________________________________________________________________________             Chronic Obstructive Pulmonary Disease (COPD): Care Instructions  Your Care Instructions    Chronic obstructive pulmonary disease (COPD) is a general term for a group of lung diseases, including emphysema and chronic bronchitis. People with COPD have decreased airflow in and out of the lungs, which makes it hard to breathe. The airways also can get clogged with thick mucus. Cigarette smoking is a major cause of COPD. Although there is no cure for COPD, you can slow its progress. Following your treatment plan and taking care of yourself can help you feel better and live longer. Follow-up care is a key part of your treatment and safety. Be sure to make and go to all appointments, and call your doctor if you are having problems. It's also a good idea to know your test results and keep a list of the medicines you take. How can you care for yourself at home?   Staying healthy    · Do not smoke. This is the most important step you can take to prevent more damage to your lungs. If you need help quitting, talk to your doctor about stop-smoking programs and medicines. These can increase your chances of quitting for good.     · Avoid colds and flu. Get a pneumococcal vaccine shot. If you have had one before, ask your doctor whether you need a second dose. Get the flu vaccine every fall. If you must be around people with colds or the flu, wash your hands often.     · Avoid secondhand smoke, air pollution, and high altitudes. Also avoid cold, dry air and hot, humid air. Stay at home with your windows closed when air pollution is bad.    Medicines and oxygen therapy    · Take your medicines exactly as prescribed. Call your doctor if you think you are having a problem with your medicine.     · You may be taking medicines such as:  ? Bronchodilators. These help open your airways and make breathing easier. Bronchodilators are either short-acting (work for 6 to 9 hours) or long-acting (work for 24 hours). You inhale most bronchodilators, so they start to act quickly.  Always carry your quick-relief inhaler with you in case you need it while you are away from home. ? Corticosteroids (prednisone, budesonide). These reduce airway inflammation. They come in pill or inhaled form. You must take these medicines every day for them to work well.     · A spacer may help you get more inhaled medicine to your lungs. Ask your doctor or pharmacist if a spacer is right for you. If it is, ask how to use it properly.     · Do not take any vitamins, over-the-counter medicine, or herbal products without talking to your doctor first.     · If your doctor prescribed antibiotics, take them as directed. Do not stop taking them just because you feel better. You need to take the full course of antibiotics.     · Oxygen therapy boosts the amount of oxygen in your blood and helps you breathe easier. Use the flow rate your doctor has recommended, and do not change it without talking to your doctor first.   Activity    · Get regular exercise. Walking is an easy way to get exercise. Start out slowly, and walk a little more each day.     · Pay attention to your breathing. You are exercising too hard if you cannot talk while you are exercising.     · Take short rest breaks when doing household chores and other activities.     · Learn breathing methods--such as breathing through pursed lips--to help you become less short of breath.     · If your doctor has not set you up with a pulmonary rehabilitation program, talk to him or her about whether rehab is right for you. Rehab includes exercise programs, education about your disease and how to manage it, help with diet and other changes, and emotional support. Diet    · Eat regular, healthy meals. Use bronchodilators about 1 hour before you eat to make it easier to eat. Eat several small meals instead of three large ones.  Drink beverages at the end of the meal. Avoid foods that are hard to chew.     · Eat foods that contain protein so that you do not lose muscle mass.     · Talk with your doctor if you gain too much weight or if you lose weight without trying.    Mental health    · Talk to your family, friends, or a therapist about your feelings. It is normal to feel frightened, angry, hopeless, helpless, and even guilty. Talking openly about bad feelings can help you cope. If these feelings last, talk to your doctor. When should you call for help? Call 911 anytime you think you may need emergency care. For example, call if:    · You have severe trouble breathing.    Call your doctor now or seek immediate medical care if:    · You have new or worse trouble breathing.     · You cough up blood.     · You have a fever.    Watch closely for changes in your health, and be sure to contact your doctor if:    · You cough more deeply or more often, especially if you notice more mucus or a change in the color of your mucus.     · You have new or worse swelling in your legs or belly.     · You are not getting better as expected. Where can you learn more? Go to http://regina-jacy.info/. Wes Gutierrez in the search box to learn more about \"Chronic Obstructive Pulmonary Disease (COPD): Care Instructions. \"  Current as of: December 6, 2017  Content Version: 11.8  © 0066-3436 [employer]. Care instructions adapted under license by [employer] (which disclaims liability or warranty for this information). If you have questions about a medical condition or this instruction, always ask your healthcare professional. [address] any warranty or liability for your use of this information.

## 2018-11-30 NOTE — PROGRESS NOTES
Gave discharge instructions to the pt. The pt voiced a clear understanding. The primary care nurse is aware, the PICC line has been removed. The pt has requested a prescription for pain medication and the  is setting up [facility] and [facility] for medications.

## 2018-12-02 ENCOUNTER — APPOINTMENT (OUTPATIENT)
Dept: GENERAL RADIOLOGY | Age: 53
DRG: 189 | End: 2018-12-02
Attending: EMERGENCY MEDICINE
Payer: SELF-PAY

## 2018-12-02 ENCOUNTER — HOSPITAL ENCOUNTER (INPATIENT)
Age: 53
LOS: 10 days | Discharge: HOME | DRG: 189 | End: 2018-12-12
Attending: EMERGENCY MEDICINE | Admitting: INTERNAL MEDICINE
Payer: SELF-PAY

## 2018-12-02 ENCOUNTER — HOME CARE VISIT (OUTPATIENT)
Dept: HOME HEALTH SERVICES | Facility: HOME HEALTH | Age: 53
End: 2018-12-02
Payer: SELF-PAY

## 2018-12-02 ENCOUNTER — HOME CARE VISIT (OUTPATIENT)
Dept: SCHEDULING | Facility: HOME HEALTH | Age: 53
End: 2018-12-02
Payer: SELF-PAY

## 2018-12-02 DIAGNOSIS — J96.21 ACUTE ON CHRONIC RESPIRATORY FAILURE WITH HYPOXIA AND HYPERCAPNIA (HCC): ICD-10-CM

## 2018-12-02 DIAGNOSIS — R09.02 HYPOXIA: ICD-10-CM

## 2018-12-02 DIAGNOSIS — F19.10 SUBSTANCE ABUSE (HCC): ICD-10-CM

## 2018-12-02 DIAGNOSIS — N17.9 ACUTE KIDNEY INJURY (HCC): ICD-10-CM

## 2018-12-02 DIAGNOSIS — F19.10 POLYSUBSTANCE ABUSE (HCC): ICD-10-CM

## 2018-12-02 DIAGNOSIS — G47.33 OSA (OBSTRUCTIVE SLEEP APNEA): Chronic | ICD-10-CM

## 2018-12-02 DIAGNOSIS — G93.40 ACUTE ENCEPHALOPATHY: ICD-10-CM

## 2018-12-02 DIAGNOSIS — J96.01 ACUTE RESPIRATORY FAILURE WITH HYPOXIA AND HYPERCARBIA (HCC): ICD-10-CM

## 2018-12-02 DIAGNOSIS — E66.01 MORBID OBESITY (HCC): Chronic | ICD-10-CM

## 2018-12-02 DIAGNOSIS — Z59.00 HOMELESS: Chronic | ICD-10-CM

## 2018-12-02 DIAGNOSIS — F17.210 CIGARETTE NICOTINE DEPENDENCE WITHOUT COMPLICATION: Chronic | ICD-10-CM

## 2018-12-02 DIAGNOSIS — J44.9 CHRONIC OBSTRUCTIVE PULMONARY DISEASE, UNSPECIFIED COPD TYPE (HCC): Primary | ICD-10-CM

## 2018-12-02 DIAGNOSIS — F15.10 METHAMPHETAMINE ABUSE (HCC): Chronic | ICD-10-CM

## 2018-12-02 DIAGNOSIS — J96.02 ACUTE RESPIRATORY FAILURE WITH HYPOXIA AND HYPERCARBIA (HCC): ICD-10-CM

## 2018-12-02 DIAGNOSIS — J96.22 ACUTE ON CHRONIC RESPIRATORY FAILURE WITH HYPOXIA AND HYPERCAPNIA (HCC): ICD-10-CM

## 2018-12-02 DIAGNOSIS — I10 ESSENTIAL HYPERTENSION: Chronic | ICD-10-CM

## 2018-12-02 DIAGNOSIS — Z91.199 NONCOMPLIANCE: ICD-10-CM

## 2018-12-02 DIAGNOSIS — J96.11 CHRONIC RESPIRATORY FAILURE WITH HYPOXIA (HCC): Chronic | ICD-10-CM

## 2018-12-02 DIAGNOSIS — F17.208 NICOTINE DEPENDENCE WITH OTHER NICOTINE-INDUCED DISORDER, UNSPECIFIED NICOTINE PRODUCT TYPE: ICD-10-CM

## 2018-12-02 DIAGNOSIS — Z86.79 H/O ATRIAL FLUTTER: Chronic | ICD-10-CM

## 2018-12-02 LAB
ALBUMIN SERPL-MCNC: 2.7 G/DL (ref 3.5–5)
ALBUMIN/GLOB SERPL: 0.8 {RATIO} (ref 1.2–3.5)
ALP SERPL-CCNC: 66 U/L (ref 50–136)
ALT SERPL-CCNC: 22 U/L (ref 12–65)
AMPHET UR QL SCN: POSITIVE
ANION GAP SERPL CALC-SCNC: 2 MMOL/L (ref 7–16)
ARTERIAL PATENCY WRIST A: YES
ARTERIAL PATENCY WRIST A: YES
AST SERPL-CCNC: 10 U/L (ref 15–37)
BARBITURATES UR QL SCN: NEGATIVE
BASE EXCESS BLD CALC-SCNC: 12 MMOL/L
BASE EXCESS BLD CALC-SCNC: 9 MMOL/L
BASOPHILS # BLD: 0 K/UL (ref 0–0.2)
BASOPHILS NFR BLD: 0 % (ref 0–2)
BDY SITE: ABNORMAL
BDY SITE: ABNORMAL
BENZODIAZ UR QL: NEGATIVE
BILIRUB SERPL-MCNC: 0.3 MG/DL (ref 0.2–1.1)
BNP SERPL-MCNC: 371 PG/ML
BODY TEMPERATURE: 98.6
BODY TEMPERATURE: 98.6
BUN SERPL-MCNC: 18 MG/DL (ref 6–23)
CA-I BLD-MCNC: 1.2 MMOL/L (ref 1.12–1.32)
CA-I BLD-MCNC: 1.23 MMOL/L (ref 1.12–1.32)
CALCIUM SERPL-MCNC: 8.5 MG/DL (ref 8.3–10.4)
CANNABINOIDS UR QL SCN: POSITIVE
CHLORIDE SERPL-SCNC: 99 MMOL/L (ref 98–107)
CO2 SERPL-SCNC: 41 MMOL/L (ref 21–32)
COCAINE UR QL SCN: NEGATIVE
CREAT SERPL-MCNC: 1.13 MG/DL (ref 0.8–1.5)
DIFFERENTIAL METHOD BLD: ABNORMAL
EOSINOPHIL # BLD: 0.1 K/UL (ref 0–0.8)
EOSINOPHIL NFR BLD: 1 % (ref 0.5–7.8)
ERYTHROCYTE [DISTWIDTH] IN BLOOD BY AUTOMATED COUNT: 13.5 % (ref 11.9–14.6)
ETHANOL SERPL-MCNC: <3 MG/DL
GAS FLOW.O2 O2 DELIVERY SYS: ABNORMAL L/MIN
GAS FLOW.O2 O2 DELIVERY SYS: ABNORMAL L/MIN
GAS FLOW.O2 SETTING OXYMISER: 10 BPM
GLOBULIN SER CALC-MCNC: 3.6 G/DL (ref 2.3–3.5)
GLUCOSE BLD STRIP.AUTO-MCNC: 117 MG/DL (ref 65–100)
GLUCOSE BLD STRIP.AUTO-MCNC: 139 MG/DL (ref 65–100)
GLUCOSE SERPL-MCNC: 131 MG/DL (ref 65–100)
HCO3 BLD-SCNC: 39.9 MMOL/L (ref 22–26)
HCO3 BLD-SCNC: 41.3 MMOL/L (ref 22–26)
HCT VFR BLD AUTO: 39.7 % (ref 41.1–50.3)
HGB BLD-MCNC: 11.7 G/DL (ref 13.6–17.2)
IMM GRANULOCYTES # BLD: 0.1 K/UL (ref 0–0.5)
IMM GRANULOCYTES NFR BLD AUTO: 1 % (ref 0–5)
INSPIRATION.DURATION SETTING TIME VENT: 0.9 SEC
LYMPHOCYTES # BLD: 1 K/UL (ref 0.5–4.6)
LYMPHOCYTES NFR BLD: 12 % (ref 13–44)
MCH RBC QN AUTO: 31.9 PG (ref 26.1–32.9)
MCHC RBC AUTO-ENTMCNC: 29.5 G/DL (ref 31.4–35)
MCV RBC AUTO: 108.2 FL (ref 79.6–97.8)
METHADONE UR QL: NEGATIVE
MONOCYTES # BLD: 0.8 K/UL (ref 0.1–1.3)
MONOCYTES NFR BLD: 10 % (ref 4–12)
NEUTS SEG # BLD: 6.1 K/UL (ref 1.7–8.2)
NEUTS SEG NFR BLD: 76 % (ref 43–78)
NRBC # BLD: 0 K/UL (ref 0–0.2)
O2/TOTAL GAS SETTING VFR VENT: 40 %
OPIATES UR QL: POSITIVE
PCO2 BLD: 79.1 MMHG (ref 35–45)
PCO2 BLD: 90.5 MMHG (ref 35–45)
PCP UR QL: NEGATIVE
PH BLD: 7.25 [PH] (ref 7.35–7.45)
PH BLD: 7.33 [PH] (ref 7.35–7.45)
PIP ISTAT,IPIP: 20
PLATELET # BLD AUTO: 177 K/UL (ref 150–450)
PMV BLD AUTO: 10.3 FL (ref 9.4–12.3)
PO2 BLD: 52 MMHG (ref 75–100)
PO2 BLD: 54 MMHG (ref 75–100)
POTASSIUM BLD-SCNC: 4.2 MMOL/L (ref 3.5–5.1)
POTASSIUM BLD-SCNC: 4.3 MMOL/L (ref 3.5–5.1)
POTASSIUM SERPL-SCNC: 4.5 MMOL/L (ref 3.5–5.1)
PROT SERPL-MCNC: 6.3 G/DL (ref 6.3–8.2)
RBC # BLD AUTO: 3.67 M/UL (ref 4.23–5.6)
SAO2 % BLD: 78 % (ref 95–98)
SAO2 % BLD: 83 % (ref 95–98)
SERVICE CMNT-IMP: ABNORMAL
SERVICE CMNT-IMP: ABNORMAL
SODIUM BLD-SCNC: 139 MMOL/L (ref 136–145)
SODIUM BLD-SCNC: 139 MMOL/L (ref 136–145)
SODIUM SERPL-SCNC: 142 MMOL/L (ref 136–145)
SPECIMEN TYPE: ABNORMAL
SPECIMEN TYPE: ABNORMAL
VT SETTING VENT: 510 ML
WBC # BLD AUTO: 8 K/UL (ref 4.3–11.1)

## 2018-12-02 PROCEDURE — 82803 BLOOD GASES ANY COMBINATION: CPT

## 2018-12-02 PROCEDURE — 96375 TX/PRO/DX INJ NEW DRUG ADDON: CPT | Performed by: EMERGENCY MEDICINE

## 2018-12-02 PROCEDURE — 74011000250 HC RX REV CODE- 250: Performed by: INTERNAL MEDICINE

## 2018-12-02 PROCEDURE — 77030032490 HC SLV COMPR SCD KNE COVD -B

## 2018-12-02 PROCEDURE — 99223 1ST HOSP IP/OBS HIGH 75: CPT | Performed by: INTERNAL MEDICINE

## 2018-12-02 PROCEDURE — 96374 THER/PROPH/DIAG INJ IV PUSH: CPT | Performed by: EMERGENCY MEDICINE

## 2018-12-02 PROCEDURE — 80053 COMPREHEN METABOLIC PANEL: CPT

## 2018-12-02 PROCEDURE — 74011250636 HC RX REV CODE- 250/636: Performed by: EMERGENCY MEDICINE

## 2018-12-02 PROCEDURE — G0299 HHS/HOSPICE OF RN EA 15 MIN: HCPCS

## 2018-12-02 PROCEDURE — 83880 ASSAY OF NATRIURETIC PEPTIDE: CPT

## 2018-12-02 PROCEDURE — 74011000250 HC RX REV CODE- 250: Performed by: EMERGENCY MEDICINE

## 2018-12-02 PROCEDURE — 94640 AIRWAY INHALATION TREATMENT: CPT

## 2018-12-02 PROCEDURE — 80307 DRUG TEST PRSMV CHEM ANLYZR: CPT

## 2018-12-02 PROCEDURE — 93005 ELECTROCARDIOGRAM TRACING: CPT | Performed by: EMERGENCY MEDICINE

## 2018-12-02 PROCEDURE — 65620000000 HC RM CCU GENERAL

## 2018-12-02 PROCEDURE — 71045 X-RAY EXAM CHEST 1 VIEW: CPT

## 2018-12-02 PROCEDURE — 400013 HH SOC

## 2018-12-02 PROCEDURE — 99285 EMERGENCY DEPT VISIT HI MDM: CPT | Performed by: EMERGENCY MEDICINE

## 2018-12-02 PROCEDURE — 94660 CPAP INITIATION&MGMT: CPT

## 2018-12-02 PROCEDURE — 85025 COMPLETE CBC W/AUTO DIFF WBC: CPT

## 2018-12-02 PROCEDURE — 77030019605

## 2018-12-02 PROCEDURE — 36600 WITHDRAWAL OF ARTERIAL BLOOD: CPT

## 2018-12-02 PROCEDURE — 82330 ASSAY OF CALCIUM: CPT

## 2018-12-02 PROCEDURE — 74011250637 HC RX REV CODE- 250/637: Performed by: INTERNAL MEDICINE

## 2018-12-02 PROCEDURE — 77030013032 HC MSK BPAP/CPAP FISP -B

## 2018-12-02 RX ORDER — AMOXICILLIN 250 MG
1 CAPSULE ORAL
Status: DISCONTINUED | OUTPATIENT
Start: 2018-12-02 | End: 2018-12-12 | Stop reason: HOSPADM

## 2018-12-02 RX ORDER — AMIODARONE HYDROCHLORIDE 200 MG/1
200 TABLET ORAL EVERY 12 HOURS
Status: DISCONTINUED | OUTPATIENT
Start: 2018-12-02 | End: 2018-12-12 | Stop reason: HOSPADM

## 2018-12-02 RX ORDER — CARVEDILOL 12.5 MG/1
25 TABLET ORAL 2 TIMES DAILY WITH MEALS
Status: DISCONTINUED | OUTPATIENT
Start: 2018-12-02 | End: 2018-12-12 | Stop reason: HOSPADM

## 2018-12-02 RX ORDER — SODIUM CHLORIDE 0.9 % (FLUSH) 0.9 %
5-10 SYRINGE (ML) INJECTION AS NEEDED
Status: DISCONTINUED | OUTPATIENT
Start: 2018-12-02 | End: 2018-12-12 | Stop reason: HOSPADM

## 2018-12-02 RX ORDER — FUROSEMIDE 10 MG/ML
40 INJECTION INTRAMUSCULAR; INTRAVENOUS
Status: COMPLETED | OUTPATIENT
Start: 2018-12-02 | End: 2018-12-02

## 2018-12-02 RX ORDER — HYDRALAZINE HYDROCHLORIDE 50 MG/1
50 TABLET, FILM COATED ORAL 3 TIMES DAILY
Status: DISCONTINUED | OUTPATIENT
Start: 2018-12-02 | End: 2018-12-12 | Stop reason: HOSPADM

## 2018-12-02 RX ORDER — ONDANSETRON 2 MG/ML
4 INJECTION INTRAMUSCULAR; INTRAVENOUS
Status: DISCONTINUED | OUTPATIENT
Start: 2018-12-02 | End: 2018-12-12 | Stop reason: HOSPADM

## 2018-12-02 RX ORDER — SODIUM CHLORIDE 0.9 % (FLUSH) 0.9 %
5-10 SYRINGE (ML) INJECTION EVERY 8 HOURS
Status: DISCONTINUED | OUTPATIENT
Start: 2018-12-02 | End: 2018-12-12 | Stop reason: HOSPADM

## 2018-12-02 RX ORDER — AMLODIPINE BESYLATE 10 MG/1
10 TABLET ORAL DAILY
Status: DISCONTINUED | OUTPATIENT
Start: 2018-12-03 | End: 2018-12-12 | Stop reason: HOSPADM

## 2018-12-02 RX ORDER — FUROSEMIDE 10 MG/ML
40 INJECTION INTRAMUSCULAR; INTRAVENOUS ONCE
Status: COMPLETED | OUTPATIENT
Start: 2018-12-03 | End: 2018-12-03

## 2018-12-02 RX ORDER — ACETAMINOPHEN 325 MG/1
650 TABLET ORAL
Status: DISCONTINUED | OUTPATIENT
Start: 2018-12-02 | End: 2018-12-12 | Stop reason: HOSPADM

## 2018-12-02 RX ORDER — AMIODARONE HYDROCHLORIDE 200 MG/1
200 TABLET ORAL 2 TIMES DAILY
Status: DISCONTINUED | OUTPATIENT
Start: 2018-12-02 | End: 2018-12-02 | Stop reason: SDUPTHER

## 2018-12-02 RX ORDER — IPRATROPIUM BROMIDE AND ALBUTEROL SULFATE 2.5; .5 MG/3ML; MG/3ML
3 SOLUTION RESPIRATORY (INHALATION)
Status: COMPLETED | OUTPATIENT
Start: 2018-12-02 | End: 2018-12-02

## 2018-12-02 RX ORDER — LEVALBUTEROL INHALATION SOLUTION 0.63 MG/3ML
0.63 SOLUTION RESPIRATORY (INHALATION)
Status: DISCONTINUED | OUTPATIENT
Start: 2018-12-02 | End: 2018-12-12 | Stop reason: HOSPADM

## 2018-12-02 RX ORDER — NALOXONE HYDROCHLORIDE 0.4 MG/ML
0.4 INJECTION, SOLUTION INTRAMUSCULAR; INTRAVENOUS; SUBCUTANEOUS AS NEEDED
Status: DISCONTINUED | OUTPATIENT
Start: 2018-12-02 | End: 2018-12-12 | Stop reason: HOSPADM

## 2018-12-02 RX ORDER — SERTRALINE HYDROCHLORIDE 50 MG/1
50 TABLET, FILM COATED ORAL DAILY
Status: DISCONTINUED | OUTPATIENT
Start: 2018-12-03 | End: 2018-12-12 | Stop reason: HOSPADM

## 2018-12-02 RX ADMIN — Medication 10 ML: at 17:48

## 2018-12-02 RX ADMIN — IPRATROPIUM BROMIDE AND ALBUTEROL SULFATE 3 ML: .5; 3 SOLUTION RESPIRATORY (INHALATION) at 15:02

## 2018-12-02 RX ADMIN — FUROSEMIDE 40 MG: 10 INJECTION, SOLUTION INTRAMUSCULAR; INTRAVENOUS at 15:47

## 2018-12-02 RX ADMIN — LEVALBUTEROL HYDROCHLORIDE 0.63 MG: 0.63 SOLUTION RESPIRATORY (INHALATION) at 20:38

## 2018-12-02 RX ADMIN — HYDRALAZINE HYDROCHLORIDE 50 MG: 50 TABLET, FILM COATED ORAL at 21:52

## 2018-12-02 RX ADMIN — AMIODARONE HYDROCHLORIDE 200 MG: 200 TABLET ORAL at 21:51

## 2018-12-02 RX ADMIN — RIVAROXABAN 20 MG: 20 TABLET, FILM COATED ORAL at 17:53

## 2018-12-02 RX ADMIN — Medication 10 ML: at 21:53

## 2018-12-02 RX ADMIN — METHYLPREDNISOLONE SODIUM SUCCINATE 125 MG: 125 INJECTION, POWDER, FOR SOLUTION INTRAMUSCULAR; INTRAVENOUS at 14:44

## 2018-12-02 RX ADMIN — CARVEDILOL 25 MG: 12.5 TABLET, FILM COATED ORAL at 17:53

## 2018-12-02 SDOH — ECONOMIC STABILITY - HOUSING INSECURITY: HOMELESSNESS UNSPECIFIED: Z59.00

## 2018-12-02 NOTE — H&P
HISTORY & PHYSICAL EXAM 
 
Romy Burris 12/2/2018 Date of Admission:  12/2/2018 Consultation performed at the request of Dr. Dieudonne Pulido of the [facility] Emergency Dept Reason for admission:  
Acute respiratory failure with hypercarbia requiring BiPAP 
 
HPI:  
 
Romy Burris is a 52 y.o. male with a h/o morbid obesity, NATALIIA, presumed COPD, substance abuse (amphetamines, opioids, benzos in past), smoking and atrial flutter who was recently hospitalized at [facility] from 11/16-30th. His admission was notable for suspected drug use prior to being found unresponsive and brought in by EMS. He required intubation and MIRANDA w cardioversion for atrial flutter. He was managed with nightly CPAP (his home equipment which worked properly) prior to his discharge. Today, he returned with dyspnea and a RA oxygen saturation of 76%. His initial ABG demonstrated hypercarbia:  7.25/90/52 and after 1h of BiPAP improved incrementally to 7.33/79/54. On serum testing his HCO3 has risen to 41 and his renal function is stable. BNP is 371, slightly above likely baseline in 100s. Urine drug screen is pending. He received 40mg of IV lasix, solumedrol, bronchodilators and continues on 20/10 BiPAP. On evaluation, he slurs his speech and has pinpoint pupils. He confirms using marijuana and smoking a cigarette and says that his CPAP is broken since 2 days ago because his son dropped it. He has pitting ankle edema. His UDS is again positive for amphetamines, opiates and THC. Medication reconciliation not able to be performed due to patient's grogginess. REVIEW OF SYSTEMS: 
Unable to obtain due to patient confusion Prior to Admission Medications Prescriptions Last Dose Informant Patient Reported? Taking? HYDROcodone-acetaminophen (NORCO) 7.5-325 mg per tablet   No No  
Sig: Take 1 Tab by mouth every four (4) hours as needed. Max Daily Amount: 6 Tabs. albuterol (PROVENTIL HFA, VENTOLIN HFA, PROAIR HFA) 90 mcg/actuation inhaler   No No  
Sig: Take 2 Puffs by inhalation every four (4) hours as needed for Wheezing. albuterol-ipratropium (DUO-NEB) 2.5 mg-0.5 mg/3 ml nebu   No No  
Sig: 3 mL by Nebulization route every four (4) hours as needed. amLODIPine (NORVASC) 10 mg tablet   No No  
Sig: Take 1 Tab by mouth daily. amiodarone (CORDARONE) 200 mg tablet   No No  
Sig: Take 1 Tab by mouth every twelve (12) hours. carvedilol (COREG) 25 mg tablet   No No  
Sig: Take 1 Tab by mouth two (2) times daily (with meals). furosemide (LASIX) 40 mg tablet   No No  
Sig: Take 1 Tab by mouth daily. hydrALAZINE (APRESOLINE) 50 mg tablet   No No  
Sig: Take 1 Tab by mouth three (3) times daily. levalbuterol (XOPENEX) 1.25 mg/3 mL nebu   No No  
Sig: 3 mL by Nebulization route every four (4) hours as needed. nicotine (NICODERM CQ) 21 mg/24 hr   No No  
Si Patch by TransDERmal route daily for 30 days. rivaroxaban (XARELTO) 20 mg tab tablet   No No  
Sig: Take 1 Tab by mouth daily (with dinner). sertraline (ZOLOFT) 50 mg tablet   No No  
Sig: Take 1 Tab by mouth daily. tiotropium (SPIRIVA) 18 mcg inhalation capsule   No No  
Sig: Take 1 Cap by inhalation daily. Facility-Administered Medications: None Past Medical History:  
Diagnosis Date  Acute respiratory failure with hypercapnia ([facility] 2018  Chronic obstructive pulmonary disease ([facility]  Heart failure ([facility]  Sleep disorder No past surgical history on file. Social History Socioeconomic History  Marital status:  Spouse name: Not on file  Number of children: Not on file  Years of education: Not on file  Highest education level: Not on file Social Needs  Financial resource strain: Not on file  Food insecurity - worry: Not on file  Food insecurity - inability: Not on file  Transportation needs - medical: Not on file  Transportation needs - non-medical: Not on file Occupational History  Not on file Tobacco Use  Smoking status: Current Every Day Smoker Packs/day: 2.00 Substance and Sexual Activity  Alcohol use: No  
  Comment: once a month beer  Drug use: Yes Types: Marijuana, Methamphetamines  Sexual activity: Not on file Other Topics Concern  Not on file Social History Narrative  Not on file No family history on file. No Known Allergies Current Facility-Administered Medications Medication Dose Route Frequency  methylPREDNISolone (PF) (SOLU-MEDROL) 125 mg/2 mL injection Current Outpatient Medications Medication Sig  
 amLODIPine (NORVASC) 10 mg tablet Take 1 Tab by mouth daily.  furosemide (LASIX) 40 mg tablet Take 1 Tab by mouth daily.  hydrALAZINE (APRESOLINE) 50 mg tablet Take 1 Tab by mouth three (3) times daily.  levalbuterol (XOPENEX) 1.25 mg/3 mL nebu 3 mL by Nebulization route every four (4) hours as needed.  nicotine (NICODERM CQ) 21 mg/24 hr 1 Patch by TransDERmal route daily for 30 days.  amiodarone (CORDARONE) 200 mg tablet Take 1 Tab by mouth every twelve (12) hours.  carvedilol (COREG) 25 mg tablet Take 1 Tab by mouth two (2) times daily (with meals).  rivaroxaban (XARELTO) 20 mg tab tablet Take 1 Tab by mouth daily (with dinner).  sertraline (ZOLOFT) 50 mg tablet Take 1 Tab by mouth daily.  tiotropium (SPIRIVA) 18 mcg inhalation capsule Take 1 Cap by inhalation daily.  HYDROcodone-acetaminophen (NORCO) 7.5-325 mg per tablet Take 1 Tab by mouth every four (4) hours as needed. Max Daily Amount: 6 Tabs.  albuterol (PROVENTIL HFA, VENTOLIN HFA, PROAIR HFA) 90 mcg/actuation inhaler Take 2 Puffs by inhalation every four (4) hours as needed for Wheezing.  albuterol-ipratropium (DUO-NEB) 2.5 mg-0.5 mg/3 ml nebu 3 mL by Nebulization route every four (4) hours as needed. PHYSICAL EXAM  
 
Vitals: 12/02/18 1549 12/02/18 1602 12/02/18 1615 12/02/18 1701 BP: 122/64 137/84  137/84 Pulse: 66 81  77 Resp:  (!) 49  19 Temp:      
SpO2:   93% 90% Weight:      
Height:      
 
Constitutional:  the patient is overweight on BiPAP 
EENMT:  Sclera clear, pupils equal and 2-3mm Respiratory: coarse sounds Cardiovascular:  RRR without M,G,R 
Gastrointestinal: soft and non-tender; with positive bowel sounds. Musculoskeletal: warm without cyanosis. There is 1+ lower leg edema. Skin:  no jaundice or rashes, no wounds Neurologic: no gross neuro deficits Psychiatric:  alert and oriented x 3 Diagnostic Studies CXR:  L basilar atelectasis vs effusion. Possible vascular congestion Recent Labs 12/02/18 055 579 91 89 11/30/18 
3351 WBC 8.0 7.1 HGB 11.7* 10.2* HCT 39.7* 33.5*  
 150 Recent Labs 12/02/18 055 579 91 89 11/30/18 
3781  139  
K 4.5 3.8 CL 99 97* * 114* CO2 41* 39* BUN 18 15 CREA 1.13 1.12  
CA 8.5 8.3 ALB 2.7*  --   
TBILI 0.3  --   
ALT 22  --   
SGOT 10*  -- No results for input(s): PH, PCO2, PO2, HCO3 in the last 72 hours. No results for input(s): LCAD, LAC in the last 72 hours. Assessment:  (Medical Decision Making) Hospital Problems  Date Reviewed: 11/24/2018 Codes Class Noted POA Acute respiratory failure with hypoxia and hypercarbia (HCC) ICD-10-CM: J96.01, J96.02 
ICD-9-CM: 518.81  12/2/2018 Unknown Continue BiPAP Substance abuse ([facility] ICD-10-CM: F19.10 ICD-9-CM: 305.90  12/2/2018 Unknown Again positive for amphetamines, opiates, now [facility] Noncompliance ICD-10-CM: Z91.19 ICD-9-CM: V15.81  12/2/2018 Unknown Not wearing CPAP at home. Reports not taking xarelto. NATALIIA (obstructive sleep apnea) (Chronic) ICD-10-CM: G47.33 
ICD-9-CM: 327.23  11/16/2018 Yes Not compliant with CPAP therapy Acute encephalopathy:  Due to drug intoxication and hypercarbia combined. Will monitor closely and repeat ABG if worsened. Plan/Recommendations:  (Medical Decision Making) --Continue BiPAP in ICU with close monitoring of mental status on BiPAP 
--will continue diuretic therapy and antihypertensives 
--resume xarelto/amio for a flutter and monitor for this 
--ABG/CXR in am 
--full code More than 50% of the time documented was spent in face-to-face contact with the patient and in the care of the patient on the floor/unit where the patient is located. Thank you very much for this referral.  We appreciate the opportunity to participate in this patient's care. Will follow along with above stated plan.  
 
Georges Bose MD

## 2018-12-02 NOTE — PROGRESS NOTES
Pt arrived to room via stretcher on high flow nasal cannula. Pt alert and oriented. Upon arrival pt voided 400ml yellow/clear urine. Pt denies pain at this time. Respiratory at bedside to put patient on BIPAP. Upon skin assessment, pt has multiple ring shaped lesions on L upper leg as well as lower abdomen. Small scratches also noted on patient lower back. Allevyn applied.

## 2018-12-02 NOTE — ED TRIAGE NOTES
Patient arrived via EMS from home. EMS states that patient was recently admitted for COPD and CHF. States unable to get some meds filled and other's not taken correctly. Home Health nurse arrived to house today and was not able to get O2 increased. EMS initial sat was 88%. Duoneb given and O2 up to 92%. Given 125mg solumedrol. VSS.   
 
Patient currently in bed, alert and oriented, on 2L NC.

## 2018-12-02 NOTE — ED NOTES
TRANSFER - OUT REPORT: 
 
Verbal report given to Mel(name) on [facility]  being transferred to [facility](unit) for routine progression of care Report consisted of patients Situation, Background, Assessment and  
Recommendations(SBAR). Information from the following report(s) SBAR, ED Summary, [facility] and Recent Results was reviewed with the receiving nurse. Lines:  
Peripheral IV 12/02/18 Left Antecubital (Active) Peripheral IV 12/02/18 Right Antecubital (Active) Opportunity for questions and clarification was provided. Patient transported with: 
 Monitor Registered Nurse

## 2018-12-02 NOTE — PROGRESS NOTES
TRANSFER - IN REPORT: 
 
Verbal report received from ED, RN(name) on Prasad Isabel  being received from ED(unit) for routine progression of care Report consisted of patients Situation, Background, Assessment and  
Recommendations(SBAR). Information from the following report(s) SBAR, Kardex, ED Summary, Intake/Output, MAR and Recent Results was reviewed with the receiving nurse. Opportunity for questions and clarification was provided. Assessment completed upon patients arrival to unit and care assumed.

## 2018-12-03 ENCOUNTER — HOME CARE VISIT (OUTPATIENT)
Dept: HOME HEALTH SERVICES | Facility: HOME HEALTH | Age: 53
End: 2018-12-03
Payer: SELF-PAY

## 2018-12-03 ENCOUNTER — APPOINTMENT (OUTPATIENT)
Dept: GENERAL RADIOLOGY | Age: 53
DRG: 189 | End: 2018-12-03
Attending: INTERNAL MEDICINE
Payer: SELF-PAY

## 2018-12-03 PROBLEM — Z91.199 NONCOMPLIANCE: Chronic | Status: ACTIVE | Noted: 2018-12-02

## 2018-12-03 PROBLEM — J96.10 CHRONIC RESPIRATORY FAILURE (HCC): Status: ACTIVE | Noted: 2018-11-16

## 2018-12-03 PROBLEM — F19.10 SUBSTANCE ABUSE (HCC): Chronic | Status: ACTIVE | Noted: 2018-12-02

## 2018-12-03 PROBLEM — J96.10 CHRONIC RESPIRATORY FAILURE (HCC): Chronic | Status: ACTIVE | Noted: 2018-11-16

## 2018-12-03 LAB
ANION GAP SERPL CALC-SCNC: 3 MMOL/L (ref 7–16)
ARTERIAL PATENCY WRIST A: YES
ATRIAL RATE: 71 BPM
BASE EXCESS BLD CALC-SCNC: 16 MMOL/L
BDY SITE: ABNORMAL
BODY TEMPERATURE: 98.6
BUN SERPL-MCNC: 18 MG/DL (ref 6–23)
CALCIUM SERPL-MCNC: 8.6 MG/DL (ref 8.3–10.4)
CALCULATED P AXIS, ECG09: 33 DEGREES
CALCULATED R AXIS, ECG10: 72 DEGREES
CALCULATED T AXIS, ECG11: 77 DEGREES
CHLORIDE SERPL-SCNC: 95 MMOL/L (ref 98–107)
CO2 BLD-SCNC: 47 MMOL/L
CO2 SERPL-SCNC: 41 MMOL/L (ref 21–32)
COLLECT TIME,HTIME: 317
CREAT SERPL-MCNC: 1.18 MG/DL (ref 0.8–1.5)
DIAGNOSIS, 93000: NORMAL
ERYTHROCYTE [DISTWIDTH] IN BLOOD BY AUTOMATED COUNT: 13 % (ref 11.9–14.6)
EXHALED MINUTE VOLUME, VE: 9.5 L/MIN
GAS FLOW.O2 O2 DELIVERY SYS: ABNORMAL L/MIN
GAS FLOW.O2 SETTING OXYMISER: 8 BPM
GLUCOSE SERPL-MCNC: 167 MG/DL (ref 65–100)
HCO3 BLD-SCNC: 44.8 MMOL/L (ref 22–26)
HCT VFR BLD AUTO: 37.8 % (ref 41.1–50.3)
HGB BLD-MCNC: 11.3 G/DL (ref 13.6–17.2)
INSPIRATION.DURATION SETTING TIME VENT: 0.8 SEC
MAGNESIUM SERPL-MCNC: 1.9 MG/DL (ref 1.8–2.4)
MCH RBC QN AUTO: 31.3 PG (ref 26.1–32.9)
MCHC RBC AUTO-ENTMCNC: 29.9 G/DL (ref 31.4–35)
MCV RBC AUTO: 104.7 FL (ref 79.6–97.8)
NRBC # BLD: 0 K/UL (ref 0–0.2)
O2/TOTAL GAS SETTING VFR VENT: 50 %
P-R INTERVAL, ECG05: 204 MS
PCO2 BLD: 79.7 MMHG (ref 35–45)
PEEP RESPIRATORY: 8 CMH2O
PH BLD: 7.36 [PH] (ref 7.35–7.45)
PIP ISTAT,IPIP: 17
PLATELET # BLD AUTO: 183 K/UL (ref 150–450)
PMV BLD AUTO: 10.4 FL (ref 9.4–12.3)
PO2 BLD: 90 MMHG (ref 75–100)
POTASSIUM SERPL-SCNC: 4.3 MMOL/L (ref 3.5–5.1)
Q-T INTERVAL, ECG07: 372 MS
QRS DURATION, ECG06: 86 MS
QTC CALCULATION (BEZET), ECG08: 404 MS
RBC # BLD AUTO: 3.61 M/UL (ref 4.23–5.6)
SAO2 % BLD: 96 % (ref 95–98)
SERVICE CMNT-IMP: ABNORMAL
SODIUM SERPL-SCNC: 139 MMOL/L (ref 136–145)
SPECIMEN TYPE: ABNORMAL
SPONTANEOUS TIMED, IST: 16
VENTRICULAR RATE, ECG03: 71 BPM
VT SETTING VENT: 506 ML
WBC # BLD AUTO: 5.2 K/UL (ref 4.3–11.1)

## 2018-12-03 PROCEDURE — 83735 ASSAY OF MAGNESIUM: CPT

## 2018-12-03 PROCEDURE — 74011000250 HC RX REV CODE- 250: Performed by: INTERNAL MEDICINE

## 2018-12-03 PROCEDURE — 74011250636 HC RX REV CODE- 250/636: Performed by: INTERNAL MEDICINE

## 2018-12-03 PROCEDURE — 94640 AIRWAY INHALATION TREATMENT: CPT

## 2018-12-03 PROCEDURE — 99232 SBSQ HOSP IP/OBS MODERATE 35: CPT | Performed by: INTERNAL MEDICINE

## 2018-12-03 PROCEDURE — 36415 COLL VENOUS BLD VENIPUNCTURE: CPT

## 2018-12-03 PROCEDURE — 65620000000 HC RM CCU GENERAL

## 2018-12-03 PROCEDURE — 36600 WITHDRAWAL OF ARTERIAL BLOOD: CPT

## 2018-12-03 PROCEDURE — 80048 BASIC METABOLIC PNL TOTAL CA: CPT

## 2018-12-03 PROCEDURE — 94660 CPAP INITIATION&MGMT: CPT

## 2018-12-03 PROCEDURE — 82803 BLOOD GASES ANY COMBINATION: CPT

## 2018-12-03 PROCEDURE — 71045 X-RAY EXAM CHEST 1 VIEW: CPT

## 2018-12-03 PROCEDURE — 85027 COMPLETE CBC AUTOMATED: CPT

## 2018-12-03 PROCEDURE — 74011250637 HC RX REV CODE- 250/637: Performed by: INTERNAL MEDICINE

## 2018-12-03 PROCEDURE — 77030027688 HC DRSG MEPILEX 16-48IN NO BORD MOLN -A

## 2018-12-03 RX ADMIN — LEVALBUTEROL HYDROCHLORIDE 0.63 MG: 0.63 SOLUTION RESPIRATORY (INHALATION) at 19:58

## 2018-12-03 RX ADMIN — AMLODIPINE BESYLATE 10 MG: 10 TABLET ORAL at 10:05

## 2018-12-03 RX ADMIN — AMIODARONE HYDROCHLORIDE 200 MG: 200 TABLET ORAL at 21:28

## 2018-12-03 RX ADMIN — LEVALBUTEROL HYDROCHLORIDE 0.63 MG: 0.63 SOLUTION RESPIRATORY (INHALATION) at 23:25

## 2018-12-03 RX ADMIN — LEVALBUTEROL HYDROCHLORIDE 0.63 MG: 0.63 SOLUTION RESPIRATORY (INHALATION) at 04:53

## 2018-12-03 RX ADMIN — FUROSEMIDE 40 MG: 10 INJECTION, SOLUTION INTRAMUSCULAR; INTRAVENOUS at 05:34

## 2018-12-03 RX ADMIN — HYDRALAZINE HYDROCHLORIDE 50 MG: 50 TABLET, FILM COATED ORAL at 17:57

## 2018-12-03 RX ADMIN — LEVALBUTEROL HYDROCHLORIDE 0.63 MG: 0.63 SOLUTION RESPIRATORY (INHALATION) at 00:50

## 2018-12-03 RX ADMIN — SERTRALINE HYDROCHLORIDE 50 MG: 50 TABLET ORAL at 10:05

## 2018-12-03 RX ADMIN — Medication 10 ML: at 05:34

## 2018-12-03 RX ADMIN — AMIODARONE HYDROCHLORIDE 200 MG: 200 TABLET ORAL at 10:05

## 2018-12-03 RX ADMIN — Medication 10 ML: at 21:29

## 2018-12-03 RX ADMIN — Medication 10 ML: at 14:00

## 2018-12-03 RX ADMIN — CARVEDILOL 25 MG: 12.5 TABLET, FILM COATED ORAL at 17:57

## 2018-12-03 RX ADMIN — CARVEDILOL 25 MG: 12.5 TABLET, FILM COATED ORAL at 10:05

## 2018-12-03 RX ADMIN — LEVALBUTEROL HYDROCHLORIDE 0.63 MG: 0.63 SOLUTION RESPIRATORY (INHALATION) at 11:19

## 2018-12-03 RX ADMIN — LEVALBUTEROL HYDROCHLORIDE 0.63 MG: 0.63 SOLUTION RESPIRATORY (INHALATION) at 08:33

## 2018-12-03 RX ADMIN — HYDRALAZINE HYDROCHLORIDE 50 MG: 50 TABLET, FILM COATED ORAL at 10:05

## 2018-12-03 RX ADMIN — RIVAROXABAN 20 MG: 20 TABLET, FILM COATED ORAL at 17:57

## 2018-12-03 RX ADMIN — LEVALBUTEROL HYDROCHLORIDE 0.63 MG: 0.63 SOLUTION RESPIRATORY (INHALATION) at 15:57

## 2018-12-03 NOTE — PROGRESS NOTES
Bedside shift change report given to Reina RN (oncoming nurse) by Dean Garcia RN (offgoing nurse).  Report included the following information Kardex, Intake/Output, MAR, Recent Results, Med Rec Status and Cardiac Rhythm SR.

## 2018-12-03 NOTE — PROGRESS NOTES
O2 saturation periodically drops to high 80's while asleep on BIPAP. Respiratory therapist notified.

## 2018-12-03 NOTE — PROGRESS NOTES
Aislinn Jung Admission Date: 12/2/2018 ICU Daily Progress Note: 12/3/2018 Aislinn Jung is a 52 y.o. male with a h/o morbid obesity, NATALIIA, presumed COPD, substance abuse (amphetamines, opioids, benzos), smoking and atrial flutter who was recently hospitalized at [facility] from 11/16-30th. His admission was notable for suspected drug use prior to being found unresponsive and brought in by EMS. He required intubation and MIRANDA w cardioversion for atrial flutter. He was managed with nightly CPAP (his home equipment which worked properly) prior to his discharge. 
  
12/2, he returned with dyspnea and a RA oxygen saturation of 76%. His initial ABG demonstrated hypercarbia:  7.25/90/52 and after 1h of BiPAP improved incrementally to 7.33/79/54. On serum testing his HCO3 has risen to 41 and his renal function is stable. BNP is 371, slightly above likely baseline in 100s. He received 40mg of IV lasix, solumedrol, bronchodilators and continues on 20/10 BiPAP. He confirms using marijuana and smoking a cigarette and says that his CPAP is broken since 2 days ago because his son dropped it. He has pitting ankle edema. Subjective: On BiPAP, continues to smoke (multiple agents) and wears 4 lpm at home. Discussed trach and has multiple questions about it. Current Facility-Administered Medications Medication Dose Route Frequency  amLODIPine (NORVASC) tablet 10 mg  10 mg Oral DAILY  hydrALAZINE (APRESOLINE) tablet 50 mg  50 mg Oral TID  levalbuterol (XOPENEX) nebulizer soln 0.63 mg/3 mL  0.63 mg Nebulization Q4H RT  
 carvedilol (COREG) tablet 25 mg  25 mg Oral BID WITH MEALS  sertraline (ZOLOFT) tablet 50 mg  50 mg Oral DAILY  tiotropium (SPIRIVA) inhalation capsule 18 mcg  1 Cap Inhalation DAILY  rivaroxaban (XARELTO) tablet 20 mg  20 mg Oral DAILY WITH DINNER  
 sodium chloride (NS) flush 5-10 mL  5-10 mL IntraVENous Q8H  
  sodium chloride (NS) flush 5-10 mL  5-10 mL IntraVENous PRN  
 acetaminophen (TYLENOL) tablet 650 mg  650 mg Oral Q4H PRN  
 ondansetron (ZOFRAN) injection 4 mg  4 mg IntraVENous Q4H PRN  
 naloxone (NARCAN) injection 0.4 mg  0.4 mg IntraVENous PRN  
 senna-docusate (PERICOLACE) 8.6-50 mg per tablet 1 Tab  1 Tab Oral BID PRN  
 amiodarone (CORDARONE) tablet 200 mg  200 mg Oral Q12H Objective:  
 
Vitals:  
 12/03/18 1001 12/03/18 1100 12/03/18 1102 12/03/18 1119 BP: 143/85  107/65 Pulse: 66 69 Resp: 11 23 Temp:      
SpO2: 99%  100% 92% Weight:      
Height:      
 
Intake and Output:  
12/01 1901 - 12/03 0700 In: 120 [P.O.:120] Out: 2400 [Urine:2400] No intake/output data recorded. Physical Exam:         
GEN: acutely ill, HEENT:   no alar flaring or epistaxis, oral mucosa moist without cyanosis, NECK:  no JVD, no retractions, no thyromegaly or masses, LUNGS:  Decreased bases on BiPAP HEART:  RRR with no M,G,R; 
ABDOMEN:  soft with no tenderness; positive bowel sounds present EXTREMITIES:  warm with no cyanosis, 1+ lower leg edema SKIN:  no jaundice or ecchymosis NEURO:  A & O X 3 
 
LINES: IV 
DRIPS: none NUTRITION: NPO Ventilator Settings Mode FIO2 Rate Tidal Volume Pressure PEEP  
   50 % Peak airway pressure:    
Minute ventilation: 10.7 l/min LAB Recent Labs 12/03/18 
0342 12/02/18 [facility] Tr[facility] WBC 5.2 8.0 HGB 11.3* 11.7* HCT 37.8* 39.7*  177 Recent Labs 12/03/18 
0342 12/02/18 [facility]  142  
K 4.3 4.5  
CL 95* 99  
CO2 41* 41* * 131* BUN 18 18 CREA 1.18 1.13  
MG 1.9  --   
 
 
 
Cultures: NTG 
 
Echocardiogram 
SUMMARY: 
 
-  Left ventricle: Systolic function was mildly to moderately reduced. Ejection fraction was estimated in the range of 40 % to 45 %. This study was Inadequate for the evaluation of regional wall motion. -  Left atrium: The atrium was mildly to moderately dilated. -  Right atrium: The atrium was mildly dilated. -  Mitral valve: There was moderate regurgitation. Assessment:  
 
Patient Active Problem List  
Diagnosis Code  COPD (chronic obstructive pulmonary disease) ([facility]) J44.9  Nicotine dependence F17.200  Methamphetamine abuse ([facility] F15.10  Anasarca R60.1  NATALIIA (obstructive sleep apnea) G47.33  
 Acute kidney injury ([facility] N17.9  Acute on chronic respiratory failure with hypoxia and hypercapnia ([facility]) J96.21, J96.22  
 Urethral stricture [id number]  Alcohol abuse F10.10  Homeless Z59.0  Altered mental status R41.82  
 Morbid obesity ([facility]) E66.01  
 H/O atrial flutter Z86.79  
 Essential hypertension I10  
 Acute respiratory failure with hypoxia and hypercarbia ([facility]) J96.01, J96.02  
 Substance abuse ([facility] F19.10  Noncompliance Z91.19  
 Acute encephalopathy G93.40 Plan Hospital Problems  Date Reviewed: 11/24/2018 Codes Class Noted POA Methamphetamine abuse (HCC) (Chronic) ICD-10-CM: F15.10 ICD-9-CM: 305.70  12/2/2018 Yes  
 smokes H/O atrial flutter (Chronic) ICD-10-CM: Z86.79 
ICD-9-CM: V12.59  12/2/2018 Yes Overview Signed 2/6/2018  7:51 AM by Andria Hodgkin, NP  
  1/2018 Atrial flutter, s/p cardioversion. Essential hypertension (Chronic) ICD-10-CM: I10 
ICD-9-CM: 401.9  12/2/2018 Yes * (Principal) Acute respiratory failure with hypoxia and hypercarbia (HCC) ICD-10-CM: J96.01, J96.02 
ICD-9-CM: 518.81  12/2/2018 Unknown Substance abuse ([facility] ICD-10-CM: F19.10 ICD-9-CM: 305.90  12/2/2018 Unknown  
 THC, meth, tobacco, opiates Noncompliance ICD-10-CM: Z91.19 ICD-9-CM: V15.81  12/2/2018 Unknown Acute encephalopathy ICD-10-CM: G93.40 ICD-9-CM: 348.30  12/2/2018 Unknown  
 improved  NATALIIA (obstructive sleep apnea) (Chronic) ICD-10-CM: G47.33 
ICD-9-CM: 327.23  11/16/2018 Yes  
   
  
 
 
-- acute on chronic hypercapnia respiratory failure with continued smoking, COPD and noncompliance 
 --discussed trach (deciding), weight loss and lifestyle modifications -- try off BiPAP  
-- xopenex, spiriva Debbie Barkley, NP-C More than 50% of time documented was spent in face-to-face contact with the patient and in the care of the patient on the floor/unit where the patient is located. Lungs:  CTA B, no w/r/r Heart:  RRR with no Murmur/Rubs/Gallops Additional Comments:   
Patient with recurrent bouts of hypercarbic respiratory failure. Appears to be brought on by combination of obesity and drug abuse, +/- COPD. No signs of COPD exacerbation currently. Bipap tonight, ABG in the am. States he doesn't have a good living situation. SW consult. If ABG better tomorrow am ok to transfer to floor with NIPPV at night. I have spoken with and examined the patient. I agree with the above assessment and plan as documented.  
 
Jacek Hdz MD

## 2018-12-03 NOTE — PROGRESS NOTES
Pt recently d/c home with [facility], RN and PT. Re-admitted to acute respiratory failure with hypoxia and pyercarbia. Currently in ICU with BIPAP. Positive for amphetamines, opiates, and THC. Pt known well to CM. CM will follow to [facility] at d/c. Care Management Interventions Mode of Transport at Discharge: Other (see comment) Transition of Care Consult (CM Consult): Discharge Planning Discharge Durable Medical Equipment: (portable o2, concentrator, CPAP, nebulizer) Current Support Network: Relative's Home Confirm Follow Up Transport: Family(wife provides transportation) Freedom of Choice Offered: Yes [employer] Information Provided?: (no payor source) Discharge Location Discharge Placement: Home with home health

## 2018-12-03 NOTE — PROGRESS NOTES
Reviewed notes for spiritual concerns Participated in staff rounds Noted social issues in patient's life Will follow Nhi Lu, staff [address]na [address], [phone] [facility] Dar  /   [email or web address]

## 2018-12-03 NOTE — PROGRESS NOTES
Bedside shift change report given to [address] (oncoming nurse) by rBitney Lewis RN (offgoing nurse). Report included the following information Kardex, Intake/Output, MAR, Recent Results, Med Rec Status and Cardiac Rhythm SR. Alert and oriented x4, BIPAP. Speech is garbled, this is normal for him per Mr. Corinna Kinney and family. Follows commands and communicating with several family members at bedside. 2 peripheral IV sites, capped. Voids in urinal. 
No DTIs/No pressure ulcers observed.

## 2018-12-04 LAB
ARTERIAL PATENCY WRIST A: YES
BASE EXCESS BLD CALC-SCNC: 17 MMOL/L
BDY SITE: ABNORMAL
BODY TEMPERATURE: 98.6
CO2 BLD-SCNC: 47 MMOL/L
COLLECT TIME,HTIME: 730
EXHALED MINUTE VOLUME, VE: 15.5 L/MIN
GAS FLOW.O2 O2 DELIVERY SYS: ABNORMAL L/MIN
GAS FLOW.O2 SETTING OXYMISER: 14 BPM
GLUCOSE BLD STRIP.AUTO-MCNC: 109 MG/DL (ref 65–100)
GLUCOSE BLD STRIP.AUTO-MCNC: 115 MG/DL (ref 65–100)
GLUCOSE BLD STRIP.AUTO-MCNC: 117 MG/DL (ref 65–100)
HCO3 BLD-SCNC: 45.1 MMOL/L (ref 22–26)
INSPIRATION.DURATION SETTING TIME VENT: 0.8 SEC
O2/TOTAL GAS SETTING VFR VENT: 40 %
PCO2 BLD: 79.2 MMHG (ref 35–45)
PH BLD: 7.36 [PH] (ref 7.35–7.45)
PIP ISTAT,IPIP: 20
PO2 BLD: 80 MMHG (ref 75–100)
SAO2 % BLD: 94 % (ref 95–98)
SERVICE CMNT-IMP: ABNORMAL
SPECIMEN TYPE: ABNORMAL
VT SETTING VENT: 867 ML

## 2018-12-04 PROCEDURE — 74011250637 HC RX REV CODE- 250/637: Performed by: INTERNAL MEDICINE

## 2018-12-04 PROCEDURE — 77010033678 HC OXYGEN DAILY

## 2018-12-04 PROCEDURE — 99232 SBSQ HOSP IP/OBS MODERATE 35: CPT | Performed by: INTERNAL MEDICINE

## 2018-12-04 PROCEDURE — 94760 N-INVAS EAR/PLS OXIMETRY 1: CPT

## 2018-12-04 PROCEDURE — 82962 GLUCOSE BLOOD TEST: CPT

## 2018-12-04 PROCEDURE — 36600 WITHDRAWAL OF ARTERIAL BLOOD: CPT

## 2018-12-04 PROCEDURE — 82803 BLOOD GASES ANY COMBINATION: CPT

## 2018-12-04 PROCEDURE — 94640 AIRWAY INHALATION TREATMENT: CPT

## 2018-12-04 PROCEDURE — 94660 CPAP INITIATION&MGMT: CPT

## 2018-12-04 PROCEDURE — 74011000250 HC RX REV CODE- 250: Performed by: INTERNAL MEDICINE

## 2018-12-04 PROCEDURE — 65270000029 HC RM PRIVATE

## 2018-12-04 PROCEDURE — 77030018846 HC SOL IRR STRL H20 ICUM -A

## 2018-12-04 RX ORDER — INSULIN LISPRO 100 [IU]/ML
INJECTION, SOLUTION INTRAVENOUS; SUBCUTANEOUS
Status: DISCONTINUED | OUTPATIENT
Start: 2018-12-04 | End: 2018-12-12 | Stop reason: HOSPADM

## 2018-12-04 RX ORDER — IBUPROFEN 200 MG
1 TABLET ORAL DAILY
Status: DISCONTINUED | OUTPATIENT
Start: 2018-12-04 | End: 2018-12-12 | Stop reason: HOSPADM

## 2018-12-04 RX ADMIN — LEVALBUTEROL HYDROCHLORIDE 0.63 MG: 0.63 SOLUTION RESPIRATORY (INHALATION) at 19:48

## 2018-12-04 RX ADMIN — HYDRALAZINE HYDROCHLORIDE 50 MG: 50 TABLET, FILM COATED ORAL at 09:14

## 2018-12-04 RX ADMIN — HYDRALAZINE HYDROCHLORIDE 50 MG: 50 TABLET, FILM COATED ORAL at 17:11

## 2018-12-04 RX ADMIN — LEVALBUTEROL HYDROCHLORIDE 0.63 MG: 0.63 SOLUTION RESPIRATORY (INHALATION) at 08:35

## 2018-12-04 RX ADMIN — Medication 5 ML: at 17:11

## 2018-12-04 RX ADMIN — CARVEDILOL 25 MG: 12.5 TABLET, FILM COATED ORAL at 17:10

## 2018-12-04 RX ADMIN — CARVEDILOL 25 MG: 12.5 TABLET, FILM COATED ORAL at 09:14

## 2018-12-04 RX ADMIN — RIVAROXABAN 20 MG: 20 TABLET, FILM COATED ORAL at 17:11

## 2018-12-04 RX ADMIN — Medication 5 ML: at 22:04

## 2018-12-04 RX ADMIN — HYDRALAZINE HYDROCHLORIDE 50 MG: 50 TABLET, FILM COATED ORAL at 22:00

## 2018-12-04 RX ADMIN — LEVALBUTEROL HYDROCHLORIDE 0.63 MG: 0.63 SOLUTION RESPIRATORY (INHALATION) at 23:33

## 2018-12-04 RX ADMIN — AMIODARONE HYDROCHLORIDE 200 MG: 200 TABLET ORAL at 22:00

## 2018-12-04 RX ADMIN — AMLODIPINE BESYLATE 10 MG: 10 TABLET ORAL at 09:14

## 2018-12-04 RX ADMIN — ACETAMINOPHEN 650 MG: 325 TABLET, FILM COATED ORAL at 09:17

## 2018-12-04 RX ADMIN — LEVALBUTEROL HYDROCHLORIDE 0.63 MG: 0.63 SOLUTION RESPIRATORY (INHALATION) at 15:09

## 2018-12-04 RX ADMIN — LEVALBUTEROL HYDROCHLORIDE 0.63 MG: 0.63 SOLUTION RESPIRATORY (INHALATION) at 04:03

## 2018-12-04 RX ADMIN — SERTRALINE HYDROCHLORIDE 50 MG: 50 TABLET ORAL at 09:14

## 2018-12-04 RX ADMIN — LEVALBUTEROL HYDROCHLORIDE 0.63 MG: 0.63 SOLUTION RESPIRATORY (INHALATION) at 11:00

## 2018-12-04 RX ADMIN — TIOTROPIUM BROMIDE 18 MCG: 18 CAPSULE ORAL; RESPIRATORY (INHALATION) at 08:35

## 2018-12-04 RX ADMIN — Medication 5 ML: at 05:18

## 2018-12-04 RX ADMIN — AMIODARONE HYDROCHLORIDE 200 MG: 200 TABLET ORAL at 09:14

## 2018-12-04 NOTE — PROGRESS NOTES
Pt is in room alert and oriented. rr are even and unlabored dyspnea on exertion. O2 4L highflow in place pt is tolerating well. Lung sounds are course with wheezing noted. abd is soft bowel sounds are active. Safety measures in place will continue to monitor.

## 2018-12-04 NOTE — PROGRESS NOTES
Date of Outreach Update: 
Demetrius Reynolds was seen and assessed. MEWS Score: 2 (12/04/18 0041) Vitals:  
 12/04/18 8533 12/04/18 0403 12/04/18 6314 12/04/18 7754 BP: 124/68  113/50 Pulse: 74  71 Resp: 16  17 Temp: 98.3 °F (36.8 °C)  98.4 °F (36.9 °C) SpO2: 97% 96% 99% 94% Weight:      
Height:      
  
 
 Pain Assessment Pain Intensity 1: 0 (12/04/18 0313) Patient Stated Pain Goal: 0 Previous Outreach assessment has been reviewed. There have been no significant clinical changes since the completion of the last dated Outreach assessment. Patient in chair at bedside sink, no complaints at this time. MD Gomez at bedside Will continue to follow up per outreach protocol. Signed By:   Zach Vallejo RN   December 4, 2018 9:35 AM

## 2018-12-04 NOTE — PROGRESS NOTES
Date of Outreach Update: 
Arleen Bond was seen and assessed. MEWS Score: 1 (12/04/18 1110) Vitals:  
 12/04/18 1100 12/04/18 1110 12/04/18 1510 12/04/18 1512 BP:  107/65  (!) 150/101 Pulse:  63  63 Resp:  18  19 Temp:  98.3 °F (36.8 °C)  98.3 °F (36.8 °C) SpO2: 95% 95% 97% 97% Weight:      
Height:      
  
 
 Pain Assessment Pain Intensity 1: 3 (12/04/18 1500) Patient Stated Pain Goal: 3 Previous Outreach assessment has been reviewed. There have been no significant clinical changes since the completion of the last dated Outreach assessment. Patient currently napping on BiPAP with visitor at bedside. All VSS Will continue to follow up per outreach protocol. Signed By:   Brandon Pulliam RN   December 4, 2018 4:21 PM

## 2018-12-04 NOTE — PROGRESS NOTES
Patient arrived to unit by bed, no distress noted. RT here to place him onto bi-pap for the night. Will continue to monitor.

## 2018-12-04 NOTE — PROGRESS NOTES
TRANSFER - OUT REPORT: 
 
Verbal report given to [facility] RN(name) on Paty Degroot  being transferred to  60  (unit) for routine progression of care Report consisted of patients Situation, Background, Assessment and  
Recommendations(SBAR). Information from the following report(s) SBAR, Kardex, Intake/Output, MAR, Recent Results and Cardiac Rhythm Normal Sinus Rhythm was reviewed with the receiving nurse. Lines:  
Peripheral IV 12/02/18 Right Antecubital (Active) Site Assessment Clean, dry, & intact 12/3/2018  7:56 PM  
Phlebitis Assessment 0 12/3/2018  7:56 PM  
Infiltration Assessment 0 12/3/2018  7:56 PM  
Dressing Status Clean, dry, & intact 12/3/2018  7:56 PM  
Dressing Type Tape;Transparent 12/3/2018  7:56 PM  
Hub Color/Line Status Pink;Capped 12/3/2018  7:56 PM  
Alcohol Cap Used No 12/2/2018  7:03 PM  
  
 
Opportunity for questions and clarification was provided. Patient transported with: 
 O2 @ 7 liters Registered Nurse

## 2018-12-04 NOTE — PROGRESS NOTES
Date of Outreach Update: 
Ailyn Anderson was seen and assessed. Previous Outreach assessment has been reviewed. There have been no significant clinical changes since the completion of the last dated Outreach assessment. Will continue to follow up per outreach protocol. Signed By:   Rosangela Regan RN   December 4, 2018 5:21 AM

## 2018-12-04 NOTE — PROGRESS NOTES
Discussed pt condition with Dr. Valorie Mari. Pt established to wear BIPAP at night prior to admission. Orders received to transfer to the 8th floor to continue BIPAP this evening. Will repeat ABG in the morning. Discussed with Meaghan Isaac RN, 8th floor. Transfer order placed.

## 2018-12-04 NOTE — PROGRESS NOTES
100 [facility] OUTREACH NURSE PROGRESS REPORT SUBJECTIVE: Called to assess patient secondary to [facility] program protocol. MEWS Score: 2 (12/04/18 0041) Vitals:  
 12/03/18 2201 12/03/18 2301 12/04/18 0001 12/04/18 0041 BP: 105/56 110/64 117/60 128/69 Pulse: 70 72 73 73 Resp: 13 19 (!) 35 26 Temp:    98.3 °F (36.8 °C) SpO2: 99% 97% 98% 94% Weight:      
Height:      
  
  
 
LAB DATA: 
 
Recent Labs 12/03/18 
0342 12/02/18 [facility]  142  
K 4.3 4.5  
CL 95* 99  
CO2 41* 41* AGAP 3* 2*  
* 131* BUN 18 18 CREA 1.18 1.13 GFRAA >60 >60 GFRNA >60 >60  
CA 8.6 8.5 MG 1.9  --   
ALB  --  2.7* TP  --  6.3 GLOB  --  3.6* AGRAT  --  0.8* ALT  --  22 Recent Labs 12/03/18 
0342 12/02/18 [facility] WBC 5.2 8.0 HGB 11.3* 11.7* HCT 37.8* 39.7*  177 OBJECTIVE: On arrival to room, I found patient to be laying in bed. Pain Assessment Pain Intensity 1: 0 (12/04/18 0041) Patient Stated Pain Goal: 0 
 
  
  
  
  
 
  
  
  
   
 
ASSESSMENT:  Pt alert and oriented, no pain or distress noted. Lungs CTA. 02 sat 95% on BIPAP. PLAN:  Will continue to follow per outreach program protocol.   
 
Gael Shelby RN

## 2018-12-04 NOTE — PROGRESS NOTES
Problem: Falls - Risk of 
Goal: *Absence of Falls Document Cy Hunger Fall Risk and appropriate interventions in the flowsheet. Outcome: Progressing Towards Goal 
Fall Risk Interventions: 
Mobility Interventions: Communicate number of staff needed for ambulation/transfer Medication Interventions: Assess postural VS orthostatic hypotension Elimination Interventions: Call light in reach Problem: Pressure Injury - Risk of 
Goal: *Prevention of pressure injury Document Rudy Scale and appropriate interventions in the flowsheet. Outcome: Progressing Towards Goal 
Pressure Injury Interventions: 
Sensory Interventions: Avoid rigorous massage over bony prominences, Check visual cues for pain, Float heels, Discuss PT/OT consult with provider, Keep linens dry and wrinkle-free, Maintain/enhance activity level, Minimize linen layers, Monitor skin under medical devices, Pad between skin to skin, Turn and reposition approx. every two hours (pillows and wedges if needed) Moisture Interventions: Apply protective barrier, creams and emollients, Assess need for specialty bed, Limit adult briefs, Maintain skin hydration (lotion/cream), Minimize layers Activity Interventions: Increase time out of bed, Pressure redistribution bed/mattress(bed type) Mobility Interventions: Float heels, HOB 30 degrees or less, PT/OT evaluation, Pressure redistribution bed/mattress (bed type), Turn and reposition approx. every two hours(pillow and wedges) Nutrition Interventions: Document food/fluid/supplement intake, Discuss nutritional consult with provider, Offer support with meals,snacks and hydration Friction and Shear Interventions: Foam dressings/transparent film/skin sealants, HOB 30 degrees or less, Minimize layers

## 2018-12-04 NOTE — PROGRESS NOTES
Fidencio Mac Admission Date: 12/2/2018 Daily Progress Note: 12/4/2018 The patient's chart is reviewed and the patient is discussed with the staff. 52 y. o. male with a h/o morbid obesity, NATALIIA, presumed COPD, substance abuse (amphetamines, opioids, benzos), smoking and atrial flutter who was recently hospitalized at [facility] from 11/16-30th. [facility] admission was notable for suspected drug use prior to being found unresponsive and brought in by EMS. Diana Aaron required intubation and MIRANDA w cardioversion for atrial flutter.  He was managed with nightly CPAP (his home equipment which worked properly) prior to his discharge. On chronic home O2 at 4L. 
12/2, he returned with dyspnea and a RA oxygen saturation of 76%.  His initial ABG demonstrated hypercarbia:  7.25/90/52 and after 1h of BiPAP improved incrementally to 7.33/79/54.  On serum testing his HCO3 has risen to 41 and his renal function is stable.   BNP is 371, slightly above likely baseline in 100s. He received 40mg of IV lasix, solumedrol, bronchodilators and continues on 20/10 BiPAP. He confirms using marijuana and smoking. Says his CPAP is broken since 2 days ago because his son dropped it. Diana Aaron has pitting ankle edema. Drug screen was positive for amphetamines/opiates/THC Subjective: Wore BIPAP all last night. States slept well. Denies cough or congestion. States his home CPAP is broken--\"son dropped it\". Asked him to have someone bring machine here to see if we can help. Has no insurance and no DME. Current Facility-Administered Medications Medication Dose Route Frequency  amLODIPine (NORVASC) tablet 10 mg  10 mg Oral DAILY  hydrALAZINE (APRESOLINE) tablet 50 mg  50 mg Oral TID  levalbuterol (XOPENEX) nebulizer soln 0.63 mg/3 mL  0.63 mg Nebulization Q4H RT  
 carvedilol (COREG) tablet 25 mg  25 mg Oral BID WITH MEALS  sertraline (ZOLOFT) tablet 50 mg  50 mg Oral DAILY  tiotropium (SPIRIVA) inhalation capsule 18 mcg  1 Cap Inhalation DAILY  rivaroxaban (XARELTO) tablet 20 mg  20 mg Oral DAILY WITH DINNER  
 sodium chloride (NS) flush 5-10 mL  5-10 mL IntraVENous Q8H  
 sodium chloride (NS) flush 5-10 mL  5-10 mL IntraVENous PRN  
 acetaminophen (TYLENOL) tablet 650 mg  650 mg Oral Q4H PRN  
 ondansetron (ZOFRAN) injection 4 mg  4 mg IntraVENous Q4H PRN  
 naloxone (NARCAN) injection 0.4 mg  0.4 mg IntraVENous PRN  
 senna-docusate (PERICOLACE) 8.6-50 mg per tablet 1 Tab  1 Tab Oral BID PRN  
 amiodarone (CORDARONE) tablet 200 mg  200 mg Oral Q12H Review of Systems Constitutional: negative for fever, chills, sweats Cardiovascular: negative for chest pain, palpitations, syncope, edema Gastrointestinal:  negative for dysphagia, reflux, vomiting, diarrhea, abdominal pain, or melena Neurologic:  negative for focal weakness, numbness, headache Objective:  
 
Vitals:  
 12/04/18 0041 12/04/18 0335 12/04/18 0403 12/04/18 9840 BP: 128/69 124/68  113/50 Pulse: 73 74  71 Resp: 26 16  17 Temp: 98.3 °F (36.8 °C) 98.3 °F (36.8 °C)  98.4 °F (36.9 °C) SpO2: 94% 97% 96% 99% Weight:      
Height:      
 
Intake and Output:  
12/02 1901 - 12/04 0700 In: 360 [P.O.:360] Out: 3350 [IUZFB:2953] No intake/output data recorded. Physical Exam:  
Constitution:  the patient is obese and in no acute distress, BIPAP, 40%, sat 99% EENMT:  Sclera clear, pupils equal, oral mucosa moist 
Respiratory: bibasilar crackles, no wheezing Cardiovascular:  RRR without M,G,R 
Gastrointestinal: soft and non-tender; with positive bowel sounds. Musculoskeletal: warm without cyanosis. There is no lower leg edema, SCDs, edema less Skin:  no jaundice or rashes, no wounds Neurologic: no gross neuro deficits Psychiatric:  alert and oriented x 3 CXR: None today CXR 12/3/18:  Unchanged pulmonary vascular congestion. LAB Recent Labs 12/02/18 
1625 12/02/18 
8551 GLUCPOC 117* 139* Recent Labs 12/03/18 
0342 12/02/18 [facility] WBC 5.2 8.0 HGB 11.3* 11.7* HCT 37.8* 39.7*  177 Recent Labs 12/03/18 
0342 12/02/18 [facility]  142  
K 4.3 4.5  
CL 95* 99  
CO2 41* 41* * 131* BUN 18 18 CREA 1.18 1.13  
MG 1.9  --   
CA 8.6 8.5 ALB  --  2.7* TBILI  --  0.3 ALT  --  22 SGOT  --  10* Recent Labs 12/04/18 
1428 12/03/18 
0319 12/02/18 
1625 PHI 7.363 7.358 7.325* PCO2I 79.2* 79.7* 79.1*  
PO2I 80 90 54* HCO3I 45.1* 44.8* 41.3* No results for input(s): LCAD, LAC in the last 72 hours. Assessment:  (Medical Decision Making) Hospital Problems  Date Reviewed: 12/4/2018 Codes Class Noted POA Methamphetamine abuse (HCC) (Chronic) ICD-10-CM: F15.10 ICD-9-CM: 305.70  12/2/2018 Yes Chronic--stressed cessation---AA and NA encouraged H/O atrial flutter (Chronic) ICD-10-CM: Z86.79 
ICD-9-CM: V12.59  12/2/2018 Yes  
  1/2018 Atrial flutter, s/p cardioversion. On Xarelto--sinus Essential hypertension (Chronic) ICD-10-CM: I10 
ICD-9-CM: 401.9  12/2/2018 Yes  
 chronic--controlled * (Principal) Acute respiratory failure with hypoxia and hypercarbia (HCC) ICD-10-CM: J96.01, J96.02 
ICD-9-CM: 518.81  12/2/2018 Yes On BIPAP--ABG this morning Substance abuse (HCC) (Chronic) ICD-10-CM: F19.10 ICD-9-CM: 305.90  12/2/2018 Yes  
 chronic Noncompliance (Chronic) ICD-10-CM: Z91.19 ICD-9-CM: V15.81  12/2/2018 Yes  
 chronic Acute encephalopathy ICD-10-CM: G93.40 ICD-9-CM: 348.30  12/2/2018 Yes Resolved--A&O x3  
 NATALIIA (obstructive sleep apnea) (Chronic) ICD-10-CM: G47.33 
ICD-9-CM: 327.23  11/16/2018 Yes  
 chronic--home machine broken Chronic respiratory failure (HCC) (Chronic) ICD-10-CM: J96.10 ICD-9-CM: 518.83  11/16/2018 Yes Chronic--Home flow 4L--reported Morbid obesity ([facility] [address] (Chronic) ICD-10-CM: E66.01 
ICD-9-CM: 278.01  11/16/2018 Yes  
 chronic Nicotine dependence (Chronic) ICD-10-CM: L91.095 ICD-9-CM: 305.1  2018 Yes Chronic--cessations strongly encouraged Homeless (Chronic) ICD-10-CM: Z59.0 ICD-9-CM: V60.0  1/15/2018 Yes  
 chronic Plan:  (Medical Decision Making) --AB.363/79.2/80/45. 1--change to NC, may require Air-vo if cannot maintain sat. --Xopenex, Spiriva 
--Xarelto--recent MIRANDA cardioversion for Aflutter --Have discussed trach  
--Received Lasix yesterday with 1950 ml urine output--edema less. --Long discussion regarding lifestyle changes, very poor prognosis and possible death related to noncompliance. More than 50% of the time documented was spent in face-to-face contact with the patient and in the care of the patient on the floor/unit where the patient is located. Alexandre De Dios NP Lungs: distant sounds. No wheezing. Heart S1 and S2 audible, no murmers or rubs appreciated Other Has a ?broken home machine. Told to bring him here. Will get it checked while here and then check if able to tolerate it, otherwise will likely need trach. Restart nicotine patch ? allergy when smoking with it. Told him not to smoke and he is aware and agreeable to restart. Will get hospitalist to take over care for the patient tomorrow. Bernardino Canseco I have spoken with and examined the patient. I have reviewed the history, examination, assessment, and plan and agree with the above. Becky Aguirre MD 
 
 
This note was signed electronically. Errors are unfortunately her likely due to dictation software.

## 2018-12-04 NOTE — PROGRESS NOTES
Pt is in room alert and oriented. Pt has bipap in place at current time while pt sleeps. He is tolerating well. Respiratory pattern was erratic but is more regular at current time. O2 sat is 92%. Lung sounds are course with wheezing noted. He is stable. Will continue to monitor.

## 2018-12-04 NOTE — PROGRESS NOTES
Bedside and Verbal shift change report given to Abhilash Skelton RN (oncoming nurse) by Bisi Hunt RN (offgoing nurse).  Report included the following information SBAR, Kardex, Intake/Output, MAR, Recent Results and Cardiac Rhythm SR.

## 2018-12-05 ENCOUNTER — APPOINTMENT (OUTPATIENT)
Dept: ULTRASOUND IMAGING | Age: 53
DRG: 189 | End: 2018-12-05
Attending: NURSE PRACTITIONER
Payer: SELF-PAY

## 2018-12-05 LAB
GLUCOSE BLD STRIP.AUTO-MCNC: 111 MG/DL (ref 65–100)
GLUCOSE BLD STRIP.AUTO-MCNC: 125 MG/DL (ref 65–100)
GLUCOSE BLD STRIP.AUTO-MCNC: 177 MG/DL (ref 65–100)
GLUCOSE BLD STRIP.AUTO-MCNC: 92 MG/DL (ref 65–100)

## 2018-12-05 PROCEDURE — 99232 SBSQ HOSP IP/OBS MODERATE 35: CPT | Performed by: INTERNAL MEDICINE

## 2018-12-05 PROCEDURE — 94760 N-INVAS EAR/PLS OXIMETRY 1: CPT

## 2018-12-05 PROCEDURE — 65270000029 HC RM PRIVATE

## 2018-12-05 PROCEDURE — 93970 EXTREMITY STUDY: CPT

## 2018-12-05 PROCEDURE — 82962 GLUCOSE BLOOD TEST: CPT

## 2018-12-05 PROCEDURE — 74011250637 HC RX REV CODE- 250/637: Performed by: INTERNAL MEDICINE

## 2018-12-05 PROCEDURE — 74011250636 HC RX REV CODE- 250/636: Performed by: INTERNAL MEDICINE

## 2018-12-05 PROCEDURE — 94640 AIRWAY INHALATION TREATMENT: CPT

## 2018-12-05 PROCEDURE — 94660 CPAP INITIATION&MGMT: CPT

## 2018-12-05 PROCEDURE — 77010033711 HC HIGH FLOW OXYGEN

## 2018-12-05 PROCEDURE — 74011636637 HC RX REV CODE- 636/637: Performed by: INTERNAL MEDICINE

## 2018-12-05 PROCEDURE — 74011000250 HC RX REV CODE- 250: Performed by: INTERNAL MEDICINE

## 2018-12-05 RX ORDER — FUROSEMIDE 10 MG/ML
20 INJECTION INTRAMUSCULAR; INTRAVENOUS EVERY 12 HOURS
Status: DISCONTINUED | OUTPATIENT
Start: 2018-12-05 | End: 2018-12-06

## 2018-12-05 RX ADMIN — Medication 10 ML: at 14:19

## 2018-12-05 RX ADMIN — LEVALBUTEROL HYDROCHLORIDE 0.63 MG: 0.63 SOLUTION RESPIRATORY (INHALATION) at 15:16

## 2018-12-05 RX ADMIN — LEVALBUTEROL HYDROCHLORIDE 0.63 MG: 0.63 SOLUTION RESPIRATORY (INHALATION) at 09:26

## 2018-12-05 RX ADMIN — LEVALBUTEROL HYDROCHLORIDE 0.63 MG: 0.63 SOLUTION RESPIRATORY (INHALATION) at 20:04

## 2018-12-05 RX ADMIN — LEVALBUTEROL HYDROCHLORIDE 0.63 MG: 0.63 SOLUTION RESPIRATORY (INHALATION) at 04:06

## 2018-12-05 RX ADMIN — SERTRALINE HYDROCHLORIDE 50 MG: 50 TABLET ORAL at 08:34

## 2018-12-05 RX ADMIN — HYDRALAZINE HYDROCHLORIDE 50 MG: 50 TABLET, FILM COATED ORAL at 22:07

## 2018-12-05 RX ADMIN — LEVALBUTEROL HYDROCHLORIDE 0.63 MG: 0.63 SOLUTION RESPIRATORY (INHALATION) at 23:14

## 2018-12-05 RX ADMIN — HYDRALAZINE HYDROCHLORIDE 50 MG: 50 TABLET, FILM COATED ORAL at 16:11

## 2018-12-05 RX ADMIN — FUROSEMIDE 20 MG: 10 INJECTION, SOLUTION INTRAMUSCULAR; INTRAVENOUS at 22:07

## 2018-12-05 RX ADMIN — CARVEDILOL 25 MG: 12.5 TABLET, FILM COATED ORAL at 08:34

## 2018-12-05 RX ADMIN — AMLODIPINE BESYLATE 10 MG: 10 TABLET ORAL at 08:34

## 2018-12-05 RX ADMIN — CARVEDILOL 25 MG: 12.5 TABLET, FILM COATED ORAL at 16:10

## 2018-12-05 RX ADMIN — AMIODARONE HYDROCHLORIDE 200 MG: 200 TABLET ORAL at 08:34

## 2018-12-05 RX ADMIN — RIVAROXABAN 20 MG: 20 TABLET, FILM COATED ORAL at 16:10

## 2018-12-05 RX ADMIN — AMIODARONE HYDROCHLORIDE 200 MG: 200 TABLET ORAL at 22:07

## 2018-12-05 RX ADMIN — TIOTROPIUM BROMIDE 18 MCG: 18 CAPSULE ORAL; RESPIRATORY (INHALATION) at 09:27

## 2018-12-05 RX ADMIN — HYDRALAZINE HYDROCHLORIDE 50 MG: 50 TABLET, FILM COATED ORAL at 08:34

## 2018-12-05 RX ADMIN — Medication 5 ML: at 21:23

## 2018-12-05 RX ADMIN — INSULIN LISPRO 2 UNITS: 100 INJECTION, SOLUTION INTRAVENOUS; SUBCUTANEOUS at 16:15

## 2018-12-05 RX ADMIN — Medication 5 ML: at 06:07

## 2018-12-05 NOTE — PROGRESS NOTES
Lizet Menendez Admission Date: 12/2/2018 Daily Progress Note: 12/5/2018 The patient's chart is reviewed and the patient is discussed with the staff. 52 y.o. CM with morbid obesity, NATALIIA, presumed COPD, substance abuse (amphetamines, opioids, benzos), smoking and atrial flutter. Was recently hospitalized at [facility] from 11/16-30th. [facility] admission was notable for suspected drug use prior to being found unresponsive and brought in by EMS. [facility] required intubation and MIRANDA w cardioversion for atrial flutter (on Xarelto and Amio).  He was managed with nightly CPAP (his home equipment which worked properly) prior to his discharge. On chronic home O2 at 4L. 
12/2, he returned with dyspnea and a RA oxygen saturation of 76%.  ABG with hypercarbia:  7.25/90/52 and after 1h of BiPAP improved incrementally to 7.33/79/54.  On serum testing his HCO3 has risen to 41 and his renal function is stable.   BNP is 371, slightly above likely baseline in 100s. He received 40mg of IV lasix, solumedrol, bronchodilators and continues on 20/10 BiPAP. He confirms using marijuana and smoking. Says his CPAP is broken since 2 days ago because his son dropped it. [facility] has pitting ankle edema. Drug screen was positive for amphetamines/opiates/THC. Moved to the floor, diuresed and edema resolved Subjective: Wore BIPAP all last night and NC yesterday 4L. States slept well. Has productive cough at times with white sputum. Family member brought in their \"old CPAP-they got a new one\". Complains of bilateral calf discomfort. Current Facility-Administered Medications Medication Dose Route Frequency  nicotine (NICODERM CQ) 14 mg/24 hr patch 1 Patch  1 Patch TransDERmal DAILY  insulin lispro (HUMALOG) injection   SubCUTAneous AC&HS  amLODIPine (NORVASC) tablet 10 mg  10 mg Oral DAILY  hydrALAZINE (APRESOLINE) tablet 50 mg  50 mg Oral TID  levalbuterol (XOPENEX) nebulizer soln 0.63 mg/3 mL  0.63 mg Nebulization Q4H RT  
 carvedilol (COREG) tablet 25 mg  25 mg Oral BID WITH MEALS  sertraline (ZOLOFT) tablet 50 mg  50 mg Oral DAILY  tiotropium (SPIRIVA) inhalation capsule 18 mcg  1 Cap Inhalation DAILY  rivaroxaban (XARELTO) tablet 20 mg  20 mg Oral DAILY WITH DINNER  
 sodium chloride (NS) flush 5-10 mL  5-10 mL IntraVENous Q8H  
 sodium chloride (NS) flush 5-10 mL  5-10 mL IntraVENous PRN  
 acetaminophen (TYLENOL) tablet 650 mg  650 mg Oral Q4H PRN  
 ondansetron (ZOFRAN) injection 4 mg  4 mg IntraVENous Q4H PRN  
 naloxone (NARCAN) injection 0.4 mg  0.4 mg IntraVENous PRN  
 senna-docusate (PERICOLACE) 8.6-50 mg per tablet 1 Tab  1 Tab Oral BID PRN  
 amiodarone (CORDARONE) tablet 200 mg  200 mg Oral Q12H Review of Systems Constitutional: negative for fever, chills, sweats Cardiovascular: negative for chest pain, palpitations, syncope, edema Gastrointestinal:  negative for dysphagia, reflux, vomiting, diarrhea, abdominal pain, or melena Neurologic:  negative for focal weakness, numbness, headache Objective:  
 
Vitals:  
 12/04/18 2345 12/05/18 0401 12/05/18 0406 12/05/18 0402 BP: 120/73 124/72  120/76 Pulse: 65 62  66 Resp: 20 20  19 Temp: 98 °F (36.7 °C) 98 °F (36.7 °C)  98.1 °F (36.7 °C) SpO2: 93% 94% 97% 96% Weight:      
Height:      
 
Intake and Output:  
12/03 1901 - 12/05 0700 In: 1080 [P.O.:1080] Out: 2050 [Urine:2050] No intake/output data recorded. Physical Exam:  
Constitution:  the patient is obese and in no acute distress, BIPAP, 40%, sat 99%, changed to HFNC 4L, sat 95% EENMT:  Sclera clear, pupils equal, oral mucosa moist 
Respiratory: few bibasilar crackles, no wheezing Cardiovascular:  RRR without M,G,R 
Gastrointestinal: soft and non-tender; with positive bowel sounds. Musculoskeletal: warm without cyanosis. There is no lower leg edema, SCDs, edema less Skin:  no jaundice or rashes, no wounds Neurologic: no gross neuro deficits Psychiatric:  alert and oriented x 3 CXR: None today CXR 12/3/18:  Unchanged pulmonary vascular congestion. LAB Recent Labs 12/05/18 
1831 12/04/18 
2245 12/04/18 
1609 12/04/18 
1232 12/02/18 
1625 GLUCPOC 111* 115* 109* 117* 117* Recent Labs 12/03/18 
0342 12/02/18 [address] WBC 5.2 8.0 HGB 11.3* 11.7* HCT 37.8* 39.7*  177 Recent Labs 12/03/18 
0342 12/02/18 [address]  142  
K 4.3 4.5  
CL 95* 99  
CO2 41* 41* * 131* BUN 18 18 CREA 1.18 1.13  
MG 1.9  --   
CA 8.6 8.5 ALB  --  2.7* TBILI  --  0.3 ALT  --  22 SGOT  --  10* Recent Labs 12/04/18 
2954 12/03/18 
0319 12/02/18 
1625 PHI 7.363 7.358 7.325* PCO2I 79.2* 79.7* 79.1*  
PO2I 80 90 54* HCO3I 45.1* 44.8* 41.3* No results for input(s): LCAD, LAC in the last 72 hours. Assessment:  (Medical Decision Making) Hospital Problems  Date Reviewed: 12/5/2018 Codes Class Noted POA Methamphetamine abuse (HCC) (Chronic) ICD-10-CM: F15.10 ICD-9-CM: 305.70  12/2/2018 Yes Chronic--stressed cessation---AA and NA encouraged H/O atrial flutter (Chronic) ICD-10-CM: Z86.79 
ICD-9-CM: V12.59  12/2/2018 Yes  
  1/2018 Atrial flutter, s/p cardioversion. On Amio and Xarelto--sinus Essential hypertension (Chronic) ICD-10-CM: I10 
ICD-9-CM: 401.9  12/2/2018 Yes  
 chronic--controlled * (Principal) Acute respiratory failure with hypoxia and hypercarbia (HCC) ICD-10-CM: J96.01, J96.02 
ICD-9-CM: 518.81  12/2/2018 Yes On BIPAP with sleep, NC during the day Substance abuse (HCC) (Chronic) ICD-10-CM: F19.10 ICD-9-CM: 305.90  12/2/2018 Yes  
 chronic Noncompliance (Chronic) ICD-10-CM: Z91.19 ICD-9-CM: V15.81  12/2/2018 Yes  
 chronic Acute encephalopathy ICD-10-CM: G93.40 ICD-9-CM: 348.30  12/2/2018 Yes  Resolved--A&O x3  
 NATALIIA (obstructive sleep apnea) (Chronic) ICD-10-CM: G47.33 
ICD-9-CM: 327.23  11/16/2018 Yes  
 chronic--home machine broken Chronic respiratory failure (HCC) (Chronic) ICD-10-CM: J96.10 ICD-9-CM: 518.83  11/16/2018 Yes Chronic--Home flow 4L--reported Morbid obesity ([address] (Chronic) ICD-10-CM: E66.01 
ICD-9-CM: 278.01  11/16/2018 Yes  
 chronic Nicotine dependence (Chronic) ICD-10-CM: Z01.928 ICD-9-CM: 305.1  2/1/2018 Yes Chronic--cessations strongly encouraged Homeless (Chronic) ICD-10-CM: Z59.0 ICD-9-CM: V60.0  1/15/2018 Yes  
 chronic Plan:  (Medical Decision Making) --Xopenex, Spiriva 
--Amiodarone and Xarelto--recent MIRANDA cardioversion for Aflutter 
--Check LE ultrasound--has bilateral calf pain 
--Urine output 1300 ml urine output--edema resolved. --Home CPAP machine broken--asked to have someone bring in to see if can be repaired and check on settings. Will ask respiratory to evaluate new machine. Adjust settings and could wear tonight. --Recent ECHO:  EF 40-45%, moderate MR 
--Hospitalist consulted to assume primary care. --Long discussion regarding lifestyle changes, very poor prognosis and possible death related to noncompliance. May need long term trach if this plan does not work --Will need to determine ambulating and resting O2 requirements prior to discharge. More than 50% of the time documented was spent in face-to-face contact with the patient and in the care of the patient on the floor/unit where the patient is located. Aida Cruz NP Lungs:  Rare crackles, edema improving Heart:  RRR with no Murmur/Rubs/Gallops Additional Comments:  Has old machine and new one; RT will attempt to evaluate old CPAP and correlate settings for new machine. If able to tolerate home CPAP and O2 requirement is reasonable with ambulation could discharge in next couple days. I have spoken with and examined the patient.  I agree with the above assessment and plan as documented.  
 
Cleve Cain MD

## 2018-12-05 NOTE — PROGRESS NOTES
Received bedside shift report from Rachel Neil RN. Pt lying in bed. No apparent distress. Respirations even and unlabored. Instructed to call for assistance with needs, as they arise. Pt voiced understanding.

## 2018-12-05 NOTE — PROGRESS NOTES
100 [facility] OUTREACH NURSE PROGRESS REPORT SUBJECTIVE: Called to assess patient secondary to outreach protocol. MEWS Score: 1 (12/04/18 1957) Vitals:  
 12/04/18 1510 12/04/18 1512 12/04/18 1948 12/04/18 1957 BP:  (!) 150/101  119/75 Pulse:  63  66 Resp:  19  20 Temp:  98.3 °F (36.8 °C)  98.1 °F (36.7 °C) SpO2: 97% 97% 91% 93% Weight:      
Height:      
  
 
LAB DATA: 
 
Recent Labs 12/03/18 0342 12/02/18 [facility] [address]  142  
K 4.3 4.5  
CL 95* 99  
CO2 41* 41* AGAP 3* 2*  
* 131* BUN 18 18 CREA 1.18 1.13 GFRAA >60 >60 GFRNA >60 >60  
CA 8.6 8.5 MG 1.9  --   
ALB  --  2.7* TP  --  6.3 GLOB  --  3.6* AGRAT  --  0.8* ALT  --  22 Recent Labs 12/03/18 0342 12/02/18 [facility] WBC 5.2 8.0 HGB 11.3* 11.7* HCT 37.8* 39.7*  177 OBJECTIVE: On arrival to room, I found patient to be resting in bed watching TV while eating a snack. Pain Assessment Pain Intensity 1: 0 (12/04/18 1957) Patient Stated Pain Goal: 3 
 
  
  
  
  
 
  
  
  
   
 
ASSESSMENT:  Pt is alert and oriented x4. O2= 92% on 5L [place]. HR= 66. Pt encouraged to wear his Bipap at night. Pt stated, \"I have to be better about wear it at home and stopping smoking and drugs. \" Pt encouraged to quit his addictions. Education offered about how to quit smoking and resources for drug cessation. Pt very appreciative. PLAN:  Will continue to monitor.

## 2018-12-05 NOTE — PROGRESS NOTES
Patient remains in stable condition with respirations even/unlabored. No acute distress noted. No needs noted or voiced at this time. Safety measures in place. Oxygen noted to nasal cannula. CPAP at bedside. Call light remains within reach. Preparing to give report to oncoming shift.

## 2018-12-05 NOTE — PROGRESS NOTES
MARKUS recevied from [address]. Patient remains in stable condition with respirations even/unlabored. No acute distress noted at this time. BiPAP in place at this time. Call light remains within reach, patient encouraged to call nurse prn assist. Will continue to monitor per policy.

## 2018-12-05 NOTE — PROGRESS NOTES
Discussed patient during IDT rounds. Patient remains independent at baseline. He has a home O2 concentrator and a single portable tank for which he has self-paid. He has a CPAP machine that was working last week at his last hospitalization, but states his son dropped it and \"something broke off the back. \" Patient has a second CPAP that a relative had but whose insurance bought a new one. That relative has given patient his old one. If, for some reason, patient's present CPAP does not work when his wife brings it in, the CPAP that he received from his relative can be used if we can get the settings adjusted to meet the patient's need. No other discharge planning needs identified at this time. Case Management will continue to follow. Care Management Interventions Mode of Transport at Discharge: Other (see comment) Transition of Care Consult (CM Consult): Discharge Planning Discharge Durable Medical Equipment: (portable o2, concentrator, CPAP, nebulizer) Current Support Network: Relative's Home Confirm Follow Up Transport: Family(wife provides transportation) Freedom of Choice Offered: Yes [employer] Information Provided?: (no payor source) Discharge Location Discharge Placement: Home with home health

## 2018-12-05 NOTE — CONSULTS
Hospitalist Consult Note Admit Date:  2018  2:34 PM  
Name:  Cynthia Ho Age:  52 y.o. 
:  1965 MRN:  [id number] PCP:  None Treatment Team: Attending Provider: Donnamae Felty, MD; Care Manager: Marjorie Ashraf, RN; Care Manager: Renate Smyth, RN; Consulting Provider: Kendrick Bearden MD; Consulting Provider: Radha Jung MD; Utilization Review: Kelvin Myles RN 
Reason for consult: Transfer of Care HPI:  
47yr old male with pmhx sig for morbid obesity, NATALIIA, Suspected COPD, substance abuse with ( amphetamines, opioids, benzos) smoking and atrial flutter. He was previously admitted on - for respiratory where he was intubated. He represented 2018 with dyspnea and hypoxic hypercarbic resp failure. His drug screen this admit was positive for amphetamines, opioids and THC. He noted that his cpap was broken 2 days prior to the admit. He was admitted to the ICU where he was diuresed and continued on BIPAP. His symptoms gradually resolved. He was moved to the floor and the hospitalist were asked to assume care. 10 systems reviewed and negative except as noted in HPI. Past Medical History:  
Diagnosis Date  Acute respiratory failure with hypercapnia ([address] 2018  Chronic obstructive pulmonary disease ([facility] [address]  Heart failure ([facility] [address]  Sleep disorder No past surgical history on file. Current Facility-Administered Medications Medication Dose Route Frequency  nicotine (NICODERM CQ) 14 mg/24 hr patch 1 Patch  1 Patch TransDERmal DAILY  insulin lispro (HUMALOG) injection   SubCUTAneous AC&HS  amLODIPine (NORVASC) tablet 10 mg  10 mg Oral DAILY  hydrALAZINE (APRESOLINE) tablet 50 mg  50 mg Oral TID  levalbuterol (XOPENEX) nebulizer soln 0.63 mg/3 mL  0.63 mg Nebulization Q4H RT  
 carvedilol (COREG) tablet 25 mg  25 mg Oral BID WITH MEALS  sertraline (ZOLOFT) tablet 50 mg  50 mg Oral DAILY  tiotropium (SPIRIVA) inhalation capsule 18 mcg  1 Cap Inhalation DAILY  rivaroxaban (XARELTO) tablet 20 mg  20 mg Oral DAILY WITH DINNER  
 sodium chloride (NS) flush 5-10 mL  5-10 mL IntraVENous Q8H  
 sodium chloride (NS) flush 5-10 mL  5-10 mL IntraVENous PRN  
 acetaminophen (TYLENOL) tablet 650 mg  650 mg Oral Q4H PRN  
 ondansetron (ZOFRAN) injection 4 mg  4 mg IntraVENous Q4H PRN  
 naloxone (NARCAN) injection 0.4 mg  0.4 mg IntraVENous PRN  
 senna-docusate (PERICOLACE) 8.6-50 mg per tablet 1 Tab  1 Tab Oral BID PRN  
 amiodarone (CORDARONE) tablet 200 mg  200 mg Oral Q12H No Known Allergies Social History Tobacco Use  Smoking status: Current Every Day Smoker Packs/day: 2.00 Substance Use Topics  Alcohol use: No  
  Comment: once a month beer No family history on file. Immunization History Administered Date(s) Administered  Influenza Vaccine (Quad) PF 01/24/2018, 11/30/2018  TB Skin Test (PPD) Intradermal 01/07/2018 Objective:  
 
Patient Vitals for the past 24 hrs: 
 Temp Pulse Resp BP SpO2  
12/05/18 1516     94 % 12/05/18 0927     95 % 12/05/18 0756     95 % 12/05/18 0713 98.1 °F (36.7 °C) 66 19 120/76 96 % 12/05/18 0406     97 % 12/05/18 0401 98 °F (36.7 °C) 62 20 124/72 94 % 12/04/18 2345 98 °F (36.7 °C) 65 20 120/73 93 % 12/04/18 2343     (!) 65 % 12/04/18 2333     95 % 12/04/18 1957 98.1 °F (36.7 °C) 66 20 119/75 93 % 12/04/18 1948     91 % Oxygen Therapy O2 Sat (%): 94 % (12/05/18 1516) Pulse via Oximetry: 84 beats per minute (12/05/18 1516) O2 Device: Hi flow nasal cannula (12/05/18 1516) O2 Flow Rate (L/min): 4 l/min (12/05/18 1516) FIO2 (%): 40 % (12/05/18 0713) Intake/Output Summary (Last 24 hours) at 12/5/2018 1618 Last data filed at 12/5/2018 8594 Gross per 24 hour Intake 600 ml Output 1300 ml Net -700 ml Physical Exam: 
General:    Well nourished. Alert. Eyes:   Normal sclera. Extraocular movements intact. ENT:  Normocephalic, atraumatic. Moist mucous membranes CV:   RRR. No murmur, rub, or gallop. Lungs:              Equal a/e bl with rales Abdomen: Soft, nontender, nondistended. Bowel sounds normal.  
Extremities: Warm and dry. No cyanosis or edema. Neurologic: CN II-XII grossly intact. Sensation intact. Skin:     No rashes or jaundice. Psych:  Normal mood and affect. I reviewed the labs, imaging, EKGs, telemetry, and other studies done this admission. Data Review:  
Recent Results (from the past 24 hour(s)) GLUCOSE, POC Collection Time: 12/04/18 10:45 PM  
Result Value Ref Range Glucose (POC) 115 (H) 65 - 100 mg/dL GLUCOSE, POC Collection Time: 12/05/18  5:16 AM  
Result Value Ref Range Glucose (POC) 111 (H) 65 - 100 mg/dL GLUCOSE, POC Collection Time: 12/05/18  1:51 PM  
Result Value Ref Range Glucose (POC) 92 65 - 100 mg/dL All Micro Results None Other Studies: Xr Chest Sngl V Result Date: 12/3/2018 EXAM:  Chest x-ray. DATE:  December 3, 2018. INDICATION:  Follow-up vascular congestion. COMPARISON:  Yesterday's chest x-ray. TECHNIQUE:  2 frontal views of the chest were obtained. FINDINGS:  There is unchanged cardiomegaly and pulmonary vascular congestion. No pneumothorax, significant pleural effusion or overt pulmonary edema is identified. IMPRESSION:  Unchanged pulmonary vascular congestion. Xr Chest [facility] Result Date: 12/2/2018 CHEST X-RAY, one view. HISTORY:  Chest pain  Shortness of breath TECHNIQUE:  AP upright portable view. COMPARISON: 19 November 2018 FINDINGS:   Pulmonary vascular congestion. Left hemidiaphragm is obscured, possibly due to technique. Obesity decreases quality the exam. Right hemidiaphragm is well-defined. Heart may be enlarged IMPRESSION:  Pulmonary venous hypertension probably early interstitial edema. Assessment and Plan: Hospital Problems as of 12/5/2018 Date Reviewed: 12/5/2018 Codes Class Noted - Resolved POA Methamphetamine abuse (HCC) (Chronic) ICD-10-CM: F15.10 ICD-9-CM: 305.70  12/2/2018 - Present Yes  
   
 H/O atrial flutter (Chronic) ICD-10-CM: T86.44 
ICD-9-CM: V12.59  12/2/2018 - Present Yes Overview Signed 2/6/2018  7:51 AM by Karley Ferrer NP  
  1/2018 Atrial flutter, s/p cardioversion. Essential hypertension (Chronic) ICD-10-CM: I10 
ICD-9-CM: 401.9  12/2/2018 - Present Yes * (Principal) Acute respiratory failure with hypoxia and hypercarbia (HCC) ICD-10-CM: J96.01, J96.02 
ICD-9-CM: 518.81  12/2/2018 - Present Yes Substance abuse (HCC) (Chronic) ICD-10-CM: F19.10 ICD-9-CM: 305.90  12/2/2018 - Present Yes Noncompliance (Chronic) ICD-10-CM: Z91.19 ICD-9-CM: V15.81  12/2/2018 - Present Yes Acute encephalopathy ICD-10-CM: G93.40 ICD-9-CM: 348.30  12/2/2018 - Present Yes  
   
 NATALIIA (obstructive sleep apnea) (Chronic) ICD-10-CM: H75.62 
ICD-9-CM: 327.23  11/16/2018 - Present Yes Chronic respiratory failure (HCC) (Chronic) ICD-10-CM: J96.10 ICD-9-CM: 518.83  11/16/2018 - Present Yes Morbid obesity ([facility] [address] (Chronic) ICD-10-CM: E66.01 
ICD-9-CM: 278.01  11/16/2018 - Present Yes Nicotine dependence (Chronic) ICD-10-CM: L64.858 ICD-9-CM: 305.1  2/1/2018 - Present Yes Homeless (Chronic) ICD-10-CM: Z59.0 ICD-9-CM: V60.0  1/15/2018 - Present Yes PLAN: 
· Acute resp failure- per pulm · Pulmonary edema- continue lasix prn · NATALIIA on bipap here at night - family to bring in machine for rt to look at · A/f continue current meds · Substance abuse patient advised to quit. · Morbid obesity - supportive care- advised on weight loss · Non- compliance- advised to be complaint · htn- continue current meds · Further workup and mgt based on his clinical course · Plan for dc when we can arrange proper nataliia equipment · dvt ppx on xarelto Signed: 
Janneth Calhoun MD

## 2018-12-05 NOTE — PROGRESS NOTES
Problem: Interdisciplinary Rounds Goal: Interdisciplinary Rounds Outcome: Progressing Towards Goal 
Interdisciplinary team rounds were held 12/5/2018 with the following team members:Care Management, Nursing, Physical Therapy, Physician, Respiratory Therapy and Clinical Coordinator and the patient. Plan of care discussed. See clinical pathway and/or care plan for interventions and desired outcomes.

## 2018-12-06 ENCOUNTER — APPOINTMENT (OUTPATIENT)
Dept: GENERAL RADIOLOGY | Age: 53
DRG: 189 | End: 2018-12-06
Attending: INTERNAL MEDICINE
Payer: SELF-PAY

## 2018-12-06 VITALS
OXYGEN SATURATION: 82 % | BODY MASS INDEX: 42.12 KG/M2 | TEMPERATURE: 98 F | HEART RATE: 99 BPM | RESPIRATION RATE: 20 BRPM | SYSTOLIC BLOOD PRESSURE: 130 MMHG | DIASTOLIC BLOOD PRESSURE: 82 MMHG | WEIGHT: 311 LBS | HEIGHT: 72 IN

## 2018-12-06 LAB
GLUCOSE BLD STRIP.AUTO-MCNC: 121 MG/DL (ref 65–100)
GLUCOSE BLD STRIP.AUTO-MCNC: 127 MG/DL (ref 65–100)
GLUCOSE BLD STRIP.AUTO-MCNC: 131 MG/DL (ref 65–100)
GLUCOSE BLD STRIP.AUTO-MCNC: 86 MG/DL (ref 65–100)

## 2018-12-06 PROCEDURE — 94760 N-INVAS EAR/PLS OXIMETRY 1: CPT

## 2018-12-06 PROCEDURE — 74011250636 HC RX REV CODE- 250/636: Performed by: INTERNAL MEDICINE

## 2018-12-06 PROCEDURE — 74011250637 HC RX REV CODE- 250/637: Performed by: INTERNAL MEDICINE

## 2018-12-06 PROCEDURE — 94640 AIRWAY INHALATION TREATMENT: CPT

## 2018-12-06 PROCEDURE — 82962 GLUCOSE BLOOD TEST: CPT

## 2018-12-06 PROCEDURE — 65270000029 HC RM PRIVATE

## 2018-12-06 PROCEDURE — 99232 SBSQ HOSP IP/OBS MODERATE 35: CPT | Performed by: INTERNAL MEDICINE

## 2018-12-06 PROCEDURE — 71045 X-RAY EXAM CHEST 1 VIEW: CPT

## 2018-12-06 PROCEDURE — 77010033678 HC OXYGEN DAILY

## 2018-12-06 PROCEDURE — 94660 CPAP INITIATION&MGMT: CPT

## 2018-12-06 PROCEDURE — 74011000250 HC RX REV CODE- 250: Performed by: INTERNAL MEDICINE

## 2018-12-06 RX ORDER — ALPRAZOLAM 0.25 MG/1
0.25 TABLET ORAL
Status: DISCONTINUED | OUTPATIENT
Start: 2018-12-06 | End: 2018-12-09

## 2018-12-06 RX ORDER — FUROSEMIDE 10 MG/ML
20 INJECTION INTRAMUSCULAR; INTRAVENOUS DAILY
Status: DISCONTINUED | OUTPATIENT
Start: 2018-12-07 | End: 2018-12-11

## 2018-12-06 RX ADMIN — HYDRALAZINE HYDROCHLORIDE 50 MG: 50 TABLET, FILM COATED ORAL at 21:33

## 2018-12-06 RX ADMIN — AMIODARONE HYDROCHLORIDE 200 MG: 200 TABLET ORAL at 21:33

## 2018-12-06 RX ADMIN — Medication 5 ML: at 05:19

## 2018-12-06 RX ADMIN — LEVALBUTEROL HYDROCHLORIDE 0.63 MG: 0.63 SOLUTION RESPIRATORY (INHALATION) at 07:43

## 2018-12-06 RX ADMIN — HYDRALAZINE HYDROCHLORIDE 50 MG: 50 TABLET, FILM COATED ORAL at 15:39

## 2018-12-06 RX ADMIN — LEVALBUTEROL HYDROCHLORIDE 0.63 MG: 0.63 SOLUTION RESPIRATORY (INHALATION) at 19:56

## 2018-12-06 RX ADMIN — CARVEDILOL 25 MG: 12.5 TABLET, FILM COATED ORAL at 16:56

## 2018-12-06 RX ADMIN — TIOTROPIUM BROMIDE 18 MCG: 18 CAPSULE ORAL; RESPIRATORY (INHALATION) at 07:43

## 2018-12-06 RX ADMIN — HYDRALAZINE HYDROCHLORIDE 50 MG: 50 TABLET, FILM COATED ORAL at 08:20

## 2018-12-06 RX ADMIN — CARVEDILOL 25 MG: 12.5 TABLET, FILM COATED ORAL at 08:20

## 2018-12-06 RX ADMIN — SERTRALINE HYDROCHLORIDE 50 MG: 50 TABLET ORAL at 08:20

## 2018-12-06 RX ADMIN — LEVALBUTEROL HYDROCHLORIDE 0.63 MG: 0.63 SOLUTION RESPIRATORY (INHALATION) at 04:14

## 2018-12-06 RX ADMIN — LEVALBUTEROL HYDROCHLORIDE 0.63 MG: 0.63 SOLUTION RESPIRATORY (INHALATION) at 11:24

## 2018-12-06 RX ADMIN — LEVALBUTEROL HYDROCHLORIDE 0.63 MG: 0.63 SOLUTION RESPIRATORY (INHALATION) at 15:44

## 2018-12-06 RX ADMIN — AMLODIPINE BESYLATE 10 MG: 10 TABLET ORAL at 08:20

## 2018-12-06 RX ADMIN — Medication 5 ML: at 21:32

## 2018-12-06 RX ADMIN — AMIODARONE HYDROCHLORIDE 200 MG: 200 TABLET ORAL at 08:20

## 2018-12-06 RX ADMIN — FUROSEMIDE 20 MG: 10 INJECTION, SOLUTION INTRAMUSCULAR; INTRAVENOUS at 08:20

## 2018-12-06 RX ADMIN — RIVAROXABAN 20 MG: 20 TABLET, FILM COATED ORAL at 16:56

## 2018-12-06 RX ADMIN — Medication 10 ML: at 13:24

## 2018-12-06 NOTE — PROGRESS NOTES
[facility] OUTREACH NURSE PROGRESS REPORT SUBJECTIVE: Called to assess patient secondary to recent transfer from CCU. MEWS Score: 1 (12/05/18 1936) Vitals:  
 12/05/18 1516 12/05/18 1619 12/05/18 1936 12/05/18 2004 BP:  113/54 114/72 Pulse:  66 61 Resp:  18 18 Temp:  98.2 °F (36.8 °C) 97.2 °F (36.2 °C) SpO2: 94% 97% 96% 95% Weight:      
Height:      
  
LAB DATA: 
 
Recent Labs 12/03/18 
7213   
K 4.3 CL 95* CO2 41* AGAP 3*  
* BUN 18 CREA 1.18  
GFRAA >60  
GFRNA >60  
CA 8.6 MG 1.9 Recent Labs 12/03/18 
5668 WBC 5.2 HGB 11.3* HCT 37.8*  
 OBJECTIVE: On arrival to room, I found patient to be sleeping. Pain Assessment Pain Intensity 1: 0 (12/05/18 2017) Patient Stated Pain Goal: 0 
 
  
ASSESSMENT:  Patient sleeping with CPAP mask on, FiO2 40%; O2 sat 99%, lung sounds diminished throughout. NAD noted at this time. PLAN:  Will continue to follow per outreach protocol.

## 2018-12-06 NOTE — PROGRESS NOTES
New IV dressing applied. Patient noticed that nicotine patch has fallen off. Nurse and patient cannot find the patch in the bed or around the room. Patient denies need for replacement patch at this time. Will continue to monitor.

## 2018-12-06 NOTE — PROGRESS NOTES
[facility]. Patient remains in stable condition with respirations even/unlabored. No acute distress noted at this time. BiPap noted at this time. Call light remains within reach, patient encouraged to call nurse prn assist. Will continue to monitor per policy.

## 2018-12-06 NOTE — PROGRESS NOTES
Problem: Gas Exchange - Impaired Goal: *Absence of hypoxia Outcome: Progressing Towards Goal 
Took pt off BiPAP and placed on 4L NC.

## 2018-12-06 NOTE — PROGRESS NOTES
Bedside report from Bobbi Kevin., RN. Patient resting in bed, no acute distress noted. Bipap in place. Call light within reach. Will continue to monitor.

## 2018-12-06 NOTE — PROGRESS NOTES
Bedtime meds given. Patient request 4L NC and remove bipap at this time. No acute distress noted. Call light within reach. Will continue to monitor.

## 2018-12-06 NOTE — PROGRESS NOTES
Resting in bed, eyes closed. Bipap in place. No acute distress noted. Call light within reach. Will continue to monitor.

## 2018-12-06 NOTE — PROGRESS NOTES
Hospitalist Progress Note Admit Date:  2018  2:34 PM  
Name:  Mckenzie Moore Age:  52 y.o. 
:  1965 MRN:  [id number] PCP:  None Treatment Team: Attending Provider: Jon Rodriguez MD; Care Manager: Bisi Hamilton, RN; Care Manager: Maria De Jesus Larose, RN; Consulting Provider: Kimmie Fernandez MD; Consulting Provider: Shay Spear MD  
 
 
Pt Transferred 2018 from Pulmonology/Intensivist 
As Per NOTES: 
' 
HPI:  
47yr old male with pmhx sig for morbid obesity, NATALIIA, Suspected COPD, substance abuse with ( amphetamines, opioids, benzos) smoking and atrial flutter. He was previously admitted on - for respiratory where he was intubated. He represented 2018 with dyspnea and hypoxic hypercarbic resp failure. His drug screen this admit was positive for amphetamines, opioids and THC. He noted that his cpap was broken 2 days prior to the admit. He was admitted to the ICU where he was diuresed and continued on BIPAP. His symptoms gradually resolved. He was moved to the floor and the hospitalist were asked to assume care\" 2018. 
 
2018- patient seen - complains of anxiety and requesting a anxiolytic- denies use of bdps as an outpatient even thou drug screen was positive. Moving better and breathing better after lasix started 10 systems reviewed and negative except as noted in HPI. Past Medical History:  
Diagnosis Date  Acute respiratory failure with hypercapnia ([address] 2018  Chronic obstructive pulmonary disease ([address]  Heart failure ([address]  Sleep disorder No past surgical history on file. Current Facility-Administered Medications Medication Dose Route Frequency  [START ON 2018] furosemide (LASIX) injection 20 mg  20 mg IntraVENous DAILY  ALPRAZolam (XANAX) tablet 0.25 mg  0.25 mg Oral TID PRN  
 nicotine (NICODERM CQ) 14 mg/24 hr patch 1 Patch  1 Patch TransDERmal DAILY  insulin lispro (HUMALOG) injection   SubCUTAneous AC&HS  amLODIPine (NORVASC) tablet 10 mg  10 mg Oral DAILY  hydrALAZINE (APRESOLINE) tablet 50 mg  50 mg Oral TID  levalbuterol (XOPENEX) nebulizer soln 0.63 mg/3 mL  0.63 mg Nebulization Q4H RT  
 carvedilol (COREG) tablet 25 mg  25 mg Oral BID WITH MEALS  sertraline (ZOLOFT) tablet 50 mg  50 mg Oral DAILY  tiotropium (SPIRIVA) inhalation capsule 18 mcg  1 Cap Inhalation DAILY  rivaroxaban (XARELTO) tablet 20 mg  20 mg Oral DAILY WITH DINNER  
 sodium chloride (NS) flush 5-10 mL  5-10 mL IntraVENous Q8H  
 sodium chloride (NS) flush 5-10 mL  5-10 mL IntraVENous PRN  
 acetaminophen (TYLENOL) tablet 650 mg  650 mg Oral Q4H PRN  
 ondansetron (ZOFRAN) injection 4 mg  4 mg IntraVENous Q4H PRN  
 naloxone (NARCAN) injection 0.4 mg  0.4 mg IntraVENous PRN  
 senna-docusate (PERICOLACE) 8.6-50 mg per tablet 1 Tab  1 Tab Oral BID PRN  
 amiodarone (CORDARONE) tablet 200 mg  200 mg Oral Q12H No Known Allergies Social History Tobacco Use  Smoking status: Current Every Day Smoker Packs/day: 2.00 Substance Use Topics  Alcohol use: No  
  Comment: once a month beer No family history on file. Immunization History Administered Date(s) Administered  Influenza Vaccine (Quad) PF 01/24/2018, 11/30/2018  TB Skin Test (PPD) Intradermal 01/07/2018 Objective:  
 
Patient Vitals for the past 24 hrs: 
 Temp Pulse Resp BP SpO2  
12/06/18 1546     95 % 12/06/18 1515 98.1 °F (36.7 °C) 67 19 116/70 93 % 12/06/18 1124     96 % 12/06/18 1059 98 °F (36.7 °C) 62 18 95/55 95 % 12/06/18 0743     98 % 12/06/18 0715 97.9 °F (36.6 °C) 69 18 119/74 98 % 12/06/18 0414     98 % 12/06/18 0315 97.6 °F (36.4 °C) 63 17 111/69 98 % 12/05/18 2338 98.3 °F (36.8 °C) (!) 59 14 136/69 99 % 12/05/18 2314     95 % 12/05/18 2004     95 % 12/05/18 1936 97.2 °F (36.2 °C) 61 18 114/72 96 % Oxygen Therapy O2 Sat (%): 95 % (12/06/18 1546) Pulse via Oximetry: 85 beats per minute (12/06/18 1546) O2 Device: Nasal cannula (12/06/18 1546) O2 Flow Rate (L/min): 4 l/min (12/06/18 1546) FIO2 (%): 40 % (12/06/18 0743) Intake/Output Summary (Last 24 hours) at 12/6/2018 1840 Last data filed at 12/6/2018 1749 Gross per 24 hour Intake 1080 ml Output 1400 ml Net -320 ml Physical Exam: 
General:    Well nourished. Alert. Eyes:   Normal sclera. Extraocular movements intact. ENT:  Normocephalic, atraumatic. Moist mucous membranes CV:   RRR. No murmur, rub, or gallop. Lungs:              Equal a/e bl with rales Abdomen: Soft, nontender, nondistended. Bowel sounds normal.  
Extremities: Warm and dry. No cyanosis or edema. Neurologic: CN II-XII grossly intact. Sensation intact. Skin:     No rashes or jaundice. Psych:  Normal mood and affect. I reviewed the labs, imaging, EKGs, telemetry, and other studies done this admission. Data Review:  
Recent Results (from the past 24 hour(s)) GLUCOSE, POC Collection Time: 12/05/18  8:31 PM  
Result Value Ref Range Glucose (POC) 125 (H) 65 - 100 mg/dL GLUCOSE, POC Collection Time: 12/06/18  5:15 AM  
Result Value Ref Range Glucose (POC) 131 (H) 65 - 100 mg/dL GLUCOSE, POC Collection Time: 12/06/18 11:25 AM  
Result Value Ref Range Glucose (POC) 121 (H) 65 - 100 mg/dL GLUCOSE, POC Collection Time: 12/06/18  3:27 PM  
Result Value Ref Range Glucose (POC) 86 65 - 100 mg/dL All Micro Results None Other Studies: Xr Chest Sngl V Result Date: 12/3/2018 EXAM:  Chest x-ray. DATE:  December 3, 2018. INDICATION:  Follow-up vascular congestion. COMPARISON:  Yesterday's chest x-ray. TECHNIQUE:  2 frontal views of the chest were obtained. FINDINGS:  There is unchanged cardiomegaly and pulmonary vascular congestion.  No pneumothorax, significant pleural effusion or overt pulmonary edema is identified. IMPRESSION:  Unchanged pulmonary vascular congestion. Xr Chest [facility] Result Date: 12/2/2018 CHEST X-RAY, one view. HISTORY:  Chest pain  Shortness of breath TECHNIQUE:  AP upright portable view. COMPARISON: 19 November 2018 FINDINGS:   Pulmonary vascular congestion. Left hemidiaphragm is obscured, possibly due to technique. Obesity decreases quality the exam. Right hemidiaphragm is well-defined. Heart may be enlarged IMPRESSION:  Pulmonary venous hypertension probably early interstitial edema. Assessment and Plan:  
 
Hospital Problems as of 12/6/2018 Date Reviewed: 12/5/2018 Codes Class Noted - Resolved POA Methamphetamine abuse (HCC) (Chronic) ICD-10-CM: F15.10 ICD-9-CM: 305.70  12/2/2018 - Present Yes  
   
 H/O atrial flutter (Chronic) ICD-10-CM: L18.51 
ICD-9-CM: V12.59  12/2/2018 - Present Yes Overview Signed 2/6/2018  7:51 AM by Angelique Orozco NP  
  1/2018 Atrial flutter, s/p cardioversion. Essential hypertension (Chronic) ICD-10-CM: I10 
ICD-9-CM: 401.9  12/2/2018 - Present Yes * (Principal) Acute respiratory failure with hypoxia and hypercarbia (HCC) ICD-10-CM: J96.01, J96.02 
ICD-9-CM: 518.81  12/2/2018 - Present Yes Substance abuse (HCC) (Chronic) ICD-10-CM: F19.10 ICD-9-CM: 305.90  12/2/2018 - Present Yes Noncompliance (Chronic) ICD-10-CM: Z91.19 ICD-9-CM: V15.81  12/2/2018 - Present Yes Acute encephalopathy ICD-10-CM: G93.40 ICD-9-CM: 348.30  12/2/2018 - Present Yes  
   
 NATALIIA (obstructive sleep apnea) (Chronic) ICD-10-CM: Z75.43 
ICD-9-CM: 327.23  11/16/2018 - Present Yes Chronic respiratory failure (HCC) (Chronic) ICD-10-CM: J96.10 ICD-9-CM: 518.83  11/16/2018 - Present Yes Morbid obesity ([address] (Chronic) ICD-10-CM: E66.01 
ICD-9-CM: 278.01  11/16/2018 - Present Yes Nicotine dependence (Chronic) ICD-10-CM: D42.075 ICD-9-CM: 305.1  2/1/2018 - Present Yes Homeless (Chronic) ICD-10-CM: Z59.0 ICD-9-CM: V60.0  1/15/2018 - Present Yes PLAN: 
· Acute resp failure- resolving;  pulm- input appreciated · Pulmonary edema- lasix re-started- given hypotension- pulm has decreased- will monitor · NATALIIA on bipap here at night - family to brought in machine for rt to look at- still working on it · Atrial flutter continue current meds · Substance abuse patient advised to quit. · Morbid obesity - continue supportive care- advised on weight loss · Non- compliance- advised to be compliant · HTN- continue current meds · Anxiety- low dose prn xanax · Further workup and mgt based on his clinical course · Plan for dc when we can arrange proper nataliia equipment · dvt ppx on xarelto Signed: 
Janneth Calhoun MD

## 2018-12-06 NOTE — PROGRESS NOTES
Date of Outreach Update: 
Kristal Hernandez was seen and assessed. MEWS Score: 0 (12/05/18 2338) Vitals:  
 12/05/18 1936 12/05/18 2004 12/05/18 2314 12/05/18 2338 BP: 114/72   136/69 Pulse: 61   (!) 59 Resp: 18   14 Temp: 97.2 °F (36.2 °C)   98.3 °F (36.8 °C) SpO2: 96% 95% 95% 99% Weight:      
Height:      
  
 
 Pain Assessment Pain Intensity 1: 0 (12/06/18 0231) Patient Stated Pain Goal: 0 Previous Outreach assessment has been reviewed. There have been no significant clinical changes since the completion of the last dated Outreach assessment. Patient sleeping with CPAP in place with FiO2 40%. O2 sat 99%, NAD noted at this time. Patient has completed the outreach program, please call outreach RN for any further concerns. Thank you. Signed By:   Sarah Dixon RN   December 6, 2018 2:09 AM

## 2018-12-06 NOTE — PROGRESS NOTES
Problem: Interdisciplinary Rounds Goal: Interdisciplinary Rounds Outcome: Progressing Towards Goal 
Interdisciplinary team rounds were held 12/6/2018 with the following team members:Care Management, Nursing, Physician and Clinical Coordinator and the patient. Plan of care discussed. See clinical pathway and/or care plan for interventions and desired outcomes.

## 2018-12-06 NOTE — PROGRESS NOTES
Moira Houser Admission Date: 12/2/2018 Daily Progress Note: 12/6/2018 The patient's chart is reviewed and the patient is discussed with the staff. 52 y.o. CM with morbid obesity, NATALIIA, presumed COPD, substance abuse (amphetamines, opioids, benzos), smoking and atrial flutter. Was recently hospitalized at [facility] from 11/16-30th. [facility] admission was notable for suspected drug use prior to being found unresponsive and brought in by EMS. [facility] required intubation and MIRANDA w cardioversion for atrial flutter (on Xarelto and Amio).  He was managed with nightly CPAP (his home equipment which worked properly) prior to his discharge. On chronic home O2 at 4L. 
12/2, he returned with dyspnea and a RA oxygen saturation of 76%.  ABG with hypercarbia:  7.25/90/52 and after 1h of BiPAP improved incrementally to 7.33/79/54.  On serum testing his HCO3 has risen to 41 and his renal function is stable.   BNP is 371, slightly above likely baseline in 100s. He received 40mg of IV lasix, solumedrol, bronchodilators and continues on 20/10 BiPAP. He confirms using marijuana and smoking. Says his CPAP is broken since 2 days ago because his son dropped it. [facility] has pitting ankle edema. Drug screen was positive for amphetamines/opiates/THC. Moved to the floor, diuresed and edema resolved Subjective: Wore BIPAP all last night and now on 4L. Diuresing. BP now a little borderline. RT coming to evaluate multiple CPAP machines brought in from home. Current Facility-Administered Medications Medication Dose Route Frequency  furosemide (LASIX) injection 20 mg  20 mg IntraVENous Q12H  
 nicotine (NICODERM CQ) 14 mg/24 hr patch 1 Patch  1 Patch TransDERmal DAILY  insulin lispro (HUMALOG) injection   SubCUTAneous AC&HS  amLODIPine (NORVASC) tablet 10 mg  10 mg Oral DAILY  hydrALAZINE (APRESOLINE) tablet 50 mg  50 mg Oral TID  levalbuterol (XOPENEX) nebulizer soln 0.63 mg/3 mL  0.63 mg Nebulization Q4H RT  
 carvedilol (COREG) tablet 25 mg  25 mg Oral BID WITH MEALS  sertraline (ZOLOFT) tablet 50 mg  50 mg Oral DAILY  tiotropium (SPIRIVA) inhalation capsule 18 mcg  1 Cap Inhalation DAILY  rivaroxaban (XARELTO) tablet 20 mg  20 mg Oral DAILY WITH DINNER  
 sodium chloride (NS) flush 5-10 mL  5-10 mL IntraVENous Q8H  
 sodium chloride (NS) flush 5-10 mL  5-10 mL IntraVENous PRN  
 acetaminophen (TYLENOL) tablet 650 mg  650 mg Oral Q4H PRN  
 ondansetron (ZOFRAN) injection 4 mg  4 mg IntraVENous Q4H PRN  
 naloxone (NARCAN) injection 0.4 mg  0.4 mg IntraVENous PRN  
 senna-docusate (PERICOLACE) 8.6-50 mg per tablet 1 Tab  1 Tab Oral BID PRN  
 amiodarone (CORDARONE) tablet 200 mg  200 mg Oral Q12H Review of Systems Constitutional: negative for fever, chills, sweats Cardiovascular: negative for chest pain, palpitations, syncope, edema Gastrointestinal:  negative for dysphagia, reflux, vomiting, diarrhea, abdominal pain, or melena Neurologic:  negative for focal weakness, numbness, headache Objective:  
 
Vitals:  
 12/06/18 0414 12/06/18 0715 12/06/18 0743 12/06/18 1059 BP:  119/74  95/55 Pulse:  69  62 Resp:  18  18 Temp:  97.9 °F (36.6 °C)  98 °F (36.7 °C) SpO2: 98% 98% 98% 95% Weight:      
Height:      
 
Intake and Output:  
12/04 1901 - 12/06 0700 In: 659 [P.O.:958] Out: 2700 [Urine:2700] 12/06 0701 - 12/06 1900 In: 360 [P.O.:360] Out: - Physical Exam:  
Constitution:  the patient is obese and in no acute distress on 4L EENMT:  Sclera clear, pupils equal, oral mucosa moist 
Respiratory: few bibasilar crackles, no wheezing Cardiovascular:  RRR without M,G,R 
Gastrointestinal: soft and non-tender; with positive bowel sounds. Musculoskeletal: warm without cyanosis. There is no lower leg edema, SCDs, edema less Skin:  no jaundice or rashes, no wounds Neurologic: no gross neuro deficits Psychiatric:  alert and oriented x 3 CXR: None today CXR 12/3/18:  Unchanged pulmonary vascular congestion. LAB Recent Labs 12/06/18 
0515 12/05/18 
2031 12/05/18 
1614 12/05/18 
1351 12/05/18 
1028 GLUCPOC 131* 125* 177* 92 111* No results for input(s): WBC, HGB, HCT, PLT, INR, HGBEXT, HCTEXT, PLTEXT, HGBEXT, HCTEXT, PLTEXT in the last 72 hours. No lab exists for component: INREXT, INREXT No results for input(s): NA, K, CL, CO2, GLU, BUN, CREA, MG, CA, PHOS, TROIQ, ALB, TBIL, TBILI, GPT, ALT, SGOT, BNPP in the last 72 hours. No lab exists for component: TROIP Recent Labs 12/04/18 
6633 PHI 7.363 PCO2I 79.2*  
PO2I 80 HCO3I 45.1* No results for input(s): LCAD, LAC in the last 72 hours. Venous US: IMPRESSION: Negative evaluation for deep venous thrombosis within either lower 
extremity. Assessment:  (Medical Decision Making) Hospital Problems  Date Reviewed: 12/5/2018 Codes Class Noted POA Methamphetamine abuse (HCC) (Chronic) ICD-10-CM: F15.10 ICD-9-CM: 305.70  12/2/2018 Yes Chronic--stressed cessation---AA and NA encouraged H/O atrial flutter (Chronic) ICD-10-CM: Z86.79 
ICD-9-CM: V12.59  12/2/2018 Yes  
  1/2018 Atrial flutter, s/p cardioversion. On Amio and Xarelto--sinus. Essential hypertension (Chronic) ICD-10-CM: I10 
ICD-9-CM: 401.9  12/2/2018 Yes  
 chronic--controlled. * (Principal) Acute respiratory failure with hypoxia and hypercarbia (HCC) ICD-10-CM: J96.01, J96.02 
ICD-9-CM: 518.81  12/2/2018 Yes On BIPAP with sleep, NC during the day Substance abuse (HCC) (Chronic) ICD-10-CM: F19.10 ICD-9-CM: 305.90  12/2/2018 Yes  
 chronic Noncompliance (Chronic) ICD-10-CM: Z91.19 ICD-9-CM: V15.81  12/2/2018 Yes  
 chronic Acute encephalopathy ICD-10-CM: G93.40 ICD-9-CM: 348.30  12/2/2018 Yes  Resolved--A&O x3  
 NATALIIA (obstructive sleep apnea) (Chronic) ICD-10-CM: G47.33 
ICD-9-CM: 327.23  11/16/2018 Yes  
 chronic--home machine broken. Chronic respiratory failure (HCC) (Chronic) ICD-10-CM: J96.10 ICD-9-CM: 518.83  11/16/2018 Yes Chronic--Home flow 4L--reported Morbid obesity ([address] (Chronic) ICD-10-CM: E66.01 
ICD-9-CM: 278.01  11/16/2018 Yes Chronic. Nicotine dependence (Chronic) ICD-10-CM: Z30.901 ICD-9-CM: 305.1  2/1/2018 Yes Chronic--cessations strongly encouraged Homeless (Chronic) ICD-10-CM: Z59.0 ICD-9-CM: V60.0  1/15/2018 Yes Chronic. Plan:  (Medical Decision Making) Await RT eval of CPAP machines to see which are functional. 
Wean oxygen as tolerated. Continue respiratory meds. Hospitalist now primary. Check CXR. Reduce lasix with BP down. More than 50% of the time documented was spent in face-to-face contact with the patient and in the care of the patient on the floor/unit where the patient is located.  
 
Ayo De Dios MD

## 2018-12-07 LAB
ARTERIAL PATENCY WRIST A: YES
BASE EXCESS BLD CALC-SCNC: 13 MMOL/L
BDY SITE: ABNORMAL
BODY TEMPERATURE: 98.6
CO2 BLD-SCNC: 44 MMOL/L
COLLECT TIME,HTIME: 1115
FLOW RATE ISTAT,IFRATE: 4 L/MIN
GAS FLOW.O2 O2 DELIVERY SYS: ABNORMAL L/MIN
GLUCOSE BLD STRIP.AUTO-MCNC: 105 MG/DL (ref 65–100)
GLUCOSE BLD STRIP.AUTO-MCNC: 132 MG/DL (ref 65–100)
GLUCOSE BLD STRIP.AUTO-MCNC: 87 MG/DL (ref 65–100)
GLUCOSE BLD STRIP.AUTO-MCNC: 99 MG/DL (ref 65–100)
HCO3 BLD-SCNC: 41.4 MMOL/L (ref 22–26)
PCO2 BLD: 75.2 MMHG (ref 35–45)
PH BLD: 7.35 [PH] (ref 7.35–7.45)
PO2 BLD: 67 MMHG (ref 75–100)
SAO2 % BLD: 91 % (ref 95–98)
SERVICE CMNT-IMP: ABNORMAL
SERVICE CMNT-IMP: ABNORMAL
SPECIMEN TYPE: ABNORMAL

## 2018-12-07 PROCEDURE — 36600 WITHDRAWAL OF ARTERIAL BLOOD: CPT

## 2018-12-07 PROCEDURE — 94660 CPAP INITIATION&MGMT: CPT

## 2018-12-07 PROCEDURE — 77010033678 HC OXYGEN DAILY

## 2018-12-07 PROCEDURE — 99232 SBSQ HOSP IP/OBS MODERATE 35: CPT | Performed by: INTERNAL MEDICINE

## 2018-12-07 PROCEDURE — 74011250637 HC RX REV CODE- 250/637: Performed by: INTERNAL MEDICINE

## 2018-12-07 PROCEDURE — 82803 BLOOD GASES ANY COMBINATION: CPT

## 2018-12-07 PROCEDURE — 82962 GLUCOSE BLOOD TEST: CPT

## 2018-12-07 PROCEDURE — 74011000250 HC RX REV CODE- 250: Performed by: INTERNAL MEDICINE

## 2018-12-07 PROCEDURE — 94760 N-INVAS EAR/PLS OXIMETRY 1: CPT

## 2018-12-07 PROCEDURE — 94640 AIRWAY INHALATION TREATMENT: CPT

## 2018-12-07 PROCEDURE — 65270000029 HC RM PRIVATE

## 2018-12-07 PROCEDURE — 74011250636 HC RX REV CODE- 250/636: Performed by: INTERNAL MEDICINE

## 2018-12-07 RX ADMIN — CARVEDILOL 25 MG: 12.5 TABLET, FILM COATED ORAL at 18:13

## 2018-12-07 RX ADMIN — LEVALBUTEROL HYDROCHLORIDE 0.63 MG: 0.63 SOLUTION RESPIRATORY (INHALATION) at 08:31

## 2018-12-07 RX ADMIN — LEVALBUTEROL HYDROCHLORIDE 0.63 MG: 0.63 SOLUTION RESPIRATORY (INHALATION) at 23:21

## 2018-12-07 RX ADMIN — LEVALBUTEROL HYDROCHLORIDE 0.63 MG: 0.63 SOLUTION RESPIRATORY (INHALATION) at 16:00

## 2018-12-07 RX ADMIN — FUROSEMIDE 20 MG: 10 INJECTION, SOLUTION INTRAMUSCULAR; INTRAVENOUS at 08:10

## 2018-12-07 RX ADMIN — HYDRALAZINE HYDROCHLORIDE 50 MG: 50 TABLET, FILM COATED ORAL at 18:13

## 2018-12-07 RX ADMIN — Medication 5 ML: at 13:42

## 2018-12-07 RX ADMIN — HYDRALAZINE HYDROCHLORIDE 50 MG: 50 TABLET, FILM COATED ORAL at 08:11

## 2018-12-07 RX ADMIN — AMIODARONE HYDROCHLORIDE 200 MG: 200 TABLET ORAL at 08:11

## 2018-12-07 RX ADMIN — TIOTROPIUM BROMIDE 18 MCG: 18 CAPSULE ORAL; RESPIRATORY (INHALATION) at 08:32

## 2018-12-07 RX ADMIN — LEVALBUTEROL HYDROCHLORIDE 0.63 MG: 0.63 SOLUTION RESPIRATORY (INHALATION) at 05:00

## 2018-12-07 RX ADMIN — Medication 5 ML: at 05:36

## 2018-12-07 RX ADMIN — AMIODARONE HYDROCHLORIDE 200 MG: 200 TABLET ORAL at 21:08

## 2018-12-07 RX ADMIN — LEVALBUTEROL HYDROCHLORIDE 0.63 MG: 0.63 SOLUTION RESPIRATORY (INHALATION) at 19:52

## 2018-12-07 RX ADMIN — SERTRALINE HYDROCHLORIDE 50 MG: 50 TABLET ORAL at 08:10

## 2018-12-07 RX ADMIN — ACETAMINOPHEN 650 MG: 325 TABLET, FILM COATED ORAL at 21:13

## 2018-12-07 RX ADMIN — LEVALBUTEROL HYDROCHLORIDE 0.63 MG: 0.63 SOLUTION RESPIRATORY (INHALATION) at 12:00

## 2018-12-07 RX ADMIN — RIVAROXABAN 20 MG: 20 TABLET, FILM COATED ORAL at 18:13

## 2018-12-07 RX ADMIN — Medication 10 ML: at 21:08

## 2018-12-07 RX ADMIN — CARVEDILOL 25 MG: 12.5 TABLET, FILM COATED ORAL at 08:10

## 2018-12-07 RX ADMIN — LEVALBUTEROL HYDROCHLORIDE 0.63 MG: 0.63 SOLUTION RESPIRATORY (INHALATION) at 00:33

## 2018-12-07 RX ADMIN — AMLODIPINE BESYLATE 10 MG: 10 TABLET ORAL at 08:11

## 2018-12-07 NOTE — PROGRESS NOTES
MARKUS recevied from Nagi Parmar RN. Patient remains in stable condition with respirations even/unlabored. No acute distress noted at this time. BiPap noted. Call light remains within reach, patient encouraged to call nurse prn assist. Will continue to monitor per policy.

## 2018-12-07 NOTE — PROGRESS NOTES
Problem: Interdisciplinary Rounds Goal: Interdisciplinary Rounds Interdisciplinary team rounds were held 12/7/2018 with the following team members:Care Management, Physical Therapy, Physician and Clinical Coordinator and the patient. Plan of care discussed. See clinical pathway and/or care plan for interventions and desired outcomes.

## 2018-12-07 NOTE — PROGRESS NOTES
Problem: Falls - Risk of 
Goal: *Absence of Falls Document Brianne Deal Fall Risk and appropriate interventions in the flowsheet. Outcome: Progressing Towards Goal 
Fall Risk Interventions: 
Mobility Interventions: Patient to call before getting OOB Medication Interventions: Evaluate medications/consider consulting pharmacy Elimination Interventions: Call light in reach

## 2018-12-07 NOTE — PROGRESS NOTES
Patient resting quietly in bed, eyes closed. Bipap in place. No acute distress noted. Will continue to monitor

## 2018-12-07 NOTE — PROGRESS NOTES
Bedside report from Noah Bates RN. Patient resting in bed, no acute distress noted. Oxygen via NC in place. Call light within reach. Will continue to monitor.

## 2018-12-07 NOTE — PROGRESS NOTES
[facility] Admission Date: 12/2/2018 Daily Progress Note: 12/7/2018 The patient's chart is reviewed and the patient is discussed with the staff. 52 y.o. CM with morbid obesity, NATALIIA, presumed COPD, substance abuse (amphetamines, opioids, benzos), smoking and atrial flutter. Was recently hospitalized at [facility] from 11/16-30th. [facility] admission was notable for suspected drug use prior to being found unresponsive and brought in by EMS. [facility] required intubation and MIRANDA w cardioversion for atrial flutter (on Xarelto and Amio).  He was managed with nightly CPAP (his home equipment which worked properly) prior to his discharge. On chronic home O2 at 4L. 
12/2, he returned with dyspnea and a RA oxygen saturation of 76%.  ABG with hypercarbia:  7.25/90/52 and after 1h of BiPAP improved incrementally to 7.33/79/54.  On serum testing his HCO3 has risen to 41 and his renal function is stable.   BNP is 371, slightly above likely baseline in 100s. He received 40mg of IV lasix, solumedrol, bronchodilators and continues on 20/10 BiPAP. He confirms using marijuana and smoking. Says his CPAP is broken since 2 days ago because his son dropped it. [facility] has pitting ankle edema. Drug screen was positive for amphetamines/opiates/THC. Moved to the floor, diuresed and edema resolved. Subjective: Wore BIPAP all last night wants to sleep some more. Currently off of oxygen and in no distress. There are no notes from RT indicating they had looked at his devices. Current Facility-Administered Medications Medication Dose Route Frequency  furosemide (LASIX) injection 20 mg  20 mg IntraVENous DAILY  ALPRAZolam (XANAX) tablet 0.25 mg  0.25 mg Oral TID PRN  
 nicotine (NICODERM CQ) 14 mg/24 hr patch 1 Patch  1 Patch TransDERmal DAILY  insulin lispro (HUMALOG) injection   SubCUTAneous AC&HS  amLODIPine (NORVASC) tablet 10 mg  10 mg Oral DAILY  hydrALAZINE (APRESOLINE) tablet 50 mg  50 mg Oral TID  levalbuterol (XOPENEX) nebulizer soln 0.63 mg/3 mL  0.63 mg Nebulization Q4H RT  
 carvedilol (COREG) tablet 25 mg  25 mg Oral BID WITH MEALS  sertraline (ZOLOFT) tablet 50 mg  50 mg Oral DAILY  tiotropium (SPIRIVA) inhalation capsule 18 mcg  1 Cap Inhalation DAILY  rivaroxaban (XARELTO) tablet 20 mg  20 mg Oral DAILY WITH DINNER  
 sodium chloride (NS) flush 5-10 mL  5-10 mL IntraVENous Q8H  
 sodium chloride (NS) flush 5-10 mL  5-10 mL IntraVENous PRN  
 acetaminophen (TYLENOL) tablet 650 mg  650 mg Oral Q4H PRN  
 ondansetron (ZOFRAN) injection 4 mg  4 mg IntraVENous Q4H PRN  
 naloxone (NARCAN) injection 0.4 mg  0.4 mg IntraVENous PRN  
 senna-docusate (PERICOLACE) 8.6-50 mg per tablet 1 Tab  1 Tab Oral BID PRN  
 amiodarone (CORDARONE) tablet 200 mg  200 mg Oral Q12H Review of Systems Constitutional: negative for fever, chills, sweats Cardiovascular: negative for chest pain, palpitations, syncope, edema Gastrointestinal:  negative for dysphagia, reflux, vomiting, diarrhea, abdominal pain, or melena Neurologic:  negative for focal weakness, numbness, headache Objective:  
 
Vitals:  
 12/07/18 0501 12/07/18 0534 12/07/18 0720 12/07/18 0956 BP:   124/60 Pulse:   68 Resp:   16 Temp:   97.5 °F (36.4 °C) SpO2: 94%  97% 92% Weight:  300 lb 1.6 oz (136.1 kg) Height:      
 
Intake and Output:  
12/05 1901 - 12/07 0700 In: 1480 [P.O.:1480] Out: 4459 [ZEACM:4368] No intake/output data recorded. Physical Exam:  
Constitution:  the patient is obese and in no acute distress on 4L EENMT:  Sclera clear, pupils equal, oral mucosa moist; some irritation of bridge of nose. Respiratory: few bibasilar crackles, no wheezing Cardiovascular:  RRR without M,G,R 
Gastrointestinal: soft and non-tender; with positive bowel sounds. Musculoskeletal: warm without cyanosis. There is no lower leg edema, SCDs Skin:  no jaundice or rashes, no wounds Neurologic: no gross neuro deficits Psychiatric:  alert and oriented x 3 CXR: None today CXR  
 
12/6: 
 
 
 
 
 
12/3/18:  Unchanged pulmonary vascular congestion. LAB Recent Labs 12/07/18 
6661 12/06/18 2023 12/06/18 
1527 12/06/18 
1125 12/06/18 
0515 GLUCPOC 105* 127* 86 121* 131* No results for input(s): WBC, HGB, HCT, PLT, INR, HGBEXT, HCTEXT, PLTEXT, HGBEXT, HCTEXT, PLTEXT in the last 72 hours. No lab exists for component: INREXT, INREXT No results for input(s): NA, K, CL, CO2, GLU, BUN, CREA, MG, CA, PHOS, TROIQ, ALB, TBIL, TBILI, GPT, ALT, SGOT, BNPP in the last 72 hours. No lab exists for component: TROIP No results for input(s): PH, PCO2, PO2, HCO3, PHI, PCO2I, PO2I, HCO3I in the last 72 hours. No results for input(s): LCAD, LAC in the last 72 hours. Venous US: IMPRESSION: Negative evaluation for deep venous thrombosis within either lower 
extremity. Assessment:  (Medical Decision Making) Hospital Problems  Date Reviewed: 12/5/2018 Codes Class Noted POA Methamphetamine abuse (HCC) (Chronic) ICD-10-CM: F15.10 ICD-9-CM: 305.70  12/2/2018 Yes Chronic--stressed cessation---AA and NA encouraged H/O atrial flutter (Chronic) ICD-10-CM: Z86.79 
ICD-9-CM: V12.59  12/2/2018 Yes  
  1/2018 Atrial flutter, s/p cardioversion. On Amio and Xarelto--sinus. Essential hypertension (Chronic) ICD-10-CM: I10 
ICD-9-CM: 401.9  12/2/2018 Yes  
 chronic--controlled. * (Principal) Acute respiratory failure with hypoxia and hypercarbia (HCC) ICD-10-CM: J96.01, J96.02 
ICD-9-CM: 518.81  12/2/2018 Yes On BIPAP with sleep, NC during the day. CXR much improved. Substance abuse (HCC) (Chronic) ICD-10-CM: F19.10 ICD-9-CM: 305.90  12/2/2018 Yes Chronic. Noncompliance (Chronic) ICD-10-CM: Z91.19 ICD-9-CM: V15.81  12/2/2018 Yes  
 chronic Acute encephalopathy ICD-10-CM: G93.40 ICD-9-CM: 348.30  12/2/2018 Yes Resolved--A&O x3  
 NATALIIA (obstructive sleep apnea) (Chronic) ICD-10-CM: G47.33 
ICD-9-CM: 327.23  11/16/2018 Yes  
 chronic--home machine broken. Chronic respiratory failure (HCC) (Chronic) ICD-10-CM: J96.10 ICD-9-CM: 518.83  11/16/2018 Yes Chronic--Home flow 4L--reported Morbid obesity ([address] (Chronic) ICD-10-CM: E66.01 
ICD-9-CM: 278.01  11/16/2018 Yes Chronic. Nicotine dependence (Chronic) ICD-10-CM: A70.416 ICD-9-CM: 305.1  2/1/2018 Yes Chronic--cessations strongly encouraged Homeless (Chronic) ICD-10-CM: Z59.0 ICD-9-CM: V60.0  1/15/2018 Yes Chronic. Plan:  (Medical Decision Making) Check ABG. BIPAP with sleep for now; has home CPAP machines but needs BIPAP or trilogy. Wean oxygen as tolerated. Continue respiratory meds. More than 50% of the time documented was spent in face-to-face contact with the patient and in the care of the patient on the floor/unit where the patient is located.  
 
Frankey Kerbs, MD

## 2018-12-07 NOTE — PROGRESS NOTES
Hospitalist Progress Note Admit Date:  2018  2:34 PM  
Name:  Mckenzie Moore Age:  52 y.o. 
:  1965 MRN:  [id number] PCP:  None Treatment Team: Attending Provider: Jon Rodriguez MD; Care Manager: Bisi Hamilton, RN; Care Manager: Maria De Jesus Larose, RN; Consulting Provider: Kimmie Fernandez MD; Consulting Provider: Shay Spear MD  
 
 
Pt Transferred 2018 from Pulmonology/Intensivist 
As Per NOTES: 
' 
HPI:  
47yr old male with pmhx sig for morbid obesity, NATALIIA, Suspected COPD, substance abuse with ( amphetamines, opioids, benzos) smoking and atrial flutter. He was previously admitted on - for respiratory where he was intubated. He represented 2018 with dyspnea and hypoxic hypercarbic resp failure. His drug screen this admit was positive for amphetamines, opioids and THC. He noted that his cpap was broken 2 days prior to the admit. He was admitted to the ICU where he was diuresed and continued on BIPAP. His symptoms gradually resolved. He was moved to the floor and the hospitalist were asked to assume care\" 2018. 
 
2018- patient seen - complains of anxiety and requesting a anxiolytic- denies use of bdps as an outpatient even thou drug screen was positive. Moving better and breathing better after lasix started 2018- seen - cpap machines were not fixed- pulm recommending bipap/trilogy- pt has no insurance 10 systems reviewed and negative except as noted in HPI. Past Medical History:  
Diagnosis Date  Acute respiratory failure with hypercapnia ([address] 2018  Chronic obstructive pulmonary disease ([address]  Heart failure [address] [address] [facility]  Sleep disorder No past surgical history on file. Current Facility-Administered Medications Medication Dose Route Frequency  furosemide (LASIX) injection 20 mg  20 mg IntraVENous DAILY  ALPRAZolam (XANAX) tablet 0.25 mg  0.25 mg Oral TID PRN  
  nicotine (NICODERM CQ) 14 mg/24 hr patch 1 Patch  1 Patch TransDERmal DAILY  insulin lispro (HUMALOG) injection   SubCUTAneous AC&HS  amLODIPine (NORVASC) tablet 10 mg  10 mg Oral DAILY  hydrALAZINE (APRESOLINE) tablet 50 mg  50 mg Oral TID  levalbuterol (XOPENEX) nebulizer soln 0.63 mg/3 mL  0.63 mg Nebulization Q4H RT  
 carvedilol (COREG) tablet 25 mg  25 mg Oral BID WITH MEALS  sertraline (ZOLOFT) tablet 50 mg  50 mg Oral DAILY  tiotropium (SPIRIVA) inhalation capsule 18 mcg  1 Cap Inhalation DAILY  rivaroxaban (XARELTO) tablet 20 mg  20 mg Oral DAILY WITH DINNER  
 sodium chloride (NS) flush 5-10 mL  5-10 mL IntraVENous Q8H  
 sodium chloride (NS) flush 5-10 mL  5-10 mL IntraVENous PRN  
 acetaminophen (TYLENOL) tablet 650 mg  650 mg Oral Q4H PRN  
 ondansetron (ZOFRAN) injection 4 mg  4 mg IntraVENous Q4H PRN  
 naloxone (NARCAN) injection 0.4 mg  0.4 mg IntraVENous PRN  
 senna-docusate (PERICOLACE) 8.6-50 mg per tablet 1 Tab  1 Tab Oral BID PRN  
 amiodarone (CORDARONE) tablet 200 mg  200 mg Oral Q12H No Known Allergies Social History Tobacco Use  Smoking status: Current Every Day Smoker Packs/day: 2.00 Substance Use Topics  Alcohol use: No  
  Comment: once a month beer No family history on file. Immunization History Administered Date(s) Administered  Influenza Vaccine (Quad) PF 01/24/2018, 11/30/2018  TB Skin Test (PPD) Intradermal 01/07/2018 Objective:  
 
Patient Vitals for the past 24 hrs: 
 Temp Pulse Resp BP SpO2  
12/07/18 1605     98 % 12/07/18 1540 97.9 °F (36.6 °C) 68 18 136/77 97 % 12/07/18 1202     (!) 89 % 12/07/18 1115 98.1 °F (36.7 °C) 64 18 120/56 97 % 12/07/18 0832     92 % 12/07/18 0720 97.5 °F (36.4 °C) 68 16 124/60 97 % 12/07/18 0501     94 % 12/07/18 0305 97.6 °F (36.4 °C) 65 16 127/57 94 % 12/06/18 2302 97.4 °F (36.3 °C) 62 18 100/62 93 % 12/06/18 2008     98 % 12/06/18 1957     91 % 12/06/18 1911 98.4 °F (36.9 °C) 84 22 106/61 90 % Oxygen Therapy O2 Sat (%): 98 % (12/07/18 1605) Pulse via Oximetry: 69 beats per minute (12/07/18 1605) O2 Device: BIPAP (12/07/18 1605) O2 Flow Rate (L/min): 4 l/min (12/07/18 1345) FIO2 (%): 40 % (12/07/18 1605) Intake/Output Summary (Last 24 hours) at 12/7/2018 1608 Last data filed at 12/7/2018 1438 Gross per 24 hour Intake 1231 ml Output 2475 ml Net -1244 ml Physical Exam: 
General:    Well nourished. Sleeping  On bipap Eyes:   Normal sclera. Extraocular movements intact. ENT:  Normocephalic, atraumatic. Moist mucous membranes CV:   RRR. No murmur, rub, or gallop. Lungs:              Equal a/e bl with rales Abdomen: Soft, nontender, nondistended. Bowel sounds normal.  
Extremities: Warm and dry. No cyanosis or edema. Neurologic: CN II-XII grossly intact. Sensation intact. Skin:     No rashes or jaundice. Psych:  Normal mood and affect. I reviewed the labs, imaging, EKGs, telemetry, and other studies done this admission. Data Review:  
Recent Results (from the past 24 hour(s)) GLUCOSE, POC Collection Time: 12/06/18  8:23 PM  
Result Value Ref Range Glucose (POC) 127 (H) 65 - 100 mg/dL GLUCOSE, POC Collection Time: 12/07/18  5:09 AM  
Result Value Ref Range Glucose (POC) 105 (H) 65 - 100 mg/dL POC G3 Collection Time: 12/07/18 11:16 AM  
Result Value Ref Range Device: NASAL CANNULA pH (POC) 7.348 (L) 7.35 - 7.45    
 pCO2 (POC) 75.2 (HH) 35 - 45 MMHG  
 pO2 (POC) 67 (L) 75 - 100 MMHG  
 HCO3 (POC) 41.4 (H) 22 - 26 MMOL/L  
 sO2 (POC) 91 (L) 95 - 98 % Base excess (POC) 13 mmol/L Allens test (POC) YES Site LEFT RADIAL Patient temp. 98.6 Specimen type (POC) ARTERIAL Performed by Sanket   
 CO2, POC 44 MMOL/L Flow rate (POC) 4.000 L/min Critical value read back 11:20   
 COLLECT TIME 1,115 GLUCOSE, POC  
 Collection Time: 12/07/18 11:45 AM  
Result Value Ref Range Glucose (POC) 87 65 - 100 mg/dL GLUCOSE, POC Collection Time: 12/07/18  3:30 PM  
Result Value Ref Range Glucose (POC) 99 65 - 100 mg/dL All Micro Results None Other Studies: Xr Chest Sngl V Result Date: 12/3/2018 EXAM:  Chest x-ray. DATE:  December 3, 2018. INDICATION:  Follow-up vascular congestion. COMPARISON:  Yesterday's chest x-ray. TECHNIQUE:  2 frontal views of the chest were obtained. FINDINGS:  There is unchanged cardiomegaly and pulmonary vascular congestion. No pneumothorax, significant pleural effusion or overt pulmonary edema is identified. IMPRESSION:  Unchanged pulmonary vascular congestion. Xr Chest [facility] Result Date: 12/2/2018 CHEST X-RAY, one view. HISTORY:  Chest pain  Shortness of breath TECHNIQUE:  AP upright portable view. COMPARISON: 19 November 2018 FINDINGS:   Pulmonary vascular congestion. Left hemidiaphragm is obscured, possibly due to technique. Obesity decreases quality the exam. Right hemidiaphragm is well-defined. Heart may be enlarged IMPRESSION:  Pulmonary venous hypertension probably early interstitial edema. Assessment and Plan:  
 
Hospital Problems as of 12/7/2018 Date Reviewed: 12/5/2018 Codes Class Noted - Resolved POA Methamphetamine abuse (HCC) (Chronic) ICD-10-CM: F15.10 ICD-9-CM: 305.70  12/2/2018 - Present Yes  
   
 H/O atrial flutter (Chronic) ICD-10-CM: C45.70 
ICD-9-CM: V12.59  12/2/2018 - Present Yes Overview Signed 2/6/2018  7:51 AM by Keira Chang NP  
  1/2018 Atrial flutter, s/p cardioversion. Essential hypertension (Chronic) ICD-10-CM: I10 
ICD-9-CM: 401.9  12/2/2018 - Present Yes * (Principal) Acute respiratory failure with hypoxia and hypercarbia (HCC) ICD-10-CM: J96.01, J96.02 
ICD-9-CM: 518.81  12/2/2018 - Present Yes Substance abuse (HCC) (Chronic) ICD-10-CM: F19.10 ICD-9-CM: 305.90  12/2/2018 - Present Yes Noncompliance (Chronic) ICD-10-CM: Z91.19 ICD-9-CM: V15.81  12/2/2018 - Present Yes Acute encephalopathy ICD-10-CM: G93.40 ICD-9-CM: 348.30  12/2/2018 - Present Yes  
   
 NATALIIA (obstructive sleep apnea) (Chronic) ICD-10-CM: N44.74 
ICD-9-CM: 327.23  11/16/2018 - Present Yes Chronic respiratory failure (HCC) (Chronic) ICD-10-CM: J96.10 ICD-9-CM: 518.83  11/16/2018 - Present Yes Morbid obesity ([address] (Chronic) ICD-10-CM: E66.01 
ICD-9-CM: 278.01  11/16/2018 - Present Yes Nicotine dependence (Chronic) ICD-10-CM: M04.184 ICD-9-CM: 305.1  2/1/2018 - Present Yes Homeless (Chronic) ICD-10-CM: Z59.0 ICD-9-CM: V60.0  1/15/2018 - Present Yes PLAN: 
· Acute resp failure- resolving;  pulm- input appreciated · Pulmonary edema- lasix re-started- given hypotension- pulm has decreased- will monitor · NATALIIA on bipap here at night - family  brought in the machine for rt to look at- not fixed · Atrial flutter continue current meds · Substance abuse patient advised to quit. · Morbid obesity - continue supportive care- advised on weight loss · Non- compliance- advised to be compliant · HTN- continue current meds · Anxiety- low dose prn xanax · Further workup and mgt based on his clinical course · Plan for dc when we can arrange proper nataliia equipment- may need to a charitable · dvt ppx on xarelto Signed: 
Gill Shipman MD

## 2018-12-07 NOTE — PROGRESS NOTES
Patient remains in stable condition. No acute distress noted. No needs noted or voiced at this time. Safety measures remain in place. Call light is within reach.

## 2018-12-07 NOTE — PROGRESS NOTES
Bedtime meds given. Bipap in place. No acute distress noted. Call light within reach. Will continue to monitor.

## 2018-12-08 LAB
GLUCOSE BLD STRIP.AUTO-MCNC: 135 MG/DL (ref 65–100)
GLUCOSE BLD STRIP.AUTO-MCNC: 139 MG/DL (ref 65–100)
GLUCOSE BLD STRIP.AUTO-MCNC: 189 MG/DL (ref 65–100)
GLUCOSE BLD STRIP.AUTO-MCNC: 94 MG/DL (ref 65–100)

## 2018-12-08 PROCEDURE — 74011000250 HC RX REV CODE- 250: Performed by: INTERNAL MEDICINE

## 2018-12-08 PROCEDURE — 82962 GLUCOSE BLOOD TEST: CPT

## 2018-12-08 PROCEDURE — 94760 N-INVAS EAR/PLS OXIMETRY 1: CPT

## 2018-12-08 PROCEDURE — 74011250636 HC RX REV CODE- 250/636: Performed by: INTERNAL MEDICINE

## 2018-12-08 PROCEDURE — 74011250637 HC RX REV CODE- 250/637: Performed by: INTERNAL MEDICINE

## 2018-12-08 PROCEDURE — 94660 CPAP INITIATION&MGMT: CPT

## 2018-12-08 PROCEDURE — 65270000029 HC RM PRIVATE

## 2018-12-08 PROCEDURE — 99232 SBSQ HOSP IP/OBS MODERATE 35: CPT | Performed by: INTERNAL MEDICINE

## 2018-12-08 PROCEDURE — 77010033678 HC OXYGEN DAILY

## 2018-12-08 PROCEDURE — 74011636637 HC RX REV CODE- 636/637: Performed by: INTERNAL MEDICINE

## 2018-12-08 PROCEDURE — 94640 AIRWAY INHALATION TREATMENT: CPT

## 2018-12-08 RX ADMIN — FUROSEMIDE 20 MG: 10 INJECTION, SOLUTION INTRAMUSCULAR; INTRAVENOUS at 10:43

## 2018-12-08 RX ADMIN — LEVALBUTEROL HYDROCHLORIDE 0.63 MG: 0.63 SOLUTION RESPIRATORY (INHALATION) at 12:21

## 2018-12-08 RX ADMIN — LEVALBUTEROL HYDROCHLORIDE 0.63 MG: 0.63 SOLUTION RESPIRATORY (INHALATION) at 15:57

## 2018-12-08 RX ADMIN — ACETAMINOPHEN 650 MG: 325 TABLET, FILM COATED ORAL at 09:07

## 2018-12-08 RX ADMIN — Medication 5 ML: at 21:17

## 2018-12-08 RX ADMIN — LEVALBUTEROL HYDROCHLORIDE 0.63 MG: 0.63 SOLUTION RESPIRATORY (INHALATION) at 08:08

## 2018-12-08 RX ADMIN — HYDRALAZINE HYDROCHLORIDE 50 MG: 50 TABLET, FILM COATED ORAL at 17:10

## 2018-12-08 RX ADMIN — HYDRALAZINE HYDROCHLORIDE 50 MG: 50 TABLET, FILM COATED ORAL at 09:06

## 2018-12-08 RX ADMIN — RIVAROXABAN 20 MG: 20 TABLET, FILM COATED ORAL at 17:10

## 2018-12-08 RX ADMIN — CARVEDILOL 25 MG: 12.5 TABLET, FILM COATED ORAL at 17:10

## 2018-12-08 RX ADMIN — AMIODARONE HYDROCHLORIDE 200 MG: 200 TABLET ORAL at 09:07

## 2018-12-08 RX ADMIN — AMLODIPINE BESYLATE 10 MG: 10 TABLET ORAL at 09:06

## 2018-12-08 RX ADMIN — Medication 5 ML: at 17:10

## 2018-12-08 RX ADMIN — CARVEDILOL 25 MG: 12.5 TABLET, FILM COATED ORAL at 09:06

## 2018-12-08 RX ADMIN — AMIODARONE HYDROCHLORIDE 200 MG: 200 TABLET ORAL at 21:17

## 2018-12-08 RX ADMIN — SERTRALINE HYDROCHLORIDE 50 MG: 50 TABLET ORAL at 09:06

## 2018-12-08 RX ADMIN — HYDRALAZINE HYDROCHLORIDE 50 MG: 50 TABLET, FILM COATED ORAL at 21:17

## 2018-12-08 RX ADMIN — LEVALBUTEROL HYDROCHLORIDE 0.63 MG: 0.63 SOLUTION RESPIRATORY (INHALATION) at 23:22

## 2018-12-08 RX ADMIN — LEVALBUTEROL HYDROCHLORIDE 0.63 MG: 0.63 SOLUTION RESPIRATORY (INHALATION) at 03:24

## 2018-12-08 RX ADMIN — LEVALBUTEROL HYDROCHLORIDE 0.63 MG: 0.63 SOLUTION RESPIRATORY (INHALATION) at 19:44

## 2018-12-08 RX ADMIN — INSULIN LISPRO 2 UNITS: 100 INJECTION, SOLUTION INTRAVENOUS; SUBCUTANEOUS at 09:07

## 2018-12-08 NOTE — PROGRESS NOTES
Tylenol 650 mg po given. Will monitor. 12/07/18 2729 Pain 1 Pain Scale 1 Numeric (0 - 10) Pain Intensity 1 8 Patient Stated Pain Goal 0 Pain Location 1 Teeth Pain Orientation 1 Lower;Right Pain Description 1 Aching Pain Intervention(s) 1 Medication (see MAR)

## 2018-12-08 NOTE — PROGRESS NOTES
Tylenol effective. 12/07/18 2236 Vital Signs Temp 97.6 °F (36.4 °C) Temp Source Axillary Pulse (Heart Rate) 68 Resp Rate 20  
O2 Sat (%) 96 % Level of Consciousness Alert /65 MAP (Calculated) 92  
MEWS Score 1 Pain 1 Pain Scale 1 Visual  
Pain Intensity 1 0 Pain Reassessment 1 Patient sleeping Patient Observation Observations vitals Ambulate No (Comment) Activity In bed;Sleeping

## 2018-12-08 NOTE — PROGRESS NOTES
Hospitalist Progress Note Admit Date:  2018  2:34 PM  
Name:  Erin De Luna Age:  52 y.o. 
:  1965 MRN:  [id number] PCP:  None Treatment Team: Attending Provider: Shorty Brown MD; Care Manager: James Loredo, RN; Care Manager: Volodymyr Valdez, RN; Consulting Provider: Darryle Sailor, MD; Consulting Provider: Debi Chang MD  
 
 
Pt Transferred 2018 from Pulmonology/Intensivist 
As Per NOTES: 
' 
HPI:  
47yr old male with pmhx sig for morbid obesity, NATALIIA, Suspected COPD, substance abuse with ( amphetamines, opioids, benzos) smoking and atrial flutter. He was previously admitted on - for respiratory where he was intubated. He represented 2018 with dyspnea and hypoxic hypercarbic resp failure. His drug screen this admit was positive for amphetamines, opioids and THC. He noted that his cpap was broken 2 days prior to the admit. He was admitted to the ICU where he was diuresed and continued on BIPAP. His symptoms gradually resolved. He was moved to the floor and the hospitalist were asked to assume care\" 2018. 
 
2018- patient seen - complains of anxiety and requesting a anxiolytic- denies use of bdps as an outpatient even thou drug screen was positive. Moving better and breathing better after lasix started 2018- seen - cpap machines were not fixed- pulm recommending bipap/trilogy- pt has no insurance 2018- seen sleeping but arousable. No adverse overnight events. 10 systems reviewed and negative except as noted in HPI. Past Medical History:  
Diagnosis Date  Acute respiratory failure with hypercapnia ([address] 2018  Chronic obstructive pulmonary disease ([address]  Heart failure ([address]  Sleep disorder No past surgical history on file. Current Facility-Administered Medications Medication Dose Route Frequency  furosemide (LASIX) injection 20 mg  20 mg IntraVENous DAILY  ALPRAZolam (XANAX) tablet 0.25 mg  0.25 mg Oral TID PRN  
 nicotine (NICODERM CQ) 14 mg/24 hr patch 1 Patch  1 Patch TransDERmal DAILY  insulin lispro (HUMALOG) injection   SubCUTAneous AC&HS  amLODIPine (NORVASC) tablet 10 mg  10 mg Oral DAILY  hydrALAZINE (APRESOLINE) tablet 50 mg  50 mg Oral TID  levalbuterol (XOPENEX) nebulizer soln 0.63 mg/3 mL  0.63 mg Nebulization Q4H RT  
 carvedilol (COREG) tablet 25 mg  25 mg Oral BID WITH MEALS  sertraline (ZOLOFT) tablet 50 mg  50 mg Oral DAILY  tiotropium (SPIRIVA) inhalation capsule 18 mcg  1 Cap Inhalation DAILY  rivaroxaban (XARELTO) tablet 20 mg  20 mg Oral DAILY WITH DINNER  
 sodium chloride (NS) flush 5-10 mL  5-10 mL IntraVENous Q8H  
 sodium chloride (NS) flush 5-10 mL  5-10 mL IntraVENous PRN  
 acetaminophen (TYLENOL) tablet 650 mg  650 mg Oral Q4H PRN  
 ondansetron (ZOFRAN) injection 4 mg  4 mg IntraVENous Q4H PRN  
 naloxone (NARCAN) injection 0.4 mg  0.4 mg IntraVENous PRN  
 senna-docusate (PERICOLACE) 8.6-50 mg per tablet 1 Tab  1 Tab Oral BID PRN  
 amiodarone (CORDARONE) tablet 200 mg  200 mg Oral Q12H No Known Allergies Social History Tobacco Use  Smoking status: Current Every Day Smoker Packs/day: 2.00 Substance Use Topics  Alcohol use: No  
  Comment: once a month beer No family history on file. Immunization History Administered Date(s) Administered  Influenza Vaccine (Quad) PF 01/24/2018, 11/30/2018  TB Skin Test (PPD) Intradermal 01/07/2018 Objective:  
 
Patient Vitals for the past 24 hrs: 
 Temp Pulse Resp BP SpO2  
12/08/18 1223     96 % 12/08/18 1100 97.4 °F (36.3 °C) 65 16 125/65 97 % 12/08/18 0811     94 % 12/08/18 0720 97.7 °F (36.5 °C) 66 16 123/66 96 % 12/08/18 0326     97 % 12/08/18 0312 97.3 °F (36.3 °C) 76 16 117/66 97 % 12/07/18 2323     95 % 12/07/18 2236 97.6 °F (36.4 °C) 68 20 147/65 96 % 12/07/18 1953     97 % 12/07/18 1919 98.2 °F (36.8 °C) 66 24 126/72 95 % 12/07/18 1605     98 % 12/07/18 1540 97.9 °F (36.6 °C) 68 18 136/77 97 % Oxygen Therapy O2 Sat (%): 96 % (12/08/18 1223) Pulse via Oximetry: 81 beats per minute (12/08/18 1223) O2 Device: Hi flow nasal cannula (12/08/18 1223) O2 Flow Rate (L/min): 4 l/min (12/08/18 1223) FIO2 (%): 40 % (12/08/18 0326) Intake/Output Summary (Last 24 hours) at 12/8/2018 1249 Last data filed at 12/8/2018 1146 Gross per 24 hour Intake 1109 ml Output 1475 ml Net -366 ml Physical Exam: 
General:    Well nourished. Sleeping  On bipap Eyes:   Normal sclera. Extraocular movements intact. ENT:  Normocephalic, atraumatic. Moist mucous membranes CV:   RRR. No murmur, rub, or gallop. Lungs:              Equal a/e bl with rales Abdomen: Soft, nontender, nondistended. Bowel sounds normal.  
Extremities: Warm and dry. No cyanosis or edema. Neurologic: CN II-XII grossly intact. Sensation intact. Skin:     No rashes or jaundice. Psych:  Normal mood and affect. I reviewed the labs, imaging, EKGs, telemetry, and other studies done this admission. Data Review:  
Recent Results (from the past 24 hour(s)) GLUCOSE, POC Collection Time: 12/07/18  3:30 PM  
Result Value Ref Range Glucose (POC) 99 65 - 100 mg/dL GLUCOSE, POC Collection Time: 12/07/18  8:07 PM  
Result Value Ref Range Glucose (POC) 132 (H) 65 - 100 mg/dL GLUCOSE, POC Collection Time: 12/08/18  5:11 AM  
Result Value Ref Range Glucose (POC) 189 (H) 65 - 100 mg/dL GLUCOSE, POC Collection Time: 12/08/18 11:29 AM  
Result Value Ref Range Glucose (POC) 94 65 - 100 mg/dL All Micro Results None Other Studies: Xr Chest Sngl V Result Date: 12/3/2018 EXAM:  Chest x-ray. DATE:  December 3, 2018.  INDICATION:  Follow-up vascular congestion. COMPARISON:  Yesterday's chest x-ray. TECHNIQUE:  2 frontal views of the chest were obtained. FINDINGS:  There is unchanged cardiomegaly and pulmonary vascular congestion. No pneumothorax, significant pleural effusion or overt pulmonary edema is identified. IMPRESSION:  Unchanged pulmonary vascular congestion. Xr Chest [facility] Result Date: 12/2/2018 CHEST X-RAY, one view. HISTORY:  Chest pain  Shortness of breath TECHNIQUE:  AP upright portable view. COMPARISON: 19 November 2018 FINDINGS:   Pulmonary vascular congestion. Left hemidiaphragm is obscured, possibly due to technique. Obesity decreases quality the exam. Right hemidiaphragm is well-defined. Heart may be enlarged IMPRESSION:  Pulmonary venous hypertension probably early interstitial edema. Assessment and Plan:  
 
Hospital Problems as of 12/8/2018 Date Reviewed: 12/5/2018 Codes Class Noted - Resolved POA Methamphetamine abuse (HCC) (Chronic) ICD-10-CM: F15.10 ICD-9-CM: 305.70  12/2/2018 - Present Yes  
   
 H/O atrial flutter (Chronic) ICD-10-CM: G10.35 
ICD-9-CM: V12.59  12/2/2018 - Present Yes Overview Signed 2/6/2018  7:51 AM by Hubert Vaughn NP  
  1/2018 Atrial flutter, s/p cardioversion. Essential hypertension (Chronic) ICD-10-CM: I10 
ICD-9-CM: 401.9  12/2/2018 - Present Yes * (Principal) Acute respiratory failure with hypoxia and hypercarbia (HCC) ICD-10-CM: J96.01, J96.02 
ICD-9-CM: 518.81  12/2/2018 - Present Yes Substance abuse (HCC) (Chronic) ICD-10-CM: F19.10 ICD-9-CM: 305.90  12/2/2018 - Present Yes Noncompliance (Chronic) ICD-10-CM: Z91.19 ICD-9-CM: V15.81  12/2/2018 - Present Yes Acute encephalopathy ICD-10-CM: G93.40 ICD-9-CM: 348.30  12/2/2018 - Present Yes  
   
 NATALIIA (obstructive sleep apnea) (Chronic) ICD-10-CM: A39.65 
ICD-9-CM: 327.23  11/16/2018 - Present Yes Chronic respiratory failure (HCC) (Chronic) ICD-10-CM: J96.10 ICD-9-CM: 518.83  11/16/2018 - Present Yes Morbid obesity ([address] (Chronic) ICD-10-CM: E66.01 
ICD-9-CM: 278.01  11/16/2018 - Present Yes Nicotine dependence (Chronic) ICD-10-CM: G12.294 ICD-9-CM: 305.1  2/1/2018 - Present Yes Homeless (Chronic) ICD-10-CM: Z59.0 ICD-9-CM: V60.0  1/15/2018 - Present Yes PLAN: 
· Acute resp failure- resolving;  pulm- input appreciated · Pulmonary edema- on lasix · NATALIIA on bipap here at night - family  brought in the machine for rt to look at- not able to fix · Atrial flutter continue current meds · Substance abuse patient advised to quit. · Morbid obesity - continue supportive care- advised on weight loss · Non- compliance- advised to be compliant · HTN- continue current meds · Anxiety- low dose prn xanax · Further workup and mgt based on his clinical course · Plan for dc when we can arrange proper antaliia equipment- may need to have a charitable donation · dvt ppx on xarelto Signed: 
Alfonso Scott MD

## 2018-12-08 NOTE — PROGRESS NOTES
Pt resting in the bed watching tv. Pt denies respiratory distress. Bi-Pap on pt. Pt is stable. Shift report has been given to the oncoming nurse.

## 2018-12-08 NOTE — PROGRESS NOTES
Received bedside shift report from Jonathan Erickson RN. Pt lying in bed on BiPap. No apparent distress. Respirations even and unlabored. Instructed to call for assistance with needs, as they arise. Pt voiced understanding.

## 2018-12-08 NOTE — PROGRESS NOTES
Pt resting in bed with Bi-pap on and working. HOB elevated for comfort and to assist breathing. Respirations are even and unlabored. Pt is stable. Pt is axox4. Pt c/o toothache. Safety measures are in place. Will continue to monitor.

## 2018-12-08 NOTE — PROGRESS NOTES
End of shift report given to oncoming dayshift nurse. In bed, resting quietly, NAD. Pt safety maintained throughout shift. none

## 2018-12-08 NOTE — PROGRESS NOTES
Missy Harden Admission Date: 12/2/2018 Daily Progress Note: 12/8/2018 The patient's chart is reviewed and the patient is discussed with the staff. 52 y.o. CM with morbid obesity, NATALIIA, presumed COPD, substance abuse (amphetamines, opioids, benzos), smoking and atrial flutter. Was recently hospitalized at [facility] from 11/16-30th. [facility] admission was notable for suspected drug use prior to being found unresponsive and brought in by EMS. Chioma Arango required intubation and MIRANDA w cardioversion for atrial flutter (on Xarelto and Amio).  He was managed with nightly CPAP (his home equipment which worked properly) prior to his discharge. On chronic home O2 at 4L. 
12/2, he returned with dyspnea and a RA oxygen saturation of 76%.  ABG with hypercarbia:  7.25/90/52 and after 1h of BiPAP improved incrementally to 7.33/79/54.  On serum testing his HCO3 has risen to 41 and his renal function is stable.   BNP is 371, slightly above likely baseline in 100s. He received 40mg of IV lasix, solumedrol, bronchodilators and continues on 20/10 BiPAP. He confirms using marijuana and smoking. Says his CPAP is broken since 2 days ago because his son dropped it. Chioma Arango has pitting ankle edema. Drug screen was positive for amphetamines/opiates/THC. Moved to the floor, diuresed and edema resolved. Subjective: Wore BIPAP all night and still using. Lethargic. Per SW, pt not able to get a trilogy. Current Facility-Administered Medications Medication Dose Route Frequency  furosemide (LASIX) injection 20 mg  20 mg IntraVENous DAILY  ALPRAZolam (XANAX) tablet 0.25 mg  0.25 mg Oral TID PRN  
 nicotine (NICODERM CQ) 14 mg/24 hr patch 1 Patch  1 Patch TransDERmal DAILY  insulin lispro (HUMALOG) injection   SubCUTAneous AC&HS  amLODIPine (NORVASC) tablet 10 mg  10 mg Oral DAILY  hydrALAZINE (APRESOLINE) tablet 50 mg  50 mg Oral TID  levalbuterol (XOPENEX) nebulizer soln 0.63 mg/3 mL  0.63 mg Nebulization Q4H RT  
 carvedilol (COREG) tablet 25 mg  25 mg Oral BID WITH MEALS  sertraline (ZOLOFT) tablet 50 mg  50 mg Oral DAILY  tiotropium (SPIRIVA) inhalation capsule 18 mcg  1 Cap Inhalation DAILY  rivaroxaban (XARELTO) tablet 20 mg  20 mg Oral DAILY WITH DINNER  
 sodium chloride (NS) flush 5-10 mL  5-10 mL IntraVENous Q8H  
 sodium chloride (NS) flush 5-10 mL  5-10 mL IntraVENous PRN  
 acetaminophen (TYLENOL) tablet 650 mg  650 mg Oral Q4H PRN  
 ondansetron (ZOFRAN) injection 4 mg  4 mg IntraVENous Q4H PRN  
 naloxone (NARCAN) injection 0.4 mg  0.4 mg IntraVENous PRN  
 senna-docusate (PERICOLACE) 8.6-50 mg per tablet 1 Tab  1 Tab Oral BID PRN  
 amiodarone (CORDARONE) tablet 200 mg  200 mg Oral Q12H Review of Systems Constitutional: negative for fever, chills, sweats Cardiovascular: negative for chest pain, palpitations, syncope, edema Gastrointestinal:  negative for dysphagia, reflux, vomiting, diarrhea, abdominal pain, or melena Neurologic:  negative for focal weakness, numbness, headache Objective:  
 
Vitals:  
 12/08/18 7872 12/08/18 0326 12/08/18 0720 12/08/18 8935 BP: 117/66  123/66 Pulse: 76  66 Resp: 16  16 Temp: 97.3 °F (36.3 °C)  97.7 °F (36.5 °C) SpO2: 97% 97% 96% 94% Weight: 303 lb (137.4 kg) Height:      
 
Intake and Output:  
12/06 1901 - 12/08 0700 In: 5294 [P.O.:1391] Out: 3250 [DVXRU:4944] 12/08 0701 - 12/08 1900 In: 236 [P.O.:236] Out: - Physical Exam:  
Constitution:  the patient is obese and in no acute distress on 4L EENMT:  Sclera clear, pupils equal, oral mucosa moist; some irritation of bridge of nose. Respiratory: decreased BS bilaterally Cardiovascular:  RRR without M,G,R 
Gastrointestinal: soft and non-tender; with positive bowel sounds. Musculoskeletal: warm without cyanosis. There is no lower leg edema, SCDs Skin:  no jaundice or rashes, no wounds Neurologic: no gross neuro deficits Psychiatric:  alert and oriented x 3 CXR: None today CXR  
 
12/6: 
 
 
 
 
 
12/3/18:  Unchanged pulmonary vascular congestion. LAB Recent Labs 12/08/18 
9776 12/07/18 2007 12/07/18 
1530 12/07/18 
1145 12/07/18 
1820 GLUCPOC 189* 132* 99 87 105* No results for input(s): WBC, HGB, HCT, PLT, INR, HGBEXT, HCTEXT, PLTEXT, HGBEXT, HCTEXT, PLTEXT in the last 72 hours. No lab exists for component: INREXT, INREXT No results for input(s): NA, K, CL, CO2, GLU, BUN, CREA, MG, CA, PHOS, TROIQ, ALB, TBIL, TBILI, GPT, ALT, SGOT, BNPP in the last 72 hours. No lab exists for component: TROIP Recent Labs 12/07/18 
1116 PHI 7.348* PCO2I 75.2*  
PO2I 67* HCO3I 41.4* No results for input(s): LCAD, LAC in the last 72 hours. Venous US: IMPRESSION: Negative evaluation for deep venous thrombosis within either lower 
extremity. Assessment:  (Medical Decision Making) Hospital Problems  Date Reviewed: 12/5/2018 Codes Class Noted POA Methamphetamine abuse (HCC) (Chronic) ICD-10-CM: F15.10 ICD-9-CM: 305.70  12/2/2018 Yes Chronic--stressed cessation---AA and NA encouraged H/O atrial flutter (Chronic) ICD-10-CM: Z86.79 
ICD-9-CM: V12.59  12/2/2018 Yes  
  1/2018 Atrial flutter, s/p cardioversion. On Amio and Xarelto--sinus. Essential hypertension (Chronic) ICD-10-CM: I10 
ICD-9-CM: 401.9  12/2/2018 Yes  
 chronic--controlled. * (Principal) Acute respiratory failure with hypoxia and hypercarbia (HCC) ICD-10-CM: J96.01, J96.02 
ICD-9-CM: 518.81  12/2/2018 Yes On BIPAP with sleep, NC during the day. CXR much improved. Substance abuse (HCC) (Chronic) ICD-10-CM: F19.10 ICD-9-CM: 305.90  12/2/2018 Yes Chronic. Noncompliance (Chronic) ICD-10-CM: Z91.19 ICD-9-CM: V15.81  12/2/2018 Yes  
 chronic Acute encephalopathy ICD-10-CM: G93.40 ICD-9-CM: 348.30  12/2/2018 Yes Resolved--A&O x3  
 NATALIIA (obstructive sleep apnea) (Chronic) ICD-10-CM: G47.33 
ICD-9-CM: 327.23  11/16/2018 Yes On BIPAP Chronic respiratory failure (HCC) (Chronic) ICD-10-CM: J96.10 ICD-9-CM: 518.83  11/16/2018 Yes Chronic--Home flow 4L--reported Morbid obesity ([address] (Chronic) ICD-10-CM: E66.01 
ICD-9-CM: 278.01  11/16/2018 Yes Chronic. Needs massive weight loss. Nicotine dependence (Chronic) ICD-10-CM: E43.762 ICD-9-CM: 305.1  2/1/2018 Yes Chronic--cessations strongly encouraged Homeless (Chronic) ICD-10-CM: Z59.0 ICD-9-CM: V60.0  1/15/2018 Yes Apparently going to live with son. Plan:  (Medical Decision Making) OOB QID. BIPAP with sleep. Discussed need to lose weight through simple carb restriction. More than 50% of the time documented was spent in face-to-face contact with the patient and in the care of the patient on the floor/unit where the patient is located.  
 
Jorge Hebert MD

## 2018-12-09 LAB
GLUCOSE BLD STRIP.AUTO-MCNC: 193 MG/DL (ref 65–100)
GLUCOSE BLD STRIP.AUTO-MCNC: 87 MG/DL (ref 65–100)
GLUCOSE BLD STRIP.AUTO-MCNC: 89 MG/DL (ref 65–100)
GLUCOSE BLD STRIP.AUTO-MCNC: 91 MG/DL (ref 65–100)

## 2018-12-09 PROCEDURE — 94640 AIRWAY INHALATION TREATMENT: CPT

## 2018-12-09 PROCEDURE — 65270000029 HC RM PRIVATE

## 2018-12-09 PROCEDURE — 82962 GLUCOSE BLOOD TEST: CPT

## 2018-12-09 PROCEDURE — 74011250637 HC RX REV CODE- 250/637: Performed by: INTERNAL MEDICINE

## 2018-12-09 PROCEDURE — 74011250636 HC RX REV CODE- 250/636: Performed by: INTERNAL MEDICINE

## 2018-12-09 PROCEDURE — 99232 SBSQ HOSP IP/OBS MODERATE 35: CPT | Performed by: INTERNAL MEDICINE

## 2018-12-09 PROCEDURE — 74011000250 HC RX REV CODE- 250: Performed by: INTERNAL MEDICINE

## 2018-12-09 PROCEDURE — 94760 N-INVAS EAR/PLS OXIMETRY 1: CPT

## 2018-12-09 PROCEDURE — 94660 CPAP INITIATION&MGMT: CPT

## 2018-12-09 PROCEDURE — 74011636637 HC RX REV CODE- 636/637: Performed by: INTERNAL MEDICINE

## 2018-12-09 PROCEDURE — 77010033678 HC OXYGEN DAILY

## 2018-12-09 PROCEDURE — 77030019605

## 2018-12-09 RX ADMIN — FUROSEMIDE 20 MG: 10 INJECTION, SOLUTION INTRAMUSCULAR; INTRAVENOUS at 09:15

## 2018-12-09 RX ADMIN — CARVEDILOL 25 MG: 12.5 TABLET, FILM COATED ORAL at 09:15

## 2018-12-09 RX ADMIN — Medication 10 ML: at 21:37

## 2018-12-09 RX ADMIN — AMLODIPINE BESYLATE 10 MG: 10 TABLET ORAL at 09:15

## 2018-12-09 RX ADMIN — LEVALBUTEROL HYDROCHLORIDE 0.63 MG: 0.63 SOLUTION RESPIRATORY (INHALATION) at 11:36

## 2018-12-09 RX ADMIN — INSULIN LISPRO 2 UNITS: 100 INJECTION, SOLUTION INTRAVENOUS; SUBCUTANEOUS at 21:36

## 2018-12-09 RX ADMIN — TIOTROPIUM BROMIDE 18 MCG: 18 CAPSULE ORAL; RESPIRATORY (INHALATION) at 08:14

## 2018-12-09 RX ADMIN — CARVEDILOL 25 MG: 12.5 TABLET, FILM COATED ORAL at 17:00

## 2018-12-09 RX ADMIN — LEVALBUTEROL HYDROCHLORIDE 0.63 MG: 0.63 SOLUTION RESPIRATORY (INHALATION) at 23:10

## 2018-12-09 RX ADMIN — RIVAROXABAN 20 MG: 20 TABLET, FILM COATED ORAL at 17:00

## 2018-12-09 RX ADMIN — HYDRALAZINE HYDROCHLORIDE 50 MG: 50 TABLET, FILM COATED ORAL at 09:15

## 2018-12-09 RX ADMIN — LEVALBUTEROL HYDROCHLORIDE 0.63 MG: 0.63 SOLUTION RESPIRATORY (INHALATION) at 03:42

## 2018-12-09 RX ADMIN — AMIODARONE HYDROCHLORIDE 200 MG: 200 TABLET ORAL at 21:35

## 2018-12-09 RX ADMIN — Medication 5 ML: at 14:00

## 2018-12-09 RX ADMIN — LEVALBUTEROL HYDROCHLORIDE 0.63 MG: 0.63 SOLUTION RESPIRATORY (INHALATION) at 19:43

## 2018-12-09 RX ADMIN — LEVALBUTEROL HYDROCHLORIDE 0.63 MG: 0.63 SOLUTION RESPIRATORY (INHALATION) at 08:13

## 2018-12-09 RX ADMIN — Medication 5 ML: at 06:34

## 2018-12-09 RX ADMIN — SERTRALINE HYDROCHLORIDE 50 MG: 50 TABLET ORAL at 09:15

## 2018-12-09 RX ADMIN — AMIODARONE HYDROCHLORIDE 200 MG: 200 TABLET ORAL at 09:15

## 2018-12-09 RX ADMIN — HYDRALAZINE HYDROCHLORIDE 50 MG: 50 TABLET, FILM COATED ORAL at 17:00

## 2018-12-09 RX ADMIN — LEVALBUTEROL HYDROCHLORIDE 0.63 MG: 0.63 SOLUTION RESPIRATORY (INHALATION) at 16:18

## 2018-12-09 NOTE — PROGRESS NOTES
Bedside shift report completed with oncoming nurse, Belkys Alvarez. Patient alert and oriented. Respirations even and unlabored. Bed low and locked. Bedside table, personal belongings and call light within reach.

## 2018-12-09 NOTE — PROGRESS NOTES
Prisca Tovar Admission Date: 12/2/2018 Daily Progress Note: 12/9/2018 The patient's chart is reviewed and the patient is discussed with the staff. 52 y.o. CM with morbid obesity, NATALIIA, presumed COPD, substance abuse (amphetamines, opioids, benzos), smoking and atrial flutter. Was recently hospitalized at [facility] from 11/16-30th. [facility] admission was notable for suspected drug use prior to being found unresponsive and brought in by EMS. Telma Rivera required intubation and MIRANDA w cardioversion for atrial flutter (on Xarelto and Amio).  He was managed with nightly CPAP (his home equipment which worked properly) prior to his discharge. On chronic home O2 at 4L. 
12/2, he returned with dyspnea and a RA oxygen saturation of 76%.  ABG with hypercarbia:  7.25/90/52 and after 1h of BiPAP improved incrementally to 7.33/79/54.  On serum testing his HCO3 has risen to 41 and his renal function is stable.   BNP is 371, slightly above likely baseline in 100s. He received 40mg of IV lasix, solumedrol, bronchodilators and continues on 20/10 BiPAP. He confirms using marijuana and smoking. Says his CPAP is broken since 2 days ago because his son dropped it. Telma Rivera has pitting ankle edema. Drug screen was positive for amphetamines/opiates/THC. Moved to the floor, diuresed and edema resolved. Subjective:  
 
Was on BIPAP all night and still on it. Says he got up to chair for lunch yesterday. Still seems lethargic. Will stop xanax. Current Facility-Administered Medications Medication Dose Route Frequency  furosemide (LASIX) injection 20 mg  20 mg IntraVENous DAILY  ALPRAZolam (XANAX) tablet 0.25 mg  0.25 mg Oral TID PRN  
 nicotine (NICODERM CQ) 14 mg/24 hr patch 1 Patch  1 Patch TransDERmal DAILY  insulin lispro (HUMALOG) injection   SubCUTAneous AC&HS  amLODIPine (NORVASC) tablet 10 mg  10 mg Oral DAILY  hydrALAZINE (APRESOLINE) tablet 50 mg  50 mg Oral TID  levalbuterol (XOPENEX) nebulizer soln 0.63 mg/3 mL  0.63 mg Nebulization Q4H RT  
 carvedilol (COREG) tablet 25 mg  25 mg Oral BID WITH MEALS  sertraline (ZOLOFT) tablet 50 mg  50 mg Oral DAILY  tiotropium (SPIRIVA) inhalation capsule 18 mcg  1 Cap Inhalation DAILY  rivaroxaban (XARELTO) tablet 20 mg  20 mg Oral DAILY WITH DINNER  
 sodium chloride (NS) flush 5-10 mL  5-10 mL IntraVENous Q8H  
 sodium chloride (NS) flush 5-10 mL  5-10 mL IntraVENous PRN  
 acetaminophen (TYLENOL) tablet 650 mg  650 mg Oral Q4H PRN  
 ondansetron (ZOFRAN) injection 4 mg  4 mg IntraVENous Q4H PRN  
 naloxone (NARCAN) injection 0.4 mg  0.4 mg IntraVENous PRN  
 senna-docusate (PERICOLACE) 8.6-50 mg per tablet 1 Tab  1 Tab Oral BID PRN  
 amiodarone (CORDARONE) tablet 200 mg  200 mg Oral Q12H Review of Systems Constitutional: negative for fever, chills, sweats Cardiovascular: negative for chest pain, palpitations, syncope, edema Gastrointestinal:  negative for dysphagia, reflux, vomiting, diarrhea, abdominal pain, or melena Neurologic:  negative for focal weakness, numbness, headache Objective:  
 
Vitals:  
 12/09/18 1626 12/09/18 0343 12/09/18 0724 12/09/18 3821 BP: 126/64  102/60 Pulse: 70  73 Resp: 22  22 Temp: 97.8 °F (36.6 °C)  98 °F (36.7 °C) SpO2: 98% 98% 94% 96% Weight:      
Height:      
 
Intake and Output:  
12/07 1901 - 12/09 0700 In: 996 [P.O.:996] Out: 6218 [Urine:2275] No intake/output data recorded. Physical Exam:  
Constitution:  the patient is obese and in no acute distress on BIPAP with large mask EENMT:  Sclera clear, pupils equal, oral mucosa moist; some irritation of bridge of nose. Respiratory: decreased BS bilaterally Cardiovascular:  RRR without M,G,R 
Gastrointestinal: soft and non-tender; with positive bowel sounds. Musculoskeletal: warm without cyanosis. There is no lower leg edema, SCDs Skin:  no jaundice or rashes, no wounds Neurologic: no gross neuro deficits Psychiatric:  alert and oriented x 3 CXR: None today CXR  
 
12/6: 
 
 
 
 
 
12/3/18:  Unchanged pulmonary vascular congestion. LAB Recent Labs 12/09/18 
0517 12/08/18 2019 12/08/18 
1534 12/08/18 
1129 12/08/18 
0698 GLUCPOC 89 139* 135* 94 189* No results for input(s): WBC, HGB, HCT, PLT, INR, HGBEXT, HCTEXT, PLTEXT, HGBEXT, HCTEXT, PLTEXT in the last 72 hours. No lab exists for component: INREXT, INREXT No results for input(s): NA, K, CL, CO2, GLU, BUN, CREA, MG, CA, PHOS, TROIQ, ALB, TBIL, TBILI, GPT, ALT, SGOT, BNPP in the last 72 hours. No lab exists for component: TROIP Recent Labs 12/07/18 
1116 PHI 7.348* PCO2I 75.2*  
PO2I 67* HCO3I 41.4* No results for input(s): LCAD, LAC in the last 72 hours. Venous US: IMPRESSION: Negative evaluation for deep venous thrombosis within either lower 
extremity. Assessment:  (Medical Decision Making) Hospital Problems  Date Reviewed: 12/5/2018 Codes Class Noted POA Methamphetamine abuse (HCC) (Chronic) ICD-10-CM: F15.10 ICD-9-CM: 305.70  12/2/2018 Yes Chronic--stressed cessation---AA and NA encouraged H/O atrial flutter (Chronic) ICD-10-CM: Z86.79 
ICD-9-CM: V12.59  12/2/2018 Yes  
  1/2018 Atrial flutter, s/p cardioversion. On Amio and Xarelto--sinus. Essential hypertension (Chronic) ICD-10-CM: I10 
ICD-9-CM: 401.9  12/2/2018 Yes  
 chronic--controlled. * (Principal) Acute respiratory failure with hypoxia and hypercarbia (HCC) ICD-10-CM: J96.01, J96.02 
ICD-9-CM: 518.81  12/2/2018 Yes On BIPAP with sleep, NC during the day. Substance abuse (HCC) (Chronic) ICD-10-CM: F19.10 ICD-9-CM: 305.90  12/2/2018 Yes Chronic. Noncompliance (Chronic) ICD-10-CM: Z91.19 ICD-9-CM: V15.81  12/2/2018 Yes  
 chronic Acute encephalopathy ICD-10-CM: G93.40 ICD-9-CM: 348.30  12/2/2018 Yes  Resolved--A&O x3  
 NATALIIA (obstructive sleep apnea) (Chronic) ICD-10-CM: G47.33 
ICD-9-CM: 327.23  11/16/2018 Yes On BIPAP but using it constantly while awake. Chronic respiratory failure (HCC) (Chronic) ICD-10-CM: J96.10 ICD-9-CM: 518.83  11/16/2018 Yes Chronic--Home flow 4L--reported Morbid obesity ([address] (Chronic) ICD-10-CM: E66.01 
ICD-9-CM: 278.01  11/16/2018 Yes Chronic. Needs massive weight loss. Nicotine dependence (Chronic) ICD-10-CM: F11.677 ICD-9-CM: 305.1  2/1/2018 Yes Chronic--cessations strongly encouraged Homeless (Chronic) ICD-10-CM: Z59.0 ICD-9-CM: V60.0  1/15/2018 Yes Apparently going to live with son. Plan:  (Medical Decision Making) OOB QID. BIPAP with sleep. Stop Xanax. More than 50% of the time documented was spent in face-to-face contact with the patient and in the care of the patient on the floor/unit where the patient is located.  
 
Jalyn Mcadams MD

## 2018-12-09 NOTE — PROGRESS NOTES
Received bedside shift report from offgoing nurse, Qiana Patterson. Patient alert and oriented. Respirations even and unlabored. Denies needs at this time. Bed low and locked. Bedside table, personal belongings and call light within reach.

## 2018-12-09 NOTE — PROGRESS NOTES
Pt resting in the recliner watching tv. Pt has no complaints at this time. Will continue to monitor.

## 2018-12-09 NOTE — PROGRESS NOTES
Patient alert and oriented. Respirations even and unlabored on 4L NC. No acute events overnight. Patient resting quietly in bed at this time, eyes closed, respirations present. No needs stated. Bed low and locked. Bedside table, personal belongings and call light within reach.

## 2018-12-09 NOTE — PROGRESS NOTES
Pt sleeping in bed with Bi-pap on. Safety measures are in place. Shift report given to the oncoming nurse.

## 2018-12-09 NOTE — PROGRESS NOTES
Hospitalist Progress Note Admit Date:  2018  2:34 PM  
Name:  Anand Granados Age:  52 y.o. 
:  1965 MRN:  [id number] PCP:  None Treatment Team: Attending Provider: Lanie Angeles MD; Care Manager: Jagdish Moran, RN; Care Manager: Diana Canela, RN; Consulting Provider: Malou Bhandari MD; Consulting Provider: Jazmin Fu MD  
 
 
Pt Transferred 2018 from Pulmonology/Intensivist 
As Per NOTES: 
' 
HPI:  
47yr old male with pmhx sig for morbid obesity, NATALIIA, Suspected COPD, substance abuse with ( amphetamines, opioids, benzos) smoking and atrial flutter. He was previously admitted on - for respiratory where he was intubated. He represented 2018 with dyspnea and hypoxic hypercarbic resp failure. His drug screen this admit was positive for amphetamines, opioids and THC. He noted that his cpap was broken 2 days prior to the admit. He was admitted to the ICU where he was diuresed and continued on BIPAP. His symptoms gradually resolved. He was moved to the floor and the hospitalist were asked to assume care\" 2018. 
 
2018- patient seen - complains of anxiety and requesting a anxiolytic- denies use of bdps as an outpatient even thou drug screen was positive. Moving better and breathing better after lasix started 2018- seen - cpap machines were not fixed- pulm recommending bipap/trilogy- pt has no insurance 2018- seen sleeping but arousable. No adverse overnight events. 2018- seen sitting up oob to chair - taking breathing treatment and wants to go home. He has not had any xanax during this hospital stay although it was ordered. 10 systems reviewed and negative except as noted in HPI. Past Medical History:  
Diagnosis Date  Acute respiratory failure with hypercapnia ([address] 2018  Chronic obstructive pulmonary disease ([address]  Heart failure ([address]  Sleep disorder No past surgical history on file. Current Facility-Administered Medications Medication Dose Route Frequency  furosemide (LASIX) injection 20 mg  20 mg IntraVENous DAILY  nicotine (NICODERM CQ) 14 mg/24 hr patch 1 Patch  1 Patch TransDERmal DAILY  insulin lispro (HUMALOG) injection   SubCUTAneous AC&HS  amLODIPine (NORVASC) tablet 10 mg  10 mg Oral DAILY  hydrALAZINE (APRESOLINE) tablet 50 mg  50 mg Oral TID  levalbuterol (XOPENEX) nebulizer soln 0.63 mg/3 mL  0.63 mg Nebulization Q4H RT  
 carvedilol (COREG) tablet 25 mg  25 mg Oral BID WITH MEALS  sertraline (ZOLOFT) tablet 50 mg  50 mg Oral DAILY  tiotropium (SPIRIVA) inhalation capsule 18 mcg  1 Cap Inhalation DAILY  rivaroxaban (XARELTO) tablet 20 mg  20 mg Oral DAILY WITH DINNER  
 sodium chloride (NS) flush 5-10 mL  5-10 mL IntraVENous Q8H  
 sodium chloride (NS) flush 5-10 mL  5-10 mL IntraVENous PRN  
 acetaminophen (TYLENOL) tablet 650 mg  650 mg Oral Q4H PRN  
 ondansetron (ZOFRAN) injection 4 mg  4 mg IntraVENous Q4H PRN  
 naloxone (NARCAN) injection 0.4 mg  0.4 mg IntraVENous PRN  
 senna-docusate (PERICOLACE) 8.6-50 mg per tablet 1 Tab  1 Tab Oral BID PRN  
 amiodarone (CORDARONE) tablet 200 mg  200 mg Oral Q12H No Known Allergies Social History Tobacco Use  Smoking status: Current Every Day Smoker Packs/day: 2.00 Substance Use Topics  Alcohol use: No  
  Comment: once a month beer No family history on file. Immunization History Administered Date(s) Administered  Influenza Vaccine (Quad) PF 01/24/2018, 11/30/2018  TB Skin Test (PPD) Intradermal 01/07/2018 Objective:  
 
Patient Vitals for the past 24 hrs: 
 Temp Pulse Resp BP SpO2  
12/09/18 1137     92 % 12/09/18 1057 98.6 °F (37 °C) 71 22 106/55 90 % 12/09/18 0814     96 % 12/09/18 0724 98 °F (36.7 °C) 73 22 102/60 94 % 12/09/18 0343     98 % 12/09/18 0244 97.8 °F (36.6 °C) 70 22 126/64 98 % 12/08/18 2325     94 % 12/08/18 2322 98.1 °F (36.7 °C) 69 18 101/58 94 % 12/08/18 1946     94 % 12/08/18 1930 97.6 °F (36.4 °C) 68 18 122/67 98 % 12/08/18 1558     93 % 12/08/18 1458 98.2 °F (36.8 °C) 76 18 100/58 90 % 12/08/18 1223     96 % Oxygen Therapy O2 Sat (%): 92 % (12/09/18 1137) Pulse via Oximetry: 82 beats per minute (12/09/18 1137) O2 Device: Nasal cannula (12/09/18 1137) O2 Flow Rate (L/min): 4 l/min (12/09/18 1137) FIO2 (%): 40 % (12/09/18 0343) Intake/Output Summary (Last 24 hours) at 12/9/2018 1158 Last data filed at 12/9/2018 8534 Gross per 24 hour Intake 596 ml Output 800 ml Net -204 ml Physical Exam: 
General:    Well nourished. Sleeping  On bipap Eyes:   Normal sclera. Extraocular movements intact. ENT:  Normocephalic, atraumatic. Moist mucous membranes CV:   RRR. No murmur, rub, or gallop. Lungs:              Equal a/e bl with rales Abdomen: Soft, nontender, nondistended. Bowel sounds normal.  
Extremities: Warm and dry. No cyanosis or edema. Neurologic: CN II-XII grossly intact. Sensation intact. Skin:     No rashes or jaundice. Psych:  Normal mood and affect. I reviewed the labs, imaging, EKGs, telemetry, and other studies done this admission. Data Review:  
Recent Results (from the past 24 hour(s)) GLUCOSE, POC Collection Time: 12/08/18  3:34 PM  
Result Value Ref Range Glucose (POC) 135 (H) 65 - 100 mg/dL GLUCOSE, POC Collection Time: 12/08/18  8:19 PM  
Result Value Ref Range Glucose (POC) 139 (H) 65 - 100 mg/dL GLUCOSE, POC Collection Time: 12/09/18  5:17 AM  
Result Value Ref Range Glucose (POC) 89 65 - 100 mg/dL GLUCOSE, POC Collection Time: 12/09/18 11:17 AM  
Result Value Ref Range Glucose (POC) 91 65 - 100 mg/dL All Micro Results None Other Studies: Xr Chest Sngl V Result Date: 12/3/2018 EXAM:  Chest x-ray. DATE:  December 3, 2018.  INDICATION:  Follow-up vascular congestion. COMPARISON:  Yesterday's chest x-ray. TECHNIQUE:  2 frontal views of the chest were obtained. FINDINGS:  There is unchanged cardiomegaly and pulmonary vascular congestion. No pneumothorax, significant pleural effusion or overt pulmonary edema is identified. IMPRESSION:  Unchanged pulmonary vascular congestion. Xr Chest Nori Pipes Result Date: 12/2/2018 CHEST X-RAY, one view. HISTORY:  Chest pain  Shortness of breath TECHNIQUE:  AP upright portable view. COMPARISON: 19 November 2018 FINDINGS:   Pulmonary vascular congestion. Left hemidiaphragm is obscured, possibly due to technique. Obesity decreases quality the exam. Right hemidiaphragm is well-defined. Heart may be enlarged IMPRESSION:  Pulmonary venous hypertension probably early interstitial edema. Assessment and Plan:  
 
Hospital Problems as of 12/9/2018 Date Reviewed: 12/5/2018 Codes Class Noted - Resolved POA Methamphetamine abuse (HCC) (Chronic) ICD-10-CM: F15.10 ICD-9-CM: 305.70  12/2/2018 - Present Yes  
   
 H/O atrial flutter (Chronic) ICD-10-CM: Q32.34 
ICD-9-CM: V12.59  12/2/2018 - Present Yes Overview Signed 2/6/2018  7:51 AM by Arlyne Lesches, NP  
  1/2018 Atrial flutter, s/p cardioversion. Essential hypertension (Chronic) ICD-10-CM: I10 
ICD-9-CM: 401.9  12/2/2018 - Present Yes * (Principal) Acute respiratory failure with hypoxia and hypercarbia (HCC) ICD-10-CM: J96.01, J96.02 
ICD-9-CM: 518.81  12/2/2018 - Present Yes Substance abuse (HCC) (Chronic) ICD-10-CM: F19.10 ICD-9-CM: 305.90  12/2/2018 - Present Yes Noncompliance (Chronic) ICD-10-CM: Z91.19 ICD-9-CM: V15.81  12/2/2018 - Present Yes Acute encephalopathy ICD-10-CM: G93.40 ICD-9-CM: 348.30  12/2/2018 - Present Yes  
   
 NATALIIA (obstructive sleep apnea) (Chronic) ICD-10-CM: W53.80 
ICD-9-CM: 327.23  11/16/2018 - Present Yes Chronic respiratory failure (HCC) (Chronic) ICD-10-CM: J96.10 ICD-9-CM: 518.83  11/16/2018 - Present Yes Morbid obesity ([address] (Chronic) ICD-10-CM: E66.01 
ICD-9-CM: 278.01  11/16/2018 - Present Yes Nicotine dependence (Chronic) ICD-10-CM: P32.091 ICD-9-CM: 305.1  2/1/2018 - Present Yes Homeless (Chronic) ICD-10-CM: Z59.0 ICD-9-CM: V60.0  1/15/2018 - Present Yes PLAN: 
· Acute resp failure- resolving;  pulm- input appreciated · Pulmonary edema- on lasix · NATALIIA on bipap here at night - family  brought in the machine for rt to look at- not able to fix · Atrial flutter continue current meds · Substance abuse patient advised to quit. · Morbid obesity - continue supportive care- advised on weight loss · Non- compliance- advised to be compliant · HTN- continue current meds · Anxiety- supportive care · Further workup and mgt based on his clinical course · Plan for dc when we can arrange proper nataliia equipment- may need to have a charitable donation- case mgt aware · dvt ppx on xarelto Signed: 
Justus Ramos MD

## 2018-12-09 NOTE — PROGRESS NOTES
Pt resting in bed with Bi-pap on. Pt states he rested better with new mask. Pt is stable. Pt is axox4. Pt denies pain or discomfort at this time. Bed is in low and locked position. Call light is within reach. Pt instructed to ask for assistance if needed. Will continue to monitor.

## 2018-12-10 LAB
ANION GAP SERPL CALC-SCNC: 4 MMOL/L (ref 7–16)
BASOPHILS # BLD: 0 K/UL (ref 0–0.2)
BASOPHILS NFR BLD: 0 % (ref 0–2)
BUN SERPL-MCNC: 15 MG/DL (ref 6–23)
CALCIUM SERPL-MCNC: 8.7 MG/DL (ref 8.3–10.4)
CHLORIDE SERPL-SCNC: 97 MMOL/L (ref 98–107)
CO2 SERPL-SCNC: 39 MMOL/L (ref 21–32)
CREAT SERPL-MCNC: 1.01 MG/DL (ref 0.8–1.5)
DIFFERENTIAL METHOD BLD: ABNORMAL
EOSINOPHIL # BLD: 0.2 K/UL (ref 0–0.8)
EOSINOPHIL NFR BLD: 3 % (ref 0.5–7.8)
ERYTHROCYTE [DISTWIDTH] IN BLOOD BY AUTOMATED COUNT: 13.2 % (ref 11.9–14.6)
GLUCOSE BLD STRIP.AUTO-MCNC: 109 MG/DL (ref 65–100)
GLUCOSE BLD STRIP.AUTO-MCNC: 111 MG/DL (ref 65–100)
GLUCOSE BLD STRIP.AUTO-MCNC: 85 MG/DL (ref 65–100)
GLUCOSE BLD STRIP.AUTO-MCNC: 97 MG/DL (ref 65–100)
GLUCOSE SERPL-MCNC: 93 MG/DL (ref 65–100)
HCT VFR BLD AUTO: 34.1 % (ref 41.1–50.3)
HGB BLD-MCNC: 10.2 G/DL (ref 13.6–17.2)
IMM GRANULOCYTES # BLD: 0 K/UL (ref 0–0.5)
IMM GRANULOCYTES NFR BLD AUTO: 0 % (ref 0–5)
LYMPHOCYTES # BLD: 1.2 K/UL (ref 0.5–4.6)
LYMPHOCYTES NFR BLD: 22 % (ref 13–44)
MCH RBC QN AUTO: 31.2 PG (ref 26.1–32.9)
MCHC RBC AUTO-ENTMCNC: 29.9 G/DL (ref 31.4–35)
MCV RBC AUTO: 104.3 FL (ref 79.6–97.8)
MONOCYTES # BLD: 0.5 K/UL (ref 0.1–1.3)
MONOCYTES NFR BLD: 10 % (ref 4–12)
NEUTS SEG # BLD: 3.4 K/UL (ref 1.7–8.2)
NEUTS SEG NFR BLD: 65 % (ref 43–78)
NRBC # BLD: 0 K/UL (ref 0–0.2)
PLATELET # BLD AUTO: 241 K/UL (ref 150–450)
PMV BLD AUTO: 9.9 FL (ref 9.4–12.3)
POTASSIUM SERPL-SCNC: 4 MMOL/L (ref 3.5–5.1)
RBC # BLD AUTO: 3.27 M/UL (ref 4.23–5.6)
SODIUM SERPL-SCNC: 140 MMOL/L (ref 136–145)
WBC # BLD AUTO: 5.3 K/UL (ref 4.3–11.1)

## 2018-12-10 PROCEDURE — 80048 BASIC METABOLIC PNL TOTAL CA: CPT

## 2018-12-10 PROCEDURE — 85025 COMPLETE CBC W/AUTO DIFF WBC: CPT

## 2018-12-10 PROCEDURE — 94660 CPAP INITIATION&MGMT: CPT

## 2018-12-10 PROCEDURE — 74011250636 HC RX REV CODE- 250/636: Performed by: INTERNAL MEDICINE

## 2018-12-10 PROCEDURE — 94760 N-INVAS EAR/PLS OXIMETRY 1: CPT

## 2018-12-10 PROCEDURE — 99232 SBSQ HOSP IP/OBS MODERATE 35: CPT | Performed by: INTERNAL MEDICINE

## 2018-12-10 PROCEDURE — 77010033678 HC OXYGEN DAILY

## 2018-12-10 PROCEDURE — 65270000029 HC RM PRIVATE

## 2018-12-10 PROCEDURE — 74011250637 HC RX REV CODE- 250/637: Performed by: INTERNAL MEDICINE

## 2018-12-10 PROCEDURE — 82962 GLUCOSE BLOOD TEST: CPT

## 2018-12-10 PROCEDURE — 36415 COLL VENOUS BLD VENIPUNCTURE: CPT

## 2018-12-10 PROCEDURE — 74011000250 HC RX REV CODE- 250: Performed by: INTERNAL MEDICINE

## 2018-12-10 PROCEDURE — 94640 AIRWAY INHALATION TREATMENT: CPT

## 2018-12-10 RX ADMIN — Medication 10 ML: at 17:25

## 2018-12-10 RX ADMIN — CARVEDILOL 25 MG: 12.5 TABLET, FILM COATED ORAL at 08:52

## 2018-12-10 RX ADMIN — AMIODARONE HYDROCHLORIDE 200 MG: 200 TABLET ORAL at 08:52

## 2018-12-10 RX ADMIN — SERTRALINE HYDROCHLORIDE 50 MG: 50 TABLET ORAL at 08:54

## 2018-12-10 RX ADMIN — LEVALBUTEROL HYDROCHLORIDE 0.63 MG: 0.63 SOLUTION RESPIRATORY (INHALATION) at 11:43

## 2018-12-10 RX ADMIN — HYDRALAZINE HYDROCHLORIDE 50 MG: 50 TABLET, FILM COATED ORAL at 21:34

## 2018-12-10 RX ADMIN — LEVALBUTEROL HYDROCHLORIDE 0.63 MG: 0.63 SOLUTION RESPIRATORY (INHALATION) at 08:21

## 2018-12-10 RX ADMIN — LEVALBUTEROL HYDROCHLORIDE 0.63 MG: 0.63 SOLUTION RESPIRATORY (INHALATION) at 19:44

## 2018-12-10 RX ADMIN — LEVALBUTEROL HYDROCHLORIDE 0.63 MG: 0.63 SOLUTION RESPIRATORY (INHALATION) at 15:30

## 2018-12-10 RX ADMIN — RIVAROXABAN 20 MG: 20 TABLET, FILM COATED ORAL at 17:24

## 2018-12-10 RX ADMIN — ACETAMINOPHEN 650 MG: 325 TABLET, FILM COATED ORAL at 18:13

## 2018-12-10 RX ADMIN — CARVEDILOL 25 MG: 12.5 TABLET, FILM COATED ORAL at 17:24

## 2018-12-10 RX ADMIN — HYDRALAZINE HYDROCHLORIDE 50 MG: 50 TABLET, FILM COATED ORAL at 08:52

## 2018-12-10 RX ADMIN — FUROSEMIDE 20 MG: 10 INJECTION, SOLUTION INTRAMUSCULAR; INTRAVENOUS at 08:52

## 2018-12-10 RX ADMIN — LEVALBUTEROL HYDROCHLORIDE 0.63 MG: 0.63 SOLUTION RESPIRATORY (INHALATION) at 23:20

## 2018-12-10 RX ADMIN — LEVALBUTEROL HYDROCHLORIDE 0.63 MG: 0.63 SOLUTION RESPIRATORY (INHALATION) at 03:57

## 2018-12-10 RX ADMIN — Medication 5 ML: at 08:57

## 2018-12-10 RX ADMIN — AMLODIPINE BESYLATE 10 MG: 10 TABLET ORAL at 08:52

## 2018-12-10 RX ADMIN — Medication 5 ML: at 14:00

## 2018-12-10 RX ADMIN — AMIODARONE HYDROCHLORIDE 200 MG: 200 TABLET ORAL at 21:34

## 2018-12-10 RX ADMIN — Medication 10 ML: at 06:04

## 2018-12-10 RX ADMIN — TIOTROPIUM BROMIDE 18 MCG: 18 CAPSULE ORAL; RESPIRATORY (INHALATION) at 08:21

## 2018-12-10 RX ADMIN — Medication 10 ML: at 21:35

## 2018-12-10 NOTE — PROGRESS NOTES
Spiritual Care Visit, follow up visit. Visited with patient at bedside. Prayed for patient's healing and health. Visit by Chriss Edgar, Staff .  Rell, Uzma.B., B.A.

## 2018-12-10 NOTE — PROGRESS NOTES
Hospitalist Progress Note Admit Date:  2018  2:34 PM  
Name:  Marvel Thorpe Age:  52 y.o. 
:  1965 MRN:  [id number] PCP:  None Treatment Team: Attending Provider: Rafy Molina MD; Care Manager: Lisa Betts, RN; Care Manager: Janeen Handy RN; Consulting Provider: Imani Duval MD; Consulting Provider: Maansa Ellis MD  
 
 
Pt Transferred 2018 from Pulmonology/Intensivist 
As Per NOTES: 
' 
HPI:  
47yr old male with pmhx sig for morbid obesity, NATALIIA, Suspected COPD, substance abuse with ( amphetamines, opioids, benzos) smoking and atrial flutter. He was previously admitted on - for respiratory where he was intubated. He represented 2018 with dyspnea and hypoxic hypercarbic resp failure. His drug screen this admit was positive for amphetamines, opioids and THC. He noted that his cpap was broken 2 days prior to the admit. He was admitted to the ICU where he was diuresed and continued on BIPAP. His symptoms gradually resolved. He was moved to the floor and the hospitalist were asked to assume care\" 2018. 
 
2018- patient seen - complains of anxiety and requesting a anxiolytic- denies use of bdps as an outpatient even thou drug screen was positive. Moving better and breathing better after lasix started 2018- seen - cpap machines were not fixed- pulm recommending bipap/trilogy- pt has no insurance 2018- seen sleeping but arousable. No adverse overnight events. 2018- seen sitting up oob to chair - taking breathing treatment and wants to go home. He has not had any xanax during this hospital stay although it was ordered. 12/10/2018- seen - no adverse overnight events. . Awaiting a trilogy/bipap machine- he has no insurance 10 systems reviewed and negative except as noted in HPI. Past Medical History:  
Diagnosis Date  Acute respiratory failure with hypercapnia ([facility] 2/1/2018  Chronic obstructive pulmonary disease ([facility]  Heart failure ([facility]  Sleep disorder No past surgical history on file. Current Facility-Administered Medications Medication Dose Route Frequency  furosemide (LASIX) injection 20 mg  20 mg IntraVENous DAILY  nicotine (NICODERM CQ) 14 mg/24 hr patch 1 Patch  1 Patch TransDERmal DAILY  insulin lispro (HUMALOG) injection   SubCUTAneous AC&HS  amLODIPine (NORVASC) tablet 10 mg  10 mg Oral DAILY  hydrALAZINE (APRESOLINE) tablet 50 mg  50 mg Oral TID  levalbuterol (XOPENEX) nebulizer soln 0.63 mg/3 mL  0.63 mg Nebulization Q4H RT  
 carvedilol (COREG) tablet 25 mg  25 mg Oral BID WITH MEALS  sertraline (ZOLOFT) tablet 50 mg  50 mg Oral DAILY  tiotropium (SPIRIVA) inhalation capsule 18 mcg  1 Cap Inhalation DAILY  rivaroxaban (XARELTO) tablet 20 mg  20 mg Oral DAILY WITH DINNER  
 sodium chloride (NS) flush 5-10 mL  5-10 mL IntraVENous Q8H  
 sodium chloride (NS) flush 5-10 mL  5-10 mL IntraVENous PRN  
 acetaminophen (TYLENOL) tablet 650 mg  650 mg Oral Q4H PRN  
 ondansetron (ZOFRAN) injection 4 mg  4 mg IntraVENous Q4H PRN  
 naloxone (NARCAN) injection 0.4 mg  0.4 mg IntraVENous PRN  
 senna-docusate (PERICOLACE) 8.6-50 mg per tablet 1 Tab  1 Tab Oral BID PRN  
 amiodarone (CORDARONE) tablet 200 mg  200 mg Oral Q12H No Known Allergies Social History Tobacco Use  Smoking status: Current Every Day Smoker Packs/day: 2.00 Substance Use Topics  Alcohol use: No  
  Comment: once a month beer No family history on file. Immunization History Administered Date(s) Administered  Influenza Vaccine (Quad) PF 01/24/2018, 11/30/2018  TB Skin Test (PPD) Intradermal 01/07/2018 Objective:  
 
Patient Vitals for the past 24 hrs: 
 Temp Pulse Resp BP SpO2  
12/10/18 1146 98.3 °F (36.8 °C) 69 18 104/58 91 % 12/10/18 1143     94 % 12/10/18 0822     94 % 12/10/18 0737 97.8 °F (36.6 °C) 74 18 138/70 95 % 12/10/18 0359     96 % 12/10/18 0308 98.1 °F (36.7 °C) 72 18 120/76 96 % 12/09/18 2331 97.4 °F (36.3 °C) 72 18 129/67 99 % 12/09/18 2314     99 % 12/09/18 1945 98.1 °F (36.7 °C) 71 14 111/57 99 % 12/09/18 [address] 94 % 12/09/18 1619     98 % 12/09/18 1517 97.8 °F (36.6 °C) 70 22 134/77 93 % Oxygen Therapy O2 Sat (%): 91 % (12/10/18 1146) Pulse via Oximetry: 84 beats per minute (12/10/18 1143) O2 Device: Nasal cannula (12/10/18 1143) O2 Flow Rate (L/min): 4 l/min (12/10/18 1143) FIO2 (%): 40 % (12/10/18 0359) Intake/Output Summary (Last 24 hours) at 12/10/2018 1425 Last data filed at 12/10/2018 1344 Gross per 24 hour Intake 955 ml Output 1250 ml Net -295 ml Physical Exam: 
General:    Well nourished. Sleeping  On bipap Eyes:   Normal sclera. Extraocular movements intact. ENT:  Normocephalic, atraumatic. Moist mucous membranes CV:   RRR. No murmur, rub, or gallop. Lungs:              Equal a/e bl with rales Abdomen: Soft, nontender, nondistended. Bowel sounds normal.  
Extremities: Warm and dry. No cyanosis or edema. Neurologic: CN II-XII grossly intact. Sensation intact. Skin:     No rashes or jaundice. Psych:  Normal mood and affect. I reviewed the labs, imaging, EKGs, telemetry, and other studies done this admission. Data Review:  
Recent Results (from the past 24 hour(s)) GLUCOSE, POC Collection Time: 12/09/18  3:32 PM  
Result Value Ref Range Glucose (POC) 87 65 - 100 mg/dL GLUCOSE, POC Collection Time: 12/09/18  8:04 PM  
Result Value Ref Range Glucose (POC) 193 (H) 65 - 100 mg/dL GLUCOSE, POC Collection Time: 12/10/18  5:08 AM  
Result Value Ref Range Glucose (POC) 111 (H) 65 - 100 mg/dL CBC WITH AUTOMATED DIFF Collection Time: 12/10/18  6:34 AM  
Result Value Ref Range WBC 5.3 4.3 - 11.1 K/uL RBC 3.27 (L) 4.23 - 5.6 M/uL  
 HGB 10.2 (L) 13.6 - 17.2 g/dL HCT 34.1 (L) 41.1 - 50.3 % .3 (H) 79.6 - 97.8 FL  
 MCH 31.2 26.1 - 32.9 PG  
 MCHC 29.9 (L) 31.4 - 35.0 g/dL  
 RDW 13.2 11.9 - 14.6 % PLATELET 028 645 - 618 K/uL MPV 9.9 9.4 - 12.3 FL ABSOLUTE NRBC 0.00 0.0 - 0.2 K/uL  
 DF AUTOMATED NEUTROPHILS 65 43 - 78 % LYMPHOCYTES 22 13 - 44 % MONOCYTES 10 4.0 - 12.0 % EOSINOPHILS 3 0.5 - 7.8 % BASOPHILS 0 0.0 - 2.0 % IMMATURE GRANULOCYTES 0 0.0 - 5.0 %  
 ABS. NEUTROPHILS 3.4 1.7 - 8.2 K/UL  
 ABS. LYMPHOCYTES 1.2 0.5 - 4.6 K/UL  
 ABS. MONOCYTES 0.5 0.1 - 1.3 K/UL  
 ABS. EOSINOPHILS 0.2 0.0 - 0.8 K/UL  
 ABS. BASOPHILS 0.0 0.0 - 0.2 K/UL  
 ABS. IMM. GRANS. 0.0 0.0 - 0.5 K/UL METABOLIC PANEL, BASIC Collection Time: 12/10/18  6:34 AM  
Result Value Ref Range Sodium 140 136 - 145 mmol/L Potassium 4.0 3.5 - 5.1 mmol/L Chloride 97 (L) 98 - 107 mmol/L  
 CO2 39 (H) 21 - 32 mmol/L Anion gap 4 (L) 7 - 16 mmol/L Glucose 93 65 - 100 mg/dL BUN 15 6 - 23 MG/DL Creatinine 1.01 0.8 - 1.5 MG/DL  
 GFR est AA >60 >60 ml/min/1.73m2 GFR est non-AA >60 >60 ml/min/1.73m2 Calcium 8.7 8.3 - 10.4 MG/DL  
GLUCOSE, POC Collection Time: 12/10/18 11:28 AM  
Result Value Ref Range Glucose (POC) 85 65 - 100 mg/dL All Micro Results None Other Studies: Xr Chest Sngl V Result Date: 12/3/2018 EXAM:  Chest x-ray. DATE:  December 3, 2018. INDICATION:  Follow-up vascular congestion. COMPARISON:  Yesterday's chest x-ray. TECHNIQUE:  2 frontal views of the chest were obtained. FINDINGS:  There is unchanged cardiomegaly and pulmonary vascular congestion. No pneumothorax, significant pleural effusion or overt pulmonary edema is identified. IMPRESSION:  Unchanged pulmonary vascular congestion. Xr Chest [facility] Result Date: 12/2/2018 CHEST X-RAY, one view.  HISTORY:  Chest pain  Shortness of breath TECHNIQUE:  AP upright portable view. COMPARISON: 19 November 2018 FINDINGS:   Pulmonary vascular congestion. Left hemidiaphragm is obscured, possibly due to technique. Obesity decreases quality the exam. Right hemidiaphragm is well-defined. Heart may be enlarged IMPRESSION:  Pulmonary venous hypertension probably early interstitial edema. Assessment and Plan:  
 
Hospital Problems as of 12/10/2018 Date Reviewed: 12/5/2018 Codes Class Noted - Resolved POA Methamphetamine abuse (HCC) (Chronic) ICD-10-CM: F15.10 ICD-9-CM: 305.70  12/2/2018 - Present Yes  
   
 H/O atrial flutter (Chronic) ICD-10-CM: Q26.81 
ICD-9-CM: V12.59  12/2/2018 - Present Yes Overview Signed 2/6/2018  7:51 AM by Nora Klein NP  
  1/2018 Atrial flutter, s/p cardioversion. Essential hypertension (Chronic) ICD-10-CM: I10 
ICD-9-CM: 401.9  12/2/2018 - Present Yes * (Principal) Acute respiratory failure with hypoxia and hypercarbia (HCC) ICD-10-CM: J96.01, J96.02 
ICD-9-CM: 518.81  12/2/2018 - Present Yes Substance abuse (HCC) (Chronic) ICD-10-CM: F19.10 ICD-9-CM: 305.90  12/2/2018 - Present Yes Noncompliance (Chronic) ICD-10-CM: Z91.19 ICD-9-CM: V15.81  12/2/2018 - Present Yes Acute encephalopathy ICD-10-CM: G93.40 ICD-9-CM: 348.30  12/2/2018 - Present Yes  
   
 NATALIIA (obstructive sleep apnea) (Chronic) ICD-10-CM: P17.71 
ICD-9-CM: 327.23  11/16/2018 - Present Yes Chronic respiratory failure (HCC) (Chronic) ICD-10-CM: J96.10 ICD-9-CM: 518.83  11/16/2018 - Present Yes Morbid obesity ([address] (Chronic) ICD-10-CM: E66.01 
ICD-9-CM: 278.01  11/16/2018 - Present Yes Nicotine dependence (Chronic) ICD-10-CM: N38.103 ICD-9-CM: 305.1  2/1/2018 - Present Yes Homeless (Chronic) ICD-10-CM: Z59.0 ICD-9-CM: V60.0  1/15/2018 - Present Yes PLAN: 
· Acute resp failure- resolving;  pulm- input appreciated · Pulmonary edema- continue lasix- will switch to po nanette · NATALIIA on bipap here at night - family  brought in the machine for rt to look at- not able to fix · Atrial flutter continue current meds · Substance abuse patient advised to quit. · Morbid obesity - continue supportive care- advised on weight loss · Non- compliance- advised to be compliant · HTN- continue current meds · Anxiety- supportive care · Further workup and mgt based on his clinical course · Plan for dc when we can arrange proper nataliia equipment- may need to have a charitable donation- case mgt aware and working to obtain a machine · dvt ppx on xarelto Signed: 
Lyle Schaeffer MD

## 2018-12-10 NOTE — PROGRESS NOTES
Received bedside shift report from Roselyn Cary RN. Pt lying in bed, with BiPap on. No apparent distress. Respirations even and unlabored. Instructed to call for assistance with needs, as they arise. Pt voiced understanding.

## 2018-12-10 NOTE — PROGRESS NOTES
Pt resting in the bed watching tv. Pt is stable. Nasal cannula on pt, 4 L/min. Respirations are even and unlabored. Safety measures are in place. Pt instructed to ask for assistance if needed. Shift report has been given to the oncoming nurse.

## 2018-12-10 NOTE — PROGRESS NOTES
Respirations even and unlabored. Compliant with BiPap. No s/sx distress. Resting with eyes closed. Appears to be sleeping.

## 2018-12-10 NOTE — PROGRESS NOTES
Problem: Nutrition Deficit Goal: *Optimize nutritional status Nutrition: 
Assessment based on LOS day 7. Assessment: Anthropometrics: Ht - 6'0\", wgt - 303# (standing scale), BMI 41.1 c/w obesity class III, edema - trace. Macronutrient Needs: 
Estimated calorie needs - 5212-9957 franki/day (11-14 franki/kg/day) (Current BW) Estimated protein needs - 65-81 gm pro/day (0.8-1 gm pro/kgIBW/day) Intake/Comparative Standards: This patient usually eats 100% of meals (consistent carbohydrate diet). This meets 100% of calorie and 100% of protein goals. Food/Nutrition History: 
           The patient presents with no acute nutrition risk factors based on the nursing admission malnutrition screen. He reports no difficulty with oral intake PTA and is having no difficulty eating while off BIPAP. 
  
Diagnosis (Nutrition): No nutrition diagnosis at this time. 
  
Intervention: 
Meals and Snacks: Consistent carbohydrate diet Education- brief: Discussed disadvantages to breathing and mobility if patient's weight continues to increase. Patient agreeable to this sentiment. Discharge Nutrition Plan: Related to weight gain, patient may benefit from nutrition behavior change therapy in outpatient setting. Contact [phone] for more information or send referral to [facility] \". [place], [address], [address]

## 2018-12-10 NOTE — PROGRESS NOTES
Pt sleeping in bed with bi-pap. Respirations are visible. Safety measures are in place. Will continue to monitor.

## 2018-12-10 NOTE — PROGRESS NOTES
C/o worsening left eye lesion, which pt states was caused by his BiPap mask. Mask has been changed to a full-faced mask because of this. Pt states he believes the eye has gotten worse. Making this note, and will pass on info to dayshift RN.

## 2018-12-10 NOTE — PROGRESS NOTES
Demetrius Reynolds Admission Date: 12/2/2018 Daily Progress Note: 12/10/2018 The patient's chart is reviewed and the patient is discussed with the staff. 52 y.o. CM with morbid obesity, NATALIIA, presumed COPD, substance abuse (amphetamines, opioids, benzos), smoking and atrial flutter. Was recently hospitalized at [facility] from 11/16-30th. [facility] admission was notable for suspected drug use prior to being found unresponsive and brought in by EMS. Ruben Root required intubation and MIRANDA w cardioversion for atrial flutter (on Xarelto and Amio).  He was managed with nightly CPAP (his home equipment which worked properly) prior to his discharge. On chronic home O2 at 4L. 
12/2, he returned with dyspnea and a RA oxygen saturation of 76%.  ABG with hypercarbia:  7.25/90/52 and after 1h of BiPAP improved incrementally to 7.33/79/54.  On serum testing his HCO3 has risen to 41 and his renal function is stable.   BNP is 371, slightly above likely baseline in 100s. He received 40mg of IV lasix, solumedrol, bronchodilators and continues on 20/10 BiPAP. He confirms using marijuana and smoking. Says his CPAP is broken since 2 days ago because his son dropped it. Ruben Root has pitting ankle edema. Drug screen was positive for amphetamines/opiates/THC. Moved to the floor, diuresed and edema resolved. Subjective:  
 
No change On BIPAP Current Facility-Administered Medications Medication Dose Route Frequency  furosemide (LASIX) injection 20 mg  20 mg IntraVENous DAILY  nicotine (NICODERM CQ) 14 mg/24 hr patch 1 Patch  1 Patch TransDERmal DAILY  insulin lispro (HUMALOG) injection   SubCUTAneous AC&HS  amLODIPine (NORVASC) tablet 10 mg  10 mg Oral DAILY  hydrALAZINE (APRESOLINE) tablet 50 mg  50 mg Oral TID  levalbuterol (XOPENEX) nebulizer soln 0.63 mg/3 mL  0.63 mg Nebulization Q4H RT  
 carvedilol (COREG) tablet 25 mg  25 mg Oral BID WITH MEALS  
  sertraline (ZOLOFT) tablet 50 mg  50 mg Oral DAILY  tiotropium (SPIRIVA) inhalation capsule 18 mcg  1 Cap Inhalation DAILY  rivaroxaban (XARELTO) tablet 20 mg  20 mg Oral DAILY WITH DINNER  
 sodium chloride (NS) flush 5-10 mL  5-10 mL IntraVENous Q8H  
 sodium chloride (NS) flush 5-10 mL  5-10 mL IntraVENous PRN  
 acetaminophen (TYLENOL) tablet 650 mg  650 mg Oral Q4H PRN  
 ondansetron (ZOFRAN) injection 4 mg  4 mg IntraVENous Q4H PRN  
 naloxone (NARCAN) injection 0.4 mg  0.4 mg IntraVENous PRN  
 senna-docusate (PERICOLACE) 8.6-50 mg per tablet 1 Tab  1 Tab Oral BID PRN  
 amiodarone (CORDARONE) tablet 200 mg  200 mg Oral Q12H Review of Systems Constitutional: negative for fever, chills, sweats Cardiovascular: negative for chest pain, palpitations, syncope, edema Gastrointestinal:  negative for dysphagia, reflux, vomiting, diarrhea, abdominal pain, or melena Neurologic:  negative for focal weakness, numbness, headache Objective:  
 
Vitals:  
 12/10/18 2258 12/10/18 9979 12/10/18 1411 12/10/18 5017 BP: 120/76  138/70 Pulse: 72  74 Resp: 18  18 Temp: 98.1 °F (36.7 °C)  97.8 °F (36.6 °C) SpO2: 96% 96% 95% 94% Weight:      
Height:      
 
Intake and Output:  
12/08 1901 - 12/10 0700 In: 0817 [P.O.:1306] Out: 2050 [Urine:2050] 12/10 0701 - 12/10 1900 In: 360 [P.O.:360] Out: - Physical Exam:  
Constitution:  the patient is obese and in no acute distress on BIPAP with large mask EENMT:  Sclera clear, pupils equal, oral mucosa moist; some irritation of bridge of nose. Respiratory: decreased BS bilaterally Cardiovascular:  RRR without M,G,R 
Gastrointestinal: soft and non-tender; with positive bowel sounds. Musculoskeletal: warm without cyanosis. There is no lower leg edema, SCDs Skin:  no jaundice or rashes, no wounds Neurologic: no gross neuro deficits Psychiatric:  alert and oriented x 3 CXR: None today CXR  
 
12/6: 12/3/18:  Unchanged pulmonary vascular congestion. LAB Recent Labs 12/10/18 
0508 12/09/18 
2004 12/09/18 
1532 12/09/18 0421 34 84 07 12/09/18 
9107 GLUCPOC 111* 193* 87 91 89 Recent Labs 12/10/18 
8642 WBC 5.3 HGB 10.2* HCT 34.1*  
 Recent Labs 12/10/18 
8723   
K 4.0  
CL 97* CO2 39* GLU 93 BUN 15  
CREA 1.01  
CA 8.7 Recent Labs 12/07/18 
1116 PHI 7.348* PCO2I 75.2*  
PO2I 67* HCO3I 41.4* Venous US: IMPRESSION: Negative evaluation for deep venous thrombosis within either lower 
extremity. Assessment:  (Medical Decision Making) Hospital Problems  Date Reviewed: 12/5/2018 Codes Class Noted POA Methamphetamine abuse (HCC) (Chronic) ICD-10-CM: F15.10 ICD-9-CM: 305.70  12/2/2018 Yes Chronic--stressed cessation---AA and NA encouraged H/O atrial flutter (Chronic) ICD-10-CM: Z86.79 
ICD-9-CM: V12.59  12/2/2018 Yes  
  1/2018 Atrial flutter, s/p cardioversion. On Amio and Xarelto--sinus. Essential hypertension (Chronic) ICD-10-CM: I10 
ICD-9-CM: 401.9  12/2/2018 Yes  
 chronic--controlled. * (Principal) Acute respiratory failure with hypoxia and hypercarbia (HCC) ICD-10-CM: J96.01, J96.02 
ICD-9-CM: 518.81  12/2/2018 Yes On BIPAP with sleep, NC during the day. Substance abuse (HCC) (Chronic) ICD-10-CM: F19.10 ICD-9-CM: 305.90  12/2/2018 Yes Chronic. Noncompliance (Chronic) ICD-10-CM: Z91.19 ICD-9-CM: V15.81  12/2/2018 Yes  
 chronic Acute encephalopathy ICD-10-CM: G93.40 ICD-9-CM: 348.30  12/2/2018 Yes Resolved--A&O x3  
 NATALIIA (obstructive sleep apnea) (Chronic) ICD-10-CM: G47.33 
ICD-9-CM: 327.23  11/16/2018 Yes On BIPAP but using it constantly while awake. Chronic respiratory failure (HCC) (Chronic) ICD-10-CM: J96.10 ICD-9-CM: 518.83  11/16/2018 Yes Chronic--Home flow 4L--reported Morbid obesity ([facility] [address] (Chronic) ICD-10-CM: E66.01 
ICD-9-CM: 278.01  11/16/2018 Yes Chronic. Needs massive weight loss. Nicotine dependence (Chronic) ICD-10-CM: W55.634 ICD-9-CM: 305.1  2/1/2018 Yes Chronic--cessations strongly encouraged Homeless (Chronic) ICD-10-CM: Z59.0 ICD-9-CM: V60.0  1/15/2018 Yes Apparently going to live with son. Plan:  (Medical Decision Making) OOB QID. BIPAP with sleep. 
-social work to arrange NIPPV if possible More than 50% of the time documented was spent in face-to-face contact with the patient and in the care of the patient on the floor/unit where the patient is located.  
 
Nathaly Martinez MD

## 2018-12-10 NOTE — PROGRESS NOTES
Pt resting in the bed with bi-pap on. Awakened pt from sleeping. Pt is stable. Pt is axo. Pt denies any pain or distress at this time. Safety measures are in place. Will continue to monitor.

## 2018-12-11 LAB
GLUCOSE BLD STRIP.AUTO-MCNC: 108 MG/DL (ref 65–100)
GLUCOSE BLD STRIP.AUTO-MCNC: 192 MG/DL (ref 65–100)
GLUCOSE BLD STRIP.AUTO-MCNC: 84 MG/DL (ref 65–100)
GLUCOSE BLD STRIP.AUTO-MCNC: 88 MG/DL (ref 65–100)

## 2018-12-11 PROCEDURE — 74011250637 HC RX REV CODE- 250/637: Performed by: INTERNAL MEDICINE

## 2018-12-11 PROCEDURE — 74011636637 HC RX REV CODE- 636/637: Performed by: INTERNAL MEDICINE

## 2018-12-11 PROCEDURE — 77030021668 HC NEB PREFIL KT VYRM -A

## 2018-12-11 PROCEDURE — 99232 SBSQ HOSP IP/OBS MODERATE 35: CPT | Performed by: INTERNAL MEDICINE

## 2018-12-11 PROCEDURE — 82962 GLUCOSE BLOOD TEST: CPT

## 2018-12-11 PROCEDURE — 74011250636 HC RX REV CODE- 250/636: Performed by: INTERNAL MEDICINE

## 2018-12-11 PROCEDURE — 65270000029 HC RM PRIVATE

## 2018-12-11 PROCEDURE — 77010033678 HC OXYGEN DAILY

## 2018-12-11 PROCEDURE — 94640 AIRWAY INHALATION TREATMENT: CPT

## 2018-12-11 PROCEDURE — 94660 CPAP INITIATION&MGMT: CPT

## 2018-12-11 PROCEDURE — 94760 N-INVAS EAR/PLS OXIMETRY 1: CPT

## 2018-12-11 PROCEDURE — 74011000250 HC RX REV CODE- 250: Performed by: INTERNAL MEDICINE

## 2018-12-11 RX ORDER — FUROSEMIDE 20 MG/1
20 TABLET ORAL DAILY
Status: DISCONTINUED | OUTPATIENT
Start: 2018-12-12 | End: 2018-12-12 | Stop reason: HOSPADM

## 2018-12-11 RX ADMIN — LEVALBUTEROL HYDROCHLORIDE 0.63 MG: 0.63 SOLUTION RESPIRATORY (INHALATION) at 07:53

## 2018-12-11 RX ADMIN — AMIODARONE HYDROCHLORIDE 200 MG: 200 TABLET ORAL at 08:47

## 2018-12-11 RX ADMIN — HYDRALAZINE HYDROCHLORIDE 50 MG: 50 TABLET, FILM COATED ORAL at 08:47

## 2018-12-11 RX ADMIN — Medication 5 ML: at 20:56

## 2018-12-11 RX ADMIN — LEVALBUTEROL HYDROCHLORIDE 0.63 MG: 0.63 SOLUTION RESPIRATORY (INHALATION) at 03:22

## 2018-12-11 RX ADMIN — RIVAROXABAN 20 MG: 20 TABLET, FILM COATED ORAL at 16:41

## 2018-12-11 RX ADMIN — FUROSEMIDE 20 MG: 10 INJECTION, SOLUTION INTRAMUSCULAR; INTRAVENOUS at 08:48

## 2018-12-11 RX ADMIN — CARVEDILOL 25 MG: 12.5 TABLET, FILM COATED ORAL at 08:47

## 2018-12-11 RX ADMIN — TIOTROPIUM BROMIDE 18 MCG: 18 CAPSULE ORAL; RESPIRATORY (INHALATION) at 08:38

## 2018-12-11 RX ADMIN — INSULIN LISPRO 2 UNITS: 100 INJECTION, SOLUTION INTRAVENOUS; SUBCUTANEOUS at 20:56

## 2018-12-11 RX ADMIN — LEVALBUTEROL HYDROCHLORIDE 0.63 MG: 0.63 SOLUTION RESPIRATORY (INHALATION) at 11:46

## 2018-12-11 RX ADMIN — CARVEDILOL 25 MG: 12.5 TABLET, FILM COATED ORAL at 16:41

## 2018-12-11 RX ADMIN — HYDRALAZINE HYDROCHLORIDE 50 MG: 50 TABLET, FILM COATED ORAL at 16:41

## 2018-12-11 RX ADMIN — Medication 10 ML: at 15:19

## 2018-12-11 RX ADMIN — AMLODIPINE BESYLATE 10 MG: 10 TABLET ORAL at 08:47

## 2018-12-11 RX ADMIN — HYDRALAZINE HYDROCHLORIDE 50 MG: 50 TABLET, FILM COATED ORAL at 20:56

## 2018-12-11 RX ADMIN — Medication 10 ML: at 05:18

## 2018-12-11 RX ADMIN — LEVALBUTEROL HYDROCHLORIDE 0.63 MG: 0.63 SOLUTION RESPIRATORY (INHALATION) at 16:02

## 2018-12-11 RX ADMIN — AMIODARONE HYDROCHLORIDE 200 MG: 200 TABLET ORAL at 20:56

## 2018-12-11 RX ADMIN — SERTRALINE HYDROCHLORIDE 50 MG: 50 TABLET ORAL at 08:47

## 2018-12-11 RX ADMIN — LEVALBUTEROL HYDROCHLORIDE 0.63 MG: 0.63 SOLUTION RESPIRATORY (INHALATION) at 20:01

## 2018-12-11 NOTE — PROGRESS NOTES
Problem: Interdisciplinary Rounds  Goal: Interdisciplinary Rounds  Interdisciplinary team rounds were held 12/11/2018 with the following team members:Care Management, Physical Therapy, Physician and Clinical Coordinator and the patient. Plan of care discussed. See clinical pathway and/or care plan for interventions and desired outcomes.

## 2018-12-11 NOTE — PROGRESS NOTES
Bedtime meds given. No acute distress noted. Respirations even and unlabored on 4L via nc. Call light within reach. Will continue to monitor.

## 2018-12-11 NOTE — PROGRESS NOTES
Hospitalist Progress Note     Admit Date:  2018  2:34 PM   Name:  Ayanna Yanez   Age:  52 y.o.  :  1965   MRN:  [id number]   PCP:  None  Treatment Team: Attending Provider: Mercedes Fuller MD; Care Manager: Bernabe Quan, RN; Care Manager: Dustin Davidson, RN; Consulting Provider: Vladislav Hernández MD       Pt Transferred 2018 from Pulmonology/Intensivist  As Per NOTES:  '  HPI:   47yr old male with pmhx sig for morbid obesity, NATALIIA, Suspected COPD, substance abuse with ( amphetamines, opioids, benzos) smoking and atrial flutter. He was previously admitted on - for respiratory where he was intubated. He represented 2018 with dyspnea and hypoxic hypercarbic resp failure. His drug screen this admit was positive for amphetamines, opioids and THC. He noted that his cpap was broken 2 days prior to the admit. He was admitted to the ICU where he was diuresed and continued on BIPAP. His symptoms gradually resolved. He was moved to the floor and the hospitalist were asked to assume care\" 2018.    2018- patient seen - complains of anxiety and requesting a anxiolytic- denies use of bdps as an outpatient even thou drug screen was positive. Moving better and breathing better after lasix started    2018- seen - cpap machines were not fixed- pulm recommending bipap/trilogy- pt has no insurance    2018- seen sleeping but arousable. No adverse overnight events. 2018- seen sitting up oob to chair - taking breathing treatment and wants to go home. He has not had any xanax during this hospital stay although it was ordered. 12/10/2018- seen - no adverse overnight events. . Awaiting a trilogy/bipap machine- he has no insurance    2018- pt seen - ready to go home- despite multiple attempts - unable to get a home trilogy or cpap or bipap. 10 systems reviewed and negative except as noted in HPI.   Past Medical History:   Diagnosis Date    Acute respiratory failure with hypercapnia (HCC) 2/1/2018    Chronic obstructive pulmonary disease (HCC)     Heart failure (HCC)     Sleep disorder       No past surgical history on file. Current Facility-Administered Medications   Medication Dose Route Frequency    furosemide (LASIX) injection 20 mg  20 mg IntraVENous DAILY    nicotine (NICODERM CQ) 14 mg/24 hr patch 1 Patch  1 Patch TransDERmal DAILY    insulin lispro (HUMALOG) injection   SubCUTAneous AC&HS    amLODIPine (NORVASC) tablet 10 mg  10 mg Oral DAILY    hydrALAZINE (APRESOLINE) tablet 50 mg  50 mg Oral TID    levalbuterol (XOPENEX) nebulizer soln 0.63 mg/3 mL  0.63 mg Nebulization Q4H RT    carvedilol (COREG) tablet 25 mg  25 mg Oral BID WITH MEALS    sertraline (ZOLOFT) tablet 50 mg  50 mg Oral DAILY    tiotropium (SPIRIVA) inhalation capsule 18 mcg  1 Cap Inhalation DAILY    rivaroxaban (XARELTO) tablet 20 mg  20 mg Oral DAILY WITH DINNER    sodium chloride (NS) flush 5-10 mL  5-10 mL IntraVENous Q8H    sodium chloride (NS) flush 5-10 mL  5-10 mL IntraVENous PRN    acetaminophen (TYLENOL) tablet 650 mg  650 mg Oral Q4H PRN    ondansetron (ZOFRAN) injection 4 mg  4 mg IntraVENous Q4H PRN    naloxone (NARCAN) injection 0.4 mg  0.4 mg IntraVENous PRN    senna-docusate (PERICOLACE) 8.6-50 mg per tablet 1 Tab  1 Tab Oral BID PRN    amiodarone (CORDARONE) tablet 200 mg  200 mg Oral Q12H     No Known Allergies   Social History     Tobacco Use    Smoking status: Current Every Day Smoker     Packs/day: 2.00   Substance Use Topics    Alcohol use: No     Comment: once a month beer      No family history on file.    Immunization History   Administered Date(s) Administered    Influenza Vaccine (Quad) PF 01/24/2018, 11/30/2018    TB Skin Test (PPD) Intradermal 01/07/2018       Objective:     Patient Vitals for the past 24 hrs:   Temp Pulse Resp BP SpO2   12/11/18 1602     94 %   12/11/18 1525 98.8 °F (37.1 °C) 67 18 104/52 96 %   12/11/18 1146     91 %   12/11/18 1109 97.6 °F (36.4 °C) 69 18 125/62 96 %   12/11/18 0839     95 %   12/11/18 0827     94 %   12/11/18 0753     95 %   12/11/18 0724 97.6 °F (36.4 °C) 69 18 135/55 96 %   12/11/18 0415 98.1 °F (36.7 °C) 67 18 146/70 96 %   12/11/18 0326     96 %   12/10/18 2326 98 °F (36.7 °C) 63 18 101/65 96 %   12/10/18 2325     94 %   12/10/18 1946     93 %   12/10/18 1859 97.9 °F (36.6 °C) 68 18 115/63 94 %     Oxygen Therapy  O2 Sat (%): 94 % (12/11/18 1602)  Pulse via Oximetry: 77 beats per minute (12/11/18 1602)  O2 Device: Nasal cannula (12/11/18 1602)  O2 Flow Rate (L/min): 4 l/min (12/11/18 1602)  FIO2 (%): 40 % (12/11/18 0753)    Intake/Output Summary (Last 24 hours) at 12/11/2018 1737  Last data filed at 12/11/2018 1612  Gross per 24 hour   Intake 480 ml   Output 3450 ml   Net -2970 ml       Physical Exam:  General:    Well nourished. Sleeping  On bipap  Eyes:   Normal sclera. Extraocular movements intact. ENT:  Normocephalic, atraumatic. Moist mucous membranes  CV:   RRR. No murmur, rub, or gallop. Lungs:              Equal a/e bl with rales   Abdomen: Soft, nontender, nondistended. Bowel sounds normal.   Extremities: Warm and dry. No cyanosis or edema. Neurologic: CN II-XII grossly intact. Sensation intact. Skin:     No rashes or jaundice. Psych:  Normal mood and affect. I reviewed the labs, imaging, EKGs, telemetry, and other studies done this admission.   Data Review:   Recent Results (from the past 24 hour(s))   GLUCOSE, POC    Collection Time: 12/10/18  8:46 PM   Result Value Ref Range    Glucose (POC) 109 (H) 65 - 100 mg/dL   GLUCOSE, POC    Collection Time: 12/11/18  5:16 AM   Result Value Ref Range    Glucose (POC) 108 (H) 65 - 100 mg/dL   GLUCOSE, POC    Collection Time: 12/11/18 10:53 AM   Result Value Ref Range    Glucose (POC) 88 65 - 100 mg/dL   GLUCOSE, POC    Collection Time: 12/11/18  3:53 PM   Result Value Ref Range    Glucose (POC) 84 65 - 100 mg/dL       All Micro Results     None          Other Studies:  Xr Chest Sngl V    Result Date: 12/3/2018  EXAM:  Chest x-ray. DATE:  December 3, 2018. INDICATION:  Follow-up vascular congestion. COMPARISON:  Yesterday's chest x-ray. TECHNIQUE:  2 frontal views of the chest were obtained. FINDINGS:  There is unchanged cardiomegaly and pulmonary vascular congestion. No pneumothorax, significant pleural effusion or overt pulmonary edema is identified. IMPRESSION:  Unchanged pulmonary vascular congestion. Xr Chest Port    Result Date: 12/2/2018  CHEST X-RAY, one view. HISTORY:  Chest pain  Shortness of breath TECHNIQUE:  AP upright portable view. COMPARISON: 19 November 2018 FINDINGS:   Pulmonary vascular congestion. Left hemidiaphragm is obscured, possibly due to technique. Obesity decreases quality the exam. Right hemidiaphragm is well-defined. Heart may be enlarged     IMPRESSION:  Pulmonary venous hypertension probably early interstitial edema. Assessment and Plan:     Hospital Problems as of 12/11/2018 Date Reviewed: 12/5/2018          Codes Class Noted - Resolved POA    Methamphetamine abuse ([facility] (Chronic) ICD-10-CM: F15.10  ICD-9-CM: 305.70  12/2/2018 - Present Yes        H/O atrial flutter (Chronic) ICD-10-CM: Z86.79  ICD-9-CM: V12.59  12/2/2018 - Present Yes    Overview Signed 2/6/2018  7:51 AM by Frantz Woo NP     1/2018 Atrial flutter, s/p cardioversion.              Essential hypertension (Chronic) ICD-10-CM: I10  ICD-9-CM: 401.9  12/2/2018 - Present Yes        * (Principal) Acute respiratory failure with hypoxia and hypercarbia (HCC) ICD-10-CM: J96.01, J96.02  ICD-9-CM: 518.81  12/2/2018 - Present Yes        Substance abuse ([facility] [address] (Chronic) ICD-10-CM: F19.10  ICD-9-CM: 305.90  12/2/2018 - Present Yes        Noncompliance (Chronic) ICD-10-CM: Z91.19  ICD-9-CM: V15.81  12/2/2018 - Present Yes        Acute encephalopathy ICD-10-CM: G93.40  ICD-9-CM: 348.30  12/2/2018 - Present Yes        NATALIIA (obstructive sleep apnea) (Chronic) ICD-10-CM: G47.33  ICD-9-CM: 327.23  11/16/2018 - Present Yes        Chronic respiratory failure (HCC) (Chronic) ICD-10-CM: J96.10  ICD-9-CM: 518.83  11/16/2018 - Present Yes        Morbid obesity ([address] (Chronic) ICD-10-CM: E66.01  ICD-9-CM: 278.01  11/16/2018 - Present Yes        Nicotine dependence (Chronic) ICD-10-CM: F17.200  ICD-9-CM: 305.1  2/1/2018 - Present Yes        Homeless (Chronic) ICD-10-CM: Z59.0  ICD-9-CM: V60.0  1/15/2018 - Present Yes              PLAN:  · Acute resp failure- resolving;  pulm- input appreciated-pt does not qualify for a new machine- case mgt plans to refer to free clinic on discharge - hopefully they can get him a machine   ·  will do JAMES tonight   · Pulmonary edema- continue lasix- will switch to po  · NATALIIA  - family  brought in the machine for rt to look at- not able to fix  · Atrial flutter continue current meds  · Substance abuse patient advised to quit. · Morbid obesity - continue supportive care- advised on weight loss  · Non- compliance- advised to be compliant  · HTN- continue current meds  · Anxiety- supportive care  · Further workup and mgt based on his clinical course  · Plan for dc on home o2- unable to get him a machine and he wants to go home.  Will refer to the free medical clinic on dc   · dvt ppx on xarelto    Signed:  Axel Rivers MD

## 2018-12-11 NOTE — PROGRESS NOTES
Moira Houser Admission Date: 12/2/2018 Daily Progress Note: 12/11/2018 The patient's chart is reviewed and the patient is discussed with the staff. 52 y.o. CM with morbid obesity, NATALIIA, presumed COPD, substance abuse (amphetamines, opioids, benzos), smoking and atrial flutter. Was recently hospitalized at [facility] from 11/16-30th. [facility] admission was notable for suspected drug use prior to being found unresponsive and brought in by EMS. Osiris Nicholson required intubation and MIRANDA w cardioversion for atrial flutter (on Xarelto and Amio).  He was managed with nightly CPAP (his home equipment which worked properly) prior to his discharge. On chronic home O2 at 4L. 
12/2, he returned with dyspnea and a RA oxygen saturation of 76%.  ABG with hypercarbia:  7.25/90/52 and after 1h of BiPAP improved incrementally to 7.33/79/54.  On serum testing his HCO3 has risen to 41 and his renal function is stable.   BNP is 371, slightly above likely baseline in 100s. He received 40mg of IV lasix, solumedrol, bronchodilators and continues on 20/10 BiPAP. He confirms using marijuana and smoking. Says his CPAP is broken since 2 days ago because his son dropped it. Osiris Nicholson has pitting ankle edema. Drug screen was positive for amphetamines/opiates/THC. Moved to the floor, diuresed and edema resolved. Subjective: No new complains Current Facility-Administered Medications Medication Dose Route Frequency  furosemide (LASIX) injection 20 mg  20 mg IntraVENous DAILY  nicotine (NICODERM CQ) 14 mg/24 hr patch 1 Patch  1 Patch TransDERmal DAILY  insulin lispro (HUMALOG) injection   SubCUTAneous AC&HS  amLODIPine (NORVASC) tablet 10 mg  10 mg Oral DAILY  hydrALAZINE (APRESOLINE) tablet 50 mg  50 mg Oral TID  levalbuterol (XOPENEX) nebulizer soln 0.63 mg/3 mL  0.63 mg Nebulization Q4H RT  
 carvedilol (COREG) tablet 25 mg  25 mg Oral BID WITH MEALS  
  sertraline (ZOLOFT) tablet 50 mg  50 mg Oral DAILY  tiotropium (SPIRIVA) inhalation capsule 18 mcg  1 Cap Inhalation DAILY  rivaroxaban (XARELTO) tablet 20 mg  20 mg Oral DAILY WITH DINNER  
 sodium chloride (NS) flush 5-10 mL  5-10 mL IntraVENous Q8H  
 sodium chloride (NS) flush 5-10 mL  5-10 mL IntraVENous PRN  
 acetaminophen (TYLENOL) tablet 650 mg  650 mg Oral Q4H PRN  
 ondansetron (ZOFRAN) injection 4 mg  4 mg IntraVENous Q4H PRN  
 naloxone (NARCAN) injection 0.4 mg  0.4 mg IntraVENous PRN  
 senna-docusate (PERICOLACE) 8.6-50 mg per tablet 1 Tab  1 Tab Oral BID PRN  
 amiodarone (CORDARONE) tablet 200 mg  200 mg Oral Q12H Review of Systems Constitutional: negative for fever, chills, sweats Cardiovascular: negative for chest pain, palpitations, syncope, edema Gastrointestinal:  negative for dysphagia, reflux, vomiting, diarrhea, abdominal pain, or melena Neurologic:  negative for focal weakness, numbness, headache Objective:  
 
Vitals:  
 12/11/18 7477 12/11/18 0655 12/11/18 0827 12/11/18 6014 BP: 135/55 Pulse: 69 Resp: 18 Temp: 97.6 °F (36.4 °C) SpO2: 96% 95% 94% 95% Weight:      
Height:      
 
Intake and Output:  
12/09 1901 - 12/11 0700 In: 840 [P.O.:840] Out: 2800 [Urine:2800] 12/11 0701 - 12/11 1900 In: 240 [P.O.:240] Out: 200 [Urine:200] Physical Exam:  
Constitution:  the patient is obese and in no acute distress on BIPAP with large mask EENMT:  Sclera clear, pupils equal, oral mucosa moist; some irritation of bridge of nose. Respiratory: decreased BS bilaterally Cardiovascular:  RRR without M,G,R 
Gastrointestinal: soft and non-tender; with positive bowel sounds. Musculoskeletal: warm without cyanosis. There is no lower leg edema, SCDs Skin:  no jaundice or rashes, no wounds Neurologic: no gross neuro deficits Psychiatric:  alert and oriented x 3 CXR: None today CXR  
 
12/6: 12/3/18:  Unchanged pulmonary vascular congestion. LAB Recent Labs 12/11/18 
4228 12/10/18 
2046 12/10/18 
1605 12/10/18 
1128 12/10/18 
0493 GLUCPOC 108* 109* 97 85 111* Recent Labs 12/10/18 
3956 WBC 5.3 HGB 10.2* HCT 34.1*  
 Recent Labs 12/10/18 
9643   
K 4.0  
CL 97* CO2 39* GLU 93 BUN 15  
CREA 1.01  
CA 8.7 No results for input(s): PH, PCO2, PO2, HCO3, PHI, PCO2I, PO2I, HCO3I in the last 72 hours. Venous US: IMPRESSION: Negative evaluation for deep venous thrombosis within either lower 
extremity. Assessment:  (Medical Decision Making) Hospital Problems  Date Reviewed: 12/5/2018 Codes Class Noted POA Methamphetamine abuse (HCC) (Chronic) ICD-10-CM: F15.10 ICD-9-CM: 305.70  12/2/2018 Yes Chronic--stressed cessation---AA and NA encouraged H/O atrial flutter (Chronic) ICD-10-CM: Z86.79 
ICD-9-CM: V12.59  12/2/2018 Yes  
  1/2018 Atrial flutter, s/p cardioversion. On Amio and Xarelto--sinus. Essential hypertension (Chronic) ICD-10-CM: I10 
ICD-9-CM: 401.9  12/2/2018 Yes  
 chronic--controlled. * (Principal) Acute respiratory failure with hypoxia and hypercarbia (HCC) ICD-10-CM: J96.01, J96.02 
ICD-9-CM: 518.81  12/2/2018 Yes On BIPAP with sleep, NC during the day. Substance abuse (HCC) (Chronic) ICD-10-CM: F19.10 ICD-9-CM: 305.90  12/2/2018 Yes Chronic. Noncompliance (Chronic) ICD-10-CM: Z91.19 ICD-9-CM: V15.81  12/2/2018 Yes  
 chronic Acute encephalopathy ICD-10-CM: G93.40 ICD-9-CM: 348.30  12/2/2018 Yes Resolved--A&O x3  
 NATALIIA (obstructive sleep apnea) (Chronic) ICD-10-CM: G47.33 
ICD-9-CM: 327.23  11/16/2018 Yes On BIPAP but using it constantly while awake. Chronic respiratory failure (HCC) (Chronic) ICD-10-CM: J96.10 ICD-9-CM: 518.83  11/16/2018 Yes Chronic--Home flow 4L--reported  Morbid obesity (HCC) (Chronic) ICD-10-CM: E66.01 
 ICD-9-CM: 278.01  11/16/2018 Yes Chronic. Needs massive weight loss. Nicotine dependence (Chronic) ICD-10-CM: F55.894 ICD-9-CM: 305.1  2/1/2018 Yes Chronic--cessations strongly encouraged Homeless (Chronic) ICD-10-CM: Z59.0 ICD-9-CM: V60.0  1/15/2018 Yes Apparently going to live with son. Plan:  (Medical Decision Making) OOB QID. BIPAP with sleep. Awaiting trilogy per social workers- if possible We will sign off, call us with questions More than 50% of the time documented was spent in face-to-face contact with the patient and in the care of the patient on the floor/unit where the patient is located.  
 
Luigi Gill MD

## 2018-12-11 NOTE — PROGRESS NOTES
Awakens easily  Slept with bipap  Breath sounds diminished with few basilar rales  Denies pain   No distress

## 2018-12-11 NOTE — PROGRESS NOTES
Patient's CPAP is not working after being dropped sometime between his previous hospitalization and current hospitalization. Patient remains uninsured with no payer source for a new CPAP. Patient's brother gave him his current CPAP and recently got a new CPAP, giving patient his old one. Unfortunately, we do not have anyone who can adjust settings or verify that the CPAP is functioning properly as the machine was for someone other than the current patient. Unless patient is able to pay rental out of pocket, we likely have no source for new CPAP for him. Case Management will continue to follow. Care Management Interventions  Mode of Transport at Discharge:  Other (see comment)  Transition of Care Consult (CM Consult): Discharge Planning  Discharge Durable Medical Equipment: (portable o2, concentrator, CPAP, nebulizer)  Current Support Network: Relative's Home  Confirm Follow Up Transport: Family(wife provides transportation)  Freedom of Choice Offered: Yes   Resource Information Provided?: (no payor source)  Discharge Location  Discharge Placement: Home with home health

## 2018-12-11 NOTE — PROGRESS NOTES
Bedside report from Link Tomlinson., RN. Patient resting in bed. Respirations even and unlabored on bipap. No acute distress noted. Call light within reach. Will continue to monitor.

## 2018-12-11 NOTE — PROGRESS NOTES
Called to check on patient's home CPAP unit in his room. He has multiple units, CPAP and  BiPAP. BiPAP is broken beyond repair. One of his CPAP machines was checked last week and verified that it was putting out 15 cmH2O however upon inspection of the unit this week, this CPAP has been dropped and broken beyond repair. His last CPAP unit in his possession is putting out pressure, unable to verify how much or adjust as unit is too old. Patient has had a self pay agreement with [facility] in the past for BiPAP but has not paid his bill. He could contact [employer] and resolve billing issues and might be able to obtain equipment again. Best option for patients without insurance living in [place] is the [facility] if this is an option for him.

## 2018-12-11 NOTE — PROGRESS NOTES
Patient referred to the [facility] for possible inclusion in that outpatient program. Case Management will continue to follow. Care Management Interventions  Mode of Transport at Discharge:  Other (see comment)  Transition of Care Consult (CM Consult): Discharge Planning  Discharge Durable Medical Equipment: (portable o2, concentrator, CPAP, nebulizer)  Current Support Network: Relative's Home  Confirm Follow Up Transport: Family(wife provides transportation)  Freedom of Choice Offered: Yes   Resource Information Provided?: (no payor source)  Discharge Location  Discharge Placement: Home with home health

## 2018-12-11 NOTE — PROGRESS NOTES
Patient resting quietly in bed, eyes closed. Bipap in place. No acute distress noted. Will continue to monitor.

## 2018-12-12 ENCOUNTER — HOME CARE VISIT (OUTPATIENT)
Dept: HOME HEALTH SERVICES | Facility: HOME HEALTH | Age: 53
End: 2018-12-12
Payer: SELF-PAY

## 2018-12-12 VITALS
TEMPERATURE: 97.5 F | SYSTOLIC BLOOD PRESSURE: 113 MMHG | WEIGHT: 288.1 LBS | BODY MASS INDEX: 39.02 KG/M2 | HEIGHT: 72 IN | DIASTOLIC BLOOD PRESSURE: 70 MMHG | RESPIRATION RATE: 19 BRPM | HEART RATE: 68 BPM | OXYGEN SATURATION: 92 %

## 2018-12-12 LAB
GLUCOSE BLD STRIP.AUTO-MCNC: 104 MG/DL (ref 65–100)
GLUCOSE BLD STRIP.AUTO-MCNC: 94 MG/DL (ref 65–100)

## 2018-12-12 PROCEDURE — 94760 N-INVAS EAR/PLS OXIMETRY 1: CPT

## 2018-12-12 PROCEDURE — 94640 AIRWAY INHALATION TREATMENT: CPT

## 2018-12-12 PROCEDURE — 74011000250 HC RX REV CODE- 250: Performed by: INTERNAL MEDICINE

## 2018-12-12 PROCEDURE — 94761 N-INVAS EAR/PLS OXIMETRY MLT: CPT

## 2018-12-12 PROCEDURE — 74011250637 HC RX REV CODE- 250/637: Performed by: INTERNAL MEDICINE

## 2018-12-12 PROCEDURE — 82962 GLUCOSE BLOOD TEST: CPT

## 2018-12-12 PROCEDURE — 77010033678 HC OXYGEN DAILY

## 2018-12-12 RX ADMIN — CARVEDILOL 25 MG: 12.5 TABLET, FILM COATED ORAL at 09:10

## 2018-12-12 RX ADMIN — LEVALBUTEROL HYDROCHLORIDE 0.63 MG: 0.63 SOLUTION RESPIRATORY (INHALATION) at 00:34

## 2018-12-12 RX ADMIN — SERTRALINE HYDROCHLORIDE 50 MG: 50 TABLET ORAL at 09:10

## 2018-12-12 RX ADMIN — AMIODARONE HYDROCHLORIDE 200 MG: 200 TABLET ORAL at 09:10

## 2018-12-12 RX ADMIN — TIOTROPIUM BROMIDE 18 MCG: 18 CAPSULE ORAL; RESPIRATORY (INHALATION) at 08:46

## 2018-12-12 RX ADMIN — FUROSEMIDE 20 MG: 20 TABLET ORAL at 09:11

## 2018-12-12 RX ADMIN — HYDRALAZINE HYDROCHLORIDE 50 MG: 50 TABLET, FILM COATED ORAL at 09:11

## 2018-12-12 RX ADMIN — AMLODIPINE BESYLATE 10 MG: 10 TABLET ORAL at 09:11

## 2018-12-12 RX ADMIN — LEVALBUTEROL HYDROCHLORIDE 0.63 MG: 0.63 SOLUTION RESPIRATORY (INHALATION) at 11:32

## 2018-12-12 RX ADMIN — LEVALBUTEROL HYDROCHLORIDE 0.63 MG: 0.63 SOLUTION RESPIRATORY (INHALATION) at 08:45

## 2018-12-12 RX ADMIN — Medication 5 ML: at 05:33

## 2018-12-12 NOTE — PROGRESS NOTES
Spoke with Kingsley Ibarra from the [facility]). Patient has been enrolled with [facility] for quite some time. They actually provided the patient with a BiPAP when he first entered the program, and so Ms. Amber Naidu asked where that machine was. She stated that patient has been noncompliant in showing up for his appointments at the [facility] and that they will not be able to provide patient with another machine as they have already provided him one and they have a waiting list of people needing the machines. Unfortunately, this is yet another avenue of assistance that the patient has used up. Case Management has no resources left to provide either a CPAP/BiPAP or home O2. Patient states he still has his concentrator, but isn't sure that his one O2 tank has any O2 left in it. He asked MD if he could take one of the hospital's tanks.  informed attending MD that we cannot provide tanks for the patient. He needs to straighten out his finances with [employer] in order to get a refill from them. Case Management is continuing to follow. Care Management Interventions  Mode of Transport at Discharge:  Other (see comment)  Transition of Care Consult (CM Consult): Discharge Planning  Discharge Durable Medical Equipment: (portable o2, concentrator, CPAP, nebulizer)  Current Support Network: Relative's Home  Confirm Follow Up Transport: Family(wife provides transportation)  Freedom of Choice Offered: Yes   Resource Information Provided?: (no payor source)  Discharge Location  Discharge Placement: Home with home health

## 2018-12-12 NOTE — PROGRESS NOTES
Pt O2 SAT checked on 4 at rest SAT-87%/ HR-90. Pt increased to 5 at rest SAT-90%/HR-81. Pt ambulated 6+ Min. And SAT-92%/ HR-83. Pt back to his room and on 5 SAT-93%/ HR-80.

## 2018-12-12 NOTE — DISCHARGE SUMMARY
Hospitalist Discharge Summary     Admit Date:  2018  2:34 PM   Name:  Prasad Isabel   Age:  52 y.o.  :  1965   MRN:  [id number]   PCP:  None  Treatment Team: Attending Provider: Nivia Monterroso MD; Care Manager: Khanh Angulo, RN; Care Manager: Raine Oakley, RN; Consulting Provider: Nicky Hogan MD    Problem List for this Hospitalization:  Hospital Problems as of 2018 Date Reviewed: 2018          Codes Class Noted - Resolved POA    Methamphetamine abuse ([facility] (Chronic) ICD-10-CM: F15.10  ICD-9-CM: 305.70  2018 - Present Yes        H/O atrial flutter (Chronic) ICD-10-CM: Z86.79  ICD-9-CM: V12.59  2018 - Present Yes    Overview Signed 2018  7:51 AM by Wilver Benitez NP     2018 Atrial flutter, s/p cardioversion.              Essential hypertension (Chronic) ICD-10-CM: I10  ICD-9-CM: 401.9  2018 - Present Yes        * (Principal) Acute respiratory failure with hypoxia and hypercarbia (HCC) ICD-10-CM: J96.01, J96.02  ICD-9-CM: 518.81  2018 - Present Yes        Substance abuse ([facility] (Chronic) ICD-10-CM: F19.10  ICD-9-CM: 305.90  2018 - Present Yes        Noncompliance (Chronic) ICD-10-CM: Z91.19  ICD-9-CM: V15.81  2018 - Present Yes        Acute encephalopathy ICD-10-CM: G93.40  ICD-9-CM: 348.30  2018 - Present Yes        NATALIIA (obstructive sleep apnea) (Chronic) ICD-10-CM: G47.33  ICD-9-CM: 327.23  2018 - Present Yes        Chronic respiratory failure (HCC) (Chronic) ICD-10-CM: J96.10  ICD-9-CM: 518.83  2018 - Present Yes        Morbid obesity ([facility] (Chronic) ICD-10-CM: E66.01  ICD-9-CM: 278.01  2018 - Present Yes        Nicotine dependence (Chronic) ICD-10-CM: F17.200  ICD-9-CM: 305.1  2018 - Present Yes        Homeless (Chronic) ICD-10-CM: Z59.0  ICD-9-CM: V60.0  1/15/2018 - Present Yes                Admission HPI from 2018:    \"Adilson Schaffer is a 52 y.o. male with a h/o morbid obesity, NATALIIA, presumed COPD, substance abuse (amphetamines, opioids, benzos in past), smoking and atrial flutter who was recently hospitalized at [facility] from 11/16-30th. His admission was notable for suspected drug use prior to being found unresponsive and brought in by EMS. He required intubation and MIRANDA w cardioversion for atrial flutter. He was managed with nightly CPAP (his home equipment which worked properly) prior to his discharge.     Today, he returned with dyspnea and a RA oxygen saturation of 76%. His initial ABG demonstrated hypercarbia:  7.25/90/52 and after 1h of BiPAP improved incrementally to 7.33/79/54. On serum testing his HCO3 has risen to 41 and his renal function is stable. BNP is 371, slightly above likely baseline in 100s. Urine drug screen is pending. He received 40mg of IV lasix, solumedrol, bronchodilators and continues on 20/10 BiPAP.     On evaluation, he slurs his speech and has pinpoint pupils. He confirms using marijuana and smoking a cigarette and says that his CPAP is broken since 2 days ago because his son dropped it. He has pitting ankle edema. His UDS is again positive for amphetamines, opiates and THC.     Med[facility] be [facility] Course: The patient was admitted to the pulmonology service. Respiratory status improved and he was eventually transferred out of the ICU. And then to the Hospitalist service. It would be ideal for him to have a bipap/trilogy machine but Case Management has been unable to secure a machine for him despite multiple attempts as the patient has broken/lost 4 machines so far and has not been forthcoming with the suppliers of these machines or his Home O2. He is able to obtain Home O2 and will need 5L going forward. Long discussion with him including case management and his wife.  Perhaps if they can produce the prior machine he got from the [facility] he may be able to get a new one. He should stay of drugs and be complaint with follow up. He has a high risk of returning secondary to his behavior and choices. Follow up instructions below. Plan was discussed with Patient, family and IDT. All questions answered. Patient was stable at time of discharge and was instructed to call or return if there are any concerns or recurrence of symptoms. Diagnostic Imaging/Tests:   Xr Chest Sngl V    Result Date: 12/3/2018  EXAM:  Chest x-ray. DATE:  December 3, 2018. INDICATION:  Follow-up vascular congestion. COMPARISON:  Yesterday's chest x-ray. TECHNIQUE:  2 frontal views of the chest were obtained. FINDINGS:  There is unchanged cardiomegaly and pulmonary vascular congestion. No pneumothorax, significant pleural effusion or overt pulmonary edema is identified. IMPRESSION:  Unchanged pulmonary vascular congestion. Xr Chest Port    Result Date: 12/2/2018  CHEST X-RAY, one view. HISTORY:  Chest pain  Shortness of breath TECHNIQUE:  AP upright portable view. COMPARISON: 19 November 2018 FINDINGS:   Pulmonary vascular congestion. Left hemidiaphragm is obscured, possibly due to technique. Obesity decreases quality the exam. Right hemidiaphragm is well-defined. Heart may be enlarged     IMPRESSION:  Pulmonary venous hypertension probably early interstitial edema. Echocardiogram results:  No results found for this visit on 12/02/18. All Micro Results     None          Labs: Results:       BMP, Mg, Phos Recent Labs     12/10/18  0634      K 4.0   CL 97*   CO2 39*   AGAP 4*   BUN 15   CREA 1.01   CA 8.7   GLU 93      CBC Recent Labs     12/10/18  0634   WBC 5.3   RBC 3.27*   HGB 10.2*   HCT 34.1*      GRANS 65   LYMPH 22   EOS 3   MONOS 10   BASOS 0   IG 0   ANEU 3.4   ABL 1.2   JAY 0.2   ABM 0.5   ABB 0.0   AIG 0.0      LFT No results for input(s): SGOT, ALT, TBIL, AP, TP, ALB, GLOB, AGRAT, GPT in the last 72 hours.    Cardiac Testing Lab Results Component Value Date/Time     12/02/2018 02:47 PM     11/22/2018 04:46 AM     11/16/2018 02:32 AM    CK 30 01/22/2018 04:40 AM    CK 28 01/21/2018 06:20 PM    CK 28 01/21/2018 01:24 PM    CK - MB 2.2 01/22/2018 04:40 AM    CK - MB 1.6 01/21/2018 06:20 PM    CK - MB 1.6 01/21/2018 01:24 PM    CK-MB Index 7.3 (H) 01/22/2018 04:40 AM    CK-MB Index 5.7 (H) 01/21/2018 06:20 PM    CK-MB Index 5.7 (H) 01/21/2018 01:24 PM    Troponin-I, Qt. 0.02 07/03/2018 12:40 AM    Troponin-I, Qt. 0.10 (HH) 01/22/2018 04:40 AM    Troponin-I, Qt. 0.04 01/21/2018 06:20 PM      Coagulation Tests Lab Results   Component Value Date/Time    Prothrombin time 9.8 05/11/2015 03:30 PM    INR 0.9 05/11/2015 03:30 PM    aPTT 57.3 (H) 01/22/2018 08:26 AM    aPTT 42.9 (H) 01/21/2018 08:44 PM      A1c No results found for: HBA1C, HGBE8, KEL0HFLY   Lipid Panel No results found for: CHOL, CHOLPOCT, CHOLX, CHLST, CHOLV, 696532, HDL, LDL, LDLC, DLDLP, 778966, VLDLC, VLDL, TGLX, TRIGL, TRIGP, TGLPOCT, CHHD, [facility]   Thyroid Panel Lab Results   Component Value Date/Time    TSH 3.630 11/16/2018 02:32 AM    TSH 1.550 01/21/2018 06:20 PM        Most Recent UA Lab Results   Component Value Date/Time    Color YELLOW 11/22/2018 09:44 AM    Appearance CLEAR 11/22/2018 09:44 AM    Specific gravity 1.023 11/22/2018 09:44 AM    pH (UA) 6.0 11/22/2018 09:44 AM    Protein TRACE (A) 11/22/2018 09:44 AM    Glucose 100 11/22/2018 09:44 AM    Ketone NEGATIVE  11/22/2018 09:44 AM    Bilirubin NEGATIVE  11/22/2018 09:44 AM    Blood NEGATIVE  11/22/2018 09:44 AM    Urobilinogen 0.2 11/22/2018 09:44 AM    Nitrites NEGATIVE  11/22/2018 09:44 AM    Leukocyte Esterase NEGATIVE  11/22/2018 09:44 AM        No Known Allergies  Immunization History   Administered Date(s) Administered    Influenza Vaccine (Quad) PF 01/24/2018, 11/30/2018    TB Skin Test (PPD) Intradermal 01/07/2018       All Labs from Last 24 Hrs:  Recent Results (from the past 24 hour(s)) GLUCOSE, POC    Collection Time: 12/11/18 10:53 AM   Result Value Ref Range    Glucose (POC) 88 65 - 100 mg/dL   GLUCOSE, POC    Collection Time: 12/11/18  3:53 PM   Result Value Ref Range    Glucose (POC) 84 65 - 100 mg/dL   GLUCOSE, POC    Collection Time: 12/11/18  7:57 PM   Result Value Ref Range    Glucose (POC) 192 (H) 65 - 100 mg/dL   GLUCOSE, POC    Collection Time: 12/12/18  5:24 AM   Result Value Ref Range    Glucose (POC) 94 65 - 100 mg/dL       Discharge Exam:  Patient Vitals for the past 24 hrs:   Temp Pulse Resp BP SpO2   12/12/18 0952  81 18  91 %   12/12/18 0848     90 %   12/12/18 0725 97.5 °F (36.4 °C) 71 18 115/67 90 %   12/12/18 0350     90 %   12/12/18 0308 97.8 °F (36.6 °C) 70 18 119/53 94 %   12/12/18 0034     91 %   12/11/18 2308 98.4 °F (36.9 °C) 73 18 102/48 91 %   12/11/18 2230     90 %   12/11/18 2148     92 %   12/11/18 2001     90 %   12/11/18 1915 98.2 °F (36.8 °C) 70 18 124/66 90 %   12/11/18 1602     94 %   12/11/18 1525 98.8 °F (37.1 °C) 67 18 104/52 96 %   12/11/18 1146     91 %   12/11/18 1109 97.6 °F (36.4 °C) 69 18 125/62 96 %     Oxygen Therapy  O2 Sat (%): 91 % (12/12/18 0952)  Pulse via Oximetry: 70 beats per minute (12/12/18 0848)  O2 Device: Nasal cannula (12/12/18 0952)  O2 Flow Rate (L/min): 5 l/min (12/12/18 0952)  FIO2 (%): 40 % (12/11/18 0753)    Intake/Output Summary (Last 24 hours) at 12/12/2018 1009  Last data filed at 12/12/2018 0824  Gross per 24 hour   Intake 1080 ml   Output 4500 ml   Net -3420 ml       General:    Well nourished. Alert. No distress. Eyes:   Normal sclera. Extraocular movements intact. ENT:  Normocephalic, atraumatic. Moist mucous membranes  CV:   Regular rate and rhythm. No murmur, rub, or gallop. Lungs:  Clear to auscultation bilaterally. No wheezing, rhonchi, or rales. Abdomen: Soft, nontender, nondistended. Bowel sounds normal.   Extremities: Warm and dry. No cyanosis or edema.   Neurologic: CN II-XII grossly intact. Sensation intact. Skin:     No rashes or jaundice. Psych:  Normal mood and affect. Discharge Info:   Current Discharge Medication List      CONTINUE these medications which have NOT CHANGED    Details   amLODIPine (NORVASC) 10 mg tablet Take 1 Tab by mouth daily. Qty: 30 Tab, Refills: 0      furosemide (LASIX) 40 mg tablet Take 1 Tab by mouth daily. Qty: 30 Tab, Refills: 0      hydrALAZINE (APRESOLINE) 50 mg tablet Take 1 Tab by mouth three (3) times daily. Qty: 90 Tab, Refills: 0      levalbuterol (XOPENEX) 1.25 mg/3 mL nebu 3 mL by Nebulization route every four (4) hours as needed. Qty: 60 Nebule, Refills: 0      nicotine (NICODERM CQ) 21 mg/24 hr 1 Patch by TransDERmal route daily for 30 days. Qty: 30 Patch, Refills: 0      amiodarone (CORDARONE) 200 mg tablet Take 1 Tab by mouth every twelve (12) hours. Qty: 60 Tab, Refills: 0      carvedilol (COREG) 25 mg tablet Take 1 Tab by mouth two (2) times daily (with meals). Qty: 30 Tab, Refills: 0      rivaroxaban (XARELTO) 20 mg tab tablet Take 1 Tab by mouth daily (with dinner). Qty: 30 Tab, Refills: 0      sertraline (ZOLOFT) 50 mg tablet Take 1 Tab by mouth daily. Qty: 30 Tab, Refills: 0      tiotropium (SPIRIVA) 18 mcg inhalation capsule Take 1 Cap by inhalation daily. Qty: 30 Cap, Refills: 0      HYDROcodone-acetaminophen (NORCO) 7.5-325 mg per tablet Take 1 Tab by mouth every four (4) hours as needed. Max Daily Amount: 6 Tabs. Qty: 18 Tab, Refills: 0    Associated Diagnoses: Pain of both hip joints      albuterol (PROVENTIL HFA, VENTOLIN HFA, PROAIR HFA) 90 mcg/actuation inhaler Take 2 Puffs by inhalation every four (4) hours as needed for Wheezing. Qty: 1 Inhaler, Refills: 0      albuterol-ipratropium (DUO-NEB) 2.5 mg-0.5 mg/3 ml nebu 3 mL by Nebulization route every four (4) hours as needed.   Qty: 30 Nebule, Refills: 0               Disposition: home  Activity: as tolerated  Diet: DIET DIABETIC CONSISTENT CARB    No orders of the defined types were placed in this encounter. Follow-up Information     Follow up With Specialties Details Why [address]  In 1 week  [address] [facility]  [phone]          Time spent in patient discharge planning and coordination 1 hour and 12 minutes.     Signed:  Morris Koyanagi, MD

## 2018-12-12 NOTE — PROGRESS NOTES
Bedside report from Geronimo Lao.[email or web address]. Patient resting in bed. Respirations even and unlabored on 3L nc. No acute distress noted. Call light within reach. Will continue to monitor.

## 2018-12-12 NOTE — PROGRESS NOTES
Discharge instructions and prescriptions provided and explained to the pt. Med side effect sheet reviewed. Opportunity for questions provided. Waiting on oxygen. Instructed to call once ready to leave.

## 2018-12-12 NOTE — PROGRESS NOTES
Met with patient and his wife regarding pending discharge. Explained that I had spoken with Ihsan Cabrera, patient's home O2 provider, and they have agreed to exchange patient's empty tank(s) if someone would bring it in. Family states that they are borrowing patient's brother's portable concentrator to get him home. Patient's wife spoke with Ihsan Cabrera on Friday and was to meet them then for an exchange, but she was unable to get there. She states that after taking patient home, she will go to [facility] and make an exchange of the empty tank for a full one. Per Jose's records, patient has three, not one, tanks. But patient has repeatedly reported having only one. Case Management will remain available to assist as appropriate. Care Management Interventions  PCP Verified by CM: Yes([facility], but has missed his last three appointments.)  Mode of Transport at Discharge:  Other (see comment)  Transition of Care Consult (CM Consult): Discharge Planning  Discharge Durable Medical Equipment: No  Physical Therapy Consult: No  Occupational Therapy Consult: No  Speech Therapy Consult: No  Current Support Network: Lives with Spouse, Own Home  Confirm Follow Up Transport: Family  Plan discussed with Pt/Family/Caregiver: Yes  Freedom of Choice Offered: Yes   Resource Information Provided?: (no payor source)  Discharge Location  Discharge Placement: Home

## 2018-12-12 NOTE — PROGRESS NOTES
12/11/18 2140   Oxygen Therapy   O2 Sat (%) 92 %   Pulse via Oximetry 70 beats per minute   O2 Device Nasal cannula;Humidifier   O2 Flow Rate (L/min) 3 l/min  (placed on 3L for JAMES)     Patient set up on over bight Oximetry (machine #[id number]). Placed patient on  Machine while changing NC and humidity patient desaturated to 83% on RM. It took 4L to get patient's saturation above 90%. Patient is now on 3L nc for JAMES. Alarms limits set. Patient is stable and no complications at this time.

## 2018-12-12 NOTE — PROGRESS NOTES
12/11/18 2230   Oxygen Therapy   O2 Sat (%) 90 %   Pulse via Oximetry 75 beats per minute   O2 Device Nasal cannula   O2 Flow Rate (L/min) 5 l/min  (increased due to desaturation while sleeping)     Over night monitor alarming because of patient's desaturation to 86% while sleeping. Titrated O2 from 3L to 5L to maintain patient saturation of 90%.

## 2018-12-12 NOTE — DISCHARGE INSTRUCTIONS
Chronic Obstructive Pulmonary Disease (COPD): Care Instructions  Your Care Instructions    Chronic obstructive pulmonary disease (COPD) is a general term for a group of lung diseases, including emphysema and chronic bronchitis. People with COPD have decreased airflow in and out of the lungs, which makes it hard to breathe. The airways also can get clogged with thick mucus. Cigarette smoking is a major cause of COPD. Although there is no cure for COPD, you can slow its progress. Following your treatment plan and taking care of yourself can help you feel better and live longer. Follow-up care is a key part of your treatment and safety. Be sure to make and go to all appointments, and call your doctor if you are having problems. It's also a good idea to know your test results and keep a list of the medicines you take. How can you care for yourself at home?   Staying healthy    · Do not smoke. This is the most important step you can take to prevent more damage to your lungs. If you need help quitting, talk to your doctor about stop-smoking programs and medicines. These can increase your chances of quitting for good.     · Avoid colds and flu. Get a pneumococcal vaccine shot. If you have had one before, ask your doctor whether you need a second dose. Get the flu vaccine every fall. If you must be around people with colds or the flu, wash your hands often.     · Avoid secondhand smoke, air pollution, and high altitudes. Also avoid cold, dry air and hot, humid air. Stay at home with your windows closed when air pollution is bad.    Medicines and oxygen therapy    · Take your medicines exactly as prescribed. Call your doctor if you think you are having a problem with your medicine.     · You may be taking medicines such as:  ? Bronchodilators. These help open your airways and make breathing easier. Bronchodilators are either short-acting (work for 6 to 9 hours) or long-acting (work for 24 hours).  You inhale most bronchodilators, so they start to act quickly. Always carry your quick-relief inhaler with you in case you need it while you are away from home. ? Corticosteroids (prednisone, budesonide). These reduce airway inflammation. They come in pill or inhaled form. You must take these medicines every day for them to work well.     · A spacer may help you get more inhaled medicine to your lungs. Ask your doctor or pharmacist if a spacer is right for you. If it is, ask how to use it properly.     · Do not take any vitamins, over-the-counter medicine, or herbal products without talking to your doctor first.     · If your doctor prescribed antibiotics, take them as directed. Do not stop taking them just because you feel better. You need to take the full course of antibiotics.     · Oxygen therapy boosts the amount of oxygen in your blood and helps you breathe easier. Use the flow rate your doctor has recommended, and do not change it without talking to your doctor first.   Activity    · Get regular exercise. Walking is an easy way to get exercise. Start out slowly, and walk a little more each day.     · Pay attention to your breathing. You are exercising too hard if you cannot talk while you are exercising.     · Take short rest breaks when doing household chores and other activities.     · Learn breathing methods--such as breathing through pursed lips--to help you become less short of breath.     · If your doctor has not set you up with a pulmonary rehabilitation program, talk to him or her about whether rehab is right for you. Rehab includes exercise programs, education about your disease and how to manage it, help with diet and other changes, and emotional support. Diet    · Eat regular, healthy meals. Use bronchodilators about 1 hour before you eat to make it easier to eat. Eat several small meals instead of three large ones.  Drink beverages at the end of the meal. Avoid foods that are hard to chew.     · Eat foods that contain protein so that you do not lose muscle mass.     · Talk with your doctor if you gain too much weight or if you lose weight without trying.    Mental health    · Talk to your family, friends, or a therapist about your feelings. It is normal to feel frightened, angry, hopeless, helpless, and even guilty. Talking openly about bad feelings can help you cope. If these feelings last, talk to your doctor. When should you call for help? Call 911 anytime you think you may need emergency care. For example, call if:    · You have severe trouble breathing.    Call your doctor now or seek immediate medical care if:    · You have new or worse trouble breathing.     · You cough up blood.     · You have a fever.    Watch closely for changes in your health, and be sure to contact your doctor if:    · You cough more deeply or more often, especially if you notice more mucus or a change in the color of your mucus.     · You have new or worse swelling in your legs or belly.     · You are not getting better as expected. Where can you learn more? Go to http://regina-jacy.info/. Santos Dick in the search box to learn more about \"Chronic Obstructive Pulmonary Disease (COPD): Care Instructions. \"  Current as of: December 6, 2017  Content Version: 11.8  © 8628-6373 [employer]. Care instructions adapted under license by [employer] (which disclaims liability or warranty for this information). If you have questions about a medical condition or this instruction, always ask your healthcare professional. [place] [address] any warranty or liability for your use of this information. Acute Kidney Injury: Care Instructions  Your Care Instructions    Acute kidney injury (MARGARITA) is a sudden decrease in kidney function. This can happen over a period of hours, days or, in some cases, weeks.  MARGARITA used to be called acute renal failure, but kidney failure doesn't always happen with MARGARITA. Common causes of MARGARITA are dehydration, blood loss, and medicines. When MARGARITA happens, the kidneys have trouble removing waste and excess fluids from the body. The waste and fluids build up and become harmful. Kidney function may return to normal if the cause of MARGARITA is treated quickly. Your chance of a full recovery depends on what caused the problem, how quickly the cause was treated, and what other medical problems you have. A machine may be used to help your kidneys remove waste and fluids for a short period of time. This is called dialysis. Follow-up care is a key part of your treatment and safety. Be sure to make and go to all appointments, and call your doctor if you are having problems. It's also a good idea to know your test results and keep a list of the medicines you take. How can you care for yourself at home? · Talk to your doctor about how much fluid you should drink. · Eat a balanced diet. Talk to your doctor or a dietitian about what type of diet may be best for you. You may need to limit sodium, potassium, and phosphorus. · If you need dialysis, follow the instructions and schedule for dialysis that your doctor gives you. · Do not smoke. Smoking can make your condition worse. If you need help quitting, talk to your doctor about stop-smoking programs and medicines. These can increase your chances of quitting for good. · Do not drink alcohol. · Review all of your medicines with your doctor. Do not take any medicines, including nonsteroidal anti-inflammatory drugs (NSAIDs) such as ibuprofen (Advil, Motrin) or naproxen (Aleve), unless your doctor says it is safe for you to do so. · Make sure that anyone treating you for any health problem knows that you have had MARGARITA. When should you call for help? Call 911 anytime you think you may need emergency care.  For example, call if:    · You passed out (lost consciousness).    Call your doctor now or seek immediate medical care if:    · You have new or worse nausea and vomiting.     · You have much less urine than normal, or you have no urine.     · You are feeling confused or cannot think clearly.     · You have new or more blood in your urine.     · You have new swelling.     · You are dizzy or lightheaded, or feel like you may faint.    Watch closely for changes in your health, and be sure to contact your doctor if:    · You do not get better as expected. Where can you learn more? Go to http://regina-jacy.info/. Enter J729 in the search box to learn more about \"Acute Kidney Injury: Care Instructions. \"  Current as of: March 15, 2018  Content Version: 11.8  © 9770-5714 [employer]. Care instructions adapted under license by [employer] (which disclaims liability or warranty for this information). If you have questions about a medical condition or this instruction, always ask your healthcare professional. [place] [address] any warranty or liability for your use of this information. Learning About Drug Misuse  What is drug misuse? Drug misuse means using drugs in a way that harms you or causes you to harm others. Your doctor may call it substance use disorder or drug abuse. It's possible to misuse almost any type of drug, including illegal drugs, prescription drugs, and over-the-counter drugs. An overdose happens when a person takes more than the normal or recommended amount of a drug. An overdose can result in harmful symptoms or death. Could you have a problem? If there's a chance you may be misusing drugs, it's important to find out. So ask yourself a few questions. · Do you spend a lot of time thinking about your medicine or other drug--getting it and using it? Has that taken the place of other things you used to enjoy? · Have you had problems with your family or friends, or at work, because of your use? · Do you use drugs even when you've told yourself you won't?   Do you think you might have a problem? If you do, then you've just taken an important first step. Many people have overcome this problem. And most of them started by reaching out to others, like caring friends or family, their doctor, or a support group. How is drug misuse treated? If you think you may have a problem with drugs, talk to your doctor. You and your doctor can decide whether you have a problem and what type of treatment might help you. You may need to stay in a hospital at first, so that you can be treated for withdrawal symptoms. One of the goals of treatment is helping you get used to life without the drug. Counseling can help you prepare for people or situations that might tempt you to start using again. You can practice these skills through one-on-one counseling, family therapy, or group therapy. Therapy may be part of inpatient treatment, where you stay in a treatment center. Or it may be part of outpatient treatment, where you can fit your therapy around your job or other responsibilities. Another goal of treatment is getting ongoing support for your drug-free life. Many people find support by going to meetings like Narcotics Anonymous or SMART Recovery. This type of support can help you feel less alone and more motivated to stay drug-free. You might talk to your doctor or do an online search for local treatment programs. Or you might tell a friend or loved one that you need help. Follow-up care is a key part of your treatment and safety. Be sure to make and go to all appointments, and call your doctor if you are having problems. It's also a good idea to know your test results and keep a list of the medicines you take. Where can you learn more? Go to http://regina-jacy.info/. Enter [phone] in the search box to learn more about \"Learning About Drug Misuse. \"  Current as of: May 8, 2018  Content Version: 11.8  © 0566-0261 [employer].  Care instructions adapted under license by [employer] (which disclaims liability or warranty for this information). If you have questions about a medical condition or this instruction, always ask your healthcare professional. [address] any warranty or liability for your use of this information. DISCHARGE SUMMARY from Nurse    PATIENT INSTRUCTIONS:    After general anesthesia or intravenous sedation, for 24 hours or while taking prescription Narcotics:  · Limit your activities  · Do not drive and operate hazardous machinery  · Do not make important personal or business decisions  · Do  not drink alcoholic beverages  · If you have not urinated within 8 hours after discharge, please contact your surgeon on call. Report the following to your surgeon:  · Excessive pain, swelling, redness or odor of or around the surgical area  · Temperature over 100.5  · Nausea and vomiting lasting longer than 4 hours or if unable to take medications  · Any signs of decreased circulation or nerve impairment to extremity: change in color, persistent  numbness, tingling, coldness or increase pain  · Any questions    What to do at Home:    *  Please give a list of your current medications to your Primary Care Provider. *  Please update this list whenever your medications are discontinued, doses are      changed, or new medications (including over-the-counter products) are added. *  Please carry medication information at all times in case of emergency situations. These are general instructions for a healthy lifestyle:    No smoking/ No tobacco products/ Avoid exposure to second hand smoke  Surgeon General's Warning:  Quitting smoking now greatly reduces serious risk to your health.     Obesity, smoking, and sedentary lifestyle greatly increases your risk for illness    A healthy diet, regular physical exercise & weight monitoring are important for maintaining a healthy lifestyle    You may be retaining fluid if you have a history of heart failure or if you experience any of the following symptoms:  Weight gain of 3 pounds or more overnight or 5 pounds in a week, increased swelling in our hands or feet or shortness of breath while lying flat in bed. Please call your doctor as soon as you notice any of these symptoms; do not wait until your next office visit. Recognize signs and symptoms of STROKE:    F-face looks uneven    A-arms unable to move or move unevenly    S-speech slurred or non-existent    T-time-call 911 as soon as signs and symptoms begin-DO NOT go       Back to bed or wait to see if you get better-TIME IS BRAIN. Warning Signs of HEART ATTACK     Call 911 if you have these symptoms:   Chest discomfort. Most heart attacks involve discomfort in the center of the chest that lasts more than a few minutes, or that goes away and comes back. It can feel like uncomfortable pressure, squeezing, fullness, or pain.  Discomfort in other areas of the upper body. Symptoms can include pain or discomfort in one or both arms, the back, neck, jaw, or stomach.  Shortness of breath with or without chest discomfort.  Other signs may include breaking out in a cold sweat, nausea, or lightheadedness. Don't wait more than five minutes to call 911 - MINUTES MATTER! Fast action can save your life. Calling 911 is almost always the fastest way to get lifesaving treatment. Emergency Medical Services staff can begin treatment when they arrive -- up to an hour sooner than if someone gets to the hospital by car. The discharge information has been reviewed with the patient. The spouse verbalized understanding. Discharge medications reviewed with the patient and appropriate educational materials and side effects teaching were provided.   ___________________________________________________________________________________________________________________________________

## 2020-01-01 ENCOUNTER — HOSPITAL ENCOUNTER (OUTPATIENT)
Dept: INFUSION THERAPY | Age: 55
Discharge: HOME | End: 2020-10-16
Payer: MEDICAID

## 2020-01-01 ENCOUNTER — HOSPITAL ENCOUNTER (OUTPATIENT)
Dept: INFUSION THERAPY | Age: 55
Discharge: HOME | End: 2020-11-11
Payer: MEDICAID

## 2020-01-01 ENCOUNTER — HOSPITAL ENCOUNTER (OUTPATIENT)
Dept: LAB | Age: 55
Discharge: HOME | End: 2020-10-23
Payer: MEDICAID

## 2020-01-01 ENCOUNTER — PATIENT OUTREACH (OUTPATIENT)
Dept: CASE MANAGEMENT | Age: 55
End: 2020-01-01

## 2020-01-01 ENCOUNTER — HOSPITAL ENCOUNTER (OUTPATIENT)
Dept: INFUSION THERAPY | Age: 55
Discharge: HOME | End: 2020-08-12
Payer: MEDICAID

## 2020-01-01 ENCOUNTER — HOSPITAL ENCOUNTER (OUTPATIENT)
Dept: INFUSION THERAPY | Age: 55
Discharge: HOME | End: 2020-05-27
Payer: MEDICAID

## 2020-01-01 ENCOUNTER — HOSPITAL ENCOUNTER (OUTPATIENT)
Dept: INFUSION THERAPY | Age: 55
End: 2020-01-01
Payer: MEDICAID

## 2020-01-01 ENCOUNTER — APPOINTMENT (OUTPATIENT)
Dept: INFUSION THERAPY | Age: 55
End: 2020-01-01

## 2020-01-01 ENCOUNTER — HOSPITAL ENCOUNTER (OUTPATIENT)
Dept: INFUSION THERAPY | Age: 55
Discharge: HOME | End: 2020-06-09
Payer: MEDICAID

## 2020-01-01 ENCOUNTER — HOSPITAL ENCOUNTER (OUTPATIENT)
Dept: INFUSION THERAPY | Age: 55
Discharge: HOME | End: 2020-05-15
Payer: MEDICAID

## 2020-01-01 ENCOUNTER — APPOINTMENT (OUTPATIENT)
Dept: INFUSION THERAPY | Age: 55
End: 2020-01-01
Payer: MEDICAID

## 2020-01-01 ENCOUNTER — HOSPITAL ENCOUNTER (OUTPATIENT)
Dept: INFUSION THERAPY | Age: 55
End: 2020-01-01

## 2020-01-01 ENCOUNTER — HOSPITAL ENCOUNTER (OUTPATIENT)
Dept: INTERVENTIONAL RADIOLOGY/VASCULAR | Age: 55
Discharge: HOME | End: 2020-04-23
Attending: PHYSICIAN ASSISTANT
Payer: MEDICAID

## 2020-01-01 ENCOUNTER — HOSPITAL ENCOUNTER (OUTPATIENT)
Dept: INFUSION THERAPY | Age: 55
Discharge: HOME | End: 2020-10-02
Payer: MEDICAID

## 2020-01-01 ENCOUNTER — HOSPITAL ENCOUNTER (OUTPATIENT)
Dept: INFUSION THERAPY | Age: 55
Discharge: HOME | End: 2020-04-03
Payer: MEDICAID

## 2020-01-01 ENCOUNTER — HOSPITAL ENCOUNTER (OUTPATIENT)
Dept: INFUSION THERAPY | Age: 55
Discharge: HOME | End: 2020-04-10
Payer: MEDICAID

## 2020-01-01 ENCOUNTER — HOSPITAL ENCOUNTER (OUTPATIENT)
Dept: INFUSION THERAPY | Age: 55
Discharge: HOME | End: 2020-06-25
Payer: MEDICAID

## 2020-01-01 ENCOUNTER — HOSPITAL ENCOUNTER (OUTPATIENT)
Dept: INFUSION THERAPY | Age: 55
Discharge: HOME | End: 2020-12-09
Payer: MEDICAID

## 2020-01-01 ENCOUNTER — HOSPITAL ENCOUNTER (OUTPATIENT)
Dept: INFUSION THERAPY | Age: 55
Discharge: HOME | End: 2020-10-19
Payer: MEDICAID

## 2020-01-01 ENCOUNTER — HOSPITAL ENCOUNTER (OUTPATIENT)
Dept: INFUSION THERAPY | Age: 55
Discharge: HOME | End: 2020-08-24
Payer: MEDICAID

## 2020-01-01 ENCOUNTER — HOSPITAL ENCOUNTER (OUTPATIENT)
Dept: INFUSION THERAPY | Age: 55
Discharge: HOME | End: 2020-04-24
Payer: MEDICAID

## 2020-01-01 ENCOUNTER — HOSPITAL ENCOUNTER (OUTPATIENT)
Dept: LAB | Age: 55
Discharge: HOME | End: 2020-05-26
Payer: MEDICAID

## 2020-01-01 ENCOUNTER — HOSPITAL ENCOUNTER (OUTPATIENT)
Dept: INTERVENTIONAL RADIOLOGY/VASCULAR | Age: 55
Discharge: HOME | End: 2020-12-08
Attending: RADIOLOGY

## 2020-01-01 ENCOUNTER — HOSPITAL ENCOUNTER (OUTPATIENT)
Dept: LAB | Age: 55
Discharge: HOME | End: 2020-05-12
Payer: MEDICAID

## 2020-01-01 ENCOUNTER — HOSPITAL ENCOUNTER (OUTPATIENT)
Dept: INFUSION THERAPY | Age: 55
Discharge: HOME | End: 2020-10-30
Payer: MEDICAID

## 2020-01-01 ENCOUNTER — HOSPITAL ENCOUNTER (OUTPATIENT)
Dept: INFUSION THERAPY | Age: 55
Discharge: HOME | End: 2020-10-28
Payer: MEDICAID

## 2020-01-01 ENCOUNTER — HOSPITAL ENCOUNTER (OUTPATIENT)
Dept: INFUSION THERAPY | Age: 55
Discharge: HOME | End: 2020-04-26
Payer: MEDICAID

## 2020-01-01 ENCOUNTER — HOSPITAL ENCOUNTER (OUTPATIENT)
Dept: LAB | Age: 55
Discharge: HOME | End: 2020-11-11
Payer: MEDICAID

## 2020-01-01 ENCOUNTER — HOSPITAL ENCOUNTER (OUTPATIENT)
Dept: INFUSION THERAPY | Age: 55
Discharge: HOME | End: 2020-12-17
Payer: MEDICAID

## 2020-01-01 ENCOUNTER — HOSPITAL ENCOUNTER (OUTPATIENT)
Dept: INFUSION THERAPY | Age: 55
Discharge: HOME | End: 2020-07-13
Payer: MEDICAID

## 2020-01-01 ENCOUNTER — HOSPITAL ENCOUNTER (OUTPATIENT)
Dept: CT IMAGING | Age: 55
Discharge: HOME | End: 2020-11-27
Attending: INTERNAL MEDICINE
Payer: MEDICAID

## 2020-01-01 ENCOUNTER — HOSPITAL ENCOUNTER (OUTPATIENT)
Dept: LAB | Age: 55
Discharge: HOME | End: 2020-09-04

## 2020-01-01 ENCOUNTER — HOSPITAL ENCOUNTER (OUTPATIENT)
Dept: INFUSION THERAPY | Age: 55
Discharge: HOME | End: 2020-10-17
Payer: MEDICAID

## 2020-01-01 ENCOUNTER — HOSPITAL ENCOUNTER (OUTPATIENT)
Dept: INFUSION THERAPY | Age: 55
Discharge: HOME | End: 2020-08-26
Payer: MEDICAID

## 2020-01-01 ENCOUNTER — HOSPITAL ENCOUNTER (OUTPATIENT)
Dept: LAB | Age: 55
Discharge: HOME | End: 2020-04-03
Payer: MEDICAID

## 2020-01-01 ENCOUNTER — HOSPITAL ENCOUNTER (OUTPATIENT)
Dept: INFUSION THERAPY | Age: 55
Discharge: HOME | End: 2020-11-12
Payer: MEDICAID

## 2020-01-01 ENCOUNTER — HOSPITAL ENCOUNTER (OUTPATIENT)
Dept: INFUSION THERAPY | Age: 55
Discharge: HOME | End: 2020-10-28

## 2020-01-01 ENCOUNTER — HOSPITAL ENCOUNTER (OUTPATIENT)
Dept: INFUSION THERAPY | Age: 55
Discharge: HOME | End: 2020-07-15
Payer: MEDICAID

## 2020-01-01 ENCOUNTER — HOSPITAL ENCOUNTER (OUTPATIENT)
Dept: INFUSION THERAPY | Age: 55
Discharge: HOME | End: 2020-08-10
Payer: MEDICAID

## 2020-01-01 ENCOUNTER — HOSPITAL ENCOUNTER (OUTPATIENT)
Dept: INFUSION THERAPY | Age: 55
Discharge: HOME | End: 2020-12-23
Payer: MEDICAID

## 2020-01-01 ENCOUNTER — HOSPITAL ENCOUNTER (OUTPATIENT)
Dept: INFUSION THERAPY | Age: 55
Discharge: HOME | End: 2020-06-11
Payer: MEDICAID

## 2020-01-01 ENCOUNTER — HOSPITAL ENCOUNTER (OUTPATIENT)
Dept: INFUSION THERAPY | Age: 55
Discharge: HOME | End: 2020-05-29
Payer: MEDICAID

## 2020-01-01 ENCOUNTER — HOSPITAL ENCOUNTER (OUTPATIENT)
Dept: CT IMAGING | Age: 55
Discharge: HOME | End: 2020-06-16
Attending: INTERNAL MEDICINE
Payer: MEDICAID

## 2020-01-01 ENCOUNTER — HOSPITAL ENCOUNTER (OUTPATIENT)
Dept: INTERVENTIONAL RADIOLOGY/VASCULAR | Age: 55
Discharge: HOME | End: 2020-12-18
Attending: RADIOLOGY
Payer: MEDICAID

## 2020-01-01 ENCOUNTER — HOSPITAL ENCOUNTER (OUTPATIENT)
Dept: INFUSION THERAPY | Age: 55
Discharge: HOME | End: 2020-05-13
Payer: MEDICAID

## 2020-01-01 ENCOUNTER — HOSPITAL ENCOUNTER (OUTPATIENT)
Dept: INFUSION THERAPY | Age: 55
Discharge: HOME | End: 2020-06-23
Payer: MEDICAID

## 2020-01-01 ENCOUNTER — HOSPITAL ENCOUNTER (OUTPATIENT)
Dept: INFUSION THERAPY | Age: 55
Discharge: HOME | End: 2020-11-13
Payer: MEDICAID

## 2020-01-01 ENCOUNTER — HOSPITAL ENCOUNTER (OUTPATIENT)
Dept: INFUSION THERAPY | Age: 55
Discharge: HOME | End: 2020-12-16
Payer: MEDICAID

## 2020-01-01 ENCOUNTER — HOSPITAL ENCOUNTER (OUTPATIENT)
Dept: INFUSION THERAPY | Age: 55
Discharge: HOME | End: 2020-10-04
Payer: MEDICAID

## 2020-01-01 ENCOUNTER — HOSPITAL ENCOUNTER (OUTPATIENT)
Dept: LAB | Age: 55
Discharge: HOME | End: 2020-12-01
Payer: MEDICAID

## 2020-01-01 ENCOUNTER — HOSPITAL ENCOUNTER (OUTPATIENT)
Dept: INFUSION THERAPY | Age: 55
Discharge: HOME | End: 2020-04-12
Payer: MEDICAID

## 2020-01-01 ENCOUNTER — HOSPITAL ENCOUNTER (OUTPATIENT)
Dept: LAB | Age: 55
Discharge: HOME | End: 2020-10-01
Payer: MEDICAID

## 2020-01-01 ENCOUNTER — HOSPITAL ENCOUNTER (OUTPATIENT)
Dept: INFUSION THERAPY | Age: 55
Discharge: HOME | End: 2020-12-10
Payer: MEDICAID

## 2020-01-01 VITALS
WEIGHT: 138.8 LBS | TEMPERATURE: 97.8 F | HEART RATE: 98 BPM | OXYGEN SATURATION: 99 % | DIASTOLIC BLOOD PRESSURE: 86 MMHG | OXYGEN SATURATION: 99 % | HEIGHT: 72 IN | RESPIRATION RATE: 18 BRPM | RESPIRATION RATE: 18 BRPM | BODY MASS INDEX: 18.8 KG/M2 | DIASTOLIC BLOOD PRESSURE: 96 MMHG | TEMPERATURE: 98.2 F | SYSTOLIC BLOOD PRESSURE: 143 MMHG | SYSTOLIC BLOOD PRESSURE: 141 MMHG | HEART RATE: 76 BPM

## 2020-01-01 VITALS
HEART RATE: 91 BPM | HEIGHT: 72 IN | OXYGEN SATURATION: 100 % | DIASTOLIC BLOOD PRESSURE: 98 MMHG | SYSTOLIC BLOOD PRESSURE: 165 MMHG | RESPIRATION RATE: 16 BRPM | BODY MASS INDEX: 23.84 KG/M2 | TEMPERATURE: 98.1 F | WEIGHT: 176 LBS

## 2020-01-01 VITALS
HEART RATE: 104 BPM | DIASTOLIC BLOOD PRESSURE: 76 MMHG | RESPIRATION RATE: 18 BRPM | SYSTOLIC BLOOD PRESSURE: 140 MMHG | TEMPERATURE: 97.6 F | OXYGEN SATURATION: 98 %

## 2020-01-01 VITALS
DIASTOLIC BLOOD PRESSURE: 90 MMHG | OXYGEN SATURATION: 96 % | SYSTOLIC BLOOD PRESSURE: 154 MMHG | WEIGHT: 168.4 LBS | RESPIRATION RATE: 22 BRPM | TEMPERATURE: 98.4 F | HEIGHT: 72 IN | BODY MASS INDEX: 22.81 KG/M2

## 2020-01-01 VITALS
OXYGEN SATURATION: 99 % | TEMPERATURE: 97.1 F | WEIGHT: 145 LBS | BODY MASS INDEX: 19.67 KG/M2 | HEART RATE: 102 BPM | BODY MASS INDEX: 20.48 KG/M2 | RESPIRATION RATE: 18 BRPM | DIASTOLIC BLOOD PRESSURE: 84 MMHG | SYSTOLIC BLOOD PRESSURE: 131 MMHG | WEIGHT: 151 LBS

## 2020-01-01 VITALS
DIASTOLIC BLOOD PRESSURE: 75 MMHG | SYSTOLIC BLOOD PRESSURE: 108 MMHG | RESPIRATION RATE: 18 BRPM | OXYGEN SATURATION: 97 % | HEART RATE: 102 BPM | TEMPERATURE: 98.4 F

## 2020-01-01 VITALS
OXYGEN SATURATION: 100 % | SYSTOLIC BLOOD PRESSURE: 135 MMHG | HEART RATE: 87 BPM | DIASTOLIC BLOOD PRESSURE: 74 MMHG | TEMPERATURE: 97.5 F | RESPIRATION RATE: 18 BRPM

## 2020-01-01 VITALS
OXYGEN SATURATION: 100 % | TEMPERATURE: 97.5 F | WEIGHT: 137 LBS | HEART RATE: 88 BPM | SYSTOLIC BLOOD PRESSURE: 141 MMHG | DIASTOLIC BLOOD PRESSURE: 83 MMHG | RESPIRATION RATE: 19 BRPM | BODY MASS INDEX: 18.58 KG/M2

## 2020-01-01 VITALS
WEIGHT: 142.2 LBS | BODY MASS INDEX: 19.29 KG/M2 | SYSTOLIC BLOOD PRESSURE: 144 MMHG | DIASTOLIC BLOOD PRESSURE: 88 MMHG | RESPIRATION RATE: 18 BRPM | TEMPERATURE: 97.4 F | OXYGEN SATURATION: 97 % | HEART RATE: 91 BPM

## 2020-01-01 VITALS
DIASTOLIC BLOOD PRESSURE: 107 MMHG | HEART RATE: 76 BPM | WEIGHT: 143 LBS | RESPIRATION RATE: 18 BRPM | SYSTOLIC BLOOD PRESSURE: 195 MMHG | OXYGEN SATURATION: 100 % | TEMPERATURE: 98.1 F | BODY MASS INDEX: 19.37 KG/M2 | HEIGHT: 72 IN

## 2020-01-01 VITALS
OXYGEN SATURATION: 98 % | TEMPERATURE: 98.4 F | HEART RATE: 89 BPM | DIASTOLIC BLOOD PRESSURE: 63 MMHG | SYSTOLIC BLOOD PRESSURE: 120 MMHG | RESPIRATION RATE: 18 BRPM

## 2020-01-01 VITALS
SYSTOLIC BLOOD PRESSURE: 137 MMHG | TEMPERATURE: 97.9 F | HEART RATE: 93 BPM | DIASTOLIC BLOOD PRESSURE: 86 MMHG | RESPIRATION RATE: 18 BRPM | OXYGEN SATURATION: 95 %

## 2020-01-01 VITALS
RESPIRATION RATE: 20 BRPM | TEMPERATURE: 97.7 F | OXYGEN SATURATION: 95 % | HEART RATE: 91 BPM | DIASTOLIC BLOOD PRESSURE: 71 MMHG | SYSTOLIC BLOOD PRESSURE: 108 MMHG

## 2020-01-01 VITALS
HEART RATE: 95 BPM | TEMPERATURE: 98.3 F | SYSTOLIC BLOOD PRESSURE: 141 MMHG | DIASTOLIC BLOOD PRESSURE: 93 MMHG | OXYGEN SATURATION: 99 % | RESPIRATION RATE: 18 BRPM | WEIGHT: 155.8 LBS | BODY MASS INDEX: 21.13 KG/M2

## 2020-01-01 VITALS
SYSTOLIC BLOOD PRESSURE: 133 MMHG | TEMPERATURE: 98.2 F | RESPIRATION RATE: 20 BRPM | DIASTOLIC BLOOD PRESSURE: 74 MMHG | HEART RATE: 105 BPM | OXYGEN SATURATION: 97 %

## 2020-01-01 VITALS
RESPIRATION RATE: 16 BRPM | DIASTOLIC BLOOD PRESSURE: 99 MMHG | TEMPERATURE: 98.5 F | SYSTOLIC BLOOD PRESSURE: 151 MMHG | WEIGHT: 143.8 LBS | BODY MASS INDEX: 19.48 KG/M2 | OXYGEN SATURATION: 98 % | HEIGHT: 72 IN | HEART RATE: 81 BPM

## 2020-01-01 VITALS
SYSTOLIC BLOOD PRESSURE: 154 MMHG | OXYGEN SATURATION: 97 % | BODY MASS INDEX: 23.11 KG/M2 | RESPIRATION RATE: 18 BRPM | TEMPERATURE: 97.4 F | HEART RATE: 74 BPM | DIASTOLIC BLOOD PRESSURE: 91 MMHG | WEIGHT: 170.4 LBS

## 2020-01-01 VITALS
TEMPERATURE: 97 F | SYSTOLIC BLOOD PRESSURE: 105 MMHG | RESPIRATION RATE: 18 BRPM | DIASTOLIC BLOOD PRESSURE: 70 MMHG | OXYGEN SATURATION: 97 % | HEART RATE: 104 BPM

## 2020-01-01 VITALS
DIASTOLIC BLOOD PRESSURE: 90 MMHG | HEART RATE: 81 BPM | WEIGHT: 147 LBS | RESPIRATION RATE: 18 BRPM | OXYGEN SATURATION: 99 % | BODY MASS INDEX: 19.94 KG/M2 | SYSTOLIC BLOOD PRESSURE: 144 MMHG | TEMPERATURE: 98 F

## 2020-01-01 VITALS
SYSTOLIC BLOOD PRESSURE: 118 MMHG | DIASTOLIC BLOOD PRESSURE: 74 MMHG | TEMPERATURE: 98.6 F | RESPIRATION RATE: 18 BRPM | OXYGEN SATURATION: 97 % | HEART RATE: 94 BPM

## 2020-01-01 VITALS
OXYGEN SATURATION: 95 % | HEART RATE: 88 BPM | SYSTOLIC BLOOD PRESSURE: 131 MMHG | RESPIRATION RATE: 18 BRPM | DIASTOLIC BLOOD PRESSURE: 77 MMHG | TEMPERATURE: 98.6 F

## 2020-01-01 VITALS
RESPIRATION RATE: 18 BRPM | OXYGEN SATURATION: 97 % | DIASTOLIC BLOOD PRESSURE: 78 MMHG | TEMPERATURE: 98.1 F | HEART RATE: 88 BPM | SYSTOLIC BLOOD PRESSURE: 159 MMHG

## 2020-01-01 VITALS
TEMPERATURE: 98.5 F | OXYGEN SATURATION: 95 % | RESPIRATION RATE: 18 BRPM | SYSTOLIC BLOOD PRESSURE: 120 MMHG | DIASTOLIC BLOOD PRESSURE: 65 MMHG | HEART RATE: 100 BPM

## 2020-01-01 VITALS — HEIGHT: 72 IN | BODY MASS INDEX: 19.67 KG/M2

## 2020-01-01 VITALS — HEIGHT: 72 IN | BODY MASS INDEX: 20.07 KG/M2

## 2020-01-01 DIAGNOSIS — K22.89 ESOPHAGEAL MASS: Primary | ICD-10-CM

## 2020-01-01 DIAGNOSIS — C15.9 ESOPHAGEAL CARCINOMA (HCC): ICD-10-CM

## 2020-01-01 DIAGNOSIS — E86.0 DEHYDRATION: ICD-10-CM

## 2020-01-01 DIAGNOSIS — C78.7 LIVER METASTASES (HCC): ICD-10-CM

## 2020-01-01 DIAGNOSIS — K22.89 ESOPHAGEAL MASS: ICD-10-CM

## 2020-01-01 DIAGNOSIS — C15.9 ESOPHAGEAL CARCINOMA (HCC): Primary | ICD-10-CM

## 2020-01-01 DIAGNOSIS — C78.7 LIVER METASTASES (HCC): Primary | ICD-10-CM

## 2020-01-01 DIAGNOSIS — E87.6 HYPOKALEMIA: ICD-10-CM

## 2020-01-01 DIAGNOSIS — C15.5 MALIGNANT NEOPLASM OF LOWER THIRD OF ESOPHAGUS (HCC): Primary | ICD-10-CM

## 2020-01-01 DIAGNOSIS — C78.00 MALIGNANT NEOPLASM METASTATIC TO LUNG, UNSPECIFIED LATERALITY (HCC): ICD-10-CM

## 2020-01-01 DIAGNOSIS — C15.5 MALIGNANT NEOPLASM OF LOWER THIRD OF ESOPHAGUS (HCC): ICD-10-CM

## 2020-01-01 DIAGNOSIS — D50.9 IRON DEFICIENCY ANEMIA, UNSPECIFIED IRON DEFICIENCY ANEMIA TYPE: Primary | ICD-10-CM

## 2020-01-01 DIAGNOSIS — Z51.5 ENCOUNTER FOR PALLIATIVE CARE: ICD-10-CM

## 2020-01-01 DIAGNOSIS — C15.9 MALIGNANT NEOPLASM OF ESOPHAGUS, UNSPECIFIED LOCATION (HCC): Primary | ICD-10-CM

## 2020-01-01 DIAGNOSIS — D50.9 IRON DEFICIENCY ANEMIA, UNSPECIFIED IRON DEFICIENCY ANEMIA TYPE: ICD-10-CM

## 2020-01-01 DIAGNOSIS — E87.6 HYPOKALEMIA: Primary | ICD-10-CM

## 2020-01-01 DIAGNOSIS — C78.7 LIVER METASTASIS (HCC): Primary | ICD-10-CM

## 2020-01-01 DIAGNOSIS — C78.7 LIVER METASTASIS (HCC): ICD-10-CM

## 2020-01-01 DIAGNOSIS — G89.3 CANCER RELATED PAIN: ICD-10-CM

## 2020-01-01 DIAGNOSIS — C15.9 MALIGNANT NEOPLASM OF ESOPHAGUS, UNSPECIFIED LOCATION (HCC): ICD-10-CM

## 2020-01-01 LAB
ABO + RH BLD: NORMAL
ALBUMIN SERPL-MCNC: 2.4 G/DL (ref 3.5–5)
ALBUMIN SERPL-MCNC: 2.4 G/DL (ref 3.5–5)
ALBUMIN SERPL-MCNC: 2.5 G/DL (ref 3.5–5)
ALBUMIN SERPL-MCNC: 2.5 G/DL (ref 3.5–5)
ALBUMIN SERPL-MCNC: 2.6 G/DL (ref 3.5–5)
ALBUMIN SERPL-MCNC: 2.7 G/DL (ref 3.5–5)
ALBUMIN SERPL-MCNC: 2.8 G/DL (ref 3.5–5)
ALBUMIN SERPL-MCNC: 2.9 G/DL (ref 3.5–5)
ALBUMIN SERPL-MCNC: 2.9 G/DL (ref 3.5–5)
ALBUMIN SERPL-MCNC: 3 G/DL (ref 3.5–5)
ALBUMIN SERPL-MCNC: 3 G/DL (ref 3.5–5)
ALBUMIN/GLOB SERPL: 0.5 {RATIO} (ref 1.2–3.5)
ALBUMIN/GLOB SERPL: 0.6 {RATIO} (ref 1.2–3.5)
ALBUMIN/GLOB SERPL: 0.7 {RATIO} (ref 1.2–3.5)
ALBUMIN/GLOB SERPL: 0.8 {RATIO} (ref 1.2–3.5)
ALBUMIN/GLOB SERPL: 0.8 {RATIO} (ref 1.2–3.5)
ALP SERPL-CCNC: 103 U/L (ref 50–136)
ALP SERPL-CCNC: 114 U/L (ref 50–136)
ALP SERPL-CCNC: 130 U/L (ref 50–136)
ALP SERPL-CCNC: 142 U/L (ref 50–136)
ALP SERPL-CCNC: 142 U/L (ref 50–136)
ALP SERPL-CCNC: 145 U/L (ref 50–136)
ALP SERPL-CCNC: 154 U/L (ref 50–136)
ALP SERPL-CCNC: 158 U/L (ref 50–136)
ALP SERPL-CCNC: 162 U/L (ref 50–136)
ALP SERPL-CCNC: 169 U/L (ref 50–136)
ALP SERPL-CCNC: 209 U/L (ref 50–136)
ALP SERPL-CCNC: 212 U/L (ref 50–136)
ALP SERPL-CCNC: 86 U/L (ref 50–136)
ALP SERPL-CCNC: 86 U/L (ref 50–136)
ALP SERPL-CCNC: 88 U/L (ref 50–136)
ALP SERPL-CCNC: 91 U/L (ref 50–136)
ALP SERPL-CCNC: 92 U/L (ref 50–136)
ALP SERPL-CCNC: 92 U/L (ref 50–136)
ALP SERPL-CCNC: 98 U/L (ref 50–136)
ALT SERPL-CCNC: 15 U/L (ref 12–65)
ALT SERPL-CCNC: 15 U/L (ref 12–65)
ALT SERPL-CCNC: 16 U/L (ref 12–65)
ALT SERPL-CCNC: 17 U/L (ref 12–65)
ALT SERPL-CCNC: 18 U/L (ref 12–65)
ALT SERPL-CCNC: 19 U/L (ref 12–65)
ALT SERPL-CCNC: 19 U/L (ref 12–65)
ALT SERPL-CCNC: 20 U/L (ref 12–65)
ALT SERPL-CCNC: 20 U/L (ref 12–65)
ALT SERPL-CCNC: 21 U/L (ref 12–65)
ALT SERPL-CCNC: 22 U/L (ref 12–65)
ALT SERPL-CCNC: 23 U/L (ref 12–65)
ALT SERPL-CCNC: 24 U/L (ref 12–65)
ALT SERPL-CCNC: 26 U/L (ref 12–65)
ALT SERPL-CCNC: 32 U/L (ref 12–65)
ALT SERPL-CCNC: 35 U/L (ref 12–65)
ANION GAP SERPL CALC-SCNC: 2 MMOL/L (ref 7–16)
ANION GAP SERPL CALC-SCNC: 2 MMOL/L (ref 7–16)
ANION GAP SERPL CALC-SCNC: 4 MMOL/L (ref 7–16)
ANION GAP SERPL CALC-SCNC: 5 MMOL/L (ref 7–16)
ANION GAP SERPL CALC-SCNC: 6 MMOL/L (ref 7–16)
ANION GAP SERPL CALC-SCNC: 7 MMOL/L (ref 7–16)
AST SERPL-CCNC: 13 U/L (ref 15–37)
AST SERPL-CCNC: 15 U/L (ref 15–37)
AST SERPL-CCNC: 16 U/L (ref 15–37)
AST SERPL-CCNC: 17 U/L (ref 15–37)
AST SERPL-CCNC: 17 U/L (ref 15–37)
AST SERPL-CCNC: 18 U/L (ref 15–37)
AST SERPL-CCNC: 19 U/L (ref 15–37)
AST SERPL-CCNC: 20 U/L (ref 15–37)
AST SERPL-CCNC: 21 U/L (ref 15–37)
AST SERPL-CCNC: 22 U/L (ref 15–37)
AST SERPL-CCNC: 25 U/L (ref 15–37)
AST SERPL-CCNC: 27 U/L (ref 15–37)
AST SERPL-CCNC: 27 U/L (ref 15–37)
AST SERPL-CCNC: 29 U/L (ref 15–37)
AST SERPL-CCNC: 32 U/L (ref 15–37)
AST SERPL-CCNC: 33 U/L (ref 15–37)
AST SERPL-CCNC: 33 U/L (ref 15–37)
BASOPHILS # BLD: 0 K/UL (ref 0–0.2)
BASOPHILS # BLD: 0.1 K/UL (ref 0–0.2)
BASOPHILS NFR BLD: 0 % (ref 0–2)
BASOPHILS NFR BLD: 1 % (ref 0–2)
BILIRUB SERPL-MCNC: 0.2 MG/DL (ref 0.2–1.1)
BILIRUB SERPL-MCNC: 0.3 MG/DL (ref 0.2–1.1)
BILIRUB SERPL-MCNC: 0.4 MG/DL (ref 0.2–1.1)
BILIRUB SERPL-MCNC: 0.5 MG/DL (ref 0.2–1.1)
BLD PROD TYP BPU: NORMAL
BLOOD GROUP ANTIBODIES SERPL: NORMAL
BPU ID: NORMAL
BUN SERPL-MCNC: 13 MG/DL (ref 6–23)
BUN SERPL-MCNC: 14 MG/DL (ref 6–23)
BUN SERPL-MCNC: 14 MG/DL (ref 6–23)
BUN SERPL-MCNC: 15 MG/DL (ref 6–23)
BUN SERPL-MCNC: 16 MG/DL (ref 6–23)
BUN SERPL-MCNC: 16 MG/DL (ref 6–23)
BUN SERPL-MCNC: 17 MG/DL (ref 6–23)
BUN SERPL-MCNC: 17 MG/DL (ref 6–23)
BUN SERPL-MCNC: 18 MG/DL (ref 6–23)
BUN SERPL-MCNC: 19 MG/DL (ref 6–23)
BUN SERPL-MCNC: 22 MG/DL (ref 6–23)
BUN SERPL-MCNC: 24 MG/DL (ref 6–23)
BUN SERPL-MCNC: 25 MG/DL (ref 6–23)
BUN SERPL-MCNC: 25 MG/DL (ref 6–23)
BUN SERPL-MCNC: 28 MG/DL (ref 6–23)
CALCIUM SERPL-MCNC: 8.4 MG/DL (ref 8.3–10.4)
CALCIUM SERPL-MCNC: 8.5 MG/DL (ref 8.3–10.4)
CALCIUM SERPL-MCNC: 8.6 MG/DL (ref 8.3–10.4)
CALCIUM SERPL-MCNC: 8.6 MG/DL (ref 8.3–10.4)
CALCIUM SERPL-MCNC: 8.7 MG/DL (ref 8.3–10.4)
CALCIUM SERPL-MCNC: 8.8 MG/DL (ref 8.3–10.4)
CALCIUM SERPL-MCNC: 8.9 MG/DL (ref 8.3–10.4)
CALCIUM SERPL-MCNC: 9 MG/DL (ref 8.3–10.4)
CALCIUM SERPL-MCNC: 9.1 MG/DL (ref 8.3–10.4)
CALCIUM SERPL-MCNC: 9.2 MG/DL (ref 8.3–10.4)
CALCIUM SERPL-MCNC: 9.2 MG/DL (ref 8.3–10.4)
CALCIUM SERPL-MCNC: 9.3 MG/DL (ref 8.3–10.4)
CALCIUM SERPL-MCNC: 9.4 MG/DL (ref 8.3–10.4)
CALCIUM SERPL-MCNC: 9.5 MG/DL (ref 8.3–10.4)
CALCIUM SERPL-MCNC: 9.5 MG/DL (ref 8.3–10.4)
CANCER AG19-9 SERPL-ACNC: 189.9 U/ML (ref 2–37)
CANCER AG19-9 SERPL-ACNC: 30.5 U/ML (ref 2–37)
CANCER AG19-9 SERPL-ACNC: 34.6 U/ML (ref 2–37)
CANCER AG19-9 SERPL-ACNC: 38.1 U/ML (ref 2–37)
CANCER AG19-9 SERPL-ACNC: 40.5 U/ML (ref 2–37)
CANCER AG19-9 SERPL-ACNC: 45.2 U/ML (ref 2–37)
CANCER AG19-9 SERPL-ACNC: 68 U/ML (ref 2–37)
CANCER AG19-9 SERPL-ACNC: 88.5 U/ML (ref 2–37)
CEA SERPL-MCNC: 10.1 NG/ML (ref 0–3)
CEA SERPL-MCNC: 10.9 NG/ML (ref 0–3)
CEA SERPL-MCNC: 12.4 NG/ML (ref 0–3)
CEA SERPL-MCNC: 5 NG/ML (ref 0–3)
CEA SERPL-MCNC: 5.5 NG/ML (ref 0–3)
CEA SERPL-MCNC: 5.9 NG/ML (ref 0–3)
CEA SERPL-MCNC: 6.1 NG/ML (ref 0–3)
CEA SERPL-MCNC: 6.1 NG/ML (ref 0–3)
CEA SERPL-MCNC: 6.5 NG/ML (ref 0–3)
CEA SERPL-MCNC: 6.8 NG/ML (ref 0–3)
CEA SERPL-MCNC: 6.8 NG/ML (ref 0–3)
CEA SERPL-MCNC: 9.3 NG/ML (ref 0–3)
CHLORIDE SERPL-SCNC: 100 MMOL/L (ref 98–107)
CHLORIDE SERPL-SCNC: 103 MMOL/L (ref 98–107)
CHLORIDE SERPL-SCNC: 104 MMOL/L (ref 98–107)
CHLORIDE SERPL-SCNC: 90 MMOL/L (ref 98–107)
CHLORIDE SERPL-SCNC: 93 MMOL/L (ref 98–107)
CHLORIDE SERPL-SCNC: 95 MMOL/L (ref 98–107)
CHLORIDE SERPL-SCNC: 95 MMOL/L (ref 98–107)
CHLORIDE SERPL-SCNC: 96 MMOL/L (ref 98–107)
CHLORIDE SERPL-SCNC: 96 MMOL/L (ref 98–107)
CHLORIDE SERPL-SCNC: 97 MMOL/L (ref 98–107)
CHLORIDE SERPL-SCNC: 97 MMOL/L (ref 98–107)
CHLORIDE SERPL-SCNC: 98 MMOL/L (ref 98–107)
CHLORIDE SERPL-SCNC: 99 MMOL/L (ref 98–107)
CO2 SERPL-SCNC: 26 MMOL/L (ref 21–32)
CO2 SERPL-SCNC: 27 MMOL/L (ref 21–32)
CO2 SERPL-SCNC: 28 MMOL/L (ref 21–32)
CO2 SERPL-SCNC: 29 MMOL/L (ref 21–32)
CO2 SERPL-SCNC: 30 MMOL/L (ref 21–32)
CO2 SERPL-SCNC: 30 MMOL/L (ref 21–32)
CO2 SERPL-SCNC: 31 MMOL/L (ref 21–32)
CO2 SERPL-SCNC: 32 MMOL/L (ref 21–32)
CREAT SERPL-MCNC: 0.7 MG/DL (ref 0.8–1.5)
CREAT SERPL-MCNC: 0.7 MG/DL (ref 0.8–1.5)
CREAT SERPL-MCNC: 0.76 MG/DL (ref 0.8–1.5)
CREAT SERPL-MCNC: 0.8 MG/DL (ref 0.8–1.5)
CREAT SERPL-MCNC: 0.8 MG/DL (ref 0.8–1.5)
CREAT SERPL-MCNC: 0.9 MG/DL (ref 0.8–1.5)
CREAT SERPL-MCNC: 0.91 MG/DL (ref 0.8–1.5)
CREAT SERPL-MCNC: 1 MG/DL (ref 0.8–1.5)
CREAT SERPL-MCNC: 1.1 MG/DL (ref 0.8–1.5)
CREAT SERPL-MCNC: 1.1 MG/DL (ref 0.8–1.5)
CREAT SERPL-MCNC: 1.2 MG/DL (ref 0.8–1.5)
CREAT SERPL-MCNC: 1.2 MG/DL (ref 0.8–1.5)
CREAT SERPL-MCNC: 1.21 MG/DL (ref 0.8–1.5)
CREAT SERPL-MCNC: 1.6 MG/DL (ref 0.8–1.5)
CROSSMATCH RESULT,%XM: NORMAL
DIFFERENTIAL METHOD BLD: ABNORMAL
DRUGS UR: NORMAL
EOSINOPHIL # BLD: 0.1 K/UL (ref 0–0.8)
EOSINOPHIL # BLD: 0.2 K/UL (ref 0–0.8)
EOSINOPHIL # BLD: 0.3 K/UL (ref 0–0.8)
EOSINOPHIL # BLD: 0.4 K/UL (ref 0–0.8)
EOSINOPHIL # BLD: 0.4 K/UL (ref 0–0.8)
EOSINOPHIL NFR BLD: 1 % (ref 0.5–7.8)
EOSINOPHIL NFR BLD: 1 % (ref 0.5–7.8)
EOSINOPHIL NFR BLD: 2 % (ref 0.5–7.8)
EOSINOPHIL NFR BLD: 3 % (ref 0.5–7.8)
EOSINOPHIL NFR BLD: 4 % (ref 0.5–7.8)
EOSINOPHIL NFR BLD: 6 % (ref 0.5–7.8)
EOSINOPHIL NFR BLD: 7 % (ref 0.5–7.8)
EOSINOPHIL NFR BLD: 7 % (ref 0.5–7.8)
EOSINOPHIL NFR BLD: 9 % (ref 0.5–7.8)
ERYTHROCYTE [DISTWIDTH] IN BLOOD BY AUTOMATED COUNT: 14.6 % (ref 11.9–14.6)
ERYTHROCYTE [DISTWIDTH] IN BLOOD BY AUTOMATED COUNT: 14.6 % (ref 11.9–14.6)
ERYTHROCYTE [DISTWIDTH] IN BLOOD BY AUTOMATED COUNT: 14.9 % (ref 11.9–14.6)
ERYTHROCYTE [DISTWIDTH] IN BLOOD BY AUTOMATED COUNT: 15.2 % (ref 11.9–14.6)
ERYTHROCYTE [DISTWIDTH] IN BLOOD BY AUTOMATED COUNT: 15.3 % (ref 11.9–14.6)
ERYTHROCYTE [DISTWIDTH] IN BLOOD BY AUTOMATED COUNT: 15.4 % (ref 11.9–14.6)
ERYTHROCYTE [DISTWIDTH] IN BLOOD BY AUTOMATED COUNT: 15.5 % (ref 11.9–14.6)
ERYTHROCYTE [DISTWIDTH] IN BLOOD BY AUTOMATED COUNT: 15.5 % (ref 11.9–14.6)
ERYTHROCYTE [DISTWIDTH] IN BLOOD BY AUTOMATED COUNT: 15.8 % (ref 11.9–14.6)
ERYTHROCYTE [DISTWIDTH] IN BLOOD BY AUTOMATED COUNT: 15.9 % (ref 11.9–14.6)
ERYTHROCYTE [DISTWIDTH] IN BLOOD BY AUTOMATED COUNT: 16.6 % (ref 11.9–14.6)
ERYTHROCYTE [DISTWIDTH] IN BLOOD BY AUTOMATED COUNT: 17.3 % (ref 11.9–14.6)
ERYTHROCYTE [DISTWIDTH] IN BLOOD BY AUTOMATED COUNT: 17.3 % (ref 11.9–14.6)
ERYTHROCYTE [DISTWIDTH] IN BLOOD BY AUTOMATED COUNT: 17.4 % (ref 11.9–14.6)
ERYTHROCYTE [DISTWIDTH] IN BLOOD BY AUTOMATED COUNT: 17.8 % (ref 11.9–14.6)
ERYTHROCYTE [DISTWIDTH] IN BLOOD BY AUTOMATED COUNT: 18 % (ref 11.9–14.6)
ERYTHROCYTE [DISTWIDTH] IN BLOOD BY AUTOMATED COUNT: 18.5 % (ref 11.9–14.6)
ERYTHROCYTE [DISTWIDTH] IN BLOOD BY AUTOMATED COUNT: 18.6 % (ref 11.9–14.6)
FERRITIN SERPL-MCNC: 17 NG/ML (ref 8–388)
FERRITIN SERPL-MCNC: 67 NG/ML (ref 8–388)
GLOBULIN SER CALC-MCNC: 3.8 G/DL (ref 2.3–3.5)
GLOBULIN SER CALC-MCNC: 3.9 G/DL (ref 2.3–3.5)
GLOBULIN SER CALC-MCNC: 3.9 G/DL (ref 2.3–3.5)
GLOBULIN SER CALC-MCNC: 4.1 G/DL (ref 2.3–3.5)
GLOBULIN SER CALC-MCNC: 4.2 G/DL (ref 2.3–3.5)
GLOBULIN SER CALC-MCNC: 4.3 G/DL (ref 2.3–3.5)
GLOBULIN SER CALC-MCNC: 4.4 G/DL (ref 2.3–3.5)
GLOBULIN SER CALC-MCNC: 4.4 G/DL (ref 2.3–3.5)
GLOBULIN SER CALC-MCNC: 4.5 G/DL (ref 2.3–3.5)
GLOBULIN SER CALC-MCNC: 4.9 G/DL (ref 2.3–3.5)
GLOBULIN SER CALC-MCNC: 4.9 G/DL (ref 2.3–3.5)
GLOBULIN SER CALC-MCNC: 5 G/DL (ref 2.3–3.5)
GLUCOSE SERPL-MCNC: 100 MG/DL (ref 65–100)
GLUCOSE SERPL-MCNC: 100 MG/DL (ref 65–100)
GLUCOSE SERPL-MCNC: 102 MG/DL (ref 65–100)
GLUCOSE SERPL-MCNC: 106 MG/DL (ref 65–100)
GLUCOSE SERPL-MCNC: 110 MG/DL (ref 65–100)
GLUCOSE SERPL-MCNC: 114 MG/DL (ref 65–100)
GLUCOSE SERPL-MCNC: 120 MG/DL (ref 65–100)
GLUCOSE SERPL-MCNC: 133 MG/DL (ref 65–100)
GLUCOSE SERPL-MCNC: 144 MG/DL (ref 65–100)
GLUCOSE SERPL-MCNC: 162 MG/DL (ref 65–100)
GLUCOSE SERPL-MCNC: 180 MG/DL (ref 65–100)
GLUCOSE SERPL-MCNC: 69 MG/DL (ref 65–100)
GLUCOSE SERPL-MCNC: 70 MG/DL (ref 65–100)
GLUCOSE SERPL-MCNC: 74 MG/DL (ref 65–100)
GLUCOSE SERPL-MCNC: 85 MG/DL (ref 65–100)
GLUCOSE SERPL-MCNC: 94 MG/DL (ref 65–100)
GLUCOSE SERPL-MCNC: 95 MG/DL (ref 65–100)
GLUCOSE SERPL-MCNC: 97 MG/DL (ref 65–100)
GLUCOSE SERPL-MCNC: 99 MG/DL (ref 65–100)
HCT VFR BLD AUTO: 19.2 % (ref 41.1–50.3)
HCT VFR BLD AUTO: 20 % (ref 41.1–50.3)
HCT VFR BLD AUTO: 21.5 % (ref 41.1–50.3)
HCT VFR BLD AUTO: 23.9 % (ref 41.1–50.3)
HCT VFR BLD AUTO: 25 % (ref 41.1–50.3)
HCT VFR BLD AUTO: 25.5 % (ref 41.1–50.3)
HCT VFR BLD AUTO: 25.5 % (ref 41.1–50.3)
HCT VFR BLD AUTO: 28.8 % (ref 41.1–50.3)
HCT VFR BLD AUTO: 28.9 % (ref 41.1–50.3)
HCT VFR BLD AUTO: 29 % (ref 41.1–50.3)
HCT VFR BLD AUTO: 29.1 % (ref 41.1–50.3)
HCT VFR BLD AUTO: 29.5 % (ref 41.1–50.3)
HCT VFR BLD AUTO: 30.4 % (ref 41.1–50.3)
HCT VFR BLD AUTO: 30.5 % (ref 41.1–50.3)
HCT VFR BLD AUTO: 30.6 % (ref 41.1–50.3)
HCT VFR BLD AUTO: 31 % (ref 41.1–50.3)
HCT VFR BLD AUTO: 31 % (ref 41.1–50.3)
HCT VFR BLD AUTO: 31.1 % (ref 41.1–50.3)
HCT VFR BLD AUTO: 34.2 % (ref 41.1–50.3)
HCT VFR BLD AUTO: 36.3 % (ref 41.1–50.3)
HGB BLD-MCNC: 10 G/DL (ref 13.6–17.2)
HGB BLD-MCNC: 10 G/DL (ref 13.6–17.2)
HGB BLD-MCNC: 10.5 G/DL (ref 13.6–17.2)
HGB BLD-MCNC: 11.1 G/DL (ref 13.6–17.2)
HGB BLD-MCNC: 12.1 G/DL (ref 13.6–17.2)
HGB BLD-MCNC: 6.1 G/DL (ref 13.6–17.2)
HGB BLD-MCNC: 6.1 G/DL (ref 13.6–17.2)
HGB BLD-MCNC: 6.7 G/DL (ref 13.6–17.2)
HGB BLD-MCNC: 7.7 G/DL (ref 13.6–17.2)
HGB BLD-MCNC: 8.1 G/DL (ref 13.6–17.2)
HGB BLD-MCNC: 8.3 G/DL (ref 13.6–17.2)
HGB BLD-MCNC: 8.4 G/DL (ref 13.6–17.2)
HGB BLD-MCNC: 9.3 G/DL (ref 13.6–17.2)
HGB BLD-MCNC: 9.3 G/DL (ref 13.6–17.2)
HGB BLD-MCNC: 9.4 G/DL (ref 13.6–17.2)
HGB BLD-MCNC: 9.6 G/DL (ref 13.6–17.2)
HGB BLD-MCNC: 9.6 G/DL (ref 13.6–17.2)
HGB BLD-MCNC: 9.7 G/DL (ref 13.6–17.2)
HGB BLD-MCNC: 9.7 G/DL (ref 13.6–17.2)
HGB BLD-MCNC: 9.8 G/DL (ref 13.6–17.2)
HISTORY CHECKED?,CKHIST: NORMAL
HISTORY CHECKED?,CKHIST: NORMAL
IMM GRANULOCYTES # BLD AUTO: 0 K/UL (ref 0–0.5)
IMM GRANULOCYTES # BLD AUTO: 0.1 K/UL (ref 0–0.5)
IMM GRANULOCYTES # BLD AUTO: 0.2 K/UL (ref 0–0.5)
IMM GRANULOCYTES NFR BLD AUTO: 0 % (ref 0–5)
IMM GRANULOCYTES NFR BLD AUTO: 1 % (ref 0–5)
IMM GRANULOCYTES NFR BLD AUTO: 2 % (ref 0–5)
IRON SATN MFR SERPL: 21 %
IRON SATN MFR SERPL: 6 %
IRON SERPL-MCNC: 21 UG/DL (ref 35–150)
IRON SERPL-MCNC: 79 UG/DL (ref 35–150)
LYMPHOCYTES # BLD: 0.6 K/UL (ref 0.5–4.6)
LYMPHOCYTES # BLD: 0.7 K/UL (ref 0.5–4.6)
LYMPHOCYTES # BLD: 1 K/UL (ref 0.5–4.6)
LYMPHOCYTES # BLD: 1 K/UL (ref 0.5–4.6)
LYMPHOCYTES # BLD: 1.1 K/UL (ref 0.5–4.6)
LYMPHOCYTES # BLD: 1.2 K/UL (ref 0.5–4.6)
LYMPHOCYTES # BLD: 1.4 K/UL (ref 0.5–4.6)
LYMPHOCYTES # BLD: 1.5 K/UL (ref 0.5–4.6)
LYMPHOCYTES # BLD: 1.6 K/UL (ref 0.5–4.6)
LYMPHOCYTES # BLD: 1.7 K/UL (ref 0.5–4.6)
LYMPHOCYTES # BLD: 1.7 K/UL (ref 0.5–4.6)
LYMPHOCYTES # BLD: 1.8 K/UL (ref 0.5–4.6)
LYMPHOCYTES # BLD: 2 K/UL (ref 0.5–4.6)
LYMPHOCYTES # BLD: 2.8 K/UL (ref 0.5–4.6)
LYMPHOCYTES NFR BLD: 15 % (ref 13–44)
LYMPHOCYTES NFR BLD: 16 % (ref 13–44)
LYMPHOCYTES NFR BLD: 17 % (ref 13–44)
LYMPHOCYTES NFR BLD: 18 % (ref 13–44)
LYMPHOCYTES NFR BLD: 19 % (ref 13–44)
LYMPHOCYTES NFR BLD: 21 % (ref 13–44)
LYMPHOCYTES NFR BLD: 23 % (ref 13–44)
LYMPHOCYTES NFR BLD: 23 % (ref 13–44)
LYMPHOCYTES NFR BLD: 26 % (ref 13–44)
LYMPHOCYTES NFR BLD: 28 % (ref 13–44)
LYMPHOCYTES NFR BLD: 29 % (ref 13–44)
LYMPHOCYTES NFR BLD: 30 % (ref 13–44)
LYMPHOCYTES NFR BLD: 30 % (ref 13–44)
LYMPHOCYTES NFR BLD: 33 % (ref 13–44)
LYMPHOCYTES NFR BLD: 36 % (ref 13–44)
LYMPHOCYTES NFR BLD: 37 % (ref 13–44)
LYMPHOCYTES NFR BLD: 39 % (ref 13–44)
LYMPHOCYTES NFR BLD: 43 % (ref 13–44)
LYMPHOCYTES NFR BLD: 46 % (ref 13–44)
LYMPHOCYTES NFR BLD: 49 % (ref 13–44)
MAGNESIUM SERPL-MCNC: 1.8 MG/DL (ref 1.8–2.4)
MAGNESIUM SERPL-MCNC: 1.8 MG/DL (ref 1.8–2.4)
MAGNESIUM SERPL-MCNC: 1.9 MG/DL (ref 1.8–2.4)
MAGNESIUM SERPL-MCNC: 2 MG/DL (ref 1.8–2.4)
MAGNESIUM SERPL-MCNC: 2.1 MG/DL (ref 1.8–2.4)
MAGNESIUM SERPL-MCNC: 2.2 MG/DL (ref 1.8–2.4)
MAGNESIUM SERPL-MCNC: 2.4 MG/DL (ref 1.8–2.4)
MCH RBC QN AUTO: 28.2 PG (ref 26.1–32.9)
MCH RBC QN AUTO: 29.4 PG (ref 26.1–32.9)
MCH RBC QN AUTO: 29.6 PG (ref 26.1–32.9)
MCH RBC QN AUTO: 29.7 PG (ref 26.1–32.9)
MCH RBC QN AUTO: 30 PG (ref 26.1–32.9)
MCH RBC QN AUTO: 30.3 PG (ref 26.1–32.9)
MCH RBC QN AUTO: 30.4 PG (ref 26.1–32.9)
MCH RBC QN AUTO: 30.8 PG (ref 26.1–32.9)
MCH RBC QN AUTO: 30.9 PG (ref 26.1–32.9)
MCH RBC QN AUTO: 31.4 PG (ref 26.1–32.9)
MCH RBC QN AUTO: 31.6 PG (ref 26.1–32.9)
MCH RBC QN AUTO: 32.1 PG (ref 26.1–32.9)
MCH RBC QN AUTO: 32.5 PG (ref 26.1–32.9)
MCH RBC QN AUTO: 33.4 PG (ref 26.1–32.9)
MCH RBC QN AUTO: 33.9 PG (ref 26.1–32.9)
MCH RBC QN AUTO: 34.5 PG (ref 26.1–32.9)
MCH RBC QN AUTO: 35 PG (ref 26.1–32.9)
MCH RBC QN AUTO: 35.1 PG (ref 26.1–32.9)
MCH RBC QN AUTO: 35.4 PG (ref 26.1–32.9)
MCH RBC QN AUTO: 35.4 PG (ref 26.1–32.9)
MCHC RBC AUTO-ENTMCNC: 30.5 G/DL (ref 31.4–35)
MCHC RBC AUTO-ENTMCNC: 31.2 G/DL (ref 31.4–35)
MCHC RBC AUTO-ENTMCNC: 31.2 G/DL (ref 31.4–35)
MCHC RBC AUTO-ENTMCNC: 31.5 G/DL (ref 31.4–35)
MCHC RBC AUTO-ENTMCNC: 31.6 G/DL (ref 31.4–35)
MCHC RBC AUTO-ENTMCNC: 31.8 G/DL (ref 31.4–35)
MCHC RBC AUTO-ENTMCNC: 31.8 G/DL (ref 31.4–35)
MCHC RBC AUTO-ENTMCNC: 32 G/DL (ref 31.4–35)
MCHC RBC AUTO-ENTMCNC: 32.1 G/DL (ref 31.4–35)
MCHC RBC AUTO-ENTMCNC: 32.2 G/DL (ref 31.4–35)
MCHC RBC AUTO-ENTMCNC: 32.3 G/DL (ref 31.4–35)
MCHC RBC AUTO-ENTMCNC: 32.5 G/DL (ref 31.4–35)
MCHC RBC AUTO-ENTMCNC: 32.6 G/DL (ref 31.4–35)
MCHC RBC AUTO-ENTMCNC: 32.7 G/DL (ref 31.4–35)
MCHC RBC AUTO-ENTMCNC: 32.9 G/DL (ref 31.4–35)
MCHC RBC AUTO-ENTMCNC: 33.2 G/DL (ref 31.4–35)
MCHC RBC AUTO-ENTMCNC: 33.2 G/DL (ref 31.4–35)
MCHC RBC AUTO-ENTMCNC: 33.3 G/DL (ref 31.4–35)
MCHC RBC AUTO-ENTMCNC: 33.4 G/DL (ref 31.4–35)
MCHC RBC AUTO-ENTMCNC: 33.9 G/DL (ref 31.4–35)
MCV RBC AUTO: 101.7 FL (ref 79.6–97.8)
MCV RBC AUTO: 102 FL (ref 79.6–97.8)
MCV RBC AUTO: 103 FL (ref 79.6–97.8)
MCV RBC AUTO: 105.5 FL (ref 79.6–97.8)
MCV RBC AUTO: 105.8 FL (ref 79.6–97.8)
MCV RBC AUTO: 106.6 FL (ref 79.6–97.8)
MCV RBC AUTO: 107.5 FL (ref 79.6–97.8)
MCV RBC AUTO: 92.6 FL (ref 79.6–97.8)
MCV RBC AUTO: 93.1 FL (ref 79.6–97.8)
MCV RBC AUTO: 93.3 FL (ref 79.6–97.8)
MCV RBC AUTO: 94.2 FL (ref 79.6–97.8)
MCV RBC AUTO: 94.2 FL (ref 79.6–97.8)
MCV RBC AUTO: 94.5 FL (ref 79.6–97.8)
MCV RBC AUTO: 96.7 FL (ref 79.6–97.8)
MCV RBC AUTO: 97 FL (ref 79.6–97.8)
MCV RBC AUTO: 97.3 FL (ref 79.6–97.8)
MCV RBC AUTO: 97.3 FL (ref 79.6–97.8)
MCV RBC AUTO: 98.1 FL (ref 79.6–97.8)
MCV RBC AUTO: 99.3 FL (ref 79.6–97.8)
MCV RBC AUTO: 99.4 FL (ref 79.6–97.8)
MONOCYTES # BLD: 0.2 K/UL (ref 0.1–1.3)
MONOCYTES # BLD: 0.2 K/UL (ref 0.1–1.3)
MONOCYTES # BLD: 0.3 K/UL (ref 0.1–1.3)
MONOCYTES # BLD: 0.4 K/UL (ref 0.1–1.3)
MONOCYTES # BLD: 0.4 K/UL (ref 0.1–1.3)
MONOCYTES # BLD: 0.5 K/UL (ref 0.1–1.3)
MONOCYTES # BLD: 0.5 K/UL (ref 0.1–1.3)
MONOCYTES # BLD: 0.6 K/UL (ref 0.1–1.3)
MONOCYTES # BLD: 0.6 K/UL (ref 0.1–1.3)
MONOCYTES # BLD: 0.7 K/UL (ref 0.1–1.3)
MONOCYTES # BLD: 0.8 K/UL (ref 0.1–1.3)
MONOCYTES # BLD: 0.9 K/UL (ref 0.1–1.3)
MONOCYTES # BLD: 0.9 K/UL (ref 0.1–1.3)
MONOCYTES # BLD: 1.3 K/UL (ref 0.1–1.3)
MONOCYTES # BLD: 1.5 K/UL (ref 0.1–1.3)
MONOCYTES NFR BLD: 10 % (ref 4–12)
MONOCYTES NFR BLD: 10 % (ref 4–12)
MONOCYTES NFR BLD: 11 % (ref 4–12)
MONOCYTES NFR BLD: 11 % (ref 4–12)
MONOCYTES NFR BLD: 12 % (ref 4–12)
MONOCYTES NFR BLD: 13 % (ref 4–12)
MONOCYTES NFR BLD: 14 % (ref 4–12)
MONOCYTES NFR BLD: 16 % (ref 4–12)
MONOCYTES NFR BLD: 18 % (ref 4–12)
MONOCYTES NFR BLD: 23 % (ref 4–12)
MONOCYTES NFR BLD: 6 % (ref 4–12)
MONOCYTES NFR BLD: 8 % (ref 4–12)
MONOCYTES NFR BLD: 9 % (ref 4–12)
NEUTS SEG # BLD: 0.4 K/UL (ref 1.7–8.2)
NEUTS SEG # BLD: 1 K/UL (ref 1.7–8.2)
NEUTS SEG # BLD: 1.1 K/UL (ref 1.7–8.2)
NEUTS SEG # BLD: 1.4 K/UL (ref 1.7–8.2)
NEUTS SEG # BLD: 1.5 K/UL (ref 1.7–8.2)
NEUTS SEG # BLD: 2.1 K/UL (ref 1.7–8.2)
NEUTS SEG # BLD: 2.1 K/UL (ref 1.7–8.2)
NEUTS SEG # BLD: 2.3 K/UL (ref 1.7–8.2)
NEUTS SEG # BLD: 2.5 K/UL (ref 1.7–8.2)
NEUTS SEG # BLD: 2.7 K/UL (ref 1.7–8.2)
NEUTS SEG # BLD: 2.9 K/UL (ref 1.7–8.2)
NEUTS SEG # BLD: 3 K/UL (ref 1.7–8.2)
NEUTS SEG # BLD: 3.1 K/UL (ref 1.7–8.2)
NEUTS SEG # BLD: 3.3 K/UL (ref 1.7–8.2)
NEUTS SEG # BLD: 4.2 K/UL (ref 1.7–8.2)
NEUTS SEG # BLD: 4.9 K/UL (ref 1.7–8.2)
NEUTS SEG # BLD: 5.8 K/UL (ref 1.7–8.2)
NEUTS SEG # BLD: 6.8 K/UL (ref 1.7–8.2)
NEUTS SEG # BLD: 7.2 K/UL (ref 1.7–8.2)
NEUTS SEG # BLD: 9.5 K/UL (ref 1.7–8.2)
NEUTS SEG NFR BLD: 30 % (ref 43–78)
NEUTS SEG NFR BLD: 33 % (ref 43–78)
NEUTS SEG NFR BLD: 35 % (ref 43–78)
NEUTS SEG NFR BLD: 36 % (ref 43–78)
NEUTS SEG NFR BLD: 38 % (ref 43–78)
NEUTS SEG NFR BLD: 40 % (ref 43–78)
NEUTS SEG NFR BLD: 49 % (ref 43–78)
NEUTS SEG NFR BLD: 51 % (ref 43–78)
NEUTS SEG NFR BLD: 52 % (ref 43–78)
NEUTS SEG NFR BLD: 54 % (ref 43–78)
NEUTS SEG NFR BLD: 56 % (ref 43–78)
NEUTS SEG NFR BLD: 59 % (ref 43–78)
NEUTS SEG NFR BLD: 64 % (ref 43–78)
NEUTS SEG NFR BLD: 65 % (ref 43–78)
NEUTS SEG NFR BLD: 66 % (ref 43–78)
NEUTS SEG NFR BLD: 68 % (ref 43–78)
NEUTS SEG NFR BLD: 69 % (ref 43–78)
NEUTS SEG NFR BLD: 71 % (ref 43–78)
NEUTS SEG NFR BLD: 72 % (ref 43–78)
NEUTS SEG NFR BLD: 72 % (ref 43–78)
NRBC # BLD: 0 K/UL (ref 0–0.2)
PLATELET # BLD AUTO: 122 K/UL (ref 150–450)
PLATELET # BLD AUTO: 131 K/UL (ref 150–450)
PLATELET # BLD AUTO: 131 K/UL (ref 150–450)
PLATELET # BLD AUTO: 133 K/UL (ref 150–450)
PLATELET # BLD AUTO: 134 K/UL (ref 150–450)
PLATELET # BLD AUTO: 148 K/UL (ref 150–450)
PLATELET # BLD AUTO: 159 K/UL (ref 150–450)
PLATELET # BLD AUTO: 168 K/UL (ref 150–450)
PLATELET # BLD AUTO: 180 K/UL (ref 150–450)
PLATELET # BLD AUTO: 190 K/UL (ref 150–450)
PLATELET # BLD AUTO: 203 K/UL (ref 150–450)
PLATELET # BLD AUTO: 207 K/UL (ref 150–450)
PLATELET # BLD AUTO: 219 K/UL (ref 150–450)
PLATELET # BLD AUTO: 232 K/UL (ref 150–450)
PLATELET # BLD AUTO: 235 K/UL (ref 150–450)
PLATELET # BLD AUTO: 237 K/UL (ref 150–450)
PLATELET # BLD AUTO: 254 K/UL (ref 150–450)
PLATELET # BLD AUTO: 294 K/UL (ref 150–450)
PLATELET # BLD AUTO: 340 K/UL (ref 150–450)
PLATELET # BLD AUTO: 404 K/UL (ref 150–450)
PMV BLD AUTO: 10 FL (ref 9.4–12.3)
PMV BLD AUTO: 10.3 FL (ref 9.4–12.3)
PMV BLD AUTO: 10.5 FL (ref 9.4–12.3)
PMV BLD AUTO: 10.6 FL (ref 9.4–12.3)
PMV BLD AUTO: 9 FL (ref 9.4–12.3)
PMV BLD AUTO: 9.2 FL (ref 9.4–12.3)
PMV BLD AUTO: 9.3 FL (ref 9.4–12.3)
PMV BLD AUTO: 9.4 FL (ref 9.4–12.3)
PMV BLD AUTO: 9.4 FL (ref 9.4–12.3)
PMV BLD AUTO: 9.5 FL (ref 9.4–12.3)
PMV BLD AUTO: 9.6 FL (ref 9.4–12.3)
PMV BLD AUTO: 9.7 FL (ref 9.4–12.3)
PMV BLD AUTO: 9.8 FL (ref 9.4–12.3)
PMV BLD AUTO: 9.8 FL (ref 9.4–12.3)
PMV BLD AUTO: 9.9 FL (ref 9.4–12.3)
POTASSIUM SERPL-SCNC: 3 MMOL/L (ref 3.5–5.1)
POTASSIUM SERPL-SCNC: 3.7 MMOL/L (ref 3.5–5.1)
POTASSIUM SERPL-SCNC: 3.9 MMOL/L (ref 3.5–5.1)
POTASSIUM SERPL-SCNC: 4 MMOL/L (ref 3.5–5.1)
POTASSIUM SERPL-SCNC: 4.1 MMOL/L (ref 3.5–5.1)
POTASSIUM SERPL-SCNC: 4.2 MMOL/L (ref 3.5–5.1)
POTASSIUM SERPL-SCNC: 4.3 MMOL/L (ref 3.5–5.1)
POTASSIUM SERPL-SCNC: 4.3 MMOL/L (ref 3.5–5.1)
POTASSIUM SERPL-SCNC: 4.4 MMOL/L (ref 3.5–5.1)
POTASSIUM SERPL-SCNC: 4.5 MMOL/L (ref 3.5–5.1)
PROT SERPL-MCNC: 6.2 G/DL (ref 6.3–8.2)
PROT SERPL-MCNC: 6.5 G/DL (ref 6.3–8.2)
PROT SERPL-MCNC: 6.5 G/DL (ref 6.3–8.2)
PROT SERPL-MCNC: 6.6 G/DL (ref 6.3–8.2)
PROT SERPL-MCNC: 6.7 G/DL (ref 6.3–8.2)
PROT SERPL-MCNC: 6.9 G/DL (ref 6.3–8.2)
PROT SERPL-MCNC: 7 G/DL (ref 6.3–8.2)
PROT SERPL-MCNC: 7.1 G/DL (ref 6.3–8.2)
PROT SERPL-MCNC: 7.2 G/DL (ref 6.3–8.2)
PROT SERPL-MCNC: 7.5 G/DL (ref 6.3–8.2)
PROT SERPL-MCNC: 7.7 G/DL (ref 6.3–8.2)
PROT SERPL-MCNC: 7.8 G/DL (ref 6.3–8.2)
PROT UR-MCNC: 24 MG/DL
PROT UR-MCNC: 31 MG/DL
PROT UR-MCNC: 37 MG/DL
RBC # BLD AUTO: 1.98 M/UL (ref 4.23–5.67)
RBC # BLD AUTO: 2.16 M/UL (ref 4.23–5.67)
RBC # BLD AUTO: 2.21 M/UL (ref 4.23–5.67)
RBC # BLD AUTO: 2.37 M/UL (ref 4.23–5.67)
RBC # BLD AUTO: 2.53 M/UL (ref 4.23–5.67)
RBC # BLD AUTO: 2.62 M/UL (ref 4.23–5.67)
RBC # BLD AUTO: 2.68 M/UL (ref 4.23–5.67)
RBC # BLD AUTO: 2.71 M/UL (ref 4.23–5.67)
RBC # BLD AUTO: 2.74 M/UL (ref 4.23–5.67)
RBC # BLD AUTO: 2.74 M/UL (ref 4.23–5.67)
RBC # BLD AUTO: 3.01 M/UL (ref 4.23–5.67)
RBC # BLD AUTO: 3.04 M/UL (ref 4.23–5.67)
RBC # BLD AUTO: 3.06 M/UL (ref 4.23–5.67)
RBC # BLD AUTO: 3.08 M/UL (ref 4.23–5.67)
RBC # BLD AUTO: 3.13 M/UL (ref 4.23–5.67)
RBC # BLD AUTO: 3.16 M/UL (ref 4.23–5.67)
RBC # BLD AUTO: 3.27 M/UL (ref 4.23–5.67)
RBC # BLD AUTO: 3.3 M/UL (ref 4.23–5.67)
RBC # BLD AUTO: 3.32 M/UL (ref 4.23–5.67)
RBC # BLD AUTO: 3.57 M/UL (ref 4.23–5.67)
SODIUM SERPL-SCNC: 127 MMOL/L (ref 136–145)
SODIUM SERPL-SCNC: 129 MMOL/L (ref 136–145)
SODIUM SERPL-SCNC: 131 MMOL/L (ref 136–145)
SODIUM SERPL-SCNC: 132 MMOL/L (ref 136–145)
SODIUM SERPL-SCNC: 133 MMOL/L (ref 136–145)
SODIUM SERPL-SCNC: 134 MMOL/L (ref 136–145)
SODIUM SERPL-SCNC: 136 MMOL/L (ref 136–145)
SODIUM SERPL-SCNC: 137 MMOL/L (ref 136–145)
SODIUM SERPL-SCNC: 137 MMOL/L (ref 136–145)
SPECIMEN EXP DATE BLD: NORMAL
STATUS OF UNIT,%ST: NORMAL
TIBC SERPL-MCNC: 341 UG/DL (ref 250–450)
TIBC SERPL-MCNC: 370 UG/DL (ref 250–450)
UNIT DIVISION, %UDIV: 0
WBC # BLD AUTO: 1.2 K/UL (ref 4.3–11.1)
WBC # BLD AUTO: 10 K/UL (ref 4.3–11.1)
WBC # BLD AUTO: 13.4 K/UL (ref 4.3–11.1)
WBC # BLD AUTO: 2 K/UL (ref 4.3–11.1)
WBC # BLD AUTO: 3.6 K/UL (ref 4.3–11.1)
WBC # BLD AUTO: 3.8 K/UL (ref 4.3–11.1)
WBC # BLD AUTO: 4.1 K/UL (ref 4.3–11.1)
WBC # BLD AUTO: 4.3 K/UL (ref 4.3–11.1)
WBC # BLD AUTO: 4.3 K/UL (ref 4.3–11.1)
WBC # BLD AUTO: 4.4 K/UL (ref 4.3–11.1)
WBC # BLD AUTO: 4.8 K/UL (ref 4.3–11.1)
WBC # BLD AUTO: 4.8 K/UL (ref 4.3–11.1)
WBC # BLD AUTO: 5.2 K/UL (ref 4.3–11.1)
WBC # BLD AUTO: 5.3 K/UL (ref 4.3–11.1)
WBC # BLD AUTO: 5.9 K/UL (ref 4.3–11.1)
WBC # BLD AUTO: 6.6 K/UL (ref 4.3–11.1)
WBC # BLD AUTO: 7.2 K/UL (ref 4.3–11.1)
WBC # BLD AUTO: 7.2 K/UL (ref 4.3–11.1)
WBC # BLD AUTO: 8.5 K/UL (ref 4.3–11.1)
WBC # BLD AUTO: 9.5 K/UL (ref 4.3–11.1)

## 2020-01-01 PROCEDURE — 85025 COMPLETE CBC W/AUTO DIFF WBC: CPT

## 2020-01-01 PROCEDURE — 74011000636 HC RX REV CODE- 636: Performed by: RADIOLOGY

## 2020-01-01 PROCEDURE — 36591 DRAW BLOOD OFF VENOUS DEVICE: CPT

## 2020-01-01 PROCEDURE — 83735 ASSAY OF MAGNESIUM: CPT

## 2020-01-01 PROCEDURE — 74011636320 HC RX REV CODE- 636/320: Performed by: RADIOLOGY

## 2020-01-01 PROCEDURE — 96368 THER/DIAG CONCURRENT INF: CPT

## 2020-01-01 PROCEDURE — G0498 CHEMO EXTEND IV INFUS W/PUMP: HCPCS

## 2020-01-01 PROCEDURE — 74011000258 HC RX REV CODE- 258: Performed by: NURSE PRACTITIONER

## 2020-01-01 PROCEDURE — 74011250636 HC RX REV CODE- 250/636: Performed by: INTERNAL MEDICINE

## 2020-01-01 PROCEDURE — 80053 COMPREHEN METABOLIC PANEL: CPT

## 2020-01-01 PROCEDURE — 96375 TX/PRO/DX INJ NEW DRUG ADDON: CPT

## 2020-01-01 PROCEDURE — 74011250636 HC RX REV CODE- 250/636: Performed by: NURSE PRACTITIONER

## 2020-01-01 PROCEDURE — 96367 TX/PROPH/DG ADDL SEQ IV INF: CPT

## 2020-01-01 PROCEDURE — 96415 CHEMO IV INFUSION ADDL HR: CPT

## 2020-01-01 PROCEDURE — 82378 CARCINOEMBRYONIC ANTIGEN: CPT

## 2020-01-01 PROCEDURE — 36415 COLL VENOUS BLD VENIPUNCTURE: CPT

## 2020-01-01 PROCEDURE — 74011000258 HC RX REV CODE- 258: Performed by: INTERNAL MEDICINE

## 2020-01-01 PROCEDURE — 74011000250 HC RX REV CODE- 250: Performed by: RADIOLOGY

## 2020-01-01 PROCEDURE — 96374 THER/PROPH/DIAG INJ IV PUSH: CPT

## 2020-01-01 PROCEDURE — 96411 CHEMO IV PUSH ADDL DRUG: CPT

## 2020-01-01 PROCEDURE — 96413 CHEMO IV INFUSION 1 HR: CPT

## 2020-01-01 PROCEDURE — 74011250637 HC RX REV CODE- 250/637: Performed by: NURSE PRACTITIONER

## 2020-01-01 PROCEDURE — 96523 IRRIG DRUG DELIVERY DEVICE: CPT

## 2020-01-01 PROCEDURE — 86301 IMMUNOASSAY TUMOR CA 19-9: CPT

## 2020-01-01 PROCEDURE — 77030018846 HC SOL IRR STRL H20 ICUM -A

## 2020-01-01 PROCEDURE — 86644 CMV ANTIBODY: CPT

## 2020-01-01 PROCEDURE — 80307 DRUG TEST PRSMV CHEM ANLYZR: CPT

## 2020-01-01 PROCEDURE — 86900 BLOOD TYPING SEROLOGIC ABO: CPT

## 2020-01-01 PROCEDURE — 74011250637 HC RX REV CODE- 250/637: Performed by: INTERNAL MEDICINE

## 2020-01-01 PROCEDURE — 83540 ASSAY OF IRON: CPT

## 2020-01-01 PROCEDURE — 49450 REPLACE G/C TUBE PERC: CPT

## 2020-01-01 PROCEDURE — P9016 RBC LEUKOCYTES REDUCED: HCPCS

## 2020-01-01 PROCEDURE — 96366 THER/PROPH/DIAG IV INF ADDON: CPT

## 2020-01-01 PROCEDURE — 96416 CHEMO PROLONG INFUSE W/PUMP: CPT

## 2020-01-01 PROCEDURE — 86923 COMPATIBILITY TEST ELECTRIC: CPT

## 2020-01-01 PROCEDURE — 96360 HYDRATION IV INFUSION INIT: CPT

## 2020-01-01 PROCEDURE — 77030005103 HC CATH GASTMY BLN HALY -B

## 2020-01-01 PROCEDURE — 74011636320 HC RX REV CODE- 636/320: Performed by: INTERNAL MEDICINE

## 2020-01-01 PROCEDURE — 74011000250 HC RX REV CODE- 250: Performed by: INTERNAL MEDICINE

## 2020-01-01 PROCEDURE — 84156 ASSAY OF PROTEIN URINE: CPT

## 2020-01-01 PROCEDURE — 96417 CHEMO IV INFUS EACH ADDL SEQ: CPT

## 2020-01-01 PROCEDURE — 2709999900 HC NON-CHARGEABLE SUPPLY

## 2020-01-01 PROCEDURE — 36430 TRANSFUSION BLD/BLD COMPNT: CPT

## 2020-01-01 PROCEDURE — 82728 ASSAY OF FERRITIN: CPT

## 2020-01-01 PROCEDURE — 36593 DECLOT VASCULAR DEVICE: CPT

## 2020-01-01 PROCEDURE — C1769 GUIDE WIRE: HCPCS

## 2020-01-01 PROCEDURE — 77030018667 ADMN ST IV BLD FENW -A

## 2020-01-01 PROCEDURE — 74177 CT ABD & PELVIS W/CONTRAST: CPT

## 2020-01-01 PROCEDURE — P9040 RBC LEUKOREDUCED IRRADIATED: HCPCS

## 2020-01-01 PROCEDURE — 74011000636 HC RX REV CODE- 636: Performed by: INTERNAL MEDICINE

## 2020-01-01 PROCEDURE — 74011250636 HC RX REV CODE- 250/636: Performed by: RADIOLOGY

## 2020-01-01 RX ORDER — MORPHINE SULFATE 2 MG/ML
2 INJECTION, SOLUTION INTRAMUSCULAR; INTRAVENOUS ONCE
Status: COMPLETED | OUTPATIENT
Start: 2020-01-01 | End: 2020-01-01

## 2020-01-01 RX ORDER — SODIUM CHLORIDE 0.9 % (FLUSH) 0.9 %
10 SYRINGE (ML) INJECTION
Status: COMPLETED | OUTPATIENT
Start: 2020-01-01 | End: 2020-01-01

## 2020-01-01 RX ORDER — ONDANSETRON 2 MG/ML
8 INJECTION INTRAMUSCULAR; INTRAVENOUS ONCE
Status: COMPLETED | OUTPATIENT
Start: 2020-01-01 | End: 2020-01-01

## 2020-01-01 RX ORDER — ACETAMINOPHEN 325 MG/1
650 TABLET ORAL AS NEEDED
Status: DISCONTINUED | OUTPATIENT
Start: 2020-01-01 | End: 2020-01-01 | Stop reason: HOSPADM

## 2020-01-01 RX ORDER — SODIUM CHLORIDE 9 MG/ML
250 INJECTION, SOLUTION INTRAVENOUS AS NEEDED
Status: DISCONTINUED | OUTPATIENT
Start: 2020-01-01 | End: 2020-01-01 | Stop reason: HOSPADM

## 2020-01-01 RX ORDER — ACETAMINOPHEN 325 MG/1
650 TABLET ORAL ONCE
Status: COMPLETED | OUTPATIENT
Start: 2020-01-01 | End: 2020-01-01

## 2020-01-01 RX ORDER — DEXTROSE MONOHYDRATE 50 MG/ML
25 INJECTION, SOLUTION INTRAVENOUS CONTINUOUS
Status: ACTIVE | OUTPATIENT
Start: 2020-01-01 | End: 2020-01-01

## 2020-01-01 RX ORDER — EPINEPHRINE 1 MG/ML
0.3 INJECTION, SOLUTION, CONCENTRATE INTRAVENOUS AS NEEDED
Status: DISCONTINUED | OUTPATIENT
Start: 2020-01-01 | End: 2020-01-01 | Stop reason: HOSPADM

## 2020-01-01 RX ORDER — SODIUM CHLORIDE 0.9 % (FLUSH) 0.9 %
10 SYRINGE (ML) INJECTION AS NEEDED
Status: CANCELLED
Start: 2020-01-01

## 2020-01-01 RX ORDER — DIPHENHYDRAMINE HYDROCHLORIDE 50 MG/ML
50 INJECTION, SOLUTION INTRAMUSCULAR; INTRAVENOUS AS NEEDED
Status: DISCONTINUED | OUTPATIENT
Start: 2020-01-01 | End: 2020-01-01 | Stop reason: HOSPADM

## 2020-01-01 RX ORDER — ALBUTEROL SULFATE 0.83 MG/ML
2.5 SOLUTION RESPIRATORY (INHALATION) AS NEEDED
Status: DISCONTINUED | OUTPATIENT
Start: 2020-01-01 | End: 2020-01-01 | Stop reason: HOSPADM

## 2020-01-01 RX ORDER — HYDROCORTISONE SODIUM SUCCINATE 100 MG/2ML
100 INJECTION, POWDER, FOR SOLUTION INTRAMUSCULAR; INTRAVENOUS AS NEEDED
Status: DISPENSED | OUTPATIENT
Start: 2020-01-01 | End: 2020-01-01

## 2020-01-01 RX ORDER — LORAZEPAM 2 MG/ML
0.5 INJECTION INTRAMUSCULAR
Status: DISCONTINUED | OUTPATIENT
Start: 2020-01-01 | End: 2020-01-01 | Stop reason: HOSPADM

## 2020-01-01 RX ORDER — SODIUM CHLORIDE 9 MG/ML
25 INJECTION, SOLUTION INTRAVENOUS CONTINUOUS
Status: ACTIVE | OUTPATIENT
Start: 2020-01-01 | End: 2020-01-01

## 2020-01-01 RX ORDER — HEPARIN 100 UNIT/ML
300-500 SYRINGE INTRAVENOUS AS NEEDED
Status: CANCELLED
Start: 2020-01-01

## 2020-01-01 RX ORDER — FLUOROURACIL 50 MG/ML
400 INJECTION, SOLUTION INTRAVENOUS ONCE
Status: COMPLETED | OUTPATIENT
Start: 2020-01-01 | End: 2020-01-01

## 2020-01-01 RX ORDER — LIDOCAINE HYDROCHLORIDE 20 MG/ML
15 SOLUTION OROPHARYNGEAL ONCE
Status: COMPLETED | OUTPATIENT
Start: 2020-01-01 | End: 2020-01-01

## 2020-01-01 RX ORDER — HEPARIN 100 UNIT/ML
500 SYRINGE INTRAVENOUS ONCE
Status: COMPLETED | OUTPATIENT
Start: 2020-01-01 | End: 2020-01-01

## 2020-01-01 RX ORDER — DIPHENHYDRAMINE HCL 25 MG
25 CAPSULE ORAL ONCE
Status: COMPLETED | OUTPATIENT
Start: 2020-01-01 | End: 2020-01-01

## 2020-01-01 RX ORDER — HYDROCORTISONE SODIUM SUCCINATE 100 MG/2ML
100 INJECTION, POWDER, FOR SOLUTION INTRAMUSCULAR; INTRAVENOUS AS NEEDED
Status: DISCONTINUED | OUTPATIENT
Start: 2020-01-01 | End: 2020-01-01 | Stop reason: HOSPADM

## 2020-01-01 RX ORDER — SODIUM CHLORIDE 0.9 % (FLUSH) 0.9 %
10 SYRINGE (ML) INJECTION AS NEEDED
Status: ACTIVE | OUTPATIENT
Start: 2020-01-01 | End: 2020-01-01

## 2020-01-01 RX ORDER — SODIUM CHLORIDE 9 MG/ML
10 INJECTION INTRAMUSCULAR; INTRAVENOUS; SUBCUTANEOUS AS NEEDED
Status: DISCONTINUED | OUTPATIENT
Start: 2020-01-01 | End: 2020-01-01 | Stop reason: HOSPADM

## 2020-01-01 RX ORDER — SODIUM CHLORIDE 9 MG/ML
25 INJECTION, SOLUTION INTRAVENOUS CONTINUOUS
Status: DISCONTINUED | OUTPATIENT
Start: 2020-01-01 | End: 2020-01-01 | Stop reason: HOSPADM

## 2020-01-01 RX ORDER — ALBUTEROL SULFATE 0.83 MG/ML
2.5 SOLUTION RESPIRATORY (INHALATION) AS NEEDED
Status: CANCELLED
Start: 2020-01-01

## 2020-01-01 RX ORDER — ONDANSETRON 2 MG/ML
8 INJECTION INTRAMUSCULAR; INTRAVENOUS ONCE
Status: ACTIVE | OUTPATIENT
Start: 2020-01-01 | End: 2020-01-01

## 2020-01-01 RX ORDER — DIPHENHYDRAMINE HYDROCHLORIDE 50 MG/ML
25 INJECTION, SOLUTION INTRAMUSCULAR; INTRAVENOUS AS NEEDED
Status: CANCELLED
Start: 2020-01-01

## 2020-01-01 RX ORDER — SODIUM CHLORIDE 0.9 % (FLUSH) 0.9 %
10 SYRINGE (ML) INJECTION AS NEEDED
Status: DISCONTINUED | OUTPATIENT
Start: 2020-01-01 | End: 2020-01-01 | Stop reason: HOSPADM

## 2020-01-01 RX ORDER — LIDOCAINE HYDROCHLORIDE 20 MG/ML
1-10 INJECTION, SOLUTION INFILTRATION; PERINEURAL ONCE
Status: ACTIVE | OUTPATIENT
Start: 2020-01-01 | End: 2020-01-01

## 2020-01-01 RX ORDER — DIPHENHYDRAMINE HYDROCHLORIDE 50 MG/ML
50 INJECTION, SOLUTION INTRAMUSCULAR; INTRAVENOUS AS NEEDED
Status: DISPENSED | OUTPATIENT
Start: 2020-01-01 | End: 2020-01-01

## 2020-01-01 RX ORDER — POTASSIUM CHLORIDE 750 MG/1
40 TABLET, EXTENDED RELEASE ORAL
Status: COMPLETED | OUTPATIENT
Start: 2020-01-01 | End: 2020-01-01

## 2020-01-01 RX ORDER — ACETAMINOPHEN 325 MG/1
650 TABLET ORAL AS NEEDED
Status: CANCELLED
Start: 2020-01-01

## 2020-01-01 RX ORDER — ONDANSETRON 2 MG/ML
8 INJECTION INTRAMUSCULAR; INTRAVENOUS AS NEEDED
Status: DISPENSED | OUTPATIENT
Start: 2020-01-01 | End: 2020-01-01

## 2020-01-01 RX ORDER — SODIUM CHLORIDE 9 MG/ML
500 INJECTION, SOLUTION INTRAVENOUS ONCE
Status: COMPLETED | OUTPATIENT
Start: 2020-01-01 | End: 2020-01-01

## 2020-01-01 RX ORDER — DIPHENHYDRAMINE HYDROCHLORIDE 50 MG/ML
50 INJECTION, SOLUTION INTRAMUSCULAR; INTRAVENOUS AS NEEDED
Status: ACTIVE | OUTPATIENT
Start: 2020-01-01 | End: 2020-01-01

## 2020-01-01 RX ORDER — SODIUM CHLORIDE 0.9 % (FLUSH) 0.9 %
10-30 SYRINGE (ML) INJECTION AS NEEDED
Status: DISCONTINUED | OUTPATIENT
Start: 2020-01-01 | End: 2020-01-01 | Stop reason: HOSPADM

## 2020-01-01 RX ORDER — POTASSIUM CHLORIDE 29.8 MG/ML
20 INJECTION INTRAVENOUS ONCE
Status: COMPLETED | OUTPATIENT
Start: 2020-01-01 | End: 2020-01-01

## 2020-01-01 RX ORDER — HYDROCORTISONE SODIUM SUCCINATE 100 MG/2ML
100 INJECTION, POWDER, FOR SOLUTION INTRAMUSCULAR; INTRAVENOUS AS NEEDED
Status: CANCELLED | OUTPATIENT
Start: 2020-01-01

## 2020-01-01 RX ORDER — ONDANSETRON 2 MG/ML
8 INJECTION INTRAMUSCULAR; INTRAVENOUS AS NEEDED
Status: CANCELLED | OUTPATIENT
Start: 2020-01-01

## 2020-01-01 RX ORDER — DEXAMETHASONE SODIUM PHOSPHATE 100 MG/10ML
10 INJECTION INTRAMUSCULAR; INTRAVENOUS ONCE
Status: COMPLETED | OUTPATIENT
Start: 2020-01-01 | End: 2020-01-01

## 2020-01-01 RX ORDER — DIPHENHYDRAMINE HYDROCHLORIDE 50 MG/ML
50 INJECTION, SOLUTION INTRAMUSCULAR; INTRAVENOUS ONCE
Status: COMPLETED | OUTPATIENT
Start: 2020-01-01 | End: 2020-01-01

## 2020-01-01 RX ORDER — HEPARIN 100 UNIT/ML
300-500 SYRINGE INTRAVENOUS AS NEEDED
Status: DISCONTINUED | OUTPATIENT
Start: 2020-01-01 | End: 2020-01-01 | Stop reason: HOSPADM

## 2020-01-01 RX ORDER — DEXTROSE MONOHYDRATE 50 MG/ML
25 INJECTION, SOLUTION INTRAVENOUS CONTINUOUS
Status: DISCONTINUED | OUTPATIENT
Start: 2020-01-01 | End: 2020-01-01 | Stop reason: HOSPADM

## 2020-01-01 RX ORDER — LORAZEPAM 2 MG/ML
1 INJECTION INTRAMUSCULAR ONCE
Status: COMPLETED | OUTPATIENT
Start: 2020-01-01 | End: 2020-01-01

## 2020-01-01 RX ORDER — SODIUM CHLORIDE 9 MG/ML
10 INJECTION INTRAMUSCULAR; INTRAVENOUS; SUBCUTANEOUS AS NEEDED
Status: CANCELLED | OUTPATIENT
Start: 2020-01-01

## 2020-01-01 RX ORDER — SODIUM CHLORIDE 9 MG/ML
500 INJECTION, SOLUTION INTRAVENOUS CONTINUOUS
Status: DISCONTINUED | OUTPATIENT
Start: 2020-01-01 | End: 2020-01-01 | Stop reason: HOSPADM

## 2020-01-01 RX ORDER — DIPHENHYDRAMINE HYDROCHLORIDE 50 MG/ML
50 INJECTION, SOLUTION INTRAMUSCULAR; INTRAVENOUS AS NEEDED
Status: CANCELLED
Start: 2020-01-01

## 2020-01-01 RX ORDER — HYDROCORTISONE SODIUM SUCCINATE 100 MG/2ML
100 INJECTION, POWDER, FOR SOLUTION INTRAMUSCULAR; INTRAVENOUS AS NEEDED
Status: ACTIVE | OUTPATIENT
Start: 2020-01-01 | End: 2020-01-01

## 2020-01-01 RX ORDER — SODIUM CHLORIDE 9 MG/ML
10 INJECTION INTRAMUSCULAR; INTRAVENOUS; SUBCUTANEOUS AS NEEDED
Status: ACTIVE | OUTPATIENT
Start: 2020-01-01 | End: 2020-01-01

## 2020-01-01 RX ORDER — LIDOCAINE HYDROCHLORIDE 20 MG/ML
20-200 INJECTION, SOLUTION INFILTRATION; PERINEURAL ONCE
Status: ACTIVE | OUTPATIENT
Start: 2020-01-01 | End: 2020-01-01

## 2020-01-01 RX ORDER — DIPHENHYDRAMINE HYDROCHLORIDE 50 MG/ML
25 INJECTION, SOLUTION INTRAMUSCULAR; INTRAVENOUS AS NEEDED
Status: DISCONTINUED | OUTPATIENT
Start: 2020-01-01 | End: 2020-01-01 | Stop reason: HOSPADM

## 2020-01-01 RX ORDER — FLUOROURACIL 50 MG/ML
400 INJECTION, SOLUTION INTRAVENOUS ONCE
Status: DISCONTINUED | OUTPATIENT
Start: 2020-01-01 | End: 2020-01-01 | Stop reason: HOSPADM

## 2020-01-01 RX ORDER — ALBUTEROL SULFATE 0.83 MG/ML
2.5 SOLUTION RESPIRATORY (INHALATION) AS NEEDED
Status: ACTIVE | OUTPATIENT
Start: 2020-01-01 | End: 2020-01-01

## 2020-01-01 RX ORDER — EPINEPHRINE 1 MG/ML
0.3 INJECTION, SOLUTION, CONCENTRATE INTRAVENOUS AS NEEDED
Status: CANCELLED | OUTPATIENT
Start: 2020-01-01

## 2020-01-01 RX ORDER — ONDANSETRON 2 MG/ML
8 INJECTION INTRAMUSCULAR; INTRAVENOUS AS NEEDED
Status: DISCONTINUED | OUTPATIENT
Start: 2020-01-01 | End: 2020-01-01 | Stop reason: HOSPADM

## 2020-01-01 RX ORDER — SODIUM CHLORIDE 9 MG/ML
25 INJECTION, SOLUTION INTRAVENOUS CONTINUOUS
Status: CANCELLED | OUTPATIENT
Start: 2020-01-01

## 2020-01-01 RX ADMIN — FLUOROURACIL 4392 MG: 50 INJECTION, SOLUTION INTRAVENOUS at 13:52

## 2020-01-01 RX ADMIN — ONDANSETRON 8 MG: 2 INJECTION, SOLUTION INTRAMUSCULAR; INTRAVENOUS at 09:10

## 2020-01-01 RX ADMIN — Medication 10 ML: at 15:09

## 2020-01-01 RX ADMIN — FAMOTIDINE 20 MG: 10 INJECTION, SOLUTION INTRAVENOUS at 15:49

## 2020-01-01 RX ADMIN — DEXTROSE MONOHYDRATE 25 ML/HR: 5 INJECTION, SOLUTION INTRAVENOUS at 10:10

## 2020-01-01 RX ADMIN — SODIUM CHLORIDE 500 ML: 900 INJECTION, SOLUTION INTRAVENOUS at 17:28

## 2020-01-01 RX ADMIN — DIATRIZOATE MEGLUMINE AND DIATRIZOATE SODIUM 15 ML: 660; 100 LIQUID ORAL; RECTAL at 13:28

## 2020-01-01 RX ADMIN — TRASTUZUMAB 263 MG: 150 INJECTION, POWDER, LYOPHILIZED, FOR SOLUTION INTRAVENOUS at 13:47

## 2020-01-01 RX ADMIN — LEUCOVORIN CALCIUM 732 MG: 200 INJECTION, POWDER, LYOPHILIZED, FOR SUSPENSION INTRAMUSCULAR; INTRAVENOUS at 13:53

## 2020-01-01 RX ADMIN — FLUOROURACIL 772 MG: 50 INJECTION, SOLUTION INTRAVENOUS at 12:17

## 2020-01-01 RX ADMIN — FLUOROURACIL 772 MG: 50 INJECTION, SOLUTION INTRAVENOUS at 12:50

## 2020-01-01 RX ADMIN — SODIUM CHLORIDE 10 ML: 9 INJECTION, SOLUTION INTRAMUSCULAR; INTRAVENOUS; SUBCUTANEOUS at 17:00

## 2020-01-01 RX ADMIN — DEXTROSE MONOHYDRATE 25 ML/HR: 5 INJECTION, SOLUTION INTRAVENOUS at 09:05

## 2020-01-01 RX ADMIN — DEXTROSE MONOHYDRATE 164 MG: 5 INJECTION, SOLUTION INTRAVENOUS at 10:53

## 2020-01-01 RX ADMIN — DEXTROSE MONOHYDRATE 25 ML/HR: 5 INJECTION, SOLUTION INTRAVENOUS at 10:36

## 2020-01-01 RX ADMIN — FERUMOXYTOL 510 MG: 510 INJECTION INTRAVENOUS at 17:43

## 2020-01-01 RX ADMIN — SODIUM CHLORIDE 25 ML/HR: 900 INJECTION, SOLUTION INTRAVENOUS at 10:25

## 2020-01-01 RX ADMIN — Medication 10 ML: at 17:28

## 2020-01-01 RX ADMIN — OXALIPLATIN 150 MG: 5 INJECTION, SOLUTION INTRAVENOUS at 11:25

## 2020-01-01 RX ADMIN — DEXTROSE MONOHYDRATE 25 ML/HR: 5 INJECTION, SOLUTION INTRAVENOUS at 08:35

## 2020-01-01 RX ADMIN — ACETAMINOPHEN 650 MG: 325 TABLET, FILM COATED ORAL at 08:19

## 2020-01-01 RX ADMIN — Medication 10 ML: at 16:57

## 2020-01-01 RX ADMIN — Medication 10 ML: at 18:30

## 2020-01-01 RX ADMIN — DIPHENHYDRAMINE HYDROCHLORIDE 50 MG: 50 INJECTION, SOLUTION INTRAMUSCULAR; INTRAVENOUS at 15:38

## 2020-01-01 RX ADMIN — LEUCOVORIN CALCIUM 772 MG: 100 INJECTION, POWDER, LYOPHILIZED, FOR SUSPENSION INTRAMUSCULAR; INTRAVENOUS at 10:15

## 2020-01-01 RX ADMIN — FERUMOXYTOL 510 MG: 510 INJECTION INTRAVENOUS at 17:44

## 2020-01-01 RX ADMIN — LEUCOVORIN CALCIUM 732 MG: 100 INJECTION, POWDER, LYOPHILIZED, FOR SUSPENSION INTRAMUSCULAR; INTRAVENOUS at 14:20

## 2020-01-01 RX ADMIN — FLUOROURACIL 4500 MG: 50 INJECTION, SOLUTION INTRAVENOUS at 11:20

## 2020-01-01 RX ADMIN — SODIUM CHLORIDE 100 ML: 900 INJECTION, SOLUTION INTRAVENOUS at 13:28

## 2020-01-01 RX ADMIN — OXALIPLATIN 150 MG: 5 INJECTION, SOLUTION, CONCENTRATE INTRAVENOUS at 13:53

## 2020-01-01 RX ADMIN — Medication 10 ML: at 16:00

## 2020-01-01 RX ADMIN — DEXTROSE MONOHYDRATE 25 ML/HR: 5 INJECTION, SOLUTION INTRAVENOUS at 09:25

## 2020-01-01 RX ADMIN — OXALIPLATIN 164 MG: 5 INJECTION, SOLUTION INTRAVENOUS at 09:38

## 2020-01-01 RX ADMIN — TRASTUZUMAB 295 MG: 150 INJECTION, POWDER, LYOPHILIZED, FOR SOLUTION INTRAVENOUS at 09:35

## 2020-01-01 RX ADMIN — Medication 10 ML: at 15:31

## 2020-01-01 RX ADMIN — Medication 10 ML: at 10:10

## 2020-01-01 RX ADMIN — FLUOROURACIL 732 MG: 50 INJECTION, SOLUTION INTRAVENOUS at 13:50

## 2020-01-01 RX ADMIN — DEXAMETHASONE SODIUM PHOSPHATE 12 MG: 4 INJECTION, SOLUTION INTRAMUSCULAR; INTRAVENOUS at 12:10

## 2020-01-01 RX ADMIN — TRASTUZUMAB 263 MG: 150 INJECTION, POWDER, LYOPHILIZED, FOR SOLUTION INTRAVENOUS at 09:40

## 2020-01-01 RX ADMIN — IOPAMIDOL 100 ML: 755 INJECTION, SOLUTION INTRAVENOUS at 13:27

## 2020-01-01 RX ADMIN — LEUCOVORIN CALCIUM 772 MG: 350 INJECTION, POWDER, LYOPHILIZED, FOR SUSPENSION INTRAMUSCULAR; INTRAVENOUS at 09:36

## 2020-01-01 RX ADMIN — DEXAMETHASONE SODIUM PHOSPHATE 12 MG: 4 INJECTION, SOLUTION INTRAMUSCULAR; INTRAVENOUS at 09:11

## 2020-01-01 RX ADMIN — FLUOROURACIL 4500 MG: 50 INJECTION, SOLUTION INTRAVENOUS at 13:08

## 2020-01-01 RX ADMIN — ONDANSETRON 8 MG: 2 INJECTION INTRAMUSCULAR; INTRAVENOUS at 09:12

## 2020-01-01 RX ADMIN — Medication 10 ML: at 16:35

## 2020-01-01 RX ADMIN — DEXAMETHASONE SODIUM PHOSPHATE 10 MG: 10 INJECTION INTRAMUSCULAR; INTRAVENOUS at 15:22

## 2020-01-01 RX ADMIN — Medication 10 ML: at 08:30

## 2020-01-01 RX ADMIN — LEUCOVORIN CALCIUM 772 MG: 350 INJECTION, POWDER, LYOPHILIZED, FOR SUSPENSION INTRAMUSCULAR; INTRAVENOUS at 10:36

## 2020-01-01 RX ADMIN — FLUOROURACIL 732 MG: 50 INJECTION, SOLUTION INTRAVENOUS at 15:30

## 2020-01-01 RX ADMIN — Medication 10 ML: at 12:28

## 2020-01-01 RX ADMIN — DEXAMETHASONE SODIUM PHOSPHATE 12 MG: 4 INJECTION, SOLUTION INTRAMUSCULAR; INTRAVENOUS at 13:30

## 2020-01-01 RX ADMIN — SODIUM CHLORIDE 10 ML: 9 INJECTION INTRAMUSCULAR; INTRAVENOUS; SUBCUTANEOUS at 10:25

## 2020-01-01 RX ADMIN — FLUOROURACIL 772 MG: 50 INJECTION, SOLUTION INTRAVENOUS at 13:03

## 2020-01-01 RX ADMIN — Medication 10 ML: at 07:52

## 2020-01-01 RX ADMIN — ALTEPLASE 2 MG: 2.2 INJECTION, POWDER, LYOPHILIZED, FOR SOLUTION INTRAVENOUS at 09:52

## 2020-01-01 RX ADMIN — FLUOROURACIL 4500 MG: 50 INJECTION, SOLUTION INTRAVENOUS at 12:54

## 2020-01-01 RX ADMIN — FLUOROURACIL 4392 MG: 50 INJECTION, SOLUTION INTRAVENOUS at 16:10

## 2020-01-01 RX ADMIN — Medication 10 ML: at 14:35

## 2020-01-01 RX ADMIN — ONDANSETRON 8 MG: 2 INJECTION INTRAMUSCULAR; INTRAVENOUS at 10:15

## 2020-01-01 RX ADMIN — FLUOROURACIL 732 MG: 50 INJECTION, SOLUTION INTRAVENOUS at 15:22

## 2020-01-01 RX ADMIN — SODIUM CHLORIDE 25 ML/HR: 900 INJECTION, SOLUTION INTRAVENOUS at 15:22

## 2020-01-01 RX ADMIN — OXALIPLATIN 150 MG: 5 INJECTION, SOLUTION, CONCENTRATE INTRAVENOUS at 10:53

## 2020-01-01 RX ADMIN — DEXAMETHASONE SODIUM PHOSPHATE 12 MG: 4 INJECTION, SOLUTION INTRAMUSCULAR; INTRAVENOUS at 09:14

## 2020-01-01 RX ADMIN — SODIUM CHLORIDE 25 ML/HR: 900 INJECTION, SOLUTION INTRAVENOUS at 12:30

## 2020-01-01 RX ADMIN — FAMOTIDINE 20 MG: 10 INJECTION, SOLUTION INTRAVENOUS at 13:53

## 2020-01-01 RX ADMIN — ONDANSETRON 8 MG: 2 INJECTION INTRAMUSCULAR; INTRAVENOUS at 13:40

## 2020-01-01 RX ADMIN — PACLITAXEL 116 MG: 6 INJECTION, SOLUTION INTRAVENOUS at 12:58

## 2020-01-01 RX ADMIN — FLUOROURACIL 4392 MG: 50 INJECTION, SOLUTION INTRAVENOUS at 15:27

## 2020-01-01 RX ADMIN — TRASTUZUMAB 263 MG: 150 INJECTION, POWDER, LYOPHILIZED, FOR SOLUTION INTRAVENOUS at 09:50

## 2020-01-01 RX ADMIN — HEPARIN SODIUM (PORCINE) LOCK FLUSH IV SOLN 100 UNIT/ML 500 UNITS: 100 SOLUTION at 14:36

## 2020-01-01 RX ADMIN — FLUOROURACIL 4392 MG: 50 INJECTION, SOLUTION INTRAVENOUS at 13:01

## 2020-01-01 RX ADMIN — SODIUM CHLORIDE 100 ML: 900 INJECTION, SOLUTION INTRAVENOUS at 14:23

## 2020-01-01 RX ADMIN — DIPHENHYDRAMINE HYDROCHLORIDE 50 MG: 50 INJECTION, SOLUTION INTRAMUSCULAR; INTRAVENOUS at 11:00

## 2020-01-01 RX ADMIN — Medication 10 ML: at 10:50

## 2020-01-01 RX ADMIN — Medication 10 ML: at 10:25

## 2020-01-01 RX ADMIN — DEXAMETHASONE SODIUM PHOSPHATE 12 MG: 4 INJECTION, SOLUTION INTRAMUSCULAR; INTRAVENOUS at 10:17

## 2020-01-01 RX ADMIN — Medication 10 ML: at 16:30

## 2020-01-01 RX ADMIN — DEXAMETHASONE SODIUM PHOSPHATE 10 MG: 10 INJECTION INTRAMUSCULAR; INTRAVENOUS at 14:09

## 2020-01-01 RX ADMIN — DEXAMETHASONE SODIUM PHOSPHATE 12 MG: 4 INJECTION, SOLUTION INTRAMUSCULAR; INTRAVENOUS at 10:21

## 2020-01-01 RX ADMIN — SODIUM CHLORIDE 500 MG: 9 INJECTION, SOLUTION INTRAVENOUS at 11:45

## 2020-01-01 RX ADMIN — TRASTUZUMAB 263 MG: 150 INJECTION, POWDER, LYOPHILIZED, FOR SOLUTION INTRAVENOUS at 11:30

## 2020-01-01 RX ADMIN — Medication 10 ML: at 09:32

## 2020-01-01 RX ADMIN — ONDANSETRON 8 MG: 2 INJECTION INTRAMUSCULAR; INTRAVENOUS at 08:55

## 2020-01-01 RX ADMIN — Medication 10 ML: at 08:54

## 2020-01-01 RX ADMIN — DEXAMETHASONE SODIUM PHOSPHATE 12 MG: 4 INJECTION, SOLUTION INTRAMUSCULAR; INTRAVENOUS at 08:43

## 2020-01-01 RX ADMIN — DIPHENHYDRAMINE HYDROCHLORIDE 50 MG: 50 INJECTION, SOLUTION INTRAMUSCULAR; INTRAVENOUS at 14:10

## 2020-01-01 RX ADMIN — DIATRIZOATE MEGLUMINE AND DIATRIZOATE SODIUM 15 ML: 660; 100 LIQUID ORAL; RECTAL at 14:23

## 2020-01-01 RX ADMIN — IOPAMIDOL 100 ML: 755 INJECTION, SOLUTION INTRAVENOUS at 14:23

## 2020-01-01 RX ADMIN — FAMOTIDINE 20 MG: 10 INJECTION, SOLUTION INTRAVENOUS at 11:03

## 2020-01-01 RX ADMIN — DEXTROSE MONOHYDRATE 25 ML/HR: 5 INJECTION, SOLUTION INTRAVENOUS at 13:16

## 2020-01-01 RX ADMIN — SODIUM CHLORIDE 25 ML/HR: 900 INJECTION, SOLUTION INTRAVENOUS at 13:20

## 2020-01-01 RX ADMIN — SODIUM CHLORIDE 100 ML/HR: 900 INJECTION, SOLUTION INTRAVENOUS at 08:30

## 2020-01-01 RX ADMIN — Medication 10 ML: at 15:02

## 2020-01-01 RX ADMIN — LEUCOVORIN CALCIUM 772 MG: 100 INJECTION, POWDER, LYOPHILIZED, FOR SUSPENSION INTRAMUSCULAR; INTRAVENOUS at 13:56

## 2020-01-01 RX ADMIN — LEUCOVORIN CALCIUM 772 MG: 100 INJECTION, POWDER, LYOPHILIZED, FOR SUSPENSION INTRAMUSCULAR; INTRAVENOUS at 10:53

## 2020-01-01 RX ADMIN — FLUOROURACIL 772 MG: 50 INJECTION, SOLUTION INTRAVENOUS at 11:44

## 2020-01-01 RX ADMIN — FLUOROURACIL 732 MG: 50 INJECTION, SOLUTION INTRAVENOUS at 13:02

## 2020-01-01 RX ADMIN — LIDOCAINE HYDROCHLORIDE 15 ML: 20 SOLUTION ORAL; TOPICAL at 09:19

## 2020-01-01 RX ADMIN — FLUOROURACIL 824 MG: 50 INJECTION, SOLUTION INTRAVENOUS at 11:59

## 2020-01-01 RX ADMIN — LEUCOVORIN CALCIUM 732 MG: 350 INJECTION, POWDER, LYOPHILIZED, FOR SOLUTION INTRAMUSCULAR; INTRAVENOUS at 11:25

## 2020-01-01 RX ADMIN — SODIUM CHLORIDE 500 ML: 900 INJECTION, SOLUTION INTRAVENOUS at 11:05

## 2020-01-01 RX ADMIN — DEXTROSE MONOHYDRATE 25 ML/HR: 5 INJECTION, SOLUTION INTRAVENOUS at 13:43

## 2020-01-01 RX ADMIN — SODIUM CHLORIDE 10 ML: 9 INJECTION INTRAMUSCULAR; INTRAVENOUS; SUBCUTANEOUS at 08:45

## 2020-01-01 RX ADMIN — Medication 10 ML: at 14:28

## 2020-01-01 RX ADMIN — ONDANSETRON 8 MG: 2 INJECTION INTRAMUSCULAR; INTRAVENOUS at 09:33

## 2020-01-01 RX ADMIN — PACLITAXEL 116 MG: 6 INJECTION, SOLUTION INTRAVENOUS at 16:30

## 2020-01-01 RX ADMIN — FLUOROURACIL 772 MG: 50 INJECTION, SOLUTION INTRAVENOUS at 16:00

## 2020-01-01 RX ADMIN — IOPAMIDOL 12 ML: 612 INJECTION, SOLUTION INTRAVENOUS at 10:52

## 2020-01-01 RX ADMIN — FLUOROURACIL 4944 MG: 50 INJECTION, SOLUTION INTRAVENOUS at 12:03

## 2020-01-01 RX ADMIN — DEXTROSE MONOHYDRATE 25 ML/HR: 5 INJECTION, SOLUTION INTRAVENOUS at 10:06

## 2020-01-01 RX ADMIN — MORPHINE SULFATE 2 MG: 2 INJECTION, SOLUTION INTRAMUSCULAR; INTRAVENOUS at 08:39

## 2020-01-01 RX ADMIN — FLUOROURACIL 732 MG: 50 INJECTION, SOLUTION INTRAVENOUS at 16:00

## 2020-01-01 RX ADMIN — Medication 10 ML: at 09:24

## 2020-01-01 RX ADMIN — FLUOROURACIL 4500 MG: 50 INJECTION, SOLUTION INTRAVENOUS at 16:05

## 2020-01-01 RX ADMIN — SODIUM CHLORIDE 250 ML: 900 INJECTION, SOLUTION INTRAVENOUS at 08:20

## 2020-01-01 RX ADMIN — DEXAMETHASONE SODIUM PHOSPHATE 12 MG: 4 INJECTION, SOLUTION INTRAMUSCULAR; INTRAVENOUS at 13:23

## 2020-01-01 RX ADMIN — LEUCOVORIN CALCIUM 732 MG: 200 INJECTION, POWDER, LYOPHILIZED, FOR SUSPENSION INTRAMUSCULAR; INTRAVENOUS at 10:55

## 2020-01-01 RX ADMIN — DEXTROSE MONOHYDRATE 164 MG: 5 INJECTION, SOLUTION INTRAVENOUS at 09:14

## 2020-01-01 RX ADMIN — Medication 10 ML: at 13:27

## 2020-01-01 RX ADMIN — FLUOROURACIL 772 MG: 50 INJECTION, SOLUTION INTRAVENOUS at 11:15

## 2020-01-01 RX ADMIN — SODIUM CHLORIDE 25 ML/HR: 900 INJECTION, SOLUTION INTRAVENOUS at 09:40

## 2020-01-01 RX ADMIN — Medication 10 ML: at 09:15

## 2020-01-01 RX ADMIN — Medication 10 ML: at 15:30

## 2020-01-01 RX ADMIN — SODIUM CHLORIDE 250 ML: 900 INJECTION, SOLUTION INTRAVENOUS at 09:08

## 2020-01-01 RX ADMIN — SODIUM CHLORIDE 10 ML: 9 INJECTION INTRAMUSCULAR; INTRAVENOUS; SUBCUTANEOUS at 14:55

## 2020-01-01 RX ADMIN — SODIUM CHLORIDE 25 ML/HR: 9 INJECTION, SOLUTION INTRAVENOUS at 08:45

## 2020-01-01 RX ADMIN — Medication 10 ML: at 10:40

## 2020-01-01 RX ADMIN — OXALIPLATIN 164 MG: 5 INJECTION, SOLUTION INTRAVENOUS at 13:56

## 2020-01-01 RX ADMIN — DEXTROSE MONOHYDRATE 175 MG: 5 INJECTION, SOLUTION INTRAVENOUS at 09:38

## 2020-01-01 RX ADMIN — DEXAMETHASONE SODIUM PHOSPHATE 12 MG: 4 INJECTION, SOLUTION INTRAMUSCULAR; INTRAVENOUS at 10:25

## 2020-01-01 RX ADMIN — DEXTROSE MONOHYDRATE 25 ML/HR: 5 INJECTION, SOLUTION INTRAVENOUS at 12:30

## 2020-01-01 RX ADMIN — SODIUM CHLORIDE 25 ML/HR: 900 INJECTION, SOLUTION INTRAVENOUS at 11:05

## 2020-01-01 RX ADMIN — ONDANSETRON 8 MG: 2 INJECTION INTRAMUSCULAR; INTRAVENOUS at 12:08

## 2020-01-01 RX ADMIN — Medication 10 ML: at 09:05

## 2020-01-01 RX ADMIN — TRASTUZUMAB 295 MG: 150 INJECTION, POWDER, LYOPHILIZED, FOR SOLUTION INTRAVENOUS at 11:44

## 2020-01-01 RX ADMIN — Medication 10 ML: at 13:50

## 2020-01-01 RX ADMIN — ONDANSETRON 8 MG: 2 INJECTION INTRAMUSCULAR; INTRAVENOUS at 10:20

## 2020-01-01 RX ADMIN — IOPAMIDOL 8 ML: 612 INJECTION, SOLUTION INTRAVENOUS at 09:21

## 2020-01-01 RX ADMIN — LEUCOVORIN CALCIUM 824 MG: 350 INJECTION, POWDER, LYOPHILIZED, FOR SUSPENSION INTRAMUSCULAR; INTRAVENOUS at 09:38

## 2020-01-01 RX ADMIN — DEXTROSE MONOHYDRATE 25 ML/HR: 5 INJECTION, SOLUTION INTRAVENOUS at 10:41

## 2020-01-01 RX ADMIN — SODIUM CHLORIDE 500 MG: 9 INJECTION, SOLUTION INTRAVENOUS at 14:33

## 2020-01-01 RX ADMIN — ONDANSETRON 8 MG: 2 INJECTION INTRAMUSCULAR; INTRAVENOUS at 13:27

## 2020-01-01 RX ADMIN — DIPHENHYDRAMINE HYDROCHLORIDE 25 MG: 25 CAPSULE ORAL at 08:19

## 2020-01-01 RX ADMIN — FLUOROURACIL 4500 MG: 50 INJECTION, SOLUTION INTRAVENOUS at 11:50

## 2020-01-01 RX ADMIN — Medication 10 ML: at 14:23

## 2020-01-01 RX ADMIN — POTASSIUM CHLORIDE 20 MEQ: 400 INJECTION, SOLUTION INTRAVENOUS at 09:41

## 2020-01-01 RX ADMIN — POTASSIUM CHLORIDE 40 MEQ: 750 TABLET, EXTENDED RELEASE ORAL at 09:22

## 2020-01-01 RX ADMIN — OXALIPLATIN 150 MG: 5 INJECTION, SOLUTION INTRAVENOUS at 12:55

## 2020-01-01 RX ADMIN — ONDANSETRON 8 MG: 2 INJECTION INTRAMUSCULAR; INTRAVENOUS at 13:22

## 2020-01-01 RX ADMIN — SODIUM CHLORIDE 500 ML: 900 INJECTION, SOLUTION INTRAVENOUS at 16:00

## 2020-01-01 RX ADMIN — Medication 10 ML: at 09:43

## 2020-01-01 RX ADMIN — LEUCOVORIN CALCIUM 772 MG: 350 INJECTION, POWDER, LYOPHILIZED, FOR SUSPENSION INTRAMUSCULAR; INTRAVENOUS at 09:14

## 2020-01-01 RX ADMIN — ONDANSETRON 8 MG: 2 INJECTION INTRAMUSCULAR; INTRAVENOUS at 09:50

## 2020-01-01 RX ADMIN — DEXAMETHASONE SODIUM PHOSPHATE 12 MG: 4 INJECTION, SOLUTION INTRAMUSCULAR; INTRAVENOUS at 13:29

## 2020-01-01 RX ADMIN — PACLITAXEL 116 MG: 6 INJECTION, SOLUTION INTRAVENOUS at 15:51

## 2020-01-01 RX ADMIN — OXALIPLATIN 164 MG: 5 INJECTION, SOLUTION INTRAVENOUS at 10:15

## 2020-01-01 RX ADMIN — Medication 10 ML: at 13:15

## 2020-01-01 RX ADMIN — DEXAMETHASONE SODIUM PHOSPHATE 12 MG: 4 INJECTION, SOLUTION INTRAMUSCULAR; INTRAVENOUS at 09:51

## 2020-01-01 RX ADMIN — SODIUM CHLORIDE 25 ML/HR: 900 INJECTION, SOLUTION INTRAVENOUS at 09:00

## 2020-01-01 RX ADMIN — ONDANSETRON 8 MG: 2 INJECTION INTRAMUSCULAR; INTRAVENOUS at 08:37

## 2020-01-01 RX ADMIN — FLUOROURACIL 4500 MG: 50 INJECTION, SOLUTION INTRAVENOUS at 12:22

## 2020-01-01 RX ADMIN — LORAZEPAM 1 MG: 2 INJECTION INTRAMUSCULAR; INTRAVENOUS at 11:47

## 2020-01-01 RX ADMIN — LIDOCAINE HYDROCHLORIDE 15 ML: 20 SOLUTION ORAL; TOPICAL at 10:54

## 2020-01-01 RX ADMIN — Medication 10 ML: at 10:09

## 2020-01-01 RX ADMIN — FLUOROURACIL 4392 MG: 50 INJECTION, SOLUTION INTRAVENOUS at 15:35

## 2020-01-01 RX ADMIN — TRASTUZUMAB 263 MG: 150 INJECTION, POWDER, LYOPHILIZED, FOR SOLUTION INTRAVENOUS at 12:45

## 2020-01-01 RX ADMIN — DEXAMETHASONE SODIUM PHOSPHATE 10 MG: 10 INJECTION INTRAMUSCULAR; INTRAVENOUS at 10:59

## 2020-01-01 RX ADMIN — LEUCOVORIN CALCIUM 732 MG: 350 INJECTION, POWDER, LYOPHILIZED, FOR SUSPENSION INTRAMUSCULAR; INTRAVENOUS at 12:55

## 2020-01-01 RX ADMIN — OXALIPLATIN 164 MG: 5 INJECTION, SOLUTION INTRAVENOUS at 10:36

## 2020-01-01 RX ADMIN — Medication 10 ML: at 17:00

## 2020-01-01 RX ADMIN — Medication 10 ML: at 08:15

## 2020-01-01 NOTE — PROGRESS NOTES
cxr done and ETT in place with noted LLL infiltrates. Central line in place to use. Will get ABG shortly.  Respiratory dis suction and copious yellow secretions removed and sent for analysis    Eitan Fabian MD yes

## 2020-01-16 ENCOUNTER — HOSPITAL ENCOUNTER (INPATIENT)
Age: 55
LOS: 15 days | Discharge: HOME HEALTH | DRG: 950 | End: 2020-01-31
Attending: EMERGENCY MEDICINE | Admitting: HOSPITALIST
Payer: MEDICAID

## 2020-01-16 ENCOUNTER — ANESTHESIA EVENT (OUTPATIENT)
Dept: ENDOSCOPY | Age: 55
DRG: 950 | End: 2020-01-16
Payer: MEDICAID

## 2020-01-16 ENCOUNTER — APPOINTMENT (OUTPATIENT)
Dept: GENERAL RADIOLOGY | Age: 55
DRG: 950 | End: 2020-01-16
Attending: EMERGENCY MEDICINE
Payer: MEDICAID

## 2020-01-16 ENCOUNTER — APPOINTMENT (OUTPATIENT)
Dept: CT IMAGING | Age: 55
DRG: 950 | End: 2020-01-16
Attending: EMERGENCY MEDICINE
Payer: MEDICAID

## 2020-01-16 DIAGNOSIS — Z71.89 COUNSELING AND COORDINATION OF CARE: ICD-10-CM

## 2020-01-16 DIAGNOSIS — F15.10 METHAMPHETAMINE ABUSE (HCC): Chronic | ICD-10-CM

## 2020-01-16 DIAGNOSIS — C15.9 MALIGNANT NEOPLASM OF ESOPHAGUS, UNSPECIFIED LOCATION (HCC): ICD-10-CM

## 2020-01-16 DIAGNOSIS — Z51.5 ENCOUNTER FOR PALLIATIVE CARE: ICD-10-CM

## 2020-01-16 DIAGNOSIS — J44.9 CHRONIC OBSTRUCTIVE PULMONARY DISEASE, UNSPECIFIED COPD TYPE (HCC): Chronic | ICD-10-CM

## 2020-01-16 DIAGNOSIS — R13.19 ESOPHAGEAL DYSPHAGIA: ICD-10-CM

## 2020-01-16 DIAGNOSIS — J18.9 PNEUMONIA OF RIGHT LUNG DUE TO INFECTIOUS ORGANISM, UNSPECIFIED PART OF LUNG: ICD-10-CM

## 2020-01-16 DIAGNOSIS — C78.00 SECONDARY CARCINOMA OF LUNG, UNSPECIFIED LATERALITY (HCC): Primary | ICD-10-CM

## 2020-01-16 DIAGNOSIS — R10.13 EPIGASTRIC PAIN: ICD-10-CM

## 2020-01-16 DIAGNOSIS — F19.10 SUBSTANCE ABUSE (HCC): Chronic | ICD-10-CM

## 2020-01-16 DIAGNOSIS — G89.3 CANCER RELATED PAIN: ICD-10-CM

## 2020-01-16 DIAGNOSIS — K22.89 ESOPHAGEAL THICKENING: ICD-10-CM

## 2020-01-16 DIAGNOSIS — C15.5 MALIGNANT NEOPLASM OF LOWER THIRD OF ESOPHAGUS (HCC): ICD-10-CM

## 2020-01-16 DIAGNOSIS — R11.0 NAUSEA: ICD-10-CM

## 2020-01-16 DIAGNOSIS — C15.9 ESOPHAGEAL CARCINOMA (HCC): ICD-10-CM

## 2020-01-16 DIAGNOSIS — R07.9 CHEST PAIN, UNSPECIFIED TYPE: ICD-10-CM

## 2020-01-16 DIAGNOSIS — C78.7 LIVER METASTASIS (HCC): ICD-10-CM

## 2020-01-16 DIAGNOSIS — R63.4 WEIGHT LOSS: ICD-10-CM

## 2020-01-16 DIAGNOSIS — E63.9 POOR NUTRITION: ICD-10-CM

## 2020-01-16 DIAGNOSIS — C15.9 ESOPHAGEAL CANCER (HCC): ICD-10-CM

## 2020-01-16 DIAGNOSIS — R50.9 FEVER, UNSPECIFIED FEVER CAUSE: ICD-10-CM

## 2020-01-16 DIAGNOSIS — N17.9 ACUTE KIDNEY INJURY (HCC): ICD-10-CM

## 2020-01-16 DIAGNOSIS — F32.89 OTHER DEPRESSION: ICD-10-CM

## 2020-01-16 DIAGNOSIS — C78.7 LIVER METASTASES (HCC): ICD-10-CM

## 2020-01-16 DIAGNOSIS — K22.89 ESOPHAGEAL MASS: ICD-10-CM

## 2020-01-16 DIAGNOSIS — C78.00 MALIGNANT NEOPLASM METASTATIC TO LUNG, UNSPECIFIED LATERALITY (HCC): ICD-10-CM

## 2020-01-16 DIAGNOSIS — Z91.199 NONCOMPLIANCE: Chronic | ICD-10-CM

## 2020-01-16 LAB
ALBUMIN SERPL-MCNC: 2.9 G/DL (ref 3.5–5)
ALBUMIN/GLOB SERPL: 0.6 {RATIO} (ref 1.2–3.5)
ALP SERPL-CCNC: 136 U/L (ref 50–136)
ALT SERPL-CCNC: 11 U/L (ref 12–65)
AMPHET UR QL SCN: POSITIVE
ANION GAP SERPL CALC-SCNC: 6 MMOL/L (ref 7–16)
AST SERPL-CCNC: 11 U/L (ref 15–37)
ATRIAL RATE: 105 BPM
BARBITURATES UR QL SCN: NEGATIVE
BASOPHILS # BLD: 0.1 K/UL (ref 0–0.2)
BASOPHILS NFR BLD: 1 % (ref 0–2)
BENZODIAZ UR QL: NEGATIVE
BILIRUB SERPL-MCNC: 0.2 MG/DL (ref 0.2–1.1)
BNP SERPL-MCNC: 1300 PG/ML (ref 5–125)
BUN SERPL-MCNC: 16 MG/DL (ref 6–23)
CALCIUM SERPL-MCNC: 9.8 MG/DL (ref 8.3–10.4)
CALCULATED P AXIS, ECG09: 40 DEGREES
CALCULATED R AXIS, ECG10: 57 DEGREES
CALCULATED T AXIS, ECG11: 91 DEGREES
CANNABINOIDS UR QL SCN: POSITIVE
CHLORIDE SERPL-SCNC: 102 MMOL/L (ref 98–107)
CO2 SERPL-SCNC: 29 MMOL/L (ref 21–32)
COCAINE UR QL SCN: NEGATIVE
CREAT SERPL-MCNC: 1.41 MG/DL (ref 0.8–1.5)
DIAGNOSIS, 93000: NORMAL
DIFFERENTIAL METHOD BLD: ABNORMAL
EOSINOPHIL # BLD: 0.3 K/UL (ref 0–0.8)
EOSINOPHIL NFR BLD: 4 % (ref 0.5–7.8)
ERYTHROCYTE [DISTWIDTH] IN BLOOD BY AUTOMATED COUNT: 13.3 % (ref 11.9–14.6)
GLOBULIN SER CALC-MCNC: 4.9 G/DL (ref 2.3–3.5)
GLUCOSE SERPL-MCNC: 146 MG/DL (ref 65–100)
HCT VFR BLD AUTO: 41.3 % (ref 41.1–50.3)
HGB BLD-MCNC: 13.3 G/DL (ref 13.6–17.2)
IMM GRANULOCYTES # BLD AUTO: 0 K/UL (ref 0–0.5)
IMM GRANULOCYTES NFR BLD AUTO: 1 % (ref 0–5)
LIPASE SERPL-CCNC: 270 U/L (ref 73–393)
LYMPHOCYTES # BLD: 2.4 K/UL (ref 0.5–4.6)
LYMPHOCYTES NFR BLD: 28 % (ref 13–44)
MCH RBC QN AUTO: 29.8 PG (ref 26.1–32.9)
MCHC RBC AUTO-ENTMCNC: 32.2 G/DL (ref 31.4–35)
MCV RBC AUTO: 92.4 FL (ref 79.6–97.8)
METHADONE UR QL: NEGATIVE
MONOCYTES # BLD: 0.7 K/UL (ref 0.1–1.3)
MONOCYTES NFR BLD: 8 % (ref 4–12)
NEUTS SEG # BLD: 5.1 K/UL (ref 1.7–8.2)
NEUTS SEG NFR BLD: 59 % (ref 43–78)
NRBC # BLD: 0 K/UL (ref 0–0.2)
OPIATES UR QL: NEGATIVE
P-R INTERVAL, ECG05: 172 MS
PCP UR QL: NEGATIVE
PLATELET # BLD AUTO: 450 K/UL (ref 150–450)
PMV BLD AUTO: 9.2 FL (ref 9.4–12.3)
POTASSIUM SERPL-SCNC: 3.9 MMOL/L (ref 3.5–5.1)
PROT SERPL-MCNC: 7.8 G/DL (ref 6.3–8.2)
Q-T INTERVAL, ECG07: 356 MS
QRS DURATION, ECG06: 84 MS
QTC CALCULATION (BEZET), ECG08: 470 MS
RBC # BLD AUTO: 4.47 M/UL (ref 4.23–5.6)
SODIUM SERPL-SCNC: 137 MMOL/L (ref 136–145)
TROPONIN I SERPL-MCNC: <0.02 NG/ML (ref 0.02–0.05)
VENTRICULAR RATE, ECG03: 105 BPM
WBC # BLD AUTO: 8.6 K/UL (ref 4.3–11.1)

## 2020-01-16 PROCEDURE — 80053 COMPREHEN METABOLIC PANEL: CPT

## 2020-01-16 PROCEDURE — 65660000000 HC RM CCU STEPDOWN

## 2020-01-16 PROCEDURE — 99284 EMERGENCY DEPT VISIT MOD MDM: CPT | Performed by: EMERGENCY MEDICINE

## 2020-01-16 PROCEDURE — 83880 ASSAY OF NATRIURETIC PEPTIDE: CPT

## 2020-01-16 PROCEDURE — 71260 CT THORAX DX C+: CPT

## 2020-01-16 PROCEDURE — 83690 ASSAY OF LIPASE: CPT

## 2020-01-16 PROCEDURE — 74011250636 HC RX REV CODE- 250/636: Performed by: EMERGENCY MEDICINE

## 2020-01-16 PROCEDURE — 74011000258 HC RX REV CODE- 258: Performed by: EMERGENCY MEDICINE

## 2020-01-16 PROCEDURE — 74011000250 HC RX REV CODE- 250: Performed by: HOSPITALIST

## 2020-01-16 PROCEDURE — 74011250637 HC RX REV CODE- 250/637: Performed by: HOSPITALIST

## 2020-01-16 PROCEDURE — 71045 X-RAY EXAM CHEST 1 VIEW: CPT

## 2020-01-16 PROCEDURE — 87040 BLOOD CULTURE FOR BACTERIA: CPT

## 2020-01-16 PROCEDURE — 74011636320 HC RX REV CODE- 636/320: Performed by: EMERGENCY MEDICINE

## 2020-01-16 PROCEDURE — 80307 DRUG TEST PRSMV CHEM ANLYZR: CPT

## 2020-01-16 PROCEDURE — C8929 TTE W OR WO FOL WCON,DOPPLER: HCPCS

## 2020-01-16 PROCEDURE — 96374 THER/PROPH/DIAG INJ IV PUSH: CPT | Performed by: EMERGENCY MEDICINE

## 2020-01-16 PROCEDURE — 94640 AIRWAY INHALATION TREATMENT: CPT

## 2020-01-16 PROCEDURE — 93005 ELECTROCARDIOGRAM TRACING: CPT | Performed by: EMERGENCY MEDICINE

## 2020-01-16 PROCEDURE — 96361 HYDRATE IV INFUSION ADD-ON: CPT | Performed by: EMERGENCY MEDICINE

## 2020-01-16 PROCEDURE — 84484 ASSAY OF TROPONIN QUANT: CPT

## 2020-01-16 PROCEDURE — 85025 COMPLETE CBC W/AUTO DIFF WBC: CPT

## 2020-01-16 PROCEDURE — 36415 COLL VENOUS BLD VENIPUNCTURE: CPT

## 2020-01-16 PROCEDURE — 74011250636 HC RX REV CODE- 250/636: Performed by: HOSPITALIST

## 2020-01-16 RX ORDER — HYDRALAZINE HYDROCHLORIDE 20 MG/ML
10 INJECTION INTRAMUSCULAR; INTRAVENOUS
Status: DISCONTINUED | OUTPATIENT
Start: 2020-01-16 | End: 2020-01-16

## 2020-01-16 RX ORDER — NALOXONE HYDROCHLORIDE 0.4 MG/ML
0.4 INJECTION, SOLUTION INTRAMUSCULAR; INTRAVENOUS; SUBCUTANEOUS AS NEEDED
Status: DISCONTINUED | OUTPATIENT
Start: 2020-01-16 | End: 2020-01-31 | Stop reason: HOSPADM

## 2020-01-16 RX ORDER — IBUPROFEN 200 MG
1 TABLET ORAL EVERY 24 HOURS
Status: DISCONTINUED | OUTPATIENT
Start: 2020-01-16 | End: 2020-01-31 | Stop reason: HOSPADM

## 2020-01-16 RX ORDER — AMIODARONE HYDROCHLORIDE 200 MG/1
200 TABLET ORAL DAILY
Status: DISCONTINUED | OUTPATIENT
Start: 2020-01-17 | End: 2020-01-31 | Stop reason: HOSPADM

## 2020-01-16 RX ORDER — CARVEDILOL 6.25 MG/1
6.25 TABLET ORAL 2 TIMES DAILY WITH MEALS
Status: DISCONTINUED | OUTPATIENT
Start: 2020-01-16 | End: 2020-01-16

## 2020-01-16 RX ORDER — ACETAMINOPHEN 325 MG/1
650 TABLET ORAL
Status: DISCONTINUED | OUTPATIENT
Start: 2020-01-16 | End: 2020-01-31 | Stop reason: HOSPADM

## 2020-01-16 RX ORDER — POLYETHYLENE GLYCOL 3350 17 G/17G
17 POWDER, FOR SOLUTION ORAL DAILY
Status: DISCONTINUED | OUTPATIENT
Start: 2020-01-17 | End: 2020-01-17

## 2020-01-16 RX ORDER — OXYCODONE AND ACETAMINOPHEN 7.5; 325 MG/1; MG/1
1 TABLET ORAL
Status: DISCONTINUED | OUTPATIENT
Start: 2020-01-16 | End: 2020-01-20

## 2020-01-16 RX ORDER — HYDROMORPHONE HYDROCHLORIDE 1 MG/ML
0.5 INJECTION, SOLUTION INTRAMUSCULAR; INTRAVENOUS; SUBCUTANEOUS
Status: DISCONTINUED | OUTPATIENT
Start: 2020-01-16 | End: 2020-01-23

## 2020-01-16 RX ORDER — DIPHENHYDRAMINE HCL 25 MG
25 CAPSULE ORAL
Status: DISCONTINUED | OUTPATIENT
Start: 2020-01-16 | End: 2020-01-31 | Stop reason: HOSPADM

## 2020-01-16 RX ORDER — AMLODIPINE BESYLATE 5 MG/1
10 TABLET ORAL DAILY
Status: DISCONTINUED | OUTPATIENT
Start: 2020-01-16 | End: 2020-01-16

## 2020-01-16 RX ORDER — DILTIAZEM HYDROCHLORIDE 90 MG/1
90 TABLET, FILM COATED ORAL 3 TIMES DAILY
Status: DISCONTINUED | OUTPATIENT
Start: 2020-01-16 | End: 2020-01-31 | Stop reason: HOSPADM

## 2020-01-16 RX ORDER — SODIUM CHLORIDE 0.9 % (FLUSH) 0.9 %
5-40 SYRINGE (ML) INJECTION EVERY 8 HOURS
Status: DISCONTINUED | OUTPATIENT
Start: 2020-01-16 | End: 2020-01-31 | Stop reason: HOSPADM

## 2020-01-16 RX ORDER — HYDRALAZINE HYDROCHLORIDE 20 MG/ML
10 INJECTION INTRAMUSCULAR; INTRAVENOUS
Status: DISCONTINUED | OUTPATIENT
Start: 2020-01-16 | End: 2020-01-31 | Stop reason: HOSPADM

## 2020-01-16 RX ORDER — SODIUM CHLORIDE 0.9 % (FLUSH) 0.9 %
5-40 SYRINGE (ML) INJECTION AS NEEDED
Status: DISCONTINUED | OUTPATIENT
Start: 2020-01-16 | End: 2020-01-31 | Stop reason: HOSPADM

## 2020-01-16 RX ORDER — SODIUM CHLORIDE 0.9 % (FLUSH) 0.9 %
10 SYRINGE (ML) INJECTION
Status: COMPLETED | OUTPATIENT
Start: 2020-01-16 | End: 2020-01-16

## 2020-01-16 RX ORDER — IPRATROPIUM BROMIDE AND ALBUTEROL SULFATE 2.5; .5 MG/3ML; MG/3ML
3 SOLUTION RESPIRATORY (INHALATION)
Status: DISCONTINUED | OUTPATIENT
Start: 2020-01-16 | End: 2020-01-31 | Stop reason: HOSPADM

## 2020-01-16 RX ORDER — HYDRALAZINE HYDROCHLORIDE 25 MG/1
25 TABLET, FILM COATED ORAL
Status: DISCONTINUED | OUTPATIENT
Start: 2020-01-16 | End: 2020-01-16

## 2020-01-16 RX ORDER — HYDRALAZINE HYDROCHLORIDE 50 MG/1
50 TABLET, FILM COATED ORAL
Status: DISCONTINUED | OUTPATIENT
Start: 2020-01-16 | End: 2020-01-31 | Stop reason: HOSPADM

## 2020-01-16 RX ORDER — ARFORMOTEROL TARTRATE 15 UG/2ML
15 SOLUTION RESPIRATORY (INHALATION)
Status: DISCONTINUED | OUTPATIENT
Start: 2020-01-16 | End: 2020-01-31 | Stop reason: HOSPADM

## 2020-01-16 RX ORDER — ONDANSETRON 2 MG/ML
4 INJECTION INTRAMUSCULAR; INTRAVENOUS
Status: COMPLETED | OUTPATIENT
Start: 2020-01-16 | End: 2020-01-16

## 2020-01-16 RX ORDER — ONDANSETRON 2 MG/ML
4 INJECTION INTRAMUSCULAR; INTRAVENOUS
Status: DISCONTINUED | OUTPATIENT
Start: 2020-01-16 | End: 2020-01-31 | Stop reason: HOSPADM

## 2020-01-16 RX ORDER — SODIUM CHLORIDE, SODIUM LACTATE, POTASSIUM CHLORIDE, CALCIUM CHLORIDE 600; 310; 30; 20 MG/100ML; MG/100ML; MG/100ML; MG/100ML
50 INJECTION, SOLUTION INTRAVENOUS CONTINUOUS
Status: DISCONTINUED | OUTPATIENT
Start: 2020-01-16 | End: 2020-01-17

## 2020-01-16 RX ORDER — ADHESIVE BANDAGE
30 BANDAGE TOPICAL DAILY PRN
Status: DISCONTINUED | OUTPATIENT
Start: 2020-01-16 | End: 2020-01-31 | Stop reason: HOSPADM

## 2020-01-16 RX ADMIN — HYDROMORPHONE HYDROCHLORIDE 0.5 MG: 1 INJECTION, SOLUTION INTRAMUSCULAR; INTRAVENOUS; SUBCUTANEOUS at 10:51

## 2020-01-16 RX ADMIN — OXYCODONE HYDROCHLORIDE AND ACETAMINOPHEN 1 TABLET: 7.5; 325 TABLET ORAL at 13:37

## 2020-01-16 RX ADMIN — PERFLUTREN 1 ML: 6.52 INJECTION, SUSPENSION INTRAVENOUS at 15:28

## 2020-01-16 RX ADMIN — HYDROMORPHONE HYDROCHLORIDE 0.5 MG: 1 INJECTION, SOLUTION INTRAMUSCULAR; INTRAVENOUS; SUBCUTANEOUS at 21:04

## 2020-01-16 RX ADMIN — HYDROMORPHONE HYDROCHLORIDE 0.5 MG: 1 INJECTION, SOLUTION INTRAMUSCULAR; INTRAVENOUS; SUBCUTANEOUS at 16:50

## 2020-01-16 RX ADMIN — DILTIAZEM HYDROCHLORIDE 90 MG: 30 TABLET, FILM COATED ORAL at 21:04

## 2020-01-16 RX ADMIN — IOPAMIDOL 100 ML: 755 INJECTION, SOLUTION INTRAVENOUS at 08:54

## 2020-01-16 RX ADMIN — OXYCODONE HYDROCHLORIDE AND ACETAMINOPHEN 1 TABLET: 7.5; 325 TABLET ORAL at 18:25

## 2020-01-16 RX ADMIN — Medication 10 ML: at 08:54

## 2020-01-16 RX ADMIN — FAMOTIDINE 20 MG: 10 INJECTION INTRAVENOUS at 21:03

## 2020-01-16 RX ADMIN — HYDRALAZINE HYDROCHLORIDE 10 MG: 20 INJECTION INTRAMUSCULAR; INTRAVENOUS at 16:52

## 2020-01-16 RX ADMIN — Medication 10 ML: at 21:06

## 2020-01-16 RX ADMIN — ARFORMOTEROL TARTRATE 15 MCG: 15 SOLUTION RESPIRATORY (INHALATION) at 19:37

## 2020-01-16 RX ADMIN — DILTIAZEM HYDROCHLORIDE 90 MG: 30 TABLET, FILM COATED ORAL at 16:52

## 2020-01-16 RX ADMIN — SODIUM CHLORIDE, SODIUM LACTATE, POTASSIUM CHLORIDE, AND CALCIUM CHLORIDE 100 ML/HR: 600; 310; 30; 20 INJECTION, SOLUTION INTRAVENOUS at 10:53

## 2020-01-16 RX ADMIN — SODIUM CHLORIDE 500 ML: 900 INJECTION, SOLUTION INTRAVENOUS at 07:50

## 2020-01-16 RX ADMIN — SODIUM CHLORIDE 100 ML: 900 INJECTION, SOLUTION INTRAVENOUS at 08:54

## 2020-01-16 RX ADMIN — SODIUM CHLORIDE, SODIUM LACTATE, POTASSIUM CHLORIDE, AND CALCIUM CHLORIDE 100 ML/HR: 600; 310; 30; 20 INJECTION, SOLUTION INTRAVENOUS at 19:20

## 2020-01-16 RX ADMIN — ONDANSETRON 4 MG: 2 INJECTION INTRAMUSCULAR; INTRAVENOUS at 08:17

## 2020-01-16 RX ADMIN — ONDANSETRON 4 MG: 2 INJECTION INTRAMUSCULAR; INTRAVENOUS at 21:53

## 2020-01-16 RX ADMIN — FAMOTIDINE 20 MG: 10 INJECTION INTRAVENOUS at 13:37

## 2020-01-16 NOTE — ED NOTES
Patient resting in bed. Visitor at bedside. Patient states continued chest pain. Patient given blanket.   Patient awaiting evaluation by ER MD.

## 2020-01-16 NOTE — H&P
H&P 
 
 
Patient: Vera Kiran               Sex: male             MRN: [id number] YOB: 1965      Age:  62 y.o. Chief Complaint:  Chest pain, nausea, vomiting HPI This is a 57-year-old gentleman with history of tobacco abuse, methamphetamine abuse, COPD, systolic CHF, atrial flutter, obstructive sleep apnea on CPAP, obesity, came to the emergency room with complaints of nausea, vomiting and chest pain. Started having nausea 3 months ago, severe in intensity, gradually worsening, aggravated by any food or fluid intake, no specific relieving factors, had multiple episodes of vomiting's over the last several days. This is associated with moderate mid sternal chest pain on and off for several days, gradually worsening. Also complains of mild epigastric pain for several days. Complains of shortness of breath with exertion not at rest.  Patient was unable to eat or drink much for the last 3 months and lost nearly 100 pounds over a period of several months. Constipated has not had a bowel movement for 1 week but he is not eating much. No fevers. Complains of cough with small amount of brownish sputum. Patient continues to smoke half pack of cigarettes at least every day. History of alcohol abuse in the past but not drinking much currently. Still does methamphetamine on and off has not done in more than a week. Patient is not taking any medications currently. He has history of noncompliance past also. Review of Systems Comprehensive 10 point ROS is done, and pertinent positives & negatives per HPI, rest of them are negative. Past Medical History:  
Diagnosis Date  Acute respiratory failure with hypercapnia ([address] 2/1/2018  Chronic obstructive pulmonary disease ([address]  Heart failure ([address]  Sleep disorder Past medical history: As per HPI No past surgical history on file. Family history: Reviewed and significant for heart problems in father. No cancers in the family. Social History Socioeconomic History  Marital status:  Spouse name: Not on file  Number of children: Not on file  Years of education: Not on file  Highest education level: Not on file Tobacco Use  Smoking status: Current Every Day Smoker Packs/day: 2.00 Substance and Sexual Activity  Alcohol use: No  
  Comment: once a month beer  Drug use: Yes Types: Marijuana, Methamphetamines No Known Allergies Prior to Admission medications Medication Sig Start Date End Date Taking? Authorizing Provider OXYGEN-AIR DELIVERY SYSTEMS 4 L by Nasal route continuous. 12/2/18   Provider, Historical  
amLODIPine (NORVASC) 10 mg tablet Take 1 Tab by mouth daily. 12/1/18   Stacey Batres NP  
furosemide (LASIX) 40 mg tablet Take 1 Tab by mouth daily. 11/30/18   Stacey Batres NP  
hydrALAZINE (APRESOLINE) 50 mg tablet Take 1 Tab by mouth three (3) times daily. 11/30/18   Stacey Batres NP  
levalbuterol (XOPENEX) 1.25 mg/3 mL nebu 3 mL by Nebulization route every four (4) hours as needed. 11/30/18   Stacey Batres NP  
amiodarone (CORDARONE) 200 mg tablet Take 1 Tab by mouth every twelve (12) hours. 11/30/18   Stacey Batres NP  
carvedilol (COREG) 25 mg tablet Take 1 Tab by mouth two (2) times daily (with meals). 11/30/18   Stacey Batres NP  
rivaroxaban (XARELTO) 20 mg tab tablet Take 1 Tab by mouth daily (with dinner). 11/30/18   Stacey Batres NP  
sertraline (ZOLOFT) 50 mg tablet Take 1 Tab by mouth daily. 11/30/18   Stacey Batres NP  
tiotropium (SPIRIVA) 18 mcg inhalation capsule Take 1 Cap by inhalation daily. 12/1/18   Stacey Batres NP  
HYDROcodone-acetaminophen (NORCO) 7.5-325 mg per tablet Take 1 Tab by mouth every four (4) hours as needed. Max Daily Amount: 6 Tabs.  11/30/18   Deena Monday, NP  
 albuterol (PROVENTIL HFA, VENTOLIN HFA, PROAIR HFA) 90 mcg/actuation inhaler Take 2 Puffs by inhalation every four (4) hours as needed for Wheezing. 18   Rufus Dumont MD  
albuterol-ipratropium (DUO-NEB) 2.5 mg-0.5 mg/3 ml nebu 3 mL by Nebulization route every four (4) hours as needed. 18   Rufus Dumont MD  
 
 
 
Physical Exam  
 
Visit Vitals /65 Pulse 86 Temp 98.1 °F (36.7 °C) Resp 18 Ht 6' (1.829 m) Wt 87.1 kg (192 lb) SpO2 98% BMI 26.04 kg/m² Temp (24hrs), Av.1 °F (36.7 °C), Min:98.1 °F (36.7 °C), Max:98.1 °F (36.7 °C) Oxygen Therapy O2 Sat (%): 98 % (20 09) Pulse via Oximetry: 87 beats per minute (20 0919) O2 Device: Room air (20 0641) No intake or output data in the 24 hours ending 20 1122 General: Conscious, No acute distress Eyes:  EDMAR, No pallor/icterus HENT:             Oral Mucosa is dry, No sinus tenderness Neck:               Supple, No JVD Lungs:  CTA Bilaterally, No significant wheezing Heart:  S1 S2 regular. ... Mild midsternal chest wall tenderness. Abdomen: Soft, Positive bowel sounds, not distended. Mild epigastric tenderness. No guarding/rigidity/rebound tend. Extremities: No pedal edema Neurologic:  AAOX3, No acute FND, Motor: LUE: 5/5, LLE: 5/5, RUE: 5/5, RLE: 5/5 Skin:                No acute rashes Musculoskeletal: No Acute findings Psych:              Depressed mood. LAB Recent Results (from the past 24 hour(s)) CBC WITH AUTOMATED DIFF Collection Time: 20  6:49 AM  
Result Value Ref Range WBC 8.6 4.3 - 11.1 K/uL  
 RBC 4.47 4.23 - 5.6 M/uL  
 HGB 13.3 (L) 13.6 - 17.2 g/dL HCT 41.3 41.1 - 50.3 % MCV 92.4 79.6 - 97.8 FL  
 MCH 29.8 26.1 - 32.9 PG  
 MCHC 32.2 31.4 - 35.0 g/dL  
 RDW 13.3 11.9 - 14.6 % PLATELET 137 367 - 429 K/uL MPV 9.2 (L) 9.4 - 12.3 FL ABSOLUTE NRBC 0.00 0.0 - 0.2 K/uL  
 DF AUTOMATED NEUTROPHILS 59 43 - 78 % LYMPHOCYTES 28 13 - 44 % MONOCYTES 8 4.0 - 12.0 % EOSINOPHILS 4 0.5 - 7.8 % BASOPHILS 1 0.0 - 2.0 % IMMATURE GRANULOCYTES 1 0.0 - 5.0 %  
 ABS. NEUTROPHILS 5.1 1.7 - 8.2 K/UL  
 ABS. LYMPHOCYTES 2.4 0.5 - 4.6 K/UL  
 ABS. MONOCYTES 0.7 0.1 - 1.3 K/UL  
 ABS. EOSINOPHILS 0.3 0.0 - 0.8 K/UL  
 ABS. BASOPHILS 0.1 0.0 - 0.2 K/UL  
 ABS. IMM. GRANS. 0.0 0.0 - 0.5 K/UL METABOLIC PANEL, COMPREHENSIVE Collection Time: 01/16/20  6:49 AM  
Result Value Ref Range Sodium 137 136 - 145 mmol/L Potassium 3.9 3.5 - 5.1 mmol/L Chloride 102 98 - 107 mmol/L  
 CO2 29 21 - 32 mmol/L Anion gap 6 (L) 7 - 16 mmol/L Glucose 146 (H) 65 - 100 mg/dL BUN 16 6 - 23 MG/DL Creatinine 1.41 0.8 - 1.5 MG/DL  
 GFR est AA >60 >60 ml/min/1.73m2 GFR est non-AA 55 (L) >60 ml/min/1.73m2 Calcium 9.8 8.3 - 10.4 MG/DL Bilirubin, total 0.2 0.2 - 1.1 MG/DL  
 ALT (SGPT) 11 (L) 12 - 65 U/L  
 AST (SGOT) 11 (L) 15 - 37 U/L Alk. phosphatase 136 50 - 136 U/L Protein, total 7.8 6.3 - 8.2 g/dL Albumin 2.9 (L) 3.5 - 5.0 g/dL Globulin 4.9 (H) 2.3 - 3.5 g/dL A-G Ratio 0.6 (L) 1.2 - 3.5 LIPASE Collection Time: 01/16/20  6:49 AM  
Result Value Ref Range Lipase 270 73 - 393 U/L  
TROPONIN I Collection Time: 01/16/20  6:49 AM  
Result Value Ref Range Troponin-I, Qt. <0.02 (L) 0.02 - 0.05 NG/ML  
NT-PRO BNP Collection Time: 01/16/20  6:49 AM  
Result Value Ref Range NT pro-BNP 1,300 (H) 5 - 125 PG/ML  
 
 
IMAGING:  
 
Ct Chest Abd Pelv W Cont Result Date: 1/16/2020 IMPRESSION: 1. Extensive pulmonary parenchymal and hepatic metastatic disease as well as subcarinal and azygoesophageal recess lymphadenopathy likely representing metastatic disease as well. 2.  Long-segment thickening of the wall of the distal esophagus suggests the possibility of a primary esophageal carcinoma. Follow-up gastroenterology consultation is recommended for consideration of endoscopic biopsy.  3.  Left ventricular hypertrophy and scattered atherosclerosis. Xr Chest Arianna Tom Result Date: 1/16/2020 IMPRESSION:  NUMEROUS BILATERAL PULMONARY NODULES ARE SUSPICIOUS FOR METASTATIC DISEASE, AND A 4 CM RIGHT LOWER LUNG MASS PROJECTED OVER THE INFERIOR ASPECT OF THE RIGHT HILUM IS SUSPICIOUS FOR A PRIMARY BRONCHOGENIC CARCINOMA IN THIS SMOKER WITH COPD. FOLLOW PULMONARY MEDICINE CONSULTATION IS RECOMMENDED. 689 The significant findings in this report have been referred to the Imaging Navigator in order to communicate to the referring provider or his/her designee as outlined in Section II.C.2.a.ii-iii of the ACR Practice Guideline for Communication of Diagnostic Imaging Findings. All Micro Results Procedure Component Value Units Date/Time CULTURE, BLOOD [[id number]] Order Status:  Sent Specimen:  Blood CULTURE, BLOOD [[id number]] Order Status:  Sent Specimen:  Blood EKG: personally reviewed and shows sinus tachycardia with no acute ST-T changes. CXR: Personally reviewed and shows  bilateral pulmonary nodules suspicious for metastatic disease. Assessment/Plan Active Problems: 
  Chest pain (1/16/2020) 1. Chest pain: Atypical likely secondary to underlying esophageal mass and gastritis. EKG and troponins are normal.  Will get 2D echo to further evaluate his heart function and look for any regional wall motion abnormalities. 2.  Dysphagia, intractable nausea and vomiting, weight loss: Could be secondary to obstructing esophageal mass. We will keep him on clear liquids for now, use IV antiemetics as needed. N.p.o. after midnight for possible EGD tomorrow as per GI. 
 
3.  Possible esophageal mass./Malignancy and with pulmonary and liver metastasis. EGD with EUS tomorrow by GI. Consult oncology for further recommendations. 4.  Chronic systolic CHF: Likely from nonischemic cardiomyopathy. Check 2D echo. Keep him on the monitor. 5. MARGARITA likely secondary to decreased oral intake and hypovolemia. Keep him on IV fluids and monitor creatinine. 6.  History of atrial flutter status post cardioversion. Continue amiodarone, add diltiazem for blood pressure and heart rate control. .  He has not been taking any anticoagulation for several days. We will hold off on anticoagulation considering the need for biopsy and EGD tomorrow. 7.  Chronic COPD: No acute exacerbation. Give him DuoNebs as needed. 8.  Tobacco abuse: Patient was counseled regarding cessation. Given nicotine patch 9. Methamphetamine abuse. Counseled regarding cessation. Check urine drug screen. 10.  Obstructive sleep apnea, keep him on CPAP at night. 11.  Hypertension: Place him on diltiazem IR, use hydralazine as needed. All the pertinent investigative studies and treatment plan was discussed with the patient. Further recommendations as per the clinical course. DVT prophylaxis: SCDs Code status: Full Risk: High risk patient Wendy Finch MD 
January 16, 2020

## 2020-01-16 NOTE — ED TRIAGE NOTES
Pt arrives via pov. Pt states he has had n/v for several months and has lost 100lbs since then. Pt states cp started yesterday on and off. Denies sob. Reports current n/v. Reports constipation, last bm a week ago. Reports cp in center radiating to left chest. Pt htn in triage. States he has not taken any of his medications in more than 6 months.

## 2020-01-16 NOTE — H&P (VIEW-ONLY)
[facility] Consult Note Primary GI Physician: new Referring Provider:  ER - Dr Kiran Welsh Consult Date:  1/16/2020 Admit Date:  1/16/2020 Chief Complaint:  Abnormal CT findings, weight loss Subjective:  
 
History of Present Illness:  Patient is a 62 y.o. male with COPD, substance abuse, atrial flutter, seen in consultation at the request of Dr. Kiran Welsh for n/v and abdominal pain with reports of 100 lb weight loss in the last few months. CT chest/abd/pelvs w contrast was obtained with findings of innumerable pulmonary lesions, marked thickening of the distal esophagus, lymphadenopathy, and hepatic lesions. Labs with WBC 8.6, hgb 13.3, platelet 095, creatinine 1.41, ALT 11, AST 11, alb 2.9, alk phos 136, lipase 270. Patient reports onset of n/v in the last few weeks with inability to tolerate po intake. He reports decreased frequency of stool pattern but believes this is related to less po intake. He has had some mild and diffuse abdominal discomfort but reports extreme weight loss from inability to eat. He admits to recent use of meth, marijuana, and etoh, along with continued tobacco abuse. He reports no prior hx of EGD or colonoscopy. He denies use of anti-coagulation despite previous reports of Xarelto prescription. He is to be admitted by the hospitalist service. He 
 
Patient was admitted previously in December 2018 for dyspnea and hypoxia with ICU admission. He was diuresed and placed on steroids with improvement. Admission the November prior to that is reviewed at which time patient was noted unresponsive with suspect for iillicit drug use prior to admission. He was intubated for acute respiratory failure and required MIRANDA for atrial flutter. CT Chest/Abd/Pelvis W Contrast 1/16/2020 IMPRESSION:   
1. Extensive pulmonary parenchymal and hepatic metastatic disease as well as 
subcarinal and azygoesophageal recess lymphadenopathy likely representing metastatic disease as well.  
2.  Long-segment thickening of the wall of the distal esophagus suggests the 
possibility of a primary esophageal carcinoma. Follow-up gastroenterology 
consultation is recommended for consideration of endoscopic biopsy.  
3.  Left ventricular hypertrophy and scattered atherosclerosis. PMH: 
Past Medical History:  
Diagnosis Date  Acute respiratory failure with hypercapnia ([facility] [address] 2/1/2018  Chronic obstructive pulmonary disease ([facility] [address]  Heart failure ([facility] [address]  Sleep disorder Multi-substance abuse PSH: 
None Allergies: 
No Known Allergies Home Medications: 
Prior to Admission medications Medication Sig Start Date End Date Taking? Authorizing Provider OXYGEN-AIR DELIVERY SYSTEMS 4 L by Nasal route continuous. 12/2/18   Provider, Historical  
amLODIPine (NORVASC) 10 mg tablet Take 1 Tab by mouth daily. 12/1/18   Stacey Batres NP  
furosemide (LASIX) 40 mg tablet Take 1 Tab by mouth daily. 11/30/18   Stacey Batres NP  
hydrALAZINE (APRESOLINE) 50 mg tablet Take 1 Tab by mouth three (3) times daily. 11/30/18   Stacey Batres NP  
levalbuterol (XOPENEX) 1.25 mg/3 mL nebu 3 mL by Nebulization route every four (4) hours as needed. 11/30/18   Stacey Batres NP  
amiodarone (CORDARONE) 200 mg tablet Take 1 Tab by mouth every twelve (12) hours. 11/30/18   Stacey Batres NP  
carvedilol (COREG) 25 mg tablet Take 1 Tab by mouth two (2) times daily (with meals). 11/30/18   Stacey Batres NP  
rivaroxaban (XARELTO) 20 mg tab tablet Take 1 Tab by mouth daily (with dinner). 11/30/18   Stacey Batres NP  
sertraline (ZOLOFT) 50 mg tablet Take 1 Tab by mouth daily. 11/30/18   Stacey Batres NP  
tiotropium (SPIRIVA) 18 mcg inhalation capsule Take 1 Cap by inhalation daily.  12/1/18   Stacey Batres NP  
HYDROcodone-acetaminophen (NORCO) 7.5-325 mg per tablet Take 1 Tab by mouth every four (4) hours as needed. Max Daily Amount: 6 Tabs. 11/30/18   Stacey Batres NP  
albuterol (PROVENTIL HFA, VENTOLIN HFA, PROAIR HFA) 90 mcg/actuation inhaler Take 2 Puffs by inhalation every four (4) hours as needed for Wheezing. 1/24/18   Levi Hendricks MD  
albuterol-ipratropium (DUO-NEB) 2.5 mg-0.5 mg/3 ml nebu 3 mL by Nebulization route every four (4) hours as needed. 1/24/18   Levi Hendricks MD  
 
 
Hospital Medications: No current facility-administered medications for this encounter. Current Outpatient Medications Medication Sig  
 OXYGEN-AIR DELIVERY SYSTEMS 4 L by Nasal route continuous.  amLODIPine (NORVASC) 10 mg tablet Take 1 Tab by mouth daily.  furosemide (LASIX) 40 mg tablet Take 1 Tab by mouth daily.  hydrALAZINE (APRESOLINE) 50 mg tablet Take 1 Tab by mouth three (3) times daily.  levalbuterol (XOPENEX) 1.25 mg/3 mL nebu 3 mL by Nebulization route every four (4) hours as needed.  amiodarone (CORDARONE) 200 mg tablet Take 1 Tab by mouth every twelve (12) hours.  carvedilol (COREG) 25 mg tablet Take 1 Tab by mouth two (2) times daily (with meals).  rivaroxaban (XARELTO) 20 mg tab tablet Take 1 Tab by mouth daily (with dinner).  sertraline (ZOLOFT) 50 mg tablet Take 1 Tab by mouth daily.  tiotropium (SPIRIVA) 18 mcg inhalation capsule Take 1 Cap by inhalation daily.  HYDROcodone-acetaminophen (NORCO) 7.5-325 mg per tablet Take 1 Tab by mouth every four (4) hours as needed. Max Daily Amount: 6 Tabs.  albuterol (PROVENTIL HFA, VENTOLIN HFA, PROAIR HFA) 90 mcg/actuation inhaler Take 2 Puffs by inhalation every four (4) hours as needed for Wheezing.  albuterol-ipratropium (DUO-NEB) 2.5 mg-0.5 mg/3 ml nebu 3 mL by Nebulization route every four (4) hours as needed. Social History: 
Social History Tobacco Use  Smoking status: Current Every Day Smoker Packs/day: 2.00 Substance Use Topics  Alcohol use:  No  
 Comment: once a month beer Family History: No family history on file. Review of Systems: A detailed 10 system ROS is obtained, with pertinent positives as listed above. All others are negative. Diet:  clears Objective:  
 
Physical Exam: 
Vitals: 
Visit Vitals /65 Pulse 86 Temp 98.1 °F (36.7 °C) Resp 18 Ht 6' (1.829 m) Wt 87.1 kg (192 lb) SpO2 98% BMI 26.04 kg/m² Gen:  Pt is alert, cooperative, no acute distress Skin:  Extremities and face reveal no rashes. HEENT: Sclerae anicteric. Extra-occular muscles are intact. No oral ulcers. No abnormal pigmentation of the lips. The neck is supple. Cardiovascular: Regular rate and rhythm. No murmurs, gallops, or rubs. Respiratory:  Comfortable breathing with no accessory muscle use. Clear but decreased breath sounds anteriorly with no wheezes, rales, or rhonchi. GI:  Abdomen nondistended, soft, and nontender. Normal active bowel sounds. No enlargement of the liver or spleen. No masses palpable. Rectal:  Deferred Musculoskeletal:  No pitting edema of the lower legs. Neurological:  Gross memory appears intact. Patient is alert and oriented. Lymphatic:  No cervical or supraclavicular adenopathy. Laboratory:   
Recent Labs  
  01/16/20 
2258 WBC 8.6 HGB 13.3* HCT 41.3  MCV 92.4   
K 3.9  CO2 29 BUN 16  
CREA 1.41  
CA 9.8 *  SGOT 11* ALT 11* TBILI 0.2 ALB 2.9*  
TP 7.8 LPSE 270 Assessment:  
 
Active Problems: * Nausea/vomiting. * Unintentional weight loss Abnormal CT chest/abd/pelvis COPD Multi-substance abuse Plan:  
 
63 yo male is seen in consultation for evaluation of n/v, weight loss, and abnormal CT scan, presenting to the ER today for progressive symptoms over the last few weeks. He has had poor tolerance of po intake with decreased stool frequency and extreme weight loss.   CT imaging suspect for pulmonary lesions, liver lesions, esophageal thickening, and lymphadenopathy. Multi-substance abuse reported by the patient as recently as this week including etoh, tobacco, meth and marijuana. He denies prior gi evaluation. Although Xarelto noted in med list, he denies current use. 1.  Hospitalist admission 2. Supportive care with hydration, anti-emetics 3. NPO after midnight for EGD and possible EUS on Friday to evaluate findings of CT 4. Further recommendations to be based on above Patient is seen and examined in collaboration with Dr. Dondra Canavan. Assessment and plan as per Dr. Dondra Canavan. [facility] NP 
 
ATTENDING NOTE:  I agree with the above assessment and plan. Patient with nausea, vomiting and weight loss found with extensive metastatic disease and suspected esophageal primary tumor on CT. Will plan for EGD with EUS tomorrow to further evaluate. I have discussed with the patient the technique, benefits, alternatives, and risks of the procedure, including medication reaction, immediate or delayed bleeding, or perforation of the gastrointestinal tract.   
 
Rachel Denise MD

## 2020-01-16 NOTE — ED PROVIDER NOTES
64-year-old male with history of COPD, CHF presents with complaint of persistent nausea, vomiting for the past several months and over 100 pound weight loss since then. States that yesterday he noticed intermittent substernal chest discomfort w/ radiation to L chest.  Patient denies shortness of breath. Patient also states that he has been having constipation with last bowel movement around 1 week ago. Patient also states that he has not taken his medications in over 6 months. Denies fever, chills, urinary symptoms, headache, focal weakness, numbness, tingling. Pt does report history of tobacco use. The history is provided by the patient. No  was used. Chest Pain (Angina) This is a new problem. The current episode started yesterday. The problem has not changed since onset. The problem occurs rarely. The pain is associated with rest. The pain is present in the substernal region. The pain is at a severity of 2/10. The pain is mild. The quality of the pain is described as sharp. Associated symptoms include abdominal pain, nausea and vomiting. Pertinent negatives include no back pain, no claudication, no cough, no diaphoresis, no dizziness, no exertional chest pressure, no fever, no headaches, no hemoptysis, no irregular heartbeat, no leg pain, no lower extremity edema, no near-syncope, no numbness, no orthopnea, no palpitations, no PND, no sputum production and no weakness. He has tried nothing for the symptoms. The treatment provided no relief. Past Medical History:  
Diagnosis Date  Acute respiratory failure with hypercapnia ([address] 2/1/2018  Chronic obstructive pulmonary disease ([address]  Heart failure ([address]  Sleep disorder No past surgical history on file. No family history on file. Social History Socioeconomic History  Marital status:  Spouse name: Not on file  Number of children: Not on file  Years of education: Not on file  Highest education level: Not on file Occupational History  Not on file Social Needs  Financial resource strain: Not on file  Food insecurity:  
  Worry: Not on file Inability: Not on file  Transportation needs:  
  Medical: Not on file Non-medical: Not on file Tobacco Use  Smoking status: Current Every Day Smoker Packs/day: 2.00 Substance and Sexual Activity  Alcohol use: No  
  Comment: once a month beer  Drug use: Yes Types: Marijuana, Methamphetamines  Sexual activity: Not on file Lifestyle  Physical activity:  
  Days per week: Not on file Minutes per session: Not on file  Stress: Not on file Relationships  Social connections:  
  Talks on phone: Not on file Gets together: Not on file Attends [employer] service: Not on file Active member of club or organization: Not on file Attends meetings of clubs or organizations: Not on file Relationship status: Not on file  Intimate partner violence:  
  Fear of current or ex partner: Not on file Emotionally abused: Not on file Physically abused: Not on file Forced sexual activity: Not on file Other Topics Concern  Not on file Social History Narrative  Not on file ALLERGIES: Patient has no known allergies. Review of Systems Constitutional: Positive for fatigue. Negative for diaphoresis and fever. HENT: Negative for congestion, rhinorrhea and sore throat. Respiratory: Negative for cough, hemoptysis, sputum production, choking and wheezing. Cardiovascular: Positive for chest pain. Negative for palpitations, orthopnea, claudication, PND and near-syncope. Gastrointestinal: Positive for abdominal pain, nausea and vomiting. Negative for anal bleeding, blood in stool, constipation and diarrhea. Genitourinary: Negative for dysuria, flank pain and hematuria. Musculoskeletal: Negative for back pain, myalgias, neck pain and neck stiffness. Skin: Negative for rash and wound. Neurological: Negative for dizziness, seizures, weakness, light-headedness, numbness and headaches. Psychiatric/Behavioral: Negative for confusion. Vitals:  
 01/16/20 3205 01/16/20 0720 01/16/20 6834 BP: (!) 159/109 133/87 Pulse: (!) 102 91 (!) 112 Resp: 18 Temp: 98.1 °F (36.7 °C) SpO2: 98%  95% Weight: 81.6 kg (180 lb) Height: 6' (1.829 m) Physical Exam 
Vitals signs and nursing note reviewed. Constitutional:   
   Appearance: He is well-developed. Comments: Well appearing and in NAD. HENT:  
   Head: Normocephalic. Eyes:  
   Conjunctiva/sclera: Conjunctivae normal.  
   Pupils: Pupils are equal, round, and reactive to light. Neck:  
   Vascular: No JVD. Trachea: No tracheal deviation. Cardiovascular:  
   Rate and Rhythm: Regular rhythm. Tachycardia present. Heart sounds: Normal heart sounds. Comments: Pulses 2+ and equal throughout. Pulmonary:  
   Effort: Pulmonary effort is normal.  
   Breath sounds: Normal breath sounds. Abdominal:  
   Palpations: Abdomen is soft. Comments: Mild diffuse tenderness noted. Soft, ND. No rebound or guarding. No CVAT. Musculoskeletal: Normal range of motion. Comments: No calf TTP. Skin: 
   General: Skin is warm and dry. Comments: No rash. Neurological:  
   General: No focal deficit present. Mental Status: He is alert and oriented to person, place, and time. Cranial Nerves: No cranial nerve deficit. Comments: Strength 5/5 throughout. Normal sensory. No focal deficits noted. MDM Number of Diagnoses or Management Options Esophageal thickening: new and requires workup Liver metastasis ([facility]): new and requires workup Secondary carcinoma of lung, unspecified laterality [facility]): new and requires workup Diagnosis management comments: Vital signs stable.   Patient given Zofran for nausea. Patient given IV fluid hydration. Chest x-ray with findings concerning for new nodules. CT chest/abdomen/pelvis ordered. CT with evidence of extensive pulmonary parenchymal and hepatic metastases evidence of long segment thickening of the wall of the distal esophagus suggesting possible primary esophageal neoplasm. Hospitalist consulted for admission. Requested GI be consulted as well. GI contacted. Awaiting call back. Amount and/or Complexity of Data Reviewed Clinical lab tests: ordered and reviewed Tests in the radiology section of CPT®: ordered and reviewed Tests in the medicine section of CPT®: ordered and reviewed Review and summarize past medical records: yes Discuss the patient with other providers: yes Independent visualization of images, tracings, or specimens: yes Risk of Complications, Morbidity, and/or Mortality Presenting problems: moderate Diagnostic procedures: moderate Management options: moderate Patient Progress Patient progress: stable ED Course as of Jan 16 1004 Thu Jan 16, 2020  
0809 CXR IMPRESSION:  NUMEROUS BILATERAL PULMONARY NODULES ARE SUSPICIOUS FOR METASTATIC 
DISEASE, AND A 4 CM RIGHT LOWER LUNG MASS PROJECTED OVER THE INFERIOR ASPECT OF 
THE RIGHT HILUM IS SUSPICIOUS FOR A PRIMARY BRONCHOGENIC CARCINOMA IN THIS 
SMOKER WITH COPD. FOLLOW PULMONARY MEDICINE CONSULTATION IS RECOMMENDED [DF] 4318 CT chest/abd/pelvis IMPRESSION:  
  
1. Extensive pulmonary parenchymal and hepatic metastatic disease as well as 
subcarinal and azygoesophageal recess lymphadenopathy likely representing 
metastatic disease as well. 
  
2.  Long-segment thickening of the wall of the distal esophagus suggests the 
possibility of a primary esophageal carcinoma. Follow-up gastroenterology 
consultation is recommended for consideration of endoscopic biopsy. 
  
3.  Left ventricular hypertrophy and scattered atherosclerosis. [DF] ED Course User Index [DF] Rachel Salvador MD  
 
 
EKG Date/Time: 1/16/2020 7:45 AM 
Performed by: Rachel Salvador MD 
Authorized by: Rachel Salvador MD  
 
ECG reviewed by ED Physician in the absence of a cardiologist: yes Rate:  
  ECG rate:  105 ECG rate assessment: tachycardic Rhythm:  
  Rhythm: sinus tachycardia QRS:  
  QRS axis:  Normal 
  QRS intervals:  Normal 
Conduction:  
  Conduction: normal   
ST segments: ST segments:  Normal 
T waves:  
  T waves: normal   
 
 
 
 
 
Results Include: 
 
Recent Results (from the past 24 hour(s)) CBC WITH AUTOMATED DIFF Collection Time: 01/16/20  6:49 AM  
Result Value Ref Range WBC 8.6 4.3 - 11.1 K/uL  
 RBC 4.47 4.23 - 5.6 M/uL  
 HGB 13.3 (L) 13.6 - 17.2 g/dL HCT 41.3 41.1 - 50.3 % MCV 92.4 79.6 - 97.8 FL  
 MCH 29.8 26.1 - 32.9 PG  
 MCHC 32.2 31.4 - 35.0 g/dL  
 RDW 13.3 11.9 - 14.6 % PLATELET 116 470 - 592 K/uL MPV 9.2 (L) 9.4 - 12.3 FL ABSOLUTE NRBC 0.00 0.0 - 0.2 K/uL  
 DF AUTOMATED NEUTROPHILS 59 43 - 78 % LYMPHOCYTES 28 13 - 44 % MONOCYTES 8 4.0 - 12.0 % EOSINOPHILS 4 0.5 - 7.8 % BASOPHILS 1 0.0 - 2.0 % IMMATURE GRANULOCYTES 1 0.0 - 5.0 %  
 ABS. NEUTROPHILS 5.1 1.7 - 8.2 K/UL  
 ABS. LYMPHOCYTES 2.4 0.5 - 4.6 K/UL  
 ABS. MONOCYTES 0.7 0.1 - 1.3 K/UL  
 ABS. EOSINOPHILS 0.3 0.0 - 0.8 K/UL  
 ABS. BASOPHILS 0.1 0.0 - 0.2 K/UL  
 ABS. IMM. GRANS. 0.0 0.0 - 0.5 K/UL METABOLIC PANEL, COMPREHENSIVE Collection Time: 01/16/20  6:49 AM  
Result Value Ref Range Sodium 137 136 - 145 mmol/L Potassium 3.9 3.5 - 5.1 mmol/L Chloride 102 98 - 107 mmol/L  
 CO2 29 21 - 32 mmol/L Anion gap 6 (L) 7 - 16 mmol/L Glucose 146 (H) 65 - 100 mg/dL BUN 16 6 - 23 MG/DL Creatinine 1.41 0.8 - 1.5 MG/DL  
 GFR est AA >60 >60 ml/min/1.73m2 GFR est non-AA 55 (L) >60 ml/min/1.73m2 Calcium 9.8 8.3 - 10.4 MG/DL  Bilirubin, total 0.2 0.2 - 1.1 MG/DL  
 ALT (SGPT) 11 (L) 12 - 65 U/L  
 AST (SGOT) 11 (L) 15 - 37 U/L Alk. phosphatase 136 50 - 136 U/L Protein, total 7.8 6.3 - 8.2 g/dL Albumin 2.9 (L) 3.5 - 5.0 g/dL Globulin 4.9 (H) 2.3 - 3.5 g/dL A-G Ratio 0.6 (L) 1.2 - 3.5 LIPASE Collection Time: 01/16/20  6:49 AM  
Result Value Ref Range Lipase 270 73 - 393 U/L  
TROPONIN I Collection Time: 01/16/20  6:49 AM  
Result Value Ref Range Troponin-I, Qt. <0.02 (L) 0.02 - 0.05 NG/ML Carmen Mcghee MD; 1/16/2020 @7:45 AM Voice dictation software was used during the making of this note. This software is not perfect and grammatical and other typographical errors may be present.   This note has not been proofread for errors. 
===================================================================

## 2020-01-16 NOTE — CONSULTS
[facility] Consult Note Primary GI Physician: new Referring Provider:  ER - Dr Asia Sethi Consult Date:  1/16/2020 Admit Date:  1/16/2020 Chief Complaint:  Abnormal CT findings, weight loss Subjective:  
 
History of Present Illness:  Patient is a 62 y.o. male with COPD, substance abuse, atrial flutter, seen in consultation at the request of Dr. Asia Sethi for n/v and abdominal pain with reports of 100 lb weight loss in the last few months. CT chest/abd/pelvs w contrast was obtained with findings of innumerable pulmonary lesions, marked thickening of the distal esophagus, lymphadenopathy, and hepatic lesions. Labs with WBC 8.6, hgb 13.3, platelet 798, creatinine 1.41, ALT 11, AST 11, alb 2.9, alk phos 136, lipase 270. Patient reports onset of n/v in the last few weeks with inability to tolerate po intake. He reports decreased frequency of stool pattern but believes this is related to less po intake. He has had some mild and diffuse abdominal discomfort but reports extreme weight loss from inability to eat. He admits to recent use of meth, marijuana, and etoh, along with continued tobacco abuse. He reports no prior hx of EGD or colonoscopy. He denies use of anti-coagulation despite previous reports of Xarelto prescription. He is to be admitted by the hospitalist service. He 
 
Patient was admitted previously in December 2018 for dyspnea and hypoxia with ICU admission. He was diuresed and placed on steroids with improvement. Admission the November prior to that is reviewed at which time patient was noted unresponsive with suspect for iillicit drug use prior to admission. He was intubated for acute respiratory failure and required MIRANDA for atrial flutter. CT Chest/Abd/Pelvis W Contrast 1/16/2020 IMPRESSION:   
1. Extensive pulmonary parenchymal and hepatic metastatic disease as well as 
subcarinal and azygoesophageal recess lymphadenopathy likely representing metastatic disease as well.  
2.  Long-segment thickening of the wall of the distal esophagus suggests the 
possibility of a primary esophageal carcinoma. Follow-up gastroenterology 
consultation is recommended for consideration of endoscopic biopsy.  
3.  Left ventricular hypertrophy and scattered atherosclerosis. PMH: 
Past Medical History:  
Diagnosis Date  Acute respiratory failure with hypercapnia ([facility] [address] 2/1/2018  Chronic obstructive pulmonary disease ([facility]  Heart failure ([facility]  Sleep disorder Multi-substance abuse PSH: 
None Allergies: 
No Known Allergies Home Medications: 
Prior to Admission medications Medication Sig Start Date End Date Taking? Authorizing Provider OXYGEN-AIR DELIVERY SYSTEMS 4 L by Nasal route continuous. 12/2/18   Provider, Historical  
amLODIPine (NORVASC) 10 mg tablet Take 1 Tab by mouth daily. 12/1/18   Stacey Batres NP  
furosemide (LASIX) 40 mg tablet Take 1 Tab by mouth daily. 11/30/18   Stacey Batres NP  
hydrALAZINE (APRESOLINE) 50 mg tablet Take 1 Tab by mouth three (3) times daily. 11/30/18   Stacey Batres NP  
levalbuterol (XOPENEX) 1.25 mg/3 mL nebu 3 mL by Nebulization route every four (4) hours as needed. 11/30/18   Stacey Batres NP  
amiodarone (CORDARONE) 200 mg tablet Take 1 Tab by mouth every twelve (12) hours. 11/30/18   Stacey Batres NP  
carvedilol (COREG) 25 mg tablet Take 1 Tab by mouth two (2) times daily (with meals). 11/30/18   Stacey Batres NP  
rivaroxaban (XARELTO) 20 mg tab tablet Take 1 Tab by mouth daily (with dinner). 11/30/18   Stacey Batres NP  
sertraline (ZOLOFT) 50 mg tablet Take 1 Tab by mouth daily. 11/30/18   Stacey Batres NP  
tiotropium (SPIRIVA) 18 mcg inhalation capsule Take 1 Cap by inhalation daily.  12/1/18   Stacey Batres NP  
HYDROcodone-acetaminophen (NORCO) 7.5-325 mg per tablet Take 1 Tab by mouth every four (4) hours as needed. Max Daily Amount: 6 Tabs. 11/30/18   Stacey Batres NP  
albuterol (PROVENTIL HFA, VENTOLIN HFA, PROAIR HFA) 90 mcg/actuation inhaler Take 2 Puffs by inhalation every four (4) hours as needed for Wheezing. 1/24/18   Diamond Andujar MD  
albuterol-ipratropium (DUO-NEB) 2.5 mg-0.5 mg/3 ml nebu 3 mL by Nebulization route every four (4) hours as needed. 1/24/18   Diamond Andujar MD  
 
 
Hospital Medications: No current facility-administered medications for this encounter. Current Outpatient Medications Medication Sig  
 OXYGEN-AIR DELIVERY SYSTEMS 4 L by Nasal route continuous.  amLODIPine (NORVASC) 10 mg tablet Take 1 Tab by mouth daily.  furosemide (LASIX) 40 mg tablet Take 1 Tab by mouth daily.  hydrALAZINE (APRESOLINE) 50 mg tablet Take 1 Tab by mouth three (3) times daily.  levalbuterol (XOPENEX) 1.25 mg/3 mL nebu 3 mL by Nebulization route every four (4) hours as needed.  amiodarone (CORDARONE) 200 mg tablet Take 1 Tab by mouth every twelve (12) hours.  carvedilol (COREG) 25 mg tablet Take 1 Tab by mouth two (2) times daily (with meals).  rivaroxaban (XARELTO) 20 mg tab tablet Take 1 Tab by mouth daily (with dinner).  sertraline (ZOLOFT) 50 mg tablet Take 1 Tab by mouth daily.  tiotropium (SPIRIVA) 18 mcg inhalation capsule Take 1 Cap by inhalation daily.  HYDROcodone-acetaminophen (NORCO) 7.5-325 mg per tablet Take 1 Tab by mouth every four (4) hours as needed. Max Daily Amount: 6 Tabs.  albuterol (PROVENTIL HFA, VENTOLIN HFA, PROAIR HFA) 90 mcg/actuation inhaler Take 2 Puffs by inhalation every four (4) hours as needed for Wheezing.  albuterol-ipratropium (DUO-NEB) 2.5 mg-0.5 mg/3 ml nebu 3 mL by Nebulization route every four (4) hours as needed. Social History: 
Social History Tobacco Use  Smoking status: Current Every Day Smoker Packs/day: 2.00 Substance Use Topics  Alcohol use:  No  
 Comment: once a month beer Family History: No family history on file. Review of Systems: A detailed 10 system ROS is obtained, with pertinent positives as listed above. All others are negative. Diet:  clears Objective:  
 
Physical Exam: 
Vitals: 
Visit Vitals /65 Pulse 86 Temp 98.1 °F (36.7 °C) Resp 18 Ht 6' (1.829 m) Wt 87.1 kg (192 lb) SpO2 98% BMI 26.04 kg/m² Gen:  Pt is alert, cooperative, no acute distress Skin:  Extremities and face reveal no rashes. HEENT: Sclerae anicteric. Extra-occular muscles are intact. No oral ulcers. No abnormal pigmentation of the lips. The neck is supple. Cardiovascular: Regular rate and rhythm. No murmurs, gallops, or rubs. Respiratory:  Comfortable breathing with no accessory muscle use. Clear but decreased breath sounds anteriorly with no wheezes, rales, or rhonchi. GI:  Abdomen nondistended, soft, and nontender. Normal active bowel sounds. No enlargement of the liver or spleen. No masses palpable. Rectal:  Deferred Musculoskeletal:  No pitting edema of the lower legs. Neurological:  Gross memory appears intact. Patient is alert and oriented. Lymphatic:  No cervical or supraclavicular adenopathy. Laboratory:   
Recent Labs  
  01/16/20 
1933 WBC 8.6 HGB 13.3* HCT 41.3  MCV 92.4   
K 3.9  CO2 29 BUN 16  
CREA 1.41  
CA 9.8 *  SGOT 11* ALT 11* TBILI 0.2 ALB 2.9*  
TP 7.8 LPSE 270 Assessment:  
 
Active Problems: * Nausea/vomiting. * Unintentional weight loss Abnormal CT chest/abd/pelvis COPD Multi-substance abuse Plan:  
 
61 yo male is seen in consultation for evaluation of n/v, weight loss, and abnormal CT scan, presenting to the ER today for progressive symptoms over the last few weeks. He has had poor tolerance of po intake with decreased stool frequency and extreme weight loss.   CT imaging suspect for pulmonary lesions, liver lesions, esophageal thickening, and lymphadenopathy. Multi-substance abuse reported by the patient as recently as this week including etoh, tobacco, meth and marijuana. He denies prior gi evaluation. Although Xarelto noted in med list, he denies current use. 1.  Hospitalist admission 2. Supportive care with hydration, anti-emetics 3. NPO after midnight for EGD and possible EUS on Friday to evaluate findings of CT 4. Further recommendations to be based on above Patient is seen and examined in collaboration with Dr. Shena Carl. Assessment and plan as per Dr. Shena Carl. Antwan Anne NP 
 
ATTENDING NOTE:  I agree with the above assessment and plan. Patient with nausea, vomiting and weight loss found with extensive metastatic disease and suspected esophageal primary tumor on CT. Will plan for EGD with EUS tomorrow to further evaluate. I have discussed with the patient the technique, benefits, alternatives, and risks of the procedure, including medication reaction, immediate or delayed bleeding, or perforation of the gastrointestinal tract.   
 
Barbara Escalante MD

## 2020-01-16 NOTE — PROGRESS NOTES
Nutrition Reason for assessment: Referral received from nursing admission Malnutrition Screening Tool Recently Lost Weight Without Trying: Yes If Yes, How Much Weight Loss: >33 lbs(almost 100 lbs in 3 months) Eating Poorly Due to Decreased Appetite: No 
 
Assessment:  
Diet: DIET NPO 
DIET CLEAR LIQUID 
DIET NPO  After midnight Food/Nutrition Patient History:   
Pt is 61 yo male who presented with chest pain and nausea and vomiting for number of months. Pt has history of MARGARITA, COPD, altered mental status, nocotine use and alcohol abuse. Pt was in bed at time of RD visit. Pt reports he has lost 100 lbs in 3-4 months. During this time he had extreme nausea and vomiting. He reports he did have an appetite but he could not keep any food down. He states he has not had any pain when eating. He also is happy to have lost some weight but wants to prevent further weight loss. Understands the use of ensure clear as supplement. Anthropometrics: 
Height: 6' (182.9 cm), Weight: 87.1 kg (192 lb), Weight Source: Patient stated, Body mass index is 26.04 kg/m². BMI class of overweight. WT / BMI 1/16/2020 12/12/2018 WEIGHT 192 lb 288 lb 1.6 oz BODY MASS INDEX 26.04 kg/m2 Unable to verify weight sources. Also unable to confirm weight loss in 3-4 months, but pt has lost 96 lbs in 1 year, 33% which is clinically significant. Macronutrient needs: MARGARITA (CBW 87.1kg) EER: 6539-2475 kcal/day (20-25 kcals/kg ) EPR:  g/day (0.8-1.2 g/kg ) Intake/Comparative Standards: Pt currently on clear liquids diet which will not meet protein or calorie needs. Malnutrition Criteria:   
Meets Criteria for Malnutrition in the context of Chronic Illness [x] Severe Malnutrition, as evidenced by: 
 [x] Nutritional intake of <75% of energy intake compared to estimated energy needs for > 1 month 
 [x] Weight loss of >5% in 1 month, >7.5% in 3 months,  >10% in 6 months, or >20% in 12 months 
 [] Severe edema [] Severe loss of muscle mass 
 [] Severe loss of subcutaneous fat 
 [] Functional decline 
 [] Severe edema Nutrition Diagnosis: 
Severe chronic disease or condition related malnutrition related to decreased intake  as evidenced by estimated intake less than estimated needs, nausea, vomiting and unintentional weight loss at least 20% in 1 year. Nutrition Intervention: 
Meals and Snacks: Continue with current diet. MD to advance diet as medically able. Commercial Beverages and Supplements: Add Ensure Clear TID Discharge Nutrition Plan: Too soon to determine [address], [place],   
[phone]

## 2020-01-16 NOTE — PROGRESS NOTES
TRANSFER - IN REPORT: 
 
Verbal report received from Ovidio Love RN on Alejandra Drop  being received from ED(unit) for routine progression of care Report consisted of patients Situation, Background, Assessment and  
Recommendations(SBAR). Information from the following report(s) SBAR, Kardex, ED Summary and Recent Results was reviewed with the receiving nurse. Opportunity for questions and clarification was provided. Assessment completed upon patients arrival to unit and care assumed.

## 2020-01-16 NOTE — ED NOTES
TRANSFER - OUT REPORT: 
 
Verbal report given to Abhijeet RN(name) on Domitila Smart  being transferred to 537(unit) for routine progression of care Report consisted of patients Situation, Background, Assessment and  
Recommendations(SBAR). Information from the following report(s) ED Summary was reviewed with the receiving nurse. Lines:  
Peripheral IV Left Antecubital (Active) Opportunity for questions and clarification was provided.

## 2020-01-16 NOTE — PROGRESS NOTES
01/16/20 1226 Dual Skin Pressure Injury Assessment Dual Skin Pressure Injury Assessment [facility] Second Care Provider (Based on [address] [place] [address]) Rob Ruby, RN  
 
Pt skin is dry and flaky otherwise unremarkable.

## 2020-01-16 NOTE — PROGRESS NOTES
Problem: Falls - Risk of 
Goal: *Absence of Falls Outcome: Progressing Towards Goal 
Note: Fall Risk Interventions:

## 2020-01-16 NOTE — PROGRESS NOTES
Problem: Falls - Risk of 
Goal: *Absence of Falls 1/16/2020 1831 by Sakshi He RN Outcome: Progressing Towards Goal 
Note: Fall Risk Interventions: 
  
 
  
 
  
 
  
 
  
 
 
1/16/2020 1328 by Sakshi He RN Outcome: Progressing Towards Goal 
Note: Fall Risk Interventions:

## 2020-01-16 NOTE — PROGRESS NOTES
1226-Report received from [facility], [address] [facility]. Arrived to room 537. No s/s of acute distress. 1652-Hydralazine 10 mg slow IVP given for DT=785/113. Will continue to monitor. 1822-HP=925/88. No further intervention needed.

## 2020-01-16 NOTE — PROGRESS NOTES
's follow-up visit. Offered spiritual and emotional support including prayer as requested. Laymond Brittle, MDiv Board Certified [employer]

## 2020-01-17 ENCOUNTER — ANESTHESIA (OUTPATIENT)
Dept: ENDOSCOPY | Age: 55
DRG: 950 | End: 2020-01-17
Payer: MEDICAID

## 2020-01-17 PROBLEM — K22.89 ESOPHAGEAL MASS: Status: ACTIVE | Noted: 2020-01-17

## 2020-01-17 PROBLEM — R13.19 ESOPHAGEAL DYSPHAGIA: Status: ACTIVE | Noted: 2020-01-17

## 2020-01-17 PROBLEM — C78.7 LIVER METASTASES (HCC): Status: ACTIVE | Noted: 2020-01-17

## 2020-01-17 PROBLEM — R63.4 WEIGHT LOSS: Status: ACTIVE | Noted: 2020-01-17

## 2020-01-17 LAB
ANION GAP SERPL CALC-SCNC: 5 MMOL/L (ref 7–16)
BASOPHILS # BLD: 0 K/UL (ref 0–0.2)
BASOPHILS NFR BLD: 1 % (ref 0–2)
BUN SERPL-MCNC: 11 MG/DL (ref 6–23)
CALCIUM SERPL-MCNC: 8.9 MG/DL (ref 8.3–10.4)
CANCER AG19-9 SERPL-ACNC: 219.2 U/ML (ref 2–37)
CEA SERPL-MCNC: 19.1 NG/ML (ref 0–3)
CHLORIDE SERPL-SCNC: 102 MMOL/L (ref 98–107)
CO2 SERPL-SCNC: 28 MMOL/L (ref 21–32)
CREAT SERPL-MCNC: 0.97 MG/DL (ref 0.8–1.5)
DIFFERENTIAL METHOD BLD: ABNORMAL
EOSINOPHIL # BLD: 0.2 K/UL (ref 0–0.8)
EOSINOPHIL NFR BLD: 3 % (ref 0.5–7.8)
ERYTHROCYTE [DISTWIDTH] IN BLOOD BY AUTOMATED COUNT: 13.1 % (ref 11.9–14.6)
FERRITIN SERPL-MCNC: 27 NG/ML (ref 8–388)
FOLATE SERPL-MCNC: 6.3 NG/ML (ref 3.1–17.5)
GLUCOSE SERPL-MCNC: 99 MG/DL (ref 65–100)
HCT VFR BLD AUTO: 33.8 % (ref 41.1–50.3)
HGB BLD-MCNC: 10.4 G/DL (ref 13.6–17.2)
IMM GRANULOCYTES # BLD AUTO: 0 K/UL (ref 0–0.5)
IMM GRANULOCYTES NFR BLD AUTO: 0 % (ref 0–5)
INR PPP: 1
IRON SATN MFR SERPL: 9 %
IRON SERPL-MCNC: 21 UG/DL (ref 35–150)
LYMPHOCYTES # BLD: 1.6 K/UL (ref 0.5–4.6)
LYMPHOCYTES NFR BLD: 26 % (ref 13–44)
MAGNESIUM SERPL-MCNC: 1.8 MG/DL (ref 1.8–2.4)
MCH RBC QN AUTO: 28.6 PG (ref 26.1–32.9)
MCHC RBC AUTO-ENTMCNC: 30.8 G/DL (ref 31.4–35)
MCV RBC AUTO: 92.9 FL (ref 79.6–97.8)
MONOCYTES # BLD: 0.5 K/UL (ref 0.1–1.3)
MONOCYTES NFR BLD: 7 % (ref 4–12)
NEUTS SEG # BLD: 3.9 K/UL (ref 1.7–8.2)
NEUTS SEG NFR BLD: 63 % (ref 43–78)
NRBC # BLD: 0 K/UL (ref 0–0.2)
PHOSPHATE SERPL-MCNC: 3.2 MG/DL (ref 2.5–4.5)
PLATELET # BLD AUTO: 352 K/UL (ref 150–450)
PMV BLD AUTO: 9.4 FL (ref 9.4–12.3)
POTASSIUM SERPL-SCNC: 4 MMOL/L (ref 3.5–5.1)
PROTHROMBIN TIME: 13.1 SEC (ref 11.7–14.5)
RBC # BLD AUTO: 3.64 M/UL (ref 4.23–5.6)
SODIUM SERPL-SCNC: 135 MMOL/L (ref 136–145)
TIBC SERPL-MCNC: 239 UG/DL (ref 250–450)
VIT B12 SERPL-MCNC: 518 PG/ML (ref 193–986)
WBC # BLD AUTO: 6.2 K/UL (ref 4.3–11.1)

## 2020-01-17 PROCEDURE — 82746 ASSAY OF FOLIC ACID SERUM: CPT

## 2020-01-17 PROCEDURE — 82607 VITAMIN B-12: CPT

## 2020-01-17 PROCEDURE — 76450000000

## 2020-01-17 PROCEDURE — 74011250636 HC RX REV CODE- 250/636: Performed by: ANESTHESIOLOGY

## 2020-01-17 PROCEDURE — 82728 ASSAY OF FERRITIN: CPT

## 2020-01-17 PROCEDURE — 74011250636 HC RX REV CODE- 250/636: Performed by: NURSE ANESTHETIST, CERTIFIED REGISTERED

## 2020-01-17 PROCEDURE — 99223 1ST HOSP IP/OBS HIGH 75: CPT | Performed by: INTERNAL MEDICINE

## 2020-01-17 PROCEDURE — 83735 ASSAY OF MAGNESIUM: CPT

## 2020-01-17 PROCEDURE — 36415 COLL VENOUS BLD VENIPUNCTURE: CPT

## 2020-01-17 PROCEDURE — 82378 CARCINOEMBRYONIC ANTIGEN: CPT

## 2020-01-17 PROCEDURE — 74011000250 HC RX REV CODE- 250: Performed by: HOSPITALIST

## 2020-01-17 PROCEDURE — 74011000250 HC RX REV CODE- 250: Performed by: NURSE ANESTHETIST, CERTIFIED REGISTERED

## 2020-01-17 PROCEDURE — 85610 PROTHROMBIN TIME: CPT

## 2020-01-17 PROCEDURE — 99222 1ST HOSP IP/OBS MODERATE 55: CPT | Performed by: NURSE PRACTITIONER

## 2020-01-17 PROCEDURE — 0DB58ZX EXCISION OF ESOPHAGUS, VIA NATURAL OR ARTIFICIAL OPENING ENDOSCOPIC, DIAGNOSTIC: ICD-10-PCS | Performed by: INTERNAL MEDICINE

## 2020-01-17 PROCEDURE — 94660 CPAP INITIATION&MGMT: CPT

## 2020-01-17 PROCEDURE — 77030008969: Performed by: INTERNAL MEDICINE

## 2020-01-17 PROCEDURE — 88305 TISSUE EXAM BY PATHOLOGIST: CPT

## 2020-01-17 PROCEDURE — 74011250637 HC RX REV CODE- 250/637: Performed by: HOSPITALIST

## 2020-01-17 PROCEDURE — 65660000000 HC RM CCU STEPDOWN

## 2020-01-17 PROCEDURE — 77030020255 HC SOL INJ LR 1000ML BG

## 2020-01-17 PROCEDURE — 80048 BASIC METABOLIC PNL TOTAL CA: CPT

## 2020-01-17 PROCEDURE — 76040000025: Performed by: INTERNAL MEDICINE

## 2020-01-17 PROCEDURE — 85025 COMPLETE CBC W/AUTO DIFF WBC: CPT

## 2020-01-17 PROCEDURE — 74011250636 HC RX REV CODE- 250/636: Performed by: HOSPITALIST

## 2020-01-17 PROCEDURE — 77030021593 HC FCPS BIOP ENDOSC BSC -A: Performed by: INTERNAL MEDICINE

## 2020-01-17 PROCEDURE — 83540 ASSAY OF IRON: CPT

## 2020-01-17 PROCEDURE — 0D758ZZ DILATION OF ESOPHAGUS, VIA NATURAL OR ARTIFICIAL OPENING ENDOSCOPIC: ICD-10-PCS | Performed by: INTERNAL MEDICINE

## 2020-01-17 PROCEDURE — 86301 IMMUNOASSAY TUMOR CA 19-9: CPT

## 2020-01-17 PROCEDURE — 94640 AIRWAY INHALATION TREATMENT: CPT

## 2020-01-17 PROCEDURE — 94760 N-INVAS EAR/PLS OXIMETRY 1: CPT

## 2020-01-17 PROCEDURE — 84100 ASSAY OF PHOSPHORUS: CPT

## 2020-01-17 PROCEDURE — 76060000032 HC ANESTHESIA 0.5 TO 1 HR: Performed by: INTERNAL MEDICINE

## 2020-01-17 RX ORDER — HALOPERIDOL 5 MG/ML
0.5 INJECTION INTRAMUSCULAR ONCE
Status: COMPLETED | OUTPATIENT
Start: 2020-01-17 | End: 2020-01-17

## 2020-01-17 RX ORDER — LIDOCAINE HYDROCHLORIDE 20 MG/ML
INJECTION, SOLUTION EPIDURAL; INFILTRATION; INTRACAUDAL; PERINEURAL AS NEEDED
Status: DISCONTINUED | OUTPATIENT
Start: 2020-01-17 | End: 2020-01-17 | Stop reason: HOSPADM

## 2020-01-17 RX ORDER — MAGNESIUM SULFATE 1 G/100ML
1 INJECTION INTRAVENOUS ONCE
Status: COMPLETED | OUTPATIENT
Start: 2020-01-17 | End: 2020-01-17

## 2020-01-17 RX ORDER — PROPOFOL 10 MG/ML
INJECTION, EMULSION INTRAVENOUS AS NEEDED
Status: DISCONTINUED | OUTPATIENT
Start: 2020-01-17 | End: 2020-01-17 | Stop reason: HOSPADM

## 2020-01-17 RX ORDER — POLYETHYLENE GLYCOL 3350 17 G/17G
17 POWDER, FOR SOLUTION ORAL 2 TIMES DAILY
Status: DISCONTINUED | OUTPATIENT
Start: 2020-01-17 | End: 2020-01-31 | Stop reason: HOSPADM

## 2020-01-17 RX ORDER — PROPOFOL 10 MG/ML
INJECTION, EMULSION INTRAVENOUS
Status: DISCONTINUED | OUTPATIENT
Start: 2020-01-17 | End: 2020-01-17 | Stop reason: HOSPADM

## 2020-01-17 RX ORDER — ONDANSETRON 2 MG/ML
4 INJECTION INTRAMUSCULAR; INTRAVENOUS ONCE
Status: COMPLETED | OUTPATIENT
Start: 2020-01-17 | End: 2020-01-17

## 2020-01-17 RX ADMIN — HYDROMORPHONE HYDROCHLORIDE 0.5 MG: 1 INJECTION, SOLUTION INTRAMUSCULAR; INTRAVENOUS; SUBCUTANEOUS at 03:02

## 2020-01-17 RX ADMIN — ONDANSETRON 4 MG: 2 INJECTION INTRAMUSCULAR; INTRAVENOUS at 06:48

## 2020-01-17 RX ADMIN — HYDROMORPHONE HYDROCHLORIDE 0.5 MG: 1 INJECTION, SOLUTION INTRAMUSCULAR; INTRAVENOUS; SUBCUTANEOUS at 06:48

## 2020-01-17 RX ADMIN — AMIODARONE HYDROCHLORIDE 200 MG: 200 TABLET ORAL at 10:18

## 2020-01-17 RX ADMIN — FAMOTIDINE 20 MG: 10 INJECTION INTRAVENOUS at 10:01

## 2020-01-17 RX ADMIN — ARFORMOTEROL TARTRATE 15 MCG: 15 SOLUTION RESPIRATORY (INHALATION) at 20:40

## 2020-01-17 RX ADMIN — ARFORMOTEROL TARTRATE 15 MCG: 15 SOLUTION RESPIRATORY (INHALATION) at 11:45

## 2020-01-17 RX ADMIN — POLYETHYLENE GLYCOL 3350 17 G: 17 POWDER, FOR SOLUTION ORAL at 20:13

## 2020-01-17 RX ADMIN — DILTIAZEM HYDROCHLORIDE 90 MG: 30 TABLET, FILM COATED ORAL at 16:43

## 2020-01-17 RX ADMIN — PROPOFOL 50 MG: 10 INJECTION, EMULSION INTRAVENOUS at 08:39

## 2020-01-17 RX ADMIN — ONDANSETRON 4 MG: 2 INJECTION INTRAMUSCULAR; INTRAVENOUS at 09:35

## 2020-01-17 RX ADMIN — SODIUM CHLORIDE, SODIUM LACTATE, POTASSIUM CHLORIDE, AND CALCIUM CHLORIDE 50 ML/HR: 600; 310; 30; 20 INJECTION, SOLUTION INTRAVENOUS at 08:05

## 2020-01-17 RX ADMIN — Medication 10 ML: at 05:26

## 2020-01-17 RX ADMIN — Medication 10 ML: at 21:07

## 2020-01-17 RX ADMIN — LIDOCAINE HYDROCHLORIDE 50 MG: 20 INJECTION, SOLUTION EPIDURAL; INFILTRATION; INTRACAUDAL; PERINEURAL at 08:23

## 2020-01-17 RX ADMIN — FAMOTIDINE 20 MG: 10 INJECTION INTRAVENOUS at 20:13

## 2020-01-17 RX ADMIN — HYDROMORPHONE HYDROCHLORIDE 0.5 MG: 1 INJECTION, SOLUTION INTRAMUSCULAR; INTRAVENOUS; SUBCUTANEOUS at 20:13

## 2020-01-17 RX ADMIN — TIOTROPIUM BROMIDE 18 MCG: 18 CAPSULE ORAL; RESPIRATORY (INHALATION) at 11:45

## 2020-01-17 RX ADMIN — POLYETHYLENE GLYCOL 3350 17 G: 17 POWDER, FOR SOLUTION ORAL at 10:01

## 2020-01-17 RX ADMIN — HALOPERIDOL LACTATE 0.5 MG: 5 INJECTION INTRAMUSCULAR at 10:01

## 2020-01-17 RX ADMIN — DILTIAZEM HYDROCHLORIDE 90 MG: 30 TABLET, FILM COATED ORAL at 10:01

## 2020-01-17 RX ADMIN — OXYCODONE HYDROCHLORIDE AND ACETAMINOPHEN 1 TABLET: 7.5; 325 TABLET ORAL at 10:01

## 2020-01-17 RX ADMIN — MAGNESIUM SULFATE HEPTAHYDRATE 1 G: 1 INJECTION, SOLUTION INTRAVENOUS at 12:27

## 2020-01-17 RX ADMIN — PROPOFOL 70 MG: 10 INJECTION, EMULSION INTRAVENOUS at 08:23

## 2020-01-17 RX ADMIN — ACETAMINOPHEN 650 MG: 325 TABLET, FILM COATED ORAL at 07:06

## 2020-01-17 RX ADMIN — OXYCODONE HYDROCHLORIDE AND ACETAMINOPHEN 1 TABLET: 7.5; 325 TABLET ORAL at 16:43

## 2020-01-17 RX ADMIN — PROPOFOL 200 MCG/KG/MIN: 10 INJECTION, EMULSION INTRAVENOUS at 08:23

## 2020-01-17 RX ADMIN — DILTIAZEM HYDROCHLORIDE 90 MG: 30 TABLET, FILM COATED ORAL at 21:09

## 2020-01-17 NOTE — PROGRESS NOTES
0700-Bedside report received from [address]. Resting in bed. No needs voiced. No s/s of acute distress. 0713-TRANSFER - OUT REPORT: 
 
Verbal report given to Hansa Villa RN on [facility]  being transferred to GI lab for ordered procedure Report consisted of patients Situation, Background, Assessment and  
Recommendations(SBAR). Information from the following report(s) SBAR was reviewed with the receiving nurse. Lines:  
Peripheral IV Left Antecubital (Active) Site Assessment Clean, dry, & intact 1/17/2020  3:38 AM  
Phlebitis Assessment 0 1/17/2020  3:38 AM  
Infiltration Assessment 0 1/17/2020  3:38 AM  
Dressing Status Clean, dry, & intact 1/17/2020  3:38 AM  
Dressing Type Tape;Transparent 1/17/2020  3:38 AM  
Hub Color/Line Status Infusing 1/17/2020  3:38 AM  
  
 
Opportunity for questions and clarification was provided. Patient will be transported with: 
 Tech  
0915-TRANSFER - IN REPORT: 
 
Verbal report received from [address] [place] [address] on [facility]  being received from GI lab for routine progression of care Report consisted of patients Situation, Background, Assessment and  
Recommendations(SBAR). Information from the following report(s) SBAR and Recent Results was reviewed with the receiving nurse. Opportunity for questions and clarification was provided. Assessment will be completed upon patients arrival to unit and care will be assumed.

## 2020-01-17 NOTE — INTERVAL H&P NOTE
H&P Update: 
Veronique Segura was seen and examined. History and physical has been reviewed. Significant clinical changes have occurred as noted:  none

## 2020-01-17 NOTE — CONSULTS
H&P/Consult Note/Progress Note/Office Note:  
Raimundo Rascon  MRN: [id number]  :1965  Age:54 y.o. 
 
HPI: Raimundo Rascon is a 62 y.o. male who with h/p ETOH abuse, tobacco abuse, meth abuse, homelessness, COPD, CHF,  
who was admitted from the ER after presenting with a several week h/o progressive N/V, inability to sustain PO intake and weight loss (>100lbs) Nothing in particular made his symptoms better or worse. He also had associated 2/10 substernal chest pain No SOB He had not been taking his maint meds in >6 months Pertinent negatives include no back pain, no claudication, no cough, no diaphoresis, no dizziness 20 CT chest/abd/pelvis with oral and IV contrast 
Hx:   Follow-up abnormal chest radiograph smoker with pulmonary 
nodules and 100-pound weight loss over the last several months. 
  
CT CHEST: There are innumerable bilateral pulmonary nodules, the largest 
measuring approximately 3.9 cm posteriorly in the right lower lobe. There is 
marked thickening of the wall of the distal esophagus below the level of the 
hilary which suggests the possibility of primary esophageal carcinoma. There is 
associated subcarinal and azygoesophageal recess lymphadenopathy. No 
significant pleural fluid. The thyroid appears normal as imaged. There is 
thickening of the left ventricular wall suggesting a hypertrophy. 
  
CT ABDOMEN: There are innumerable small hepatic lesions which are new from 2018 
and also suspicious for metastatic disease. The largest measures approximately 2 cm posteriorly in the right lobe. There are mildly enlarged lymph nodes in 
the lesser sac which may represent metastatic disease as well. The spleen, 
pancreas, kidneys, and adrenals appear unremarkable. 
  
CT PELVIS:  The appendix is not confidently identified, but there are no 
secondary signs of appendicitis. The prostate is not enlarged. No primary or secondary signs of appendicitis or identified. No pathologically enlarged lymph 
nodes or free fluid is evident. There is scattered aortoiliac atherosclerotic 
calcifications with mild ectasia but no focal aneurysm. Bone windows 
demonstrate no aggressive process, accounting for degenerative changes including 
severe osteoarthritis at the right hip. A 1 cm central lucency in the body of 
L4 has sclerotic margins and is not typical of metastatic disease. 
  
IMPRESSION:  
  
1. Extensive pulmonary parenchymal and hepatic metastatic disease as well as 
subcarinal and azygoesophageal recess lymphadenopathy likely representing 
metastatic disease as well. 
  
2.  Long-segment thickening of the wall of the distal esophagus suggests the 
possibility of a primary esophageal carcinoma. Follow-up gastroenterology 
consultation is recommended for consideration of endoscopic biopsy. 
  
3.  Left ventricular hypertrophy and scattered atherosclerosis.   
  
 
 
 
 
1/17/20 s/p EGD; Dr Sy Euceda Findings: At 32-46cm there is a circumferential, partially obstructing, fungating, ulcerated mass concerning for malignancy. Extensive biopsies were obtained. STOMACH:  The mass extends into the proximal stomach. The fundus on antegrade and retroflexed views is normal. The body, antrum, and pylorus are normal.  
DUODENUM:  The bulb and second portions are normal.  
There are multiple enlarged periesophageal lymph nodes. The largest measures 34 x 14 mm in size. FNA not performed as the needle which averse the tumor and confirmation of malignancy would not affect staging. Further evaluation of the distal extent of tumor as well as stomach and liver could not be performed as the scope could not safely traverse the mass. Impression:   
Esophageal mass. This is concerning for a least T3N2Mx based on endoscopic ultrasound criteria.   
As the tumor could not be traversed, the distal extent could not be evaluated for invasion of adjacent structures such as the diaphragm. Thus, the possibility of understaging exists. More importantly, there is strong suspicion for metastatic liver and lung disease based on CT.  
  
Plan: 1. Follow-up pathology results. 2. Consult oncology. 3. Full liquids today. 4. As patient likely has unresectable malignancy, he may be a candidate for palliative esophageal stent placement. This would require multiple stents due to the length of the tumor. Based on extension into the stomach, he will be at increased risk of stent migration.  
  
 
 
Past Medical History:  
Diagnosis Date  Acute respiratory failure with hypercapnia ([facility] 2/1/2018  Chronic obstructive pulmonary disease ([facility]  Heart failure ([facility]  Sleep disorder No past surgical history on file. Current Facility-Administered Medications Medication Dose Route Frequency  polyethylene glycol (MIRALAX) packet 17 g  17 g Oral BID  
 HYDROmorphone (PF) (DILAUDID) injection 0.5 mg  0.5 mg IntraVENous Q4H PRN  promethazine (PHENERGAN) with saline injection 12.5 mg  12.5 mg IntraVENous Q6H PRN  
 famotidine (PF) (PEPCID) 20 mg in 0.9% sodium chloride 10 mL injection  20 mg IntraVENous Q12H  
 albuterol-ipratropium (DUO-NEB) 2.5 MG-0.5 MG/3 ML  3 mL Nebulization Q4H PRN  
 tiotropium (SPIRIVA) inhalation capsule 18 mcg  1 Cap Inhalation DAILY  arformoteroL (BROVANA) neb solution 15 mcg  15 mcg Nebulization BID RT  
 amiodarone (CORDARONE) tablet 200 mg  200 mg Oral DAILY  sodium chloride (NS) flush 5-40 mL  5-40 mL IntraVENous Q8H  
 sodium chloride (NS) flush 5-40 mL  5-40 mL IntraVENous PRN  
 acetaminophen (TYLENOL) tablet 650 mg  650 mg Oral Q4H PRN  
 naloxone (NARCAN) injection 0.4 mg  0.4 mg IntraVENous PRN  
 diphenhydrAMINE (BENADRYL) capsule 25 mg  25 mg Oral Q4H PRN  
 ondansetron (ZOFRAN) injection 4 mg  4 mg IntraVENous Q4H PRN  
  magnesium hydroxide (MILK OF MAGNESIA) 400 mg/5 mL oral suspension 30 mL  30 mL Oral DAILY PRN  
 oxyCODONE-acetaminophen (PERCOCET 7.5) 7.5-325 mg per tablet 1 Tab  1 Tab Oral Q4H PRN  
 nicotine (NICODERM CQ) 21 mg/24 hr patch 1 Patch  1 Patch TransDERmal Q24H  
 influenza vaccine 2019-20 (6 mos+)(PF) (FLUARIX/FLULAVAL/FLUZONE QUAD) injection 0.5 mL  0.5 mL IntraMUSCular PRIOR TO DISCHARGE  hydrALAZINE (APRESOLINE) tablet 50 mg  50 mg Oral Q6H PRN  
 hydrALAZINE (APRESOLINE) 20 mg/mL injection 10 mg  10 mg IntraVENous Q6H PRN  
 dilTIAZem (CARDIZEM) IR tablet 90 mg  90 mg Oral TID Patient has no known allergies. Social History Socioeconomic History  Marital status:  Spouse name: Not on file  Number of children: Not on file  Years of education: Not on file  Highest education level: Not on file Tobacco Use  Smoking status: Current Every Day Smoker Packs/day: 2.00 Substance and Sexual Activity  Alcohol use: No  
  Comment: once a month beer  Drug use: Yes Types: Marijuana, Methamphetamines Social History Tobacco Use Smoking Status Current Every Day Smoker  Packs/day: 2.00 No family history on file. ROS: The patient has no difficulty with chest pain or shortness of breath. No fever or chills. Comprehensive review of systems was otherwise unremarkable except as noted above. Physical Exam:  
Visit Vitals BP (!) 153/99 Pulse 83 Temp 98 °F (36.7 °C) Resp 16 Ht 6' (1.829 m) Wt 192 lb (87.1 kg) SpO2 96% BMI 26.04 kg/m² Vitals:  
 01/17/20 0927 01/17/20 1145 01/17/20 1209 01/17/20 1501 BP: (!) 163/92  (!) 184/92 (!) [id number] Pulse: 78  91 83 Resp: 18  16 16 Temp:   98.2 °F (36.8 °C) 98 °F (36.7 °C) SpO2: 92% 95% 94% 96% Weight:      
Height:      
 
01/17 0701 - 01/17 1900 In: 644 [I.V.:644] Out: 0  
01/15 1901 - 01/17 0700 In: 2476 [P.O.:960; I.V.:1516] Out: 425 [Urine:425] Constitutional: Alert, oriented, cooperative patient in no acute distress; appears weak Eyes:Sclera are clear. EOMs intact ENMT: no external lesions gross hearing normal; no obvious neck masses, no ear or lip lesions, nares normal 
CV: RRR. Normal perfusion Resp: No JVD. Breathing is  non-labored; no audible wheezing. GI: soft and non-distended Musculoskeletal: unremarkable with normal function. No embolic signs or cyanosis. Neuro:  Oriented; moves all 4; no focal deficits Psychiatric: normal affect and mood, no memory impairment Recent vitals (if inpt): 
Patient Vitals for the past 24 hrs: 
 BP Temp Pulse Resp SpO2  
01/17/20 1501 (!) 153/99 98 °F (36.7 °C) 83 16 96 % 01/17/20 1209 (!) 184/92 98.2 °F (36.8 °C) 91 16 94 % 01/17/20 1145     95 % 01/17/20 0927 (!) 163/92  78 18 92 % 01/17/20 0917 (!) 164/94  85 18 98 % 01/17/20 0912 (!) 174/95  80 16 100 % 01/17/20 0855 105/69 98 °F (36.7 °C) 88 14 98 % 01/17/20 0747 (!) 177/105 98.1 °F (36.7 °C) 84 18 97 % 01/17/20 0303 (!) 154/92 97.6 °F (36.4 °C) 74 18 97 % 01/16/20 2250 156/89 98 °F (36.7 °C) 92 18 94 % 01/16/20 2024 (!) 163/94 98 °F (36.7 °C) 84 18 94 % 01/16/20 1939     95 % 01/16/20 1822 155/88 97.8 °F (36.6 °C) 88 18 97 % Labs: 
Recent Labs  
  01/17/20 
0425 01/16/20 
3196 WBC 6.2 8.6 HGB 10.4* 13.3*  
 450 * 137  
K 4.0 3.9  102 CO2 28 29 BUN 11 16 CREA 0.97 1.41  
GLU 99 146* PTP 13.1  --   
INR 1.0  --   
TBILI  --  0.2 SGOT  --  11* ALT  --  11* AP  --  136 LPSE  --  270 TROIQ  --  <0.02* Lab Results Component Value Date/Time  WBC 6.2 01/17/2020 04:25 AM  
 HGB 10.4 (L) 01/17/2020 04:25 AM  
 PLATELET 350 78/17/9985 04:25 AM  
 Sodium 135 (L) 01/17/2020 04:25 AM  
 Potassium 4.0 01/17/2020 04:25 AM  
 Chloride 102 01/17/2020 04:25 AM  
 CO2 28 01/17/2020 04:25 AM  
 BUN 11 01/17/2020 04:25 AM  
 Creatinine 0.97 01/17/2020 04:25 AM  
 Glucose 99 01/17/2020 04:25 AM  
 INR 1.0 01/17/2020 04:25 AM  
 aPTT 57.3 (H) 01/22/2018 08:26 AM  
 Bilirubin, total 0.2 01/16/2020 06:49 AM  
 AST (SGOT) 11 (L) 01/16/2020 06:49 AM  
 ALT (SGPT) 11 (L) 01/16/2020 06:49 AM  
 Alk. phosphatase 136 01/16/2020 06:49 AM  
 Lipase 270 01/16/2020 06:49 AM  
 Troponin-I, Qt. <0.02 (L) 01/16/2020 06:49 AM  
 
 
CT Results  (Last 48 hours) 01/16/20 0858  CT CHEST ABD PELV W CONT Final result Impression:  IMPRESSION:   
   
1. Extensive pulmonary parenchymal and hepatic metastatic disease as well as  
subcarinal and azygoesophageal recess lymphadenopathy likely representing  
metastatic disease as well. 2.  Long-segment thickening of the wall of the distal esophagus suggests the  
possibility of a primary esophageal carcinoma. Follow-up gastroenterology  
consultation is recommended for consideration of endoscopic biopsy. 3.  Left ventricular hypertrophy and scattered atherosclerosis. Narrative:  CT CHEST, ABDOMEN, PELVIS WITH CONTRAST:    
   
CLINICAL HISTORY:   Follow-up abnormal chest radiograph smoker with pulmonary  
nodules and 100-pound weight loss over the last several months. TECHNIQUE:  Oral and nonionic intravenous contrast was administered, and the  
torso was scanned with spiral technique. Radiation dose reduction was achieved  
using one or all of the following techniques: automated exposure control,  
weight-based dosing, iterative reconstruction. COMPARISON:  Portable chest today and chest CTA of November 16, 2019. CT CHEST: There are innumerable bilateral pulmonary nodules, the largest  
measuring approximately 3.9 cm posteriorly in the right lower lobe. There is  
marked thickening of the wall of the distal esophagus below the level of the  
hilary which suggests the possibility of primary esophageal carcinoma. There is associated subcarinal and azygoesophageal recess lymphadenopathy. No  
significant pleural fluid. The thyroid appears normal as imaged. There is  
thickening of the left ventricular wall suggesting a hypertrophy. CT ABDOMEN: There are innumerable small hepatic lesions which are new from 2018  
and also suspicious for metastatic disease. The largest measures approximately 2 cm posteriorly in the right lobe. There are mildly enlarged lymph nodes in  
the lesser sac which may represent metastatic disease as well. The spleen,  
pancreas, kidneys, and adrenals appear unremarkable. CT PELVIS:  The appendix is not confidently identified, but there are no  
secondary signs of appendicitis. The prostate is not enlarged. No primary or  
secondary signs of appendicitis or identified. No pathologically enlarged lymph  
nodes or free fluid is evident. There is scattered aortoiliac atherosclerotic  
calcifications with mild ectasia but no focal aneurysm. Bone windows  
demonstrate no aggressive process, accounting for degenerative changes including  
severe osteoarthritis at the right hip. A 1 cm central lucency in the body of  
L4 has sclerotic margins and is not typical of metastatic disease. chest X-ray I reviewed recent labs, recent radiologic studies, and pertinent records including other doctor notes if needed. I independently reviewed radiology images for studies I described above or studies I have ordered. Admission date (for inpatients): 1/16/2020 Day of Surgery  Procedure(s): ENDOSCOPIC ULTRASOUND (EUS)/ BMI 26 ROOM 537 ESOPHAGOGASTRODUODENAL (EGD) BIOPSY ASSESSMENT/PLAN: 
Problem List  Date Reviewed: 1/17/2020 Codes Class Noted * (Principal) Esophageal mass ICD-10-CM: K22.8 ICD-9-CM: 530.89  1/17/2020 Liver metastases ([facility] ICD-10-CM: C78.7 ICD-9-CM: 197.7  1/17/2020 Esophageal dysphagia ICD-10-CM: R13.10 ICD-9-CM: 787.20  1/17/2020 Weight loss ICD-10-CM: R63.4 ICD-9-CM: 783.21  1/17/2020 Chest pain ICD-10-CM: R07.9 ICD-9-CM: 786.50  1/16/2020 Methamphetamine abuse (HCC) (Chronic) ICD-10-CM: F15.10 ICD-9-CM: 305.70  12/2/2018 H/O atrial flutter (Chronic) ICD-10-CM: Z86.79 
ICD-9-CM: V12.59  12/2/2018 Overview Signed 2/6/2018  7:51 AM by Jesse Lara NP  
  1/2018 Atrial flutter, s/p cardioversion. Essential hypertension (Chronic) ICD-10-CM: I10 
ICD-9-CM: 401.9  12/2/2018 Acute respiratory failure with hypoxia and hypercarbia (HCC) ICD-10-CM: J96.01, J96.02 
ICD-9-CM: 518.81  12/2/2018 Substance abuse (HCC) (Chronic) ICD-10-CM: F19.10 ICD-9-CM: 305.90  12/2/2018 Noncompliance (Chronic) ICD-10-CM: Z91.19 ICD-9-CM: V15.81  12/2/2018 Acute encephalopathy ICD-10-CM: G93.40 ICD-9-CM: 348.30  12/2/2018 COPD (chronic obstructive pulmonary disease) (HCC) (Chronic) ICD-10-CM: J44.9 ICD-9-CM: 317  11/16/2018 NATALIIA (obstructive sleep apnea) (Chronic) ICD-10-CM: C62.62 
ICD-9-CM: 327.23  11/16/2018 Chronic respiratory failure (HCC) (Chronic) ICD-10-CM: J96.10 ICD-9-CM: 518.83  11/16/2018 Altered mental status ICD-10-CM: R41.82 
ICD-9-CM: 780.97  11/16/2018 Morbid obesity ([address] (Chronic) ICD-10-CM: E66.01 
ICD-9-CM: 278.01  11/16/2018 Nicotine dependence (Chronic) ICD-10-CM: M52.285 ICD-9-CM: 305.1  2/1/2018 Alcohol abuse (Chronic) ICD-10-CM: F10.10 ICD-9-CM: 305.00  2/1/2018 Homeless (Chronic) ICD-10-CM: Z59.0 ICD-9-CM: V60.0  1/15/2018 Urethral stricture (Chronic) ICD-10-CM: N35.919 ICD-9-CM: 598.9  1/7/2018 Anasarca ICD-10-CM: R60.1 ICD-9-CM: 782.3  1/6/2018 Acute kidney injury ([facility] ICD-10-CM: N17.9 ICD-9-CM: 584.9  1/6/2018 Principal Problem: 
  Esophageal mass (1/17/2020) Active Problems: 
  Nicotine dependence (2/1/2018) Methamphetamine abuse ([facility] (12/2/2018) Alcohol abuse (2/1/2018) Homeless (1/15/2018) Chest pain (1/16/2020) Liver metastases ([address] (1/17/2020) Esophageal dysphagia (1/17/2020) Weight loss (1/17/2020) Likely Esophageal malignancy/suspected lung/liver/garth mets Await path Med onc and palliative care consulted Dysphagia/Weight loss I discussed enteral feeding options with him He wants to think it over and wait for his path report He is not sure he wants treatment Liquid diet only for now I have personally performed a face-to-face diagnostic evaluation and management  service on this patient. I have independently seen the patient. I have independently obtained the above history from the patient/family. I have independently examined the patient with above findings. I have independently reviewed data/labs for this patient and developed the above plan of care (MDM). Signed: Sanam Alas.  Pau Monsivais MD, FACS

## 2020-01-17 NOTE — PROGRESS NOTES
Pt nauseous in recovery. Anesthesia ordered zofran and haldol. pt only received Zofran in recovery because pharmacy had not verified haldol order before transport therefore it was not given. Yonas Albrecht RN on floor notified.

## 2020-01-17 NOTE — CONSULTS
Palliative Care Patient: Jacquelin Beckham MRN: [id number]  SSN: [id number] YOB: 1965  Age: 62 y.o. Sex: male Date of Request: 1/17/2020 Date of Consult:  1/17/2020 Reason for Consult:  pain and symptom management Requesting Physician: Salvador Snider NP Assessment/Plan:  
 
Principal Diagnosis:   
Fatigue, Lethargy  R53.83 Additional Diagnoses:  
· Nausea/Vomiting  R11.2 · Pain, abdomen  R10.9 · Counseling, Encounter for Medical Advice  Z71.9 
· Encounter for Palliative Care  Z51.5 Palliative Performance Scale (PPS): PPS: 70 Medical Decision Making:  
Reviewed and summarized chart from admission to present. Discussed case with appropriate providers. Reviewed laboratory and x-ray data. Patient sleeping soundly/snoring. His wife is at the bedside. Introduced the role of palliative care. She says he has been sleeping most of the day post his EGD. He was able to wake up and eat some lunch and she says so far, it has stayed down. She feels that his pain has been well controlled on current regimen; agree with IV Dilaudid and Percocet prn. Assessed wife's understanding of patient's condition; she states that he has cancer, that he will get a port and a feeding tube, and will begin chemotherapy soon. Will continue to follow to assist with symptom management and goals of care. Will discuss findings with members of the interdisciplinary team.   
 
Thank you for this referral.    
 
  
. 
 
Subjective:  
 
History obtained from:  Family, Care Provider and Chart Chief Complaint: N/V- none currently History of Present Illness:  Mr. Wil Lua is a 60yo male with PMH of COPD, heart failure, NATALIIA, substance abuse, and other history as listed below. Patient presented to [facility] ER on 1/16 with several month history of nausea/vomiting and significant weight loss. He began having chest pain and shortness of breath.   EKG and troponin normal, but unfortunately a CXR showed a right hilar mass. A CT CAP very concerning for primary esophageal carcinoma as well as pulmonary and hepatic metastatic disease and lymphadenopathy. He was admitted for further management of these issues. GI was consulted and EGD with EUS was performed 1/17. Advance Directive: No      
Code Status:  Full Code Health Care Power of : No - Patient does not have a [address]. Past Medical History:  
Diagnosis Date  Acute respiratory failure with hypercapnia ([facility] 2/1/2018  Chronic obstructive pulmonary disease ([facility]  Heart failure ([facility]  Sleep disorder No past surgical history on file. No family history on file. Social History Tobacco Use  Smoking status: Current Every Day Smoker Packs/day: 2.00 Substance Use Topics  Alcohol use: No  
  Comment: once a month beer Prior to Admission medications Medication Sig Start Date End Date Taking? Authorizing Provider OXYGEN-AIR DELIVERY SYSTEMS 4 L by Nasal route continuous. 12/2/18   Provider, Historical  
amLODIPine (NORVASC) 10 mg tablet Take 1 Tab by mouth daily. 12/1/18   Stacey Batres NP  
furosemide (LASIX) 40 mg tablet Take 1 Tab by mouth daily. 11/30/18   Stacey Batres NP  
hydrALAZINE (APRESOLINE) 50 mg tablet Take 1 Tab by mouth three (3) times daily. 11/30/18   Stacey Batres NP  
levalbuterol (XOPENEX) 1.25 mg/3 mL nebu 3 mL by Nebulization route every four (4) hours as needed. 11/30/18   Stacey Batres NP  
amiodarone (CORDARONE) 200 mg tablet Take 1 Tab by mouth every twelve (12) hours. 11/30/18   Stacey Batres NP  
carvedilol (COREG) 25 mg tablet Take 1 Tab by mouth two (2) times daily (with meals). 11/30/18   Stacey Batres NP  
rivaroxaban (XARELTO) 20 mg tab tablet Take 1 Tab by mouth daily (with dinner). 11/30/18   Stacey Batres NP  
sertraline (ZOLOFT) 50 mg tablet Take 1 Tab by mouth daily.  11/30/18   Colin Mann NP  
 tiotropium (SPIRIVA) 18 mcg inhalation capsule Take 1 Cap by inhalation daily. 12/1/18   Stacey Batres NP  
HYDROcodone-acetaminophen (NORCO) 7.5-325 mg per tablet Take 1 Tab by mouth every four (4) hours as needed. Max Daily Amount: 6 Tabs. 11/30/18   Stacey Batres NP  
albuterol (PROVENTIL HFA, VENTOLIN HFA, PROAIR HFA) 90 mcg/actuation inhaler Take 2 Puffs by inhalation every four (4) hours as needed for Wheezing. 1/24/18   Floyd Mccullough MD  
albuterol-ipratropium (DUO-NEB) 2.5 mg-0.5 mg/3 ml nebu 3 mL by Nebulization route every four (4) hours as needed. 1/24/18   Floyd Mccullough MD  
 
 
No Known Allergies Review of Systems: 
Review of systems not obtained due to patient factors: lethargic Objective:  
 
Visit Vitals BP (!) 153/99 Pulse 83 Temp 98 °F (36.7 °C) Resp 16 Ht 6' (1.829 m) Wt 192 lb (87.1 kg) SpO2 96% BMI 26.04 kg/m² Physical Exam: 
 
General:  Sleeping. No acute distress. Eyes:  Deferred. Nose: Nares normal. Septum midline. Neck: Supple, symmetrical, trachea midline. Lungs:   Unlabored, snoring. Heart:  Deferred. Abdomen:   Non-distended Extremities: Normal, atraumatic, no cyanosis. Skin: Skin color, texture, turgor normal. No rash. Neurologic: Nonfocal.  
Psych: Sleeping- unable to assess. Assessment:  
 
Hospital Problems  Date Reviewed: 12/5/2018 Codes Class Noted POA * (Principal) Chest pain ICD-10-CM: R07.9 ICD-9-CM: 786.50  1/16/2020 Yes Signed By: Brandon العراقي NP   
 January 17, 2020

## 2020-01-17 NOTE — CONSULTS
[employer] [facility] Inpatient Hematology / Oncology Consult Note Reason for Consult:  Chest pain [R07.9] Referring Physician:  Colin Tavares MD 
 
History of Present Illness: 
Mr. Ananya Cali is a 62 y.o. male admitted on 1/16/2020. His PMH includes ETOH/tobacco abuse, methamphetamine abuse, COPD, systolic CHF, atrial flutter, obstructive sleep apnea on CPAP, and obesity. He presented to the ED with complaints of nausea, vomiting and chest pain. Started having nausea 3 months ago, severe in intensity, gradually worsening, aggravated by any food or fluid intake, no specific relieving factors, had multiple episodes of vomiting over the last several days. This is associated with moderate mid sternal chest pain on and off for several days, gradually worsening. Also complains of mild epigastric pain for several days. Complains of shortness of breath with exertion not at rest.  Patient was unable to eat or drink much for the last 3 months and lost nearly 100 pounds over a period of several months. Constipated has not had a bowel movement for 1 week. No fevers. Complains of cough with small amount of brownish sputum. Patient continues to smoke half pack of cigarettes at least every day. History of alcohol abuse in the past but not drinking much currently. Still does methamphetamine on and off has not done in more than a week. Patient is not taking any medications currently. He has history of noncompliance past also. CT CAP with extensive pulm parenchymal and hepatic metastatic disease; subcarinal and azygoesophageal recess LAD; long-segment thickening of the wall of the distal esophagus; and L ventricular hypertrophy and scattered atherosclerosis. GI performing EGD/EUS today, 1/17. We were consulted d/t the concern for metastatic esophageal cancer. Review of Systems: 
Unable to assess - pt too drowsy s/p EGD No Known Allergies Past Medical History:  
Diagnosis Date  Acute respiratory failure with hypercapnia ([facility] 2/1/2018  Chronic obstructive pulmonary disease ([facility]  Heart failure ([facility]  Sleep disorder No past surgical history on file. No family history on file. Social History Socioeconomic History  Marital status:  Spouse name: Not on file  Number of children: Not on file  Years of education: Not on file  Highest education level: Not on file Occupational History  Not on file Social Needs  Financial resource strain: Not on file  Food insecurity:  
  Worry: Not on file Inability: Not on file  Transportation needs:  
  Medical: Not on file Non-medical: Not on file Tobacco Use  Smoking status: Current Every Day Smoker Packs/day: 2.00 Substance and Sexual Activity  Alcohol use: No  
  Comment: once a month beer  Drug use: Yes Types: Marijuana, Methamphetamines  Sexual activity: Not on file Lifestyle  Physical activity:  
  Days per week: Not on file Minutes per session: Not on file  Stress: Not on file Relationships  Social connections:  
  Talks on phone: Not on file Gets together: Not on file Attends Christianity service: Not on file Active member of club or organization: Not on file Attends meetings of clubs or organizations: Not on file Relationship status: Not on file  Intimate partner violence:  
  Fear of current or ex partner: Not on file Emotionally abused: Not on file Physically abused: Not on file Forced sexual activity: Not on file Other Topics Concern  Not on file Social History Narrative  Not on file Current Facility-Administered Medications Medication Dose Route Frequency Provider Last Rate Last Dose  magnesium sulfate 1 g/100 ml IVPB (premix or compounded)  1 g IntraVENous WilRanulfo Peterson MD      
 ondansetron [facility] injection 4 mg  4 mg IntraVENous ONCE Danielito Hodge MD      
  haloperidol lactate (HALDOL) injection 0.5 mg  0.5 mg IntraVENous ONCE Vinod Hodge MD      
 lactated Ringers infusion  50 mL/hr IntraVENous CONTINUOUS Carole Hill MD 50 mL/hr at 01/17/20 0805 50 mL/hr at 01/17/20 0805  
 HYDROmorphone (PF) (DILAUDID) injection 0.5 mg  0.5 mg IntraVENous Q4H PRN Carole Hill MD   0.5 mg at 01/17/20 7869  promethazine (PHENERGAN) with saline injection 12.5 mg  12.5 mg IntraVENous Q6H PRN Carole Hill MD      
 famotidine (PF) (PEPCID) 20 mg in 0.9% sodium chloride 10 mL injection  20 mg IntraVENous Q12H Carole Hill MD   20 mg at 01/16/20 2103  albuterol-ipratropium (DUO-NEB) 2.5 MG-0.5 MG/3 ML  3 mL Nebulization Q4H PRN Carole Hill MD      
 tiotropium [facility]) inhalation capsule 18 mcg  1 Cap Inhalation DAILY Carole Hill MD      
 arformoteroL (BROVANA) neb solution 15 mcg  15 mcg Nebulization BID RT Carole Hill MD   15 mcg at 01/16/20 1937  
 amiodarone (CORDARONE) tablet 200 mg  200 mg Oral DAILY Carole Hill MD      
 sodium chloride (NS) flush 5-40 mL  5-40 mL IntraVENous Q8H Carole Hill MD   10 mL at 01/17/20 9487  sodium chloride (NS) flush 5-40 mL  5-40 mL IntraVENous PRN Carole Hill MD      
 acetaminophen (TYLENOL) tablet 650 mg  650 mg Oral Q4H PRN Carole Hill MD   650 mg at 01/17/20 0706  
 naloxone [facility]) injection 0.4 mg  0.4 mg IntraVENous PRN Carole Hill MD      
 diphenhydrAMINE (BENADRYL) capsule 25 mg  25 mg Oral Q4H PRN Carole Hill MD      
 [facility] injection 4 mg  4 mg IntraVENous Q4H PRN Carole Hill MD   4 mg at 01/17/20 0025  magnesium hydroxide (MILK OF MAGNESIA) 400 mg/5 mL oral suspension 30 mL  30 mL Oral DAILY PRN Carole Hill MD      
 polyethylene glycol (MIRALAX) packet 17 g  17 g Oral DAILY Carole Hill MD      
 oxyCODONE-acetaminophen (PERCOCET 7.5) 7.5-325 mg per tablet 1 Tab  1 Tab Oral Q4H PRN Stephanie Nascimento MD   1 Tab at 20 1825  
 nicotine (NICODERM CQ) 21 mg/24 hr patch 1 Patch  1 Patch TransDERmal Q24H Stephanie Nascimento MD   1 Patch at 20 1337  influenza vaccine 2019- (6 mos+)(PF) (FLUARIX/FLULAVAL/FLUZONE QUAD) injection 0.5 mL  0.5 mL IntraMUSCular PRIOR TO DISCHARGE Stephanie Nascimento MD      
 hydrALAZINE (APRESOLINE) tablet 50 mg  50 mg Oral Q6H PRN Stephanie Nascimento MD      
 hydrALAZINE (APRESOLINE) 20 mg/mL injection 10 mg  10 mg IntraVENous Q6H PRN Stephanie Nascimento MD   10 mg at 20 1652  dilTIAZem (CARDIZEM) IR tablet 90 mg  90 mg Oral TID Stephanie Nascimento MD   90 mg at 20 2104 OBJECTIVE: 
Patient Vitals for the past 8 hrs: 
 BP Temp Pulse Resp SpO2  
20 0917 (!) 164/94  85  98 % 20 0912 (!) 174/95  80  100 % 20 0855 105/69 98 °F (36.7 °C) 88 14 98 % 20 0747 (!) 177/105 98.1 °F (36.7 °C) 84 18 97 % 20 0303 (!) 154/92 97.6 °F (36.4 °C) 74 18 97 % Temp (24hrs), Av °F (36.7 °C), Min:97.6 °F (36.4 °C), Max:98.3 °F (36.8 °C) 
 
 0701 -  1900 In: 644 [I.V.:644] Out: 0 Physical Exam: 
Constitutional: Well developed, well nourished male in no acute distress, lying comfortably in the hospital bed. Wife at bedside. HEENT: Normocephalic and atraumatic. Neck supple without JVD. No thyromegaly present. Skin Warm and dry. No bruising and no rash noted. No erythema. No pallor. Respiratory Lungs are clear to auscultation bilaterally without wheezes, rales or rhonchi, normal air exchange without accessory muscle use. CVS Normal rate, regular rhythm and normal S1 and S2. No murmurs, gallops, or rubs. Abdomen Soft, nontender and nondistended, normoactive bowel sounds. No palpable mass. No hepatosplenomegaly. Neuro Grossly nonfocal with no obvious sensory or motor deficits. MSK Normal range of motion in general.  No edema and no tenderness. Psych Somnolent after EGD Labs:   
Recent Results (from the past 24 hour(s)) DRUG SCREEN, URINE Collection Time: 01/16/20 10:56 AM  
Result Value Ref Range PCP(PHENCYCLIDINE) NEGATIVE BENZODIAZEPINES NEGATIVE     
 COCAINE NEGATIVE AMPHETAMINES POSITIVE METHADONE NEGATIVE     
 THC (TH-CANNABINOL) POSITIVE    
 OPIATES NEGATIVE     
 BARBITURATES NEGATIVE CULTURE, BLOOD Collection Time: 01/16/20  1:27 PM  
Result Value Ref Range Special Requests: RIGHT Antecubital 
    
 Culture result: NO GROWTH AFTER 17 HOURS    
CULTURE, BLOOD Collection Time: 01/16/20  1:32 PM  
Result Value Ref Range Special Requests: LEFT Antecubital 
    
 Culture result: NO GROWTH AFTER 17 HOURS    
CBC WITH AUTOMATED DIFF Collection Time: 01/17/20  4:25 AM  
Result Value Ref Range WBC 6.2 4.3 - 11.1 K/uL  
 RBC 3.64 (L) 4.23 - 5.6 M/uL  
 HGB 10.4 (L) 13.6 - 17.2 g/dL HCT 33.8 (L) 41.1 - 50.3 % MCV 92.9 79.6 - 97.8 FL  
 MCH 28.6 26.1 - 32.9 PG  
 MCHC 30.8 (L) 31.4 - 35.0 g/dL  
 RDW 13.1 11.9 - 14.6 % PLATELET 724 478 - 722 K/uL MPV 9.4 9.4 - 12.3 FL ABSOLUTE NRBC 0.00 0.0 - 0.2 K/uL  
 DF AUTOMATED NEUTROPHILS 63 43 - 78 % LYMPHOCYTES 26 13 - 44 % MONOCYTES 7 4.0 - 12.0 % EOSINOPHILS 3 0.5 - 7.8 % BASOPHILS 1 0.0 - 2.0 % IMMATURE GRANULOCYTES 0 0.0 - 5.0 %  
 ABS. NEUTROPHILS 3.9 1.7 - 8.2 K/UL  
 ABS. LYMPHOCYTES 1.6 0.5 - 4.6 K/UL  
 ABS. MONOCYTES 0.5 0.1 - 1.3 K/UL  
 ABS. EOSINOPHILS 0.2 0.0 - 0.8 K/UL  
 ABS. BASOPHILS 0.0 0.0 - 0.2 K/UL  
 ABS. IMM. GRANS. 0.0 0.0 - 0.5 K/UL METABOLIC PANEL, BASIC Collection Time: 01/17/20  4:25 AM  
Result Value Ref Range Sodium 135 (L) 136 - 145 mmol/L Potassium 4.0 3.5 - 5.1 mmol/L Chloride 102 98 - 107 mmol/L  
 CO2 28 21 - 32 mmol/L Anion gap 5 (L) 7 - 16 mmol/L Glucose 99 65 - 100 mg/dL BUN 11 6 - 23 MG/DL Creatinine 0.97 0.8 - 1.5 MG/DL  
 GFR est AA >60 >60 ml/min/1.73m2 GFR est non-AA >60 >60 ml/min/1.73m2 Calcium 8.9 8.3 - 10.4 MG/DL PROTHROMBIN TIME + INR Collection Time: 01/17/20  4:25 AM  
Result Value Ref Range Prothrombin time 13.1 11.7 - 14.5 sec INR 1.0 MAGNESIUM Collection Time: 01/17/20  4:25 AM  
Result Value Ref Range Magnesium 1.8 1.8 - 2.4 mg/dL PHOSPHORUS Collection Time: 01/17/20  4:25 AM  
Result Value Ref Range Phosphorus 3.2 2.5 - 4.5 MG/DL Imaging: 
CT CHEST ABD PELV W CONT [[id number]] Collected: 01/16/20 6987 Order Status: Completed Updated: 01/16/20 8472 Narrative:    
CT CHEST, ABDOMEN, PELVIS WITH CONTRAST:   
 
CLINICAL HISTORY:   Follow-up abnormal chest radiograph smoker with pulmonary 
nodules and 100-pound weight loss over the last several months. TECHNIQUE:  Oral and nonionic intravenous contrast was administered, and the 
torso was scanned with spiral technique.  Radiation dose reduction was achieved 
using one or all of the following techniques: automated exposure control, 
weight-based dosing, iterative reconstruction. COMPARISON:  Portable chest today and chest CTA of November 16, 2019. CT CHEST: There are innumerable bilateral pulmonary nodules, the largest 
measuring approximately 3.9 cm posteriorly in the right lower lobe.  There is 
marked thickening of the wall of the distal esophagus below the level of the 
hilary which suggests the possibility of primary esophageal carcinoma. Loann Ball is 
associated subcarinal and azygoesophageal recess lymphadenopathy.  No 
significant pleural fluid.  The thyroid appears normal as imaged.  There is 
thickening of the left ventricular wall suggesting a hypertrophy. CT ABDOMEN: There are innumerable small hepatic lesions which are new from 2018 
and also suspicious for metastatic disease.  The largest measures approximately 2 cm posteriorly in the right lobe.  There are mildly enlarged lymph nodes in 
 the lesser sac which may represent metastatic disease as well.  The spleen, 
pancreas, kidneys, and adrenals appear unremarkable. CT PELVIS:  The appendix is not confidently identified, but there are no 
secondary signs of appendicitis.  The prostate is not enlarged.  No primary or 
secondary signs of appendicitis or identified.  No pathologically enlarged lymph 
nodes or free fluid is evident.  There is scattered aortoiliac atherosclerotic 
calcifications with mild ectasia but no focal aneurysm.  Bone windows 
demonstrate no aggressive process, accounting for degenerative changes including 
severe osteoarthritis at the right hip.  A 1 cm central lucency in the body of 
L4 has sclerotic margins and is not typical of metastatic disease. Impression:    
IMPRESSION:  
 
1.  Extensive pulmonary parenchymal and hepatic metastatic disease as well as 
subcarinal and azygoesophageal recess lymphadenopathy likely representing 
metastatic disease as well. 2.  Long-segment thickening of the wall of the distal esophagus suggests the 
possibility of a primary esophageal carcinoma.  Follow-up gastroenterology 
consultation is recommended for consideration of endoscopic biopsy. 3.  Left ventricular hypertrophy and scattered atherosclerosis. XR CHEST PORT [[id number]] Collected: 01/16/20 0737 Order Status: Completed Updated: 01/16/20 3047 Narrative:    
PORTABLE CHEST, January 16, 2020 at 0715 hours CLINICAL HISTORY:  Cough and chest pain in a smoker. COMPARISON:  December 6, 2018. FINDINGS:  AP erect image demonstrates an approximately 4 cm rounded mass 
projected over the inferior aspect of the right hilum.  There are numerous 
additional pulmonary nodules bilaterally, suspicious for metastatic disease.  No 
nidhi pneumonic consolidation or pleural fluid is evident.  The heart size is 
within normal limits without evidence of congestive heart failure or pneumothorax.  The bony thorax appears intact on this view.  There are overlying 
radiopaque support devices. Impression:    
IMPRESSION:  NUMEROUS BILATERAL PULMONARY NODULES ARE SUSPICIOUS FOR METASTATIC 
DISEASE, AND A 4 CM RIGHT LOWER LUNG MASS PROJECTED OVER THE INFERIOR ASPECT OF 
THE RIGHT HILUM IS SUSPICIOUS FOR A PRIMARY BRONCHOGENIC CARCINOMA IN THIS 
SMOKER WITH COPD.  FOLLOW PULMONARY MEDICINE CONSULTATION IS RECOMMENDED. ASSESSMENT: 
Problem List  Date Reviewed: 12/5/2018 Codes Class Noted * (Principal) Chest pain ICD-10-CM: R07.9 ICD-9-CM: 786.50  1/16/2020 Methamphetamine abuse (HCC) (Chronic) ICD-10-CM: F15.10 ICD-9-CM: 305.70  12/2/2018 H/O atrial flutter (Chronic) ICD-10-CM: Z86.79 
ICD-9-CM: V12.59  12/2/2018 Overview Signed 2/6/2018  7:51 AM by Dada Kraus NP  
  1/2018 Atrial flutter, s/p cardioversion. Essential hypertension (Chronic) ICD-10-CM: I10 
ICD-9-CM: 401.9  12/2/2018 Acute respiratory failure with hypoxia and hypercarbia (HCC) ICD-10-CM: J96.01, J96.02 
ICD-9-CM: 518.81  12/2/2018 Substance abuse (HCC) (Chronic) ICD-10-CM: F19.10 ICD-9-CM: 305.90  12/2/2018 Noncompliance (Chronic) ICD-10-CM: Z91.19 ICD-9-CM: V15.81  12/2/2018 Acute encephalopathy ICD-10-CM: G93.40 ICD-9-CM: 348.30  12/2/2018 COPD (chronic obstructive pulmonary disease) (HCC) (Chronic) ICD-10-CM: J44.9 ICD-9-CM: 937  11/16/2018 NATALIIA (obstructive sleep apnea) (Chronic) ICD-10-CM: P90.23 
ICD-9-CM: 327.23  11/16/2018 Chronic respiratory failure (HCC) (Chronic) ICD-10-CM: J96.10 ICD-9-CM: 518.83  11/16/2018 Altered mental status ICD-10-CM: R41.82 
ICD-9-CM: 780.97  11/16/2018 Morbid obesity ([address] (Chronic) ICD-10-CM: E66.01 
ICD-9-CM: 278.01  11/16/2018 Nicotine dependence (Chronic) ICD-10-CM: R36.513 ICD-9-CM: 305.1  2/1/2018 Alcohol abuse (Chronic) ICD-10-CM: F10.10 ICD-9-CM: 305.00  2/1/2018 Homeless (Chronic) ICD-10-CM: Z59.0 ICD-9-CM: V60.0  1/15/2018 Urethral stricture (Chronic) ICD-10-CM: N35.919 ICD-9-CM: 598.9  1/7/2018 Anasarca ICD-10-CM: R60.1 ICD-9-CM: 782.3  1/6/2018 Acute kidney injury ([facility] ICD-10-CM: N17.9 ICD-9-CM: 584.9  1/6/2018 RECOMMENDATIONS: 
Metastatic esophageal cancer - CT CAP with extensive pulm parenchymal and hepatic metastatic disease; subcarinal and azygoesophageal recess LAD; and long-segment thickening of the wall of the distal esophagus - GI performing EGD/EUS, biopsy taken and path pending. Will need tissue dx. Will need to send tissue for Caris testing - navigation team notified. 
- Pt was too drowsy s/p EGD this AM for discussion regarding the findings on his EGD and imaging. Will return tomorrow to discuss. - Check CEA, CA 19-9, iron studies - Consult surgery for feeding tube - Consult palliative care - Consult IR for port placement - Will need PET/CT as OP Lab studies and imaging studies were personally reviewed. Thank you for allowing us to participate in the care of Mr. Gayatri Casanova. He will need to f/u with Dr. Deepika Bustillo within one week after discharge to discuss path report/diagnosis and possible treatment options. Simmie Fleischer, NP [facility]ematology [facility] On[facility] [address] [place] [address] Office : [phone] Fax : [phone] Attending Addendum: 
I have personally performed a face to face diagnostic evaluation on this patient. I have reviewed and agree with the care plan as documented above by Simmie Fleischer, N.P.  My findings are as follows: Patient appears stable, heart rate regular without murmurs, abdomen is non-tender, bowel sounds are positive.   62 male, smoker history of EtOH abuse, drug abuse (methamphetamine, ongoing), CHF, atrial flutter, COPD, NATALIIA on CPAP, obesity, admitted with ongoing significant nausea, inability to keep fluids down, as well as significant weight loss (reportedly over 100 pounds in the last few months). CT CAP concerning for metastatic process including multiple lesions in lungs and liver (imaging personally reviewed). Recent EGD/EUS showing at a distance of 32 to 46 cm from incisors, is circumferential fungating, ulcerated mass concerning for malignancy. Mass extends into the proximal stomach. Likely staged as T3N2MX. Await final path, will also check Carris NGS. Will likely benefit from port placement as well as feeding tube placement in anticipation of therapy directed at likely metastatic esophageal cancer. We will also get PET scan as an outpatient. Check iron panel, CEA and CA-19-9. Follow-up in outpatient oncology within week of discharge. Thank you for allowing us to participate in care of this pleasant patient. Please call w any questions. Taylor Sullivan MD 
[facility] Hematology/Oncology [address] [address] Office : [phone] Fax : [phone]

## 2020-01-17 NOTE — OP NOTES
Gastroenterology Procedure Note Procedure:  Esophagogastroduodenoscopy with biopsies, Endoscopic ultrasound with Doppler Date of Procedure:  1/17/2020 Patient:  Andres Cartagena     1965 Indication:  Esophageal mass, dysphagia, abnormal weight loss, metastatic lesions on CT Sedation:  MAC Pre-Procedure Physical Exam: 
 
Mental status:  alert and oriented Airway:  normal oropharyngeal airway and neck mobility CV:  regular rate and rhythm Respiratory:  clear to auscultation Procedure: A History and Physical has been performed, and patient medication allergies have been 
reviewed. Risks of perforation, hemorrhage, adverse drug reaction, and aspiration were discussed. Informed consent was obtained for the procedure, including sedation. The patient was placed in the left lateral decubitus position. The heart rate, oxygen saturations, blood pressure, and response to care were monitored throughout the procedure. The gastroscope was advanced to second portion of the duodenum. A careful inspection was performed as the scope was withdrawn. The radial echoendoscope was then passed through the mouth and advanced under direct vision to the mid esophagus. This scope could not safely traverse the mass. As the scope was slowly withdrawn, detailed endoscopic images were obtained from the surrounding organs. The patient tolerated the procedure well. Findings:  
 
ENDOSCOPIC FINDINGS:   
OROPHARYNX:  Cords and pyriform recesses appear normal.  
ESOPHAGUS:  At a distance 32-46 cm from the incisors there is a circumferential, partially obstructing, fungating, ulcerated mass concerning for malignancy. Extensive biopsies were obtained. STOMACH:  The mass extends into the proximal stomach.  The fundus on antegrade and retroflexed views is normal. The body, antrum, and pylorus are normal.  
DUODENUM:  The bulb and second portions are normal.  
 
 ESOPHAGUS:  The balloon was insufflated to improve acoustic coupling. At a distance 32-37 cm from the incisors, there is severe circumferential wall thickening to 19 mm maximally. There is a hypoechoic, heterogeneous mass that involves the esophageal mucosa, submucosa and muscularis propria. There are a few small tumor pseudopodia extending through the muscularis propria. There is no visualized invasion of adjacent organs. There are multiple enlarged periesophageal lymph nodes. The largest measures 34 x 14 mm in size. FNA not performed as the needle which averse the tumor and confirmation of malignancy would not affect staging. Further evaluation of the distal extent of tumor as well as stomach and liver could not be performed as the scope could not safely traverse the mass. Specimen:  Yes 
 
Estimated Blood Loss:  5 ml Implant:  None Impression:   
Esophageal mass. This is concerning for a least T3N2Mx based on endoscopic ultrasound criteria. As the tumor could not be traversed, the distal extent could not be evaluated for invasion of adjacent structures such as the diaphragm. Thus, the possibility of understaging exists. More importantly, there is strong suspicion for metastatic liver and lung disease based on CT. Plan: 1. Follow-up pathology results. 2. Consult oncology. 3. Full liquids today. 4. As patient likely has unresectable malignancy, he may be a candidate for palliative esophageal stent placement. This would require multiple stents due to the length of the tumor. Based on extension into the stomach, he will be at increased risk of stent migration.   
 
Signed: 
Bill Fuentes MD 
1/17/2020 
8:44 AM

## 2020-01-17 NOTE — PROGRESS NOTES
Problem: Falls - Risk of 
Goal: *Absence of Falls 1/17/2020 0715 by Mejia Roman RN Outcome: Progressing Towards Goal 
Note: Fall Risk Interventions: 
  
 
  
 
Medication Interventions: Teach patient to arise slowly 1/16/2020 1831 by Mejia Roman RN Outcome: Progressing Towards Goal 
Note: Fall Risk Interventions:

## 2020-01-17 NOTE — ANESTHESIA PREPROCEDURE EVALUATION
Relevant Problems No relevant active problems Anesthetic History No history of anesthetic complications Review of Systems / Medical History Patient summary reviewed and pertinent labs reviewed Pulmonary COPD Smoker Neuro/Psych Cardiovascular GI/Hepatic/Renal 
  
 
 
 
 
 
 Endo/Other Other Findings Comments: 100+lb weight loss. H/o of drug abuse, etoh use. Distal esophagus thickening, threw up clear last night after broth and jello. Elevated BNP negative troponins on admission, ECHO grossly normal 1/16/20. Physical Exam 
 
Airway Mallampati: II 
 
Neck ROM: normal range of motion Mouth opening: Normal 
 
 Cardiovascular Rhythm: regular Rate: normal 
 
 
Pertinent negatives: No murmur and peripheral edema Dental 
 
Dentition: Poor dentition Pulmonary Breath sounds clear to auscultation Abdominal 
 
 
 
 Other Findings Anesthetic Plan ASA: 3 Anesthesia type: total IV anesthesia Induction: Intravenous Anesthetic plan and risks discussed with: Patient

## 2020-01-17 NOTE — PROGRESS NOTES
Progress Note Patient: Sean Dela Cruz               Sex: male          MRN: [id number] YOB: 1965      Age:  62 y.o. PCP:  None Treatment Team: Attending Provider: Carole Hill MD; Consulting Provider: Sarah Belle MD; Consulting Provider: Nora Haider MD; Primary Nurse: Catarino Hammonds RN Subjective: This is a 57-year-old gentleman with history of methamphetamine abuse, tobacco abuse, noncompliance with medications, hypertension, CHF, COPD came in with complaints of chest pain and shortness of breath and was found to have esophageal mass with possible liver and lung metastasis. 2020. He had EGD this morning and was found to have fungating, partially obstructing esophageal mass extending into stomach. Patient is able to tolerate liquids. Still having nausea. Complains of moderate mid sternal chest wall pain. No fevers overnight. Denies any abdominal pain or diarrhea. Objective:  
Physical Exam:  
Visit Vitals BP (!) 163/92 Pulse 78 Temp 98 °F (36.7 °C) Resp 18 Ht 6' (1.829 m) Wt 87.1 kg (192 lb) SpO2 92% BMI 26.04 kg/m² Temp (24hrs), Av °F (36.7 °C), Min:97.6 °F (36.4 °C), Max:98.3 °F (36.8 °C) Oxygen Therapy O2 Sat (%): 92 % (20 0927) Pulse via Oximetry: 79 beats per minute (20 0927) O2 Device: Room air (20 3132) Intake/Output Summary (Last 24 hours) at 2020 1112 Last data filed at 2020 6451 Gross per 24 hour Intake 3120 ml Output 425 ml Net 2695 ml General: Conscious, No acute distress Eyes:  EDMAR, No pallor/icterus HENT:             Oral Mucosa is Moist, No sinus tenderness Neck:               Supple, No JVD Lungs:  CTA Bilaterally, No significant wheeze/rhonchi Heart:  S1 S2 regular Abdomen: Soft, normal bowel sounds, mild epigastric tenderness, nondistended, No guarding/rigidity/rebound tend. Extremities: No pedal edema Neurologic:  AAOX3, No acute FND Skin:                No acute rashes Musculoskeletal: No Acute findings Psych:             Somewhat depressed mood. LAB Recent Results (from the past 24 hour(s)) CULTURE, BLOOD Collection Time: 01/16/20  1:27 PM  
Result Value Ref Range Special Requests: RIGHT Antecubital 
    
 Culture result: NO GROWTH AFTER 17 HOURS    
CULTURE, BLOOD Collection Time: 01/16/20  1:32 PM  
Result Value Ref Range Special Requests: LEFT Antecubital 
    
 Culture result: NO GROWTH AFTER 17 HOURS    
CBC WITH AUTOMATED DIFF Collection Time: 01/17/20  4:25 AM  
Result Value Ref Range WBC 6.2 4.3 - 11.1 K/uL  
 RBC 3.64 (L) 4.23 - 5.6 M/uL  
 HGB 10.4 (L) 13.6 - 17.2 g/dL HCT 33.8 (L) 41.1 - 50.3 % MCV 92.9 79.6 - 97.8 FL  
 MCH 28.6 26.1 - 32.9 PG  
 MCHC 30.8 (L) 31.4 - 35.0 g/dL  
 RDW 13.1 11.9 - 14.6 % PLATELET 878 159 - 635 K/uL MPV 9.4 9.4 - 12.3 FL ABSOLUTE NRBC 0.00 0.0 - 0.2 K/uL  
 DF AUTOMATED NEUTROPHILS 63 43 - 78 % LYMPHOCYTES 26 13 - 44 % MONOCYTES 7 4.0 - 12.0 % EOSINOPHILS 3 0.5 - 7.8 % BASOPHILS 1 0.0 - 2.0 % IMMATURE GRANULOCYTES 0 0.0 - 5.0 %  
 ABS. NEUTROPHILS 3.9 1.7 - 8.2 K/UL  
 ABS. LYMPHOCYTES 1.6 0.5 - 4.6 K/UL  
 ABS. MONOCYTES 0.5 0.1 - 1.3 K/UL  
 ABS. EOSINOPHILS 0.2 0.0 - 0.8 K/UL  
 ABS. BASOPHILS 0.0 0.0 - 0.2 K/UL  
 ABS. IMM. GRANS. 0.0 0.0 - 0.5 K/UL METABOLIC PANEL, BASIC Collection Time: 01/17/20  4:25 AM  
Result Value Ref Range Sodium 135 (L) 136 - 145 mmol/L Potassium 4.0 3.5 - 5.1 mmol/L Chloride 102 98 - 107 mmol/L  
 CO2 28 21 - 32 mmol/L Anion gap 5 (L) 7 - 16 mmol/L Glucose 99 65 - 100 mg/dL BUN 11 6 - 23 MG/DL Creatinine 0.97 0.8 - 1.5 MG/DL  
 GFR est AA >60 >60 ml/min/1.73m2 GFR est non-AA >60 >60 ml/min/1.73m2 Calcium 8.9 8.3 - 10.4 MG/DL PROTHROMBIN TIME + INR Collection Time: 01/17/20  4:25 AM  
Result Value Ref Range Prothrombin time 13.1 11.7 - 14.5 sec INR 1.0 MAGNESIUM Collection Time: 01/17/20  4:25 AM  
Result Value Ref Range Magnesium 1.8 1.8 - 2.4 mg/dL PHOSPHORUS Collection Time: 01/17/20  4:25 AM  
Result Value Ref Range Phosphorus 3.2 2.5 - 4.5 MG/DL No results found. No results found. All Micro Results Procedure Component Value Units Date/Time CULTURE, BLOOD [[id number]] Collected:  01/16/20 1327 Order Status:  Completed Specimen:  Blood Updated:  01/17/20 0741 Special Requests: --     
  RIGHT Antecubital 
  
  Culture result: NO GROWTH AFTER 17 HOURS     
 CULTURE, BLOOD [[id number]] Collected:  01/16/20 1332 Order Status:  Completed Specimen:  Blood Updated:  01/17/20 0741 Special Requests: --     
  LEFT Antecubital 
  
  Culture result: NO GROWTH AFTER 17 HOURS Current Medications Reviewed Assessment/Plan Principal Problem: 
  Chest pain (1/16/2020) 1. Chest pain: Atypical.  Likely secondary to esophageal mass which is likely a malignancy. EKG and troponins are normal.  2D echo shows normal LVEF with LVH, No regional wall motion abnormalities. 2.  Partially obstructive fungating esophageal mass with extension into the stomach. Status post EGD and biopsy. Follow-up biopsy results. Placed on full liquids, monitor. GI is following. 3.  Likely metastatic esophageal cancer with liver and lung mets. Oncology consulted. Follow-up biopsy results. 4.  Possible nonischemic cardiomyopathy. 2D echo shows normal EF with LVH. No evidence of fluid overload. 5.  MARGARITA: Likely secondary to dehydration. Improved with IV fluids. 6.  History of atrial flutter status post cardioversion. Continue amiodarone, diltiazem for blood pressure and heart rate control. .  He has not been taking any anticoagulation for several days. We will hold off on anticoagulation considering biopsy this morning and risk of bleeding. Patient may need PEG tube placement for nutrition. Will await oncology evaluation before starting any anticoagulation. Currently he is in sinus rhythm. He has been noncompliant with anticoagulation and continues to do methamphetamine. 7.  Chronic COPD: No acute exacerbation. Give him DuoNebs as needed. 8.  Tobacco abuse: Patient was counseled regarding cessation. 9.  Methamphetamine abuse. Counseled regarding cessation. 10.  Obstructive sleep apnea, keep him on CPAP at night. 11.  Hypertension: Place him on diltiazem IR, use hydralazine as needed. Blood pressure is elevated this morning, likely related to pain. Continue to monitor. DVT prophylaxis: SCDs Code status: Full Risk: High risk patient secondary to multiple comorbidities, likely metastatic malignancy. Emma Hammond MD 
January 17, 2020

## 2020-01-17 NOTE — PROGRESS NOTES
TRANSFER - IN REPORT: 
 
Verbal report received from [facility], RN(name) on Shena Rein  being received from Room 537(unit) for ordered procedure Report consisted of patients Situation, Background, Assessment and  
Recommendations(SBAR). Information from the following report(s) SBAR was reviewed with the receiving nurse. Opportunity for questions and clarification was provided. Assessment completed upon patients arrival to unit and care assumed.

## 2020-01-17 NOTE — ANESTHESIA POSTPROCEDURE EVALUATION
Procedure(s): ENDOSCOPIC ULTRASOUND (EUS)/ BMI 26 ROOM 537 ESOPHAGOGASTRODUODENAL (EGD) BIOPSY. total IV anesthesia Anesthesia Post Evaluation Multimodal analgesia: multimodal analgesia used between 6 hours prior to anesthesia start to PACU discharge Patient location during evaluation: bedside Patient participation: complete - patient participated Level of consciousness: awake and responsive to light touch Pain management: adequate Airway patency: patent Anesthetic complications: no 
Cardiovascular status: acceptable, hemodynamically stable, blood pressure returned to baseline and stable Respiratory status: acceptable, unassisted, spontaneous ventilation and nonlabored ventilation Hydration status: acceptable Post anesthesia nausea and vomiting:  controlled Vitals Value Taken Time /69 1/17/2020  8:55 AM  
Temp 36.7 °C (98 °F) 1/17/2020  8:55 AM  
Pulse 80 1/17/2020  9:08 AM  
Resp 14 1/17/2020  8:55 AM  
SpO2 100 % 1/17/2020  9:09 AM  
Vitals shown include unvalidated device data.

## 2020-01-17 NOTE — ROUTINE PROCESS
Full liquids. Stat path sent. Oncology consult TRANSFER - OUT REPORT: 
 
Verbal report given to [facility] AT [facility], RN(name) on Raimundo Rascon  being transferred to [facility](unit) for routine post - op Report consisted of patients Situation, Background, Assessment and  
Recommendations(SBAR). Information from the following report(s) SBAR was reviewed with the receiving nurse. Lines:  
Peripheral IV Left Antecubital (Active) Site Assessment Clean, dry, & intact 1/17/2020  7:05 AM  
Phlebitis Assessment 0 1/17/2020  7:05 AM  
Infiltration Assessment 0 1/17/2020  7:05 AM  
Dressing Status Clean, dry, & intact 1/17/2020  7:05 AM  
Dressing Type Tape;Transparent 1/17/2020  7:05 AM  
Hub Color/Line Status Infusing 1/17/2020  7:05 AM  
  
 
Opportunity for questions and clarification was provided.

## 2020-01-18 LAB
ANION GAP SERPL CALC-SCNC: 6 MMOL/L (ref 7–16)
BASOPHILS # BLD: 0.1 K/UL (ref 0–0.2)
BASOPHILS NFR BLD: 1 % (ref 0–2)
BUN SERPL-MCNC: 8 MG/DL (ref 6–23)
CALCIUM SERPL-MCNC: 9.1 MG/DL (ref 8.3–10.4)
CHLORIDE SERPL-SCNC: 105 MMOL/L (ref 98–107)
CO2 SERPL-SCNC: 28 MMOL/L (ref 21–32)
CREAT SERPL-MCNC: 0.96 MG/DL (ref 0.8–1.5)
DIFFERENTIAL METHOD BLD: ABNORMAL
EOSINOPHIL # BLD: 0.3 K/UL (ref 0–0.8)
EOSINOPHIL NFR BLD: 3 % (ref 0.5–7.8)
ERYTHROCYTE [DISTWIDTH] IN BLOOD BY AUTOMATED COUNT: 13.2 % (ref 11.9–14.6)
GLUCOSE SERPL-MCNC: 95 MG/DL (ref 65–100)
HCT VFR BLD AUTO: 35 % (ref 41.1–50.3)
HGB BLD-MCNC: 10.8 G/DL (ref 13.6–17.2)
IMM GRANULOCYTES # BLD AUTO: 0 K/UL (ref 0–0.5)
IMM GRANULOCYTES NFR BLD AUTO: 0 % (ref 0–5)
LYMPHOCYTES # BLD: 1.2 K/UL (ref 0.5–4.6)
LYMPHOCYTES NFR BLD: 15 % (ref 13–44)
MAGNESIUM SERPL-MCNC: 2.1 MG/DL (ref 1.8–2.4)
MCH RBC QN AUTO: 28.9 PG (ref 26.1–32.9)
MCHC RBC AUTO-ENTMCNC: 30.9 G/DL (ref 31.4–35)
MCV RBC AUTO: 93.6 FL (ref 79.6–97.8)
MONOCYTES # BLD: 0.6 K/UL (ref 0.1–1.3)
MONOCYTES NFR BLD: 7 % (ref 4–12)
NEUTS SEG # BLD: 5.8 K/UL (ref 1.7–8.2)
NEUTS SEG NFR BLD: 74 % (ref 43–78)
NRBC # BLD: 0 K/UL (ref 0–0.2)
PHOSPHATE SERPL-MCNC: 4.5 MG/DL (ref 2.5–4.5)
PLATELET # BLD AUTO: 353 K/UL (ref 150–450)
PMV BLD AUTO: 9.3 FL (ref 9.4–12.3)
POTASSIUM SERPL-SCNC: 4.5 MMOL/L (ref 3.5–5.1)
RBC # BLD AUTO: 3.74 M/UL (ref 4.23–5.6)
SODIUM SERPL-SCNC: 139 MMOL/L (ref 136–145)
WBC # BLD AUTO: 7.9 K/UL (ref 4.3–11.1)

## 2020-01-18 PROCEDURE — 65660000000 HC RM CCU STEPDOWN

## 2020-01-18 PROCEDURE — 74011250636 HC RX REV CODE- 250/636: Performed by: NURSE PRACTITIONER

## 2020-01-18 PROCEDURE — 74011000250 HC RX REV CODE- 250: Performed by: HOSPITALIST

## 2020-01-18 PROCEDURE — 74011250636 HC RX REV CODE- 250/636: Performed by: HOSPITALIST

## 2020-01-18 PROCEDURE — 94660 CPAP INITIATION&MGMT: CPT

## 2020-01-18 PROCEDURE — 99232 SBSQ HOSP IP/OBS MODERATE 35: CPT | Performed by: INTERNAL MEDICINE

## 2020-01-18 PROCEDURE — 80048 BASIC METABOLIC PNL TOTAL CA: CPT

## 2020-01-18 PROCEDURE — 85025 COMPLETE CBC W/AUTO DIFF WBC: CPT

## 2020-01-18 PROCEDURE — 84100 ASSAY OF PHOSPHORUS: CPT

## 2020-01-18 PROCEDURE — 94640 AIRWAY INHALATION TREATMENT: CPT

## 2020-01-18 PROCEDURE — 94760 N-INVAS EAR/PLS OXIMETRY 1: CPT

## 2020-01-18 PROCEDURE — 74011000258 HC RX REV CODE- 258: Performed by: NURSE PRACTITIONER

## 2020-01-18 PROCEDURE — 83735 ASSAY OF MAGNESIUM: CPT

## 2020-01-18 PROCEDURE — 74011250637 HC RX REV CODE- 250/637: Performed by: HOSPITALIST

## 2020-01-18 PROCEDURE — 74011250637 HC RX REV CODE- 250/637: Performed by: INTERNAL MEDICINE

## 2020-01-18 PROCEDURE — 36415 COLL VENOUS BLD VENIPUNCTURE: CPT

## 2020-01-18 RX ORDER — LISINOPRIL 5 MG/1
10 TABLET ORAL DAILY
Status: DISCONTINUED | OUTPATIENT
Start: 2020-01-18 | End: 2020-01-31 | Stop reason: HOSPADM

## 2020-01-18 RX ORDER — SODIUM FERRIC GLUCONATE COMPLEX IN SUCROSE 12.5 MG/ML
125 INJECTION INTRAVENOUS ONCE
Status: DISCONTINUED | OUTPATIENT
Start: 2020-01-18 | End: 2020-01-18 | Stop reason: SDUPTHER

## 2020-01-18 RX ADMIN — HYDROMORPHONE HYDROCHLORIDE 0.5 MG: 1 INJECTION, SOLUTION INTRAMUSCULAR; INTRAVENOUS; SUBCUTANEOUS at 15:41

## 2020-01-18 RX ADMIN — OXYCODONE HYDROCHLORIDE AND ACETAMINOPHEN 1 TABLET: 7.5; 325 TABLET ORAL at 09:25

## 2020-01-18 RX ADMIN — ONDANSETRON 4 MG: 2 INJECTION INTRAMUSCULAR; INTRAVENOUS at 09:25

## 2020-01-18 RX ADMIN — AMIODARONE HYDROCHLORIDE 200 MG: 200 TABLET ORAL at 08:37

## 2020-01-18 RX ADMIN — OXYCODONE HYDROCHLORIDE AND ACETAMINOPHEN 1 TABLET: 7.5; 325 TABLET ORAL at 13:17

## 2020-01-18 RX ADMIN — HYDROMORPHONE HYDROCHLORIDE 0.5 MG: 1 INJECTION, SOLUTION INTRAMUSCULAR; INTRAVENOUS; SUBCUTANEOUS at 19:52

## 2020-01-18 RX ADMIN — FAMOTIDINE 20 MG: 10 INJECTION INTRAVENOUS at 19:53

## 2020-01-18 RX ADMIN — DILTIAZEM HYDROCHLORIDE 90 MG: 30 TABLET, FILM COATED ORAL at 22:44

## 2020-01-18 RX ADMIN — OXYCODONE HYDROCHLORIDE AND ACETAMINOPHEN 1 TABLET: 7.5; 325 TABLET ORAL at 18:02

## 2020-01-18 RX ADMIN — HYDROMORPHONE HYDROCHLORIDE 0.5 MG: 1 INJECTION, SOLUTION INTRAMUSCULAR; INTRAVENOUS; SUBCUTANEOUS at 06:43

## 2020-01-18 RX ADMIN — FAMOTIDINE 20 MG: 10 INJECTION INTRAVENOUS at 08:37

## 2020-01-18 RX ADMIN — DILTIAZEM HYDROCHLORIDE 90 MG: 30 TABLET, FILM COATED ORAL at 08:37

## 2020-01-18 RX ADMIN — ARFORMOTEROL TARTRATE 15 MCG: 15 SOLUTION RESPIRATORY (INHALATION) at 08:23

## 2020-01-18 RX ADMIN — HYDROMORPHONE HYDROCHLORIDE 0.5 MG: 1 INJECTION, SOLUTION INTRAMUSCULAR; INTRAVENOUS; SUBCUTANEOUS at 01:20

## 2020-01-18 RX ADMIN — SODIUM CHLORIDE 125 MG: 900 INJECTION, SOLUTION INTRAVENOUS at 15:35

## 2020-01-18 RX ADMIN — OXYCODONE HYDROCHLORIDE AND ACETAMINOPHEN 1 TABLET: 7.5; 325 TABLET ORAL at 22:44

## 2020-01-18 RX ADMIN — POLYETHYLENE GLYCOL 3350 17 G: 17 POWDER, FOR SOLUTION ORAL at 19:52

## 2020-01-18 RX ADMIN — ARFORMOTEROL TARTRATE 15 MCG: 15 SOLUTION RESPIRATORY (INHALATION) at 20:21

## 2020-01-18 RX ADMIN — Medication 10 ML: at 19:53

## 2020-01-18 RX ADMIN — Medication 10 ML: at 05:13

## 2020-01-18 RX ADMIN — TIOTROPIUM BROMIDE 18 MCG: 18 CAPSULE ORAL; RESPIRATORY (INHALATION) at 08:23

## 2020-01-18 RX ADMIN — Medication 10 ML: at 13:21

## 2020-01-18 RX ADMIN — DILTIAZEM HYDROCHLORIDE 90 MG: 30 TABLET, FILM COATED ORAL at 15:35

## 2020-01-18 RX ADMIN — LISINOPRIL 10 MG: 5 TABLET ORAL at 15:35

## 2020-01-18 RX ADMIN — HYDROMORPHONE HYDROCHLORIDE 0.5 MG: 1 INJECTION, SOLUTION INTRAMUSCULAR; INTRAVENOUS; SUBCUTANEOUS at 11:27

## 2020-01-18 NOTE — PROGRESS NOTES
Gastroenterology Progress Note Date of admission:  1/16/2020 Today's date:  1/18/2020 CC:     Dysphagia HPI:   Patient tolerating liquids. He is unsure whether he wants PEG tube placement. He is extremely anxious about recent findings at endoscopy. ROS:  
 Gen: No fevers, no chills CV:   No chest pain, no SOB Current Medications:  
 
Current Facility-Administered Medications Medication Dose Route Frequency  polyethylene glycol (MIRALAX) packet 17 g  17 g Oral BID  
 HYDROmorphone (PF) (DILAUDID) injection 0.5 mg  0.5 mg IntraVENous Q4H PRN  promethazine (PHENERGAN) with saline injection 12.5 mg  12.5 mg IntraVENous Q6H PRN  
 famotidine (PF) (PEPCID) 20 mg in 0.9% sodium chloride 10 mL injection  20 mg IntraVENous Q12H  
 albuterol-ipratropium (DUO-NEB) 2.5 MG-0.5 MG/3 ML  3 mL Nebulization Q4H PRN  
 tiotropium (SPIRIVA) inhalation capsule 18 mcg  1 Cap Inhalation DAILY  arformoteroL (BROVANA) neb solution 15 mcg  15 mcg Nebulization BID RT  
 amiodarone (CORDARONE) tablet 200 mg  200 mg Oral DAILY  sodium chloride (NS) flush 5-40 mL  5-40 mL IntraVENous Q8H  
 sodium chloride (NS) flush 5-40 mL  5-40 mL IntraVENous PRN  
 acetaminophen (TYLENOL) tablet 650 mg  650 mg Oral Q4H PRN  
 naloxone (NARCAN) injection 0.4 mg  0.4 mg IntraVENous PRN  
 diphenhydrAMINE (BENADRYL) capsule 25 mg  25 mg Oral Q4H PRN  
 ondansetron (ZOFRAN) injection 4 mg  4 mg IntraVENous Q4H PRN  
 magnesium hydroxide (MILK OF MAGNESIA) 400 mg/5 mL oral suspension 30 mL  30 mL Oral DAILY PRN  
 oxyCODONE-acetaminophen (PERCOCET 7.5) 7.5-325 mg per tablet 1 Tab  1 Tab Oral Q4H PRN  
 nicotine (NICODERM CQ) 21 mg/24 hr patch 1 Patch  1 Patch TransDERmal Q24H  
 influenza vaccine 2019-20 (6 mos+)(PF) (FLUARIX/FLULAVAL/FLUZONE QUAD) injection 0.5 mL  0.5 mL IntraMUSCular PRIOR TO DISCHARGE  hydrALAZINE (APRESOLINE) tablet 50 mg  50 mg Oral Q6H PRN  
  hydrALAZINE (APRESOLINE) 20 mg/mL injection 10 mg  10 mg IntraVENous Q6H PRN  
 dilTIAZem (CARDIZEM) IR tablet 90 mg  90 mg Oral TID Physical Exam:  
Vitals: 
Visit Vitals BP (!) 148/97 (BP 1 Location: Right arm, BP Patient Position: At rest) Pulse 81 Temp 97.5 °F (36.4 °C) Resp 13 Ht 6' (1.829 m) Wt 85.7 kg (189 lb) SpO2 96% BMI 25.63 kg/m² Intake/Output: 
01/18 0701 - 01/18 1900 In: 360 [P.O.:360] Out: 150 [Urine:150] 01/16 1901 - 01/18 0700 In: 2636 [P.O.:476; I.V.:2160] Out: 1500 [Urine:1500] HEENT:  No scleral icterus, no oral ulcers Abdomen: Soft, Mild epigastric tenderness without rebound Extremities:  No cyanosis, no leg edema Neuro:  Alert and oriented to person, place, and time Data:  
 
Recent Results (from the past 24 hour(s)) CEA Collection Time: 01/17/20  1:35 PM  
Result Value Ref Range CEA 19.1 (H) 0.0 - 3.0 ng/mL CANCER AG 19-9 Collection Time: 01/17/20  1:35 PM  
Result Value Ref Range Cancer antigen 19-9 219.20 (H) 2.0 - 37.0 U/mL  
TRANSFERRIN SATURATION Collection Time: 01/17/20  1:35 PM  
Result Value Ref Range Iron 21 (L) 35 - 150 ug/dL TIBC 239 (L) 250 - 450 ug/dL Transferrin Saturation 9 (L) >20 % FERRITIN Collection Time: 01/17/20  1:35 PM  
Result Value Ref Range Ferritin 27 8 - 388 NG/ML  
VITAMIN B12 Collection Time: 01/17/20  1:35 PM  
Result Value Ref Range Vitamin B12 518 193 - 986 pg/mL FOLATE Collection Time: 01/17/20  1:35 PM  
Result Value Ref Range Folate 6.3 3.1 - 17.5 ng/mL CBC WITH AUTOMATED DIFF Collection Time: 01/18/20  5:00 AM  
Result Value Ref Range WBC 7.9 4.3 - 11.1 K/uL  
 RBC 3.74 (L) 4.23 - 5.6 M/uL  
 HGB 10.8 (L) 13.6 - 17.2 g/dL HCT 35.0 (L) 41.1 - 50.3 % MCV 93.6 79.6 - 97.8 FL  
 MCH 28.9 26.1 - 32.9 PG  
 MCHC 30.9 (L) 31.4 - 35.0 g/dL  
 RDW 13.2 11.9 - 14.6 % PLATELET 436 479 - 521 K/uL  MPV 9.3 (L) 9.4 - 12.3 FL  
 ABSOLUTE NRBC 0.00 0.0 - 0.2 K/uL  
 DF AUTOMATED NEUTROPHILS 74 43 - 78 % LYMPHOCYTES 15 13 - 44 % MONOCYTES 7 4.0 - 12.0 % EOSINOPHILS 3 0.5 - 7.8 % BASOPHILS 1 0.0 - 2.0 % IMMATURE GRANULOCYTES 0 0.0 - 5.0 %  
 ABS. NEUTROPHILS 5.8 1.7 - 8.2 K/UL  
 ABS. LYMPHOCYTES 1.2 0.5 - 4.6 K/UL  
 ABS. MONOCYTES 0.6 0.1 - 1.3 K/UL  
 ABS. EOSINOPHILS 0.3 0.0 - 0.8 K/UL  
 ABS. BASOPHILS 0.1 0.0 - 0.2 K/UL  
 ABS. IMM. GRANS. 0.0 0.0 - 0.5 K/UL METABOLIC PANEL, BASIC Collection Time: 01/18/20  5:00 AM  
Result Value Ref Range Sodium 139 136 - 145 mmol/L Potassium 4.5 3.5 - 5.1 mmol/L Chloride 105 98 - 107 mmol/L  
 CO2 28 21 - 32 mmol/L Anion gap 6 (L) 7 - 16 mmol/L Glucose 95 65 - 100 mg/dL BUN 8 6 - 23 MG/DL Creatinine 0.96 0.8 - 1.5 MG/DL  
 GFR est AA >60 >60 ml/min/1.73m2 GFR est non-AA >60 >60 ml/min/1.73m2 Calcium 9.1 8.3 - 10.4 MG/DL MAGNESIUM Collection Time: 01/18/20  5:00 AM  
Result Value Ref Range Magnesium 2.1 1.8 - 2.4 mg/dL PHOSPHORUS Collection Time: 01/18/20  5:00 AM  
Result Value Ref Range Phosphorus 4.5 2.5 - 4.5 MG/DL Impression/Plan: 
  
 
57-year-old gentleman with history of polysubstance abuse admitted with dysphagia and significant abnormal weight loss. EGD with EUS demonstrates 14 cm long esophageal mass extending into the proximal stomach which is staged at least T3 N2 by EUS criteria (tumor not traversed by echoendoscope). CT suggests extensive metastatic disease in the liver and lungs. 1. Follow-up pathology results. 2. Surgery, oncology and palliative care are following. 3. Pending patient's decision regarding goals of care, could consider placement of a PEG tube versus possible palliative metal esophageal stents (though this would require multiple stents).   
 
Signed: 
Bill Fuentes MD 
1/18/2020 
9:40 AM

## 2020-01-18 NOTE — PROGRESS NOTES
Problem: Falls - Risk of 
Goal: *Absence of Falls Description Document Edgar Brunner Fall Risk and appropriate interventions in the flowsheet. Outcome: Progressing Towards Goal 
Note: Fall Risk Interventions: 
  
 
  
 
Medication Interventions: Teach patient to arise slowly

## 2020-01-18 NOTE — PROGRESS NOTES
END OF SHIFT NOTE: 
 
Intake/Output 01/18 0701 - 01/18 1900 In: 840 [P.O.:840] Out: 150 [Urine:150] Voiding: YES Catheter: NO 
Drain:   
 
 
 
 
 
Stool:  0 occurrences. Emesis:  0 occurrences. VITAL SIGNS Patient Vitals for the past 12 hrs: 
 Temp Pulse Resp BP SpO2  
01/18/20 1544 97.9 °F (36.6 °C) 89 18 127/84 97 % 01/18/20 1119 98.3 °F (36.8 °C) 80 16 158/90 98 % 01/18/20 0828     96 % 01/18/20 0732 97.5 °F (36.4 °C) 81 13 (!) 148/97 98 % Pain Assessment Pain 1 Pain Scale 1: Numeric (0 - 10) (01/18/20 1802) Pain Intensity 1: 8 (01/18/20 1802) Patient Stated Pain Goal: 0 (01/18/20 1541) Pain Reassessment 1: Yes (01/18/20 1400) Pain Onset 1: ongoing (01/18/20 1541) Pain Location 1: Abdomen (01/18/20 1802) Pain Orientation 1: Upper (01/18/20 1802) Pain Description 1: Aching (01/18/20 1802) Pain Intervention(s) 1: Medication (see MAR) (01/18/20 1802) Additional Pain Sites: Yes (01/17/20 0706) Ambulating Yes Additional Information: Patient tearful this morning hearing diagnosis. In pain today. Otherwise uneventful day. VSS. No needs voiced. Shift report given to oncoming nurse at the bedside. Blue Murguia

## 2020-01-18 NOTE — PROGRESS NOTES
[address] Hematology Oncology Inpatient Hematology / Oncology Progress Note Admission Date: 2020  6:59 AM 
Reason for Admission/Hospital Course: Chest pain [R07.9] 24 Hour Events: 
Up in bed, alone Discussed diagnosis + metastatic adenocarcinoma, he would like treatment ROS: 
Constitutional: Positive for fatigue. Negative for fever, chills. CV: Negative for chest pain, palpitations, edema. Respiratory: Positive for dyspnea - exertional. Negative for cough, wheezing. GI: Negative for nausea, abdominal pain, diarrhea. + dysphagia. 10 point review of systems is otherwise negative with the exception of the elements mentioned above in the HPI. No Known Allergies OBJECTIVE: 
Patient Vitals for the past 8 hrs: 
 BP Temp Pulse Resp SpO2  
20 1119 158/90 98.3 °F (36.8 °C) 80 16 98 % 20 0828     96 % 20 0732 (!) 148/97 97.5 °F (36.4 °C) 81 13 98 % Temp (24hrs), Av.9 °F (36.6 °C), Min:97.5 °F (36.4 °C), Max:98.3 °F (36.8 °C) 
 
 0701 -  1900 In: 600 [P.O.:600] Out: 150 [Urine:150] Physical Exam: 
Constitutional: Well developed, well nourished, chronically-ill appearing male in no acute distress, sitting comfortably in the hospital bed. HEENT: Normocephalic and atraumatic. Oropharynx is clear, mucous membranes are moist. + poor dentition. Extraocular muscles are intact. Sclerae anicteric. Neck supple. Skin Warm and dry. No bruising and no rash noted. No erythema. No pallor. Respiratory Lungs are clear to auscultation bilaterally without wheezes, rales or rhonchi, normal air exchange without accessory muscle use. CVS Normal rate, regular rhythm and normal S1 and S2. No murmurs, gallops, or rubs. Abdomen Soft, nontender and nondistended, normoactive bowel sounds. No palpable mass. No hepatosplenomegaly. Neuro Grossly nonfocal with no obvious sensory or motor deficits. MSK Normal range of motion in general.  No edema and no tenderness. Psych Anxious mood and affect. Labs: 
   
Recent Labs  
  01/18/20 
0500 01/17/20 
0425 01/16/20 
7952 WBC 7.9 6.2 8.6  
RBC 3.74* 3.64* 4.47 HGB 10.8* 10.4* 13.3* HCT 35.0* 33.8* 41.3 MCV 93.6 92.9 92.4 MCH 28.9 28.6 29.8 MCHC 30.9* 30.8* 32.2 RDW 13.2 13.1 13.3  352 450 GRANS 74 63 59 LYMPH 15 26 28 MONOS 7 7 8 EOS 3 3 4 BASOS 1 1 1 IG 0 0 1 DF AUTOMATED AUTOMATED AUTOMATED ANEU 5.8 3.9 5.1 ABL 1.2 1.6 2.4 ABM 0.6 0.5 0.7 JAY 0.3 0.2 0.3 ABB 0.1 0.0 0.1 AIG 0.0 0.0 0.0 Recent Labs  
  01/18/20 
0500 01/17/20 
0425 01/16/20 
3582  135* 137  
K 4.5 4.0 3.9  102 102 CO2 28 28 29 AGAP 6* 5* 6*  
GLU 95 99 146* BUN 8 11 16 CREA 0.96 0.97 1.41 GFRAA >60 >60 >60 GFRNA >60 >60 55* CA 9.1 8.9 9.8 SGOT  --   --  11* AP  --   --  136 TP  --   --  7.8 ALB  --   --  2.9*  
GLOB  --   --  4.9* AGRAT  --   --  0.6* MG 2.1 1.8  --   
PHOS 4.5 3.2  --   
 
 
 
Imaging: 
CT CHEST ABD PELV W CONT [[id number]] Collected: 01/16/20 4824 Order Status: Completed Updated: 01/16/20 4441 Narrative:    
CT CHEST, ABDOMEN, PELVIS WITH CONTRAST:   
 
CLINICAL HISTORY:   Follow-up abnormal chest radiograph smoker with pulmonary 
nodules and 100-pound weight loss over the last several months. TECHNIQUE:  Oral and nonionic intravenous contrast was administered, and the 
torso was scanned with spiral technique.  Radiation dose reduction was achieved 
using one or all of the following techniques: automated exposure control, 
weight-based dosing, iterative reconstruction. COMPARISON:  Portable chest today and chest CTA of November 16, 2019.  
 
CT CHEST: There are innumerable bilateral pulmonary nodules, the largest 
measuring approximately 3.9 cm posteriorly in the right lower lobe.  There is 
marked thickening of the wall of the distal esophagus below the level of the 
hilary which suggests the possibility of primary esophageal carcinoma. Shearer Sina is 
associated subcarinal and azygoesophageal recess lymphadenopathy.  No 
significant pleural fluid.  The thyroid appears normal as imaged.  There is 
thickening of the left ventricular wall suggesting a hypertrophy. CT ABDOMEN: There are innumerable small hepatic lesions which are new from 2018 
and also suspicious for metastatic disease.  The largest measures approximately 2 cm posteriorly in the right lobe.  There are mildly enlarged lymph nodes in 
the lesser sac which may represent metastatic disease as well.  The spleen, 
pancreas, kidneys, and adrenals appear unremarkable. CT PELVIS:  The appendix is not confidently identified, but there are no 
secondary signs of appendicitis.  The prostate is not enlarged.  No primary or 
secondary signs of appendicitis or identified.  No pathologically enlarged lymph 
nodes or free fluid is evident.  There is scattered aortoiliac atherosclerotic 
calcifications with mild ectasia but no focal aneurysm.  Bone windows 
demonstrate no aggressive process, accounting for degenerative changes including 
severe osteoarthritis at the right hip.  A 1 cm central lucency in the body of 
L4 has sclerotic margins and is not typical of metastatic disease. Impression:    
IMPRESSION:  
 
1.  Extensive pulmonary parenchymal and hepatic metastatic disease as well as 
subcarinal and azygoesophageal recess lymphadenopathy likely representing 
metastatic disease as well. 2.  Long-segment thickening of the wall of the distal esophagus suggests the 
possibility of a primary esophageal carcinoma.  Follow-up gastroenterology 
consultation is recommended for consideration of endoscopic biopsy. 3.  Left ventricular hypertrophy and scattered atherosclerosis. XR CHEST PORT [[id number]] Collected: 01/16/20 0737 Order Status: Completed Updated: 01/16/20 9072 Narrative:    
 PORTABLE CHEST, January 16, 2020 at 0715 hours CLINICAL HISTORY:  Cough and chest pain in a smoker. COMPARISON:  December 6, 2018. FINDINGS:  AP erect image demonstrates an approximately 4 cm rounded mass 
projected over the inferior aspect of the right hilum.  There are numerous 
additional pulmonary nodules bilaterally, suspicious for metastatic disease.  No 
nidhi pneumonic consolidation or pleural fluid is evident.  The heart size is 
within normal limits without evidence of congestive heart failure or 
pneumothorax.  The bony thorax appears intact on this view.  There are overlying 
radiopaque support devices. Impression:    
IMPRESSION:  NUMEROUS BILATERAL PULMONARY NODULES ARE SUSPICIOUS FOR METASTATIC 
DISEASE, AND A 4 CM RIGHT LOWER LUNG MASS PROJECTED OVER THE INFERIOR ASPECT OF 
THE RIGHT HILUM IS SUSPICIOUS FOR A PRIMARY BRONCHOGENIC CARCINOMA IN THIS 
SMOKER WITH COPD.  FOLLOW PULMONARY MEDICINE CONSULTATION IS RECOMMENDED. ASSESSMENT: 
 
Problem List  Date Reviewed: 1/17/2020 Codes Class Noted * (Principal) Esophageal mass ICD-10-CM: K22.8 ICD-9-CM: 530.89  1/17/2020 Liver metastases ([facility] [address] ICD-10-CM: C78.7 ICD-9-CM: 197.7  1/17/2020 Esophageal dysphagia ICD-10-CM: R13.10 ICD-9-CM: 787.20  1/17/2020 Weight loss ICD-10-CM: R63.4 ICD-9-CM: 783.21  1/17/2020 Chest pain ICD-10-CM: R07.9 ICD-9-CM: 786.50  1/16/2020 Methamphetamine abuse (HCC) (Chronic) ICD-10-CM: F15.10 ICD-9-CM: 305.70  12/2/2018 H/O atrial flutter (Chronic) ICD-10-CM: Z86.79 
ICD-9-CM: V12.59  12/2/2018 Overview Signed 2/6/2018  7:51 AM by [place], NP  
  1/2018 Atrial flutter, s/p cardioversion. Essential hypertension (Chronic) ICD-10-CM: I10 
ICD-9-CM: 401.9  12/2/2018 Acute respiratory failure with hypoxia and hypercarbia (HCC) ICD-10-CM: J96.01, J96.02 
ICD-9-CM: 518.81  12/2/2018 Substance abuse (HCC) (Chronic) ICD-10-CM: F19.10 ICD-9-CM: 305.90  12/2/2018 Noncompliance (Chronic) ICD-10-CM: Z91.19 ICD-9-CM: V15.81  12/2/2018 Acute encephalopathy ICD-10-CM: G93.40 ICD-9-CM: 348.30  12/2/2018 COPD (chronic obstructive pulmonary disease) (HCC) (Chronic) ICD-10-CM: J44.9 ICD-9-CM: 427  11/16/2018 NATALIIA (obstructive sleep apnea) (Chronic) ICD-10-CM: D29.52 
ICD-9-CM: 327.23  11/16/2018 Chronic respiratory failure (HCC) (Chronic) ICD-10-CM: J96.10 ICD-9-CM: 518.83  11/16/2018 Altered mental status ICD-10-CM: R41.82 
ICD-9-CM: 780.97  11/16/2018 Morbid obesity ([facility] [address] (Chronic) ICD-10-CM: E66.01 
ICD-9-CM: 278.01  11/16/2018 Nicotine dependence (Chronic) ICD-10-CM: K11.248 ICD-9-CM: 305.1  2/1/2018 Alcohol abuse (Chronic) ICD-10-CM: F10.10 ICD-9-CM: 305.00  2/1/2018 Homeless (Chronic) ICD-10-CM: Z59.0 ICD-9-CM: V60.0  1/15/2018 Urethral stricture (Chronic) ICD-10-CM: N35.919 ICD-9-CM: 598.9  1/7/2018 Anasarca ICD-10-CM: R60.1 ICD-9-CM: 782.3  1/6/2018 Acute kidney injury ([facility] [address] ICD-10-CM: N17.9 ICD-9-CM: 584.9  1/6/2018 PLAN: 
Metastatic esophageal cancer - CT CAP with extensive pulm parenchymal and hepatic metastatic disease; subcarinal and azygoesophageal recess LAD; and long-segment thickening of the wall of the distal esophagus - GI performing EGD/EUS, biopsy taken and path shows moderately differentiated adenocarcinoma. Will need tissue dx. Will need to send tissue for Caris testing - navigation team notified. 
- CEA 19.1, CA 19-9 219.2  
- Iron studies low with ferritin 27, tsat 9%. Replete iron with ferrlecit x 1.  
- Consulted surgery for feeding tube - patient agrees. - Consulted palliative care - managing pain. - Consult IR for port placement. - Echocardiogram with EF 55-60%. - Will need PET/CT as OP. Follow up with Ja at discharge - Feb 4 at 2:30. Agustin Marquez  [address] Hematology Oncology [address] [address] Office : [phone] Fax : [phone]  
 
 
  
  
Attending Addendum: 
I have personally performed a face to face diagnostic evaluation on this patient. I have reviewed and agree with the care plan as documented above Jessica Mazariegos N.P.  My findings are as follows: Patient appears stable, heart rate regular without murmurs, abdomen is non-tender, bowel sounds are positive. 62 male, smoker history of EtOH abuse, drug abuse (methamphetamine, ongoing), CHF, atrial flutter, COPD, NATALIIA on CPAP, obesity, admitted with ongoing significant nausea, inability to keep fluids down, as well as significant weight loss (reportedly over 100 pounds in the last few months). CT CAP concerning for metastatic process including multiple lesions in lungs and liver (imaging personally reviewed). Recent EGD/EUS showing at a distance of 32 to 46 cm from incisors, is circumferential fungating, ulcerated mass concerning for malignancy. Mass extends into the proximal stomach. Likely staged as [id number]. Final path w adenocarcinoma: discussed results w pt, will also check Carris NGS. Will likely benefit from port placement as well as feeding tube placement in anticipation of therapy directed at likely metastatic esophageal cancer. TM high, iron stores low. He wants to think over aggresivness of care. Follow-up in outpatient oncology within week of discharge. 
  
      
  
Total time 25min, 50% in direct consultation. Barak Coulter MD 
[facility] Hematology/Oncology [id number] [id number] [facility] Office : [phone] Fax : [phone]

## 2020-01-18 NOTE — PROGRESS NOTES
Progress Note Patient: Hilaria Gaucher               Sex: male          MRN: [id number] YOB: 1965      Age:  62 y.o. PCP:  None Treatment Team: Attending Provider: Ren Doshi MD; Consulting Provider: Kylah Manzo MD; Consulting Provider: Arthur Mac MD; Consulting Provider: Christopher Carter MD; Utilization Review: Sherley Duran RN; Surgeon: Christopher Carter MD; Consulting Provider: Britt Rodriguez NP; Primary Nurse: Nelly Hanks Subjective: This is a 75-year-old gentleman with history of methamphetamine abuse, tobacco abuse, noncompliance with medications, hypertension, CHF, COPD came in with complaints of chest pain and shortness of breath and was found to have esophageal mass with possible liver and lung metastasis. He had EGD on   and was found to have fungating, partially obstructing esophageal mass extending into stomach. His tumor was biopsied and the pathology report showed adenocarcinoma. This is considered a stage IV disease. If the patient wants to pursue treatment he will need a PEG tube, a port placement and will be started on palliative chemotherapy. Case has been discussed with Oncologist today. Objective:  
Physical Exam:  
Visit Vitals /90 (BP 1 Location: Right arm, BP Patient Position: At rest) Pulse 80 Temp 98.3 °F (36.8 °C) Resp 16 Ht 6' (1.829 m) Wt 85.7 kg (189 lb) SpO2 98% BMI 25.63 kg/m² Temp (24hrs), Av.9 °F (36.6 °C), Min:97.5 °F (36.4 °C), Max:98.3 °F (36.8 °C) Oxygen Therapy O2 Sat (%): 98 % (20 1119) Pulse via Oximetry: 82 beats per minute (20 0828) O2 Device: Room air (20 7035) FIO2 (%): 21 % (20 5948) Intake/Output Summary (Last 24 hours) at 2020 1414 Last data filed at 2020 1308 Gross per 24 hour Intake 1076 ml Output 1450 ml Net -374 ml General: Conscious, No acute distress Eyes: EDMAR, No pallor/icterus HENT:             Oral Mucosa is Moist, No sinus tenderness Neck:               Supple, No JVD Lungs:  CTA Bilaterally, No significant wheeze/rhonchi Heart:  S1 S2 regular Abdomen: Soft, normal bowel sounds, mild epigastric tenderness, nondistended, No guarding/rigidity/rebound tend. Extremities: No pedal edema Neurologic:  AAOX3, No acute FND Skin:                No acute rashes Musculoskeletal: No Acute findings Psych:             Somewhat depressed mood. LAB Recent Results (from the past 24 hour(s)) CBC WITH AUTOMATED DIFF Collection Time: 01/18/20  5:00 AM  
Result Value Ref Range WBC 7.9 4.3 - 11.1 K/uL  
 RBC 3.74 (L) 4.23 - 5.6 M/uL  
 HGB 10.8 (L) 13.6 - 17.2 g/dL HCT 35.0 (L) 41.1 - 50.3 % MCV 93.6 79.6 - 97.8 FL  
 MCH 28.9 26.1 - 32.9 PG  
 MCHC 30.9 (L) 31.4 - 35.0 g/dL  
 RDW 13.2 11.9 - 14.6 % PLATELET 463 431 - 529 K/uL MPV 9.3 (L) 9.4 - 12.3 FL ABSOLUTE NRBC 0.00 0.0 - 0.2 K/uL  
 DF AUTOMATED NEUTROPHILS 74 43 - 78 % LYMPHOCYTES 15 13 - 44 % MONOCYTES 7 4.0 - 12.0 % EOSINOPHILS 3 0.5 - 7.8 % BASOPHILS 1 0.0 - 2.0 % IMMATURE GRANULOCYTES 0 0.0 - 5.0 %  
 ABS. NEUTROPHILS 5.8 1.7 - 8.2 K/UL  
 ABS. LYMPHOCYTES 1.2 0.5 - 4.6 K/UL  
 ABS. MONOCYTES 0.6 0.1 - 1.3 K/UL  
 ABS. EOSINOPHILS 0.3 0.0 - 0.8 K/UL  
 ABS. BASOPHILS 0.1 0.0 - 0.2 K/UL  
 ABS. IMM. GRANS. 0.0 0.0 - 0.5 K/UL METABOLIC PANEL, BASIC Collection Time: 01/18/20  5:00 AM  
Result Value Ref Range Sodium 139 136 - 145 mmol/L Potassium 4.5 3.5 - 5.1 mmol/L Chloride 105 98 - 107 mmol/L  
 CO2 28 21 - 32 mmol/L Anion gap 6 (L) 7 - 16 mmol/L Glucose 95 65 - 100 mg/dL BUN 8 6 - 23 MG/DL Creatinine 0.96 0.8 - 1.5 MG/DL  
 GFR est AA >60 >60 ml/min/1.73m2 GFR est non-AA >60 >60 ml/min/1.73m2 Calcium 9.1 8.3 - 10.4 MG/DL MAGNESIUM Collection Time: 01/18/20  5:00 AM  
Result Value Ref Range Magnesium 2.1 1.8 - 2.4 mg/dL PHOSPHORUS Collection Time: 01/18/20  5:00 AM  
Result Value Ref Range Phosphorus 4.5 2.5 - 4.5 MG/DL No results found. No results found. All Micro Results Procedure Component Value Units Date/Time CULTURE, BLOOD [[id number]] Collected:  01/16/20 1327 Order Status:  Completed Specimen:  Blood Updated:  01/18/20 0909 Special Requests: --     
  RIGHT Antecubital 
  
  Culture result: NO GROWTH 2 DAYS     
 CULTURE, BLOOD [[id number]] Collected:  01/16/20 1332 Order Status:  Completed Specimen:  Blood Updated:  01/18/20 3086 Special Requests: --     
  LEFT Antecubital 
  
  Culture result: NO GROWTH 2 DAYS Current Medications Reviewed Assessment/Plan Principal Problem: 
  Esophageal mass (1/17/2020) Active Problems: 
  Nicotine dependence (2/1/2018) Methamphetamine abuse ([address] (12/2/2018) Alcohol abuse (2/1/2018) Homeless (1/15/2018) Chest pain (1/16/2020) Liver metastases ([address] (1/17/2020) Esophageal dysphagia (1/17/2020) Weight loss (1/17/2020) 1. Chest pain: Atypical.  Likely secondary to esophageal mass which is likely a malignancy. EKG and troponins are normal.  2D echo shows normal LVEF with LVH, No regional wall motion abnormalities. 2.  Partially obstructive fungating esophageal mass with extension into the stomach. Status post EGD and biopsy. Pathology report is showing adenocarcinoma. If the patient wants to have treatment he will need a PEG, a PORT  and palliative chemotherapy. 3.  Likely metastatic esophageal cancer with liver and lung mets. Oncology consulted. 4.  Possible nonischemic cardiomyopathy. 2D echo shows normal EF with LVH. No evidence of fluid overload. 5.  MARGARITA:resolved 6. History of atrial flutter status post cardioversion. Continue amiodarone, diltiazem for blood pressure and heart rate control. .  He has not been taking any anticoagulation for several days. We will hold off on anticoagulation considering biopsy and possible need for PEG and PORT will continue holding anticoagulation 7. Chronic COPD: No acute exacerbation. Give him DuoNebs as needed. 8.  Tobacco abuse: Patient was counseled regarding cessation. 9.  Methamphetamine abuse. Counseled regarding cessation. 10.  Obstructive sleep apnea, keep him on CPAP at night. 11.  Hypertension: On cardizem. Started on lisinopril today. DVT prophylaxis: SCDs Code status: Full Ambrocio Lees MD 
January 18, 2020

## 2020-01-18 NOTE — PROGRESS NOTES
H&P/Consult Note/Progress Note/Office Note:  
Tiera Sanchez  MRN: [id number]  :1965  Age:54 y.o. 
 
HPI: Tiera Sanchez is a 62 y.o. male who with h/p ETOH abuse, tobacco abuse, meth abuse, homelessness, COPD, CHF,  
who was admitted from the ER after presenting with a several week h/o progressive N/V, inability to sustain PO intake and weight loss (>100lbs) Nothing in particular made his symptoms better or worse. He also had associated 2/10 substernal chest pain No SOB He had not been taking his maint meds in >6 months Pertinent negatives include no back pain, no claudication, no cough, no diaphoresis, no dizziness 20 CT chest/abd/pelvis with oral and IV contrast 
Hx:   Follow-up abnormal chest radiograph smoker with pulmonary 
nodules and 100-pound weight loss over the last several months. 
  
CT CHEST: There are innumerable bilateral pulmonary nodules, the largest 
measuring approximately 3.9 cm posteriorly in the right lower lobe. There is 
marked thickening of the wall of the distal esophagus below the level of the 
hilary which suggests the possibility of primary esophageal carcinoma. There is 
associated subcarinal and azygoesophageal recess lymphadenopathy. No 
significant pleural fluid. The thyroid appears normal as imaged. There is 
thickening of the left ventricular wall suggesting a hypertrophy. 
  
CT ABDOMEN: There are innumerable small hepatic lesions which are new from 2018 
and also suspicious for metastatic disease. The largest measures approximately 2 cm posteriorly in the right lobe. There are mildly enlarged lymph nodes in 
the lesser sac which may represent metastatic disease as well. The spleen, 
pancreas, kidneys, and adrenals appear unremarkable. 
  
CT PELVIS:  The appendix is not confidently identified, but there are no 
secondary signs of appendicitis. The prostate is not enlarged. No primary or secondary signs of appendicitis or identified. No pathologically enlarged lymph 
nodes or free fluid is evident. There is scattered aortoiliac atherosclerotic 
calcifications with mild ectasia but no focal aneurysm. Bone windows 
demonstrate no aggressive process, accounting for degenerative changes including 
severe osteoarthritis at the right hip. A 1 cm central lucency in the body of 
L4 has sclerotic margins and is not typical of metastatic disease. 
  
IMPRESSION:  
  
1. Extensive pulmonary parenchymal and hepatic metastatic disease as well as 
subcarinal and azygoesophageal recess lymphadenopathy likely representing 
metastatic disease as well. 
  
2.  Long-segment thickening of the wall of the distal esophagus suggests the 
possibility of a primary esophageal carcinoma. Follow-up gastroenterology 
consultation is recommended for consideration of endoscopic biopsy. 
  
3.  Left ventricular hypertrophy and scattered atherosclerosis.   
  
 
 
 
 
1/17/20 s/p EGD; Dr Alfredo Mercado Findings: At 32-46cm there is a circumferential, partially obstructing, fungating, ulcerated mass concerning for malignancy. Extensive biopsies were obtained. STOMACH:  The mass extends into the proximal stomach. The fundus on antegrade and retroflexed views is normal. The body, antrum, and pylorus are normal.  
DUODENUM:  The bulb and second portions are normal.  
There are multiple enlarged periesophageal lymph nodes. The largest measures 34 x 14 mm in size. FNA not performed as the needle which averse the tumor and confirmation of malignancy would not affect staging. Further evaluation of the distal extent of tumor as well as stomach and liver could not be performed as the scope could not safely traverse the mass. Impression:   
Esophageal mass. This is concerning for a least T3N2Mx based on endoscopic ultrasound criteria.   
As the tumor could not be traversed, the distal extent could not be evaluated for invasion of adjacent structures such as the diaphragm. Thus, the possibility of understaging exists. More importantly, there is strong suspicion for metastatic liver and lung disease based on CT.  
  
Plan: 1. Follow-up pathology results. 2. Consult oncology. 3. Full liquids today. 4. As patient likely has unresectable malignancy, he may be a candidate for palliative esophageal stent placement. This would require multiple stents due to the length of the tumor. Based on extension into the stomach, he will be at increased risk of stent migration.  
  
 
 
Past Medical History:  
Diagnosis Date  Acute respiratory failure with hypercapnia ([facility] [address] 2/1/2018  Chronic obstructive pulmonary disease ([facility] [address]  Heart failure ([facility] [address]  Sleep disorder No past surgical history on file. Current Facility-Administered Medications Medication Dose Route Frequency  polyethylene glycol (MIRALAX) packet 17 g  17 g Oral BID  
 HYDROmorphone (PF) (DILAUDID) injection 0.5 mg  0.5 mg IntraVENous Q4H PRN  promethazine (PHENERGAN) with saline injection 12.5 mg  12.5 mg IntraVENous Q6H PRN  
 famotidine (PF) (PEPCID) 20 mg in 0.9% sodium chloride 10 mL injection  20 mg IntraVENous Q12H  
 albuterol-ipratropium (DUO-NEB) 2.5 MG-0.5 MG/3 ML  3 mL Nebulization Q4H PRN  
 tiotropium (SPIRIVA) inhalation capsule 18 mcg  1 Cap Inhalation DAILY  arformoteroL (BROVANA) neb solution 15 mcg  15 mcg Nebulization BID RT  
 amiodarone (CORDARONE) tablet 200 mg  200 mg Oral DAILY  sodium chloride (NS) flush 5-40 mL  5-40 mL IntraVENous Q8H  
 sodium chloride (NS) flush 5-40 mL  5-40 mL IntraVENous PRN  
 acetaminophen (TYLENOL) tablet 650 mg  650 mg Oral Q4H PRN  
 naloxone (NARCAN) injection 0.4 mg  0.4 mg IntraVENous PRN  
 diphenhydrAMINE (BENADRYL) capsule 25 mg  25 mg Oral Q4H PRN  
 ondansetron (ZOFRAN) injection 4 mg  4 mg IntraVENous Q4H PRN  
  magnesium hydroxide (MILK OF MAGNESIA) 400 mg/5 mL oral suspension 30 mL  30 mL Oral DAILY PRN  
 oxyCODONE-acetaminophen (PERCOCET 7.5) 7.5-325 mg per tablet 1 Tab  1 Tab Oral Q4H PRN  
 nicotine (NICODERM CQ) 21 mg/24 hr patch 1 Patch  1 Patch TransDERmal Q24H  
 influenza vaccine 2019-20 (6 mos+)(PF) (FLUARIX/FLULAVAL/FLUZONE QUAD) injection 0.5 mL  0.5 mL IntraMUSCular PRIOR TO DISCHARGE  hydrALAZINE (APRESOLINE) tablet 50 mg  50 mg Oral Q6H PRN  
 hydrALAZINE (APRESOLINE) 20 mg/mL injection 10 mg  10 mg IntraVENous Q6H PRN  
 dilTIAZem (CARDIZEM) IR tablet 90 mg  90 mg Oral TID Patient has no known allergies. Social History Socioeconomic History  Marital status:  Spouse name: Not on file  Number of children: Not on file  Years of education: Not on file  Highest education level: Not on file Tobacco Use  Smoking status: Current Every Day Smoker Packs/day: 2.00 Substance and Sexual Activity  Alcohol use: No  
  Comment: once a month beer  Drug use: Yes Types: Marijuana, Methamphetamines Social History Tobacco Use Smoking Status Current Every Day Smoker  Packs/day: 2.00 No family history on file. ROS: The patient has no difficulty with chest pain or shortness of breath. No fever or chills. Comprehensive review of systems was otherwise unremarkable except as noted above. Physical Exam:  
Visit Vitals BP (!) 164/93 (BP 1 Location: Right arm, BP Patient Position: At rest) Pulse 69 Temp 97.9 °F (36.6 °C) Resp 12 Ht 6' (1.829 m) Wt 189 lb (85.7 kg) SpO2 97% BMI 25.63 kg/m² Vitals:  
 01/17/20 2208 01/17/20 2325 01/17/20 2338 01/18/20 0249 BP:  (!) 151/92 132/80 (!) 164/93 Pulse:  86 86 69 Resp:  16 18 12 Temp:  97.5 °F (36.4 °C) 98.3 °F (36.8 °C) 97.9 °F (36.6 °C) SpO2:  95% 96% 97% Weight: 189 lb (85.7 kg) Height:      
 
No intake/output data recorded. 01/16 1901 - 01/18 0700 In: 2636 [P.O.:476; I.V.:2160] Out: 1500 [Urine:1500] Constitutional: Alert, oriented, cooperative patient in no acute distress; appears weak Eyes:Sclera are clear. EOMs intact ENMT: no external lesions gross hearing normal; no obvious neck masses, no ear or lip lesions, nares normal 
CV: RRR. Normal perfusion Resp: No JVD. Breathing is  non-labored; no audible wheezing. GI: soft and non-distended Musculoskeletal: unremarkable with normal function. No embolic signs or cyanosis. Neuro:  Oriented; moves all 4; no focal deficits Psychiatric: normal affect and mood, no memory impairment Recent vitals (if inpt): 
Patient Vitals for the past 24 hrs: 
 BP Temp Pulse Resp SpO2 Weight 01/18/20 0249 (!) 164/93 97.9 °F (36.6 °C) 69 12 97 %   
01/17/20 2338 132/80 98.3 °F (36.8 °C) 86 18 96 %   
01/17/20 2325 (!) 151/92 97.5 °F (36.4 °C) 86 16 95 %   
01/17/20 2208      189 lb (85.7 kg) 01/17/20 2040     96 %   
01/17/20 2017 150/90 97.9 °F (36.6 °C) 98 14 100 %   
01/17/20 1501 (!) 153/99 98 °F (36.7 °C) 83 16 96 %   
01/17/20 1209 (!) 184/92 98.2 °F (36.8 °C) 91 16 94 %   
01/17/20 1145     95 %   
01/17/20 0927 (!) 163/92  78 18 92 %   
01/17/20 0917 (!) 164/94  85 18 98 %   
01/17/20 0912 (!) 174/95  80 16 100 %   
01/17/20 0855 105/69 98 °F (36.7 °C) 88 14 98 %   
01/17/20 0747 (!) 177/105 98.1 °F (36.7 °C) 84 18 97 %  Labs: 
Recent Labs  
  01/18/20 
0500 01/17/20 
0425 01/16/20 
9857 WBC 7.9 6.2 8.6 HGB 10.8* 10.4* 13.3*  
 352 450  135* 137  
K 4.5 4.0 3.9  102 102 CO2 28 28 29 BUN 8 11 16 CREA 0.96 0.97 1.41  
GLU 95 99 146* PTP  --  13.1  --   
INR  --  1.0  --   
TBILI  --   --  0.2 SGOT  --   --  11* ALT  --   --  11* AP  --   --  136 LPSE  --   --  270 TROIQ  --   --  <0.02* Lab Results Component Value Date/Time  WBC 7.9 01/18/2020 05:00 AM  
 HGB 10.8 (L) 01/18/2020 05:00 AM  
 PLATELET 413 68/40/4564 05:00 AM  
 Sodium 139 01/18/2020 05:00 AM  
 Potassium 4.5 01/18/2020 05:00 AM  
 Chloride 105 01/18/2020 05:00 AM  
 CO2 28 01/18/2020 05:00 AM  
 BUN 8 01/18/2020 05:00 AM  
 Creatinine 0.96 01/18/2020 05:00 AM  
 Glucose 95 01/18/2020 05:00 AM  
 INR 1.0 01/17/2020 04:25 AM  
 aPTT 57.3 (H) 01/22/2018 08:26 AM  
 Bilirubin, total 0.2 01/16/2020 06:49 AM  
 AST (SGOT) 11 (L) 01/16/2020 06:49 AM  
 ALT (SGPT) 11 (L) 01/16/2020 06:49 AM  
 Alk. phosphatase 136 01/16/2020 06:49 AM  
 Lipase 270 01/16/2020 06:49 AM  
 Troponin-I, Qt. <0.02 (L) 01/16/2020 06:49 AM  
 
 
CT Results  (Last 48 hours) 01/16/20 0858  CT CHEST ABD PELV W CONT Final result Impression:  IMPRESSION:   
   
1. Extensive pulmonary parenchymal and hepatic metastatic disease as well as  
subcarinal and azygoesophageal recess lymphadenopathy likely representing  
metastatic disease as well. 2.  Long-segment thickening of the wall of the distal esophagus suggests the  
possibility of a primary esophageal carcinoma. Follow-up gastroenterology  
consultation is recommended for consideration of endoscopic biopsy. 3.  Left ventricular hypertrophy and scattered atherosclerosis. Narrative:  CT CHEST, ABDOMEN, PELVIS WITH CONTRAST:    
   
CLINICAL HISTORY:   Follow-up abnormal chest radiograph smoker with pulmonary  
nodules and 100-pound weight loss over the last several months. TECHNIQUE:  Oral and nonionic intravenous contrast was administered, and the  
torso was scanned with spiral technique. Radiation dose reduction was achieved  
using one or all of the following techniques: automated exposure control,  
weight-based dosing, iterative reconstruction. COMPARISON:  Portable chest today and chest CTA of November 16, 2019.   
   
CT CHEST: There are innumerable bilateral pulmonary nodules, the largest  
 measuring approximately 3.9 cm posteriorly in the right lower lobe. There is  
marked thickening of the wall of the distal esophagus below the level of the  
hilary which suggests the possibility of primary esophageal carcinoma. There is  
associated subcarinal and azygoesophageal recess lymphadenopathy. No  
significant pleural fluid. The thyroid appears normal as imaged. There is  
thickening of the left ventricular wall suggesting a hypertrophy. CT ABDOMEN: There are innumerable small hepatic lesions which are new from 2018  
and also suspicious for metastatic disease. The largest measures approximately 2 cm posteriorly in the right lobe. There are mildly enlarged lymph nodes in  
the lesser sac which may represent metastatic disease as well. The spleen,  
pancreas, kidneys, and adrenals appear unremarkable. CT PELVIS:  The appendix is not confidently identified, but there are no  
secondary signs of appendicitis. The prostate is not enlarged. No primary or  
secondary signs of appendicitis or identified. No pathologically enlarged lymph  
nodes or free fluid is evident. There is scattered aortoiliac atherosclerotic  
calcifications with mild ectasia but no focal aneurysm. Bone windows  
demonstrate no aggressive process, accounting for degenerative changes including  
severe osteoarthritis at the right hip. A 1 cm central lucency in the body of  
L4 has sclerotic margins and is not typical of metastatic disease. chest X-ray I reviewed recent labs, recent radiologic studies, and pertinent records including other doctor notes if needed. I independently reviewed radiology images for studies I described above or studies I have ordered. Admission date (for inpatients): 1/16/2020 Day of Surgery  Procedure(s): ENDOSCOPIC ULTRASOUND (EUS)/ BMI 26 ROOM 537 ESOPHAGOGASTRODUODENAL (EGD) BIOPSY ASSESSMENT/PLAN: 
Problem List  Date Reviewed: 1/17/2020 Codes Class Noted * (Principal) Esophageal mass ICD-10-CM: K22.8 ICD-9-CM: 530.89  1/17/2020 Liver metastases ([facility] [address] ICD-10-CM: C78.7 ICD-9-CM: 197.7  1/17/2020 Esophageal dysphagia ICD-10-CM: R13.10 ICD-9-CM: 787.20  1/17/2020 Weight loss ICD-10-CM: R63.4 ICD-9-CM: 783.21  1/17/2020 Chest pain ICD-10-CM: R07.9 ICD-9-CM: 786.50  1/16/2020 Methamphetamine abuse (HCC) (Chronic) ICD-10-CM: F15.10 ICD-9-CM: 305.70  12/2/2018 H/O atrial flutter (Chronic) ICD-10-CM: Z86.79 
ICD-9-CM: V12.59  12/2/2018 Overview Signed 2/6/2018  7:51 AM by Aris Fishman NP  
  1/2018 Atrial flutter, s/p cardioversion. Essential hypertension (Chronic) ICD-10-CM: I10 
ICD-9-CM: 401.9  12/2/2018 Acute respiratory failure with hypoxia and hypercarbia (HCC) ICD-10-CM: J96.01, J96.02 
ICD-9-CM: 518.81  12/2/2018 Substance abuse (HCC) (Chronic) ICD-10-CM: F19.10 ICD-9-CM: 305.90  12/2/2018 Noncompliance (Chronic) ICD-10-CM: Z91.19 ICD-9-CM: V15.81  12/2/2018 Acute encephalopathy ICD-10-CM: G93.40 ICD-9-CM: 348.30  12/2/2018 COPD (chronic obstructive pulmonary disease) (HCC) (Chronic) ICD-10-CM: J44.9 ICD-9-CM: 351  11/16/2018 NATALIIA (obstructive sleep apnea) (Chronic) ICD-10-CM: Q45.27 
ICD-9-CM: 327.23  11/16/2018 Chronic respiratory failure (HCC) (Chronic) ICD-10-CM: J96.10 ICD-9-CM: 518.83  11/16/2018 Altered mental status ICD-10-CM: R41.82 
ICD-9-CM: 780.97  11/16/2018 Morbid obesity ([address] (Chronic) ICD-10-CM: E66.01 
ICD-9-CM: 278.01  11/16/2018 Nicotine dependence (Chronic) ICD-10-CM: V07.684 ICD-9-CM: 305.1  2/1/2018 Alcohol abuse (Chronic) ICD-10-CM: F10.10 ICD-9-CM: 305.00  2/1/2018 Homeless (Chronic) ICD-10-CM: Z59.0 ICD-9-CM: V60.0  1/15/2018 Urethral stricture (Chronic) ICD-10-CM: N35.919 ICD-9-CM: 598.9  1/7/2018 Anasarca ICD-10-CM: R60.1 ICD-9-CM: 782.3  1/6/2018 Acute kidney injury ([facility] [address] ICD-10-CM: N17.9 ICD-9-CM: 584.9  1/6/2018 Principal Problem: 
  Esophageal mass (1/17/2020) Active Problems: 
  Nicotine dependence (2/1/2018) Methamphetamine abuse ([facility] [address] (12/2/2018) Alcohol abuse (2/1/2018) Homeless (1/15/2018) Chest pain (1/16/2020) Liver metastases ([facility] [address] (1/17/2020) Esophageal dysphagia (1/17/2020) Weight loss (1/17/2020) Likely Esophageal malignancy/suspected lung/liver/garth mets Await path Med onc and palliative care consulted I discussed case with Dr Annette Ahumada Homelessness He was homeless for about 6-8months but now lives with his son, but there is no heat in house (except portable propane tank space heater) Dysphagia/Weight loss I discussed enteral feeding options with him All questions answered He wants to think it over and wait for his path report He is not sure he wants treatment Liquid diet only for now I have personally performed a face-to-face diagnostic evaluation and management  service on this patient. I have independently seen the patient. I have independently obtained the above history from the patient/family. I have independently examined the patient with above findings. I have independently reviewed data/labs for this patient and developed the above plan of care (MDM). Signed: Tab Carrera.  Jennifer Walsh MD, FACS

## 2020-01-19 LAB
ANION GAP SERPL CALC-SCNC: 4 MMOL/L (ref 7–16)
BUN SERPL-MCNC: 13 MG/DL (ref 6–23)
CALCIUM SERPL-MCNC: 8.8 MG/DL (ref 8.3–10.4)
CHLORIDE SERPL-SCNC: 101 MMOL/L (ref 98–107)
CO2 SERPL-SCNC: 30 MMOL/L (ref 21–32)
CREAT SERPL-MCNC: 1.15 MG/DL (ref 0.8–1.5)
ERYTHROCYTE [DISTWIDTH] IN BLOOD BY AUTOMATED COUNT: 13.2 % (ref 11.9–14.6)
GLUCOSE SERPL-MCNC: 101 MG/DL (ref 65–100)
HCT VFR BLD AUTO: 32.7 % (ref 41.1–50.3)
HGB BLD-MCNC: 10.1 G/DL (ref 13.6–17.2)
MCH RBC QN AUTO: 29 PG (ref 26.1–32.9)
MCHC RBC AUTO-ENTMCNC: 30.9 G/DL (ref 31.4–35)
MCV RBC AUTO: 94 FL (ref 79.6–97.8)
NRBC # BLD: 0 K/UL (ref 0–0.2)
PLATELET # BLD AUTO: 349 K/UL (ref 150–450)
PMV BLD AUTO: 9.6 FL (ref 9.4–12.3)
POTASSIUM SERPL-SCNC: 4.7 MMOL/L (ref 3.5–5.1)
RBC # BLD AUTO: 3.48 M/UL (ref 4.23–5.6)
SODIUM SERPL-SCNC: 135 MMOL/L (ref 136–145)
WBC # BLD AUTO: 8 K/UL (ref 4.3–11.1)

## 2020-01-19 PROCEDURE — 94760 N-INVAS EAR/PLS OXIMETRY 1: CPT

## 2020-01-19 PROCEDURE — 74011250636 HC RX REV CODE- 250/636: Performed by: HOSPITALIST

## 2020-01-19 PROCEDURE — 94660 CPAP INITIATION&MGMT: CPT

## 2020-01-19 PROCEDURE — 80048 BASIC METABOLIC PNL TOTAL CA: CPT

## 2020-01-19 PROCEDURE — 65660000000 HC RM CCU STEPDOWN

## 2020-01-19 PROCEDURE — 74011250637 HC RX REV CODE- 250/637: Performed by: INTERNAL MEDICINE

## 2020-01-19 PROCEDURE — 77010033678 HC OXYGEN DAILY

## 2020-01-19 PROCEDURE — 74011250637 HC RX REV CODE- 250/637: Performed by: HOSPITALIST

## 2020-01-19 PROCEDURE — 74011000250 HC RX REV CODE- 250: Performed by: HOSPITALIST

## 2020-01-19 PROCEDURE — 85027 COMPLETE CBC AUTOMATED: CPT

## 2020-01-19 PROCEDURE — 99232 SBSQ HOSP IP/OBS MODERATE 35: CPT | Performed by: INTERNAL MEDICINE

## 2020-01-19 PROCEDURE — 36415 COLL VENOUS BLD VENIPUNCTURE: CPT

## 2020-01-19 PROCEDURE — 94640 AIRWAY INHALATION TREATMENT: CPT

## 2020-01-19 RX ORDER — ALPRAZOLAM 0.5 MG/1
0.25 TABLET ORAL ONCE
Status: COMPLETED | OUTPATIENT
Start: 2020-01-19 | End: 2020-01-19

## 2020-01-19 RX ORDER — ALPRAZOLAM 0.5 MG/1
0.25 TABLET ORAL
Status: DISCONTINUED | OUTPATIENT
Start: 2020-01-19 | End: 2020-01-31 | Stop reason: HOSPADM

## 2020-01-19 RX ADMIN — FAMOTIDINE 20 MG: 10 INJECTION INTRAVENOUS at 08:36

## 2020-01-19 RX ADMIN — OXYCODONE HYDROCHLORIDE AND ACETAMINOPHEN 1 TABLET: 7.5; 325 TABLET ORAL at 02:35

## 2020-01-19 RX ADMIN — FAMOTIDINE 20 MG: 10 INJECTION INTRAVENOUS at 20:09

## 2020-01-19 RX ADMIN — HYDROMORPHONE HYDROCHLORIDE 0.5 MG: 1 INJECTION, SOLUTION INTRAMUSCULAR; INTRAVENOUS; SUBCUTANEOUS at 13:34

## 2020-01-19 RX ADMIN — TIOTROPIUM BROMIDE 18 MCG: 18 CAPSULE ORAL; RESPIRATORY (INHALATION) at 08:29

## 2020-01-19 RX ADMIN — OXYCODONE HYDROCHLORIDE AND ACETAMINOPHEN 1 TABLET: 7.5; 325 TABLET ORAL at 21:54

## 2020-01-19 RX ADMIN — POLYETHYLENE GLYCOL 3350 17 G: 17 POWDER, FOR SOLUTION ORAL at 20:09

## 2020-01-19 RX ADMIN — Medication 10 ML: at 21:56

## 2020-01-19 RX ADMIN — DIPHENHYDRAMINE HYDROCHLORIDE 25 MG: 25 CAPSULE ORAL at 17:32

## 2020-01-19 RX ADMIN — DILTIAZEM HYDROCHLORIDE 90 MG: 30 TABLET, FILM COATED ORAL at 08:38

## 2020-01-19 RX ADMIN — ALPRAZOLAM 0.25 MG: 0.5 TABLET ORAL at 09:11

## 2020-01-19 RX ADMIN — POLYETHYLENE GLYCOL 3350 17 G: 17 POWDER, FOR SOLUTION ORAL at 08:39

## 2020-01-19 RX ADMIN — HYDROMORPHONE HYDROCHLORIDE 0.5 MG: 1 INJECTION, SOLUTION INTRAMUSCULAR; INTRAVENOUS; SUBCUTANEOUS at 20:10

## 2020-01-19 RX ADMIN — LISINOPRIL 10 MG: 5 TABLET ORAL at 08:37

## 2020-01-19 RX ADMIN — DILTIAZEM HYDROCHLORIDE 90 MG: 30 TABLET, FILM COATED ORAL at 16:23

## 2020-01-19 RX ADMIN — ARFORMOTEROL TARTRATE 15 MCG: 15 SOLUTION RESPIRATORY (INHALATION) at 21:10

## 2020-01-19 RX ADMIN — AMIODARONE HYDROCHLORIDE 200 MG: 200 TABLET ORAL at 08:38

## 2020-01-19 RX ADMIN — ONDANSETRON 4 MG: 2 INJECTION INTRAMUSCULAR; INTRAVENOUS at 09:15

## 2020-01-19 RX ADMIN — HYDROMORPHONE HYDROCHLORIDE 0.5 MG: 1 INJECTION, SOLUTION INTRAMUSCULAR; INTRAVENOUS; SUBCUTANEOUS at 03:26

## 2020-01-19 RX ADMIN — OXYCODONE HYDROCHLORIDE AND ACETAMINOPHEN 1 TABLET: 7.5; 325 TABLET ORAL at 07:18

## 2020-01-19 RX ADMIN — ARFORMOTEROL TARTRATE 15 MCG: 15 SOLUTION RESPIRATORY (INHALATION) at 08:28

## 2020-01-19 RX ADMIN — Medication 10 ML: at 13:17

## 2020-01-19 RX ADMIN — OXYCODONE HYDROCHLORIDE AND ACETAMINOPHEN 1 TABLET: 7.5; 325 TABLET ORAL at 16:24

## 2020-01-19 RX ADMIN — OXYCODONE HYDROCHLORIDE AND ACETAMINOPHEN 1 TABLET: 7.5; 325 TABLET ORAL at 11:31

## 2020-01-19 RX ADMIN — HYDROMORPHONE HYDROCHLORIDE 0.5 MG: 1 INJECTION, SOLUTION INTRAMUSCULAR; INTRAVENOUS; SUBCUTANEOUS at 00:15

## 2020-01-19 RX ADMIN — DILTIAZEM HYDROCHLORIDE 90 MG: 30 TABLET, FILM COATED ORAL at 21:54

## 2020-01-19 RX ADMIN — HYDROMORPHONE HYDROCHLORIDE 0.5 MG: 1 INJECTION, SOLUTION INTRAMUSCULAR; INTRAVENOUS; SUBCUTANEOUS at 09:11

## 2020-01-19 NOTE — PROGRESS NOTES
[address] Hematology Oncology Inpatient Hematology / Oncology Progress Note Admission Date: 2020  6:59 AM 
Reason for Admission/Hospital Course: Chest pain [R07.9] 24 Hour Events: No new complaints Agrees to feeding tube Agrees to treatment of his esophageal cancer Family and friends at bedside encouraging him ROS: 
Constitutional: Positive for fatigue, weakness. Negative for fever, chills. CV: Negative for chest pain, palpitations, edema. Respiratory: Negative for cough, wheezing. Positive for exertional dyspnea. GI: Negative for nausea, abdominal pain, diarrhea. + dysphagia. 10 point review of systems is otherwise negative with the exception of the elements mentioned above in the HPI. No Known Allergies OBJECTIVE: 
Patient Vitals for the past 8 hrs: 
 BP Temp Pulse Resp SpO2  
20 1124 125/80 97.8 °F (36.6 °C) 82 18 92 % 20 0829     96 % 20 0737 137/69 97.9 °F (36.6 °C) 87 18 94 % Temp (24hrs), Av.9 °F (36.6 °C), Min:97.7 °F (36.5 °C), Max:98.1 °F (36.7 °C) 
 
 0701 -  1900 In: 26 [P.O.:237] Out: 400 [Urine:400] Physical Exam: 
Constitutional: Well developed, well nourished, chronically-ill appearing male in no acute distress, sitting comfortably in the hospital bed. HEENT: Normocephalic and atraumatic. Oropharynx is clear, mucous membranes are moist. + poor dentition. Extraocular muscles are intact. Sclerae anicteric. Neck supple. Skin Warm and dry. No bruising and no rash noted. No erythema. No pallor. Respiratory Lungs are clear to auscultation bilaterally without wheezes, rales or rhonchi, normal air exchange without accessory muscle use. CVS Normal rate, regular rhythm and normal S1 and S2. No murmurs, gallops, or rubs. Abdomen Soft, nontender and nondistended, normoactive bowel sounds. No palpable mass. No hepatosplenomegaly. Neuro Grossly nonfocal with no obvious sensory or motor deficits. MSK Normal range of motion in general.  No edema and no tenderness. Psych Anxious mood and affect. Labs: 
   
Recent Labs  
  01/19/20 0242 01/18/20 
0500 01/17/20 
0425 WBC 8.0 7.9 6.2  
RBC 3.48* 3.74* 3.64* HGB 10.1* 10.8* 10.4* HCT 32.7* 35.0* 33.8* MCV 94.0 93.6 92.9 MCH 29.0 28.9 28.6 MCHC 30.9* 30.9* 30.8*  
RDW 13.2 13.2 13.1  353 352 GRANS  --  74 63 LYMPH  --  15 26 MONOS  --  7 7 EOS  --  3 3 BASOS  --  1 1 IG  --  0 0 DF  --  AUTOMATED AUTOMATED ANEU  --  5.8 3.9 ABL  --  1.2 1.6 ABM  --  0.6 0.5 JAY  --  0.3 0.2 ABB  --  0.1 0.0 AIG  --  0.0 0.0 Recent Labs  
  01/19/20 0242 01/18/20 
0500 01/17/20 
0425 * 139 135* K 4.7 4.5 4.0  
 105 102 CO2 30 28 28 AGAP 4* 6* 5* * 95 99 BUN 13 8 11 CREA 1.15 0.96 0.97 GFRAA >60 >60 >60 GFRNA >60 >60 >60  
CA 8.8 9.1 8.9 MG  --  2.1 1.8 PHOS  --  4.5 3.2 Imaging: 
CT CHEST ABD PELV W CONT [[id number]] Collected: 01/16/20 4699 Order Status: Completed Updated: 01/16/20 8216 Narrative:    
CT CHEST, ABDOMEN, PELVIS WITH CONTRAST:   
 
CLINICAL HISTORY:   Follow-up abnormal chest radiograph smoker with pulmonary 
nodules and 100-pound weight loss over the last several months. TECHNIQUE:  Oral and nonionic intravenous contrast was administered, and the 
torso was scanned with spiral technique.  Radiation dose reduction was achieved 
using one or all of the following techniques: automated exposure control, 
weight-based dosing, iterative reconstruction. COMPARISON:  Portable chest today and chest CTA of November 16, 2019.  
 
CT CHEST: There are innumerable bilateral pulmonary nodules, the largest 
measuring approximately 3.9 cm posteriorly in the right lower lobe.  There is 
marked thickening of the wall of the distal esophagus below the level of the 
 hilary which suggests the possibility of primary esophageal carcinoma. Charo Lame is 
associated subcarinal and azygoesophageal recess lymphadenopathy.  No 
significant pleural fluid.  The thyroid appears normal as imaged.  There is 
thickening of the left ventricular wall suggesting a hypertrophy. CT ABDOMEN: There are innumerable small hepatic lesions which are new from 2018 
and also suspicious for metastatic disease.  The largest measures approximately 2 cm posteriorly in the right lobe.  There are mildly enlarged lymph nodes in 
the lesser sac which may represent metastatic disease as well.  The spleen, 
pancreas, kidneys, and adrenals appear unremarkable. CT PELVIS:  The appendix is not confidently identified, but there are no 
secondary signs of appendicitis.  The prostate is not enlarged.  No primary or 
secondary signs of appendicitis or identified.  No pathologically enlarged lymph 
nodes or free fluid is evident.  There is scattered aortoiliac atherosclerotic 
calcifications with mild ectasia but no focal aneurysm.  Bone windows 
demonstrate no aggressive process, accounting for degenerative changes including 
severe osteoarthritis at the right hip.  A 1 cm central lucency in the body of 
L4 has sclerotic margins and is not typical of metastatic disease. Impression:    
IMPRESSION:  
 
1.  Extensive pulmonary parenchymal and hepatic metastatic disease as well as 
subcarinal and azygoesophageal recess lymphadenopathy likely representing 
metastatic disease as well. 2.  Long-segment thickening of the wall of the distal esophagus suggests the 
possibility of a primary esophageal carcinoma.  Follow-up gastroenterology 
consultation is recommended for consideration of endoscopic biopsy. 3.  Left ventricular hypertrophy and scattered atherosclerosis. XR CHEST PORT [[id number]] Collected: 01/16/20 0737 Order Status: Completed Updated: 01/16/20 3396 Narrative:    
 PORTABLE CHEST, January 16, 2020 at 0715 hours CLINICAL HISTORY:  Cough and chest pain in a smoker. COMPARISON:  December 6, 2018. FINDINGS:  AP erect image demonstrates an approximately 4 cm rounded mass 
projected over the inferior aspect of the right hilum.  There are numerous 
additional pulmonary nodules bilaterally, suspicious for metastatic disease.  No 
nidhi pneumonic consolidation or pleural fluid is evident.  The heart size is 
within normal limits without evidence of congestive heart failure or 
pneumothorax.  The bony thorax appears intact on this view.  There are overlying 
radiopaque support devices. Impression:    
IMPRESSION:  NUMEROUS BILATERAL PULMONARY NODULES ARE SUSPICIOUS FOR METASTATIC 
DISEASE, AND A 4 CM RIGHT LOWER LUNG MASS PROJECTED OVER THE INFERIOR ASPECT OF 
THE RIGHT HILUM IS SUSPICIOUS FOR A PRIMARY BRONCHOGENIC CARCINOMA IN THIS 
SMOKER WITH COPD.  FOLLOW PULMONARY MEDICINE CONSULTATION IS RECOMMENDED. ASSESSMENT: 
 
Problem List  Date Reviewed: 1/17/2020 Codes Class Noted * (Principal) Esophageal mass ICD-10-CM: K22.8 ICD-9-CM: 530.89  1/17/2020 Liver metastases ([facility] [facility] ICD-10-CM: C78.7 ICD-9-CM: 197.7  1/17/2020 Esophageal dysphagia ICD-10-CM: R13.10 ICD-9-CM: 787.20  1/17/2020 Weight loss ICD-10-CM: R63.4 ICD-9-CM: 783.21  1/17/2020 Chest pain ICD-10-CM: R07.9 ICD-9-CM: 786.50  1/16/2020 Methamphetamine abuse (HCC) (Chronic) ICD-10-CM: F15.10 ICD-9-CM: 305.70  12/2/2018 H/O atrial flutter (Chronic) ICD-10-CM: Z86.79 
ICD-9-CM: V12.59  12/2/2018 Overview Signed 2/6/2018  7:51 AM by Taylor Trujillo NP  
  1/2018 Atrial flutter, s/p cardioversion. Essential hypertension (Chronic) ICD-10-CM: I10 
ICD-9-CM: 401.9  12/2/2018 Acute respiratory failure with hypoxia and hypercarbia (HCC) ICD-10-CM: J96.01, J96.02 
ICD-9-CM: 518.81  12/2/2018 Substance abuse (HCC) (Chronic) ICD-10-CM: F19.10 ICD-9-CM: 305.90  12/2/2018 Noncompliance (Chronic) ICD-10-CM: Z91.19 ICD-9-CM: V15.81  12/2/2018 Acute encephalopathy ICD-10-CM: G93.40 ICD-9-CM: 348.30  12/2/2018 COPD (chronic obstructive pulmonary disease) (HCC) (Chronic) ICD-10-CM: J44.9 ICD-9-CM: 414  11/16/2018 NATALIIA (obstructive sleep apnea) (Chronic) ICD-10-CM: S48.12 
ICD-9-CM: 327.23  11/16/2018 Chronic respiratory failure (HCC) (Chronic) ICD-10-CM: J96.10 ICD-9-CM: 518.83  11/16/2018 Altered mental status ICD-10-CM: R41.82 
ICD-9-CM: 780.97  11/16/2018 Morbid obesity ([facility] [address] (Chronic) ICD-10-CM: E66.01 
ICD-9-CM: 278.01  11/16/2018 Nicotine dependence (Chronic) ICD-10-CM: E70.121 ICD-9-CM: 305.1  2/1/2018 Alcohol abuse (Chronic) ICD-10-CM: F10.10 ICD-9-CM: 305.00  2/1/2018 Homeless (Chronic) ICD-10-CM: Z59.0 ICD-9-CM: V60.0  1/15/2018 Urethral stricture (Chronic) ICD-10-CM: N35.919 ICD-9-CM: 598.9  1/7/2018 Anasarca ICD-10-CM: R60.1 ICD-9-CM: 782.3  1/6/2018 Acute kidney injury ([facility] [address] ICD-10-CM: N17.9 ICD-9-CM: 584.9  1/6/2018 PLAN: 
Metastatic esophageal cancer - CT CAP with extensive pulm parenchymal and hepatic metastatic disease; subcarinal and azygoesophageal recess LAD; and long-segment thickening of the wall of the distal esophagus - GI performing EGD/EUS, biopsy taken and path shows moderately differentiated adenocarcinoma.   Will need to send tissue for Caris testing - navigation team notified. 
- CEA 19.1, CA 19-9 219.2  
- Iron studies low with ferritin 27, tsat 9%. Repleted iron with ferrlecit x 1.  
- Consulted surgery for feeding tube - patient agrees. - Consulted palliative care - managing pain. - Consult IR for port placement. - Echocardiogram with EF 55-60%. - Will need PET/CT as OP.  Follow up with Qucristina at discharge - Feb 4 at 2:30.  
  
  
 
 
 
 
 Luann Cohn  [address] Hematology Oncology [address] [address] Office : [phone] Fax : [phone] Attending Addendum: 
I have personally performed a face to face diagnostic evaluation on this patient. I have reviewed and agree with the care plan as documented above by Caitlyn Lott N.P.  My findings are as follows: Patient appears stable, heart rate regular without murmurs, abdomen is non-tender, bowel sounds are positive.  62 male, smoker history of EtOH abuse, drug abuse (methamphetamine, ongoing), CHF, atrial flutter, COPD, NATALIIA on CPAP, obesity, admitted with ongoing significant nausea, inability to keep fluids down, as well as significant weight loss (reportedly over 100 pounds in the last few months).  CT CAP concerning for metastatic process including multiple lesions in lungs and liver (imaging personally reviewed).  Recent EGD/EUS showing at a distance of 32 to 46 cm from incisors, is circumferential fungating, ulcerated mass concerning for malignancy.  Mass extends into the proximal stomach.  Likely staged as [id number].  Final path w adenocarcinoma: discussed results w pt, will also check Carris NGS.  Will likely benefit from port placement as well as feeding tube placement in anticipation of therapy directed at likely metastatic esophageal cancer. TM high, iron stores low: iv iron. Today w family and friends, thinking over aggressiveness of care however leaning towards likely therapy. Follow-up in outpatient oncology within week of discharge. 
  
  
  
Total time 25min, 50% in direct consultation. Melissa Chang MD 
[facility] Hematology/Oncology [id number] [facility] Office : [phone] Fax : [phone]

## 2020-01-19 NOTE — PROGRESS NOTES
Gastroenterology Progress Note Date of admission:  1/16/2020 Today's date:  1/19/2020 CC:     Dysphagia HPI:   Patient is very anxious about his recent diagnosis. Tolerating liquids. ROS:  
 Gen: No fevers, no chills CV:   No chest pain, no SOB Current Medications:  
 
Current Facility-Administered Medications Medication Dose Route Frequency  ALPRAZolam (XANAX) tablet 0.25 mg  0.25 mg Oral TID PRN  
 ALPRAZolam (XANAX) tablet 0.25 mg  0.25 mg Oral ONCE  
 lisinopril (PRINIVIL, ZESTRIL) tablet 10 mg  10 mg Oral DAILY  polyethylene glycol (MIRALAX) packet 17 g  17 g Oral BID  
 HYDROmorphone (PF) (DILAUDID) injection 0.5 mg  0.5 mg IntraVENous Q4H PRN  promethazine (PHENERGAN) with saline injection 12.5 mg  12.5 mg IntraVENous Q6H PRN  
 famotidine (PF) (PEPCID) 20 mg in 0.9% sodium chloride 10 mL injection  20 mg IntraVENous Q12H  
 albuterol-ipratropium (DUO-NEB) 2.5 MG-0.5 MG/3 ML  3 mL Nebulization Q4H PRN  
 tiotropium (SPIRIVA) inhalation capsule 18 mcg  1 Cap Inhalation DAILY  arformoteroL (BROVANA) neb solution 15 mcg  15 mcg Nebulization BID RT  
 amiodarone (CORDARONE) tablet 200 mg  200 mg Oral DAILY  sodium chloride (NS) flush 5-40 mL  5-40 mL IntraVENous Q8H  
 sodium chloride (NS) flush 5-40 mL  5-40 mL IntraVENous PRN  
 acetaminophen (TYLENOL) tablet 650 mg  650 mg Oral Q4H PRN  
 naloxone (NARCAN) injection 0.4 mg  0.4 mg IntraVENous PRN  
 diphenhydrAMINE (BENADRYL) capsule 25 mg  25 mg Oral Q4H PRN  
 ondansetron (ZOFRAN) injection 4 mg  4 mg IntraVENous Q4H PRN  
 magnesium hydroxide (MILK OF MAGNESIA) 400 mg/5 mL oral suspension 30 mL  30 mL Oral DAILY PRN  
 oxyCODONE-acetaminophen (PERCOCET 7.5) 7.5-325 mg per tablet 1 Tab  1 Tab Oral Q4H PRN  
 nicotine (NICODERM CQ) 21 mg/24 hr patch 1 Patch  1 Patch TransDERmal Q24H  
 influenza vaccine 2019-20 (6 mos+)(PF) (FLUARIX/FLULAVAL/FLUZONE QUAD) injection 0.5 mL  0.5 mL IntraMUSCular PRIOR TO DISCHARGE  hydrALAZINE (APRESOLINE) tablet 50 mg  50 mg Oral Q6H PRN  
 hydrALAZINE (APRESOLINE) 20 mg/mL injection 10 mg  10 mg IntraVENous Q6H PRN  
 dilTIAZem (CARDIZEM) IR tablet 90 mg  90 mg Oral TID Physical Exam:  
Vitals: 
Visit Vitals /69 (BP 1 Location: Left arm, BP Patient Position: At rest) Pulse 87 Temp 97.9 °F (36.6 °C) Resp 18 Ht 6' (1.829 m) Wt 85.7 kg (189 lb) SpO2 96% BMI 25.63 kg/m² Intake/Output: 
01/19 0701 - 01/19 1900 In: -  
Out: 200 [Urine:200] 01/17 1901 - 01/19 0700 In: 7278 [P.O.:1316] Out: 1850 [ZLRRU:3769] HEENT:  No scleral icterus, no oral ulcers Abdomen: Soft, nontender, normoactive bowel sounds Extremities:  No cyanosis, no leg edema Neuro:  Alert and oriented to person, place, and time Data:  
 
Recent Results (from the past 24 hour(s)) CBC W/O DIFF Collection Time: 01/19/20  2:42 AM  
Result Value Ref Range WBC 8.0 4.3 - 11.1 K/uL  
 RBC 3.48 (L) 4.23 - 5.6 M/uL  
 HGB 10.1 (L) 13.6 - 17.2 g/dL HCT 32.7 (L) 41.1 - 50.3 % MCV 94.0 79.6 - 97.8 FL  
 MCH 29.0 26.1 - 32.9 PG  
 MCHC 30.9 (L) 31.4 - 35.0 g/dL  
 RDW 13.2 11.9 - 14.6 % PLATELET 675 267 - 057 K/uL MPV 9.6 9.4 - 12.3 FL ABSOLUTE NRBC 0.00 0.0 - 0.2 K/uL METABOLIC PANEL, BASIC Collection Time: 01/19/20  2:42 AM  
Result Value Ref Range Sodium 135 (L) 136 - 145 mmol/L Potassium 4.7 3.5 - 5.1 mmol/L Chloride 101 98 - 107 mmol/L  
 CO2 30 21 - 32 mmol/L Anion gap 4 (L) 7 - 16 mmol/L Glucose 101 (H) 65 - 100 mg/dL BUN 13 6 - 23 MG/DL Creatinine 1.15 0.8 - 1.5 MG/DL  
 GFR est AA >60 >60 ml/min/1.73m2 GFR est non-AA >60 >60 ml/min/1.73m2 Calcium 8.8 8.3 - 10.4 MG/DL Impression/Plan: 
  
 
78-year-old gentleman with history of polysubstance abuse admitted with dysphagia and significant abnormal weight loss.  He was found with metastatic esophageal adenocarcinoma. Patient remains uncertain about his next step in management whether palliative versus chemotherapy, as well as means of nutrition.   
 
Signed: 
Dotty Medel MD 
1/19/2020 
8:58 AM

## 2020-01-19 NOTE — PROGRESS NOTES
Patient visited by spiritual care volunteer Codi Chavez. patient was awake and alert Patient was concerned about his prognosis of cancer and end of life Talked about salvation - before, after, future events Reassured the timing of his decision Shared his new life in [place] 
Patient seemed to benefit from visit Guided him on how to combat the attacks of satan Shared morgan esparza for help Tearful Patient gave a hug at end of visit Concerned about wife's salvation Juliana Rodriguez, staff

## 2020-01-19 NOTE — PROGRESS NOTES
Patient on CPAP 
 
 01/18/20 2036 Oxygen Therapy O2 Sat (%) 94 % Pulse via Oximetry 75 beats per minute O2 Device CPAP mask O2 Flow Rate (L/min) 2 l/min Respiratory Respiratory (WDL) WDL CPAP/BIPAP  
CPAP/BIPAP Start/Stop On Device Mode CPAP, auto-titrating  
$$ CPAP Daily Yes Mask Type and Size Full face; Medium Skin Condition intact EPAP (cm H2O) (auto titrating [id number]) Total RR (Spontaneous) 18 breaths per minute Pt's Home Machine No  
Biomedical Check Performed Yes Settings Verified Yes

## 2020-01-19 NOTE — PROGRESS NOTES
Progress Note Patient: Veronique Segura               Sex: male          MRN: [id number] YOB: 1965      Age:  62 y.o. PCP:  None Treatment Team: Attending Provider: Nicky Gamble MD; Consulting Provider: Reji Merchant MD; Consulting Provider: Yvonne James MD; Consulting Provider: Mauro Bennett MD; Utilization Review: Chucky Hurt RN; Surgeon: Mauro Bennett MD; Consulting Provider: Radha Morrison NP Subjective: This is a 12-year-old gentleman with history of methamphetamine abuse, tobacco abuse, noncompliance with medications, hypertension, CHF, COPD came in on  with complaints of chest pain and shortness of breath and was found to have an esophageal mass with possible liver and lung metastasis. He had EGD on   and was found to have a fungating, partially obstructing esophageal mass extending into the stomach. His tumor was biopsied and the pathology report showed adenocarcinoma. This is considered a stage IV disease. If the patient wants to pursue treatment he will need a PEG tube, a port placement and will be started on palliative chemotherapy. The patient is still thinking about what he wants to do. Apparently there is a family meeting to be held today. Objective:  
Physical Exam:  
Visit Vitals /80 (BP 1 Location: Left arm, BP Patient Position: At rest) Pulse 82 Temp 97.8 °F (36.6 °C) Resp 18 Ht 6' (1.829 m) Wt 85.7 kg (189 lb) SpO2 92% BMI 25.63 kg/m² Temp (24hrs), Av.9 °F (36.6 °C), Min:97.7 °F (36.5 °C), Max:98.1 °F (36.7 °C) Oxygen Therapy O2 Sat (%): 92 % (20 1124) Pulse via Oximetry: 98 beats per minute (20) O2 Device: Room air (20) O2 Flow Rate (L/min): 2 l/min (20) FIO2 (%): 21 % (20) Intake/Output Summary (Last 24 hours) at 2020 1344 Last data filed at 2020 1341 Gross per 24 hour Intake 477 ml  
 Output 800 ml Net -323 ml General: Conscious, No acute distress Eyes:  EDMAR, No pallor/icterus HENT:             Oral Mucosa is Moist, No sinus tenderness Neck:               Supple, No JVD Lungs:  CTA Bilaterally, No significant wheeze/rhonchi Heart:  S1 S2 regular Abdomen: Soft, normal bowel sounds, mild epigastric tenderness, nondistended, No guarding/rigidity/rebound tend. Extremities: No pedal edema Neurologic:  AAOX3, No acute FND Skin:                No acute rashes Musculoskeletal: No Acute findings Psych:             Somewhat depressed mood. LAB Recent Results (from the past 24 hour(s)) CBC W/O DIFF Collection Time: 01/19/20  2:42 AM  
Result Value Ref Range WBC 8.0 4.3 - 11.1 K/uL  
 RBC 3.48 (L) 4.23 - 5.6 M/uL  
 HGB 10.1 (L) 13.6 - 17.2 g/dL HCT 32.7 (L) 41.1 - 50.3 % MCV 94.0 79.6 - 97.8 FL  
 MCH 29.0 26.1 - 32.9 PG  
 MCHC 30.9 (L) 31.4 - 35.0 g/dL  
 RDW 13.2 11.9 - 14.6 % PLATELET 337 751 - 966 K/uL MPV 9.6 9.4 - 12.3 FL ABSOLUTE NRBC 0.00 0.0 - 0.2 K/uL METABOLIC PANEL, BASIC Collection Time: 01/19/20  2:42 AM  
Result Value Ref Range Sodium 135 (L) 136 - 145 mmol/L Potassium 4.7 3.5 - 5.1 mmol/L Chloride 101 98 - 107 mmol/L  
 CO2 30 21 - 32 mmol/L Anion gap 4 (L) 7 - 16 mmol/L Glucose 101 (H) 65 - 100 mg/dL BUN 13 6 - 23 MG/DL Creatinine 1.15 0.8 - 1.5 MG/DL  
 GFR est AA >60 >60 ml/min/1.73m2 GFR est non-AA >60 >60 ml/min/1.73m2 Calcium 8.8 8.3 - 10.4 MG/DL No results found. No results found. All Micro Results Procedure Component Value Units Date/Time CULTURE, BLOOD [[id number]] Collected:  01/16/20 1327 Order Status:  Completed Specimen:  Blood Updated:  01/19/20 1885 Special Requests: --     
  RIGHT Antecubital 
  
  Culture result: NO GROWTH 3 DAYS     
 CULTURE, BLOOD [[id number]] Collected:  01/16/20 1332 Order Status:  Completed Specimen:  Blood Updated:  01/19/20 4483 Special Requests: --     
  LEFT Antecubital 
  
  Culture result: NO GROWTH 3 DAYS Current Medications Reviewed Assessment/Plan Principal Problem: 
  Esophageal mass (1/17/2020) Active Problems: 
  Nicotine dependence (2/1/2018) Methamphetamine abuse ([address] (12/2/2018) Alcohol abuse (2/1/2018) Homeless (1/15/2018) Chest pain (1/16/2020) Liver metastases ([address] (1/17/2020) Esophageal dysphagia (1/17/2020) Weight loss (1/17/2020) #.   metastatic esophageal cancer to  liver and lung mets. Oncology consulted. Patient will need to have a PEG tube, port placement and palliative chemotherapy. He is a still deciding if he wants to pursue aggressive treatment. #.  Possible nonischemic cardiomyopathy. 2D echo shows normal EF with LVH. No evidence of fluid overload. #.  History of atrial flutter status post cardioversion. Continue amiodarone, diltiazem for blood pressure and heart rate control. .  He has not been taking any anticoagulation for several days. We will hold off on anticoagulation considering biopsy and possible need for PEG and PORT will continue holding anticoagulation #. Chronic COPD: No acute exacerbation. #.  Tobacco abuse: Patient was counseled regarding cessation. #.  Methamphetamine abuse. Counseled regarding cessation. #.  Obstructive sleep apnea, keep him on CPAP at night. #.  Hypertension: On cardizem and  lisinopril . DVT prophylaxis: SCDs Code status: Full Akla Montoya MD 
January 19, 2020

## 2020-01-19 NOTE — PROGRESS NOTES
Referral from nurse Reviewed notes prior to visit - patient is terminal per notes Patient was awake Calm [place] family present He was very receptive to  presence Acknowledged his illness and prognosis Patient said \"he didn't want to burn in hell\" Patient is listed as Confucianist. 
 
Explored his wendy and feelings -  
Guided him in scripture and the assurance of salvation Shared story of wendy with him Prayer offered Patient will need to be followed closely due to prognosis He benefits from  visits often Theodore Arias, staff [address], [address] [facility]  /   [email or web address]

## 2020-01-19 NOTE — PROGRESS NOTES
END OF SHIFT NOTE: 
 
Intake/Output 01/18 1901 - 01/19 0700 In: -  
Out: 839 [VATJK:458] Voiding: YES Catheter: NO 
Drain:   
 
 
 
 
 
Stool:  0 occurrences. Emesis:  0 occurrences. VITAL SIGNS Patient Vitals for the past 12 hrs: 
 Temp Pulse Resp BP SpO2  
01/19/20 0227 98 °F (36.7 °C) 68 14 127/75 96 % 01/18/20 2243 98.1 °F (36.7 °C) 77 18 149/84 99 % 01/18/20 2036     94 % 01/18/20 2022     92 % 01/18/20 1906 97.7 °F (36.5 °C) 86 18 121/85 97 % Pain Assessment Pain 1 Pain Scale 1: Numeric (0 - 10) (01/19/20 0047) Pain Intensity 1: 5 (01/19/20 0047) Patient Stated Pain Goal: 0 (01/18/20 1541) Pain Reassessment 1: Yes (01/18/20 1400) Pain Onset 1: ongoing (01/18/20 1541) Pain Location 1: Abdomen (01/18/20 1951) Pain Orientation 1: Upper (01/18/20 1951) Pain Description 1: Aching (01/18/20 1951) Pain Intervention(s) 1: Medication (see MAR) (01/19/20 0010) Additional Pain Sites: Yes (01/17/20 0706) Ambulating Yes Additional Information: Patient did not sleep well throughout night despite round the clock IV dilaudid and percocet. Patient seems anxious and fearful of new diagnosis and prognosis. Otherwise, uneventful night. Shift report given to oncoming nurse, Hero Rob at the bedside. Eitan Sierra

## 2020-01-19 NOTE — PROGRESS NOTES
H&P/Consult Note/Progress Note/Office Note:  
Taylor Quan  MRN: [id number]  :1965  Age:54 y.o. 
 
HPI: Taylor Quan is a 62 y.o. male who has Stage 4 metastatic esophageal adenocarcinoma with suspected liver, lungs, and garth mets. He has h/o ETOH abuse, tobacco abuse, meth abuse, homelessness, COPD, CHF,  
who was admitted from the ER after presenting with a several week h/o progressive N/V, inability to sustain PO intake and weight loss (>100lbs) Nothing in particular made his symptoms better or worse. He also had associated 2/10 substernal chest pain. No SOB He had not been taking his maint meds in >6 months Pertinent negatives include no back pain, no claudication, no cough, no diaphoresis, no dizziness 20 s/p EGD with esophageal bx; Dr Skylar Cabrales DIAGNOSIS ESOPHAGEAL MASS BIOPSIES: MODERATELY DIFFERENTIATED ADENOCARCINOMA. Electronically signed out on 2020 14:21 by Maria Guadalupe Denise. Britney Melendez MD  
Findings: At 32-46cm there is a circumferential, partially obstructing, fungating, ulcerated mass concerning for malignancy. Extensive biopsies were obtained. STOMACH:  The mass extends into the proximal stomach. The fundus on antegrade and retroflexed views is normal. The body, antrum, and pylorus are normal.  
DUODENUM:  The bulb and second portions are normal.  
There are multiple enlarged periesophageal lymph nodes. The largest measures 34 x 14 mm in size. FNA not performed as the needle which averse the tumor and confirmation of malignancy would not affect staging. Further evaluation of the distal extent of tumor as well as stomach and liver could not be performed as the scope could not safely traverse the mass. Impression:   
Esophageal mass. This is concerning for a least T3N2Mx based on endoscopic ultrasound criteria. As the tumor could not be traversed, the distal extent could not be evaluated for invasion of adjacent structures such as the diaphragm. Thus, the possibility of understaging exists. More importantly, there is strong suspicion for metastatic liver and lung disease based on CT.  
  
GI recs:  As patient likely has unresectable malignancy, he may be a candidate for palliative esophageal stent placement. This would require multiple stents due to the length of the tumor. Based on extension into the stomach, he will be at increased risk of stent migration.  
  
 
 
 
 
1/16/20 CT chest/abd/pelvis with oral and IV contrast 
Hx:   Follow-up abnormal chest radiograph smoker with pulmonary 
nodules and 100-pound weight loss over the last several months. 
  
CT CHEST: There are innumerable bilateral pulmonary nodules, the largest 
measuring approximately 3.9 cm posteriorly in the right lower lobe. There is 
marked thickening of the wall of the distal esophagus below the level of the 
hilary which suggests the possibility of primary esophageal carcinoma. There is 
associated subcarinal and azygoesophageal recess lymphadenopathy. No 
significant pleural fluid. The thyroid appears normal as imaged. There is 
thickening of the left ventricular wall suggesting a hypertrophy. 
  
CT ABDOMEN: There are innumerable small hepatic lesions which are new from 2018 
and also suspicious for metastatic disease. The largest measures approximately 2 cm posteriorly in the right lobe. There are mildly enlarged lymph nodes in 
the lesser sac which may represent metastatic disease as well. The spleen, 
pancreas, kidneys, and adrenals appear unremarkable. 
  
CT PELVIS:  The appendix is not confidently identified, but there are no 
secondary signs of appendicitis. The prostate is not enlarged. No primary or 
secondary signs of appendicitis or identified. No pathologically enlarged lymph 
nodes or free fluid is evident. There is scattered aortoiliac atherosclerotic 
calcifications with mild ectasia but no focal aneurysm. Bone windows demonstrate no aggressive process, accounting for degenerative changes including 
severe osteoarthritis at the right hip. A 1 cm central lucency in the body of 
L4 has sclerotic margins and is not typical of metastatic disease. 
  
IMPRESSION:  
  
1. Extensive pulmonary parenchymal and hepatic metastatic disease as well as 
subcarinal and azygoesophageal recess lymphadenopathy likely representing 
metastatic disease as well. 
  
2.  Long-segment thickening of the wall of the distal esophagus suggests the 
possibility of a primary esophageal carcinoma. Follow-up gastroenterology 
consultation is recommended for consideration of endoscopic biopsy. 
  
3.  Left ventricular hypertrophy and scattered atherosclerosis.   
  
 
 
 
 
 
 
Past Medical History:  
Diagnosis Date  Acute respiratory failure with hypercapnia ([facility] 2/1/2018  Chronic obstructive pulmonary disease ([facility]  Heart failure ([facility]  Sleep disorder No past surgical history on file. Current Facility-Administered Medications Medication Dose Route Frequency  lisinopril (PRINIVIL, ZESTRIL) tablet 10 mg  10 mg Oral DAILY  polyethylene glycol (MIRALAX) packet 17 g  17 g Oral BID  
 HYDROmorphone (PF) (DILAUDID) injection 0.5 mg  0.5 mg IntraVENous Q4H PRN  promethazine (PHENERGAN) with saline injection 12.5 mg  12.5 mg IntraVENous Q6H PRN  
 famotidine (PF) (PEPCID) 20 mg in 0.9% sodium chloride 10 mL injection  20 mg IntraVENous Q12H  
 albuterol-ipratropium (DUO-NEB) 2.5 MG-0.5 MG/3 ML  3 mL Nebulization Q4H PRN  
 tiotropium (SPIRIVA) inhalation capsule 18 mcg  1 Cap Inhalation DAILY  arformoteroL (BROVANA) neb solution 15 mcg  15 mcg Nebulization BID RT  
 amiodarone (CORDARONE) tablet 200 mg  200 mg Oral DAILY  sodium chloride (NS) flush 5-40 mL  5-40 mL IntraVENous Q8H  
 sodium chloride (NS) flush 5-40 mL  5-40 mL IntraVENous PRN  
 acetaminophen (TYLENOL) tablet 650 mg  650 mg Oral Q4H PRN  
  naloxone (NARCAN) injection 0.4 mg  0.4 mg IntraVENous PRN  
 diphenhydrAMINE (BENADRYL) capsule 25 mg  25 mg Oral Q4H PRN  
 ondansetron (ZOFRAN) injection 4 mg  4 mg IntraVENous Q4H PRN  
 magnesium hydroxide (MILK OF MAGNESIA) 400 mg/5 mL oral suspension 30 mL  30 mL Oral DAILY PRN  
 oxyCODONE-acetaminophen (PERCOCET 7.5) 7.5-325 mg per tablet 1 Tab  1 Tab Oral Q4H PRN  
 nicotine (NICODERM CQ) 21 mg/24 hr patch 1 Patch  1 Patch TransDERmal Q24H  
 influenza vaccine 2019-20 (6 mos+)(PF) (FLUARIX/FLULAVAL/FLUZONE QUAD) injection 0.5 mL  0.5 mL IntraMUSCular PRIOR TO DISCHARGE  hydrALAZINE (APRESOLINE) tablet 50 mg  50 mg Oral Q6H PRN  
 hydrALAZINE (APRESOLINE) 20 mg/mL injection 10 mg  10 mg IntraVENous Q6H PRN  
 dilTIAZem (CARDIZEM) IR tablet 90 mg  90 mg Oral TID Patient has no known allergies. Social History Socioeconomic History  Marital status:  Spouse name: Not on file  Number of children: Not on file  Years of education: Not on file  Highest education level: Not on file Tobacco Use  Smoking status: Current Every Day Smoker Packs/day: 2.00 Substance and Sexual Activity  Alcohol use: No  
  Comment: once a month beer  Drug use: Yes Types: Marijuana, Methamphetamines Social History Tobacco Use Smoking Status Current Every Day Smoker  Packs/day: 2.00 No family history on file. ROS: The patient has no difficulty with chest pain or shortness of breath. No fever or chills. Comprehensive review of systems was otherwise unremarkable except as noted above. Physical Exam:  
Visit Vitals /75 (BP 1 Location: Right arm, BP Patient Position: At rest) Pulse 68 Temp 98 °F (36.7 °C) Resp 14 Ht 6' (1.829 m) Wt 189 lb (85.7 kg) SpO2 96% BMI 25.63 kg/m² Vitals:  
 01/18/20 2022 01/18/20 2036 01/18/20 2243 01/19/20 0318 BP:   149/84 127/75 Pulse:   77 68 Resp:   18 14 Temp:   98.1 °F (36.7 °C) 98 °F (36.7 °C) SpO2: 92% 94% 99% 96% Weight:      
Height:      
 
No intake/output data recorded. 01/17 1901 - 01/19 0700 In: 3929 [P.O.:1316] Out: 1850 [JSZOV:5720] Constitutional: Alert, oriented, cooperative patient in no acute distress; appears weak Eyes:Sclera are clear. EOMs intact ENMT: no external lesions gross hearing normal; no obvious neck masses, no ear or lip lesions, nares normal 
CV: RRR. Normal perfusion Resp: No JVD. Breathing is  non-labored; no audible wheezing. GI: soft and non-distended Musculoskeletal: unremarkable with normal function. No embolic signs or cyanosis. Neuro:  Oriented; moves all 4; no focal deficits Psychiatric: normal affect and mood, no memory impairment Recent vitals (if inpt): 
Patient Vitals for the past 24 hrs: 
 BP Temp Pulse Resp SpO2  
01/19/20 0227 127/75 98 °F (36.7 °C) 68 14 96 % 01/18/20 2243 149/84 98.1 °F (36.7 °C) 77 18 99 % 01/18/20 2036     94 % 01/18/20 2022     92 % 01/18/20 1906 121/85 97.7 °F (36.5 °C) 86 18 97 % 01/18/20 1544 127/84 97.9 °F (36.6 °C) 89 18 97 % 01/18/20 1119 158/90 98.3 °F (36.8 °C) 80 16 98 % 01/18/20 0828     96 % 01/18/20 0732 (!) 148/97 97.5 °F (36.4 °C) 81 13 98 % Labs: 
Recent Labs  
  01/19/20 
0242  01/17/20 
0425 WBC 8.0   < > 6.2 HGB 10.1*   < > 10.4*    < > 352 *   < > 135* K 4.7   < > 4.0  
   < > 102 CO2 30   < > 28 BUN 13   < > 11  
CREA 1.15   < > 0.97 *   < > 99 PTP  --   --  13.1 INR  --   --  1.0  
 < > = values in this interval not displayed. Lab Results Component Value Date/Time  WBC 8.0 01/19/2020 02:42 AM  
 HGB 10.1 (L) 01/19/2020 02:42 AM  
 PLATELET 336 80/13/1051 02:42 AM  
 Sodium 135 (L) 01/19/2020 02:42 AM  
 Potassium 4.7 01/19/2020 02:42 AM  
 Chloride 101 01/19/2020 02:42 AM  
 CO2 30 01/19/2020 02:42 AM  
 BUN 13 01/19/2020 02:42 AM  
 Creatinine 1.15 01/19/2020 02:42 AM  
 Glucose 101 (H) 01/19/2020 02:42 AM  
 INR 1.0 01/17/2020 04:25 AM  
 aPTT 57.3 (H) 01/22/2018 08:26 AM  
 Bilirubin, total 0.2 01/16/2020 06:49 AM  
 AST (SGOT) 11 (L) 01/16/2020 06:49 AM  
 ALT (SGPT) 11 (L) 01/16/2020 06:49 AM  
 Alk. phosphatase 136 01/16/2020 06:49 AM  
 Lipase 270 01/16/2020 06:49 AM  
 Troponin-I, Qt. <0.02 (L) 01/16/2020 06:49 AM  
 
 
CT Results  (Last 48 hours) None  
  
 
chest X-ray I reviewed recent labs, recent radiologic studies, and pertinent records including other doctor notes if needed. I independently reviewed radiology images for studies I described above or studies I have ordered. Admission date (for inpatients): 1/16/2020 Day of Surgery  Procedure(s): ENDOSCOPIC ULTRASOUND (EUS)/ BMI 26 ROOM 537 ESOPHAGOGASTRODUODENAL (EGD) BIOPSY ASSESSMENT/PLAN: 
Problem List  Date Reviewed: 1/17/2020 Codes Class Noted * (Principal) Esophageal mass ICD-10-CM: K22.8 ICD-9-CM: 530.89  1/17/2020 Liver metastases ([facility] ICD-10-CM: C78.7 ICD-9-CM: 197.7  1/17/2020 Esophageal dysphagia ICD-10-CM: R13.10 ICD-9-CM: 787.20  1/17/2020 Weight loss ICD-10-CM: R63.4 ICD-9-CM: 783.21  1/17/2020 Chest pain ICD-10-CM: R07.9 ICD-9-CM: 786.50  1/16/2020 Methamphetamine abuse (HCC) (Chronic) ICD-10-CM: F15.10 ICD-9-CM: 305.70  12/2/2018 H/O atrial flutter (Chronic) ICD-10-CM: Z86.79 
ICD-9-CM: V12.59  12/2/2018 Overview Signed 2/6/2018  7:51 AM by Krysta Hernández NP  
  1/2018 Atrial flutter, s/p cardioversion. Essential hypertension (Chronic) ICD-10-CM: I10 
ICD-9-CM: 401.9  12/2/2018 Acute respiratory failure with hypoxia and hypercarbia (HCC) ICD-10-CM: J96.01, J96.02 
ICD-9-CM: 518.81  12/2/2018 Substance abuse (HCC) (Chronic) ICD-10-CM: F19.10 ICD-9-CM: 305.90  12/2/2018 Noncompliance (Chronic) ICD-10-CM: Z91.19 ICD-9-CM: V15.81  12/2/2018 Acute encephalopathy ICD-10-CM: G93.40 ICD-9-CM: 348.30  12/2/2018 COPD (chronic obstructive pulmonary disease) (HCC) (Chronic) ICD-10-CM: J44.9 ICD-9-CM: 964  11/16/2018 NATALIIA (obstructive sleep apnea) (Chronic) ICD-10-CM: Q45.87 
ICD-9-CM: 327.23  11/16/2018 Chronic respiratory failure (HCC) (Chronic) ICD-10-CM: J96.10 ICD-9-CM: 518.83  11/16/2018 Altered mental status ICD-10-CM: R41.82 
ICD-9-CM: 780.97  11/16/2018 Morbid obesity ([address] (Chronic) ICD-10-CM: E66.01 
ICD-9-CM: 278.01  11/16/2018 Nicotine dependence (Chronic) ICD-10-CM: E38.564 ICD-9-CM: 305.1  2/1/2018 Alcohol abuse (Chronic) ICD-10-CM: F10.10 ICD-9-CM: 305.00  2/1/2018 Homeless (Chronic) ICD-10-CM: Z59.0 ICD-9-CM: V60.0  1/15/2018 Urethral stricture (Chronic) ICD-10-CM: N35.919 ICD-9-CM: 598.9  1/7/2018 Anasarca ICD-10-CM: R60.1 ICD-9-CM: 782.3  1/6/2018 Acute kidney injury ([facility] ICD-10-CM: N17.9 ICD-9-CM: 584.9  1/6/2018 Principal Problem: 
  Esophageal mass (1/17/2020) Active Problems: 
  Nicotine dependence (2/1/2018) Methamphetamine abuse ([address] (12/2/2018) Alcohol abuse (2/1/2018) Homeless (1/15/2018) Chest pain (1/16/2020) Liver metastases ([address] (1/17/2020) Esophageal dysphagia (1/17/2020) Weight loss (1/17/2020) Metastatic Esophageal adenocarcinoma with suspected lung/liver/garth mets Med onc and palliative care following I discussed case with Dr Agus Islas Homelessness He was homeless for about 6-8months but now lives with his son, but there is no heat in house (except portable propane tank space heater) Dysphagia/Weight loss I discussed enteral feeding options with him All questions answered He wants to think it over He is not sure he wants treatment Liquid diet only for now I have personally performed a face-to-face diagnostic evaluation and management  service on this patient. I have independently seen the patient. I have independently obtained the above history from the patient/family. I have independently examined the patient with above findings. I have independently reviewed data/labs for this patient and developed the above plan of care (MDM). Signed: Wellington Greenberg.  Tereso Ly MD, FACS

## 2020-01-20 LAB
ANION GAP SERPL CALC-SCNC: 5 MMOL/L (ref 7–16)
BUN SERPL-MCNC: 12 MG/DL (ref 6–23)
CALCIUM SERPL-MCNC: 9 MG/DL (ref 8.3–10.4)
CHLORIDE SERPL-SCNC: 101 MMOL/L (ref 98–107)
CO2 SERPL-SCNC: 31 MMOL/L (ref 21–32)
CREAT SERPL-MCNC: 1 MG/DL (ref 0.8–1.5)
ERYTHROCYTE [DISTWIDTH] IN BLOOD BY AUTOMATED COUNT: 13.2 % (ref 11.9–14.6)
GLUCOSE SERPL-MCNC: 88 MG/DL (ref 65–100)
HCT VFR BLD AUTO: 32.8 % (ref 41.1–50.3)
HGB BLD-MCNC: 10 G/DL (ref 13.6–17.2)
MCH RBC QN AUTO: 28.9 PG (ref 26.1–32.9)
MCHC RBC AUTO-ENTMCNC: 30.5 G/DL (ref 31.4–35)
MCV RBC AUTO: 94.8 FL (ref 79.6–97.8)
NRBC # BLD: 0 K/UL (ref 0–0.2)
PLATELET # BLD AUTO: 313 K/UL (ref 150–450)
PMV BLD AUTO: 9.7 FL (ref 9.4–12.3)
POTASSIUM SERPL-SCNC: 4.7 MMOL/L (ref 3.5–5.1)
RBC # BLD AUTO: 3.46 M/UL (ref 4.23–5.6)
SODIUM SERPL-SCNC: 137 MMOL/L (ref 136–145)
WBC # BLD AUTO: 7.4 K/UL (ref 4.3–11.1)

## 2020-01-20 PROCEDURE — 94760 N-INVAS EAR/PLS OXIMETRY 1: CPT

## 2020-01-20 PROCEDURE — 36415 COLL VENOUS BLD VENIPUNCTURE: CPT

## 2020-01-20 PROCEDURE — 74011250637 HC RX REV CODE- 250/637: Performed by: INTERNAL MEDICINE

## 2020-01-20 PROCEDURE — 99233 SBSQ HOSP IP/OBS HIGH 50: CPT | Performed by: NURSE PRACTITIONER

## 2020-01-20 PROCEDURE — 65660000000 HC RM CCU STEPDOWN

## 2020-01-20 PROCEDURE — 85027 COMPLETE CBC AUTOMATED: CPT

## 2020-01-20 PROCEDURE — 74011250636 HC RX REV CODE- 250/636: Performed by: HOSPITALIST

## 2020-01-20 PROCEDURE — 74011000250 HC RX REV CODE- 250: Performed by: HOSPITALIST

## 2020-01-20 PROCEDURE — 74011250637 HC RX REV CODE- 250/637: Performed by: HOSPITALIST

## 2020-01-20 PROCEDURE — 80048 BASIC METABOLIC PNL TOTAL CA: CPT

## 2020-01-20 PROCEDURE — 99232 SBSQ HOSP IP/OBS MODERATE 35: CPT | Performed by: INTERNAL MEDICINE

## 2020-01-20 PROCEDURE — 77010033678 HC OXYGEN DAILY

## 2020-01-20 PROCEDURE — 94640 AIRWAY INHALATION TREATMENT: CPT

## 2020-01-20 RX ORDER — OXYCODONE AND ACETAMINOPHEN 7.5; 325 MG/1; MG/1
1 TABLET ORAL
Status: DISCONTINUED | OUTPATIENT
Start: 2020-01-20 | End: 2020-01-25

## 2020-01-20 RX ADMIN — DILTIAZEM HYDROCHLORIDE 90 MG: 30 TABLET, FILM COATED ORAL at 21:41

## 2020-01-20 RX ADMIN — Medication 10 ML: at 05:15

## 2020-01-20 RX ADMIN — OXYCODONE HYDROCHLORIDE AND ACETAMINOPHEN 1 TABLET: 7.5; 325 TABLET ORAL at 04:01

## 2020-01-20 RX ADMIN — ALPRAZOLAM 0.25 MG: 0.5 TABLET ORAL at 08:02

## 2020-01-20 RX ADMIN — FAMOTIDINE 20 MG: 10 INJECTION INTRAVENOUS at 21:41

## 2020-01-20 RX ADMIN — HYDROMORPHONE HYDROCHLORIDE 0.5 MG: 1 INJECTION, SOLUTION INTRAMUSCULAR; INTRAVENOUS; SUBCUTANEOUS at 07:54

## 2020-01-20 RX ADMIN — LISINOPRIL 10 MG: 5 TABLET ORAL at 07:55

## 2020-01-20 RX ADMIN — DIPHENHYDRAMINE HYDROCHLORIDE 25 MG: 25 CAPSULE ORAL at 21:41

## 2020-01-20 RX ADMIN — AMIODARONE HYDROCHLORIDE 200 MG: 200 TABLET ORAL at 07:55

## 2020-01-20 RX ADMIN — FAMOTIDINE 20 MG: 10 INJECTION INTRAVENOUS at 07:55

## 2020-01-20 RX ADMIN — ALPRAZOLAM 0.25 MG: 0.5 TABLET ORAL at 00:34

## 2020-01-20 RX ADMIN — Medication 10 ML: at 14:57

## 2020-01-20 RX ADMIN — OXYCODONE HYDROCHLORIDE AND ACETAMINOPHEN 1 TABLET: 7.5; 325 TABLET ORAL at 18:45

## 2020-01-20 RX ADMIN — DILTIAZEM HYDROCHLORIDE 90 MG: 30 TABLET, FILM COATED ORAL at 07:55

## 2020-01-20 RX ADMIN — DILTIAZEM HYDROCHLORIDE 90 MG: 30 TABLET, FILM COATED ORAL at 15:07

## 2020-01-20 RX ADMIN — HYDROMORPHONE HYDROCHLORIDE 0.5 MG: 1 INJECTION, SOLUTION INTRAMUSCULAR; INTRAVENOUS; SUBCUTANEOUS at 20:16

## 2020-01-20 RX ADMIN — POLYETHYLENE GLYCOL 3350 17 G: 17 POWDER, FOR SOLUTION ORAL at 21:41

## 2020-01-20 RX ADMIN — HYDROMORPHONE HYDROCHLORIDE 0.5 MG: 1 INJECTION, SOLUTION INTRAMUSCULAR; INTRAVENOUS; SUBCUTANEOUS at 15:12

## 2020-01-20 RX ADMIN — POLYETHYLENE GLYCOL 3350 17 G: 17 POWDER, FOR SOLUTION ORAL at 09:32

## 2020-01-20 RX ADMIN — TIOTROPIUM BROMIDE 18 MCG: 18 CAPSULE ORAL; RESPIRATORY (INHALATION) at 08:23

## 2020-01-20 RX ADMIN — OXYCODONE HYDROCHLORIDE AND ACETAMINOPHEN 1 TABLET: 7.5; 325 TABLET ORAL at 11:21

## 2020-01-20 RX ADMIN — Medication 10 ML: at 21:41

## 2020-01-20 RX ADMIN — HYDROMORPHONE HYDROCHLORIDE 0.5 MG: 1 INJECTION, SOLUTION INTRAMUSCULAR; INTRAVENOUS; SUBCUTANEOUS at 00:34

## 2020-01-20 NOTE — PROGRESS NOTES
GI DAILY PROGRESS NOTE Admit Date:  1/16/2020 Today's Date:  1/20/2020 CC:  Esophageal tumor - metastastic Subjective:  
 
Patient is somnolent. Arouses but cannot effectively answer questions. Medications:  
Current Facility-Administered Medications Medication Dose Route Frequency  oxyCODONE-acetaminophen (PERCOCET 7.5) 7.5-325 mg per tablet 1 Tab  1 Tab Oral Q6H PRN  
 ALPRAZolam (XANAX) tablet 0.25 mg  0.25 mg Oral TID PRN  
 lisinopril (PRINIVIL, ZESTRIL) tablet 10 mg  10 mg Oral DAILY  polyethylene glycol (MIRALAX) packet 17 g  17 g Oral BID  
 HYDROmorphone (PF) (DILAUDID) injection 0.5 mg  0.5 mg IntraVENous Q4H PRN  promethazine (PHENERGAN) with saline injection 12.5 mg  12.5 mg IntraVENous Q6H PRN  
 famotidine (PF) (PEPCID) 20 mg in 0.9% sodium chloride 10 mL injection  20 mg IntraVENous Q12H  
 albuterol-ipratropium (DUO-NEB) 2.5 MG-0.5 MG/3 ML  3 mL Nebulization Q4H PRN  
 tiotropium (SPIRIVA) inhalation capsule 18 mcg  1 Cap Inhalation DAILY  arformoteroL (BROVANA) neb solution 15 mcg  15 mcg Nebulization BID RT  
 amiodarone (CORDARONE) tablet 200 mg  200 mg Oral DAILY  sodium chloride (NS) flush 5-40 mL  5-40 mL IntraVENous Q8H  
 sodium chloride (NS) flush 5-40 mL  5-40 mL IntraVENous PRN  
 acetaminophen (TYLENOL) tablet 650 mg  650 mg Oral Q4H PRN  
 naloxone (NARCAN) injection 0.4 mg  0.4 mg IntraVENous PRN  
 diphenhydrAMINE (BENADRYL) capsule 25 mg  25 mg Oral Q4H PRN  
 ondansetron (ZOFRAN) injection 4 mg  4 mg IntraVENous Q4H PRN  
 magnesium hydroxide (MILK OF MAGNESIA) 400 mg/5 mL oral suspension 30 mL  30 mL Oral DAILY PRN  
 nicotine (NICODERM CQ) 21 mg/24 hr patch 1 Patch  1 Patch TransDERmal Q24H  
 influenza vaccine 2019-20 (6 mos+)(PF) (FLUARIX/FLULAVAL/FLUZONE QUAD) injection 0.5 mL  0.5 mL IntraMUSCular PRIOR TO DISCHARGE  hydrALAZINE (APRESOLINE) tablet 50 mg  50 mg Oral Q6H PRN  
  hydrALAZINE (APRESOLINE) 20 mg/mL injection 10 mg  10 mg IntraVENous Q6H PRN  
 dilTIAZem (CARDIZEM) IR tablet 90 mg  90 mg Oral TID Review of Systems: A review of system was obtained, with pertinent positives as listed above. All others are negative. Objective:  
Vitals: 
Visit Vitals /86 (BP 1 Location: Right arm, BP Patient Position: At rest) Pulse 90 Temp 97.2 °F (36.2 °C) Resp 20 Ht 6' (1.829 m) Wt 85.5 kg (188 lb 9 oz) SpO2 94% BMI 25.57 kg/m² Intake/Output: 
No intake/output data recorded. 01/18 1901 - 01/20 0700 In: 717 [P.O.:717] Out: 2950 [Urine:2950] Exam: 
General appearance: sitting upright, arouses easily but falls back asleep Lungs: clear to auscultation bilaterally anteriorly Heart: regular rate and rhythm Abdomen: soft, non-tender. Bowel sounds normal. No masses,  no organomegaly Extremities: extremities normal, atraumatic, no cyanosis or edema Neuro: unable to assess for neurological deficits. Data Review (Labs):   
Recent Labs  
  01/20/20 
0353 01/19/20 
0242 01/18/20 
0500 WBC 7.4 8.0 7.9 HGB 10.0* 10.1* 10.8* HCT 32.8* 32.7* 35.0*  
 349 353 MCV 94.8 94.0 93.6  135* 139  
K 4.7 4.7 4.5  
 101 105 CO2 31 30 28 BUN 12 13 8 CREA 1.00 1.15 0.96  
CA 9.0 8.8 9.1 GLU 88 101* 95 Assessment:  
 
Principal Problem: 
  Esophageal mass (1/17/2020) Active Problems: 
  Nicotine dependence (2/1/2018) Methamphetamine abuse ([facility] (12/2/2018) Alcohol abuse (2/1/2018) Homeless (1/15/2018) Chest pain (1/16/2020) Liver metastases ([facility] (1/17/2020) Esophageal dysphagia (1/17/2020) Weight loss (1/17/2020) Plan: The extent of the tumor, long and obstructing, into the gastric pouch would argue against palliative stenting. PEG might be tried for nutrition and meds.   Hospice care should be pursued, inpatient, if his home situation is suboptimal. 
 
 We will be on call for any services we can provide to help.  
Staci Fierro MD

## 2020-01-20 NOTE — PROGRESS NOTES
Spoke with Vargas in IR, will try and schedule port placement for tomorrow- make NPO at midnight. Per Dr. Rodriguez Hence, earliest PEG could be scheduled is Wednesday

## 2020-01-20 NOTE — PROGRESS NOTES
END OF SHIFT NOTE: 
 
Intake/Output 01/20 0701 - 01/20 1900 In: 240 [P.O.:240] Out: 350 [Urine:350] Voiding: YES Catheter: NO 
Drain:   
 
 
 
 
 
Stool:  0 occurrences. Emesis:  0 occurrences. VITAL SIGNS Patient Vitals for the past 12 hrs: 
 Temp Pulse Resp BP SpO2  
01/20/20 1428 98.4 °F (36.9 °C) 81 18 103/68 98 % 01/20/20 1139 97.5 °F (36.4 °C) 86 18 122/75 99 % 01/20/20 0823     94 % 01/20/20 0812     95 % 01/20/20 0742  90     
01/20/20 0740 97.2 °F (36.2 °C) 96 20 143/86 100 % Pain Assessment Pain 1 Pain Scale 1: Visual (01/20/20 1200) Pain Intensity 1: 0 (01/20/20 1200) Patient Stated Pain Goal: 0 (01/20/20 1200) Pain Reassessment 1: Patient resting w/respiratory rate greater than 10 (01/20/20 1200) Pain Onset 1: ongoing (01/20/20 0401) Pain Location 1: Abdomen (01/20/20 1123) Pain Orientation 1: Mid (01/20/20 0754) Pain Description 1: Throbbing (01/20/20 0754) Pain Intervention(s) 1: Medication (see MAR) (01/20/20 1123) Additional Pain Sites: Yes (01/17/20 0706) Ambulating Yes Additional Information: IR will try and place port tomorrow. PEG maybe Wednesday. Pt does want treatment. Shift report will be given to oncoming nurse at the bedside.  
 
Lydia Tucker RN

## 2020-01-20 NOTE — ACP (ADVANCE CARE PLANNING)
Patient shares about his thought process about treatment; at this time, he is very sure that he wants to pursue a port, feeding tube, and chemotherapy. He has very good understanding that his cancer is incurable and he will die eventually, but he hopes treatment will help him live a little bit longer. We discussed HCPOA/code status. Patient has been  to his wife for over 36 years, and he would want his wife to be his surrogate decision maker. In regards to heroic interventions, he would want time honored trial (1-2 weeks) of mechanical ventilation, but not prolonged support.

## 2020-01-20 NOTE — PROGRESS NOTES
Patient placed on 2L due to low sat. 
 
 01/19/20 2113 Oxygen Therapy O2 Sat (%) 92 % Pulse via Oximetry 98 beats per minute O2 Device Nasal cannula O2 Flow Rate (L/min) 2 l/min

## 2020-01-20 NOTE — PROGRESS NOTES
[address] Hematology Oncology Inpatient Hematology / Oncology Progress Note Admission Date: 2020  6:59 AM 
Reason for Admission/Hospital Course: Chest pain [R07.9] 24 Hour Events: No new complaints Drug and tobacco abuse He wants PEG, port, chemo Surgery following ROS: 
Constitutional: Positive for fatigue, weakness. Negative for fever, chills. CV: Negative for chest pain, palpitations, edema. Respiratory: Negative for cough, wheezing. Positive for exertional dyspnea. GI: Negative for nausea, abdominal pain, diarrhea. + dysphagia. 10 point review of systems is otherwise negative with the exception of the elements mentioned above in the HPI. No Known Allergies OBJECTIVE: 
Patient Vitals for the past 8 hrs: 
 BP Temp Pulse Resp SpO2  
20 0823     94 % 20 0812     95 % 20 0742   90    
20 0740 143/86 97.2 °F (36.2 °C) 96 20 100 % 20 [address] [facility],8[address] Fl Temp (24hrs), Av.8 °F (36.6 °C), Min:97.2 °F (36.2 °C), Max:98.1 °F (36.7 °C) No intake/output data recorded. Physical Exam: 
Constitutional: Well developed, well nourished, chronically-ill appearing male in no acute distress, sitting comfortably in the hospital bed. HEENT: Normocephalic and atraumatic. Oropharynx is clear, mucous membranes are moist. + poor dentition. Extraocular muscles are intact. Sclerae anicteric. Skin Warm and dry. No bruising and no rash noted. No erythema. No pallor. Respiratory Lungs are clear to auscultation bilaterally without wheezes, rales or rhonchi, normal air exchange without accessory muscle use. CVS Normal rate, regular rhythm and normal S1 and S2. No murmurs, gallops, or rubs. Abdomen Soft, nontender and nondistended, normoactive bowel sounds. No palpable mass. No hepatosplenomegaly. Neuro Grossly nonfocal with no obvious sensory or motor deficits. MSK Normal range of motion in general.  No edema and no tenderness. Psych Anxious mood and affect. Labs: 
   
Recent Labs  
  01/20/20 
0353 01/19/20 
0242 01/18/20 
0500 WBC 7.4 8.0 7.9  
RBC 3.46* 3.48* 3.74* HGB 10.0* 10.1* 10.8* HCT 32.8* 32.7* 35.0*  
MCV 94.8 94.0 93.6 MCH 28.9 29.0 28.9 MCHC 30.5* 30.9* 30.9*  
RDW 13.2 13.2 13.2  349 353 GRANS  --   --  74  
LYMPH  --   --  15 MONOS  --   --  7  
EOS  --   --  3  
BASOS  --   --  1 IG  --   --  0  
DF  --   --  AUTOMATED ANEU  --   --  5.8 ABL  --   --  1.2 ABM  --   --  0.6 JAY  --   --  0.3 ABB  --   --  0.1 AIG  --   --  0.0 Recent Labs  
  01/20/20 
0353 01/19/20 
0242 01/18/20 
0500  135* 139  
K 4.7 4.7 4.5  
 101 105 CO2 31 30 28 AGAP 5* 4* 6*  
GLU 88 101* 95 BUN 12 13 8 CREA 1.00 1.15 0.96 GFRAA >60 >60 >60 GFRNA >60 >60 >60  
CA 9.0 8.8 9.1 MG  --   --  2.1 PHOS  --   --  4.5 Imaging: 
CT CHEST ABD PELV W CONT [[id number]] Collected: 01/16/20 5306 Order Status: Completed Updated: 01/16/20 4377 Narrative:    
CT CHEST, ABDOMEN, PELVIS WITH CONTRAST:   
 
CLINICAL HISTORY:   Follow-up abnormal chest radiograph smoker with pulmonary 
nodules and 100-pound weight loss over the last several months. TECHNIQUE:  Oral and nonionic intravenous contrast was administered, and the 
torso was scanned with spiral technique.  Radiation dose reduction was achieved 
using one or all of the following techniques: automated exposure control, 
weight-based dosing, iterative reconstruction. COMPARISON:  Portable chest today and chest CTA of November 16, 2019. CT CHEST: There are innumerable bilateral pulmonary nodules, the largest 
measuring approximately 3.9 cm posteriorly in the right lower lobe.  There is 
marked thickening of the wall of the distal esophagus below the level of the 
hilary which suggests the possibility of primary esophageal carcinoma. [place] is associated subcarinal and azygoesophageal recess lymphadenopathy.  No 
significant pleural fluid.  The thyroid appears normal as imaged.  There is 
thickening of the left ventricular wall suggesting a hypertrophy. CT ABDOMEN: There are innumerable small hepatic lesions which are new from 2018 
and also suspicious for metastatic disease.  The largest measures approximately 2 cm posteriorly in the right lobe.  There are mildly enlarged lymph nodes in 
the lesser sac which may represent metastatic disease as well.  The spleen, 
pancreas, kidneys, and adrenals appear unremarkable. CT PELVIS:  The appendix is not confidently identified, but there are no 
secondary signs of appendicitis.  The prostate is not enlarged.  No primary or 
secondary signs of appendicitis or identified.  No pathologically enlarged lymph 
nodes or free fluid is evident.  There is scattered aortoiliac atherosclerotic 
calcifications with mild ectasia but no focal aneurysm.  Bone windows 
demonstrate no aggressive process, accounting for degenerative changes including 
severe osteoarthritis at the right hip.  A 1 cm central lucency in the body of 
L4 has sclerotic margins and is not typical of metastatic disease. Impression:    
IMPRESSION:  
 
1.  Extensive pulmonary parenchymal and hepatic metastatic disease as well as 
subcarinal and azygoesophageal recess lymphadenopathy likely representing 
metastatic disease as well. 2.  Long-segment thickening of the wall of the distal esophagus suggests the 
possibility of a primary esophageal carcinoma.  Follow-up gastroenterology 
consultation is recommended for consideration of endoscopic biopsy. 3.  Left ventricular hypertrophy and scattered atherosclerosis. XR CHEST PORT [[id number]] Collected: 01/16/20 0737 Order Status: Completed Updated: 01/16/20 0503 Narrative:    
PORTABLE CHEST, January 16, 2020 at 0715 hours CLINICAL HISTORY:  Cough and chest pain in a smoker. COMPARISON:  December 6, 2018. FINDINGS:  AP erect image demonstrates an approximately 4 cm rounded mass 
projected over the inferior aspect of the right hilum.  There are numerous 
additional pulmonary nodules bilaterally, suspicious for metastatic disease.  No 
nidhi pneumonic consolidation or pleural fluid is evident.  The heart size is 
within normal limits without evidence of congestive heart failure or 
pneumothorax.  The bony thorax appears intact on this view.  There are overlying 
radiopaque support devices. Impression:    
IMPRESSION:  NUMEROUS BILATERAL PULMONARY NODULES ARE SUSPICIOUS FOR METASTATIC 
DISEASE, AND A 4 CM RIGHT LOWER LUNG MASS PROJECTED OVER THE INFERIOR ASPECT OF 
THE RIGHT HILUM IS SUSPICIOUS FOR A PRIMARY BRONCHOGENIC CARCINOMA IN THIS 
SMOKER WITH COPD.  FOLLOW PULMONARY MEDICINE CONSULTATION IS RECOMMENDED. ASSESSMENT: 
 
Problem List  Date Reviewed: 1/17/2020 Codes Class Noted * (Principal) Esophageal mass ICD-10-CM: K22.8 ICD-9-CM: 530.89  1/17/2020 Liver metastases ([facility] [address] ICD-10-CM: C78.7 ICD-9-CM: 197.7  1/17/2020 Esophageal dysphagia ICD-10-CM: R13.10 ICD-9-CM: 787.20  1/17/2020 Weight loss ICD-10-CM: R63.4 ICD-9-CM: 783.21  1/17/2020 Chest pain ICD-10-CM: R07.9 ICD-9-CM: 786.50  1/16/2020 Methamphetamine abuse (HCC) (Chronic) ICD-10-CM: F15.10 ICD-9-CM: 305.70  12/2/2018 H/O atrial flutter (Chronic) ICD-10-CM: Z86.79 
ICD-9-CM: V12.59  12/2/2018 Overview Signed 2/6/2018  7:51 AM by Jesse Lara NP  
  1/2018 Atrial flutter, s/p cardioversion. Essential hypertension (Chronic) ICD-10-CM: I10 
ICD-9-CM: 401.9  12/2/2018 Acute respiratory failure with hypoxia and hypercarbia (HCC) ICD-10-CM: J96.01, J96.02 
ICD-9-CM: 518.81  12/2/2018 Substance abuse (HCC) (Chronic) ICD-10-CM: F19.10 ICD-9-CM: 305.90  12/2/2018  Noncompliance (Chronic) ICD-10-CM: Z91.19 
 ICD-9-CM: V15.81  12/2/2018 Acute encephalopathy ICD-10-CM: G93.40 ICD-9-CM: 348.30  12/2/2018 COPD (chronic obstructive pulmonary disease) (HCC) (Chronic) ICD-10-CM: J44.9 ICD-9-CM: 408  11/16/2018 NATALIIA (obstructive sleep apnea) (Chronic) ICD-10-CM: U03.04 
ICD-9-CM: 327.23  11/16/2018 Chronic respiratory failure (HCC) (Chronic) ICD-10-CM: J96.10 ICD-9-CM: 518.83  11/16/2018 Altered mental status ICD-10-CM: R41.82 
ICD-9-CM: 780.97  11/16/2018 Morbid obesity ([facility] [address] (Chronic) ICD-10-CM: E66.01 
ICD-9-CM: 278.01  11/16/2018 Nicotine dependence (Chronic) ICD-10-CM: C92.882 ICD-9-CM: 305.1  2/1/2018 Alcohol abuse (Chronic) ICD-10-CM: F10.10 ICD-9-CM: 305.00  2/1/2018 Homeless (Chronic) ICD-10-CM: Z59.0 ICD-9-CM: V60.0  1/15/2018 Urethral stricture (Chronic) ICD-10-CM: N35.919 ICD-9-CM: 598.9  1/7/2018 Anasarca ICD-10-CM: R60.1 ICD-9-CM: 782.3  1/6/2018 Acute kidney injury ([facility] [address] ICD-10-CM: N17.9 ICD-9-CM: 584.9  1/6/2018 PLAN: 
Metastatic esophageal cancer - CT CAP with extensive pulm parenchymal and hepatic metastatic disease; subcarinal and azygoesophageal recess LAD; and long-segment thickening of the wall of the distal esophagus - GI performing EGD/EUS, biopsy taken and path shows moderately differentiated adenocarcinoma.   Will need to send tissue for Caris testing - navigation team notified. 
- CEA 19.1, CA 19-9 219.2  
- Iron studies low with ferritin 27, tsat 9%. Repleted iron with ferrlecit x 1.  
- Consulted surgery for feeding tube - patient agrees. - Consulted palliative care - managing pain. - Consult IR for port placement. - Echocardiogram with EF 55-60%. - Will need PET/CT as OP.  
 
1/20 Today patient states that he wants chemo, port and PEG. Surgery following. GI signed off.  If he changes his mind again, discharge and have patient follow up with Dr. Alison Drake in clinic. Once PEG and port placed we will take over as primary. Dyan Al  [address] Hematology Oncology [address] [place] [address] Office : [phone] Fax : [phone] Attending Addendum: 
I have personally performed a face to face diagnostic evaluation on this patient. I have reviewed and agree with the care plan as documented by Dyan Al N.P. My findings are as follows:  He has metastatic esophageal cancer, appears anxious, heart rate regular without murmurs, abdomen is non-tender, bowel sounds are positive, he said he wants treatment for his disease, he has agreed to have a Mediport and a PEG tube inserted, we will administer Cycle 1 of FOLFOX once that happens. Maria Esther Miramontes MD 
 
 
[facility] [email or web address]] Hematology/Oncology [id number] [address] Office : [phone] Fax : [phone]

## 2020-01-20 NOTE — PROGRESS NOTES
Progress Note Patient: Clark Carbajal               Sex: male          MRN: [id number] YOB: 1965      Age:  62 y.o. PCP:  None Treatment Team: Attending Provider: Prasad Gonsalez MD; Consulting Provider: Tee Stover MD; Consulting Provider: Samantha Pisano MD; Utilization Review: Chauncey Reyna RN; Surgeon: Samantha Pisano MD; Consulting Provider: Anna Jensen NP; Staff Nurse: Corky Clark RN Subjective: This is a 58-year-old gentleman with history of methamphetamine abuse, tobacco abuse, noncompliance with medications, hypertension, CHF, COPD came in on  with complaints of chest pain and shortness of breath and was found to have an esophageal mass with possible liver and lung metastasis. He had EGD on   and was found to have a fungating, partially obstructing esophageal mass extending into the stomach. His tumor was biopsied and the pathology report showed adenocarcinoma. This is considered a stage IV disease. If the patient wants to pursue treatment he will need a PEG tube, a port placement and will be started on palliative chemotherapy. For the last 3 days the patient has been thinking about what he wants to do. Finally today he decided to have full treatment for his condition. Dr. Mary Speaks will try to schedule him for PEG tube as soon as possible. IR has been consulted for port placement. Oncology might start chemotherapy during this admission. Objective:  
Physical Exam:  
Visit Vitals /68 (BP 1 Location: Right arm, BP Patient Position: At rest) Pulse 81 Temp 98.4 °F (36.9 °C) Resp 18 Ht 6' (1.829 m) Wt 85.5 kg (188 lb 9 oz) SpO2 98% BMI 25.57 kg/m² Temp (24hrs), Av.9 °F (36.6 °C), Min:97.2 °F (36.2 °C), Max:98.4 °F (36.9 °C) Oxygen Therapy O2 Sat (%): 98 % (20 1428) Pulse via Oximetry: 89 beats per minute (20 08) O2 Device: Nasal cannula (20) O2 Flow Rate (L/min): 2 l/min (01/20/20 0823) FIO2 (%): 21 % (01/17/20 4378) Intake/Output Summary (Last 24 hours) at 1/20/2020 1847 Last data filed at 1/20/2020 1843 Gross per 24 hour Intake 720 ml Output 2450 ml Net -1730 ml General: Conscious, No acute distress Eyes:  EDMAR, No pallor/icterus HENT:             Oral Mucosa is Moist, No sinus tenderness Neck:               Supple, No JVD Lungs:  CTA Bilaterally, No significant wheeze/rhonchi Heart:  S1 S2 regular Abdomen: Soft, normal bowel sounds, mild epigastric tenderness, nondistended, No guarding/rigidity/rebound tend. Extremities: No pedal edema Neurologic:  AAOX3, No acute FND Skin:                No acute rashes Musculoskeletal: No Acute findings Psych:             Somewhat depressed mood. LAB Recent Results (from the past 24 hour(s)) CBC W/O DIFF Collection Time: 01/20/20  3:53 AM  
Result Value Ref Range WBC 7.4 4.3 - 11.1 K/uL  
 RBC 3.46 (L) 4.23 - 5.6 M/uL  
 HGB 10.0 (L) 13.6 - 17.2 g/dL HCT 32.8 (L) 41.1 - 50.3 % MCV 94.8 79.6 - 97.8 FL  
 MCH 28.9 26.1 - 32.9 PG  
 MCHC 30.5 (L) 31.4 - 35.0 g/dL  
 RDW 13.2 11.9 - 14.6 % PLATELET 378 026 - 267 K/uL MPV 9.7 9.4 - 12.3 FL ABSOLUTE NRBC 0.00 0.0 - 0.2 K/uL METABOLIC PANEL, BASIC Collection Time: 01/20/20  3:53 AM  
Result Value Ref Range Sodium 137 136 - 145 mmol/L Potassium 4.7 3.5 - 5.1 mmol/L Chloride 101 98 - 107 mmol/L  
 CO2 31 21 - 32 mmol/L Anion gap 5 (L) 7 - 16 mmol/L Glucose 88 65 - 100 mg/dL BUN 12 6 - 23 MG/DL Creatinine 1.00 0.8 - 1.5 MG/DL  
 GFR est AA >60 >60 ml/min/1.73m2 GFR est non-AA >60 >60 ml/min/1.73m2 Calcium 9.0 8.3 - 10.4 MG/DL No results found. No results found. All Micro Results Procedure Component Value Units Date/Time CULTURE, BLOOD [[id number]] Collected:  01/16/20 1327 Order Status:  Completed Specimen:  Blood Updated:  01/20/20 0809 Special Requests: --     
  RIGHT Antecubital 
  
  Culture result: NO GROWTH 4 DAYS     
 CULTURE, BLOOD [[id number]] Collected:  01/16/20 1332 Order Status:  Completed Specimen:  Blood Updated:  01/20/20 0809 Special Requests: --     
  LEFT Antecubital 
  
  Culture result: NO GROWTH 4 DAYS Current Medications Reviewed Assessment/Plan Principal Problem: 
  Esophageal mass (1/17/2020) Active Problems: 
  Nicotine dependence (2/1/2018) Methamphetamine abuse ([address] (12/2/2018) Alcohol abuse (2/1/2018) Homeless (1/15/2018) Chest pain (1/16/2020) Liver metastases ([address] (1/17/2020) Esophageal dysphagia (1/17/2020) Weight loss (1/17/2020) #. Metastatic esophageal cancer to  liver and lung mets. Oncology on board and they might start chemotherapy during this admission due to the patient's poor social situation. Patient will need to have a PEG tube, port placement and palliative chemotherapy. Dr. Lizabeth Rascon will try to schedule him for a PEG tube insertion as soon as possible. IR consulted for port placement. #.  Possible nonischemic cardiomyopathy. 2D echo shows normal EF with LVH. No evidence of fluid overload. #.  History of atrial flutter status post cardioversion. Continue amiodarone, diltiazem for blood pressure and heart rate control. .  He has not been taking any anticoagulation for several days. We will hold off on anticoagulation considering biopsy and possible need for PEG and PORT will continue holding anticoagulation. Due to a history of drug abuse he seems to be a poor candidate for anticoagulation. #.  Chronic COPD: No acute exacerbation. #.  Tobacco abuse: Patient was counseled regarding cessation. #.  Methamphetamine abuse. Counseled regarding cessation. #.  Obstructive sleep apnea, keep him on CPAP at night. #.  Hypertension: On cardizem and  lisinopril . DVT prophylaxis: SCDs Code status: Full May Obrien MD 
January 20, 2020

## 2020-01-20 NOTE — PROGRESS NOTES
06-called into pts room after surgeon had left and pt told me to notify Dr. Ashlie Barron that he wants to go ahead and proceed with treatment. Dr. Ashlie Barron notified.

## 2020-01-20 NOTE — PROGRESS NOTES
H&P/Consult Note/Progress Note/Office Note:  
Rancho Choe  MRN: [id number]  :1965  Age:54 y.o. 
 
HPI: Rancho hCoe is a 62 y.o. male who has Stage 4 metastatic esophageal adenocarcinoma with suspected liver, lungs, and garth mets. He has h/o ETOH abuse, tobacco abuse, meth abuse, homelessness, COPD, CHF,  
who was admitted from the ER after presenting with a several week h/o progressive N/V, inability to sustain PO intake and weight loss (>100lbs) Nothing in particular made his symptoms better or worse. He also had associated 2/10 substernal chest pain. No SOB He had not been taking his maint meds in >6 months Pertinent negatives include no back pain, no claudication, no cough, no diaphoresis, no dizziness 20 s/p EGD with esophageal bx; Dr Breanna Arora DIAGNOSIS ESOPHAGEAL MASS BIOPSIES: MODERATELY DIFFERENTIATED ADENOCARCINOMA. Electronically signed out on 2020 14:21 by Veronica Hines. Hollie Cornelius MD  
Findings: At 32-46cm there is a circumferential, partially obstructing, fungating, ulcerated mass concerning for malignancy. Extensive biopsies were obtained. STOMACH:  The mass extends into the proximal stomach. The fundus on antegrade and retroflexed views is normal. The body, antrum, and pylorus are normal.  
DUODENUM:  The bulb and second portions are normal.  
There are multiple enlarged periesophageal lymph nodes. The largest measures 34 x 14 mm in size. FNA not performed as the needle which averse the tumor and confirmation of malignancy would not affect staging. Further evaluation of the distal extent of tumor as well as stomach and liver could not be performed as the scope could not safely traverse the mass. Impression:   
Esophageal mass. This is concerning for a least T3N2Mx based on endoscopic ultrasound criteria. As the tumor could not be traversed, the distal extent could not be evaluated for invasion of adjacent structures such as the diaphragm. Thus, the possibility of understaging exists. More importantly, there is strong suspicion for metastatic liver and lung disease based on CT.  
  
GI recs:  As patient likely has unresectable malignancy, he may be a candidate for palliative esophageal stent placement. This would require multiple stents due to the length of the tumor. Based on extension into the stomach, he will be at increased risk of stent migration.  
  
 
 
 
 
1/16/20 CT chest/abd/pelvis with oral and IV contrast 
Hx:   Follow-up abnormal chest radiograph smoker with pulmonary 
nodules and 100-pound weight loss over the last several months. 
  
CT CHEST: There are innumerable bilateral pulmonary nodules, the largest 
measuring approximately 3.9 cm posteriorly in the right lower lobe. There is 
marked thickening of the wall of the distal esophagus below the level of the 
hilary which suggests the possibility of primary esophageal carcinoma. There is 
associated subcarinal and azygoesophageal recess lymphadenopathy. No 
significant pleural fluid. The thyroid appears normal as imaged. There is 
thickening of the left ventricular wall suggesting a hypertrophy. 
  
CT ABDOMEN: There are innumerable small hepatic lesions which are new from 2018 
and also suspicious for metastatic disease. The largest measures approximately 2 cm posteriorly in the right lobe. There are mildly enlarged lymph nodes in 
the lesser sac which may represent metastatic disease as well. The spleen, 
pancreas, kidneys, and adrenals appear unremarkable. 
  
CT PELVIS:  The appendix is not confidently identified, but there are no 
secondary signs of appendicitis. The prostate is not enlarged. No primary or 
secondary signs of appendicitis or identified. No pathologically enlarged lymph 
nodes or free fluid is evident. There is scattered aortoiliac atherosclerotic 
calcifications with mild ectasia but no focal aneurysm. Bone windows demonstrate no aggressive process, accounting for degenerative changes including 
severe osteoarthritis at the right hip. A 1 cm central lucency in the body of 
L4 has sclerotic margins and is not typical of metastatic disease. 
  
IMPRESSION:  
  
1. Extensive pulmonary parenchymal and hepatic metastatic disease as well as 
subcarinal and azygoesophageal recess lymphadenopathy likely representing 
metastatic disease as well. 
  
2.  Long-segment thickening of the wall of the distal esophagus suggests the 
possibility of a primary esophageal carcinoma. Follow-up gastroenterology 
consultation is recommended for consideration of endoscopic biopsy. 
  
3.  Left ventricular hypertrophy and scattered atherosclerosis.   
  
 
 
 
 
 
 
Past Medical History:  
Diagnosis Date  Acute respiratory failure with hypercapnia ([facility] 2/1/2018  Chronic obstructive pulmonary disease ([facility]  Heart failure ([facility]  Sleep disorder No past surgical history on file. Current Facility-Administered Medications Medication Dose Route Frequency  ALPRAZolam (XANAX) tablet 0.25 mg  0.25 mg Oral TID PRN  
 lisinopril (PRINIVIL, ZESTRIL) tablet 10 mg  10 mg Oral DAILY  polyethylene glycol (MIRALAX) packet 17 g  17 g Oral BID  
 HYDROmorphone (PF) (DILAUDID) injection 0.5 mg  0.5 mg IntraVENous Q4H PRN  promethazine (PHENERGAN) with saline injection 12.5 mg  12.5 mg IntraVENous Q6H PRN  
 famotidine (PF) (PEPCID) 20 mg in 0.9% sodium chloride 10 mL injection  20 mg IntraVENous Q12H  
 albuterol-ipratropium (DUO-NEB) 2.5 MG-0.5 MG/3 ML  3 mL Nebulization Q4H PRN  
 tiotropium (SPIRIVA) inhalation capsule 18 mcg  1 Cap Inhalation DAILY  arformoteroL (BROVANA) neb solution 15 mcg  15 mcg Nebulization BID RT  
 amiodarone (CORDARONE) tablet 200 mg  200 mg Oral DAILY  sodium chloride (NS) flush 5-40 mL  5-40 mL IntraVENous Q8H  
 sodium chloride (NS) flush 5-40 mL  5-40 mL IntraVENous PRN  
  acetaminophen (TYLENOL) tablet 650 mg  650 mg Oral Q4H PRN  
 naloxone (NARCAN) injection 0.4 mg  0.4 mg IntraVENous PRN  
 diphenhydrAMINE (BENADRYL) capsule 25 mg  25 mg Oral Q4H PRN  
 ondansetron (ZOFRAN) injection 4 mg  4 mg IntraVENous Q4H PRN  
 magnesium hydroxide (MILK OF MAGNESIA) 400 mg/5 mL oral suspension 30 mL  30 mL Oral DAILY PRN  
 oxyCODONE-acetaminophen (PERCOCET 7.5) 7.5-325 mg per tablet 1 Tab  1 Tab Oral Q4H PRN  
 nicotine (NICODERM CQ) 21 mg/24 hr patch 1 Patch  1 Patch TransDERmal Q24H  
 influenza vaccine 2019-20 (6 mos+)(PF) (FLUARIX/FLULAVAL/FLUZONE QUAD) injection 0.5 mL  0.5 mL IntraMUSCular PRIOR TO DISCHARGE  hydrALAZINE (APRESOLINE) tablet 50 mg  50 mg Oral Q6H PRN  
 hydrALAZINE (APRESOLINE) 20 mg/mL injection 10 mg  10 mg IntraVENous Q6H PRN  
 dilTIAZem (CARDIZEM) IR tablet 90 mg  90 mg Oral TID Patient has no known allergies. Social History Socioeconomic History  Marital status:  Spouse name: Not on file  Number of children: Not on file  Years of education: Not on file  Highest education level: Not on file Tobacco Use  Smoking status: Current Every Day Smoker Packs/day: 2.00 Substance and Sexual Activity  Alcohol use: No  
  Comment: once a month beer  Drug use: Yes Types: Marijuana, Methamphetamines Social History Tobacco Use Smoking Status Current Every Day Smoker  Packs/day: 2.00 No family history on file. ROS: The patient has no difficulty with chest pain or shortness of breath. No fever or chills. Comprehensive review of systems was otherwise unremarkable except as noted above. Physical Exam:  
Visit Vitals /74 (BP 1 Location: Left arm, BP Patient Position: At rest) Pulse 74 Temp 98 °F (36.7 °C) Resp 18 Ht 6' (1.829 m) Wt 188 lb 9 oz (85.5 kg) SpO2 96% BMI 25.57 kg/m² Vitals:  
 01/19/20 2111 01/19/20 2113 01/19/20 2217 01/19/20 2236 BP:    130/74 Pulse:    74 Resp:    18 Temp:    98 °F (36.7 °C) SpO2: (!) 87% 92% 96% 96% Weight:      
Height:      
 
01/19 1901 - 01/20 0700 In: -  
Out: 750 [Urine:750] 01/18 0701 - 01/19 1900 In: 4462 [P.O.:1077] Out: 1300 [Urine:1300] Constitutional: Alert, oriented, cooperative patient in no acute distress; appears weak Eyes:Sclera are clear. EOMs intact ENMT: no external lesions gross hearing normal; no obvious neck masses, no ear or lip lesions, nares normal 
CV: RRR. Normal perfusion Resp: No JVD. Breathing is  non-labored; no audible wheezing. GI: soft and non-distended Musculoskeletal: unremarkable with normal function. No embolic signs or cyanosis. Neuro:  Oriented; moves all 4; no focal deficits Psychiatric: normal affect and mood, no memory impairment Recent vitals (if inpt): 
Patient Vitals for the past 24 hrs: 
 BP Temp Pulse Resp SpO2 Weight 01/19/20 2236 130/74 98 °F (36.7 °C) 74 18 96 %   
01/19/20 2217     96 %   
01/19/20 2113     92 %   
01/19/20 2111     (!) 87 %   
01/19/20 2030 131/72 98 °F (36.7 °C) 76 19 96 % 188 lb 9 oz (85.5 kg) 01/19/20 1600 127/72 97.7 °F (36.5 °C) 74 19 96 %   
01/19/20 1124 125/80 97.8 °F (36.6 °C) 82 18 92 %   
01/19/20 0829     96 %   
01/19/20 0737 137/69 97.9 °F (36.6 °C) 87 18 94 %   
01/19/20 0227 127/75 98 °F (36.7 °C) 68 14 96 %  Labs: 
Recent Labs  
  01/19/20 
0242  01/17/20 
0425 WBC 8.0   < > 6.2 HGB 10.1*   < > 10.4*    < > 352 *   < > 135* K 4.7   < > 4.0  
   < > 102 CO2 30   < > 28 BUN 13   < > 11  
CREA 1.15   < > 0.97 *   < > 99 PTP  --   --  13.1 INR  --   --  1.0  
 < > = values in this interval not displayed. Lab Results Component Value Date/Time  WBC 8.0 01/19/2020 02:42 AM  
 HGB 10.1 (L) 01/19/2020 02:42 AM  
 PLATELET 548 81/10/2618 02:42 AM  
 Sodium 135 (L) 01/19/2020 02:42 AM  
 Potassium 4.7 01/19/2020 02:42 AM  
 Chloride 101 01/19/2020 02:42 AM  
 CO2 30 01/19/2020 02:42 AM  
 BUN 13 01/19/2020 02:42 AM  
 Creatinine 1.15 01/19/2020 02:42 AM  
 Glucose 101 (H) 01/19/2020 02:42 AM  
 INR 1.0 01/17/2020 04:25 AM  
 aPTT 57.3 (H) 01/22/2018 08:26 AM  
 Bilirubin, total 0.2 01/16/2020 06:49 AM  
 AST (SGOT) 11 (L) 01/16/2020 06:49 AM  
 ALT (SGPT) 11 (L) 01/16/2020 06:49 AM  
 Alk. phosphatase 136 01/16/2020 06:49 AM  
 Lipase 270 01/16/2020 06:49 AM  
 Troponin-I, Qt. <0.02 (L) 01/16/2020 06:49 AM  
 
 
CT Results  (Last 48 hours) None  
  
 
chest X-ray I reviewed recent labs, recent radiologic studies, and pertinent records including other doctor notes if needed. I independently reviewed radiology images for studies I described above or studies I have ordered. Admission date (for inpatients): 1/16/2020 Day of Surgery  Procedure(s): ENDOSCOPIC ULTRASOUND (EUS)/ BMI 26 ROOM 537 ESOPHAGOGASTRODUODENAL (EGD) BIOPSY ASSESSMENT/PLAN: 
Problem List  Date Reviewed: 1/17/2020 Codes Class Noted * (Principal) Esophageal mass ICD-10-CM: K22.8 ICD-9-CM: 530.89  1/17/2020 Liver metastases ([facility] ICD-10-CM: C78.7 ICD-9-CM: 197.7  1/17/2020 Esophageal dysphagia ICD-10-CM: R13.10 ICD-9-CM: 787.20  1/17/2020 Weight loss ICD-10-CM: R63.4 ICD-9-CM: 783.21  1/17/2020 Chest pain ICD-10-CM: R07.9 ICD-9-CM: 786.50  1/16/2020 Methamphetamine abuse (HCC) (Chronic) ICD-10-CM: F15.10 ICD-9-CM: 305.70  12/2/2018 H/O atrial flutter (Chronic) ICD-10-CM: Z86.79 
ICD-9-CM: V12.59  12/2/2018 Overview Signed 2/6/2018  7:51 AM by Galindo Angulo NP  
  1/2018 Atrial flutter, s/p cardioversion. Essential hypertension (Chronic) ICD-10-CM: I10 
ICD-9-CM: 401.9  12/2/2018 Acute respiratory failure with hypoxia and hypercarbia (HCC) ICD-10-CM: J96.01, J96.02 
ICD-9-CM: 518.81  12/2/2018 Substance abuse (HCC) (Chronic) ICD-10-CM: F19.10 ICD-9-CM: 305.90  12/2/2018 Noncompliance (Chronic) ICD-10-CM: Z91.19 ICD-9-CM: V15.81  12/2/2018 Acute encephalopathy ICD-10-CM: G93.40 ICD-9-CM: 348.30  12/2/2018 COPD (chronic obstructive pulmonary disease) (HCC) (Chronic) ICD-10-CM: J44.9 ICD-9-CM: 316  11/16/2018 NATALIIA (obstructive sleep apnea) (Chronic) ICD-10-CM: R14.87 
ICD-9-CM: 327.23  11/16/2018 Chronic respiratory failure (HCC) (Chronic) ICD-10-CM: J96.10 ICD-9-CM: 518.83  11/16/2018 Altered mental status ICD-10-CM: R41.82 
ICD-9-CM: 780.97  11/16/2018 Morbid obesity ([facility] (Chronic) ICD-10-CM: E66.01 
ICD-9-CM: 278.01  11/16/2018 Nicotine dependence (Chronic) ICD-10-CM: R49.915 ICD-9-CM: 305.1  2/1/2018 Alcohol abuse (Chronic) ICD-10-CM: F10.10 ICD-9-CM: 305.00  2/1/2018 Homeless (Chronic) ICD-10-CM: Z59.0 ICD-9-CM: V60.0  1/15/2018 Urethral stricture (Chronic) ICD-10-CM: N35.919 ICD-9-CM: 598.9  1/7/2018 Anasarca ICD-10-CM: R60.1 ICD-9-CM: 782.3  1/6/2018 Acute kidney injury ([facility] ICD-10-CM: N17.9 ICD-9-CM: 584.9  1/6/2018 Principal Problem: 
  Esophageal mass (1/17/2020) Active Problems: 
  Nicotine dependence (2/1/2018) Methamphetamine abuse ([facility] (12/2/2018) Alcohol abuse (2/1/2018) Homeless (1/15/2018) Chest pain (1/16/2020) Liver metastases ([facility] (1/17/2020) Esophageal dysphagia (1/17/2020) Weight loss (1/17/2020) Metastatic Esophageal adenocarcinoma with suspected lung/liver/garth mets Med onc and palliative care following I discussed case with Dr Taylor Villa who thinks we may be able to get a PEG placed and avoid surgery Homelessness He was homeless for about 6-8months but now lives with his son, but there is no heat in house (except portable propane tank space heater) Dysphagia/Weight loss I discussed enteral feeding options with him All questions answered He wants to think it over He is not sure he wants treatment I pressed him for an answer in am of 1/20/20 after he had a few days to think it over, and he still hadn't made up his mind. I offered the option of going home for a few days on a liquid diet and thinking about it, and he says he isn't sure he wants that either. He say he thinks he may decide later today as to whether he wants to go home or pursue further treatment Liquid diet only for now Eventually Home on full liquid diet only I have personally performed a face-to-face diagnostic evaluation and management  service on this patient. I have independently seen the patient. I have independently obtained the above history from the patient/family. I have independently examined the patient with above findings. I have independently reviewed data/labs for this patient and developed the above plan of care (MDM). Signed: Sanam Alas.  Pau Monsivais MD, FACS

## 2020-01-20 NOTE — PROGRESS NOTES
CM met with patient / father-in-law at bedside to complete CMA. Patient alert/orient to name, place and . Patient verified information no changes needed. Patient states he lives with his spouse in a one level home with three steps to enter into the ome with handrails. Patient states he's been where lately that he has needed more assist with his ADL's and do has DME's in the home. Patient is SELF PAY deco has screen and meet with patient. Patient plan is to return home with no needs identified. CM will continue to monitor. Care Management Interventions PCP Verified by CM: No 
Mode of Transport at Discharge: Other (see comment)(Family will transport patient home) Transition of Care Consult (CM Consult): Discharge Planning Discharge Durable Medical Equipment: No 
Physical Therapy Consult: No 
Occupational Therapy Consult: No 
Speech Therapy Consult: No 
Current Support Network: Own Home, Lives with Spouse Confirm Follow Up Transport: Family Discharge Location Discharge Placement: Home

## 2020-01-20 NOTE — PROGRESS NOTES
0600-END OF SHIFT NOTE: 
 
-pt rested well throughout the night 
-2x percocet, 2x dilaudid, 1x xanax 
-has been NPO since midnight for possible port placement 
-vss, no needs voiced at this time Intake/Output 01/19 1901 - 01/20 0700 In: 480 [P.O.:480] Out: 1800 [Urine:1800] Voiding: YES Catheter: NO 
Drain:   
 
 
Stool:  0 occurrences. Emesis:  0 occurrences. VITAL SIGNS Patient Vitals for the past 12 hrs: 
 Temp Pulse Resp BP SpO2  
01/20/20 0458  74     
01/20/20 0232 98.1 °F (36.7 °C) 77 18 130/76 96 % 01/19/20 2236 98 °F (36.7 °C) 74 18 130/74 96 % 01/19/20 2217     96 % 01/19/20 2113     92 % 01/19/20 2111     (!) 87 % 01/19/20 2030 98 °F (36.7 °C) 76 19 131/72 96 % Pain Assessment Pain 1 Pain Scale 1: Visual (01/20/20 0500) Pain Intensity 1: 0 (01/20/20 0500) Patient Stated Pain Goal: 0 (01/20/20 0500) Pain Reassessment 1: Patient resting w/respiratory rate greater than 10 (01/20/20 0500) Pain Onset 1: ongoing (01/20/20 0401) Pain Location 1: Abdomen (01/20/20 0401) Pain Orientation 1: Upper (01/20/20 0401) Pain Description 1: Aching (01/20/20 0401) Pain Intervention(s) 1: Medication (see MAR) (01/20/20 0401) Additional Pain Sites: Yes (01/17/20 0706) Ambulating Yes Additional Information:  
 
Shift report given to oncoming nurse at the bedside.  
 
Mariaa Hernandez RN

## 2020-01-20 NOTE — PROGRESS NOTES
Palliative Care Progress Note Patient: Kirti Jackson MRN: [id number]  SSN: [id number] YOB: 1965  Age: 62 y.o. Sex: male Assessment/Plan: Chief Complaint/Interval History: Has decided to pursue treatment Principal Diagnosis:   
· Pain, chest  R07.9 Additional Diagnoses: · Counseling, Encounter for Medical Advice  Z71.9 
· Encounter for Palliative Care  Z51.5 Palliative Performance Scale (PPS) PPS: 70 Medical Decision Making:  
Reviewed and summarized notes and events over the weekend. Discussed case with appropriate providers: cristina Tinsley RN Reviewed laboratory and x-ray data. Patient resting in bed, no distress noted and no family present. Patient drowsy upon entering room, but awakens easily and engaged throughout visit. Introduced role of palliative care. Patient shares about his thought process about treatment; at this time, he is very sure that he wants to pursue a port, feeding tube, and chemotherapy. He has very good understanding that his cancer is incurable and he will die eventually, but he hopes treatment will help him live a little bit longer. We discussed HCPOA/code status. Patient has been  to his wife for over 36 years, and he would want his wife to be his surrogate decision maker. In regards to heroic interventions, he would want time honored trial (1-2 weeks) of mechanical ventilation, but not prolonged support. Agree with Percocet for pain control; consider reducing to 5mg tablets if lethargy is an ongoing problem. Goals and plan of care established. Symptoms well managed. Will follow intermittently. Will discuss findings with members of the interdisciplinary team.   
 
  
More than 50% of this 35 minute visit was spent counseling and coordination of care as outlined above. Subjective:  
 
Review of Systems: A comprehensive review of systems was negative except for: Gastrointestinal: Positive for intermittent chest/upper abdominal pain, non currently. Objective:  
 
Visit Vitals /68 (BP 1 Location: Right arm, BP Patient Position: At rest) Pulse 81 Temp 98.4 °F (36.9 °C) Resp 18 Ht 6' (1.829 m) Wt 188 lb 9 oz (85.5 kg) SpO2 98% BMI 25.57 kg/m² Physical Exam: 
 
General:  Cooperative. No acute distress. Eyes:  Conjunctivae/corneas clear. Nose: Nares normal. Septum midline. Neck: Supple, symmetrical, trachea midline. Lungs:   Clear to auscultation bilaterally, unlabored. Heart:  Regular rate and rhythm. Abdomen:   Soft, non-tender, non-distended. Extremities: Normal, atraumatic, no cyanosis or edema. Skin: Skin color, texture, turgor normal. No rash. Neurologic: Nonfocal.  
Psych: Alert and oriented. Signed By: Angelica Ann NP   
 January 20, 2020

## 2020-01-21 ENCOUNTER — ANESTHESIA EVENT (OUTPATIENT)
Dept: ENDOSCOPY | Age: 55
DRG: 950 | End: 2020-01-21
Payer: MEDICAID

## 2020-01-21 ENCOUNTER — APPOINTMENT (OUTPATIENT)
Dept: OTHER | Age: 55
DRG: 950 | End: 2020-01-21
Attending: INTERNAL MEDICINE
Payer: MEDICAID

## 2020-01-21 PROBLEM — L03.114 CELLULITIS OF LEFT UPPER EXTREMITY: Status: ACTIVE | Noted: 2020-01-21

## 2020-01-21 LAB
ANION GAP SERPL CALC-SCNC: 5 MMOL/L (ref 7–16)
BACTERIA SPEC CULT: NORMAL
BACTERIA SPEC CULT: NORMAL
BUN SERPL-MCNC: 11 MG/DL (ref 6–23)
CALCIUM SERPL-MCNC: 8.9 MG/DL (ref 8.3–10.4)
CHLORIDE SERPL-SCNC: 102 MMOL/L (ref 98–107)
CO2 SERPL-SCNC: 31 MMOL/L (ref 21–32)
CREAT SERPL-MCNC: 0.98 MG/DL (ref 0.8–1.5)
ERYTHROCYTE [DISTWIDTH] IN BLOOD BY AUTOMATED COUNT: 13.2 % (ref 11.9–14.6)
GLUCOSE SERPL-MCNC: 97 MG/DL (ref 65–100)
HCT VFR BLD AUTO: 31.6 % (ref 41.1–50.3)
HGB BLD-MCNC: 9.9 G/DL (ref 13.6–17.2)
MCH RBC QN AUTO: 29.8 PG (ref 26.1–32.9)
MCHC RBC AUTO-ENTMCNC: 31.3 G/DL (ref 31.4–35)
MCV RBC AUTO: 95.2 FL (ref 79.6–97.8)
NRBC # BLD: 0 K/UL (ref 0–0.2)
PLATELET # BLD AUTO: 294 K/UL (ref 150–450)
PMV BLD AUTO: 9.6 FL (ref 9.4–12.3)
POTASSIUM SERPL-SCNC: 4.5 MMOL/L (ref 3.5–5.1)
RBC # BLD AUTO: 3.32 M/UL (ref 4.23–5.6)
SERVICE CMNT-IMP: NORMAL
SERVICE CMNT-IMP: NORMAL
SODIUM SERPL-SCNC: 138 MMOL/L (ref 136–145)
WBC # BLD AUTO: 7.3 K/UL (ref 4.3–11.1)

## 2020-01-21 PROCEDURE — C1894 INTRO/SHEATH, NON-LASER: HCPCS

## 2020-01-21 PROCEDURE — 74011250636 HC RX REV CODE- 250/636: Performed by: HOSPITALIST

## 2020-01-21 PROCEDURE — 74011000250 HC RX REV CODE- 250: Performed by: RADIOLOGY

## 2020-01-21 PROCEDURE — C1788 PORT, INDWELLING, IMP: HCPCS

## 2020-01-21 PROCEDURE — B548ZZA ULTRASONOGRAPHY OF SUPERIOR VENA CAVA, GUIDANCE: ICD-10-PCS | Performed by: RADIOLOGY

## 2020-01-21 PROCEDURE — 74011250636 HC RX REV CODE- 250/636: Performed by: RADIOLOGY

## 2020-01-21 PROCEDURE — 74011250637 HC RX REV CODE- 250/637: Performed by: INTERNAL MEDICINE

## 2020-01-21 PROCEDURE — 74011250637 HC RX REV CODE- 250/637: Performed by: HOSPITALIST

## 2020-01-21 PROCEDURE — 02H633Z INSERTION OF INFUSION DEVICE INTO RIGHT ATRIUM, PERCUTANEOUS APPROACH: ICD-10-PCS | Performed by: RADIOLOGY

## 2020-01-21 PROCEDURE — 77030018719 HC DRSG PTCH ANTIMIC J&J -A

## 2020-01-21 PROCEDURE — 77010033678 HC OXYGEN DAILY

## 2020-01-21 PROCEDURE — 85027 COMPLETE CBC AUTOMATED: CPT

## 2020-01-21 PROCEDURE — 65660000000 HC RM CCU STEPDOWN

## 2020-01-21 PROCEDURE — 77030031131 HC SUT MXN P COVD -B

## 2020-01-21 PROCEDURE — 74011250636 HC RX REV CODE- 250/636: Performed by: SURGERY

## 2020-01-21 PROCEDURE — 99232 SBSQ HOSP IP/OBS MODERATE 35: CPT | Performed by: INTERNAL MEDICINE

## 2020-01-21 PROCEDURE — 77001 FLUOROGUIDE FOR VEIN DEVICE: CPT

## 2020-01-21 PROCEDURE — 80048 BASIC METABOLIC PNL TOTAL CA: CPT

## 2020-01-21 PROCEDURE — 94640 AIRWAY INHALATION TREATMENT: CPT

## 2020-01-21 PROCEDURE — 36415 COLL VENOUS BLD VENIPUNCTURE: CPT

## 2020-01-21 PROCEDURE — 0JH60WZ INSERTION OF TOTALLY IMPLANTABLE VASCULAR ACCESS DEVICE INTO CHEST SUBCUTANEOUS TISSUE AND FASCIA, OPEN APPROACH: ICD-10-PCS | Performed by: RADIOLOGY

## 2020-01-21 PROCEDURE — 74011000250 HC RX REV CODE- 250: Performed by: HOSPITALIST

## 2020-01-21 PROCEDURE — 77030002983 HC SUT POLYSRB COVD -A

## 2020-01-21 PROCEDURE — 94760 N-INVAS EAR/PLS OXIMETRY 1: CPT

## 2020-01-21 PROCEDURE — 77030010507 HC ADH SKN DERMBND J&J -B

## 2020-01-21 PROCEDURE — 77030020255 HC SOL INJ LR 1000ML BG

## 2020-01-21 RX ORDER — METRONIDAZOLE 500 MG/100ML
500 INJECTION, SOLUTION INTRAVENOUS
Status: COMPLETED | OUTPATIENT
Start: 2020-01-22 | End: 2020-01-22

## 2020-01-21 RX ORDER — FENTANYL CITRATE 50 UG/ML
25-50 INJECTION, SOLUTION INTRAMUSCULAR; INTRAVENOUS
Status: DISCONTINUED | OUTPATIENT
Start: 2020-01-21 | End: 2020-01-23

## 2020-01-21 RX ORDER — HEPARIN SODIUM (PORCINE) LOCK FLUSH IV SOLN 100 UNIT/ML 100 UNIT/ML
300 SOLUTION INTRAVENOUS AS NEEDED
Status: DISCONTINUED | OUTPATIENT
Start: 2020-01-21 | End: 2020-01-31 | Stop reason: HOSPADM

## 2020-01-21 RX ORDER — CEFAZOLIN SODIUM/WATER 2 G/20 ML
2 SYRINGE (ML) INTRAVENOUS
Status: COMPLETED | OUTPATIENT
Start: 2020-01-21 | End: 2020-01-21

## 2020-01-21 RX ORDER — MIDAZOLAM HYDROCHLORIDE 1 MG/ML
.5-2 INJECTION, SOLUTION INTRAMUSCULAR; INTRAVENOUS
Status: DISCONTINUED | OUTPATIENT
Start: 2020-01-21 | End: 2020-01-23

## 2020-01-21 RX ORDER — SODIUM CHLORIDE, SODIUM LACTATE, POTASSIUM CHLORIDE, CALCIUM CHLORIDE 600; 310; 30; 20 MG/100ML; MG/100ML; MG/100ML; MG/100ML
100 INJECTION, SOLUTION INTRAVENOUS CONTINUOUS
Status: DISCONTINUED | OUTPATIENT
Start: 2020-01-21 | End: 2020-01-22

## 2020-01-21 RX ORDER — LIDOCAINE HYDROCHLORIDE 20 MG/ML
2-10 INJECTION, SOLUTION INFILTRATION; PERINEURAL ONCE
Status: COMPLETED | OUTPATIENT
Start: 2020-01-21 | End: 2020-01-21

## 2020-01-21 RX ORDER — SODIUM CHLORIDE, SODIUM LACTATE, POTASSIUM CHLORIDE, CALCIUM CHLORIDE 600; 310; 30; 20 MG/100ML; MG/100ML; MG/100ML; MG/100ML
100 INJECTION, SOLUTION INTRAVENOUS CONTINUOUS
Status: CANCELLED | OUTPATIENT
Start: 2020-01-21

## 2020-01-21 RX ORDER — CEFAZOLIN SODIUM/WATER 2 G/20 ML
2 SYRINGE (ML) INTRAVENOUS
Status: COMPLETED | OUTPATIENT
Start: 2020-01-22 | End: 2020-01-22

## 2020-01-21 RX ORDER — LIDOCAINE HYDROCHLORIDE AND EPINEPHRINE 10; 10 MG/ML; UG/ML
2-100 INJECTION, SOLUTION INFILTRATION; PERINEURAL ONCE
Status: COMPLETED | OUTPATIENT
Start: 2020-01-21 | End: 2020-01-21

## 2020-01-21 RX ADMIN — POLYETHYLENE GLYCOL 3350 17 G: 17 POWDER, FOR SOLUTION ORAL at 21:03

## 2020-01-21 RX ADMIN — Medication 10 ML: at 05:17

## 2020-01-21 RX ADMIN — TIOTROPIUM BROMIDE 18 MCG: 18 CAPSULE ORAL; RESPIRATORY (INHALATION) at 07:35

## 2020-01-21 RX ADMIN — HYDROMORPHONE HYDROCHLORIDE 0.5 MG: 1 INJECTION, SOLUTION INTRAMUSCULAR; INTRAVENOUS; SUBCUTANEOUS at 07:08

## 2020-01-21 RX ADMIN — OXYCODONE HYDROCHLORIDE AND ACETAMINOPHEN 1 TABLET: 7.5; 325 TABLET ORAL at 09:55

## 2020-01-21 RX ADMIN — Medication 10 ML: at 21:03

## 2020-01-21 RX ADMIN — LISINOPRIL 10 MG: 5 TABLET ORAL at 08:01

## 2020-01-21 RX ADMIN — HYDROMORPHONE HYDROCHLORIDE 0.5 MG: 1 INJECTION, SOLUTION INTRAMUSCULAR; INTRAVENOUS; SUBCUTANEOUS at 21:03

## 2020-01-21 RX ADMIN — HYDROMORPHONE HYDROCHLORIDE 0.5 MG: 1 INJECTION, SOLUTION INTRAMUSCULAR; INTRAVENOUS; SUBCUTANEOUS at 02:46

## 2020-01-21 RX ADMIN — DILTIAZEM HYDROCHLORIDE 90 MG: 30 TABLET, FILM COATED ORAL at 15:16

## 2020-01-21 RX ADMIN — HYDROMORPHONE HYDROCHLORIDE 0.5 MG: 1 INJECTION, SOLUTION INTRAMUSCULAR; INTRAVENOUS; SUBCUTANEOUS at 15:15

## 2020-01-21 RX ADMIN — DILTIAZEM HYDROCHLORIDE 90 MG: 30 TABLET, FILM COATED ORAL at 08:00

## 2020-01-21 RX ADMIN — AMIODARONE HYDROCHLORIDE 200 MG: 200 TABLET ORAL at 08:01

## 2020-01-21 RX ADMIN — OXYCODONE HYDROCHLORIDE AND ACETAMINOPHEN 1 TABLET: 7.5; 325 TABLET ORAL at 01:59

## 2020-01-21 RX ADMIN — MIDAZOLAM 0.5 MG: 1 INJECTION INTRAMUSCULAR; INTRAVENOUS at 13:12

## 2020-01-21 RX ADMIN — FENTANYL CITRATE 25 MCG: 50 INJECTION, SOLUTION INTRAMUSCULAR; INTRAVENOUS at 13:06

## 2020-01-21 RX ADMIN — DILTIAZEM HYDROCHLORIDE 90 MG: 30 TABLET, FILM COATED ORAL at 22:42

## 2020-01-21 RX ADMIN — Medication 2 G: at 13:04

## 2020-01-21 RX ADMIN — HYDROMORPHONE HYDROCHLORIDE 0.5 MG: 1 INJECTION, SOLUTION INTRAMUSCULAR; INTRAVENOUS; SUBCUTANEOUS at 11:04

## 2020-01-21 RX ADMIN — FENTANYL CITRATE 25 MCG: 50 INJECTION, SOLUTION INTRAMUSCULAR; INTRAVENOUS at 13:13

## 2020-01-21 RX ADMIN — FAMOTIDINE 20 MG: 10 INJECTION INTRAVENOUS at 08:01

## 2020-01-21 RX ADMIN — LIDOCAINE HYDROCHLORIDE 100 MG: 20 INJECTION, SOLUTION INFILTRATION; PERINEURAL at 13:13

## 2020-01-21 RX ADMIN — FAMOTIDINE 20 MG: 10 INJECTION INTRAVENOUS at 21:03

## 2020-01-21 RX ADMIN — Medication 10 ML: at 15:00

## 2020-01-21 RX ADMIN — ARFORMOTEROL TARTRATE 15 MCG: 15 SOLUTION RESPIRATORY (INHALATION) at 20:50

## 2020-01-21 RX ADMIN — HEPARIN SODIUM (PORCINE) LOCK FLUSH IV SOLN 100 UNIT/ML 300 UNITS: 100 SOLUTION at 13:13

## 2020-01-21 RX ADMIN — SODIUM CHLORIDE, SODIUM LACTATE, POTASSIUM CHLORIDE, AND CALCIUM CHLORIDE 100 ML/HR: 600; 310; 30; 20 INJECTION, SOLUTION INTRAVENOUS at 23:59

## 2020-01-21 RX ADMIN — MIDAZOLAM 0.5 MG: 1 INJECTION INTRAMUSCULAR; INTRAVENOUS at 13:06

## 2020-01-21 RX ADMIN — OXYCODONE HYDROCHLORIDE AND ACETAMINOPHEN 1 TABLET: 7.5; 325 TABLET ORAL at 17:16

## 2020-01-21 RX ADMIN — LIDOCAINE HYDROCHLORIDE,EPINEPHRINE BITARTRATE 200 MG: 10; .01 INJECTION, SOLUTION INFILTRATION; PERINEURAL at 13:00

## 2020-01-21 RX ADMIN — ARFORMOTEROL TARTRATE 15 MCG: 15 SOLUTION RESPIRATORY (INHALATION) at 07:32

## 2020-01-21 NOTE — PROGRESS NOTES
0600-END OF SHIFT NOTE: 
 
-pt rested well throughout the night 
-2x dilaudid, 1x percocet 
-pt has been npo since midnight for possible port placement today  
-pts arm red and complaining of pain from prior iv  
-vss, no needs voiced at this time Intake/Output 01/20 1901 - 01/21 0700 In: 120 [P.O.:120] Out: 575 [Urine:575] Voiding: YES Catheter: NO 
Drain:   
 
 
Stool:  0 occurrences. Emesis:  0 occurrences. VITAL SIGNS Patient Vitals for the past 12 hrs: 
 Temp Pulse Resp BP SpO2  
01/21/20 0246 97.9 °F (36.6 °C) 81 18 128/72 96 % 01/20/20 2345 98 °F (36.7 °C) 80 17 138/81 98 % 01/20/20 1915 98.2 °F (36.8 °C) 79 18 122/74 96 % Pain Assessment Pain 1 Pain Scale 1: Visual (01/21/20 0321) Pain Intensity 1: 0 (01/21/20 0321) Patient Stated Pain Goal: 0 (01/21/20 0321) Pain Reassessment 1: Patient resting w/respiratory rate greater than 10 (01/21/20 0321) Pain Onset 1: ongoing (01/21/20 0246) Pain Location 1: Abdomen (01/21/20 0246) Pain Orientation 1: Mid (01/21/20 0246) Pain Description 1: Aching; Throbbing (01/21/20 0246) Pain Intervention(s) 1: Medication (see MAR) (01/21/20 0246) Additional Pain Sites: Yes (01/17/20 0706) Ambulating Yes Additional Information:  
 
Shift report given to oncoming nurse at the bedside.  
 
Shantal Wallace RN

## 2020-01-21 NOTE — CDMP QUERY
Pt admitted with a malignant neoplasm of the esophagus and has malnutrition documented. Please further specify type of malnutrition. ? Malnutrition (mild, moderate, severe) ? Protein calorie malnutrition (mild, moderate, severe) ? Other (please specify) ? Unable to determine The medical record reflects the following: 
  Risk Factors: age, pt is currently admitted to hospital for a malignant neoplasm of the esophagus Clinical Indicators: per progress note written on 1/16 @ 1546 by ROSELYN Hankins \"Malnutrition Criteria:   
Meets Criteria for Malnutrition in the context of Chronic Illness  
[x]? Severe Malnutrition, as evidenced by: 
            [x]? Nutritional intake of <75% of energy intake compared to estimated energy needs for > 1 month 
            [x]? Weight loss of >5% in 1 month, >7.5% in 3 months,  >10% in 6 months, or >20% in 12 months 
            []? Severe edema 
            []? Severe loss of muscle mass 
            []? Severe loss of subcutaneous fat 
            []? Functional decline []? Severe edema Treatment: RD consult, continue with current diet, add Ensure Clear TID, continue to monitor Tamara Powell BSN, RN, CDS Compliant Documentation Management Program 
[phone]

## 2020-01-21 NOTE — PROGRESS NOTES
[address] Hematology Oncology Inpatient Hematology / Oncology Progress Note Admission Date: 2020  6:59 AM 
Reason for Admission/Hospital Course: Chest pain [R07.9] 24 Hour Events: No new complaints Drug and tobacco abuse Port today Possible PEG tomorrow ROS: 
Constitutional: Positive for fatigue, weakness. Negative for fever, chills. CV: Negative for chest pain, palpitations, edema. Respiratory: Negative for cough, wheezing. Positive for exertional dyspnea. GI: Negative for nausea, abdominal pain, diarrhea. + dysphagia. 10 point review of systems is otherwise negative with the exception of the elements mentioned above in the HPI. No Known Allergies OBJECTIVE: 
Patient Vitals for the past 8 hrs: 
 BP Temp Pulse Resp SpO2  
20 1315 124/78  88 14 100 % 20 1310 119/80  78 16 100 % 20 1305 113/67  80 16 100 % 20 1300 93/73  81 16 99 % 20 1117  98.2 °F (36.8 °C) 88  99 % 20 0739     98 % 20 0725 117/76 98.6 °F (37 °C) 74  93 % Temp (24hrs), Av.2 °F (36.8 °C), Min:97.9 °F (36.6 °C), Max:98.6 °F (37 °C) 
 
 0701 -  1900 In: -  
Out: 250 [Urine:250] Physical Exam: 
Constitutional: Well developed, well nourished, chronically-ill appearing male in no acute distress, sitting comfortably in the hospital bed. HEENT: Normocephalic and atraumatic. Oropharynx is clear, mucous membranes are moist. + poor dentition. Extraocular muscles are intact. Sclerae anicteric. Skin Warm and dry. No bruising and no rash noted. No erythema. No pallor. Respiratory Lungs are clear to auscultation bilaterally without wheezes, rales or rhonchi, normal air exchange without accessory muscle use. CVS Normal rate, regular rhythm and normal S1 and S2. No murmurs, gallops, or rubs. Abdomen Soft, nontender and nondistended, normoactive bowel sounds. No palpable mass. No hepatosplenomegaly. Neuro Grossly nonfocal with no obvious sensory or motor deficits. MSK Normal range of motion in general.  No edema and no tenderness. Psych Anxious mood and affect. Labs: 
   
Recent Labs  
  01/21/20 
0244 01/20/20 
0353 01/19/20 
0242 WBC 7.3 7.4 8.0  
RBC 3.32* 3.46* 3.48* HGB 9.9* 10.0* 10.1* HCT 31.6* 32.8* 32.7* MCV 95.2 94.8 94.0 MCH 29.8 28.9 29.0 MCHC 31.3* 30.5* 30.9*  
RDW 13.2 13.2 13.2  313 349 Recent Labs  
  01/21/20 
0244 01/20/20 0353 01/19/20 
0242  137 135* K 4.5 4.7 4.7  101 101 CO2 31 31 30 AGAP 5* 5* 4*  
GLU 97 88 101* BUN 11 12 13 CREA 0.98 1.00 1.15 GFRAA >60 >60 >60 GFRNA >60 >60 >60  
CA 8.9 9.0 8.8 Imaging: 
CT CHEST ABD PELV W CONT [[id number]] Collected: 01/16/20 9398 Order Status: Completed Updated: 01/16/20 3561 Narrative:    
CT CHEST, ABDOMEN, PELVIS WITH CONTRAST:   
 
CLINICAL HISTORY:   Follow-up abnormal chest radiograph smoker with pulmonary 
nodules and 100-pound weight loss over the last several months. TECHNIQUE:  Oral and nonionic intravenous contrast was administered, and the 
torso was scanned with spiral technique.  Radiation dose reduction was achieved 
using one or all of the following techniques: automated exposure control, 
weight-based dosing, iterative reconstruction. COMPARISON:  Portable chest today and chest CTA of November 16, 2019. CT CHEST: There are innumerable bilateral pulmonary nodules, the largest 
measuring approximately 3.9 cm posteriorly in the right lower lobe.  There is 
marked thickening of the wall of the distal esophagus below the level of the 
hilary which suggests the possibility of primary esophageal carcinoma. Jodell Shoshana is 
associated subcarinal and azygoesophageal recess lymphadenopathy.  No 
significant pleural fluid.  The thyroid appears normal as imaged.  There is 
thickening of the left ventricular wall suggesting a hypertrophy. CT ABDOMEN: There are innumerable small hepatic lesions which are new from 2018 
and also suspicious for metastatic disease.  The largest measures approximately 2 cm posteriorly in the right lobe.  There are mildly enlarged lymph nodes in 
the lesser sac which may represent metastatic disease as well.  The spleen, 
pancreas, kidneys, and adrenals appear unremarkable. CT PELVIS:  The appendix is not confidently identified, but there are no 
secondary signs of appendicitis.  The prostate is not enlarged.  No primary or 
secondary signs of appendicitis or identified.  No pathologically enlarged lymph 
nodes or free fluid is evident.  There is scattered aortoiliac atherosclerotic 
calcifications with mild ectasia but no focal aneurysm.  Bone windows 
demonstrate no aggressive process, accounting for degenerative changes including 
severe osteoarthritis at the right hip.  A 1 cm central lucency in the body of 
L4 has sclerotic margins and is not typical of metastatic disease. Impression:    
IMPRESSION:  
 
1.  Extensive pulmonary parenchymal and hepatic metastatic disease as well as 
subcarinal and azygoesophageal recess lymphadenopathy likely representing 
metastatic disease as well. 2.  Long-segment thickening of the wall of the distal esophagus suggests the 
possibility of a primary esophageal carcinoma.  Follow-up gastroenterology 
consultation is recommended for consideration of endoscopic biopsy. 3.  Left ventricular hypertrophy and scattered atherosclerosis. XR CHEST PORT [[id number]] Collected: 01/16/20 0737 Order Status: Completed Updated: 01/16/20 8652 Narrative:    
PORTABLE CHEST, January 16, 2020 at 0715 hours CLINICAL HISTORY:  Cough and chest pain in a smoker. COMPARISON:  December 6, 2018. FINDINGS:  AP erect image demonstrates an approximately 4 cm rounded mass 
projected over the inferior aspect of the right hilum.  There are numerous additional pulmonary nodules bilaterally, suspicious for metastatic disease.  No 
nidhi pneumonic consolidation or pleural fluid is evident.  The heart size is 
within normal limits without evidence of congestive heart failure or 
pneumothorax.  The bony thorax appears intact on this view.  There are overlying 
radiopaque support devices. Impression:    
IMPRESSION:  NUMEROUS BILATERAL PULMONARY NODULES ARE SUSPICIOUS FOR METASTATIC 
DISEASE, AND A 4 CM RIGHT LOWER LUNG MASS PROJECTED OVER THE INFERIOR ASPECT OF 
THE RIGHT HILUM IS SUSPICIOUS FOR A PRIMARY BRONCHOGENIC CARCINOMA IN THIS 
SMOKER WITH COPD.  FOLLOW PULMONARY MEDICINE CONSULTATION IS RECOMMENDED. ASSESSMENT: 
 
Problem List  Date Reviewed: 1/17/2020 Codes Class Noted * (Principal) Esophageal mass ICD-10-CM: K22.8 ICD-9-CM: 530.89  1/17/2020 Liver metastases ([facility] [address] ICD-10-CM: C78.7 ICD-9-CM: 197.7  1/17/2020 Esophageal dysphagia ICD-10-CM: R13.10 ICD-9-CM: 787.20  1/17/2020 Weight loss ICD-10-CM: R63.4 ICD-9-CM: 783.21  1/17/2020 Chest pain ICD-10-CM: R07.9 ICD-9-CM: 786.50  1/16/2020 Methamphetamine abuse (HCC) (Chronic) ICD-10-CM: F15.10 ICD-9-CM: 305.70  12/2/2018 H/O atrial flutter (Chronic) ICD-10-CM: Z86.79 
ICD-9-CM: V12.59  12/2/2018 Overview Signed 2/6/2018  7:51 AM by Nabor Arrieta NP  
  1/2018 Atrial flutter, s/p cardioversion. Essential hypertension (Chronic) ICD-10-CM: I10 
ICD-9-CM: 401.9  12/2/2018 Acute respiratory failure with hypoxia and hypercarbia (HCC) ICD-10-CM: J96.01, J96.02 
ICD-9-CM: 518.81  12/2/2018 Substance abuse (HCC) (Chronic) ICD-10-CM: F19.10 ICD-9-CM: 305.90  12/2/2018 Noncompliance (Chronic) ICD-10-CM: Z91.19 ICD-9-CM: V15.81  12/2/2018 Acute encephalopathy ICD-10-CM: G93.40 ICD-9-CM: 348.30  12/2/2018 COPD (chronic obstructive pulmonary disease) (HCC) (Chronic) ICD-10-CM: J44.9 ICD-9-CM: 618  11/16/2018 NATALIIA (obstructive sleep apnea) (Chronic) ICD-10-CM: J19.98 
ICD-9-CM: 327.23  11/16/2018 Chronic respiratory failure (HCC) (Chronic) ICD-10-CM: J96.10 ICD-9-CM: 518.83  11/16/2018 Altered mental status ICD-10-CM: R41.82 
ICD-9-CM: 780.97  11/16/2018 Morbid obesity ([facility] [address] (Chronic) ICD-10-CM: E66.01 
ICD-9-CM: 278.01  11/16/2018 Nicotine dependence (Chronic) ICD-10-CM: A67.121 ICD-9-CM: 305.1  2/1/2018 Alcohol abuse (Chronic) ICD-10-CM: F10.10 ICD-9-CM: 305.00  2/1/2018 Homeless (Chronic) ICD-10-CM: Z59.0 ICD-9-CM: V60.0  1/15/2018 Urethral stricture (Chronic) ICD-10-CM: N35.919 ICD-9-CM: 598.9  1/7/2018 Anasarca ICD-10-CM: R60.1 ICD-9-CM: 782.3  1/6/2018 [facility] ICD-10-CM: N17.9 ICD-9-CM: 584.9  1/6/2018 PLAN: 
Metastatic esophageal cancer - CT CAP with extensive pulm parenchymal and hepatic metastatic disease; subcarinal and azygoesophageal recess LAD; and long-segment thickening of the wall of the distal esophagus - GI performing EGD/EUS, biopsy taken and path shows moderately differentiated adenocarcinoma.   Will need to send tissue for Caris testing - navigation team notified. 
- CEA 19.1, CA 19-9 219.2  
- Iron studies low with ferritin 27, tsat 9%. Repleted iron with ferrlecit x 1.  
- Consulted surgery for feeding tube - patient agrees. - Consulted palliative care - managing pain. - Consult IR for port placement. - Echocardiogram with EF 55-60%. - Will need PET/CT as OP.  
 
1/20 Today patient states that he wants chemo, port and PEG. Surgery following. GI signed off. If he changes his mind again, discharge and have patient follow up with Dr. Caron Cardoza in clinic. Once PEG and port placed we will take over as primary. 1/21 Port today. ?PEG tomorrow. Then chemo to follow. Enrico Shultz  [address] Hematology Oncology [address] [facility] Office : [phone] Fax : [phone] Attending Addendum: 
I have personally performed a face to face diagnostic evaluation on this patient. I have reviewed and agree with the care plan as documented by Davon Elena N.P. My findings are as follows:  He has esophageal cancer, appears weak, heart rate regular without murmurs, abdomen is non-tender, bowel sounds are positive, we will start FOLFOX after a PEG-tube and Mediport have been inserted. Mariano Fraga MD 
 
 
[facility] Hematology/Oncology [id number] [place], [place] [id number] [facility] Office : [phone] Fax : [phone]

## 2020-01-21 NOTE — PROGRESS NOTES
END OF SHIFT NOTE: 
 
Intake/Output 01/21 0701 - 01/21 1900 In: 0 Out: 250 [Urine:250] Voiding: YES Catheter: NO 
Drain:   
 
 
 
 
 
Stool:  0 occurrences. Emesis:  0 occurrences. VITAL SIGNS Patient Vitals for the past 12 hrs: 
 Temp Pulse Resp BP SpO2  
01/21/20 1537 99 °F (37.2 °C) 78 16 108/65 99 % 01/21/20 1420  88 16 114/69 100 % 01/21/20 1405  78 16 122/80 100 % 01/21/20 1350  78 16 127/85 100 % 01/21/20 1340  82 16 132/67 100 % 01/21/20 1335  83 16 134/78 100 % 01/21/20 1330  79 16 117/79 100 % 01/21/20 1325  79 16 126/72 100 % 01/21/20 1320  79 14 129/77 100 % 01/21/20 1315  88 14 124/78 100 % 01/21/20 1310  78 16 119/80 100 % 01/21/20 1305  80 16 113/67 100 % 01/21/20 1300  81 16 93/73 99 % 01/21/20 1117 98.2 °F (36.8 °C) 88  115/71 99 % 01/21/20 0739     98 % 01/21/20 0725 98.6 °F (37 °C) 74  117/76 93 % Pain Assessment Pain 1 Pain Scale 1: Numeric (0 - 10) (01/21/20 1716) Pain Intensity 1: 8 (01/21/20 1716) Patient Stated Pain Goal: 0 (01/21/20 1716) Pain Reassessment 1: Patient resting w/respiratory rate greater than 10 (01/21/20 1551) Pain Onset 1: ongoing (01/21/20 1716) Pain Location 1: Abdomen; Chest (01/21/20 1716) Pain Orientation 1: Mid (01/21/20 1716) Pain Description 1: Aching; Throbbing (01/21/20 1716) Pain Intervention(s) 1: Medication (see MAR) (01/21/20 1716) Additional Pain Sites: Yes (01/17/20 0706) Ambulating Yes Additional Information: Patient got port today. NPO at midnight for PEG tomorrow. Pain medicine around the clock. VSS. No needs voiced. Shift report given to oncoming nurse at the bedside. Aga Peterson

## 2020-01-21 NOTE — PROGRESS NOTES
H&P/Consult Note/Progress Note/Office Note:  
Lyndel Hatchet  MRN: [id number]  :1965  Age:54 y.o. 
 
HPI: Lyndel Hatchet is a 62 y.o. male who has Stage 4 metastatic esophageal adenocarcinoma with suspected liver, lungs, and garth mets. He has h/o ETOH abuse, tobacco abuse, meth abuse, homelessness, COPD, CHF,  
who was admitted from the ER after presenting with a several week h/o progressive N/V, inability to sustain PO intake and weight loss (>100lbs) Nothing in particular made his symptoms better or worse. He also had associated 2/10 substernal chest pain. No SOB He had not been taking his maint meds in >6 months Pertinent negatives include no back pain, no claudication, no cough, no diaphoresis, no dizziness 20 s/p EGD with esophageal bx; Dr Krystle Barone DIAGNOSIS ESOPHAGEAL MASS BIOPSIES: MODERATELY DIFFERENTIATED ADENOCARCINOMA. Electronically signed out on 2020 14:21 by Kari Osman. Loida Goel MD  
Findings: At 32-46cm there is a circumferential, partially obstructing, fungating, ulcerated mass concerning for malignancy. Extensive biopsies were obtained. STOMACH:  The mass extends into the proximal stomach. The fundus on antegrade and retroflexed views is normal. The body, antrum, and pylorus are normal.  
DUODENUM:  The bulb and second portions are normal.  
There are multiple enlarged periesophageal lymph nodes. The largest measures 34 x 14 mm in size. FNA not performed as the needle which averse the tumor and confirmation of malignancy would not affect staging. Further evaluation of the distal extent of tumor as well as stomach and liver could not be performed as the scope could not safely traverse the mass. Impression:   
Esophageal mass. This is concerning for a least T3N2Mx based on endoscopic ultrasound criteria. As the tumor could not be traversed, the distal extent could not be evaluated for invasion of adjacent structures such as the diaphragm. Thus, the possibility of understaging exists. More importantly, there is strong suspicion for metastatic liver and lung disease based on CT.  
  
GI recs:  As patient likely has unresectable malignancy, he may be a candidate for palliative esophageal stent placement. This would require multiple stents due to the length of the tumor. Based on extension into the stomach, he will be at increased risk of stent migration.  
  
 
 
 
 
1/16/20 CT chest/abd/pelvis with oral and IV contrast 
Hx:   Follow-up abnormal chest radiograph smoker with pulmonary 
nodules and 100-pound weight loss over the last several months. 
  
CT CHEST: There are innumerable bilateral pulmonary nodules, the largest 
measuring approximately 3.9 cm posteriorly in the right lower lobe. There is 
marked thickening of the wall of the distal esophagus below the level of the 
hilary which suggests the possibility of primary esophageal carcinoma. There is 
associated subcarinal and azygoesophageal recess lymphadenopathy. No 
significant pleural fluid. The thyroid appears normal as imaged. There is 
thickening of the left ventricular wall suggesting a hypertrophy. 
  
CT ABDOMEN: There are innumerable small hepatic lesions which are new from 2018 
and also suspicious for metastatic disease. The largest measures approximately 2 cm posteriorly in the right lobe. There are mildly enlarged lymph nodes in 
the lesser sac which may represent metastatic disease as well. The spleen, 
pancreas, kidneys, and adrenals appear unremarkable. 
  
CT PELVIS:  The appendix is not confidently identified, but there are no 
secondary signs of appendicitis. The prostate is not enlarged. No primary or 
secondary signs of appendicitis or identified. No pathologically enlarged lymph 
nodes or free fluid is evident. There is scattered aortoiliac atherosclerotic 
calcifications with mild ectasia but no focal aneurysm. Bone windows demonstrate no aggressive process, accounting for degenerative changes including 
severe osteoarthritis at the right hip. A 1 cm central lucency in the body of 
L4 has sclerotic margins and is not typical of metastatic disease. 
  
IMPRESSION:  
  
1. Extensive pulmonary parenchymal and hepatic metastatic disease as well as 
subcarinal and azygoesophageal recess lymphadenopathy likely representing 
metastatic disease as well. 
  
2.  Long-segment thickening of the wall of the distal esophagus suggests the 
possibility of a primary esophageal carcinoma. Follow-up gastroenterology 
consultation is recommended for consideration of endoscopic biopsy. 
  
3.  Left ventricular hypertrophy and scattered atherosclerosis.   
  
 
 
 
 
 
 
Past Medical History:  
Diagnosis Date  Acute respiratory failure with hypercapnia ([facility] 2/1/2018  Chronic obstructive pulmonary disease ([facility]  Heart failure ([facility]  Sleep disorder No past surgical history on file. Current Facility-Administered Medications Medication Dose Route Frequency  heparin (porcine) 100 unit/mL injection 300 Units  300 Units InterCATHeter PRN  
 midazolam (VERSED) injection 0.5-2 mg  0.5-2 mg IntraVENous Multiple  fentaNYL citrate (PF) injection 25-50 mcg  25-50 mcg IntraVENous Multiple  lactated Ringers infusion  100 mL/hr IntraVENous CONTINUOUS  
 [START ON 1/22/2020] ceFAZolin (ANCEF) 2 g/20 mL in sterile water IV syringe  2 g IntraVENous ON CALL  
 [START ON 1/22/2020] metroNIDAZOLE (FLAGYL) IVPB premix 500 mg  500 mg IntraVENous ON CALL  
 oxyCODONE-acetaminophen (PERCOCET 7.5) 7.5-325 mg per tablet 1 Tab  1 Tab Oral Q6H PRN  
 ALPRAZolam (XANAX) tablet 0.25 mg  0.25 mg Oral TID PRN  
 lisinopril (PRINIVIL, ZESTRIL) tablet 10 mg  10 mg Oral DAILY  polyethylene glycol (MIRALAX) packet 17 g  17 g Oral BID  
 HYDROmorphone (PF) (DILAUDID) injection 0.5 mg  0.5 mg IntraVENous Q4H PRN  
  promethazine (PHENERGAN) with saline injection 12.5 mg  12.5 mg IntraVENous Q6H PRN  
 famotidine (PF) (PEPCID) 20 mg in 0.9% sodium chloride 10 mL injection  20 mg IntraVENous Q12H  
 albuterol-ipratropium (DUO-NEB) 2.5 MG-0.5 MG/3 ML  3 mL Nebulization Q4H PRN  
 tiotropium (SPIRIVA) inhalation capsule 18 mcg  1 Cap Inhalation DAILY  arformoteroL (BROVANA) neb solution 15 mcg  15 mcg Nebulization BID RT  
 amiodarone (CORDARONE) tablet 200 mg  200 mg Oral DAILY  sodium chloride (NS) flush 5-40 mL  5-40 mL IntraVENous Q8H  
 sodium chloride (NS) flush 5-40 mL  5-40 mL IntraVENous PRN  
 acetaminophen (TYLENOL) tablet 650 mg  650 mg Oral Q4H PRN  
 naloxone (NARCAN) injection 0.4 mg  0.4 mg IntraVENous PRN  
 diphenhydrAMINE (BENADRYL) capsule 25 mg  25 mg Oral Q4H PRN  
 ondansetron (ZOFRAN) injection 4 mg  4 mg IntraVENous Q4H PRN  
 magnesium hydroxide (MILK OF MAGNESIA) 400 mg/5 mL oral suspension 30 mL  30 mL Oral DAILY PRN  
 nicotine (NICODERM CQ) 21 mg/24 hr patch 1 Patch  1 Patch TransDERmal Q24H  
 influenza vaccine 2019-20 (6 mos+)(PF) (FLUARIX/FLULAVAL/FLUZONE QUAD) injection 0.5 mL  0.5 mL IntraMUSCular PRIOR TO DISCHARGE  hydrALAZINE (APRESOLINE) tablet 50 mg  50 mg Oral Q6H PRN  
 hydrALAZINE (APRESOLINE) 20 mg/mL injection 10 mg  10 mg IntraVENous Q6H PRN  
 dilTIAZem (CARDIZEM) IR tablet 90 mg  90 mg Oral TID Patient has no known allergies. Social History Socioeconomic History  Marital status:  Spouse name: Not on file  Number of children: Not on file  Years of education: Not on file  Highest education level: Not on file Tobacco Use  Smoking status: Current Every Day Smoker Packs/day: 2.00 Substance and Sexual Activity  Alcohol use: No  
  Comment: once a month beer  Drug use: Yes Types: Marijuana, Methamphetamines Social History Tobacco Use Smoking Status Current Every Day Smoker  Packs/day: 2.00  
 
 No family history on file. ROS: The patient has no difficulty with chest pain or shortness of breath. No fever or chills. Comprehensive review of systems was otherwise unremarkable except as noted above. Physical Exam:  
Visit Vitals /67 (BP 1 Location: Left arm) Pulse 82 Temp 98.2 °F (36.8 °C) Resp 16 Ht 6' (1.829 m) Wt 183 lb 11.2 oz (83.3 kg) SpO2 100% BMI 24.91 kg/m² Vitals:  
 01/21/20 1325 01/21/20 1330 01/21/20 1335 01/21/20 1340 BP: 126/72 117/79 134/78 132/67 Pulse: 79 79 83 82 Resp: 16 16 16 16 Temp:      
SpO2: 100% 100% 100% 100% Weight:      
Height:      
 
01/21 0701 - 01/21 1900 In: 0 Out: 250 [Urine:250] 01/19 1901 - 01/21 0700 In: 840 [P.O.:840] Out: 5005 [AOVAE:2630] Constitutional: Alert, oriented, cooperative patient in no acute distress; appears weak Eyes:Sclera are clear. EOMs intact ENMT: no external lesions gross hearing normal; no obvious neck masses, no ear or lip lesions, nares normal 
CV: RRR. Normal perfusion Resp: No JVD. Breathing is  non-labored; no audible wheezing. GI: soft and non-distended Musculoskeletal: unremarkable with normal function. No embolic signs or cyanosis. Neuro:  Oriented; moves all 4; no focal deficits Psychiatric: normal affect and mood, no memory impairment Recent vitals (if inpt): 
Patient Vitals for the past 24 hrs: 
 BP Temp Pulse Resp SpO2 Weight  
01/21/20 1340 132/67  82 16 100 %   
01/21/20 1335 134/78  83 16 100 %   
01/21/20 1330 117/79  79 16 100 %   
01/21/20 1325 126/72  79 16 100 %   
01/21/20 1320 129/77  79 14 100 %   
01/21/20 1315 124/78  88 14 100 %   
01/21/20 1310 119/80  78 16 100 %   
01/21/20 1305 113/67  80 16 100 %   
01/21/20 1300 93/73  81 16 99 %   
01/21/20 1117  98.2 °F (36.8 °C) 88  99 %   
01/21/20 0739     98 %   
01/21/20 0725 117/76 98.6 °F (37 °C) 74  93 %   
01/21/20 0246 128/72 97.9 °F (36.6 °C) 81 18 96 %   
 01/20/20 2345 138/81 98 °F (36.7 °C) 80 17 98 %   
01/20/20 2204      183 lb 11.2 oz (83.3 kg) 01/20/20 1915 122/74 98.2 °F (36.8 °C) 79 18 96 %   
01/20/20 1428 103/68 98.4 °F (36.9 °C) 81 18 98 %  Labs: 
Recent Labs  
  01/21/20 
0244 WBC 7.3 HGB 9.9*  
   
K 4.5  
 CO2 31 BUN 11  
CREA 0.98  
GLU 97 Lab Results Component Value Date/Time WBC 7.3 01/21/2020 02:44 AM  
 HGB 9.9 (L) 01/21/2020 02:44 AM  
 PLATELET 093 31/67/6933 02:44 AM  
 Sodium 138 01/21/2020 02:44 AM  
 Potassium 4.5 01/21/2020 02:44 AM  
 Chloride 102 01/21/2020 02:44 AM  
 CO2 31 01/21/2020 02:44 AM  
 BUN 11 01/21/2020 02:44 AM  
 Creatinine 0.98 01/21/2020 02:44 AM  
 Glucose 97 01/21/2020 02:44 AM  
 INR 1.0 01/17/2020 04:25 AM  
 aPTT 57.3 (H) 01/22/2018 08:26 AM  
 Bilirubin, total 0.2 01/16/2020 06:49 AM  
 AST (SGOT) 11 (L) 01/16/2020 06:49 AM  
 ALT (SGPT) 11 (L) 01/16/2020 06:49 AM  
 Alk. phosphatase 136 01/16/2020 06:49 AM  
 Lipase 270 01/16/2020 06:49 AM  
 Troponin-I, Qt. <0.02 (L) 01/16/2020 06:49 AM  
 
 
CT Results  (Last 48 hours) None  
  
 
chest X-ray I reviewed recent labs, recent radiologic studies, and pertinent records including other doctor notes if needed. I independently reviewed radiology images for studies I described above or studies I have ordered. Admission date (for inpatients): 1/16/2020 Day of Surgery  Procedure(s): ENDOSCOPIC ULTRASOUND (EUS)/ BMI 26 ROOM 537 ESOPHAGOGASTRODUODENAL (EGD) BIOPSY 
ESOPHAGOGASTRODUODENOSCOPY (EGD) ASSESSMENT/PLAN: 
Problem List  Date Reviewed: 1/17/2020 Codes Class Noted Cellulitis of left upper extremity ICD-10-CM: L03.114 
ICD-9-CM: 682.3  1/21/2020 * (Principal) Esophageal mass ICD-10-CM: K22.8 ICD-9-CM: 530.89  1/17/2020 [facility] ICD-10-CM: C78.7 ICD-9-CM: 197.7  1/17/2020 Esophageal dysphagia ICD-10-CM: R13.10 ICD-9-CM: 787.20  1/17/2020 Weight loss ICD-10-CM: R63.4 ICD-9-CM: 783.21  1/17/2020 Chest pain ICD-10-CM: R07.9 ICD-9-CM: 786.50  1/16/2020 Methamphetamine abuse (HCC) (Chronic) ICD-10-CM: F15.10 ICD-9-CM: 305.70  12/2/2018 H/O atrial flutter (Chronic) ICD-10-CM: Z86.79 
ICD-9-CM: V12.59  12/2/2018 Overview Signed 2/6/2018  7:51 AM by Nicolas Cash NP  
  1/2018 Atrial flutter, s/p cardioversion. Essential hypertension (Chronic) ICD-10-CM: I10 
ICD-9-CM: 401.9  12/2/2018 Acute respiratory failure with hypoxia and hypercarbia (HCC) ICD-10-CM: J96.01, J96.02 
ICD-9-CM: 518.81  12/2/2018 Substance abuse (HCC) (Chronic) ICD-10-CM: F19.10 ICD-9-CM: 305.90  12/2/2018 Noncompliance (Chronic) ICD-10-CM: Z91.19 ICD-9-CM: V15.81  12/2/2018 Acute encephalopathy ICD-10-CM: G93.40 ICD-9-CM: 348.30  12/2/2018 COPD (chronic obstructive pulmonary disease) (HCC) (Chronic) ICD-10-CM: J44.9 ICD-9-CM: 438  11/16/2018 NATALIIA (obstructive sleep apnea) (Chronic) ICD-10-CM: M62.42 
ICD-9-CM: 327.23  11/16/2018 Chronic respiratory failure (HCC) (Chronic) ICD-10-CM: J96.10 ICD-9-CM: 518.83  11/16/2018 Altered mental status ICD-10-CM: R41.82 
ICD-9-CM: 780.97  11/16/2018 Morbid obesity ([address] (Chronic) ICD-10-CM: E66.01 
ICD-9-CM: 278.01  11/16/2018 Nicotine dependence (Chronic) ICD-10-CM: K98.474 ICD-9-CM: 305.1  2/1/2018 Alcohol abuse (Chronic) ICD-10-CM: F10.10 ICD-9-CM: 305.00  2/1/2018 Homeless (Chronic) ICD-10-CM: Z59.0 ICD-9-CM: V60.0  1/15/2018 Urethral stricture (Chronic) ICD-10-CM: N35.919 ICD-9-CM: 598.9  1/7/2018 Anasarca ICD-10-CM: R60.1 ICD-9-CM: 782.3  1/6/2018 Acute kidney injury ([facility] ICD-10-CM: N17.9 ICD-9-CM: 584.9  1/6/2018 Principal Problem: 
  Esophageal mass (1/17/2020) Active Problems: 
  Nicotine dependence (2/1/2018) [facility] ([facility] (12/2/2018) Alcohol abuse (2/1/2018) Homeless (1/15/2018) Chest pain (1/16/2020) Liver metastases ([address] (1/17/2020) Esophageal dysphagia (1/17/2020) Weight loss (1/17/2020) Cellulitis of left upper extremity (1/21/2020) Metastatic Esophageal adenocarcinoma with suspected lung/liver/garth mets Med onc and palliative care following I discussed case with Dr Red Villa who thinks we may be able to get a PEG placed and avoid surgery Homelessness He was homeless for about 6-8months but now lives with his son, but there is no heat in house (except portable propane tank space heater) Dysphagia/Weight loss I discussed the patient's condition with the patient at length and treatment options. I discussed risks of PEG placement in language the patient could understand including bleeding, infection, need for further surgery, inability to pace PEG secondary to esophageal tumor, esophageal bleeding or perforation, bowel or liver injury, pneumonia, blood clots, risks of anesthesia, etc..... The patient voiced understanding of all this and all questions were answered. Alternatives to surgery were discussed also and risks of the alternatives. The patient requested that we proceed with surgery. Informed consent was obtained. Liquid diet only for now NPO after midnight Eventually Home on full liquid diet only and tube feeds prn PEG feedings per nutrition recommendations I have personally performed a face-to-face diagnostic evaluation and management  service on this patient. I have independently seen the patient. I have independently obtained the above history from the patient/family. I have independently examined the patient with above findings. I have independently reviewed data/labs for this patient and developed the above plan of care (MDM). Signed: Rosalie Villalpando.  Ashlie Barron MD, FACS

## 2020-01-21 NOTE — PROCEDURES
Department of Interventional Radiology 
[phone] Interventional Radiology Brief Procedure Note Patient: Domitila Hernandez MRN: [id number]  SSN: [id number] YOB: 1965  Age: 62 y.o. Sex: male Date of Procedure: 1/21/2020 Pre-Procedure Diagnosis: Metastatic Esophageal Cancer. Post-Procedure Diagnosis: SAME Procedure(s): Venous Chest [facility] Placement Brief Description of Procedure: RIJV access. Performed By: Annel Lal MD  
 
Assistants: None Anesthesia:Moderate Sedation Estimated Blood Loss: Less than 10ml Specimens:  None Implants:  Chest [facility] Placement Findings: Tip in RA Complications: None Recommendations: REady to use. 1 hour bedrest.    
 
Follow Up: PRN Signed By: Annel Lal MD   
 January 21, 2020

## 2020-01-21 NOTE — PROGRESS NOTES
Problem: Falls - Risk of 
Goal: *Absence of Falls Description Document Clide Failing Fall Risk and appropriate interventions in the flowsheet. Outcome: Progressing Towards Goal 
Note: Fall Risk Interventions: 
Mobility Interventions: Communicate number of staff needed for ambulation/transfer, Patient to call before getting OOB Medication Interventions: Teach patient to arise slowly History of Falls Interventions: Room close to nurse's station

## 2020-01-21 NOTE — PROGRESS NOTES
Nutrition follow-up Reason for initial assessment: Referral received from nursing admission Malnutrition Screening Tool Recently Lost Weight Without Trying: Yes If Yes, How Much Weight Loss: >33 lbs(almost 100 lbs in 3 months) Eating Poorly Due to Decreased Appetite: No 
  
Assessment:  
Diet: DIET NPO past midnight DIET NPO  After midnight Food/Nutrition Patient History: Pt with history of MARGARITA, COPD, altered mental status, nocotine use and alcohol abuse. He presented with nausea/vomiting and chest pain for months. 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 Plans for port and PEG today. Patient was seen with wife at bedside. He appears anxious and tearful at times. When asking about recall prior to admission, wife states that they tried everything at home, but nothing would stay down. They state they never tried ONS at home and patient was mainly trying to eat soup at home. Patient and spouse report that he has been tolerating clear liquids since admission and state that they have been staying down. Patient has many questions regarding tube feeding including administration and options as he is self pay. They both voice concern with being able to pay for formula. They are familiar with bolus feeds as they had a child that required tube feedings.  
  
Anthropometrics: 
Height: 6' (182.9 cm), Weight: 87.1 kg (192 lb), Weight Source: Patient stated, Body mass index is 26.04 kg/m². BMI class of overweight. WT / BMI 1/16/2020 12/12/2018 WEIGHT 192 lb 288 lb 1.6 oz BODY MASS INDEX 26.04 kg/m2    
Unable to verify weight sources. Also unable to confirm weight loss in 3-4 months, but pt has lost 96 lbs in 1 year, 33% which is clinically significant.  
  
Macronutrient needs: MARGARITA (CBW 87.1kg) EER: 0060-1063 kcal/day (20-25 kcals/kg) EPR:  g/day (0.8-1.2 g/kg) Max CHO: 326 g/d (60% kcal) Fluid: 1.7-2.2 L/d (~1 ml/kcal) Intake/Comparative Standards: Pt currently NPO, previously on clear liquids diet which does not meet protein or calorie needs.  
  
Malnutrition Criteria:   
Meets Criteria for Malnutrition in the context of Chronic Illness  
[x]? Severe Malnutrition, as evidenced by: 
            [x]? Nutritional intake of <75% of energy intake compared to estimated energy needs for > 1 month 
            [x]? Weight loss of >5% in 1 month, >7.5% in 3 months,  >10% in 6 months, or >20% in 12 months 
            []? Severe edema 
            []? Severe loss of muscle mass 
            []? Severe loss of subcutaneous fat 
            []? Functional decline []? Severe edema  
  
  
Nutrition Diagnosis: 
Severe chronic disease or condition related malnutrition related to decreased intake  as evidenced by estimated intake less than estimated needs, nausea, vomiting and unintentional weight loss at least 20% in 1 year. 
  
Nutrition Intervention: 
Meals and Snacks: Advance as medically appropriate and tolerated. Commercial Beverages and Supplements: Continue Ensure Clear TID for now, if diet able to be advanced can change to Ensure Enlive TID. Enteral Nutrition: Once PEG is placed if TF required, please place consult to RD for tube feeding management. Patient is at high risk for refeeding. Would recommend starting with Jevity 1.2 @ 35 ml/hr (do not advance) with 30 ml water. As tolerated would advance to goal of 75 ml/hr with 30 ml/hr water flush. TF regimen at goal would provide: 2160 kcal (100% estimated energy needs), 100 g pro (100% estimated protein needs), 304 g CHO (does not exceed max CHO), 2172 ml fluid (100% estimated fluid needs). Can consider transition to bolus feeds as tolerated and once stable on continuous feeds. Coordination of care: Vahe Jaimes RN and Ashu Corona CM - discussed with CM that patient has concerns with being able to afford formula.  Let her know that I explained options of pricing from [address], [employer] Plus version from [employer], and assistance from the [address]. Discharge Nutrition Plan: Too soon to determine [address] [place] [address], [address], [address]

## 2020-01-21 NOTE — ANESTHESIA PREPROCEDURE EVALUATION
Relevant Problems No relevant active problems Anesthetic History Review of Systems / Medical History Patient summary reviewed and pertinent labs reviewed Pulmonary COPD Sleep apnea Smoker Comments: Metastatic cancer to lungs Neuro/Psych Cardiovascular Hypertension Comments: Echo 1/2020- EF 55-60%, mild TR  
GI/Hepatic/Renal 
  
 
 
 
Liver disease (mets to liver) Comments: Metastatic esophageal CA Endo/Other Cancer (esophageal) and anemia (Hb 9.3) Other Findings Comments: H/o meth and etoh abuse Physical Exam 
 
Airway Mallampati: II 
TM Distance: 4 - 6 cm Neck ROM: normal range of motion Mouth opening: Normal 
 
 Cardiovascular Rhythm: regular Rate: normal 
 
 
 
 Dental 
 
Dentition: Poor dentition Pulmonary Breath sounds clear to auscultation Abdominal 
GI exam deferred Other Findings Anesthetic Plan ASA: 4 Anesthesia type: total IV anesthesia Induction: Intravenous Anesthetic plan and risks discussed with: Patient Patient has slightly loose lower front and upper back right teeth - discussed the possibility of his teeth possibly becoming dislodged during the procedure. Patient endorsed understanding

## 2020-01-21 NOTE — PROGRESS NOTES
Progress Note Patient: Karen Hernandez MRN: [id number]  SSN: [id number] YOB: 1965  Age: 62 y.o. Sex: male Admit Date: 1/16/2020 LOS: 5 days Subjective:  
 
Patient with past medical history of methamphetamine abuse, tobacco use, noncompliance with medical treatment, hypertension, CHF, COPD. Admitted due to chest pain, shortness of breath. Found to have an esophageal mass, with possible liver and lung metastasis. Had EGD on 1/17/2020, found to have fungating, partially obstructing esophageal mass extending into stomach. Biopsy of tumor show adenocarcinoma. Considered stage IV disease. Patient decided to have full treatment for this cancer. Surgical consult is on the case and will plan on PEG tube placement. IR has been consulted for port placement. Oncology has been consulted. Today he is complaining of some redness and some swelling on the left forearm area. This is where he had previous IV access which then was removed. Objective:  
 
Vitals:  
 01/21/20 1300 01/21/20 1305 01/21/20 1310 01/21/20 1315 BP: 93/73 113/67 119/80 124/78 Pulse: 81 80 78 88 Resp: 16 16 16 14 Temp:      
SpO2: 99% 100% 100% 100% Weight:      
Height:      
  
 
Intake and Output: 
Current Shift: 01/21 0701 - 01/21 1900 In: 0 Out: 250 [Urine:250] Last three shifts: 01/19 1901 - 01/21 0700 In: 840 [P.O.:840] Out: 8639 [BAPBL:4400] Physical Exam:  
 
General:                    The patient is a male in no acute distress. Patient appears chronically ill. Head:                                   Normocephalic/atraumatic. Eyes:                                   palpebral pallor, no scleral icterus. ENT:                                    External auricular and nasal exam within normal limits. Mucous membranes are moist. 
Neck:                                   Supple, non-tender, no JVD. Lungs:                       decreased to auscultation bilaterally without wheezes or crackles. No respiratory distress or accessory muscle use. Heart:                                  Regular rate and rhythm, without murmurs, rubs, or gallops. Abdomen:                  Soft, non-tender, non-distended with normoactive bowel sounds. Genitourinary:           No tenderness over the bladder or bilateral CVAs. Extremities:               Without clubbing, cyanosis, or edema. Skin:                                   left forearm with a small area of mild redness , and swelling . No fluctuation . pulses:                      Radial and dorsalis pedis pulses present 2+ bilaterally. Capillary refill <2s. Neurologic:                CN II-XII grossly intact and symmetrical.  
                                            Moving all four extremities well with no focal deficits. Psychiatric:                Pleasant demeanor, appropriate affect. Alert and oriented x 3 Lab/Data Review: 
 
Recent Results (from the past 24 hour(s)) CBC W/O DIFF Collection Time: 01/21/20  2:44 AM  
Result Value Ref Range WBC 7.3 4.3 - 11.1 K/uL  
 RBC 3.32 (L) 4.23 - 5.6 M/uL HGB 9.9 (L) 13.6 - 17.2 g/dL HCT 31.6 (L) 41.1 - 50.3 % MCV 95.2 79.6 - 97.8 FL  
 MCH 29.8 26.1 - 32.9 PG  
 MCHC 31.3 (L) 31.4 - 35.0 g/dL  
 RDW 13.2 11.9 - 14.6 % PLATELET 254 034 - 422 K/uL MPV 9.6 9.4 - 12.3 FL ABSOLUTE NRBC 0.00 0.0 - 0.2 K/uL METABOLIC PANEL, BASIC Collection Time: 01/21/20  2:44 AM  
Result Value Ref Range Sodium 138 136 - 145 mmol/L Potassium 4.5 3.5 - 5.1 mmol/L Chloride 102 98 - 107 mmol/L  
 CO2 31 21 - 32 mmol/L Anion gap 5 (L) 7 - 16 mmol/L Glucose 97 65 - 100 mg/dL BUN 11 6 - 23 MG/DL Creatinine 0.98 0.8 - 1.5 MG/DL  
 GFR est AA >60 >60 ml/min/1.73m2 GFR est non-AA >60 >60 ml/min/1.73m2 Calcium 8.9 8.3 - 10.4 MG/DL  
 
CT chest  
1- IMPRESSION:  
  
1. Extensive pulmonary parenchymal and hepatic metastatic disease as well as 
subcarinal and azygoesophageal recess lymphadenopathy likely representing 
metastatic disease as well. 
  
2.  Long-segment thickening of the wall of the distal esophagus suggests the 
possibility of a primary esophageal carcinoma. Follow-up gastroenterology 
consultation is recommended for consideration of endoscopic biopsy. 
  
3.  Left ventricular hypertrophy and scattered atherosclerosis.   
 XR chest  
1- IMPRESSION:  NUMEROUS BILATERAL PULMONARY NODULES ARE SUSPICIOUS FOR METASTATIC 
DISEASE, AND A 4 CM RIGHT LOWER LUNG MASS PROJECTED OVER THE INFERIOR ASPECT OF 
THE RIGHT HILUM IS SUSPICIOUS FOR A PRIMARY BRONCHOGENIC CARCINOMA IN THIS 
SMOKER WITH COPD. FOLLOW PULMONARY MEDICINE CONSULTATION IS RECOMMENDED. 
  
Echo  
1- -  Left ventricle: Systolic function was normal. Ejection fraction was 
estimated in the range of 55 % to 60 %. There were no regional wall motion 
abnormalities. There was moderate concentric hypertrophy. 
  
-  Tricuspid valve: There was mild regurgitation. 
  
-  Additional impressions: LVH noted from echo in 1/2018 unchanged, LVEF is 
normal. 
  
 
Current Facility-Administered Medications:  
  heparin (porcine) 100 unit/mL injection 300 Units, 300 Units, InterCATHeter, PRN, Bella Rico MD, 300 Units at 01/21/20 1313 
  midazolam (VERSED) injection 0.5-2 mg, 0.5-2 mg, IntraVENous, Multiple, Bella Rico MD, 0.5 mg at 01/21/20 1312   fentaNYL citrate (PF) injection 25-50 mcg, 25-50 mcg, IntraVENous, Multiple, Bella Rico MD, 25 mcg at 01/21/20 1313 
  oxyCODONE-acetaminophen (PERCOCET 7.5) 7.5-325 mg per tablet 1 Tab, 1 Tab, Oral, Q6H PRN, Renata Alcocer MD, 1 Tab at 01/21/20 5916   ALPRAZolam (XANAX) tablet 0.25 mg, 0.25 mg, Oral, TID PRN, Storm García Chandan Hancock MD, 0.25 mg at 01/20/20 7908   lisinopril (PRINIVIL, ZESTRIL) tablet 10 mg, 10 mg, Oral, DAILY, Chandan Babcock MD, 10 mg at 01/21/20 1403   polyethylene glycol (MIRALAX) packet 17 g, 17 g, Oral, BID, Aline Scott MD, Stopped at 01/21/20 0800 
  HYDROmorphone (PF) (DILAUDID) injection 0.5 mg, 0.5 mg, IntraVENous, Q4H PRN, Aline Scott MD, 0.5 mg at 01/21/20 1104   promethazine (PHENERGAN) with saline injection 12.5 mg, 12.5 mg, IntraVENous, Q6H PRN, Aline Scott MD 
  famotidine (PF) (PEPCID) 20 mg in 0.9% sodium chloride 10 mL injection, 20 mg, IntraVENous, Q12H, Aline Scott MD, 20 mg at 01/21/20 0801 
  albuterol-ipratropium (DUO-NEB) 2.5 MG-0.5 MG/3 ML, 3 mL, Nebulization, Q4H PRN, Aline Scott MD 
  tiotropium [facility]) inhalation capsule 18 mcg, 1 Cap, Inhalation, DAILY, Aline Scott MD, 18 mcg at 01/21/20 6641   arformoteroL (BROVANA) neb solution 15 mcg, 15 mcg, Nebulization, BID RT, Aline Scott MD, 15 mcg at 01/21/20 0732 
  amiodarone (CORDARONE) tablet 200 mg, 200 mg, Oral, DAILY, Aline Scott MD, 200 mg at 01/21/20 0801 
  sodium chloride (NS) flush 5-40 mL, 5-40 mL, IntraVENous, Q8H, Bright Phelps MD, 10 mL at 01/21/20 0517 
  sodium chloride (NS) flush 5-40 mL, 5-40 mL, IntraVENous, PRN, Aline Scott MD 
  acetaminophen (TYLENOL) tablet 650 mg, 650 mg, Oral, Q4H PRN, Aline Scott MD, 650 mg at 01/17/20 0706 
  naloxone (NARCAN) injection 0.4 mg, 0.4 mg, IntraVENous, PRN, Aline Scott MD 
  diphenhydrAMINE (BENADRYL) capsule 25 mg, 25 mg, Oral, Q4H PRN, Aline Scott MD, 25 mg at 01/20/20 2141   ondansetron (ZOFRAN) injection 4 mg, 4 mg, IntraVENous, Q4H PRN, Aline Scott MD, 4 mg at 01/19/20 0915 
  magnesium hydroxide (MILK OF MAGNESIA) 400 mg/5 mL oral suspension 30 mL, 30 mL, Oral, DAILY PRN, Aline Scott MD 
  nicotine (NICODERM CQ) 21 mg/24 hr patch 1 Patch, 1 Patch, TransDERmal, Q24H, Aline Scott MD, 1 Patch at 01/20/20 1341   influenza vaccine 2019-20 (6 mos+)(PF) (FLUARIX/FLULAVAL/FLUZONE QUAD) injection 0.5 mL, 0.5 mL, IntraMUSCular, PRIOR TO DISCHARGE, Aline Scott MD 
  hydrALAZINE (APRESOLINE) tablet 50 mg, 50 mg, Oral, Q6H PRN, Aline Scott MD 
  hydrALAZINE (APRESOLINE) 20 mg/mL injection 10 mg, 10 mg, IntraVENous, Q6H PRN, Aline Scott MD, 10 mg at 01/16/20 1652   dilTIAZem (CARDIZEM) IR tablet 90 mg, 90 mg, Oral, TID, Aline Scott MD, 90 mg at 01/21/20 0800 Assessment:  
 
Principal Problem: 
  Esophageal mass (1/17/2020) Active Problems: 
  Nicotine dependence (2/1/2018) Methamphetamine abuse ([facility] [address] (12/2/2018) Alcohol abuse (2/1/2018) Homeless (1/15/2018) Chest pain (1/16/2020) Liver metastases ([facility] [address] (1/17/2020) Esophageal dysphagia (1/17/2020) Weight loss (1/17/2020) Plan:  
 
Patient with esophageal mass. Lung lesion, liver lesion. Likely metastasis. Chronic smoking. To go for PEG placement. S/P a port placement. As per oncology, consider chemotherapy. Symptomatic treatments Smoking. I advised patient to quit. Nicotine patch. Severe Malnutrition, as evidenced by:  
Nutritional intake of <75% of energy intake compared to estimated energy needs for > 1 month Weight loss of >5% in 1 month             
Severe loss of muscle mass  
        
Palliative care consulted. I have discussed the plan of care with patient and family members at bedside. DVT prophylaxis : SCD Signed By: Johnny Ann MD   
 January 21, 2020

## 2020-01-22 ENCOUNTER — ANESTHESIA (OUTPATIENT)
Dept: ENDOSCOPY | Age: 55
DRG: 950 | End: 2020-01-22
Payer: MEDICAID

## 2020-01-22 ENCOUNTER — APPOINTMENT (OUTPATIENT)
Dept: ULTRASOUND IMAGING | Age: 55
DRG: 950 | End: 2020-01-22
Attending: INTERNAL MEDICINE
Payer: MEDICAID

## 2020-01-22 LAB
ALBUMIN SERPL-MCNC: 2.1 G/DL (ref 3.5–5)
ALBUMIN/GLOB SERPL: 0.5 {RATIO} (ref 1.2–3.5)
ALP SERPL-CCNC: 112 U/L (ref 50–136)
ALT SERPL-CCNC: 9 U/L (ref 12–65)
ANION GAP SERPL CALC-SCNC: 6 MMOL/L (ref 7–16)
AST SERPL-CCNC: 11 U/L (ref 15–37)
BASOPHILS # BLD: 0 K/UL (ref 0–0.2)
BASOPHILS NFR BLD: 0 % (ref 0–2)
BILIRUB SERPL-MCNC: 0.2 MG/DL (ref 0.2–1.1)
BUN SERPL-MCNC: 12 MG/DL (ref 6–23)
CALCIUM SERPL-MCNC: 8.9 MG/DL (ref 8.3–10.4)
CHLORIDE SERPL-SCNC: 101 MMOL/L (ref 98–107)
CO2 SERPL-SCNC: 30 MMOL/L (ref 21–32)
CREAT SERPL-MCNC: 0.93 MG/DL (ref 0.8–1.5)
DIFFERENTIAL METHOD BLD: ABNORMAL
EOSINOPHIL # BLD: 0.4 K/UL (ref 0–0.8)
EOSINOPHIL NFR BLD: 6 % (ref 0.5–7.8)
ERYTHROCYTE [DISTWIDTH] IN BLOOD BY AUTOMATED COUNT: 13.3 % (ref 11.9–14.6)
GLOBULIN SER CALC-MCNC: 4.1 G/DL (ref 2.3–3.5)
GLUCOSE SERPL-MCNC: 106 MG/DL (ref 65–100)
HCT VFR BLD AUTO: 29.6 % (ref 41.1–50.3)
HGB BLD-MCNC: 9.3 G/DL (ref 13.6–17.2)
IMM GRANULOCYTES # BLD AUTO: 0 K/UL (ref 0–0.5)
IMM GRANULOCYTES NFR BLD AUTO: 0 % (ref 0–5)
LYMPHOCYTES # BLD: 1.1 K/UL (ref 0.5–4.6)
LYMPHOCYTES NFR BLD: 14 % (ref 13–44)
MAGNESIUM SERPL-MCNC: 1.9 MG/DL (ref 1.8–2.4)
MCH RBC QN AUTO: 29.8 PG (ref 26.1–32.9)
MCHC RBC AUTO-ENTMCNC: 31.4 G/DL (ref 31.4–35)
MCV RBC AUTO: 94.9 FL (ref 79.6–97.8)
MONOCYTES # BLD: 0.6 K/UL (ref 0.1–1.3)
MONOCYTES NFR BLD: 8 % (ref 4–12)
NEUTS SEG # BLD: 5.3 K/UL (ref 1.7–8.2)
NEUTS SEG NFR BLD: 72 % (ref 43–78)
NRBC # BLD: 0 K/UL (ref 0–0.2)
PLATELET # BLD AUTO: 294 K/UL (ref 150–450)
PMV BLD AUTO: 9.4 FL (ref 9.4–12.3)
POTASSIUM SERPL-SCNC: 4 MMOL/L (ref 3.5–5.1)
PROT SERPL-MCNC: 6.2 G/DL (ref 6.3–8.2)
RBC # BLD AUTO: 3.12 M/UL (ref 4.23–5.6)
SODIUM SERPL-SCNC: 137 MMOL/L (ref 136–145)
URATE SERPL-MCNC: 3.5 MG/DL (ref 2.6–6)
WBC # BLD AUTO: 7.4 K/UL (ref 4.3–11.1)

## 2020-01-22 PROCEDURE — 74011000250 HC RX REV CODE- 250: Performed by: HOSPITALIST

## 2020-01-22 PROCEDURE — 36591 DRAW BLOOD OFF VENOUS DEVICE: CPT

## 2020-01-22 PROCEDURE — 76060000031 HC ANESTHESIA FIRST 0.5 HR: Performed by: SURGERY

## 2020-01-22 PROCEDURE — 76040000025: Performed by: SURGERY

## 2020-01-22 PROCEDURE — 83735 ASSAY OF MAGNESIUM: CPT

## 2020-01-22 PROCEDURE — 74011000250 HC RX REV CODE- 250: Performed by: NURSE ANESTHETIST, CERTIFIED REGISTERED

## 2020-01-22 PROCEDURE — 94640 AIRWAY INHALATION TREATMENT: CPT

## 2020-01-22 PROCEDURE — 74011250637 HC RX REV CODE- 250/637: Performed by: INTERNAL MEDICINE

## 2020-01-22 PROCEDURE — 74011250637 HC RX REV CODE- 250/637: Performed by: HOSPITALIST

## 2020-01-22 PROCEDURE — 84550 ASSAY OF BLOOD/URIC ACID: CPT

## 2020-01-22 PROCEDURE — 80053 COMPREHEN METABOLIC PANEL: CPT

## 2020-01-22 PROCEDURE — 99233 SBSQ HOSP IP/OBS HIGH 50: CPT | Performed by: INTERNAL MEDICINE

## 2020-01-22 PROCEDURE — 74011250636 HC RX REV CODE- 250/636: Performed by: SURGERY

## 2020-01-22 PROCEDURE — 0DJ08ZZ INSPECTION OF UPPER INTESTINAL TRACT, VIA NATURAL OR ARTIFICIAL OPENING ENDOSCOPIC: ICD-10-PCS | Performed by: SURGERY

## 2020-01-22 PROCEDURE — 93971 EXTREMITY STUDY: CPT

## 2020-01-22 PROCEDURE — 85025 COMPLETE CBC W/AUTO DIFF WBC: CPT

## 2020-01-22 PROCEDURE — 74011250636 HC RX REV CODE- 250/636: Performed by: HOSPITALIST

## 2020-01-22 PROCEDURE — 74011250636 HC RX REV CODE- 250/636: Performed by: NURSE ANESTHETIST, CERTIFIED REGISTERED

## 2020-01-22 PROCEDURE — 94760 N-INVAS EAR/PLS OXIMETRY 1: CPT

## 2020-01-22 PROCEDURE — 65270000029 HC RM PRIVATE

## 2020-01-22 PROCEDURE — 77030005122 HC CATH GASTMY PEG BSC -B: Performed by: SURGERY

## 2020-01-22 RX ORDER — ALBUTEROL SULFATE 0.83 MG/ML
2.5 SOLUTION RESPIRATORY (INHALATION) AS NEEDED
Status: CANCELLED
Start: 2020-01-24

## 2020-01-22 RX ORDER — HYDROCORTISONE SODIUM SUCCINATE 100 MG/2ML
100 INJECTION, POWDER, FOR SOLUTION INTRAMUSCULAR; INTRAVENOUS AS NEEDED
Status: CANCELLED | OUTPATIENT
Start: 2020-01-24

## 2020-01-22 RX ORDER — HEPARIN SODIUM 5000 [USP'U]/ML
80 INJECTION, SOLUTION INTRAVENOUS; SUBCUTANEOUS ONCE
Status: DISCONTINUED | OUTPATIENT
Start: 2020-01-22 | End: 2020-01-22

## 2020-01-22 RX ORDER — SODIUM CHLORIDE 0.9 % (FLUSH) 0.9 %
10 SYRINGE (ML) INJECTION AS NEEDED
Status: CANCELLED
Start: 2020-01-26

## 2020-01-22 RX ORDER — CEPHALEXIN 500 MG/1
500 CAPSULE ORAL EVERY 6 HOURS
Status: DISCONTINUED | OUTPATIENT
Start: 2020-01-22 | End: 2020-01-28 | Stop reason: SDUPTHER

## 2020-01-22 RX ORDER — PROPOFOL 10 MG/ML
INJECTION, EMULSION INTRAVENOUS AS NEEDED
Status: DISCONTINUED | OUTPATIENT
Start: 2020-01-22 | End: 2020-01-22 | Stop reason: HOSPADM

## 2020-01-22 RX ORDER — PROPOFOL 10 MG/ML
INJECTION, EMULSION INTRAVENOUS
Status: DISCONTINUED | OUTPATIENT
Start: 2020-01-22 | End: 2020-01-22 | Stop reason: HOSPADM

## 2020-01-22 RX ORDER — SODIUM CHLORIDE 9 MG/ML
10 INJECTION INTRAMUSCULAR; INTRAVENOUS; SUBCUTANEOUS AS NEEDED
Status: CANCELLED | OUTPATIENT
Start: 2020-01-26

## 2020-01-22 RX ORDER — SODIUM CHLORIDE 9 MG/ML
10 INJECTION INTRAMUSCULAR; INTRAVENOUS; SUBCUTANEOUS AS NEEDED
Status: CANCELLED | OUTPATIENT
Start: 2020-01-24

## 2020-01-22 RX ORDER — DIPHENHYDRAMINE HYDROCHLORIDE 50 MG/ML
50 INJECTION, SOLUTION INTRAMUSCULAR; INTRAVENOUS AS NEEDED
Status: CANCELLED
Start: 2020-01-24

## 2020-01-22 RX ORDER — SODIUM CHLORIDE 0.9 % (FLUSH) 0.9 %
10 SYRINGE (ML) INJECTION AS NEEDED
Status: CANCELLED
Start: 2020-01-24

## 2020-01-22 RX ORDER — HEPARIN 100 UNIT/ML
300-500 SYRINGE INTRAVENOUS AS NEEDED
Status: CANCELLED
Start: 2020-01-26

## 2020-01-22 RX ORDER — EPINEPHRINE 1 MG/ML
0.3 INJECTION, SOLUTION, CONCENTRATE INTRAVENOUS AS NEEDED
Status: CANCELLED | OUTPATIENT
Start: 2020-01-24

## 2020-01-22 RX ORDER — LIDOCAINE HYDROCHLORIDE 20 MG/ML
INJECTION, SOLUTION EPIDURAL; INFILTRATION; INTRACAUDAL; PERINEURAL AS NEEDED
Status: DISCONTINUED | OUTPATIENT
Start: 2020-01-22 | End: 2020-01-22 | Stop reason: HOSPADM

## 2020-01-22 RX ORDER — HEPARIN 100 UNIT/ML
300-500 SYRINGE INTRAVENOUS AS NEEDED
Status: CANCELLED
Start: 2020-01-24

## 2020-01-22 RX ORDER — HEPARIN SODIUM 5000 [USP'U]/100ML
18-36 INJECTION, SOLUTION INTRAVENOUS
Status: DISCONTINUED | OUTPATIENT
Start: 2020-01-22 | End: 2020-01-22

## 2020-01-22 RX ADMIN — OXYCODONE HYDROCHLORIDE AND ACETAMINOPHEN 1 TABLET: 7.5; 325 TABLET ORAL at 15:51

## 2020-01-22 RX ADMIN — ARFORMOTEROL TARTRATE 15 MCG: 15 SOLUTION RESPIRATORY (INHALATION) at 07:42

## 2020-01-22 RX ADMIN — Medication 10 ML: at 14:53

## 2020-01-22 RX ADMIN — OXYCODONE HYDROCHLORIDE AND ACETAMINOPHEN 1 TABLET: 7.5; 325 TABLET ORAL at 21:04

## 2020-01-22 RX ADMIN — ARFORMOTEROL TARTRATE 15 MCG: 15 SOLUTION RESPIRATORY (INHALATION) at 21:34

## 2020-01-22 RX ADMIN — HYDROMORPHONE HYDROCHLORIDE 0.5 MG: 1 INJECTION, SOLUTION INTRAMUSCULAR; INTRAVENOUS; SUBCUTANEOUS at 10:32

## 2020-01-22 RX ADMIN — LIDOCAINE HYDROCHLORIDE 40 MG: 20 INJECTION, SOLUTION EPIDURAL; INFILTRATION; INTRACAUDAL; PERINEURAL at 13:08

## 2020-01-22 RX ADMIN — DILTIAZEM HYDROCHLORIDE 90 MG: 30 TABLET, FILM COATED ORAL at 21:05

## 2020-01-22 RX ADMIN — OXYCODONE HYDROCHLORIDE AND ACETAMINOPHEN 1 TABLET: 7.5; 325 TABLET ORAL at 07:34

## 2020-01-22 RX ADMIN — Medication 10 ML: at 05:34

## 2020-01-22 RX ADMIN — HYDROMORPHONE HYDROCHLORIDE 0.5 MG: 1 INJECTION, SOLUTION INTRAMUSCULAR; INTRAVENOUS; SUBCUTANEOUS at 21:06

## 2020-01-22 RX ADMIN — METRONIDAZOLE 500 MG: 500 INJECTION, SOLUTION INTRAVENOUS at 12:07

## 2020-01-22 RX ADMIN — POLYETHYLENE GLYCOL 3350 17 G: 17 POWDER, FOR SOLUTION ORAL at 21:05

## 2020-01-22 RX ADMIN — Medication 10 ML: at 21:06

## 2020-01-22 RX ADMIN — PROPOFOL 120 MCG/KG/MIN: 10 INJECTION, EMULSION INTRAVENOUS at 13:09

## 2020-01-22 RX ADMIN — Medication 2 G: at 12:25

## 2020-01-22 RX ADMIN — HYDROMORPHONE HYDROCHLORIDE 0.5 MG: 1 INJECTION, SOLUTION INTRAMUSCULAR; INTRAVENOUS; SUBCUTANEOUS at 06:00

## 2020-01-22 RX ADMIN — DILTIAZEM HYDROCHLORIDE 90 MG: 30 TABLET, FILM COATED ORAL at 15:00

## 2020-01-22 RX ADMIN — CEPHALEXIN 500 MG: 500 CAPSULE ORAL at 15:00

## 2020-01-22 RX ADMIN — ALPRAZOLAM 0.25 MG: 0.5 TABLET ORAL at 15:51

## 2020-01-22 RX ADMIN — TIOTROPIUM BROMIDE 18 MCG: 18 CAPSULE ORAL; RESPIRATORY (INHALATION) at 07:43

## 2020-01-22 RX ADMIN — LISINOPRIL 10 MG: 5 TABLET ORAL at 07:34

## 2020-01-22 RX ADMIN — DILTIAZEM HYDROCHLORIDE 90 MG: 30 TABLET, FILM COATED ORAL at 07:34

## 2020-01-22 RX ADMIN — PROPOFOL 50 MG: 10 INJECTION, EMULSION INTRAVENOUS at 13:09

## 2020-01-22 RX ADMIN — HYDROMORPHONE HYDROCHLORIDE 0.5 MG: 1 INJECTION, SOLUTION INTRAMUSCULAR; INTRAVENOUS; SUBCUTANEOUS at 15:00

## 2020-01-22 RX ADMIN — FAMOTIDINE 20 MG: 10 INJECTION INTRAVENOUS at 21:06

## 2020-01-22 RX ADMIN — ALPRAZOLAM 0.25 MG: 0.5 TABLET ORAL at 21:05

## 2020-01-22 RX ADMIN — HYDROMORPHONE HYDROCHLORIDE 0.5 MG: 1 INJECTION, SOLUTION INTRAMUSCULAR; INTRAVENOUS; SUBCUTANEOUS at 01:50

## 2020-01-22 RX ADMIN — AMIODARONE HYDROCHLORIDE 200 MG: 200 TABLET ORAL at 07:34

## 2020-01-22 NOTE — BRIEF OP NOTE
BRIEF OPERATIVE NOTE Date of Procedure:  1/22/2020 Preoperative Diagnosis: Malignant neoplasm of esophagus, unspecified location ([facility] [address] [C15.9] Postoperative Diagnosis: same Procedure:  
EGD (Dr Yao Mccarty) Aborted PEG Surgeon(s) and Role: 
    
   * Martin Puentes MD -Endoscopy Risa Mcburney, MD - surgeon Anesthesia: MAC Estimated Blood Loss: <30cc Specimens: none Findings: see op note Complications: none Implants: none

## 2020-01-22 NOTE — PROGRESS NOTES
END OF SHIFT NOTE: 
 
Intake/Output 01/22 0701 - 01/22 1900 In: 18 [P.O.:236; I.V.:300] Out: 200 [Urine:200] Voiding: YES Catheter: NO 
Drain:   
 
 
 
 
 
Stool:  0 occurrences. Emesis:  0 occurrences. VITAL SIGNS Patient Vitals for the past 12 hrs: 
 Temp Pulse Resp BP SpO2  
01/22/20 1504 97.7 °F (36.5 °C) 77 16 137/79 100 % 01/22/20 1358  73 16 120/73 100 % 01/22/20 1343  69 14 109/62 99 % 01/22/20 1330 98 °F (36.7 °C) 74 12 110/63 98 % 01/22/20 1328  76 16 110/63 98 % 01/22/20 1310  76 18 99/64 94 % 01/22/20 1136 97.9 °F (36.6 °C) 69 18 129/76 92 % 01/22/20 1122 97.8 °F (36.6 °C) 71 18 117/68 92 % 01/22/20 0756 97.5 °F (36.4 °C) 83 18 140/82 99 % 01/22/20 0743     95 % Pain Assessment Pain 1 Pain Scale 1: Visual (01/22/20 1650) Pain Intensity 1: 0 (01/22/20 1650) Patient Stated Pain Goal: 0 (01/22/20 1650) Pain Reassessment 1: Patient resting w/respiratory rate greater than 10 (01/22/20 1650) Pain Onset 1: ongoing (01/22/20 1600) Pain Location 1: Abdomen; Chest (01/22/20 1600) Pain Orientation 1: Mid (01/22/20 1600) Pain Description 1: Aching; Throbbing (01/22/20 1600) Pain Intervention(s) 1: Medication (see MAR) (01/22/20 1600) Additional Pain Sites: Yes (01/17/20 0706) Ambulating Yes Additional Information: Patient received pain medication around the clock. Went for PEG placement but unsuccessful so IR will try tomorrow. NPO at midnight. VSS. No needs voiced. Shift report given to oncoming nurse at the bedside. Kya Larsen

## 2020-01-22 NOTE — ROUTINE PROCESS
TRANSFER - OUT REPORT: 
 
Verbal report given to Carol Mendoza RN(name) on Mani Neighbours  being transferred to room 518(unit) for routine post - op Report consisted of patients Situation, Background, Assessment and  
Recommendations(SBAR). Information from the following report(s) SBAR was reviewed with the receiving nurse. Lines:  
Venous Access Device power port 01/21/20 (Active) Central Line Being Utilized Yes 1/22/2020  7:30 AM  
Criteria for Appropriate Use Irritant/vesicant medication 1/22/2020  7:30 AM  
Site Assessment Clean, dry, & intact 1/22/2020  7:30 AM  
Date of Last Dressing Change 01/21/20 1/22/2020  7:30 AM  
Dressing Status Clean, dry, & intact 1/22/2020  7:30 AM  
Dressing Type Transparent;Disk with Chlorhexadine gluconate (CHG) 1/22/2020  7:30 AM  
Action Taken Blood drawn 1/22/2020 10:32 AM  
Date Accessed (Medial Site) 01/21/20 1/22/2020  7:30 AM  
Positive Blood Return (Medial Site) Yes 1/22/2020 10:32 AM  
Action Taken (Medial Site) Flushed;Blood drawn; Infusing 1/22/2020 10:32 AM  
Alcohol Cap Used No 1/22/2020  7:30 AM  
   
Peripheral IV 01/20/20 Posterior;Right Hand (Active) Site Assessment Clean, dry, & intact 1/22/2020  7:30 AM  
Phlebitis Assessment 0 1/22/2020  7:30 AM  
Infiltration Assessment 0 1/22/2020  7:30 AM  
Dressing Status Clean, dry, & intact 1/22/2020  7:30 AM  
Dressing Type Transparent;Tape 1/22/2020  7:30 AM  
Hub Color/Line Status Patent; Flushed 1/22/2020  7:30 AM  
Alcohol Cap Used No 1/22/2020  7:30 AM  
  
 
Opportunity for questions and clarification was provided. Patient transported with: 
 O2 @ 3 liters Tech

## 2020-01-22 NOTE — PROGRESS NOTES
TRANSFER - OUT REPORT: 
 
Verbal report given to LUCIANO Gibson(name) on Shazia Minor  being transferred to Endo(unit) for ordered procedure Report consisted of patients Situation, Background, Assessment and  
Recommendations(SBAR). Information from the following report(s) SBAR, Kardex, Intake/Output, MAR, Recent Results, Pre Procedure Checklist and Procedure Verification was reviewed with the receiving nurse. Lines:  
Venous Access Device power port 01/21/20 (Active) Central Line Being Utilized Yes 1/22/2020  7:30 AM  
Criteria for Appropriate Use Irritant/vesicant medication 1/22/2020  7:30 AM  
Site Assessment Clean, dry, & intact 1/22/2020  7:30 AM  
Date of Last Dressing Change 01/21/20 1/22/2020  7:30 AM  
Dressing Status Clean, dry, & intact 1/22/2020  7:30 AM  
Dressing Type Transparent;Disk with Chlorhexadine gluconate (CHG) 1/22/2020  7:30 AM  
Date Accessed (Medial Site) 01/21/20 1/22/2020  7:30 AM  
Positive Blood Return (Medial Site) Yes 1/22/2020  7:30 AM  
Action Taken (Medial Site) Flushed 1/22/2020  7:30 AM  
Alcohol Cap Used No 1/22/2020  7:30 AM  
   
Peripheral IV 01/20/20 Posterior;Right Hand (Active) Site Assessment Clean, dry, & intact 1/22/2020  7:30 AM  
Phlebitis Assessment 0 1/22/2020  7:30 AM  
Infiltration Assessment 0 1/22/2020  7:30 AM  
Dressing Status Clean, dry, & intact 1/22/2020  7:30 AM  
Dressing Type Transparent;Tape 1/22/2020  7:30 AM  
Hub Color/Line Status Patent; Flushed 1/22/2020  7:30 AM  
Alcohol Cap Used No 1/22/2020  7:30 AM  
  
 
Opportunity for questions and clarification was provided. Patient transported with: 
 O2 @ 2 liters Registered Nurse

## 2020-01-22 NOTE — PROGRESS NOTES
TRANSFER - IN REPORT: 
 
Verbal report received from Pilar Alejandro RN(name) on Jacquelin Beckham  being received from [id number] for ordered procedure Report consisted of patients Situation, Background, Assessment and  
Recommendations(SBAR). Information from the following report(s) SBAR, Kardex, Intake/Output, MAR, Recent Results, Pre Procedure Checklist and Procedure Verification was reviewed with the receiving nurse. Opportunity for questions and clarification was provided.

## 2020-01-22 NOTE — PROGRESS NOTES
Problem: Falls - Risk of 
Goal: *Absence of Falls Description Document Oziel Cifuentes Fall Risk and appropriate interventions in the flowsheet. Outcome: Progressing Towards Goal 
Note: Fall Risk Interventions: 
Mobility Interventions: Bed/chair exit alarm Medication Interventions: Bed/chair exit alarm History of Falls Interventions: Bed/chair exit alarm

## 2020-01-22 NOTE — PROGRESS NOTES
Progress Note Patient: Jaime Sheppard MRN: [id number]  SSN: [id number] YOB: 1965  Age: 62 y.o. Sex: male Admit Date: 1/16/2020 LOS: 6 days Subjective:  
 
Patient with past medical history of methamphetamine abuse, tobacco use, noncompliance with medical treatment, hypertension, CHF, COPD. Admitted due to chest pain, shortness of breath. Found to have an esophageal mass, with possible liver and lung metastasis. Had EGD on 1/17/2020, found to have fungating, partially obstructing esophageal mass extending into stomach. Biopsy of tumor show adenocarcinoma. Considered stage IV disease. Patient decided to have full treatment for this cancer. Surgical consult is on the case and will plan on PEG tube placement today. Patient had IV port placement on 1/21/2020. Today the left forearm is more red, and some tenderness along previous IV site. No fever. Patient started asking me more about what to expect after chemotherapy. I explained to the patient. He then became emotional, and asked to talk with preacher. Objective:  
 
Vitals:  
 01/21/20 2241 01/22/20 1177 01/22/20 5687 01/22/20 5884 BP: 143/75 136/70  140/82 Pulse: 92 82  83 Resp: 17 18  18 Temp: 98.3 °F (36.8 °C) 97.8 °F (36.6 °C)  97.5 °F (36.4 °C) SpO2: 99% 99% 95% 99% Weight:      
Height:      
  
 
Intake and Output: 
Current Shift: No intake/output data recorded. Last three shifts: 01/20 1901 - 01/22 0700 In: 6822 [P.O.:480; I.V.:593] Out: 8525 [Urine:2425] Physical Exam:  
 
General:                    The patient is a male in no acute distress. Patient appears chronically ill. Head:                                   Normocephalic/atraumatic. Eyes:                                   palpebral pallor, no scleral icterus. ENT:                                    External auricular and nasal exam within normal limits. Mucous membranes are moist. 
Neck:                                   Supple, non-tender, no JVD. Right upper chest wall with IV port. No evidence of local inflammation. No bleeding. Lungs:                       decreased to auscultation bilaterally without wheezes or crackles. No respiratory distress or accessory muscle use. Heart:                                  Regular rate and rhythm, without murmurs, rubs, or gallops. Abdomen:                  Soft, non-tender, non-distended with normoactive bowel sounds. Genitourinary:           No tenderness over the bladder or bilateral CVAs. Extremities:               Without clubbing, cyanosis, or edema. Skin:                                   left forearm with a small area of mild redness , and swelling . No fluctuation . pulses:                      Radial and dorsalis pedis pulses present 2+ bilaterally. Capillary refill <2s. Neurologic:                CN II-XII grossly intact and symmetrical.  
                                            Moving all four extremities well with no focal deficits. Psychiatric:                Pleasant demeanor, appropriate affect. Alert and oriented x 3 Lab/Data Review: 
 
CT chest  
1- IMPRESSION:  
  
1. Extensive pulmonary parenchymal and hepatic metastatic disease as well as 
subcarinal and azygoesophageal recess lymphadenopathy likely representing 
metastatic disease as well. 
  
2.  Long-segment thickening of the wall of the distal esophagus suggests the 
possibility of a primary esophageal carcinoma. Follow-up gastroenterology 
consultation is recommended for consideration of endoscopic biopsy. 
  
3.  Left ventricular hypertrophy and scattered atherosclerosis.   
 XR chest  
1- IMPRESSION:  NUMEROUS BILATERAL PULMONARY NODULES ARE SUSPICIOUS FOR METASTATIC DISEASE, AND A 4 CM RIGHT LOWER LUNG MASS PROJECTED OVER THE INFERIOR ASPECT OF 
THE RIGHT HILUM IS SUSPICIOUS FOR A PRIMARY BRONCHOGENIC CARCINOMA IN THIS 
SMOKER WITH COPD. FOLLOW PULMONARY MEDICINE CONSULTATION IS RECOMMENDED. 
  
Echo  
1- -  Left ventricle: Systolic function was normal. Ejection fraction was 
estimated in the range of 55 % to 60 %. There were no regional wall motion 
abnormalities. There was moderate concentric hypertrophy. 
  
-  Tricuspid valve: There was mild regurgitation. 
  
-  Additional impressions: LVH noted from echo in 1/2018 unchanged, LVEF is 
normal. 
  
 
Current Facility-Administered Medications:  
  cephALEXin (KEFLEX) capsule 500 mg, 500 mg, Oral, Q6H, Delphine Schaeffer MD 
  heparin (porcine) 100 unit/mL injection 300 Units, 300 Units, InterCATHeter, PRN, Lynsey Isabel MD, 300 Units at 01/21/20 1313 
  midazolam (VERSED) injection 0.5-2 mg, 0.5-2 mg, IntraVENous, Multiple, Lynsey Isabel MD, 0.5 mg at 01/21/20 1312   fentaNYL citrate (PF) injection 25-50 mcg, 25-50 mcg, IntraVENous, Multiple, Lynsey Isabel MD, 25 mcg at 01/21/20 1313   lactated Ringers infusion, 100 mL/hr, IntraVENous, CONTINUOUS, Ld Woodward MD, Last Rate: 100 mL/hr at 01/21/20 2359, 100 mL/hr at 01/21/20 2359   ceFAZolin (ANCEF) 2 g/20 mL in sterile water IV syringe, 2 g, IntraVENous, ON CALL, Raghu Jack MD 
  metroNIDAZOLE (FLAGYL) IVPB premix 500 mg, 500 mg, IntraVENous, ON CALL, Ld Woodward MD 
  oxyCODONE-acetaminophen (PERCOCET 7.5) 7.5-325 mg per tablet 1 Tab, 1 Tab, Oral, Q6H PRN, Penny Winchester MD, 1 Tab at 01/22/20 9957   ALPRAZolam (XANAX) tablet 0.25 mg, 0.25 mg, Oral, TID PRN, Suyapa Goodrich MD, 0.25 mg at 01/20/20 1349   lisinopril (PRINIVIL, ZESTRIL) tablet 10 mg, 10 mg, Oral, DAILY, Penny Winchester MD, 10 mg at 01/22/20 6756   polyethylene glycol (MIRALAX) packet 17 g, 17 g, Oral, BID, Mj Leigh MD, Stopped at 01/22/20 0800 
  HYDROmorphone (PF) (DILAUDID) injection 0.5 mg, 0.5 mg, IntraVENous, Q4H PRN, Mj Leigh MD, 0.5 mg at 01/22/20 0600   promethazine (PHENERGAN) with saline injection 12.5 mg, 12.5 mg, IntraVENous, Q6H PRN, Mj Leigh MD 
  famotidine (PF) (PEPCID) 20 mg in 0.9% sodium chloride 10 mL injection, 20 mg, IntraVENous, Q12H, Mj Leigh MD, 20 mg at 01/21/20 2103   albuterol-ipratropium (DUO-NEB) 2.5 MG-0.5 MG/3 ML, 3 mL, Nebulization, Q4H PRN, Mj Leigh MD 
  tiotropium [facility]) inhalation capsule 18 mcg, 1 Cap, Inhalation, DAILY, Mj Leigh MD, 18 mcg at 01/22/20 6205   arformoteroL (BROVANA) neb solution 15 mcg, 15 mcg, Nebulization, BID RT, Mj Leigh MD, 15 mcg at 01/22/20 0609 
  amiodarone (CORDARONE) tablet 200 mg, 200 mg, Oral, DAILY, Mj Leigh MD, 200 mg at 01/22/20 4824   sodium chloride (NS) flush 5-40 mL, 5-40 mL, IntraVENous, Q8H, Mj Leigh MD, 10 mL at 01/22/20 6587   sodium chloride (NS) flush 5-40 mL, 5-40 mL, IntraVENous, PRN, Mj Leigh MD 
  acetaminophen (TYLENOL) tablet 650 mg, 650 mg, Oral, Q4H PRN, Mj Leigh MD, 650 mg at 01/17/20 0706 
  naloxone [facility]) injection 0.4 mg, 0.4 mg, IntraVENous, PRN, Mj Leigh MD 
  diphenhydrAMINE (BENADRYL) capsule 25 mg, 25 mg, Oral, Q4H PRN, Mj Leigh MD, 25 mg at 01/20/20 2141   ondansetron (ZOFRAN) injection 4 mg, 4 mg, IntraVENous, Q4H PRN, Mj Leigh MD, 4 mg at 01/19/20 0915 
  magnesium hydroxide (MILK OF MAGNESIA) 400 mg/5 mL oral suspension 30 mL, 30 mL, Oral, DAILY PRN, Mj Legih MD 
  nicotine (NICODERM CQ) 21 mg/24 hr patch 1 Patch, 1 Patch, TransDERmal, Q24H, Mj Leigh MD, 1 Patch at 01/21/20 1443   influenza vaccine 2019-20 (6 mos+)(PF) (FLUARIX/FLULAVAL/FLUZONE QUAD) injection 0.5 mL, 0.5 mL, IntraMUSCular, PRIOR TO DISCHARGE, Mattie Tai Lugo MD 
  hydrALAZINE (APRESOLINE) tablet 50 mg, 50 mg, Oral, Q6H PRN, Ren Doshi MD 
  hydrALAZINE (APRESOLINE) 20 mg/mL injection 10 mg, 10 mg, IntraVENous, Q6H PRN, Ren Doshi MD, 10 mg at 01/16/20 1652   dilTIAZem (CARDIZEM) IR tablet 90 mg, 90 mg, Oral, TID, Ren Doshi MD, 90 mg at 01/22/20 7749 Assessment:  
 
Principal Problem: 
  Esophageal mass (1/17/2020) Active Problems: 
  Nicotine dependence (2/1/2018) Methamphetamine abuse ([facility] (12/2/2018) Alcohol abuse (2/1/2018) Homeless (1/15/2018) Chest pain (1/16/2020) Liver metastases ([facility] [address] (1/17/2020) Esophageal dysphagia (1/17/2020) Weight loss (1/17/2020) Cellulitis of left upper extremity (1/21/2020) Plan:  
 
Patient with esophageal mass. Lung lesion, liver lesion. Likely metastasis. Chronic smoking. To go for PEG placement. S/P a port placement. As per oncology, consider chemotherapy. Symptomatic treatments Will defer detailed discussion regarding chemotherapy, choices, what to expect, to oncologist.  I have informed the patient. We will ask  service to see the patient. Smoking. I advised patient to quit. Nicotine patch. Severe Malnutrition, as evidenced by:  
Nutritional intake of <75% of energy intake compared to estimated energy needs for > 1 month Weight loss of >5% in 1 month             
Severe loss of muscle mass Nutritional service consulted. Give additional nutritional supplement. Cellulitis left forearm We will give Keflex 500 mg p.o. We will also check venous Doppler to rule out DVT.         
Palliative care consulted. I have discussed the plan of care with patient. DVT prophylaxis : SCD Addendum : doppler left arm shows superficial DVT. Not DVT. No anticoagulation needed. Signed By: Reid Howe MD   
 January 22, 2020

## 2020-01-22 NOTE — PROGRESS NOTES
END OF SHIFT NOTE: 
 
Intake/Output 01/21 1901 - 01/22 0700 In: 593 [I.V.:593] Out: 950 [Urine:950] Voiding: YES Catheter: NO 
Drain:   
 
 
 
 
 
Stool:  0 occurrences. Emesis:  0 occurrences. VITAL SIGNS Patient Vitals for the past 12 hrs: 
 Temp Pulse Resp BP SpO2  
01/22/20 0325 97.8 °F (36.6 °C) 82 18 136/70 99 % 01/21/20 2241 98.3 °F (36.8 °C) 92 17 143/75 99 % 01/21/20 2050     98 % 01/21/20 1948 98 °F (36.7 °C) 77 16 114/64 99 % Pain Assessment Pain 1 Pain Scale 1: Numeric (0 - 10) (01/22/20 0245) Pain Intensity 1: 0 (01/22/20 0245) Patient Stated Pain Goal: 0 (01/22/20 0245) Pain Reassessment 1: Yes (01/21/20 1746) Pain Onset 1: ongoing (01/21/20 1716) Pain Location 1: Abdomen; Chest (01/21/20 1716) Pain Orientation 1: Mid (01/21/20 1716) Pain Description 1: Aching; Throbbing (01/21/20 1716) Pain Intervention(s) 1: Medication (see MAR) (01/21/20 1716) Additional Pain Sites: Yes (01/17/20 0706) Ambulating Yes Additional Information: VSS. Afebrile. Pt NPO at midnight for peg placement today. PRN dilaudid given. No needs voiced at this time Shift report given to oncoming nurse at the bedside.  
 
Stef Sheriff RN

## 2020-01-22 NOTE — PROGRESS NOTES
Called to confirm that the duplex did show a positive DVT in the left arm. Informed Dr. Simba Prado via perfect serve. Orders to do anticoagulation after surgery. Will continue to monitor.

## 2020-01-22 NOTE — PROGRESS NOTES
[address] Hematology Oncology Inpatient Hematology / Oncology Progress Note Admission Date: 2020  6:59 AM 
Reason for Admission/Hospital Course: Chest pain [R07.9] 24 Hour Events: No new complaints Drug and tobacco abuse Port yesterday PEG tomorrow Occlusive superficial venous thrombosis in the left basilic and cephalic veins ROS: 
Constitutional: Positive for fatigue, weakness. Negative for fever, chills. CV: Negative for chest pain, palpitations, edema. Respiratory: Negative for cough, wheezing. Positive for exertional dyspnea. GI: Negative for nausea, abdominal pain, diarrhea. + dysphagia. 10 point review of systems is otherwise negative with the exception of the elements mentioned above in the HPI. No Known Allergies OBJECTIVE: 
Patient Vitals for the past 8 hrs: 
 BP Temp Pulse Resp SpO2  
20 1136 129/76 97.9 °F (36.6 °C) 69 18 92 % 20 1122 117/68 97.8 °F (36.6 °C) 71 18 92 % 20 0756 140/82 97.5 °F (36.4 °C) 83 18 99 % 20 0743     95 % Temp (24hrs), Av °F (36.7 °C), Min:97.5 °F (36.4 °C), Max:99 °F (37.2 °C) No intake/output data recorded. Physical Exam: 
Constitutional: Well developed, well nourished, chronically-ill appearing male in no acute distress, sitting comfortably in the hospital bed. HEENT: Normocephalic and atraumatic. Oropharynx is clear, mucous membranes are moist. + poor dentition. Extraocular muscles are intact. Sclerae anicteric. Skin Warm and dry. No bruising and no rash noted. No erythema. No pallor. Respiratory Lungs are clear to auscultation bilaterally without wheezes, rales or rhonchi, normal air exchange without accessory muscle use. CVS Normal rate, regular rhythm and normal S1 and S2. No murmurs, gallops, or rubs. Abdomen Soft, nontender and nondistended, normoactive bowel sounds. No palpable mass. No hepatosplenomegaly. Neuro Grossly nonfocal with no obvious sensory or motor deficits. MSK Normal range of motion in general.  No edema and no tenderness. Psych Anxious mood and affect. Labs: 
   
Recent Labs  
  01/22/20 
1040 01/21/20 
0244 01/20/20 
3074 WBC 7.4 7.3 7.4  
RBC 3.12* 3.32* 3.46* HGB 9.3* 9.9* 10.0* HCT 29.6* 31.6* 32.8* MCV 94.9 95.2 94.8 MCH 29.8 29.8 28.9 MCHC 31.4 31.3* 30.5* RDW 13.3 13.2 13.2  294 313 GRANS 72  --   --   
LYMPH 14  --   -- MONOS 8  --   --   
EOS 6  --   --   
BASOS 0  --   --   
IG 0  --   --   
DF AUTOMATED  --   --   
ANEU 5.3  --   --   
ABL 1.1  --   --   
ABM 0.6  --   --   
JAY 0.4  --   --   
ABB 0.0  --   --   
AIG 0.0  --   --   
 
  
Recent Labs  
  01/22/20 
1040 01/21/20 
0244 01/20/20 
0353  138 137  
K 4.0 4.5 4.7  102 101 CO2 30 31 31 AGAP 6* 5* 5* * 97 88 BUN 12 11 12 CREA 0.93 0.98 1.00 GFRAA >60 >60 >60 GFRNA >60 >60 >60  
CA 8.9 8.9 9.0 SGOT 11*  --   --   
  --   --   
TP 6.2*  --   --   
ALB 2.1*  --   --   
GLOB 4.1*  --   --   
AGRAT 0.5*  --   --   
MG 1.9  --   --   
 
 
 
Imaging: 
CT CHEST ABD PELV W CONT [[id number]] Collected: 01/16/20 0005 Order Status: Completed Updated: 01/16/20 3618 Narrative:    
CT CHEST, ABDOMEN, PELVIS WITH CONTRAST:   
 
CLINICAL HISTORY:   Follow-up abnormal chest radiograph smoker with pulmonary 
nodules and 100-pound weight loss over the last several months. TECHNIQUE:  Oral and nonionic intravenous contrast was administered, and the 
torso was scanned with spiral technique.  Radiation dose reduction was achieved 
using one or all of the following techniques: automated exposure control, 
weight-based dosing, iterative reconstruction. COMPARISON:  Portable chest today and chest CTA of November 16, 2019.  
 
CT CHEST: There are innumerable bilateral pulmonary nodules, the largest 
 measuring approximately 3.9 cm posteriorly in the right lower lobe.  There is 
marked thickening of the wall of the distal esophagus below the level of the 
hilary which suggests the possibility of primary esophageal carcinoma. Chevis Hallmark is 
associated subcarinal and azygoesophageal recess lymphadenopathy.  No 
significant pleural fluid.  The thyroid appears normal as imaged.  There is 
thickening of the left ventricular wall suggesting a hypertrophy. CT ABDOMEN: There are innumerable small hepatic lesions which are new from 2018 
and also suspicious for metastatic disease.  The largest measures approximately 2 cm posteriorly in the right lobe.  There are mildly enlarged lymph nodes in 
the lesser sac which may represent metastatic disease as well.  The spleen, 
pancreas, kidneys, and adrenals appear unremarkable. CT PELVIS:  The appendix is not confidently identified, but there are no 
secondary signs of appendicitis.  The prostate is not enlarged.  No primary or 
secondary signs of appendicitis or identified.  No pathologically enlarged lymph 
nodes or free fluid is evident.  There is scattered aortoiliac atherosclerotic 
calcifications with mild ectasia but no focal aneurysm.  Bone windows 
demonstrate no aggressive process, accounting for degenerative changes including 
severe osteoarthritis at the right hip.  A 1 cm central lucency in the body of 
L4 has sclerotic margins and is not typical of metastatic disease. Impression:    
IMPRESSION:  
 
1.  Extensive pulmonary parenchymal and hepatic metastatic disease as well as 
subcarinal and azygoesophageal recess lymphadenopathy likely representing 
metastatic disease as well. 2.  Long-segment thickening of the wall of the distal esophagus suggests the 
possibility of a primary esophageal carcinoma.  Follow-up gastroenterology 
consultation is recommended for consideration of endoscopic biopsy. 3.  Left ventricular hypertrophy and scattered atherosclerosis. XR CHEST PORT [[id number]] Collected: 01/16/20 0737 Order Status: Completed Updated: 01/16/20 1149 Narrative:    
PORTABLE CHEST, January 16, 2020 at 0715 hours CLINICAL HISTORY:  Cough and chest pain in a smoker. COMPARISON:  December 6, 2018. FINDINGS:  AP erect image demonstrates an approximately 4 cm rounded mass 
projected over the inferior aspect of the right hilum.  There are numerous 
additional pulmonary nodules bilaterally, suspicious for metastatic disease.  No 
nidhi pneumonic consolidation or pleural fluid is evident.  The heart size is 
within normal limits without evidence of congestive heart failure or 
pneumothorax.  The bony thorax appears intact on this view.  There are overlying 
radiopaque support devices. Impression:    
IMPRESSION:  NUMEROUS BILATERAL PULMONARY NODULES ARE SUSPICIOUS FOR METASTATIC 
DISEASE, AND A 4 CM RIGHT LOWER LUNG MASS PROJECTED OVER THE INFERIOR ASPECT OF 
THE RIGHT HILUM IS SUSPICIOUS FOR A PRIMARY BRONCHOGENIC CARCINOMA IN THIS 
SMOKER WITH COPD.  FOLLOW PULMONARY MEDICINE CONSULTATION IS RECOMMENDED. ASSESSMENT: 
 
Problem List  Date Reviewed: 1/17/2020 Codes Class Noted Cellulitis of left upper extremity ICD-10-CM: L03.114 
ICD-9-CM: 682.3  1/21/2020 * (Principal) Esophageal mass ICD-10-CM: K22.8 ICD-9-CM: 530.89  1/17/2020 Liver metastases ([address] ICD-10-CM: C78.7 ICD-9-CM: 197.7  1/17/2020 Esophageal dysphagia ICD-10-CM: R13.10 ICD-9-CM: 787.20  1/17/2020 Weight loss ICD-10-CM: R63.4 ICD-9-CM: 783.21  1/17/2020 Chest pain ICD-10-CM: R07.9 ICD-9-CM: 786.50  1/16/2020 Methamphetamine abuse (HCC) (Chronic) ICD-10-CM: F15.10 ICD-9-CM: 305.70  12/2/2018 H/O atrial flutter (Chronic) ICD-10-CM: Z86.79 
ICD-9-CM: V12.59  12/2/2018  Overview Signed 2/6/2018  7:51 AM by Ciro Pagan NP  
 1/2018 Atrial flutter, s/p cardioversion. Essential hypertension (Chronic) ICD-10-CM: I10 
ICD-9-CM: 401.9  12/2/2018 Acute respiratory failure with hypoxia and hypercarbia (HCC) ICD-10-CM: J96.01, J96.02 
ICD-9-CM: 518.81  12/2/2018 Substance abuse (HCC) (Chronic) ICD-10-CM: F19.10 ICD-9-CM: 305.90  12/2/2018 Noncompliance (Chronic) ICD-10-CM: Z91.19 ICD-9-CM: V15.81  12/2/2018 Acute encephalopathy ICD-10-CM: G93.40 ICD-9-CM: 348.30  12/2/2018 COPD (chronic obstructive pulmonary disease) (HCC) (Chronic) ICD-10-CM: J44.9 ICD-9-CM: 503  11/16/2018 NATALIIA (obstructive sleep apnea) (Chronic) ICD-10-CM: U85.87 
ICD-9-CM: 327.23  11/16/2018 Chronic respiratory failure (HCC) (Chronic) ICD-10-CM: J96.10 ICD-9-CM: 518.83  11/16/2018 Altered mental status ICD-10-CM: R41.82 
ICD-9-CM: 780.97  11/16/2018 Morbid obesity ([facility] [address] (Chronic) ICD-10-CM: E66.01 
ICD-9-CM: 278.01  11/16/2018 Nicotine dependence (Chronic) ICD-10-CM: O41.281 ICD-9-CM: 305.1  2/1/2018 Alcohol abuse (Chronic) ICD-10-CM: F10.10 ICD-9-CM: 305.00  2/1/2018 Homeless (Chronic) ICD-10-CM: Z59.0 ICD-9-CM: V60.0  1/15/2018 Urethral stricture (Chronic) ICD-10-CM: N35.919 ICD-9-CM: 598.9  1/7/2018 Anasarca ICD-10-CM: R60.1 ICD-9-CM: 782.3  1/6/2018 Acute kidney injury ([facility] ICD-10-CM: N17.9 ICD-9-CM: 584.9  1/6/2018 PLAN: 
Metastatic esophageal cancer - CT CAP with extensive pulm parenchymal and hepatic metastatic disease; subcarinal and azygoesophageal recess LAD; and long-segment thickening of the wall of the distal esophagus - GI performing EGD/EUS, biopsy taken and path shows moderately differentiated adenocarcinoma.   Will need to send tissue for Caris testing - navigation team notified. 
- CEA 19.1, CA 19-9 219.2  
- Iron studies low with ferritin 27, tsat 9%. Repleted iron with ferrlecit x 1. - Consulted surgery for feeding tube - patient agrees. - Consulted palliative care - managing pain. - Consult IR for port placement. - Echocardiogram with EF 55-60%. - Will need PET/CT as OP.  
 
1/20 Today patient states that he wants chemo, port and PEG. Surgery following. GI signed off. If he changes his mind again, discharge and have patient follow up with Dr. Arthur Kinsey in clinic. Once PEG and port placed we will take over as primary. 1/21 Port today. ?PEG tomorrow. Then chemo to follow. 1/22 Occlusive superficial venous thrombosis in the left basilic and cephalic veins. PEG today. We will take over the care of this patient tomorrow. Ilda Kim  [address] Hematology Oncology [address] [place] [address] Office : [phone] Fax : [phone] Attending Addendum: 
I have personally performed a face to face diagnostic evaluation on this patient. I have reviewed and agree with the care plan as documented by Ilda Kim, N.P. 36 minutes were spent on patient care, including but not limited to, reviewing the chart and time with the patient and family, more than 50% of the time documented was spent in face-to-face contact with the patient and in the care of the patient on the floor/unit where the patient is located. My findings are as follows:  He has metastatic esophageal carcinoma, appears anxious, heart rate regular without murmurs, abdomen is non-tender, bowel sounds are positive, we will transfer him to our service and start Cycle 1 of FOLFOX tomorrow. Jeff Blas MD 
 
 
[facility] Hematology/Oncology [address] [place] [address] Office : [phone] Fax : [phone]

## 2020-01-22 NOTE — ANESTHESIA POSTPROCEDURE EVALUATION
Procedure(s): ESOPHAGOGASTRODUODENOSCOPY (EGD). total IV anesthesia Anesthesia Post Evaluation Patient location during evaluation: PACU Patient participation: complete - patient participated Level of consciousness: awake and alert Pain management: adequate Airway patency: patent Anesthetic complications: no 
Cardiovascular status: acceptable Respiratory status: acceptable Hydration status: acceptable Post anesthesia nausea and vomiting:  controlled Vitals Value Taken Time /62 1/22/2020  1:43 PM  
Temp 36.7 °C (98 °F) 1/22/2020  1:30 PM  
Pulse 69 1/22/2020  1:46 PM  
Resp 14 1/22/2020  1:43 PM  
SpO2 99 % 1/22/2020  1:46 PM  
Vitals shown include unvalidated device data.

## 2020-01-22 NOTE — PROGRESS NOTES
Problem: Falls - Risk of 
Goal: *Absence of Falls Description Document Jesus Burk Fall Risk and appropriate interventions in the flowsheet. Outcome: Progressing Towards Goal 
Note: Fall Risk Interventions: 
Mobility Interventions: Bed/chair exit alarm Medication Interventions: Bed/chair exit alarm History of Falls Interventions: Bed/chair exit alarm

## 2020-01-23 ENCOUNTER — APPOINTMENT (OUTPATIENT)
Dept: INTERVENTIONAL RADIOLOGY/VASCULAR | Age: 55
DRG: 950 | End: 2020-01-23
Attending: SURGERY
Payer: MEDICAID

## 2020-01-23 LAB
ALBUMIN SERPL-MCNC: 1.9 G/DL (ref 3.5–5)
ALBUMIN/GLOB SERPL: 0.5 {RATIO} (ref 1.2–3.5)
ALP SERPL-CCNC: 101 U/L (ref 50–136)
ALT SERPL-CCNC: 7 U/L (ref 12–65)
ANION GAP SERPL CALC-SCNC: 6 MMOL/L (ref 7–16)
AST SERPL-CCNC: 12 U/L (ref 15–37)
BASOPHILS # BLD: 0 K/UL (ref 0–0.2)
BASOPHILS NFR BLD: 0 % (ref 0–2)
BILIRUB SERPL-MCNC: 0.2 MG/DL (ref 0.2–1.1)
BUN SERPL-MCNC: 11 MG/DL (ref 6–23)
CALCIUM SERPL-MCNC: 8.7 MG/DL (ref 8.3–10.4)
CHLORIDE SERPL-SCNC: 103 MMOL/L (ref 98–107)
CO2 SERPL-SCNC: 29 MMOL/L (ref 21–32)
CREAT SERPL-MCNC: 0.83 MG/DL (ref 0.8–1.5)
DIFFERENTIAL METHOD BLD: ABNORMAL
EOSINOPHIL # BLD: 0.4 K/UL (ref 0–0.8)
EOSINOPHIL NFR BLD: 6 % (ref 0.5–7.8)
ERYTHROCYTE [DISTWIDTH] IN BLOOD BY AUTOMATED COUNT: 13.2 % (ref 11.9–14.6)
GLOBULIN SER CALC-MCNC: 3.8 G/DL (ref 2.3–3.5)
GLUCOSE SERPL-MCNC: 127 MG/DL (ref 65–100)
HCT VFR BLD AUTO: 27.5 % (ref 41.1–50.3)
HGB BLD-MCNC: 8.6 G/DL (ref 13.6–17.2)
IMM GRANULOCYTES # BLD AUTO: 0 K/UL (ref 0–0.5)
IMM GRANULOCYTES NFR BLD AUTO: 0 % (ref 0–5)
LYMPHOCYTES # BLD: 1.3 K/UL (ref 0.5–4.6)
LYMPHOCYTES NFR BLD: 20 % (ref 13–44)
MAGNESIUM SERPL-MCNC: 1.7 MG/DL (ref 1.8–2.4)
MCH RBC QN AUTO: 30 PG (ref 26.1–32.9)
MCHC RBC AUTO-ENTMCNC: 31.3 G/DL (ref 31.4–35)
MCV RBC AUTO: 95.8 FL (ref 79.6–97.8)
MONOCYTES # BLD: 0.6 K/UL (ref 0.1–1.3)
MONOCYTES NFR BLD: 8 % (ref 4–12)
NEUTS SEG # BLD: 4.4 K/UL (ref 1.7–8.2)
NEUTS SEG NFR BLD: 65 % (ref 43–78)
NRBC # BLD: 0 K/UL (ref 0–0.2)
PLATELET # BLD AUTO: 279 K/UL (ref 150–450)
PMV BLD AUTO: 9.7 FL (ref 9.4–12.3)
POTASSIUM SERPL-SCNC: 4.1 MMOL/L (ref 3.5–5.1)
PROT SERPL-MCNC: 5.7 G/DL (ref 6.3–8.2)
RBC # BLD AUTO: 2.87 M/UL (ref 4.23–5.6)
SODIUM SERPL-SCNC: 138 MMOL/L (ref 136–145)
WBC # BLD AUTO: 6.7 K/UL (ref 4.3–11.1)

## 2020-01-23 PROCEDURE — 74011250637 HC RX REV CODE- 250/637: Performed by: INTERNAL MEDICINE

## 2020-01-23 PROCEDURE — 99152 MOD SED SAME PHYS/QHP 5/>YRS: CPT

## 2020-01-23 PROCEDURE — 94640 AIRWAY INHALATION TREATMENT: CPT

## 2020-01-23 PROCEDURE — 74011250636 HC RX REV CODE- 250/636: Performed by: RADIOLOGY

## 2020-01-23 PROCEDURE — 74011000250 HC RX REV CODE- 250: Performed by: RADIOLOGY

## 2020-01-23 PROCEDURE — 77030005103 HC CATH GASTMY BLN HALY -B

## 2020-01-23 PROCEDURE — 74011000250 HC RX REV CODE- 250: Performed by: HOSPITALIST

## 2020-01-23 PROCEDURE — 65270000029 HC RM PRIVATE

## 2020-01-23 PROCEDURE — 74011250637 HC RX REV CODE- 250/637: Performed by: HOSPITALIST

## 2020-01-23 PROCEDURE — C1769 GUIDE WIRE: HCPCS

## 2020-01-23 PROCEDURE — 0DH63UZ INSERTION OF FEEDING DEVICE INTO STOMACH, PERCUTANEOUS APPROACH: ICD-10-PCS | Performed by: RADIOLOGY

## 2020-01-23 PROCEDURE — 77010033678 HC OXYGEN DAILY

## 2020-01-23 PROCEDURE — 74011636320 HC RX REV CODE- 636/320: Performed by: RADIOLOGY

## 2020-01-23 PROCEDURE — 74011250636 HC RX REV CODE- 250/636: Performed by: HOSPITALIST

## 2020-01-23 PROCEDURE — 74011250636 HC RX REV CODE- 250/636: Performed by: INTERNAL MEDICINE

## 2020-01-23 PROCEDURE — 83735 ASSAY OF MAGNESIUM: CPT

## 2020-01-23 PROCEDURE — 80053 COMPREHEN METABOLIC PANEL: CPT

## 2020-01-23 PROCEDURE — 36591 DRAW BLOOD OFF VENOUS DEVICE: CPT

## 2020-01-23 PROCEDURE — C1894 INTRO/SHEATH, NON-LASER: HCPCS

## 2020-01-23 PROCEDURE — 77030018846 HC SOL IRR STRL H20 ICUM -A

## 2020-01-23 PROCEDURE — 85025 COMPLETE CBC W/AUTO DIFF WBC: CPT

## 2020-01-23 PROCEDURE — 77030021532 HC CATH ANGI DX IMPRS MRTM -B

## 2020-01-23 PROCEDURE — 94760 N-INVAS EAR/PLS OXIMETRY 1: CPT

## 2020-01-23 PROCEDURE — 94660 CPAP INITIATION&MGMT: CPT

## 2020-01-23 RX ORDER — LIDOCAINE HYDROCHLORIDE 20 MG/ML
2-20 INJECTION, SOLUTION INFILTRATION; PERINEURAL ONCE
Status: COMPLETED | OUTPATIENT
Start: 2020-01-23 | End: 2020-01-23

## 2020-01-23 RX ORDER — HYDROMORPHONE HYDROCHLORIDE 1 MG/ML
1 INJECTION, SOLUTION INTRAMUSCULAR; INTRAVENOUS; SUBCUTANEOUS
Status: DISCONTINUED | OUTPATIENT
Start: 2020-01-23 | End: 2020-01-25

## 2020-01-23 RX ORDER — LIDOCAINE HYDROCHLORIDE 20 MG/ML
15 SOLUTION OROPHARYNGEAL ONCE
Status: COMPLETED | OUTPATIENT
Start: 2020-01-23 | End: 2020-01-23

## 2020-01-23 RX ORDER — ONDANSETRON 2 MG/ML
4 INJECTION INTRAMUSCULAR; INTRAVENOUS AS NEEDED
Status: DISCONTINUED | OUTPATIENT
Start: 2020-01-23 | End: 2020-01-31 | Stop reason: HOSPADM

## 2020-01-23 RX ORDER — SODIUM CHLORIDE 9 MG/ML
25 INJECTION, SOLUTION INTRAVENOUS ONCE
Status: COMPLETED | OUTPATIENT
Start: 2020-01-23 | End: 2020-01-23

## 2020-01-23 RX ORDER — FENTANYL CITRATE 50 UG/ML
25-50 INJECTION, SOLUTION INTRAMUSCULAR; INTRAVENOUS
Status: DISCONTINUED | OUTPATIENT
Start: 2020-01-23 | End: 2020-01-23

## 2020-01-23 RX ORDER — MIDAZOLAM HYDROCHLORIDE 1 MG/ML
.5-2 INJECTION, SOLUTION INTRAMUSCULAR; INTRAVENOUS
Status: DISCONTINUED | OUTPATIENT
Start: 2020-01-23 | End: 2020-01-23

## 2020-01-23 RX ADMIN — LISINOPRIL 10 MG: 5 TABLET ORAL at 08:00

## 2020-01-23 RX ADMIN — HYDROMORPHONE HYDROCHLORIDE 0.5 MG: 1 INJECTION, SOLUTION INTRAMUSCULAR; INTRAVENOUS; SUBCUTANEOUS at 09:16

## 2020-01-23 RX ADMIN — Medication 10 ML: at 06:00

## 2020-01-23 RX ADMIN — Medication 10 ML: at 21:39

## 2020-01-23 RX ADMIN — FENTANYL CITRATE 25 MCG: 50 INJECTION, SOLUTION INTRAMUSCULAR; INTRAVENOUS at 10:38

## 2020-01-23 RX ADMIN — SODIUM CHLORIDE 25 ML/HR: 900 INJECTION, SOLUTION INTRAVENOUS at 10:00

## 2020-01-23 RX ADMIN — LIDOCAINE HYDROCHLORIDE 15 ML: 20 SOLUTION ORAL; TOPICAL at 10:25

## 2020-01-23 RX ADMIN — CEPHALEXIN 500 MG: 500 CAPSULE ORAL at 00:08

## 2020-01-23 RX ADMIN — OXYCODONE HYDROCHLORIDE AND ACETAMINOPHEN 1 TABLET: 7.5; 325 TABLET ORAL at 08:00

## 2020-01-23 RX ADMIN — MIDAZOLAM 0.5 MG: 1 INJECTION INTRAMUSCULAR; INTRAVENOUS at 10:25

## 2020-01-23 RX ADMIN — FAMOTIDINE 20 MG: 10 INJECTION INTRAVENOUS at 21:25

## 2020-01-23 RX ADMIN — FAMOTIDINE 20 MG: 10 INJECTION INTRAVENOUS at 08:00

## 2020-01-23 RX ADMIN — GLUCAGON HYDROCHLORIDE 1 MG: 1 INJECTION, POWDER, FOR SOLUTION INTRAMUSCULAR; INTRAVENOUS; SUBCUTANEOUS at 10:25

## 2020-01-23 RX ADMIN — ONDANSETRON 4 MG: 2 INJECTION INTRAMUSCULAR; INTRAVENOUS at 11:14

## 2020-01-23 RX ADMIN — HYDROMORPHONE HYDROCHLORIDE 1 MG: 1 INJECTION, SOLUTION INTRAMUSCULAR; INTRAVENOUS; SUBCUTANEOUS at 19:34

## 2020-01-23 RX ADMIN — TIOTROPIUM BROMIDE 18 MCG: 18 CAPSULE ORAL; RESPIRATORY (INHALATION) at 07:41

## 2020-01-23 RX ADMIN — CEPHALEXIN 500 MG: 500 CAPSULE ORAL at 17:10

## 2020-01-23 RX ADMIN — LIDOCAINE HYDROCHLORIDE 20 ML: 20 INJECTION, SOLUTION INFILTRATION; PERINEURAL at 10:26

## 2020-01-23 RX ADMIN — AMIODARONE HYDROCHLORIDE 200 MG: 200 TABLET ORAL at 08:00

## 2020-01-23 RX ADMIN — HYDROMORPHONE HYDROCHLORIDE 0.5 MG: 1 INJECTION, SOLUTION INTRAMUSCULAR; INTRAVENOUS; SUBCUTANEOUS at 00:08

## 2020-01-23 RX ADMIN — ARFORMOTEROL TARTRATE 15 MCG: 15 SOLUTION RESPIRATORY (INHALATION) at 07:42

## 2020-01-23 RX ADMIN — DILTIAZEM HYDROCHLORIDE 90 MG: 30 TABLET, FILM COATED ORAL at 08:00

## 2020-01-23 RX ADMIN — OXYCODONE HYDROCHLORIDE AND ACETAMINOPHEN 1 TABLET: 7.5; 325 TABLET ORAL at 21:24

## 2020-01-23 RX ADMIN — HYDROMORPHONE HYDROCHLORIDE 1 MG: 1 INJECTION, SOLUTION INTRAMUSCULAR; INTRAVENOUS; SUBCUTANEOUS at 13:32

## 2020-01-23 RX ADMIN — DILTIAZEM HYDROCHLORIDE 90 MG: 30 TABLET, FILM COATED ORAL at 21:24

## 2020-01-23 RX ADMIN — ARFORMOTEROL TARTRATE 15 MCG: 15 SOLUTION RESPIRATORY (INHALATION) at 19:46

## 2020-01-23 RX ADMIN — FENTANYL CITRATE 25 MCG: 50 INJECTION, SOLUTION INTRAMUSCULAR; INTRAVENOUS at 10:25

## 2020-01-23 RX ADMIN — IOPAMIDOL 50 ML: 612 INJECTION, SOLUTION INTRAVENOUS at 10:51

## 2020-01-23 RX ADMIN — CEPHALEXIN 500 MG: 500 CAPSULE ORAL at 05:17

## 2020-01-23 RX ADMIN — MIDAZOLAM 0.5 MG: 1 INJECTION INTRAMUSCULAR; INTRAVENOUS at 10:38

## 2020-01-23 RX ADMIN — POLYETHYLENE GLYCOL 3350 17 G: 17 POWDER, FOR SOLUTION ORAL at 21:24

## 2020-01-23 RX ADMIN — OXYCODONE HYDROCHLORIDE AND ACETAMINOPHEN 1 TABLET: 7.5; 325 TABLET ORAL at 13:55

## 2020-01-23 RX ADMIN — CEPHALEXIN 500 MG: 500 CAPSULE ORAL at 12:18

## 2020-01-23 RX ADMIN — ALPRAZOLAM 0.25 MG: 0.5 TABLET ORAL at 09:16

## 2020-01-23 RX ADMIN — Medication 10 ML: at 13:32

## 2020-01-23 RX ADMIN — HYDROMORPHONE HYDROCHLORIDE 0.5 MG: 1 INJECTION, SOLUTION INTRAMUSCULAR; INTRAVENOUS; SUBCUTANEOUS at 05:17

## 2020-01-23 RX ADMIN — OXYCODONE HYDROCHLORIDE AND ACETAMINOPHEN 1 TABLET: 7.5; 325 TABLET ORAL at 02:33

## 2020-01-23 NOTE — PROGRESS NOTES
END OF SHIFT NOTE: 
 
Intake/Output 01/23 0701 - 01/23 1900 In: 120 [P.O.:120] Out: 200 [Urine:200] Voiding: YES Catheter: NO 
Drain:   
 
 
 
 
 
Stool:  0 occurrences. Emesis:  0 occurrences. VITAL SIGNS Patient Vitals for the past 12 hrs: 
 Temp Pulse Resp BP SpO2  
01/23/20 1521 97.7 °F (36.5 °C) 76 16 122/70 98 % 01/23/20 1215 97.6 °F (36.4 °C) 72 16 121/73 99 % 01/23/20 1141    122/70 95 % 01/23/20 1126    117/64 99 % 01/23/20 1115     100 % 01/23/20 1111    127/69 99 % 01/23/20 1110     100 % 01/23/20 1101     95 % 01/23/20 1059  65 14 118/70 99 % 01/23/20 1055  67 (!) 87 109/69 99 % 01/23/20 1049  62 11 114/69 (!) 89 % 01/23/20 1044  66 20 122/68 96 % 01/23/20 1040  68 24 104/73 94 % 01/23/20 1035  64 8 118/70 94 % 01/23/20 1030  62 18 116/65 97 % 01/23/20 1025  62 12 115/70 99 % 01/23/20 1022  60 9 112/72 98 % 01/23/20 1020   29  99 % 01/23/20 1015    112/69   
01/23/20 1005     91 % 01/23/20 1002     97 % 01/23/20 1000     93 % 01/23/20 0959 98.3 °F (36.8 °C) 71 16 112/69 90 % 01/23/20 0749 97.8 °F (36.6 °C) 74 18 120/70 100 % 01/23/20 0742     95 % Pain Assessment Pain 1 Pain Scale 1: Visual (01/23/20 1503) Pain Intensity 1: 0 (01/23/20 1503) Patient Stated Pain Goal: 0 (01/23/20 1503) Pain Reassessment 1: Patient resting w/respiratory rate greater than 10 (01/23/20 1503) Pain Onset 1: now (01/23/20 1400) Pain Location 1: Abdomen (01/23/20 1400) Pain Orientation 1: Mid;Upper (01/23/20 1400) Pain Description 1: Aching; Throbbing (01/23/20 1400) Pain Intervention(s) 1: Medication (see MAR) (01/23/20 1400) Additional Pain Sites: Yes (01/17/20 0706) Ambulating Yes Additional Information: Patient got PEG placed today. Upped dilaudid to 1mg. Seems to work a lot better than the half. Pain medication around the clock. VSS. No needs voiced. Shift report given to oncoming nurse at the bedside. Ceci Mccallum

## 2020-01-23 NOTE — PROGRESS NOTES
TRANSFER - OUT REPORT: 
 
Verbal report given to Tavo Taylor RN on Shena Banks  being transferred to 5th floor for routine post - op Report consisted of patients Situation, Background, Assessment and  
Recommendations(SBAR). Information from the following report(s) SBAR and Procedure Summary was reviewed with the receiving nurse. Lines:  
Venous Access Device power port 01/21/20 (Active) Central Line Being Utilized Yes 1/23/2020  8:00 AM  
Criteria for Appropriate Use Irritant/vesicant medication 1/23/2020  8:00 AM  
Site Assessment Clean, dry, & intact 1/23/2020  8:00 AM  
Date of Last Dressing Change 01/21/20 1/23/2020  8:00 AM  
Dressing Status Clean, dry, & intact 1/23/2020  8:00 AM  
Dressing Type Transparent;Disk with Chlorhexadine gluconate (CHG) 1/23/2020  8:00 AM  
Action Taken Blood drawn 1/23/2020  2:21 AM  
Date Accessed (Medial Site) 01/21/20 1/23/2020  2:46 AM  
Positive Blood Return ([facility]) Yes 1/23/2020  8:00 AM  
Action Taken ([facility]) Flushed 1/23/2020  8:00 AM  
Alcohol Cap Used No 1/23/2020  8:00 AM  
  
 
Opportunity for questions and clarification was provided. Nursing communication may use G tube in 4 hours at 1500 if no peritoneal signs present Patient transported with: 
 O2 @ 3 liters

## 2020-01-23 NOTE — PROGRESS NOTES
Went to see patient with Dr. Miranda Villa, but he was off the floor for procedure. Will follow up tomorrow. Maddy Dixon, LUCÍA-GRETA [facility] Hematology and Oncology [address] [place] [address] Office : [phone] Fax : [phone]

## 2020-01-23 NOTE — PROGRESS NOTES
H&P/Consult Note/Progress Note/Office Note:  
Clark Carbajal  MRN: [id number]  :1965  Age:54 y.o. 
 
HPI: Clark Carbajal is a 62 y.o. male who has Stage 4 metastatic esophageal adenocarcinoma with suspected liver, lungs, and garth mets. He has h/o ETOH abuse, tobacco abuse, meth abuse, homelessness, COPD, CHF,  
who was admitted from the ER after presenting with a several week h/o progressive N/V, inability to sustain PO intake and weight loss (>100lbs) Nothing in particular made his symptoms better or worse. He also had associated 2/10 substernal chest pain. No SOB He had not been taking his maint meds in >6 months Pertinent negatives include no back pain, no claudication, no cough, no diaphoresis, no dizziness 20 s/p EGD with esophageal bx; Dr Simmons Esters DIAGNOSIS ESOPHAGEAL MASS BIOPSIES: MODERATELY DIFFERENTIATED ADENOCARCINOMA. Electronically signed out on 2020 14:21 by Consuelo Strong. Olive Hager MD  
Findings: At 32-46cm there is a circumferential, partially obstructing, fungating, ulcerated mass concerning for malignancy. Extensive biopsies were obtained. STOMACH:  The mass extends into the proximal stomach. The fundus on antegrade and retroflexed views is normal. The body, antrum, and pylorus are normal.  
DUODENUM:  The bulb and second portions are normal.  
There are multiple enlarged periesophageal lymph nodes. The largest measures 34 x 14 mm in size. FNA not performed as the needle which averse the tumor and confirmation of malignancy would not affect staging. Further evaluation of the distal extent of tumor as well as stomach and liver could not be performed as the scope could not safely traverse the mass. Impression:   
Esophageal mass. This is concerning for a least T3N2Mx based on endoscopic ultrasound criteria. As the tumor could not be traversed, the distal extent could not be evaluated for invasion of adjacent structures such as the diaphragm. Thus, the possibility of understaging exists. More importantly, there is strong suspicion for metastatic liver and lung disease based on CT.  
  
GI recs:  As patient likely has unresectable malignancy, he may be a candidate for palliative esophageal stent placement. This would require multiple stents due to the length of the tumor. Based on extension into the stomach, he will be at increased risk of stent migration.  
  
 
 
 
 
1/16/20 CT chest/abd/pelvis with oral and IV contrast 
Hx:   Follow-up abnormal chest radiograph smoker with pulmonary 
nodules and 100-pound weight loss over the last several months. 
  
CT CHEST: There are innumerable bilateral pulmonary nodules, the largest 
measuring approximately 3.9 cm posteriorly in the right lower lobe. There is 
marked thickening of the wall of the distal esophagus below the level of the 
hilary which suggests the possibility of primary esophageal carcinoma. There is 
associated subcarinal and azygoesophageal recess lymphadenopathy. No 
significant pleural fluid. The thyroid appears normal as imaged. There is 
thickening of the left ventricular wall suggesting a hypertrophy. 
  
CT ABDOMEN: There are innumerable small hepatic lesions which are new from 2018 
and also suspicious for metastatic disease. The largest measures approximately 2 cm posteriorly in the right lobe. There are mildly enlarged lymph nodes in 
the lesser sac which may represent metastatic disease as well. The spleen, 
pancreas, kidneys, and adrenals appear unremarkable. 
  
CT PELVIS:  The appendix is not confidently identified, but there are no 
secondary signs of appendicitis. The prostate is not enlarged. No primary or 
secondary signs of appendicitis or identified. No pathologically enlarged lymph 
nodes or free fluid is evident. There is scattered aortoiliac atherosclerotic 
calcifications with mild ectasia but no focal aneurysm. Bone windows demonstrate no aggressive process, accounting for degenerative changes including 
severe osteoarthritis at the right hip. A 1 cm central lucency in the body of 
L4 has sclerotic margins and is not typical of metastatic disease. 
  
IMPRESSION:  
  
1. Extensive pulmonary parenchymal and hepatic metastatic disease as well as 
subcarinal and azygoesophageal recess lymphadenopathy likely representing 
metastatic disease as well. 
  
2.  Long-segment thickening of the wall of the distal esophagus suggests the 
possibility of a primary esophageal carcinoma. Follow-up gastroenterology 
consultation is recommended for consideration of endoscopic biopsy. 
  
3.  Left ventricular hypertrophy and scattered atherosclerosis.   
  
 
 
 
 
 
 
Past Medical History:  
Diagnosis Date  Acute respiratory failure with hypercapnia ([address] 2/1/2018  Chronic obstructive pulmonary disease ([address]  Heart failure ([address]  Sleep disorder Past Surgical History:  
Procedure Laterality Date  IR INSERT TUNL CVC W PORT OVER 5 YEARS  1/21/2020 Current Facility-Administered Medications Medication Dose Route Frequency  cephALEXin (KEFLEX) capsule 500 mg  500 mg Oral Q6H  
 heparin (porcine) 100 unit/mL injection 300 Units  300 Units InterCATHeter PRN  
 midazolam (VERSED) injection 0.5-2 mg  0.5-2 mg IntraVENous Multiple  fentaNYL citrate (PF) injection 25-50 mcg  25-50 mcg IntraVENous Multiple  oxyCODONE-acetaminophen (PERCOCET 7.5) 7.5-325 mg per tablet 1 Tab  1 Tab Oral Q6H PRN  
 ALPRAZolam (XANAX) tablet 0.25 mg  0.25 mg Oral TID PRN  
 lisinopril (PRINIVIL, ZESTRIL) tablet 10 mg  10 mg Oral DAILY  polyethylene glycol (MIRALAX) packet 17 g  17 g Oral BID  
 HYDROmorphone (PF) (DILAUDID) injection 0.5 mg  0.5 mg IntraVENous Q4H PRN  promethazine (PHENERGAN) with saline injection 12.5 mg  12.5 mg IntraVENous Q6H PRN  
 famotidine (PF) (PEPCID) 20 mg in 0.9% sodium chloride 10 mL injection 20 mg IntraVENous Q12H  
 albuterol-ipratropium (DUO-NEB) 2.5 MG-0.5 MG/3 ML  3 mL Nebulization Q4H PRN  
 tiotropium (SPIRIVA) inhalation capsule 18 mcg  1 Cap Inhalation DAILY  arformoteroL (BROVANA) neb solution 15 mcg  15 mcg Nebulization BID RT  
 amiodarone (CORDARONE) tablet 200 mg  200 mg Oral DAILY  sodium chloride (NS) flush 5-40 mL  5-40 mL IntraVENous Q8H  
 sodium chloride (NS) flush 5-40 mL  5-40 mL IntraVENous PRN  
 acetaminophen (TYLENOL) tablet 650 mg  650 mg Oral Q4H PRN  
 naloxone (NARCAN) injection 0.4 mg  0.4 mg IntraVENous PRN  
 diphenhydrAMINE (BENADRYL) capsule 25 mg  25 mg Oral Q4H PRN  
 ondansetron (ZOFRAN) injection 4 mg  4 mg IntraVENous Q4H PRN  
 magnesium hydroxide (MILK OF MAGNESIA) 400 mg/5 mL oral suspension 30 mL  30 mL Oral DAILY PRN  
 nicotine (NICODERM CQ) 21 mg/24 hr patch 1 Patch  1 Patch TransDERmal Q24H  
 influenza vaccine 2019-20 (6 mos+)(PF) (FLUARIX/FLULAVAL/FLUZONE QUAD) injection 0.5 mL  0.5 mL IntraMUSCular PRIOR TO DISCHARGE  hydrALAZINE (APRESOLINE) tablet 50 mg  50 mg Oral Q6H PRN  
 hydrALAZINE (APRESOLINE) 20 mg/mL injection 10 mg  10 mg IntraVENous Q6H PRN  
 dilTIAZem (CARDIZEM) IR tablet 90 mg  90 mg Oral TID Patient has no known allergies. Social History Socioeconomic History  Marital status:  Spouse name: Not on file  Number of children: Not on file  Years of education: Not on file  Highest education level: Not on file Tobacco Use  Smoking status: Current Every Day Smoker Packs/day: 2.00 Substance and Sexual Activity  Alcohol use: No  
  Comment: once a month beer  Drug use: Yes Types: Marijuana, Methamphetamines Social History Tobacco Use Smoking Status Current Every Day Smoker  Packs/day: 2.00 No family history on file. ROS: The patient has no difficulty with chest pain or shortness of breath. No fever or chills.   Comprehensive review of systems was otherwise unremarkable except as noted above. Physical Exam:  
Visit Vitals /70 (BP 1 Location: Right arm, BP Patient Position: Supine) Pulse 74 Temp 97.8 °F (36.6 °C) Resp 18 Ht 6' (1.829 m) Wt 183 lb 11.2 oz (83.3 kg) SpO2 100% BMI 24.91 kg/m² Vitals:  
 01/22/20 1756 01/23/20 0031 01/23/20 3223 01/23/20 7640 BP: 127/66  115/77 120/70 Pulse: 78  78 74 Resp: 18 18 18 18 Temp: 97.7 °F (36.5 °C)  97.7 °F (36.5 °C) 97.8 °F (36.6 °C) SpO2: 98% 99% 93% 100% Weight:      
Height:      
 
No intake/output data recorded. 01/21 1901 - 01/23 0700 In: 2199 [P.O.:236; I.V.:893] Out: 1600 [Urine:1600] Constitutional: Alert, oriented, cooperative patient in no acute distress; appears weak Eyes:Sclera are clear. EOMs intact ENMT: no external lesions gross hearing normal; no obvious neck masses, no ear or lip lesions, nares normal 
CV: RRR. Normal perfusion Resp: No JVD. Breathing is  non-labored; no audible wheezing. GI: soft and non-distended Musculoskeletal: unremarkable with normal function. No embolic signs or cyanosis. Neuro:  Oriented; moves all 4; no focal deficits Psychiatric: normal affect and mood, no memory impairment Recent vitals (if inpt): 
Patient Vitals for the past 24 hrs: 
 BP Temp Pulse Resp SpO2  
01/23/20 0749 120/70 97.8 °F (36.6 °C) 74 18 100 % 01/23/20 0318 115/77 97.7 °F (36.5 °C) 78 18 93 % 01/23/20 0031    18 99 % 01/22/20 2341 127/66 97.7 °F (36.5 °C) 78 18 98 % 01/22/20 2134     97 % 01/22/20 2008 133/77 97.4 °F (36.3 °C) 75 18 99 % 01/22/20 1504 137/79 97.7 °F (36.5 °C) 77 16 100 % 01/22/20 1358 120/73  73 16 100 % 01/22/20 1343 109/62  69 14 99 % 01/22/20 1330 110/63 98 °F (36.7 °C) 74 12 98 % 01/22/20 1328 110/63  76 16 98 % 01/22/20 1310 99/64  76 18 94 % 01/22/20 1136 129/76 97.9 °F (36.6 °C) 69 18 92 % 01/22/20 1122 117/68 97.8 °F (36.6 °C) 71 18 92 % 01/22/20 0756 140/82 97.5 °F (36.4 °C) 83 18 99 % Labs: 
Recent Labs  
  01/23/20 
0205 WBC 6.7 HGB 8.6*    
K 4.1  CO2 29 BUN 11  
CREA 0.83 * TBILI 0.2 SGOT 12* ALT 7*  Lab Results Component Value Date/Time WBC 6.7 01/23/2020 02:05 AM  
 HGB 8.6 (L) 01/23/2020 02:05 AM  
 PLATELET 435 68/70/0495 02:05 AM  
 Sodium 138 01/23/2020 02:05 AM  
 Potassium 4.1 01/23/2020 02:05 AM  
 Chloride 103 01/23/2020 02:05 AM  
 CO2 29 01/23/2020 02:05 AM  
 BUN 11 01/23/2020 02:05 AM  
 Creatinine 0.83 01/23/2020 02:05 AM  
 Glucose 127 (H) 01/23/2020 02:05 AM  
 INR 1.0 01/17/2020 04:25 AM  
 aPTT 57.3 (H) 01/22/2018 08:26 AM  
 Bilirubin, total 0.2 01/23/2020 02:05 AM  
 AST (SGOT) 12 (L) 01/23/2020 02:05 AM  
 ALT (SGPT) 7 (L) 01/23/2020 02:05 AM  
 Alk. phosphatase 101 01/23/2020 02:05 AM  
 Lipase 270 01/16/2020 06:49 AM  
 Troponin-I, Qt. <0.02 (L) 01/16/2020 06:49 AM  
 
 
CT Results  (Last 48 hours) None  
  
 
chest X-ray I reviewed recent labs, recent radiologic studies, and pertinent records including other doctor notes if needed. I independently reviewed radiology images for studies I described above or studies I have ordered. Admission date (for inpatients): 1/16/2020 Day of Surgery  Procedure(s): ESOPHAGOGASTRODUODENOSCOPY (EGD) ASSESSMENT/PLAN: 
Problem List  Date Reviewed: 1/17/2020 Codes Class Noted Cellulitis of left upper extremity ICD-10-CM: L03.114 
ICD-9-CM: 682.3  1/21/2020 * (Principal) Esophageal mass ICD-10-CM: K22.8 ICD-9-CM: 530.89  1/17/2020 Liver metastases ([facility] ICD-10-CM: C78.7 ICD-9-CM: 197.7  1/17/2020 Esophageal dysphagia ICD-10-CM: R13.10 ICD-9-CM: 787.20  1/17/2020 Weight loss ICD-10-CM: R63.4 ICD-9-CM: 783.21  1/17/2020 Chest pain ICD-10-CM: R07.9 ICD-9-CM: 786.50  1/16/2020 Methamphetamine abuse (HCC) (Chronic) ICD-10-CM: F15.10 ICD-9-CM: 305.70  12/2/2018 H/O atrial flutter (Chronic) ICD-10-CM: Z86.79 
ICD-9-CM: V12.59  12/2/2018 Overview Signed 2/6/2018  7:51 AM by Shailesh Lundberg NP  
  1/2018 Atrial flutter, s/p cardioversion. Essential hypertension (Chronic) ICD-10-CM: I10 
ICD-9-CM: 401.9  12/2/2018 Acute respiratory failure with hypoxia and hypercarbia (HCC) ICD-10-CM: J96.01, J96.02 
ICD-9-CM: 518.81  12/2/2018 Substance abuse (HCC) (Chronic) ICD-10-CM: F19.10 ICD-9-CM: 305.90  12/2/2018 Noncompliance (Chronic) ICD-10-CM: Z91.19 ICD-9-CM: V15.81  12/2/2018 Acute encephalopathy ICD-10-CM: G93.40 ICD-9-CM: 348.30  12/2/2018 COPD (chronic obstructive pulmonary disease) (HCC) (Chronic) ICD-10-CM: J44.9 ICD-9-CM: 225  11/16/2018 NATALIIA (obstructive sleep apnea) (Chronic) ICD-10-CM: P17.75 
ICD-9-CM: 327.23  11/16/2018 Chronic respiratory failure (HCC) (Chronic) ICD-10-CM: J96.10 ICD-9-CM: 518.83  11/16/2018 Altered mental status ICD-10-CM: R41.82 
ICD-9-CM: 780.97  11/16/2018 Morbid obesity ([facility] [address] (Chronic) ICD-10-CM: E66.01 
ICD-9-CM: 278.01  11/16/2018 Nicotine dependence (Chronic) ICD-10-CM: H92.361 ICD-9-CM: 305.1  2/1/2018 Alcohol abuse (Chronic) ICD-10-CM: F10.10 ICD-9-CM: 305.00  2/1/2018 Homeless (Chronic) ICD-10-CM: Z59.0 ICD-9-CM: V60.0  1/15/2018 Urethral stricture (Chronic) ICD-10-CM: N35.919 ICD-9-CM: 598.9  1/7/2018 Anasarca ICD-10-CM: R60.1 ICD-9-CM: 782.3  1/6/2018 Acute kidney injury ([facility] ICD-10-CM: N17.9 ICD-9-CM: 584.9  1/6/2018 Principal Problem: 
  Esophageal mass (1/17/2020) Active Problems: 
  Nicotine dependence (2/1/2018) Methamphetamine abuse ([facility] [address] (12/2/2018) Alcohol abuse (2/1/2018) Homeless (1/15/2018) Chest pain (1/16/2020) Liver metastases ([facility] [address] (1/17/2020) Esophageal dysphagia (1/17/2020) Weight loss (1/17/2020) Cellulitis of left upper extremity (1/21/2020) Stage 4 Widely-metastatic Esophageal adenocarcinoma with suspected diffuse lung, diffuse liver, and mediastinal mets Med onc planning chemotherapy s/p Port placement on 1/21/20 Palliative care following Homelessness He was homeless for about 6-8months but now lives with his son, but there is no heat in house (except portable propane tank space heater) Dysphagia/Weight loss Could not place PEG on 1/22/20 IR will evaluate today to possibly place gastrostomy NPO for possible procedure today His PO intake should be restricted to full liquid diet only as a result of the large circumferential esophageal malignancy Eventually Home on full liquid diet only and tube feeds prn PEG feedings per nutrition recommendations I have personally performed a face-to-face diagnostic evaluation and management  service on this patient. I have independently seen the patient. I have independently obtained the above history from the patient/family. I have independently examined the patient with above findings. I have independently reviewed data/labs for this patient and developed the above plan of care (MDM). Signed: Vandana Parekh.  Kiley Nguyễn MD, FACS

## 2020-01-23 NOTE — PROGRESS NOTES
Progress Note Patient: Shazia Rosa MRN: [id number]  SSN: [id number] YOB: 1965  Age: 62 y.o. Sex: male Admit Date: 1/16/2020 LOS: 7 days Subjective:  
 
Patient with past medical history of methamphetamine abuse, tobacco use, noncompliance with medical treatment, hypertension, CHF, COPD. Admitted due to chest pain, shortness of breath. Found to have an esophageal mass, with possible liver and lung metastasis. Had EGD on 1/17/2020, found to have fungating, partially obstructing esophageal mass extending into stomach. Biopsy of tumor show adenocarcinoma. Considered stage IV disease. Patient decided to have full treatment for this cancer. Surgical consult is on the case and will plan on PEG tube placement today. Patient had IV port placement on 1/21/2020. Patient was found to have some redness on the left forearm. Doppler did not show evidence of DVT, just superficial thrombosis. He was started on Keflex. That helps his symptom. The redness is gone down. This morning he had PEG tube placement. Now he would like to have some liquid by mouth. Objective:  
 
Vitals:  
 01/23/20 1115 01/23/20 1126 01/23/20 1141 01/23/20 1215 BP:  117/64 122/70 121/73 Pulse:    72 Resp:    16 Temp:    97.6 °F (36.4 °C) SpO2: 100% 99% 95% 99% Weight:      
Height:      
  
 
Intake and Output: 
Current Shift: No intake/output data recorded. Last three shifts: 01/21 1901 - 01/23 0700 In: 3078 [P.O.:236; I.V.:893] Out: 1600 [Urine:1600] Physical Exam:  
 
General:                    The patient is a male in no acute distress. Patient appears chronically ill. He lies flat in bed. Some expressive aphasia because of esophageal mass affecting his speech. Head:                                   Normocephalic/atraumatic. Eyes:                                   palpebral pallor, no scleral icterus. ENT:                                    External auricular and nasal exam within normal limits. Mucous membranes are moist. 
Neck:                                   Supple, non-tender, no JVD. Right upper chest wall with IV port. No evidence of local inflammation. No bleeding. Lungs:                       decreased to auscultation bilaterally without wheezes or crackles. No respiratory distress or accessory muscle use. Heart:                                  Regular rate and rhythm, without murmurs, rubs, or gallops. Abdomen:                  Soft, non-tender, mildly distended with normoactive bowel sounds. Presence of PEG tube. No active bleeding. Genitourinary:           No tenderness over the bladder or bilateral CVAs. Extremities:               Without clubbing, cyanosis, or edema. Skin:                                   left forearm with a small area of mild redness , and swelling . No fluctuation . Redness has gone down. Swelling has gone down. pulses:                      Radial and dorsalis pedis pulses present 2+ bilaterally. Capillary refill <2s. Neurologic:                CN II-XII grossly intact and symmetrical.  
                                            Moving all four extremities well with no focal deficits. Psychiatric:                Pleasant demeanor, appropriate affect. Alert and oriented x 3 Lab/Data Review: 
 
CT chest  
1- IMPRESSION:  
  
1. Extensive pulmonary parenchymal and hepatic metastatic disease as well as 
subcarinal and azygoesophageal recess lymphadenopathy likely representing 
metastatic disease as well. 
  
2.  Long-segment thickening of the wall of the distal esophagus suggests the 
possibility of a primary esophageal carcinoma. Follow-up gastroenterology consultation is recommended for consideration of endoscopic biopsy. 
  
3.  Left ventricular hypertrophy and scattered atherosclerosis.   
 XR chest  
1- IMPRESSION:  NUMEROUS BILATERAL PULMONARY NODULES ARE SUSPICIOUS FOR METASTATIC 
DISEASE, AND A 4 CM RIGHT LOWER LUNG MASS PROJECTED OVER THE INFERIOR ASPECT OF 
THE RIGHT HILUM IS SUSPICIOUS FOR A PRIMARY BRONCHOGENIC CARCINOMA IN THIS 
SMOKER WITH COPD. FOLLOW PULMONARY MEDICINE CONSULTATION IS RECOMMENDED. 
  
Echo  
1- -  Left ventricle: Systolic function was normal. Ejection fraction was 
estimated in the range of 55 % to 60 %. There were no regional wall motion 
abnormalities. There was moderate concentric hypertrophy. 
  
-  Tricuspid valve: There was mild regurgitation. 
  
-  Additional impressions: LVH noted from echo in 1/2018 unchanged, LVEF is 
normal. 
  
 
Current Facility-Administered Medications:  
  ondansetron (ZOFRAN) injection 4 mg, 4 mg, IntraVENous, PRN, Curtis Patel MD, 4 mg at 01/23/20 1114 
  benzocaine (HURRICANE) 20 % spray, , Mucous Membrane, ONCE, Curtis Patel MD, Stopped at 01/23/20 1000 
  HYDROmorphone (PF) (DILAUDID) injection 1 mg, 1 mg, IntraVENous, Q4H PRN, Delphine Schaeffer MD 
  cephALEXin (KEFLEX) capsule 500 mg, 500 mg, Oral, Q6H, Delphine Schaeffer MD, 500 mg at 01/23/20 1218   heparin (porcine) 100 unit/mL injection 300 Units, 300 Units, InterCATHeter, PRN, Chucky Osorio MD, 300 Units at 01/21/20 1313 
  oxyCODONE-acetaminophen (PERCOCET 7.5) 7.5-325 mg per tablet 1 Tab, 1 Tab, Oral, Q6H PRN, Valerie Murguia MD, 1 Tab at 01/23/20 0800   ALPRAZolam (XANAX) tablet 0.25 mg, 0.25 mg, Oral, TID PRN, Valerie Murguia MD, 0.25 mg at 01/23/20 8941   lisinopril (PRINIVIL, ZESTRIL) tablet 10 mg, 10 mg, Oral, DAILY, Valerie Murguia MD, 10 mg at 01/23/20 0800   polyethylene glycol (MIRALAX) packet 17 g, 17 g, Oral, BID, Mattie Jameel Darby MD, [address] at 01/23/20 0800   promethazine (PHENERGAN) with saline injection 12.5 mg, 12.5 mg, IntraVENous, Q6H PRN, Shari Carlton MD 
  famotidine (PF) (PEPCID) 20 mg in 0.9% sodium chloride 10 mL injection, 20 mg, IntraVENous, Q12H, Shari Carlton MD, 20 mg at 01/23/20 0800 
  albuterol-ipratropium (DUO-NEB) 2.5 MG-0.5 MG/3 ML, 3 mL, Nebulization, Q4H PRN, Shari Carlton MD 
  tiotropium [facility]) inhalation capsule 18 mcg, 1 Cap, Inhalation, DAILY, Shari Carlton MD, 18 mcg at 01/23/20 0741 
  arformoteroL (BROVANA) neb solution 15 mcg, 15 mcg, Nebulization, BID RT, Shari Carlton MD, 15 mcg at 01/23/20 7485 
  amiodarone (CORDARONE) tablet 200 mg, 200 mg, Oral, DAILY, Shari Carlton MD, 200 mg at 01/23/20 0800 
  sodium chloride (NS) flush 5-40 mL, 5-40 mL, IntraVENous, Q8H, Jameel Phelps MD, 10 mL at 01/23/20 0600 
  sodium chloride (NS) flush 5-40 mL, 5-40 mL, IntraVENous, PRN, Shari Carlton MD 
  acetaminophen (TYLENOL) tablet 650 mg, 650 mg, Oral, Q4H PRN, Shari Carlton MD, 650 mg at 01/17/20 0706 
  naloxone (NARCAN) injection 0.4 mg, 0.4 mg, IntraVENous, PRN, Shari Carlton MD 
  diphenhydrAMINE (BENADRYL) capsule 25 mg, 25 mg, Oral, Q4H PRN, Shari Carlton MD, 25 mg at 01/20/20 2141   ondansetron (ZOFRAN) injection 4 mg, 4 mg, IntraVENous, Q4H PRN, Shari Carlton MD, 4 mg at 01/19/20 0915 
  magnesium hydroxide (MILK OF MAGNESIA) 400 mg/5 mL oral suspension 30 mL, 30 mL, Oral, DAILY PRN, Shari Carlton MD 
  nicotine (NICODERM CQ) 21 mg/24 hr patch 1 Patch, 1 Patch, TransDERmal, Q24H, Shari Carlton MD, 1 Patch at 01/23/20 1218   influenza vaccine 2019-20 (6 mos+)(PF) (FLUARIX/FLULAVAL/FLUZONE QUAD) injection 0.5 mL, 0.5 mL, IntraMUSCular, PRIOR TO DISCHARGE, Shari Carlton MD 
  hydrALAZINE (APRESOLINE) tablet 50 mg, 50 mg, Oral, Q6H PRN, Shari Carlton MD 
  hydrALAZINE (APRESOLINE) 20 mg/mL injection 10 mg, 10 mg, IntraVENous, Q6H PRN, Marito Palacio MD, 10 mg at 01/16/20 1652   dilTIAZem (CARDIZEM) IR tablet 90 mg, 90 mg, Oral, TID, Marito Palacio MD, 90 mg at 01/23/20 0800 Assessment:  
 
Principal Problem: 
  Esophageal mass (1/17/2020) Active Problems: 
  Nicotine dependence (2/1/2018) Methamphetamine abuse ([facility] [address] (12/2/2018) Alcohol abuse (2/1/2018) Homeless (1/15/2018) Chest pain (1/16/2020) Liver metastases ([facility] [address] (1/17/2020) Esophageal dysphagia (1/17/2020) Weight loss (1/17/2020) Cellulitis of left upper extremity (1/21/2020) Plan:  
 
Patient with esophageal mass. Lung lesion, liver lesion. Likely metastasis. Chronic smoking. Had PEG placement. S/P a port placement. I discussed with oncology today. As per oncology's NP, patient is to be accepted to the service tomorrow for chemotherapy. I have updated the patient on this information. Symptomatic treatments Since he has PEG tube placement by IR, and now patient asked to be allowed fluid intake by mouth. I will ask IR regarding if we can start full liquid diet for the patient by mouth. Likely can start feeding through PEG tube tomorrow. Smoking. I advised patient to quit. Nicotine patch. Severe Malnutrition, as evidenced by:  
Nutritional intake of <75% of energy intake compared to estimated energy needs for > 1 month Weight loss of >5% in 1 month             
Severe loss of muscle mass Nutritional service consulted. Give additional nutritional supplement. Cellulitis left forearm Continue with Keflex 500 mg p.o. I have discussed the plan of care with patient. DVT prophylaxis : SCD Signed By: Clair Jimenez MD   
 January 23, 2020

## 2020-01-23 NOTE — PROGRESS NOTES
Nutrition follow-up Reason for initial assessment: Referral received from nursing admission Malnutrition Screening Tool Recently Lost Weight Without Trying: Yes If Yes, How Much Weight Loss: >33 lbs(almost 100 lbs in 3 months) Eating Poorly Due to Decreased Appetite: No 
  
Assessment:  
Diet: DIET NPO past midnight Food/Nutrition Patient History:   
Pt with history of MARGARITA, COPD, altered mental status, nocotine use and alcohol abuse. He presented with nausea/vomiting and chest pain for months. 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 Plans for port placed 1/21. Patient was off the floor in IR for PEG at RD rounds. Spoke later with primary RN who reports PEG placement successful and likely to start TF tomorrow.  
  
Anthropometrics: 
Height: 6' (182.9 cm), Weight: 87.1 kg (192 lb), Weight Source: Patient stated, Body mass index is 26.04 kg/m². BMI class of overweight. WT / BMI 1/16/2020 12/12/2018 WEIGHT 192 lb 288 lb 1.6 oz BODY MASS INDEX 26.04 kg/m2    
Unable to verify weight sources. Also unable to confirm weight loss in 3-4 months, but pt has lost 96 lbs in 1 year, 33% which is clinically significant.  
  
Macronutrient needs: MARGARITA (CBW 87.1kg) BYD: 3929-3317 HRLD/OSR (20-25 kcals/kg) EPR:  g/day (0.8-1.2 g/kg) Max CHO: 326 g/d (60% kcal) Fluid: 1.7-2.2 L/d (~1 ml/kcal) 
  Intake/Comparative Standards: Pt remains NPO, previously on clear liquids diet which does not meet protein or calorie needs.  
  
Malnutrition Criteria:   
Meets Criteria for Malnutrition in the context of Chronic Illness  
[x]? ? Severe Malnutrition, as evidenced by: 
            [x]? ? Nutritional intake of <75% of energy intake compared to estimated energy needs for > 1 month             [x]? ? Weight loss of >5% in 1 month, >7.5% in 3 months,  >10% in 6 months, or >20% in 12 months 
            []? ? Severe edema 
            []? ? Severe loss of muscle mass             []? ? Severe loss of subcutaneous fat 
            []? ? Functional decline             []? ? Severe edema  
  
  
Nutrition Diagnosis: 
Severe chronic disease or condition related malnutrition related to decreased intake  as evidenced by estimated intake less than estimated needs, nausea, vomiting and unintentional weight loss at least 20% in 1 year. 
  
Nutrition Intervention: 
Meals and Snacks: Advance as medically appropriate and tolerated. Per GI note, this am, hopeful to d/c home with full liquids. Commercial Beverages and Supplements: Continue Ensure Clear TID for now, if diet able to be advanced can change to Ensure Enlive TID. Enteral Nutrition: Once okay to start TF, please place consult to RD for tube feeding management. Patient remains at high risk for refeeding. Recommendations remain the same since last assessment: Initiate TF with Jevity 1.2 @ 35 ml/hr (do not advance) with 30 ml water. As tolerated would advance to goal of 75 ml/hr with 30 ml/hr water flush. TF regimen at goal would provide: 2160 kcal (100% estimated energy needs), 100 g pro (100% estimated protein needs), 304 g CHO (does not exceed max CHO), 2172 ml fluid (100% estimated fluid needs). Can consider transition to bolus feeds as tolerated and once stable on continuous feeds. Coordination of care: Discussed with Carol Mendoza RN, ? Plan to start oral diet, ? Start of TF - she will address with MD, advised her that nutrition will need TF management consult. Discharge Nutrition Plan: Too soon to determine [address] [facility] [address], [address]

## 2020-01-23 NOTE — PROGRESS NOTES
END OF SHIFT NOTE: 
 
Intake/Output 01/22 1901 - 01/23 0700 In: -  
Out: 450 [Urine:450] Voiding: YES Catheter: NO 
Drain:   
 
 
 
 
 
Stool:  0 occurrences. Emesis:  0 occurrences. VITAL SIGNS Patient Vitals for the past 12 hrs: 
 Temp Pulse Resp BP SpO2  
01/23/20 0318 97.7 °F (36.5 °C) 78 18 115/77 93 % 01/23/20 0031   18  99 % 01/22/20 2341 97.7 °F (36.5 °C) 78 18 127/66 98 % 01/22/20 2134     97 % 01/22/20 2008 97.4 °F (36.3 °C) 75 18 133/77 99 % Pain Assessment Pain 1 Pain Scale 1: Visual (01/23/20 0354) Pain Intensity 1: 0 (01/23/20 0354) Patient Stated Pain Goal: 0 (01/22/20 1650) Pain Reassessment 1: Patient resting w/respiratory rate greater than 10 (01/22/20 1650) Pain Onset 1: ongoing (01/22/20 1600) Pain Location 1: Abdomen (01/22/20 2102) Pain Orientation 1: Mid (01/22/20 1600) Pain Description 1: Aching; Throbbing (01/22/20 1600) Pain Intervention(s) 1: Medication (see MAR) (01/23/20 0222) Additional Pain Sites: Yes (01/17/20 0706) Ambulating Yes Additional Information: Patient receiving dilaudid Q4h and percocet Q6h. Patient to go to IR today for PEG placement. Uneventful night, patient rested well. VSS. Shift report given to oncoming nurse, Ezra Wheeler at the bedside. Yassine Rider

## 2020-01-23 NOTE — PROGRESS NOTES
Attempted to see pt, however, he was off the floor for a procedure. Will follow up tomorrow. Marissa Cain, FNP-C Palliative Care

## 2020-01-23 NOTE — PROCEDURES
Department of Interventional Radiology 
[phone] Interventional Radiology Brief Procedure Note Patient: Raimundo Rascon MRN: [id number]  SSN: [id number] YOB: 1965  Age: 62 y.o. Sex: male Date of Procedure: 1/23/2020 Pre-Procedure Diagnosis: Esophageal cancer Post-Procedure Diagnosis: SAME Procedure(s): Feeding tube placement Brief Description of Procedure: as above Performed By: Keira Peguero MD  
 
Assistants: None Anesthesia:Moderate Sedation Estimated Blood Loss: Less than 10ml Specimens:  None Implants:  18 f gastrostomy tube Findings: tube into body of stomach Complications: None Recommendations: OK to use in 4 hours Follow Up: 2 weeks for pexy removal 
 
Signed By: Keira Peguero MD   
 January 23, 2020

## 2020-01-23 NOTE — PROGRESS NOTES
TRANSFER - IN REPORT: 
 
Verbal report received from LUCIANO Chris(name) on [employer]  being received from IR(unit) for routine post - op Report consisted of patients Situation, Background, Assessment and  
Recommendations(SBAR). Information from the following report(s) SBAR, Kardex, Procedure Summary and MAR was reviewed with the receiving nurse. Opportunity for questions and clarification was provided. Assessment completed upon patients arrival to unit and care assumed.

## 2020-01-23 NOTE — PROGRESS NOTES
TRANSFER - IN REPORT: 
 
[facility] Procedure for routine progression of care Report consisted of patients Situation, Background, Assessment and  
Recommendations(SBAR). Information from the following report(s) SBAR, Kardex, Procedure Summary and MAR was reviewed with the receiving nurse. Opportunity for questions and clarification was provided. Assessment completed upon patients arrival to unit and care assumed.

## 2020-01-24 LAB
ALBUMIN SERPL-MCNC: 2 G/DL (ref 3.5–5)
ALBUMIN/GLOB SERPL: 0.5 {RATIO} (ref 1.2–3.5)
ALP SERPL-CCNC: 101 U/L (ref 50–136)
ALT SERPL-CCNC: 6 U/L (ref 12–65)
ANION GAP SERPL CALC-SCNC: 3 MMOL/L (ref 7–16)
AST SERPL-CCNC: 13 U/L (ref 15–37)
BASOPHILS # BLD: 0 K/UL (ref 0–0.2)
BASOPHILS NFR BLD: 0 % (ref 0–2)
BILIRUB SERPL-MCNC: 0.3 MG/DL (ref 0.2–1.1)
BUN SERPL-MCNC: 8 MG/DL (ref 6–23)
CALCIUM SERPL-MCNC: 9.2 MG/DL (ref 8.3–10.4)
CHLORIDE SERPL-SCNC: 102 MMOL/L (ref 98–107)
CO2 SERPL-SCNC: 32 MMOL/L (ref 21–32)
CREAT SERPL-MCNC: 0.65 MG/DL (ref 0.8–1.5)
DIFFERENTIAL METHOD BLD: ABNORMAL
EOSINOPHIL # BLD: 0.2 K/UL (ref 0–0.8)
EOSINOPHIL NFR BLD: 3 % (ref 0.5–7.8)
ERYTHROCYTE [DISTWIDTH] IN BLOOD BY AUTOMATED COUNT: 13.4 % (ref 11.9–14.6)
GLOBULIN SER CALC-MCNC: 4 G/DL (ref 2.3–3.5)
GLUCOSE SERPL-MCNC: 93 MG/DL (ref 65–100)
HCT VFR BLD AUTO: 29.5 % (ref 41.1–50.3)
HGB BLD-MCNC: 9.1 G/DL (ref 13.6–17.2)
IMM GRANULOCYTES # BLD AUTO: 0 K/UL (ref 0–0.5)
IMM GRANULOCYTES NFR BLD AUTO: 0 % (ref 0–5)
LYMPHOCYTES # BLD: 1.1 K/UL (ref 0.5–4.6)
LYMPHOCYTES NFR BLD: 17 % (ref 13–44)
MAGNESIUM SERPL-MCNC: 1.8 MG/DL (ref 1.8–2.4)
MCH RBC QN AUTO: 30 PG (ref 26.1–32.9)
MCHC RBC AUTO-ENTMCNC: 30.8 G/DL (ref 31.4–35)
MCV RBC AUTO: 97.4 FL (ref 79.6–97.8)
MONOCYTES # BLD: 0.6 K/UL (ref 0.1–1.3)
MONOCYTES NFR BLD: 9 % (ref 4–12)
NEUTS SEG # BLD: 4.6 K/UL (ref 1.7–8.2)
NEUTS SEG NFR BLD: 70 % (ref 43–78)
NRBC # BLD: 0 K/UL (ref 0–0.2)
PLATELET # BLD AUTO: 312 K/UL (ref 150–450)
PMV BLD AUTO: 9.5 FL (ref 9.4–12.3)
POTASSIUM SERPL-SCNC: 4.3 MMOL/L (ref 3.5–5.1)
PROT SERPL-MCNC: 6 G/DL (ref 6.3–8.2)
RBC # BLD AUTO: 3.03 M/UL (ref 4.23–5.6)
SODIUM SERPL-SCNC: 137 MMOL/L (ref 136–145)
WBC # BLD AUTO: 6.5 K/UL (ref 4.3–11.1)

## 2020-01-24 PROCEDURE — 74011250637 HC RX REV CODE- 250/637: Performed by: HOSPITALIST

## 2020-01-24 PROCEDURE — 80053 COMPREHEN METABOLIC PANEL: CPT

## 2020-01-24 PROCEDURE — 85025 COMPLETE CBC W/AUTO DIFF WBC: CPT

## 2020-01-24 PROCEDURE — 77010033678 HC OXYGEN DAILY

## 2020-01-24 PROCEDURE — 74011250636 HC RX REV CODE- 250/636: Performed by: INTERNAL MEDICINE

## 2020-01-24 PROCEDURE — 74011250637 HC RX REV CODE- 250/637: Performed by: NURSE PRACTITIONER

## 2020-01-24 PROCEDURE — 83735 ASSAY OF MAGNESIUM: CPT

## 2020-01-24 PROCEDURE — 74011250637 HC RX REV CODE- 250/637: Performed by: INTERNAL MEDICINE

## 2020-01-24 PROCEDURE — 74011000250 HC RX REV CODE- 250: Performed by: HOSPITALIST

## 2020-01-24 PROCEDURE — 94640 AIRWAY INHALATION TREATMENT: CPT

## 2020-01-24 PROCEDURE — 99233 SBSQ HOSP IP/OBS HIGH 50: CPT | Performed by: NURSE PRACTITIONER

## 2020-01-24 PROCEDURE — 65270000029 HC RM PRIVATE

## 2020-01-24 PROCEDURE — 99233 SBSQ HOSP IP/OBS HIGH 50: CPT | Performed by: INTERNAL MEDICINE

## 2020-01-24 PROCEDURE — 36591 DRAW BLOOD OFF VENOUS DEVICE: CPT

## 2020-01-24 PROCEDURE — 94660 CPAP INITIATION&MGMT: CPT

## 2020-01-24 PROCEDURE — 94760 N-INVAS EAR/PLS OXIMETRY 1: CPT

## 2020-01-24 PROCEDURE — 74011250636 HC RX REV CODE- 250/636: Performed by: HOSPITALIST

## 2020-01-24 RX ORDER — FENTANYL 12.5 UG/1
1 PATCH TRANSDERMAL
Status: DISCONTINUED | OUTPATIENT
Start: 2020-01-24 | End: 2020-01-31 | Stop reason: HOSPADM

## 2020-01-24 RX ORDER — SERTRALINE HYDROCHLORIDE 50 MG/1
25 TABLET, FILM COATED ORAL DAILY
Status: DISCONTINUED | OUTPATIENT
Start: 2020-01-24 | End: 2020-01-31 | Stop reason: HOSPADM

## 2020-01-24 RX ADMIN — HYDROMORPHONE HYDROCHLORIDE 1 MG: 1 INJECTION, SOLUTION INTRAMUSCULAR; INTRAVENOUS; SUBCUTANEOUS at 21:46

## 2020-01-24 RX ADMIN — CEPHALEXIN 500 MG: 500 CAPSULE ORAL at 17:48

## 2020-01-24 RX ADMIN — ARFORMOTEROL TARTRATE 15 MCG: 15 SOLUTION RESPIRATORY (INHALATION) at 09:01

## 2020-01-24 RX ADMIN — OXYCODONE HYDROCHLORIDE AND ACETAMINOPHEN 1 TABLET: 7.5; 325 TABLET ORAL at 08:51

## 2020-01-24 RX ADMIN — HYDROMORPHONE HYDROCHLORIDE 1 MG: 1 INJECTION, SOLUTION INTRAMUSCULAR; INTRAVENOUS; SUBCUTANEOUS at 05:21

## 2020-01-24 RX ADMIN — DILTIAZEM HYDROCHLORIDE 90 MG: 30 TABLET, FILM COATED ORAL at 17:48

## 2020-01-24 RX ADMIN — POLYETHYLENE GLYCOL 3350 17 G: 17 POWDER, FOR SOLUTION ORAL at 09:02

## 2020-01-24 RX ADMIN — POLYETHYLENE GLYCOL 3350 17 G: 17 POWDER, FOR SOLUTION ORAL at 21:40

## 2020-01-24 RX ADMIN — Medication 10 ML: at 12:07

## 2020-01-24 RX ADMIN — DILTIAZEM HYDROCHLORIDE 90 MG: 30 TABLET, FILM COATED ORAL at 21:40

## 2020-01-24 RX ADMIN — Medication 10 ML: at 21:42

## 2020-01-24 RX ADMIN — Medication 10 ML: at 05:22

## 2020-01-24 RX ADMIN — LISINOPRIL 10 MG: 5 TABLET ORAL at 09:00

## 2020-01-24 RX ADMIN — SERTRALINE HYDROCHLORIDE 25 MG: 50 TABLET ORAL at 11:18

## 2020-01-24 RX ADMIN — HYDROMORPHONE HYDROCHLORIDE 1 MG: 1 INJECTION, SOLUTION INTRAMUSCULAR; INTRAVENOUS; SUBCUTANEOUS at 17:55

## 2020-01-24 RX ADMIN — ALPRAZOLAM 0.25 MG: 0.5 TABLET ORAL at 08:51

## 2020-01-24 RX ADMIN — FAMOTIDINE 20 MG: 10 INJECTION INTRAVENOUS at 09:02

## 2020-01-24 RX ADMIN — HYDROMORPHONE HYDROCHLORIDE 1 MG: 1 INJECTION, SOLUTION INTRAMUSCULAR; INTRAVENOUS; SUBCUTANEOUS at 00:17

## 2020-01-24 RX ADMIN — HYDROMORPHONE HYDROCHLORIDE 1 MG: 1 INJECTION, SOLUTION INTRAMUSCULAR; INTRAVENOUS; SUBCUTANEOUS at 12:01

## 2020-01-24 RX ADMIN — ARFORMOTEROL TARTRATE 15 MCG: 15 SOLUTION RESPIRATORY (INHALATION) at 21:05

## 2020-01-24 RX ADMIN — DILTIAZEM HYDROCHLORIDE 90 MG: 30 TABLET, FILM COATED ORAL at 09:00

## 2020-01-24 RX ADMIN — CEPHALEXIN 500 MG: 500 CAPSULE ORAL at 00:17

## 2020-01-24 RX ADMIN — CEPHALEXIN 500 MG: 500 CAPSULE ORAL at 05:21

## 2020-01-24 RX ADMIN — FAMOTIDINE 20 MG: 10 INJECTION INTRAVENOUS at 21:40

## 2020-01-24 RX ADMIN — CEPHALEXIN 500 MG: 500 CAPSULE ORAL at 11:18

## 2020-01-24 RX ADMIN — OXYCODONE HYDROCHLORIDE AND ACETAMINOPHEN 1 TABLET: 7.5; 325 TABLET ORAL at 16:57

## 2020-01-24 RX ADMIN — AMIODARONE HYDROCHLORIDE 200 MG: 200 TABLET ORAL at 09:00

## 2020-01-24 RX ADMIN — TIOTROPIUM BROMIDE 18 MCG: 18 CAPSULE ORAL; RESPIRATORY (INHALATION) at 09:02

## 2020-01-24 NOTE — PROGRESS NOTES
Referral from palliative care  knows patient well This morning he was tearful In and out of alertness/sleeping Sad that his family has abandoned him Provided supportive/ calming presence Prayer Touch Will follow closely thru his admission Will talk with family and see if anything is going on 
 
Thomasina Saint, staff [address], [address] [address]  /   [email or web address]

## 2020-01-24 NOTE — PROGRESS NOTES
0700-Bedside report received from [facility]. Resting in bed. No needs voiced. No s/s of acute distress. 0834-No diet ordered. Dr. Radha Atwood contacted via perfect serve. 0851-Pt crying. Reports family won't come see him. Reports pain 10/10. Percocet given for pain. 0935-Sleeping. No s/s of acute distress. 1107-Dr. Radha Atwood reports via perfect serve that Oncology is taking over care. Discussed with [address] [place] [address]. Will take over care and can have a full liquid diet. Reports pain 10/10 to KANU Hannah.

## 2020-01-24 NOTE — PROGRESS NOTES
Palliative Care Progress Note Patient: Jacquelin Beckham MRN: [id number]  SSN: [id number] YOB: 1965  Age: 62 y.o. Sex: male Assessment/Plan: Chief Complaint/Interval History: Sad, depressed, chest pain Principal Diagnosis:   
· Pain, chest  R07.9 Additional Diagnoses: · Cough  R05 · Depression  F32.9 · Counseling, Encounter for Medical Advice  Z71.9 
· Encounter for Palliative Care  Z51.5 Palliative Performance Scale (PPS) PPS: 70 Medical Decision Making:  
Reviewed and summarized notes over previous 24 hours. Discussed case with appropriate providers: Abhi Olivier Reviewed laboratory and x-ray data. Patient resting in bed, no distress noted. Patient is tearful today, sad about a variety of things. He is sad that his wife is not here currently. He is worried about where he will go after the hospital; he shares that he gave his life to God two days ago, and worried about his new wendy in previous home environment. Communicated his concerns about discharge with Latoya Thompson. Patient acknowledges that he is going to die due to his stage IV disease. He wants to live the rest of his life in peace. He feels depressed- denies any suicidal thoughts. He is agreeable to antidepressant; will start Zoloft daily. Reassured him of the team's care; discussed with Edwin Pagan, who will visit patient when able. He has ongoing chest pain due to esophageal mass. Would recommend Fentanyl patch for pain control and to prepare for a discharge regimen that is most abuse deterrent. Will start low-dose Fentanyl patch at 12mcg TD every 72 hours. Continue Percocet prn. Will continue to follow. Will discuss findings with members of the interdisciplinary team.   
 
  
More than 50% of this 35 minute visit was spent counseling and coordination of care as outlined above. Subjective:  
 
Review of Systems: A comprehensive review of systems was negative except for: Gastrointestinal: Positive for intermittent chest/upper abdominal pain, none currently. Psych: Positive for depression. Objective:  
 
Visit Vitals /79 (BP 1 Location: Right arm, BP Patient Position: At rest) Pulse 95 Temp 98.9 °F (37.2 °C) Resp 17 Ht 6' (1.829 m) Wt 183 lb 11.2 oz (83.3 kg) SpO2 94% BMI 24.91 kg/m² Physical Exam: 
 
General:  Cooperative. No acute distress. Eyes:  Conjunctivae/corneas clear. Nose: Nares normal. Septum midline. Neck: Supple, symmetrical, trachea midline. Lungs:   Clear to auscultation bilaterally, unlabored. Heart:  Regular rate and rhythm. Abdomen:   Soft, non-tender, non-distended. Extremities: Normal, atraumatic, no cyanosis or edema. Skin: Skin color, texture, turgor normal. No rash. Neurologic: Nonfocal.  
Psych: Alert and oriented. Tearful at times. Signed By: Annie Booker NP   
 January 24, 2020

## 2020-01-24 NOTE — PROGRESS NOTES
Problem: Falls - Risk of 
Goal: *Absence of Falls Outcome: Progressing Towards Goal 
Note: Fall Risk Interventions: 
Mobility Interventions: Communicate number of staff needed for ambulation/transfer, Patient to call before getting OOB Medication Interventions: Teach patient to arise slowly History of Falls Interventions: Room close to nurse's station

## 2020-01-24 NOTE — PROGRESS NOTES
Nutrition follow-up Consult for tube feeding management (Oncology) Reason for initial assessment: Referral received from nursing admission Malnutrition Screening Tool Recently Lost Weight Without Trying: Yes If Yes, How Much Weight Loss: >33 lbs(almost 100 lbs in 3 months) Eating Poorly Due to Decreased Appetite: No 
  
Assessment:  
Diet: DIET Full Liquids - advanced this am  
Food/Nutrition Patient History:   
Pt with history of MARGARITA, COPD, altered mental status, nocotine use and alcohol abuse. He presented with nausea/vomiting and chest pain for months. 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 Plans for port placed 1/21. PEG yesterday in IR. Possibly starting FOLFOX today. Patient was seen this am and was very tearful. He states that he does not know where he is going to go when he discharges. He states that he does not have any support. He does not know if he is hungry or not, likely due to current emotional status. He has been NPO since last RD assessment. He continues to be high risk for refeeding due to poor PO prior to admission and since admission. Last recorded BM was 1/17/2020 with noted constipation. Discussed with Mike Wyatt, NP Labs reviewed and unremarkable. Pertinent Medications: Pepcid, milk of mag, Miralax,   
Anthropometrics: 
Height: 6' (182.9 cm), Weight: 87.1 kg (192 lb), Weight Source: Patient stated, Body mass index is 26.04 kg/m². BMI class of overweight. WT / BMI 1/16/2020 12/12/2018 WEIGHT 192 lb 288 lb 1.6 oz BODY MASS INDEX 26.04 kg/m2    
Unable to verify weight sources. Also unable to confirm weight loss in 3-4 months, but pt has lost 96 lbs in 1 year, 33% which is clinically significant.  
  
Macronutrient needs: MARGARITA (CBW 87.1kg) WNB: 5979-5787 YKWV/XIU (20-25 kcals/kg) EPR:  g/day (0.8-1.2 g/kg)  
Max CHO: 326 g/d (60% kcal) Fluid: 1.7-2.2 L/d (~1 ml/kcal) 
  
 Intake/Comparative Standards: Patient has been NPO or clear liquids since admission, this does not meet EEN or EPN. 
  
Malnutrition Criteria:   
Meets Criteria for Malnutrition in the context of Chronic Illness  
[x]??? Severe Malnutrition, as evidenced by: 
            [x]? ?? Nutritional intake of <75% of energy intake compared to estimated energy needs for > 1 month             [x]? ?? Weight loss of >5% in 1 month, >7.5% in 3 months,  >10% in 6 months, or >20% in 12 months 
            []??? Severe edema 
            []??? Severe loss of muscle mass 
            []??? Severe loss of subcutaneous fat 
            []??? Functional decline             []??? Severe edema  
  
  
Nutrition Diagnosis: 
Severe chronic disease or condition related malnutrition related to decreased intake  as evidenced by estimated intake less than estimated needs, nausea, vomiting and unintentional weight loss at least 20% in 1 year. 
  
Nutrition Intervention: 
Meals and Snacks: Advance as medically appropriate and tolerated. Commercial Beverages and Supplements: Change to Ensure Enlive TID. Enteral Nutrition:  Patient at high risk for refeeding. Initiate TF with Jevity 1.2 @ 35 ml/hr (do not advance) with 50 ml water. This will provided 1008 kcal (~60% estimated energy needs), 47 g pro (~65% estimated protein needs), 142 g CHO (does not exceed max CHO), 1878 ml water (100% estimated fluid needs). Goal rate is 75 ml/hr with 30 ml/hr water flush. TF regimen at goal would provide: 2160 kcal (100% estimated energy needs), 100 g pro (100% estimated protein needs), 304 g CHO (does not exceed max CHO), 2172 ml fluid (100% estimated fluid needs). Can consider transition to bolus feeds as tolerated and once stable on continuous feeds. Nutrition Support Electrolyte Replacement active and on the MAR. Labs: Will add Phos to am labs. Coordination of care: Discussed Renee Morrison NP Discharge Nutrition Plan: Too soon to determine [address], [address] [facility]

## 2020-01-24 NOTE — PROGRESS NOTES
0600-END OF SHIFT NOTE: 
 
-pt rested well throughout the night 
-pt states pain is uncontrolled but rested throughout the night, 3x iv dilaudid, 1x percocet 
-chemo treatment planned to start today  
-vss, no needs voiced at this time Intake/Output 01/23 1901 - 01/24 0700 In: 250 [P.O.:250] Out: 550 [Urine:550] Voiding: YES Catheter: NO 
Drain:   
 
 
Stool:  0 occurrences. Emesis:  0 occurrences. VITAL SIGNS Patient Vitals for the past 12 hrs: 
 Temp Pulse Resp BP SpO2  
01/24/20 0407 97.7 °F (36.5 °C) 92 17 130/70 94 % 01/23/20 2255 98.8 °F (37.1 °C) 92 16 118/71 96 % 01/23/20 1948     97 % 01/23/20 1918 98.2 °F (36.8 °C) 83 16 139/78 98 % Pain Assessment Pain 1 Pain Scale 1: Numeric (0 - 10) (01/24/20 0521) Pain Intensity 1: 9 (01/24/20 0521) Patient Stated Pain Goal: 0 (01/24/20 0521) Pain Reassessment 1: Patient resting w/respiratory rate greater than 10 (01/24/20 0100) Pain Onset 1: pta (01/24/20 0521) Pain Location 1: Abdomen (01/24/20 0521) Pain Orientation 1: Upper (01/24/20 0521) Pain Description 1: Aching; Throbbing (01/24/20 2013) Pain Intervention(s) 1: Medication (see MAR) (01/24/20 0521) Additional Pain Sites: Yes (01/17/20 0706) Ambulating Yes Additional Information:  
 
Shift report given to oncoming nurse at the bedside.  
 
Juliet Cope RN

## 2020-01-24 NOTE — PROGRESS NOTES
Problem: Falls - Risk of 
Goal: *Absence of Falls 1/24/2020 0907 by Immanuel Morrison RN Outcome: Progressing Towards Goal 
Note: Fall Risk Interventions: 
Mobility Interventions: Communicate number of staff needed for ambulation/transfer, Patient to call before getting OOB Medication Interventions: Teach patient to arise slowly History of Falls Interventions: Room close to nurse's station 1/24/2020 0906 by Immanuel Morrison RN Outcome: Progressing Towards Goal 
Note: Fall Risk Interventions: 
Mobility Interventions: Communicate number of staff needed for ambulation/transfer, Patient to call before getting OOB Medication Interventions: Teach patient to arise slowly History of Falls Interventions: Room close to nurse's station

## 2020-01-24 NOTE — PROGRESS NOTES
[address] Hematology Oncology Inpatient Hematology / Oncology Progress Note Admission Date: 2020  6:59 AM 
Reason for Admission/Hospital Course: Chest pain [R07.9] 24 Hour Events: PEG placed yesterday Start FOLFOX today ROS: 
Constitutional: Positive for fatigue, weakness. Negative for fever, chills. CV: Negative for chest pain, palpitations, edema. Respiratory: Negative for cough, wheezing. Positive for exertional dyspnea. GI: Negative for nausea, abdominal pain, diarrhea. + dysphagia. 10 point review of systems is otherwise negative with the exception of the elements mentioned above in the HPI. No Known Allergies OBJECTIVE: 
Patient Vitals for the past 8 hrs: 
 BP Temp Pulse Resp SpO2  
20 0859     94 % 20 0734 133/79 98.9 °F (37.2 °C) 95 17 99 % 20 0407 130/70 97.7 °F (36.5 °C) 92 17 94 % Temp (24hrs), Av.2 °F (36.8 °C), Min:97.6 °F (36.4 °C), Max:98.9 °F (37.2 °C) No intake/output data recorded. Physical Exam: 
Constitutional: Well developed, well nourished, chronically-ill appearing male in no acute distress, sitting comfortably in the hospital bed. HEENT: Normocephalic and atraumatic. Oropharynx is clear, mucous membranes are moist. + poor dentition. Extraocular muscles are intact. Sclerae anicteric. Skin Warm and dry. No bruising and no rash noted. No erythema. No pallor. Respiratory Lungs are clear to auscultation bilaterally without wheezes, rales or rhonchi, normal air exchange without accessory muscle use. CVS Normal rate, regular rhythm and normal S1 and S2. No murmurs, gallops, or rubs. Abdomen Soft, nontender and nondistended, normoactive bowel sounds. No palpable mass. No hepatosplenomegaly. Neuro Grossly nonfocal with no obvious sensory or motor deficits. MSK Normal range of motion in general.  No edema and no tenderness. Psych Anxious mood and affect. Labs: Recent Labs  
  01/24/20 
0408 01/23/20 
0205 01/22/20 
1040 WBC 6.5 6.7 7.4  
RBC 3.03* 2.87* 3.12* HGB 9.1* 8.6* 9.3* HCT 29.5* 27.5* 29.6* MCV 97.4 95.8 94.9 MCH 30.0 30.0 29.8 MCHC 30.8* 31.3* 31.4  
RDW 13.4 13.2 13.3  279 294 GRANS 70 65 72 LYMPH 17 20 14 MONOS 9 8 8 EOS 3 6 6 BASOS 0 0 0 IG 0 0 0  
DF AUTOMATED AUTOMATED AUTOMATED ANEU 4.6 4.4 5.3 ABL 1.1 1.3 1.1 ABM 0.6 0.6 0.6 JAY 0.2 0.4 0.4 ABB 0.0 0.0 0.0 AIG 0.0 0.0 0.0 Recent Labs  
  01/24/20 
0408 01/23/20 
0205 01/22/20 
1040  138 137  
K 4.3 4.1 4.0  
 103 101 CO2 32 29 30 AGAP 3* 6* 6*  
GLU 93 127* 106* BUN 8 11 12 CREA 0.65* 0.83 0.93 GFRAA >60 >60 >60 GFRNA >60 >60 >60  
CA 9.2 8.7 8.9 SGOT 13* 12* 11*  101 112 TP 6.0* 5.7* 6.2* ALB 2.0* 1.9* 2.1*  
GLOB 4.0* 3.8* 4.1* AGRAT 0.5* 0.5* 0.5* MG 1.8 1.7* 1.9 Imaging: 
CT CHEST ABD PELV W CONT [[id number]] Collected: 01/16/20 7333 Order Status: Completed Updated: 01/16/20 8436 Narrative:    
CT CHEST, ABDOMEN, PELVIS WITH CONTRAST:   
 
CLINICAL HISTORY:   Follow-up abnormal chest radiograph smoker with pulmonary 
nodules and 100-pound weight loss over the last several months. TECHNIQUE:  Oral and nonionic intravenous contrast was administered, and the 
torso was scanned with spiral technique.  Radiation dose reduction was achieved 
using one or all of the following techniques: automated exposure control, 
weight-based dosing, iterative reconstruction. COMPARISON:  Portable chest today and chest CTA of November 16, 2019. CT CHEST: There are innumerable bilateral pulmonary nodules, the largest 
measuring approximately 3.9 cm posteriorly in the right lower lobe.  There is 
marked thickening of the wall of the distal esophagus below the level of the 
hilary which suggests the possibility of primary esophageal carcinoma. Gretchen Anna is associated subcarinal and azygoesophageal recess lymphadenopathy.  No 
significant pleural fluid.  The thyroid appears normal as imaged.  There is 
thickening of the left ventricular wall suggesting a hypertrophy. CT ABDOMEN: There are innumerable small hepatic lesions which are new from 2018 
and also suspicious for metastatic disease.  The largest measures approximately 2 cm posteriorly in the right lobe.  There are mildly enlarged lymph nodes in 
the lesser sac which may represent metastatic disease as well.  The spleen, 
pancreas, kidneys, and adrenals appear unremarkable. CT PELVIS:  The appendix is not confidently identified, but there are no 
secondary signs of appendicitis.  The prostate is not enlarged.  No primary or 
secondary signs of appendicitis or identified.  No pathologically enlarged lymph 
nodes or free fluid is evident.  There is scattered aortoiliac atherosclerotic 
calcifications with mild ectasia but no focal aneurysm.  Bone windows 
demonstrate no aggressive process, accounting for degenerative changes including 
severe osteoarthritis at the right hip.  A 1 cm central lucency in the body of 
L4 has sclerotic margins and is not typical of metastatic disease. Impression:    
IMPRESSION:  
 
1.  Extensive pulmonary parenchymal and hepatic metastatic disease as well as 
subcarinal and azygoesophageal recess lymphadenopathy likely representing 
metastatic disease as well. 2.  Long-segment thickening of the wall of the distal esophagus suggests the 
possibility of a primary esophageal carcinoma.  Follow-up gastroenterology 
consultation is recommended for consideration of endoscopic biopsy. 3.  Left ventricular hypertrophy and scattered atherosclerosis. XR CHEST PORT [[id number]] Collected: 01/16/20 0737 Order Status: Completed Updated: 01/16/20 4914 Narrative:    
PORTABLE CHEST, January 16, 2020 at 0715 hours CLINICAL HISTORY:  Cough and chest pain in a smoker. COMPARISON:  December 6, 2018. FINDINGS:  AP erect image demonstrates an approximately 4 cm rounded mass 
projected over the inferior aspect of the right hilum.  There are numerous 
additional pulmonary nodules bilaterally, suspicious for metastatic disease.  No 
nidhi pneumonic consolidation or pleural fluid is evident.  The heart size is 
within normal limits without evidence of congestive heart failure or 
pneumothorax.  The bony thorax appears intact on this view.  There are overlying 
radiopaque support devices. Impression:    
IMPRESSION:  NUMEROUS BILATERAL PULMONARY NODULES ARE SUSPICIOUS FOR METASTATIC 
DISEASE, AND A 4 CM RIGHT LOWER LUNG MASS PROJECTED OVER THE INFERIOR ASPECT OF 
THE RIGHT HILUM IS SUSPICIOUS FOR A PRIMARY BRONCHOGENIC CARCINOMA IN THIS 
SMOKER WITH COPD.  FOLLOW PULMONARY MEDICINE CONSULTATION IS RECOMMENDED. ASSESSMENT: 
 
Problem List  Date Reviewed: 1/17/2020 Codes Class Noted Cellulitis of left upper extremity ICD-10-CM: L03.114 
ICD-9-CM: 682.3  1/21/2020 * (Principal) Esophageal mass ICD-10-CM: K22.8 ICD-9-CM: 530.89  1/17/2020 Liver metastases ([facility] [address] ICD-10-CM: C78.7 ICD-9-CM: 197.7  1/17/2020 Esophageal dysphagia ICD-10-CM: R13.10 ICD-9-CM: 787.20  1/17/2020 Weight loss ICD-10-CM: R63.4 ICD-9-CM: 783.21  1/17/2020 Chest pain ICD-10-CM: R07.9 ICD-9-CM: 786.50  1/16/2020 Methamphetamine abuse (HCC) (Chronic) ICD-10-CM: F15.10 ICD-9-CM: 305.70  12/2/2018 H/O atrial flutter (Chronic) ICD-10-CM: Z86.79 
ICD-9-CM: V12.59  12/2/2018 Overview Signed 2/6/2018  7:51 AM by Aris Fishman NP  
  1/2018 Atrial flutter, s/p cardioversion. Essential hypertension (Chronic) ICD-10-CM: I10 
ICD-9-CM: 401.9  12/2/2018 Acute respiratory failure with hypoxia and hypercarbia (HCC) ICD-10-CM: J96.01, J96.02 
ICD-9-CM: 518.81  12/2/2018 Substance abuse (HCC) (Chronic) ICD-10-CM: F19.10 ICD-9-CM: 305.90  12/2/2018 Noncompliance (Chronic) ICD-10-CM: Z91.19 ICD-9-CM: V15.81  12/2/2018 Acute encephalopathy ICD-10-CM: G93.40 ICD-9-CM: 348.30  12/2/2018 COPD (chronic obstructive pulmonary disease) (HCC) (Chronic) ICD-10-CM: J44.9 ICD-9-CM: 653  11/16/2018 NATALIIA (obstructive sleep apnea) (Chronic) ICD-10-CM: W15.59 
ICD-9-CM: 327.23  11/16/2018 Chronic respiratory failure (HCC) (Chronic) ICD-10-CM: J96.10 ICD-9-CM: 518.83  11/16/2018 Altered mental status ICD-10-CM: R41.82 
ICD-9-CM: 780.97  11/16/2018 Morbid obesity ([facility] [address] (Chronic) ICD-10-CM: E66.01 
ICD-9-CM: 278.01  11/16/2018 Nicotine dependence (Chronic) ICD-10-CM: L42.940 ICD-9-CM: 305.1  2/1/2018 Alcohol abuse (Chronic) ICD-10-CM: F10.10 ICD-9-CM: 305.00  2/1/2018 Homeless (Chronic) ICD-10-CM: Z59.0 ICD-9-CM: V60.0  1/15/2018 Urethral stricture (Chronic) ICD-10-CM: N35.919 ICD-9-CM: 598.9  1/7/2018 Anasarca ICD-10-CM: R60.1 ICD-9-CM: 782.3  1/6/2018 Acute kidney injury ([facility] [place] [address] ICD-10-CM: N17.9 ICD-9-CM: 584.9  1/6/2018 PLAN: 
Metastatic esophageal cancer - CT CAP with extensive pulm parenchymal and hepatic metastatic disease; subcarinal and azygoesophageal recess LAD; and long-segment thickening of the wall of the distal esophagus - GI performing EGD/EUS, biopsy taken and path shows moderately differentiated adenocarcinoma.   Will need to send tissue for Caris testing - navigation team notified. 
- CEA 19.1, CA 19-9 219.2  
- Iron studies low with ferritin 27, tsat 9%. Repleted iron with ferrlecit x 1.  
- Consulted surgery for feeding tube - patient agrees. - Consulted palliative care - managing pain. - Consult IR for port placement. - Echocardiogram with EF 55-60%. - Will need PET/CT as OP.  
 
1/20 Today patient states that he wants chemo, port and PEG.  Surgery following. GI signed off. If he changes his mind again, discharge and have patient follow up with Dr. Lizzeth Phillips in clinic. Once PEG and port placed we will take over as primary. 1/21 Port today. ?PEG tomorrow. Then chemo to follow. 1/22 Occlusive superficial venous thrombosis in the left basilic and cephalic veins. PEG today. 1/24 PEG placed yesterday. Consult RD for tube feeding management, full liquid diet only per surgery. Start FOLFOX. We will take over the care of this patient. VANDANA Zurita [address] [place] [address] Hematology and Oncology [address] [place] [address] Office : [phone] Fax : [phone] Attending Addendum: 
I have personally performed a face to face diagnostic evaluation on this patient. I have reviewed and agree with the care plan as documented by Padmini Mancera N.P. 36 minutes were spent on patient care, including but not limited to, reviewing the chart and time with the patient and family, more than 50% of the time documented was spent in face-to-face contact with the patient and in the care of the patient on the floor/unit where the patient is located. My findings are as follows:  He has metastatic esophageal cancer, appears weak, heart rate regular without murmurs, abdomen is non-tender, bowel sounds are positive, we will start Cycle 1 of FOLFOX today. Arjun Garibay MD 
 
 
[address] [place] [address] Hematology/Oncology [address] [place] [address] Office : [phone] Fax : [phone]

## 2020-01-24 NOTE — PROGRESS NOTES
Case management met with pt in 518. Pt does not want to return home because he states that son and wife are doing drugs. Pt states he was doing meth in the home as well. Pt does not want to return to home for fear of doing Meth again. Spoke with pt about discharge options. Pt states he has a son that does not do drugs that may let him stay there. Case management encouraged pt confirm with son that at discharge pt can go to sons home. Pt in agreement with discharge option. Discharge plan is for pt to go to son's house at discharge until he is able to obtain a residence on his own. Case management will continue to follow.

## 2020-01-24 NOTE — PROGRESS NOTES
Chart screened by  for discharge planning. Undtermined at this time what needs at discharge. Please consult  if any new issues arise Case Management will continue to follow

## 2020-01-25 LAB
ALBUMIN SERPL-MCNC: 1.9 G/DL (ref 3.5–5)
ALBUMIN/GLOB SERPL: 0.5 {RATIO} (ref 1.2–3.5)
ALP SERPL-CCNC: 99 U/L (ref 50–136)
ALT SERPL-CCNC: 7 U/L (ref 12–65)
ANION GAP SERPL CALC-SCNC: 5 MMOL/L (ref 7–16)
AST SERPL-CCNC: 10 U/L (ref 15–37)
BASOPHILS # BLD: 0 K/UL (ref 0–0.2)
BASOPHILS NFR BLD: 0 % (ref 0–2)
BILIRUB SERPL-MCNC: 0.2 MG/DL (ref 0.2–1.1)
BUN SERPL-MCNC: 11 MG/DL (ref 6–23)
CALCIUM SERPL-MCNC: 9 MG/DL (ref 8.3–10.4)
CHLORIDE SERPL-SCNC: 99 MMOL/L (ref 98–107)
CO2 SERPL-SCNC: 33 MMOL/L (ref 21–32)
CREAT SERPL-MCNC: 0.66 MG/DL (ref 0.8–1.5)
DIFFERENTIAL METHOD BLD: ABNORMAL
EOSINOPHIL # BLD: 0.3 K/UL (ref 0–0.8)
EOSINOPHIL NFR BLD: 5 % (ref 0.5–7.8)
ERYTHROCYTE [DISTWIDTH] IN BLOOD BY AUTOMATED COUNT: 13.4 % (ref 11.9–14.6)
GLOBULIN SER CALC-MCNC: 4.1 G/DL (ref 2.3–3.5)
GLUCOSE SERPL-MCNC: 102 MG/DL (ref 65–100)
HCT VFR BLD AUTO: 28.6 % (ref 41.1–50.3)
HGB BLD-MCNC: 8.9 G/DL (ref 13.6–17.2)
IMM GRANULOCYTES # BLD AUTO: 0 K/UL (ref 0–0.5)
IMM GRANULOCYTES NFR BLD AUTO: 0 % (ref 0–5)
LYMPHOCYTES # BLD: 1.4 K/UL (ref 0.5–4.6)
LYMPHOCYTES NFR BLD: 21 % (ref 13–44)
MAGNESIUM SERPL-MCNC: 1.9 MG/DL (ref 1.8–2.4)
MCH RBC QN AUTO: 29.8 PG (ref 26.1–32.9)
MCHC RBC AUTO-ENTMCNC: 31.1 G/DL (ref 31.4–35)
MCV RBC AUTO: 95.7 FL (ref 79.6–97.8)
MONOCYTES # BLD: 0.6 K/UL (ref 0.1–1.3)
MONOCYTES NFR BLD: 9 % (ref 4–12)
NEUTS SEG # BLD: 4.2 K/UL (ref 1.7–8.2)
NEUTS SEG NFR BLD: 66 % (ref 43–78)
NRBC # BLD: 0 K/UL (ref 0–0.2)
PHOSPHATE SERPL-MCNC: 2.9 MG/DL (ref 2.5–4.5)
PLATELET # BLD AUTO: 287 K/UL (ref 150–450)
PMV BLD AUTO: 9.5 FL (ref 9.4–12.3)
POTASSIUM SERPL-SCNC: 4.2 MMOL/L (ref 3.5–5.1)
PROT SERPL-MCNC: 6 G/DL (ref 6.3–8.2)
RBC # BLD AUTO: 2.99 M/UL (ref 4.23–5.6)
SODIUM SERPL-SCNC: 137 MMOL/L (ref 136–145)
WBC # BLD AUTO: 6.4 K/UL (ref 4.3–11.1)

## 2020-01-25 PROCEDURE — 74011250637 HC RX REV CODE- 250/637: Performed by: HOSPITALIST

## 2020-01-25 PROCEDURE — 74011250636 HC RX REV CODE- 250/636: Performed by: HOSPITALIST

## 2020-01-25 PROCEDURE — 74011250637 HC RX REV CODE- 250/637: Performed by: NURSE PRACTITIONER

## 2020-01-25 PROCEDURE — 83735 ASSAY OF MAGNESIUM: CPT

## 2020-01-25 PROCEDURE — 85025 COMPLETE CBC W/AUTO DIFF WBC: CPT

## 2020-01-25 PROCEDURE — 77030018798 HC PMP KT ENTRL FED COVD -A

## 2020-01-25 PROCEDURE — 74011250637 HC RX REV CODE- 250/637: Performed by: INTERNAL MEDICINE

## 2020-01-25 PROCEDURE — 84100 ASSAY OF PHOSPHORUS: CPT

## 2020-01-25 PROCEDURE — 94760 N-INVAS EAR/PLS OXIMETRY 1: CPT

## 2020-01-25 PROCEDURE — 94640 AIRWAY INHALATION TREATMENT: CPT

## 2020-01-25 PROCEDURE — 74011000258 HC RX REV CODE- 258: Performed by: INTERNAL MEDICINE

## 2020-01-25 PROCEDURE — 74011250636 HC RX REV CODE- 250/636: Performed by: INTERNAL MEDICINE

## 2020-01-25 PROCEDURE — 80053 COMPREHEN METABOLIC PANEL: CPT

## 2020-01-25 PROCEDURE — 77010033678 HC OXYGEN DAILY

## 2020-01-25 PROCEDURE — 74011000250 HC RX REV CODE- 250: Performed by: HOSPITALIST

## 2020-01-25 PROCEDURE — 65270000029 HC RM PRIVATE

## 2020-01-25 PROCEDURE — 77030020250 HC SOL INJ D 5% LFCR 1000ML BG LF

## 2020-01-25 PROCEDURE — 94660 CPAP INITIATION&MGMT: CPT

## 2020-01-25 PROCEDURE — 99233 SBSQ HOSP IP/OBS HIGH 50: CPT | Performed by: INTERNAL MEDICINE

## 2020-01-25 PROCEDURE — 3E04305 INTRODUCTION OF OTHER ANTINEOPLASTIC INTO CENTRAL VEIN, PERCUTANEOUS APPROACH: ICD-10-PCS | Performed by: INTERNAL MEDICINE

## 2020-01-25 PROCEDURE — 74011250636 HC RX REV CODE- 250/636: Performed by: NURSE PRACTITIONER

## 2020-01-25 PROCEDURE — 36591 DRAW BLOOD OFF VENOUS DEVICE: CPT

## 2020-01-25 RX ORDER — HYDROMORPHONE HYDROCHLORIDE 1 MG/ML
1 INJECTION, SOLUTION INTRAMUSCULAR; INTRAVENOUS; SUBCUTANEOUS
Status: DISCONTINUED | OUTPATIENT
Start: 2020-01-25 | End: 2020-01-27

## 2020-01-25 RX ORDER — DIPHENHYDRAMINE HYDROCHLORIDE 50 MG/ML
50 INJECTION, SOLUTION INTRAMUSCULAR; INTRAVENOUS AS NEEDED
Status: ACTIVE | OUTPATIENT
Start: 2020-01-25 | End: 2020-01-25

## 2020-01-25 RX ORDER — DEXTROSE MONOHYDRATE 50 MG/ML
25 INJECTION, SOLUTION INTRAVENOUS CONTINUOUS
Status: DISPENSED | OUTPATIENT
Start: 2020-01-25 | End: 2020-01-25

## 2020-01-25 RX ORDER — ACETAMINOPHEN 325 MG/1
650 TABLET ORAL AS NEEDED
Status: ACTIVE | OUTPATIENT
Start: 2020-01-25 | End: 2020-01-25

## 2020-01-25 RX ORDER — ONDANSETRON 2 MG/ML
8 INJECTION INTRAMUSCULAR; INTRAVENOUS AS NEEDED
Status: ACTIVE | OUTPATIENT
Start: 2020-01-25 | End: 2020-01-25

## 2020-01-25 RX ORDER — OXYCODONE AND ACETAMINOPHEN 10; 325 MG/1; MG/1
1 TABLET ORAL
Status: DISCONTINUED | OUTPATIENT
Start: 2020-01-25 | End: 2020-01-27

## 2020-01-25 RX ORDER — FLUOROURACIL 50 MG/ML
400 INJECTION, SOLUTION INTRAVENOUS ONCE
Status: COMPLETED | OUTPATIENT
Start: 2020-01-25 | End: 2020-01-25

## 2020-01-25 RX ORDER — ONDANSETRON 2 MG/ML
8 INJECTION INTRAMUSCULAR; INTRAVENOUS ONCE
Status: COMPLETED | OUTPATIENT
Start: 2020-01-25 | End: 2020-01-25

## 2020-01-25 RX ADMIN — ALPRAZOLAM 0.25 MG: 0.5 TABLET ORAL at 11:09

## 2020-01-25 RX ADMIN — DEXTROSE MONOHYDRATE 25 ML/HR: 5 INJECTION, SOLUTION INTRAVENOUS at 11:10

## 2020-01-25 RX ADMIN — FLUOROURACIL 824 MG: 50 INJECTION, SOLUTION INTRAVENOUS at 14:34

## 2020-01-25 RX ADMIN — SERTRALINE HYDROCHLORIDE 25 MG: 50 TABLET ORAL at 08:18

## 2020-01-25 RX ADMIN — HYDROMORPHONE HYDROCHLORIDE 1 MG: 1 INJECTION, SOLUTION INTRAMUSCULAR; INTRAVENOUS; SUBCUTANEOUS at 10:15

## 2020-01-25 RX ADMIN — AMIODARONE HYDROCHLORIDE 200 MG: 200 TABLET ORAL at 08:19

## 2020-01-25 RX ADMIN — HYDROMORPHONE HYDROCHLORIDE 1 MG: 1 INJECTION, SOLUTION INTRAMUSCULAR; INTRAVENOUS; SUBCUTANEOUS at 17:22

## 2020-01-25 RX ADMIN — FLUOROURACIL 2472 MG: 50 INJECTION, SOLUTION INTRAVENOUS at 14:35

## 2020-01-25 RX ADMIN — CEPHALEXIN 500 MG: 500 CAPSULE ORAL at 17:22

## 2020-01-25 RX ADMIN — ARFORMOTEROL TARTRATE 15 MCG: 15 SOLUTION RESPIRATORY (INHALATION) at 20:00

## 2020-01-25 RX ADMIN — POLYETHYLENE GLYCOL 3350 17 G: 17 POWDER, FOR SOLUTION ORAL at 21:12

## 2020-01-25 RX ADMIN — ONDANSETRON 8 MG: 2 INJECTION INTRAMUSCULAR; INTRAVENOUS at 11:09

## 2020-01-25 RX ADMIN — Medication 10 ML: at 06:08

## 2020-01-25 RX ADMIN — ALPRAZOLAM 0.25 MG: 0.5 TABLET ORAL at 21:25

## 2020-01-25 RX ADMIN — DEXAMETHASONE SODIUM PHOSPHATE 12 MG: 4 INJECTION, SOLUTION INTRAMUSCULAR; INTRAVENOUS at 11:44

## 2020-01-25 RX ADMIN — OXYCODONE HYDROCHLORIDE AND ACETAMINOPHEN 1 TABLET: 7.5; 325 TABLET ORAL at 00:09

## 2020-01-25 RX ADMIN — LEUCOVORIN CALCIUM 824 MG: 500 INJECTION, POWDER, LYOPHILIZED, FOR SOLUTION INTRAMUSCULAR; INTRAVENOUS at 12:18

## 2020-01-25 RX ADMIN — POLYETHYLENE GLYCOL 3350 17 G: 17 POWDER, FOR SOLUTION ORAL at 08:00

## 2020-01-25 RX ADMIN — Medication 10 ML: at 14:05

## 2020-01-25 RX ADMIN — FAMOTIDINE 20 MG: 10 INJECTION INTRAVENOUS at 08:19

## 2020-01-25 RX ADMIN — OXALIPLATIN 175 MG: 5 INJECTION, SOLUTION INTRAVENOUS at 12:18

## 2020-01-25 RX ADMIN — HYDROMORPHONE HYDROCHLORIDE 1 MG: 1 INJECTION, SOLUTION INTRAMUSCULAR; INTRAVENOUS; SUBCUTANEOUS at 03:34

## 2020-01-25 RX ADMIN — DILTIAZEM HYDROCHLORIDE 90 MG: 30 TABLET, FILM COATED ORAL at 17:22

## 2020-01-25 RX ADMIN — OXYCODONE HYDROCHLORIDE AND ACETAMINOPHEN 1 TABLET: 7.5; 325 TABLET ORAL at 06:08

## 2020-01-25 RX ADMIN — OXYCODONE HYDROCHLORIDE AND ACETAMINOPHEN 1 TABLET: 10; 325 TABLET ORAL at 14:01

## 2020-01-25 RX ADMIN — PROMETHAZINE HYDROCHLORIDE 12.5 MG: 25 INJECTION INTRAMUSCULAR; INTRAVENOUS at 17:38

## 2020-01-25 RX ADMIN — FAMOTIDINE 20 MG: 10 INJECTION INTRAVENOUS at 21:14

## 2020-01-25 RX ADMIN — CEPHALEXIN 500 MG: 500 CAPSULE ORAL at 00:09

## 2020-01-25 RX ADMIN — LISINOPRIL 10 MG: 5 TABLET ORAL at 08:18

## 2020-01-25 RX ADMIN — HYDROMORPHONE HYDROCHLORIDE 1 MG: 1 INJECTION, SOLUTION INTRAMUSCULAR; INTRAVENOUS; SUBCUTANEOUS at 21:28

## 2020-01-25 RX ADMIN — CEPHALEXIN 500 MG: 500 CAPSULE ORAL at 11:09

## 2020-01-25 RX ADMIN — TIOTROPIUM BROMIDE 18 MCG: 18 CAPSULE ORAL; RESPIRATORY (INHALATION) at 07:47

## 2020-01-25 RX ADMIN — DILTIAZEM HYDROCHLORIDE 90 MG: 30 TABLET, FILM COATED ORAL at 08:18

## 2020-01-25 RX ADMIN — CEPHALEXIN 500 MG: 500 CAPSULE ORAL at 06:08

## 2020-01-25 RX ADMIN — Medication 10 ML: at 21:32

## 2020-01-25 RX ADMIN — ARFORMOTEROL TARTRATE 15 MCG: 15 SOLUTION RESPIRATORY (INHALATION) at 07:47

## 2020-01-25 RX ADMIN — DILTIAZEM HYDROCHLORIDE 90 MG: 30 TABLET, FILM COATED ORAL at 21:19

## 2020-01-25 NOTE — PROGRESS NOTES
0600-END OF SHIFT NOTE: 
 
-pt rested well throughout the night 
-pain medicine around the clock (see mar)  
-gastric residuals >250 2x consecutively, feedings currently on hold per order 
-vss, no needs voiced at this time Intake/Output 01/24 1901 - 01/25 0700 In: 612 [P.O.:240] Out: 850 [Urine:850] Voiding: YES Catheter: NO 
Drain:   
 
 
Stool:  0 occurrences. Emesis:  0 occurrences. VITAL SIGNS Patient Vitals for the past 12 hrs: 
 Temp Pulse Resp BP SpO2  
01/25/20 0410 98.4 °F (36.9 °C) 81 20 123/80 96 % 01/24/20 2345 99.1 °F (37.3 °C) 91 18 123/79 98 % 01/24/20 2124     96 % 01/24/20 2105     94 % 01/24/20 2015 98 °F (36.7 °C) 86 18 133/77 96 % Pain Assessment Pain 1 Pain Scale 1: Visual (01/25/20 0407) Pain Intensity 1: 0 (01/25/20 0407) Patient Stated Pain Goal: 0 (01/25/20 0407) Pain Reassessment 1: Patient resting w/respiratory rate greater than 10 (01/25/20 0407) Pain Onset 1: ongoing (01/25/20 0334) Pain Location 1: Abdomen (01/25/20 0334) Pain Orientation 1: Upper (01/25/20 0334) Pain Description 1: Aching;Constant (01/25/20 0334) Pain Intervention(s) 1: Medication (see MAR) (01/25/20 0334) Additional Pain Sites: Yes (01/17/20 0706) Ambulating Yes Additional Information:  
 
Shift report given to oncoming nurse at the bedside.  
 
Bernardo Ontiveros, LUCIANO

## 2020-01-25 NOTE — PROGRESS NOTES
Nutrition F/U: 
TF Management for Oncology. Assessment: PEG was placed in IR yesterday and the feeding (Jevity 1.2) was started last evening at 35 ml/hr. High gastric residuals ( 425, 325, 400 and 350) have been noted since shortly after the TF was started. TF was held early this morning. Staff reports good oral intake at dinner last evening and breakfast today. The patient reports drinking Ensure Enlive (no vanilla) at all meals (350 calories, 20 gm protein per bottle). Noted that he is scheduled to receive cycle 1 FOLFOX today. Side effects include: N/V, mouth sores and diarrhea. Enteral Access: 
 PEG. Macronutrient Needs (CBW 87.1 kg): ZJU: 6904-5462 FLEF/LENO (20-25 kcals/kg) EPR:  g/day (0.8-1.2 g/kg)  
Max CHO: 326 g/d (60% kcal) Fluid: 1.7-2.2 L/d (~1 ml/kcal) Intake/Comparative Standards: 
TF remains on hold due to high gastric residuals. This patient's average meal intake of full liquid diet for the past 2 recorded days/2 meals: 100%. This potentially meets 100% of calorie and 100% of protein needs. Intervention:  
Meals and Snacks: Full liquids. Enteral Nutrition: Resume TF this evening after dinner at a rate of 20 ml/hr and infuse during the night even if gastric residuals remains high. Medical Food Supplement Therapy: Commercial Beverage: Ensure Enlive with all trays. One carton Ensure Clear (240 calories, 8 gm protein) as night snack. Mineral Supplement Therapy: Nutrition Support Orders/Electrolyte Management Replacement Protocols are active on the MAR. Coordination of Nutrition Care by a Nutrition Professional: Collaboration with Natan Denton RN. Nutrition Discharge Plan: Too soon to determine. Keely Santos. Francesco Asif 
[phone]

## 2020-01-25 NOTE — PROGRESS NOTES
Patient placed on CPAP for QHS. No complications. 01/24/20 2124 Oxygen Therapy O2 Sat (%) 96 % Pulse via Oximetry 88 beats per minute O2 Device CPAP mask O2 Flow Rate (L/min) 2 l/min Respiratory Respiratory (WDL) X Respiratory Pattern Regular Breath Sounds Bilateral Clear CPAP/BIPAP  
CPAP/BIPAP Start/Stop On Device Mode CPAP  
$$ CPAP Daily Yes [employer] ID # [id number] Mask Type and Size Full face; Medium Skin Condition intact PIP Observed 8 cm H20  
EPAP (cm H2O) 8 cm H2O Total RR (Spontaneous) 15 breaths per minute Pt's Home Machine No  
Biomedical Check Performed Yes Settings Verified Yes

## 2020-01-25 NOTE — PROGRESS NOTES
Folfox started this shift. Pt tolerating well. Pump setting verified with oncoming RN. Appetite good; eating approx 75% of meals. TF held this shift for high residuals with orders to restart tonight. Pt with no BM for approx 5 days he reports; miralax given as scheduled. PRN dilaudid and percocet given for pain. Report given to oncoming RN.

## 2020-01-25 NOTE — PROGRESS NOTES
[facility] Inpatient Hematology / Oncology Progress Note Admission Date: 2020  6:59 AM 
Reason for Admission/Hospital Course: Chest pain [R07.9] 24 Hour Events: 
Afeb, VSS Pain ongoing Too groggy yesterday to consent for chemo Proceed with C1 FOLFOX today ROS: 
Constitutional: Positive for fatigue, weakness. Negative for fever, chills. CV: Negative for chest pain, palpitations, edema. Respiratory: Negative for cough, wheezing. Positive for exertional dyspnea. GI: Negative for nausea, diarrhea. + dysphagia, abd pain. 10 point review of systems is otherwise negative with the exception of the elements mentioned above in the HPI. No Known Allergies OBJECTIVE: 
Patient Vitals for the past 8 hrs: 
 BP Temp Pulse Resp SpO2  
20 1134 119/67 97.8 °F (36.6 °C) 79 20 93 % 20 0747     91 % 20 0727 114/73 98.8 °F (37.1 °C) 78 20 99 % 20 0410 123/80 98.4 °F (36.9 °C) 81 20 96 % Temp (24hrs), Av.4 °F (36.9 °C), Min:97.8 °F (36.6 °C), Max:99.1 °F (37.3 °C) 
 
 0701 -  1900 In: 360 [P.O.:360] Out: 425 [Urine:425] Physical Exam: 
Constitutional: Well developed, well nourished, chronically-ill appearing male in no acute distress, sitting comfortably in the hospital bed. HEENT: Normocephalic and atraumatic. Oropharynx is clear, mucous membranes are moist. + poor dentition. Extraocular muscles are intact. Sclerae anicteric. Skin Warm and dry. No bruising and no rash noted. No erythema. No pallor. Respiratory Lungs are clear to auscultation bilaterally without wheezes, rales or rhonchi, normal air exchange without accessory muscle use. CVS Normal rate, regular rhythm and normal S1 and S2. No murmurs, gallops, or rubs. Abdomen Soft, mildly tender and nondistended, normoactive bowel sounds. No palpable mass. +PEG Neuro Grossly nonfocal with no obvious sensory or motor deficits. MSK Normal range of motion in general.  No edema and no tenderness. Psych Anxious mood and affect. Labs: 
   
Recent Labs  
  01/25/20 
0335 01/24/20 
0408 01/23/20 
0205 WBC 6.4 6.5 6.7  
RBC 2.99* 3.03* 2.87* HGB 8.9* 9.1* 8.6* HCT 28.6* 29.5* 27.5* MCV 95.7 97.4 95.8 MCH 29.8 30.0 30.0 MCHC 31.1* 30.8* 31.3*  
RDW 13.4 13.4 13.2  312 279 GRANS 66 70 65 LYMPH 21 17 20 MONOS 9 9 8 EOS 5 3 6 BASOS 0 0 0 IG 0 0 0  
DF AUTOMATED AUTOMATED AUTOMATED ANEU 4.2 4.6 4.4 ABL 1.4 1.1 1.3 ABM 0.6 0.6 0.6 JAY 0.3 0.2 0.4 ABB 0.0 0.0 0.0 AIG 0.0 0.0 0.0 Recent Labs  
  01/25/20 
0335 01/24/20 
0408 01/23/20 
0205  137 138  
K 4.2 4.3 4.1 CL 99 102 103 CO2 33* 32 29 AGAP 5* 3* 6*  
* 93 127* BUN 11 8 11 CREA 0.66* 0.65* 0.83 GFRAA >60 >60 >60 GFRNA >60 >60 >60  
CA 9.0 9.2 8.7 SGOT 10* 13* 12* AP 99 101 101  
TP 6.0* 6.0* 5.7* ALB 1.9* 2.0* 1.9*  
GLOB 4.1* 4.0* 3.8* AGRAT 0.5* 0.5* 0.5* MG 1.9 1.8 1.7* PHOS 2.9  --   --   
 
 
 
Imaging: 
CT CHEST ABD PELV W CONT [[id number]] Collected: 01/16/20 9751 Order Status: Completed Updated: 01/16/20 0099 Narrative:    
CT CHEST, ABDOMEN, PELVIS WITH CONTRAST:   
 
CLINICAL HISTORY:   Follow-up abnormal chest radiograph smoker with pulmonary 
nodules and 100-pound weight loss over the last several months. TECHNIQUE:  Oral and nonionic intravenous contrast was administered, and the 
torso was scanned with spiral technique.  Radiation dose reduction was achieved 
using one or all of the following techniques: automated exposure control, 
weight-based dosing, iterative reconstruction. COMPARISON:  Portable chest today and chest CTA of November 16, 2019.  
 
CT CHEST: There are innumerable bilateral pulmonary nodules, the largest 
measuring approximately 3.9 cm posteriorly in the right lower lobe.  There is 
marked thickening of the wall of the distal esophagus below the level of the 
hilary which suggests the possibility of primary esophageal carcinoma. Clance Feast is 
associated subcarinal and azygoesophageal recess lymphadenopathy.  No 
significant pleural fluid.  The thyroid appears normal as imaged.  There is 
thickening of the left ventricular wall suggesting a hypertrophy. CT ABDOMEN: There are innumerable small hepatic lesions which are new from 2018 
and also suspicious for metastatic disease.  The largest measures approximately 2 cm posteriorly in the right lobe.  There are mildly enlarged lymph nodes in 
the lesser sac which may represent metastatic disease as well.  The spleen, 
pancreas, kidneys, and adrenals appear unremarkable. CT PELVIS:  The appendix is not confidently identified, but there are no 
secondary signs of appendicitis.  The prostate is not enlarged.  No primary or 
secondary signs of appendicitis or identified.  No pathologically enlarged lymph 
nodes or free fluid is evident.  There is scattered aortoiliac atherosclerotic 
calcifications with mild ectasia but no focal aneurysm.  Bone windows 
demonstrate no aggressive process, accounting for degenerative changes including 
severe osteoarthritis at the right hip.  A 1 cm central lucency in the body of 
L4 has sclerotic margins and is not typical of metastatic disease. Impression:    
IMPRESSION:  
 
1.  Extensive pulmonary parenchymal and hepatic metastatic disease as well as 
subcarinal and azygoesophageal recess lymphadenopathy likely representing 
metastatic disease as well. 2.  Long-segment thickening of the wall of the distal esophagus suggests the 
possibility of a primary esophageal carcinoma.  Follow-up gastroenterology 
consultation is recommended for consideration of endoscopic biopsy. 3.  Left ventricular hypertrophy and scattered atherosclerosis. XR CHEST PORT [[id number]] Collected: 01/16/20 0737 Order Status: Completed Updated: 01/16/20 5244 Narrative:    
 PORTABLE CHEST, January 16, 2020 at 0715 hours CLINICAL HISTORY:  Cough and chest pain in a smoker. COMPARISON:  December 6, 2018. FINDINGS:  AP erect image demonstrates an approximately 4 cm rounded mass 
projected over the inferior aspect of the right hilum.  There are numerous 
additional pulmonary nodules bilaterally, suspicious for metastatic disease.  No 
nidhi pneumonic consolidation or pleural fluid is evident.  The heart size is 
within normal limits without evidence of congestive heart failure or 
pneumothorax.  The bony thorax appears intact on this view.  There are overlying 
radiopaque support devices. Impression:    
IMPRESSION:  NUMEROUS BILATERAL PULMONARY NODULES ARE SUSPICIOUS FOR METASTATIC 
DISEASE, AND A 4 CM RIGHT LOWER LUNG MASS PROJECTED OVER THE INFERIOR ASPECT OF 
THE RIGHT HILUM IS SUSPICIOUS FOR A PRIMARY BRONCHOGENIC CARCINOMA IN THIS 
SMOKER WITH COPD.  FOLLOW PULMONARY MEDICINE CONSULTATION IS RECOMMENDED. ASSESSMENT: 
 
Problem List  Date Reviewed: 1/17/2020 Codes Class Noted Cellulitis of left upper extremity ICD-10-CM: L03.114 
ICD-9-CM: 682.3  1/21/2020 * (Principal) Esophageal mass ICD-10-CM: K22.8 ICD-9-CM: 530.89  1/17/2020 Liver metastases ([facility] [address] ICD-10-CM: C78.7 ICD-9-CM: 197.7  1/17/2020 Esophageal dysphagia ICD-10-CM: R13.10 ICD-9-CM: 787.20  1/17/2020 Weight loss ICD-10-CM: R63.4 ICD-9-CM: 783.21  1/17/2020 Chest pain ICD-10-CM: R07.9 ICD-9-CM: 786.50  1/16/2020 Methamphetamine abuse (HCC) (Chronic) ICD-10-CM: F15.10 ICD-9-CM: 305.70  12/2/2018 H/O atrial flutter (Chronic) ICD-10-CM: Z86.79 
ICD-9-CM: V12.59  12/2/2018 Overview Signed 2/6/2018  7:51 AM by Indio Denton NP  
  1/2018 Atrial flutter, s/p cardioversion. Essential hypertension (Chronic) ICD-10-CM: I10 
ICD-9-CM: 401.9  12/2/2018  Acute respiratory failure with hypoxia and hypercarbia ([facility] [address] ICD-10-CM: J96.01, J96.02 
ICD-9-CM: 518.81  12/2/2018 Substance abuse (HCC) (Chronic) ICD-10-CM: F19.10 ICD-9-CM: 305.90  12/2/2018 Noncompliance (Chronic) ICD-10-CM: Z91.19 ICD-9-CM: V15.81  12/2/2018 Acute encephalopathy ICD-10-CM: G93.40 ICD-9-CM: 348.30  12/2/2018 COPD (chronic obstructive pulmonary disease) (HCC) (Chronic) ICD-10-CM: J44.9 ICD-9-CM: 730  11/16/2018 NATALIIA (obstructive sleep apnea) (Chronic) ICD-10-CM: Z09.98 
ICD-9-CM: 327.23  11/16/2018 Chronic respiratory failure (HCC) (Chronic) ICD-10-CM: J96.10 ICD-9-CM: 518.83  11/16/2018 Altered mental status ICD-10-CM: R41.82 
ICD-9-CM: 780.97  11/16/2018 Morbid obesity ([facility] [address] (Chronic) ICD-10-CM: E66.01 
ICD-9-CM: 278.01  11/16/2018 Nicotine dependence (Chronic) ICD-10-CM: R54.601 ICD-9-CM: 305.1  2/1/2018 Alcohol abuse (Chronic) ICD-10-CM: F10.10 ICD-9-CM: 305.00  2/1/2018 Homeless (Chronic) ICD-10-CM: Z59.0 ICD-9-CM: V60.0  1/15/2018 Urethral stricture (Chronic) ICD-10-CM: N35.919 ICD-9-CM: 598.9  1/7/2018 Anasarca ICD-10-CM: R60.1 ICD-9-CM: 782.3  1/6/2018 [facility] ([facility] ICD-10-CM: N17.9 ICD-9-CM: 584.9  1/6/2018 PLAN: 
Metastatic esophageal cancer - CT CAP with extensive pulm parenchymal and hepatic metastatic disease; subcarinal and azygoesophageal recess LAD; and long-segment thickening of the wall of the distal esophagus - GI performing EGD/EUS, biopsy taken and path shows moderately differentiated adenocarcinoma.   Will need to send tissue for Caris testing - navigation team notified. 
- CEA 19.1, CA 19-9 219.2  
- Iron studies low with ferritin 27, tsat 9%. Repleted iron with ferrlecit x 1.  
- Consulted surgery for feeding tube - patient agrees. - Consulted palliative care - managing pain. - Consult IR for port placement. - Echocardiogram with EF 55-60%. - Will need PET/CT as OP. 1/20 Today patient states that he wants chemo, port and PEG. Surgery following. GI signed off. If he changes his mind again, discharge and have patient follow up with Dr. Tiffanie Dorsey in clinic. Once PEG and port placed we will take over as primary. 1/21 Port today. ?PEG tomorrow. Then chemo to follow. 1/22 Occlusive superficial venous thrombosis in the left basilic and cephalic veins. PEG today. 1/24 PEG placed yesterday. Consult RD for tube feeding management, full liquid diet only per surgery. Start FOLFOX.  
1/25 He was too groggy to consent yesterday, alert today and consented to start C1 FOLFOX today, continue Fentanyl patch, increase Percocet to 10 mg, try to decrease use of IV dilaudid Plan for discharge tomorrow, will need pump off scheduled for Monday and follow up in clinic. Will consult SW as will need assistance with transportation to the [address] [place] [address]. Rafy Foster NP [facility] Hematology and Oncology [address] [place] [address] Office : [phone] Fax : [phone] Attending Addendum: 
I have personally performed a face to face diagnostic evaluation on this patient. I have reviewed and agree with the care plan as documented by Joanne Baltazar N.P. 36 minutes were spent on patient care, including but not limited to, reviewing the chart and time with the patient and family, more than 50% of the time documented was spent in face-to-face contact with the patient and in the care of the patient on the floor/unit where the patient is located. My findings are as follows:  He has metastatic esophageal cancer, appears weak, heart rate regular without murmurs, abdomen is non-tender, bowel sounds are positive, we will start Cycle 1 of FOLFOX today. Aleksey Galvan MD 
 
 
[facility] Hematology/Oncology [address] [place] [address] Office : [phone] Fax : [phone]

## 2020-01-26 LAB
ALBUMIN SERPL-MCNC: 2.1 G/DL (ref 3.5–5)
ALBUMIN/GLOB SERPL: 0.5 {RATIO} (ref 1.2–3.5)
ALP SERPL-CCNC: 104 U/L (ref 50–136)
ALT SERPL-CCNC: 9 U/L (ref 12–65)
ANION GAP SERPL CALC-SCNC: 5 MMOL/L (ref 7–16)
AST SERPL-CCNC: 9 U/L (ref 15–37)
BASOPHILS # BLD: 0 K/UL (ref 0–0.2)
BASOPHILS NFR BLD: 0 % (ref 0–2)
BILIRUB SERPL-MCNC: 0.2 MG/DL (ref 0.2–1.1)
BUN SERPL-MCNC: 9 MG/DL (ref 6–23)
CALCIUM SERPL-MCNC: 9.4 MG/DL (ref 8.3–10.4)
CHLORIDE SERPL-SCNC: 99 MMOL/L (ref 98–107)
CO2 SERPL-SCNC: 32 MMOL/L (ref 21–32)
CREAT SERPL-MCNC: 0.72 MG/DL (ref 0.8–1.5)
DIFFERENTIAL METHOD BLD: ABNORMAL
EOSINOPHIL # BLD: 0 K/UL (ref 0–0.8)
EOSINOPHIL NFR BLD: 0 % (ref 0.5–7.8)
ERYTHROCYTE [DISTWIDTH] IN BLOOD BY AUTOMATED COUNT: 13.1 % (ref 11.9–14.6)
GLOBULIN SER CALC-MCNC: 4.5 G/DL (ref 2.3–3.5)
GLUCOSE SERPL-MCNC: 154 MG/DL (ref 65–100)
HCT VFR BLD AUTO: 29.3 % (ref 41.1–50.3)
HGB BLD-MCNC: 9.2 G/DL (ref 13.6–17.2)
IMM GRANULOCYTES # BLD AUTO: 0 K/UL (ref 0–0.5)
IMM GRANULOCYTES NFR BLD AUTO: 0 % (ref 0–5)
LYMPHOCYTES # BLD: 0.6 K/UL (ref 0.5–4.6)
LYMPHOCYTES NFR BLD: 11 % (ref 13–44)
MAGNESIUM SERPL-MCNC: 2.1 MG/DL (ref 1.8–2.4)
MCH RBC QN AUTO: 29.5 PG (ref 26.1–32.9)
MCHC RBC AUTO-ENTMCNC: 31.4 G/DL (ref 31.4–35)
MCV RBC AUTO: 93.9 FL (ref 79.6–97.8)
MONOCYTES # BLD: 0.1 K/UL (ref 0.1–1.3)
MONOCYTES NFR BLD: 3 % (ref 4–12)
NEUTS SEG # BLD: 4.7 K/UL (ref 1.7–8.2)
NEUTS SEG NFR BLD: 86 % (ref 43–78)
NRBC # BLD: 0 K/UL (ref 0–0.2)
PLATELET # BLD AUTO: 350 K/UL (ref 150–450)
PMV BLD AUTO: 9.6 FL (ref 9.4–12.3)
POTASSIUM SERPL-SCNC: 4.5 MMOL/L (ref 3.5–5.1)
PROT SERPL-MCNC: 6.6 G/DL (ref 6.3–8.2)
RBC # BLD AUTO: 3.12 M/UL (ref 4.23–5.6)
SODIUM SERPL-SCNC: 136 MMOL/L (ref 136–145)
WBC # BLD AUTO: 5.5 K/UL (ref 4.3–11.1)

## 2020-01-26 PROCEDURE — 74011250637 HC RX REV CODE- 250/637: Performed by: INTERNAL MEDICINE

## 2020-01-26 PROCEDURE — 74011250636 HC RX REV CODE- 250/636: Performed by: HOSPITALIST

## 2020-01-26 PROCEDURE — 77010033678 HC OXYGEN DAILY

## 2020-01-26 PROCEDURE — 99232 SBSQ HOSP IP/OBS MODERATE 35: CPT | Performed by: INTERNAL MEDICINE

## 2020-01-26 PROCEDURE — 94640 AIRWAY INHALATION TREATMENT: CPT

## 2020-01-26 PROCEDURE — 83735 ASSAY OF MAGNESIUM: CPT

## 2020-01-26 PROCEDURE — 74011250637 HC RX REV CODE- 250/637: Performed by: HOSPITALIST

## 2020-01-26 PROCEDURE — 74011250637 HC RX REV CODE- 250/637: Performed by: NURSE PRACTITIONER

## 2020-01-26 PROCEDURE — 74011250636 HC RX REV CODE- 250/636: Performed by: NURSE PRACTITIONER

## 2020-01-26 PROCEDURE — 85025 COMPLETE CBC W/AUTO DIFF WBC: CPT

## 2020-01-26 PROCEDURE — 94760 N-INVAS EAR/PLS OXIMETRY 1: CPT

## 2020-01-26 PROCEDURE — 74011250636 HC RX REV CODE- 250/636: Performed by: RADIOLOGY

## 2020-01-26 PROCEDURE — 74011000250 HC RX REV CODE- 250: Performed by: HOSPITALIST

## 2020-01-26 PROCEDURE — 80053 COMPREHEN METABOLIC PANEL: CPT

## 2020-01-26 PROCEDURE — 74011250636 HC RX REV CODE- 250/636: Performed by: INTERNAL MEDICINE

## 2020-01-26 PROCEDURE — 65270000029 HC RM PRIVATE

## 2020-01-26 PROCEDURE — 94660 CPAP INITIATION&MGMT: CPT

## 2020-01-26 RX ADMIN — AMIODARONE HYDROCHLORIDE 200 MG: 200 TABLET ORAL at 08:22

## 2020-01-26 RX ADMIN — LISINOPRIL 10 MG: 5 TABLET ORAL at 08:23

## 2020-01-26 RX ADMIN — ARFORMOTEROL TARTRATE 15 MCG: 15 SOLUTION RESPIRATORY (INHALATION) at 20:42

## 2020-01-26 RX ADMIN — Medication 10 ML: at 23:03

## 2020-01-26 RX ADMIN — SERTRALINE HYDROCHLORIDE 25 MG: 50 TABLET ORAL at 08:23

## 2020-01-26 RX ADMIN — ONDANSETRON 4 MG: 2 INJECTION INTRAMUSCULAR; INTRAVENOUS at 18:15

## 2020-01-26 RX ADMIN — DILTIAZEM HYDROCHLORIDE 90 MG: 30 TABLET, FILM COATED ORAL at 22:53

## 2020-01-26 RX ADMIN — HYDROMORPHONE HYDROCHLORIDE 1 MG: 1 INJECTION, SOLUTION INTRAMUSCULAR; INTRAVENOUS; SUBCUTANEOUS at 11:52

## 2020-01-26 RX ADMIN — FLUOROURACIL 2472 MG: 50 INJECTION, SOLUTION INTRAVENOUS at 13:54

## 2020-01-26 RX ADMIN — CEPHALEXIN 500 MG: 500 CAPSULE ORAL at 00:29

## 2020-01-26 RX ADMIN — DILTIAZEM HYDROCHLORIDE 90 MG: 30 TABLET, FILM COATED ORAL at 08:24

## 2020-01-26 RX ADMIN — POLYETHYLENE GLYCOL 3350 17 G: 17 POWDER, FOR SOLUTION ORAL at 20:55

## 2020-01-26 RX ADMIN — ARFORMOTEROL TARTRATE 15 MCG: 15 SOLUTION RESPIRATORY (INHALATION) at 08:32

## 2020-01-26 RX ADMIN — CEPHALEXIN 500 MG: 500 CAPSULE ORAL at 11:41

## 2020-01-26 RX ADMIN — CEPHALEXIN 500 MG: 500 CAPSULE ORAL at 17:08

## 2020-01-26 RX ADMIN — POLYETHYLENE GLYCOL 3350 17 G: 17 POWDER, FOR SOLUTION ORAL at 08:25

## 2020-01-26 RX ADMIN — CEPHALEXIN 500 MG: 500 CAPSULE ORAL at 05:49

## 2020-01-26 RX ADMIN — HYDROMORPHONE HYDROCHLORIDE 1 MG: 1 INJECTION, SOLUTION INTRAMUSCULAR; INTRAVENOUS; SUBCUTANEOUS at 06:01

## 2020-01-26 RX ADMIN — DILTIAZEM HYDROCHLORIDE 90 MG: 30 TABLET, FILM COATED ORAL at 15:52

## 2020-01-26 RX ADMIN — HYDROMORPHONE HYDROCHLORIDE 1 MG: 1 INJECTION, SOLUTION INTRAMUSCULAR; INTRAVENOUS; SUBCUTANEOUS at 18:16

## 2020-01-26 RX ADMIN — FAMOTIDINE 20 MG: 10 INJECTION INTRAVENOUS at 20:57

## 2020-01-26 RX ADMIN — Medication 10 ML: at 13:31

## 2020-01-26 RX ADMIN — ALPRAZOLAM 0.25 MG: 0.5 TABLET ORAL at 20:00

## 2020-01-26 RX ADMIN — TIOTROPIUM BROMIDE 18 MCG: 18 CAPSULE ORAL; RESPIRATORY (INHALATION) at 08:32

## 2020-01-26 RX ADMIN — FAMOTIDINE 20 MG: 10 INJECTION INTRAVENOUS at 08:24

## 2020-01-26 RX ADMIN — Medication 10 ML: at 05:51

## 2020-01-26 RX ADMIN — OXYCODONE HYDROCHLORIDE AND ACETAMINOPHEN 1 TABLET: 10; 325 TABLET ORAL at 15:52

## 2020-01-26 RX ADMIN — OXYCODONE HYDROCHLORIDE AND ACETAMINOPHEN 1 TABLET: 10; 325 TABLET ORAL at 22:53

## 2020-01-26 RX ADMIN — OXYCODONE HYDROCHLORIDE AND ACETAMINOPHEN 1 TABLET: 10; 325 TABLET ORAL at 09:50

## 2020-01-26 RX ADMIN — OXYCODONE HYDROCHLORIDE AND ACETAMINOPHEN 1 TABLET: 10; 325 TABLET ORAL at 03:21

## 2020-01-26 NOTE — PROGRESS NOTES
Nutrition F/U: 
TF Management for Oncology. Assessment: 
Gastric residual dropped to 0 with the TF being held for most of yesterday. Trickle TF was started late last night at ~2000 with reports of low gastric residual up to this point. Oral intake continues to be reported as good, however I see many bottles of Ensure in the room unopened. Chemo was started and question how long oral intake may continue, therefore will start to advance TF towards its goal rate (75 ml/hr with a 30 ml/hr water flush). Labs are remarkable for AM glucose 154 (he received Decadron yesterday and chemo was based in dextrose). Still no bowel movement despite receiving Miralax twice daily since 1/17/2020. Enteral Access: 
 PEG. Macronutrient Needs (CBW 87.1 kg): KUZ: 7900-8328 ANNETTE/AOK (20-25 kcals/kg) EPR:  g/day (0.8-1.2 g/kg)  
Max CHO: 326 g/d (60% kcal) Fluid: 1.7-2.2 L/d (~1 ml/kcal) Intake/Comparative Standards: 
Current intake of TF (Jevity 1.2 @ 20 ml/hr with a 50 ml/hr water flush) provides 576 calories/day (33% calorie goal), 27 grams protein/day (39% protein goal), 81 grams CHO/day (does not exceed max CHO limit) and 1589 ml water/day (>100% fluid goal). This patient's average meal intake of full liquid diet for the past 1 recorded days/2 meals: 63%. Intervention:  
Meals and Snacks: Continue full liquid diet with Ensure Enlive with all trays. Enteral Nutrition: Start to advance TF as tolerated to the goal rate of 75 ml/hr with a 30 ml/hr water flush - 2160 kcal (100% estimated energy needs), 100 g pro (100% estimated protein needs), 304 g CHO (does not exceed max CHO), 2172 ml fluid (100% estimated fluid needs). Medical Food Supplement Therapy: Commercial Beverage: Ensure Enlive with all trays. Ensure Clear as night snack. Mineral Supplement Therapy: Nutrition Support Orders/Electrolyte Management Replacement Protocols are active on the MAR. Coordination of Nutrition Care by a Nutrition Professional: Collaboration with Nichole Crouch RN. Nutrition Discharge Plan: PEG feeding as oral intake diminishes. Adam Merrill. [facility] 
[phone]

## 2020-01-26 NOTE — PROGRESS NOTES
[address] Hematology Oncology Inpatient Hematology / Oncology Progress Note Admission Date: 2020  6:59 AM 
Reason for Admission/Hospital Course: Chest pain [R07.9] 24 Hour Events: 
Afeb, VSS Tolerated D1C1 FOLFOX yesterday, 5FU infusing x 48 hours Family at bedside ROS: 
Constitutional: Positive for fatigue, weakness. Negative for fever, chills. CV: Negative for chest pain, palpitations, edema. Respiratory: Negative for cough, wheezing. Positive for exertional dyspnea. GI: Negative for nausea, diarrhea. + dysphagia, abd pain. 10 point review of systems is otherwise negative with the exception of the elements mentioned above in the HPI. No Known Allergies OBJECTIVE: 
Patient Vitals for the past 8 hrs: 
 BP Temp Pulse Resp SpO2  
20 1110 142/78 98.1 °F (36.7 °C) 73 18 99 % 20 0832     92 % 20 0734 120/82 97.9 °F (36.6 °C) 72 18 97 % Temp (24hrs), Av °F (36.7 °C), Min:97.4 °F (36.3 °C), Max:98.2 °F (36.8 °C) 
 
 0701 -  1900 In: 300 [P.O.:240] Out: - Physical Exam: 
Constitutional: Well developed, well nourished, chronically-ill appearing male in no acute distress, sitting comfortably in the hospital bed. HEENT: Normocephalic and atraumatic. Oropharynx is clear, mucous membranes are moist. + poor dentition. Extraocular muscles are intact. Sclerae anicteric. Skin Warm and dry. No bruising and no rash noted. No erythema. No pallor. Respiratory Lungs are clear to auscultation bilaterally without wheezes, rales or rhonchi, normal air exchange without accessory muscle use. CVS Normal rate, regular rhythm and normal S1 and S2. No murmurs, gallops, or rubs. Abdomen Soft, mildly tender and nondistended, normoactive bowel sounds. No palpable mass. +PEG Neuro Grossly nonfocal with no obvious sensory or motor deficits. MSK Normal range of motion in general.  No edema and no tenderness. Psych Depressed mood and affect. Labs: 
   
Recent Labs  
  01/26/20 
0334 01/25/20 
0335 01/24/20 
0408 WBC 5.5 6.4 6.5  
RBC 3.12* 2.99* 3.03* HGB 9.2* 8.9* 9.1*  
HCT 29.3* 28.6* 29.5* MCV 93.9 95.7 97.4 MCH 29.5 29.8 30.0 MCHC 31.4 31.1* 30.8*  
RDW 13.1 13.4 13.4  287 312 GRANS 86* 66 70 LYMPH 11* 21 17 MONOS 3* 9 9  
EOS 0* 5 3  
BASOS 0 0 0 IG 0 0 0  
DF AUTOMATED AUTOMATED AUTOMATED ANEU 4.7 4.2 4.6 ABL 0.6 1.4 1.1 ABM 0.1 0.6 0.6 JAY 0.0 0.3 0.2 ABB 0.0 0.0 0.0 AIG 0.0 0.0 0.0 Recent Labs  
  01/26/20 
0334 01/25/20 
0335 01/24/20 
0408  137 137  
K 4.5 4.2 4.3 CL 99 99 102 CO2 32 33* 32 AGAP 5* 5* 3*  
* 102* 93 BUN 9 11 8 CREA 0.72* 0.66* 0.65* GFRAA >60 >60 >60 GFRNA >60 >60 >60  
CA 9.4 9.0 9.2 SGOT 9* 10* 13*  99 101  
TP 6.6 6.0* 6.0* ALB 2.1* 1.9* 2.0*  
GLOB 4.5* 4.1* 4.0* AGRAT 0.5* 0.5* 0.5* MG 2.1 1.9 1.8 PHOS  --  2.9  --   
 
 
 
Imaging: 
CT CHEST ABD PELV W CONT [[id number]] Collected: 01/16/20 8209 Order Status: Completed Updated: 01/16/20 9082 Narrative:    
CT CHEST, ABDOMEN, PELVIS WITH CONTRAST:   
 
CLINICAL HISTORY:   Follow-up abnormal chest radiograph smoker with pulmonary 
nodules and 100-pound weight loss over the last several months. TECHNIQUE:  Oral and nonionic intravenous contrast was administered, and the 
torso was scanned with spiral technique.  Radiation dose reduction was achieved 
using one or all of the following techniques: automated exposure control, 
weight-based dosing, iterative reconstruction. COMPARISON:  Portable chest today and chest CTA of November 16, 2019.  
 
CT CHEST: There are innumerable bilateral pulmonary nodules, the largest 
measuring approximately 3.9 cm posteriorly in the right lower lobe.  There is 
marked thickening of the wall of the distal esophagus below the level of the 
 hilary which suggests the possibility of primary esophageal carcinoma. [place] is 
associated subcarinal and azygoesophageal recess lymphadenopathy.  No 
significant pleural fluid.  The thyroid appears normal as imaged.  There is 
thickening of the left ventricular wall suggesting a hypertrophy. CT ABDOMEN: There are innumerable small hepatic lesions which are new from 2018 
and also suspicious for metastatic disease.  The largest measures approximately 2 cm posteriorly in the right lobe.  There are mildly enlarged lymph nodes in 
the lesser sac which may represent metastatic disease as well.  The spleen, 
pancreas, kidneys, and adrenals appear unremarkable. CT PELVIS:  The appendix is not confidently identified, but there are no 
secondary signs of appendicitis.  The prostate is not enlarged.  No primary or 
secondary signs of appendicitis or identified.  No pathologically enlarged lymph 
nodes or free fluid is evident.  There is scattered aortoiliac atherosclerotic 
calcifications with mild ectasia but no focal aneurysm.  Bone windows 
demonstrate no aggressive process, accounting for degenerative changes including 
severe osteoarthritis at the right hip.  A 1 cm central lucency in the body of 
L4 has sclerotic margins and is not typical of metastatic disease. Impression:    
IMPRESSION:  
 
1.  Extensive pulmonary parenchymal and hepatic metastatic disease as well as 
subcarinal and azygoesophageal recess lymphadenopathy likely representing 
metastatic disease as well. 2.  Long-segment thickening of the wall of the distal esophagus suggests the 
possibility of a primary esophageal carcinoma.  Follow-up gastroenterology 
consultation is recommended for consideration of endoscopic biopsy. 3.  Left ventricular hypertrophy and scattered atherosclerosis. XR CHEST PORT [[id number]] Collected: 01/16/20 0737 Order Status: Completed Updated: 01/16/20 0467 Narrative:    
 PORTABLE CHEST, January 16, 2020 at 0715 hours CLINICAL HISTORY:  Cough and chest pain in a smoker. COMPARISON:  December 6, 2018. FINDINGS:  AP erect image demonstrates an approximately 4 cm rounded mass 
projected over the inferior aspect of the right hilum.  There are numerous 
additional pulmonary nodules bilaterally, suspicious for metastatic disease.  No 
nidhi pneumonic consolidation or pleural fluid is evident.  The heart size is 
within normal limits without evidence of congestive heart failure or 
pneumothorax.  The bony thorax appears intact on this view.  There are overlying 
radiopaque support devices. Impression:    
IMPRESSION:  NUMEROUS BILATERAL PULMONARY NODULES ARE SUSPICIOUS FOR METASTATIC 
DISEASE, AND A 4 CM RIGHT LOWER LUNG MASS PROJECTED OVER THE INFERIOR ASPECT OF 
THE RIGHT HILUM IS SUSPICIOUS FOR A PRIMARY BRONCHOGENIC CARCINOMA IN THIS 
SMOKER WITH COPD.  FOLLOW PULMONARY MEDICINE CONSULTATION IS RECOMMENDED. ASSESSMENT: 
 
Problem List  Date Reviewed: 1/17/2020 Codes Class Noted Cellulitis of left upper extremity ICD-10-CM: L03.114 
ICD-9-CM: 682.3  1/21/2020 * (Principal) Esophageal mass ICD-10-CM: K22.8 ICD-9-CM: 530.89  1/17/2020 Liver metastases ([facility] ICD-10-CM: C78.7 ICD-9-CM: 197.7  1/17/2020 Esophageal dysphagia ICD-10-CM: R13.10 ICD-9-CM: 787.20  1/17/2020 Weight loss ICD-10-CM: R63.4 ICD-9-CM: 783.21  1/17/2020 Chest pain ICD-10-CM: R07.9 ICD-9-CM: 786.50  1/16/2020 Methamphetamine abuse (HCC) (Chronic) ICD-10-CM: F15.10 ICD-9-CM: 305.70  12/2/2018 H/O atrial flutter (Chronic) ICD-10-CM: Z86.79 
ICD-9-CM: V12.59  12/2/2018 Overview Signed 2/6/2018  7:51 AM by Sofía Pepper NP  
  1/2018 Atrial flutter, s/p cardioversion. Essential hypertension (Chronic) ICD-10-CM: I10 
ICD-9-CM: 401.9  12/2/2018  Acute respiratory failure with hypoxia and hypercarbia ([facility] ICD-10-CM: J96.01, J96.02 
ICD-9-CM: 518.81  12/2/2018 Substance abuse (HCC) (Chronic) ICD-10-CM: F19.10 ICD-9-CM: 305.90  12/2/2018 Noncompliance (Chronic) ICD-10-CM: Z91.19 ICD-9-CM: V15.81  12/2/2018 Acute encephalopathy ICD-10-CM: G93.40 ICD-9-CM: 348.30  12/2/2018 COPD (chronic obstructive pulmonary disease) (HCC) (Chronic) ICD-10-CM: J44.9 ICD-9-CM: 748  11/16/2018 NATALIIA (obstructive sleep apnea) (Chronic) ICD-10-CM: A60.62 
ICD-9-CM: 327.23  11/16/2018 Chronic respiratory failure (HCC) (Chronic) ICD-10-CM: J96.10 ICD-9-CM: 518.83  11/16/2018 Altered mental status ICD-10-CM: R41.82 
ICD-9-CM: 780.97  11/16/2018 Morbid obesity ([facility] (Chronic) ICD-10-CM: E66.01 
ICD-9-CM: 278.01  11/16/2018 Nicotine dependence (Chronic) ICD-10-CM: B07.132 ICD-9-CM: 305.1  2/1/2018 Alcohol abuse (Chronic) ICD-10-CM: F10.10 ICD-9-CM: 305.00  2/1/2018 Homeless (Chronic) ICD-10-CM: Z59.0 ICD-9-CM: V60.0  1/15/2018 Urethral stricture (Chronic) ICD-10-CM: N35.919 ICD-9-CM: 598.9  1/7/2018 Anasarca ICD-10-CM: R60.1 ICD-9-CM: 782.3  1/6/2018 [facility] kidney [facility] ([facility] ICD-10-CM: N17.9 ICD-9-CM: 584.9  1/6/2018 PLAN: 
Metastatic esophageal cancer - CT CAP with extensive pulm parenchymal and hepatic metastatic disease; subcarinal and azygoesophageal recess LAD; and long-segment thickening of the wall of the distal esophagus - GI performing EGD/EUS, biopsy taken and path shows moderately differentiated adenocarcinoma.   Will need to send tissue for Caris testing - navigation team notified. 
- CEA 19.1, CA 19-9 219.2  
- Iron studies low with ferritin 27, tsat 9%. Repleted iron with ferrlecit x 1.  
- Consulted surgery for feeding tube - patient agrees. - Consulted palliative care - managing pain. - Consult IR for port placement. - Echocardiogram with EF 55-60%. - Will need PET/CT as OP. 1/20 Today patient states that he wants chemo, port and PEG. Surgery following. GI signed off. If he changes his mind again, discharge and have patient follow up with Dr. Isidoro Marroquin in clinic. Once PEG and port placed we will take over as primary. 1/21 Port today. ?PEG tomorrow. Then chemo to follow. 1/22 Occlusive superficial venous thrombosis in the left basilic and cephalic veins. PEG today. 1/24 PEG placed yesterday. Consult RD for tube feeding management, full liquid diet only per surgery. Start FOLFOX.  
1/25 He was too groggy to consent yesterday, alert today and consented to start C1 FOLFOX today, continue Fentanyl patch, increase Percocet to 10 mg, try to decrease use of IV dilaudid 1/26 Will need to remain inpatient until 48 5FU infusion complete as pump not available, depressed mood today, stating no one in his family cares about him and won't visit him, however during our visit 2 family members arrived, needs assistance from  for HH/transportation, plan for discharge tomorrow, PC on board for pain management Monika Chirinos NP [facility] Hematology and Oncology [id number] [address] Office : [phone] Fax : [phone] Attending Addendum: 
I have personally performed a face to face diagnostic evaluation on this patient. I have reviewed and agree with the care plan as documented by Chio Craig N.P. My findings are as follows:  He has metastatic esophageal cancer, appears tearful, heart rate regular without murmurs, abdomen is non-tender, bowel sounds are positive, we will continue Cycle 1 Day 2 of FOLFOX, he may be able to go home tomorrow. Yolanda Morton MD 
 
 
[facility] Hematology/Oncology [address] [address] Office : [phone] Fax : [phone]

## 2020-01-27 LAB
ALBUMIN SERPL-MCNC: 1.9 G/DL (ref 3.5–5)
ALBUMIN/GLOB SERPL: 0.5 {RATIO} (ref 1.2–3.5)
ALP SERPL-CCNC: 102 U/L (ref 50–136)
ALT SERPL-CCNC: 26 U/L (ref 12–65)
ANION GAP SERPL CALC-SCNC: 4 MMOL/L (ref 7–16)
AST SERPL-CCNC: 42 U/L (ref 15–37)
BASOPHILS # BLD: 0 K/UL (ref 0–0.2)
BASOPHILS NFR BLD: 0 % (ref 0–2)
BILIRUB SERPL-MCNC: 0.1 MG/DL (ref 0.2–1.1)
BUN SERPL-MCNC: 15 MG/DL (ref 6–23)
CA-I BLD-MCNC: 4.84 MG/DL (ref 4–5.2)
CALCIUM SERPL-MCNC: 9.1 MG/DL (ref 8.3–10.4)
CHLORIDE SERPL-SCNC: 100 MMOL/L (ref 98–107)
CO2 SERPL-SCNC: 34 MMOL/L (ref 21–32)
CREAT SERPL-MCNC: 0.68 MG/DL (ref 0.8–1.5)
DIFFERENTIAL METHOD BLD: ABNORMAL
EOSINOPHIL # BLD: 0 K/UL (ref 0–0.8)
EOSINOPHIL NFR BLD: 0 % (ref 0.5–7.8)
ERYTHROCYTE [DISTWIDTH] IN BLOOD BY AUTOMATED COUNT: 13 % (ref 11.9–14.6)
GLOBULIN SER CALC-MCNC: 3.9 G/DL (ref 2.3–3.5)
GLUCOSE SERPL-MCNC: 107 MG/DL (ref 65–100)
HCT VFR BLD AUTO: 27.9 % (ref 41.1–50.3)
HGB BLD-MCNC: 8.5 G/DL (ref 13.6–17.2)
IMM GRANULOCYTES # BLD AUTO: 0 K/UL (ref 0–0.5)
IMM GRANULOCYTES NFR BLD AUTO: 0 % (ref 0–5)
LYMPHOCYTES # BLD: 1.1 K/UL (ref 0.5–4.6)
LYMPHOCYTES NFR BLD: 10 % (ref 13–44)
MAGNESIUM SERPL-MCNC: 2 MG/DL (ref 1.8–2.4)
MCH RBC QN AUTO: 29.1 PG (ref 26.1–32.9)
MCHC RBC AUTO-ENTMCNC: 30.5 G/DL (ref 31.4–35)
MCV RBC AUTO: 95.5 FL (ref 79.6–97.8)
MONOCYTES # BLD: 0.6 K/UL (ref 0.1–1.3)
MONOCYTES NFR BLD: 6 % (ref 4–12)
NEUTS SEG # BLD: 9.6 K/UL (ref 1.7–8.2)
NEUTS SEG NFR BLD: 84 % (ref 43–78)
NRBC # BLD: 0 K/UL (ref 0–0.2)
PHOSPHATE SERPL-MCNC: 3.5 MG/DL (ref 2.5–4.5)
PLATELET # BLD AUTO: 349 K/UL (ref 150–450)
PMV BLD AUTO: 9.2 FL (ref 9.4–12.3)
POTASSIUM SERPL-SCNC: 4.9 MMOL/L (ref 3.5–5.1)
PROT SERPL-MCNC: 5.8 G/DL (ref 6.3–8.2)
RBC # BLD AUTO: 2.92 M/UL (ref 4.23–5.6)
SODIUM SERPL-SCNC: 138 MMOL/L (ref 136–145)
WBC # BLD AUTO: 11.5 K/UL (ref 4.3–11.1)

## 2020-01-27 PROCEDURE — 80053 COMPREHEN METABOLIC PANEL: CPT

## 2020-01-27 PROCEDURE — 74011000250 HC RX REV CODE- 250: Performed by: HOSPITALIST

## 2020-01-27 PROCEDURE — 85025 COMPLETE CBC W/AUTO DIFF WBC: CPT

## 2020-01-27 PROCEDURE — 99232 SBSQ HOSP IP/OBS MODERATE 35: CPT | Performed by: INTERNAL MEDICINE

## 2020-01-27 PROCEDURE — 74011250637 HC RX REV CODE- 250/637: Performed by: HOSPITALIST

## 2020-01-27 PROCEDURE — 36591 DRAW BLOOD OFF VENOUS DEVICE: CPT

## 2020-01-27 PROCEDURE — 94760 N-INVAS EAR/PLS OXIMETRY 1: CPT

## 2020-01-27 PROCEDURE — 74011250637 HC RX REV CODE- 250/637: Performed by: NURSE PRACTITIONER

## 2020-01-27 PROCEDURE — 84100 ASSAY OF PHOSPHORUS: CPT

## 2020-01-27 PROCEDURE — 65270000029 HC RM PRIVATE

## 2020-01-27 PROCEDURE — 77010033678 HC OXYGEN DAILY

## 2020-01-27 PROCEDURE — 82330 ASSAY OF CALCIUM: CPT

## 2020-01-27 PROCEDURE — 99233 SBSQ HOSP IP/OBS HIGH 50: CPT | Performed by: NURSE PRACTITIONER

## 2020-01-27 PROCEDURE — 77030018798 HC PMP KT ENTRL FED COVD -A

## 2020-01-27 PROCEDURE — 74011250636 HC RX REV CODE- 250/636: Performed by: NURSE PRACTITIONER

## 2020-01-27 PROCEDURE — 74011250637 HC RX REV CODE- 250/637: Performed by: INTERNAL MEDICINE

## 2020-01-27 PROCEDURE — 74011250636 HC RX REV CODE- 250/636: Performed by: RADIOLOGY

## 2020-01-27 PROCEDURE — 83735 ASSAY OF MAGNESIUM: CPT

## 2020-01-27 PROCEDURE — 94640 AIRWAY INHALATION TREATMENT: CPT

## 2020-01-27 PROCEDURE — 74011250636 HC RX REV CODE- 250/636: Performed by: HOSPITALIST

## 2020-01-27 RX ORDER — OXYCODONE HYDROCHLORIDE 5 MG/1
10 TABLET ORAL
Status: DISCONTINUED | OUTPATIENT
Start: 2020-01-27 | End: 2020-01-31 | Stop reason: HOSPADM

## 2020-01-27 RX ORDER — AMOXICILLIN 250 MG
1 CAPSULE ORAL DAILY
Status: DISCONTINUED | OUTPATIENT
Start: 2020-01-27 | End: 2020-01-31 | Stop reason: HOSPADM

## 2020-01-27 RX ADMIN — OXYCODONE 10 MG: 5 TABLET ORAL at 13:44

## 2020-01-27 RX ADMIN — ALPRAZOLAM 0.25 MG: 0.5 TABLET ORAL at 21:24

## 2020-01-27 RX ADMIN — SERTRALINE HYDROCHLORIDE 25 MG: 50 TABLET ORAL at 09:15

## 2020-01-27 RX ADMIN — ALPRAZOLAM 0.25 MG: 0.5 TABLET ORAL at 13:41

## 2020-01-27 RX ADMIN — LACTULOSE 15 ML: 20 SOLUTION ORAL at 16:10

## 2020-01-27 RX ADMIN — CEPHALEXIN 500 MG: 500 CAPSULE ORAL at 05:37

## 2020-01-27 RX ADMIN — CEPHALEXIN 500 MG: 500 CAPSULE ORAL at 11:43

## 2020-01-27 RX ADMIN — HYDROMORPHONE HYDROCHLORIDE 1 MG: 1 INJECTION, SOLUTION INTRAMUSCULAR; INTRAVENOUS; SUBCUTANEOUS at 02:18

## 2020-01-27 RX ADMIN — DILTIAZEM HYDROCHLORIDE 90 MG: 30 TABLET, FILM COATED ORAL at 16:10

## 2020-01-27 RX ADMIN — DILTIAZEM HYDROCHLORIDE 90 MG: 30 TABLET, FILM COATED ORAL at 22:00

## 2020-01-27 RX ADMIN — TIOTROPIUM BROMIDE 18 MCG: 18 CAPSULE ORAL; RESPIRATORY (INHALATION) at 07:42

## 2020-01-27 RX ADMIN — OXYCODONE 10 MG: 5 TABLET ORAL at 23:27

## 2020-01-27 RX ADMIN — FAMOTIDINE 20 MG: 10 INJECTION INTRAVENOUS at 21:00

## 2020-01-27 RX ADMIN — AMIODARONE HYDROCHLORIDE 200 MG: 200 TABLET ORAL at 09:15

## 2020-01-27 RX ADMIN — POLYETHYLENE GLYCOL 3350 17 G: 17 POWDER, FOR SOLUTION ORAL at 09:15

## 2020-01-27 RX ADMIN — DILTIAZEM HYDROCHLORIDE 90 MG: 30 TABLET, FILM COATED ORAL at 09:15

## 2020-01-27 RX ADMIN — OXYCODONE HYDROCHLORIDE AND ACETAMINOPHEN 1 TABLET: 10; 325 TABLET ORAL at 05:37

## 2020-01-27 RX ADMIN — LISINOPRIL 10 MG: 5 TABLET ORAL at 09:15

## 2020-01-27 RX ADMIN — ARFORMOTEROL TARTRATE 15 MCG: 15 SOLUTION RESPIRATORY (INHALATION) at 07:39

## 2020-01-27 RX ADMIN — OXYCODONE 10 MG: 5 TABLET ORAL at 18:57

## 2020-01-27 RX ADMIN — MAGNESIUM HYDROXIDE 30 ML: 400 SUSPENSION ORAL at 11:43

## 2020-01-27 RX ADMIN — HYDROMORPHONE HYDROCHLORIDE 1 MG: 1 INJECTION, SOLUTION INTRAMUSCULAR; INTRAVENOUS; SUBCUTANEOUS at 08:23

## 2020-01-27 RX ADMIN — FAMOTIDINE 20 MG: 10 INJECTION INTRAVENOUS at 08:23

## 2020-01-27 RX ADMIN — CEPHALEXIN 500 MG: 500 CAPSULE ORAL at 16:11

## 2020-01-27 RX ADMIN — Medication 10 ML: at 05:39

## 2020-01-27 RX ADMIN — Medication 10 ML: at 13:46

## 2020-01-27 RX ADMIN — CEPHALEXIN 500 MG: 500 CAPSULE ORAL at 00:04

## 2020-01-27 RX ADMIN — Medication 10 ML: at 21:00

## 2020-01-27 RX ADMIN — SENNOSIDES AND DOCUSATE SODIUM 1 TABLET: 8.6; 5 TABLET ORAL at 16:11

## 2020-01-27 RX ADMIN — POLYETHYLENE GLYCOL 3350 17 G: 17 POWDER, FOR SOLUTION ORAL at 21:00

## 2020-01-27 RX ADMIN — OXYCODONE HYDROCHLORIDE AND ACETAMINOPHEN 1 TABLET: 10; 325 TABLET ORAL at 11:46

## 2020-01-27 RX ADMIN — CEPHALEXIN 500 MG: 500 CAPSULE ORAL at 23:27

## 2020-01-27 RX ADMIN — ONDANSETRON 4 MG: 2 INJECTION INTRAMUSCULAR; INTRAVENOUS at 23:32
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
## 2020-01-27 NOTE — PROGRESS NOTES
Nutrition follow-up Consult for tube feeding management (Oncology) Reason for initial assessment: Referral received from nursing admission Malnutrition Screening Tool Recently Lost Weight Without Trying: Yes If Yes, How Much Weight Loss: >33 lbs(almost 100 lbs in 3 months) Eating Poorly Due to Decreased Appetite: No 
  
Assessment:  
Diet: DIET Full Liquids - advanced this am  
Food/Nutrition Patient History:   
Pt with history of MARGARITA, COPD, altered mental status, nocotine use and alcohol abuse. He presented with nausea/vomiting and chest pain for months. 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 Plans for port placed . PEG yesterday in IR. S/p FOLFOX now on 5FU. Patient was seen in follow-up today. He states that he is feeling better, but still very emotional about diagnosis and lack of resources. He has been advanced to full liquids and states that he has been eating his soup and grits, but not much else. He states that he has been drinking Ensure x2 per day, he does not like the vanilla flavor. Pertinent Medications: Pepcid, milk of mag, Miralax, Zofran, Phenergan  
Abdomen: Active bowel sounds, constipation noted, 2-5 ml gastric residuals for last 24 hrs, last documented BM 2020. Patient states no BM for a week. Anthropometrics: 
Height: 6' (182.9 cm), Weight: 87.1 kg (192 lb), Weight Source: Patient stated, Body mass index is 26.04 kg/m². BMI class of overweight. 
  
Macronutrient needs: MARGARITA (CBW 87.1kg) JSW: 3515-5995 OLTS/DAP (20-25 kcals/kg) EPR:  g/day (0.8-1.2 g/kg)  
Max CHO: 326 g/d (60% kcal) Fluid: 1.7-2.2 L/d (~1 ml/kcal) 
  Intake/Comparative Standards: Jevity 1.2 @ 75 ml/hr with 30 ml/hr water flush providin kcal (100% estimated energy needs), 100 g pro (100% estimated protein needs), 304 g CHO (does not exceed max CHO), 2172 ml fluid (100% estimated fluid needs).   
 PO intake of 2 Ensure Enlive per day and soup/grits from trays providing ~1100 kcal (~65% estimated energy needs), ~45 g pro (~65% estimated protein needs). Malnutrition Criteria:   
Meets Criteria for Malnutrition in the context of Chronic Illness  
[x]???? Severe Malnutrition, as evidenced by: 
            [x]???? Nutritional intake of <75% of energy intake compared to estimated energy needs for > 1 month             [x]? ??? Weight loss of >5% in 1 month, >7.5% in 3 months,  >10% in 6 months, or >20% in 12 months 
            []???? Severe edema 
            []???? Severe loss of muscle mass 
            []???? Severe loss of subcutaneous fat 
            []???? Functional decline             []???? Severe edema  
  
  
Nutrition Diagnosis: 
Severe chronic disease or condition related malnutrition related to decreased intake  as evidenced by estimated intake less than estimated needs, nausea, vomiting and unintentional weight loss at least 20% in 1 year. 
  
Nutrition Intervention: 
Meals and Snacks: Advance as medically appropriate and tolerated.  
Commercial Beverages and Supplements: Change to Ensure Enlive TID. Encouraged patient to increase to TID and explained need for increase to meet needs. Enteral Nutrition:  Transition to supplemental bolus feeds. Bolus 1 bottle Ensure Plus TID between meals (10A, 2P, 8P) with 30 ml water flush before and after each feeding. This will provide 1050 kcal (~60% estimated needs low end of range), 39 g pro (~55% estimated protein needs low end of range), 150 g CHO (does not exceed max CHO), 786 ml free water (~45% estimated fluid needs). Nutrition Support Electrolyte Replacement active and on the MAR. Coordination of care: Discussed Brandon Gonzalez, TIFFANY and Darin Jacome RN. Maddy Dixon to adjust bowel regimen. Also discussed with Marcus KELLOGG - patient will likely needs assistance with Ensure/tube feeding at discharge.  
Discharge Nutrition Plan: Likely home on full liquid diet with supplemental bolus feeds. Recommend continue Ensure Plus or equivalent 6 per day, either PO or via gtube bolus. PO intake of full liquids as tolerated. [address] [place] [address], [address]

## 2020-01-28 ENCOUNTER — APPOINTMENT (OUTPATIENT)
Dept: GENERAL RADIOLOGY | Age: 55
DRG: 950 | End: 2020-01-28
Attending: NURSE PRACTITIONER
Payer: MEDICAID

## 2020-01-28 LAB
ALBUMIN SERPL-MCNC: 2 G/DL (ref 3.5–5)
ALBUMIN/GLOB SERPL: 0.5 {RATIO} (ref 1.2–3.5)
ALP SERPL-CCNC: 97 U/L (ref 50–136)
ALT SERPL-CCNC: 30 U/L (ref 12–65)
ANION GAP SERPL CALC-SCNC: 5 MMOL/L (ref 7–16)
APPEARANCE UR: CLEAR
AST SERPL-CCNC: 30 U/L (ref 15–37)
BASOPHILS # BLD: 0 K/UL (ref 0–0.2)
BASOPHILS NFR BLD: 0 % (ref 0–2)
BILIRUB SERPL-MCNC: 0.4 MG/DL (ref 0.2–1.1)
BILIRUB UR QL: ABNORMAL
BUN SERPL-MCNC: 19 MG/DL (ref 6–23)
CALCIUM SERPL-MCNC: 8.8 MG/DL (ref 8.3–10.4)
CHLORIDE SERPL-SCNC: 98 MMOL/L (ref 98–107)
CO2 SERPL-SCNC: 33 MMOL/L (ref 21–32)
COLOR UR: YELLOW
CREAT SERPL-MCNC: 0.7 MG/DL (ref 0.8–1.5)
DIFFERENTIAL METHOD BLD: ABNORMAL
EOSINOPHIL # BLD: 0.3 K/UL (ref 0–0.8)
EOSINOPHIL NFR BLD: 11 % (ref 0.5–7.8)
ERYTHROCYTE [DISTWIDTH] IN BLOOD BY AUTOMATED COUNT: 13.2 % (ref 11.9–14.6)
GLOBULIN SER CALC-MCNC: 3.7 G/DL (ref 2.3–3.5)
GLUCOSE SERPL-MCNC: 95 MG/DL (ref 65–100)
GLUCOSE UR STRIP.AUTO-MCNC: NEGATIVE MG/DL
HCT VFR BLD AUTO: 30 % (ref 41.1–50.3)
HGB BLD-MCNC: 9.2 G/DL (ref 13.6–17.2)
HGB UR QL STRIP: NEGATIVE
IMM GRANULOCYTES # BLD AUTO: 0 K/UL (ref 0–0.5)
IMM GRANULOCYTES NFR BLD AUTO: 0 % (ref 0–5)
KETONES UR QL STRIP.AUTO: NEGATIVE MG/DL
LEUKOCYTE ESTERASE UR QL STRIP.AUTO: NEGATIVE
LYMPHOCYTES # BLD: 0.4 K/UL (ref 0.5–4.6)
LYMPHOCYTES NFR BLD: 13 % (ref 13–44)
MAGNESIUM SERPL-MCNC: 1.9 MG/DL (ref 1.8–2.4)
MCH RBC QN AUTO: 29.2 PG (ref 26.1–32.9)
MCHC RBC AUTO-ENTMCNC: 30.7 G/DL (ref 31.4–35)
MCV RBC AUTO: 95.2 FL (ref 79.6–97.8)
MONOCYTES # BLD: 0.1 K/UL (ref 0.1–1.3)
MONOCYTES NFR BLD: 2 % (ref 4–12)
NEUTS SEG # BLD: 2.3 K/UL (ref 1.7–8.2)
NEUTS SEG NFR BLD: 74 % (ref 43–78)
NITRITE UR QL STRIP.AUTO: NEGATIVE
NRBC # BLD: 0 K/UL (ref 0–0.2)
PH UR STRIP: 7 [PH] (ref 5–9)
PLATELET # BLD AUTO: 299 K/UL (ref 150–450)
PMV BLD AUTO: 9.4 FL (ref 9.4–12.3)
POTASSIUM SERPL-SCNC: 4.7 MMOL/L (ref 3.5–5.1)
PROT SERPL-MCNC: 5.7 G/DL (ref 6.3–8.2)
PROT UR STRIP-MCNC: NEGATIVE MG/DL
RBC # BLD AUTO: 3.15 M/UL (ref 4.23–5.6)
SODIUM SERPL-SCNC: 136 MMOL/L (ref 136–145)
SP GR UR REFRACTOMETRY: 1.02 (ref 1–1.02)
UROBILINOGEN UR QL STRIP.AUTO: 1 EU/DL (ref 0.2–1)
WBC # BLD AUTO: 3.1 K/UL (ref 4.3–11.1)

## 2020-01-28 PROCEDURE — 74011250636 HC RX REV CODE- 250/636: Performed by: HOSPITALIST

## 2020-01-28 PROCEDURE — 36415 COLL VENOUS BLD VENIPUNCTURE: CPT

## 2020-01-28 PROCEDURE — 36591 DRAW BLOOD OFF VENOUS DEVICE: CPT

## 2020-01-28 PROCEDURE — 77030019605

## 2020-01-28 PROCEDURE — 74011250636 HC RX REV CODE- 250/636: Performed by: INTERNAL MEDICINE

## 2020-01-28 PROCEDURE — 99232 SBSQ HOSP IP/OBS MODERATE 35: CPT | Performed by: INTERNAL MEDICINE

## 2020-01-28 PROCEDURE — 71045 X-RAY EXAM CHEST 1 VIEW: CPT

## 2020-01-28 PROCEDURE — 65270000029 HC RM PRIVATE

## 2020-01-28 PROCEDURE — 74011250636 HC RX REV CODE- 250/636: Performed by: NURSE PRACTITIONER

## 2020-01-28 PROCEDURE — 74011000258 HC RX REV CODE- 258: Performed by: NURSE PRACTITIONER

## 2020-01-28 PROCEDURE — 77010033678 HC OXYGEN DAILY

## 2020-01-28 PROCEDURE — 85025 COMPLETE CBC W/AUTO DIFF WBC: CPT

## 2020-01-28 PROCEDURE — 74011250637 HC RX REV CODE- 250/637: Performed by: NURSE PRACTITIONER

## 2020-01-28 PROCEDURE — 94640 AIRWAY INHALATION TREATMENT: CPT

## 2020-01-28 PROCEDURE — 87040 BLOOD CULTURE FOR BACTERIA: CPT

## 2020-01-28 PROCEDURE — 81003 URINALYSIS AUTO W/O SCOPE: CPT

## 2020-01-28 PROCEDURE — 87086 URINE CULTURE/COLONY COUNT: CPT

## 2020-01-28 PROCEDURE — 83735 ASSAY OF MAGNESIUM: CPT

## 2020-01-28 PROCEDURE — 74011250637 HC RX REV CODE- 250/637: Performed by: INTERNAL MEDICINE

## 2020-01-28 PROCEDURE — 94760 N-INVAS EAR/PLS OXIMETRY 1: CPT

## 2020-01-28 PROCEDURE — 77030003560 HC NDL HUBR BARD -A

## 2020-01-28 PROCEDURE — 74011000250 HC RX REV CODE- 250: Performed by: HOSPITALIST

## 2020-01-28 PROCEDURE — 80053 COMPREHEN METABOLIC PANEL: CPT

## 2020-01-28 PROCEDURE — 74011250637 HC RX REV CODE- 250/637: Performed by: HOSPITALIST

## 2020-01-28 RX ORDER — ADHESIVE BANDAGE
30 BANDAGE TOPICAL DAILY PRN
Status: DISCONTINUED | OUTPATIENT
Start: 2020-01-28 | End: 2020-01-31 | Stop reason: HOSPADM

## 2020-01-28 RX ORDER — VANCOMYCIN/0.9 % SOD CHLORIDE 1.5G/250ML
1500 PLASTIC BAG, INJECTION (ML) INTRAVENOUS EVERY 12 HOURS
Status: DISCONTINUED | OUTPATIENT
Start: 2020-01-28 | End: 2020-01-30

## 2020-01-28 RX ORDER — HEPARIN 100 UNIT/ML
300 SYRINGE INTRAVENOUS AS NEEDED
Status: DISCONTINUED | OUTPATIENT
Start: 2020-01-28 | End: 2020-01-31

## 2020-01-28 RX ORDER — VANCOMYCIN 2 GRAM/500 ML IN 0.9 % SODIUM CHLORIDE INTRAVENOUS
2000 ONCE
Status: COMPLETED | OUTPATIENT
Start: 2020-01-28 | End: 2020-01-28

## 2020-01-28 RX ADMIN — PIPERACILLIN AND TAZOBACTAM 4.5 G: 4; .5 INJECTION, POWDER, FOR SOLUTION INTRAVENOUS at 14:08

## 2020-01-28 RX ADMIN — VANCOMYCIN HYDROCHLORIDE 1500 MG: 10 INJECTION, POWDER, LYOPHILIZED, FOR SOLUTION INTRAVENOUS at 17:44

## 2020-01-28 RX ADMIN — VANCOMYCIN HYDROCHLORIDE 2000 MG: 10 INJECTION, POWDER, LYOPHILIZED, FOR SOLUTION INTRAVENOUS at 04:36

## 2020-01-28 RX ADMIN — FAMOTIDINE 20 MG: 10 INJECTION INTRAVENOUS at 21:00

## 2020-01-28 RX ADMIN — ALPRAZOLAM 0.25 MG: 0.5 TABLET ORAL at 23:55

## 2020-01-28 RX ADMIN — DILTIAZEM HYDROCHLORIDE 90 MG: 30 TABLET, FILM COATED ORAL at 09:16

## 2020-01-28 RX ADMIN — LISINOPRIL 10 MG: 5 TABLET ORAL at 09:15

## 2020-01-28 RX ADMIN — ACETAMINOPHEN 650 MG: 325 TABLET, FILM COATED ORAL at 03:25

## 2020-01-28 RX ADMIN — ALPRAZOLAM 0.25 MG: 0.5 TABLET ORAL at 17:53

## 2020-01-28 RX ADMIN — SENNOSIDES AND DOCUSATE SODIUM 1 TABLET: 8.6; 5 TABLET ORAL at 09:18

## 2020-01-28 RX ADMIN — Medication 10 ML: at 15:43

## 2020-01-28 RX ADMIN — SERTRALINE HYDROCHLORIDE 25 MG: 50 TABLET ORAL at 09:19

## 2020-01-28 RX ADMIN — ONDANSETRON 4 MG: 2 INJECTION INTRAMUSCULAR; INTRAVENOUS at 13:10

## 2020-01-28 RX ADMIN — ARFORMOTEROL TARTRATE 15 MCG: 15 SOLUTION RESPIRATORY (INHALATION) at 19:17

## 2020-01-28 RX ADMIN — ARFORMOTEROL TARTRATE 15 MCG: 15 SOLUTION RESPIRATORY (INHALATION) at 08:23

## 2020-01-28 RX ADMIN — Medication 10 ML: at 21:02

## 2020-01-28 RX ADMIN — ALPRAZOLAM 0.25 MG: 0.5 TABLET ORAL at 09:37

## 2020-01-28 RX ADMIN — DILTIAZEM HYDROCHLORIDE 90 MG: 30 TABLET, FILM COATED ORAL at 15:42

## 2020-01-28 RX ADMIN — OXYCODONE 10 MG: 5 TABLET ORAL at 13:10

## 2020-01-28 RX ADMIN — CEFEPIME HYDROCHLORIDE 2 G: 2 INJECTION, POWDER, FOR SOLUTION INTRAVENOUS at 04:05

## 2020-01-28 RX ADMIN — FAMOTIDINE 20 MG: 10 INJECTION INTRAVENOUS at 09:20

## 2020-01-28 RX ADMIN — PIPERACILLIN AND TAZOBACTAM 4.5 G: 4; .5 INJECTION, POWDER, FOR SOLUTION INTRAVENOUS at 21:00

## 2020-01-28 RX ADMIN — AMIODARONE HYDROCHLORIDE 200 MG: 200 TABLET ORAL at 09:17

## 2020-01-28 RX ADMIN — ONDANSETRON 4 MG: 2 INJECTION INTRAMUSCULAR; INTRAVENOUS at 03:25

## 2020-01-28 RX ADMIN — OXYCODONE 10 MG: 5 TABLET ORAL at 21:01

## 2020-01-28 RX ADMIN — OXYCODONE 10 MG: 5 TABLET ORAL at 03:25

## 2020-01-28 RX ADMIN — TIOTROPIUM BROMIDE 18 MCG: 18 CAPSULE ORAL; RESPIRATORY (INHALATION) at 08:28

## 2020-01-28 RX ADMIN — HEPARIN SODIUM (PORCINE) LOCK FLUSH IV SOLN 100 UNIT/ML 300 UNITS: 100 SOLUTION at 12:21

## 2020-01-28 RX ADMIN — Medication 10 ML: at 05:08
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
## 2020-01-28 NOTE — PROGRESS NOTES
0600-END OF SHIFT NOTE:  
 
-pt rested well throughout the night 
-2x oxycodone 
-pt states has not had a bm in over a week  
-nauseas during shift 
-first febrile episode - protocol initiated, antibiotics started 
-visitor at bedside  
-vss, no needs voiced at this time Intake/Output 01/27 1901 - 01/28 0700 In: 423 [P.O.:350] Out: [address] [place] Voiding: YES Catheter: NO 
Drain:   
 
 
Stool:  0 occurrences. Emesis:  2 occurrences. VITAL SIGNS Patient Vitals for the past 12 hrs: 
 Temp Pulse Resp BP SpO2  
01/28/20 0443 98.6 °F (37 °C) (!) 107     
01/28/20 0304 (!) 101 °F (38.3 °C) (!) 106 16 115/76 97 % 01/27/20 2301 97.6 °F (36.4 °C) 72 16 121/73 97 % 01/27/20 1857 97.5 °F (36.4 °C) 68 16 119/67 97 % Pain Assessment Pain 1 Pain Scale 1: Visual (01/28/20 0400) Pain Intensity 1: 0 (01/28/20 0400) Patient Stated Pain Goal: 0 (01/28/20 0400) Pain Reassessment 1: Patient resting w/respiratory rate greater than 10 (01/28/20 0400) Pain Onset 1: pta (01/28/20 0305) Pain Location 1: Abdomen (01/28/20 0305) Pain Orientation 1: Mid (01/28/20 0305) Pain Description 1: Aching;Constant; Stabbing (01/28/20 0305) Pain Intervention(s) 1: Medication (see MAR) (01/28/20 0305) Additional Pain Sites: Yes (01/17/20 0706) Ambulating No 
 
Additional Information:  
 
Shift report given to oncoming nurse at the bedside.  
 
Niurka Ritter RN

## 2020-01-28 NOTE — PROGRESS NOTES
12- notified provider on call of pts temp and increased hr. Bcx2, ua, uc, cxr, vanc, and cef ordered.

## 2020-01-28 NOTE — PROGRESS NOTES
Pharmacokinetic Consult to Pharmacist 
 
Mani Sharpe is a 62 y.o. male being treated for sepsis of unknown etiology with vancomycin. Height: 6' (182.9 cm)  Weight: 83.3 kg (183 lb 10 oz) Lab Results Component Value Date/Time BUN 19 01/28/2020 03:25 AM  
 Creatinine 0.70 (L) 01/28/2020 03:25 AM  
 WBC 3.1 (L) 01/28/2020 03:25 AM  
 Procalcitonin 0.4 02/02/2018 08:52 AM  
 Lactic acid 1.6 02/02/2018 08:52 AM  
 Lactic Acid (POC) 5.6 (H) 02/01/2018 07:19 PM  
  
Estimated Creatinine Clearance: 129.3 mL/min (A) (based on SCr of 0.7 mg/dL (L)). Day 1 of vancomycin. Goal trough is 15-20. Vancomycin dose initiated at 2000 mg x 1 dose; followed by Vanc 1500 mg IV q12h. Will continue to follow patient. Thank you, Anamika Richardson, PharmD.

## 2020-01-29 ENCOUNTER — HOME HEALTH ADMISSION (OUTPATIENT)
Dept: HOME HEALTH SERVICES | Facility: HOME HEALTH | Age: 55
End: 2020-01-29
Payer: MEDICAID

## 2020-01-29 LAB
ALBUMIN SERPL-MCNC: 1.9 G/DL (ref 3.5–5)
ALBUMIN/GLOB SERPL: 0.5 {RATIO} (ref 1.2–3.5)
ALP SERPL-CCNC: 87 U/L (ref 50–136)
ALT SERPL-CCNC: 21 U/L (ref 12–65)
ANION GAP SERPL CALC-SCNC: 6 MMOL/L (ref 7–16)
AST SERPL-CCNC: 16 U/L (ref 15–37)
BASOPHILS # BLD: 0 K/UL (ref 0–0.2)
BASOPHILS NFR BLD: 0 % (ref 0–2)
BILIRUB SERPL-MCNC: 0.3 MG/DL (ref 0.2–1.1)
BUN SERPL-MCNC: 18 MG/DL (ref 6–23)
CALCIUM SERPL-MCNC: 8.7 MG/DL (ref 8.3–10.4)
CHLORIDE SERPL-SCNC: 99 MMOL/L (ref 98–107)
CO2 SERPL-SCNC: 32 MMOL/L (ref 21–32)
CREAT SERPL-MCNC: 0.71 MG/DL (ref 0.8–1.5)
DIFFERENTIAL METHOD BLD: ABNORMAL
EOSINOPHIL # BLD: 0.6 K/UL (ref 0–0.8)
EOSINOPHIL NFR BLD: 12 % (ref 0.5–7.8)
ERYTHROCYTE [DISTWIDTH] IN BLOOD BY AUTOMATED COUNT: 13.2 % (ref 11.9–14.6)
GLOBULIN SER CALC-MCNC: 3.5 G/DL (ref 2.3–3.5)
GLUCOSE SERPL-MCNC: 95 MG/DL (ref 65–100)
HCT VFR BLD AUTO: 25.4 % (ref 41.1–50.3)
HGB BLD-MCNC: 7.8 G/DL (ref 13.6–17.2)
IMM GRANULOCYTES # BLD AUTO: 0 K/UL (ref 0–0.5)
IMM GRANULOCYTES NFR BLD AUTO: 0 % (ref 0–5)
LYMPHOCYTES # BLD: 1.2 K/UL (ref 0.5–4.6)
LYMPHOCYTES NFR BLD: 24 % (ref 13–44)
MAGNESIUM SERPL-MCNC: 1.9 MG/DL (ref 1.8–2.4)
MCH RBC QN AUTO: 29.3 PG (ref 26.1–32.9)
MCHC RBC AUTO-ENTMCNC: 30.7 G/DL (ref 31.4–35)
MCV RBC AUTO: 95.5 FL (ref 79.6–97.8)
MONOCYTES # BLD: 0.1 K/UL (ref 0.1–1.3)
MONOCYTES NFR BLD: 2 % (ref 4–12)
NEUTS SEG # BLD: 3.1 K/UL (ref 1.7–8.2)
NEUTS SEG NFR BLD: 62 % (ref 43–78)
NRBC # BLD: 0 K/UL (ref 0–0.2)
PLATELET # BLD AUTO: 246 K/UL (ref 150–450)
PMV BLD AUTO: 9.6 FL (ref 9.4–12.3)
POTASSIUM SERPL-SCNC: 4.4 MMOL/L (ref 3.5–5.1)
PROT SERPL-MCNC: 5.4 G/DL (ref 6.3–8.2)
RBC # BLD AUTO: 2.66 M/UL (ref 4.23–5.6)
SODIUM SERPL-SCNC: 137 MMOL/L (ref 136–145)
VANCOMYCIN TROUGH SERPL-MCNC: 18.8 UG/ML (ref 5–20)
WBC # BLD AUTO: 5 K/UL (ref 4.3–11.1)

## 2020-01-29 PROCEDURE — 65270000029 HC RM PRIVATE

## 2020-01-29 PROCEDURE — 94640 AIRWAY INHALATION TREATMENT: CPT

## 2020-01-29 PROCEDURE — 74011250636 HC RX REV CODE- 250/636: Performed by: HOSPITALIST

## 2020-01-29 PROCEDURE — 74011250637 HC RX REV CODE- 250/637: Performed by: NURSE PRACTITIONER

## 2020-01-29 PROCEDURE — 36593 DECLOT VASCULAR DEVICE: CPT

## 2020-01-29 PROCEDURE — 74011250637 HC RX REV CODE- 250/637: Performed by: HOSPITALIST

## 2020-01-29 PROCEDURE — 74011250636 HC RX REV CODE- 250/636: Performed by: INTERNAL MEDICINE

## 2020-01-29 PROCEDURE — 36591 DRAW BLOOD OFF VENOUS DEVICE: CPT

## 2020-01-29 PROCEDURE — 74011250636 HC RX REV CODE- 250/636: Performed by: NURSE PRACTITIONER

## 2020-01-29 PROCEDURE — 80053 COMPREHEN METABOLIC PANEL: CPT

## 2020-01-29 PROCEDURE — 94660 CPAP INITIATION&MGMT: CPT

## 2020-01-29 PROCEDURE — 83735 ASSAY OF MAGNESIUM: CPT

## 2020-01-29 PROCEDURE — 74011250637 HC RX REV CODE- 250/637: Performed by: INTERNAL MEDICINE

## 2020-01-29 PROCEDURE — 94760 N-INVAS EAR/PLS OXIMETRY 1: CPT

## 2020-01-29 PROCEDURE — 99232 SBSQ HOSP IP/OBS MODERATE 35: CPT | Performed by: INTERNAL MEDICINE

## 2020-01-29 PROCEDURE — 99231 SBSQ HOSP IP/OBS SF/LOW 25: CPT | Performed by: NURSE PRACTITIONER

## 2020-01-29 PROCEDURE — 85025 COMPLETE CBC W/AUTO DIFF WBC: CPT

## 2020-01-29 PROCEDURE — 77030003560 HC NDL HUBR BARD -A

## 2020-01-29 PROCEDURE — 74011000250 HC RX REV CODE- 250: Performed by: HOSPITALIST

## 2020-01-29 PROCEDURE — 74011000258 HC RX REV CODE- 258: Performed by: NURSE PRACTITIONER

## 2020-01-29 PROCEDURE — 80202 ASSAY OF VANCOMYCIN: CPT

## 2020-01-29 RX ADMIN — ALPRAZOLAM 0.25 MG: 0.5 TABLET ORAL at 09:09

## 2020-01-29 RX ADMIN — OXYCODONE 10 MG: 5 TABLET ORAL at 03:21

## 2020-01-29 RX ADMIN — ALPRAZOLAM 0.25 MG: 0.5 TABLET ORAL at 16:04

## 2020-01-29 RX ADMIN — VANCOMYCIN HYDROCHLORIDE 1500 MG: 10 INJECTION, POWDER, LYOPHILIZED, FOR SOLUTION INTRAVENOUS at 17:05

## 2020-01-29 RX ADMIN — ARFORMOTEROL TARTRATE 15 MCG: 15 SOLUTION RESPIRATORY (INHALATION) at 20:15

## 2020-01-29 RX ADMIN — FAMOTIDINE 20 MG: 10 INJECTION INTRAVENOUS at 21:13

## 2020-01-29 RX ADMIN — PIPERACILLIN AND TAZOBACTAM 4.5 G: 4; .5 INJECTION, POWDER, FOR SOLUTION INTRAVENOUS at 13:00

## 2020-01-29 RX ADMIN — OXYCODONE 10 MG: 5 TABLET ORAL at 13:09

## 2020-01-29 RX ADMIN — TIOTROPIUM BROMIDE 18 MCG: 18 CAPSULE ORAL; RESPIRATORY (INHALATION) at 09:00

## 2020-01-29 RX ADMIN — ALPRAZOLAM 0.25 MG: 0.5 TABLET ORAL at 20:03

## 2020-01-29 RX ADMIN — FAMOTIDINE 20 MG: 10 INJECTION INTRAVENOUS at 09:16

## 2020-01-29 RX ADMIN — DILTIAZEM HYDROCHLORIDE 90 MG: 30 TABLET, FILM COATED ORAL at 21:13

## 2020-01-29 RX ADMIN — OXYCODONE 10 MG: 5 TABLET ORAL at 17:10

## 2020-01-29 RX ADMIN — DILTIAZEM HYDROCHLORIDE 90 MG: 30 TABLET, FILM COATED ORAL at 09:15

## 2020-01-29 RX ADMIN — OXYCODONE 10 MG: 5 TABLET ORAL at 09:09

## 2020-01-29 RX ADMIN — SENNOSIDES AND DOCUSATE SODIUM 1 TABLET: 8.6; 5 TABLET ORAL at 09:15

## 2020-01-29 RX ADMIN — Medication 10 ML: at 06:23

## 2020-01-29 RX ADMIN — LISINOPRIL 10 MG: 5 TABLET ORAL at 09:15

## 2020-01-29 RX ADMIN — ALTEPLASE 1 MG: KIT at 03:01

## 2020-01-29 RX ADMIN — PIPERACILLIN AND TAZOBACTAM 4.5 G: 4; .5 INJECTION, POWDER, FOR SOLUTION INTRAVENOUS at 06:23

## 2020-01-29 RX ADMIN — Medication 10 ML: at 17:02

## 2020-01-29 RX ADMIN — SERTRALINE HYDROCHLORIDE 25 MG: 50 TABLET ORAL at 09:14

## 2020-01-29 RX ADMIN — ARFORMOTEROL TARTRATE 15 MCG: 15 SOLUTION RESPIRATORY (INHALATION) at 09:00

## 2020-01-29 RX ADMIN — DILTIAZEM HYDROCHLORIDE 90 MG: 30 TABLET, FILM COATED ORAL at 16:04

## 2020-01-29 RX ADMIN — Medication 10 ML: at 21:14

## 2020-01-29 RX ADMIN — POLYETHYLENE GLYCOL 3350 17 G: 17 POWDER, FOR SOLUTION ORAL at 21:13

## 2020-01-29 RX ADMIN — VANCOMYCIN HYDROCHLORIDE 1500 MG: 10 INJECTION, POWDER, LYOPHILIZED, FOR SOLUTION INTRAVENOUS at 04:32

## 2020-01-29 RX ADMIN — OXYCODONE 10 MG: 5 TABLET ORAL at 21:13

## 2020-01-29 RX ADMIN — PIPERACILLIN AND TAZOBACTAM 4.5 G: 4; .5 INJECTION, POWDER, FOR SOLUTION INTRAVENOUS at 21:14

## 2020-01-29 RX ADMIN — AMIODARONE HYDROCHLORIDE 200 MG: 200 TABLET ORAL at 09:00

## 2020-01-29 NOTE — DISCHARGE SUMMARY
[facility]: Inpatient Hematology / Oncology Discharge Summary Note Patient ID: Karen Hutchisonr [id number] 
62 y.o. 
1965 Admit Date: 1/16/2020 Discharge Date: 1/31/2020 Admission Diagnoses: Chest pain [R07.9] Discharge Diagnoses: 
Principal Diagnosis: Esophageal mass Principal Problem: 
  Esophageal mass (1/17/2020) Active Problems: 
  Nicotine dependence (2/1/2018) Methamphetamine abuse ([address] (12/2/2018) Alcohol abuse (2/1/2018) Homeless (1/15/2018) Chest pain (1/16/2020) Liver metastases ([facility] (1/17/2020) Esophageal dysphagia (1/17/2020) Weight loss (1/17/2020) Cellulitis of left upper extremity (1/21/2020) Hospital Course: 62year old patient of Dr Jesus Beltran with PMH tobacco/alcohol/drug abuse, CHF, a-flutter, COPD, NATALIIA/CPAP and obesity. He presented to ED with nausea, vomiting, chest pain. Nausea, anorexia, weight loss x3 months but worsened prior to admission. CT CAP with extensive pulm parenchymal and hepatic metastatic disease; subcarinal and azygoesophageal recess LAD; and long-segment thickening of the wall of the distal esophagus. GI performed EGD/EUS, biopsy taken and path shows moderately differentiated adenocarcinoma. Tissue sent for Caris testing. Pt agreed to treatment. s/p FOLFOX C1. Port and PEG tube placed, full liquid diet. Iron studies low with ferritin 27, tsat 9%. Repleted iron with ferrlecit x 1. He developed a fever 1/28 and received vanc/zosyn for RLL infiltrate on CXR (possible aspiration pneumonia). NGTD on BC, urine culture NGTD. He was transitioned to oral Levaquin without any more fevers. Currently without complaints. CM assisting with financial needs and medical supply/equipment including home tube feeds and he will follow up with our outpatient /dietitican in the clinic. He will f/u with Dr. Jesus Beltran within one week of discharge . He is being discharged home with son/daughter-in-law. He will call the office with any fevers, chills, or other worrisome symptoms. I have reviewed the patient's controlled substance prescription history, as maintained in the [place] prescription monitoring program, so that the prescriptions(s) for a controlled substance can be given. Last Date Reviewed: 1/31/2020 Consults: 
IP CONSULT TO ONCOLOGY 
IP CONSULT TO GASTROENTEROLOGY 
IP CONSULT TO ONCOLOGY 
IP CONSULT TO PALLIATIVE CARE - PROVIDER 
IP CONSULT TO INTERVENTIONAL RADIOLOGY 
IP CONSULT TO GENERAL SURGERY Pertinent Diagnostic Studies:  
Labs:   
Recent Labs  
  01/31/20 
0258 01/30/20 
0251 01/29/20 
0346 WBC 4.9 5.6 5.0 HGB 7.8* 8.0* 7.8*  252 246 ANEU 3.1 4.0 3.1 Recent Labs  
  01/31/20 
0258 01/30/20 
0251 01/29/20 
0346 * 137 137  
K 4.4 4.3 4.4 CL 98 100 99 CO2 32 31 32 GLU 95 103* 95 BUN 13 15 18 CREA 0.71* 0.71* 0.71* CA 8.9 8.7 8.7 SGOT 13* 12* 16  
AP 90 86 87  
TP 5.7* 5.4* 5.4* ALB 2.0* 1.9* 1.9*  
MG 2.1 2.1 1.9 Imaging: 
Study Result CT CHEST, ABDOMEN, PELVIS WITH CONTRAST:   
  
CLINICAL HISTORY:   Follow-up abnormal chest radiograph smoker with pulmonary 
nodules and 100-pound weight loss over the last several months. 
  
TECHNIQUE:  Oral and nonionic intravenous contrast was administered, and the 
torso was scanned with spiral technique. Radiation dose reduction was achieved 
using one or all of the following techniques: automated exposure control, 
weight-based dosing, iterative reconstruction. 
  
COMPARISON:  Portable chest today and chest CTA of November 16, 2019. 
  
CT CHEST: There are innumerable bilateral pulmonary nodules, the largest 
measuring approximately 3.9 cm posteriorly in the right lower lobe. There is 
marked thickening of the wall of the distal esophagus below the level of the 
hilary which suggests the possibility of primary esophageal carcinoma. There is 
associated subcarinal and azygoesophageal recess lymphadenopathy. No 
significant pleural fluid. The thyroid appears normal as imaged. There is 
thickening of the left ventricular wall suggesting a hypertrophy. 
  
CT ABDOMEN: There are innumerable small hepatic lesions which are new from 2018 
and also suspicious for metastatic disease. The largest measures approximately 2 cm posteriorly in the right lobe. There are mildly enlarged lymph nodes in 
the lesser sac which may represent metastatic disease as well. The spleen, 
pancreas, kidneys, and adrenals appear unremarkable. 
  
CT PELVIS:  The appendix is not confidently identified, but there are no 
secondary signs of appendicitis. The prostate is not enlarged. No primary or secondary signs of appendicitis or identified. No pathologically enlarged lymph 
nodes or free fluid is evident. There is scattered aortoiliac atherosclerotic 
calcifications with mild ectasia but no focal aneurysm. Bone windows 
demonstrate no aggressive process, accounting for degenerative changes including 
severe osteoarthritis at the right hip. A 1 cm central lucency in the body of 
L4 has sclerotic margins and is not typical of metastatic disease. 
  
IMPRESSION IMPRESSION:  
  
1. Extensive pulmonary parenchymal and hepatic metastatic disease as well as 
subcarinal and azygoesophageal recess lymphadenopathy likely representing 
metastatic disease as well. 
  
2.  Long-segment thickening of the wall of the distal esophagus suggests the 
possibility of a primary esophageal carcinoma. Follow-up gastroenterology 
consultation is recommended for consideration of endoscopic biopsy. 
  
3.  Left ventricular hypertrophy and scattered atherosclerosis. 
   
 
 
 
Current Discharge Medication List  
  
START taking these medications Details  
levoFLOXacin (LEVAQUIN) 750 mg tablet Take 1 Tab by mouth daily for 7 days. Qty: 7 Tab, Refills: 0  
  
lisinopril (PRINIVIL, ZESTRIL) 10 mg tablet Take 1 Tab by mouth daily. Qty: 30 Tab, Refills: 0  
  
nicotine (NICODERM CQ) 21 mg/24 hr 1 Patch by TransDERmal route every twenty-four (24) hours for 30 days. Qty: 30 Patch, Refills: 0  
  
senna-docusate (PERICOLACE) 8.6-50 mg per tablet Take 1 Tab by mouth daily. Qty: 30 Tab, Refills: 0  
  
polyethylene glycol (MIRALAX) 17 gram packet Take 1 Packet by mouth two (2) times a day. Qty: 60 Packet, Refills: 0  
  
dilTIAZem (CARDIZEM) 90 mg tablet Take 1 Tab by mouth three (3) times daily. Qty: 90 Tab, Refills: 0  
  
fentaNYL (DURAGESIC) 12 mcg/hr patch 1 Patch by TransDERmal route every seventy-two (72) hours for 3 days. Max Daily Amount: 1 Patch. Qty: 1 Patch, Refills: 0 Associated Diagnoses: Cancer related pain  
  
oxyCODONE IR (ROXICODONE) 10 mg tab immediate release tablet Take 1 Tab by mouth every four (4) hours as needed for Pain for up to 4 days. Max Daily Amount: 60 mg. 
Qty: 20 Tab, Refills: 0 Associated Diagnoses: Esophageal mass; Liver metastases ([facility] Cancer related pain  
  
naloxone (NARCAN) 4 mg/actuation nasal spray Use 1 spray intranasally, then discard. Repeat with new spray every 2 min as needed for opioid overdose symptoms, alternating nostrils. Qty: 1 Each, Refills: 0 ALPRAZolam (XANAX) 0.25 mg tablet Take 1 Tab by mouth three (3) times daily as needed for Anxiety. Max Daily Amount: 0.75 mg. Qty: 12 Tab, Refills: 0 Associated Diagnoses: [facility] ([facility] Cancer related pain CONTINUE these medications which have NOT CHANGED Details  
hydrALAZINE (APRESOLINE) 50 mg tablet Take 1 Tab by mouth three (3) times daily. Qty: 90 Tab, Refills: 0  
  
amiodarone (CORDARONE) 200 mg tablet Take 1 Tab by mouth every twelve (12) hours. Qty: 60 Tab, Refills: 0  
  
sertraline (ZOLOFT) 50 mg tablet Take 1 Tab by mouth daily. Qty: 30 Tab, Refills: 0  
  
tiotropium (SPIRIVA) 18 mcg inhalation capsule Take 1 Cap by inhalation daily. Qty: 30 Cap, Refills: 0  
  
albuterol (PROVENTIL HFA, VENTOLIN HFA, PROAIR HFA) 90 mcg/actuation inhaler Take 2 Puffs by inhalation every four (4) hours as needed for Wheezing. Qty: 1 Inhaler, Refills: 0  
  
albuterol-ipratropium (DUO-NEB) 2.5 mg-0.5 mg/3 ml nebu 3 mL by Nebulization route every four (4) hours as needed. Qty: 30 Nebule, Refills: 0 STOP taking these medications OXYGEN-AIR DELIVERY SYSTEMS Comments:  
Reason for Stopping:   
   
 amLODIPine (NORVASC) 10 mg tablet Comments:  
Reason for Stopping:   
   
 furosemide (LASIX) 40 mg tablet Comments:  
Reason for Stopping:   
   
 levalbuterol (XOPENEX) 1.25 mg/3 mL nebu Comments:  
Reason for Stopping: carvedilol (COREG) 25 mg tablet Comments:  
Reason for Stopping:   
   
 rivaroxaban (XARELTO) 20 mg tab tablet Comments:  
Reason for Stopping:   
   
 HYDROcodone-acetaminophen (NORCO) 7.5-325 mg per tablet Comments:  
Reason for Stopping: OBJECTIVE: 
Patient Vitals for the past 8 hrs: 
 BP Temp Pulse Resp SpO2  
20 1110 104/67 98.1 °F (36.7 °C) 81 17 94 % 20 0745     92 % 20 0724 130/76 98.2 °F (36.8 °C) 86 18 93 % Temp (24hrs), Av.1 °F (36.7 °C), Min:97.3 °F (36.3 °C), Max:98.9 °F (37.2 °C) 
 
 0701 -  1900 In: 358 [P.O.:358] Out: 300 [Urine:300] Physical Exam: 
Constitutional: Well developed, well nourished male in no acute distress, sitting comfortably in the chair HEENT: Normocephalic and atraumatic. Oropharynx is clear, mucous membranes are moist.  Pupils are equal, round, and reactive to light. Extraocular muscles are intact. Sclerae anicteric. Neck supple without JVD. No thyromegaly present. Lymph node Deferred Skin Warm and dry. No bruising and no rash noted. No erythema. No pallor. Respiratory Lungs are clear to auscultation bilaterally without wheezes, rales or rhonchi, normal air exchange without accessory muscle use. CVS Normal rate, regular rhythm and normal S1 and S2. No murmurs, gallops, or rubs. Abdomen Soft, nontender and nondistended, normoactive bowel sounds. No palpable mass. No hepatosplenomegaly. +PEG Neuro Grossly nonfocal with no obvious sensory or motor deficits. MSK Normal range of motion in general.  No edema and no tenderness. Psych Appropriate mood and affect. ASSESSMENT: 
 
Principal Problem: 
  Esophageal mass (2020) Active Problems: 
  Nicotine dependence (2018) Methamphetamine abuse ([facility] [address] (2018) Alcohol abuse (2018) Homeless (1/15/2018) Chest pain (2020) Liver metastases ([address] (2020) Esophageal dysphagia (2020) Weight loss (1/17/2020) Cellulitis of left upper extremity (1/21/2020) DISPOSITION: 
Follow-up Appointments Procedures  FOLLOW UP VISIT Appointment in: Other (Specify) Follow up in the office with Dr. Maikel Reeves next week for pre chemo appointment on 6th, 7th, or 8th with labs prior Infusion appointment for cycle 2 FOLFOX on 2/8, pump d/c off two days later Follow up in the office with Dr. Maikel Reeves next week for pre chemo appointment on 6th, 7th, or 8th with labs prior Infusion appointment for cycle 2 FOLFOX on 2/8, pump d/c off two days later Standing Status:   Standing Number of Occurrences:   1 Order Specific Question:   Appointment in Answer: Other (Specify) Over 30 minutes was spent in discharge planning and coordination of care. VANDANA Zurita [facility] [address] [place] [address] Office : [phone] Fax : [phone]

## 2020-01-29 NOTE — PROGRESS NOTES
END OF SHIFT NOTE: 
 
Intake/Output No intake/output data recorded. Voiding: YES Catheter: NO 
Drain:   
 
 
 
 
 
Stool:  2 occurrences. Emesis:  0 occurrences. VITAL SIGNS Patient Vitals for the past 12 hrs: 
 Temp Pulse Resp BP SpO2  
01/28/20 1801 97.6 °F (36.4 °C)      
01/28/20 1556 99.6 °F (37.6 °C) 83 18 110/65 96 % 01/28/20 1148 97.9 °F (36.6 °C) 87 16 107/65 95 % 01/28/20 0915  94  115/73   
01/28/20 0828     92 % 01/28/20 0805 97.4 °F (36.3 °C) 98 16 (!) 104/91 95 % Pain Assessment Pain 1 Pain Scale 1: Numeric (0 - 10) (01/28/20 1415) Pain Intensity 1: 6 (01/28/20 1415) Patient Stated Pain Goal: 0 (01/28/20 0400) Pain Reassessment 1: Yes (01/28/20 1415) Pain Onset 1: pta (01/28/20 0305) Pain Location 1: Abdomen (01/28/20 1315) Pain Orientation 1: Mid (01/28/20 0305) Pain Description 1: Aching (01/28/20 1315) Pain Intervention(s) 1: Medication (see MAR) (01/28/20 1315) Additional Pain Sites: Yes (01/17/20 0706) Ambulating Yes Additional Information: Got up in chair this AM. Seems very depressed. C/o of \"just feeling really bad\". Refused 2PM tube feeding because he didn't feel good but did drink a chocolate ensure. Shift report given to oncoming nurse at the bedside.  
 
Mark Viveros, RN

## 2020-01-29 NOTE — PROGRESS NOTES
Patient placed on auto-CPAP with 2 liters bled into machine. 01/28/20 1934 Oxygen Therapy O2 Sat (%) 98 % Pulse via Oximetry 76 beats per minute O2 Device CPAP mask O2 Flow Rate (L/min) 2 l/min FIO2 (%) 28 % CPAP/BIPAP  
CPAP/BIPAP Start/Stop On Device Mode CPAP, auto-titrating Mask Type and Size Full face; Medium Skin Condition Intact Total RR (Spontaneous) 16 breaths per minute Pt's Home Machine No  
Biomedical Check Performed Yes Settings Verified Yes Pulmonary Toilet Pulmonary Toilet H. O.B elevated

## 2020-01-29 NOTE — PROGRESS NOTES
[employer] Face to Face Encounter Patients Name: Robin Conroy    YOB: 1965 Ordering Physician: Saurav Guo Primary Diagnosis: Chest pain [R07.9] Date of Face to Face:   1/29/2020 Face to Face Encounter findings are related to primary reason for home care:   yes. 1. I certify that the patient needs intermittent care as follows: skilled nursing care:  skilled observation/assessment, patient education, complex care plan management, administration of medications and rehabilitative nursing 2. I certify that this patient is homebound, that is: 1) patient requires the use of a none device, special transportation, or assistance of another to leave the home; or 2) patient's condition makes leaving the home medically contraindicated; and 3) patient has a normal inability to leave the home and leaving the home requires considerable and taxing effort. Patient may leave the home for infrequent and short duration for medical reasons, and occasional absences for non-medical reasons. Homebound status is due to the following functional limitations: Patient with visual deficits increasing risk for falls with all ambulation outside of the home. Patient requires the assistance of others when leaving the home. 3. I certify that this patient is under my care and that I, or a nurse practitioner or  [id number], or clinical nurse specialist, or certified nurse midwife, working with me, had a Face-to-Face Encounter that meets the physician Face-to-Face Encounter requirements. The following are the clinical findings from the [address] [place] [address] encounter that support the need for skilled services and is a summary of the encounter: Progress Notes See attached progess note Rita Palma, LUCIANO 
1/29/2020 THE FOLLOWING TO BE COMPLETED BY THE COMMUNITY PHYSICIAN: 
 
I concur with the findings described above from the [facility] encounter that this patient is homebound and in need of a skilled service. Certifying Physician: _____________________________________ Printed Certifying Physician Name: _____________________________________ Date: _________________

## 2020-01-29 NOTE — PROGRESS NOTES
Palliative Care Progress Note Patient: Cee Abad MRN: [id number]  SSN: [id number] YOB: 1965  Age: 62 y.o. Sex: male Assessment/Plan: Chief Complaint/Interval History: pain well controlled Principal Diagnosis:   
· Pain, chest  R07.9 Additional Diagnoses: · Cough  R05 · Depression  F32.9 · Fatigue, Lethargy  R53.83 
· Counseling, Encounter for Medical Advice  Z71.9 
· Encounter for Palliative Care  Z51.5 Palliative Performance Scale (PPS) PPS: 70 Medical Decision Making:  
Reviewed and summarized notes over previous 24 hours. Discussed case with appropriate providers: 
Reviewed laboratory and x-ray data. Pt resting in bed, no distress noted. No visitors at bedside. Pt reports he his pain is well managed, and he had a BM yesterday. He continues to worry about paperwork, and his support system outside of the hospital.  CM is assisting. Will send referral for PC follow up at [facility] after discharge for ongoing pain management and goals of care. No further inpatient needs- we will sign off. Thank you for allowing us to participate in Mr Schaffer's care, Will discuss findings with members of the interdisciplinary team.   
 
  
More than 50% of this 15 minute visit was spent counseling and coordination of care as outlined above. Subjective:  
 
Review of Systems: A comprehensive review of systems was negative except for: Psych: Positive for depression, worry. Objective:  
 
Visit Vitals /85 (BP 1 Location: Left arm) Pulse 80 Temp 97.7 °F (36.5 °C) Resp 18 Ht 6' (1.829 m) Wt 183 lb (83 kg) SpO2 94% BMI 24.82 kg/m² Physical Exam: 
 
General:  Cooperative. No acute distress. Eyes:  Conjunctivae/corneas clear. Nose: Nares normal. Septum midline. Neck: Supple, symmetrical, trachea midline. Lungs:   Clear to auscultation bilaterally, unlabored. Heart:  Regular rate and rhythm. Abdomen:   Soft, non-tender, non-distended. PEG tube Extremities: Normal, atraumatic, no cyanosis or edema. Skin: Skin color, texture, turgor normal. No rash. Neurologic: Nonfocal.  
Psych: Alert and oriented. Signed By: Joseph Horner NP   
 January 29, 2020

## 2020-01-29 NOTE — PROGRESS NOTES
BESS spoke with Moustapha Anguiano MD about pt financial needs at discharge. Re contacted JANNETTE spoke with [facility] about pt issues. Jannette will come and reevaluate pt needs and give the proper paperwork to initiate assistance with financial need. Daughter-n-law Li Gordon at beside will be present when [facility] comes. Tube feeding formula and supplies will be provided by the [address] [place] [address]. If pt will require O2 at discharge he will need a O2 qualifier within 24 hrs of discharge. Mr Marlen Kitchen has stated to pay 30 dollars a month if acceptable by [facility]. CM will talk with Devante Blanchard @ [facility] to see if that is possible. Discharge plan is to go to son home [id number] [facility] [place] [address] Contact number: Lanette Ramirez [phone] Tube feeding formula and supplies from the [facility] O2 if needed pending  Approval from supply company. Case management will continue to monitor.

## 2020-01-29 NOTE — PROGRESS NOTES
Pharmacokinetic Consult to Pharmacist 
 
Gaby Stevenson is a 62 y.o. male being treated for sepsis of unknown etiology with vancomycin. Height: 6' (182.9 cm)  Weight: 83 kg (183 lb) Lab Results Component Value Date/Time BUN 18 01/29/2020 03:46 AM  
 Creatinine 0.71 (L) 01/29/2020 03:46 AM  
 WBC 5.0 01/29/2020 03:46 AM  
 Procalcitonin 0.4 02/02/2018 08:52 AM  
 Lactic acid 1.6 02/02/2018 08:52 AM  
 Lactic Acid (POC) 5.6 (H) 02/01/2018 07:19 PM  
  
Estimated Creatinine Clearance: 127.5 mL/min (A) (based on SCr of 0.71 mg/dL (L)). Day 2 of vancomycin. Goal trough is 15-20. Patient febrile in past 24h, chest xray showing new infiltrate, lung source suspected. Patient therapeutic. Will continue Vancomycin 1500 mg IV q12h. Thank you, BALTAZAR Romero, PharmD.

## 2020-01-29 NOTE — PROGRESS NOTES
0600-END OF SHIFT NOTE: 
 
-pt rested well throughout the night 
-refused bolus feeding last pm  
-2x oxycodone 
-1x cathflo ordered, changed port needle and dressing due to not being able to be flushed and no blood return 
-vss, no needs voiced at this time Intake/Output 01/28 1901 - 01/29 0700 In: 1030 [P.O.:250; I.V.:780] Out: 1675 [UEBMA:6206] Voiding: YES Catheter: NO 
Drain:   
 
 
Stool:  0 occurrences. Emesis:  0 occurrences. VITAL SIGNS Patient Vitals for the past 12 hrs: 
 Temp Pulse Resp BP SpO2  
01/29/20 0344 97.6 °F (36.4 °C) 74 18 124/74 93 % 01/28/20 2350 97.5 °F (36.4 °C) 82 18 126/64 94 % 01/28/20 1956 96.4 °F (35.8 °C) 71 18 106/66 98 % 01/28/20 1934     98 % 01/28/20 1917     99 % 01/28/20 1801 97.6 °F (36.4 °C)     Pain Assessment Pain 1 Pain Scale 1: Visual (01/29/20 0357) Pain Intensity 1: 0 (01/29/20 0357) Patient Stated Pain Goal: 0 (01/29/20 0357) Pain Reassessment 1: Patient resting w/respiratory rate greater than 10 (01/29/20 0357) Pain Onset 1: pta (01/29/20 0321) Pain Location 1: Abdomen (01/29/20 0321) Pain Orientation 1: Mid (01/29/20 0321) Pain Description 1: Aching;Stabbing (01/29/20 0321) Pain Intervention(s) 1: Medication (see MAR) (01/29/20 0321) Additional Pain Sites: Yes (01/17/20 0706) Ambulating No 
 
Additional Information:  
 
Shift report given to oncoming nurse at the bedside.  
 
Dinora Perez RN

## 2020-01-29 NOTE — PROGRESS NOTES
Resting in bed,  Pt refused PEG tube bolus feeding. Xanax given per pt request.  C/o anxiety. Prayer and emotional support offered.

## 2020-01-29 NOTE — PROGRESS NOTES
Resting in bed, c/o pain and anxiety. Oxycodone and Xanax given as ordered,  See MAR and pain flowsheet.

## 2020-01-29 NOTE — PROGRESS NOTES
[address] Hematology Oncology Inpatient Hematology / Oncology Progress Note Admission Date: 2020  6:59 AM 
Reason for Admission/Hospital Course: Chest pain [R07.9] 24 Hour Events: 
Afeb, VSS Daughter at bedside Concerned about paperwork More alert ROS: 
Constitutional: Positive for fatigue, weakness. Negative for fever, chills. CV: Negative for chest pain, palpitations, edema. Respiratory: Negative for cough, wheezing. Positive for exertional dyspnea. GI: Negative for nausea, diarrhea. + dysphagia, abd pain. 10 point review of systems is otherwise negative with the exception of the elements mentioned above in the HPI. No Known Allergies OBJECTIVE: 
Patient Vitals for the past 8 hrs: 
 BP Temp Pulse Resp SpO2  
20 0905     94 % 20 0745 135/85 97.7 °F (36.5 °C) 80 18 95 % 20 0344 124/74 97.6 °F (36.4 °C) 74 18 93 % Temp (24hrs), Av.8 °F (36.6 °C), Min:96.4 °F (35.8 °C), Max:99.6 °F (37.6 °C) 
 
 0701 -  1900 In: -  
Out: 800 [Urine:800] Physical Exam: 
Constitutional: Well developed, well nourished, chronically-ill appearing male in no acute distress, sitting comfortably in the bed. HEENT: Normocephalic and atraumatic. Oropharynx is clear, mucous membranes are moist. + poor dentition. Extraocular muscles are intact. Sclerae anicteric. Skin Warm and dry. No bruising and no rash noted. No erythema. No pallor. Respiratory Lungs are clear to auscultation bilaterally without wheezes, rales or rhonchi, normal air exchange without accessory muscle use. CVS Normal rate, regular rhythm and normal S1 and S2. No murmurs, gallops, or rubs. Abdomen Soft, mildly tender and nondistended, normoactive bowel sounds. No palpable mass. +PEG Neuro Grossly nonfocal with no obvious sensory or motor deficits. MSK Normal range of motion in general.  No edema and no tenderness. Psych Anxious mood and affect. Labs: 
   
Recent Labs  
  01/29/20 
0346 01/28/20 
0325 01/27/20 
7140 WBC 5.0 3.1* 11.5*  
RBC 2.66* 3.15* 2.92* HGB 7.8* 9.2* 8.5* HCT 25.4* 30.0* 27.9*  
MCV 95.5 95.2 95.5 MCH 29.3 29.2 29.1 MCHC 30.7* 30.7* 30.5* RDW 13.2 13.2 13.0  299 349 GRANS 62 74 84* LYMPH 24 13 10* MONOS 2* 2* 6  
EOS 12* 11* 0*  
BASOS 0 0 0 IG 0 0 0  
DF AUTOMATED AUTOMATED AUTOMATED ANEU 3.1 2.3 9.6* ABL 1.2 0.4* 1.1 ABM 0.1 0.1 0.6 JAY 0.6 0.3 0.0 ABB 0.0 0.0 0.0 AIG 0.0 0.0 0.0 Recent Labs  
  01/29/20 
0346 01/28/20 
0325 01/27/20 
9654  136 138  
K 4.4 4.7 4.9 CL 99 98 100 CO2 32 33* 34* AGAP 6* 5* 4*  
GLU 95 95 107* BUN 18 19 15 CREA 0.71* 0.70* 0.68* GFRAA >60 >60 >60 GFRNA >60 >60 >60  
CA 8.7 8.8 9.1 SGOT 16 30 42* AP 87 97 102 TP 5.4* 5.7* 5.8* ALB 1.9* 2.0* 1.9*  
GLOB 3.5 3.7* 3.9* AGRAT 0.5* 0.5* 0.5* MG 1.9 1.9 2.0 PHOS  --   --  3.5 Imaging: 
CT CHEST ABD PELV W CONT [[id number]] Collected: 01/16/20 2793 Order Status: Completed Updated: 01/16/20 9537 Narrative:    
CT CHEST, ABDOMEN, PELVIS WITH CONTRAST:   
 
CLINICAL HISTORY:   Follow-up abnormal chest radiograph smoker with pulmonary 
nodules and 100-pound weight loss over the last several months. TECHNIQUE:  Oral and nonionic intravenous contrast was administered, and the 
torso was scanned with spiral technique.  Radiation dose reduction was achieved 
using one or all of the following techniques: automated exposure control, 
weight-based dosing, iterative reconstruction. COMPARISON:  Portable chest today and chest CTA of November 16, 2019.  
 
CT CHEST: There are innumerable bilateral pulmonary nodules, the largest 
measuring approximately 3.9 cm posteriorly in the right lower lobe.  There is 
marked thickening of the wall of the distal esophagus below the level of the 
 hilary which suggests the possibility of primary esophageal carcinoma. Andre Drilling is 
associated subcarinal and azygoesophageal recess lymphadenopathy.  No 
significant pleural fluid.  The thyroid appears normal as imaged.  There is 
thickening of the left ventricular wall suggesting a hypertrophy. CT ABDOMEN: There are innumerable small hepatic lesions which are new from 2018 
and also suspicious for metastatic disease.  The largest measures approximately 2 cm posteriorly in the right lobe.  There are mildly enlarged lymph nodes in 
the lesser sac which may represent metastatic disease as well.  The spleen, 
pancreas, kidneys, and adrenals appear unremarkable. CT PELVIS:  The appendix is not confidently identified, but there are no 
secondary signs of appendicitis.  The prostate is not enlarged.  No primary or 
secondary signs of appendicitis or identified.  No pathologically enlarged lymph 
nodes or free fluid is evident.  There is scattered aortoiliac atherosclerotic 
calcifications with mild ectasia but no focal aneurysm.  Bone windows 
demonstrate no aggressive process, accounting for degenerative changes including 
severe osteoarthritis at the right hip.  A 1 cm central lucency in the body of 
L4 has sclerotic margins and is not typical of metastatic disease. Impression:    
IMPRESSION:  
 
1.  Extensive pulmonary parenchymal and hepatic metastatic disease as well as 
subcarinal and azygoesophageal recess lymphadenopathy likely representing 
metastatic disease as well. 2.  Long-segment thickening of the wall of the distal esophagus suggests the 
possibility of a primary esophageal carcinoma.  Follow-up gastroenterology 
consultation is recommended for consideration of endoscopic biopsy. 3.  Left ventricular hypertrophy and scattered atherosclerosis. XR CHEST PORT [[id number]] Collected: 01/16/20 0737 Order Status: Completed Updated: 01/16/20 1935 Narrative:    
 PORTABLE CHEST, January 16, 2020 at 0715 hours CLINICAL HISTORY:  Cough and chest pain in a smoker. COMPARISON:  December 6, 2018. FINDINGS:  AP erect image demonstrates an approximately 4 cm rounded mass 
projected over the inferior aspect of the right hilum.  There are numerous 
additional pulmonary nodules bilaterally, suspicious for metastatic disease.  No 
nidhi pneumonic consolidation or pleural fluid is evident.  The heart size is 
within normal limits without evidence of congestive heart failure or 
pneumothorax.  The bony thorax appears intact on this view.  There are overlying 
radiopaque support devices. Impression:    
IMPRESSION:  NUMEROUS BILATERAL PULMONARY NODULES ARE SUSPICIOUS FOR METASTATIC 
DISEASE, AND A 4 CM RIGHT LOWER LUNG MASS PROJECTED OVER THE INFERIOR ASPECT OF 
THE RIGHT HILUM IS SUSPICIOUS FOR A PRIMARY BRONCHOGENIC CARCINOMA IN THIS 
SMOKER WITH COPD.  FOLLOW PULMONARY MEDICINE CONSULTATION IS RECOMMENDED. ASSESSMENT: 
 
Problem List  Date Reviewed: 1/17/2020 Codes Class Noted Cellulitis of left upper extremity ICD-10-CM: L03.114 
ICD-9-CM: 682.3  1/21/2020 * (Principal) Esophageal mass ICD-10-CM: K22.8 ICD-9-CM: 530.89  1/17/2020 Liver metastases ([facility] [address] ICD-10-CM: C78.7 ICD-9-CM: 197.7  1/17/2020 Esophageal dysphagia ICD-10-CM: R13.10 ICD-9-CM: 787.20  1/17/2020 Weight loss ICD-10-CM: R63.4 ICD-9-CM: 783.21  1/17/2020 Chest pain ICD-10-CM: R07.9 ICD-9-CM: 786.50  1/16/2020 Methamphetamine abuse (HCC) (Chronic) ICD-10-CM: F15.10 ICD-9-CM: 305.70  12/2/2018 H/O atrial flutter (Chronic) ICD-10-CM: Z86.79 
ICD-9-CM: V12.59  12/2/2018 Overview Signed 2/6/2018  7:51 AM by Ace Vasquez NP  
  1/2018 Atrial flutter, s/p cardioversion. Essential hypertension (Chronic) ICD-10-CM: I10 
ICD-9-CM: 401.9  12/2/2018  Acute respiratory failure with hypoxia and hypercarbia ([facility] [address] ICD-10-CM: J96.01, J96.02 
ICD-9-CM: 518.81  12/2/2018 Substance abuse (HCC) (Chronic) ICD-10-CM: F19.10 ICD-9-CM: 305.90  12/2/2018 Noncompliance (Chronic) ICD-10-CM: Z91.19 ICD-9-CM: V15.81  12/2/2018 Acute encephalopathy ICD-10-CM: G93.40 ICD-9-CM: 348.30  12/2/2018 COPD (chronic obstructive pulmonary disease) (HCC) (Chronic) ICD-10-CM: J44.9 ICD-9-CM: 112  11/16/2018 NATALIIA (obstructive sleep apnea) (Chronic) ICD-10-CM: H37.54 
ICD-9-CM: 327.23  11/16/2018 Chronic respiratory failure (HCC) (Chronic) ICD-10-CM: J96.10 ICD-9-CM: 518.83  11/16/2018 Altered mental status ICD-10-CM: R41.82 
ICD-9-CM: 780.97  11/16/2018 Morbid obesity ([facility] [address] (Chronic) ICD-10-CM: E66.01 
ICD-9-CM: 278.01  11/16/2018 Nicotine dependence (Chronic) ICD-10-CM: M60.943 ICD-9-CM: 305.1  2/1/2018 Alcohol abuse (Chronic) ICD-10-CM: F10.10 ICD-9-CM: 305.00  2/1/2018 Homeless (Chronic) ICD-10-CM: Z59.0 ICD-9-CM: V60.0  1/15/2018 Urethral stricture (Chronic) ICD-10-CM: N35.919 ICD-9-CM: 598.9  1/7/2018 Anasarca ICD-10-CM: R60.1 ICD-9-CM: 782.3  1/6/2018 Acute kidney injury ([facility] [address] ICD-10-CM: N17.9 ICD-9-CM: 584.9  1/6/2018 PLAN: 
Metastatic esophageal cancer - CT CAP with extensive pulm parenchymal and hepatic metastatic disease; subcarinal and azygoesophageal recess LAD; and long-segment thickening of the wall of the distal esophagus - GI performing EGD/EUS, biopsy taken and path shows moderately differentiated adenocarcinoma.   Will need to send tissue for Caris testing - navigation team notified. 
- CEA 19.1, CA 19-9 219.2  
- Iron studies low with ferritin 27, tsat 9%. Repleted iron with ferrlecit x 1.  
- Consulted surgery for feeding tube - patient agrees. - Consulted palliative care - managing pain. - Consult IR for port placement. - Echocardiogram with EF 55-60%. - Will need PET/CT as OP. 1/20 Today patient states that he wants chemo, port and PEG. Surgery following. GI signed off. If he changes his mind again, discharge and have patient follow up with Dr. Vincent Alvarez in clinic. Once PEG and port placed we will take over as primary. 1/21 Port today. ?PEG tomorrow. Then chemo to follow. 1/22 Occlusive superficial venous thrombosis in the left basilic and cephalic veins. PEG today. 1/24 PEG placed yesterday. Consult RD for tube feeding management, full liquid diet only per surgery. Start FOLFOX.  
1/25 He was too groggy to consent yesterday, alert today and consented to start C1 FOLFOX today, continue Fentanyl patch, increase Percocet to 10 mg, try to decrease use of IV dilaudid 1/26 palliative care following for pain medication recommendations. Groggy this AM on exam, but answers questions appropriately. No treatment related complaints- just worried about discharge plans. Completes treatment today. 1/27 treatment completed yesterday. No problems with treatment noted. 1/29 no treatment complaints today. Fevers Tmax 101. Started on cef/vanc overnight. CXR with new right lung base atelectasis or infiltrate. Will change to vanc/zosyn to cover for possible aspiration as he is on tube feedings. 1/29 D2 vanc/zosyn. Afebrile. Blood cultures pending. Disposition: Plan for discharge tomorrow if continues to be afebrile. Will be discharged home with daughter in law /son to a safe place. VANDANA Zurita [facility] Hematology and Oncology [id number] [place] [address] Office : [phone] Fax : [phone]

## 2020-01-30 LAB
ALBUMIN SERPL-MCNC: 1.9 G/DL (ref 3.5–5)
ALBUMIN/GLOB SERPL: 0.5 {RATIO} (ref 1.2–3.5)
ALP SERPL-CCNC: 86 U/L (ref 50–136)
ALT SERPL-CCNC: 16 U/L (ref 12–65)
ANION GAP SERPL CALC-SCNC: 6 MMOL/L (ref 7–16)
AST SERPL-CCNC: 12 U/L (ref 15–37)
BACTERIA SPEC CULT: NORMAL
BASOPHILS # BLD: 0 K/UL (ref 0–0.2)
BASOPHILS NFR BLD: 0 % (ref 0–2)
BILIRUB SERPL-MCNC: 0.2 MG/DL (ref 0.2–1.1)
BUN SERPL-MCNC: 15 MG/DL (ref 6–23)
CALCIUM SERPL-MCNC: 8.7 MG/DL (ref 8.3–10.4)
CHLORIDE SERPL-SCNC: 100 MMOL/L (ref 98–107)
CO2 SERPL-SCNC: 31 MMOL/L (ref 21–32)
CREAT SERPL-MCNC: 0.71 MG/DL (ref 0.8–1.5)
DIFFERENTIAL METHOD BLD: ABNORMAL
EOSINOPHIL # BLD: 0.3 K/UL (ref 0–0.8)
EOSINOPHIL NFR BLD: 5 % (ref 0.5–7.8)
ERYTHROCYTE [DISTWIDTH] IN BLOOD BY AUTOMATED COUNT: 13.2 % (ref 11.9–14.6)
GLOBULIN SER CALC-MCNC: 3.5 G/DL (ref 2.3–3.5)
GLUCOSE SERPL-MCNC: 103 MG/DL (ref 65–100)
HCT VFR BLD AUTO: 25.6 % (ref 41.1–50.3)
HGB BLD-MCNC: 8 G/DL (ref 13.6–17.2)
IMM GRANULOCYTES # BLD AUTO: 0 K/UL (ref 0–0.5)
IMM GRANULOCYTES NFR BLD AUTO: 0 % (ref 0–5)
LYMPHOCYTES # BLD: 1.2 K/UL (ref 0.5–4.6)
LYMPHOCYTES NFR BLD: 21 % (ref 13–44)
MAGNESIUM SERPL-MCNC: 2.1 MG/DL (ref 1.8–2.4)
MCH RBC QN AUTO: 29.6 PG (ref 26.1–32.9)
MCHC RBC AUTO-ENTMCNC: 31.3 G/DL (ref 31.4–35)
MCV RBC AUTO: 94.8 FL (ref 79.6–97.8)
MONOCYTES # BLD: 0.1 K/UL (ref 0.1–1.3)
MONOCYTES NFR BLD: 2 % (ref 4–12)
NEUTS SEG # BLD: 4 K/UL (ref 1.7–8.2)
NEUTS SEG NFR BLD: 71 % (ref 43–78)
NRBC # BLD: 0 K/UL (ref 0–0.2)
PLATELET # BLD AUTO: 252 K/UL (ref 150–450)
PMV BLD AUTO: 9.4 FL (ref 9.4–12.3)
POTASSIUM SERPL-SCNC: 4.3 MMOL/L (ref 3.5–5.1)
PROT SERPL-MCNC: 5.4 G/DL (ref 6.3–8.2)
RBC # BLD AUTO: 2.7 M/UL (ref 4.23–5.6)
SERVICE CMNT-IMP: NORMAL
SODIUM SERPL-SCNC: 137 MMOL/L (ref 136–145)
WBC # BLD AUTO: 5.6 K/UL (ref 4.3–11.1)

## 2020-01-30 PROCEDURE — 74011000250 HC RX REV CODE- 250: Performed by: HOSPITALIST

## 2020-01-30 PROCEDURE — 83735 ASSAY OF MAGNESIUM: CPT

## 2020-01-30 PROCEDURE — 85025 COMPLETE CBC W/AUTO DIFF WBC: CPT

## 2020-01-30 PROCEDURE — 36591 DRAW BLOOD OFF VENOUS DEVICE: CPT

## 2020-01-30 PROCEDURE — 74011250637 HC RX REV CODE- 250/637: Performed by: NURSE PRACTITIONER

## 2020-01-30 PROCEDURE — 94760 N-INVAS EAR/PLS OXIMETRY 1: CPT

## 2020-01-30 PROCEDURE — 74011250636 HC RX REV CODE- 250/636: Performed by: NURSE PRACTITIONER

## 2020-01-30 PROCEDURE — 74011000258 HC RX REV CODE- 258: Performed by: NURSE PRACTITIONER

## 2020-01-30 PROCEDURE — 74011250637 HC RX REV CODE- 250/637: Performed by: HOSPITALIST

## 2020-01-30 PROCEDURE — 65270000029 HC RM PRIVATE

## 2020-01-30 PROCEDURE — 74011250637 HC RX REV CODE- 250/637: Performed by: INTERNAL MEDICINE

## 2020-01-30 PROCEDURE — 77010033678 HC OXYGEN DAILY

## 2020-01-30 PROCEDURE — 80053 COMPREHEN METABOLIC PANEL: CPT

## 2020-01-30 PROCEDURE — 94640 AIRWAY INHALATION TREATMENT: CPT

## 2020-01-30 PROCEDURE — 74011250636 HC RX REV CODE- 250/636: Performed by: HOSPITALIST

## 2020-01-30 PROCEDURE — 99232 SBSQ HOSP IP/OBS MODERATE 35: CPT | Performed by: INTERNAL MEDICINE

## 2020-01-30 RX ORDER — VANCOMYCIN HYDROCHLORIDE
1250 EVERY 12 HOURS
Status: DISCONTINUED | OUTPATIENT
Start: 2020-01-30 | End: 2020-01-30

## 2020-01-30 RX ADMIN — PIPERACILLIN AND TAZOBACTAM 4.5 G: 4; .5 INJECTION, POWDER, FOR SOLUTION INTRAVENOUS at 06:28

## 2020-01-30 RX ADMIN — OXYCODONE 10 MG: 5 TABLET ORAL at 09:00

## 2020-01-30 RX ADMIN — TIOTROPIUM BROMIDE 18 MCG: 18 CAPSULE ORAL; RESPIRATORY (INHALATION) at 07:23

## 2020-01-30 RX ADMIN — DILTIAZEM HYDROCHLORIDE 90 MG: 30 TABLET, FILM COATED ORAL at 08:45

## 2020-01-30 RX ADMIN — VANCOMYCIN HYDROCHLORIDE 1500 MG: 10 INJECTION, POWDER, LYOPHILIZED, FOR SOLUTION INTRAVENOUS at 04:37

## 2020-01-30 RX ADMIN — FAMOTIDINE 20 MG: 10 INJECTION INTRAVENOUS at 20:18

## 2020-01-30 RX ADMIN — Medication 10 ML: at 13:13

## 2020-01-30 RX ADMIN — DILTIAZEM HYDROCHLORIDE 90 MG: 30 TABLET, FILM COATED ORAL at 21:14

## 2020-01-30 RX ADMIN — OXYCODONE 10 MG: 5 TABLET ORAL at 21:16

## 2020-01-30 RX ADMIN — POLYETHYLENE GLYCOL 3350 17 G: 17 POWDER, FOR SOLUTION ORAL at 08:41

## 2020-01-30 RX ADMIN — POLYETHYLENE GLYCOL 3350 17 G: 17 POWDER, FOR SOLUTION ORAL at 20:17

## 2020-01-30 RX ADMIN — ALPRAZOLAM 0.25 MG: 0.5 TABLET ORAL at 20:28

## 2020-01-30 RX ADMIN — SERTRALINE HYDROCHLORIDE 25 MG: 50 TABLET ORAL at 08:42

## 2020-01-30 RX ADMIN — ARFORMOTEROL TARTRATE 15 MCG: 15 SOLUTION RESPIRATORY (INHALATION) at 07:23

## 2020-01-30 RX ADMIN — DILTIAZEM HYDROCHLORIDE 90 MG: 30 TABLET, FILM COATED ORAL at 17:24

## 2020-01-30 RX ADMIN — LEVOFLOXACIN 750 MG: 500 TABLET, FILM COATED ORAL at 21:57

## 2020-01-30 RX ADMIN — PIPERACILLIN AND TAZOBACTAM 4.5 G: 4; .5 INJECTION, POWDER, FOR SOLUTION INTRAVENOUS at 13:08

## 2020-01-30 RX ADMIN — Medication 10 ML: at 21:18

## 2020-01-30 RX ADMIN — ALPRAZOLAM 0.25 MG: 0.5 TABLET ORAL at 11:59

## 2020-01-30 RX ADMIN — FAMOTIDINE 20 MG: 10 INJECTION INTRAVENOUS at 08:45

## 2020-01-30 RX ADMIN — OXYCODONE 10 MG: 5 TABLET ORAL at 13:11

## 2020-01-30 RX ADMIN — SENNOSIDES AND DOCUSATE SODIUM 1 TABLET: 8.6; 5 TABLET ORAL at 08:45

## 2020-01-30 RX ADMIN — OXYCODONE 10 MG: 5 TABLET ORAL at 17:24

## 2020-01-30 RX ADMIN — VANCOMYCIN HYDROCHLORIDE 1250 MG: 10 INJECTION, POWDER, LYOPHILIZED, FOR SOLUTION INTRAVENOUS at 17:25

## 2020-01-30 RX ADMIN — LISINOPRIL 10 MG: 5 TABLET ORAL at 08:43

## 2020-01-30 RX ADMIN — Medication 10 ML: at 05:55

## 2020-01-30 RX ADMIN — AMIODARONE HYDROCHLORIDE 200 MG: 200 TABLET ORAL at 08:42

## 2020-01-30 RX ADMIN — ALPRAZOLAM 0.25 MG: 0.5 TABLET ORAL at 04:37

## 2020-01-30 RX ADMIN — OXYCODONE 10 MG: 5 TABLET ORAL at 02:50

## 2020-01-30 RX ADMIN — ARFORMOTEROL TARTRATE 15 MCG: 15 SOLUTION RESPIRATORY (INHALATION) at 19:55

## 2020-01-30 NOTE — PROGRESS NOTES
's follow-up visit attempted. Steve Rosita [place] was involved in the restroom. Chaplains remain available for support. Owen Ridley MDiv Board Certified Jocy Chin

## 2020-01-30 NOTE — PROGRESS NOTES
Nutrition follow-up Consult for tube feeding management (Oncology)  
Assessment:  
Diet: DIET Full Liquids Diet: Ensure Enlive all meals Diet: Tube Feeding - Ensure Plus 1 carton (237 ml) 3 times per day with 30 ml water before and after (Recommended times; 10A, 2P, 8P) Food/Nutrition Patient History:   
Pt with history of MARGARITA, COPD, altered mental status, nocotine use and alcohol abuse. He presented with nausea/vomiting and chest pain for months. 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 Port and PEG placed. Now s/p FOLFOX and 5FU. Per RN documentation, patient has been refusing some bolus feeds. He was seen today following walking with respiratory and states that he is very fatigued. He states that he is very weak and is concerned with being discharged and being able to take care of himself at home. He states that he was able to eat some of his grits this am and some jello. He admits he has not been drinking Ensure Enlive TID as recommended at last RD assessment. Otherwise he has been eating some soup and pudding. He states that he did receive an Ensure Plus bolus last night, but has not been getting them 3 times per day. Pertinent Medications: Pepcid, milk of mag, Miralax, Zofran, Phenergan  
Abdomen: Active bowel sounds, abdomen distended, last documented BM 1/28/2020. Anthropometrics: 
Height: 6' (182.9 cm), Weight: 87.1 kg (192 lb), Weight Source: Patient stated, Body mass index is 26.04 kg/m². BMI class of overweight. 
  
Macronutrient needs: MARGARITA (CBW 87.1kg) QQC: 3092-9208 [facility] (20-25 kcals/kg) EPR:  g/day (0.8-1.2 g/kg)  
Max CHO: 326 g/d (60% kcal) Fluid: 1.7-2.2 L/d (~1 ml/kcal) 
  Intake/Comparative Standards: Per patient report he is consuming ~50% of cream of wheat/soup and pudding/jello at meals. Drinking ~2 Ensure per day. This is likely meeting ~50-75% of estimated energy and protein needs. Unable to determine intake of supplemental bolus feeds as patient has been refusing.   
Malnutrition Criteria:   
Meets Criteria for Malnutrition in the context of Chronic Illness  
[x]????? Severe Malnutrition, as evidenced by: 
            [x]????? Nutritional intake of <75% of energy intake compared to estimated energy needs for > 1 month             [x]????? Weight loss of >5% in 1 month, >7.5% in 3 months,  >10% in 6 months, or >20% in 12 months 
            []????? Severe edema 
            []????? Severe loss of muscle mass 
            []????? Severe loss of subcutaneous fat 
            []????? Functional decline             []????? Severe edema  
  
  
Nutrition Diagnosis: 
Severe chronic disease or condition related malnutrition related to decreased intake  as evidenced by estimated intake less than estimated needs, nausea, vomiting and unintentional weight loss at least 20% in 1 year. 
  
Nutrition Intervention: 
Meals and Snacks: Advance as medically appropriate and tolerated.  
Commercial Beverages and Supplements: Change to Ensure Enlive TID. Encouraged patient to increase to TID and explained need for increase to meet needs. Education: Provided verbal and written education on full liquid diet. Reviewed food and consistencies that are allowed and those to avoid using written instruction. Provided sample menu that will provide ~2000 kcal and ~70g pro. Explained that if he can tolerate 50% of sample menu, he will need about 3 Ensure Enlive/Ensure Plus per day. If eating ~25% of sample menu will need 4-6 Ensure Enlive/Ensure Plus per day. Explained that if 6 Ensure Enlive/Ensure Plus per day will meet needs if he is eating 0-25% of needs. Explained the method of administration is his choice, either PO as tolerated or via Gtube bolus. Explained to flush with 50 ml water before and after bolus feeds. Explained that RN will provide education/traning on bolus feeds.  Encouraged patient to participate with training and try to administer himself. Explained that daughter in law can also be trained. Patient verbalized understanding. Expect fair compliance. Enteral Nutrition:  Continue supplemental bolus feeds. Bolus 1 bottle Ensure Plus TID between meals (10A, 2P, 8P) with 50 ml water flush before and after each feeding. This will provide 1050 kcal (~60% estimated needs low end of range), 39 g pro (~55% estimated protein needs low end of range), 150 g CHO (does not exceed max CHO), 786 ml free water (~52% estimated fluid needs). Nutrition Support Electrolyte Replacement active and on the MAR. Coordination of care: Discussed Micaela Burden, RN and Roshan KELLOGG - patient will have some formula donated from [address] [place] [address], and discussed with Tayla Valentine, outpatient oncology RD. Discharge Nutrition Plan: Likely home on full liquid diet with supplemental bolus feeds. Recommend continue Ensure Plus or equivalent 6 per day, either PO or via gtube bolus [address], [address], [address] [facility]

## 2020-01-30 NOTE — PROGRESS NOTES
Pharmacokinetic Consult to Pharmacist 
 
Karen David is a 62 y.o. male being treated for sepsis of unclear origin with vancomycin. Height: 6' (182.9 cm)  Weight: 84.1 kg (185 lb 8 oz) Lab Results Component Value Date/Time BUN 15 01/30/2020 02:51 AM  
 Creatinine 0.71 (L) 01/30/2020 02:51 AM  
 WBC 5.6 01/30/2020 02:51 AM  
 Procalcitonin 0.4 02/02/2018 08:52 AM  
 Lactic acid 1.6 02/02/2018 08:52 AM  
 Lactic Acid (POC) 5.6 (H) 02/01/2018 07:19 PM  
  
Estimated Creatinine Clearance: 127.5 mL/min (A) (based on SCr of 0.71 mg/dL (L)). Lab Results Component Value Date/Time Vancomycin,trough 18.8 01/29/2020 04:06 PM  
 
 
Day 3 of vancomycin. Goal trough is 15-20. Will decrease the current dose to 1250 mg every 12 hours. Will continue to follow patient. Thank you, Rock Albrecht, PharmD, BCPS Clinical Pharmacy Specialist

## 2020-01-30 NOTE — PROGRESS NOTES
Oxygen Qualifier Room air: SpO2 with O2 and liter flow Resting SpO2  94% Ambulating SpO2  93% Completed by: 
 
Jaycee Moreno, RT

## 2020-01-30 NOTE — PROGRESS NOTES
0600-END OF SHIFT NOTE: 
 
-pt rested well throughout the night 
-2x oxycodone, 2x xanax 
-vss, no needs voiced at this time Intake/Output 01/29 1901 - 01/30 0700 In: 5 [P.O.:240] Out: 1875 [[id number]] Voiding: YES Catheter: NO 
Drain:   
 
 
Stool:  0 occurrences. Emesis:  0 occurrences. VITAL SIGNS Patient Vitals for the past 12 hrs: 
 Temp Pulse Resp BP SpO2  
01/30/20 0314 97 °F (36.1 °C) 76 20 127/75 97 % 01/29/20 2350 98.7 °F (37.1 °C) 84 20 129/61 92 % 01/29/20 2033     95 % 01/29/20 2015     95 % 01/29/20 1900 98 °F (36.7 °C) 82 18 146/81 95 % Pain Assessment Pain 1 Pain Scale 1: Visual (01/30/20 0320) Pain Intensity 1: 0 (01/30/20 0320) Patient Stated Pain Goal: 0 (01/30/20 0320) Pain Reassessment 1: Patient resting w/respiratory rate greater than 10 (01/30/20 0320) Pain Onset 1: pta (01/30/20 0250) Pain Location 1: Abdomen (01/30/20 0250) Pain Orientation 1: Mid (01/30/20 0250) Pain Description 1: Aching;Constant (01/30/20 0250) Pain Intervention(s) 1: Medication (see MAR) (01/30/20 0250) Additional Pain Sites: Yes (01/17/20 0706) Ambulating No 
 
Additional Information:  
 
Shift report given to oncoming nurse at the bedside.  
 
Jaren Curtis RN

## 2020-01-30 NOTE — PROGRESS NOTES
END OF SHIFT NOTE: 
 
Intake/Output 01/30 0701 - 01/30 1900 In: 6373 [P.O.:960; I.V.:164] Out: 1250 [BQIPX:7269] Voiding: YES Catheter: NO 
Drain:   
 
 
 
 
 
Stool:  1 occurrences. Stool Assessment Stool Source/Status: Rectum (01/30/20 1136) Emesis:  0 occurrences. VITAL SIGNS Patient Vitals for the past 12 hrs: 
 Temp Pulse Resp BP SpO2  
01/30/20 1510 97.3 °F (36.3 °C) 77 20 114/70 92 % 01/30/20 1135 97.9 °F (36.6 °C) 77 20 115/71 92 % 01/30/20 0733 97.4 °F (36.3 °C) 75 20 128/77 98 % 01/30/20 0724     94 % Pain Assessment Pain 1 Pain Scale 1: Numeric (0 - 10) (01/30/20 1724) Pain Intensity 1: 9 (01/30/20 1724) Patient Stated Pain Goal: 0 (01/30/20 1724) Pain Reassessment 1: Yes (01/30/20 1400) Pain Onset 1: pta (01/30/20 0250) Pain Location 1: Abdomen (01/30/20 1724) Pain Orientation 1: Mid (01/30/20 0250) Pain Description 1: Aching (01/30/20 1724) Pain Intervention(s) 1: Medication (see MAR) (01/30/20 1724) Additional Pain Sites: Yes (01/17/20 0706) Ambulating Yes Additional Information: patient given roxicodone 10 mg PO x 3 for pain. Patient drank two ensure enlives today. Patient ate 50% of breakfast, 100% of lunch, and 75% of dinner. Patient drank two ensure enlives today as well and the patient gave himself his first bolus feeding and did very well. The patient demonstrated the ability to stop and start bolus feeding without allowing air to enter his PEG tube. The patient then demonstrated how to properly flush his PEG tube after the feeding using a syringe with a plunger. Patient to receive 1-2 more ensure enlive bottles tonight base on the amount of food he ate throughout the day. Shift report to be given to oncoming nurse at the bedside.  
 
Caitlin Ndiaye RN

## 2020-01-30 NOTE — PROGRESS NOTES
Massage Therapy Note Gentle 20 minute massage to lower legs and feet while patient supine using hypoallergenic massage lotion. Patient reported some leg and foot discomfort, first massage. Felt less pain and more relaxed after massage. No follow up planned. JAMES Bullock

## 2020-01-30 NOTE — PROGRESS NOTES
01/29/20 2033 Oxygen Therapy O2 Sat (%) 95 % Pulse via Oximetry 72 beats per minute O2 Device CPAP mask O2 Flow Rate (L/min) 2 l/min Respiratory Respiratory (WDL) WDL Respiratory Pattern Regular Breath Sounds Bilateral Coarse;Diminished CPAP/BIPAP  
CPAP/BIPAP Start/Stop On Device Mode CPAP, auto-titrating  
$$ CPAP Daily Yes Mask Type and Size Full face; Medium Skin Condition nose and cheeks intact EPAP (cm H2O) (auto-titrating 5-20 cmH2O) Total RR (Spontaneous) 18 breaths per minute Pt's Home Machine No  
Biomedical Check Performed Yes Settings Verified Yes Pulmonary Toilet Pulmonary Toilet H. O.B elevated;Cough and deep breath

## 2020-01-30 NOTE — PROGRESS NOTES
[address] Hematology Oncology Inpatient Hematology / Oncology Progress Note Admission Date: 2020  6:59 AM 
Reason for Admission/Hospital Course: Chest pain [R07.9] 24 Hour Events: 
Afeb, VSS No complaints this AM 
 
ROS: 
Constitutional: Positive for fatigue, weakness. Negative for fever, chills. CV: Negative for chest pain, palpitations, edema. Respiratory: Negative for cough, wheezing. Positive for exertional dyspnea. GI: Negative for nausea, diarrhea. + dysphagia, abd pain. 10 point review of systems is otherwise negative with the exception of the elements mentioned above in the HPI. No Known Allergies OBJECTIVE: 
Patient Vitals for the past 8 hrs: 
 BP Temp Pulse Resp SpO2 Weight 20 1135 115/71 97.9 °F (36.6 °C) 77 20 92 %   
20 1102      185 lb 8 oz (84.1 kg) 20 0733 128/77 97.4 °F (36.3 °C) 75 20 98 %   
20 0724     94 %  Temp (24hrs), Av.8 °F (36.6 °C), Min:97 °F (36.1 °C), Max:98.7 °F (37.1 °C) 
 
 0701 -  1900 In: 240 [P.O.:240] Out: 900 [Urine:900] Physical Exam: 
Constitutional: Well developed, well nourished, chronically-ill appearing male in no acute distress, sitting comfortably in the bed. HEENT: Normocephalic and atraumatic. Oropharynx is clear, mucous membranes are moist. + poor dentition. Extraocular muscles are intact. Sclerae anicteric. Skin Warm and dry. No bruising and no rash noted. No erythema. No pallor. Respiratory Lungs are clear to auscultation bilaterally without wheezes, rales or rhonchi, normal air exchange without accessory muscle use. CVS Normal rate, regular rhythm and normal S1 and S2. No murmurs, gallops, or rubs. Abdomen Soft, mildly tender and nondistended, normoactive bowel sounds. No palpable mass. +PEG Neuro Grossly nonfocal with no obvious sensory or motor deficits. MSK Normal range of motion in general.  No edema and no tenderness. Psych Anxious mood and affect. Labs: 
   
Recent Labs  
  01/30/20 
0251 01/29/20 
0346 01/28/20 
0325 WBC 5.6 5.0 3.1*  
RBC 2.70* 2.66* 3.15* HGB 8.0* 7.8* 9.2* HCT 25.6* 25.4* 30.0* MCV 94.8 95.5 95.2 MCH 29.6 29.3 29.2 MCHC 31.3* 30.7* 30.7* RDW 13.2 13.2 13.2  246 299 GRANS 71 62 74 LYMPH 21 24 13 MONOS 2* 2* 2*  
EOS 5 12* 11* BASOS 0 0 0 IG 0 0 0  
DF AUTOMATED AUTOMATED AUTOMATED ANEU 4.0 3.1 2.3 ABL 1.2 1.2 0.4* ABM 0.1 0.1 0.1 JAY 0.3 0.6 0.3 ABB 0.0 0.0 0.0 AIG 0.0 0.0 0.0 Recent Labs  
  01/30/20 0251 01/29/20 0346 01/28/20 
0325  137 136  
K 4.3 4.4 4.7  99 98  
CO2 31 32 33* AGAP 6* 6* 5* * 95 95 BUN 15 18 19 CREA 0.71* 0.71* 0.70* GFRAA >60 >60 >60 GFRNA >60 >60 >60  
CA 8.7 8.7 8.8 SGOT 12* 16 30 AP 86 87 97  
TP 5.4* 5.4* 5.7* ALB 1.9* 1.9* 2.0*  
GLOB 3.5 3.5 3.7* AGRAT 0.5* 0.5* 0.5* MG 2.1 1.9 1.9 Imaging: 
CT CHEST ABD PELV W CONT [[id number]] Collected: 01/16/20 0463 Order Status: Completed Updated: 01/16/20 6480 Narrative:    
CT CHEST, ABDOMEN, PELVIS WITH CONTRAST:   
 
CLINICAL HISTORY:   Follow-up abnormal chest radiograph smoker with pulmonary 
nodules and 100-pound weight loss over the last several months. TECHNIQUE:  Oral and nonionic intravenous contrast was administered, and the 
torso was scanned with spiral technique.  Radiation dose reduction was achieved 
using one or all of the following techniques: automated exposure control, 
weight-based dosing, iterative reconstruction. COMPARISON:  Portable chest today and chest CTA of November 16, 2019. CT CHEST: There are innumerable bilateral pulmonary nodules, the largest 
measuring approximately 3.9 cm posteriorly in the right lower lobe.  There is 
marked thickening of the wall of the distal esophagus below the level of the 
hilary which suggests the possibility of primary esophageal carcinoma. Leliafrances Fiddler is associated subcarinal and azygoesophageal recess lymphadenopathy.  No 
significant pleural fluid.  The thyroid appears normal as imaged.  There is 
thickening of the left ventricular wall suggesting a hypertrophy. CT ABDOMEN: There are innumerable small hepatic lesions which are new from 2018 
and also suspicious for metastatic disease.  The largest measures approximately 2 cm posteriorly in the right lobe.  There are mildly enlarged lymph nodes in 
the lesser sac which may represent metastatic disease as well.  The spleen, 
pancreas, kidneys, and adrenals appear unremarkable. CT PELVIS:  The appendix is not confidently identified, but there are no 
secondary signs of appendicitis.  The prostate is not enlarged.  No primary or 
secondary signs of appendicitis or identified.  No pathologically enlarged lymph 
nodes or free fluid is evident.  There is scattered aortoiliac atherosclerotic 
calcifications with mild ectasia but no focal aneurysm.  Bone windows 
demonstrate no aggressive process, accounting for degenerative changes including 
severe osteoarthritis at the right hip.  A 1 cm central lucency in the body of 
L4 has sclerotic margins and is not typical of metastatic disease. Impression:    
IMPRESSION:  
 
1.  Extensive pulmonary parenchymal and hepatic metastatic disease as well as 
subcarinal and azygoesophageal recess lymphadenopathy likely representing 
metastatic disease as well. 2.  Long-segment thickening of the wall of the distal esophagus suggests the 
possibility of a primary esophageal carcinoma.  Follow-up gastroenterology 
consultation is recommended for consideration of endoscopic biopsy. 3.  Left ventricular hypertrophy and scattered atherosclerosis. XR CHEST PORT [[id number]] Collected: 01/16/20 0737 Order Status: Completed Updated: 01/16/20 3875 Narrative:    
PORTABLE CHEST, January 16, 2020 at 0715 hours CLINICAL HISTORY:  Cough and chest pain in a smoker. COMPARISON:  December 6, 2018. FINDINGS:  AP erect image demonstrates an approximately 4 cm rounded mass 
projected over the inferior aspect of the right hilum.  There are numerous 
additional pulmonary nodules bilaterally, suspicious for metastatic disease.  No 
nidhi pneumonic consolidation or pleural fluid is evident.  The heart size is 
within normal limits without evidence of congestive heart failure or 
pneumothorax.  The bony thorax appears intact on this view.  There are overlying 
radiopaque support devices. Impression:    
IMPRESSION:  NUMEROUS BILATERAL PULMONARY NODULES ARE SUSPICIOUS FOR METASTATIC 
DISEASE, AND A 4 CM RIGHT LOWER LUNG MASS PROJECTED OVER THE INFERIOR ASPECT OF 
THE RIGHT HILUM IS SUSPICIOUS FOR A PRIMARY BRONCHOGENIC CARCINOMA IN THIS 
SMOKER WITH COPD.  FOLLOW PULMONARY MEDICINE CONSULTATION IS RECOMMENDED. ASSESSMENT: 
 
Problem List  Date Reviewed: 1/17/2020 Codes Class Noted Cellulitis of left upper extremity ICD-10-CM: L03.114 
ICD-9-CM: 682.3  1/21/2020 * (Principal) Esophageal mass ICD-10-CM: K22.8 ICD-9-CM: 530.89  1/17/2020 Liver metastases ([facility] [address] ICD-10-CM: C78.7 ICD-9-CM: 197.7  1/17/2020 Esophageal dysphagia ICD-10-CM: R13.10 ICD-9-CM: 787.20  1/17/2020 Weight loss ICD-10-CM: R63.4 ICD-9-CM: 783.21  1/17/2020 Chest pain ICD-10-CM: R07.9 ICD-9-CM: 786.50  1/16/2020 Methamphetamine abuse (HCC) (Chronic) ICD-10-CM: F15.10 ICD-9-CM: 305.70  12/2/2018 H/O atrial flutter (Chronic) ICD-10-CM: Z86.79 
ICD-9-CM: V12.59  12/2/2018 Overview Signed 2/6/2018  7:51 AM by Aditya Bowden NP  
  1/2018 Atrial flutter, s/p cardioversion. Essential hypertension (Chronic) ICD-10-CM: I10 
ICD-9-CM: 401.9  12/2/2018 Acute respiratory failure with hypoxia and hypercarbia (HCC) ICD-10-CM: J96.01, J96.02 
ICD-9-CM: 518.81  12/2/2018 Substance abuse (HCC) (Chronic) ICD-10-CM: F19.10 ICD-9-CM: 305.90  12/2/2018 Noncompliance (Chronic) ICD-10-CM: Z91.19 ICD-9-CM: V15.81  12/2/2018 Acute encephalopathy ICD-10-CM: G93.40 ICD-9-CM: 348.30  12/2/2018 COPD (chronic obstructive pulmonary disease) (HCC) (Chronic) ICD-10-CM: J44.9 ICD-9-CM: 172  11/16/2018 NATALIIA (obstructive sleep apnea) (Chronic) ICD-10-CM: N05.52 
ICD-9-CM: 327.23  11/16/2018 Chronic respiratory failure (HCC) (Chronic) ICD-10-CM: J96.10 ICD-9-CM: 518.83  11/16/2018 Altered mental status ICD-10-CM: R41.82 
ICD-9-CM: 780.97  11/16/2018 Morbid obesity ([facility] [address] (Chronic) ICD-10-CM: E66.01 
ICD-9-CM: 278.01  11/16/2018 Nicotine dependence (Chronic) ICD-10-CM: W77.967 ICD-9-CM: 305.1  2/1/2018 Alcohol abuse (Chronic) ICD-10-CM: F10.10 ICD-9-CM: 305.00  2/1/2018 Homeless (Chronic) ICD-10-CM: Z59.0 ICD-9-CM: V60.0  1/15/2018 Urethral stricture (Chronic) ICD-10-CM: N35.919 ICD-9-CM: 598.9  1/7/2018 Anasarca ICD-10-CM: R60.1 ICD-9-CM: 782.3  1/6/2018 Acute kidney injury ([facility] [address] ICD-10-CM: N17.9 ICD-9-CM: 584.9  1/6/2018 PLAN: 
Metastatic esophageal cancer - CT CAP with extensive pulm parenchymal and hepatic metastatic disease; subcarinal and azygoesophageal recess LAD; and long-segment thickening of the wall of the distal esophagus - GI performing EGD/EUS, biopsy taken and path shows moderately differentiated adenocarcinoma.   Will need to send tissue for Caris testing - navigation team notified. 
- CEA 19.1, CA 19-9 219.2  
- Iron studies low with ferritin 27, tsat 9%. Repleted iron with ferrlecit x 1.  
- Consulted surgery for feeding tube - patient agrees. - Consulted palliative care - managing pain. - Consult IR for port placement. - Echocardiogram with EF 55-60%. - Will need PET/CT as OP.  
 
1/20 Today patient states that he wants chemo, port and PEG.  Surgery following. GI signed off. If he changes his mind again, discharge and have patient follow up with Dr. Clayton Christina in clinic. Once PEG and port placed we will take over as primary. 1/21 Port today. ?PEG tomorrow. Then chemo to follow. 1/22 Occlusive superficial venous thrombosis in the left basilic and cephalic veins. PEG today. 1/24 PEG placed yesterday. Consult RD for tube feeding management, full liquid diet only per surgery. Start FOLFOX.  
1/25 He was too groggy to consent yesterday, alert today and consented to start C1 FOLFOX today, continue Fentanyl patch, increase Percocet to 10 mg, try to decrease use of IV dilaudid 1/26 palliative care following for pain medication recommendations. Groggy this AM on exam, but answers questions appropriately. No treatment related complaints- just worried about discharge plans. Completes treatment today. 1/27 treatment completed yesterday. No problems with treatment noted. 1/29 no treatment complaints today. Fevers Tmax 101. Started on cef/vanc overnight. CXR with new right lung base atelectasis or infiltrate. Will change to vanc/zosyn to cover for possible aspiration as he is on tube feedings. 1/29 D2 vanc/zosyn. Afebrile. Blood cultures pending. 1/3 D3 vanc/zosyn. Change to PO Levaquin today. Disposition: Plan for discharge tomorrow if continues to be afebrile. Will be discharged home with daughter in law /son to a safe place. VANDANA [facility] Hematology and Oncology [id number] [address] Office : [phone] Fax : [phone]

## 2020-01-31 VITALS
BODY MASS INDEX: 25.33 KG/M2 | TEMPERATURE: 98 F | WEIGHT: 187 LBS | RESPIRATION RATE: 17 BRPM | SYSTOLIC BLOOD PRESSURE: 112 MMHG | OXYGEN SATURATION: 93 % | DIASTOLIC BLOOD PRESSURE: 70 MMHG | HEIGHT: 72 IN | HEART RATE: 78 BPM

## 2020-01-31 LAB
ALBUMIN SERPL-MCNC: 2 G/DL (ref 3.5–5)
ALBUMIN/GLOB SERPL: 0.5 {RATIO} (ref 1.2–3.5)
ALP SERPL-CCNC: 90 U/L (ref 50–136)
ALT SERPL-CCNC: 13 U/L (ref 12–65)
ANION GAP SERPL CALC-SCNC: 5 MMOL/L (ref 7–16)
AST SERPL-CCNC: 13 U/L (ref 15–37)
BASOPHILS # BLD: 0 K/UL (ref 0–0.2)
BASOPHILS NFR BLD: 0 % (ref 0–2)
BILIRUB SERPL-MCNC: 0.3 MG/DL (ref 0.2–1.1)
BUN SERPL-MCNC: 13 MG/DL (ref 6–23)
CALCIUM SERPL-MCNC: 8.9 MG/DL (ref 8.3–10.4)
CHLORIDE SERPL-SCNC: 98 MMOL/L (ref 98–107)
CO2 SERPL-SCNC: 32 MMOL/L (ref 21–32)
CREAT SERPL-MCNC: 0.71 MG/DL (ref 0.8–1.5)
DIFFERENTIAL METHOD BLD: ABNORMAL
EOSINOPHIL # BLD: 0.3 K/UL (ref 0–0.8)
EOSINOPHIL NFR BLD: 5 % (ref 0.5–7.8)
ERYTHROCYTE [DISTWIDTH] IN BLOOD BY AUTOMATED COUNT: 13.2 % (ref 11.9–14.6)
GLOBULIN SER CALC-MCNC: 3.7 G/DL (ref 2.3–3.5)
GLUCOSE SERPL-MCNC: 95 MG/DL (ref 65–100)
HCT VFR BLD AUTO: 25.2 % (ref 41.1–50.3)
HGB BLD-MCNC: 7.8 G/DL (ref 13.6–17.2)
IMM GRANULOCYTES # BLD AUTO: 0 K/UL (ref 0–0.5)
IMM GRANULOCYTES NFR BLD AUTO: 0 % (ref 0–5)
LYMPHOCYTES # BLD: 1.4 K/UL (ref 0.5–4.6)
LYMPHOCYTES NFR BLD: 28 % (ref 13–44)
MAGNESIUM SERPL-MCNC: 2.1 MG/DL (ref 1.8–2.4)
MCH RBC QN AUTO: 29 PG (ref 26.1–32.9)
MCHC RBC AUTO-ENTMCNC: 31 G/DL (ref 31.4–35)
MCV RBC AUTO: 93.7 FL (ref 79.6–97.8)
MONOCYTES # BLD: 0.1 K/UL (ref 0.1–1.3)
MONOCYTES NFR BLD: 3 % (ref 4–12)
NEUTS SEG # BLD: 3.1 K/UL (ref 1.7–8.2)
NEUTS SEG NFR BLD: 63 % (ref 43–78)
NRBC # BLD: 0 K/UL (ref 0–0.2)
PLATELET # BLD AUTO: 255 K/UL (ref 150–450)
PMV BLD AUTO: 9.6 FL (ref 9.4–12.3)
POTASSIUM SERPL-SCNC: 4.4 MMOL/L (ref 3.5–5.1)
PROT SERPL-MCNC: 5.7 G/DL (ref 6.3–8.2)
RBC # BLD AUTO: 2.69 M/UL (ref 4.23–5.6)
SODIUM SERPL-SCNC: 135 MMOL/L (ref 136–145)
WBC # BLD AUTO: 4.9 K/UL (ref 4.3–11.1)

## 2020-01-31 PROCEDURE — 94660 CPAP INITIATION&MGMT: CPT

## 2020-01-31 PROCEDURE — 90686 IIV4 VACC NO PRSV 0.5 ML IM: CPT | Performed by: HOSPITALIST

## 2020-01-31 PROCEDURE — 99239 HOSP IP/OBS DSCHRG MGMT >30: CPT | Performed by: INTERNAL MEDICINE

## 2020-01-31 PROCEDURE — 36591 DRAW BLOOD OFF VENOUS DEVICE: CPT

## 2020-01-31 PROCEDURE — 80053 COMPREHEN METABOLIC PANEL: CPT

## 2020-01-31 PROCEDURE — 74011250637 HC RX REV CODE- 250/637: Performed by: HOSPITALIST

## 2020-01-31 PROCEDURE — 74011250637 HC RX REV CODE- 250/637: Performed by: NURSE PRACTITIONER

## 2020-01-31 PROCEDURE — 94760 N-INVAS EAR/PLS OXIMETRY 1: CPT

## 2020-01-31 PROCEDURE — 77030019605

## 2020-01-31 PROCEDURE — 94640 AIRWAY INHALATION TREATMENT: CPT

## 2020-01-31 PROCEDURE — 83735 ASSAY OF MAGNESIUM: CPT

## 2020-01-31 PROCEDURE — 85025 COMPLETE CBC W/AUTO DIFF WBC: CPT

## 2020-01-31 PROCEDURE — 74011000250 HC RX REV CODE- 250: Performed by: HOSPITALIST

## 2020-01-31 PROCEDURE — 74011250636 HC RX REV CODE- 250/636: Performed by: INTERNAL MEDICINE

## 2020-01-31 PROCEDURE — 90471 IMMUNIZATION ADMIN: CPT | Performed by: NURSE PRACTITIONER

## 2020-01-31 PROCEDURE — 74011250636 HC RX REV CODE- 250/636: Performed by: HOSPITALIST

## 2020-01-31 PROCEDURE — 74011250637 HC RX REV CODE- 250/637: Performed by: INTERNAL MEDICINE

## 2020-01-31 RX ORDER — AMOXICILLIN 250 MG
1 CAPSULE ORAL DAILY
Qty: 30 TAB | Refills: 0 | Status: SHIPPED | OUTPATIENT
Start: 2020-02-01 | End: 2020-01-01 | Stop reason: SDUPTHER

## 2020-01-31 RX ORDER — DILTIAZEM HYDROCHLORIDE 90 MG/1
90 TABLET, FILM COATED ORAL 3 TIMES DAILY
Qty: 90 TAB | Refills: 0 | Status: SHIPPED | OUTPATIENT
Start: 2020-01-31 | End: 2021-01-01 | Stop reason: HOSPADM

## 2020-01-31 RX ORDER — HEPARIN 100 UNIT/ML
500 SYRINGE INTRAVENOUS AS NEEDED
Status: DISCONTINUED | OUTPATIENT
Start: 2020-01-31 | End: 2020-01-31 | Stop reason: HOSPADM

## 2020-01-31 RX ORDER — OXYCODONE HYDROCHLORIDE 10 MG/1
10 TABLET ORAL
Qty: 20 TAB | Refills: 0 | Status: SHIPPED | OUTPATIENT
Start: 2020-01-31 | End: 2020-02-04 | Stop reason: SDUPTHER

## 2020-01-31 RX ORDER — POLYETHYLENE GLYCOL 3350 17 G/17G
17 POWDER, FOR SOLUTION ORAL 2 TIMES DAILY
Qty: 60 PACKET | Refills: 0 | Status: SHIPPED | OUTPATIENT
Start: 2020-01-31 | End: 2020-01-01 | Stop reason: SDUPTHER

## 2020-01-31 RX ORDER — ALPRAZOLAM 0.25 MG/1
0.25 TABLET ORAL
Qty: 12 TAB | Refills: 0 | Status: SHIPPED | OUTPATIENT
Start: 2020-01-31 | End: 2020-02-04 | Stop reason: SDUPTHER

## 2020-01-31 RX ORDER — IBUPROFEN 200 MG
1 TABLET ORAL EVERY 24 HOURS
Qty: 30 PATCH | Refills: 0 | Status: SHIPPED | OUTPATIENT
Start: 2020-01-31 | End: 2020-03-01

## 2020-01-31 RX ORDER — LEVOFLOXACIN 750 MG/1
750 TABLET ORAL DAILY
Qty: 7 TAB | Refills: 0 | Status: SHIPPED | OUTPATIENT
Start: 2020-01-31 | End: 2020-02-07

## 2020-01-31 RX ORDER — FENTANYL 12.5 UG/1
1 PATCH TRANSDERMAL
Qty: 1 PATCH | Refills: 0 | Status: SHIPPED | OUTPATIENT
Start: 2020-02-02 | End: 2020-02-04 | Stop reason: SDUPTHER

## 2020-01-31 RX ORDER — NALOXONE HYDROCHLORIDE 4 MG/.1ML
SPRAY NASAL
Qty: 1 EACH | Refills: 0 | Status: SHIPPED | OUTPATIENT
Start: 2020-01-31 | End: 2021-01-01 | Stop reason: HOSPADM

## 2020-01-31 RX ORDER — LISINOPRIL 10 MG/1
10 TABLET ORAL DAILY
Qty: 30 TAB | Refills: 0 | Status: SHIPPED | OUTPATIENT
Start: 2020-02-01 | End: 2021-01-01 | Stop reason: HOSPADM

## 2020-01-31 RX ADMIN — AMIODARONE HYDROCHLORIDE 200 MG: 200 TABLET ORAL at 08:20

## 2020-01-31 RX ADMIN — ARFORMOTEROL TARTRATE 15 MCG: 15 SOLUTION RESPIRATORY (INHALATION) at 07:43

## 2020-01-31 RX ADMIN — OXYCODONE 10 MG: 5 TABLET ORAL at 11:21

## 2020-01-31 RX ADMIN — DILTIAZEM HYDROCHLORIDE 90 MG: 30 TABLET, FILM COATED ORAL at 15:21

## 2020-01-31 RX ADMIN — OXYCODONE 10 MG: 5 TABLET ORAL at 05:24

## 2020-01-31 RX ADMIN — OXYCODONE 10 MG: 5 TABLET ORAL at 01:36

## 2020-01-31 RX ADMIN — DILTIAZEM HYDROCHLORIDE 90 MG: 30 TABLET, FILM COATED ORAL at 08:21

## 2020-01-31 RX ADMIN — FAMOTIDINE 20 MG: 10 INJECTION INTRAVENOUS at 08:21

## 2020-01-31 RX ADMIN — SERTRALINE HYDROCHLORIDE 25 MG: 50 TABLET ORAL at 08:21

## 2020-01-31 RX ADMIN — OXYCODONE 10 MG: 5 TABLET ORAL at 15:20

## 2020-01-31 RX ADMIN — SENNOSIDES AND DOCUSATE SODIUM 1 TABLET: 8.6; 5 TABLET ORAL at 08:20

## 2020-01-31 RX ADMIN — ALPRAZOLAM 0.25 MG: 0.5 TABLET ORAL at 08:28

## 2020-01-31 RX ADMIN — Medication 10 ML: at 13:08

## 2020-01-31 RX ADMIN — INFLUENZA VIRUS VACCINE 0.5 ML: 15; 15; 15; 15 SUSPENSION INTRAMUSCULAR at 13:53

## 2020-01-31 RX ADMIN — LISINOPRIL 10 MG: 5 TABLET ORAL at 08:20

## 2020-01-31 RX ADMIN — Medication 10 ML: at 05:03

## 2020-01-31 RX ADMIN — TIOTROPIUM BROMIDE 18 MCG: 18 CAPSULE ORAL; RESPIRATORY (INHALATION) at 07:54

## 2020-01-31 RX ADMIN — Medication 500 UNITS: at 14:35

## 2020-01-31 RX ADMIN — ALPRAZOLAM 0.25 MG: 0.5 TABLET ORAL at 13:10

## 2020-01-31 NOTE — CDMP QUERY
Pt admitted with a malignant neoplasm of the esophagus and has malnutrition documented. Please further specify type of malnutrition. ? Malnutrition (mild, moderate, severe) ? Protein calorie malnutrition (mild, moderate, severe) ? Other (please specify) ? Unable to determine The medical record reflects the following: 
  Risk Factors: age, pt is currently admitted to hospital for a malignant neoplasm of the esophagus Clinical Indicators: per RD progress note on 1/27 @ 1259 by Caitlyn Aquino \" Meets Criteria for Malnutrition in the context of Chronic Illness  
[x]????? Severe Malnutrition, as evidenced by: 
            [x]????? Nutritional intake of <75% of energy intake compared to estimated energy needs for > 1 month             [x]????? Weight loss of >5% in 1 month, >7.5% in 3 months,  >10% in 6 months, or >20% in 12 months 
            []????? Severe edema 
            []????? Severe loss of muscle mass 
            []????? Severe loss of subcutaneous fat 
            []????? Functional decline             []????? Severe edema Treatment: RD consult, change to Ensure Enlive TID, continue to monitor Thanks, 
DAVID SrN, RN, CDS Compliant Documentation Management Program 
[phone]

## 2020-01-31 NOTE — PROGRESS NOTES
Visit with patient prior to his discharge He was thankful for the care Prayer for his continued peace with God Gio Rojas, staff [address], [facility]  /   [email or web address]

## 2020-01-31 NOTE — PROGRESS NOTES
Pt is being discharged today [facility] services [facility] SN PT and OT 2 Cases of ensure Medications vouchered: 
Miralax                    17gm                           60packet Narcan                    4mg nasal spray         1nasal spray Pericolace              8.6 50 mg                     30tabs Levaquin                  750mg                         7 tabs Prinivil Zesteril         10mg Total vouched medications $177.84 Unable to fill Cardizem script  Was unavailable at the Cancer center until Monday. Gave pt a Good Rx coupon to get medication filled at [facility] for $17 Pt requested financial aid to pay family that he was staying with. Explained to patient that I did not handle any dispense of monies. JANNETTE did work with pt about applying for disability. Pt will have close follow-up at the cancer center Transportation will be by personal vehicle Milestones Met Care Management Interventions PCP Verified by CM: Yes Mode of Transport at Discharge: Self Transition of Care Consult (CM Consult): Discharge Planning Discharge Durable Medical Equipment: No 
Physical Therapy Consult: Yes Occupational Therapy Consult: Yes Speech Therapy Consult: No 
Current Support Network: Own Home, Family Lives [place] Confirm Follow Up Transport: Family The Plan for Transition of Care is Related to the Following Treatment Goals : ongoing therapy needs The Patient and/or Patient Representative was Provided with a Choice of Provider and Agrees with the Discharge Plan?: Yes Name of the Patient Representative Who was Provided with a Choice of Provider and Agrees with the Discharge Plan: pt and daughter in law Freedom of Choice List was Provided with Basic Dialogue that Supports the Patient's Individualized Plan of Care/Goals, Treatment Preferences and Shares the Quality Data Associated with the Providers?: Yes [employer] Information Provided?: No 
Discharge Location Discharge Placement: Home with home health([facility] SN, PT and OT)

## 2020-01-31 NOTE — PROGRESS NOTES
1612-I have reviewed discharge instructions with the patient and caregiver. The patient and caregiver verbalized understanding. Discharge medications reviewed with patient and caregiver and appropriate educational materials and side effects teaching were provided. Pt given prescription vouchers. Pt's port deacessed.

## 2020-01-31 NOTE — PROGRESS NOTES
Problem: General Medical Care Plan Goal: *Vital signs within specified parameters Outcome: Progressing Towards Goal 
Goal: *Labs within defined limits Outcome: Progressing Towards Goal 
Goal: *Absence of infection signs and symptoms Outcome: Progressing Towards Goal 
Goal: *Optimal pain control at patient's stated goal 
Outcome: Progressing Towards Goal 
Goal: *Skin integrity maintained Outcome: Progressing Towards Goal 
Goal: *Fluid volume balance Outcome: Progressing Towards Goal 
Goal: *Optimize nutritional status Outcome: Progressing Towards Goal 
Goal: *Anxiety reduced or absent Outcome: Progressing Towards Goal 
Goal: *Progressive mobility and function (eg: ADL's) Outcome: Progressing Towards Goal 
  
Problem: Falls - Risk of 
Goal: *Absence of Falls Description Document Riya Thuan Fall Risk and appropriate interventions in the flowsheet. Outcome: Progressing Towards Goal 
Note: Fall Risk Interventions: 
Mobility Interventions: Communicate number of staff needed for ambulation/transfer Medication Interventions: Patient to call before getting OOB Elimination Interventions: Call light in reach History of Falls Interventions: Investigate reason for fall Problem: Patient Education: Go to Patient Education Activity Goal: Patient/Family Education Outcome: Progressing Towards Goal 
  
Problem: Nutrition Deficit Goal: *Optimize nutritional status Outcome: Progressing Towards Goal 
  
Problem: Breathing Pattern - Ineffective Goal: *Absence of hypoxia Outcome: Progressing Towards Goal 
Goal: *Use of effective breathing techniques Outcome: Progressing Towards Goal 
Goal: *PALLIATIVE CARE:  Alleviation of Dyspnea Outcome: Progressing Towards Goal 
  
Problem: Pain Goal: *Control of Pain Outcome: Progressing Towards Goal 
  
Problem: Patient Education: Go to Patient Education Activity Goal: Patient/Family Education Outcome: Progressing Towards Goal

## 2020-01-31 NOTE — PROGRESS NOTES
0650-Bedside report received from Ania Hughes RN. Resting in bed. No needs voiced. No s/s of acute distress.

## 2020-01-31 NOTE — DISCHARGE INSTRUCTIONS
DISCHARGE SUMMARY from Nurse    PATIENT INSTRUCTIONS:    After general anesthesia or intravenous sedation, for 24 hours or while taking prescription Narcotics:  · Limit your activities  · Do not drive and operate hazardous machinery  · Do not make important personal or business decisions  · Do  not drink alcoholic beverages  ·   Please call physician after your discharge if the following symptoms present:    1. Temperature greater than 100.5  2. Heart rate greater than 90 beats per minute  3. Increased respirations   4. Chills  5. Cough with any sputum (color: yellow, white, green, or bloody?)  6. Shortness of breath or change in breathing pattern  7. Bleeding:  Any prolonged bleeding presented from skin tears, cuts, bleeding from brushing teeth, nose bleed, bleeding noted in stool  8. Tenderness, swelling, or drainage from IV site or central line catheter      MD office #:[phone]      What to do at Home:  Recommended activity: Activity as tolerated    *  Please give a list of your current medications to your Primary Care Provider. *  Please update this list whenever your medications are discontinued, doses are      changed, or new medications (including over-the-counter products) are added. *  Please carry medication information at all times in case of emergency situations. These are general instructions for a healthy lifestyle:    No smoking/ No tobacco products/ Avoid exposure to second hand smoke  Surgeon General's Warning:  Quitting smoking now greatly reduces serious risk to your health.     Obesity, smoking, and sedentary lifestyle greatly increases your risk for illness    A healthy diet, regular physical exercise & weight monitoring are important for maintaining a healthy lifestyle    You may be retaining fluid if you have a history of heart failure or if you experience any of the following symptoms:  Weight gain of 3 pounds or more overnight or 5 pounds in a week, increased swelling in our hands or feet or shortness of breath while lying flat in bed. Please call your doctor as soon as you notice any of these symptoms; do not wait until your next office visit. The discharge information has been reviewed with the patient. The patient verbalized understanding. Discharge medications reviewed with the patient and appropriate educational materials and side effects teaching were provided.   ___________________________________________________________________________________________________________________________________

## 2020-02-02 ENCOUNTER — HOME CARE VISIT (OUTPATIENT)
Dept: SCHEDULING | Facility: HOME HEALTH | Age: 55
End: 2020-02-02
Payer: MEDICAID

## 2020-02-02 VITALS
TEMPERATURE: 99.2 F | SYSTOLIC BLOOD PRESSURE: 110 MMHG | OXYGEN SATURATION: 99 % | HEART RATE: 109 BPM | DIASTOLIC BLOOD PRESSURE: 74 MMHG | RESPIRATION RATE: 20 BRPM

## 2020-02-02 PROCEDURE — 400013 HH SOC

## 2020-02-02 PROCEDURE — G0299 HHS/HOSPICE OF RN EA 15 MIN: HCPCS

## 2020-02-03 ENCOUNTER — HOME CARE VISIT (OUTPATIENT)
Dept: SCHEDULING | Facility: HOME HEALTH | Age: 55
End: 2020-02-03
Payer: MEDICAID

## 2020-02-03 PROCEDURE — G0156 HHCP-SVS OF AIDE,EA 15 MIN: HCPCS

## 2020-02-04 ENCOUNTER — HOSPITAL ENCOUNTER (OUTPATIENT)
Dept: INFUSION THERAPY | Age: 55
Discharge: HOME | End: 2020-02-04
Payer: MEDICAID

## 2020-02-04 ENCOUNTER — HOSPITAL ENCOUNTER (OUTPATIENT)
Dept: LAB | Age: 55
Discharge: HOME | End: 2020-02-04
Payer: MEDICAID

## 2020-02-04 VITALS
HEART RATE: 100 BPM | SYSTOLIC BLOOD PRESSURE: 118 MMHG | DIASTOLIC BLOOD PRESSURE: 66 MMHG | TEMPERATURE: 97.8 F | RESPIRATION RATE: 18 BRPM

## 2020-02-04 DIAGNOSIS — K22.89 ESOPHAGEAL MASS: Primary | ICD-10-CM

## 2020-02-04 DIAGNOSIS — C78.00 MALIGNANT NEOPLASM METASTATIC TO LUNG, UNSPECIFIED LATERALITY (HCC): ICD-10-CM

## 2020-02-04 LAB
ALBUMIN SERPL-MCNC: 2.8 G/DL (ref 3.5–5)
ALBUMIN/GLOB SERPL: 0.6 {RATIO} (ref 1.2–3.5)
ALP SERPL-CCNC: 107 U/L (ref 50–136)
ALT SERPL-CCNC: 20 U/L (ref 12–65)
ANION GAP SERPL CALC-SCNC: 8 MMOL/L (ref 7–16)
AST SERPL-CCNC: 17 U/L (ref 15–37)
BASOPHILS # BLD: 0 K/UL (ref 0–0.2)
BASOPHILS NFR BLD: 1 % (ref 0–2)
BILIRUB SERPL-MCNC: 0.2 MG/DL (ref 0.2–1.1)
BUN SERPL-MCNC: 17 MG/DL (ref 6–23)
CALCIUM SERPL-MCNC: 10.1 MG/DL (ref 8.3–10.4)
CHLORIDE SERPL-SCNC: 98 MMOL/L (ref 98–107)
CO2 SERPL-SCNC: 30 MMOL/L (ref 21–32)
CREAT SERPL-MCNC: 1.04 MG/DL (ref 0.8–1.5)
DIFFERENTIAL METHOD BLD: ABNORMAL
EOSINOPHIL # BLD: 0.1 K/UL (ref 0–0.8)
EOSINOPHIL NFR BLD: 1 % (ref 0.5–7.8)
ERYTHROCYTE [DISTWIDTH] IN BLOOD BY AUTOMATED COUNT: 13.3 % (ref 11.9–14.6)
GLOBULIN SER CALC-MCNC: 4.5 G/DL (ref 2.3–3.5)
GLUCOSE SERPL-MCNC: 68 MG/DL (ref 65–100)
HCT VFR BLD AUTO: 31.5 % (ref 41.1–50.3)
HGB BLD-MCNC: 9.9 G/DL (ref 13.6–17.2)
IMM GRANULOCYTES # BLD AUTO: 0 K/UL (ref 0–0.5)
IMM GRANULOCYTES NFR BLD AUTO: 1 % (ref 0–5)
LYMPHOCYTES # BLD: 1.6 K/UL (ref 0.5–4.6)
LYMPHOCYTES NFR BLD: 27 % (ref 13–44)
MAGNESIUM SERPL-MCNC: 2.4 MG/DL (ref 1.8–2.4)
MCH RBC QN AUTO: 29.3 PG (ref 26.1–32.9)
MCHC RBC AUTO-ENTMCNC: 31.4 G/DL (ref 31.4–35)
MCV RBC AUTO: 93.2 FL (ref 79.6–97.8)
MONOCYTES # BLD: 0.5 K/UL (ref 0.1–1.3)
MONOCYTES NFR BLD: 9 % (ref 4–12)
NEUTS SEG # BLD: 3.7 K/UL (ref 1.7–8.2)
NEUTS SEG NFR BLD: 62 % (ref 43–78)
NRBC # BLD: 0 K/UL (ref 0–0.2)
PLATELET # BLD AUTO: 373 K/UL (ref 150–450)
PMV BLD AUTO: 9.1 FL (ref 9.4–12.3)
POTASSIUM SERPL-SCNC: 4.6 MMOL/L (ref 3.5–5.1)
PROT SERPL-MCNC: 7.3 G/DL (ref 6.3–8.2)
RBC # BLD AUTO: 3.38 M/UL (ref 4.23–5.67)
SODIUM SERPL-SCNC: 136 MMOL/L (ref 136–145)
WBC # BLD AUTO: 6.1 K/UL (ref 4.3–11.1)

## 2020-02-04 PROCEDURE — 74011250636 HC RX REV CODE- 250/636: Performed by: INTERNAL MEDICINE

## 2020-02-04 PROCEDURE — 83735 ASSAY OF MAGNESIUM: CPT

## 2020-02-04 PROCEDURE — 80053 COMPREHEN METABOLIC PANEL: CPT

## 2020-02-04 PROCEDURE — 36415 COLL VENOUS BLD VENIPUNCTURE: CPT

## 2020-02-04 PROCEDURE — 96361 HYDRATE IV INFUSION ADD-ON: CPT

## 2020-02-04 PROCEDURE — 85025 COMPLETE CBC W/AUTO DIFF WBC: CPT

## 2020-02-04 PROCEDURE — 96374 THER/PROPH/DIAG INJ IV PUSH: CPT

## 2020-02-04 RX ORDER — SODIUM CHLORIDE 0.9 % (FLUSH) 0.9 %
10-40 SYRINGE (ML) INJECTION AS NEEDED
Status: DISCONTINUED | OUTPATIENT
Start: 2020-02-04 | End: 2020-02-08 | Stop reason: HOSPADM

## 2020-02-04 RX ORDER — SODIUM CHLORIDE 9 MG/ML
1000 INJECTION, SOLUTION INTRAVENOUS ONCE
Status: COMPLETED | OUTPATIENT
Start: 2020-02-04 | End: 2020-02-04

## 2020-02-04 RX ORDER — ONDANSETRON 2 MG/ML
8 INJECTION INTRAMUSCULAR; INTRAVENOUS ONCE
Status: COMPLETED | OUTPATIENT
Start: 2020-02-04 | End: 2020-02-04

## 2020-02-04 RX ADMIN — ONDANSETRON 8 MG: 2 INJECTION INTRAMUSCULAR; INTRAVENOUS at 17:12

## 2020-02-04 RX ADMIN — Medication 10 ML: at 17:56

## 2020-02-04 RX ADMIN — SODIUM CHLORIDE 1000 ML: 900 INJECTION, SOLUTION INTRAVENOUS at 16:55

## 2020-02-04 RX ADMIN — Medication 10 ML: at 16:55

## 2020-02-04 NOTE — PROGRESS NOTES
Arrived to the [facility]. 1 liter NS and IV zofran completed. Patient tolerated well. Any issues or concerns during appointment: none. Patient aware of next infusion appointment on 2/7/2020 at 8:30am.  Discharged via wheelchair.

## 2020-02-05 ENCOUNTER — HOME CARE VISIT (OUTPATIENT)
Dept: HOME HEALTH SERVICES | Facility: HOME HEALTH | Age: 55
End: 2020-02-05
Payer: MEDICAID

## 2020-02-05 ENCOUNTER — HOME CARE VISIT (OUTPATIENT)
Dept: SCHEDULING | Facility: HOME HEALTH | Age: 55
End: 2020-02-05
Payer: MEDICAID

## 2020-02-06 ENCOUNTER — HOME CARE VISIT (OUTPATIENT)
Dept: SCHEDULING | Facility: HOME HEALTH | Age: 55
End: 2020-02-06
Payer: MEDICAID

## 2020-02-06 VITALS
OXYGEN SATURATION: 100 % | RESPIRATION RATE: 18 BRPM | HEART RATE: 108 BPM | TEMPERATURE: 98.3 F | DIASTOLIC BLOOD PRESSURE: 70 MMHG | SYSTOLIC BLOOD PRESSURE: 110 MMHG

## 2020-02-06 PROCEDURE — A6222 GAUZE <=16 IN NO W/SAL W/O B: HCPCS

## 2020-02-06 PROCEDURE — A6260 WOUND CLEANSER ANY TYPE/SIZE: HCPCS

## 2020-02-06 PROCEDURE — G0155 HHCP-SVS OF CSW,EA 15 MIN: HCPCS

## 2020-02-06 PROCEDURE — A6250 SKIN SEAL PROTECT MOISTURIZR: HCPCS

## 2020-02-06 PROCEDURE — G0299 HHS/HOSPICE OF RN EA 15 MIN: HCPCS

## 2020-02-06 PROCEDURE — A4450 NON-WATERPROOF TAPE: HCPCS

## 2020-02-07 ENCOUNTER — HOSPITAL ENCOUNTER (OUTPATIENT)
Dept: INFUSION THERAPY | Age: 55
Discharge: HOME | End: 2020-02-07
Payer: MEDICAID

## 2020-02-07 VITALS
DIASTOLIC BLOOD PRESSURE: 49 MMHG | HEART RATE: 82 BPM | RESPIRATION RATE: 18 BRPM | OXYGEN SATURATION: 93 % | WEIGHT: 180.8 LBS | BODY MASS INDEX: 25.94 KG/M2 | TEMPERATURE: 98.3 F | SYSTOLIC BLOOD PRESSURE: 75 MMHG

## 2020-02-07 DIAGNOSIS — K22.89 ESOPHAGEAL MASS: Primary | ICD-10-CM

## 2020-02-07 DIAGNOSIS — C78.7 LIVER METASTASES (HCC): ICD-10-CM

## 2020-02-07 PROCEDURE — 96411 CHEMO IV PUSH ADDL DRUG: CPT

## 2020-02-07 PROCEDURE — G0498 CHEMO EXTEND IV INFUS W/PUMP: HCPCS

## 2020-02-07 PROCEDURE — 96413 CHEMO IV INFUSION 1 HR: CPT

## 2020-02-07 PROCEDURE — 96361 HYDRATE IV INFUSION ADD-ON: CPT

## 2020-02-07 PROCEDURE — 74011250636 HC RX REV CODE- 250/636: Performed by: NURSE PRACTITIONER

## 2020-02-07 PROCEDURE — 74011250636 HC RX REV CODE- 250/636: Performed by: INTERNAL MEDICINE

## 2020-02-07 PROCEDURE — 96368 THER/DIAG CONCURRENT INF: CPT

## 2020-02-07 PROCEDURE — 96375 TX/PRO/DX INJ NEW DRUG ADDON: CPT

## 2020-02-07 PROCEDURE — 74011000258 HC RX REV CODE- 258: Performed by: INTERNAL MEDICINE

## 2020-02-07 PROCEDURE — 96415 CHEMO IV INFUSION ADDL HR: CPT

## 2020-02-07 RX ORDER — DEXTROSE MONOHYDRATE 50 MG/ML
25 INJECTION, SOLUTION INTRAVENOUS CONTINUOUS
Status: ACTIVE | OUTPATIENT
Start: 2020-02-07 | End: 2020-02-07

## 2020-02-07 RX ORDER — ONDANSETRON 2 MG/ML
8 INJECTION INTRAMUSCULAR; INTRAVENOUS ONCE
Status: COMPLETED | OUTPATIENT
Start: 2020-02-07 | End: 2020-02-07

## 2020-02-07 RX ORDER — SODIUM CHLORIDE 9 MG/ML
1000 INJECTION, SOLUTION INTRAVENOUS ONCE
Status: COMPLETED | OUTPATIENT
Start: 2020-02-07 | End: 2020-02-07

## 2020-02-07 RX ORDER — FLUOROURACIL 50 MG/ML
400 INJECTION, SOLUTION INTRAVENOUS ONCE
Status: COMPLETED | OUTPATIENT
Start: 2020-02-07 | End: 2020-02-07

## 2020-02-07 RX ADMIN — ONDANSETRON 8 MG: 2 INJECTION INTRAMUSCULAR; INTRAVENOUS at 09:25

## 2020-02-07 RX ADMIN — SODIUM CHLORIDE 1000 ML: 900 INJECTION, SOLUTION INTRAVENOUS at 10:00

## 2020-02-07 RX ADMIN — DEXTROSE MONOHYDRATE 25 ML/HR: 5 INJECTION, SOLUTION INTRAVENOUS at 09:15

## 2020-02-07 RX ADMIN — DEXAMETHASONE SODIUM PHOSPHATE 12 MG: 4 INJECTION, SOLUTION INTRAMUSCULAR; INTRAVENOUS at 09:30

## 2020-02-07 RX ADMIN — FLUOROURACIL 824 MG: 50 INJECTION, SOLUTION INTRAVENOUS at 13:43

## 2020-02-07 RX ADMIN — OXALIPLATIN 175 MG: 5 INJECTION, SOLUTION INTRAVENOUS at 11:08

## 2020-02-07 RX ADMIN — LEUCOVORIN CALCIUM 824 MG: 200 INJECTION, POWDER, LYOPHILIZED, FOR SUSPENSION INTRAMUSCULAR; INTRAVENOUS at 11:08

## 2020-02-07 RX ADMIN — FLUOROURACIL 4944 MG: 50 INJECTION, SOLUTION INTRAVENOUS at 13:49

## 2020-02-07 NOTE — PROGRESS NOTES
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 SUBJECTIVE:  Present Symptoms: Foot pain     Pre-Treatment Pain: 5/10   Neuropathy Scale:  1/10  Past Medical History:    Mr. George Schaumann  has a past medical history of Acute respiratory failure with hypercapnia ([facility] [address] (2/1/2018), Cancer [facility]), Chronic obstructive pulmonary disease ([facility] [address] Heart failure ([facility] [address] and Sleep disorder. Mr. George Schaumann  has a past surgical history that includes ir insert tunl cvc w port over 5 years (1/21/2020); ir insert gastrostomy tube perc (1/23/2020); and hx vascular access. Current Medications:       Current Outpatient Medications:     fentaNYL (DURAGESIC) 12 mcg/hr patch, 1 Patch by TransDERmal route every seventy-two (72) hours for 15 days. Max Daily Amount: 1 Patch., Disp: 5 Patch, Rfl: 0    oxyCODONE IR (ROXICODONE) 10 mg tab immediate release tablet, Take 1 Tab by mouth every four (4) hours as needed for Pain for up to 15 days. Max Daily Amount: 60 mg., Disp: 90 Tab, Rfl: 0    ondansetron hcl (ZOFRAN) 8 mg tablet, Take 0.5 Tabs by mouth every eight (8) hours as needed for Nausea or Vomiting., Disp: 90 Tab, Rfl: 3    prochlorperazine (COMPAZINE) 10 mg tablet, Take 1 Tab by mouth every six (6) hours as needed for Nausea., Disp: 120 Tab, Rfl: 3    ALPRAZolam (XANAX) 0.25 mg tablet, Take 1 Tab by mouth three (3) times daily as needed for Anxiety. Max Daily Amount: 0.75 mg., Disp: 45 Tab, Rfl: 1    sertraline (ZOLOFT) 50 mg tablet, Take 1 Tab by mouth daily. , Disp: 30 Tab, Rfl: 1    OXYGEN-AIR DELIVERY SYSTEMS, 3 L by Nasal route continuous. , Disp: , Rfl:     dimenhyDRINATE (DRAMAMINE) 50 mg tablet, Take 50 mg by mouth daily. , Disp: , Rfl:     levoFLOXacin (LEVAQUIN) 750 mg tablet, Take 1 Tab by mouth daily for 7 days. , Disp: 7 Tab, Rfl: 0    lisinopril (PRINIVIL, ZESTRIL) 10 mg tablet, Take 1 Tab by mouth daily. , Disp: 30 Tab, Rfl: 0    nicotine (NICODERM CQ) 21 mg/24 hr, 1 Patch by TransDERmal route every twenty-four (24) hours for 30 days. , Disp: 30 Patch, Rfl: 0    senna-docusate (PERICOLACE) 8.6-50 mg per tablet, Take 1 Tab by mouth daily. , Disp: 30 Tab, Rfl: 0    polyethylene glycol (MIRALAX) 17 gram packet, Take 1 Packet by mouth two (2) times a day., Disp: 60 Packet, Rfl: 0    dilTIAZem (CARDIZEM) 90 mg tablet, Take 1 Tab by mouth three (3) times daily. , Disp: 90 Tab, Rfl: 0    naloxone (NARCAN) 4 mg/actuation nasal spray, Use 1 spray intranasally, then discard. Repeat with new spray every 2 min as needed for opioid overdose symptoms, alternating nostrils. , Disp: 1 Each, Rfl: 0    hydrALAZINE (APRESOLINE) 50 mg tablet, Take 1 Tab by mouth three (3) times daily. , Disp: 90 Tab, Rfl: 0    amiodarone (CORDARONE) 200 mg tablet, Take 1 Tab by mouth every twelve (12) hours. , Disp: 60 Tab, Rfl: 0    tiotropium (SPIRIVA) 18 mcg inhalation capsule, Take 1 Cap by inhalation daily. , Disp: 30 Cap, Rfl: 0    albuterol (PROVENTIL HFA, VENTOLIN HFA, PROAIR HFA) 90 mcg/actuation inhaler, Take 2 Puffs by inhalation every four (4) hours as needed for Wheezing., Disp: 1 Inhaler, Rfl: 0    albuterol-ipratropium (DUO-NEB) 2.5 mg-0.5 mg/3 ml nebu, 3 mL by Nebulization route every four (4) hours as needed. , Disp: 30 Nebule, Rfl: 0    Current Facility-Administered Medications:     dextrose 5% infusion, 25 mL/hr, IntraVENous, CONTINUOUS, Amrit Peres MD, Last Rate: 25 mL/hr at 02/07/20 0915, 25 mL/hr at 02/07/20 0915    leucovorin (WELLCOVORIN) 824 mg in dextrose 5% 250 mL IVPB, 400 mg/m2 (Treatment Plan Recorded), IntraVENous, ONCE, Emanuel Fang MD, Last Rate: 158 mL/hr at 02/07/20 1108, 824 mg at 02/07/20 1108    oxaliplatin (ELOXATIN) 175 mg in dextrose 5% 250 mL chemo infusion, 85 mg/m2 (Treatment Plan Recorded), IntraVENous, ONCE, Emanuel Fang MD, Last Rate: 155 mL/hr at 02/07/20 1108, 175 mg at 02/07/20 1108    fluorouraciL (ADRUCIL) chemo syringe 824 mg, 400 mg/m2 (Treatment Plan Recorded), IntraVENous, ONCE, Lincoln Vital MD    fluorouraciL (ADRUCIL) 4,944 mg in 0.9% sodium chloride 230 mL elastomeric pump, 2,400 mg/m2 (Treatment Plan Recorded), IntraVENous, ONCE, Lincoln Vital MD    Facility-Administered Medications Ordered in Other Encounters:     sodium chloride (NS) flush 10-40 mL, 10-40 mL, IntraVENous, PRN, Lincoln Vital MD, 10 mL at 02/04/20 1756       OBJECTIVE/ASSESSMENT:  Observations of Patient:  No issues related to massage  Response To Treatment: More relaxed, some pain relief   Post-Treatment Pain: 3/10   Neuropathy Scale:  1/10  TREATMENT:    (In addition to Assessment/Re-Assessment sessions the following treatments were rendered)  Treatment Provided:  [x]  Soft tissue massage  []  Healing Touch   Location: lower leg(s) bilaterally and feet  Patient Position: Seated  Time: 20 minutes    PLAN OF CARE:    []  I will follow up with this patient as needed. [x]  No follow up visit necessary.     Thank you for this referral.  Natalio Hennessy, LMT

## 2020-02-07 NOTE — PROGRESS NOTES
Arrived to the [facility] via [facility] with family. 1lt NS bolus and chemo completed and CI 5fu pump started over 46hours. Patient tolerated well. Any issues or concerns during appointment: Low B/P pt got 1lt NS bolus per Sofia Lacy NP. Patient aware of next infusion appointment on 2/10 at 1400. Discharged to home via [facility] with family.

## 2020-02-09 ENCOUNTER — HOSPITAL ENCOUNTER (OUTPATIENT)
Dept: INFUSION THERAPY | Age: 55
Discharge: HOME | End: 2020-02-09
Payer: MEDICAID

## 2020-02-09 VITALS
DIASTOLIC BLOOD PRESSURE: 83 MMHG | OXYGEN SATURATION: 95 % | RESPIRATION RATE: 18 BRPM | TEMPERATURE: 98.1 F | SYSTOLIC BLOOD PRESSURE: 139 MMHG | HEART RATE: 94 BPM

## 2020-02-09 DIAGNOSIS — C78.7 LIVER METASTASES (HCC): ICD-10-CM

## 2020-02-09 DIAGNOSIS — K22.89 ESOPHAGEAL MASS: Primary | ICD-10-CM

## 2020-02-09 PROCEDURE — 96523 IRRIG DRUG DELIVERY DEVICE: CPT

## 2020-02-09 RX ORDER — HEPARIN 100 UNIT/ML
300-500 SYRINGE INTRAVENOUS AS NEEDED
Status: ACTIVE | OUTPATIENT
Start: 2020-02-09 | End: 2020-02-09

## 2020-02-09 RX ORDER — SODIUM CHLORIDE 9 MG/ML
10 INJECTION INTRAMUSCULAR; INTRAVENOUS; SUBCUTANEOUS AS NEEDED
Status: ACTIVE | OUTPATIENT
Start: 2020-02-09 | End: 2020-02-09

## 2020-02-09 RX ORDER — SODIUM CHLORIDE 0.9 % (FLUSH) 0.9 %
10 SYRINGE (ML) INJECTION AS NEEDED
Status: ACTIVE | OUTPATIENT
Start: 2020-02-09 | End: 2020-02-09

## 2020-02-09 RX ADMIN — Medication 10 ML: at 11:55

## 2020-02-09 NOTE — PROGRESS NOTES
Arrived to the [facility] via [address]. Assessment complete, . Adrucil pump removed port flushed and deaccessed. Patient tolerated without problems. Any issues or concerns during appointment: None. Patient aware of next infusion appointment on 2/10(date) at [phone] (time).   Discharged with W/C with family

## 2020-02-10 ENCOUNTER — HOME CARE VISIT (OUTPATIENT)
Dept: SCHEDULING | Facility: HOME HEALTH | Age: 55
End: 2020-02-10
Payer: MEDICAID

## 2020-02-10 VITALS
SYSTOLIC BLOOD PRESSURE: 138 MMHG | RESPIRATION RATE: 18 BRPM | DIASTOLIC BLOOD PRESSURE: 88 MMHG | HEART RATE: 77 BPM | OXYGEN SATURATION: 98 % | TEMPERATURE: 98.4 F

## 2020-02-10 PROCEDURE — G0299 HHS/HOSPICE OF RN EA 15 MIN: HCPCS

## 2020-02-11 ENCOUNTER — HOME CARE VISIT (OUTPATIENT)
Dept: SCHEDULING | Facility: HOME HEALTH | Age: 55
End: 2020-02-11
Payer: MEDICAID

## 2020-02-11 PROCEDURE — G0155 HHCP-SVS OF CSW,EA 15 MIN: HCPCS

## 2020-02-11 PROCEDURE — G0156 HHCP-SVS OF AIDE,EA 15 MIN: HCPCS

## 2020-02-12 ENCOUNTER — HOME CARE VISIT (OUTPATIENT)
Dept: HOME HEALTH SERVICES | Facility: HOME HEALTH | Age: 55
End: 2020-02-12
Payer: MEDICAID

## 2020-02-13 ENCOUNTER — HOSPITAL ENCOUNTER (OUTPATIENT)
Dept: INTERVENTIONAL RADIOLOGY/VASCULAR | Age: 55
Discharge: HOME | End: 2020-02-13
Attending: RADIOLOGY

## 2020-02-13 ENCOUNTER — HOME CARE VISIT (OUTPATIENT)
Dept: SCHEDULING | Facility: HOME HEALTH | Age: 55
End: 2020-02-13
Payer: MEDICAID

## 2020-02-13 VITALS
HEART RATE: 102 BPM | OXYGEN SATURATION: 98 % | RESPIRATION RATE: 18 BRPM | DIASTOLIC BLOOD PRESSURE: 60 MMHG | SYSTOLIC BLOOD PRESSURE: 118 MMHG | TEMPERATURE: 98 F

## 2020-02-13 PROCEDURE — G0299 HHS/HOSPICE OF RN EA 15 MIN: HCPCS

## 2020-02-13 PROCEDURE — G0156 HHCP-SVS OF AIDE,EA 15 MIN: HCPCS

## 2020-02-13 PROCEDURE — E0325 URINAL MALE JUG-TYPE: HCPCS

## 2020-02-16 VITALS
DIASTOLIC BLOOD PRESSURE: 70 MMHG | RESPIRATION RATE: 19 BRPM | HEART RATE: 96 BPM | TEMPERATURE: 97.9 F | SYSTOLIC BLOOD PRESSURE: 100 MMHG

## 2020-02-18 ENCOUNTER — HOME CARE VISIT (OUTPATIENT)
Dept: SCHEDULING | Facility: HOME HEALTH | Age: 55
End: 2020-02-18
Payer: MEDICAID

## 2020-02-18 VITALS
DIASTOLIC BLOOD PRESSURE: 60 MMHG | TEMPERATURE: 97.9 F | HEART RATE: 81 BPM | SYSTOLIC BLOOD PRESSURE: 120 MMHG | OXYGEN SATURATION: 94 % | RESPIRATION RATE: 18 BRPM

## 2020-02-18 PROCEDURE — G0299 HHS/HOSPICE OF RN EA 15 MIN: HCPCS

## 2020-02-18 PROCEDURE — G0156 HHCP-SVS OF AIDE,EA 15 MIN: HCPCS

## 2020-02-19 ENCOUNTER — HOSPITAL ENCOUNTER (OUTPATIENT)
Dept: LAB | Age: 55
Discharge: HOME | End: 2020-02-19
Payer: MEDICAID

## 2020-02-19 VITALS
TEMPERATURE: 97.9 F | RESPIRATION RATE: 20 BRPM | SYSTOLIC BLOOD PRESSURE: 128 MMHG | DIASTOLIC BLOOD PRESSURE: 76 MMHG | HEART RATE: 80 BPM

## 2020-02-19 DIAGNOSIS — R33.8 ACUTE RETENTION OF URINE: ICD-10-CM

## 2020-02-19 DIAGNOSIS — G89.3 CANCER RELATED PAIN: ICD-10-CM

## 2020-02-19 DIAGNOSIS — F15.10 METHAMPHETAMINE ABUSE (HCC): ICD-10-CM

## 2020-02-19 DIAGNOSIS — K22.89 ESOPHAGEAL MASS: ICD-10-CM

## 2020-02-19 LAB
ALBUMIN SERPL-MCNC: 2.7 G/DL (ref 3.5–5)
ALBUMIN/GLOB SERPL: 0.7 {RATIO} (ref 1.2–3.5)
ALP SERPL-CCNC: 95 U/L (ref 50–136)
ALT SERPL-CCNC: 17 U/L (ref 12–65)
ANION GAP SERPL CALC-SCNC: 3 MMOL/L (ref 7–16)
APPEARANCE UR: ABNORMAL
AST SERPL-CCNC: 20 U/L (ref 15–37)
BACTERIA URNS QL MICRO: ABNORMAL /HPF
BASOPHILS # BLD: 0 K/UL (ref 0–0.2)
BASOPHILS NFR BLD: 1 % (ref 0–2)
BILIRUB SERPL-MCNC: 0.3 MG/DL (ref 0.2–1.1)
BILIRUB UR QL: NEGATIVE
BUN SERPL-MCNC: 11 MG/DL (ref 6–23)
CALCIUM SERPL-MCNC: 9.1 MG/DL (ref 8.3–10.4)
CASTS URNS QL MICRO: 0 /LPF
CHLORIDE SERPL-SCNC: 101 MMOL/L (ref 98–107)
CO2 SERPL-SCNC: 34 MMOL/L (ref 21–32)
COLOR UR: YELLOW
CREAT SERPL-MCNC: 1.06 MG/DL (ref 0.8–1.5)
CRYSTALS URNS QL MICRO: 0 /LPF
DIFFERENTIAL METHOD BLD: ABNORMAL
EOSINOPHIL # BLD: 0.1 K/UL (ref 0–0.8)
EOSINOPHIL NFR BLD: 1 % (ref 0.5–7.8)
EPI CELLS #/AREA URNS HPF: ABNORMAL /HPF
ERYTHROCYTE [DISTWIDTH] IN BLOOD BY AUTOMATED COUNT: 15.4 % (ref 11.9–14.6)
GLOBULIN SER CALC-MCNC: 4.1 G/DL (ref 2.3–3.5)
GLUCOSE SERPL-MCNC: 129 MG/DL (ref 65–100)
GLUCOSE UR STRIP.AUTO-MCNC: NEGATIVE MG/DL
HCT VFR BLD AUTO: 31.7 % (ref 41.1–50.3)
HGB BLD-MCNC: 9.8 G/DL (ref 13.6–17.2)
HGB UR QL STRIP: NEGATIVE
IMM GRANULOCYTES # BLD AUTO: 0 K/UL (ref 0–0.5)
IMM GRANULOCYTES NFR BLD AUTO: 0 % (ref 0–5)
KETONES UR QL STRIP.AUTO: NEGATIVE MG/DL
LEUKOCYTE ESTERASE UR QL STRIP.AUTO: ABNORMAL
LYMPHOCYTES # BLD: 1.3 K/UL (ref 0.5–4.6)
LYMPHOCYTES NFR BLD: 27 % (ref 13–44)
MAGNESIUM SERPL-MCNC: 2.3 MG/DL (ref 1.8–2.4)
MCH RBC QN AUTO: 29.3 PG (ref 26.1–32.9)
MCHC RBC AUTO-ENTMCNC: 30.9 G/DL (ref 31.4–35)
MCV RBC AUTO: 94.9 FL (ref 79.6–97.8)
MONOCYTES # BLD: 0.7 K/UL (ref 0.1–1.3)
MONOCYTES NFR BLD: 14 % (ref 4–12)
MUCOUS THREADS URNS QL MICRO: 0 /LPF
NEUTS SEG # BLD: 2.8 K/UL (ref 1.7–8.2)
NEUTS SEG NFR BLD: 57 % (ref 43–78)
NITRITE UR QL STRIP.AUTO: NEGATIVE
NRBC # BLD: 0 K/UL (ref 0–0.2)
OTHER OBSERVATIONS,UCOM: ABNORMAL
PH UR STRIP: 7 [PH] (ref 5–9)
PLATELET # BLD AUTO: 222 K/UL (ref 150–450)
PMV BLD AUTO: 9 FL (ref 9.4–12.3)
POTASSIUM SERPL-SCNC: 4.3 MMOL/L (ref 3.5–5.1)
PROT SERPL-MCNC: 6.8 G/DL (ref 6.3–8.2)
PROT UR STRIP-MCNC: 100 MG/DL
RBC # BLD AUTO: 3.34 M/UL (ref 4.23–5.67)
RBC #/AREA URNS HPF: ABNORMAL /HPF
SODIUM SERPL-SCNC: 138 MMOL/L (ref 136–145)
SP GR UR REFRACTOMETRY: 1.02 (ref 1–1.02)
UA: UC IF INDICATED,UAUC: ABNORMAL
UROBILINOGEN UR QL STRIP.AUTO: 2 EU/DL (ref 0.2–1)
WBC # BLD AUTO: 4.9 K/UL (ref 4.3–11.1)
WBC URNS QL MICRO: >100 /HPF

## 2020-02-19 PROCEDURE — 36415 COLL VENOUS BLD VENIPUNCTURE: CPT

## 2020-02-19 PROCEDURE — 80307 DRUG TEST PRSMV CHEM ANLYZR: CPT

## 2020-02-19 PROCEDURE — 87088 URINE BACTERIA CULTURE: CPT

## 2020-02-19 PROCEDURE — 87086 URINE CULTURE/COLONY COUNT: CPT

## 2020-02-19 PROCEDURE — 87186 SC STD MICRODIL/AGAR DIL: CPT

## 2020-02-19 PROCEDURE — 81001 URINALYSIS AUTO W/SCOPE: CPT

## 2020-02-19 PROCEDURE — 83735 ASSAY OF MAGNESIUM: CPT

## 2020-02-19 PROCEDURE — 80053 COMPREHEN METABOLIC PANEL: CPT

## 2020-02-19 PROCEDURE — 85025 COMPLETE CBC W/AUTO DIFF WBC: CPT

## 2020-02-20 ENCOUNTER — HOME CARE VISIT (OUTPATIENT)
Dept: SCHEDULING | Facility: HOME HEALTH | Age: 55
End: 2020-02-20
Payer: MEDICAID

## 2020-02-20 VITALS
TEMPERATURE: 98.7 F | DIASTOLIC BLOOD PRESSURE: 72 MMHG | OXYGEN SATURATION: 98 % | RESPIRATION RATE: 18 BRPM | SYSTOLIC BLOOD PRESSURE: 120 MMHG | HEART RATE: 76 BPM

## 2020-02-20 PROCEDURE — G0152 HHCP-SERV OF OT,EA 15 MIN: HCPCS

## 2020-02-20 PROCEDURE — G0156 HHCP-SVS OF AIDE,EA 15 MIN: HCPCS

## 2020-02-20 NOTE — PROGRESS NOTES
U/A + for UTI. Spoke with patient's daughter in law. She would like to see if we can treat with an option that would be free at [facility]. Culture is pending. Per guidelines, can treat with Bactrim x 7 days as this is free at [facility]. Will follow culture results.  Script sent to [facility] per their request.

## 2020-02-21 ENCOUNTER — HOSPITAL ENCOUNTER (OUTPATIENT)
Dept: INFUSION THERAPY | Age: 55
Discharge: HOME | End: 2020-02-21
Payer: MEDICAID

## 2020-02-21 ENCOUNTER — HOME CARE VISIT (OUTPATIENT)
Dept: SCHEDULING | Facility: HOME HEALTH | Age: 55
End: 2020-02-21
Payer: MEDICAID

## 2020-02-21 VITALS
TEMPERATURE: 98.5 F | RESPIRATION RATE: 18 BRPM | BODY MASS INDEX: 24.82 KG/M2 | OXYGEN SATURATION: 94 % | DIASTOLIC BLOOD PRESSURE: 62 MMHG | SYSTOLIC BLOOD PRESSURE: 107 MMHG | HEART RATE: 100 BPM | WEIGHT: 173 LBS

## 2020-02-21 DIAGNOSIS — K22.89 ESOPHAGEAL MASS: Primary | ICD-10-CM

## 2020-02-21 DIAGNOSIS — C78.7 LIVER METASTASES (HCC): ICD-10-CM

## 2020-02-21 LAB
BACTERIA SPEC CULT: ABNORMAL
SERVICE CMNT-IMP: ABNORMAL

## 2020-02-21 PROCEDURE — 74011250636 HC RX REV CODE- 250/636: Performed by: NURSE PRACTITIONER

## 2020-02-21 PROCEDURE — 96415 CHEMO IV INFUSION ADDL HR: CPT

## 2020-02-21 PROCEDURE — 96413 CHEMO IV INFUSION 1 HR: CPT

## 2020-02-21 PROCEDURE — 96368 THER/DIAG CONCURRENT INF: CPT

## 2020-02-21 PROCEDURE — 96375 TX/PRO/DX INJ NEW DRUG ADDON: CPT

## 2020-02-21 PROCEDURE — 74011000258 HC RX REV CODE- 258: Performed by: NURSE PRACTITIONER

## 2020-02-21 PROCEDURE — G0498 CHEMO EXTEND IV INFUS W/PUMP: HCPCS

## 2020-02-21 PROCEDURE — 96411 CHEMO IV PUSH ADDL DRUG: CPT

## 2020-02-21 RX ORDER — ONDANSETRON 2 MG/ML
8 INJECTION INTRAMUSCULAR; INTRAVENOUS ONCE
Status: COMPLETED | OUTPATIENT
Start: 2020-02-21 | End: 2020-02-21

## 2020-02-21 RX ORDER — DEXTROSE MONOHYDRATE 50 MG/ML
25 INJECTION, SOLUTION INTRAVENOUS CONTINUOUS
Status: DISCONTINUED | OUTPATIENT
Start: 2020-02-21 | End: 2020-02-22 | Stop reason: HOSPADM

## 2020-02-21 RX ORDER — SODIUM CHLORIDE 0.9 % (FLUSH) 0.9 %
10 SYRINGE (ML) INJECTION AS NEEDED
Status: DISCONTINUED | OUTPATIENT
Start: 2020-02-21 | End: 2020-02-22 | Stop reason: HOSPADM

## 2020-02-21 RX ORDER — FLUOROURACIL 50 MG/ML
400 INJECTION, SOLUTION INTRAVENOUS ONCE
Status: COMPLETED | OUTPATIENT
Start: 2020-02-21 | End: 2020-02-21

## 2020-02-21 RX ADMIN — DEXTROSE MONOHYDRATE 25 ML/HR: 5 INJECTION, SOLUTION INTRAVENOUS at 13:35

## 2020-02-21 RX ADMIN — FLUOROURACIL 824 MG: 50 INJECTION, SOLUTION INTRAVENOUS at 16:10

## 2020-02-21 RX ADMIN — ONDANSETRON 8 MG: 2 INJECTION INTRAMUSCULAR; INTRAVENOUS at 13:40

## 2020-02-21 RX ADMIN — LEUCOVORIN CALCIUM 824 MG: 200 INJECTION, POWDER, LYOPHILIZED, FOR SUSPENSION INTRAMUSCULAR; INTRAVENOUS at 14:02

## 2020-02-21 RX ADMIN — OXALIPLATIN 175 MG: 5 INJECTION, SOLUTION INTRAVENOUS at 14:02

## 2020-02-21 RX ADMIN — FLUOROURACIL 4944 MG: 50 INJECTION, SOLUTION INTRAVENOUS at 16:15

## 2020-02-21 RX ADMIN — Medication 10 ML: at 13:35

## 2020-02-21 RX ADMIN — DEXAMETHASONE SODIUM PHOSPHATE 12 MG: 4 INJECTION, SOLUTION INTRAMUSCULAR; INTRAVENOUS at 13:41

## 2020-02-21 NOTE — PROGRESS NOTES
SW followed up with pt briefly in infusion. Pt stated his o2 sats have been in the 90s and he has a non-functional concentrator at home, but stated he does not have any needs at this time. SW encouraged pt to call should any needs arise. Pt verbalized understanding and appreciation. SW intends to follow up PRN.

## 2020-02-21 NOTE — PROGRESS NOTES
Arrived to the [facility]. FOLFOX completed. Patient tolerated well. Any issues or concerns during appointment: none. Patient aware of next infusion appointment on 2/24 (date) at 8:00 AM (time). Discharged via w/c.

## 2020-02-23 VITALS
RESPIRATION RATE: 16 BRPM | TEMPERATURE: 97.9 F | HEART RATE: 86 BPM | DIASTOLIC BLOOD PRESSURE: 80 MMHG | SYSTOLIC BLOOD PRESSURE: 140 MMHG

## 2020-02-24 ENCOUNTER — HOSPITAL ENCOUNTER (OUTPATIENT)
Dept: INFUSION THERAPY | Age: 55
Discharge: HOME | End: 2020-02-24
Payer: MEDICAID

## 2020-02-24 VITALS
TEMPERATURE: 99 F | DIASTOLIC BLOOD PRESSURE: 71 MMHG | RESPIRATION RATE: 18 BRPM | SYSTOLIC BLOOD PRESSURE: 129 MMHG | OXYGEN SATURATION: 92 % | HEART RATE: 91 BPM

## 2020-02-24 DIAGNOSIS — K22.89 ESOPHAGEAL MASS: Primary | ICD-10-CM

## 2020-02-24 DIAGNOSIS — C78.7 LIVER METASTASES (HCC): ICD-10-CM

## 2020-02-24 PROCEDURE — 74011250636 HC RX REV CODE- 250/636: Performed by: INTERNAL MEDICINE

## 2020-02-24 PROCEDURE — 96360 HYDRATION IV INFUSION INIT: CPT

## 2020-02-24 RX ORDER — HEPARIN 100 UNIT/ML
300-500 SYRINGE INTRAVENOUS AS NEEDED
Status: CANCELLED
Start: 2020-02-24

## 2020-02-24 RX ORDER — SODIUM CHLORIDE 0.9 % (FLUSH) 0.9 %
10 SYRINGE (ML) INJECTION AS NEEDED
Status: ACTIVE | OUTPATIENT
Start: 2020-02-24 | End: 2020-02-24

## 2020-02-24 RX ORDER — SODIUM CHLORIDE 9 MG/ML
10 INJECTION INTRAMUSCULAR; INTRAVENOUS; SUBCUTANEOUS AS NEEDED
Status: CANCELLED | OUTPATIENT
Start: 2020-02-24

## 2020-02-24 RX ADMIN — Medication 10 ML: at 09:55

## 2020-02-24 RX ADMIN — SODIUM CHLORIDE 1000 ML: 900 INJECTION, SOLUTION INTRAVENOUS at 08:54

## 2020-02-24 NOTE — PROGRESS NOTES
Arrived to the [facility]. Adrucil pump and hydration completed. Patient tolerated without problems. Any issues or concerns during appointment: patient and his daughter in law, Jakub Lindsey, report nausea with episodes of vomiting over the weekend. Patient did not call on call provider. Jakub Lindsey reports that patient is unable to swallow pills, Zofran,  even when crushed and mixed with apple sauce. Jakub Lindsey reports that she thought it wasn't allowed to use PEG for pills. Spoke with Marla Welsh and no reason PEG cant be used. Explained to Jakub Lindsey to crush pills and mix with water, instill medication via PEG and then flush PEG with a full syringe of water, she V/U. Instructed to call office with any further concerns. Patient aware of next infusion appointment on 3/6/20 (date) at 0830 (time).   Discharged via [facility] with family

## 2020-02-25 ENCOUNTER — HOME CARE VISIT (OUTPATIENT)
Dept: SCHEDULING | Facility: HOME HEALTH | Age: 55
End: 2020-02-25
Payer: MEDICAID

## 2020-02-25 PROCEDURE — G0156 HHCP-SVS OF AIDE,EA 15 MIN: HCPCS

## 2020-02-26 ENCOUNTER — HOME CARE VISIT (OUTPATIENT)
Dept: SCHEDULING | Facility: HOME HEALTH | Age: 55
End: 2020-02-26
Payer: MEDICAID

## 2020-02-26 VITALS
TEMPERATURE: 97.3 F | DIASTOLIC BLOOD PRESSURE: 74 MMHG | RESPIRATION RATE: 15 BRPM | HEART RATE: 70 BPM | SYSTOLIC BLOOD PRESSURE: 126 MMHG

## 2020-02-26 VITALS
HEART RATE: 89 BPM | DIASTOLIC BLOOD PRESSURE: 80 MMHG | TEMPERATURE: 97.9 F | RESPIRATION RATE: 18 BRPM | OXYGEN SATURATION: 98 % | SYSTOLIC BLOOD PRESSURE: 120 MMHG

## 2020-02-26 LAB — DRUGS UR: NORMAL

## 2020-02-26 PROCEDURE — G0299 HHS/HOSPICE OF RN EA 15 MIN: HCPCS

## 2020-02-28 ENCOUNTER — HOME CARE VISIT (OUTPATIENT)
Dept: SCHEDULING | Facility: HOME HEALTH | Age: 55
End: 2020-02-28
Payer: MEDICAID

## 2020-02-28 PROCEDURE — G0155 HHCP-SVS OF CSW,EA 15 MIN: HCPCS

## 2020-03-02 PROCEDURE — A4322 IRRIGATION SYRINGE: HCPCS

## 2020-03-03 ENCOUNTER — HOSPITAL ENCOUNTER (OUTPATIENT)
Dept: LAB | Age: 55
Discharge: HOME | End: 2020-03-03
Payer: MEDICAID

## 2020-03-03 DIAGNOSIS — C78.7 LIVER METASTASES (HCC): ICD-10-CM

## 2020-03-03 LAB
ALBUMIN SERPL-MCNC: 2.9 G/DL (ref 3.5–5)
ALBUMIN/GLOB SERPL: 0.8 {RATIO} (ref 1.2–3.5)
ALP SERPL-CCNC: 93 U/L (ref 50–136)
ALT SERPL-CCNC: 36 U/L (ref 12–65)
ANION GAP SERPL CALC-SCNC: 4 MMOL/L (ref 7–16)
AST SERPL-CCNC: 37 U/L (ref 15–37)
BASOPHILS # BLD: 0 K/UL (ref 0–0.2)
BASOPHILS NFR BLD: 0 % (ref 0–2)
BILIRUB SERPL-MCNC: 0.2 MG/DL (ref 0.2–1.1)
BUN SERPL-MCNC: 12 MG/DL (ref 6–23)
CALCIUM SERPL-MCNC: 9.4 MG/DL (ref 8.3–10.4)
CHLORIDE SERPL-SCNC: 103 MMOL/L (ref 98–107)
CO2 SERPL-SCNC: 31 MMOL/L (ref 21–32)
CREAT SERPL-MCNC: 1 MG/DL (ref 0.8–1.5)
DIFFERENTIAL METHOD BLD: ABNORMAL
EOSINOPHIL # BLD: 0.1 K/UL (ref 0–0.8)
EOSINOPHIL NFR BLD: 3 % (ref 0.5–7.8)
ERYTHROCYTE [DISTWIDTH] IN BLOOD BY AUTOMATED COUNT: 16.4 % (ref 11.9–14.6)
GLOBULIN SER CALC-MCNC: 3.6 G/DL (ref 2.3–3.5)
GLUCOSE SERPL-MCNC: 108 MG/DL (ref 65–100)
HCT VFR BLD AUTO: 30.6 % (ref 41.1–50.3)
HGB BLD-MCNC: 9.7 G/DL (ref 13.6–17.2)
IMM GRANULOCYTES # BLD AUTO: 0 K/UL (ref 0–0.5)
IMM GRANULOCYTES NFR BLD AUTO: 0 % (ref 0–5)
LYMPHOCYTES # BLD: 1.3 K/UL (ref 0.5–4.6)
LYMPHOCYTES NFR BLD: 44 % (ref 13–44)
MAGNESIUM SERPL-MCNC: 2.2 MG/DL (ref 1.8–2.4)
MCH RBC QN AUTO: 29.8 PG (ref 26.1–32.9)
MCHC RBC AUTO-ENTMCNC: 31.7 G/DL (ref 31.4–35)
MCV RBC AUTO: 94.2 FL (ref 79.6–97.8)
MONOCYTES # BLD: 0.3 K/UL (ref 0.1–1.3)
MONOCYTES NFR BLD: 12 % (ref 4–12)
NEUTS SEG # BLD: 1.2 K/UL (ref 1.7–8.2)
NEUTS SEG NFR BLD: 41 % (ref 43–78)
NRBC # BLD: 0 K/UL (ref 0–0.2)
PLATELET # BLD AUTO: 134 K/UL (ref 150–450)
PMV BLD AUTO: 9.7 FL (ref 9.4–12.3)
POTASSIUM SERPL-SCNC: 4 MMOL/L (ref 3.5–5.1)
PROT SERPL-MCNC: 6.5 G/DL (ref 6.3–8.2)
RBC # BLD AUTO: 3.25 M/UL (ref 4.23–5.67)
SODIUM SERPL-SCNC: 138 MMOL/L (ref 136–145)
WBC # BLD AUTO: 2.9 K/UL (ref 4.3–11.1)

## 2020-03-03 PROCEDURE — 85025 COMPLETE CBC W/AUTO DIFF WBC: CPT

## 2020-03-03 PROCEDURE — 83735 ASSAY OF MAGNESIUM: CPT

## 2020-03-03 PROCEDURE — 80053 COMPREHEN METABOLIC PANEL: CPT

## 2020-03-03 PROCEDURE — 36415 COLL VENOUS BLD VENIPUNCTURE: CPT

## 2020-03-04 ENCOUNTER — HOME CARE VISIT (OUTPATIENT)
Dept: SCHEDULING | Facility: HOME HEALTH | Age: 55
End: 2020-03-04
Payer: MEDICAID

## 2020-03-04 VITALS
RESPIRATION RATE: 18 BRPM | HEART RATE: 93 BPM | WEIGHT: 165 LBS | BODY MASS INDEX: 23.68 KG/M2 | TEMPERATURE: 97.8 F | OXYGEN SATURATION: 98 % | DIASTOLIC BLOOD PRESSURE: 60 MMHG | SYSTOLIC BLOOD PRESSURE: 122 MMHG

## 2020-03-04 PROCEDURE — G0299 HHS/HOSPICE OF RN EA 15 MIN: HCPCS

## 2020-03-04 PROCEDURE — 400013 HH SOC

## 2020-03-04 PROCEDURE — A4322 IRRIGATION SYRINGE: HCPCS

## 2020-03-05 ENCOUNTER — HOME CARE VISIT (OUTPATIENT)
Dept: HOME HEALTH SERVICES | Facility: HOME HEALTH | Age: 55
End: 2020-03-05
Payer: MEDICAID

## 2020-03-06 ENCOUNTER — HOSPITAL ENCOUNTER (OUTPATIENT)
Dept: INFUSION THERAPY | Age: 55
Discharge: HOME | End: 2020-03-06
Payer: MEDICAID

## 2020-03-06 VITALS
SYSTOLIC BLOOD PRESSURE: 136 MMHG | WEIGHT: 173.4 LBS | RESPIRATION RATE: 18 BRPM | HEIGHT: 72 IN | TEMPERATURE: 98.5 F | HEART RATE: 100 BPM | DIASTOLIC BLOOD PRESSURE: 78 MMHG | OXYGEN SATURATION: 100 % | BODY MASS INDEX: 23.49 KG/M2

## 2020-03-06 DIAGNOSIS — C78.7 LIVER METASTASES (HCC): ICD-10-CM

## 2020-03-06 DIAGNOSIS — K22.89 ESOPHAGEAL MASS: Primary | ICD-10-CM

## 2020-03-06 PROCEDURE — 74011000258 HC RX REV CODE- 258: Performed by: INTERNAL MEDICINE

## 2020-03-06 PROCEDURE — 96413 CHEMO IV INFUSION 1 HR: CPT

## 2020-03-06 PROCEDURE — 96416 CHEMO PROLONG INFUSE W/PUMP: CPT

## 2020-03-06 PROCEDURE — G0498 CHEMO EXTEND IV INFUS W/PUMP: HCPCS

## 2020-03-06 PROCEDURE — 96367 TX/PROPH/DG ADDL SEQ IV INF: CPT

## 2020-03-06 PROCEDURE — 74011250636 HC RX REV CODE- 250/636: Performed by: INTERNAL MEDICINE

## 2020-03-06 PROCEDURE — 96368 THER/DIAG CONCURRENT INF: CPT

## 2020-03-06 PROCEDURE — 96375 TX/PRO/DX INJ NEW DRUG ADDON: CPT

## 2020-03-06 PROCEDURE — 96411 CHEMO IV PUSH ADDL DRUG: CPT

## 2020-03-06 PROCEDURE — 96415 CHEMO IV INFUSION ADDL HR: CPT

## 2020-03-06 RX ORDER — FLUOROURACIL 50 MG/ML
400 INJECTION, SOLUTION INTRAVENOUS ONCE
Status: COMPLETED | OUTPATIENT
Start: 2020-03-06 | End: 2020-03-06

## 2020-03-06 RX ORDER — SODIUM CHLORIDE 0.9 % (FLUSH) 0.9 %
10 SYRINGE (ML) INJECTION AS NEEDED
Status: ACTIVE | OUTPATIENT
Start: 2020-03-06 | End: 2020-03-06

## 2020-03-06 RX ORDER — DEXTROSE MONOHYDRATE 50 MG/ML
25 INJECTION, SOLUTION INTRAVENOUS CONTINUOUS
Status: ACTIVE | OUTPATIENT
Start: 2020-03-06 | End: 2020-03-06

## 2020-03-06 RX ORDER — ONDANSETRON 2 MG/ML
8 INJECTION INTRAMUSCULAR; INTRAVENOUS ONCE
Status: COMPLETED | OUTPATIENT
Start: 2020-03-06 | End: 2020-03-06

## 2020-03-06 RX ADMIN — OXALIPLATIN 175 MG: 5 INJECTION, SOLUTION INTRAVENOUS at 10:13

## 2020-03-06 RX ADMIN — ONDANSETRON 8 MG: 2 INJECTION INTRAMUSCULAR; INTRAVENOUS at 09:24

## 2020-03-06 RX ADMIN — LEUCOVORIN CALCIUM 824 MG: 200 INJECTION, POWDER, LYOPHILIZED, FOR SOLUTION INTRAMUSCULAR; INTRAVENOUS at 10:12

## 2020-03-06 RX ADMIN — FLUOROURACIL 4944 MG: 50 INJECTION, SOLUTION INTRAVENOUS at 12:17

## 2020-03-06 RX ADMIN — FLUOROURACIL 824 MG: 50 INJECTION, SOLUTION INTRAVENOUS at 12:17

## 2020-03-06 RX ADMIN — DEXTROSE MONOHYDRATE 25 ML/HR: 5 INJECTION, SOLUTION INTRAVENOUS at 09:24

## 2020-03-06 RX ADMIN — DEXAMETHASONE SODIUM PHOSPHATE 12 MG: 4 INJECTION, SOLUTION INTRAMUSCULAR; INTRAVENOUS at 09:24

## 2020-03-06 NOTE — PROGRESS NOTES
Arrived to the [facility]. FOLFOX  completed. Patient tolerated without problems. Any issues or concerns during appointment: no. 
Patient aware of next infusion appointment on 3/8/20 (date) at [phone] (time). Discharged via [facility] with family.

## 2020-03-08 ENCOUNTER — HOSPITAL ENCOUNTER (OUTPATIENT)
Dept: INFUSION THERAPY | Age: 55
Discharge: HOME | End: 2020-03-08
Payer: MEDICAID

## 2020-03-08 DIAGNOSIS — K22.89 ESOPHAGEAL MASS: Primary | ICD-10-CM

## 2020-03-08 DIAGNOSIS — C78.7 LIVER METASTASES (HCC): ICD-10-CM

## 2020-03-08 PROCEDURE — 96523 IRRIG DRUG DELIVERY DEVICE: CPT

## 2020-03-08 RX ORDER — HEPARIN 100 UNIT/ML
300-500 SYRINGE INTRAVENOUS AS NEEDED
Status: ACTIVE | OUTPATIENT
Start: 2020-03-08 | End: 2020-03-08

## 2020-03-08 RX ORDER — SODIUM CHLORIDE 9 MG/ML
10 INJECTION INTRAMUSCULAR; INTRAVENOUS; SUBCUTANEOUS AS NEEDED
Status: ACTIVE | OUTPATIENT
Start: 2020-03-08 | End: 2020-03-08

## 2020-03-08 RX ORDER — SODIUM CHLORIDE 0.9 % (FLUSH) 0.9 %
10 SYRINGE (ML) INJECTION AS NEEDED
Status: ACTIVE | OUTPATIENT
Start: 2020-03-08 | End: 2020-03-08

## 2020-03-08 RX ADMIN — Medication 10 ML: at 10:46

## 2020-03-08 NOTE — PROGRESS NOTES
Pt. Discharged via wheelchair. Continuous chemotherapy pump removed per protocol. To call physician with any problems or concerns. Understanding voiced. To return to Infusions on 3/20/20.

## 2020-03-11 ENCOUNTER — HOME CARE VISIT (OUTPATIENT)
Dept: SCHEDULING | Facility: HOME HEALTH | Age: 55
End: 2020-03-11
Payer: MEDICAID

## 2020-03-11 VITALS
WEIGHT: 165 LBS | BODY MASS INDEX: 22.37 KG/M2 | RESPIRATION RATE: 18 BRPM | SYSTOLIC BLOOD PRESSURE: 128 MMHG | OXYGEN SATURATION: 98 % | DIASTOLIC BLOOD PRESSURE: 74 MMHG | TEMPERATURE: 98 F | HEART RATE: 96 BPM

## 2020-03-11 PROCEDURE — G0299 HHS/HOSPICE OF RN EA 15 MIN: HCPCS

## 2020-03-18 ENCOUNTER — HOME CARE VISIT (OUTPATIENT)
Dept: SCHEDULING | Facility: HOME HEALTH | Age: 55
End: 2020-03-18
Payer: MEDICAID

## 2020-03-18 VITALS
WEIGHT: 165 LBS | BODY MASS INDEX: 22.37 KG/M2 | HEART RATE: 86 BPM | RESPIRATION RATE: 18 BRPM | SYSTOLIC BLOOD PRESSURE: 128 MMHG | OXYGEN SATURATION: 98 % | TEMPERATURE: 97.9 F | DIASTOLIC BLOOD PRESSURE: 78 MMHG

## 2020-03-18 PROCEDURE — G0299 HHS/HOSPICE OF RN EA 15 MIN: HCPCS

## 2020-03-19 ENCOUNTER — HOSPITAL ENCOUNTER (OUTPATIENT)
Dept: LAB | Age: 55
Discharge: HOME | End: 2020-03-19
Payer: MEDICAID

## 2020-03-19 DIAGNOSIS — C15.5 MALIGNANT NEOPLASM OF LOWER THIRD OF ESOPHAGUS (HCC): ICD-10-CM

## 2020-03-19 LAB
ALBUMIN SERPL-MCNC: 2.9 G/DL (ref 3.5–5)
ALBUMIN/GLOB SERPL: 0.8 {RATIO} (ref 1.2–3.5)
ALP SERPL-CCNC: 87 U/L (ref 50–136)
ALT SERPL-CCNC: 24 U/L (ref 12–65)
ANION GAP SERPL CALC-SCNC: 1 MMOL/L (ref 7–16)
AST SERPL-CCNC: 22 U/L (ref 15–37)
BASOPHILS # BLD: 0 K/UL (ref 0–0.2)
BASOPHILS NFR BLD: 1 % (ref 0–2)
BILIRUB SERPL-MCNC: 0.2 MG/DL (ref 0.2–1.1)
BUN SERPL-MCNC: 14 MG/DL (ref 6–23)
CALCIUM SERPL-MCNC: 9.4 MG/DL (ref 8.3–10.4)
CHLORIDE SERPL-SCNC: 102 MMOL/L (ref 98–107)
CO2 SERPL-SCNC: 34 MMOL/L (ref 21–32)
CREAT SERPL-MCNC: 0.79 MG/DL (ref 0.8–1.5)
DIFFERENTIAL METHOD BLD: ABNORMAL
EOSINOPHIL # BLD: 0.1 K/UL (ref 0–0.8)
EOSINOPHIL NFR BLD: 3 % (ref 0.5–7.8)
ERYTHROCYTE [DISTWIDTH] IN BLOOD BY AUTOMATED COUNT: 17.8 % (ref 11.9–14.6)
GLOBULIN SER CALC-MCNC: 3.8 G/DL (ref 2.3–3.5)
GLUCOSE SERPL-MCNC: 77 MG/DL (ref 65–100)
HCT VFR BLD AUTO: 32.8 % (ref 41.1–50.3)
HGB BLD-MCNC: 10.6 G/DL (ref 13.6–17.2)
IMM GRANULOCYTES # BLD AUTO: 0 K/UL (ref 0–0.5)
IMM GRANULOCYTES NFR BLD AUTO: 0 % (ref 0–5)
LYMPHOCYTES # BLD: 1.1 K/UL (ref 0.5–4.6)
LYMPHOCYTES NFR BLD: 42 % (ref 13–44)
MAGNESIUM SERPL-MCNC: 2.2 MG/DL (ref 1.8–2.4)
MCH RBC QN AUTO: 31.2 PG (ref 26.1–32.9)
MCHC RBC AUTO-ENTMCNC: 32.3 G/DL (ref 31.4–35)
MCV RBC AUTO: 96.5 FL (ref 79.6–97.8)
MONOCYTES # BLD: 0.4 K/UL (ref 0.1–1.3)
MONOCYTES NFR BLD: 15 % (ref 4–12)
NEUTS SEG # BLD: 1 K/UL (ref 1.7–8.2)
NEUTS SEG NFR BLD: 39 % (ref 43–78)
NRBC # BLD: 0.03 K/UL (ref 0–0.2)
PLATELET # BLD AUTO: 111 K/UL (ref 150–450)
PLATELET COMMENTS,PCOM: ABNORMAL
PMV BLD AUTO: 10.5 FL (ref 9.4–12.3)
POTASSIUM SERPL-SCNC: 4 MMOL/L (ref 3.5–5.1)
PROT SERPL-MCNC: 6.7 G/DL (ref 6.3–8.2)
RBC # BLD AUTO: 3.4 M/UL (ref 4.23–5.67)
RBC MORPH BLD: ABNORMAL
SODIUM SERPL-SCNC: 137 MMOL/L (ref 136–145)
WBC # BLD AUTO: 2.6 K/UL (ref 4.3–11.1)
WBC MORPH BLD: ABNORMAL

## 2020-03-19 PROCEDURE — 36415 COLL VENOUS BLD VENIPUNCTURE: CPT

## 2020-03-19 PROCEDURE — 85025 COMPLETE CBC W/AUTO DIFF WBC: CPT

## 2020-03-19 PROCEDURE — 83735 ASSAY OF MAGNESIUM: CPT

## 2020-03-19 PROCEDURE — 80053 COMPREHEN METABOLIC PANEL: CPT

## 2020-03-20 ENCOUNTER — HOSPITAL ENCOUNTER (OUTPATIENT)
Dept: INFUSION THERAPY | Age: 55
Discharge: HOME | End: 2020-03-20
Payer: MEDICAID

## 2020-03-20 ENCOUNTER — HOSPITAL ENCOUNTER (OUTPATIENT)
Dept: LAB | Age: 55
Discharge: HOME | End: 2020-03-20
Payer: MEDICAID

## 2020-03-20 VITALS
HEART RATE: 91 BPM | DIASTOLIC BLOOD PRESSURE: 80 MMHG | WEIGHT: 170 LBS | RESPIRATION RATE: 18 BRPM | OXYGEN SATURATION: 100 % | SYSTOLIC BLOOD PRESSURE: 143 MMHG | BODY MASS INDEX: 23.05 KG/M2 | TEMPERATURE: 98.3 F

## 2020-03-20 DIAGNOSIS — C15.5 MALIGNANT NEOPLASM OF LOWER THIRD OF ESOPHAGUS (HCC): ICD-10-CM

## 2020-03-20 DIAGNOSIS — K22.89 ESOPHAGEAL MASS: Primary | ICD-10-CM

## 2020-03-20 DIAGNOSIS — F15.10 METHAMPHETAMINE ABUSE (HCC): ICD-10-CM

## 2020-03-20 DIAGNOSIS — C78.7 LIVER METASTASES (HCC): ICD-10-CM

## 2020-03-20 PROCEDURE — 96413 CHEMO IV INFUSION 1 HR: CPT

## 2020-03-20 PROCEDURE — 96368 THER/DIAG CONCURRENT INF: CPT

## 2020-03-20 PROCEDURE — 96416 CHEMO PROLONG INFUSE W/PUMP: CPT

## 2020-03-20 PROCEDURE — G0498 CHEMO EXTEND IV INFUS W/PUMP: HCPCS

## 2020-03-20 PROCEDURE — 74011250636 HC RX REV CODE- 250/636: Performed by: NURSE PRACTITIONER

## 2020-03-20 PROCEDURE — 96415 CHEMO IV INFUSION ADDL HR: CPT

## 2020-03-20 PROCEDURE — 96367 TX/PROPH/DG ADDL SEQ IV INF: CPT

## 2020-03-20 PROCEDURE — 74011000258 HC RX REV CODE- 258: Performed by: NURSE PRACTITIONER

## 2020-03-20 PROCEDURE — 94416 HC CHEMO EXTEND IV INFUS W/PUMP: CPT

## 2020-03-20 PROCEDURE — 96411 CHEMO IV PUSH ADDL DRUG: CPT

## 2020-03-20 PROCEDURE — 96375 TX/PRO/DX INJ NEW DRUG ADDON: CPT

## 2020-03-20 PROCEDURE — G0480 DRUG TEST DEF 1-7 CLASSES: HCPCS

## 2020-03-20 RX ORDER — SODIUM CHLORIDE 0.9 % (FLUSH) 0.9 %
10 SYRINGE (ML) INJECTION AS NEEDED
Status: ACTIVE | OUTPATIENT
Start: 2020-03-20 | End: 2020-03-20

## 2020-03-20 RX ORDER — FLUOROURACIL 50 MG/ML
400 INJECTION, SOLUTION INTRAVENOUS ONCE
Status: COMPLETED | OUTPATIENT
Start: 2020-03-20 | End: 2020-03-20

## 2020-03-20 RX ORDER — HEPARIN 100 UNIT/ML
300-500 SYRINGE INTRAVENOUS AS NEEDED
Status: DISPENSED | OUTPATIENT
Start: 2020-03-20 | End: 2020-03-20

## 2020-03-20 RX ORDER — ONDANSETRON 2 MG/ML
8 INJECTION INTRAMUSCULAR; INTRAVENOUS ONCE
Status: COMPLETED | OUTPATIENT
Start: 2020-03-20 | End: 2020-03-20

## 2020-03-20 RX ORDER — ONDANSETRON 2 MG/ML
8 INJECTION INTRAMUSCULAR; INTRAVENOUS AS NEEDED
Status: ACTIVE | OUTPATIENT
Start: 2020-03-20 | End: 2020-03-20

## 2020-03-20 RX ORDER — DIPHENHYDRAMINE HYDROCHLORIDE 50 MG/ML
50 INJECTION, SOLUTION INTRAMUSCULAR; INTRAVENOUS AS NEEDED
Status: ACTIVE | OUTPATIENT
Start: 2020-03-20 | End: 2020-03-20

## 2020-03-20 RX ORDER — SODIUM CHLORIDE 9 MG/ML
10 INJECTION INTRAMUSCULAR; INTRAVENOUS; SUBCUTANEOUS AS NEEDED
Status: ACTIVE | OUTPATIENT
Start: 2020-03-20 | End: 2020-03-20

## 2020-03-20 RX ORDER — ACETAMINOPHEN 325 MG/1
650 TABLET ORAL AS NEEDED
Status: ACTIVE | OUTPATIENT
Start: 2020-03-20 | End: 2020-03-20

## 2020-03-20 RX ORDER — DEXTROSE MONOHYDRATE 50 MG/ML
25 INJECTION, SOLUTION INTRAVENOUS CONTINUOUS
Status: ACTIVE | OUTPATIENT
Start: 2020-03-20 | End: 2020-03-20

## 2020-03-20 RX ADMIN — DEXTROSE MONOHYDRATE 25 ML/HR: 5 INJECTION, SOLUTION INTRAVENOUS at 10:00

## 2020-03-20 RX ADMIN — DEXTROSE MONOHYDRATE 175 MG: 5 INJECTION, SOLUTION INTRAVENOUS at 11:15

## 2020-03-20 RX ADMIN — DEXAMETHASONE SODIUM PHOSPHATE 12 MG: 4 INJECTION, SOLUTION INTRAMUSCULAR; INTRAVENOUS at 10:10

## 2020-03-20 RX ADMIN — LEUCOVORIN CALCIUM 824 MG: 200 INJECTION, POWDER, LYOPHILIZED, FOR SOLUTION INTRAMUSCULAR; INTRAVENOUS at 11:13

## 2020-03-20 RX ADMIN — FLUOROURACIL 4944 MG: 50 INJECTION, SOLUTION INTRAVENOUS at 13:32

## 2020-03-20 RX ADMIN — FLUOROURACIL 824 MG: 50 INJECTION, SOLUTION INTRAVENOUS at 13:17

## 2020-03-20 RX ADMIN — ONDANSETRON 8 MG: 2 INJECTION INTRAMUSCULAR; INTRAVENOUS at 10:10

## 2020-03-20 NOTE — PROGRESS NOTES
Arrived to the [facility] via [facility].  FOLFOX C5 completed. Pump verified with Jojo Bo RN. Patient tolerated well. Any issues or concerns during appointment: none. Patient aware of next infusion appointment on 3/22 (date) at 2:00 PM (time). Discharged via [facility].

## 2020-03-22 ENCOUNTER — HOSPITAL ENCOUNTER (OUTPATIENT)
Dept: INFUSION THERAPY | Age: 55
Discharge: HOME | End: 2020-03-22
Payer: MEDICAID

## 2020-03-22 PROCEDURE — 96523 IRRIG DRUG DELIVERY DEVICE: CPT

## 2020-03-22 RX ORDER — SODIUM CHLORIDE 0.9 % (FLUSH) 0.9 %
10 SYRINGE (ML) INJECTION AS NEEDED
Status: DISCONTINUED | OUTPATIENT
Start: 2020-03-22 | End: 2020-01-01 | Stop reason: HOSPADM

## 2020-03-22 RX ADMIN — Medication 10 ML: at 12:15

## 2020-03-22 NOTE — PROGRESS NOTES
Arrived to the [facility]. Assessment completed and Elastomeric pump discontinued. Patient tolerated well. Any issues or concerns during appointment: none. Patient aware of next infusion appointment on 04/05/2020 at 1430. Discharged via wheelchair.

## 2020-03-23 ENCOUNTER — HOSPITAL ENCOUNTER (OUTPATIENT)
Dept: CT IMAGING | Age: 55
Discharge: HOME | End: 2020-03-23
Attending: INTERNAL MEDICINE
Payer: MEDICAID

## 2020-03-23 DIAGNOSIS — C78.7 LIVER METASTASES (HCC): ICD-10-CM

## 2020-03-23 DIAGNOSIS — C15.5 MALIGNANT NEOPLASM OF LOWER THIRD OF ESOPHAGUS (HCC): ICD-10-CM

## 2020-03-23 PROCEDURE — 74177 CT ABD & PELVIS W/CONTRAST: CPT

## 2020-03-23 PROCEDURE — 74011000258 HC RX REV CODE- 258: Performed by: INTERNAL MEDICINE

## 2020-03-23 PROCEDURE — 74011636320 HC RX REV CODE- 636/320: Performed by: INTERNAL MEDICINE

## 2020-03-23 RX ORDER — HEPARIN 100 UNIT/ML
500 SYRINGE INTRAVENOUS ONCE
Status: DISCONTINUED | OUTPATIENT
Start: 2020-03-23 | End: 2020-03-24 | Stop reason: HOSPADM

## 2020-03-23 RX ORDER — SODIUM CHLORIDE 0.9 % (FLUSH) 0.9 %
10 SYRINGE (ML) INJECTION
Status: COMPLETED | OUTPATIENT
Start: 2020-03-23 | End: 2020-03-23

## 2020-03-23 RX ADMIN — IOPAMIDOL 100 ML: 755 INJECTION, SOLUTION INTRAVENOUS at 15:50

## 2020-03-23 RX ADMIN — DIATRIZOATE MEGLUMINE AND DIATRIZOATE SODIUM 15 ML: 660; 100 LIQUID ORAL; RECTAL at 15:50

## 2020-03-23 RX ADMIN — Medication 10 ML: at 15:50

## 2020-03-23 RX ADMIN — SODIUM CHLORIDE 100 ML: 900 INJECTION, SOLUTION INTRAVENOUS at 15:50

## 2020-03-24 LAB — DRUGS UR: NORMAL

## 2020-04-10 NOTE — PROGRESS NOTES
Arrived to the [facility]. FOLFOX completed. Patient tolerated well. Any issues or concerns during appointment:  After chemo treatment completed, patient c/o nausea and burning at the roof of his mouth. Ativan 1 mg IV administered per Jose Bradshaw NP who was notified. Patient aware of next infusion appointment on 4/12 (date) at 1:30 PM (time). Discharged via w/c with Adrucil CADD pump infusing.

## 2020-04-12 NOTE — PROGRESS NOTES
Arrived to the [facility]. DC elastomeric pump completed. Patient tolerated well. Any issues or concerns during appointment: none  Patient aware of next infusion appointment on 4/24/20 at [address] [place] [address]. Discharged via w/c to front of [facility].

## 2020-04-22 NOTE — PROGRESS NOTES
Screening for COVID-19 During Preassessment    1) Do you currently have signs or symptoms of a respiratory infection, such as fever, cough, shortness of breath or sore throat?  no    2) In the last 14 days have you had contact with any of the following:   A) Someone with confirmed or presumptive diagnosis of COVID-19? NO    Or B) Someone under investigation for COVID-19? NO    Or  C) Someone who has been diagnosed with COVID-19? NO    3) In the last 14 days have you traveled or has someone in your home traveled to [place], [facility], [place], [place], [place], [place], [place], or [place]?    No

## 2020-04-23 NOTE — PROCEDURES
Department of Interventional Radiology  [phone]        Interventional Radiology Brief Procedure Note    Patient: Valentin Dow MRN: [id number]  SSN: [id number]    YOB: 1965  Age: 60 y.o.   Sex: male      Date of Procedure: 4/23/2020    Pre-Procedure Diagnosis: esophageal cancer    Post-Procedure Diagnosis: SAME    Procedure(s): Feeding tube exchange    Brief Description of Procedure: as above    Performed By: Ady Matias MD     Assistants: None    Anesthesia:Lidocaine    Estimated Blood Loss: Less than 10ml    Specimens:  None    Implants:  25 F G tube    Findings: tube exchanged over a wire    Complications: None    Recommendations: OK to use     Follow Up: 4-5 months if needed    Signed By: Ady Matias MD     April 23, 2020

## 2020-04-23 NOTE — DISCHARGE INSTRUCTIONS
[address]  Department of Interventional Radiology  [phone] Office  [phone] Fax       FEEDING TUBE DISCHARGE INSTRUCTIONS    General Information:      Your doctor has asked us to put a feeding tube in your abdomen. The tube can be used to give you medications or supplemental nutrition. Home Care Instructions: You can resume your regular diet and medication regimen. Do not drink alcohol, drive, or make any important legal decisions in the next 24 hours. Do not lift anything heavier than a gallon of milk, or do anything strenuous for the next 24 hours. You will notice a dressing on your abdomen. Keep the site covered for 24-48 hours. Do no immerse yourself in water while the tube is in place. You will be asked to flush the feeding tube through the gastric and/or jejunal port daily after the first two days. You will flush the  port with 20ml of tap water. You must also flush the tube before and after any medication administration. ALWAYS check with your physician or pharmacist before crushing or dissolving medications. Clean around the tube with warm water or hydrogen peroxide diluted with warm water using a Q-tip. Always rinse the area with warm water after cleaning and pat dry. Call If:     You should call your Physician and/or the Radiology Nurse if you have any bleeding other than a small spot on your bandage. Call if you have any signs of infection including fever, swelling, or increased pain at the site. Call if you have any questions. Follow-Up Instructions: Please see your ordering doctor as he/she has requested. To Reach Us: If you have any questions about your procedure, please call the Interventional Radiology department at [phone]. After business hours (5pm) and weekends, call the answering service at [phone] and ask for the Radiologist on call to be paged.    Si tiene Preguntas acerca del procedimiento, por favor llame al departamento de Radiología Intervencional al [phone]. Después de horas de oficina (5 pm) y los fines de [place], llamar al [facility] [phone] y pregunte por el Radiologo de [facility]. Interventional Radiology General Nurse Discharge    After general anesthesia or intravenous sedation, for 24 hours or while taking prescription Narcotics:  · Limit your activities  · Do not drive and operate hazardous machinery  · Do not make important personal or business decisions  · Do  not drink alcoholic beverages  · If you have not urinated within 8 hours after discharge, please contact your surgeon on call. * Please give a list of your current medications to your Primary Care Provider. * Please update this list whenever your medications are discontinued, doses are     changed, or new medications (including over-the-counter products) are added. * Please carry medication information at all times in case of emergency situations. These are general instructions for a healthy lifestyle:    No smoking/ No tobacco products/ Avoid exposure to second hand smoke  Surgeon General's Warning:  Quitting smoking now greatly reduces serious risk to your health. Obesity, smoking, and sedentary lifestyle greatly increases your risk for illness  A healthy diet, regular physical exercise & weight monitoring are important for maintaining a healthy lifestyle    You may be retaining fluid if you have a history of heart failure or if you experience any of the following symptoms:  Weight gain of 3 pounds or more overnight or 5 pounds in a week, increased swelling in our hands or feet or shortness of breath while lying flat in bed. Please call your doctor as soon as you notice any of these symptoms; do not wait until your next office visit.     Recognize signs and symptoms of STROKE:  F-face looks uneven    A-arms unable to move or move unevenly    S-speech slurred or non-existent    T-time-call 911 as soon as signs and symptoms begin-DO NOT go       Back to bed or wait to see if you get better-TIME IS BRAIN.

## 2020-04-24 NOTE — PROGRESS NOTES
SW reviewed pt's account notes and noted pt's hospital assistance application was incomplete and pt's Medicaid is still pending. SW met briefly with pt in infusion and provided list of needed documents for complete hospital sponsorship application: letter of spouse's Social Security benefit or bank statement with direct deposit listed, and written permission to add spouse to application. SW also stated that pt's Medicaid is still pending. Pt verbalized understanding and appreciation. SW provided SW contact information should additional needs arise. He verbalized understanding. SW intends to follow up PRN.

## 2020-04-24 NOTE — PROGRESS NOTES
Arrived to the [facility]. Assessment complete, labs reviewed. FOLFOX regimen completed. Adrucil elastomeric pump connected and unclamped for flow, with dual RN verification, prior to discharge. Patient tolerated without problems. Any issues or concerns during appointment: None. Pt instructed to call for any concerns or questions. Pt verbalized understanding. Patient aware of next infusion appointment on 4/26/2020 (date) at [phone] (time). Discharged via wheelchair.

## 2020-04-26 NOTE — PROGRESS NOTES
Patient arrived to infusion center thirty minutes past appt time. Several attempts were made prior to patient showing up to call all numbers listed on file. No answer from any numbers and daughter-in-law's number not set up to receive voice messages. Pump successfully removed. Port de-accessed. Patient had no complaints or needs at this time. Discharged via wheelchair to caregiver. Next appt is on 5/8/2020 @ 2230.

## 2020-05-13 NOTE — PROGRESS NOTES
Pt arrived ambulatory with cane, folfox completed, 5FU pump connected and unclamped, pt discharged home aware of appointment Friday at 4:30

## 2020-05-13 NOTE — PROGRESS NOTES
Pt returned completed applications for [facility], YURIY faxed to And completed application for [employer], YURIY faxed to Pt also provided wife's benefit letter of Supplemental Security Income to complete his hospital assistance application, provided to financial navigator Nato.

## 2020-05-15 NOTE — PROGRESS NOTES
Arrived to the [facility]. D/C pump and hydration completed. Patient tolerated well. Any issues or concerns during appointment:  Patient with orthostatic hypotension upon arrival.  Hydration administered per protocol. Patient reports only having time to receive half the bag(500mls) because wife is waiting outside for him. Patient aware of next infusion appointment on 5/27 (date) at 8:30 AM (time). Discharged ambulatory.

## 2020-05-27 NOTE — PROGRESS NOTES
Arrived to the [facility]. Folfox completed. Patient tolerated well. Any issues or concerns during appointment: [facility], Palliative care to follow up on pain meds. Patient advised to call her with questions. Patient aware of next infusion appointment on 5/29 at 5pm. 
Discharged ambulatory with cane to home.

## 2020-05-29 NOTE — PROGRESS NOTES
Pt arrived ambulatory today at 1014, to have fluorouracil pump removed. Pt tolerated without difficulty. Patient discharged via ambulatory accompanied by self. Instructed to notify physician of any problems, questions or concerns. Allowed opportunity for patient/family to ask questions. Verbalized understanding. Next appointment is June 9 at 0900 with Denisa Vides.

## 2020-06-09 NOTE — PROGRESS NOTES
Arrived to the [facility]. FOLFOX completed. Patient tolerated well. Any issues or concerns during appointment: none. Patient aware of next infusion appointment on 6/11 (date) at 3:00 PM (time). Discharged via w/c with Adrucil CADD pump infusing.

## 2020-06-11 NOTE — PROGRESS NOTES
Arrived to infusion center for pump dc. Pump taken off, port flushed and de-accessed. Patient reports intermittent nausea with pump, controlled with home prns. Reports needs refill for roxicodone; Erich Lee (navigator) emailed and updated. Patient aware of next appt 6/23 at 10:30. Discharged via wheelchair to home.

## 2020-06-23 NOTE — PROGRESS NOTES
Arrived to infusion. FOLFOX and D1C1 Herceptin completed without issues. Patient denies new concerns. Incontinent episode x1 and given scrubs to change into. Elastomeric pump connected and verified with Kristopher Yung RN. Next infusion appointment is 6/25/20 at 330pm.   Discharged via w/c to car.

## 2020-06-23 NOTE — PROGRESS NOTES
Clinical Social Work Note  Name: Marlena Ely  : 1965  MRN: [id number]  Date of Service: 2020  Location of Service: [facility]  Date of Service: 2020    Type of Service: Case Management     Length of Service: 5 minutes    Patient Diagnosis:   1. Malignant neoplasm of esophagus, unspecified location ([facility] [address]    2. Esophageal mass    3. Liver metastases [facility])        Referral Source: NP    Reason for Visit: FU    CM Note: Seen patient at Infusion. Patient was somnolent and was not able to wake up.          3 most recent [facility] 2020   PHQ Not Done Patient Decline - Patient Decline   Little interest or pleasure in doing things Not at all Not at all Not at all   Feeling down, depressed, irritable, or hopeless Not at all Several days Not at all   Total Score PHQ 2 0 1 0         Signed By: DHEERAJ Bryan     2020

## 2020-06-25 NOTE — PROGRESS NOTES
Arrived to the [facility].  Assessment completed. Patient's Adrucil pump disconnected.    Any issues or concerns during appointment: See previous note.   Educated pt on the importance of staying hydrated and to call with any needs or concerns.  Pt verbalized understanding. Patient aware of next infusion appointment on 7/7/2020 (date) [phone] (time). Discharged ambulatory via cane.

## 2020-06-25 NOTE — PROGRESS NOTES
Clinical Social Work Note  Name: Timothy Hi  : 1965  MRN: [id number]  Date of Service: 2020  Location of Service: [facility]  Date of Service: 2020    Type of Service: Case Management     Length of Service: 15 minutes    Patient Diagnosis:   1. Liver metastases ([address]    2. Esophageal mass         Referral Source: NP    Reason for Visit:     CM Note: Seen patient at infusion. Patient stated that he has used methamphetamine \"most of my entire life\". Patient responded, \"I do not want to quit\". Patient declined any behavioral health counseling. However. patient request assistance with his social security check. \"I want to get my money\". Writer informed patient that it was approved on 6/10/2020. Also, if patient does not receive a check by the end of July; he will have to call the social security office. At  this moment, pt denisd any other psychosocial and economic needs. Mental Status Exam: Intellectual functioning appears to be intact. Mood is \"fine\" and affect is good. Insight is adequate. Judgment is adequate. Patient did not report suicidal ideations, intent or plans. Patient did not report homicidal ideations, intent or plans. Patient is oriented to self, place, time and situation. Protective Factors: Current care for physical and mental illness, adequate insight and judgment, family support, cultural and Anglican beliefs and values that support self-care. Next Steps: SW gave contact information and encouraged pt to call should any needs arise. Pt verbalized understanding. SW intends to follow up by phone and/or face-to-face as needed.     3 most recent [facility] Screens 2020   PHQ Not Done Patient Decline - Patient Decline   Little interest or pleasure in doing things Not at all Not at all Not at all   Feeling down, depressed, irritable, or hopeless Not at all Several days Not at all   Total Score PHQ 2 0 1 0         Signed By: Jessenia Hall June 25, 2020

## 2020-06-25 NOTE — PROGRESS NOTES
Pt arrived for chemotherapy pump removal. Upon assessment pt's pump noted to be half full. Assisted pt to pharmacy for weight of chemotherapy pump. Per pharmacist, 2743 mg of Adrucil delivered with 1757 mg left in pump (4500 mg Total). Dr. Shanthi Martinez made aware. Per Dr. Shanthi Martinez, [facility] to remove pt's chemotherapy pump. Pt made aware. Time for questions allowed.  Pt denies any questions

## 2020-07-13 NOTE — PROGRESS NOTES
Report received from Diamond Epperson , [address] [place] [address], chemo completed, pt tolerated well, discharged via wheelchair, aware of appointment on Wednesday at 3:30

## 2020-07-13 NOTE — PROGRESS NOTES
Verbalizes/demonstrates understanding of purpose/procedure/potential side effects of fluorouracil/oxaliplatin/leucovorin/herceptin.

## 2020-08-04 PROBLEM — E86.0 DEHYDRATION: Status: ACTIVE | Noted: 2020-01-01

## 2020-08-10 PROBLEM — E87.6 HYPOKALEMIA: Status: ACTIVE | Noted: 2020-01-01

## 2020-08-11 NOTE — PROGRESS NOTES
Pt. Discharged ambulatory. Tolerated chemotherapy well. No distress noted. To call physician with any problems or concerns. Understanding voiced.

## 2020-08-12 NOTE — PROGRESS NOTES
[facility] 5 FU Elastomeric pump completed. Provided education on pump-patient familiar with pump Patient instructed to report any side affects to ordering provider- Patient tolerated well Any issues or concerns during appointment:No 
Patient aware of next infusion appointment on Monday,August 24th @ 1100 Discharged home via wheelchair

## 2020-08-24 NOTE — PROGRESS NOTES
Pt. Discharged via wheelchair. Tolerated infusion well. No distress noted. To call physician with any problems or concerns. Understanding voiced. To return to Infusions on 8/26/20.

## 2020-08-24 NOTE — PROGRESS NOTES
Social Work Progress Note  Name: Antoni Abbasi  : 1965  MRN: [id number]  Date of Service: 2020    Length of Service: 15 minutes    Patient Diagnosis:   1. Esophageal carcinoma ([facility]    2. Hypokalemia    3. Esophageal mass    4. Liver metastases ([facility]        Reason for Visit: F/U    Subjective: Seen patient at Infusion. Patient was upset, \"my son had a heart attack\". Patient responded that his son, 42y.o. is at home now. DHEERAJ-CP provided validation and sustainment as patient vent about emotional and physical distress due to his cancer treatment. DHEERAJ-CP discussed with patient symptoms of distress, causes, triggers and coping skills. Protective Factors: Current care for physical and mental illness, adequate insight and judgment, family support, cultural and Denominational beliefs and values that support self-care. Patient did not report suicidal ideations, intent or plans. Patient did not report homicidal ideations, intent or plans. Patient is oriented to self, place, time and situation. Next Steps: DHEERAJ-CP gave contact information and encouraged pt to call should any needs arise. Pt verbalized understanding. DHEERAJ-CP intends to follow up as needed.       3 most recent [facility] 2020 8/10/2020 2020   PHQ Not Done - - -   Little interest or pleasure in doing things Not at all Not at all Not at all   Feeling down, depressed, irritable, or hopeless Several days Not at all Not at all   Total Score PHQ 2 1 0 0         Prema Scott [address], DHEERAJ

## 2020-08-24 NOTE — PROGRESS NOTES
Pt. Discharged via wheelchair. Tolerated  Chemotherapy well. No distress noted. To call physician with any problems or concerns. Understanding voiced. Chemotherapy pump in place and functioning properly. To return to Infusions on 8/26/20.

## 2020-08-26 NOTE — PROGRESS NOTES
Arrived to the [facility]. DC pump completed. Patient tolerated well. Any issues or concerns during appointment: none. Patient aware of next infusion appointment on 9/7/2020 at 11. Discharged ambulatory to home.

## 2020-09-10 NOTE — PROGRESS NOTES
I spoke with Jorge Warren and the patient stated he will have [facility] as his insurance effective 10/1/2020. The patient currently has [facility], [facility].

## 2020-10-02 NOTE — PROGRESS NOTES
Arrived to the [facility]. Assessment completed and labs reviewed. Pre meds, Herceptin and Folfox infusion completed. Elastomeric pump attached. Patient tolerated well. Any issues or concerns during appointment: none. Patient aware of next infusion appointment on 10/04/2020 at 26. Discharged via wheelchair.

## 2020-10-02 NOTE — PROGRESS NOTES
Problem: Knowledge Deficit Goal: *Participate in the learning process Outcome: Progressing Towards Goal 
Goal: *Verbalize description of procedure Outcome: Progressing Towards Goal 
Goal: *Verbalizes understanding and describes medication purposes and frequencies Outcome: Progressing Towards Goal 
Goal: *Knowledge of discharge instructions Outcome: Progressing Towards Goal 
Goal: Interventions Outcome: Progressing Towards Goal

## 2020-10-04 NOTE — PROGRESS NOTES
Arrived to the [facility]. Adrucil pump completed. Provided education on hydration    Patient instructed to report any side affects to ordering provider. Patient tolerated without problems. Any issues or concerns during appointment: no.  Patient aware of next infusion appointment on 10/16/20 (date) at 1500 (time). Discharged ambulatory.

## 2020-10-17 NOTE — PROGRESS NOTES
Arrived to the [facility]. 1 unit of PRBC's completed. Patient tolerated well. Any issues or concerns during appointment: none. Patient aware of next infusion appointment on 10/19/20 (date) at 0830 (time). Discharged via wheelchair.

## 2020-10-19 NOTE — PROGRESS NOTES
Problem: Knowledge Deficit  Goal: *Participate in the learning process  Outcome: Progressing Towards Goal  Goal: *Verbalize description of procedure  Outcome: Progressing Towards Goal  Goal: *Verbalizes understanding and describes medication purposes and frequencies  Outcome: Progressing Towards Goal  Goal: *Knowledge of discharge instructions  Outcome: Progressing Towards Goal  Goal: Interventions  Outcome: Progressing Towards Goal     Problem: Patient Education: Go to Patient Education Activity  Goal: Patient/Family Education  Outcome: Progressing Towards Goal Yes

## 2020-10-19 NOTE — PROGRESS NOTES
Arrived to the [facility]. Lab draw completed, ANC 0.4, WBC 1.2, Hgb 6.7. NO TREATMENT TODAY. Any issues or concerns during appointment: chemo rescheduled, type and screen completed for blood transfusion tomorrow. Patient aware of next infusion appointment on 10/20 at 9:00am.  Discharged via cane to home.

## 2020-10-28 NOTE — PROGRESS NOTES
folfox complete, adrucil push complete Adrucil elastomeric pump connected ,infusing at discharge Next appt 10/30  @ 530 Discharged via w/c

## 2020-10-30 NOTE — PROGRESS NOTES
Arrived to the [facility]. Pump DC completed and port flushed, needle removed. Any issues or concerns during appointment: none. Discharged to home.

## 2020-11-09 NOTE — PROGRESS NOTES
Called patient to check why pt missed appt today and recent scans and she stated she intended to call and get all appt today rescheduled due to wife of patient being hospitalized.  We will get patient rescheduled and call number

## 2020-11-11 PROBLEM — D50.9 IRON DEFICIENCY ANEMIA: Status: ACTIVE | Noted: 2020-01-01

## 2020-11-11 NOTE — PROGRESS NOTES
Arrived to the [facility]. Chemotherapy completed. Patient tolerated well. Any issues or concerns during appointment: none. Slept during entire infusion. Patient aware of next infusion appointment on 11/12/20 at 0830 for 2 units of blood. Pt instructed several times not to remove green blood bank bracelet and verbalized understanding. Discharged in [facility].

## 2020-11-11 NOTE — PROGRESS NOTES
YURIY attempted to follow up with pt in infusion, as palliative NP Loco Vera reported pt verbalized mild suicidal ideation without intent/specific plan (see palliative NP note) and concern about his disability status during NP appt. Pt known to YURIY and has refused counseling referral in the past but is scheduled to see psychiatry in January 2021. Pt was somnolent and did not wake up to SW. Pt did not appear in distress. YURIY left contact information for [facility] for follow up on disability. YURIY noted National Suicide Hotline number on table. YURIY intends to follow up PRN.

## 2020-11-12 NOTE — PROGRESS NOTES
I saw patient on 11-11-20 with Blank Slater and Naeem Fernandez. Pt presents today in wheelchair and states he is struggling some days. He continues to live with family. He denies any new uncontrolled symptoms. He will receive FOLFOX today and 2 units blood on 11-12. We also tasha iron studies today and pt will receive iron. Pt states he has used meth 5 days ago so echocardiogram ordered and scheduled. Spent time with patient and encouraged pt to attend ALL office appts. Pt verbalized understanding of this and agreed. Requested Uzair Chapin to see pt in infusion due to admitting suicidal thoughts on occasional bad days -pt states no plans of action. He remains on antidepressants.  Navigation will be following

## 2020-11-12 NOTE — PROGRESS NOTES
SW received update from [address] [address] that pt must contact Social Security directly for status update at this time. YURIY met with pt in infusion and provided SSA contact information, relaying the above. Pt verbalized understanding. SW provided pt with SW contact information and encouraged pt to call should any needs arise. Pt verbalized understanding and appreciation. YURIY intends to follow up PRN.

## 2020-11-12 NOTE — PROGRESS NOTES
Arrived to the [facility]. 2 units PRBCs completed. Patient tolerated well. Any issues or concerns during appointment: no 
Patient aware of next infusion appointment on 11/13/20 at 5pm for pump dc Discharged ambulatory with family.

## 2020-11-14 NOTE — PROGRESS NOTES
Arrived to the [facility]. Chemo pump removed and Iron infusion completed. Patient tolerated well. Any issues or concerns during appointment: Patient slept most of visit. .  Patient aware of next infusion appointment on 12/2 (date) at 56 (time). Scheduling notified to schedule patient 11/19 for dose #2 of Iron and to contact patient. Discharged via wheelchair.

## 2020-12-02 NOTE — PROGRESS NOTES
I saw patient on 12-1-20 with Dr Marisela Lauren. Dr Marisela Lauren explained disease is progressing and therapy will need to be changed. Pt will have echo after being on Herceptin therapy, then start Cyramza and Taxol. Pt will also have one more dose Feraheme scheduled. Pt verbalizes understanding. Emmanuel Barrera from palliative care is here to see patient and assist with comfort issues. Nurse navigation will continue to follow      Called to completely review patient's schedule with him and could not reach him on Safia's phone and unable to lv VM. Called only other num avail which was patient's wife and she wrote down schedule for 12-7, 12-8 and 12-9.  She stated she would make sure patient received message

## 2020-12-10 NOTE — PROGRESS NOTES
Arrived to the [facility]. Assessment complete, labs reviewed. Consent obtained/verified for chemotherapy. D1C1 Cyramza and Taxol completed. Patient tolerated without problems. Pt moniterd thirty minuets post completion of treatment, no issues noted. Any issues or concerns during appointment: Pt educated on how to take prescribed antiemetics. Pt instructed to call with any concerns or issues. Pt verbalized understanding. Time for questions allowed. Pt denies any questions at this time. Patient aware of next infusion appointment on 12/16/2020 (date) at 56 (time). Discharged ambulatory.

## 2020-12-10 NOTE — PROGRESS NOTES
Clinical Social Work Note Date of Service: 12/10/2020 Length of Service: 40 minutes Patient Diagnosis: esophageal 
 
Referral Source: RN Travisator Jonah Reason for Visit: transportation Clinical Note: Pt well known to [address] [place] [address] followed up with pt in infusion. Pt stated he's concerned about his wife's health after recent hospitalization and her non-compliance with follow up care. SW encouraged pt to call 911 should his concerns be emergent and wife refuses to seek medical attention. Pt verbalized understanding. Pt asked for transportation assistance. SW scheduled [employer] van pick ups below. Pt provided with hardcopy of schedule. 12/16/20 labs, follow up, chemo 
            Ruben Hammer  at 7:45 am 
 Confirmation number: [id number] 
            call [phone] for ride home or if ride does not show up on time 12/18/20 J-tube 
            Hutchinson Hammer  at 6:37 am 
 Confirmation number: [id number] 
            call [phone] for ride home or if ride does not show up on time 12/23/20 labs, follow up, chemo 
            Hutchinson Hammer  at 9:45 am 
 Confirmation number: [id number] 
            call [phone] for ride home or if ride does not show up on time Pt asked for assistance with Social Security. SW facilitated call and pt was told he would receive update on Social Security Disability determination within this week. Pt verbalized understanding. No other needs noted at this time. Next Steps: SW provided pt with SW contact information and encouraged pt to call should any needs arise. Pt verbalized understanding. SW intends to follow up at next visit. 3 most recent [facility] Screens 12/9/2020 12/1/2020 11/11/2020 PHQ Not Done - - - Little interest or pleasure in doing things Not at all Nearly every day Not at all Feeling down, depressed, irritable, or hopeless Several days Nearly every day Not at all Total Score PHQ 2 1 6 0  
 Trouble falling or staying asleep, or sleeping too much - Several days - Feeling tired or having little energy - Nearly every day - Poor appetite, weight loss, or overeating - Several days - Feeling bad about yourself - or that you are a failure or have let yourself or your family down - Not at all - Trouble concentrating on things such as school, work, reading, or watching TV - Not at all - Moving or speaking so slowly that other people could have noticed; or the opposite being so fidgety that others notice - Several days - Thoughts of being better off dead, or hurting yourself in some way - Several days -  
PHQ 9 Score - 13 - Electronically Signed By: Yocasta Chappell LMSW

## 2020-12-10 NOTE — PROGRESS NOTES
I saw patient on 12-9-20 with Brenda Dixon NP prior to Cyramza/Taxol start. We reviewed possible side effects and discussed patient's ongoing weight loss issues. Pt was strongly encouraged less meals more frequently with higher calories. Pt states is will need transportation assistance if he is to come 3 of 4 weeks for chemotherapy since he does no drive and his wife is sick. He also request a FC visit to assist w disability which I will schedule. Nurse navigation will continue to follow patient. Patient give my number and encouraged pt to call with any questions or concerns.

## 2020-12-17 NOTE — PROGRESS NOTES
Arrived to the [facility]. Assessment completed. Labs reviewed. Patient tolerating chemotherapy well. Any issues or concerns during appointment: states that there is some redness at the G-tube insertion site but that he goes at 1430 tomorrow to have it changed. Patient aware of next infusion appointment on 12/23/20 at 1400 with IV infusion center. Report given to Sil Mckeon RN, at this time.

## 2020-12-17 NOTE — PROGRESS NOTES
Arrived to the [facility]. Taxol and Feraheme infusion  completed. Patient tolerated well. Any issues or concerns during appointment: no. 
Patient aware of next infusion appointment on 12/23/20  at 1400 . Discharged via wheelchair by nurse.

## 2020-12-17 NOTE — PROGRESS NOTES
I spoke with Alvaroleo Dubin regarding his [facility] coverage, potential oral medication authorizations, enrollment in the [employer] ([facility]) and the [address] [place] [address] ([address]), and assistance organization resource sheet. Bonnielee Dubin stated he is currently receiving services from the [address] [place] [address]. Next, I spoke with Bonnielee Dubin regarding his Prescription Drug Benefits. Next, I spoke with Bonnielee Dubin about billing questions and treatment services. We discussed deductibles, copayments, and out of pocket maximums. Next, I spoke with Bonnielee Dubin regarding the Limited Power of [place] document. After reading the form, Agataelee Dubin signed the Limited Power of [place] document. Next we discussed the Taxol Medication and costs associated with the drug. Next, I spoke with Bonnielee Dubin regarding potential oral medication authorizations. I told him that if he ever had any problems getting his oral medications filled to give the dedicated [facility]  a call. Most of the time, it is simply an authorization that needs to be done with the insurance company. Next, I gave Bonnielee Dubin flyers about the free Yoga classes for cancer survivors and the Oncology Massage program.  I let his know that he can get a free 30 minute massage. Lastly, I gave Bonnielee Dubin a form with various resource organizations that could assist with specific needs (example:  transportation, lodging, preparing meals, home cleaning)      Jcgarrison Dubin expressed understanding of the information above and all questions were answered to his satisfaction.

## 2020-12-18 NOTE — DISCHARGE INSTRUCTIONS
[address]  Department of Interventional Radiology  [phone] Office  [phone] Fax       FEEDING TUBE DISCHARGE INSTRUCTIONS    General Information:      Your doctor has asked us to put a feeding tube in your abdomen. The tube can be used to give you medications or supplemental nutrition. Home Care Instructions: You can resume your regular diet and medication regimen. Do not drink alcohol, drive, or make any important legal decisions in the next 24 hours. Do not lift anything heavier than a gallon of milk, or do anything strenuous for the next 24 hours. You will notice a dressing on your abdomen. Keep the site covered for 24-48 hours. Do no immerse yourself in water while the tube is in place. You will be asked to flush the feeding tube through the gastric and/or jejunal port daily after the first two days. You will flush the  port with 20ml of tap water. You must also flush the tube before and after any medication administration. ALWAYS check with your physician or pharmacist before crushing or dissolving medications. Clean around the tube with warm water or hydrogen peroxide diluted with warm water using a Q-tip. Always rinse the area with warm water after cleaning and pat dry. Call If:     You should call your Physician and/or the Radiology Nurse if you have any bleeding other than a small spot on your bandage. Call if you have any signs of infection including fever, swelling, or increased pain at the site. Call if you have any questions. Follow-Up Instructions: Please see your ordering doctor as he/she has requested. To Reach Us: If you have any questions about your procedure, please call the Interventional Radiology department at [phone]. After business hours (5pm) and weekends, call the answering service at [phone] and ask for the Radiologist on call to be paged.    Si tiene Preguntas acerca del procedimiento, por favor llame al departamento de Radiología Intervencional al [phone]. Después de horas de oficina (5 pm) y los fines de [place], llamar al Jaylan cool [phone] y pregunte por el Radiologo de [facility]. Interventional Radiology General Nurse Discharge    After general anesthesia or intravenous sedation, for 24 hours or while taking prescription Narcotics:  · Limit your activities  · Do not drive and operate hazardous machinery  · Do not make important personal or business decisions  · Do  not drink alcoholic beverages  · If you have not urinated within 8 hours after discharge, please contact your surgeon on call. * Please give a list of your current medications to your Primary Care Provider. * Please update this list whenever your medications are discontinued, doses are     changed, or new medications (including over-the-counter products) are added. * Please carry medication information at all times in case of emergency situations. These are general instructions for a healthy lifestyle:    No smoking/ No tobacco products/ Avoid exposure to second hand smoke  Surgeon General's Warning:  Quitting smoking now greatly reduces serious risk to your health. Obesity, smoking, and sedentary lifestyle greatly increases your risk for illness  A healthy diet, regular physical exercise & weight monitoring are important for maintaining a healthy lifestyle    You may be retaining fluid if you have a history of heart failure or if you experience any of the following symptoms:  Weight gain of 3 pounds or more overnight or 5 pounds in a week, increased swelling in our hands or feet or shortness of breath while lying flat in bed. Please call your doctor as soon as you notice any of these symptoms; do not wait until your next office visit.     Recognize signs and symptoms of STROKE:  F-face looks uneven    A-arms unable to move or move unevenly    S-speech slurred or non-existent    T-time-call 911 as soon as signs and symptoms begin-DO NOT go       Back to bed or wait to see if you get better-TIME IS BRAIN.

## 2020-12-23 NOTE — PROGRESS NOTES
I saw patient today with Dr Ayad Arreguin prior to C1D15 Cyramza/Taxol. Pt denies complaints today and feels he is doing better and gaining strength. He is not in wheelchair today--assist with cane. Pt will continue to current regimen and return in 2 weeks. He remains with intake 4-5 cans Boost day via tube.

## 2020-12-23 NOTE — PROGRESS NOTES
Arrived to the infusion center for chemo-Cyramza / Taxol. Tolerated   Both without signs of adverse rteaction. BP prior to Ceramza was 155/97. Per Dr. Aurelio Hamilton ok to treat. No new issues or concerns voiced during the visit. At the end of treatment /106. Per Dr. Aurelio Hamilton no further orders concerning elevatd BP. Aware of next infusion on 1/6 at 1330. Discharged to home in satisfactory condition with no complaints.

## 2021-01-01 ENCOUNTER — HOME CARE VISIT (OUTPATIENT)
Dept: SCHEDULING | Facility: HOME HEALTH | Age: 56
End: 2021-01-01
Payer: MEDICAID

## 2021-01-01 ENCOUNTER — HOME CARE VISIT (OUTPATIENT)
Dept: HOSPICE | Facility: HOSPICE | Age: 56
End: 2021-01-01
Payer: MEDICAID

## 2021-01-01 ENCOUNTER — PATIENT OUTREACH (OUTPATIENT)
Dept: CASE MANAGEMENT | Age: 56
End: 2021-01-01

## 2021-01-01 ENCOUNTER — HOSPITAL ENCOUNTER (OUTPATIENT)
Dept: LAB | Age: 56
Discharge: HOME | End: 2021-02-03
Payer: MEDICAID

## 2021-01-01 ENCOUNTER — HOSPICE ADMISSION (OUTPATIENT)
Dept: HOSPICE | Facility: HOSPICE | Age: 56
End: 2021-01-01
Payer: MEDICAID

## 2021-01-01 ENCOUNTER — HOSPITAL ENCOUNTER (OUTPATIENT)
Dept: INFUSION THERAPY | Age: 56
Discharge: HOME | End: 2021-01-20
Payer: MEDICAID

## 2021-01-01 ENCOUNTER — HOSPITAL ENCOUNTER (OUTPATIENT)
Dept: INFUSION THERAPY | Age: 56
Discharge: HOME | End: 2021-01-06
Payer: MEDICAID

## 2021-01-01 VITALS — SYSTOLIC BLOOD PRESSURE: 110 MMHG | HEART RATE: 98 BPM | DIASTOLIC BLOOD PRESSURE: 70 MMHG

## 2021-01-01 VITALS — DIASTOLIC BLOOD PRESSURE: 80 MMHG | HEART RATE: 98 BPM | SYSTOLIC BLOOD PRESSURE: 110 MMHG

## 2021-01-01 VITALS — DIASTOLIC BLOOD PRESSURE: 80 MMHG | SYSTOLIC BLOOD PRESSURE: 140 MMHG | HEART RATE: 90 BPM

## 2021-01-01 VITALS
DIASTOLIC BLOOD PRESSURE: 80 MMHG | SYSTOLIC BLOOD PRESSURE: 146 MMHG | RESPIRATION RATE: 16 BRPM | OXYGEN SATURATION: 93 % | HEART RATE: 90 BPM

## 2021-01-01 VITALS — DIASTOLIC BLOOD PRESSURE: 58 MMHG | HEART RATE: 90 BPM | SYSTOLIC BLOOD PRESSURE: 88 MMHG

## 2021-01-01 VITALS — SYSTOLIC BLOOD PRESSURE: 130 MMHG | DIASTOLIC BLOOD PRESSURE: 60 MMHG | HEART RATE: 78 BPM

## 2021-01-01 VITALS
RESPIRATION RATE: 19 BRPM | DIASTOLIC BLOOD PRESSURE: 84 MMHG | BODY MASS INDEX: 18.81 KG/M2 | HEART RATE: 88 BPM | SYSTOLIC BLOOD PRESSURE: 132 MMHG | HEIGHT: 71 IN | TEMPERATURE: 97.6 F

## 2021-01-01 VITALS — SYSTOLIC BLOOD PRESSURE: 130 MMHG | DIASTOLIC BLOOD PRESSURE: 80 MMHG | HEART RATE: 100 BPM

## 2021-01-01 VITALS — HEART RATE: 88 BPM | DIASTOLIC BLOOD PRESSURE: 80 MMHG | SYSTOLIC BLOOD PRESSURE: 136 MMHG

## 2021-01-01 DIAGNOSIS — K22.89 ESOPHAGEAL MASS: ICD-10-CM

## 2021-01-01 DIAGNOSIS — C78.7 LIVER METASTASES (HCC): ICD-10-CM

## 2021-01-01 DIAGNOSIS — T45.1X5A NEUROPATHY DUE TO CHEMOTHERAPEUTIC DRUG (HCC): Primary | ICD-10-CM

## 2021-01-01 DIAGNOSIS — C15.9 ESOPHAGEAL CANCER, STAGE IV (HCC): ICD-10-CM

## 2021-01-01 DIAGNOSIS — C15.9 MALIGNANT NEOPLASM OF ESOPHAGUS, UNSPECIFIED LOCATION (HCC): ICD-10-CM

## 2021-01-01 DIAGNOSIS — G62.0 NEUROPATHY DUE TO CHEMOTHERAPEUTIC DRUG (HCC): Primary | ICD-10-CM

## 2021-01-01 DIAGNOSIS — E87.6 HYPOKALEMIA: ICD-10-CM

## 2021-01-01 DIAGNOSIS — C15.5 MALIGNANT NEOPLASM OF LOWER THIRD OF ESOPHAGUS (HCC): Primary | ICD-10-CM

## 2021-01-01 LAB
ALBUMIN SERPL-MCNC: 2.4 G/DL (ref 3.5–5)
ALBUMIN SERPL-MCNC: 2.4 G/DL (ref 3.5–5)
ALBUMIN SERPL-MCNC: 2.5 G/DL (ref 3.5–5)
ALBUMIN/GLOB SERPL: 0.5 {RATIO} (ref 1.2–3.5)
ALBUMIN/GLOB SERPL: 0.6 {RATIO} (ref 1.2–3.5)
ALBUMIN/GLOB SERPL: 0.6 {RATIO} (ref 1.2–3.5)
ALP SERPL-CCNC: 147 U/L (ref 50–136)
ALP SERPL-CCNC: 148 U/L (ref 50–136)
ALP SERPL-CCNC: 160 U/L (ref 50–136)
ALT SERPL-CCNC: 16 U/L (ref 12–65)
ALT SERPL-CCNC: 16 U/L (ref 12–65)
ALT SERPL-CCNC: 24 U/L (ref 12–65)
ANION GAP SERPL CALC-SCNC: 3 MMOL/L (ref 7–16)
ANION GAP SERPL CALC-SCNC: 6 MMOL/L (ref 7–16)
ANION GAP SERPL CALC-SCNC: 8 MMOL/L (ref 7–16)
AST SERPL-CCNC: 16 U/L (ref 15–37)
AST SERPL-CCNC: 17 U/L (ref 15–37)
AST SERPL-CCNC: 21 U/L (ref 15–37)
BASOPHILS # BLD: 0 K/UL (ref 0–0.2)
BASOPHILS # BLD: 0 K/UL (ref 0–0.2)
BASOPHILS # BLD: 0.1 K/UL (ref 0–0.2)
BASOPHILS NFR BLD: 1 % (ref 0–2)
BILIRUB SERPL-MCNC: 0.3 MG/DL (ref 0.2–1.1)
BILIRUB SERPL-MCNC: 0.3 MG/DL (ref 0.2–1.1)
BILIRUB SERPL-MCNC: 0.4 MG/DL (ref 0.2–1.1)
BUN SERPL-MCNC: 11 MG/DL (ref 6–23)
BUN SERPL-MCNC: 16 MG/DL (ref 6–23)
BUN SERPL-MCNC: 19 MG/DL (ref 6–23)
CALCIUM SERPL-MCNC: 8.5 MG/DL (ref 8.3–10.4)
CALCIUM SERPL-MCNC: 8.6 MG/DL (ref 8.3–10.4)
CALCIUM SERPL-MCNC: 8.9 MG/DL (ref 8.3–10.4)
CANCER AG19-9 SERPL-ACNC: 190.5 U/ML (ref 2–37)
CANCER AG19-9 SERPL-ACNC: 257.5 U/ML (ref 2–37)
CEA SERPL-MCNC: 7.1 NG/ML (ref 0–3)
CEA SERPL-MCNC: 7.8 NG/ML (ref 0–3)
CEA SERPL-MCNC: 8.6 NG/ML (ref 0–3)
CHLORIDE SERPL-SCNC: 95 MMOL/L (ref 98–107)
CHLORIDE SERPL-SCNC: 95 MMOL/L (ref 98–107)
CHLORIDE SERPL-SCNC: 96 MMOL/L (ref 98–107)
CO2 SERPL-SCNC: 28 MMOL/L (ref 21–32)
CO2 SERPL-SCNC: 29 MMOL/L (ref 21–32)
CO2 SERPL-SCNC: 32 MMOL/L (ref 21–32)
CREAT SERPL-MCNC: 0.7 MG/DL (ref 0.8–1.5)
CREAT SERPL-MCNC: 0.8 MG/DL (ref 0.8–1.5)
CREAT SERPL-MCNC: 1 MG/DL (ref 0.8–1.5)
DIFFERENTIAL METHOD BLD: ABNORMAL
EOSINOPHIL # BLD: 0.1 K/UL (ref 0–0.8)
EOSINOPHIL # BLD: 0.2 K/UL (ref 0–0.8)
EOSINOPHIL # BLD: 0.3 K/UL (ref 0–0.8)
EOSINOPHIL NFR BLD: 2 % (ref 0.5–7.8)
EOSINOPHIL NFR BLD: 3 % (ref 0.5–7.8)
EOSINOPHIL NFR BLD: 4 % (ref 0.5–7.8)
ERYTHROCYTE [DISTWIDTH] IN BLOOD BY AUTOMATED COUNT: 14.7 % (ref 11.9–14.6)
ERYTHROCYTE [DISTWIDTH] IN BLOOD BY AUTOMATED COUNT: 16.4 % (ref 11.9–14.6)
ERYTHROCYTE [DISTWIDTH] IN BLOOD BY AUTOMATED COUNT: 18.5 % (ref 11.9–14.6)
GLOBULIN SER CALC-MCNC: 4.3 G/DL (ref 2.3–3.5)
GLOBULIN SER CALC-MCNC: 4.3 G/DL (ref 2.3–3.5)
GLOBULIN SER CALC-MCNC: 4.7 G/DL (ref 2.3–3.5)
GLUCOSE SERPL-MCNC: 110 MG/DL (ref 65–100)
GLUCOSE SERPL-MCNC: 203 MG/DL (ref 65–100)
GLUCOSE SERPL-MCNC: 96 MG/DL (ref 65–100)
HCT VFR BLD AUTO: 30.9 % (ref 41.1–50.3)
HCT VFR BLD AUTO: 32.2 % (ref 41.1–50.3)
HCT VFR BLD AUTO: 32.6 % (ref 41.1–50.3)
HGB BLD-MCNC: 10.5 G/DL (ref 13.6–17.2)
HGB BLD-MCNC: 10.6 G/DL (ref 13.6–17.2)
HGB BLD-MCNC: 9.8 G/DL (ref 13.6–17.2)
IMM GRANULOCYTES # BLD AUTO: 0 K/UL (ref 0–0.5)
IMM GRANULOCYTES NFR BLD AUTO: 0 % (ref 0–5)
LYMPHOCYTES # BLD: 1.2 K/UL (ref 0.5–4.6)
LYMPHOCYTES # BLD: 1.3 K/UL (ref 0.5–4.6)
LYMPHOCYTES # BLD: 1.5 K/UL (ref 0.5–4.6)
LYMPHOCYTES NFR BLD: 15 % (ref 13–44)
LYMPHOCYTES NFR BLD: 18 % (ref 13–44)
LYMPHOCYTES NFR BLD: 27 % (ref 13–44)
MAGNESIUM SERPL-MCNC: 1.9 MG/DL (ref 1.8–2.4)
MAGNESIUM SERPL-MCNC: 1.9 MG/DL (ref 1.8–2.4)
MCH RBC QN AUTO: 31.4 PG (ref 26.1–32.9)
MCH RBC QN AUTO: 31.6 PG (ref 26.1–32.9)
MCH RBC QN AUTO: 31.7 PG (ref 26.1–32.9)
MCHC RBC AUTO-ENTMCNC: 31.7 G/DL (ref 31.4–35)
MCHC RBC AUTO-ENTMCNC: 32.5 G/DL (ref 31.4–35)
MCHC RBC AUTO-ENTMCNC: 32.6 G/DL (ref 31.4–35)
MCV RBC AUTO: 97.3 FL (ref 79.6–97.8)
MCV RBC AUTO: 97.3 FL (ref 79.6–97.8)
MCV RBC AUTO: 99 FL (ref 79.6–97.8)
MONOCYTES # BLD: 0.5 K/UL (ref 0.1–1.3)
MONOCYTES # BLD: 0.6 K/UL (ref 0.1–1.3)
MONOCYTES # BLD: 0.7 K/UL (ref 0.1–1.3)
MONOCYTES NFR BLD: 10 % (ref 4–12)
MONOCYTES NFR BLD: 6 % (ref 4–12)
MONOCYTES NFR BLD: 9 % (ref 4–12)
NEUTS SEG # BLD: 3.4 K/UL (ref 1.7–8.2)
NEUTS SEG # BLD: 5.1 K/UL (ref 1.7–8.2)
NEUTS SEG # BLD: 6 K/UL (ref 1.7–8.2)
NEUTS SEG NFR BLD: 60 % (ref 43–78)
NEUTS SEG NFR BLD: 69 % (ref 43–78)
NEUTS SEG NFR BLD: 76 % (ref 43–78)
NRBC # BLD: 0 K/UL (ref 0–0.2)
PLATELET # BLD AUTO: 167 K/UL (ref 150–450)
PLATELET # BLD AUTO: 174 K/UL (ref 150–450)
PLATELET # BLD AUTO: 175 K/UL (ref 150–450)
PMV BLD AUTO: 10.5 FL (ref 9.4–12.3)
PMV BLD AUTO: 9.5 FL (ref 9.4–12.3)
PMV BLD AUTO: 9.8 FL (ref 9.4–12.3)
POTASSIUM SERPL-SCNC: 4 MMOL/L (ref 3.5–5.1)
POTASSIUM SERPL-SCNC: 4.4 MMOL/L (ref 3.5–5.1)
POTASSIUM SERPL-SCNC: 4.4 MMOL/L (ref 3.5–5.1)
PROT SERPL-MCNC: 6.7 G/DL (ref 6.3–8.2)
PROT SERPL-MCNC: 6.8 G/DL (ref 6.3–8.2)
PROT SERPL-MCNC: 7.1 G/DL (ref 6.3–8.2)
PROT UR-MCNC: 37 MG/DL
PROT UR-MCNC: 64 MG/DL
RBC # BLD AUTO: 3.12 M/UL (ref 4.23–5.67)
RBC # BLD AUTO: 3.31 M/UL (ref 4.23–5.67)
RBC # BLD AUTO: 3.35 M/UL (ref 4.23–5.67)
SODIUM SERPL-SCNC: 130 MMOL/L (ref 136–145)
SODIUM SERPL-SCNC: 131 MMOL/L (ref 136–145)
SODIUM SERPL-SCNC: 131 MMOL/L (ref 136–145)
WBC # BLD AUTO: 5.7 K/UL (ref 4.3–11.1)
WBC # BLD AUTO: 7.4 K/UL (ref 4.3–11.1)
WBC # BLD AUTO: 7.9 K/UL (ref 4.3–11.1)

## 2021-01-01 PROCEDURE — 86301 IMMUNOASSAY TUMOR CA 19-9: CPT

## 2021-01-01 PROCEDURE — G0299 HHS/HOSPICE OF RN EA 15 MIN: HCPCS

## 2021-01-01 PROCEDURE — HOSPICE MEDICATION HC HH HOSPICE MEDICATION

## 2021-01-01 PROCEDURE — 83735 ASSAY OF MAGNESIUM: CPT

## 2021-01-01 PROCEDURE — 3331090004 HSPC SERVICE INTENSITY ADD-ON

## 2021-01-01 PROCEDURE — 0651 HSPC ROUTINE HOME CARE

## 2021-01-01 PROCEDURE — 96375 TX/PRO/DX INJ NEW DRUG ADDON: CPT

## 2021-01-01 PROCEDURE — 96417 CHEMO IV INFUS EACH ADDL SEQ: CPT

## 2021-01-01 PROCEDURE — 82378 CARCINOEMBRYONIC ANTIGEN: CPT

## 2021-01-01 PROCEDURE — G0155 HHCP-SVS OF CSW,EA 15 MIN: HCPCS

## 2021-01-01 PROCEDURE — 36415 COLL VENOUS BLD VENIPUNCTURE: CPT

## 2021-01-01 PROCEDURE — 74011000250 HC RX REV CODE- 250: Performed by: NURSE PRACTITIONER

## 2021-01-01 PROCEDURE — 96413 CHEMO IV INFUSION 1 HR: CPT

## 2021-01-01 PROCEDURE — 80053 COMPREHEN METABOLIC PANEL: CPT

## 2021-01-01 PROCEDURE — 85025 COMPLETE CBC W/AUTO DIFF WBC: CPT

## 2021-01-01 PROCEDURE — 74011250636 HC RX REV CODE- 250/636: Performed by: NURSE PRACTITIONER

## 2021-01-01 PROCEDURE — 3336500001 HSPC ELECTION

## 2021-01-01 PROCEDURE — 74011000250 HC RX REV CODE- 250: Performed by: INTERNAL MEDICINE

## 2021-01-01 PROCEDURE — 74011250636 HC RX REV CODE- 250/636: Performed by: INTERNAL MEDICINE

## 2021-01-01 PROCEDURE — 84156 ASSAY OF PROTEIN URINE: CPT

## 2021-01-01 RX ORDER — SODIUM CHLORIDE 9 MG/ML
25 INJECTION, SOLUTION INTRAVENOUS CONTINUOUS
Status: DISCONTINUED | OUTPATIENT
Start: 2021-01-01 | End: 2021-01-01 | Stop reason: HOSPADM

## 2021-01-01 RX ORDER — SODIUM CHLORIDE 0.9 % (FLUSH) 0.9 %
10 SYRINGE (ML) INJECTION AS NEEDED
Status: DISCONTINUED | OUTPATIENT
Start: 2021-01-01 | End: 2021-01-01 | Stop reason: HOSPADM

## 2021-01-01 RX ORDER — DEXAMETHASONE SODIUM PHOSPHATE 100 MG/10ML
10 INJECTION INTRAMUSCULAR; INTRAVENOUS ONCE
Status: COMPLETED | OUTPATIENT
Start: 2021-01-01 | End: 2021-01-01

## 2021-01-01 RX ORDER — DIPHENHYDRAMINE HYDROCHLORIDE 50 MG/ML
50 INJECTION, SOLUTION INTRAMUSCULAR; INTRAVENOUS ONCE
Status: COMPLETED | OUTPATIENT
Start: 2021-01-01 | End: 2021-01-01

## 2021-01-01 RX ADMIN — SODIUM CHLORIDE 25 ML/HR: 900 INJECTION, SOLUTION INTRAVENOUS at 14:30

## 2021-01-01 RX ADMIN — Medication 10 ML: at 16:45

## 2021-01-01 RX ADMIN — SODIUM CHLORIDE 500 MG: 9 INJECTION, SOLUTION INTRAVENOUS at 15:39

## 2021-01-01 RX ADMIN — FAMOTIDINE 20 MG: 10 INJECTION INTRAVENOUS at 14:28

## 2021-01-01 RX ADMIN — DIPHENHYDRAMINE HYDROCHLORIDE 50 MG: 50 INJECTION, SOLUTION INTRAMUSCULAR; INTRAVENOUS at 14:30

## 2021-01-01 RX ADMIN — DIPHENHYDRAMINE HYDROCHLORIDE 50 MG: 50 INJECTION, SOLUTION INTRAMUSCULAR; INTRAVENOUS at 14:26

## 2021-01-01 RX ADMIN — DEXAMETHASONE SODIUM PHOSPHATE 10 MG: 10 INJECTION INTRAMUSCULAR; INTRAVENOUS at 14:34

## 2021-01-01 RX ADMIN — PACLITAXEL 116 MG: 6 INJECTION, SOLUTION INTRAVENOUS at 16:04

## 2021-01-01 RX ADMIN — Medication 10 ML: at 17:05

## 2021-01-01 RX ADMIN — SODIUM CHLORIDE 25 ML/HR: 9 INJECTION, SOLUTION INTRAVENOUS at 14:23

## 2021-01-01 RX ADMIN — FAMOTIDINE 20 MG: 10 INJECTION INTRAVENOUS at 14:31

## 2021-01-01 RX ADMIN — DEXAMETHASONE SODIUM PHOSPHATE 10 MG: 10 INJECTION INTRAMUSCULAR; INTRAVENOUS at 14:30

## 2021-01-01 RX ADMIN — SODIUM CHLORIDE 500 MG: 9 INJECTION, SOLUTION INTRAVENOUS at 15:00

## 2021-01-06 NOTE — PROGRESS NOTES
Arrived to the [facility]. Taxol + Cyramza completed. Patient tolerated well. Any issues or concerns during appointment: none. Discharged ambulatory.     Jhoan Cameron RN

## 2021-01-06 NOTE — PROGRESS NOTES
1/6/2021  Pt seen with Ramos NP follow up esophageal cancer. Palliative care and dietician also in room. Due for C2 Cyramza/Taxol today. Pt upset/worried, son had a stroke and wife sick, having to take care of both. Reviewed medications. Pt states still having pain, controlled with medications. Pt is eating some by mouth along with feeding tube, denies N/V. Continue to Infusion for treatment. Encouraged to call with any questions/concerns. Navigation will continue to follow.

## 2021-01-20 NOTE — PROGRESS NOTES
[facility] Robertza completed. Patient tolerated without problems. Any issues or concerns during appointment: no. 
Patient aware of next follow up with Dr Casey Pan to discuss further chemotherapy plans Discharged via [facility] with family.

## 2021-01-20 NOTE — PROGRESS NOTES
I saw patient today prior to Cyramza/Taxol. We will hold Taxol today and pt will receive Cyramza  due to pt weakness and not feeling well today. Pt is also losing weight. We will hold chemo for 2 weeks and see pt will be seen in on ffice with Dr Casey Pan and CT scan and we will make decisions about further treatment plan. Sia Teixeira and Tosha Christie here to see patient today.

## 2021-02-11 PROBLEM — J18.9 PNEUMONIA OF LEFT LUNG DUE TO INFECTIOUS ORGANISM: Status: ACTIVE | Noted: 2019-03-21

## 2021-02-11 PROBLEM — J44.1 CHRONIC OBSTRUCTIVE PULMONARY DISEASE WITH ACUTE EXACERBATION (HCC): Status: ACTIVE | Noted: 2018-11-16

## 2021-02-11 PROBLEM — R07.2 PRECORDIAL PAIN: Status: ACTIVE | Noted: 2019-03-21

## 2021-02-17 PROBLEM — Z51.5 HOSPICE CARE: Status: ACTIVE | Noted: 2021-01-01

## 2021-07-01 NOTE — PROGRESS NOTES
H&P/Consult Note/Progress Note/Office Note:  
Shanel Odell  MRN: [id number]  :1965  Age:54 y.o. 
 
HPI: Shanel Odell is a 62 y.o. male who has Stage 4 metastatic esophageal adenocarcinoma with suspected liver, lungs, and garth mets. He has h/o ETOH abuse, tobacco abuse, meth abuse, homelessness, COPD, CHF,  
who was admitted from the ER after presenting with a several week h/o progressive N/V, inability to sustain PO intake and weight loss (>100lbs) Nothing in particular made his symptoms better or worse. He also had associated 2/10 substernal chest pain. No SOB He had not been taking his maint meds in >6 months Pertinent negatives include no back pain, no claudication, no cough, no diaphoresis, no dizziness 20 s/p EGD with esophageal bx; Dr Mark Lovell DIAGNOSIS ESOPHAGEAL MASS BIOPSIES: MODERATELY DIFFERENTIATED ADENOCARCINOMA. Electronically signed out on 2020 14:21 by Dottie Soto. Pablito Pelayo MD  
Findings: At 32-46cm there is a circumferential, partially obstructing, fungating, ulcerated mass concerning for malignancy. Extensive biopsies were obtained. STOMACH:  The mass extends into the proximal stomach. The fundus on antegrade and retroflexed views is normal. The body, antrum, and pylorus are normal.  
DUODENUM:  The bulb and second portions are normal.  
There are multiple enlarged periesophageal lymph nodes. The largest measures 34 x 14 mm in size. FNA not performed as the needle which averse the tumor and confirmation of malignancy would not affect staging. Further evaluation of the distal extent of tumor as well as stomach and liver could not be performed as the scope could not safely traverse the mass. Impression:   
Esophageal mass. This is concerning for a least T3N2Mx based on endoscopic ultrasound criteria. As the tumor could not be traversed, the distal extent could not be evaluated for invasion of adjacent structures such as the diaphragm. Thus, the possibility of understaging exists. More importantly, there is strong suspicion for metastatic liver and lung disease based on CT.  
  
GI recs:  As patient likely has unresectable malignancy, he may be a candidate for palliative esophageal stent placement. This would require multiple stents due to the length of the tumor. Based on extension into the stomach, he will be at increased risk of stent migration.  
  
 
 
 
 
1/16/20 CT chest/abd/pelvis with oral and IV contrast 
Hx:   Follow-up abnormal chest radiograph smoker with pulmonary 
nodules and 100-pound weight loss over the last several months. 
  
CT CHEST: There are innumerable bilateral pulmonary nodules, the largest 
measuring approximately 3.9 cm posteriorly in the right lower lobe. There is 
marked thickening of the wall of the distal esophagus below the level of the 
hilary which suggests the possibility of primary esophageal carcinoma. There is 
associated subcarinal and azygoesophageal recess lymphadenopathy. No 
significant pleural fluid. The thyroid appears normal as imaged. There is 
thickening of the left ventricular wall suggesting a hypertrophy. 
  
CT ABDOMEN: There are innumerable small hepatic lesions which are new from 2018 
and also suspicious for metastatic disease. The largest measures approximately 2 cm posteriorly in the right lobe. There are mildly enlarged lymph nodes in 
the lesser sac which may represent metastatic disease as well. The spleen, 
pancreas, kidneys, and adrenals appear unremarkable. 
  
CT PELVIS:  The appendix is not confidently identified, but there are no 
secondary signs of appendicitis. The prostate is not enlarged. No primary or 
secondary signs of appendicitis or identified. No pathologically enlarged lymph 
nodes or free fluid is evident. There is scattered aortoiliac atherosclerotic 
calcifications with mild ectasia but no focal aneurysm. Bone windows demonstrate no aggressive process, accounting for degenerative changes including 
severe osteoarthritis at the right hip. A 1 cm central lucency in the body of 
L4 has sclerotic margins and is not typical of metastatic disease. 
  
IMPRESSION:  
  
1. Extensive pulmonary parenchymal and hepatic metastatic disease as well as 
subcarinal and azygoesophageal recess lymphadenopathy likely representing 
metastatic disease as well. 
  
2.  Long-segment thickening of the wall of the distal esophagus suggests the 
possibility of a primary esophageal carcinoma. Follow-up gastroenterology 
consultation is recommended for consideration of endoscopic biopsy. 
  
3.  Left ventricular hypertrophy and scattered atherosclerosis.   
  
 
 
 
 
 
 
Past Medical History:  
Diagnosis Date  Acute respiratory failure with hypercapnia ([address] 2/1/2018  Chronic obstructive pulmonary disease ([address]  Heart failure ([address]  Sleep disorder Past Surgical History:  
Procedure Laterality Date  IR INSERT TUNL CVC W PORT OVER 5 YEARS  1/21/2020 Current Facility-Administered Medications Medication Dose Route Frequency  cephALEXin (KEFLEX) capsule 500 mg  500 mg Oral Q6H  
 heparin (porcine) 100 unit/mL injection 300 Units  300 Units InterCATHeter PRN  
 midazolam (VERSED) injection 0.5-2 mg  0.5-2 mg IntraVENous Multiple  fentaNYL citrate (PF) injection 25-50 mcg  25-50 mcg IntraVENous Multiple  lactated Ringers infusion  100 mL/hr IntraVENous CONTINUOUS  
 metroNIDAZOLE (FLAGYL) IVPB premix 500 mg  500 mg IntraVENous ON CALL  
 oxyCODONE-acetaminophen (PERCOCET 7.5) 7.5-325 mg per tablet 1 Tab  1 Tab Oral Q6H PRN  
 ALPRAZolam (XANAX) tablet 0.25 mg  0.25 mg Oral TID PRN  
 lisinopril (PRINIVIL, ZESTRIL) tablet 10 mg  10 mg Oral DAILY  polyethylene glycol (MIRALAX) packet 17 g  17 g Oral BID  
 HYDROmorphone (PF) (DILAUDID) injection 0.5 mg  0.5 mg IntraVENous Q4H PRN  
  promethazine (PHENERGAN) with saline injection 12.5 mg  12.5 mg IntraVENous Q6H PRN  
 famotidine (PF) (PEPCID) 20 mg in 0.9% sodium chloride 10 mL injection  20 mg IntraVENous Q12H  
 albuterol-ipratropium (DUO-NEB) 2.5 MG-0.5 MG/3 ML  3 mL Nebulization Q4H PRN  
 tiotropium (SPIRIVA) inhalation capsule 18 mcg  1 Cap Inhalation DAILY  arformoteroL (BROVANA) neb solution 15 mcg  15 mcg Nebulization BID RT  
 amiodarone (CORDARONE) tablet 200 mg  200 mg Oral DAILY  sodium chloride (NS) flush 5-40 mL  5-40 mL IntraVENous Q8H  
 sodium chloride (NS) flush 5-40 mL  5-40 mL IntraVENous PRN  
 acetaminophen (TYLENOL) tablet 650 mg  650 mg Oral Q4H PRN  
 naloxone (NARCAN) injection 0.4 mg  0.4 mg IntraVENous PRN  
 diphenhydrAMINE (BENADRYL) capsule 25 mg  25 mg Oral Q4H PRN  
 ondansetron (ZOFRAN) injection 4 mg  4 mg IntraVENous Q4H PRN  
 magnesium hydroxide (MILK OF MAGNESIA) 400 mg/5 mL oral suspension 30 mL  30 mL Oral DAILY PRN  
 nicotine (NICODERM CQ) 21 mg/24 hr patch 1 Patch  1 Patch TransDERmal Q24H  
 influenza vaccine 2019-20 (6 mos+)(PF) (FLUARIX/FLULAVAL/FLUZONE QUAD) injection 0.5 mL  0.5 mL IntraMUSCular PRIOR TO DISCHARGE  hydrALAZINE (APRESOLINE) tablet 50 mg  50 mg Oral Q6H PRN  
 hydrALAZINE (APRESOLINE) 20 mg/mL injection 10 mg  10 mg IntraVENous Q6H PRN  
 dilTIAZem (CARDIZEM) IR tablet 90 mg  90 mg Oral TID Patient has no known allergies. Social History Socioeconomic History  Marital status:  Spouse name: Not on file  Number of children: Not on file  Years of education: Not on file  Highest education level: Not on file Tobacco Use  Smoking status: Current Every Day Smoker Packs/day: 2.00 Substance and Sexual Activity  Alcohol use: No  
  Comment: once a month beer  Drug use: Yes Types: Marijuana, Methamphetamines Social History Tobacco Use Smoking Status Current Every Day Smoker  Packs/day: 2.00  
 
 No family history on file. ROS: The patient has no difficulty with chest pain or shortness of breath. No fever or chills. Comprehensive review of systems was otherwise unremarkable except as noted above. Physical Exam:  
Visit Vitals /76 Pulse 69 Temp 97.9 °F (36.6 °C) Resp 18 Ht 6' (1.829 m) Wt 183 lb 11.2 oz (83.3 kg) SpO2 92% BMI 24.91 kg/m² Vitals:  
 01/22/20 0743 01/22/20 0756 01/22/20 1122 01/22/20 1136 BP:  140/82 117/68 129/76 Pulse:  83 71 69 Resp:  18 18 18 Temp:  97.5 °F (36.4 °C) 97.8 °F (36.6 °C) 97.9 °F (36.6 °C) SpO2: 95% 99% 92% 92% Weight:      
Height:      
 
No intake/output data recorded. 01/20 1901 - 01/22 0700 In: 4768 [P.O.:480; I.V.:593] Out: 1453 [Urine:2425] Constitutional: Alert, oriented, cooperative patient in no acute distress; appears weak Eyes:Sclera are clear. EOMs intact ENMT: no external lesions gross hearing normal; no obvious neck masses, no ear or lip lesions, nares normal 
CV: RRR. Normal perfusion Resp: No JVD. Breathing is  non-labored; no audible wheezing. GI: soft and non-distended Musculoskeletal: unremarkable with normal function. No embolic signs or cyanosis. Neuro:  Oriented; moves all 4; no focal deficits Psychiatric: normal affect and mood, no memory impairment Recent vitals (if inpt): 
Patient Vitals for the past 24 hrs: 
 BP Temp Pulse Resp SpO2  
01/22/20 1136 129/76 97.9 °F (36.6 °C) 69 18 92 % 01/22/20 1122 117/68 97.8 °F (36.6 °C) 71 18 92 % 01/22/20 0756 140/82 97.5 °F (36.4 °C) 83 18 99 % 01/22/20 0743     95 % 01/22/20 0325 136/70 97.8 °F (36.6 °C) 82 18 99 % 01/21/20 2241 143/75 98.3 °F (36.8 °C) 92 17 99 % 01/21/20 2050     98 % 01/21/20 1948 114/64 98 °F (36.7 °C) 77 16 99 % 01/21/20 1537 108/65 99 °F (37.2 °C) 78 16 99 % 01/21/20 1420 114/69  88 16 100 % 01/21/20 1405 122/80  78 16 100 % 01/21/20 1350 127/85  78 16 100 % 01/21/20 1340 132/67  82 16 100 % 01/21/20 1335 134/78  83 16 100 % 01/21/20 1330 117/79  79 16 100 % 01/21/20 1325 126/72  79 16 100 % 01/21/20 1320 129/77  79 14 100 % 01/21/20 1315 124/78  88 14 100 % 01/21/20 1310 119/80  78 16 100 % 01/21/20 1305 113/67  80 16 100 % 01/21/20 1300 93/73  81 16 99 % Labs: 
Recent Labs  
  01/22/20 
1040 WBC 7.4 HGB 9.3*  
   
K 4.0  
 CO2 30 BUN 12  
CREA 0.93 * TBILI 0.2 SGOT 11* ALT 9*  
 Lab Results Component Value Date/Time WBC 7.4 01/22/2020 10:40 AM  
 HGB 9.3 (L) 01/22/2020 10:40 AM  
 PLATELET 980 60/29/5777 10:40 AM  
 Sodium 137 01/22/2020 10:40 AM  
 Potassium 4.0 01/22/2020 10:40 AM  
 Chloride 101 01/22/2020 10:40 AM  
 CO2 30 01/22/2020 10:40 AM  
 BUN 12 01/22/2020 10:40 AM  
 Creatinine 0.93 01/22/2020 10:40 AM  
 Glucose 106 (H) 01/22/2020 10:40 AM  
 INR 1.0 01/17/2020 04:25 AM  
 aPTT 57.3 (H) 01/22/2018 08:26 AM  
 Bilirubin, total 0.2 01/22/2020 10:40 AM  
 AST (SGOT) 11 (L) 01/22/2020 10:40 AM  
 ALT (SGPT) 9 (L) 01/22/2020 10:40 AM  
 Alk. phosphatase 112 01/22/2020 10:40 AM  
 Lipase 270 01/16/2020 06:49 AM  
 Troponin-I, Qt. <0.02 (L) 01/16/2020 06:49 AM  
 
 
CT Results  (Last 48 hours) None  
  
 
chest X-ray I reviewed recent labs, recent radiologic studies, and pertinent records including other doctor notes if needed. I independently reviewed radiology images for studies I described above or studies I have ordered. Admission date (for inpatients): 1/16/2020 Day of Surgery  Procedure(s): ESOPHAGOGASTRODUODENOSCOPY (EGD) PERCUTANEOUS ENDOSCOPIC GASTROSTOMY TUBE INSERTION 
 
ASSESSMENT/PLAN: 
Problem List  Date Reviewed: 1/17/2020 Codes Class Noted Cellulitis of left upper extremity ICD-10-CM: L03.114 
ICD-9-CM: 682.3  1/21/2020 * (Principal) Esophageal mass ICD-10-CM: K22.8 ICD-9-CM: 530.89  1/17/2020 Liver metastases ([facility] ICD-10-CM: C78.7 ICD-9-CM: 197.7  1/17/2020 Esophageal dysphagia ICD-10-CM: R13.10 ICD-9-CM: 787.20  1/17/2020 Weight loss ICD-10-CM: R63.4 ICD-9-CM: 783.21  1/17/2020 Chest pain ICD-10-CM: R07.9 ICD-9-CM: 786.50  1/16/2020 Methamphetamine abuse (HCC) (Chronic) ICD-10-CM: F15.10 ICD-9-CM: 305.70  12/2/2018 H/O atrial flutter (Chronic) ICD-10-CM: Z86.79 
ICD-9-CM: V12.59  12/2/2018 Overview Signed 2/6/2018  7:51 AM by Tacho Tong NP  
  1/2018 Atrial flutter, s/p cardioversion. Essential hypertension (Chronic) ICD-10-CM: I10 
ICD-9-CM: 401.9  12/2/2018 Acute respiratory failure with hypoxia and hypercarbia (HCC) ICD-10-CM: J96.01, J96.02 
ICD-9-CM: 518.81  12/2/2018 Substance abuse (HCC) (Chronic) ICD-10-CM: F19.10 ICD-9-CM: 305.90  12/2/2018 Noncompliance (Chronic) ICD-10-CM: Z91.19 ICD-9-CM: V15.81  12/2/2018 Acute encephalopathy ICD-10-CM: G93.40 ICD-9-CM: 348.30  12/2/2018 COPD (chronic obstructive pulmonary disease) (HCC) (Chronic) ICD-10-CM: J44.9 ICD-9-CM: 549  11/16/2018 NATALIIA (obstructive sleep apnea) (Chronic) ICD-10-CM: X50.59 
ICD-9-CM: 327.23  11/16/2018 Chronic respiratory failure (HCC) (Chronic) ICD-10-CM: J96.10 ICD-9-CM: 518.83  11/16/2018 Altered mental status ICD-10-CM: R41.82 
ICD-9-CM: 780.97  11/16/2018 Morbid obesity ([facility] (Chronic) ICD-10-CM: E66.01 
ICD-9-CM: 278.01  11/16/2018 Nicotine dependence (Chronic) ICD-10-CM: Z58.788 ICD-9-CM: 305.1  2/1/2018 Alcohol abuse (Chronic) ICD-10-CM: F10.10 ICD-9-CM: 305.00  2/1/2018 Homeless (Chronic) ICD-10-CM: Z59.0 ICD-9-CM: V60.0  1/15/2018 Urethral stricture (Chronic) ICD-10-CM: N35.919 ICD-9-CM: 598.9  1/7/2018 Anasarca ICD-10-CM: R60.1 ICD-9-CM: 782.3  1/6/2018 Acute kidney injury ([facility] ICD-10-CM: N17.9 ICD-9-CM: 584.9  1/6/2018 Principal Problem: 
  Esophageal mass (1/17/2020) Active Problems: 
  Nicotine dependence (2/1/2018) Methamphetamine abuse ([facility] (12/2/2018) Alcohol abuse (2/1/2018) Homeless (1/15/2018) Chest pain (1/16/2020) Liver metastases ([facility] (1/17/2020) Esophageal dysphagia (1/17/2020) Weight loss (1/17/2020) Cellulitis of left upper extremity (1/21/2020) Metastatic Esophageal adenocarcinoma with suspected lung/liver/garth mets Med onc and palliative care following I discussed case with Dr Flako Lucero who thinks we may be able to get a PEG placed and avoid surgery Homelessness He was homeless for about 6-8months but now lives with his son, but there is no heat in house (except portable propane tank space heater) Dysphagia/Weight loss I discussed the patient's condition with the patient at length and treatment options. I discussed risks of PEG placement in language the patient could understand including bleeding, infection, need for further surgery, inability to pace PEG secondary to esophageal tumor, esophageal bleeding or perforation, bowel or liver injury, pneumonia, blood clots, risks of anesthesia, etc..... The patient voiced understanding of all this and all questions were answered. Alternatives to surgery were discussed also and risks of the alternatives. The patient requested that we proceed with surgery. Informed consent was obtained. Stable overnight with no new issues s/p Port placement on 1/21/20 We decided paxton proceed with attempted PEG today GI lab nortified NPO Eventually Home on full liquid diet only and tube feeds prn PEG feedings per nutrition recommendations I have personally performed a face-to-face diagnostic evaluation and management  service on this patient. I have independently seen the patient. I have independently obtained the above history from the patient/family. I have independently examined the patient with above findings. I have independently reviewed data/labs for this patient and developed the above plan of care (MDM). Signed: Raghu Justin.  Stephon Vann MD, FACS 
 
 
 multiple medical complaints

## 2021-11-11 NOTE — PROGRESS NOTES
Arrived to the [facility]. 5 FU Elastomeric pump completed.    Provided education on pump  Patient instructed to report any side affects to ordering provider-  Patient tolerated well  Any issues or concerns during appointment: No  Patient aware of next infusion appointment on Monday,July 27th @ 1000  Discharged home via wheelchair Health Maintenance Due   Topic Date Due   • Shingles Vaccine (1 of 2) Never done   • Colorectal Cancer Screen-  11/28/2011   • DTaP/Tdap/Td Vaccine (2 - Td or Tdap) 12/10/2017   • Influenza Vaccine (1) 09/01/2021   • COVID-19 Vaccine (3 - Booster for Moderna series) 09/25/2021       Patient is due for topics as listed above but is not proceeding with Immunization(s) Influenza at this time. Education provided for Immunization(s) Influenza.

## 2022-05-04 NOTE — PROGRESS NOTES
Problem: Knowledge Deficit  Goal: *Participate in the learning process  Outcome: Progressing Towards Goal (2) good, crying

## 2022-05-09 NOTE — PROGRESS NOTES
Verbal report given to Stephany Cornelius RN. impairments found/functional limitations in following categories/risk reduction/prevention/rehab potential/therapy frequency/predicted duration of therapy intervention/anticipated discharge recommendation

## 2022-05-11 NOTE — PROGRESS NOTES
Patient remains in stable condition with respirations even/unlabored. No acute distress noted. No needs noted or voiced at this time. Safety measures in place. Oxygen per nasal cannula. Call light remains within reach. Preparing to give report to oncoming shift. 3

## 2025-06-02 NOTE — ED NOTES
NG/OG Tube    Performed by: Russell Duval CRNA  Authorized by: Simon Escalona MD    NG/OG Placement:     Insertion Location:  Mouth    Size:  18 Fr    Placement verification Method:  Gastric contents aspirated    Securement:  Taped to the cheek    Current cm Marking at Exit:  45    Drainage:  Gastric contents    Tube Status:  Continuous suction    Start Time:  6/2/2025 9:15 AM       TRANSFER - OUT REPORT:    Verbal report given to Areli Bowers RN (name) on Himanshu Orlando  being transferred to [facility] (unit) for routine progression of care       Report consisted of patients Situation, Background, Assessment and   Recommendations(SBAR). Information from the following report(s) SBAR, ED Summary, [facility] and Recent Results was reviewed with the receiving nurse. Lines:   Peripheral IV 02/01/18 Right Antecubital (Active)   Site Assessment Clean, dry, & intact 2/1/2018  7:30 PM   Phlebitis Assessment 0 2/1/2018  7:30 PM   Infiltration Assessment 0 2/1/2018  7:30 PM   Dressing Status Clean, dry, & intact 2/1/2018  7:30 PM       Peripheral IV 02/01/18 Right;Upper Arm (Active)   Site Assessment Clean, dry, & intact 2/1/2018  7:30 PM   Phlebitis Assessment 0 2/1/2018  7:30 PM   Infiltration Assessment 0 2/1/2018  7:30 PM   Dressing Status Clean, dry, & intact 2/1/2018  7:30 PM       Peripheral IV 02/01/18 Left Antecubital (Active)   Site Assessment Clean, dry, & intact 2/1/2018  7:30 PM   Phlebitis Assessment 0 2/1/2018  7:30 PM   Infiltration Assessment 0 2/1/2018  7:30 PM   Dressing Status Clean, dry, & intact 2/1/2018  7:30 PM        Opportunity for questions and clarification was provided.       Patient transported with:   Registered Nurse and RT

## 2025-07-17 NOTE — PROGRESS NOTES
New colostomy bag placed   Problem: Falls - Risk of 
Goal: *Absence of Falls Document Miley Pena Fall Risk and appropriate interventions in the flowsheet. Outcome: Progressing Towards Goal 
Fall Risk Interventions: 
Mobility Interventions: Bed/chair exit alarm, OT consult for ADLs, Patient to call before getting OOB, PT Consult for mobility concerns, PT Consult for assist device competence Medication Interventions: Bed/chair exit alarm, Evaluate medications/consider consulting pharmacy, Patient to call before getting OOB, Teach patient to arise slowly, Utilize gait belt for transfers/ambulation, Assess postural VS orthostatic hypotension Elimination Interventions: Bed/chair exit alarm, Call light in reach, Elevated toilet seat, Patient to call for help with toileting needs, Toilet paper/wipes in reach, Toileting schedule/hourly rounds, Urinal in reach IV discontinued, cath removed intact

## (undated) DEVICE — CONNECTOR TBNG OD5-7MM O2 END DISP

## (undated) DEVICE — AIRLIFE™ OXYGEN TUBING 7 FEET (2.1 M) CRUSH RESISTANT OXYGEN TUBING, VINYL TIPPED: Brand: AIRLIFE™

## (undated) DEVICE — KENDALL RADIOLUCENT FOAM MONITORING ELECTRODE RECTANGULAR SHAPE: Brand: KENDALL

## (undated) DEVICE — CANNULA NSL ORAL AD FOR CAPNOFLEX CO2 O2 AIRLFE

## (undated) DEVICE — CONTAINER PREFIL FRMLN 40ML --

## (undated) DEVICE — BLOCK BITE AD 60FR W/ VELC STRP ADDRESSES MOST PT AND

## (undated) DEVICE — BLLN KT O RING ENDOSCP US --

## (undated) DEVICE — FORCEPS BX L240CM JAW DIA2.8MM L CAP W/ NDL MIC MESH TOOTH

## (undated) DEVICE — KIT GASTMY PERC PEG PULL 20FR -- ENDOVIVE BX/2

## (undated) DEVICE — MOUTHPIECE ENDOSCP 20X27MM --